# Patient Record
Sex: MALE | Race: WHITE | NOT HISPANIC OR LATINO | Employment: OTHER | URBAN - METROPOLITAN AREA
[De-identification: names, ages, dates, MRNs, and addresses within clinical notes are randomized per-mention and may not be internally consistent; named-entity substitution may affect disease eponyms.]

---

## 2023-02-13 LAB
ANION GAP SERPL CALCULATED.3IONS-SCNC: 10 MMOL/L (ref 7–15)
APTT PPP: 27 SECONDS (ref 22–38)
BUN SERPL-MCNC: 23.2 MG/DL (ref 8–23)
CALCIUM SERPL-MCNC: 9.5 MG/DL (ref 8.8–10.2)
CHLORIDE SERPL-SCNC: 96 MMOL/L (ref 98–107)
CREAT SERPL-MCNC: 1.16 MG/DL (ref 0.67–1.17)
DEPRECATED HCO3 PLAS-SCNC: 27 MMOL/L (ref 22–29)
GFR SERPL CREATININE-BSD FRML MDRD: 70 ML/MIN/1.73M2
GLUCOSE SERPL-MCNC: 98 MG/DL (ref 70–99)
INR PPP: 1.01 (ref 0.85–1.15)
POTASSIUM SERPL-SCNC: 4.8 MMOL/L (ref 3.4–5.3)
SODIUM SERPL-SCNC: 133 MMOL/L (ref 136–145)

## 2023-02-13 PROCEDURE — 99285 EMERGENCY DEPT VISIT HI MDM: CPT | Mod: 25

## 2023-02-13 PROCEDURE — 85610 PROTHROMBIN TIME: CPT | Performed by: NURSE PRACTITIONER

## 2023-02-13 PROCEDURE — 36415 COLL VENOUS BLD VENIPUNCTURE: CPT | Performed by: NURSE PRACTITIONER

## 2023-02-13 PROCEDURE — 85730 THROMBOPLASTIN TIME PARTIAL: CPT | Performed by: NURSE PRACTITIONER

## 2023-02-13 PROCEDURE — 80048 BASIC METABOLIC PNL TOTAL CA: CPT | Performed by: NURSE PRACTITIONER

## 2023-02-13 PROCEDURE — 85007 BL SMEAR W/DIFF WBC COUNT: CPT | Performed by: NURSE PRACTITIONER

## 2023-02-13 PROCEDURE — 85014 HEMATOCRIT: CPT | Performed by: NURSE PRACTITIONER

## 2023-02-14 ENCOUNTER — APPOINTMENT (OUTPATIENT)
Dept: RADIOLOGY | Facility: HOSPITAL | Age: 63
End: 2023-02-14
Attending: EMERGENCY MEDICINE
Payer: MEDICARE

## 2023-02-14 ENCOUNTER — APPOINTMENT (OUTPATIENT)
Dept: ULTRASOUND IMAGING | Facility: HOSPITAL | Age: 63
End: 2023-02-14
Attending: NURSE PRACTITIONER
Payer: MEDICARE

## 2023-02-14 ENCOUNTER — HOSPITAL ENCOUNTER (EMERGENCY)
Facility: HOSPITAL | Age: 63
Discharge: HOME | End: 2023-02-14
Attending: EMERGENCY MEDICINE | Admitting: EMERGENCY MEDICINE
Payer: MEDICARE

## 2023-02-14 VITALS
HEART RATE: 71 BPM | OXYGEN SATURATION: 99 % | RESPIRATION RATE: 18 BRPM | WEIGHT: 157 LBS | SYSTOLIC BLOOD PRESSURE: 135 MMHG | TEMPERATURE: 97.7 F | BODY MASS INDEX: 24.64 KG/M2 | DIASTOLIC BLOOD PRESSURE: 67 MMHG | HEIGHT: 67 IN

## 2023-02-14 DIAGNOSIS — R60.0 BILATERAL LOWER EXTREMITY EDEMA: ICD-10-CM

## 2023-02-14 DIAGNOSIS — M79.605 PAIN OF LEFT LOWER EXTREMITY: ICD-10-CM

## 2023-02-14 LAB
BASOPHILS # BLD MANUAL: 0 10E3/UL (ref 0–0.2)
BASOPHILS NFR BLD MANUAL: 0 %
EOSINOPHIL # BLD MANUAL: 0.1 10E3/UL (ref 0–0.7)
EOSINOPHIL NFR BLD MANUAL: 1 %
ERYTHROCYTE [DISTWIDTH] IN BLOOD BY AUTOMATED COUNT: 15.3 % (ref 10–15)
HCT VFR BLD AUTO: 41.3 % (ref 40–53)
HGB BLD-MCNC: 13 G/DL (ref 13.3–17.7)
LYMPHOCYTES # BLD MANUAL: 2 10E3/UL (ref 0.8–5.3)
LYMPHOCYTES NFR BLD MANUAL: 25 %
MCH RBC QN AUTO: 32.7 PG (ref 26.5–33)
MCHC RBC AUTO-ENTMCNC: 31.5 G/DL (ref 31.5–36.5)
MCV RBC AUTO: 104 FL (ref 78–100)
MONOCYTES # BLD MANUAL: 0.9 10E3/UL (ref 0–1.3)
MONOCYTES NFR BLD MANUAL: 12 %
NEUTROPHILS # BLD MANUAL: 4.9 10E3/UL (ref 1.6–8.3)
NEUTROPHILS NFR BLD MANUAL: 62 %
PLAT MORPH BLD: NORMAL
PLATELET # BLD AUTO: 143 10E3/UL (ref 150–450)
RBC # BLD AUTO: 3.97 10E6/UL (ref 4.4–5.9)
RBC MORPH BLD: NORMAL
WBC # BLD AUTO: 7.9 10E3/UL (ref 4–11)

## 2023-02-14 PROCEDURE — 73610 X-RAY EXAM OF ANKLE: CPT | Mod: LT

## 2023-02-14 PROCEDURE — 73590 X-RAY EXAM OF LOWER LEG: CPT | Mod: LT

## 2023-02-14 PROCEDURE — 93971 EXTREMITY STUDY: CPT | Mod: LT

## 2023-02-14 PROCEDURE — 250N000013 HC RX MED GY IP 250 OP 250 PS 637: Performed by: EMERGENCY MEDICINE

## 2023-02-14 RX ORDER — HYDROCODONE BITARTRATE AND ACETAMINOPHEN 5; 325 MG/1; MG/1
1 TABLET ORAL EVERY 6 HOURS PRN
Qty: 10 TABLET | Refills: 0 | Status: SHIPPED | OUTPATIENT
Start: 2023-02-14 | End: 2023-02-17

## 2023-02-14 RX ORDER — HYDROCODONE BITARTRATE AND ACETAMINOPHEN 5; 325 MG/1; MG/1
2 TABLET ORAL ONCE
Status: COMPLETED | OUTPATIENT
Start: 2023-02-14 | End: 2023-02-14

## 2023-02-14 RX ADMIN — HYDROCODONE BITARTRATE AND ACETAMINOPHEN 2 TABLET: 5; 325 TABLET ORAL at 03:54

## 2023-02-14 ASSESSMENT — ENCOUNTER SYMPTOMS: MYALGIAS: 1

## 2023-02-14 NOTE — ED TRIAGE NOTES
Patient comes to ED for evaluation of left lower leg swelling, redness and warmth to touch. Started today.      Triage Assessment     Row Name 02/13/23 2014       Triage Assessment (Adult)    Airway WDL WDL       Skin Circulation/Temperature WDL    Skin Circulation/Temperature WDL X  reddened swollen left leg       Cardiac WDL    Cardiac WDL WDL       Peripheral/Neurovascular WDL    Peripheral Neurovascular WDL WDL       Cognitive/Neuro/Behavioral WDL    Cognitive/Neuro/Behavioral WDL WDL

## 2023-02-14 NOTE — DISCHARGE INSTRUCTIONS
As discussed in the ER recommend restarting your diuretic medication to help remove fluid from your legs.  Recommend use of the wheelchair and limit weightbearing on that left leg to allow rest and follow-up with your regular doctor in the next week for recheck of pain and symptoms.  If any redness, increasing swelling, open wound, or changes in pain level occur you may return to the ER or call your doctor.  Regarding the pain medication recommend taking your senna-s 1 tablet twice daily as previously taken but if constipation starts to develop may increase to 2 tabs twice daily.

## 2023-02-14 NOTE — ED PROVIDER NOTES
NAME: Zackary Hutchison  AGE: 62 year old male  YOB: 1960  MRN: [id number]  EVALUATION DATE & TIME: No admission date for patient encounter.    PCP: Reid Escobar    ED PROVIDER: Michael Wilkinson M.D.      Chief Complaint   Patient presents with     Leg Swelling     FINAL IMPRESSION:  1. Pain of left lower extremity    2. Bilateral lower extremity edema      MEDICAL DECISION MAKIN:59 AM I met with the patient, obtained history, performed an initial exam, and discussed options and plan for diagnostics and treatment here in the ED.   3:35 AM I updated Zackary regarding his lab and imaging results. I discussed discharge with him and he is agreeable with the plan.    Patient was clinically assessed and consented to treatment. After assessment, medical decision making and workup were discussed with the patient. The patient was agreeable to plan for testing, workup, and treatment.  Pertinent Labs & Imaging studies reviewed. (See chart for details)       Medical Decision Making    History:    Supplemental history from: Documented in chart, if applicable    External Record(s) reviewed: Documented in chart, if applicable.    Work Up:    Chart documentation includes differential considered and any EKGs or imaging independently interpreted by provider, where specified.    In additional to work up documented, I considered the following work up: Documented in chart, if applicable.    External consultation:    Discussion of management with another provider: Documented in chart, if applicable    Complicating factors:    Care impacted by chronic illness: Diabetes, Heart Disease, Hyperlipidemia and Hypertension    Care affected by social determinants of health: N/A    Disposition considerations: Discharge. I prescribed additional prescription strength medication(s) as charted. I considered admission, but discharged the patient after share decision making conversation.    Zackary Hutchison is a 62 year old male who  presents with left leg pain.   Differential diagnosis includes but not limited to cellulitis, musculoskeletal pain, tibial fracture, DVT, peripheral neuropathy.  Patient with history of leg swelling and CHF.  Patient reports this has been much better but recently he has moved locations within his assisted living and now has a longer hallway to get to the cafeteria and common area.  She reports having to use wheelchair to get down that hallway but initially was using walker which she normally likes using as opposed to the wheelchair.  She reports with this excessive use he has noticed some pain in the left shin area.  On examination there is no redness, no bruising, no swelling in comparison to the right which both have some 1+ edema but patient reports this is down from previous.  We did discuss work-up and patient was sent for ultrasound and x-ray along with labs.  Labs did not show any kidney function changes and no elevated white blood cell count.  Otherwise DVT scan was negative and x-rays revealed an old tibial plateau fracture.  Patient reports not remembering having a fracture there but believes possibly that he has been favoring his other leg due to hip replacement and thus putting more strain on the left leg when using the walker recently.  Patient reports he has been feeling better with that when using the wheelchair but with pain medication here in the ER the symptoms have improved dramatically.  Patient I discussed options for admission for possible placement changes but patient reports he can talk to staff for further help at his care facility and would prefer to go home.  Patient requested medication to help with pain at home until he can speak with staff and his regular doctor which was prescribed.    0 minutes of critical care time    MEDICATIONS GIVEN IN THE EMERGENCY:  Medications   HYDROcodone-acetaminophen (NORCO) 5-325 MG per tablet 2 tablet (2 tablets Oral $Given 2/14/23 0354)       NEW  PRESCRIPTIONS STARTED AT TODAY'S ER VISIT:  Discharge Medication List as of 2/14/2023  3:58 AM      START taking these medications    Details   HYDROcodone-acetaminophen (NORCO) 5-325 MG tablet Take 1 tablet by mouth every 6 hours as needed for severe pain (7-10), Disp-10 tablet, R-0, Local Print             =================================================================    HPI    Patient information was obtained from: Patient    Use of : N/A      Zackary Hutchison is a 62 year old male with a past medical history of HLD, HTN, elevated glucose, chronic diastolic CHF, Chronic ischemic heart disease who presents to the ED by private vehicle for an evaluation of leg swelling.    The patient has been experiencing progressive left leg pain and swelling since 3 weeks ago. He describes the pain as a burning sensation. He notes possible factors of his symptoms may be from overusing his walker despite him normally using a wheelchair for mobility. The pain is more localized to the inner anterior side of his left leg and the inner side of his left foot. He notes having a history of hip replacement last June. He denied having any history of blood clots. Otherwise, he denied any other associated symptoms at this time.    REVIEW OF SYSTEMS   Review of Systems   Cardiovascular: Positive for leg swelling.   Musculoskeletal: Positive for myalgias (left leg pain, left foot pain).   All other systems reviewed and are negative.     PAST MEDICAL HISTORY:  No past medical history on file.    PAST SURGICAL HISTORY:  No past surgical history on file.    CURRENT MEDICATIONS:    No current facility-administered medications for this encounter.  No current outpatient medications on file.    ALLERGIES:  No Known Allergies    FAMILY HISTORY:  No family history on file.    SOCIAL HISTORY:   Social History     Socioeconomic History     Marital status: Single       PHYSICAL EXAM:    Vitals: /67   Pulse 71   Temp 97.7  F (36.5  C)  "(Oral)   Resp 18   Ht 1.702 m (5' 7\")   Wt 71.2 kg (157 lb)   SpO2 99%   BMI 24.59 kg/m     Physical Exam  Vitals and nursing note reviewed.   Constitutional:       General: He is not in acute distress.     Appearance: Normal appearance. He is obese. He is not ill-appearing, toxic-appearing or diaphoretic.   HENT:      Head: Normocephalic.   Cardiovascular:      Rate and Rhythm: Normal rate and regular rhythm.      Heart sounds: Normal heart sounds.   Pulmonary:      Effort: Pulmonary effort is normal. No respiratory distress.      Breath sounds: Normal breath sounds.   Abdominal:      Palpations: Abdomen is soft.      Tenderness: There is no abdominal tenderness.   Musculoskeletal:         General: Tenderness present.      Cervical back: Normal range of motion.      Right lower le+ Edema present.      Left lower leg: Tenderness present. 1+ Edema present.        Legs:    Skin:     General: Skin is warm and dry.      Capillary Refill: Capillary refill takes less than 2 seconds.      Coloration: Skin is not pale.      Findings: No erythema or rash.   Neurological:      General: No focal deficit present.      Mental Status: He is alert.      Cranial Nerves: No cranial nerve deficit.      Coordination: Coordination normal.   Psychiatric:         Behavior: Behavior normal.        LAB:  All pertinent labs reviewed and interpreted.  Labs Ordered and Resulted from Time of ED Arrival to Time of ED Departure   BASIC METABOLIC PANEL - Abnormal       Result Value    Sodium 133 (*)     Potassium 4.8      Chloride 96 (*)     Carbon Dioxide (CO2) 27      Anion Gap 10      Urea Nitrogen 23.2 (*)     Creatinine 1.16      Calcium 9.5      Glucose 98      GFR Estimate 70     CBC WITH PLATELETS AND DIFFERENTIAL - Abnormal    WBC Count 7.9      RBC Count 3.97 (*)     Hemoglobin 13.0 (*)     Hematocrit 41.3       (*)     MCH 32.7      MCHC 31.5      RDW 15.3 (*)     Platelet Count 143 (*)    INR - Normal    INR 1.01   "   PARTIAL THROMBOPLASTIN TIME - Normal    aPTT 27     DIFFERENTIAL    % Neutrophils 62      % Lymphocytes 25      % Monocytes 12      % Eosinophils 1      % Basophils 0      Absolute Neutrophils 4.9      Absolute Lymphocytes 2.0      Absolute Monocytes 0.9      Absolute Eosinophils 0.1      Absolute Basophils 0.0      RBC Morphology Confirmed RBC Indices      Platelet Assessment        Value: Automated Count Confirmed. Platelet morphology is normal.     RADIOLOGY:  XR Ankle Left 3 Views   Final Result   IMPRESSION: A old medial tibial plateau fracture deformity is noted. There is no evidence of acute fracture or malalignment identified. Soft tissue swelling of the lower leg and ankle is identified.      XR Tibia & Fibula Left 2 Views   Final Result   IMPRESSION: A old medial tibial plateau fracture deformity is noted. There is no evidence of acute fracture or malalignment identified. Soft tissue swelling of the lower leg and ankle is identified.      US Lower Extremity Venous Duplex Left   Final Result   IMPRESSION:   1.  No deep venous thrombosis in the left lower extremity.      PROCEDURES:   Procedures       I, Andrez Gonzales, am serving as a scribe to document services personally performed by Dr. Michael Wilkinson  based on my observation and the provider's statements to me. IMichael MD attest that Andrez Gonzales is acting in a scribe capacity, has observed my performance of the services and has documented them in accordance with my direction.      Michael Wilkinson M.D.  Emergency Medicine  [facility] Emergency Department and [facility] Emergency Department       Michael Wilkinson MD  02/23/23 2944

## 2023-09-08 ENCOUNTER — HOSPITAL ENCOUNTER (INPATIENT)
Facility: HOSPITAL | Age: 63
LOS: 6 days | Discharge: HOME | DRG: 286 | End: 2023-09-14
Attending: EMERGENCY MEDICINE | Admitting: INTERNAL MEDICINE
Payer: MEDICARE

## 2023-09-08 ENCOUNTER — APPOINTMENT (OUTPATIENT)
Dept: RADIOLOGY | Facility: HOSPITAL | Age: 63
DRG: 286 | End: 2023-09-08
Attending: EMERGENCY MEDICINE
Payer: MEDICARE

## 2023-09-08 DIAGNOSIS — R94.31 ABNORMAL ELECTROCARDIOGRAM: ICD-10-CM

## 2023-09-08 DIAGNOSIS — I50.9 HEART FAILURE, UNSPECIFIED HF CHRONICITY, UNSPECIFIED HEART FAILURE TYPE (H): Primary | ICD-10-CM

## 2023-09-08 DIAGNOSIS — I48.91 ATRIAL FIBRILLATION, UNSPECIFIED TYPE (H): ICD-10-CM

## 2023-09-08 DIAGNOSIS — I21.4 NSTEMI (NON-ST ELEVATED MYOCARDIAL INFARCTION) (H): ICD-10-CM

## 2023-09-08 DIAGNOSIS — R06.02 SOB (SHORTNESS OF BREATH): ICD-10-CM

## 2023-09-08 DIAGNOSIS — L03.116 CELLULITIS OF LEFT LOWER EXTREMITY: ICD-10-CM

## 2023-09-08 LAB
ANION GAP SERPL CALCULATED.3IONS-SCNC: 11 MMOL/L (ref 7–15)
ANION GAP SERPL CALCULATED.3IONS-SCNC: 16 MMOL/L (ref 7–15)
BASOPHILS # BLD AUTO: 0.1 10E3/UL (ref 0–0.2)
BASOPHILS NFR BLD AUTO: 1 %
BUN SERPL-MCNC: 34.9 MG/DL (ref 8–23)
BUN SERPL-MCNC: 35.2 MG/DL (ref 8–23)
CALCIUM SERPL-MCNC: 9.7 MG/DL (ref 8.8–10.2)
CALCIUM SERPL-MCNC: 9.7 MG/DL (ref 8.8–10.2)
CHLORIDE SERPL-SCNC: 98 MMOL/L (ref 98–107)
CHLORIDE SERPL-SCNC: 98 MMOL/L (ref 98–107)
CK SERPL-CCNC: 139 U/L (ref 39–308)
CREAT SERPL-MCNC: 1.35 MG/DL (ref 0.67–1.17)
CREAT SERPL-MCNC: 1.38 MG/DL (ref 0.67–1.17)
CRP SERPL-MCNC: 15.6 MG/L
DEPRECATED HCO3 PLAS-SCNC: 21 MMOL/L (ref 22–29)
DEPRECATED HCO3 PLAS-SCNC: 24 MMOL/L (ref 22–29)
EGFRCR SERPLBLD CKD-EPI 2021: 57 ML/MIN/1.73M2
EGFRCR SERPLBLD CKD-EPI 2021: 59 ML/MIN/1.73M2
EOSINOPHIL # BLD AUTO: 0.2 10E3/UL (ref 0–0.7)
EOSINOPHIL NFR BLD AUTO: 2 %
ERYTHROCYTE [DISTWIDTH] IN BLOOD BY AUTOMATED COUNT: 16.6 % (ref 10–15)
GLUCOSE SERPL-MCNC: 104 MG/DL (ref 70–99)
GLUCOSE SERPL-MCNC: 105 MG/DL (ref 70–99)
HCT VFR BLD AUTO: 32.5 % (ref 40–53)
HGB BLD-MCNC: 10.3 G/DL (ref 13.3–17.7)
IMM GRANULOCYTES # BLD: 0.1 10E3/UL
IMM GRANULOCYTES NFR BLD: 1 %
LYMPHOCYTES # BLD AUTO: 1.1 10E3/UL (ref 0.8–5.3)
LYMPHOCYTES NFR BLD AUTO: 15 %
MCH RBC QN AUTO: 32.3 PG (ref 26.5–33)
MCHC RBC AUTO-ENTMCNC: 31.7 G/DL (ref 31.5–36.5)
MCV RBC AUTO: 102 FL (ref 78–100)
MONOCYTES # BLD AUTO: 0.6 10E3/UL (ref 0–1.3)
MONOCYTES NFR BLD AUTO: 9 %
NEUTROPHILS # BLD AUTO: 5.3 10E3/UL (ref 1.6–8.3)
NEUTROPHILS NFR BLD AUTO: 72 %
NRBC # BLD AUTO: 0 10E3/UL
NRBC BLD AUTO-RTO: 0 /100
NT-PROBNP SERPL-MCNC: 751 PG/ML (ref 0–900)
PLATELET # BLD AUTO: 173 10E3/UL (ref 150–450)
POTASSIUM SERPL-SCNC: 5.1 MMOL/L (ref 3.4–5.3)
POTASSIUM SERPL-SCNC: 5.5 MMOL/L (ref 3.4–5.3)
PROCALCITONIN SERPL IA-MCNC: 0.03 NG/ML
RBC # BLD AUTO: 3.19 10E6/UL (ref 4.4–5.9)
SODIUM SERPL-SCNC: 133 MMOL/L (ref 136–145)
SODIUM SERPL-SCNC: 135 MMOL/L (ref 136–145)
TROPONIN T SERPL HS-MCNC: 28 NG/L
TROPONIN T SERPL HS-MCNC: 29 NG/L
WBC # BLD AUTO: 7.3 10E3/UL (ref 4–11)

## 2023-09-08 PROCEDURE — 82550 ASSAY OF CK (CPK): CPT | Performed by: INTERNAL MEDICINE

## 2023-09-08 PROCEDURE — 250N000011 HC RX IP 250 OP 636: Mod: JZ | Performed by: EMERGENCY MEDICINE

## 2023-09-08 PROCEDURE — 96374 THER/PROPH/DIAG INJ IV PUSH: CPT

## 2023-09-08 PROCEDURE — 250N000013 HC RX MED GY IP 250 OP 250 PS 637: Performed by: EMERGENCY MEDICINE

## 2023-09-08 PROCEDURE — 250N000009 HC RX 250: Performed by: EMERGENCY MEDICINE

## 2023-09-08 PROCEDURE — 80048 BASIC METABOLIC PNL TOTAL CA: CPT | Performed by: EMERGENCY MEDICINE

## 2023-09-08 PROCEDURE — 83880 ASSAY OF NATRIURETIC PEPTIDE: CPT | Performed by: EMERGENCY MEDICINE

## 2023-09-08 PROCEDURE — 93005 ELECTROCARDIOGRAM TRACING: CPT | Performed by: EMERGENCY MEDICINE

## 2023-09-08 PROCEDURE — 250N000011 HC RX IP 250 OP 636: Mod: JZ | Performed by: INTERNAL MEDICINE

## 2023-09-08 PROCEDURE — 99223 1ST HOSP IP/OBS HIGH 75: CPT | Performed by: INTERNAL MEDICINE

## 2023-09-08 PROCEDURE — 94640 AIRWAY INHALATION TREATMENT: CPT

## 2023-09-08 PROCEDURE — 84145 PROCALCITONIN (PCT): CPT | Performed by: INTERNAL MEDICINE

## 2023-09-08 PROCEDURE — 210N000001 HC R&B IMCU HEART CARE

## 2023-09-08 PROCEDURE — 36415 COLL VENOUS BLD VENIPUNCTURE: CPT | Performed by: EMERGENCY MEDICINE

## 2023-09-08 PROCEDURE — 84484 ASSAY OF TROPONIN QUANT: CPT | Performed by: EMERGENCY MEDICINE

## 2023-09-08 PROCEDURE — 999N000157 HC STATISTIC RCP TIME EA 10 MIN

## 2023-09-08 PROCEDURE — 71045 X-RAY EXAM CHEST 1 VIEW: CPT

## 2023-09-08 PROCEDURE — 80048 BASIC METABOLIC PNL TOTAL CA: CPT | Performed by: INTERNAL MEDICINE

## 2023-09-08 PROCEDURE — 99285 EMERGENCY DEPT VISIT HI MDM: CPT | Mod: 25

## 2023-09-08 PROCEDURE — 86140 C-REACTIVE PROTEIN: CPT | Performed by: INTERNAL MEDICINE

## 2023-09-08 PROCEDURE — 85025 COMPLETE CBC W/AUTO DIFF WBC: CPT | Performed by: EMERGENCY MEDICINE

## 2023-09-08 PROCEDURE — 250N000013 HC RX MED GY IP 250 OP 250 PS 637: Performed by: INTERNAL MEDICINE

## 2023-09-08 RX ORDER — ASPIRIN 81 MG/1
81 TABLET, CHEWABLE ORAL ONCE
Status: COMPLETED | OUTPATIENT
Start: 2023-09-08 | End: 2023-09-08

## 2023-09-08 RX ORDER — ISOSORBIDE MONONITRATE 60 MG/1
180 TABLET, EXTENDED RELEASE ORAL EVERY MORNING
Status: DISCONTINUED | OUTPATIENT
Start: 2023-09-09 | End: 2023-09-14 | Stop reason: HOSPADM

## 2023-09-08 RX ORDER — SPIRONOLACTONE 25 MG/1
25 TABLET ORAL EVERY MORNING
Status: DISCONTINUED | OUTPATIENT
Start: 2023-09-09 | End: 2023-09-08

## 2023-09-08 RX ORDER — ALBUTEROL SULFATE 90 UG/1
1 AEROSOL, METERED RESPIRATORY (INHALATION) EVERY 6 HOURS PRN
Status: DISCONTINUED | OUTPATIENT
Start: 2023-09-08 | End: 2023-09-14 | Stop reason: HOSPADM

## 2023-09-08 RX ORDER — PIPERACILLIN SODIUM, TAZOBACTAM SODIUM 3; .375 G/15ML; G/15ML
3.38 INJECTION, POWDER, LYOPHILIZED, FOR SOLUTION INTRAVENOUS ONCE
Status: COMPLETED | OUTPATIENT
Start: 2023-09-08 | End: 2023-09-09

## 2023-09-08 RX ORDER — ACETAMINOPHEN 500 MG
1000 TABLET ORAL EVERY 8 HOURS PRN
Status: ON HOLD | COMMUNITY
Start: 2023-01-10 | End: 2024-05-20

## 2023-09-08 RX ORDER — ROPINIROLE 2 MG/1
2 TABLET, FILM COATED ORAL 3 TIMES DAILY
Status: ON HOLD | COMMUNITY
Start: 2023-08-15 | End: 2024-01-18

## 2023-09-08 RX ORDER — DULOXETIN HYDROCHLORIDE 30 MG/1
30 CAPSULE, DELAYED RELEASE ORAL EVERY MORNING
COMMUNITY
End: 2024-06-03

## 2023-09-08 RX ORDER — ATORVASTATIN CALCIUM 40 MG/1
40 TABLET, FILM COATED ORAL EVERY MORNING
Status: DISCONTINUED | OUTPATIENT
Start: 2023-09-09 | End: 2023-09-14 | Stop reason: HOSPADM

## 2023-09-08 RX ORDER — ROPINIROLE 1 MG/1
4 TABLET, FILM COATED ORAL EVERY EVENING
Status: DISCONTINUED | OUTPATIENT
Start: 2023-09-08 | End: 2023-09-14 | Stop reason: HOSPADM

## 2023-09-08 RX ORDER — APIXABAN 5 MG/1
5 TABLET, FILM COATED ORAL 2 TIMES DAILY
COMMUNITY
End: 2023-09-09

## 2023-09-08 RX ORDER — IPRATROPIUM BROMIDE AND ALBUTEROL SULFATE 2.5; .5 MG/3ML; MG/3ML
3 SOLUTION RESPIRATORY (INHALATION)
Status: DISCONTINUED | OUTPATIENT
Start: 2023-09-08 | End: 2023-09-08

## 2023-09-08 RX ORDER — POTASSIUM CHLORIDE 1500 MG/1
20 TABLET, EXTENDED RELEASE ORAL DAILY
Status: DISCONTINUED | OUTPATIENT
Start: 2023-09-09 | End: 2023-09-08

## 2023-09-08 RX ORDER — ONDANSETRON 4 MG/1
4 TABLET, ORALLY DISINTEGRATING ORAL EVERY 6 HOURS PRN
Status: DISCONTINUED | OUTPATIENT
Start: 2023-09-08 | End: 2023-09-14 | Stop reason: HOSPADM

## 2023-09-08 RX ORDER — BUMETANIDE 0.25 MG/ML
2 INJECTION INTRAMUSCULAR; INTRAVENOUS ONCE
Status: COMPLETED | OUTPATIENT
Start: 2023-09-08 | End: 2023-09-08

## 2023-09-08 RX ORDER — ONDANSETRON 2 MG/ML
4 INJECTION INTRAMUSCULAR; INTRAVENOUS EVERY 6 HOURS PRN
Status: DISCONTINUED | OUTPATIENT
Start: 2023-09-08 | End: 2023-09-14 | Stop reason: HOSPADM

## 2023-09-08 RX ORDER — ACETAMINOPHEN 325 MG/1
650 TABLET ORAL EVERY 6 HOURS PRN
Status: DISCONTINUED | OUTPATIENT
Start: 2023-09-08 | End: 2023-09-11

## 2023-09-08 RX ORDER — NITROGLYCERIN 0.4 MG/1
0.4 TABLET SUBLINGUAL EVERY 5 MIN PRN
COMMUNITY
Start: 2023-01-06 | End: 2023-09-29

## 2023-09-08 RX ORDER — ATORVASTATIN CALCIUM 40 MG/1
40 TABLET, FILM COATED ORAL EVERY MORNING
COMMUNITY

## 2023-09-08 RX ORDER — GABAPENTIN 300 MG/1
1 CAPSULE ORAL 2 TIMES DAILY
Status: ON HOLD | COMMUNITY
End: 2023-12-27

## 2023-09-08 RX ORDER — AMLODIPINE BESYLATE 5 MG/1
5 TABLET ORAL AT BEDTIME
Status: DISCONTINUED | OUTPATIENT
Start: 2023-09-08 | End: 2023-09-09

## 2023-09-08 RX ORDER — IPRATROPIUM BROMIDE AND ALBUTEROL SULFATE 2.5; .5 MG/3ML; MG/3ML
3 SOLUTION RESPIRATORY (INHALATION) EVERY 6 HOURS PRN
COMMUNITY
Start: 2023-08-10 | End: 2024-01-08

## 2023-09-08 RX ORDER — ASPIRIN 81 MG/1
81 TABLET ORAL DAILY
Status: DISCONTINUED | OUTPATIENT
Start: 2023-09-09 | End: 2023-09-14 | Stop reason: HOSPADM

## 2023-09-08 RX ORDER — ACETAMINOPHEN 650 MG/1
650 SUPPOSITORY RECTAL EVERY 6 HOURS PRN
Status: DISCONTINUED | OUTPATIENT
Start: 2023-09-08 | End: 2023-09-11

## 2023-09-08 RX ORDER — LISINOPRIL 5 MG/1
5 TABLET ORAL AT BEDTIME
Status: ON HOLD | COMMUNITY
End: 2023-09-14

## 2023-09-08 RX ORDER — AMIODARONE HYDROCHLORIDE 200 MG/1
200 TABLET ORAL DAILY
COMMUNITY
End: 2024-03-21

## 2023-09-08 RX ORDER — AMLODIPINE BESYLATE 5 MG/1
5 TABLET ORAL AT BEDTIME
Status: ON HOLD | COMMUNITY
End: 2023-09-14

## 2023-09-08 RX ORDER — IPRATROPIUM BROMIDE AND ALBUTEROL SULFATE 2.5; .5 MG/3ML; MG/3ML
3 SOLUTION RESPIRATORY (INHALATION) EVERY 6 HOURS PRN
Status: DISCONTINUED | OUTPATIENT
Start: 2023-09-08 | End: 2023-09-08

## 2023-09-08 RX ORDER — ACETAMINOPHEN/DIPHENHYDRAMINE 500MG-25MG
1000 TABLET ORAL DAILY
COMMUNITY

## 2023-09-08 RX ORDER — IPRATROPIUM BROMIDE AND ALBUTEROL SULFATE 2.5; .5 MG/3ML; MG/3ML
3 SOLUTION RESPIRATORY (INHALATION) EVERY 4 HOURS PRN
Status: DISCONTINUED | OUTPATIENT
Start: 2023-09-08 | End: 2023-09-14 | Stop reason: HOSPADM

## 2023-09-08 RX ORDER — METOLAZONE 5 MG/1
5 TABLET ORAL SEE ADMIN INSTRUCTIONS
Status: ON HOLD | COMMUNITY
Start: 2023-07-10 | End: 2023-09-14

## 2023-09-08 RX ORDER — LIDOCAINE 40 MG/G
CREAM TOPICAL
Status: DISCONTINUED | OUTPATIENT
Start: 2023-09-08 | End: 2023-09-14 | Stop reason: HOSPADM

## 2023-09-08 RX ORDER — PIPERACILLIN SODIUM, TAZOBACTAM SODIUM 3; .375 G/15ML; G/15ML
3.38 INJECTION, POWDER, LYOPHILIZED, FOR SOLUTION INTRAVENOUS EVERY 8 HOURS
Status: DISCONTINUED | OUTPATIENT
Start: 2023-09-09 | End: 2023-09-14 | Stop reason: HOSPADM

## 2023-09-08 RX ORDER — HYDROMORPHONE HCL IN WATER/PF 6 MG/30 ML
0.2 PATIENT CONTROLLED ANALGESIA SYRINGE INTRAVENOUS
Status: DISCONTINUED | OUTPATIENT
Start: 2023-09-08 | End: 2023-09-13

## 2023-09-08 RX ORDER — METOPROLOL SUCCINATE 50 MG/1
50 TABLET, EXTENDED RELEASE ORAL EVERY MORNING
Status: ON HOLD | COMMUNITY
End: 2023-09-14

## 2023-09-08 RX ORDER — ALBUTEROL SULFATE 90 UG/1
1 AEROSOL, METERED RESPIRATORY (INHALATION) EVERY 6 HOURS PRN
COMMUNITY
End: 2023-12-22

## 2023-09-08 RX ORDER — PIPERACILLIN SODIUM, TAZOBACTAM SODIUM 3; .375 G/15ML; G/15ML
3.38 INJECTION, POWDER, LYOPHILIZED, FOR SOLUTION INTRAVENOUS EVERY 8 HOURS
Status: DISCONTINUED | OUTPATIENT
Start: 2023-09-08 | End: 2023-09-08

## 2023-09-08 RX ORDER — BUMETANIDE 2 MG/1
4 TABLET ORAL 2 TIMES DAILY
COMMUNITY
End: 2023-11-28

## 2023-09-08 RX ORDER — IPRATROPIUM BROMIDE AND ALBUTEROL SULFATE 2.5; .5 MG/3ML; MG/3ML
3 SOLUTION RESPIRATORY (INHALATION) ONCE
Status: COMPLETED | OUTPATIENT
Start: 2023-09-08 | End: 2023-09-08

## 2023-09-08 RX ORDER — POLYETHYLENE GLYCOL 3350 17 G/17G
17 POWDER, FOR SOLUTION ORAL 2 TIMES DAILY PRN
Status: ON HOLD | COMMUNITY
Start: 2023-01-10 | End: 2024-01-18

## 2023-09-08 RX ORDER — GABAPENTIN 300 MG/1
3 CAPSULE ORAL AT BEDTIME
Status: ON HOLD | COMMUNITY
End: 2023-12-27

## 2023-09-08 RX ORDER — BUMETANIDE 0.25 MG/ML
1 INJECTION INTRAMUSCULAR; INTRAVENOUS EVERY 12 HOURS
Status: DISCONTINUED | OUTPATIENT
Start: 2023-09-09 | End: 2023-09-09

## 2023-09-08 RX ORDER — ENOXAPARIN SODIUM 150 MG/ML
1 INJECTION SUBCUTANEOUS ONCE
Status: COMPLETED | OUTPATIENT
Start: 2023-09-08 | End: 2023-09-08

## 2023-09-08 RX ORDER — GABAPENTIN 300 MG/1
300 CAPSULE ORAL 2 TIMES DAILY
Status: DISCONTINUED | OUTPATIENT
Start: 2023-09-09 | End: 2023-09-14 | Stop reason: HOSPADM

## 2023-09-08 RX ORDER — GABAPENTIN 300 MG/1
900 CAPSULE ORAL AT BEDTIME
Status: DISCONTINUED | OUTPATIENT
Start: 2023-09-08 | End: 2023-09-14 | Stop reason: HOSPADM

## 2023-09-08 RX ORDER — POTASSIUM CHLORIDE 1500 MG/1
20 TABLET, EXTENDED RELEASE ORAL DAILY
Status: ON HOLD | COMMUNITY
End: 2024-01-18

## 2023-09-08 RX ORDER — SPIRONOLACTONE 25 MG/1
1 TABLET ORAL EVERY MORNING
Status: ON HOLD | COMMUNITY
Start: 2023-01-06 | End: 2023-09-14

## 2023-09-08 RX ORDER — CEFTRIAXONE 1 G/1
1 INJECTION, POWDER, FOR SOLUTION INTRAMUSCULAR; INTRAVENOUS ONCE
Status: COMPLETED | OUTPATIENT
Start: 2023-09-08 | End: 2023-09-08

## 2023-09-08 RX ORDER — DULOXETIN HYDROCHLORIDE 30 MG/1
30 CAPSULE, DELAYED RELEASE ORAL EVERY MORNING
Status: DISCONTINUED | OUTPATIENT
Start: 2023-09-09 | End: 2023-09-14 | Stop reason: HOSPADM

## 2023-09-08 RX ORDER — ISOSORBIDE MONONITRATE 60 MG/1
180 TABLET, EXTENDED RELEASE ORAL EVERY MORNING
COMMUNITY
End: 2024-04-22

## 2023-09-08 RX ORDER — AMIODARONE HYDROCHLORIDE 200 MG/1
200 TABLET ORAL DAILY
Status: DISCONTINUED | OUTPATIENT
Start: 2023-09-09 | End: 2023-09-14 | Stop reason: HOSPADM

## 2023-09-08 RX ADMIN — CEFTRIAXONE SODIUM 1 G: 1 INJECTION, POWDER, FOR SOLUTION INTRAMUSCULAR; INTRAVENOUS at 21:03

## 2023-09-08 RX ADMIN — ROPINIROLE HYDROCHLORIDE 4 MG: 1 TABLET, FILM COATED ORAL at 23:05

## 2023-09-08 RX ADMIN — ENOXAPARIN SODIUM 120 MG: 120 INJECTION SUBCUTANEOUS at 23:05

## 2023-09-08 RX ADMIN — Medication 1 MG: at 23:08

## 2023-09-08 RX ADMIN — ASPIRIN 81 MG CHEWABLE TABLET 81 MG: 81 TABLET CHEWABLE at 22:07

## 2023-09-08 RX ADMIN — IPRATROPIUM BROMIDE AND ALBUTEROL SULFATE 3 ML: 2.5; .5 SOLUTION RESPIRATORY (INHALATION) at 21:17

## 2023-09-08 RX ADMIN — GABAPENTIN 900 MG: 300 CAPSULE ORAL at 23:06

## 2023-09-08 RX ADMIN — AMLODIPINE BESYLATE 5 MG: 5 TABLET ORAL at 23:06

## 2023-09-08 RX ADMIN — BUMETANIDE 2 MG: 0.25 INJECTION INTRAMUSCULAR; INTRAVENOUS at 18:52

## 2023-09-08 RX ADMIN — PIPERACILLIN AND TAZOBACTAM 3.38 G: 3; .375 INJECTION, POWDER, FOR SOLUTION INTRAVENOUS at 23:12

## 2023-09-08 ASSESSMENT — ENCOUNTER SYMPTOMS
SHORTNESS OF BREATH: 1
NAUSEA: 0
WHEEZING: 1
VOMITING: 0
ABDOMINAL PAIN: 0
FEVER: 0
CHILLS: 0
COUGH: 0
WOUND: 1
UNEXPECTED WEIGHT CHANGE: 0

## 2023-09-08 ASSESSMENT — ACTIVITIES OF DAILY LIVING (ADL)
ADLS_ACUITY_SCORE: 35

## 2023-09-08 NOTE — ED PROVIDER NOTES
EMERGENCY DEPARTMENT ENCOUNTER      NAME: Zackary Hutchison  AGE: 62 year old male  YOB: 1960  MRN: [id number]  EVALUATION DATE & TIME: 9/8/2023  5:57 PM    PCP: Reid Escobar    ED PROVIDER: Demetrice Boggs PA-C      Chief Complaint   Patient presents with    Shortness of Breath    Wound Check         FINAL IMPRESSION:  1. SOB (shortness of breath)    2. Abnormal electrocardiogram    3. Cellulitis of left lower extremity          ED COURSE & MEDICAL DECISION MAKING:    Pertinent Labs & Imaging studies reviewed. (See chart for details)    62 year old male presents to the Emergency Department for evaluation of shortness of breath and leg wound.    Physical exam is remarkable for a chronically ill-appearing male who is in no acute distress.  Heart and lung sounds are remarkable for diffuse expiratory wheezing and diminished breath sounds throughout, tachypnea also noted.  Abdomen is soft and nontender.  Patient has pitting edema in the bilateral lower extremities.  He has a 4 x 2 cm wound on the left anterior shin with surrounding erythema, warmth, and tenderness to palpation.  Normal range of motion of the left knee and ankle.  Vital signs are stable and he is afebrile.    CBC is remarkable for anemia with hemoglobin of 10.3, no leukocytosis.  BMP remarkable for hyperkalemia with potassium of 5.5, mild kidney dysfunction with creatinine of 1.35 and GFR of 59.  BNP is 751.  Troponin is elevated at 29, 2-hour delta troponin is unchanged.  EKG does show some new T wave inversions in the septal leads V1 to V3.  Chest x-ray shows cardiomegaly.    The patient was given IV Bumex here for treatment of fluid overload.  IV Rocephin initiated for cellulitis of the left leg.  Given patient's EKG changes combined with likely infection of his leg, plan to admit him to the hospital for further evaluation/monitoring.  He is agreeable with this treatment plan.  Patient's care was discussed with staff physician   Ladonna Laguerre who is in agreement with the treatment plan.    Medical Decision Making    History:  Supplemental history from: Documented in chart, if applicable  External Record(s) reviewed: Inpatient Record: 2/14/23    Work Up:  Chart documentation includes differential considered and any EKGs or imaging independently interpreted by provider, where specified.  In additional to work up documented, I considered the following work up: Documented in chart, if applicable.    External consultation:  Discussion of management with another provider: Cardiology and Hospitalist    Complicating factors:  Care impacted by chronic illness: Diabetes, Heart Disease, Hyperlipidemia, and Hypertension  Care affected by social determinants of health: N/A    Disposition considerations: Admit.    ED Course   6:15 PM Performed my initial history and physical exam. Discussed workup in the emergency department, management of symptoms, and likely disposition.   6:43 PM Staffed with Dr. Ladonna Laguerre.  8:20 PM Updated with test results. Discussed plan for admission.  8:36 PM Discussed with Dr. Shelley.  9:17 PM Discussed with Dr. Corbett who recommends holding the Eliquis, lisinopril and metoprolol; continue amiodarone and bumex. Baby ASA administered per his request.       At the conclusion of the encounter I discussed the results of all of the tests and the disposition. The questions were answered. The patient or family acknowledged understanding and was agreeable with the care plan.     Voice recognition software was used in the creation of this note. Any grammatical or nonsensical errors are due to inherent errors with the software and are not the intention of the writer.     MEDICATIONS GIVEN IN THE EMERGENCY:  Medications   lidocaine 1 % 0.1-1 mL (has no administration in time range)   lidocaine (LMX4) cream (has no administration in time range)   sodium chloride (PF) 0.9% PF flush 3 mL (has no administration in time range)    sodium chloride (PF) 0.9% PF flush 3 mL (has no administration in time range)   melatonin tablet 1 mg (1 mg Oral $Given 9/8/23 2308)   HYDROmorphone (DILAUDID) injection 0.2 mg (has no administration in time range)   HYDROmorphone (DILAUDID) half-tab 1 mg (has no administration in time range)   acetaminophen (TYLENOL) tablet 650 mg (has no administration in time range)     Or   acetaminophen (TYLENOL) Suppository 650 mg (has no administration in time range)   ondansetron (ZOFRAN ODT) ODT tab 4 mg (has no administration in time range)     Or   ondansetron (ZOFRAN) injection 4 mg (has no administration in time range)   aspirin EC tablet 81 mg (has no administration in time range)   bumetanide (BUMEX) injection 1 mg (has no administration in time range)   albuterol (PROVENTIL HFA/VENTOLIN HFA) inhaler (has no administration in time range)   amLODIPine (NORVASC) tablet 5 mg (5 mg Oral $Given 9/8/23 2306)   amiodarone (PACERONE) tablet 200 mg (has no administration in time range)   atorvastatin (LIPITOR) tablet 40 mg (has no administration in time range)   DULoxetine (CYMBALTA) DR capsule 30 mg (has no administration in time range)   isosorbide mononitrate (IMDUR) 24 hr tablet 180 mg (has no administration in time range)   rOPINIRole (REQUIP) tablet 4 mg (4 mg Oral $Given 9/8/23 2305)   gabapentin (NEURONTIN) capsule 300 mg (has no administration in time range)   gabapentin (NEURONTIN) capsule 900 mg (900 mg Oral $Given 9/8/23 2306)   piperacillin-tazobactam (ZOSYN) 3.375 g vial to attach to  mL bag (3.375 g Intravenous $New Bag 9/8/23 2312)   piperacillin-tazobactam (ZOSYN) 3.375 g vial to attach to  mL bag (has no administration in time range)   ipratropium - albuterol 0.5 mg/2.5 mg/3 mL (DUONEB) neb solution 3 mL (has no administration in time range)   bumetanide (BUMEX) injection 2 mg (2 mg Intravenous $Given 9/8/23 9533)   cefTRIAXone (ROCEPHIN) 1 g vial to attach to  mL bag for ADULTS or NS 50  "mL bag for PEDS (0 g Intravenous Stopped 9/8/23 2154)   ipratropium - albuterol 0.5 mg/2.5 mg/3 mL (DUONEB) neb solution 3 mL (3 mLs Nebulization $Given 9/8/23 2117)   aspirin (ASA) chewable tablet 81 mg (81 mg Oral $Given 9/8/23 2207)   enoxaparin ANTICOAGULANT (LOVENOX) injection 120 mg (120 mg Subcutaneous $Given 9/8/23 2305)       NEW PRESCRIPTIONS STARTED AT TODAY'S ER VISIT  New Prescriptions    No medications on file            =================================================================    HPI    Patient information was obtained from: patient    Use of : N/A        Zackary Hutchison is a 62 year old male with pertinent history of leg swelling, hyperlipidemia, hypertension, elevated glucose, chronic diastolic CHF, and chronic ischemic heart disease who presents for shortness of breath.     The patient has been experiencing worsening shortness of breath and wheezing over the past week. He notes he has been short of breath all summer but it has escalated recently. He feels short of breath at rest and with all activities. He has also noticed increased swelling in his legs. He takes Bumex daily and metolazone twice weekly, he states he has been compliant with this. He also takes Eliquis. He notes he had an episode of chest pain two days ago that required him to take nitroglycerin; he was just \"doing normal things\" when the pain started and he states it radiated to his arms and up toward his jaw. The pain resolved with the nitroglycerin and has not recurred.    The patient also complains of a wound on his left lower leg. He noticed it about a week ago and states it has become increasingly red and painful. He is concerned it may be infected.     Patient states he has been out of nebulizer treatments for 2 weeks.    Denies nausea, vomiting, diarrhea, chills, recent weight gain, or any other complaints at this time.    REVIEW OF SYSTEMS   Review of Systems   Constitutional:  Negative for chills, fever and " unexpected weight change.   Respiratory:  Positive for shortness of breath and wheezing. Negative for cough.    Cardiovascular:  Positive for chest pain and leg swelling.   Gastrointestinal:  Negative for abdominal pain, nausea and vomiting.   Skin:  Positive for wound.       All other systems reviewed and are negative unless noted in HPI.      PAST MEDICAL HISTORY:  No past medical history on file.    PAST SURGICAL HISTORY:  No past surgical history on file.    CURRENT MEDICATIONS:    acetaminophen (TYLENOL) 500 MG tablet  albuterol (PROAIR HFA/PROVENTIL HFA/VENTOLIN HFA) 108 (90 Base) MCG/ACT inhaler  amiodarone (PACERONE) 200 MG tablet  amLODIPine (NORVASC) 5 MG tablet  atorvastatin (LIPITOR) 40 MG tablet  bumetanide (BUMEX) 2 MG tablet  [employer] VITAMIN B12 1000 MCG tablet  diclofenac (VOLTAREN) 1 % topical gel  DULoxetine (CYMBALTA) 30 MG capsule  ELIQUIS ANTICOAGULANT 5 MG tablet  gabapentin (NEURONTIN) 300 MG capsule  gabapentin (NEURONTIN) 300 MG capsule  hypromellose-dextran (ARTIFICAL TEARS) 0.1-0.3 % ophthalmic solution  ipratropium - albuterol 0.5 mg/2.5 mg/3 mL (DUONEB) 0.5-2.5 (3) MG/3ML neb solution  isosorbide mononitrate (IMDUR) 60 MG 24 hr tablet  KLOR-CON 20 MEQ CR tablet  lisinopril (ZESTRIL) 5 MG tablet  metolazone (ZAROXOLYN) 5 MG tablet  metoprolol succinate ER (TOPROL XL) 50 MG 24 hr tablet  nitroGLYcerin (NITROSTAT) 0.4 MG sublingual tablet  polyethylene glycol (MIRALAX) 17 g packet  rOPINIRole (REQUIP) 2 MG tablet  spironolactone (ALDACTONE) 25 MG tablet  COMPOUNDED NON-CONTROLLED SUBSTANCE (CMPD RX) - PHARMACY TO MIX COMPOUNDED MEDICATION        ALLERGIES:  No Known Allergies    FAMILY HISTORY:  No family history on file.    SOCIAL HISTORY:   Social History     Socioeconomic History    Marital status: Single       VITALS:  Patient Vitals for the past 24 hrs:   BP Temp Temp src Pulse Resp SpO2 Height Weight   09/08/23 2236 -- -- -- 77 -- 96 % -- --   09/08/23 2130 119/60 -- -- 68 -- 97 % -- --  "  09/08/23 2117 -- -- -- -- -- 96 % -- --   09/08/23 2100 109/56 -- -- 69 24 96 % -- --   09/08/23 2030 117/55 -- -- 67 22 94 % -- --   09/08/23 1920 (!) 169/72 -- -- 75 24 97 % -- --   09/08/23 1915 -- -- -- 71 22 96 % -- --   09/08/23 1900 120/59 -- -- 67 29 95 % 1.676 m (5' 6\") --   09/08/23 1847 119/58 -- -- 67 19 95 % -- --   09/08/23 1742 100/50 97.1  F (36.2  C) Temporal 73 20 95 % 1.676 m (5' 6\") 122.3 kg (269 lb 9.6 oz)       PHYSICAL EXAM    VITAL SIGNS: /60   Pulse 77   Temp 97.1  F (36.2  C) (Temporal)   Resp 24   Ht 1.676 m (5' 6\")   Wt 122.3 kg (269 lb 9.6 oz)   SpO2 96%   BMI 43.51 kg/m    General Appearance: Alert, cooperative, normal speech and facial symmetry, appears stated age, the patient does not appear in distress  Head:  Normocephalic, without obvious abnormality, atraumatic  Eyes: Conjunctiva/corneas clear, EOM's intact, no nystagmus, PERRL  ENT:  Lips, mucosa, and tongue normal; teeth and gums normal, no pharyngeal inflammation, no dysphonia or difficulty swallowing, membranes are moist without pallor  Cardio:  Regular rate and rhythm, S1 and S2 normal, no murmur, rub    or gallop, 2+ pulses symmetric in all extremities  Pulm: Diffusely diminished breath sounds with expiratory wheezing; tachypnea   Abdomen:  Abdomen is soft, non-distended with no tenderness to palpation, rebound tenderness, or guarding.   Extremities: Pitting edema in the bilateral lower extremities.  He has a 4 x 2 cm wound on the left anterior shin with surrounding erythema, warmth, and tenderness to palpation.  Normal range of motion of the left knee and ankle  Neuro: Patient is awake, alert, and responsive to voice. No gross motor weaknesses or sensory loss; moves all extremities.    LAB:  All pertinent labs reviewed and interpreted.  Labs Ordered and Resulted from Time of ED Arrival to Time of ED Departure   BASIC METABOLIC PANEL - Abnormal       Result Value    Sodium 133 (*)     Potassium 5.5 (*)     " Chloride 98      Carbon Dioxide (CO2) 24      Anion Gap 11      Urea Nitrogen 34.9 (*)     Creatinine 1.35 (*)     Calcium 9.7      Glucose 105 (*)     GFR Estimate 59 (*)    TROPONIN T, HIGH SENSITIVITY - Abnormal    Troponin T, High Sensitivity 29 (*)    CBC WITH PLATELETS AND DIFFERENTIAL - Abnormal    WBC Count 7.3      RBC Count 3.19 (*)     Hemoglobin 10.3 (*)     Hematocrit 32.5 (*)      (*)     MCH 32.3      MCHC 31.7      RDW 16.6 (*)     Platelet Count 173      % Neutrophils 72      % Lymphocytes 15      % Monocytes 9      % Eosinophils 2      % Basophils 1      % Immature Granulocytes 1      NRBCs per 100 WBC 0      Absolute Neutrophils 5.3      Absolute Lymphocytes 1.1      Absolute Monocytes 0.6      Absolute Eosinophils 0.2      Absolute Basophils 0.1      Absolute Immature Granulocytes 0.1      Absolute NRBCs 0.0     TROPONIN T, HIGH SENSITIVITY - Abnormal    Troponin T, High Sensitivity 28 (*)    CRP INFLAMMATION - Abnormal    CRP Inflammation 15.60 (*)    BASIC METABOLIC PANEL - Abnormal    Sodium 135 (*)     Potassium 5.1      Chloride 98      Carbon Dioxide (CO2) 21 (*)     Anion Gap 16 (*)     Urea Nitrogen 35.2 (*)     Creatinine 1.38 (*)     Calcium 9.7      Glucose 104 (*)     GFR Estimate 57 (*)    NT PROBNP INPATIENT - Normal    N terminal Pro BNP Inpatient 751     CK TOTAL - Normal         PROCALCITONIN - Normal    Procalcitonin 0.03         RADIOLOGY:  Reviewed all pertinent imaging. Please see official radiology report.  XR Chest Port 1 View   Final Result   IMPRESSION: Previous CABG. Mild cardiomegaly. Pulmonary vessels appear normal. Lungs clear. Old left-sided rib fractures.      Echocardiogram Complete    (Results Pending)   CT Tibia Fibula Lower Leg Left wo Contrast    (Results Pending)       EKG:    Performed at: 18:40    I have independently reviewed and interpreted the EKG, along with the final read. EKG also reviewed by Dr. Ladonna Laguerre.    Ventricular rate 69  bpm  HI interval 170 ms  QRS duration 130 ms  QT/QTc 432/462 ms  P-R-T axes 83 74 31    Impression: Sinus rhythm; RBBB; T wave inversions leads V1-V3      I, Deandre Narvaez, am serving as a scribe to document services personally performed by Demetrice Boggs PA-C based on my observation and the provider's statements to me. I, Demetrice Boggs PA-C attest that Deandre Narvaez is acting in a scribe capacity, has observed my performance of the services and has documented them in accordance with my direction.     Demetrice Boggs PA-C  Emergency Medicine  [facility] EMERGENCY DEPARTMENT  [facility] [id number] [phone]  Dept: [phone]       Demetrice Boggs PA-C  09/08/23 7390

## 2023-09-08 NOTE — Clinical Note
Potential access sites were evaluated for patency using ultrasound.   The right femoral artery and right femoral vein were selected. Access was obtained under with Sonosite guidance using a micropuncture 21 gauge needle with direct visualization of needle entry.

## 2023-09-08 NOTE — ED TRIAGE NOTES
Patient here for wound check on left lower extremity. Patient states the wound has been present for about 3 weeks. Patient states wound has gotten bigger and has much more drainage. Patient also here for shortness of breath that is the worse today compared to other days. Patient states he has had shortness of breath all summer but today it is by far the worse. Patient states he is unable to walk 50 ft without being short of breath.     Triage Assessment       Row Name 09/08/23 9545       Triage Assessment (Adult)    Airway WDL WDL       Respiratory WDL    Respiratory WDL WDL       Skin Circulation/Temperature WDL    Skin Circulation/Temperature WDL WDL       Cardiac WDL    Cardiac WDL WDL       Peripheral/Neurovascular WDL    Peripheral Neurovascular WDL WDL       Cognitive/Neuro/Behavioral WDL    Cognitive/Neuro/Behavioral WDL WDL

## 2023-09-09 ENCOUNTER — APPOINTMENT (OUTPATIENT)
Dept: CARDIOLOGY | Facility: HOSPITAL | Age: 63
DRG: 286 | End: 2023-09-09
Attending: INTERNAL MEDICINE
Payer: MEDICARE

## 2023-09-09 ENCOUNTER — APPOINTMENT (OUTPATIENT)
Dept: ULTRASOUND IMAGING | Facility: HOSPITAL | Age: 63
DRG: 286 | End: 2023-09-09
Attending: INTERNAL MEDICINE
Payer: MEDICARE

## 2023-09-09 ENCOUNTER — APPOINTMENT (OUTPATIENT)
Dept: CT IMAGING | Facility: HOSPITAL | Age: 63
DRG: 286 | End: 2023-09-09
Attending: INTERNAL MEDICINE
Payer: MEDICARE

## 2023-09-09 LAB
ANION GAP SERPL CALCULATED.3IONS-SCNC: 11 MMOL/L (ref 7–15)
BUN SERPL-MCNC: 34 MG/DL (ref 8–23)
CALCIUM SERPL-MCNC: 9.3 MG/DL (ref 8.8–10.2)
CHLORIDE SERPL-SCNC: 100 MMOL/L (ref 98–107)
CREAT SERPL-MCNC: 1.46 MG/DL (ref 0.67–1.17)
DEPRECATED HCO3 PLAS-SCNC: 26 MMOL/L (ref 22–29)
EGFRCR SERPLBLD CKD-EPI 2021: 53 ML/MIN/1.73M2
ERYTHROCYTE [DISTWIDTH] IN BLOOD BY AUTOMATED COUNT: 16.7 % (ref 10–15)
GLUCOSE SERPL-MCNC: 91 MG/DL (ref 70–99)
HCT VFR BLD AUTO: 33.5 % (ref 40–53)
HGB BLD-MCNC: 10.4 G/DL (ref 13.3–17.7)
LVEF ECHO: NORMAL
MCH RBC QN AUTO: 31.6 PG (ref 26.5–33)
MCHC RBC AUTO-ENTMCNC: 31 G/DL (ref 31.5–36.5)
MCV RBC AUTO: 102 FL (ref 78–100)
PLATELET # BLD AUTO: 169 10E3/UL (ref 150–450)
POTASSIUM SERPL-SCNC: 4.6 MMOL/L (ref 3.4–5.3)
RBC # BLD AUTO: 3.29 10E6/UL (ref 4.4–5.9)
SODIUM SERPL-SCNC: 137 MMOL/L (ref 136–145)
WBC # BLD AUTO: 6 10E3/UL (ref 4–11)

## 2023-09-09 PROCEDURE — 99222 1ST HOSP IP/OBS MODERATE 55: CPT | Mod: 25 | Performed by: INTERNAL MEDICINE

## 2023-09-09 PROCEDURE — 255N000002 HC RX 255 OP 636: Performed by: INTERNAL MEDICINE

## 2023-09-09 PROCEDURE — 210N000001 HC R&B IMCU HEART CARE

## 2023-09-09 PROCEDURE — 80048 BASIC METABOLIC PNL TOTAL CA: CPT | Performed by: INTERNAL MEDICINE

## 2023-09-09 PROCEDURE — 250N000013 HC RX MED GY IP 250 OP 250 PS 637: Performed by: INTERNAL MEDICINE

## 2023-09-09 PROCEDURE — 93970 EXTREMITY STUDY: CPT

## 2023-09-09 PROCEDURE — G0463 HOSPITAL OUTPT CLINIC VISIT: HCPCS

## 2023-09-09 PROCEDURE — 99233 SBSQ HOSP IP/OBS HIGH 50: CPT | Performed by: INTERNAL MEDICINE

## 2023-09-09 PROCEDURE — 93306 TTE W/DOPPLER COMPLETE: CPT | Mod: 26 | Performed by: INTERNAL MEDICINE

## 2023-09-09 PROCEDURE — G1010 CDSM STANSON: HCPCS

## 2023-09-09 PROCEDURE — 85027 COMPLETE CBC AUTOMATED: CPT | Performed by: INTERNAL MEDICINE

## 2023-09-09 PROCEDURE — 250N000011 HC RX IP 250 OP 636: Mod: JZ | Performed by: INTERNAL MEDICINE

## 2023-09-09 PROCEDURE — 36415 COLL VENOUS BLD VENIPUNCTURE: CPT | Performed by: INTERNAL MEDICINE

## 2023-09-09 RX ORDER — BUMETANIDE 2 MG/1
2 TABLET ORAL
Status: DISCONTINUED | OUTPATIENT
Start: 2023-09-09 | End: 2023-09-14

## 2023-09-09 RX ORDER — CLOPIDOGREL BISULFATE 75 MG/1
300 TABLET ORAL ONCE
Status: COMPLETED | OUTPATIENT
Start: 2023-09-09 | End: 2023-09-09

## 2023-09-09 RX ORDER — CLOPIDOGREL BISULFATE 75 MG/1
75 TABLET ORAL DAILY
Status: DISCONTINUED | OUTPATIENT
Start: 2023-09-10 | End: 2023-09-14 | Stop reason: HOSPADM

## 2023-09-09 RX ORDER — LISINOPRIL 5 MG/1
5 TABLET ORAL DAILY
Status: DISCONTINUED | OUTPATIENT
Start: 2023-09-10 | End: 2023-09-11

## 2023-09-09 RX ADMIN — PERFLUTREN 2 ML: 6.52 INJECTION, SUSPENSION INTRAVENOUS at 10:06

## 2023-09-09 RX ADMIN — PIPERACILLIN AND TAZOBACTAM 3.38 G: 3; .375 INJECTION, POWDER, LYOPHILIZED, FOR SOLUTION INTRAVENOUS at 20:57

## 2023-09-09 RX ADMIN — ATORVASTATIN CALCIUM 40 MG: 40 TABLET, FILM COATED ORAL at 07:42

## 2023-09-09 RX ADMIN — PIPERACILLIN AND TAZOBACTAM 3.38 G: 3; .375 INJECTION, POWDER, LYOPHILIZED, FOR SOLUTION INTRAVENOUS at 12:00

## 2023-09-09 RX ADMIN — AMIODARONE HYDROCHLORIDE 200 MG: 200 TABLET ORAL at 07:43

## 2023-09-09 RX ADMIN — BUMETANIDE 1 MG: 0.25 INJECTION INTRAMUSCULAR; INTRAVENOUS at 07:42

## 2023-09-09 RX ADMIN — BUMETANIDE 2 MG: 2 TABLET ORAL at 17:11

## 2023-09-09 RX ADMIN — Medication 81 MG: at 07:42

## 2023-09-09 RX ADMIN — ISOSORBIDE MONONITRATE 180 MG: 60 TABLET, EXTENDED RELEASE ORAL at 07:42

## 2023-09-09 RX ADMIN — DULOXETINE HYDROCHLORIDE 30 MG: 30 CAPSULE, DELAYED RELEASE ORAL at 07:43

## 2023-09-09 RX ADMIN — HYDROMORPHONE HYDROCHLORIDE 0.2 MG: 0.2 INJECTION, SOLUTION INTRAMUSCULAR; INTRAVENOUS; SUBCUTANEOUS at 06:27

## 2023-09-09 RX ADMIN — GABAPENTIN 900 MG: 300 CAPSULE ORAL at 21:01

## 2023-09-09 RX ADMIN — ROPINIROLE HYDROCHLORIDE 4 MG: 1 TABLET, FILM COATED ORAL at 19:45

## 2023-09-09 RX ADMIN — GABAPENTIN 300 MG: 300 CAPSULE ORAL at 12:00

## 2023-09-09 RX ADMIN — PIPERACILLIN AND TAZOBACTAM 3.38 G: 3; .375 INJECTION, POWDER, LYOPHILIZED, FOR SOLUTION INTRAVENOUS at 05:55

## 2023-09-09 RX ADMIN — CLOPIDOGREL BISULFATE 300 MG: 75 TABLET ORAL at 13:31

## 2023-09-09 RX ADMIN — GABAPENTIN 300 MG: 300 CAPSULE ORAL at 07:42

## 2023-09-09 ASSESSMENT — ACTIVITIES OF DAILY LIVING (ADL)
ADLS_ACUITY_SCORE: 35
ADLS_ACUITY_SCORE: 35
TRANSFERRING: 0-->INDEPENDENT
DRESS: 1-->ASSISTANCE (EQUIPMENT/PERSON) NEEDED
DOING_ERRANDS_INDEPENDENTLY_DIFFICULTY: YES
CHANGE_IN_FUNCTIONAL_STATUS_SINCE_ONSET_OF_CURRENT_ILLNESS/INJURY: NO
ADLS_ACUITY_SCORE: 35
WEAR_GLASSES_OR_BLIND: YES
FALL_HISTORY_WITHIN_LAST_SIX_MONTHS: NO
CONCENTRATING,_REMEMBERING_OR_MAKING_DECISIONS_DIFFICULTY: NO
TOILETING_ISSUES: NO
WALKING_OR_CLIMBING_STAIRS: STAIR CLIMBING DIFFICULTY, DEPENDENT;AMBULATION DIFFICULTY, REQUIRES EQUIPMENT
WALKING_OR_CLIMBING_STAIRS_DIFFICULTY: YES
DIFFICULTY_EATING/SWALLOWING: NO
ADLS_ACUITY_SCORE: 35
TRANSFERRING: 0-->INDEPENDENT
ADLS_ACUITY_SCORE: 35
ADLS_ACUITY_SCORE: 35
BATHING: 1-->ASSISTANCE NEEDED
ADLS_ACUITY_SCORE: 35
EQUIPMENT_CURRENTLY_USED_AT_HOME: WALKER, ROLLING
ADLS_ACUITY_SCORE: 35
DRESSING/BATHING_DIFFICULTY: YES
VISION_MANAGEMENT: GLASSES
ADLS_ACUITY_SCORE: 35
DRESSING/BATHING: DRESSING DIFFICULTY, REQUIRES EQUIPMENT
DRESS: 0-->ASSISTANCE NEEDED (DEVELOPMENTALLY APPROPRIATE)
ADLS_ACUITY_SCORE: 35

## 2023-09-09 NOTE — ED NOTES
Cardiologist left bedside and don't plan to do any procedure today. Pt is able to have food and plan is to just continue with IV ABX for now. Monday will do the procedure.

## 2023-09-09 NOTE — H&P
Admission History and Physical   Zackary Jones,    1960,   [id number]    [facility]   Cellulitis of left lower extremity [L03.116]    PCP: Reid Escobar,    Code status:  No Order       Extended Emergency Contact Information  Primary Emergency Contact: APRIL JONES  Home Phone: [phone]  Relation: Brother       Principal Problem:    Cellulitis of left lower extremity  Active Problems:    Abnormal electrocardiogram    SOB (shortness of breath)       ASSESSMENT AND PLAN:  Shortness of breath with bilateral leg edema.  Clinical features suggestive of acute on chronic CHF exacerbation  CAD with history of previous CABG in the past, no chest pain currently but EKG shows T wave changes.  COPD currently stable without decompensation  Left lower extremity open wound/cellulitis, neurovascular intact  Atrial fibrillation, paroxysmal on anticoagulation/amiodarone  Benign essential hypertension, controlled currently  Hereditary peripheral neuropathy on gabapentin      Plan  Admit to cardiac telemetry  Note delta troponins unremarkable  ED spoke to cardiology on-call who recommend hold anticoagulation, metoprolol for now.  Single dose Lovenox high-dose given.  Cardiology consult placed, echo pending  Begin empiric IV antibiotics for soft tissue infection  Consult W OC for wound care evaluation  We will obtain CT left lower extremity tonight rule out complicated cellulitis  Check CRP procalcitonin and CK  Home medications reviewed and resumed as appropriate  Cardiology recommend to hold anticoagulation for now, n.p.o. in case procedure is needed in a.m.  Schedule home nebulizers  Continue amiodarone for A-fib    Full code    DVT PPX:  Home anticoagulation    Barriers to discharge    Anticipated Discharge date inpatient multiple days          Chief Complaint:  Shortness of breath few weeks duration with leg edema    HPI:  Zackary Jones is a 62 year old old male with history of CHF in the past presented  "with exertional shortness of breath with bilateral leg swelling a few weeks duration but it is getting worse last couple of days.  Patient denies chest pain no diaphoresis, no PND orthopnea.  Denies abdominal pain and no diarrhea.  Also has noticed increasing redness and pain in left lower extremity, no recent trauma.  Denies fever or chills.  Work-up in the ED suggestive of CHF exacerbation.  ED spoke to cardiology on-call recommend hold anticoagulation for possible invasive procedure in the a.m.      Medical History see above      Surgical History  He  has no past surgical history on file.      SOCIAL HISTORY:  Social History     Socioeconomic History    Marital status: Single     Spouse name: Not on file    Number of children: Not on file    Years of education: Not on file    Highest education level: Not on file   Occupational History    Not on file   Tobacco Use    Smoking status: Not on file    Smokeless tobacco: Not on file   Substance and Sexual Activity    Alcohol use: Not on file    Drug use: Not on file    Sexual activity: Not on file   Other Topics Concern    Not on file   Social History Narrative    Not on file     Social Determinants of Health     Financial Resource Strain: Not on file   Food Insecurity: Not on file   Transportation Needs: Not on file   Physical Activity: Not on file   Stress: Not on file   Social Connections: Not on file   Intimate Partner Violence: Not on file   Housing Stability: Not on file       FAMILY HISTORY:  No family history on file.      ALLERGIES:  No Known Allergies    MEDICATIONS: Please see pharmacy note        ROS:  12 point review of systems reviewed and is negative except as stated above      PHYSICAL EXAM:  /60   Pulse 68   Temp 97.1  F (36.2  C) (Temporal)   Resp 24   Ht 1.676 m (5' 6\")   Wt 122.3 kg (269 lb 9.6 oz)   SpO2 97%   BMI 43.51 kg/m      General: Alert and oriented x 3. Not in obvious distress.  HEENT: Pupils equal and reactive,ENT WNL   Neck- " supple, No JVP elevation, lymphadenopathy or thyromegaly. Trachea-central.  Chest: Bilateral wheeze  Heart: S1S2 regular. No M/R/G.  Abdomen: Soft. NT, ND. No organomegaly. Bowel sounds- active.  Back: No spine tenderness. No CVA tenderness.  Extremities: Bilateral leg swelling. Peripheral pulses 2+ bilaterally.  With open lesion on anterior left leg aspect, with erythema and mild tenderness crepitus however  Neuro: No focal neurological deficit  Skin: no skin rashes     DIAGNOSTIC DATA: Case discussed extensively ED physician      EKG Results: Personally reviewed ST T wave changes noted        Advanced Care Planning       Kraig Shelley MD

## 2023-09-09 NOTE — PLAN OF CARE
Problem: Plan of Care - These are the overarching goals to be used throughout the patient stay.    Goal: Plan of Care Review  Description: The Plan of Care Review/Shift note should be completed every shift.  The Outcome Evaluation is a brief statement about your assessment that the patient is improving, declining, or no change.  This information will be displayed automatically on your shift note.  Outcome: Progressing   Goal Outcome Evaluation:  Patient's vital signs and telemetry stable.  Patient alert and oriented x4.  Patient ambulates independently with walker to the bathroom with out difficulty.  WOC consult ordered for left shin cellulitis/wound.  Wound was oozing tan thick discharge.  Cleansed wound with saline, patted dry, and applied nonadherant dressing.  Patient has no complaints of pain. Patient does have dyspnea with exertion, and o2 saturations are mid 90s on room air.  Patient understanding he will be educated and prepped for angiogram tomorrow.  Angio scheduled for Monday.

## 2023-09-09 NOTE — ED NOTES
Pt's walker and 2 belonging bags were taken into his room up in 310. Pt is aware of that. He will head to his room after ultrasound. His cell phone, house keys and 1 belonging bag (with wallet) was left with him to US.

## 2023-09-09 NOTE — ED NOTES
"[facility] ED Handoff Report    ED Chief Complaint: Shortness of breath and wound check on left lower leg    ED Diagnosis:  (I50.9) Heart failure, unspecified HF chronicity, unspecified heart failure type (H)  (primary encounter diagnosis)    (R06.02) SOB (shortness of breath)    (R94.31) Abnormal electrocardiogram    (L03.116) Cellulitis of left lower extremity    (I21.4) NSTEMI (non-ST elevated myocardial infarction) (H)    Plan: Case Request: Coronary Angiogram, Percutaneous         Coronary Intervention      PMH:  No past medical history on file.     Code Status:  Full Code     Falls Risk: No Band: Not applicable    Current Living Situation/Residence: lives alone     Elimination Status: Continent: Yes     Activity Level: SBA w/ walker    Patients Preferred Language:  English     Needed: No    Vital Signs:  BP 98/50   Pulse 62   Temp 97.9  F (36.6  C) (Oral)   Resp 20   Ht 1.676 m (5' 6\")   Wt 122.3 kg (269 lb 9.6 oz)   SpO2 95%   BMI 43.51 kg/m       Cardiac Rhythm: normal sinus with PVC    Pain Score: 0/10    Is the Patient Confused:  No    Last Food or Drink: 9/8/23     Tests Performed: Done: Labs and Imaging    Family Dynamics/Concerns: No    Family Updated On Visitor Policy: No    Plan of Care Communicated to Family: No      Belongings Checklist Done and Signed by Patient: No    Medications sent with patient: none    Covid: asymptomatic     Additional Information: a/o x 4, sometimes sleeps in a recliner. Hx of CHF and CABG. Echo completed. Cardiology saw today and said IV ABX for cellulitis and Monday will do angiogram. Order food for patient but lunch has not arrived yet.    RN: Theodore Loco RN   9/9/2023 1:56 PM       "

## 2023-09-09 NOTE — MEDICATION SCRIBE - ADMISSION MEDICATION HISTORY
Medication Scribe Admission Medication History    Admission medication history is complete. The information provided in this note is only as accurate as the sources available at the time of the update.    Medication reconciliation/reorder completed by provider prior to medication history? No    Information Source(s): Patient via in-person    Pertinent Information: Patient reports that he has failed to take some medications due to high cost.    Patient's PTA list contained no medications prior to medication history.     Patient has home supply of wart removal compound. Has not started it yet.    Changes made to PTA medication list:  Added: All  Deleted: None  Changed: None    Medication Affordability:  Not including over the counter (OTC) medications, was there a time in the past 3 months when you did not take your medications as prescribed because of cost?: Yes    Allergies reviewed with patient and updates made in EHR: yes    Medication History Completed By: Killian Aguirre 9/8/2023 8:46 PM    Prior to Admission medications    Medication Sig Last Dose Taking? Auth Provider Long Term End Date   acetaminophen (TYLENOL) 500 MG tablet Take 1,000 mg by mouth every 8 hours as needed 9/8/2023 at am Yes Reported, Patient     albuterol (PROAIR HFA/PROVENTIL HFA/VENTOLIN HFA) 108 (90 Base) MCG/ACT inhaler Inhale 1 puff into the lungs every 6 hours as needed 9/8/2023 at am Yes Reported, Patient Yes    amiodarone (PACERONE) 200 MG tablet Take 200 mg by mouth daily 9/8/2023 at am Yes Reported, Patient Yes    amLODIPine (NORVASC) 5 MG tablet Take 5 mg by mouth At Bedtime 9/7/2023 at hs Yes Reported, Patient Yes    atorvastatin (LIPITOR) 40 MG tablet Take 40 mg by mouth every morning 9/8/2023 at am Yes Reported, Patient Yes    bumetanide (BUMEX) 2 MG tablet Take 2 mg by mouth 2 times daily 9/8/2023 at am Yes Reported, Patient     [employer] VITAMIN B12 1000 MCG tablet Take 1,000 mcg by mouth daily 9/8/2023 at am Yes Reported, Patient      diclofenac (VOLTAREN) 1 % topical gel Place 2 g onto the skin 4 times daily as needed Apply 2 g to skin 4 times a day. Indications: Joint Damage causing Pain and Loss of Function More than a month at prn Yes Reported, Patient     DULoxetine (CYMBALTA) 30 MG capsule Take 30 mg by mouth every morning 9/8/2023 at am Yes Reported, Patient Yes    ELIQUIS ANTICOAGULANT 5 MG tablet Take 5 mg by mouth 2 times daily 9/8/2023 at am Yes Reported, Patient     gabapentin (NEURONTIN) 300 MG capsule Take 300 mg by mouth 2 times daily 300 MG AM AND NOON,  MG BY MOUTH DAILY AT BEDTIME 9/8/2023 at am (1st dose) Yes Reported, Patient Yes    gabapentin (NEURONTIN) 300 MG capsule Take 900 mg by mouth At Bedtime 300 MG AM AND NOON,  MG BY MOUTH DAILY AT BEDTIME 9/7/2023 at pm Yes Reported, Patient Yes    hypromellose-dextran (ARTIFICAL TEARS) 0.1-0.3 % ophthalmic solution Place 2 drops into both eyes 4 times daily as needed Past Week at prn Yes Reported, Patient     ipratropium - albuterol 0.5 mg/2.5 mg/3 mL (DUONEB) 0.5-2.5 (3) MG/3ML neb solution Inhale 3 mLs into the lungs every 6 hours as needed More than a month at prn Yes Reported, Patient Yes    isosorbide mononitrate (IMDUR) 60 MG 24 hr tablet Take 180 mg by mouth every morning 9/8/2023 at am Yes Reported, Patient Yes    KLOR-CON 20 MEQ CR tablet Take 20 mEq by mouth daily 9/8/2023 at am Yes Reported, Patient     lisinopril (ZESTRIL) 5 MG tablet Take 5 mg by mouth At Bedtime 9/7/2023 at pm Yes Reported, Patient Yes    metolazone (ZAROXOLYN) 5 MG tablet Take 5 mg by mouth See Admin Instructions TAKE 1 TABLET (5 MG) BY MOUTH TWO TIMES A WEEK. TUESDAY AND FRIDAY, 30 MINUTES BEFORE MORNING BUMEX. 9/7/2023 at am Yes Reported, Patient Yes 7/9/24   metoprolol succinate ER (TOPROL XL) 50 MG 24 hr tablet Take 50 mg by mouth every morning 9/8/2023 at am Yes Reported, Patient Yes    nitroGLYcerin (NITROSTAT) 0.4 MG sublingual tablet Place 0.4 mg under the tongue every 5  minutes as needed If no relief after 3 tabs call 911;continue 1 tab every 5 min max 3 tab Indications: chest pain n/a at n/a Yes Reported, Patient Yes    polyethylene glycol (MIRALAX) 17 g packet Take 17 g by mouth 2 times daily as needed  Yes Reported, Patient     rOPINIRole (REQUIP) 2 MG tablet Take 4 mg by mouth every evening More than a month at prn Yes Reported, Patient Yes 8/14/24   spironolactone (ALDACTONE) 25 MG tablet Take 1 tablet by mouth every morning 9/8/2023 at am Yes Reported, Patient Yes    COMPOUNDED NON-CONTROLLED SUBSTANCE (CMPD RX) - PHARMACY TO MIX COMPOUNDED MEDICATION Apply topically three times a week Apply topically. DIPHENYLCYCLOPROPENONE 0.1% TOP OINT + 15% salicylic acid in paraffin. Apply 3 times weekly to wart until resolved.   Reported, Patient

## 2023-09-09 NOTE — TREATMENT PLAN
[facility] Treatment Plan    Patient Score: 7  Patient Acuity: 4    Clinical Indication for Therapy: history of bronchospasm    Therapy Ordered: Duo Q4H    Assessment Summary: Patient is a 61 yo male with a  pmh of CHF admitted with leg swelling and SOB.  Gave 1 duoneb to patient.  Patient is currently on RA, SPO2 96%. Lung sounds are diminished with crackles.  Upper airway wheeze noted.  Based off RCAT protocol, will change patient to PRN nebulizers.    RT will reassess in 72 hours.     Anand Bishop, RT  9/8/2023

## 2023-09-09 NOTE — PROGRESS NOTES
"[facility]    Medicine Progress Note - Hospitalist Service    Date of Admission:  9/8/2023    Assessment & Plan   Zackary Hutchison is a 62 year old old male with history of CHF,  in the past presented with exertional shortness of breath with bilateral leg swelling a few weeks duration but it is getting worse last couple of days.      Cardiomyopathy with questionable acute heart failure with reduced ejection fraction  -Labs on admission: , troponin 29 greater than 28  -Echo 9/9: LVEF 35 to 40%, borderline apical wall hypokinesis, decreased RV systolic function, no hemodynamically significant valvular disease  -Per cardiology stopping amlodipine, restarting lisinopril  -Bumex 2 mg twice daily    Coronary artery disease s/p CABG in past  -EKG showed T wave changes  -Cardiology consulted: Possible PCI Monday, starting clopidogrel and aspirin, continue Imdur, metoprolol, statin    Acute Cellulitis  Chronic venous stasis ulceration  -Chronic stasis wound has sloughing and serosanguineous drainage with surrounding erythema and edema  -Wound care consulted  -Continue Zosyn    COPD  Likely obstructive sleep apnea  Tobacco abuse  -Smokes 1/2 pack/day and has since \"forever\"  -Has been robust wheeze from the trachea and wheezing in the right upper lobe  -Discussed CPAP and sleep study with patient, will refer for sleep study at discharge  -Will need PFTs after discharge as well    Paroxysmal atrial fibrillation  -Continue amiodarone  -Holding Eliquis for procedure Monday    Possible PRINCESS on CKD stage II  -Prior creatinine 1.16 in February, 1.35 on admission  -Will monitor and avoid nephrotoxic agents    Mood disorder  -Continue duloxetine    Anemia, likely of chronic disease-stable  -Hemoglobin in February was 13, now in the tens  -Will monitor     Diet: NPO for Medical/Clinical Reasons Except for: Meds    DVT Prophylaxis: Pneumatic Compression Devices  Ruvalcaba Catheter: Not present  Lines: None   " "  Cardiac Monitoring: None  Code Status: Full Code      Clinically Significant Risk Factors Present on Admission        # Hyperkalemia: Highest K = 5.5 mmol/L in last 2 days, will monitor as appropriate        # Drug Induced Coagulation Defect: home medication list includes an anticoagulant medication    # Hypertension: Home medication list includes antihypertensive(s)      # Severe Obesity: Estimated body mass index is 43.51 kg/m  as calculated from the following:    Height as of this encounter: 1.676 m (5' 6\").    Weight as of this encounter: 122.3 kg (269 lb 9.6 oz).              Disposition Plan      Expected Discharge Date: 09/10/2023                  Pat Sebastian MD  Hospitalist Service  [facility]  Securely message with [employer] (more info)  Text page via [employer] Paging/Directory   ______________________________________________________________________    Interval History   Mr. Hutchison is doing well today.  He is feeling all right.  He is still having pain in his left lower extremity.  There is oozing coming from the chronic ulceration there.  He is having wheezing coming from both his throat and his lungs.  He states that he has been having more breathing troubles than usual.  He does not believe he has ever had PFTs.  He also has not had a sleep study and had heard \"horror stories\" about CPAP machines.  After discussion he is on board with sleep study outpatient and PFTs outpatient.    Physical Exam   Vital Signs: Temp: 97.1  F (36.2  C) Temp src: Temporal BP: 110/61 Pulse: 64   Resp: 20 SpO2: 92 % O2 Device: None (Room air)    Weight: 269 lbs 9.6 oz    General Appearance: Awake, alert, in no acute distress  Respiratory: Tracheal wheezing, wheezing in the right upper lobe  Cardiovascular: RRR  GI: soft, nontender, non distended, normal bowel sounds  Skin: no jaundice, no rash, draining and sloughing chronic venous ulcer on the left lower extremity with surrounding erythema and " edema    Medical Decision Making       56 MINUTES SPENT BY ME on the date of service doing chart review, history, exam, documentation & further activities per the note.      Data     I have personally reviewed the following data over the past 24 hrs:    6.0  \   10.4 (L)   / 169     137 100 34.0 (H) /  91   4.6 26 1.46 (H) \     Trop: 28 (H) BNP: 751     Procal: 0.03 CRP: 15.60 (H) Lactic Acid: N/A         Imaging results reviewed over the past 24 hrs:   Recent Results (from the past 24 hour(s))   XR Chest Port 1 View    Narrative    EXAM: XR CHEST PORT 1 VIEW  LOCATION: [facility]  DATE: 9/8/2023    INDICATION: increased SOB, history of CHF  COMPARISON: None.      Impression    IMPRESSION: Previous CABG. Mild cardiomegaly. Pulmonary vessels appear normal. Lungs clear. Old left-sided rib fractures.   CT Tibia Fibula Lower Leg Left wo Contrast    Narrative    EXAM: CT TIBIA FIBULA LOWER LEG LEFT WO CONTRAST  LOCATION: [facility]  DATE: 9/9/2023    INDICATION: edema, cellulitis  COMPARISON: Tibia fibula radiographs 02/14/2023  TECHNIQUE: Noncontrast. Axial, sagittal and coronal thin-section reconstruction. Dose reduction techniques were used.     FINDINGS:     BONES:  - Chronic appearing comminuted fracture involving the medial and lateral tibial plateau, though with some nonunion of the fracture fragments. Within the proximal tibial there is an soft tissue lesion which raises the possibility of a pathologic fracture.   This measures approximately 5.1 x 4.0 cm in coronal dimension (series 5 image 56). In retrospect this was subtly present on the prior radiograph and is likely increased in size on current study.  No other evidence of fracture of the tibia or fibula.   Tricompartmental degenerative changes of the knee which most prominently involve the patellofemoral component where there is severe joint space narrowing and bone on bone contact, as well as large  osteophytes.    SOFT TISSUES:  - Atherosclerotic vascular calcifications. Trace knee effusion. Diffuse subcutaneous edema of the lower extremity predominating in the area of the shin and which may be compatible with the clinically suspected cellulitis. While limited without IV   contrast, there is no definite evidence of drainable fluid collection.      Impression    IMPRESSION:  1.  Chronic appearing comminuted fracture involving the medial and lateral tibial plateau, though with some nonunion of the fracture fragments. Within the proximal tibial there is a soft tissue lesion measuring up to 5.1 cm which raises the possibility   of a pathologic fracture. In retrospect this was subtly present on the prior radiograph and is likely increased in size on the current study. MRI with contrast may be helpful for further characterization. Additionally, may consider bone scan to assess   for other sites of disease.   2.  Trace knee effusion.  3.  Diffuse subcutaneous edema of the lower extremity predominating in the area of the shin and which may be compatible with the clinically suspected cellulitis. While limited without IV contrast, there is no definite evidence of drainable fluid   collection.     Echocardiogram Complete   Result Value    LVEF  35-40% (moderately reduced)    [facility]    [id number]  [id number]  [id number]  [id number]^LISANDRA^[place] [address]     Name: KRISTINE JONES  MRN: [id number]  : 1960  Study Date: 2023 08:04 AM  Age: 62 yrs  Gender: Male  Patient Location: [facility]  Reason For Study: Syncope  Ordering Physician: NAN FRY  Performed By: MB     BSA: 2.3 m2  Height: 66 in  Weight: 269 lb  HR: 62  BP: 98/50 mmHg  ______________________________________________________________________________  Procedure  Complete Echo Adult. Definity (NDC #64622-772) given intravenously. No  hemodynamically significant valvular abnormalities on 2D or color  flow  imaging. The study was technically difficult. There is no comparison study  available.  ______________________________________________________________________________  Interpretation Summary     1.Left ventricular function is decreased. The ejection fraction is 35-40%  (moderately reduced).  2.Septal motion is consistent with post-operative state.  3.There is borderline apical wall hypokinesis.  4.Mildly decreased right ventricular systolic function  5.No hemodynamically significant valvular abnormalities on 2D or color flow  imaging.  6.There is borderline aortic root enlargement. The ascending aorta is  Borderline dilated.  7.The study was technically difficult.  There is no comparison study available.  ______________________________________________________________________________  I      WMSI = 2.13     % Normal = 0     X - Cannot   0 -                      (2) - Mildly 2 -          Segments  Size  Interpret    Hyperkinetic 1 - Normal  Hypokinetic  Hypokinetic  1-2     small                                                     7 -          3-5      moderate  3 - Akinetic 4 -          5 -         6 - Akinetic Dyskinetic   6-14    large               Dyskinetic   Aneurysmal  w/scar       w/scar       15-16   diffuse     Left Ventricle  Left ventricular function is decreased. The ejection fraction is 35-40%  (moderately reduced). The left ventricle is borderline dilated. There is  normal left ventricular wall thickness. Left ventricular diastolic function is  normal. Septal motion is consistent with post-operative state. There is  borderline apical wall hypokinesis.     Right Ventricle  The right ventricle is normal size. TAPSE is abnormal, which is consistent  with abnormal right ventricular systolic function. Mildly decreased right  ventricular systolic function.     Atria  The left atrium is mild to moderately dilated. Right atrial size is normal.  There is no color Doppler evidence of an atrial shunt.      Mitral Valve  The mitral valve is not well visualized. There is no mitral regurgitation  noted. There is no mitral valve stenosis.     Tricuspid Valve  The tricuspid valve is not well visualized. Right ventricle systolic pressure  estimate normal. There is trace tricuspid regurgitation. There is no tricuspid  stenosis.     Aortic Valve  The aortic valve is not well visualized. A bicuspid aortic valve cannot be  excluded. No aortic regurgitation is present. No aortic stenosis is present.     Pulmonic Valve  The pulmonic valve is not well seen, but is grossly normal. This degree of  valvular regurgitation is within normal limits. There is no pulmonic valvular  stenosis.     Vessels  There is borderline aortic root enlargement. The ascending aorta is Borderline  dilated. IVC diameter <2.1 cm collapsing >50% with sniff suggests a normal RA  pressure of 3 mmHg.     Pericardium  There is no pericardial effusion.     Rhythm  Sinus rhythm was noted.     ______________________________________________________________________________  MMode/2D Measurements & Calculations  IVSd: 0.93 cm  LVIDd: 5.6 cm  LVIDs: 4.1 cm  LVPWd: 0.95 cm  FS: 27.5 %     LV mass(C)d: 202.3 grams  LV mass(C)dI: 89.2 grams/m2  Ao root diam: 4.0 cm  asc Aorta Diam: 4.0 cm  LVOT diam: 2.2 cm  LVOT area: 3.8 cm2  Ao root diam Index (cm/m2): 1.8  asc Aorta Diam Index (cm/m2): 1.7  LA Volume (BP): 64.7 ml  LA Volume Index (BP): 28.5 ml/m2     LA Volume Indexed (AL/bp): 29.9 ml/m2  RWT: 0.34  TAPSE: 1.2 cm     Time Measurements  MM HR: 62.0 BPM     Doppler Measurements & Calculations  MV E max mariely: 99.9 cm/sec  MV A max mariely: 62.7 cm/sec  MV E/A: 1.6  MV dec slope: 535.0 cm/sec2  MV dec time: 0.19 sec  Ao V2 max: 138.0 cm/sec  Ao max P.0 mmHg  Ao V2 mean: 95.3 cm/sec  Ao mean P.0 mmHg  Ao V2 VTI: 30.3 cm  MARIUM(I,D): 3.2 cm2  MARIUM(V,D): 3.4 cm2     LV V1 max P.0 mmHg  LV V1 max: 122.0 cm/sec  LV V1 VTI: 25.4 cm  SV(LVOT): 96.6 ml  SI(LVOT): 42.6  ml/m2  PA acc time: 0.09 sec  AV Iraj Ratio (DI): 0.88  MARIUM Index (cm2/m2): 1.4  E/E': 9.3  E/E' avg: 10.3  Lateral E/e': 11.3  Medial E/e': 9.3  Peak E' Iraj: 10.8 cm/sec  RV S Iraj: 9.9 cm/sec     ______________________________________________________________________________  Report approved by: Aura Beal 09/09/2023 12:28 PM         US Lower Extremity Venous Duplex Bilateral    Narrative    EXAM: US LOWER EXTREMITY VENOUS DUPLEX BILATERAL  LOCATION: [facility]  DATE: 9/9/2023    INDICATION: edema and risk for DVT, please screen both legs. thank you  COMPARISON: None.  TECHNIQUE: Venous Duplex ultrasound of bilateral lower extremities with and without compression, augmentation and duplex. Color flow and spectral Doppler with waveform analysis performed.    FINDINGS: Exam includes the common femoral, femoral, popliteal veins as well as segmentally visualized deep calf veins and greater saphenous vein.     RIGHT: No deep vein thrombosis. No superficial thrombophlebitis. No popliteal cyst.    LEFT: No deep vein thrombosis. No superficial thrombophlebitis. No popliteal cyst.      Impression    IMPRESSION:  1.  No deep venous thrombosis in the bilateral lower extremities.

## 2023-09-09 NOTE — ED NOTES
I, Ladonna Laguerre MD have reviewed the documentation, personally taken the patient's history, performed an exam and agree with the physical finds, diagnosis and management plan.  I saw this patient with Demetrice Boggs PA-C.  ED Course as of 09/08/23 2143   Fri Sep 08, 2023   1841 I discussed the case with Demetrice Boggs PA-C.   2106 Patient is a 62-year-old male who comes in today for multiple complaints.  He has been having some shortness of breath that is been worsening over the last few months and then a lot worse the last few weeks and then the worst its been today.  He denies any associated chest pain.  He ran out of his albuterol 2 weeks ago and has not been able to get it refilled.  He does have a history of COPD.  He is also complaining of lower extremity swelling has been going on for some time.  Has been working with his cardiologist in [place] and sometimes gets better and sometimes it gets worse.  He also has a wound to his left anterior shin that started a couple weeks ago and really started getting worse in the last week.  It is now really red and started draining.  He has some mild diffuse tenderness to the left anterior shin.  He has some faint wheezing bilaterally to his lungs.  We will give him a dose of antibiotics for the leg.  We will give him some nebs for the lungs.  He had an ischemic-looking EKG so he needs to come into the hospital for further evaluation and further work-up.  He was comfortable with this plan.     I personally saw the patient and performed a substantive portion of the visit including all aspects of the medical decision making.       Ladonna Laguerre MD  09/08/23 2143

## 2023-09-10 LAB
ANION GAP SERPL CALCULATED.3IONS-SCNC: 12 MMOL/L (ref 7–15)
ATRIAL RATE - MUSE: 69 BPM
BUN SERPL-MCNC: 33 MG/DL (ref 8–23)
CALCIUM SERPL-MCNC: 9.2 MG/DL (ref 8.8–10.2)
CHLORIDE SERPL-SCNC: 97 MMOL/L (ref 98–107)
CREAT SERPL-MCNC: 1.43 MG/DL (ref 0.67–1.17)
DEPRECATED HCO3 PLAS-SCNC: 26 MMOL/L (ref 22–29)
DIASTOLIC BLOOD PRESSURE - MUSE: NORMAL MMHG
EGFRCR SERPLBLD CKD-EPI 2021: 55 ML/MIN/1.73M2
ERYTHROCYTE [DISTWIDTH] IN BLOOD BY AUTOMATED COUNT: 16.1 % (ref 10–15)
GLUCOSE SERPL-MCNC: 87 MG/DL (ref 70–99)
HCT VFR BLD AUTO: 33.9 % (ref 40–53)
HGB BLD-MCNC: 10.9 G/DL (ref 13.3–17.7)
INTERPRETATION ECG - MUSE: NORMAL
MAGNESIUM SERPL-MCNC: 1.9 MG/DL (ref 1.7–2.3)
MCH RBC QN AUTO: 32.1 PG (ref 26.5–33)
MCHC RBC AUTO-ENTMCNC: 32.2 G/DL (ref 31.5–36.5)
MCV RBC AUTO: 100 FL (ref 78–100)
P AXIS - MUSE: 83 DEGREES
PLATELET # BLD AUTO: 171 10E3/UL (ref 150–450)
POTASSIUM SERPL-SCNC: 4 MMOL/L (ref 3.4–5.3)
PR INTERVAL - MUSE: 170 MS
QRS DURATION - MUSE: 130 MS
QT - MUSE: 432 MS
QTC - MUSE: 462 MS
R AXIS - MUSE: 74 DEGREES
RBC # BLD AUTO: 3.4 10E6/UL (ref 4.4–5.9)
SODIUM SERPL-SCNC: 135 MMOL/L (ref 136–145)
SYSTOLIC BLOOD PRESSURE - MUSE: NORMAL MMHG
T AXIS - MUSE: 31 DEGREES
VENTRICULAR RATE- MUSE: 69 BPM
WBC # BLD AUTO: 6.3 10E3/UL (ref 4–11)

## 2023-09-10 PROCEDURE — 250N000013 HC RX MED GY IP 250 OP 250 PS 637: Performed by: INTERNAL MEDICINE

## 2023-09-10 PROCEDURE — 83735 ASSAY OF MAGNESIUM: CPT | Performed by: INTERNAL MEDICINE

## 2023-09-10 PROCEDURE — 36415 COLL VENOUS BLD VENIPUNCTURE: CPT | Performed by: INTERNAL MEDICINE

## 2023-09-10 PROCEDURE — 80048 BASIC METABOLIC PNL TOTAL CA: CPT | Performed by: INTERNAL MEDICINE

## 2023-09-10 PROCEDURE — 250N000011 HC RX IP 250 OP 636: Mod: JZ | Performed by: INTERNAL MEDICINE

## 2023-09-10 PROCEDURE — 86900 BLOOD TYPING SEROLOGIC ABO: CPT | Performed by: NURSE PRACTITIONER

## 2023-09-10 PROCEDURE — 210N000001 HC R&B IMCU HEART CARE

## 2023-09-10 PROCEDURE — 99231 SBSQ HOSP IP/OBS SF/LOW 25: CPT | Mod: 25 | Performed by: INTERNAL MEDICINE

## 2023-09-10 PROCEDURE — 85014 HEMATOCRIT: CPT | Performed by: INTERNAL MEDICINE

## 2023-09-10 RX ORDER — ROPINIROLE 1 MG/1
2 TABLET, FILM COATED ORAL DAILY PRN
Status: DISCONTINUED | OUTPATIENT
Start: 2023-09-10 | End: 2023-09-14 | Stop reason: HOSPADM

## 2023-09-10 RX ADMIN — ROPINIROLE HYDROCHLORIDE 2 MG: 1 TABLET, FILM COATED ORAL at 15:59

## 2023-09-10 RX ADMIN — DULOXETINE HYDROCHLORIDE 30 MG: 30 CAPSULE, DELAYED RELEASE ORAL at 10:04

## 2023-09-10 RX ADMIN — PIPERACILLIN AND TAZOBACTAM 3.38 G: 3; .375 INJECTION, POWDER, LYOPHILIZED, FOR SOLUTION INTRAVENOUS at 05:54

## 2023-09-10 RX ADMIN — Medication 81 MG: at 09:58

## 2023-09-10 RX ADMIN — GABAPENTIN 300 MG: 300 CAPSULE ORAL at 12:18

## 2023-09-10 RX ADMIN — GABAPENTIN 900 MG: 300 CAPSULE ORAL at 21:09

## 2023-09-10 RX ADMIN — CLOPIDOGREL BISULFATE 75 MG: 75 TABLET ORAL at 09:59

## 2023-09-10 RX ADMIN — LISINOPRIL 5 MG: 5 TABLET ORAL at 09:59

## 2023-09-10 RX ADMIN — PIPERACILLIN AND TAZOBACTAM 3.38 G: 3; .375 INJECTION, POWDER, LYOPHILIZED, FOR SOLUTION INTRAVENOUS at 21:10

## 2023-09-10 RX ADMIN — ROPINIROLE HYDROCHLORIDE 4 MG: 1 TABLET, FILM COATED ORAL at 21:10

## 2023-09-10 RX ADMIN — BUMETANIDE 2 MG: 2 TABLET ORAL at 15:59

## 2023-09-10 RX ADMIN — GABAPENTIN 300 MG: 300 CAPSULE ORAL at 09:59

## 2023-09-10 RX ADMIN — AMIODARONE HYDROCHLORIDE 200 MG: 200 TABLET ORAL at 10:00

## 2023-09-10 RX ADMIN — ATORVASTATIN CALCIUM 40 MG: 40 TABLET, FILM COATED ORAL at 09:57

## 2023-09-10 RX ADMIN — BUMETANIDE 2 MG: 2 TABLET ORAL at 09:58

## 2023-09-10 RX ADMIN — ISOSORBIDE MONONITRATE 180 MG: 60 TABLET, EXTENDED RELEASE ORAL at 09:58

## 2023-09-10 RX ADMIN — PIPERACILLIN AND TAZOBACTAM 3.38 G: 3; .375 INJECTION, POWDER, LYOPHILIZED, FOR SOLUTION INTRAVENOUS at 12:18

## 2023-09-10 ASSESSMENT — ACTIVITIES OF DAILY LIVING (ADL)
ADLS_ACUITY_SCORE: 35

## 2023-09-10 NOTE — PLAN OF CARE
Problem: Plan of Care - These are the overarching goals to be used throughout the patient stay.    Goal: Plan of Care Review  Description: The Plan of Care Review/Shift note should be completed every shift.  The Outcome Evaluation is a brief statement about your assessment that the patient is improving, declining, or no change.  This information will be displayed automatically on your shift note.  Outcome: Progressing   Goal Outcome Evaluation:  Patient has no complaints of pain.  Patient does have dyspnea with exertion.  Patient has been up independent in his room with a walker.  Patient has been voiding into the urinal for accurate output monitoring..  Patient's vitals and telemetry have remained stable. Patient will be prepped for angiogram tonight before bed.  Patient refused education materials about the the angiogram.

## 2023-09-10 NOTE — PLAN OF CARE
"  Problem: Plan of Care - These are the overarching goals to be used throughout the patient stay.    Goal: Plan of Care Review  Description: The Plan of Care Review/Shift note should be completed every shift.  The Outcome Evaluation is a brief statement about your assessment that the patient is improving, declining, or no change.  This information will be displayed automatically on your shift note.  Outcome: Progressing     Problem: Plan of Care - These are the overarching goals to be used throughout the patient stay.    Goal: Patient-Specific Goal (Individualized)  Description: You can add care plan individualizations to a care plan. Examples of Individualization might be:  \"Parent requests to be called daily at 9am for status\", \"I have a hard time hearing out of my right ear\", or \"Do not touch me to wake me up as it startles me\".  Outcome: Progressing     Problem: Plan of Care - These are the overarching goals to be used throughout the patient stay.    Goal: Optimal Comfort and Wellbeing  Outcome: Progressing   Goal Outcome Evaluation:       Report given to [place] DOLL all questions answered.                 "

## 2023-09-11 PROBLEM — I50.9 HEART FAILURE, UNSPECIFIED HF CHRONICITY, UNSPECIFIED HEART FAILURE TYPE (H): Status: ACTIVE | Noted: 2023-09-11

## 2023-09-11 LAB
ABO/RH(D): NORMAL
ANTIBODY SCREEN: NEGATIVE
BASE EXCESS BLDA CALC-SCNC: 2.2 MMOL/L
BASE EXCESS BLDV CALC-SCNC: 3.5 MMOL/L
COHGB MFR BLD: 97.3 % (ref 96–97)
GLUCOSE BLDC GLUCOMTR-MCNC: 93 MG/DL (ref 70–99)
HCO3 BLDA-SCNC: 26 MMOL/L (ref 23–29)
HCO3 BLDV-SCNC: 26 MMOL/L (ref 24–30)
PCO2 BLDA: 53 MM HG (ref 35–45)
PCO2 BLDV: 56 MM HG (ref 35–50)
PH BLDA: 7.33 [PH] (ref 7.37–7.44)
PH BLDV: 7.33 [PH] (ref 7.35–7.45)
PO2 BLDA: 87 MM HG (ref 80–90)
PO2 BLDV: 40 MM HG (ref 25–47)
SATV LHE POCT: 71.1 % (ref 70–75)
SPECIMEN EXPIRATION DATE: NORMAL

## 2023-09-11 PROCEDURE — 255N000002 HC RX 255 OP 636: Performed by: INTERNAL MEDICINE

## 2023-09-11 PROCEDURE — 82805 BLOOD GASES W/O2 SATURATION: CPT

## 2023-09-11 PROCEDURE — 99152 MOD SED SAME PHYS/QHP 5/>YRS: CPT | Performed by: INTERNAL MEDICINE

## 2023-09-11 PROCEDURE — 93451 RIGHT HEART CATH: CPT | Performed by: INTERNAL MEDICINE

## 2023-09-11 PROCEDURE — 250N000011 HC RX IP 250 OP 636: Performed by: INTERNAL MEDICINE

## 2023-09-11 PROCEDURE — 250N000009 HC RX 250: Performed by: INTERNAL MEDICINE

## 2023-09-11 PROCEDURE — 210N000001 HC R&B IMCU HEART CARE

## 2023-09-11 PROCEDURE — B2111ZZ FLUOROSCOPY OF MULTIPLE CORONARY ARTERIES USING LOW OSMOLAR CONTRAST: ICD-10-PCS | Performed by: INTERNAL MEDICINE

## 2023-09-11 PROCEDURE — 93461 R&L HRT ART/VENTRICLE ANGIO: CPT | Performed by: INTERNAL MEDICINE

## 2023-09-11 PROCEDURE — 93461 R&L HRT ART/VENTRICLE ANGIO: CPT | Mod: 26 | Performed by: INTERNAL MEDICINE

## 2023-09-11 PROCEDURE — 250N000013 HC RX MED GY IP 250 OP 250 PS 637: Performed by: NURSE PRACTITIONER

## 2023-09-11 PROCEDURE — 250N000011 HC RX IP 250 OP 636: Mod: JZ | Performed by: INTERNAL MEDICINE

## 2023-09-11 PROCEDURE — 250N000013 HC RX MED GY IP 250 OP 250 PS 637: Performed by: INTERNAL MEDICINE

## 2023-09-11 PROCEDURE — 4A023N8 MEASUREMENT OF CARDIAC SAMPLING AND PRESSURE, BILATERAL, PERCUTANEOUS APPROACH: ICD-10-PCS | Performed by: INTERNAL MEDICINE

## 2023-09-11 PROCEDURE — 999N000033 HC STATISTIC CHRONIC PULMONARY DISEASE SPECIALIST

## 2023-09-11 PROCEDURE — C1894 INTRO/SHEATH, NON-LASER: HCPCS | Performed by: INTERNAL MEDICINE

## 2023-09-11 PROCEDURE — 272N000001 HC OR GENERAL SUPPLY STERILE: Performed by: INTERNAL MEDICINE

## 2023-09-11 PROCEDURE — 99232 SBSQ HOSP IP/OBS MODERATE 35: CPT | Performed by: INTERNAL MEDICINE

## 2023-09-11 PROCEDURE — C1769 GUIDE WIRE: HCPCS | Performed by: INTERNAL MEDICINE

## 2023-09-11 PROCEDURE — 258N000003 HC RX IP 258 OP 636: Performed by: NURSE PRACTITIONER

## 2023-09-11 PROCEDURE — 99233 SBSQ HOSP IP/OBS HIGH 50: CPT | Performed by: INTERNAL MEDICINE

## 2023-09-11 PROCEDURE — C1760 CLOSURE DEV, VASC: HCPCS | Performed by: INTERNAL MEDICINE

## 2023-09-11 PROCEDURE — C1751 CATH, INF, PER/CENT/MIDLINE: HCPCS | Performed by: INTERNAL MEDICINE

## 2023-09-11 PROCEDURE — G0463 HOSPITAL OUTPT CLINIC VISIT: HCPCS

## 2023-09-11 PROCEDURE — 250N000011 HC RX IP 250 OP 636: Mod: JZ | Performed by: NURSE PRACTITIONER

## 2023-09-11 PROCEDURE — 99153 MOD SED SAME PHYS/QHP EA: CPT | Performed by: INTERNAL MEDICINE

## 2023-09-11 RX ORDER — ACETAMINOPHEN 325 MG/1
650 TABLET ORAL EVERY 4 HOURS PRN
Status: DISCONTINUED | OUTPATIENT
Start: 2023-09-11 | End: 2023-09-14 | Stop reason: HOSPADM

## 2023-09-11 RX ORDER — NALOXONE HYDROCHLORIDE 0.4 MG/ML
0.2 INJECTION, SOLUTION INTRAMUSCULAR; INTRAVENOUS; SUBCUTANEOUS
Status: DISCONTINUED | OUTPATIENT
Start: 2023-09-11 | End: 2023-09-14 | Stop reason: HOSPADM

## 2023-09-11 RX ORDER — ASPIRIN 325 MG
325 TABLET ORAL ONCE
Status: COMPLETED | OUTPATIENT
Start: 2023-09-11 | End: 2023-09-11

## 2023-09-11 RX ORDER — FENTANYL CITRATE 50 UG/ML
25 INJECTION, SOLUTION INTRAMUSCULAR; INTRAVENOUS
Status: DISCONTINUED | OUTPATIENT
Start: 2023-09-11 | End: 2023-09-11

## 2023-09-11 RX ORDER — FENTANYL CITRATE 50 UG/ML
INJECTION, SOLUTION INTRAMUSCULAR; INTRAVENOUS
Status: DISCONTINUED | OUTPATIENT
Start: 2023-09-11 | End: 2023-09-11 | Stop reason: HOSPADM

## 2023-09-11 RX ORDER — SODIUM CHLORIDE 9 MG/ML
75 INJECTION, SOLUTION INTRAVENOUS CONTINUOUS
Status: DISCONTINUED | OUTPATIENT
Start: 2023-09-11 | End: 2023-09-11

## 2023-09-11 RX ORDER — OXYCODONE HYDROCHLORIDE 5 MG/1
10 TABLET ORAL EVERY 4 HOURS PRN
Status: DISCONTINUED | OUTPATIENT
Start: 2023-09-11 | End: 2023-09-14 | Stop reason: HOSPADM

## 2023-09-11 RX ORDER — NALOXONE HYDROCHLORIDE 0.4 MG/ML
0.4 INJECTION, SOLUTION INTRAMUSCULAR; INTRAVENOUS; SUBCUTANEOUS
Status: ACTIVE | OUTPATIENT
Start: 2023-09-11 | End: 2023-09-11

## 2023-09-11 RX ORDER — NALOXONE HYDROCHLORIDE 0.4 MG/ML
0.4 INJECTION, SOLUTION INTRAMUSCULAR; INTRAVENOUS; SUBCUTANEOUS
Status: DISCONTINUED | OUTPATIENT
Start: 2023-09-11 | End: 2023-09-14 | Stop reason: HOSPADM

## 2023-09-11 RX ORDER — SODIUM CHLORIDE 9 MG/ML
INJECTION, SOLUTION INTRAVENOUS CONTINUOUS
Status: DISCONTINUED | OUTPATIENT
Start: 2023-09-11 | End: 2023-09-11 | Stop reason: HOSPADM

## 2023-09-11 RX ORDER — LISINOPRIL 5 MG/1
5 TABLET ORAL DAILY
Status: DISCONTINUED | OUTPATIENT
Start: 2023-09-11 | End: 2023-09-14 | Stop reason: HOSPADM

## 2023-09-11 RX ORDER — IODIXANOL 320 MG/ML
INJECTION, SOLUTION INTRAVASCULAR
Status: DISCONTINUED | OUTPATIENT
Start: 2023-09-11 | End: 2023-09-11 | Stop reason: HOSPADM

## 2023-09-11 RX ORDER — DIAZEPAM 10 MG
10 TABLET ORAL
Status: COMPLETED | OUTPATIENT
Start: 2023-09-11 | End: 2023-09-11

## 2023-09-11 RX ORDER — ASPIRIN 81 MG/1
243 TABLET, CHEWABLE ORAL ONCE
Status: COMPLETED | OUTPATIENT
Start: 2023-09-11 | End: 2023-09-11

## 2023-09-11 RX ORDER — LIDOCAINE 40 MG/G
CREAM TOPICAL
Status: DISCONTINUED | OUTPATIENT
Start: 2023-09-11 | End: 2023-09-11 | Stop reason: HOSPADM

## 2023-09-11 RX ORDER — FLUMAZENIL 0.1 MG/ML
0.2 INJECTION, SOLUTION INTRAVENOUS
Status: ACTIVE | OUTPATIENT
Start: 2023-09-11 | End: 2023-09-11

## 2023-09-11 RX ORDER — NALOXONE HYDROCHLORIDE 0.4 MG/ML
0.2 INJECTION, SOLUTION INTRAMUSCULAR; INTRAVENOUS; SUBCUTANEOUS
Status: ACTIVE | OUTPATIENT
Start: 2023-09-11 | End: 2023-09-11

## 2023-09-11 RX ORDER — OXYCODONE HYDROCHLORIDE 5 MG/1
5 TABLET ORAL EVERY 4 HOURS PRN
Status: DISCONTINUED | OUTPATIENT
Start: 2023-09-11 | End: 2023-09-14 | Stop reason: HOSPADM

## 2023-09-11 RX ORDER — ATROPINE SULFATE 0.1 MG/ML
0.5 INJECTION INTRAVENOUS
Status: ACTIVE | OUTPATIENT
Start: 2023-09-11 | End: 2023-09-11

## 2023-09-11 RX ORDER — HYDRALAZINE HYDROCHLORIDE 20 MG/ML
10 INJECTION INTRAMUSCULAR; INTRAVENOUS
Status: DISCONTINUED | OUTPATIENT
Start: 2023-09-11 | End: 2023-09-14 | Stop reason: HOSPADM

## 2023-09-11 RX ADMIN — ATORVASTATIN CALCIUM 40 MG: 40 TABLET, FILM COATED ORAL at 08:31

## 2023-09-11 RX ADMIN — Medication 81 MG: at 08:31

## 2023-09-11 RX ADMIN — GABAPENTIN 300 MG: 300 CAPSULE ORAL at 08:31

## 2023-09-11 RX ADMIN — ROPINIROLE HYDROCHLORIDE 4 MG: 1 TABLET, FILM COATED ORAL at 22:00

## 2023-09-11 RX ADMIN — ASPIRIN 81 MG CHEWABLE TABLET 243 MG: 81 TABLET CHEWABLE at 12:11

## 2023-09-11 RX ADMIN — ISOSORBIDE MONONITRATE 180 MG: 60 TABLET, EXTENDED RELEASE ORAL at 08:36

## 2023-09-11 RX ADMIN — PIPERACILLIN AND TAZOBACTAM 3.38 G: 3; .375 INJECTION, POWDER, LYOPHILIZED, FOR SOLUTION INTRAVENOUS at 16:17

## 2023-09-11 RX ADMIN — DIAZEPAM 10 MG: 10 TABLET ORAL at 13:22

## 2023-09-11 RX ADMIN — CLOPIDOGREL BISULFATE 75 MG: 75 TABLET ORAL at 08:32

## 2023-09-11 RX ADMIN — AMIODARONE HYDROCHLORIDE 200 MG: 200 TABLET ORAL at 08:32

## 2023-09-11 RX ADMIN — BUMETANIDE 2 MG: 2 TABLET ORAL at 15:20

## 2023-09-11 RX ADMIN — GABAPENTIN 900 MG: 300 CAPSULE ORAL at 21:59

## 2023-09-11 RX ADMIN — PIPERACILLIN AND TAZOBACTAM 3.38 G: 3; .375 INJECTION, POWDER, LYOPHILIZED, FOR SOLUTION INTRAVENOUS at 04:08

## 2023-09-11 RX ADMIN — LISINOPRIL 5 MG: 5 TABLET ORAL at 16:17

## 2023-09-11 RX ADMIN — SODIUM CHLORIDE: 9 INJECTION, SOLUTION INTRAVENOUS at 12:11

## 2023-09-11 RX ADMIN — HYDROMORPHONE HYDROCHLORIDE 1 MG: 2 TABLET ORAL at 15:20

## 2023-09-11 RX ADMIN — BUMETANIDE 2 MG: 2 TABLET ORAL at 08:31

## 2023-09-11 RX ADMIN — OXYCODONE HYDROCHLORIDE 5 MG: 5 TABLET ORAL at 16:17

## 2023-09-11 RX ADMIN — DULOXETINE HYDROCHLORIDE 30 MG: 30 CAPSULE, DELAYED RELEASE ORAL at 08:36

## 2023-09-11 ASSESSMENT — ACTIVITIES OF DAILY LIVING (ADL)
ADLS_ACUITY_SCORE: 35
ADLS_ACUITY_SCORE: 35
ADLS_ACUITY_SCORE: 33
ADLS_ACUITY_SCORE: 35
ADLS_ACUITY_SCORE: 33
ADLS_ACUITY_SCORE: 35
ADLS_ACUITY_SCORE: 33
ADLS_ACUITY_SCORE: 33
ADLS_ACUITY_SCORE: 35

## 2023-09-11 ASSESSMENT — EJECTION FRACTION: EF_VALUE: .17

## 2023-09-11 NOTE — PLAN OF CARE
Goal Outcome Evaluation:      Plan of Care Reviewed With: patient    Overall Patient Progress: no changeOverall Patient Progress: no change       Vitals:    09/10/23 0951 09/10/23 1129 09/10/23 1523 09/10/23 1919   BP: 115/55 91/45 107/55 104/54   BP Location: Left arm Left arm Left arm Left arm   Pulse: 72 74 79 74   Resp:  18 18 18   Temp:  98  F (36.7  C) 98  F (36.7  C) 98.4  F (36.9  C)   TempSrc:  Oral Oral Oral   SpO2:  93% 95% 92%   Weight:       Height:          Denies pain. Has some shortness of breath with exertion. Room air. On IV antibiotics. WOC consult for left shin wound. Independent in the room. Npo at [place] angio. Patient wiped down and clipped. Genna Solo RN

## 2023-09-11 NOTE — PROGRESS NOTES
Patient given scheduled meds this morning lisinopril was held per orders.  Patient given additional ASA before [place] [facility] Patient was clipped on the previous shift.  IVF started before patient was transferred.  Patient taken by w/c.  Patient is A&OX4.  No c/o pain.  Patient pleasant.  Patient declined to review the video for angio prior to transfer.  Patient reports that he is had angio grams done in the past.

## 2023-09-11 NOTE — PROGRESS NOTES
HEART CARE NOTE          Assessment/Recommendations     1. Severe Biventricular dysfunction/HFrEF   Assessment / Plan  Nearing euvolemia on physical exam; tolerating oral diuretic regimen - no changes at this time; continue to monitor renal function/repeat BMP, UOP and hemodynamics closely   Patient is high risk for adverse cardiac events 2/2 severe biventricular dysfunction  GDMT as detailed below    Current Pharmacotherapy AHA Guideline-Directed Medical Therapy   Lisinopril 5 mg daily - on hold  Lisinopril 20 mg twice daily   Carvedilol - not started in the setting of borderline HF Carvedilol 25 mg twice daily   Spironolactone - no started Spironolactone 25 mg once daily   Hydralazine NA Hydralazine 100 mg three times daily   Isosorbide dinitrate NA Isosorbide dinitrate 40 mg three times daily   SGLT2 inhibitor:Dapagliflozin/Empagliflozin - not started Dapagliflozin or Empagliflozin 10 mg daily     2. Atrial fibrillation  Assessment / Plan  Continue amiodarone  Apixaban on hold in anticipation of cath    3. CAD  Assessment / Plan  S/p CABG presenting with dyspnea in exertion  Plan for coronary angiogram +/- PCI today; I discussed this with him including potential risk of stroke, myocardial infarction, or death.  We also discussed the possibility of vascular injury, bleeding requiring blood transfusion, or reaction to x-ray dye  Continue ASA, atorvastatin    4. PRINCESS on CKD  Assessment / Plan  Continue to monitor UOP and renal function closely      Plan of care discussed on September 11, 2023 with patient at bedside, and primary team overseeing patient's care      History of Present Illness/Subjective    Mr. Zackary Hutchison is a 62 year old male with a PMHx significant for CAD s/p CABG, afib, HFrEF and CKD who presents in ADHF c/b angina    Today, Mr. Hutchison denies any acute cardiac events or complaints; Management plan as detailed above     ECG: personally reviewed; normal sinus rhythm, RBBB.    ECHO (personnaly  "Reviewed on 9/11/23):   1.Left ventricular function is decreased. The ejection fraction is 35-40%  (moderately reduced).  2.Septal motion is consistent with post-operative state.  3.There is borderline apical wall hypokinesis.  4.Mildly decreased right ventricular systolic function  5.No hemodynamically significant valvular abnormalities on 2D or color flow  imaging.  6.There is borderline aortic root enlargement. The ascending aorta is  Borderline dilated.  7.The study was technically difficult.  There is no comparison study available    Telemetry: personally reviewed September 11, 2023; notable for NSR      Lab results: personally reviewed September 11, 2023; notable for PRINCESS on CKD    Medical history and pertinent documents reviewed in Care Everywhere please where applicable see details above        Physical Examination Review of Systems   /53 (BP Location: Left arm)   Pulse 69   Temp 97.5  F (36.4  C) (Oral)   Resp 20   Ht 1.676 m (5' 6\")   Wt 116.3 kg (256 lb 4.8 oz)   SpO2 92%   BMI 41.37 kg/m    Body mass index is 41.37 kg/m .  Wt Readings from Last 3 Encounters:   09/11/23 116.3 kg (256 lb 4.8 oz)   02/13/23 71.2 kg (157 lb)     General Appearance:   no distress, normal body habitus   ENT/Mouth: membranes moist, no oral lesions or bleeding gums.      EYES:  no scleral icterus, normal conjunctivae   Neck: no carotid bruits or thyromegaly   Chest/Lungs:   lungs are clear to auscultation, no rales or wheezing, equal chest wall expansion    Cardiovascular:   Regular. Normal first and second heart sounds with rubs, or gallops; the carotid, radial and posterior tibial pulses are intact, no JVD and trace LE edema bilaterally    Abdomen:  no organomegaly, masses, bruits, or tenderness; bowel sounds are present   Extremities: no cyanosis or clubbing   Skin: no xanthelasma, warm.    Neurologic: NAD     Psychiatric: alert and oriented x3, calm     A complete 10 systems ROS was reviewed  And is negative " except what is listed in the HPI.          Medical History  Surgical History Family History Social History   No past medical history on file. No past surgical history on file. no family history of premature coronary artery disease Social History     Socioeconomic History    Marital status: Single     Spouse name: Not on file    Number of children: Not on file    Years of education: Not on file    Highest education level: Not on file   Occupational History    Not on file   Tobacco Use    Smoking status: Not on file    Smokeless tobacco: Not on file   Substance and Sexual Activity    Alcohol use: Not on file    Drug use: Not on file    Sexual activity: Not on file   Other Topics Concern    Not on file   Social History Narrative    Not on file     Social Determinants of Health     Financial Resource Strain: Not on file   Food Insecurity: Not on file   Transportation Needs: Not on file   Physical Activity: Not on file   Stress: Not on file   Social Connections: Not on file   Intimate Partner Violence: Not on file   Housing Stability: Not on file           Lab Results    Chemistry/lipid CBC Cardiac Enzymes/BNP/TSH/INR   Lab Results   Component Value Date    BUN 33.0 (H) 09/10/2023     (L) 09/10/2023    CO2 26 09/10/2023    Lab Results   Component Value Date    WBC 6.3 09/10/2023    HGB 10.9 (L) 09/10/2023    HCT 33.9 (L) 09/10/2023     09/10/2023     09/10/2023    Lab Results   Component Value Date    INR 1.01 02/13/2023     No results found for: CKTOTAL, CKMB, TROPONINI       Weight:    Wt Readings from Last 3 Encounters:   09/11/23 116.3 kg (256 lb 4.8 oz)   02/13/23 71.2 kg (157 lb)       Allergies  No Known Allergies      Surgical History  No past surgical history on file.    Social History  Tobacco:   History   Smoking Status    Not on file   Smokeless Tobacco    Not on file    Alcohol:   Social History    Substance and Sexual Activity      Alcohol use: Not on file   Illicit Drugs:   History    Drug Use Not on file       Family History  No family history on file.       Jacey Bhandari MD on 9/11/2023      cc: Reid Escobar

## 2023-09-11 NOTE — CONSULTS
[facility]  WOC Nurse Inpatient Assessment     Consulted for: LLE        Patient History (according to provider note(s):      Per H+P:  62 year old old male with history of CHF in the past presented with exertional shortness of breath with bilateral leg swelling a few weeks duration but it is getting worse last couple of days.  Patient denies chest pain no diaphoresis, no PND orthopnea.  Denies abdominal pain and no diarrhea.  Also has noticed increasing redness and pain in left lower extremity, no recent trauma.  Denies fever or chills.  Work-up in the ED suggestive of CHF exacerbation.  ED spoke to cardiology on-call recommend hold anticoagulation for possible invasive procedure in the a.m.     Assessment:      Areas visualized during today's visit: Lower extremities     Wound location: Left shin    Last photo: 9/11  Wound due to:  cellulitis  Wound history/plan of care: Admitted for cellulitis  Wound base: 100 % granulation tissue     Palpation of the wound bed: normal      Drainage: moderate     Description of drainage: serosanguinous     Measurements (length x width x depth, in cm): 6.8  x 3.4cm with proximal smaller area, see photo     Tunneling: N/A     Undermining: N/A  Periwound skin: Edematous and Erythema- blanchable      Color: red      Temperature: warm  Odor: none  Pain: denies   Treatment goal: Infection control/prevention and Protection  STATUS: initial assessment  Supplies ordered: gathered       Treatment Plan:     Left shin:  1. Cleanse with wound cleanser, including miriam wound skin, gently pat dry  2. Apply Adaptic and secure with Mepilex  Change every other day    Orders: Written    RECOMMEND PRIMARY TEAM ORDER: None, at this time  Education provided: plan of care  Discussed plan of care with: Patient and Nurse  WOC nurse follow-up plan: weekly  Notify WOC if wound(s) deteriorate.  Nursing to notify the Provider(s) and re-consult the WOC Nurse if new skin  concern.    DATA:     Current support surface: Standard  Standard gel/foam mattress (IsoFlex, Atmos air, etc)  Containment of urine/stool: Continent of bladder and Continent of bowel  BMI: Body mass index is 41.37 kg/m .   Active diet order: Orders Placed This Encounter      Advance Diet as Tolerated: Clear Liquid Diet     Output: I/O last 3 completed shifts:  In: 940 [P.O.:840; I.V.:100]  Out: 795 [Urine:795]     Labs:   Recent Labs   Lab 09/10/23  0445   HGB 10.9*   WBC 6.3     Pressure injury risk assessment:   Sensory Perception: 4-->no impairment  Moisture: 4-->rarely moist  Activity: 3-->walks occasionally  Mobility: 3-->slightly limited  Nutrition: 3-->adequate  Friction and Shear: 3-->no apparent problem  Simeon Score: 20    NOEL MendezN, RN, PHN, HNB-BC, CWOCN  Pager no longer is use, please contact through [employer] group: [facility] [employer]

## 2023-09-11 NOTE — DISCHARGE INSTRUCTIONS
WOC DISCHARGE INSTRUCTIONS:  Left shin:  1. Cleanse with wound cleanser, including miriam wound skin, gently pat dry  2. Apply Adaptic and secure with Mepilex  Change every other day

## 2023-09-11 NOTE — PRE-PROCEDURE
GENERAL PRE-PROCEDURE:   Procedure:  Coronary angiogram with possible PCI  Date/Time:  9/11/2023 1:28 PM    Written consent obtained?: Yes    Risks and benefits: Risks, benefits and alternatives were discussed    Consent given by:  Patient  Patient states understanding of procedure being performed: Yes    Patient's understanding of procedure matches consent: Yes    Procedure consent matches procedure scheduled: Yes    Expected level of sedation:  Moderate  Appropriately NPO:  Yes  ASA Class:  4 (elevated troponin, cardiomyopathy; EF 35-40% ,CAD; s/p CABG, HTN, AF; on NOAC, tobacco use disorder, COPD)  Mallampati  :  Grade 3- soft palate visible, posterior pharyngeal wall not visible  Lungs:  Lungs clear with good breath sounds bilaterally  Heart:  Normal heart sounds and rate  History & Physical reviewed:  History and physical reviewed and updates made (see comment)  H&P Comments:  Clinically Significant Risk Factors Present on Admission    Cardiovascular : Acute systolic heart failure    Fluid & Electrolyte Disorders : Not present on admission    Gastroenterology : Not present on admission    Hematology/Oncology : Anemia; stable    Nephrology: PRINCESS; Scr 1.43    Neurology : Not present on admission    Pulmonology : COPD, tobacco use disorder    Systemic : Not present on admission    Statement of review:  I have reviewed the lab findings, diagnostic data, medications, and the plan for sedation

## 2023-09-11 NOTE — PROGRESS NOTES
"[facility]    Medicine Progress Note - Hospitalist Service    Date of Admission:  9/8/2023    Assessment & Plan   Zackary Hutchison is a 62 year old old male with history of CHF,  in the past presented with exertional shortness of breath with bilateral leg swelling a few weeks duration but it is getting worse last couple of days.      Cardiomyopathy with questionable acute heart failure with reduced ejection fraction  -Labs on admission: , troponin 29 greater than 28  -Echo 9/9: LVEF 35 to 40%, borderline apical wall hypokinesis, decreased RV systolic function, no hemodynamically significant valvular disease  -Per cardiology stopping amlodipine, restarting lisinopril  -Bumex 2 mg twice daily  -Had angiogram 9/11    Coronary artery disease s/p CABG in past  -EKG showed T wave changes  -Cardiology consulted: Possible PCI Monday, starting clopidogrel and aspirin, continue Imdur, metoprolol, statin    Acute Cellulitis  Chronic venous stasis ulceration  -Chronic stasis wound has sloughing and serosanguineous drainage with surrounding erythema and edema  -Wound care consulted  -Continue Zosyn    COPD  Likely obstructive sleep apnea-elevated CO2  Tobacco abuse  -Smokes 1/2 pack/day and has since \"forever\"  -ABG 9/11: pH 7.33, PCO2 53, PO2 87, sat 97  -Has been robust wheeze from the trachea and wheezing in the right upper lobe  -Discussed CPAP and sleep study with patient, will refer for sleep study at discharge  -Will need PFTs after discharge as well    Paroxysmal atrial fibrillation  -Continue amiodarone  -Holding Eliquis for procedure Monday    Possible PRINCESS on CKD stage II versus CKD stage 3  -Prior creatinine 1.16 in February, 1.35 on admission and stable  -Will monitor and avoid nephrotoxic agents    Mood disorder  -Continue duloxetine    Anemia, likely of chronic disease-stable  -Hemoglobin in February was 13, now in the tens  -Will monitor       Diet: Advance Diet as Tolerated: Full Liquid " "Diet    DVT Prophylaxis: Pneumatic Compression Devices  Ruvalcaba Catheter: Not present  Lines: None     Cardiac Monitoring: ACTIVE order. Indication: Post- PCI/Angiogram (24 hours)  Code Status: Full Code      Clinically Significant Risk Factors                   # Acute heart failure with reduced ejection fraction: last echo with EF <40% and receiving IV diuretics       # Severe Obesity: Estimated body mass index is 41.37 kg/m  as calculated from the following:    Height as of this encounter: 1.676 m (5' 6\").    Weight as of this encounter: 116.3 kg (256 lb 4.8 oz)., PRESENT ON ADMISSION            Disposition Plan     Expected Discharge Date: 09/13/2023      Destination: assisted living            Pat Sebastian MD  Hospitalist Service  [facility]  Securely message with [employer] (more info)  Text page via [employer] Paging/Directory   ______________________________________________________________________    Interval History   Mr. Hutchison is doing well this morning without complaints.  I saw him prior to his angiography.  He was concerned about not being able to see wound care but he was able to see them.  No intervention done during angiography today.  On advancing diet.    Physical Exam   Vital Signs: Temp: 98.2  F (36.8  C) Temp src: Oral BP: 117/57 Pulse: 64   Resp: 15 SpO2: 97 % O2 Device: None (Room air) Oxygen Delivery: 2 LPM  Weight: 256 lbs 4.8 oz    General Appearance: Awake, alert, in no acute distress  Respiratory: Wheeze bilaterally some from trachea  Cardiovascular: RRR  GI: soft, nontender, non distended, normal bowel sounds  Skin: no jaundice, less drainage from wound with less saturation of bandage, may have had on for less time than prior      Medical Decision Making       45 MINUTES SPENT BY ME on the date of service doing chart review, history, exam, documentation & further activities per the note.      Data         Imaging results reviewed over the past 24 hrs:   Recent " Results (from the past 24 hour(s))   Cardiac Catheterization    Narrative    1.  Chronic total occlusion proximal LAD and stented segment  2.  Moderate calcific disease in medium size circumflex OM1 branch.    Smaller tortuous AV groove circumflex branch has 70% calcified narrowing   and diffuse distal disease.  3.  Chronic total occlusion mid right coronary artery.  4.  Widely patent large caliber LIMA to mid LAD with good distal and   proximal runoff.  5.  Widely patent free LAURI graft to mid PDA.  Diffuse disease in   posterolateral system which is never well visualized.  6.  Right heart cath data:  RA 12   RV 38/13  PA 41/20  mean 27  PAWP 16  CO 6.6  CI 2.2

## 2023-09-11 NOTE — PLAN OF CARE
Problem: Plan of Care - These are the overarching goals to be used throughout the patient stay.    Goal: Absence of Hospital-Acquired Illness or Injury  Intervention: Identify and Manage Fall Risk  Recent Flowsheet Documentation  Taken 9/11/2023 0415 by Vinita Bianchi, RN  Safety Promotion/Fall Prevention:   clutter free environment maintained   lighting adjusted   mobility aid in reach   nonskid shoes/slippers when out of bed   patient and family education   room door open   room near nurse's station   room organization consistent   safety round/check completed  Taken 9/10/2023 2340 by Vinita Bianchi, RN  Safety Promotion/Fall Prevention:   clutter free environment maintained   lighting adjusted   mobility aid in reach   nonskid shoes/slippers when out of bed   patient and family education   room door open   room near nurse's station   room organization consistent   safety round/check completed     Problem: Plan of Care - These are the overarching goals to be used throughout the patient stay.    Goal: Optimal Comfort and Wellbeing  9/11/2023 0614 by Vinita Bianchi, RN  Outcome: Progressing  9/11/2023 0611 by Vinita Bianchi, RN  Outcome: Progressing   Goal Outcome Evaluation:    A & O x 4, VSS, on room air, NSR with inverted T waves. Denied pain, expiratory wheeze in all lung lobes. Plan for angio this AM. Pleasant and cooperative.

## 2023-09-12 LAB
ANION GAP SERPL CALCULATED.3IONS-SCNC: 15 MMOL/L (ref 7–15)
BUN SERPL-MCNC: 41 MG/DL (ref 8–23)
CALCIUM SERPL-MCNC: 9.3 MG/DL (ref 8.8–10.2)
CHLORIDE SERPL-SCNC: 91 MMOL/L (ref 98–107)
CREAT SERPL-MCNC: 2.06 MG/DL (ref 0.67–1.17)
DEPRECATED HCO3 PLAS-SCNC: 26 MMOL/L (ref 22–29)
EGFRCR SERPLBLD CKD-EPI 2021: 35 ML/MIN/1.73M2
ERYTHROCYTE [DISTWIDTH] IN BLOOD BY AUTOMATED COUNT: 16 % (ref 10–15)
GLUCOSE SERPL-MCNC: 84 MG/DL (ref 70–99)
HCT VFR BLD AUTO: 35.5 % (ref 40–53)
HGB BLD-MCNC: 11.2 G/DL (ref 13.3–17.7)
MCH RBC QN AUTO: 31.7 PG (ref 26.5–33)
MCHC RBC AUTO-ENTMCNC: 31.5 G/DL (ref 31.5–36.5)
MCV RBC AUTO: 101 FL (ref 78–100)
PLATELET # BLD AUTO: 194 10E3/UL (ref 150–450)
POTASSIUM SERPL-SCNC: 3.9 MMOL/L (ref 3.4–5.3)
RBC # BLD AUTO: 3.53 10E6/UL (ref 4.4–5.9)
SODIUM SERPL-SCNC: 132 MMOL/L (ref 136–145)
WBC # BLD AUTO: 8.3 10E3/UL (ref 4–11)

## 2023-09-12 PROCEDURE — 99233 SBSQ HOSP IP/OBS HIGH 50: CPT | Performed by: INTERNAL MEDICINE

## 2023-09-12 PROCEDURE — 250N000013 HC RX MED GY IP 250 OP 250 PS 637: Performed by: NURSE PRACTITIONER

## 2023-09-12 PROCEDURE — 85027 COMPLETE CBC AUTOMATED: CPT | Performed by: INTERNAL MEDICINE

## 2023-09-12 PROCEDURE — 36415 COLL VENOUS BLD VENIPUNCTURE: CPT | Performed by: INTERNAL MEDICINE

## 2023-09-12 PROCEDURE — 80048 BASIC METABOLIC PNL TOTAL CA: CPT | Performed by: INTERNAL MEDICINE

## 2023-09-12 PROCEDURE — P9047 ALBUMIN (HUMAN), 25%, 50ML: HCPCS | Performed by: INTERNAL MEDICINE

## 2023-09-12 PROCEDURE — 250N000011 HC RX IP 250 OP 636: Mod: JZ | Performed by: NURSE PRACTITIONER

## 2023-09-12 PROCEDURE — 250N000011 HC RX IP 250 OP 636: Performed by: INTERNAL MEDICINE

## 2023-09-12 PROCEDURE — 210N000001 HC R&B IMCU HEART CARE

## 2023-09-12 RX ORDER — ALBUMIN (HUMAN) 12.5 G/50ML
50 SOLUTION INTRAVENOUS ONCE
Status: COMPLETED | OUTPATIENT
Start: 2023-09-12 | End: 2023-09-12

## 2023-09-12 RX ADMIN — Medication 81 MG: at 08:20

## 2023-09-12 RX ADMIN — PIPERACILLIN AND TAZOBACTAM 3.38 G: 3; .375 INJECTION, POWDER, LYOPHILIZED, FOR SOLUTION INTRAVENOUS at 16:49

## 2023-09-12 RX ADMIN — GABAPENTIN 300 MG: 300 CAPSULE ORAL at 08:20

## 2023-09-12 RX ADMIN — DULOXETINE HYDROCHLORIDE 30 MG: 30 CAPSULE, DELAYED RELEASE ORAL at 08:21

## 2023-09-12 RX ADMIN — CLOPIDOGREL BISULFATE 75 MG: 75 TABLET ORAL at 08:20

## 2023-09-12 RX ADMIN — GABAPENTIN 900 MG: 300 CAPSULE ORAL at 20:14

## 2023-09-12 RX ADMIN — ALBUMIN HUMAN 50 G: 0.25 SOLUTION INTRAVENOUS at 08:19

## 2023-09-12 RX ADMIN — AMIODARONE HYDROCHLORIDE 200 MG: 200 TABLET ORAL at 08:20

## 2023-09-12 RX ADMIN — GABAPENTIN 300 MG: 300 CAPSULE ORAL at 11:09

## 2023-09-12 RX ADMIN — PIPERACILLIN AND TAZOBACTAM 3.38 G: 3; .375 INJECTION, POWDER, LYOPHILIZED, FOR SOLUTION INTRAVENOUS at 00:55

## 2023-09-12 RX ADMIN — ATORVASTATIN CALCIUM 40 MG: 40 TABLET, FILM COATED ORAL at 08:20

## 2023-09-12 RX ADMIN — PIPERACILLIN AND TAZOBACTAM 3.38 G: 3; .375 INJECTION, POWDER, LYOPHILIZED, FOR SOLUTION INTRAVENOUS at 11:07

## 2023-09-12 ASSESSMENT — ACTIVITIES OF DAILY LIVING (ADL)
ADLS_ACUITY_SCORE: 33

## 2023-09-12 NOTE — PLAN OF CARE
Problem: Cardiac Catheterization (Diagnostic/Interventional)  Goal: Stable Heart Rate and Rhythm  Outcome: Progressing   Goal Outcome Evaluation:       Pt is alert and oriented x 4,On RA, denies pain. Left shin wound is clean and intact. Pt's angio site is intact, no swelling, no hematoma. Site is soft. Pt was able to go for a walk in the [address], no dyspnea noted. Capable of calling for help.will continue to monitor pt.

## 2023-09-12 NOTE — PLAN OF CARE
[id number]  Patient Aox4. VSS on RA. Tele NSR. Denies chest pain and n/v. Sob with exertion. R) groin site wnl. IV abx. LLE dressing changed. Albumin given this shift.     Goal Outcome Evaluation:  Problem: Plan of Care - These are the overarching goals to be used throughout the patient stay.    Goal: Absence of Hospital-Acquired Illness or Injury  Intervention: Identify and Manage Fall Risk  Recent Flowsheet Documentation  Taken 9/12/2023 1525 by Ginna Garcia, RN  Safety Promotion/Fall Prevention:   assistive device/personal items within reach   clutter free environment maintained   nonskid shoes/slippers when out of bed   patient and family education  Taken 9/12/2023 0820 by Ginna Garcia, RN  Safety Promotion/Fall Prevention:   assistive device/personal items within reach   clutter free environment maintained   nonskid shoes/slippers when out of bed   patient and family education  Problem: Cardiac Catheterization (Diagnostic/Interventional)  Goal: Absence of Bleeding  Outcome: Progressing

## 2023-09-12 NOTE — PROGRESS NOTES
HEART CARE NOTE          Assessment/Recommendations     1. Severe Biventricular dysfunction/HFrEF   Assessment / Plan  Near euvolemia; PRINCESS noted on am labs- hold diuretics and ACE-I/ARB for today; continue to monitor renal function/repeat BMP, UOP and hemodynamics closely   Patient is high risk for adverse cardiac events 2/2 severe biventricular dysfunction  GDMT as detailed below     Current Pharmacotherapy AHA Guideline-Directed Medical Therapy   Lisinopril 5 mg daily - on hold  Lisinopril 20 mg twice daily   Carvedilol - not started in the setting of borderline HF Carvedilol 25 mg twice daily   Spironolactone - no started Spironolactone 25 mg once daily   Hydralazine NA Hydralazine 100 mg three times daily   Isosorbide dinitrate NA Isosorbide dinitrate 40 mg three times daily   SGLT2 inhibitor:Dapagliflozin/Empagliflozin - not started Dapagliflozin or Empagliflozin 10 mg daily      2. Atrial fibrillation  Assessment / Plan  Resume apixaban unless otherwise contraindicated     3. CAD  Assessment / Plan  S/p CABG presenting with dyspnea in exertion  S/p coronary angiogram - no intervention indicated  Continue ASA, atorvastatin     4. PRINCESS on CKD  Assessment / Plan  Progressive; diuretics held; Continue to monitor UOP and renal function closely       Plan of care discussed on September 12, 2023 with patient at bedside, and primary team overseeing patient's care      History of Present Illness/Subjective    Mr. Zackary Hutchison is a 62 year old male with a PMHx significant for CAD s/p CABG, afib, HFrEF and CKD who presents in ADHF c/b angina     Today, Mr. Hutchison denies any acute cardiac events or complaints; Management plan as detailed above      ECG: personally reviewed; normal sinus rhythm, RBBB.     ECHO (personnaly Reviewed on 9/11/23):   1.Left ventricular function is decreased. The ejection fraction is 35-40%  (moderately reduced).  2.Septal motion is consistent with post-operative state.  3.There is borderline  "apical wall hypokinesis.  4.Mildly decreased right ventricular systolic function  5.No hemodynamically significant valvular abnormalities on 2D or color flow  imaging.  6.There is borderline aortic root enlargement. The ascending aorta is  Borderline dilated.  7.The study was technically difficult.  There is no comparison study available    Telemetry: personally reviewed September 12, 2023; notable for NSR     Lab results: personally reviewed September 12, 2023; notable for PRINCESS    Medical history and pertinent documents reviewed in Care Everywhere please where applicable see details above        Physical Examination Review of Systems   /53 (BP Location: Left arm)   Pulse 69   Temp 97.7  F (36.5  C) (Oral)   Resp 20   Ht 1.676 m (5' 6\")   Wt 116.2 kg (256 lb 1.6 oz)   SpO2 94%   BMI 41.34 kg/m    Body mass index is 41.34 kg/m .  Wt Readings from Last 3 Encounters:   09/12/23 116.2 kg (256 lb 1.6 oz)   02/13/23 71.2 kg (157 lb)     General Appearance:   no distress, normal body habitus   ENT/Mouth: membranes moist, no oral lesions or bleeding gums.      EYES:  no scleral icterus, normal conjunctivae   Neck: no carotid bruits or thyromegaly   Chest/Lungs:   lungs are clear to auscultation, no rales or wheezing, equal chest wall expansion    Cardiovascular:   Regular. Normal first and second heart sounds with no murmurs, rubs, or gallops; the carotid, radial and posterior tibial pulses are intact, no JVD and trace LE edema bilaterally    Abdomen:  no organomegaly, masses, bruits, or tenderness; bowel sounds are present   Extremities: no cyanosis or clubbing   Skin: no xanthelasma, warm.    Neurologic: NAD     Psychiatric: alert and oriented x3, calm     A complete 10 systems ROS was reviewed  And is negative except what is listed in the HPI.          Medical History  Surgical History Family History Social History   No past medical history on file. No past surgical history on file. no family history of " premature coronary artery disease Social History     Socioeconomic History    Marital status: Single     Spouse name: Not on file    Number of children: Not on file    Years of education: Not on file    Highest education level: Not on file   Occupational History    Not on file   Tobacco Use    Smoking status: Every Day     Packs/day: 0.50     Types: Cigarettes    Smokeless tobacco: Never    Tobacco comments:     Seen IP by [facility] on 9/11/23 and declined cessation services and materials   Substance and Sexual Activity    Alcohol use: Not on file    Drug use: Not on file    Sexual activity: Not on file   Other Topics Concern    Not on file   Social History Narrative    Not on file     Social Determinants of Health     Financial Resource Strain: Not on file   Food Insecurity: Not on file   Transportation Needs: Not on file   Physical Activity: Not on file   Stress: Not on file   Social Connections: Not on file   Intimate Partner Violence: Not on file   Housing Stability: Not on file           Lab Results    Chemistry/lipid CBC Cardiac Enzymes/BNP/TSH/INR   Lab Results   Component Value Date    BUN 41.0 (H) 09/12/2023     (L) 09/12/2023    CO2 26 09/12/2023    Lab Results   Component Value Date    WBC 8.3 09/12/2023    HGB 11.2 (L) 09/12/2023    HCT 35.5 (L) 09/12/2023     (H) 09/12/2023     09/12/2023    Lab Results   Component Value Date    INR 1.01 02/13/2023     No results found for: CKTOTAL, CKMB, TROPONINI       Weight:    Wt Readings from Last 3 Encounters:   09/12/23 116.2 kg (256 lb 1.6 oz)   02/13/23 71.2 kg (157 lb)       Allergies  No Known Allergies      Surgical History  No past surgical history on file.    Social History  Tobacco:   History   Smoking Status    Every Day    Packs/day: 0.50    Types: Cigarettes   Smokeless Tobacco    Never    Alcohol:   Social History    Substance and Sexual Activity      Alcohol use: Not on file   Illicit Drugs:   History   Drug Use Not on file        Family History  No family history on file.       Jacey Bhandari MD on 9/12/2023      cc: Reid Escobar

## 2023-09-12 NOTE — PROGRESS NOTES
"[facility]    Medicine Progress Note - Hospitalist Service    Date of Admission:  9/8/2023    Assessment & Plan   Zackary Hutchison is a 62 year old old male with history of CHF,  in the past presented with exertional shortness of breath with bilateral leg swelling a few weeks duration but it is getting worse last couple of days.      Cardiomyopathy with questionable acute heart failure with reduced ejection fraction  -Labs on admission: , troponin 29 greater than 28  -Echo 9/9: LVEF 35 to 40%, borderline apical wall hypokinesis, decreased RV systolic function, no hemodynamically significant valvular disease  -Per cardiology stopping amlodipine, restarting lisinopril  -Bumex 2 mg twice daily  -Had angiogram 9/11    Coronary artery disease s/p CABG in past  -EKG showed T wave changes  -Cardiology consulted: clopidogrel and aspirin, continue Imdur, metoprolol, statin    PRINCESS on CKD stage II versus CKD stage 3  -Prior creatinine 1.16 in February, 1.35 on admission, high of 2.06 on 9/12  -Likely due to angiography  -Will monitor and avoid nephrotoxic agents    Acute Cellulitis  Chronic venous stasis ulceration  -Chronic stasis wound has sloughing and serosanguineous drainage with surrounding erythema and edema  -Wound care consulted  -Continue Zosyn    COPD  Likely obstructive sleep apnea-elevated CO2  Tobacco abuse  -Smokes 1/2 pack/day and has since \"forever\"  -ABG 9/11: pH 7.33, PCO2 53, PO2 87, sat 97  -Has been robust wheeze from the trachea and wheezing in the right upper lobe  -Discussed CPAP and sleep study with patient, will refer for sleep study at discharge  -Will need PFTs after discharge as well    Paroxysmal atrial fibrillation  -Continue amiodarone  -Holding Eliquis for procedure Monday    Mood disorder  -Continue duloxetine    Anemia, likely of chronic disease-stable  -Hemoglobin in February was 13, now in the tens  -Will monitor       Diet: Advance Diet as Tolerated: Full Liquid " "Diet    DVT Prophylaxis: Pneumatic Compression Devices  Ruvalcaba Catheter: Not present  Lines: None     Cardiac Monitoring: ACTIVE order. Indication: Post- PCI/Angiogram (24 hours)  Code Status: Full Code      Clinically Significant Risk Factors                 # Acute Kidney Injury, unspecified: based on a >150% or 0.3 mg/dL increase in last creatinine compared to past 90 day average, will monitor renal function   # Acute heart failure with reduced ejection fraction: last echo with EF <40% and receiving IV diuretics       # Severe Obesity: Estimated body mass index is 41.34 kg/m  as calculated from the following:    Height as of this encounter: 1.676 m (5' 6\").    Weight as of this encounter: 116.2 kg (256 lb 1.6 oz)., PRESENT ON ADMISSION            Disposition Plan     Expected Discharge Date: 09/13/2023      Destination: assisted living            Pat Sebastian MD  Hospitalist Service  [facility]  Securely message with [employer] (more info)  Text page via [employer] Paging/Directory   ______________________________________________________________________    Interval History   Mr. Hutchison is doing well today.  He is not having any symptoms.  He is worried about his kidneys.  I reassured him that we would monitor their function and I would have him follow-up with his outpatient PCP and have them checked to make sure he gets back to baseline.  I did encourage him to drink orally today.    Physical Exam   Vital Signs: Temp: 97.7  F (36.5  C) Temp src: Oral BP: 102/53 Pulse: 69   Resp: 20 SpO2: 94 % O2 Device: None (Room air) Oxygen Delivery: 2 LPM  Weight: 256 lbs 1.6 oz    General Appearance: Awake, alert, in no acute distress  Respiratory: CTAB, no wheeze-was laying on side so wheeze may be positional  Cardiovascular: RRR, no murmur noted  GI: soft, nontender, non distended, normal bowel sounds  Skin: no jaundice, no rash, wound covered      Medical Decision Making       50 MINUTES SPENT BY ME on " the date of service doing chart review, history, exam, documentation & further activities per the note.      Data     I have personally reviewed the following data over the past 24 hrs:    8.3  \   11.2 (L)   / 194     132 (L) 91 (L) 41.0 (H) /  84   3.9 26 2.06 (H) \       Imaging results reviewed over the past 24 hrs:   No results found for this or any previous visit (from the past 24 hour(s)).

## 2023-09-13 ENCOUNTER — TELEPHONE (OUTPATIENT)
Dept: CARDIOLOGY | Facility: CLINIC | Age: 63
End: 2023-09-13
Payer: MEDICARE

## 2023-09-13 DIAGNOSIS — I50.9 ACUTE DECOMPENSATED HEART FAILURE (H): Primary | ICD-10-CM

## 2023-09-13 LAB
ANION GAP SERPL CALCULATED.3IONS-SCNC: 14 MMOL/L (ref 7–15)
BUN SERPL-MCNC: 39.7 MG/DL (ref 8–23)
CALCIUM SERPL-MCNC: 9.3 MG/DL (ref 8.8–10.2)
CHLORIDE SERPL-SCNC: 93 MMOL/L (ref 98–107)
CREAT SERPL-MCNC: 1.65 MG/DL (ref 0.67–1.17)
DEPRECATED HCO3 PLAS-SCNC: 24 MMOL/L (ref 22–29)
EGFRCR SERPLBLD CKD-EPI 2021: 46 ML/MIN/1.73M2
GLUCOSE SERPL-MCNC: 90 MG/DL (ref 70–99)
POTASSIUM SERPL-SCNC: 4.1 MMOL/L (ref 3.4–5.3)
SODIUM SERPL-SCNC: 131 MMOL/L (ref 136–145)

## 2023-09-13 PROCEDURE — 250N000013 HC RX MED GY IP 250 OP 250 PS 637: Performed by: INTERNAL MEDICINE

## 2023-09-13 PROCEDURE — 99233 SBSQ HOSP IP/OBS HIGH 50: CPT | Performed by: INTERNAL MEDICINE

## 2023-09-13 PROCEDURE — 80048 BASIC METABOLIC PNL TOTAL CA: CPT | Performed by: INTERNAL MEDICINE

## 2023-09-13 PROCEDURE — 36415 COLL VENOUS BLD VENIPUNCTURE: CPT | Performed by: INTERNAL MEDICINE

## 2023-09-13 PROCEDURE — 250N000013 HC RX MED GY IP 250 OP 250 PS 637: Performed by: NURSE PRACTITIONER

## 2023-09-13 PROCEDURE — 250N000011 HC RX IP 250 OP 636: Mod: JZ | Performed by: NURSE PRACTITIONER

## 2023-09-13 PROCEDURE — 120N000004 HC R&B MS OVERFLOW

## 2023-09-13 RX ADMIN — ISOSORBIDE MONONITRATE 180 MG: 60 TABLET, EXTENDED RELEASE ORAL at 09:22

## 2023-09-13 RX ADMIN — ROPINIROLE HYDROCHLORIDE 2 MG: 1 TABLET, FILM COATED ORAL at 03:59

## 2023-09-13 RX ADMIN — PIPERACILLIN AND TAZOBACTAM 3.38 G: 3; .375 INJECTION, POWDER, LYOPHILIZED, FOR SOLUTION INTRAVENOUS at 16:44

## 2023-09-13 RX ADMIN — Medication 81 MG: at 09:18

## 2023-09-13 RX ADMIN — DULOXETINE HYDROCHLORIDE 30 MG: 30 CAPSULE, DELAYED RELEASE ORAL at 09:18

## 2023-09-13 RX ADMIN — CLOPIDOGREL BISULFATE 75 MG: 75 TABLET ORAL at 09:18

## 2023-09-13 RX ADMIN — PIPERACILLIN AND TAZOBACTAM 3.38 G: 3; .375 INJECTION, POWDER, LYOPHILIZED, FOR SOLUTION INTRAVENOUS at 09:27

## 2023-09-13 RX ADMIN — ATORVASTATIN CALCIUM 40 MG: 40 TABLET, FILM COATED ORAL at 09:18

## 2023-09-13 RX ADMIN — GABAPENTIN 300 MG: 300 CAPSULE ORAL at 09:18

## 2023-09-13 RX ADMIN — BUMETANIDE 2 MG: 2 TABLET ORAL at 16:34

## 2023-09-13 RX ADMIN — PIPERACILLIN AND TAZOBACTAM 3.38 G: 3; .375 INJECTION, POWDER, LYOPHILIZED, FOR SOLUTION INTRAVENOUS at 00:37

## 2023-09-13 RX ADMIN — ROPINIROLE HYDROCHLORIDE 4 MG: 1 TABLET, FILM COATED ORAL at 20:48

## 2023-09-13 RX ADMIN — ROPINIROLE HYDROCHLORIDE 2 MG: 1 TABLET, FILM COATED ORAL at 00:08

## 2023-09-13 RX ADMIN — BUMETANIDE 2 MG: 2 TABLET ORAL at 09:18

## 2023-09-13 RX ADMIN — AMIODARONE HYDROCHLORIDE 200 MG: 200 TABLET ORAL at 09:18

## 2023-09-13 RX ADMIN — APIXABAN 5 MG: 5 TABLET, FILM COATED ORAL at 20:47

## 2023-09-13 RX ADMIN — GABAPENTIN 300 MG: 300 CAPSULE ORAL at 12:28

## 2023-09-13 RX ADMIN — GABAPENTIN 900 MG: 300 CAPSULE ORAL at 20:47

## 2023-09-13 ASSESSMENT — ACTIVITIES OF DAILY LIVING (ADL)
ADLS_ACUITY_SCORE: 33

## 2023-09-13 NOTE — PROGRESS NOTES
HEART CARE NOTE          Assessment/Recommendations     1. Severe Biventricular dysfunction/HFrEF   Assessment / Plan  Renal function improving; resume oral diuretic; continue to hold ACE-I/ARB for today; continue to monitor renal function/repeat BMP, UOP and hemodynamics closely   Patient is high risk for adverse cardiac events 2/2 severe biventricular dysfunction  GDMT as detailed below     Current Pharmacotherapy AHA Guideline-Directed Medical Therapy   Lisinopril 5 mg daily - on hold  Lisinopril 20 mg twice daily   Carvedilol - not started in the setting of borderline HF Carvedilol 25 mg twice daily   Spironolactone - no started Spironolactone 25 mg once daily   Hydralazine NA Hydralazine 100 mg three times daily   Isosorbide dinitrate NA Isosorbide dinitrate 40 mg three times daily   SGLT2 inhibitor:Dapagliflozin/Empagliflozin - not started Dapagliflozin or Empagliflozin 10 mg daily      2. Atrial fibrillation  Assessment / Plan  Resume apixaban unless otherwise contraindicated     3. CAD  Assessment / Plan  S/p CABG presenting with dyspnea in exertion  S/p coronary angiogram - no intervention indicated  Continue ASA, atorvastatin     4. PRINCESS on CKD  Assessment / Plan  Improved; diuretics as above; continue to monitor UOP and renal function closely    Plan of care discussed on September 13, 2023 with patient at bedside, and primary team overseeing patient's care      History of Present Illness/Subjective    Mr. Zackary Hutchison is a 62 year old male with a PMHx significant for CAD s/p CABG, afib, HFrEF and CKD who presents in ADHF c/b angina     Today, Mr. Hutchison denies any acute cardiac events or complaints; Management plan as detailed above      ECG: personally reviewed; normal sinus rhythm, RBBB.     ECHO (personnaly Reviewed on 9/11/23):   1.Left ventricular function is decreased. The ejection fraction is 35-40%  (moderately reduced).  2.Septal motion is consistent with post-operative state.  3.There is  "borderline apical wall hypokinesis.  4.Mildly decreased right ventricular systolic function  5.No hemodynamically significant valvular abnormalities on 2D or color flow  imaging.  6.There is borderline aortic root enlargement. The ascending aorta is  Borderline dilated.  7.The study was technically difficult.  There is no comparison study available    Telemetry: personally reviewed September 13, 2023; notable for NSR     Lab results: personally reviewed September 13, 2023; notable for resolving PRINCESS    Medical history and pertinent documents reviewed in Care Everywhere please where applicable see details above        Physical Examination Review of Systems   /57 (BP Location: Right arm)   Pulse 65   Temp 98.5  F (36.9  C) (Oral)   Resp 20   Ht 1.676 m (5' 6\")   Wt 118 kg (260 lb 1.6 oz)   SpO2 96%   BMI 41.98 kg/m    Body mass index is 41.98 kg/m .  Wt Readings from Last 3 Encounters:   09/13/23 118 kg (260 lb 1.6 oz)   02/13/23 71.2 kg (157 lb)     General Appearance:   no distress, normal body habitus   ENT/Mouth: membranes moist, no oral lesions or bleeding gums.      EYES:  no scleral icterus, normal conjunctivae   Neck: no carotid bruits or thyromegaly   Chest/Lungs:   lungs are clear to auscultation, no rales or wheezing, equal chest wall expansion    Cardiovascular:   Regular. Normal first and second heart sounds with no murmurs, rubs, or gallops; the carotid, radial and posterior tibial pulses are intact, no JVD or LE edema bilaterally    Abdomen:  no organomegaly, masses, bruits, or tenderness; bowel sounds are present   Extremities: no cyanosis or clubbing   Skin: no xanthelasma, warm.    Neurologic: NAD     Psychiatric: alert and oriented x3, calm     A complete 10 systems ROS was reviewed  And is negative except what is listed in the HPI.          Medical History  Surgical History Family History Social History   No past medical history on file. No past surgical history on file. no family " history of premature coronary artery disease Social History     Socioeconomic History    Marital status: Single     Spouse name: Not on file    Number of children: Not on file    Years of education: Not on file    Highest education level: Not on file   Occupational History    Not on file   Tobacco Use    Smoking status: Every Day     Packs/day: 0.50     Types: Cigarettes    Smokeless tobacco: Never    Tobacco comments:     Seen IP by [facility] on 9/11/23 and declined cessation services and materials   Substance and Sexual Activity    Alcohol use: Not on file    Drug use: Not on file    Sexual activity: Not on file   Other Topics Concern    Not on file   Social History Narrative    Not on file     Social Determinants of Health     Financial Resource Strain: Not on file   Food Insecurity: Not on file   Transportation Needs: Not on file   Physical Activity: Not on file   Stress: Not on file   Social Connections: Not on file   Intimate Partner Violence: Not on file   Housing Stability: Not on file           Lab Results    Chemistry/lipid CBC Cardiac Enzymes/BNP/TSH/INR   Lab Results   Component Value Date    BUN 39.7 (H) 09/13/2023     (L) 09/13/2023    CO2 24 09/13/2023    Lab Results   Component Value Date    WBC 8.3 09/12/2023    HGB 11.2 (L) 09/12/2023    HCT 35.5 (L) 09/12/2023     (H) 09/12/2023     09/12/2023    Lab Results   Component Value Date    INR 1.01 02/13/2023     No results found for: CKTOTAL, CKMB, TROPONINI       Weight:    Wt Readings from Last 3 Encounters:   09/13/23 118 kg (260 lb 1.6 oz)   02/13/23 71.2 kg (157 lb)       Allergies  No Known Allergies      Surgical History  No past surgical history on file.    Social History  Tobacco:   History   Smoking Status    Every Day    Packs/day: 0.50    Types: Cigarettes   Smokeless Tobacco    Never    Alcohol:   Social History    Substance and Sexual Activity      Alcohol use: Not on file   Illicit Drugs:   History   Drug Use Not on file        Family History  No family history on file.       Jacey Bhandari MD on 9/13/2023      cc: Redi Escobar

## 2023-09-13 NOTE — PROGRESS NOTES
[facility] was introduced to the patient today including our role in the home management of his heart failure.  Heart Failure Education Materials were shared today with the patient. Pathophysiology of heart failure is reviewed today, including the impact of reduced blood circulating volume on his body.  Introduction to the expectations including daily weight tracking using the Daily Weight Log  Home B/P monitoring as appropriate( to bring in home monitoring cuff to the clinic appointment for verification)  Low Sodium diet of 2000mg or less daily, review of label reading, aim for fresh and avoid processed items. He lives in an Assisted Living and meals are provided for him  there. Options are not always lowest in sodium as they could be. He was instructed on items to avoid on the menu when making his selections to reduce sodium intake.  Daily Fluid restriction of 2000ml  considerations. He reports that he stays within the fluid restrictions without too much difficulty.   Exercise importance as able explained  Monitor and report s/s of heart failure. How to report these changes.  Follow up appointments and testing expectations.    Zackary was advised that he can make some improvements in his day to day management of his heart failure and that this would in turn benefit his outcomes.     Heather HUERTA RN BSN, CHFN, PCCN-K    [facility]   [phone] Nurse [facility]   Heart [facility]   [address] [place] [address]

## 2023-09-13 NOTE — PROGRESS NOTES
"[facility]    Medicine Progress Note - Hospitalist Service    Date of Admission:  9/8/2023    Assessment & Plan   Zackary Hutchison is a 62 year old old male with history of CHF,  in the past presented with exertional shortness of breath with bilateral leg swelling a few weeks duration but it is getting worse last couple of days.      Cardiomyopathy with questionable acute heart failure with reduced ejection fraction  -Labs on admission: , troponin 29 greater than 28  -Echo 9/9: LVEF 35 to 40%, borderline apical wall hypokinesis, decreased RV systolic function, no hemodynamically significant valvular disease  -Stopped amlodipine, holding lisinopril  -Bumex 2 mg twice daily, was held for 1 day but now restarted    Coronary artery disease s/p CABG in past  -EKG showed T wave changes  -Cardiology consulted: Had angiogram 9/11    PRINCESS on CKD stage II versus CKD stage 3-improving  -Prior creatinine 1.16 in February, 1.35 on admission, high of 2.06 on 9/12  -Likely due to angiography  -Will monitor, restarting Bumex  -avoid nephrotoxic agents    Acute Cellulitis  Chronic venous stasis ulceration  -Chronic stasis wound has sloughing and serosanguineous drainage with surrounding erythema and edema  -Wound care consulted  -Zosyn had been held for 9/12, now restarted, continue 1 more day for 5-day course total of antibiotics    COPD  Likely obstructive sleep apnea-elevated CO2  Tobacco abuse  -Smokes 1/2 pack/day and has since \"forever\"  -ABG 9/11: pH 7.33, PCO2 53, PO2 87, sat 97  -Has been robust wheeze from the trachea and wheezing in the right upper lobe  -Discussed CPAP and sleep study with patient, will refer for sleep study at discharge  -Will need PFTs after discharge as well    Paroxysmal atrial fibrillation  -Continue amiodarone  -Restarted apixaban    Mood disorder  -Continue duloxetine    Anemia, likely of chronic disease-stable  -Hemoglobin in February was 13, now in the tens  -Will " "monitor       Diet: Advance Diet as Tolerated: Regular Diet Adult; Low Saturated Fat Na <2400mg Diet    DVT Prophylaxis: Pneumatic Compression Devices  Ruvalcaba Catheter: Not present  Lines: None     Cardiac Monitoring: None  Code Status: Full Code      Clinically Significant Risk Factors                   # Chronic heart failure with reduced ejection fraction: last echo with EF <40%       # Severe Obesity: Estimated body mass index is 41.98 kg/m  as calculated from the following:    Height as of this encounter: 1.676 m (5' 6\").    Weight as of this encounter: 118 kg (260 lb 1.6 oz).             Disposition Plan      Expected Discharge Date: 09/14/2023      Destination: assisted living            Pat Sebastian MD  Hospitalist Service  [facility]  Securely message with [employer] (more info)  Text page via [employer] Paging/Directory   ______________________________________________________________________    Interval History   Mr. Hutchison was concerned about discharge plans for tomorrow as he would need a ride set up and would need pants since they were removed in the ED.  Have spoken with social work for this.  Will restart Bumex and monitor his renal function.  Likely discharge home tomorrow.  He is not having complaints.    Physical Exam   Vital Signs: Temp: 98  F (36.7  C) Temp src: Oral BP: 116/61 Pulse: 75   Resp: 19 SpO2: 98 % O2 Device: None (Room air)    Weight: 260 lbs 1.6 oz    General Appearance: Awake, alert, in no acute distress  Respiratory: CTAB, no wheeze  Cardiovascular: RRR, no murmur noted  GI: soft, nontender, non distended, normal bowel sounds  Skin: no jaundice, no rash    Medical Decision Making       50 MINUTES SPENT BY ME on the date of service doing chart review, history, exam, documentation & further activities per the note.      Data     I have personally reviewed the following data over the past 24 hrs:    N/A  \   N/A   / N/A     131 (L) 93 (L) 39.7 (H) /  90   4.1 24 " 1.65 (H) \       Imaging results reviewed over the past 24 hrs:   No results found for this or any previous visit (from the past 24 hour(s)).

## 2023-09-13 NOTE — PLAN OF CARE
"  Problem: Plan of Care - These are the overarching goals to be used throughout the patient stay.    Goal: Plan of Care Review  Description: The Plan of Care Review/Shift note should be completed every shift.  The Outcome Evaluation is a brief statement about your assessment that the patient is improving, declining, or no change.  This information will be displayed automatically on your shift note.  9/13/2023 0505 by Alexx Adkins RN  Outcome: Progressing  9/13/2023 0502 by Alexx Adkins RN  Outcome: Progressing  Goal: Patient-Specific Goal (Individualized)  Description: You can add care plan individualizations to a care plan. Examples of Individualization might be:  \"Parent requests to be called daily at 9am for status\", \"I have a hard time hearing out of my right ear\", or \"Do not touch me to wake me up as it startles me\".  9/13/2023 0505 by Alexx Adkins RN  Outcome: Progressing  9/13/2023 0502 by Alexx Adkins RN  Outcome: Progressing  Goal: Absence of Hospital-Acquired Illness or Injury  9/13/2023 0505 by Alexx Adkins RN  Outcome: Progressing  9/13/2023 0502 by Alexx Adkins RN  Outcome: Progressing  Intervention: Identify and Manage Fall Risk  Recent Flowsheet Documentation  Taken 9/13/2023 0400 by Alexx Adkins RN  Safety Promotion/Fall Prevention:   assistive device/personal items within reach   clutter free environment maintained   nonskid shoes/slippers when out of bed   patient and family education   safety round/check completed   room organization consistent   room near nurse's station   room door open   mobility aid in reach   lighting adjusted   increase visualization of patient   increased rounding and observation  Taken 9/13/2023 0000 by Alexx Adkins RN  Safety Promotion/Fall Prevention:   assistive device/personal items within reach   clutter free environment maintained   nonskid shoes/slippers when out of bed   patient " and family education   safety round/check completed   room organization consistent   room near nurse's station   room door open   mobility aid in reach   lighting adjusted   increase visualization of patient   increased rounding and observation  Taken 9/12/2023 2000 by Alexx Adkins RN  Safety Promotion/Fall Prevention:   assistive device/personal items within reach   clutter free environment maintained   nonskid shoes/slippers when out of bed   patient and family education   safety round/check completed   room organization consistent   room near nurse's station   room door open   mobility aid in reach   lighting adjusted   increase visualization of patient   increased rounding and observation  Intervention: Prevent Skin Injury  Recent Flowsheet Documentation  Taken 9/13/2023 0400 by Alexx Adkins RN  Body Position: position changed independently  Taken 9/13/2023 0000 by Alexx Adkins RN  Body Position: position changed independently  Taken 9/12/2023 2000 by Alexx Adkins RN  Body Position: position changed independently  Goal: Optimal Comfort and Wellbeing  9/13/2023 0505 by Alexx Adkins RN  Outcome: Progressing  9/13/2023 0502 by Alexx Adkins RN  Outcome: Progressing  Goal: Readiness for Transition of Care  9/13/2023 0505 by Alexx Adkins RN  Outcome: Progressing  9/13/2023 0502 by Alexx Adkins RN  Outcome: Progressing   Goal Outcome Evaluation:    Cardiac:  NSR with BBB and occasional 1st AVB. Rate 60-90's.   Respiratory:  Rate 16-20, non-labored.  Sat's: Maintaining mid 90's at RA.  LS: Diminished in the bases, bilat.  Neuro:  WNL.  GI:  Passing flatus.  :  Nominal UOP (see I/O).  Monitor renal function and labs.  Pain:  Denies.  Ambulation:  Up ad maria luisa independent.  Wound:  Venous stasis ulcer: Dressing CDI.  Dressing change every day.   Labs:  AM labs pending.  Plan:  ACE-Inhibitor, Amiodarone.  IV Abx.  Hold diuretics.   Monitor renal function.

## 2023-09-13 NOTE — PROGRESS NOTES
"Care Management Follow Up    Length of Stay (days): 5    Expected Discharge Date:  09/14/23     Concerns to be Addressed:     Care progression - discharge planning  Patient plan of care discussed at interdisciplinary rounds: Yes    Anticipated Discharge Disposition:  Home     Anticipated Discharge Services:  Home  Anticipated Discharge DME:  [facility]    Patient/family educated on Medicare website which has current facility and service quality ratings:    Education Provided on the Discharge Plan:  NA  Patient/Family in Agreement with the Plan:  NA    Referrals Placed by CM/SW:    Private pay costs discussed: Not applicable    Additional Information:  Per provider, patient maybe discharge tomorrow. Patient requested assistance to set up transportation with [employer] [phone]. Patient also needs pants.    RNCM met patient at bedside. Informed patient [employer] said they are full tomorrow. They have a few openings on Friday.  Patient request a cab voucher. Bedside nurse will assist. New  XL size sweat pant given to patient.    Social Hx: \"Patient lives at [facility]. Unknown discharge needs. Transport TBD.\"     RNCM to follow for medical progression, recommendations, and final discharge plan.     Lola Jaffe RN     "

## 2023-09-14 VITALS
SYSTOLIC BLOOD PRESSURE: 112 MMHG | WEIGHT: 260.1 LBS | TEMPERATURE: 98.3 F | RESPIRATION RATE: 20 BRPM | HEART RATE: 70 BPM | OXYGEN SATURATION: 91 % | DIASTOLIC BLOOD PRESSURE: 53 MMHG | BODY MASS INDEX: 41.8 KG/M2 | HEIGHT: 66 IN

## 2023-09-14 LAB
ANION GAP SERPL CALCULATED.3IONS-SCNC: 11 MMOL/L (ref 7–15)
BUN SERPL-MCNC: 37.9 MG/DL (ref 8–23)
CALCIUM SERPL-MCNC: 9.5 MG/DL (ref 8.8–10.2)
CHLORIDE SERPL-SCNC: 93 MMOL/L (ref 98–107)
CREAT SERPL-MCNC: 1.7 MG/DL (ref 0.67–1.17)
DEPRECATED HCO3 PLAS-SCNC: 30 MMOL/L (ref 22–29)
EGFRCR SERPLBLD CKD-EPI 2021: 44 ML/MIN/1.73M2
ERYTHROCYTE [DISTWIDTH] IN BLOOD BY AUTOMATED COUNT: 15.4 % (ref 10–15)
GLUCOSE SERPL-MCNC: 100 MG/DL (ref 70–99)
HCT VFR BLD AUTO: 34.3 % (ref 40–53)
HGB BLD-MCNC: 10.9 G/DL (ref 13.3–17.7)
MCH RBC QN AUTO: 31.5 PG (ref 26.5–33)
MCHC RBC AUTO-ENTMCNC: 31.8 G/DL (ref 31.5–36.5)
MCV RBC AUTO: 99 FL (ref 78–100)
PLATELET # BLD AUTO: 150 10E3/UL (ref 150–450)
POTASSIUM SERPL-SCNC: 4.2 MMOL/L (ref 3.4–5.3)
RBC # BLD AUTO: 3.46 10E6/UL (ref 4.4–5.9)
SODIUM SERPL-SCNC: 134 MMOL/L (ref 136–145)
WBC # BLD AUTO: 6.4 10E3/UL (ref 4–11)

## 2023-09-14 PROCEDURE — 250N000013 HC RX MED GY IP 250 OP 250 PS 637: Performed by: NURSE PRACTITIONER

## 2023-09-14 PROCEDURE — 250N000011 HC RX IP 250 OP 636: Performed by: INTERNAL MEDICINE

## 2023-09-14 PROCEDURE — P9047 ALBUMIN (HUMAN), 25%, 50ML: HCPCS | Performed by: INTERNAL MEDICINE

## 2023-09-14 PROCEDURE — 85027 COMPLETE CBC AUTOMATED: CPT | Performed by: INTERNAL MEDICINE

## 2023-09-14 PROCEDURE — 250N000013 HC RX MED GY IP 250 OP 250 PS 637

## 2023-09-14 PROCEDURE — 36415 COLL VENOUS BLD VENIPUNCTURE: CPT | Performed by: INTERNAL MEDICINE

## 2023-09-14 PROCEDURE — 250N000011 HC RX IP 250 OP 636: Mod: JZ | Performed by: NURSE PRACTITIONER

## 2023-09-14 PROCEDURE — 99239 HOSP IP/OBS DSCHRG MGMT >30: CPT | Performed by: INTERNAL MEDICINE

## 2023-09-14 PROCEDURE — 250N000013 HC RX MED GY IP 250 OP 250 PS 637: Performed by: INTERNAL MEDICINE

## 2023-09-14 PROCEDURE — 80048 BASIC METABOLIC PNL TOTAL CA: CPT | Performed by: INTERNAL MEDICINE

## 2023-09-14 PROCEDURE — 99233 SBSQ HOSP IP/OBS HIGH 50: CPT | Performed by: INTERNAL MEDICINE

## 2023-09-14 RX ORDER — CLOPIDOGREL BISULFATE 75 MG/1
75 TABLET ORAL DAILY
Qty: 90 TABLET | Refills: 0 | Status: SHIPPED | OUTPATIENT
Start: 2023-09-15 | End: 2023-09-19

## 2023-09-14 RX ORDER — ALBUMIN (HUMAN) 12.5 G/50ML
50 SOLUTION INTRAVENOUS ONCE
Status: COMPLETED | OUTPATIENT
Start: 2023-09-14 | End: 2023-09-14

## 2023-09-14 RX ORDER — CEPHALEXIN 500 MG/1
500 CAPSULE ORAL 4 TIMES DAILY
Qty: 20 CAPSULE | Refills: 0 | Status: SHIPPED | OUTPATIENT
Start: 2023-09-14 | End: 2023-09-19

## 2023-09-14 RX ORDER — BUMETANIDE 2 MG/1
2 TABLET ORAL
Status: DISCONTINUED | OUTPATIENT
Start: 2023-09-14 | End: 2023-09-14 | Stop reason: HOSPADM

## 2023-09-14 RX ORDER — ASPIRIN 81 MG/1
81 TABLET ORAL DAILY
Qty: 90 TABLET | Refills: 0 | Status: SHIPPED | OUTPATIENT
Start: 2023-09-15 | End: 2024-01-08

## 2023-09-14 RX ORDER — BUMETANIDE 2 MG/1
2 TABLET ORAL DAILY
Status: DISCONTINUED | OUTPATIENT
Start: 2023-09-14 | End: 2023-09-14

## 2023-09-14 RX ADMIN — AMIODARONE HYDROCHLORIDE 200 MG: 200 TABLET ORAL at 09:41

## 2023-09-14 RX ADMIN — ATORVASTATIN CALCIUM 40 MG: 40 TABLET, FILM COATED ORAL at 09:42

## 2023-09-14 RX ADMIN — ISOSORBIDE MONONITRATE 180 MG: 60 TABLET, EXTENDED RELEASE ORAL at 09:42

## 2023-09-14 RX ADMIN — PIPERACILLIN AND TAZOBACTAM 3.38 G: 3; .375 INJECTION, POWDER, LYOPHILIZED, FOR SOLUTION INTRAVENOUS at 09:43

## 2023-09-14 RX ADMIN — ALBUMIN HUMAN 50 G: 0.25 SOLUTION INTRAVENOUS at 05:57

## 2023-09-14 RX ADMIN — Medication 81 MG: at 09:41

## 2023-09-14 RX ADMIN — APIXABAN 5 MG: 5 TABLET, FILM COATED ORAL at 09:41

## 2023-09-14 RX ADMIN — BENZOCAINE 6 MG-MENTHOL 10 MG LOZENGES 1 LOZENGE: at 06:28

## 2023-09-14 RX ADMIN — BUMETANIDE 2 MG: 2 TABLET ORAL at 09:41

## 2023-09-14 RX ADMIN — GABAPENTIN 300 MG: 300 CAPSULE ORAL at 09:41

## 2023-09-14 RX ADMIN — GABAPENTIN 300 MG: 300 CAPSULE ORAL at 13:26

## 2023-09-14 RX ADMIN — PIPERACILLIN AND TAZOBACTAM 3.38 G: 3; .375 INJECTION, POWDER, LYOPHILIZED, FOR SOLUTION INTRAVENOUS at 00:09

## 2023-09-14 RX ADMIN — CLOPIDOGREL BISULFATE 75 MG: 75 TABLET ORAL at 09:42

## 2023-09-14 RX ADMIN — DULOXETINE HYDROCHLORIDE 30 MG: 30 CAPSULE, DELAYED RELEASE ORAL at 09:45

## 2023-09-14 ASSESSMENT — ACTIVITIES OF DAILY LIVING (ADL)
ADLS_ACUITY_SCORE: 33

## 2023-09-14 NOTE — PLAN OF CARE
Goal Outcome Evaluation:             Went over discharge instructions and chf education. Pt has all his belongings. [employer] called for pt. Pt stated he had someone to get his medications at [facility].

## 2023-09-14 NOTE — PROGRESS NOTES
Pt reported  congested cough. Dr was notified and chloraseptic lozenge was prescribed. . This cough medication is in the pt's drawer.

## 2023-09-14 NOTE — PROGRESS NOTES
Care Management Discharge Note    Discharge Date: 09/14/2023       Discharge Disposition: Home    Discharge Services: None    Discharge DME: None    Discharge Transportation: Taxi voucher    Private pay costs discussed: Not applicable    Does the patient's insurance plan have a 3 day qualifying hospital stay waiver?  No    PAS Confirmation Code:  NA  Patient/family educated on Medicare website which has current facility and service quality ratings:      Education Provided on the Discharge Plan:  per team  Persons Notified of Discharge Plans: per team  Patient/Family in Agreement with the Plan: yes    Handoff Referral Completed: Yes    Additional Information:  Patient discharge home. He requested a taxi voucher.   RNCM notified bedside nurse and [facility].    Lola Jaffe RN

## 2023-09-14 NOTE — DISCHARGE SUMMARY
[facility]  Hospitalist Discharge Summary      Date of Admission:  9/8/2023  Date of Discharge:  9/14/2023  3:05 PM  Discharging Provider: Lakhwinder Huff,   Discharge Service: Hospitalist Service        Follow-ups Needed After Discharge   Follow-up Appointments     Follow-up and recommended labs and tests       Follow up with primary care provider, Reid Escobar, within 7 days for   hospital follow- up.  The following labs/tests are recommended: BMP.            Unresulted Labs Ordered in the Past 30 Days of this Admission       No orders found from 8/9/2023 to 9/9/2023.             Discharge Disposition   Discharged to home  Condition at discharge: Stable      Hospital Course   62 year old old male with history of CHF, CAD, PAF, COPD, and mood disorder presented with acute systolic CHF exacerbation.      Cardiomyopathy with concern for acute systolic CHF  , troponin 29 and trended to 28.   Echo 9/9: LVEF 35 to 40%, borderline apical wall hypokinesis, decreased RV systolic function, no hemodynamically significant valvular disease. No prior studies available for comparison  -- seen by cardiology for acute systolic CHF  -- Stopped amlodipine, holding lisinopril due to soft blood pressures and PRINCESS  -- cardiology recommends discharge on Bumex 2 mg twice daily and plan outpatient CHF clinic follow up         Coronary artery disease s/p CABG in past  EKG showed T wave changes  Trop 29 and trended down to 28 on recheck  -- coronary angiogram 9/11/2023 showed chronic total occlusion of proximal LAD and stented segment, with 70% calcified narrowing and diffuse distal disease of OM1 branch.  There is also chronic total occlusion of the RCA.  He has widely patent large caliber LIMA to mid LAD with good distal and proximal runoff, widely patent free LAURI graft to mid PDA.  -- Cardiology recommends aspirin, Plavix and apixaban for 3 months  -- Patient cardiology follow-up  -- continue  imdur, statin,          PRINCESS on CKD stage II versus CKD stage 3-improving  --Close outpatient follow-up  -- home lisinopril, metolazone and spironolactone held         Acute Cellulitis  Chronic venous stasis ulceration  --Patient received 5 days of Zosyn while inpatient and will plan discharge with 5 additional days of Keflex         Paroxysmal atrial fibrillation  --Continue amiodarone  --Restarted apixaban         COPD  Likely obstructive sleep apnea-elevated CO2 on ABG  Tobacco abuse  Smokes half a pack per day  -- continue albuterol  --will refer for sleep study at discharge  -- smoking cessation discussed         Mood disorder  --Continue duloxetine       Anemia, likely of chronic disease-stable  -- outpatient follow up       Consultations This Hospital Stay   CARDIOLOGY IP CONSULT  PHARMACY IP CONSULT  WOUND OSTOMY CONTINENCE NURSE  IP CONSULT  PHARMACY IP CONSULT  PHARMACY IP CONSULT    Code Status   Full Code    Time Spent on this Encounter   ILakhwinder DO, personally saw the patient today and spent greater than 30 minutes discharging this patient.       Lakhwinder Huff DO  [facility]  [address] [place] [id number]  Phone: [phone]  Fax: [phone]  ______________________________________________________________________    Physical Exam   Vital Signs: Temp: 98.3  F (36.8  C) Temp src: Oral BP: 112/53 Pulse: 70   Resp: 20 SpO2: 91 % O2 Device: None (Room air)    Weight: 260 lbs 1.6 oz    General: NAD  HEENT: Without congestion or inflammation  RESPIRATORY: Respirations nonlabored  CARDIOVASCULAR: No le edema bilat.  NEUROLOGIC: No focal arm or leg weakness, speech is clear    Primary Care Physician   Reid Escobar    Discharge Orders      Follow-Up with Cardiology      Adult Sleep Eval & Management  Referral      Reason for your hospital stay    Heart failure     Follow-up and recommended labs and tests     Follow up with  primary care provider, Reid Escobar, within 7 days for hospital follow- up.  The following labs/tests are recommended: BMP.     Activity    Your activity upon discharge: activity as tolerated     Diet    Follow this diet upon discharge: Orders Placed This Encounter      Advance Diet as Tolerated: Regular Diet Adult; Low Saturated Fat Na <2400mg Diet     \    Discharge Medications   Current Discharge Medication List        START taking these medications    Details   apixaban ANTICOAGULANT (ELIQUIS) 5 MG tablet Take 1 tablet (5 mg) by mouth 2 times daily  Qty: 180 tablet, Refills: 0    Associated Diagnoses: Atrial fibrillation, unspecified type (H)      aspirin 81 MG EC tablet Take 1 tablet (81 mg) by mouth daily  Qty: 90 tablet, Refills: 0    Associated Diagnoses: NSTEMI (non-ST elevated myocardial infarction) (H)      cephALEXin (KEFLEX) 500 MG capsule Take 1 capsule (500 mg) by mouth 4 times daily for 5 days  Qty: 20 capsule, Refills: 0    Associated Diagnoses: Cellulitis of left lower extremity      clopidogrel (PLAVIX) 75 MG tablet Take 1 tablet (75 mg) by mouth daily  Qty: 90 tablet, Refills: 0    Associated Diagnoses: NSTEMI (non-ST elevated myocardial infarction) (H)           CONTINUE these medications which have NOT CHANGED    Details   acetaminophen (TYLENOL) 500 MG tablet Take 1,000 mg by mouth every 8 hours as needed      albuterol (PROAIR HFA/PROVENTIL HFA/VENTOLIN HFA) 108 (90 Base) MCG/ACT inhaler Inhale 1 puff into the lungs every 6 hours as needed      amiodarone (PACERONE) 200 MG tablet Take 200 mg by mouth daily      atorvastatin (LIPITOR) 40 MG tablet Take 40 mg by mouth every morning      bumetanide (BUMEX) 2 MG tablet Take 2 mg by mouth 2 times daily      [employer] VITAMIN B12 1000 MCG tablet Take 1,000 mcg by mouth daily      diclofenac (VOLTAREN) 1 % topical gel Place 2 g onto the skin 4 times daily as needed Apply 2 g to skin 4 times a day. Indications: Joint Damage causing Pain and Loss of  Function      DULoxetine (CYMBALTA) 30 MG capsule Take 30 mg by mouth every morning      !! gabapentin (NEURONTIN) 300 MG capsule Take 300 mg by mouth 2 times daily 300 MG AM AND NOON,  MG BY MOUTH DAILY AT BEDTIME      !! gabapentin (NEURONTIN) 300 MG capsule Take 900 mg by mouth At Bedtime 300 MG AM AND NOON,  MG BY MOUTH DAILY AT BEDTIME      hypromellose-dextran (ARTIFICAL TEARS) 0.1-0.3 % ophthalmic solution Place 2 drops into both eyes 4 times daily as needed      ipratropium - albuterol 0.5 mg/2.5 mg/3 mL (DUONEB) 0.5-2.5 (3) MG/3ML neb solution Inhale 3 mLs into the lungs every 6 hours as needed      isosorbide mononitrate (IMDUR) 60 MG 24 hr tablet Take 180 mg by mouth every morning      KLOR-CON 20 MEQ CR tablet Take 20 mEq by mouth daily      nitroGLYcerin (NITROSTAT) 0.4 MG sublingual tablet Place 0.4 mg under the tongue every 5 minutes as needed If no relief after 3 tabs call 911;continue 1 tab every 5 min max 3 tab Indications: chest pain      polyethylene glycol (MIRALAX) 17 g packet Take 17 g by mouth 2 times daily as needed      rOPINIRole (REQUIP) 2 MG tablet Take 4 mg by mouth every evening      COMPOUNDED NON-CONTROLLED SUBSTANCE (CMPD RX) - PHARMACY TO MIX COMPOUNDED MEDICATION Apply topically three times a week Apply topically. DIPHENYLCYCLOPROPENONE 0.1% TOP OINT + 15% salicylic acid in paraffin. Apply 3 times weekly to wart until resolved.       !! - Potential duplicate medications found. Please discuss with provider.        STOP taking these medications       amLODIPine (NORVASC) 5 MG tablet Comments:   Reason for Stopping:         lisinopril (ZESTRIL) 5 MG tablet Comments:   Reason for Stopping:         metolazone (ZAROXOLYN) 5 MG tablet Comments:   Reason for Stopping:         metoprolol succinate ER (TOPROL XL) 50 MG 24 hr tablet Comments:   Reason for Stopping:         spironolactone (ALDACTONE) 25 MG tablet Comments:   Reason for Stopping:             Allergies   No Known  Allergies

## 2023-09-14 NOTE — PLAN OF CARE
Problem: Plan of Care - These are the overarching goals to be used throughout the patient stay.    Goal: Plan of Care Review  Description: The Plan of Care Review/Shift note should be completed every shift.  The Outcome Evaluation is a brief statement about your assessment that the patient is improving, declining, or no change.  This information will be displayed automatically on your shift note.  Outcome: Progressing   Goal Outcome Evaluation:         Po bumex, monitoring I &0, IV antibiotic.  Pt denies pain. Up independently with walker. Left shin dressing change done. Wound area and leg still look read but wound bed looks like its healing.

## 2023-09-14 NOTE — PROGRESS NOTES
Heart Failure Care Map  GOALS TO BE MET BEFORE DISCHARGE:    1. Decrease congestion and/or edema with diuretic therapy to achieve near optimal volume status.     Dyspnea improved: Yes, satisfactory for discharge.   Edema improved: Edema is improving but not completely gone. Pt is still on PO Bumex with strict monitoring of the kidney functions.  Strict intake, and out put.         Last 24 hour I/O:   Intake/Output Summary (Last 24 hours) at 9/14/2023 0024  Last data filed at 9/13/2023 2358  Gross per 24 hour   Intake 1203 ml   Output 2550 ml   Net -1347 ml           Net I/O and Weights since admission:   08/15 0700 - 09/14 0659  In: 3656 [P.O.:3450; I.V.:206]  Out: 24405 [Urine:19186]  Net: -7589     Vitals:    09/08/23 1742 09/09/23 1551 09/11/23 0431 09/12/23 0345   Weight: 122.3 kg (269 lb 9.6 oz) 117.6 kg (259 lb 3.2 oz) 116.3 kg (256 lb 4.8 oz) 116.2 kg (256 lb 1.6 oz)    09/13/23 0402   Weight: 118 kg (260 lb 1.6 oz)       2.  O2 sats > 90% on room air, or at prior home O2 therapy level.      Able to wean O2 this shift to keep sats above 90%?: Yes, satisfactory for discharge.   Does patient use Home O2? No          Current oxygenation status:   SpO2: 95 %     O2 Device: None (Room air),      3.  Tolerates ambulation and mobility near baseline.     Ambulation: Yes, satisfactory for discharge.   Times patient ambulated with staff this shift: x 2. Pt is able to ambulate in the price with a standby assist.. And independent  in the room.  With a walker. RA, Left shin dressing is clean, dry, and intact.. Next wound dressing is 9/15/2023.will continue to monitor pt.     Please review the Heart Failure Care Map for additional HF goal outcomes.    Syed Falcno RN  9/14/2023

## 2023-09-14 NOTE — PROGRESS NOTES
HEART CARE NOTE          Assessment/Recommendations     1. Severe Biventricular dysfunction/HFrEF   Assessment / Plan  Toleratinf oral diuretics - no changes at this time; continue to hold ACE-I/ARB for today; continue to monitor renal function/repeat BMP, UOP and hemodynamics closely   Patient is high risk for adverse cardiac events 2/2 severe biventricular dysfunction  GDMT as detailed below     Current Pharmacotherapy AHA Guideline-Directed Medical Therapy   Lisinopril 5 mg daily - on hold  Lisinopril 20 mg twice daily   Carvedilol - not started in the setting of borderline HF Carvedilol 25 mg twice daily   Spironolactone - no started Spironolactone 25 mg once daily   Hydralazine NA Hydralazine 100 mg three times daily   Isosorbide dinitrate NA Isosorbide dinitrate 40 mg three times daily   SGLT2 inhibitor:Dapagliflozin/Empagliflozin - not started Dapagliflozin or Empagliflozin 10 mg daily      2. Atrial fibrillation  Assessment / Plan  Continue apixaban unless otherwise contraindicated     3. CAD  Assessment / Plan  S/p CABG presenting with dyspnea in exertion  S/p coronary angiogram - no intervention indicated  Continue ASA, atorvastatin     4. PRINCESS on CKD  Assessment / Plan  Diuretics as above; continue to monitor UOP and renal function closely    Plan of care discussed on September 14, 2023 with patient at bedside, and primary team overseeing patient's care    Cardiology team will sign-off for now. Please do not hesitate to consult us again if new questions or concerns arise. Follow-up appointment will be arranged by CORE/HF clinic.     History of Present Illness/Subjective    Mr. Zackary Hutchison is a 62 year old male with a PMHx significant for CAD s/p CABG, afib, HFrEF and CKD who presents in ADHF c/b angina     Today, Mr. Hutchison denies any acute cardiac events or complaints; Management plan as detailed above      ECG: personally reviewed; normal sinus rhythm, RBBB.     ECHO (personnaly Reviewed on 9/11/23):  "  1.Left ventricular function is decreased. The ejection fraction is 35-40%  (moderately reduced).  2.Septal motion is consistent with post-operative state.  3.There is borderline apical wall hypokinesis.  4.Mildly decreased right ventricular systolic function  5.No hemodynamically significant valvular abnormalities on 2D or color flow  imaging.  6.There is borderline aortic root enlargement. The ascending aorta is  Borderline dilated.  7.The study was technically difficult.  There is no comparison study available    Telemetry: personally reviewed September 14, 2023; notable for NSR     Lab results: personally reviewed September 14, 2023; notable for stable renal function    Medical history and pertinent documents reviewed in Care Everywhere please where applicable see details above        Physical Examination Review of Systems   /60 (BP Location: Left arm)   Pulse 69   Temp 98.6  F (37  C) (Oral)   Resp 20   Ht 1.676 m (5' 6\")   Wt 118 kg (260 lb 1.6 oz)   SpO2 95%   BMI 41.98 kg/m    Body mass index is 41.98 kg/m .  Wt Readings from Last 3 Encounters:   09/13/23 118 kg (260 lb 1.6 oz)   02/13/23 71.2 kg (157 lb)     General Appearance:   no distress, normal body habitus   ENT/Mouth: membranes moist, no oral lesions or bleeding gums.      EYES:  no scleral icterus, normal conjunctivae   Neck: no carotid bruits or thyromegaly   Chest/Lungs:   lungs are clear to auscultation, no rales or wheezing, equal chest wall expansion    Cardiovascular:   Regular. Normal first and second heart sounds with no murmurs, rubs, or gallops; the carotid, radial and posterior tibial pulses are intact, no JVD or LE edema bilaterally    Abdomen:  no organomegaly, masses, bruits, or tenderness; bowel sounds are present   Extremities: no cyanosis or clubbing   Skin: no xanthelasma, warm.    Neurologic: NAD     Psychiatric: alert and oriented x3, calm     A complete 10 systems ROS was reviewed  And is negative except what is " listed in the HPI.          Medical History  Surgical History Family History Social History   No past medical history on file. Past Surgical History:   Procedure Laterality Date    CV CORONARY ANGIOGRAM N/A 9/11/2023    Procedure: Coronary Angiogram;  Surgeon: Santy Esposito MD;  Location: [facility] CATH LAB CV    CV LEFT HEART CATH N/A 9/11/2023    Procedure: Left Heart Catheterization;  Surgeon: Santy Esposito MD;  Location: [facility] CATH LAB CV    CV RIGHT HEART CATH MEASUREMENTS RECORDED N/A 9/11/2023    Procedure: Right Heart Catheterization;  Surgeon: Santy Esposito MD;  Location: [facility] CATH LAB CV    no family history of premature coronary artery disease Social History     Socioeconomic History    Marital status: Single     Spouse name: Not on file    Number of children: Not on file    Years of education: Not on file    Highest education level: Not on file   Occupational History    Not on file   Tobacco Use    Smoking status: Every Day     Packs/day: 0.50     Types: Cigarettes    Smokeless tobacco: Never    Tobacco comments:     Seen IP by [facility] on 9/11/23 and declined cessation services and materials   Substance and Sexual Activity    Alcohol use: Not on file    Drug use: Not on file    Sexual activity: Not on file   Other Topics Concern    Not on file   Social History Narrative    Not on file     Social Determinants of Health     Financial Resource Strain: Not on file   Food Insecurity: Not on file   Transportation Needs: Not on file   Physical Activity: Not on file   Stress: Not on file   Social Connections: Not on file   Intimate Partner Violence: Not on file   Housing Stability: Not on file           Lab Results    Chemistry/lipid CBC Cardiac Enzymes/BNP/TSH/INR   Lab Results   Component Value Date    BUN 37.9 (H) 09/14/2023     (L) 09/14/2023    CO2 30 (H) 09/14/2023    Lab Results   Component Value Date    WBC 6.4 09/14/2023    HGB 10.9 (L) 09/14/2023    HCT 34.3 (L) 09/14/2023    MCV  99 09/14/2023     09/14/2023    Lab Results   Component Value Date    INR 1.01 02/13/2023     No results found for: CKTOTAL, CKMB, TROPONINI       Weight:    Wt Readings from Last 3 Encounters:   09/13/23 118 kg (260 lb 1.6 oz)   02/13/23 71.2 kg (157 lb)       Allergies  No Known Allergies      Surgical History  Past Surgical History:   Procedure Laterality Date    CV CORONARY ANGIOGRAM N/A 9/11/2023    Procedure: Coronary Angiogram;  Surgeon: Santy Esposito MD;  Location: [facility] CATH LAB CV    CV LEFT HEART CATH N/A 9/11/2023    Procedure: Left Heart Catheterization;  Surgeon: Santy Esposito MD;  Location: [facility] CATH LAB CV    CV RIGHT HEART CATH MEASUREMENTS RECORDED N/A 9/11/2023    Procedure: Right Heart Catheterization;  Surgeon: Santy Esposito MD;  Location: [facility] CATH LAB CV       Social History  Tobacco:   History   Smoking Status    Every Day    Packs/day: 0.50    Types: Cigarettes   Smokeless Tobacco    Never    Alcohol:   Social History    Substance and Sexual Activity      Alcohol use: Not on file   Illicit Drugs:   History   Drug Use Not on file       Family History  No family history on file.       Jacey Bhandari MD on 9/14/2023      cc: Reid Escobar

## 2023-09-16 ENCOUNTER — PATIENT OUTREACH (OUTPATIENT)
Dept: CARE COORDINATION | Facility: CLINIC | Age: 63
End: 2023-09-16
Payer: MEDICARE

## 2023-09-16 NOTE — PROGRESS NOTES
Connected Care Resource Center Contact  [facility]/Voicemail     Clinical Data: Post-Discharge Outreach     Outreach attempted x 2.  Left message on patient's voicemail, providing [facility]'s 24/7 scheduling and nurse triage phone number [phone] ([phone]) for questions/concerns and/or to schedule an appt with an [facility] provider, if they do not have a PCP.      Plan:  [facility] will do no further outreaches at this time.       Shirin Grady MA  Connected Care Resource Center, [facility]    *Connected Care Resource Team does NOT follow patient ongoing. Referrals are identified based on internal discharge reports and the outreach is to ensure patient has an understanding of their discharge instructions.

## 2023-09-18 ENCOUNTER — TELEPHONE (OUTPATIENT)
Dept: CARDIOLOGY | Facility: CLINIC | Age: 63
End: 2023-09-18
Payer: MEDICARE

## 2023-09-18 NOTE — TELEPHONE ENCOUNTER
Called patient-- unable to reach. Left message for patient to return call to discuss recommendations. -sea     =====  ----- Message -----  From: Cedrick Corbett MD  Sent: 9/18/2023   2:22 PM CDT  To: Idania Piedra RN  Subject: RE: Med recommendations                          YES!! Thank you!!  ----- Message -----  From: Idania Piedra RN  Sent: 9/18/2023  11:31 AM CDT  To: Cedrick Corbett MD  Subject: Med recommendations                              Dr Corbett,  This patient had a diagnostic angiogram with no interventions on 9/8. Per your note Ok to stop his Clopidogrel and continue his aspirin and Eliquis?  Thanks,  SHARMILA Emerson      =====  Cedrick Corbett MD Audette, Sarah E, RN  He should stop clopidogrel and use only asp and eliquis given did not have intervention - if intervention would stop asp and cont clopidgolre and elqiuis  thanks

## 2023-09-19 ENCOUNTER — TELEPHONE (OUTPATIENT)
Dept: CARDIOLOGY | Facility: CLINIC | Age: 63
End: 2023-09-19
Payer: MEDICARE

## 2023-09-19 NOTE — TELEPHONE ENCOUNTER
Patient notified of results and recommendations per Dr. Corbett to stop taking his Clopidogrel. Patient verbalizes understanding and agrees with plan. No further questions or concerns at this time. -sea

## 2023-09-28 ENCOUNTER — APPOINTMENT (OUTPATIENT)
Dept: CARDIOLOGY | Facility: CLINIC | Age: 63
End: 2023-09-28
Payer: MEDICARE

## 2023-09-28 ENCOUNTER — OFFICE VISIT (OUTPATIENT)
Dept: CARDIOLOGY | Facility: CLINIC | Age: 63
End: 2023-09-28
Payer: MEDICARE

## 2023-09-28 VITALS
OXYGEN SATURATION: 93 % | HEART RATE: 80 BPM | HEIGHT: 66 IN | BODY MASS INDEX: 44.2 KG/M2 | WEIGHT: 275 LBS | SYSTOLIC BLOOD PRESSURE: 110 MMHG | DIASTOLIC BLOOD PRESSURE: 58 MMHG | RESPIRATION RATE: 20 BRPM

## 2023-09-28 DIAGNOSIS — I50.20 HEART FAILURE WITH REDUCED EJECTION FRACTION (H): Primary | ICD-10-CM

## 2023-09-28 DIAGNOSIS — E66.813 CLASS 3 SEVERE OBESITY DUE TO EXCESS CALORIES WITH BODY MASS INDEX (BMI) OF 40.0 TO 44.9 IN ADULT, UNSPECIFIED WHETHER SERIOUS COMORBIDITY PRESENT (H): ICD-10-CM

## 2023-09-28 DIAGNOSIS — E66.01 CLASS 3 SEVERE OBESITY DUE TO EXCESS CALORIES WITH BODY MASS INDEX (BMI) OF 40.0 TO 44.9 IN ADULT, UNSPECIFIED WHETHER SERIOUS COMORBIDITY PRESENT (H): ICD-10-CM

## 2023-09-28 DIAGNOSIS — I48.0 PAROXYSMAL ATRIAL FIBRILLATION (H): ICD-10-CM

## 2023-09-28 DIAGNOSIS — I25.810 CORONARY ARTERY DISEASE INVOLVING CORONARY BYPASS GRAFT OF NATIVE HEART WITHOUT ANGINA PECTORIS: ICD-10-CM

## 2023-09-28 DIAGNOSIS — I25.5 ISCHEMIC CARDIOMYOPATHY: ICD-10-CM

## 2023-09-28 DIAGNOSIS — N18.32 CHRONIC KIDNEY DISEASE, STAGE 3B (H): ICD-10-CM

## 2023-09-28 DIAGNOSIS — Z72.0 TOBACCO ABUSE: ICD-10-CM

## 2023-09-28 LAB
ANION GAP SERPL CALCULATED.3IONS-SCNC: 10 MMOL/L (ref 7–15)
BUN SERPL-MCNC: 17.1 MG/DL (ref 8–23)
CALCIUM SERPL-MCNC: 9.8 MG/DL (ref 8.8–10.2)
CHLORIDE SERPL-SCNC: 102 MMOL/L (ref 98–107)
CREAT SERPL-MCNC: 1.07 MG/DL (ref 0.67–1.17)
EGFRCR SERPLBLD CKD-EPI 2021: 77 ML/MIN/1.73M2
GLUCOSE SERPL-MCNC: 101 MG/DL (ref 70–99)
HCO3 SERPL-SCNC: 29 MMOL/L (ref 22–29)
POTASSIUM SERPL-SCNC: 4.4 MMOL/L (ref 3.4–5.3)
SODIUM SERPL-SCNC: 141 MMOL/L (ref 135–145)

## 2023-09-28 PROCEDURE — 36415 COLL VENOUS BLD VENIPUNCTURE: CPT | Performed by: NURSE PRACTITIONER

## 2023-09-28 PROCEDURE — 99215 OFFICE O/P EST HI 40 MIN: CPT | Performed by: NURSE PRACTITIONER

## 2023-09-28 PROCEDURE — 80048 BASIC METABOLIC PNL TOTAL CA: CPT | Performed by: NURSE PRACTITIONER

## 2023-09-28 NOTE — PROGRESS NOTES
Assessment/Recommendations   Assessment:    1. Ischemic cardiomyopathy, heart failure with reduced ejection fraction, ejection fraction 35-40%, NYHA class III: Decompensated.  He has had weight gain, increased dyspnea on exertion, lower extremity edema and wheezing since discharge.  He lives in assisted living where his meals are provided.  His meals do not sound low in sodium and I encouraged him to discuss with the dietitian.  He met with the nurse clinician during hospitalization.  2.  CAD: Has occasional chest pain. Status post CABG in 2015.  Coronary angiogram September 11 showed chronic total occlusion of proximal LAD and RCA.  Patent grafts.  No intervention was completed.  He continues aspirin, atorvastatin, Imdur.  Clopidogrel recently discontinued due to being on aspirin and Eliquis.  3.  Possible GRACE: Referral to sleep medicine made during hospitalization  4.  Paroxysmal atrial fibrillation: Normal sinus rhythm.  He continues Eliquis for anticoagulation.  He is on amiodarone 200 mg daily  5.  Chronic kidney disease stage IIIb: Creatinine increased to 2.06 during hospitalization.  His spironolactone, lisinopril and metolazone are on hold  6.  Tobacco abuse: He states he continues to smoke 1/2 pack/day.  7.  Obesity: BMI 44.39    Plan:  1.  Take an extra 2 mg of bumex today  2.  BMP pending  3.  Continue monitoring weights and stressed importance of low salt diet     Zackary Hutchison will follow up with Dr Bhandari in 2 months and in the heart failure clinic in 2 weeks.  Previous [facility] cardiology patient and he needs to determine wants to follow with for his heart care     History of Present Illness/Subjective    Mr. Zackary Hutchison is a 63 year old male seen at [facility] heart failure clinic today for continued follow-up.  He follows up for heart failure with reduced ejection fraction, ischemic cardiomyopathy.  He was hospitalized September 8 to September 14 with acute heart failure.  He was  diuresed and symptoms improved.  His amlodipine was discontinued and holding lisinopril, spironolactone and metolazone due to hypotension and acute kidney injury.  His echocardiogram showed ejection fraction 35 to 40%.  He underwent coronary angiogram with EKG showing T wave changes.  Showed chronic total occlusion of proximal LAD and stented segment, chronic total occlusion of RCA with widely patent large caliber LIMA to mid LAD with good distal and proximal runoff, widely patent free LAURI graft to mid PDA.  No intervention was completed.  He also had cellulitis and received Zosyn and discharged on Keflex.  He has a past medical history significant for hypertension, CAD, paroxysmal atrial fibrillation, COPD, possible GRACE, tobacco use, alcohol use, CABG in 2015, obesity, chronic kidney disease stage IIIb, hyperlipidemia.    Today, he has had increased dyspnea on exertion, weight gain, lower extremity edema, wheezing.  He denies lightheadedness, PND, palpitations, and abdominal fullness/bloating.      He is monitoring home weights which have increased from 252 to 269 pounds.  He lives in assisted living where his meals are provided which do not sound low in sodium      ECHOCARDIOGRAM: 9/9/2023  Interpretation Summary     1.Left ventricular function is decreased. The ejection fraction is 35-40%  (moderately reduced).  2.Septal motion is consistent with post-operative state.  3.There is borderline apical wall hypokinesis.  4.Mildly decreased right ventricular systolic function  5.No hemodynamically significant valvular abnormalities on 2D or color flow  imaging.  6.There is borderline aortic root enlargement. The ascending aorta is  Borderline dilated.  7.The study was technically difficult.  There is no comparison study available.      Coronary angiogram, right heart catheterization, left heart catheterization: 9/11/2023:  Pre Procedure Diagnosis    Acute CHF exacerbation    Post Procedure Diagnosis    Same     "  Conclusion    1.  Chronic total occlusion proximal LAD and stented segment  2.  Moderate calcific disease in medium size circumflex OM1 branch.  Smaller tortuous AV groove circumflex branch has 70% calcified narrowing and diffuse distal disease.  3.  Chronic total occlusion mid right coronary artery.  4.  Widely patent large caliber LIMA to mid LAD with good distal and proximal runoff.  5.  Widely patent free LAURI graft to mid PDA.  Diffuse disease in posterolateral system which is never well visualized.  6.  Right heart cath data:  RA 12   RV 38/13  PA 41/20  mean 27  PAWP 16  CO 6.6  CI 2.2         Plan     Follow bedrest per protocol   Continued medical management and lifestyle modifications for cardiovascular risk factor optimizations.     Recommendations    General Recommendations:  - Patient given specific instructions regarding care of arteriotomy site, activity restrictions, signs and symptoms of cardiac or vascular complications and to seek immediate medical evaluation should they occur.        Physical Examination Review of Systems   /58 (BP Location: Right arm, Patient Position: Sitting, Cuff Size: Adult Large)   Pulse 80   Resp 20   Ht 1.676 m (5' 6\")   Wt 124.7 kg (275 lb)   SpO2 93%   BMI 44.39 kg/m    Body mass index is 44.39 kg/m .  Wt Readings from Last 3 Encounters:   09/28/23 124.7 kg (275 lb)   09/13/23 118 kg (260 lb 1.6 oz)   02/13/23 71.2 kg (157 lb)       General Appearance:   no acute distress   ENT/Mouth: No abnormalities   EYES:  no scleral icterus, normal conjunctivae   Neck: no thyromegaly   Chest/Lungs:   Wheezing throughout lung fields, equal chest wall expansion    Cardiovascular:   Regular. Normal first and second heart sounds with no murmurs, rubs, or gallops, moderate edema bilaterally    Abdomen:  bowel sounds are present   Extremities: no cyanosis or clubbing   Skin: warm   Neurologic: no tremors     Psychiatric: alert and oriented x3                                "               Medical History  Surgical History Family History Social History   Past Medical History:   Diagnosis Date    Atrial fibrillation (H)     Chronic kidney disease     Chronic kidney disease, stage 3b (H) 09/28/2023    Class 3 severe obesity due to excess calories with body mass index (BMI) of 40.0 to 44.9 in adult (H) 09/28/2023    Congestive heart failure (H)     COPD (chronic obstructive pulmonary disease) (H)     Coronary artery disease     Coronary artery disease involving coronary bypass graft of native heart without angina pectoris 09/28/2023    Heart failure with reduced ejection fraction (H) 09/11/2023    Hyperlipidemia     Hypertension     Ischemic cardiomyopathy 09/28/2023    Obese     Paroxysmal atrial fibrillation (H) 09/28/2023    Tobacco abuse 09/28/2023    Past Surgical History:   Procedure Laterality Date    CORONARY ANGIOGRAPHY ADULT ORDER      CORONARY ARTERY BYPASS      CV CORONARY ANGIOGRAM N/A 09/11/2023    Procedure: Coronary Angiogram;  Surgeon: Santy Esposito MD;  Location: [facility]    CV LEFT HEART CATH N/A 09/11/2023    Procedure: Left Heart Catheterization;  Surgeon: Santy Esposito MD;  Location: [facility]    CV RIGHT HEART CATH MEASUREMENTS RECORDED N/A 09/11/2023    Procedure: Right Heart Catheterization;  Surgeon: Santy Esposito MD;  Location: [facility]    No family history on file. Social History     Socioeconomic History    Marital status: Single     Spouse name: Not on file    Number of children: Not on file    Years of education: Not on file    Highest education level: Not on file   Occupational History    Not on file   Tobacco Use    Smoking status: Every Day     Packs/day: 0.50     Types: Cigarettes    Smokeless tobacco: Never    Tobacco comments:     Seen IP by CTTS on 9/11/23 and declined cessation services and materials   Substance and Sexual Activity    Alcohol use: Not on file    Drug use: Not on file    Sexual activity:  Not on file   Other Topics Concern    Not on file   Social History Narrative    Not on file     Social Determinants of Health     Financial Resource Strain: Not on file   Food Insecurity: Not on file   Transportation Needs: Not on file   Physical Activity: Not on file   Stress: Not on file   Social Connections: Not on file   Interpersonal Safety: Not on file   Housing Stability: Not on file          Medications  Allergies   Current Outpatient Medications   Medication Sig Dispense Refill    acetaminophen (TYLENOL) 500 MG tablet Take 1,000 mg by mouth every 8 hours as needed      albuterol (PROAIR HFA/PROVENTIL HFA/VENTOLIN HFA) 108 (90 Base) MCG/ACT inhaler Inhale 1 puff into the lungs every 6 hours as needed      amiodarone (PACERONE) 200 MG tablet Take 200 mg by mouth daily      apixaban ANTICOAGULANT (ELIQUIS) 5 MG tablet Take 1 tablet (5 mg) by mouth 2 times daily 180 tablet 0    aspirin 81 MG EC tablet Take 1 tablet (81 mg) by mouth daily 90 tablet 0    atorvastatin (LIPITOR) 40 MG tablet Take 40 mg by mouth every morning      bumetanide (BUMEX) 2 MG tablet Take 2 mg by mouth 2 times daily      [employer] VITAMIN B12 1000 MCG tablet Take 1,000 mcg by mouth daily      diclofenac (VOLTAREN) 1 % topical gel Place 2 g onto the skin 4 times daily as needed Apply 2 g to skin 4 times a day. Indications: Joint Damage causing Pain and Loss of Function      DULoxetine (CYMBALTA) 30 MG capsule Take 30 mg by mouth every morning      gabapentin (NEURONTIN) 300 MG capsule Take 300 mg by mouth 2 times daily 300 MG AM AND NOON,  MG BY MOUTH DAILY AT BEDTIME      hypromellose-dextran (ARTIFICAL TEARS) 0.1-0.3 % ophthalmic solution Place 2 drops into both eyes 4 times daily as needed      ipratropium - albuterol 0.5 mg/2.5 mg/3 mL (DUONEB) 0.5-2.5 (3) MG/3ML neb solution Inhale 3 mLs into the lungs every 6 hours as needed      isosorbide mononitrate (IMDUR) 60 MG 24 hr tablet Take 180 mg by mouth every morning      KLOR-CON 20  MEQ CR tablet Take 20 mEq by mouth daily      nitroGLYcerin (NITROSTAT) 0.4 MG sublingual tablet Place 0.4 mg under the tongue every 5 minutes as needed If no relief after 3 tabs call 911;continue 1 tab every 5 min max 3 tab Indications: chest pain      polyethylene glycol (MIRALAX) 17 g packet Take 17 g by mouth 2 times daily as needed      rOPINIRole (REQUIP) 2 MG tablet Take 4 mg by mouth every evening      COMPOUNDED NON-CONTROLLED SUBSTANCE (CMPD RX) - PHARMACY TO MIX COMPOUNDED MEDICATION Apply topically three times a week Apply topically. DIPHENYLCYCLOPROPENONE 0.1% TOP OINT + 15% salicylic acid in paraffin. Apply 3 times weekly to wart until resolved. (Patient not taking: Reported on 9/28/2023)      gabapentin (NEURONTIN) 300 MG capsule Take 900 mg by mouth At Bedtime 300 MG AM AND NOON,  MG BY MOUTH DAILY AT BEDTIME (Patient not taking: Reported on 9/28/2023)      No Known Allergies      Lab Results    Chemistry/lipid CBC Cardiac Enzymes/BNP/TSH/INR   Lab Results   Component Value Date    BUN 37.9 (H) 09/14/2023     (L) 09/14/2023    CO2 30 (H) 09/14/2023    Lab Results   Component Value Date    WBC 6.4 09/14/2023    HGB 10.9 (L) 09/14/2023    HCT 34.3 (L) 09/14/2023    MCV 99 09/14/2023     09/14/2023    Lab Results   Component Value Date    INR 1.01 02/13/2023             This note has been dictated using voice recognition software. Any grammatical, typographical, or context distortions are unintentional and inherent to the software    40 minutes spent on the date of encounter doing chart review, review of outside records, review of test results, interpretation with above tests, patient visit, and documentation.

## 2023-09-28 NOTE — PATIENT INSTRUCTIONS
Zackary Hutchison,    It was a pleasure to see you today at [facility].     My recommendations after this visit include:  - Please follow up with Dr Bhandari in 2 months   - Please follow up with Debbie Woodard in 2 weeks  - I have checked labs  - Take an bumex today  - Nurse line [phone]    Debbie Woodard, CNP

## 2023-09-28 NOTE — LETTER
9/28/2023    Reid Escobar MD  [address] [place] [address]    RE: Zackary Hutchison       Dear Colleague,     I had the pleasure of seeing Zackary Hutchison in the [facility].        Assessment/Recommendations   Assessment:    1. Ischemic cardiomyopathy, heart failure with reduced ejection fraction, ejection fraction 35-40%, NYHA class III: Decompensated.  He has had weight gain, increased dyspnea on exertion, lower extremity edema and wheezing since discharge.  He lives in assisted living where his meals are provided.  His meals do not sound low in sodium and I encouraged him to discuss with the dietitian.  He met with the nurse clinician during hospitalization.  2.  CAD: Has occasional chest pain. Status post CABG in 2015.  Coronary angiogram September 11 showed chronic total occlusion of proximal LAD and RCA.  Patent grafts.  No intervention was completed.  He continues aspirin, atorvastatin, Imdur.  Clopidogrel recently discontinued due to being on aspirin and Eliquis.  3.  Possible GRACE: Referral to sleep medicine made during hospitalization  4.  Paroxysmal atrial fibrillation: Normal sinus rhythm.  He continues Eliquis for anticoagulation.  He is on amiodarone 200 mg daily  5.  Chronic kidney disease stage IIIb: Creatinine increased to 2.06 during hospitalization.  His spironolactone, lisinopril and metolazone are on hold  6.  Tobacco abuse: He states he continues to smoke 1/2 pack/day.  7.  Obesity: BMI 44.39    Plan:  1.  Take an extra 2 mg of bumex today  2.  BMP pending  3.  Continue monitoring weights and stressed importance of low salt diet     Zackary Hutchison will follow up with Dr Bhandari in 2 months and in the heart failure clinic in 2 weeks.  Previous [facility] cardiology patient and he needs to determine wants to follow with for his heart care     History of Present Illness/Subjective    Mr. Zackary Hutchison is a 63 year old male seen at [facility] heart failure  clinic today for continued follow-up.  He follows up for heart failure with reduced ejection fraction, ischemic cardiomyopathy.  He was hospitalized September 8 to September 14 with acute heart failure.  He was diuresed and symptoms improved.  His amlodipine was discontinued and holding lisinopril, spironolactone and metolazone due to hypotension and acute kidney injury.  His echocardiogram showed ejection fraction 35 to 40%.  He underwent coronary angiogram with EKG showing T wave changes.  Showed chronic total occlusion of proximal LAD and stented segment, chronic total occlusion of RCA with widely patent large caliber LIMA to mid LAD with good distal and proximal runoff, widely patent free LAURI graft to mid PDA.  No intervention was completed.  He also had cellulitis and received Zosyn and discharged on Keflex.  He has a past medical history significant for hypertension, CAD, paroxysmal atrial fibrillation, COPD, possible GRACE, tobacco use, alcohol use, CABG in 2015, obesity, chronic kidney disease stage IIIb, hyperlipidemia.    Today, he has had increased dyspnea on exertion, weight gain, lower extremity edema, wheezing.  He denies lightheadedness, PND, palpitations, and abdominal fullness/bloating.      He is monitoring home weights which have increased from 252 to 269 pounds.  He lives in assisted living where his meals are provided which do not sound low in sodium      ECHOCARDIOGRAM: 9/9/2023  Interpretation Summary     1.Left ventricular function is decreased. The ejection fraction is 35-40%  (moderately reduced).  2.Septal motion is consistent with post-operative state.  3.There is borderline apical wall hypokinesis.  4.Mildly decreased right ventricular systolic function  5.No hemodynamically significant valvular abnormalities on 2D or color flow  imaging.  6.There is borderline aortic root enlargement. The ascending aorta is  Borderline dilated.  7.The study was technically difficult.  There is no  "comparison study available.      Coronary angiogram, right heart catheterization, left heart catheterization: 9/11/2023:  Pre Procedure Diagnosis    Acute CHF exacerbation    Post Procedure Diagnosis    Same      Conclusion    1.  Chronic total occlusion proximal LAD and stented segment  2.  Moderate calcific disease in medium size circumflex OM1 branch.  Smaller tortuous AV groove circumflex branch has 70% calcified narrowing and diffuse distal disease.  3.  Chronic total occlusion mid right coronary artery.  4.  Widely patent large caliber LIMA to mid LAD with good distal and proximal runoff.  5.  Widely patent free LAURI graft to mid PDA.  Diffuse disease in posterolateral system which is never well visualized.  6.  Right heart cath data:  RA 12   RV 38/13  PA 41/20  mean 27  PAWP 16  CO 6.6  CI 2.2         Plan     Follow bedrest per protocol   Continued medical management and lifestyle modifications for cardiovascular risk factor optimizations.     Recommendations    General Recommendations:  - Patient given specific instructions regarding care of arteriotomy site, activity restrictions, signs and symptoms of cardiac or vascular complications and to seek immediate medical evaluation should they occur.        Physical Examination Review of Systems   /58 (BP Location: Right arm, Patient Position: Sitting, Cuff Size: Adult Large)   Pulse 80   Resp 20   Ht 1.676 m (5' 6\")   Wt 124.7 kg (275 lb)   SpO2 93%   BMI 44.39 kg/m    Body mass index is 44.39 kg/m .  Wt Readings from Last 3 Encounters:   09/28/23 124.7 kg (275 lb)   09/13/23 118 kg (260 lb 1.6 oz)   02/13/23 71.2 kg (157 lb)       General Appearance:   no acute distress   ENT/Mouth: No abnormalities   EYES:  no scleral icterus, normal conjunctivae   Neck: no thyromegaly   Chest/Lungs:   Wheezing throughout lung fields, equal chest wall expansion    Cardiovascular:   Regular. Normal first and second heart sounds with no murmurs, rubs, or gallops, " moderate edema bilaterally    Abdomen:  bowel sounds are present   Extremities: no cyanosis or clubbing   Skin: warm   Neurologic: no tremors     Psychiatric: alert and oriented x3                                              Medical History  Surgical History Family History Social History   Past Medical History:   Diagnosis Date    Atrial fibrillation (H)     Chronic kidney disease     Chronic kidney disease, stage 3b (H) 09/28/2023    Class 3 severe obesity due to excess calories with body mass index (BMI) of 40.0 to 44.9 in adult (H) 09/28/2023    Congestive heart failure (H)     COPD (chronic obstructive pulmonary disease) (H)     Coronary artery disease     Coronary artery disease involving coronary bypass graft of native heart without angina pectoris 09/28/2023    Heart failure with reduced ejection fraction (H) 09/11/2023    Hyperlipidemia     Hypertension     Ischemic cardiomyopathy 09/28/2023    Obese     Paroxysmal atrial fibrillation (H) 09/28/2023    Tobacco abuse 09/28/2023    Past Surgical History:   Procedure Laterality Date    CORONARY ANGIOGRAPHY ADULT ORDER      CORONARY ARTERY BYPASS      CV CORONARY ANGIOGRAM N/A 09/11/2023    Procedure: Coronary Angiogram;  Surgeon: Santy Esposito MD;  Location: [facility]    CV LEFT HEART CATH N/A 09/11/2023    Procedure: Left Heart Catheterization;  Surgeon: Santy Esposito MD;  Location: [facility]    CV RIGHT HEART CATH MEASUREMENTS RECORDED N/A 09/11/2023    Procedure: Right Heart Catheterization;  Surgeon: Santy Esposito MD;  Location: [facility]    No family history on file. Social History     Socioeconomic History    Marital status: Single     Spouse name: Not on file    Number of children: Not on file    Years of education: Not on file    Highest education level: Not on file   Occupational History    Not on file   Tobacco Use    Smoking status: Every Day     Packs/day: 0.50     Types: Cigarettes    Smokeless tobacco:  Never    Tobacco comments:     Seen IP by CTTS on 9/11/23 and declined cessation services and materials   Substance and Sexual Activity    Alcohol use: Not on file    Drug use: Not on file    Sexual activity: Not on file   Other Topics Concern    Not on file   Social History Narrative    Not on file     Social Determinants of Health     Financial Resource Strain: Not on file   Food Insecurity: Not on file   Transportation Needs: Not on file   Physical Activity: Not on file   Stress: Not on file   Social Connections: Not on file   Interpersonal Safety: Not on file   Housing Stability: Not on file          Medications  Allergies   Current Outpatient Medications   Medication Sig Dispense Refill    acetaminophen (TYLENOL) 500 MG tablet Take 1,000 mg by mouth every 8 hours as needed      albuterol (PROAIR HFA/PROVENTIL HFA/VENTOLIN HFA) 108 (90 Base) MCG/ACT inhaler Inhale 1 puff into the lungs every 6 hours as needed      amiodarone (PACERONE) 200 MG tablet Take 200 mg by mouth daily      apixaban ANTICOAGULANT (ELIQUIS) 5 MG tablet Take 1 tablet (5 mg) by mouth 2 times daily 180 tablet 0    aspirin 81 MG EC tablet Take 1 tablet (81 mg) by mouth daily 90 tablet 0    atorvastatin (LIPITOR) 40 MG tablet Take 40 mg by mouth every morning      bumetanide (BUMEX) 2 MG tablet Take 2 mg by mouth 2 times daily      CVS VITAMIN B12 1000 MCG tablet Take 1,000 mcg by mouth daily      diclofenac (VOLTAREN) 1 % topical gel Place 2 g onto the skin 4 times daily as needed Apply 2 g to skin 4 times a day. Indications: Joint Damage causing Pain and Loss of Function      DULoxetine (CYMBALTA) 30 MG capsule Take 30 mg by mouth every morning      gabapentin (NEURONTIN) 300 MG capsule Take 300 mg by mouth 2 times daily 300 MG AM AND NOON,  MG BY MOUTH DAILY AT BEDTIME      hypromellose-dextran (ARTIFICAL TEARS) 0.1-0.3 % ophthalmic solution Place 2 drops into both eyes 4 times daily as needed      ipratropium - albuterol 0.5 mg/2.5  mg/3 mL (DUONEB) 0.5-2.5 (3) MG/3ML neb solution Inhale 3 mLs into the lungs every 6 hours as needed      isosorbide mononitrate (IMDUR) 60 MG 24 hr tablet Take 180 mg by mouth every morning      KLOR-CON 20 MEQ CR tablet Take 20 mEq by mouth daily      nitroGLYcerin (NITROSTAT) 0.4 MG sublingual tablet Place 0.4 mg under the tongue every 5 minutes as needed If no relief after 3 tabs call 911;continue 1 tab every 5 min max 3 tab Indications: chest pain      polyethylene glycol (MIRALAX) 17 g packet Take 17 g by mouth 2 times daily as needed      rOPINIRole (REQUIP) 2 MG tablet Take 4 mg by mouth every evening      COMPOUNDED NON-CONTROLLED SUBSTANCE (CMPD RX) - PHARMACY TO MIX COMPOUNDED MEDICATION Apply topically three times a week Apply topically. DIPHENYLCYCLOPROPENONE 0.1% TOP OINT + 15% salicylic acid in paraffin. Apply 3 times weekly to wart until resolved. (Patient not taking: Reported on 9/28/2023)      gabapentin (NEURONTIN) 300 MG capsule Take 900 mg by mouth At Bedtime 300 MG AM AND NOON,  MG BY MOUTH DAILY AT BEDTIME (Patient not taking: Reported on 9/28/2023)      No Known Allergies      Lab Results    Chemistry/lipid CBC Cardiac Enzymes/BNP/TSH/INR   Lab Results   Component Value Date    BUN 37.9 (H) 09/14/2023     (L) 09/14/2023    CO2 30 (H) 09/14/2023    Lab Results   Component Value Date    WBC 6.4 09/14/2023    HGB 10.9 (L) 09/14/2023    HCT 34.3 (L) 09/14/2023    MCV 99 09/14/2023     09/14/2023    Lab Results   Component Value Date    INR 1.01 02/13/2023             This note has been dictated using voice recognition software. Any grammatical, typographical, or context distortions are unintentional and inherent to the software    40 minutes spent on the date of encounter doing chart review, review of outside records, review of test results, interpretation with above tests, patient visit, and documentation.                Thank you for allowing me to participate in the care of  your patient.      Sincerely,     TONY Vigil CNP     [facility]  cc:   Jacey Bhandari MD  [address]  [place] [address]

## 2023-09-29 ENCOUNTER — TELEPHONE (OUTPATIENT)
Dept: CARDIOLOGY | Facility: CLINIC | Age: 63
End: 2023-09-29
Payer: MEDICARE

## 2023-09-29 DIAGNOSIS — I25.5 ISCHEMIC CARDIOMYOPATHY: Primary | ICD-10-CM

## 2023-09-29 RX ORDER — NITROGLYCERIN 0.4 MG/1
0.4 TABLET SUBLINGUAL EVERY 5 MIN PRN
Qty: 100 TABLET | Refills: 1 | Status: SHIPPED | OUTPATIENT
Start: 2023-09-29

## 2023-09-29 NOTE — TELEPHONE ENCOUNTER
Patient is LM on identified VM with detailed instructions on increased dose of Bumex 4mg bid, with CORE RN to call next Tuesday to discuss his daily wt trend and response to this increase.  Heather Armenta  CORE RN BSN, CHFN, PCCN-K

## 2023-09-29 NOTE — TELEPHONE ENCOUNTER
----- Message from TONY Vigil CNP sent at 9/29/2023  7:27 AM CDT -----  Please inform patient of lab results.  He was retaining fluid on exam yesterday.  I recommend increasing his Bumex to 4 mg twice a day and follow-up with him early next week to see how he is doing.  Please make sure he is following a low-sodium diet as we discussed yesterday.  He lives in assisted living which is difficult.  Also monitor weights daily.  Thank you

## 2023-10-03 ENCOUNTER — TELEPHONE (OUTPATIENT)
Dept: CARDIOLOGY | Facility: CLINIC | Age: 63
End: 2023-10-03
Payer: MEDICARE

## 2023-10-03 DIAGNOSIS — I50.20 HEART FAILURE WITH REDUCED EJECTION FRACTION (H): Primary | ICD-10-CM

## 2023-10-03 NOTE — TELEPHONE ENCOUNTER
Pt was informed of recs. States he has had a bowel movement. He agreed to starting diuril tomorrow with blood draw this week.     Siddharth CLEMENT RN  BSN

## 2023-10-03 NOTE — TELEPHONE ENCOUNTER
----- Message from TONY Vigil CNP sent at 10/3/2023  7:36 AM CDT -----  Regarding: FW: increase bumex  Please call patient to see how he has responded to the increase of Bumex last week.  Recommend getting a BMP in 1 week.  Please update me.  Thank you    ----- Message -----  From: Debbie Woodard APRN CNP  Sent: 10/3/2023  12:00 AM CDT  To: TONY Vigil CNP  Subject: increase bumex                                   See how patient is doing with the increase Bumex 4 mg twice a day.  BMP in a week?

## 2023-10-03 NOTE — TELEPHONE ENCOUNTER
Saturday when I weighed myself I was up to 272lbs. Sunday it was 3lbs lighter. Today is 273lbs.     And I'm taking the double dose of bumex and I'm taking miralax three times a day and what do you know.    No improvement in symptoms. With exertion some chest pain.     Everything is the same except I'm even bigger.     Pretty much hard to do (sticking with sodium restriction). Turkey sandwich taco salad ham dinner.    Debbie, any further recs?    Siddharth CLEMENT RN  BSN

## 2023-10-03 NOTE — TELEPHONE ENCOUNTER
Has he had a bowel movement?  We discussed during his visit last week possible use of diuril.  He had used metolazone in the past and felt it was not beneficial.  Please have him start Diuril 500 mg once a week starting tomorrow if possible.  Recommend BMP this week if possible.  Reiterate the importance of low-sodium diet and making conscious choices at his assisted living.  Thank you

## 2023-10-10 ENCOUNTER — TELEPHONE (OUTPATIENT)
Dept: CARDIOLOGY | Facility: CLINIC | Age: 63
End: 2023-10-10
Payer: MEDICARE

## 2023-10-10 NOTE — TELEPHONE ENCOUNTER
Attempted to reach Pt, directed to INEZ CALERO stating to take his diuril as prescribed and still get blood work done on Friday.    Siddharth CLEMENT RN  BSN

## 2023-10-10 NOTE — TELEPHONE ENCOUNTER
[facility] Call Center    Phone Message    May a detailed message be left on voicemail: yes     Reason for Call: Other: Pt has some questions regarding his Diuril. Pt was unable to get medication until yesterday and is wondering if he will still have bloodwork done on Friday. Per patient's request, please call his brother- Cash to discuss as patient's phone is not working at the moment. [phone]      Action Taken: Other: Cardiology    Travel Screening: Not Applicable

## 2023-10-13 ENCOUNTER — OFFICE VISIT (OUTPATIENT)
Dept: CARDIOLOGY | Facility: CLINIC | Age: 63
End: 2023-10-13
Payer: MEDICARE

## 2023-10-13 VITALS
HEART RATE: 81 BPM | SYSTOLIC BLOOD PRESSURE: 144 MMHG | DIASTOLIC BLOOD PRESSURE: 62 MMHG | BODY MASS INDEX: 44.87 KG/M2 | OXYGEN SATURATION: 94 % | WEIGHT: 278 LBS | RESPIRATION RATE: 18 BRPM

## 2023-10-13 DIAGNOSIS — I48.0 PAROXYSMAL ATRIAL FIBRILLATION (H): ICD-10-CM

## 2023-10-13 DIAGNOSIS — I50.20 HEART FAILURE WITH REDUCED EJECTION FRACTION (H): Primary | ICD-10-CM

## 2023-10-13 DIAGNOSIS — E66.813 CLASS 3 SEVERE OBESITY DUE TO EXCESS CALORIES WITH BODY MASS INDEX (BMI) OF 40.0 TO 44.9 IN ADULT, UNSPECIFIED WHETHER SERIOUS COMORBIDITY PRESENT (H): ICD-10-CM

## 2023-10-13 DIAGNOSIS — I25.810 CORONARY ARTERY DISEASE INVOLVING CORONARY BYPASS GRAFT OF NATIVE HEART WITHOUT ANGINA PECTORIS: ICD-10-CM

## 2023-10-13 DIAGNOSIS — N18.32 CHRONIC KIDNEY DISEASE, STAGE 3B (H): ICD-10-CM

## 2023-10-13 DIAGNOSIS — I25.5 ISCHEMIC CARDIOMYOPATHY: ICD-10-CM

## 2023-10-13 DIAGNOSIS — E66.01 CLASS 3 SEVERE OBESITY DUE TO EXCESS CALORIES WITH BODY MASS INDEX (BMI) OF 40.0 TO 44.9 IN ADULT, UNSPECIFIED WHETHER SERIOUS COMORBIDITY PRESENT (H): ICD-10-CM

## 2023-10-13 DIAGNOSIS — Z72.0 TOBACCO ABUSE: ICD-10-CM

## 2023-10-13 LAB
ANION GAP SERPL CALCULATED.3IONS-SCNC: 12 MMOL/L (ref 7–15)
BUN SERPL-MCNC: 16.4 MG/DL (ref 8–23)
CALCIUM SERPL-MCNC: 9.9 MG/DL (ref 8.8–10.2)
CHLORIDE SERPL-SCNC: 97 MMOL/L (ref 98–107)
CREAT SERPL-MCNC: 1.03 MG/DL (ref 0.67–1.17)
DEPRECATED HCO3 PLAS-SCNC: 29 MMOL/L (ref 22–29)
EGFRCR SERPLBLD CKD-EPI 2021: 81 ML/MIN/1.73M2
GLUCOSE SERPL-MCNC: 87 MG/DL (ref 70–99)
POTASSIUM SERPL-SCNC: 3.7 MMOL/L (ref 3.4–5.3)
SODIUM SERPL-SCNC: 138 MMOL/L (ref 135–145)

## 2023-10-13 PROCEDURE — 36415 COLL VENOUS BLD VENIPUNCTURE: CPT | Performed by: NURSE PRACTITIONER

## 2023-10-13 PROCEDURE — 99214 OFFICE O/P EST MOD 30 MIN: CPT | Performed by: NURSE PRACTITIONER

## 2023-10-13 PROCEDURE — 80048 BASIC METABOLIC PNL TOTAL CA: CPT | Performed by: NURSE PRACTITIONER

## 2023-10-13 NOTE — PROGRESS NOTES
Assessment/Recommendations   Assessment:    1. Ischemic cardiomyopathy, heart failure with reduced ejection fraction, ejection fraction 35-40%, NYHA class III: Decompensated.  I increased his Bumex to 4 mg twice a day 3 weeks ago.  He did not notice any improvement in his symptoms.  I started him on Diuril this week which he took on Tuesday.  He states his weight dropped 3 pounds but gained it the next day.  He continues to have dyspnea on exertion, abdominal distention and lower extremity edema.  He denies orthopnea or PND  2.  CAD: Denies angina.  Status post CABG in 2015.  Coronary angiogram September 11 showed chronic total occlusion of proximal LAD and RCA.  Patent grafts.  No intervention was completed.  He continues aspirin, atorvastatin, Imdur.  Clopidogrel recently discontinued due to being on aspirin and Eliquis.   3.  Possible GRACE: Sleep referral made during hospitalization.  4.  Paroxysmal atrial fibrillation: Normal sinus rhythm.  He continues Eliquis for anticoagulation.  He is on amiodarone 200 mg daily   5.  Chronic kidney disease stage IIIb: BMP pending  6.  Obesity: BMI 44.87  7.  Tobacco use: He continues to smoke a half a pack a day.  He does not want to quit.    Plan:  1.  BMP pending  2.  Take an extra dose of diuril tomorrow and he will call us on Monday for an update  3.  Continue monitoring weights and stressed importance of low-salt diet    Zackary Hutchison will follow up with Dr. Bhandari November 28 and in the heart failure clinic depending on lab results.     History of Present Illness/Subjective    Mr. Zackary Hutchison is a 63 year old male seen at [facility] heart failure clinic today for continued follow-up.  He follows up for heart failure with reduced ejection fraction, ischemic cardiomyopathy.  He was hospitalized in September due to acute heart failure.  He had an echocardiogram which showed ejection fraction of 35 to 40%.  He underwent coronary angiogram with EKG showing T  wave changes.  Showed chronic total occlusion of proximal LAD and stented segment, chronic total occlusion of RCA with widely patent large caliber LIMA to mid LAD with good distal and proximal runoff, widely patent free LAURI graft to mid PDA.  No intervention was completed.  He has a past medical history significant for hypertension, CAD, paroxysmal atrial fibrillation, COPD, possible GRACE, tobacco use, alcohol use, CABG in 2015, obesity, chronic kidney disease stage IIIb, hyperlipidemia.     During the last clinic visit, I increased his Bumex to 4 mg twice a day.  He did not notice any improvement in his symptoms.  I started him on Diuril 500 mg weekly.  He took this on Tuesday and his weight decreased 3 pounds.  The following day his weight went back up.  He continues to have fatigue, dyspnea on exertion and lower extremity edema.  He denies lightheadedness, orthopnea, PND, palpitations, and chest pain.      He is monitoring home weights which was 274 pounds today. He lives in assisted living where his meals are provided and are not low in sodium     Physical Examination Review of Systems   BP (!) 144/62 (BP Location: Right arm, Patient Position: Sitting, Cuff Size: Adult Large)   Pulse 81   Resp 18   Wt 126.1 kg (278 lb)   SpO2 94%   BMI 44.87 kg/m    Body mass index is 44.87 kg/m .  Wt Readings from Last 3 Encounters:   10/13/23 126.1 kg (278 lb)   09/28/23 124.7 kg (275 lb)   09/13/23 118 kg (260 lb 1.6 oz)       General Appearance:   no acute distress   ENT/Mouth: No abnormalities   EYES:  no scleral icterus, normal conjunctivae   Neck: no thyromegaly   Chest/Lungs:   wheezing throughout lung fields, equal chest wall expansion    Cardiovascular:   Regular. Normal first and second heart sounds with no murmurs, rubs, or gallops, moderate edema bilaterally    Abdomen:  Distended, bowel sounds are present   Extremities: no cyanosis or clubbing   Skin: warm   Neurologic: no tremors     Psychiatric: alert and  oriented x3                                              Medical History  Surgical History Family History Social History   Past Medical History:   Diagnosis Date    Atrial fibrillation (H)     Chronic kidney disease     Chronic kidney disease, stage 3b (H) 09/28/2023    Class 3 severe obesity due to excess calories with body mass index (BMI) of 40.0 to 44.9 in adult (H) 09/28/2023    Congestive heart failure (H)     COPD (chronic obstructive pulmonary disease) (H)     Coronary artery disease     Coronary artery disease involving coronary bypass graft of native heart without angina pectoris 09/28/2023    Heart failure with reduced ejection fraction (H) 09/11/2023    Hyperlipidemia     Hypertension     Ischemic cardiomyopathy 09/28/2023    Obese     Paroxysmal atrial fibrillation (H) 09/28/2023    Tobacco abuse 09/28/2023    Past Surgical History:   Procedure Laterality Date    CORONARY ANGIOGRAPHY ADULT ORDER      CORONARY ARTERY BYPASS      CV CORONARY ANGIOGRAM N/A 09/11/2023    Procedure: Coronary Angiogram;  Surgeon: Santy Esposito MD;  Location: [facility]    CV LEFT HEART CATH N/A 09/11/2023    Procedure: Left Heart Catheterization;  Surgeon: Santy Esposito MD;  Location: [facility]    CV RIGHT HEART CATH MEASUREMENTS RECORDED N/A 09/11/2023    Procedure: Right Heart Catheterization;  Surgeon: Santy Esposito MD;  Location: [facility] CV    No family history on file. Social History     Socioeconomic History    Marital status: Single     Spouse name: Not on file    Number of children: Not on file    Years of education: Not on file    Highest education level: Not on file   Occupational History    Not on file   Tobacco Use    Smoking status: Every Day     Packs/day: .5     Types: Cigarettes    Smokeless tobacco: Never    Tobacco comments:     Seen IP by CTTS on 9/11/23 and declined cessation services and materials   Substance and Sexual Activity    Alcohol use: Not on file     Drug use: Not on file    Sexual activity: Not on file   Other Topics Concern    Not on file   Social History Narrative    Not on file     Social Determinants of Health     Financial Resource Strain: Not on file   Food Insecurity: Not on file   Transportation Needs: Not on file   Physical Activity: Not on file   Stress: Not on file   Social Connections: Not on file   Interpersonal Safety: Not on file   Housing Stability: Not on file          Medications  Allergies   Current Outpatient Medications   Medication Sig Dispense Refill    acetaminophen (TYLENOL) 500 MG tablet Take 1,000 mg by mouth every 8 hours as needed      albuterol (PROAIR HFA/PROVENTIL HFA/VENTOLIN HFA) 108 (90 Base) MCG/ACT inhaler Inhale 1 puff into the lungs every 6 hours as needed      amiodarone (PACERONE) 200 MG tablet Take 200 mg by mouth daily      apixaban ANTICOAGULANT (ELIQUIS) 5 MG tablet Take 1 tablet (5 mg) by mouth 2 times daily 180 tablet 0    aspirin 81 MG EC tablet Take 1 tablet (81 mg) by mouth daily 90 tablet 0    atorvastatin (LIPITOR) 40 MG tablet Take 40 mg by mouth every morning      bumetanide (BUMEX) 2 MG tablet Take 4 mg by mouth 2 times daily      chlorothiazide (DIURIL) 250 MG/5ML suspension Take 10 mLs (500 mg) by mouth once a week 50 mL 0    COMPOUNDED NON-CONTROLLED SUBSTANCE (CMPD RX) - PHARMACY TO MIX COMPOUNDED MEDICATION Apply topically three times a week Apply topically. DIPHENYLCYCLOPROPENONE 0.1% TOP OINT + 15% salicylic acid in paraffin. Apply 3 times weekly to wart until resolved.      CVS VITAMIN B12 1000 MCG tablet Take 1,000 mcg by mouth daily      diclofenac (VOLTAREN) 1 % topical gel Place 2 g onto the skin 4 times daily as needed Apply 2 g to skin 4 times a day. Indications: Joint Damage causing Pain and Loss of Function      DULoxetine (CYMBALTA) 30 MG capsule Take 30 mg by mouth every morning      gabapentin (NEURONTIN) 300 MG capsule Take 300 mg by mouth 2 times daily 300 MG AM AND NOON,  MG BY  MOUTH DAILY AT BEDTIME      gabapentin (NEURONTIN) 300 MG capsule Take 900 mg by mouth at bedtime 300 MG AM AND NOON,  MG BY MOUTH DAILY AT BEDTIME      hypromellose-dextran (ARTIFICAL TEARS) 0.1-0.3 % ophthalmic solution Place 2 drops into both eyes 4 times daily as needed      ipratropium - albuterol 0.5 mg/2.5 mg/3 mL (DUONEB) 0.5-2.5 (3) MG/3ML neb solution Inhale 3 mLs into the lungs every 6 hours as needed      isosorbide mononitrate (IMDUR) 60 MG 24 hr tablet Take 180 mg by mouth every morning      KLOR-CON 20 MEQ CR tablet Take 20 mEq by mouth daily      nitroGLYcerin (NITROSTAT) 0.4 MG sublingual tablet Place 1 tablet (0.4 mg) under the tongue every 5 minutes as needed If no relief after 3 tabs call 911;continue 1 tab every 5 min max 3 tab Indications: chest pain 100 tablet 1    polyethylene glycol (MIRALAX) 17 g packet Take 17 g by mouth 2 times daily as needed      rOPINIRole (REQUIP) 2 MG tablet Take 4 mg by mouth every evening      No Known Allergies      Lab Results    Chemistry/lipid CBC Cardiac Enzymes/BNP/TSH/INR   Lab Results   Component Value Date    BUN 17.1 09/28/2023     09/28/2023    CO2 29 09/28/2023    Lab Results   Component Value Date    WBC 6.4 09/14/2023    HGB 10.9 (L) 09/14/2023    HCT 34.3 (L) 09/14/2023    MCV 99 09/14/2023     09/14/2023    Lab Results   Component Value Date    INR 1.01 02/13/2023             This note has been dictated using voice recognition software. Any grammatical, typographical, or context distortions are unintentional and inherent to the software    30 minutes spent on the date of encounter doing chart review, review of outside records, review of test results, interpretation with above tests, patient visit, and documentation.

## 2023-10-13 NOTE — LETTER
10/13/2023    Reid Escobar MD  [address] [place] [address]    RE: Zackary Hutchison       Dear Colleague,     I had the pleasure of seeing Zackary Hutchison in the [facility].        Assessment/Recommendations   Assessment:    1. Ischemic cardiomyopathy, heart failure with reduced ejection fraction, ejection fraction 35-40%, NYHA class III: Decompensated.  I increased his Bumex to 4 mg twice a day 3 weeks ago.  He did not notice any improvement in his symptoms.  I started him on Diuril this week which he took on Tuesday.  He states his weight dropped 3 pounds but gained it the next day.  He continues to have dyspnea on exertion, abdominal distention and lower extremity edema.  He denies orthopnea or PND  2.  CAD: Denies angina.  Status post CABG in 2015.  Coronary angiogram September 11 showed chronic total occlusion of proximal LAD and RCA.  Patent grafts.  No intervention was completed.  He continues aspirin, atorvastatin, Imdur.  Clopidogrel recently discontinued due to being on aspirin and Eliquis.   3.  Possible GRACE: Sleep referral made during hospitalization.  4.  Paroxysmal atrial fibrillation: Normal sinus rhythm.  He continues Eliquis for anticoagulation.  He is on amiodarone 200 mg daily   5.  Chronic kidney disease stage IIIb: BMP pending  6.  Obesity: BMI 44.87  7.  Tobacco use: He continues to smoke a half a pack a day.  He does not want to quit.    Plan:  1.  BMP pending  2.  Take an extra dose of diuril tomorrow and he will call us on Monday for an update  3.  Continue monitoring weights and stressed importance of low-salt diet    Zackary Hutchison will follow up with Dr. Bhandari November 28 and in the heart failure clinic depending on lab results.     History of Present Illness/Subjective    Mr. Zackary Hutchison is a 63 year old male seen at [facility] heart failure clinic today for continued follow-up.  He follows up for heart failure with reduced ejection fraction,  ischemic cardiomyopathy.  He was hospitalized in September due to acute heart failure.  He had an echocardiogram which showed ejection fraction of 35 to 40%.  He underwent coronary angiogram with EKG showing T wave changes.  Showed chronic total occlusion of proximal LAD and stented segment, chronic total occlusion of RCA with widely patent large caliber LIMA to mid LAD with good distal and proximal runoff, widely patent free LAURI graft to mid PDA.  No intervention was completed.  He has a past medical history significant for hypertension, CAD, paroxysmal atrial fibrillation, COPD, possible GRACE, tobacco use, alcohol use, CABG in 2015, obesity, chronic kidney disease stage IIIb, hyperlipidemia.     During the last clinic visit, I increased his Bumex to 4 mg twice a day.  He did not notice any improvement in his symptoms.  I started him on Diuril 500 mg weekly.  He took this on Tuesday and his weight decreased 3 pounds.  The following day his weight went back up.  He continues to have fatigue, dyspnea on exertion and lower extremity edema.  He denies lightheadedness, orthopnea, PND, palpitations, and chest pain.      He is monitoring home weights which was 274 pounds today. He lives in assisted living where his meals are provided and are not low in sodium     Physical Examination Review of Systems   BP (!) 144/62 (BP Location: Right arm, Patient Position: Sitting, Cuff Size: Adult Large)   Pulse 81   Resp 18   Wt 126.1 kg (278 lb)   SpO2 94%   BMI 44.87 kg/m    Body mass index is 44.87 kg/m .  Wt Readings from Last 3 Encounters:   10/13/23 126.1 kg (278 lb)   09/28/23 124.7 kg (275 lb)   09/13/23 118 kg (260 lb 1.6 oz)       General Appearance:   no acute distress   ENT/Mouth: No abnormalities   EYES:  no scleral icterus, normal conjunctivae   Neck: no thyromegaly   Chest/Lungs:   wheezing throughout lung fields, equal chest wall expansion    Cardiovascular:   Regular. Normal first and second heart sounds with no  murmurs, rubs, or gallops, moderate edema bilaterally    Abdomen:  Distended, bowel sounds are present   Extremities: no cyanosis or clubbing   Skin: warm   Neurologic: no tremors     Psychiatric: alert and oriented x3                                              Medical History  Surgical History Family History Social History   Past Medical History:   Diagnosis Date    Atrial fibrillation (H)     Chronic kidney disease     Chronic kidney disease, stage 3b (H) 09/28/2023    Class 3 severe obesity due to excess calories with body mass index (BMI) of 40.0 to 44.9 in adult (H) 09/28/2023    Congestive heart failure (H)     COPD (chronic obstructive pulmonary disease) (H)     Coronary artery disease     Coronary artery disease involving coronary bypass graft of native heart without angina pectoris 09/28/2023    Heart failure with reduced ejection fraction (H) 09/11/2023    Hyperlipidemia     Hypertension     Ischemic cardiomyopathy 09/28/2023    Obese     Paroxysmal atrial fibrillation (H) 09/28/2023    Tobacco abuse 09/28/2023    Past Surgical History:   Procedure Laterality Date    CORONARY ANGIOGRAPHY ADULT ORDER      CORONARY ARTERY BYPASS      CV CORONARY ANGIOGRAM N/A 09/11/2023    Procedure: Coronary Angiogram;  Surgeon: Santy Esposito MD;  [facility]: [facility] CATH LAB CV    CV LEFT HEART CATH N/A 09/11/2023    Procedure: Left Heart Catheterization;  Surgeon: Santy Esposito MD;  Location: [facility]    CV RIGHT HEART CATH MEASUREMENTS RECORDED N/A 09/11/2023    Procedure: Right Heart Catheterization;  Surgeon: Santy Esposito MD;  Location: [facility] CV    No family history on file. Social History     Socioeconomic History    Marital status: Single     Spouse name: Not on file    Number of children: Not on file    Years of education: Not on file    Highest education level: Not on file   Occupational History    Not on file   Tobacco Use    Smoking status: Every Day     Packs/day: .5      Types: Cigarettes    Smokeless tobacco: Never    Tobacco comments:     Seen IP by CTTS on 9/11/23 and declined cessation services and materials   Substance and Sexual Activity    Alcohol use: Not on file    Drug use: Not on file    Sexual activity: Not on file   Other Topics Concern    Not on file   Social History Narrative    Not on file     Social Determinants of Health     Financial Resource Strain: Not on file   Food Insecurity: Not on file   Transportation Needs: Not on file   Physical Activity: Not on file   Stress: Not on file   Social Connections: Not on file   Interpersonal Safety: Not on file   Housing Stability: Not on file          Medications  Allergies   Current Outpatient Medications   Medication Sig Dispense Refill    acetaminophen (TYLENOL) 500 MG tablet Take 1,000 mg by mouth every 8 hours as needed      albuterol (PROAIR HFA/PROVENTIL HFA/VENTOLIN HFA) 108 (90 Base) MCG/ACT inhaler Inhale 1 puff into the lungs every 6 hours as needed      amiodarone (PACERONE) 200 MG tablet Take 200 mg by mouth daily      apixaban ANTICOAGULANT (ELIQUIS) 5 MG tablet Take 1 tablet (5 mg) by mouth 2 times daily 180 tablet 0    aspirin 81 MG EC tablet Take 1 tablet (81 mg) by mouth daily 90 tablet 0    atorvastatin (LIPITOR) 40 MG tablet Take 40 mg by mouth every morning      bumetanide (BUMEX) 2 MG tablet Take 4 mg by mouth 2 times daily      chlorothiazide (DIURIL) 250 MG/5ML suspension Take 10 mLs (500 mg) by mouth once a week 50 mL 0    COMPOUNDED NON-CONTROLLED SUBSTANCE (CMPD RX) - PHARMACY TO MIX COMPOUNDED MEDICATION Apply topically three times a week Apply topically. DIPHENYLCYCLOPROPENONE 0.1% TOP OINT + 15% salicylic acid in paraffin. Apply 3 times weekly to wart until resolved.      CVS VITAMIN B12 1000 MCG tablet Take 1,000 mcg by mouth daily      diclofenac (VOLTAREN) 1 % topical gel Place 2 g onto the skin 4 times daily as needed Apply 2 g to skin 4 times a day. Indications: Joint Damage causing Pain  and Loss of Function      DULoxetine (CYMBALTA) 30 MG capsule Take 30 mg by mouth every morning      gabapentin (NEURONTIN) 300 MG capsule Take 300 mg by mouth 2 times daily 300 MG AM AND NOON,  MG BY MOUTH DAILY AT BEDTIME      gabapentin (NEURONTIN) 300 MG capsule Take 900 mg by mouth at bedtime 300 MG AM AND NOON,  MG BY MOUTH DAILY AT BEDTIME      hypromellose-dextran (ARTIFICAL TEARS) 0.1-0.3 % ophthalmic solution Place 2 drops into both eyes 4 times daily as needed      ipratropium - albuterol 0.5 mg/2.5 mg/3 mL (DUONEB) 0.5-2.5 (3) MG/3ML neb solution Inhale 3 mLs into the lungs every 6 hours as needed      isosorbide mononitrate (IMDUR) 60 MG 24 hr tablet Take 180 mg by mouth every morning      KLOR-CON 20 MEQ CR tablet Take 20 mEq by mouth daily      nitroGLYcerin (NITROSTAT) 0.4 MG sublingual tablet Place 1 tablet (0.4 mg) under the tongue every 5 minutes as needed If no relief after 3 tabs call 911;continue 1 tab every 5 min max 3 tab Indications: chest pain 100 tablet 1    polyethylene glycol (MIRALAX) 17 g packet Take 17 g by mouth 2 times daily as needed      rOPINIRole (REQUIP) 2 MG tablet Take 4 mg by mouth every evening      No Known Allergies      Lab Results    Chemistry/lipid CBC Cardiac Enzymes/BNP/TSH/INR   Lab Results   Component Value Date    BUN 17.1 09/28/2023     09/28/2023    CO2 29 09/28/2023    Lab Results   Component Value Date    WBC 6.4 09/14/2023    HGB 10.9 (L) 09/14/2023    HCT 34.3 (L) 09/14/2023    MCV 99 09/14/2023     09/14/2023    Lab Results   Component Value Date    INR 1.01 02/13/2023             This note has been dictated using voice recognition software. Any grammatical, typographical, or context distortions are unintentional and inherent to the software    30 minutes spent on the date of encounter doing chart review, review of outside records, review of test results, interpretation with above tests, patient visit, and documentation.                 Thank you for allowing me to participate in the care of your patient.      Sincerely,     TONY Vigil [facility]  cc:   No referring provider defined for this encounter.

## 2023-10-13 NOTE — PATIENT INSTRUCTIONS
Zackary Hutchison,    It was a pleasure to see you today at [facility].     My recommendations after this visit include:  - Please follow up with Dr Bhandari November 28   - Please follow up with Debbie Woodard depending on lab results  - I have checked labs  - Take an dose of diuril tomorrow  - Call us on Monday at your convenience    Debbie Woodard, CNP

## 2023-10-16 ENCOUNTER — TELEPHONE (OUTPATIENT)
Dept: CARDIOLOGY | Facility: CLINIC | Age: 63
End: 2023-10-16
Payer: MEDICARE

## 2023-10-16 DIAGNOSIS — I50.20 HEART FAILURE WITH REDUCED EJECTION FRACTION (H): Primary | ICD-10-CM

## 2023-10-16 NOTE — TELEPHONE ENCOUNTER
[facility] Call Center    Phone Message    May a detailed message be left on voicemail: yes     Reason for Call: Other: Pt was told to call today to get test results from lab and due to his phone being broke he would like a call to go to his Brothers phone Obed Castrejon number is [phone].      Action Taken: Other: Cardio    Travel Screening: Not Applicable

## 2023-10-16 NOTE — TELEPHONE ENCOUNTER
LM in detail on brother Cash's phone, asking him to call back with updates.  Heather HUERTA RN BSN, CHFN, PCCN-K

## 2023-10-18 NOTE — TELEPHONE ENCOUNTER
LM again for patient on his brother's phone asking for return call to confirm his receipt of lab results and recommendations.   Heather HUERTA RN BSN, CHFN, PCCN-K

## 2023-10-20 NOTE — TELEPHONE ENCOUNTER
Zackary is calling with updates on his current HF status. He reports using Diuril last Saturday as is his plan for once weekly use.   He reports weight last Saturday of 277# prior to Diuril                                      Monday wt 273#                                      Today's wt 275#  Zackary states he does not feel a great deal of improvement. Shortness of breath and Wheezing with any activity, none when he is at rest. Sleeping fine. No cough, no dizziness or lightheadedness reported. He states he has LE edema  in ankles legs and feet. He was counseled on dietary sodium restriction and fluid restrictions today. He states he tries, but struggles with this.    He was asked to have weekly labs on Monday's which he will plan to begin next Monday.  Standing order is placed.    Debbie Woodard CNP any additional recommendations at this time?      Heather HUERTA RN BSN, CHFN, PCCN-K

## 2023-10-20 NOTE — TELEPHONE ENCOUNTER
I recommend he take Diuril twice a week instead of once a week and agree with weekly labs.  He lives in a facility where his meals are provided and are not low in sodium so this is difficult for him to follow.  Maybe offer him [facility]?  Thank you

## 2023-10-20 NOTE — TELEPHONE ENCOUNTER
LM for patient on his brother Cash's VM as per Zackary's recommendations   Discussed increased Diuril to twice a week and the weekly labs. [facility] Meal plan is also reviewed and instructed them to let us know if they wish to enroll.  Heather HUERTA RN BSN, CHFN, PCCN-K

## 2023-10-23 ENCOUNTER — LAB (OUTPATIENT)
Dept: LAB | Facility: HOSPITAL | Age: 63
End: 2023-10-23
Payer: MEDICARE

## 2023-10-23 DIAGNOSIS — I50.20 HEART FAILURE WITH REDUCED EJECTION FRACTION (H): ICD-10-CM

## 2023-10-23 LAB
ANION GAP SERPL CALCULATED.3IONS-SCNC: 10 MMOL/L (ref 7–15)
BUN SERPL-MCNC: 13.4 MG/DL (ref 8–23)
CALCIUM SERPL-MCNC: 9.5 MG/DL (ref 8.8–10.2)
CHLORIDE SERPL-SCNC: 99 MMOL/L (ref 98–107)
CREAT SERPL-MCNC: 1.1 MG/DL (ref 0.67–1.17)
DEPRECATED HCO3 PLAS-SCNC: 30 MMOL/L (ref 22–29)
EGFRCR SERPLBLD CKD-EPI 2021: 74 ML/MIN/1.73M2
GLUCOSE SERPL-MCNC: 90 MG/DL (ref 70–99)
POTASSIUM SERPL-SCNC: 3.7 MMOL/L (ref 3.4–5.3)
SODIUM SERPL-SCNC: 139 MMOL/L (ref 135–145)

## 2023-10-23 PROCEDURE — 82310 ASSAY OF CALCIUM: CPT

## 2023-10-23 PROCEDURE — 36415 COLL VENOUS BLD VENIPUNCTURE: CPT

## 2023-11-15 ENCOUNTER — TELEPHONE (OUTPATIENT)
Dept: CARDIOLOGY | Facility: CLINIC | Age: 63
End: 2023-11-15
Payer: MEDICARE

## 2023-11-15 NOTE — TELEPHONE ENCOUNTER
[place] Community Measures Blood Pressure guideline reviewed.  Patients recent blood pressure is outside of guideline parameters.  Called pt to review, no answer.  Left voicemail message asking patient to check their blood pressure using a home blood pressure cuff or by going to a [facility].  Patient instructed to then call [phone] ([facility]) and leave a message with their name, date of birth, and blood pressure reading that was completed within the last 24 hours and where it was completed.  Will await call back for further review.    ORLANDO Goldstein

## 2023-11-28 ENCOUNTER — TELEPHONE (OUTPATIENT)
Dept: CARDIOLOGY | Facility: CLINIC | Age: 63
End: 2023-11-28

## 2023-11-28 ENCOUNTER — OFFICE VISIT (OUTPATIENT)
Dept: CARDIOLOGY | Facility: CLINIC | Age: 63
End: 2023-11-28
Payer: MEDICARE

## 2023-11-28 VITALS
RESPIRATION RATE: 18 BRPM | HEART RATE: 86 BPM | HEIGHT: 66 IN | WEIGHT: 281 LBS | BODY MASS INDEX: 45.16 KG/M2 | SYSTOLIC BLOOD PRESSURE: 128 MMHG | DIASTOLIC BLOOD PRESSURE: 62 MMHG

## 2023-11-28 DIAGNOSIS — I50.20 HEART FAILURE WITH REDUCED EJECTION FRACTION (H): Primary | ICD-10-CM

## 2023-11-28 PROCEDURE — 99215 OFFICE O/P EST HI 40 MIN: CPT | Mod: 25 | Performed by: INTERNAL MEDICINE

## 2023-11-28 PROCEDURE — 96374 THER/PROPH/DIAG INJ IV PUSH: CPT | Performed by: INTERNAL MEDICINE

## 2023-11-28 RX ORDER — BUMETANIDE 0.25 MG/ML
12 INJECTION INTRAMUSCULAR; INTRAVENOUS ONCE
Status: COMPLETED | OUTPATIENT
Start: 2023-11-28 | End: 2023-11-28

## 2023-11-28 RX ORDER — LISINOPRIL 5 MG/1
5 TABLET ORAL DAILY
COMMUNITY
Start: 2023-10-27 | End: 2023-12-22

## 2023-11-28 RX ORDER — BUMETANIDE 1 MG/1
1 TABLET ORAL 2 TIMES DAILY
Qty: 180 TABLET | Refills: 3 | Status: SHIPPED | OUTPATIENT
Start: 2023-11-28 | End: 2023-11-28

## 2023-11-28 RX ORDER — BUMETANIDE 1 MG/1
5 TABLET ORAL 2 TIMES DAILY
Qty: 900 TABLET | Refills: 3 | Status: ON HOLD | OUTPATIENT
Start: 2023-11-28 | End: 2023-12-27

## 2023-11-28 RX ORDER — METOPROLOL SUCCINATE 25 MG/1
25 TABLET, EXTENDED RELEASE ORAL DAILY
Qty: 90 TABLET | Refills: 3 | Status: SHIPPED | OUTPATIENT
Start: 2023-11-28 | End: 2023-11-28

## 2023-11-28 RX ADMIN — BUMETANIDE 12 MG: 0.25 INJECTION, SOLUTION INTRAMUSCULAR; INTRAVENOUS at 15:14

## 2023-11-28 NOTE — PATIENT INSTRUCTIONS
Thank you for allowing the [facility] clinic to be a part of your care. Please pay close attention to the following medications as they have been changed during this visit.    1.) Please increase your Bumex to 5 mg (two and a half 2 mg tablets) two times daily

## 2023-11-28 NOTE — PROGRESS NOTES
Bumex 12 mg IVP administered today in clinic. Tolerated well.  Instructed to obtain labs prior to end of week, he is in agreement   With this plan.  [facility] RN to call him tomorrow to discuss response to IV diuretics.  Heather Armenta  [facility] RN BSN, CHFN, PCCN-K

## 2023-11-28 NOTE — LETTER
11/28/2023    Reid Escobar MD  [address] [place] [address]    RE: Zackary Hutchison       Dear Colleague,     I had the pleasure of seeing Zackary Hutchison in the [facility].    HEART CARE NOTE          Assessment/Recommendations     1. Severe Biventricular dysfunction/HFrEF   Assessment / Plan  Hypervolemic on physical exam; ~ 20 lbs fluid retention; endorses orthopnea, PND and progressive dyspnea; Will give IV diuretic in clinic today and C[facility] clinic will call to follow-up tomorrow; increase bumex to 5 mg BID; low threshold for admission for IV diuresis; Will also enroll patient in [facility] meal program; patient will follow-up in [facility] clinic in 2 weeks;  Patient is high risk for adverse cardiac events 2/2 severe biventricular dysfunction  GDMT as detailed below; repeat echo ordered     Current Pharmacotherapy AHA Guideline-Directed Medical Therapy   Lisinopril 5 mg daily   Lisinopril 20 mg twice daily   Metoprolol succinate 25 mg daily -on hold given acute ADHF Carvedilol 25 mg twice daily   Spironolactone - no started Spironolactone 25 mg once daily   Hydralazine NA Hydralazine 100 mg three times daily   Isosorbide mononitrate 180 mg daily Isosorbide dinitrate 40 mg three times daily   SGLT2 inhibitor:Dapagliflozin/Empagliflozin - not started Dapagliflozin or Empagliflozin 10 mg daily      2. Atrial fibrillation  Assessment / Plan  Currently on apixaban and amiodarone     3. CAD c/b ICM  Assessment / Plan  S/p CABG 2015; now s/p repeat coronary angiogram - no intervention indicated  Denies chest pain or anginal equivalents; Continue ASA, atorvastatin     4. PRINCESS on CKD  Assessment / Plan  Resolved; Diuretics as above    45 minutes spent reviewing prior records (including documentation, laboratory studies, cardiac testing/imaging), history and physical exam, planning, and subsequent documentation.    History of Present Illness/Subjective    Mr. Zackary Hutchison is a 62 year old male  "with a PMHx significant for CAD s/p CABG, afib, HFrEF and CKD who presents to [facility] clinic to establish care.     Today, Mr. Hutchison endorses progressive weight gain, orthopnea, LE edema, and progressive dyspnea; We discussed HF diet and lifestyle modifications including the 2 g Na and 2L fluid restrictions. He is unable to adhere as he does not prepare his own meals - will enroll in [facility]al program; Management plan as detailed above      ECG: personally reviewed; normal sinus rhythm, RBBB.     ECHO (personnaly Reviewed on 9/11/23):   1.Left ventricular function is decreased. The ejection fraction is 35-40%  (moderately reduced).  2.Septal motion is consistent with post-operative state.  3.There is borderline apical wall hypokinesis.  4.Mildly decreased right ventricular systolic function  5.No hemodynamically significant valvular abnormalities on 2D or color flow  imaging.  6.There is borderline aortic root enlargement. The ascending aorta is  Borderline dilated.  7.The study was technically difficult.  There is no comparison study available    Lab results: personally reviewed November 28, 2023; notable for resolution of PRINCESS    Medical history and pertinent documents reviewed in Care Everywhere please where applicable see details above        Physical Examination Review of Systems   /62 (BP Location: Left arm, Patient Position: Sitting, Cuff Size: Adult Large)   Pulse 86   Resp 18   Ht 1.676 m (5' 6\")   Wt 127.5 kg (281 lb)   BMI 45.35 kg/m    Body mass index is 45.35 kg/m .  Wt Readings from Last 3 Encounters:   11/28/23 127.5 kg (281 lb)   10/13/23 126.1 kg (278 lb)   09/28/23 124.7 kg (275 lb)     General Appearance:   no distress, normal body habitus   ENT/Mouth: membranes moist, no oral lesions or bleeding gums.      EYES:  no scleral icterus, normal conjunctivae   Neck: no carotid bruits or thyromegaly   Chest/Lungs:   lungs are clear to auscultation, no rales or wheezing, equal chest wall " expansion    Cardiovascular:   Regular. Normal first and second heart sounds with no murmurs, rubs, or gallops; the carotid, radial and posterior tibial pulses are intact, + JVD and LE edema bilaterally    Abdomen:  no organomegaly, masses, bruits, or tenderness; bowel sounds are present   Extremities: no cyanosis or clubbing   Skin: no xanthelasma, warm.    Neurologic: NAD     Psychiatric: alert and oriented x3, calm     A complete 10 systems ROS was reviewed  And is negative except what is listed in the HPI.          Medical History  Surgical History Family History Social History   Past Medical History:   Diagnosis Date    Atrial fibrillation (H)     Chronic kidney disease     Chronic kidney disease, stage 3b (H) 09/28/2023    Class 3 severe obesity due to excess calories with body mass index (BMI) of 40.0 to 44.9 in adult (H) 09/28/2023    Congestive heart failure (H)     COPD (chronic obstructive pulmonary disease) (H)     Coronary artery disease     Coronary artery disease involving coronary bypass graft of native heart without angina pectoris 09/28/2023    Heart failure with reduced ejection fraction (H) 09/11/2023    Hyperlipidemia     Hypertension     Ischemic cardiomyopathy 09/28/2023    Obese     Paroxysmal atrial fibrillation (H) 09/28/2023    Tobacco abuse 09/28/2023    Past Surgical History:   Procedure Laterality Date    CORONARY ANGIOGRAPHY ADULT ORDER      CORONARY ARTERY BYPASS      CV CORONARY ANGIOGRAM N/A 09/11/2023    Procedure: Coronary Angiogram;  Surgeon: Santy Esposito MD;  Location: [facility] CATH LAB CV    CV LEFT HEART CATH N/A 09/11/2023    Procedure: Left Heart Catheterization;  Surgeon: Santy Esposito MD;  Location: [facility] CATH LAB     CV RIGHT HEART CATH MEASUREMENTS RECORDED N/A 09/11/2023    Procedure: Right Heart Catheterization;  Surgeon: Santy Esposito MD;  Location: [facility] CATH LAB CV    no family history of premature coronary artery disease Social History  "    Socioeconomic History    Marital status: Single     Spouse name: Not on file    Number of children: Not on file    Years of education: Not on file    Highest education level: Not on file   Occupational History    Not on file   Tobacco Use    Smoking status: Every Day     Packs/day: .5     Types: Cigarettes    Smokeless tobacco: Never    Tobacco comments:     Seen IP by CTTS on 9/11/23 and declined cessation services and materials   Substance and Sexual Activity    Alcohol use: Not on file    Drug use: Not on file    Sexual activity: Not on file   Other Topics Concern    Not on file   Social History Narrative    Not on file     Social Determinants of Health     Financial Resource Strain: Not on file   Food Insecurity: Not on file   Transportation Needs: Not on file   Physical Activity: Not on file   Stress: Not on file   Social Connections: Not on file   Interpersonal Safety: Not on file   Housing Stability: Not on file           Lab Results    Chemistry/lipid CBC Cardiac Enzymes/BNP/TSH/INR   Lab Results   Component Value Date    BUN 13.4 10/23/2023     10/23/2023    CO2 30 (H) 10/23/2023    Lab Results   Component Value Date    WBC 6.4 09/14/2023    HGB 10.9 (L) 09/14/2023    HCT 34.3 (L) 09/14/2023    MCV 99 09/14/2023     09/14/2023    Lab Results   Component Value Date    INR 1.01 02/13/2023     No results found for: \"CKTOTAL\", \"CKMB\", \"TROPONINI\"       Weight:    Wt Readings from Last 3 Encounters:   11/28/23 127.5 kg (281 lb)   10/13/23 126.1 kg (278 lb)   09/28/23 124.7 kg (275 lb)       Allergies  No Known Allergies      Surgical History  Past Surgical History:   Procedure Laterality Date    CORONARY ANGIOGRAPHY ADULT ORDER      CORONARY ARTERY BYPASS      CV CORONARY ANGIOGRAM N/A 09/11/2023    Procedure: Coronary Angiogram;  Surgeon: Santy Esposito MD;  Location: [facility] CATH LAB CV    CV LEFT HEART CATH N/A 09/11/2023    Procedure: Left Heart Catheterization;  Surgeon: Santy Esposito " MD KIMBERLEE;  Location: [facility] CATH LAB CV    CV RIGHT HEART CATH MEASUREMENTS RECORDED N/A 09/11/2023    Procedure: Right Heart Catheterization;  Surgeon: Santy Esposito MD;  Location: [facility] CATH LAB CV       Social History  Tobacco:   History   Smoking Status    Every Day    Packs/day: 0.50    Types: Cigarettes   Smokeless Tobacco    Never    Alcohol:   Social History    Substance and Sexual Activity      Alcohol use: Not on file   Illicit Drugs:   History   Drug Use Not on file       Family History  No family history on file.       Jacey Bhandari MD on 11/28/2023      cc: Reid Escobar      Thank you for allowing me to participate in the care of your patient.      Sincerely,     Jacey Bhandari MD     [facility]  cc:   TONY Vigil CNP  [address], [address]  [place] [address]

## 2023-11-28 NOTE — TELEPHONE ENCOUNTER
CORE RN to call patient to discuss wt, HF symptoms in response to IV Diuretics in clinic today   Heather HUERTA RN BSN, CHFN, PCCN-K    Patient LM today to review response to IV diuretics in clinic yesterday  Heather HUERTA RN BSN, CHFN, PCCN-K

## 2023-11-28 NOTE — PROGRESS NOTES
HEART CARE NOTE          Assessment/Recommendations     1. Severe Biventricular dysfunction/HFrEF   Assessment / Plan  Hypervolemic on physical exam; ~ 20 lbs fluid retention; endorses orthopnea, PND and progressive dyspnea; Will give IV diuretic in clinic today and [facility] clinic will call to follow-up tomorrow; increase bumex to 5 mg BID; low threshold for admission for IV diuresis; Will also enroll patient in [facility] meal program; patient will follow-up in [facility] clinic in 2 weeks;  Patient is high risk for adverse cardiac events 2/2 severe biventricular dysfunction  GDMT as detailed below; repeat echo ordered     Current Pharmacotherapy AHA Guideline-Directed Medical Therapy   Lisinopril 5 mg daily   Lisinopril 20 mg twice daily   Metoprolol succinate 25 mg daily -on hold given acute ADHF Carvedilol 25 mg twice daily   Spironolactone - no started Spironolactone 25 mg once daily   Hydralazine NA Hydralazine 100 mg three times daily   Isosorbide mononitrate 180 mg daily Isosorbide dinitrate 40 mg three times daily   SGLT2 inhibitor:Dapagliflozin/Empagliflozin - not started Dapagliflozin or Empagliflozin 10 mg daily      2. Atrial fibrillation  Assessment / Plan  Currently on apixaban and amiodarone     3. CAD c/b ICM  Assessment / Plan  S/p CABG 2015; now s/p repeat coronary angiogram - no intervention indicated  Denies chest pain or anginal equivalents; Continue ASA, atorvastatin     4. PRINCESS on CKD  Assessment / Plan  Resolved; Diuretics as above    45 minutes spent reviewing prior records (including documentation, laboratory studies, cardiac testing/imaging), history and physical exam, planning, and subsequent documentation.    History of Present Illness/Subjective    Mr. Zackary Hutchison is a 62 year old male with a PMHx significant for CAD s/p CABG, afib, HFrEF and CKD who presents to [facility] clinic to establish care.     Today, Mr. Hutchison endorses progressive weight gain, orthopnea, LE edema, and progressive dyspnea;  "We discussed HF diet and lifestyle modifications including the 2 g Na and 2L fluid restrictions. He is unable to adhere as he does not prepare his own meals - will enroll in [facility] Meal program; Management plan as detailed above      ECG: personally reviewed; normal sinus rhythm, RBBB.     ECHO (personnaly Reviewed on 9/11/23):   1.Left ventricular function is decreased. The ejection fraction is 35-40%  (moderately reduced).  2.Septal motion is consistent with post-operative state.  3.There is borderline apical wall hypokinesis.  4.Mildly decreased right ventricular systolic function  5.No hemodynamically significant valvular abnormalities on 2D or color flow  imaging.  6.There is borderline aortic root enlargement. The ascending aorta is  Borderline dilated.  7.The study was technically difficult.  There is no comparison study available    Lab results: personally reviewed November 28, 2023; notable for resolution of PRINCESS    Medical history and pertinent documents reviewed in Care Everywhere please where applicable see details above        Physical Examination Review of Systems   /62 (BP Location: Left arm, Patient Position: Sitting, Cuff Size: Adult Large)   Pulse 86   Resp 18   Ht 1.676 m (5' 6\")   Wt 127.5 kg (281 lb)   BMI 45.35 kg/m    Body mass index is 45.35 kg/m .  Wt Readings from Last 3 Encounters:   11/28/23 127.5 kg (281 lb)   10/13/23 126.1 kg (278 lb)   09/28/23 124.7 kg (275 lb)     General Appearance:   no distress, normal body habitus   ENT/Mouth: membranes moist, no oral lesions or bleeding gums.      EYES:  no scleral icterus, normal conjunctivae   Neck: no carotid bruits or thyromegaly   Chest/Lungs:   lungs are clear to auscultation, no rales or wheezing, equal chest wall expansion    Cardiovascular:   Regular. Normal first and second heart sounds with no murmurs, rubs, or gallops; the carotid, radial and posterior tibial pulses are intact, + JVD and LE edema bilaterally    Abdomen: "  no organomegaly, masses, bruits, or tenderness; bowel sounds are present   Extremities: no cyanosis or clubbing   Skin: no xanthelasma, warm.    Neurologic: NAD     Psychiatric: alert and oriented x3, calm     A complete 10 systems ROS was reviewed  And is negative except what is listed in the HPI.          Medical History  Surgical History Family History Social History   Past Medical History:   Diagnosis Date    Atrial fibrillation (H)     Chronic kidney disease     Chronic kidney disease, stage 3b (H) 09/28/2023    Class 3 severe obesity due to excess calories with body mass index (BMI) of 40.0 to 44.9 in adult (H) 09/28/2023    Congestive heart failure (H)     COPD (chronic obstructive pulmonary disease) (H)     Coronary artery disease     Coronary artery disease involving coronary bypass graft of native heart without angina pectoris 09/28/2023    Heart failure with reduced ejection fraction (H) 09/11/2023    Hyperlipidemia     Hypertension     Ischemic cardiomyopathy 09/28/2023    Obese     Paroxysmal atrial fibrillation (H) 09/28/2023    Tobacco abuse 09/28/2023    Past Surgical History:   Procedure Laterality Date    CORONARY ANGIOGRAPHY ADULT ORDER      CORONARY ARTERY BYPASS      CV CORONARY ANGIOGRAM N/A 09/11/2023    Procedure: Coronary Angiogram;  Surgeon: Santy Esposito MD;  Location: [facility] CATH LAB CV    CV LEFT HEART CATH N/A 09/11/2023    Procedure: Left Heart Catheterization;  Surgeon: Santy Esposito MD;  Location: [facility] CATH LAB CV    CV RIGHT HEART CATH MEASUREMENTS RECORDED N/A 09/11/2023    Procedure: Right Heart Catheterization;  Surgeon: Santy Esposito MD;  Location: [facility] CATH LAB CV    no family history of premature coronary artery disease Social History     Socioeconomic History    Marital status: Single     Spouse name: Not on file    Number of children: Not on file    Years of education: Not on file    Highest education level: Not on file   Occupational History    Not on  "file   Tobacco Use    Smoking status: Every Day     Packs/day: .5     Types: Cigarettes    Smokeless tobacco: Never    Tobacco comments:     Seen IP by CTTS on 9/11/23 and declined cessation services and materials   Substance and Sexual Activity    Alcohol use: Not on file    Drug use: Not on file    Sexual activity: Not on file   Other Topics Concern    Not on file   Social History Narrative    Not on file     Social Determinants of Health     Financial Resource Strain: Not on file   Food Insecurity: Not on file   Transportation Needs: Not on file   Physical Activity: Not on file   Stress: Not on file   Social Connections: Not on file   Interpersonal Safety: Not on file   Housing Stability: Not on file           Lab Results    Chemistry/lipid CBC Cardiac Enzymes/BNP/TSH/INR   Lab Results   Component Value Date    BUN 13.4 10/23/2023     10/23/2023    CO2 30 (H) 10/23/2023    Lab Results   Component Value Date    WBC 6.4 09/14/2023    HGB 10.9 (L) 09/14/2023    HCT 34.3 (L) 09/14/2023    MCV 99 09/14/2023     09/14/2023    Lab Results   Component Value Date    INR 1.01 02/13/2023     No results found for: \"CKTOTAL\", \"CKMB\", \"TROPONINI\"       Weight:    Wt Readings from Last 3 Encounters:   11/28/23 127.5 kg (281 lb)   10/13/23 126.1 kg (278 lb)   09/28/23 124.7 kg (275 lb)       Allergies  No Known Allergies      Surgical History  Past Surgical History:   Procedure Laterality Date    CORONARY ANGIOGRAPHY ADULT ORDER      CORONARY ARTERY BYPASS      CV CORONARY ANGIOGRAM N/A 09/11/2023    Procedure: Coronary Angiogram;  Surgeon: Santy Esposito MD;  Location: [facility] CATH LAB CV    CV LEFT HEART CATH N/A 09/11/2023    Procedure: Left Heart Catheterization;  Surgeon: Santy Esposito MD;  Location: [facility] CATH LAB CV    CV RIGHT HEART CATH MEASUREMENTS RECORDED N/A 09/11/2023    Procedure: Right Heart Catheterization;  Surgeon: Santy Esposito MD;  Location: [facility] CATH LAB CV       Social " History  Tobacco:   History   Smoking Status    Every Day    Packs/day: 0.50    Types: Cigarettes   Smokeless Tobacco    Never    Alcohol:   Social History    Substance and Sexual Activity      Alcohol use: Not on file   Illicit Drugs:   History   Drug Use Not on file       Family History  No family history on file.       Jacey Bhandari MD on 11/28/2023      cc: Reid Escobar

## 2023-11-28 NOTE — TELEPHONE ENCOUNTER
[facility]: Heart Failure Care Coordination   Heart Failure Education    Situation/Background:      RN CC provided heart failure education to Patient  during clinic visit.    Assessment:      Living situation: assisted living    Barriers to Heart Failure follow-up: transportation, finances and meal options at Assisted Living. Will work on [facility] Meal Plan Application with patient today.     Medication management:         Intervention/Plan:      CM/HF education topics reviewed:  Low sodium: 2000 mg or less daily, meal choices and label reading   Fluid Restriction: 60 oz per day   Daily weight monitoring and logging   Medication review and importance of compliance   Home blood pressure monitoring and logging   Overview of C.O.R.E. clinic   Overview of heart failure appointments and testing   Symptoms of HF to be reported to Core Team      Education materials provided:  Low sodium food and drink handout  Low sodium food product examples  Already prepared low salt meal delivery services handout  HF stoplight tool  Guide to HF booklet  Cardiac medication handout  C.O.R.E. information brochure     HF resources reviewed:  [facility]  HRS      Instructed patient to call RN line with new or worsening heart failure symptoms and/or rapid weight gain.     Patient expressed understanding of above education/instructions and denied further questions at this time.    Heather Armenta  CORE RN BSN, CHFN, PCCN-K

## 2023-11-30 NOTE — TELEPHONE ENCOUNTER
Additional message left for patient today to discuss response to IV diuretics in clinic,   Heather HUERTA RN BSN, CHFN, PCCN-K

## 2023-12-01 ENCOUNTER — LAB (OUTPATIENT)
Dept: LAB | Facility: HOSPITAL | Age: 63
End: 2023-12-01
Payer: MEDICARE

## 2023-12-01 DIAGNOSIS — I50.20 HEART FAILURE WITH REDUCED EJECTION FRACTION (H): ICD-10-CM

## 2023-12-01 LAB
ANION GAP SERPL CALCULATED.3IONS-SCNC: 12 MMOL/L (ref 7–15)
BUN SERPL-MCNC: 20 MG/DL (ref 8–23)
CALCIUM SERPL-MCNC: 9.6 MG/DL (ref 8.8–10.2)
CHLORIDE SERPL-SCNC: 98 MMOL/L (ref 98–107)
CREAT SERPL-MCNC: 1.15 MG/DL (ref 0.67–1.17)
DEPRECATED HCO3 PLAS-SCNC: 32 MMOL/L (ref 22–29)
EGFRCR SERPLBLD CKD-EPI 2021: 71 ML/MIN/1.73M2
GLUCOSE SERPL-MCNC: 82 MG/DL (ref 70–99)
POTASSIUM SERPL-SCNC: 3.4 MMOL/L (ref 3.4–5.3)
SODIUM SERPL-SCNC: 142 MMOL/L (ref 135–145)

## 2023-12-01 PROCEDURE — 80048 BASIC METABOLIC PNL TOTAL CA: CPT

## 2023-12-01 PROCEDURE — 36415 COLL VENOUS BLD VENIPUNCTURE: CPT

## 2023-12-01 NOTE — TELEPHONE ENCOUNTER
"Patient returns call to report 5# wt loss following the receipt of IV Bumex 12mg in clinic on last Tuesday.,   He states that he has a couple of \"bad looking wounds on his LE draining and looking infected\" He was concerned that he could not get into his PCP for a couple of weeks and that he needed more attention to the wounds.   He was advised that the wounds are not likely to heal well, with the amount of excess fluid he has on board.   \"Yeah, I know that , so I maybe will just go into the hospital like Dr. Bhandari suggested at my clinic visit.\"    Dr. Bhandari is notified of patient's plan to present to the [facility]   Heather HUERTA RN BSN, CHFN, PCCN-K    "

## 2023-12-14 ENCOUNTER — TELEPHONE (OUTPATIENT)
Dept: VASCULAR SURGERY | Facility: CLINIC | Age: 63
End: 2023-12-14

## 2023-12-14 ENCOUNTER — APPOINTMENT (OUTPATIENT)
Dept: RADIOLOGY | Facility: HOSPITAL | Age: 63
End: 2023-12-14
Attending: EMERGENCY MEDICINE
Payer: MEDICARE

## 2023-12-14 ENCOUNTER — HOSPITAL ENCOUNTER (EMERGENCY)
Facility: HOSPITAL | Age: 63
Discharge: HOME | End: 2023-12-14
Attending: EMERGENCY MEDICINE | Admitting: EMERGENCY MEDICINE
Payer: MEDICARE

## 2023-12-14 ENCOUNTER — APPOINTMENT (OUTPATIENT)
Dept: CT IMAGING | Facility: HOSPITAL | Age: 63
End: 2023-12-14
Attending: EMERGENCY MEDICINE
Payer: MEDICARE

## 2023-12-14 VITALS
HEART RATE: 74 BPM | OXYGEN SATURATION: 95 % | RESPIRATION RATE: 18 BRPM | SYSTOLIC BLOOD PRESSURE: 173 MMHG | TEMPERATURE: 97.8 F | DIASTOLIC BLOOD PRESSURE: 84 MMHG

## 2023-12-14 DIAGNOSIS — R06.00 DYSPNEA, UNSPECIFIED TYPE: ICD-10-CM

## 2023-12-14 DIAGNOSIS — S81.802A OPEN WOUND OF LOWER LIMB, LEFT, INITIAL ENCOUNTER: Primary | ICD-10-CM
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
## 2023-12-14 LAB
ALBUMIN UR-MCNC: NEGATIVE MG/DL
ANION GAP SERPL CALCULATED.3IONS-SCNC: 10 MMOL/L (ref 7–15)
APPEARANCE UR: CLEAR
BILIRUB UR QL STRIP: NEGATIVE
BUN SERPL-MCNC: 21.9 MG/DL (ref 8–23)
CALCIUM SERPL-MCNC: 9.2 MG/DL (ref 8.8–10.2)
CHLORIDE SERPL-SCNC: 96 MMOL/L (ref 98–107)
COLOR UR AUTO: ABNORMAL
CREAT SERPL-MCNC: 1.31 MG/DL (ref 0.67–1.17)
DEPRECATED HCO3 PLAS-SCNC: 33 MMOL/L (ref 22–29)
EGFRCR SERPLBLD CKD-EPI 2021: 60 ML/MIN/1.73M2
ERYTHROCYTE [DISTWIDTH] IN BLOOD BY AUTOMATED COUNT: 17.1 % (ref 10–15)
GLUCOSE SERPL-MCNC: 117 MG/DL (ref 70–99)
GLUCOSE UR STRIP-MCNC: NEGATIVE MG/DL
HCT VFR BLD AUTO: 38.7 % (ref 40–53)
HGB BLD-MCNC: 11.7 G/DL (ref 13.3–17.7)
HGB UR QL STRIP: NEGATIVE
HYALINE CASTS: 8 /LPF
KETONES UR STRIP-MCNC: NEGATIVE MG/DL
LEUKOCYTE ESTERASE UR QL STRIP: NEGATIVE
MCH RBC QN AUTO: 29.4 PG (ref 26.5–33)
MCHC RBC AUTO-ENTMCNC: 30.2 G/DL (ref 31.5–36.5)
MCV RBC AUTO: 97 FL (ref 78–100)
NITRATE UR QL: NEGATIVE
NT-PROBNP SERPL-MCNC: 245 PG/ML (ref 0–900)
PH UR STRIP: 6 [PH] (ref 5–7)
PLATELET # BLD AUTO: 168 10E3/UL (ref 150–450)
POTASSIUM SERPL-SCNC: 3.5 MMOL/L (ref 3.4–5.3)
RBC # BLD AUTO: 3.98 10E6/UL (ref 4.4–5.9)
RBC URINE: 1 /HPF
SODIUM SERPL-SCNC: 139 MMOL/L (ref 135–145)
SP GR UR STRIP: 1.02 (ref 1–1.03)
TROPONIN T SERPL HS-MCNC: 26 NG/L
TROPONIN T SERPL HS-MCNC: 28 NG/L
UROBILINOGEN UR STRIP-MCNC: <2 MG/DL
WBC # BLD AUTO: 8 10E3/UL (ref 4–11)
WBC URINE: 2 /HPF

## 2023-12-14 PROCEDURE — 85027 COMPLETE CBC AUTOMATED: CPT | Performed by: EMERGENCY MEDICINE

## 2023-12-14 PROCEDURE — 250N000013 HC RX MED GY IP 250 OP 250 PS 637: Performed by: EMERGENCY MEDICINE

## 2023-12-14 PROCEDURE — 71046 X-RAY EXAM CHEST 2 VIEWS: CPT

## 2023-12-14 PROCEDURE — G1010 CDSM STANSON: HCPCS

## 2023-12-14 PROCEDURE — 81001 URINALYSIS AUTO W/SCOPE: CPT | Performed by: EMERGENCY MEDICINE

## 2023-12-14 PROCEDURE — 82310 ASSAY OF CALCIUM: CPT | Performed by: EMERGENCY MEDICINE

## 2023-12-14 PROCEDURE — 84484 ASSAY OF TROPONIN QUANT: CPT | Performed by: EMERGENCY MEDICINE

## 2023-12-14 PROCEDURE — 93005 ELECTROCARDIOGRAM TRACING: CPT | Performed by: EMERGENCY MEDICINE

## 2023-12-14 PROCEDURE — 36415 COLL VENOUS BLD VENIPUNCTURE: CPT | Performed by: EMERGENCY MEDICINE

## 2023-12-14 PROCEDURE — 99285 EMERGENCY DEPT VISIT HI MDM: CPT | Mod: 25

## 2023-12-14 PROCEDURE — 83880 ASSAY OF NATRIURETIC PEPTIDE: CPT | Performed by: EMERGENCY MEDICINE

## 2023-12-14 PROCEDURE — G0463 HOSPITAL OUTPT CLINIC VISIT: HCPCS

## 2023-12-14 RX ORDER — ALBUTEROL SULFATE 90 UG/1
2 AEROSOL, METERED RESPIRATORY (INHALATION) 4 TIMES DAILY
Qty: 18 G | Refills: 0 | Status: SHIPPED | OUTPATIENT
Start: 2023-12-14 | End: 2024-05-13

## 2023-12-14 RX ORDER — PREDNISONE 20 MG/1
40 TABLET ORAL DAILY
Qty: 8 TABLET | Refills: 0 | Status: SHIPPED | OUTPATIENT
Start: 2023-12-14 | End: 2023-12-18

## 2023-12-14 RX ORDER — ALBUTEROL SULFATE 0.83 MG/ML
2.5 SOLUTION RESPIRATORY (INHALATION) EVERY 6 HOURS PRN
Qty: 90 ML | Refills: 0 | Status: SHIPPED | OUTPATIENT
Start: 2023-12-14 | End: 2024-05-13

## 2023-12-14 RX ORDER — ROPINIROLE 1 MG/1
2 TABLET, FILM COATED ORAL 3 TIMES DAILY
Status: DISCONTINUED | OUTPATIENT
Start: 2023-12-14 | End: 2023-12-14 | Stop reason: HOSPADM

## 2023-12-14 RX ADMIN — ROPINIROLE HYDROCHLORIDE 2 MG: 1 TABLET, FILM COATED ORAL at 11:03

## 2023-12-14 ASSESSMENT — ACTIVITIES OF DAILY LIVING (ADL)
ADLS_ACUITY_SCORE: 35

## 2023-12-14 NOTE — CONSULTS
[facility] Nurse Inpatient Assessment     Consulted for: leg wound    Summary: 12/14/2023 Patient seen in ED.  Concerns with left lower leg wound weeping and if infected.  Wounds do not appear infected, patient denied pain on exam and no exudate noted.  Covered with Mepilex and advised compression therapy and follow up with Vascular clinic.    Patient History (according to provider note(s):      Zackary Hutchison is a 63 year old male with a pertinent history of COPD, atrial fibrillation, CAD, CHF, HTN, HLD, who presents to this ED via EMS for evaluation of fall.     Per EMS, patient presents from a senior living facility, having fallen off of his bed. He endured a scrape to his ear and is on blood thinners (eloquis), but the bleeding is controlled. The patient has chronic shortness of breath.     Per the patient, he currently has shortness of breath when sitting that is slightly worse than his baseline, as he normally is fine when sitting. He has an albuterol inhaler that he uses frequently, and a nebulizer that doesn't improve his symptoms very much     Patient also complains of increased lower extremity swelling for the past few weeks. He had a recent appointment to address this issue. He also has increasing weight issues with the swelling.    Assessment:      Areas visualized during today's visit: Focused: and left lower leg    Wound location: left lower leg        Last photo: 12/14  Wound due to: Mixed Etiology: venous/arterial  Wound history/plan of care: has been dealing with edema and cardiology, advised to see vascular clinic across the street.  Plan to cover wound with Mepilex and change every 2 days or as needed until seen by Vascular.  pation of the wound bed: normal      Drainage: none     Description of drainage: none     Measurements (length x width x depth, in cm): approx 5 cm x 3 cm      Periwound skin: Dry/scaly      Color: normal and consistent with surrounding tissue       Temperature: normal   Odor: none  Pain: denies , none  Pain interventions prior to dressing change: patient tolerated well and slow and gentle cares   Treatment goal: Heal   STATUS: initial assessment  Supplies ordered: gathered      Treatment Plan:     Cleanse wound with normal saline and cover with Mepilex wound(s): Every other dayor PRN       RECOMMEND PRIMARY TEAM ORDER: Vascular consult  Education provided: plan of care  Discussed plan of care with: Patient  WO nurse follow-up plan: signing off  Notify WOC if wound(s) deteriorate.  Nursing to notify the Provider(s) and re-consult the WOC Nurse if new skin concern.    DATA:     Current support surface: Standard  ED cart  Containment of urine/stool: Continent of bladder and Continent of bowel  BMI: There is no height or weight on file to calculate BMI.   Active diet order: None     Output: No intake/output data recorded.     Labs:   Recent Labs   Lab 12/14/23  0811   HGB 11.7*   WBC 8.0     Pressure injury risk assessment:                        PAOLO Carty RN [facility] services  Pager no longer in use, please contact through [employer] group: [facility] [employer]

## 2023-12-14 NOTE — ED PROVIDER NOTES
EMERGENCY DEPARTMENT ENCOUnter      NAME: Zackary Hutchison  AGE: 63 year old male  YOB: 1960  MRN: [id number]  EVALUATION DATE & TIME: 2023  7:53 AM    PCP: Reid Escobar    ED PROVIDER: Shaw Serrano DO      Chief Complaint   Patient presents with    Fall    Respiratory Problems         FINAL IMPRESSION:  1. Fall, initial encounter    2. Dyspnea, unspecified type          ED COURSE & MEDICAL DECISION MAKIN:51 AM I met the patient and performed my initial interview and exam.   10:47 AM Rechecked and updated patient on lab and imaging results. Discussed plan of care.   1:10 PM We discussed the plan for discharge and the patient is agreeable. All questions were addressed.       The patient presented to the emergency department today after falling at home and striking his head.  He is on a blood thinner medication.  He also notes some recent dyspnea.  Head CT along with chest x-ray today was normal.  The remainder of his workup was normal as well.  The patient states that he is in need of additional nebulizer and inhaler medications.  I refilled these along with prescribing some oral steroids.  I feel that he can be safely discharged home.  He is comfortable with this plan.  Of note, he also notes that he has some leg wounds that are in need of care.  The wound team saw him during his ER stay, dressed his wounds, and are arranging for an outpatient follow-up appointment.        Medical Decision Making    History:  Supplemental history from: EMS  External Record(s) reviewed: Documented in chart, if applicable. and Inpatient Record: [facility] on 2023    Work Up:  Chart documentation includes differential considered and any EKGs or imaging independently interpreted by provider, where specified.  In additional to work up documented, I considered the following work up: Documented in chart, if applicable.    External consultation:  Discussion of management with another  provider: Documented in chart, if applicable    Complicating factors:  Care impacted by chronic illness: Anticoagulated State, Chronic Kidney Disease, Chronic Lung Disease, Heart Disease, Hyperlipidemia, Hypertension, Smoking / Nicotine Use, and Other: atrial fibrillation  Care affected by social determinants of health: N/A    Disposition considerations: Discharge. I prescribed additional prescription strength medication(s) as charted. See documentation for any additional details.        At the conclusion of the encounter I discussed the results of all of the tests and the disposition. The questions were answered. The patient or family acknowledged understanding and was agreeable with the care plan.         MEDICATIONS GIVEN IN THE EMERGENCY:  Medications   rOPINIRole (REQUIP) tablet 2 mg (2 mg Oral $Given 12/14/23 1103)       NEW PRESCRIPTIONS STARTED AT TODAY'S ER VISIT  Discharge Medication List as of 12/14/2023  1:19 PM        START taking these medications    Details   !! albuterol (PROAIR HFA/PROVENTIL HFA/VENTOLIN HFA) 108 (90 Base) MCG/ACT inhaler Inhale 2 puffs into the lungs 4 times daily, Disp-18 g, R-0, E-Prescribe      albuterol (PROVENTIL) (2.5 MG/3ML) 0.083% neb solution Take 1 vial (2.5 mg) by nebulization every 6 hours as needed for shortness of breath, wheezing or cough, Disp-90 mL, R-0, E-Prescribe      predniSONE (DELTASONE) 20 MG tablet Take 2 tablets (40 mg) by mouth daily for 4 days Take two tablets (= 40mg) each day for 4 (four) days, Disp-8 tablet, R-0, E-Prescribe       !! - Potential duplicate medications found. Please discuss with provider.             =================================================================    HPI        Zackary Hutchison is a 63 year old male with a pertinent history of COPD, atrial fibrillation, CAD, CHF, HTN, HLD, who presents to this ED via EMS for evaluation of fall.    Per EMS, patient presents from a senior living facility, having fallen off of his bed. He  endured a scrape to his ear and is on blood thinners (eloquis), but the bleeding is controlled. The patient has chronic shortness of breath.    Per the patient, he currently has shortness of breath when sitting that is slightly worse than his baseline, as he normally is fine when sitting. He has an albuterol inhaler that he uses frequently, and a nebulizer that doesn't improve his symptoms very much    Patient also complains of increased lower extremity swelling for the past few weeks. He had a recent appointment to address this issue. He also has increasing weight issues with the swelling.    Per chart review, the patient presented to [facility] on 28-Nov-2023 for evaluation of heart failure follow-up. Bumex 12 mg administered in clinic; tolerated well.      PAST MEDICAL HISTORY:  Past Medical History:   Diagnosis Date    Atrial fibrillation (H)     Chronic kidney disease     Chronic kidney disease, stage 3b (H) 09/28/2023    Class 3 severe obesity due to excess calories with body mass index (BMI) of 40.0 to 44.9 in adult (H) 09/28/2023    Congestive heart failure (H)     COPD (chronic obstructive pulmonary disease) (H)     Coronary artery disease     Coronary artery disease involving coronary bypass graft of native heart without angina pectoris 09/28/2023    Heart failure with reduced ejection fraction (H) 09/11/2023    Hyperlipidemia     Hypertension     Ischemic cardiomyopathy 09/28/2023    Obese     Paroxysmal atrial fibrillation (H) 09/28/2023    Tobacco abuse 09/28/2023       PAST SURGICAL HISTORY:  Past Surgical History:   Procedure Laterality Date    CORONARY ANGIOGRAPHY ADULT ORDER      CORONARY ARTERY BYPASS      CV CORONARY ANGIOGRAM N/A 09/11/2023    Procedure: Coronary Angiogram;  Surgeon: Santy Esposito MD;  Location: [facility] CV    CV LEFT HEART CATH N/A 09/11/2023    Procedure: Left Heart Catheterization;  Surgeon: Santy Esposito MD;  Location: [facility]  CV    CV RIGHT HEART CATH MEASUREMENTS RECORDED N/A 09/11/2023    Procedure: Right Heart Catheterization;  Surgeon: Santy Esposito MD;  Location: [facility] CATH LAB CV           CURRENT MEDICATIONS:    albuterol (PROAIR HFA/PROVENTIL HFA/VENTOLIN HFA) 108 (90 Base) MCG/ACT inhaler  albuterol (PROVENTIL) (2.5 MG/3ML) 0.083% neb solution  predniSONE (DELTASONE) 20 MG tablet  acetaminophen (TYLENOL) 500 MG tablet  albuterol (PROAIR HFA/PROVENTIL HFA/VENTOLIN HFA) 108 (90 Base) MCG/ACT inhaler  amiodarone (PACERONE) 200 MG tablet  apixaban ANTICOAGULANT (ELIQUIS) 5 MG tablet  aspirin 81 MG EC tablet  atorvastatin (LIPITOR) 40 MG tablet  bumetanide (BUMEX) 1 MG tablet  chlorothiazide (DIURIL) 250 MG/5ML suspension  [employer] VITAMIN B12 1000 MCG tablet  diclofenac (VOLTAREN) 1 % topical gel  DULoxetine (CYMBALTA) 30 MG capsule  gabapentin (NEURONTIN) 300 MG capsule  gabapentin (NEURONTIN) 300 MG capsule  hypromellose-dextran (ARTIFICAL TEARS) 0.1-0.3 % ophthalmic solution  ipratropium - albuterol 0.5 mg/2.5 mg/3 mL (DUONEB) 0.5-2.5 (3) MG/3ML neb solution  isosorbide mononitrate (IMDUR) 60 MG 24 hr tablet  KLOR-CON 20 MEQ CR tablet  lisinopril (ZESTRIL) 5 MG tablet  nitroGLYcerin (NITROSTAT) 0.4 MG sublingual tablet  polyethylene glycol (MIRALAX) 17 g packet  rOPINIRole (REQUIP) 2 MG tablet        ALLERGIES:  No Known Allergies    FAMILY HISTORY:  History reviewed. No pertinent family history.    SOCIAL HISTORY:   Social History     Socioeconomic History    Marital status: Single     Spouse name: None    Number of children: None    Years of education: None    Highest education level: None   Tobacco Use    Smoking status: Every Day     Packs/day: .5     Types: Cigarettes    Smokeless tobacco: Never    Tobacco comments:     Seen IP by [facility] on 9/11/23 and declined cessation services and materials       VITALS:  Patient Vitals for the past 24 hrs:   BP Temp Temp src Pulse Resp SpO2   12/14/23 1330 (!) 173/84 -- -- 74 18 95 %    12/14/23 1317 -- -- -- 76 -- 96 %   12/14/23 1300 (!) 152/71 -- -- 71 18 95 %   12/14/23 1030 (!) 176/88 -- -- 75 18 94 %   12/14/23 1000 (!) 165/72 -- -- 76 16 93 %   12/14/23 0930 (!) 189/97 -- -- 76 16 94 %   12/14/23 0900 (!) 176/83 -- -- 74 16 94 %   12/14/23 0854 (!) 161/78 -- -- 76 -- 95 %   12/14/23 0815 (!) 142/71 -- -- 74 -- 95 %   12/14/23 0800 (!) 162/79 -- -- 75 -- 96 %   12/14/23 0754 (!) 143/81 97.8  F (36.6  C) Oral 81 18 93 %       PHYSICAL EXAM    Constitutional:  Well developed, Well nourished,  HENT:  Normocephalic, Atraumatic, Oropharynx moist, Nose normal. Minor abrasion on the left ear; no active bleeding.  Neck: No C-spine tenderness.  Eyes:  EOMI, Conjunctiva normal, No discharge.   Respiratory:  No respiratory distress, No wheezing, No chest tenderness. Diminished breath sounds bilaterally.   Cardiovascular:  Normal heart rate, Normal rhythm, No murmurs  GI:  Soft, No tenderness, No guarding, No CVA tenderness.   Musculoskeletal:  No tenderness to palpation or major deformities noted.  Extremities: Significant lower extremity edema bilaterally.  Neurologic:  Alert & oriented x 3, No focal deficits noted.   Psychiatric:  Affect normal, Judgment normal, Mood normal.        LAB:  All pertinent labs reviewed and interpreted.  Results for orders placed or performed during the hospital encounter of 12/14/23                         CBC with platelets   Result Value Ref Range    WBC Count 8.0 4.0 - 11.0 10e3/uL    RBC Count 3.98 (L) 4.40 - 5.90 10e6/uL    Hemoglobin 11.7 (L) 13.3 - 17.7 g/dL    Hematocrit 38.7 (L) 40.0 - 53.0 %    MCV 97 78 - 100 fL    MCH 29.4 26.5 - 33.0 pg    MCHC 30.2 (L) 31.5 - 36.5 g/dL    RDW 17.1 (H) 10.0 - 15.0 %    Platelet Count 168 150 - 450 10e3/uL   Basic metabolic panel   Result Value Ref Range    Sodium 139 135 - 145 mmol/L    Potassium 3.5 3.4 - 5.3 mmol/L    Chloride 96 (L) 98 - 107 mmol/L    Carbon Dioxide (CO2) 33 (H) 22 - 29 mmol/L    Anion Gap 10 7 - 15  mmol/L    Urea Nitrogen 21.9 8.0 - 23.0 mg/dL    Creatinine 1.31 (H) 0.67 - 1.17 mg/dL    GFR Estimate 60 (L) >60 mL/min/1.73m2    Calcium 9.2 8.8 - 10.2 mg/dL    Glucose 117 (H) 70 - 99 mg/dL   UA with Microscopic reflex to Culture    Specimen: Urine, Midstream   Result Value Ref Range    Color Urine Light Yellow Colorless, Straw, Light Yellow, Yellow    Appearance Urine Clear Clear    Glucose Urine Negative Negative mg/dL    Bilirubin Urine Negative Negative    Ketones Urine Negative Negative mg/dL    Specific Gravity Urine 1.016 1.001 - 1.030    Blood Urine Negative Negative    pH Urine 6.0 5.0 - 7.0    Protein Albumin Urine Negative Negative mg/dL    Urobilinogen Urine <2.0 <2.0 mg/dL    Nitrite Urine Negative Negative    Leukocyte Esterase Urine Negative Negative    RBC Urine 1 <=2 /HPF    WBC Urine 2 <=5 /HPF    Hyaline Casts Urine 8 (H) <=2 /LPF   Result Value Ref Range    Troponin T, High Sensitivity 26 (H) <=22 ng/L   Nt probnp inpatient   Result Value Ref Range    N terminal Pro BNP Inpatient 245 0 - 900 pg/mL   Result Value Ref Range    Troponin T, High Sensitivity 28 (H) <=22 ng/L         RADIOLOGY:  I have independently reviewed and interpreted the above imaging, pending the final radiology read.  XR Chest 2 Views   Final Result   IMPRESSION:    Sternotomy wires. Cardiomegaly with normal pulmonary vascularity. The lungs are clear. Old left rib fractures. No change from previous.      CT Head w/o Contrast   Final Result   IMPRESSION:      1.  No evidence of acute traumatic intracranial abnormality.   2.  Calcified intracranial atherosclerosis with changes suggestive of mild chronic microvascular ischemic disease.   3.  Moderate generalized parenchymal atrophy.          EKG:    Normal sinus rhythm at 74 bpm.  Nonspecific ST changes.  No signs of acute ischemia.   ms, QTc 492 ms.    I have independently reviewed and interpreted this EKG        I, Joseph Abdi, am serving as a scribe to document  services personally performed by Dr. Serrano based on my observation and the provider's statements to me. I, Shaw Serrano, DO attest that Joseph Garcianikki is acting in a scribe capacity, has observed my performance of the services and has documented them in accordance with my direction.    Shaw Serrano, DO  Emergency Medicine  [facility] EMERGENCY DEPARTMENT  [address] [place] [id number]  [phone]  Dept: [phone]     Shaw Serrano MD  12/14/23 9485

## 2023-12-14 NOTE — TELEPHONE ENCOUNTER
----- Message from Marie Villafuerte RN sent at 12/14/2023  3:10 PM CST -----  LK/JR and LESLIE  ----- Message -----  From: Abelardo Ruiz, RN  Sent: 12/14/2023   3:08 PM CST  To: [facility] Support Pool    Hello,    I saw this patient in the ED today.  He needs to be seen by Vascular due to chronic edema and chronic open, weeping sores on his legs times 2 months per patient but possibly longer.  I talked to the patient about seeing someone in Vascular and he was open to it but would like a call to schedule.        Thanks,  NOEL CartyN RN [facility] services  Pager no longer in use, please contact through [employer]: [facility] [employer]

## 2023-12-14 NOTE — ED TRIAGE NOTES
Pt arrives to ER via [facility] from [facility] for evaluation of fall. Pt was on the toilet this morning when he fell asleep and scrapped L ear on wall. Pt did wake up when he began to fall but couldn't stop the fall. Pt on eliquis, no LOC. Pt also endorses worsening of chronic SOB. Pt more dyspneic on exertion, hx of CHF and COPD.      Triage Assessment (Adult)       Row Name 12/14/23 0755          Triage Assessment    Airway WDL WDL        Respiratory WDL    Respiratory WDL X;rhythm/pattern     Rhythm/Pattern, Respiratory dyspnea upon exertion;shallow;unlabored;pattern regular        Skin Circulation/Temperature WDL    Skin Circulation/Temperature WDL circulation     Skin Circulation pallor        Cardiac WDL    Cardiac WDL WDL        Peripheral/Neurovascular WDL    Peripheral Neurovascular WDL capillary refill     Capillary Refill, General greater than 3 secs        Cognitive/Neuro/Behavioral WDL    Cognitive/Neuro/Behavioral WDL WDL

## 2023-12-14 NOTE — ED NOTES
Bed: [facility]  Expected date:   Expected time:   Means of arrival: Ambulance  Comments:  MPLW: Fall, ear lac, + thinners, also SOB

## 2023-12-15 LAB
ATRIAL RATE - MUSE: 74 BPM
DIASTOLIC BLOOD PRESSURE - MUSE: NORMAL MMHG
INTERPRETATION ECG - MUSE: NORMAL
P AXIS - MUSE: 55 DEGREES
PR INTERVAL - MUSE: 178 MS
QRS DURATION - MUSE: 136 MS
QT - MUSE: 444 MS
QTC - MUSE: 492 MS
R AXIS - MUSE: 98 DEGREES
SYSTOLIC BLOOD PRESSURE - MUSE: NORMAL MMHG
T AXIS - MUSE: 92 DEGREES
VENTRICULAR RATE- MUSE: 74 BPM

## 2023-12-22 ENCOUNTER — APPOINTMENT (OUTPATIENT)
Dept: RADIOLOGY | Facility: HOSPITAL | Age: 63
DRG: 291 | End: 2023-12-22
Attending: EMERGENCY MEDICINE
Payer: MEDICARE

## 2023-12-22 ENCOUNTER — TELEPHONE (OUTPATIENT)
Dept: VASCULAR SURGERY | Facility: CLINIC | Age: 63
End: 2023-12-22
Payer: MEDICARE

## 2023-12-22 ENCOUNTER — HOSPITAL ENCOUNTER (INPATIENT)
Facility: HOSPITAL | Age: 63
LOS: 5 days | Discharge: HOME HEALTH | DRG: 291 | End: 2023-12-27
Attending: EMERGENCY MEDICINE | Admitting: STUDENT IN AN ORGANIZED HEALTH CARE EDUCATION/TRAINING PROGRAM
Payer: MEDICARE

## 2023-12-22 DIAGNOSIS — J44.1 COPD EXACERBATION (H): ICD-10-CM

## 2023-12-22 DIAGNOSIS — I50.20 HEART FAILURE WITH REDUCED EJECTION FRACTION (H): ICD-10-CM

## 2023-12-22 DIAGNOSIS — G62.9 NEUROPATHY: Primary | ICD-10-CM

## 2023-12-22 DIAGNOSIS — L03.116 CELLULITIS OF LEFT LOWER EXTREMITY: ICD-10-CM

## 2023-12-22 DIAGNOSIS — I87.2 VENOUS STASIS DERMATITIS OF BOTH LOWER EXTREMITIES: ICD-10-CM

## 2023-12-22 LAB
ALBUMIN SERPL BCG-MCNC: 3.9 G/DL (ref 3.5–5.2)
ALP SERPL-CCNC: 83 U/L (ref 40–150)
ALT SERPL W P-5'-P-CCNC: 9 U/L (ref 0–70)
ANION GAP SERPL CALCULATED.3IONS-SCNC: 12 MMOL/L (ref 7–15)
AST SERPL W P-5'-P-CCNC: 17 U/L (ref 0–45)
BASOPHILS # BLD AUTO: 0.1 10E3/UL (ref 0–0.2)
BASOPHILS NFR BLD AUTO: 1 %
BILIRUB DIRECT SERPL-MCNC: <0.2 MG/DL (ref 0–0.3)
BILIRUB SERPL-MCNC: 0.3 MG/DL
BUN SERPL-MCNC: 28.3 MG/DL (ref 8–23)
CALCIUM SERPL-MCNC: 9.4 MG/DL (ref 8.8–10.2)
CHLORIDE SERPL-SCNC: 97 MMOL/L (ref 98–107)
CREAT SERPL-MCNC: 1.08 MG/DL (ref 0.67–1.17)
DEPRECATED HCO3 PLAS-SCNC: 32 MMOL/L (ref 22–29)
EGFRCR SERPLBLD CKD-EPI 2021: 76 ML/MIN/1.73M2
EOSINOPHIL # BLD AUTO: 0.1 10E3/UL (ref 0–0.7)
EOSINOPHIL NFR BLD AUTO: 1 %
ERYTHROCYTE [DISTWIDTH] IN BLOOD BY AUTOMATED COUNT: 17.2 % (ref 10–15)
FLUAV RNA SPEC QL NAA+PROBE: NEGATIVE
FLUBV RNA RESP QL NAA+PROBE: NEGATIVE
GLUCOSE SERPL-MCNC: 133 MG/DL (ref 70–99)
HCT VFR BLD AUTO: 39.9 % (ref 40–53)
HGB BLD-MCNC: 11.8 G/DL (ref 13.3–17.7)
HOLD SPECIMEN: NORMAL
IMM GRANULOCYTES # BLD: 0.1 10E3/UL
IMM GRANULOCYTES NFR BLD: 1 %
LYMPHOCYTES # BLD AUTO: 0.8 10E3/UL (ref 0.8–5.3)
LYMPHOCYTES NFR BLD AUTO: 7 %
MCH RBC QN AUTO: 28.6 PG (ref 26.5–33)
MCHC RBC AUTO-ENTMCNC: 29.6 G/DL (ref 31.5–36.5)
MCV RBC AUTO: 97 FL (ref 78–100)
MONOCYTES # BLD AUTO: 0.6 10E3/UL (ref 0–1.3)
MONOCYTES NFR BLD AUTO: 6 %
NEUTROPHILS # BLD AUTO: 9.6 10E3/UL (ref 1.6–8.3)
NEUTROPHILS NFR BLD AUTO: 84 %
NRBC # BLD AUTO: 0 10E3/UL
NRBC BLD AUTO-RTO: 0 /100
NT-PROBNP SERPL-MCNC: 407 PG/ML (ref 0–900)
PLATELET # BLD AUTO: 214 10E3/UL (ref 150–450)
POTASSIUM SERPL-SCNC: 4.1 MMOL/L (ref 3.4–5.3)
PROCALCITONIN SERPL IA-MCNC: 0.04 NG/ML
PROT SERPL-MCNC: 7.8 G/DL (ref 6.4–8.3)
RBC # BLD AUTO: 4.13 10E6/UL (ref 4.4–5.9)
RSV RNA SPEC NAA+PROBE: NEGATIVE
SARS-COV-2 RNA RESP QL NAA+PROBE: NEGATIVE
SODIUM SERPL-SCNC: 141 MMOL/L (ref 135–145)
TROPONIN T SERPL HS-MCNC: 22 NG/L
TROPONIN T SERPL HS-MCNC: 29 NG/L
WBC # BLD AUTO: 11.3 10E3/UL (ref 4–11)

## 2023-12-22 PROCEDURE — 84484 ASSAY OF TROPONIN QUANT: CPT | Performed by: EMERGENCY MEDICINE

## 2023-12-22 PROCEDURE — 250N000009 HC RX 250: Performed by: EMERGENCY MEDICINE

## 2023-12-22 PROCEDURE — 250N000009 HC RX 250: Performed by: STUDENT IN AN ORGANIZED HEALTH CARE EDUCATION/TRAINING PROGRAM

## 2023-12-22 PROCEDURE — 71046 X-RAY EXAM CHEST 2 VIEWS: CPT

## 2023-12-22 PROCEDURE — 94640 AIRWAY INHALATION TREATMENT: CPT | Mod: 76

## 2023-12-22 PROCEDURE — 80053 COMPREHEN METABOLIC PANEL: CPT | Performed by: EMERGENCY MEDICINE

## 2023-12-22 PROCEDURE — 87637 SARSCOV2&INF A&B&RSV AMP PRB: CPT | Performed by: EMERGENCY MEDICINE

## 2023-12-22 PROCEDURE — 84145 PROCALCITONIN (PCT): CPT | Performed by: STUDENT IN AN ORGANIZED HEALTH CARE EDUCATION/TRAINING PROGRAM

## 2023-12-22 PROCEDURE — 83880 ASSAY OF NATRIURETIC PEPTIDE: CPT | Performed by: EMERGENCY MEDICINE

## 2023-12-22 PROCEDURE — 999N000156 HC STATISTIC RCP CONSULT EA 30 MIN

## 2023-12-22 PROCEDURE — 120N000001 HC R&B MED SURG/OB

## 2023-12-22 PROCEDURE — 250N000013 HC RX MED GY IP 250 OP 250 PS 637: Performed by: STUDENT IN AN ORGANIZED HEALTH CARE EDUCATION/TRAINING PROGRAM

## 2023-12-22 PROCEDURE — 36415 COLL VENOUS BLD VENIPUNCTURE: CPT | Performed by: EMERGENCY MEDICINE

## 2023-12-22 PROCEDURE — 85025 COMPLETE CBC W/AUTO DIFF WBC: CPT | Performed by: EMERGENCY MEDICINE

## 2023-12-22 PROCEDURE — 99223 1ST HOSP IP/OBS HIGH 75: CPT | Performed by: STUDENT IN AN ORGANIZED HEALTH CARE EDUCATION/TRAINING PROGRAM

## 2023-12-22 PROCEDURE — 250N000011 HC RX IP 250 OP 636: Performed by: EMERGENCY MEDICINE

## 2023-12-22 PROCEDURE — 250N000013 HC RX MED GY IP 250 OP 250 PS 637: Performed by: EMERGENCY MEDICINE

## 2023-12-22 PROCEDURE — 999N000157 HC STATISTIC RCP TIME EA 10 MIN

## 2023-12-22 PROCEDURE — 94640 AIRWAY INHALATION TREATMENT: CPT

## 2023-12-22 PROCEDURE — 93005 ELECTROCARDIOGRAM TRACING: CPT | Performed by: EMERGENCY MEDICINE

## 2023-12-22 PROCEDURE — 99285 EMERGENCY DEPT VISIT HI MDM: CPT | Mod: 25

## 2023-12-22 RX ORDER — IPRATROPIUM BROMIDE AND ALBUTEROL SULFATE 2.5; .5 MG/3ML; MG/3ML
3 SOLUTION RESPIRATORY (INHALATION) ONCE
Status: COMPLETED | OUTPATIENT
Start: 2023-12-22 | End: 2023-12-22

## 2023-12-22 RX ORDER — ONDANSETRON 2 MG/ML
4 INJECTION INTRAMUSCULAR; INTRAVENOUS EVERY 6 HOURS PRN
Status: DISCONTINUED | OUTPATIENT
Start: 2023-12-22 | End: 2023-12-27 | Stop reason: HOSPADM

## 2023-12-22 RX ORDER — DULOXETIN HYDROCHLORIDE 30 MG/1
30 CAPSULE, DELAYED RELEASE ORAL EVERY MORNING
Status: DISCONTINUED | OUTPATIENT
Start: 2023-12-23 | End: 2023-12-22

## 2023-12-22 RX ORDER — ACETAMINOPHEN 325 MG/1
650 TABLET ORAL EVERY 4 HOURS PRN
Status: DISCONTINUED | OUTPATIENT
Start: 2023-12-22 | End: 2023-12-24

## 2023-12-22 RX ORDER — ROPINIROLE 1 MG/1
2 TABLET, FILM COATED ORAL 3 TIMES DAILY
Status: DISCONTINUED | OUTPATIENT
Start: 2023-12-22 | End: 2023-12-27 | Stop reason: HOSPADM

## 2023-12-22 RX ORDER — HYDRALAZINE HYDROCHLORIDE 20 MG/ML
10 INJECTION INTRAMUSCULAR; INTRAVENOUS EVERY 4 HOURS PRN
Status: DISCONTINUED | OUTPATIENT
Start: 2023-12-22 | End: 2023-12-27 | Stop reason: HOSPADM

## 2023-12-22 RX ORDER — PROCHLORPERAZINE 25 MG
12.5 SUPPOSITORY, RECTAL RECTAL EVERY 12 HOURS PRN
Status: DISCONTINUED | OUTPATIENT
Start: 2023-12-22 | End: 2023-12-27 | Stop reason: HOSPADM

## 2023-12-22 RX ORDER — DOXYCYCLINE 100 MG/1
100 CAPSULE ORAL 2 TIMES DAILY
Status: DISCONTINUED | OUTPATIENT
Start: 2023-12-22 | End: 2023-12-27 | Stop reason: HOSPADM

## 2023-12-22 RX ORDER — LIDOCAINE 40 MG/G
CREAM TOPICAL
Status: DISCONTINUED | OUTPATIENT
Start: 2023-12-22 | End: 2023-12-27 | Stop reason: HOSPADM

## 2023-12-22 RX ORDER — ASPIRIN 81 MG/1
81 TABLET ORAL DAILY
Status: DISCONTINUED | OUTPATIENT
Start: 2023-12-23 | End: 2023-12-27 | Stop reason: HOSPADM

## 2023-12-22 RX ORDER — ONDANSETRON 4 MG/1
4 TABLET, ORALLY DISINTEGRATING ORAL EVERY 6 HOURS PRN
Status: DISCONTINUED | OUTPATIENT
Start: 2023-12-22 | End: 2023-12-27 | Stop reason: HOSPADM

## 2023-12-22 RX ORDER — IPRATROPIUM BROMIDE AND ALBUTEROL SULFATE 2.5; .5 MG/3ML; MG/3ML
3 SOLUTION RESPIRATORY (INHALATION)
Status: DISCONTINUED | OUTPATIENT
Start: 2023-12-22 | End: 2023-12-22

## 2023-12-22 RX ORDER — ISOSORBIDE MONONITRATE 60 MG/1
180 TABLET, EXTENDED RELEASE ORAL EVERY MORNING
Status: DISCONTINUED | OUTPATIENT
Start: 2023-12-23 | End: 2023-12-27 | Stop reason: HOSPADM

## 2023-12-22 RX ORDER — GABAPENTIN 300 MG/1
300 CAPSULE ORAL 2 TIMES DAILY
Status: DISCONTINUED | OUTPATIENT
Start: 2023-12-22 | End: 2023-12-25

## 2023-12-22 RX ORDER — LISINOPRIL 5 MG/1
5 TABLET ORAL DAILY
Status: DISCONTINUED | OUTPATIENT
Start: 2023-12-23 | End: 2023-12-27 | Stop reason: HOSPADM

## 2023-12-22 RX ORDER — ALBUTEROL SULFATE 0.83 MG/ML
2.5 SOLUTION RESPIRATORY (INHALATION) EVERY 4 HOURS PRN
Status: DISCONTINUED | OUTPATIENT
Start: 2023-12-22 | End: 2023-12-24

## 2023-12-22 RX ORDER — GABAPENTIN 300 MG/1
900 CAPSULE ORAL AT BEDTIME
Status: DISCONTINUED | OUTPATIENT
Start: 2023-12-22 | End: 2023-12-25

## 2023-12-22 RX ORDER — ACETAMINOPHEN 650 MG/1
650 SUPPOSITORY RECTAL EVERY 4 HOURS PRN
Status: DISCONTINUED | OUTPATIENT
Start: 2023-12-22 | End: 2023-12-24

## 2023-12-22 RX ORDER — ROPINIROLE 1 MG/1
2 TABLET, FILM COATED ORAL ONCE
Status: COMPLETED | OUTPATIENT
Start: 2023-12-22 | End: 2023-12-22

## 2023-12-22 RX ORDER — ACETAMINOPHEN 325 MG/1
650 TABLET ORAL ONCE
Status: COMPLETED | OUTPATIENT
Start: 2023-12-22 | End: 2023-12-22

## 2023-12-22 RX ORDER — PROCHLORPERAZINE MALEATE 5 MG
5 TABLET ORAL EVERY 6 HOURS PRN
Status: DISCONTINUED | OUTPATIENT
Start: 2023-12-22 | End: 2023-12-27 | Stop reason: HOSPADM

## 2023-12-22 RX ORDER — BISACODYL 10 MG
10 SUPPOSITORY, RECTAL RECTAL DAILY PRN
Status: DISCONTINUED | OUTPATIENT
Start: 2023-12-22 | End: 2023-12-27 | Stop reason: HOSPADM

## 2023-12-22 RX ORDER — LIDOCAINE 40 MG/G
CREAM TOPICAL 2 TIMES DAILY PRN
Status: DISCONTINUED | OUTPATIENT
Start: 2023-12-22 | End: 2023-12-27 | Stop reason: HOSPADM

## 2023-12-22 RX ORDER — PREDNISONE 20 MG/1
40 TABLET ORAL DAILY
Status: DISCONTINUED | OUTPATIENT
Start: 2023-12-23 | End: 2023-12-27 | Stop reason: HOSPADM

## 2023-12-22 RX ORDER — AMIODARONE HYDROCHLORIDE 200 MG/1
200 TABLET ORAL DAILY
Status: DISCONTINUED | OUTPATIENT
Start: 2023-12-23 | End: 2023-12-27 | Stop reason: HOSPADM

## 2023-12-22 RX ORDER — POLYETHYLENE GLYCOL 3350 17 G/17G
17 POWDER, FOR SOLUTION ORAL 2 TIMES DAILY PRN
Status: DISCONTINUED | OUTPATIENT
Start: 2023-12-22 | End: 2023-12-27 | Stop reason: HOSPADM

## 2023-12-22 RX ORDER — NITROGLYCERIN 0.4 MG/1
0.4 TABLET SUBLINGUAL EVERY 5 MIN PRN
Status: DISCONTINUED | OUTPATIENT
Start: 2023-12-22 | End: 2023-12-27 | Stop reason: HOSPADM

## 2023-12-22 RX ORDER — NICOTINE 21 MG/24HR
1 PATCH, TRANSDERMAL 24 HOURS TRANSDERMAL DAILY PRN
Status: DISCONTINUED | OUTPATIENT
Start: 2023-12-22 | End: 2023-12-27 | Stop reason: HOSPADM

## 2023-12-22 RX ORDER — METHYLPREDNISOLONE SODIUM SUCCINATE 125 MG/2ML
125 INJECTION, POWDER, LYOPHILIZED, FOR SOLUTION INTRAMUSCULAR; INTRAVENOUS ONCE
Status: COMPLETED | OUTPATIENT
Start: 2023-12-22 | End: 2023-12-22

## 2023-12-22 RX ORDER — DULOXETIN HYDROCHLORIDE 60 MG/1
60 CAPSULE, DELAYED RELEASE ORAL EVERY MORNING
Status: DISCONTINUED | OUTPATIENT
Start: 2023-12-23 | End: 2023-12-25

## 2023-12-22 RX ORDER — IPRATROPIUM BROMIDE AND ALBUTEROL SULFATE 2.5; .5 MG/3ML; MG/3ML
3 SOLUTION RESPIRATORY (INHALATION)
Status: DISCONTINUED | OUTPATIENT
Start: 2023-12-23 | End: 2023-12-27 | Stop reason: HOSPADM

## 2023-12-22 RX ORDER — ATORVASTATIN CALCIUM 40 MG/1
40 TABLET, FILM COATED ORAL EVERY MORNING
Status: DISCONTINUED | OUTPATIENT
Start: 2023-12-23 | End: 2023-12-27 | Stop reason: HOSPADM

## 2023-12-22 RX ADMIN — POLYETHYLENE GLYCOL 3350 17 G: 17 POWDER, FOR SOLUTION ORAL at 22:32

## 2023-12-22 RX ADMIN — IPRATROPIUM BROMIDE AND ALBUTEROL SULFATE 3 ML: .5; 3 SOLUTION RESPIRATORY (INHALATION) at 22:37

## 2023-12-22 RX ADMIN — GABAPENTIN 900 MG: 300 CAPSULE ORAL at 22:32

## 2023-12-22 RX ADMIN — APIXABAN 5 MG: 5 TABLET, FILM COATED ORAL at 22:32

## 2023-12-22 RX ADMIN — ACETAMINOPHEN 650 MG: 325 TABLET ORAL at 22:32

## 2023-12-22 RX ADMIN — IPRATROPIUM BROMIDE AND ALBUTEROL SULFATE 3 ML: .5; 3 SOLUTION RESPIRATORY (INHALATION) at 17:49

## 2023-12-22 RX ADMIN — DOXYCYCLINE 100 MG: 100 CAPSULE ORAL at 22:32

## 2023-12-22 RX ADMIN — ROPINIROLE HYDROCHLORIDE 2 MG: 1 TABLET, FILM COATED ORAL at 20:52

## 2023-12-22 RX ADMIN — ACETAMINOPHEN 650 MG: 325 TABLET ORAL at 19:49

## 2023-12-22 RX ADMIN — METHYLPREDNISOLONE SODIUM SUCCINATE 125 MG: 125 INJECTION, POWDER, FOR SOLUTION INTRAMUSCULAR; INTRAVENOUS at 19:50

## 2023-12-22 ASSESSMENT — ACTIVITIES OF DAILY LIVING (ADL)
ADLS_ACUITY_SCORE: 35
ADLS_ACUITY_SCORE: 35
ADLS_ACUITY_SCORE: 25

## 2023-12-22 NOTE — ED TRIAGE NOTES
Pt endorses sore on bilateral legs. Pt was seen by nurse at assisted living who told him to come to ER. He believes the sores are infected on legs. Bilateral legs are swollen.      Triage Assessment (Adult)       Row Name 12/22/23 1415          Triage Assessment    Airway WDL WDL        Respiratory WDL    Respiratory WDL WDL        Skin Circulation/Temperature WDL    Skin Circulation/Temperature WDL WDL        Cardiac WDL    Cardiac WDL WDL        Peripheral/Neurovascular WDL    Peripheral Neurovascular WDL neurovascular assessment lower  neuropathy in bilateral legs        Cognitive/Neuro/Behavioral WDL    Cognitive/Neuro/Behavioral WDL WDL        LLE Neurovascular Assessment    Sensation LLE tingling present  tingling present in toes on bilateral feet at baseline        RLE Neurovascular Assessment    Sensation RLE tingling present

## 2023-12-22 NOTE — TELEPHONE ENCOUNTER
Caller: patient    Provider: none    Detailed reason for call: Calling back about wounds/swelling and needing an appointment. Patient states that he has been having telephone problems and apologizes for not being reachable.     Patient states that a nurse at his assisted living facility looked at the wounds and recommended that the patient go to the ED. Patient calling here to ask if he should go to the ED or come straight to our clinic. Writer let him know that we do not have appointments today and that if there is any concern for infection that he should present to the ED/UC. Patient stated that he would go to the ED.    Best phone number to contact: [phone]    Best time to contact: anytime    Ok to leave a detailed message: Yes          (Noted to patient if reason is related to wound or incision, to please send a photo via email or [employer].)

## 2023-12-22 NOTE — ED PROVIDER NOTES
"EMERGENCY DEPARTMENT NOTE     Name: Zackary Hutchison    Age/Sex: 63 year old male   MRN: [id number]   Evaluation Date & Time:  12/22/2023  5:33 PM    PCP:    Reid Escobar   ED Provider: Zackary Orellana D.O.       CHIEF COMPLAINT    Leg Swelling       DIAGNOSIS & DISPOSITION/MEDICAL DECISION MAKING     1. COPD exacerbation (H)    2. Venous stasis dermatitis of both lower extremities        Zackary Hutchison is a 63 year old male with relevant past history of CHF, Afib on Eliquis, CAD, COPD, obesity, CKD, HTN, HLD, who presents to the emergency department for evaluation of leg swelling.    Differential  diagnosis considered included but not limited to CHF, COPD exacerbation, cellulitis, DVT or PE    Medical Decision Making  Patient on exam is alert without marked respiratory distress.  Cardiac exam with irregularly irregular rhythm but normal rate, pulmonary exam with bilateral expiratory wheezing.  Extremities showed changes of chronic venous stasis with open ulcerations but does not appear to be cellulitic  EKG demonstrated sinus rhythm with bifascicular block with nonspecific ST-T wave changes, prolonged QTc troponin and BNP not elevated, globin 11.8 stable for chronic anemia, creatinine 1 with GFR 76, glucose 133 with normal CO2 and anion gap, hepatic profile within normal limits.  Received DuoNeb but remained with bilateral expiratory wheezing.  Low suspicion for DVT or PE as patient is currently anticoagulated.  Discussed options with the patient.  With persistent wheezing and patient reported exertional dyspnea not improved with home nebulizers will admit for treatment of what appears to be COPD exacerbation and will have wound care consultation for venous stasis ulcer ration lower legs.  Case was discussed with the hospitalist service.      Interventions:Duoneb  Discharge Vital Signs:/66 (BP Location: Left arm)   Pulse 89   Temp 98  F (36.7  C) (Oral)   Resp 18   Ht 1.676 m (5' 6\")   Wt 122.5 kg (270 lb)  "  SpO2 93%   BMI 43.58 kg/m       DISPOSITION:     Diagnostic studies:  Imaging:  Chest XR,  PA & LAT   Final Result   IMPRESSION: Stable chest with again seen hyperinflation of both lungs. Postoperative changes sternotomy. Old healed left rib fractures. Mild to moderate scattered degenerative changes the bony skeleton.         Lab:  Labs Ordered and Resulted from Time of ED Arrival to Time of ED Departure   BASIC METABOLIC PANEL - Abnormal       Result Value    Sodium 141      Potassium 4.1      Chloride 97 (*)     Carbon Dioxide (CO2) 32 (*)     Anion Gap 12      Urea Nitrogen 28.3 (*)     Creatinine 1.08      GFR Estimate 76      Calcium 9.4      Glucose 133 (*)    TROPONIN T, HIGH SENSITIVITY - Abnormal    Troponin T, High Sensitivity 29 (*)    CBC WITH PLATELETS AND DIFFERENTIAL - Abnormal    WBC Count 11.3 (*)     RBC Count 4.13 (*)     Hemoglobin 11.8 (*)     Hematocrit 39.9 (*)     MCV 97      MCH 28.6      MCHC 29.6 (*)     RDW 17.2 (*)     Platelet Count 214      % Neutrophils 84      % Lymphocytes 7      % Monocytes 6      % Eosinophils 1      % Basophils 1      % Immature Granulocytes 1      NRBCs per 100 WBC 0      Absolute Neutrophils 9.6 (*)     Absolute Lymphocytes 0.8      Absolute Monocytes 0.6      Absolute Eosinophils 0.1      Absolute Basophils 0.1      Absolute Immature Granulocytes 0.1      Absolute NRBCs 0.0     HEPATIC FUNCTION PANEL - Normal    Protein Total 7.8      Albumin 3.9      Bilirubin Total 0.3      Alkaline Phosphatase 83      AST 17      ALT 9      Bilirubin Direct <0.20     NT PROBNP INPATIENT - Normal    N terminal Pro BNP Inpatient 407     INFLUENZA A/B, RSV, & SARS-COV2 PCR - Normal    Influenza A PCR Negative      Influenza B PCR Negative      RSV PCR Negative      SARS CoV2 PCR Negative                 Triage note reviewed:Pt endorses sore on bilateral legs. Pt was seen by nurse at assisted living who told him to come to ER. He believes the sores are infected on legs.  Bilateral legs are swollen.      Triage Assessment (Adult)       Row Name 12/22/23 1415          Triage Assessment    Airway WDL WDL        Respiratory WDL    Respiratory WDL WDL        Skin Circulation/Temperature WDL    Skin Circulation/Temperature WDL WDL        Cardiac WDL    Cardiac WDL WDL        Peripheral/Neurovascular WDL    Peripheral Neurovascular WDL neurovascular assessment lower  neuropathy in bilateral legs        Cognitive/Neuro/Behavioral WDL    Cognitive/Neuro/Behavioral WDL WDL        LLE Neurovascular Assessment    Sensation LLE tingling present  tingling present in toes on bilateral feet at baseline        RLE Neurovascular Assessment    Sensation RLE tingling present                         History:  Supplemental history from: Documented in chart, if applicable  External Record(s) reviewed: Primary care office visit Nov 28 2023 discharge summary September 14, 2023    Work Up:  Chart documentation includes differential considered and any EKGs or imaging independently interpreted by provider, where specified.  In additional to work up documented, I considered the following work up: Of the chest or ultrasound bilateral lower extremities below suspicion for DVT or PE and deferred    External consultation:  Discussion of management with another provider: Hospitalist    Complicating factors:  Care impacted by chronic illness: Anticoagulated State, Chronic Kidney Disease, Chronic Lung Disease, Heart Disease, Hyperlipidemia, and Hypertension  Care affected by social determinants of health: Access to Medical Care    Disposition considerations: Admit.    At the conclusion of the encounter I discussed the results of all of the tests and the disposition. The questions were answered. The patient or family acknowledged understanding and was agreeable with the care plan.    TOTAL CRITICAL CARE TIME (EXCLUDING PROCEDURES): Not applicable    PROCEDURES:   None    EMERGENCY DEPARTMENT COURSE   5:19 PM I met with  the patient to gather history and to perform my initial exam.  We discussed treatment options and the plan for care while in the Emergency Department.    ED INTERVENTIONS     Medications   lidocaine 1 % 0.1-1 mL (has no administration in time range)   lidocaine (LMX4) cream (has no administration in time range)   sodium chloride (PF) 0.9% PF flush 3 mL (3 mLs Intracatheter $Given 12/22/23 2234)   sodium chloride (PF) 0.9% PF flush 3 mL (has no administration in time range)   Patient is already receiving anticoagulation with heparin, enoxaparin (LOVENOX), warfarin (COUMADIN)  or other anticoagulant medication (has no administration in time range)   hydrALAZINE (APRESOLINE) injection 10 mg (has no administration in time range)   acetaminophen (TYLENOL) tablet 650 mg (650 mg Oral $Given 12/22/23 2232)     Or   acetaminophen (TYLENOL) Suppository 650 mg ( Rectal See Alternative 12/22/23 2232)   bisacodyl (DULCOLAX) suppository 10 mg (has no administration in time range)   polyethylene glycol (MIRALAX) Packet 17 g (17 g Oral $Given 12/22/23 2232)   ondansetron (ZOFRAN ODT) ODT tab 4 mg (has no administration in time range)     Or   ondansetron (ZOFRAN) injection 4 mg (has no administration in time range)   prochlorperazine (COMPAZINE) injection 5 mg (has no administration in time range)     Or   prochlorperazine (COMPAZINE) tablet 5 mg (has no administration in time range)     Or   prochlorperazine (COMPAZINE) suppository 12.5 mg (has no administration in time range)   predniSONE (DELTASONE) tablet 40 mg (has no administration in time range)   doxycycline hyclate (VIBRAMYCIN) capsule 100 mg (100 mg Oral $Given 12/22/23 2232)   albuterol (PROVENTIL) neb solution 2.5 mg (has no administration in time range)   amiodarone (PACERONE) tablet 200 mg (has no administration in time range)   apixaban ANTICOAGULANT (ELIQUIS) tablet 5 mg (5 mg Oral $Given 12/22/23 2232)   aspirin EC tablet 81 mg (has no administration in time range)    atorvastatin (LIPITOR) tablet 40 mg (has no administration in time range)   bumetanide (BUMEX) tablet 5 mg (has no administration in time range)   gabapentin (NEURONTIN) capsule 300 mg (300 mg Oral Not Given 12/22/23 2233)   gabapentin (NEURONTIN) capsule 900 mg (900 mg Oral $Given 12/22/23 2232)   isosorbide mononitrate (IMDUR) 24 hr tablet 180 mg (has no administration in time range)   nitroGLYcerin (NITROSTAT) sublingual tablet 0.4 mg (has no administration in time range)   rOPINIRole (REQUIP) tablet 2 mg (2 mg Oral Not Given 12/22/23 2234)   DULoxetine (CYMBALTA) DR capsule 60 mg (has no administration in time range)   diclofenac (VOLTAREN) 1 % topical gel 4 g (4 g Topical Not Given 12/22/23 2235)   lidocaine (LMX4) cream (has no administration in time range)   nicotine Patch in Place ( Transdermal Patch Free Period 12/22/23 2244)   nicotine (NICODERM CQ) 21 MG/24HR 24 hr patch 1 patch (has no administration in time range)   lisinopril (ZESTRIL) tablet 5 mg (has no administration in time range)   ipratropium - albuterol 0.5 mg/2.5 mg/3 mL (DUONEB) neb solution 3 mL (has no administration in time range)   ipratropium - albuterol 0.5 mg/2.5 mg/3 mL (DUONEB) neb solution 3 mL (3 mLs Nebulization $Given 12/22/23 1749)   methylPREDNISolone sodium succinate (solu-MEDROL) injection 125 mg (125 mg Intravenous $Given 12/22/23 1950)   acetaminophen (TYLENOL) tablet 650 mg (650 mg Oral $Given 12/22/23 1949)   rOPINIRole (REQUIP) tablet 2 mg (2 mg Oral $Given 12/22/23 2052)       DISCHARGE MEDICATIONS        Review of your medicines        UNREVIEWED medicines. Ask your doctor about these medicines        Dose / Directions   acetaminophen 500 MG tablet  Commonly known as: TYLENOL      Dose: 1,000 mg  Take 1,000 mg by mouth every 8 hours as needed  Refills: 0     * albuterol 108 (90 Base) MCG/ACT inhaler  Commonly known as: PROAIR HFA/PROVENTIL HFA/VENTOLIN HFA  Ask about: Which instructions should I use?      Dose: 2  puff  Inhale 2 puffs into the lungs 4 times daily  Quantity: 18 g  Refills: 0     * albuterol (2.5 MG/3ML) 0.083% neb solution  Commonly known as: PROVENTIL  Ask about: Which instructions should I use?      Dose: 2.5 mg  Take 1 vial (2.5 mg) by nebulization every 6 hours as needed for shortness of breath, wheezing or cough  Quantity: 90 mL  Refills: 0     amiodarone 200 MG tablet  Commonly known as: PACERONE      Dose: 200 mg  Take 200 mg by mouth daily  Refills: 0     apixaban ANTICOAGULANT 5 MG tablet  Commonly known as: ELIQUIS  Indication: Atrial Fibrillation Not Caused By A Heart Valve Problem  Used for: Atrial fibrillation, unspecified type (H)      Dose: 5 mg  Take 1 tablet (5 mg) by mouth 2 times daily  Quantity: 180 tablet  Refills: 0     aspirin 81 MG EC tablet  Used for: NSTEMI (non-ST elevated myocardial infarction) (H)      Dose: 81 mg  Take 1 tablet (81 mg) by mouth daily  Quantity: 90 tablet  Refills: 0     atorvastatin 40 MG tablet  Commonly known as: LIPITOR      Dose: 40 mg  Take 40 mg by mouth every morning  Refills: 0     bumetanide 1 MG tablet  Commonly known as: BUMEX  Used for: Heart failure with reduced ejection fraction (H)      Dose: 5 mg  Take 5 tablets (5 mg) by mouth 2 times daily  Quantity: 900 tablet  Refills: 3     [employer] Vitamin B12 1000 MCG tablet  Generic drug: vitamin B-12      Dose: 1,000 mcg  Take 1,000 mcg by mouth daily  Refills: 0     DULoxetine 30 MG capsule  Commonly known as: CYMBALTA      Dose: 30 mg  Take 30 mg by mouth every morning  Refills: 0     * gabapentin 300 MG capsule  Commonly known as: NEURONTIN      Dose: 1 capsule  Take 1 capsule by mouth 2 times daily 300 MG AM AND NOON,  MG BY MOUTH DAILY AT BEDTIME  Refills: 0     * gabapentin 300 MG capsule  Commonly known as: NEURONTIN      Dose: 3 capsule  Take 3 capsules by mouth at bedtime 300 MG AM AND NOON,  MG BY MOUTH DAILY AT BEDTIME  Refills: 0     hypromellose-dextran 0.1-0.3 % ophthalmic  solution  Commonly known as: ARTIFICAL TEARS      Dose: 2 drop  Place 2 drops into both eyes 4 times daily as needed  Refills: 0     ipratropium - albuterol 0.5 mg/2.5 mg/3 mL 0.5-2.5 (3) MG/3ML neb solution  Commonly known as: DUONEB      Dose: 3 mL  Inhale 3 mLs into the lungs every 6 hours as needed  Refills: 0     isosorbide mononitrate 60 MG 24 hr tablet  Commonly known as: IMDUR      Dose: 180 mg  Take 180 mg by mouth every morning  Refills: 0     KLOR-CON 20 MEQ CR tablet  Generic drug: potassium chloride ER      Dose: 20 mEq  Take 20 mEq by mouth daily  Refills: 0     nitroGLYcerin 0.4 MG sublingual tablet  Commonly known as: NITROSTAT  Used for: Ischemic cardiomyopathy      Dose: 0.4 mg  Place 1 tablet (0.4 mg) under the tongue every 5 minutes as needed If no relief after 3 tabs call 911;continue 1 tab every 5 min max 3 tab Indications: chest pain  Quantity: 100 tablet  Refills: 1     polyethylene glycol 17 g packet  Commonly known as: MIRALAX      Dose: 17 g  Take 17 g by mouth 2 times daily as needed  Refills: 0     rOPINIRole 2 MG tablet  Commonly known as: REQUIP      Dose: 2 mg  Take 2 mg by mouth 3 times daily  Refills: 0           * This list has 4 medication(s) that are the same as other medications prescribed for you. Read the directions carefully, and ask your doctor or other care provider to review them with you.                    INFORMATION SOURCE AND LIMITATIONS    History/Exam limitations: None  Patient information was obtained from: patient  Use of : N/A  HISTORY OF PRESENT ILLNESS   Zackary Hutchison is a 63 year old year old male with a relevant past history of  CHF, Afib on Eliquis, CAD, COPD, obesity, CKD, HTN, HLD, who presents to this ED via self walk-in for evaluation of leg swelling.    Patient reports he has chronic open sores on his bilateral lower extremities for the past few months that has progressively worsened since. Within the past week, the sores have acutely worsened.  He was evaluated with the nurse at his assisted living facility last night who advised him to go to the ED for evaluation. He also associates shortness of breath, wheezing, and lower extremity edema. He denies any weight gain recently. There were no other concerns/complaints at this time.    REVIEW OF SYSTEMS:   All other systems reviewed and are negative except as noted above in HPI.    PATIENT HISTORY     Past Medical History:   Diagnosis Date    Atrial fibrillation (H)     Chronic kidney disease     Chronic kidney disease, stage 3b (H) 09/28/2023    Class 3 severe obesity due to excess calories with body mass index (BMI) of 40.0 to 44.9 in adult (H) 09/28/2023    Congestive heart failure (H)     COPD (chronic obstructive pulmonary disease) (H)     Coronary artery disease     Coronary artery disease involving coronary bypass graft of native heart without angina pectoris 09/28/2023    Heart failure with reduced ejection fraction (H) 09/11/2023    Hyperlipidemia     Hypertension     Ischemic cardiomyopathy 09/28/2023    Obese     Paroxysmal atrial fibrillation (H) 09/28/2023    Tobacco abuse 09/28/2023     Patient Active Problem List   Diagnosis    Abnormal electrocardiogram    SOB (shortness of breath)    Cellulitis of left lower extremity    Heart failure with reduced ejection fraction (H)    Ischemic cardiomyopathy    Coronary artery disease involving coronary bypass graft of native heart without angina pectoris    Paroxysmal atrial fibrillation (H)    Class 3 severe obesity due to excess calories with body mass index (BMI) of 40.0 to 44.9 in adult (H)    Chronic kidney disease, stage 3b (H)    Tobacco abuse    COPD exacerbation (H)    Venous stasis dermatitis of both lower extremities     Past Surgical History:   Procedure Laterality Date    CORONARY ANGIOGRAPHY ADULT ORDER      CORONARY ARTERY BYPASS      CV CORONARY ANGIOGRAM N/A 09/11/2023    Procedure: Coronary Angiogram;  Surgeon: Santy Esposito MD;   Location: [facility] CATH LAB CV    CV LEFT HEART CATH N/A 09/11/2023    Procedure: Left Heart Catheterization;  Surgeon: Santy Esposito MD;  Location: [facility] CATH [facility] CV    CV RIGHT HEART CATH MEASUREMENTS RECORDED N/A 09/11/2023    Procedure: Right Heart Catheterization;  Surgeon: Santy Esposito MD;  Location: [facility] CV       No Known Allergies    OUTPATIENT MEDICATIONS     Current Discharge Medication List         Vitals:    12/22/23 1844 12/22/23 1845 12/22/23 2054 12/22/23 2314   BP:  (!) 146/69 (!) 140/64 139/66   BP Location:   Left arm Left arm   Patient Position:   Sitting    Cuff Size:   Adult Large    Pulse: 87  89 89   Resp: 22 18 18   Temp:    98  F (36.7  C)   TempSrc:    Oral   SpO2: 95%  94% 93%   Weight:       Height:           Physical Exam   Constitutional: Oriented to person, place, and time. Appears well-developed and well-nourished.   HEENT:    Head: Atraumatic.   Neck: Normal range of motion. Neck supple.   Cardiovascular: Normal rate, regular rhythm and normal heart sounds.    Pulmonary/Chest: Normal effort. Expiratory wheezing.  Abdominal: Soft. Bowel sounds are normal.   Musculoskeletal: Normal range of motion. Edema with open sores on legs.   Neurological: Alert and oriented to person, place, and time. Normal strength. No sensory deficit. No cranial nerve deficit.  Skin: Skin is warm and dry.   Psychiatric: Normal mood and affect. Behavior is normal. Thought content normal.     DIAGNOSTICS    LABORATORY FINDINGS (REVIEWED AND INTERPRETED):  Labs Ordered and Resulted from Time of ED Arrival to Time of ED Departure   BASIC METABOLIC PANEL - Abnormal       Result Value    Sodium 141      Potassium 4.1      Chloride 97 (*)     Carbon Dioxide (CO2) 32 (*)     Anion Gap 12      Urea Nitrogen 28.3 (*)     Creatinine 1.08      GFR Estimate 76      Calcium 9.4      Glucose 133 (*)    TROPONIN T, HIGH SENSITIVITY - Abnormal    Troponin T, High Sensitivity 29 (*)    CBC WITH  PLATELETS AND DIFFERENTIAL - Abnormal    WBC Count 11.3 (*)     RBC Count 4.13 (*)     Hemoglobin 11.8 (*)     Hematocrit 39.9 (*)     MCV 97      MCH 28.6      MCHC 29.6 (*)     RDW 17.2 (*)     Platelet Count 214      % Neutrophils 84      % Lymphocytes 7      % Monocytes 6      % Eosinophils 1      % Basophils 1      % Immature Granulocytes 1      NRBCs per 100 WBC 0      Absolute Neutrophils 9.6 (*)     Absolute Lymphocytes 0.8      Absolute Monocytes 0.6      Absolute Eosinophils 0.1      Absolute Basophils 0.1      Absolute Immature Granulocytes 0.1      Absolute NRBCs 0.0     HEPATIC FUNCTION PANEL - Normal    Protein Total 7.8      Albumin 3.9      Bilirubin Total 0.3      Alkaline Phosphatase 83      AST 17      ALT 9      Bilirubin Direct <0.20     NT PROBNP INPATIENT - Normal    N terminal Pro BNP Inpatient 407     INFLUENZA A/B, RSV, & SARS-COV2 PCR - Normal    Influenza A PCR Negative      Influenza B PCR Negative      RSV PCR Negative      SARS CoV2 PCR Negative           IMAGING (REVIEWED AND INTERPRETED):  Chest XR,  PA & LAT   Final Result   IMPRESSION: Stable chest with again seen hyperinflation of both lungs. Postoperative changes sternotomy. Old healed left rib fractures. Mild to moderate scattered degenerative changes the bony skeleton.            ECG (REVIEWED AND INTERPRETED):   ECG:   Performed at: 17:36  HR:  88 bpm  Rhythm: Sinus  Axis: 129   QRS duration: 132 ms  QTC: 515 ms  ST changes: No ST segment elevation or depression, no T wave inversion,No Q wave  Interpretation: Sinus rhythm with bifascicular block, nonspecific ST-T wave changes  Compared to most recent ECG from: Dec14 2023 no significant change    I have reviewed the patient's ECG, with comments made as listed above. Please see scanned image for full interpretation.         I, Gissell Morales, am serving as a scribe to document services personally performed by Zackary Orellana D.O., based on my observation and the provider s statements  to me.    I, Zackary Orellana D.O., attest that Gissell Morales is acting in a scribe capacity, has observed my performance of the services and has documented them in accordance with my direction.    Zackary Orellana D.O.  EMERGENCY MEDICINE   12/22/23  [facility] EMERGENCY DEPARTMENT  [address] [place] [id number]  [phone]  Dept: [phone]     Zackary Orellana,   12/23/23 0214

## 2023-12-23 LAB
ANION GAP SERPL CALCULATED.3IONS-SCNC: 13 MMOL/L (ref 7–15)
ATRIAL RATE - MUSE: 88 BPM
BUN SERPL-MCNC: 31.3 MG/DL (ref 8–23)
CALCIUM SERPL-MCNC: 9.6 MG/DL (ref 8.8–10.2)
CHLORIDE SERPL-SCNC: 96 MMOL/L (ref 98–107)
CREAT SERPL-MCNC: 1.01 MG/DL (ref 0.67–1.17)
DEPRECATED HCO3 PLAS-SCNC: 30 MMOL/L (ref 22–29)
DIASTOLIC BLOOD PRESSURE - MUSE: NORMAL MMHG
EGFRCR SERPLBLD CKD-EPI 2021: 83 ML/MIN/1.73M2
ERYTHROCYTE [DISTWIDTH] IN BLOOD BY AUTOMATED COUNT: 17 % (ref 10–15)
GLUCOSE SERPL-MCNC: 152 MG/DL (ref 70–99)
HCT VFR BLD AUTO: 39.1 % (ref 40–53)
HGB BLD-MCNC: 11.7 G/DL (ref 13.3–17.7)
HOLD SPECIMEN: NORMAL
INTERPRETATION ECG - MUSE: NORMAL
MCH RBC QN AUTO: 28.3 PG (ref 26.5–33)
MCHC RBC AUTO-ENTMCNC: 29.9 G/DL (ref 31.5–36.5)
MCV RBC AUTO: 94 FL (ref 78–100)
P AXIS - MUSE: 80 DEGREES
PLATELET # BLD AUTO: 205 10E3/UL (ref 150–450)
POTASSIUM SERPL-SCNC: 3.7 MMOL/L (ref 3.4–5.3)
PR INTERVAL - MUSE: 170 MS
QRS DURATION - MUSE: 132 MS
QT - MUSE: 426 MS
QTC - MUSE: 515 MS
R AXIS - MUSE: 129 DEGREES
RBC # BLD AUTO: 4.14 10E6/UL (ref 4.4–5.9)
SODIUM SERPL-SCNC: 139 MMOL/L (ref 135–145)
SYSTOLIC BLOOD PRESSURE - MUSE: NORMAL MMHG
T AXIS - MUSE: 90 DEGREES
TROPONIN T SERPL HS-MCNC: 20 NG/L
VENTRICULAR RATE- MUSE: 88 BPM
WBC # BLD AUTO: 9.3 10E3/UL (ref 4–11)

## 2023-12-23 PROCEDURE — 93010 ELECTROCARDIOGRAM REPORT: CPT | Mod: HIP | Performed by: INTERNAL MEDICINE

## 2023-12-23 PROCEDURE — 94799 UNLISTED PULMONARY SVC/PX: CPT

## 2023-12-23 PROCEDURE — 80048 BASIC METABOLIC PNL TOTAL CA: CPT | Performed by: STUDENT IN AN ORGANIZED HEALTH CARE EDUCATION/TRAINING PROGRAM

## 2023-12-23 PROCEDURE — 93005 ELECTROCARDIOGRAM TRACING: CPT | Performed by: STUDENT IN AN ORGANIZED HEALTH CARE EDUCATION/TRAINING PROGRAM

## 2023-12-23 PROCEDURE — 94640 AIRWAY INHALATION TREATMENT: CPT | Mod: 76

## 2023-12-23 PROCEDURE — 250N000009 HC RX 250

## 2023-12-23 PROCEDURE — 250N000013 HC RX MED GY IP 250 OP 250 PS 637: Performed by: STUDENT IN AN ORGANIZED HEALTH CARE EDUCATION/TRAINING PROGRAM

## 2023-12-23 PROCEDURE — 250N000012 HC RX MED GY IP 250 OP 636 PS 637: Performed by: STUDENT IN AN ORGANIZED HEALTH CARE EDUCATION/TRAINING PROGRAM

## 2023-12-23 PROCEDURE — 85014 HEMATOCRIT: CPT | Performed by: STUDENT IN AN ORGANIZED HEALTH CARE EDUCATION/TRAINING PROGRAM

## 2023-12-23 PROCEDURE — 999N000157 HC STATISTIC RCP TIME EA 10 MIN

## 2023-12-23 PROCEDURE — 250N000011 HC RX IP 250 OP 636: Performed by: STUDENT IN AN ORGANIZED HEALTH CARE EDUCATION/TRAINING PROGRAM

## 2023-12-23 PROCEDURE — 36415 COLL VENOUS BLD VENIPUNCTURE: CPT | Performed by: STUDENT IN AN ORGANIZED HEALTH CARE EDUCATION/TRAINING PROGRAM

## 2023-12-23 PROCEDURE — 120N000001 HC R&B MED SURG/OB

## 2023-12-23 PROCEDURE — G0378 HOSPITAL OBSERVATION PER HR: HCPCS

## 2023-12-23 PROCEDURE — 84484 ASSAY OF TROPONIN QUANT: CPT | Performed by: STUDENT IN AN ORGANIZED HEALTH CARE EDUCATION/TRAINING PROGRAM

## 2023-12-23 PROCEDURE — 94640 AIRWAY INHALATION TREATMENT: CPT

## 2023-12-23 PROCEDURE — 93005 ELECTROCARDIOGRAM TRACING: CPT

## 2023-12-23 PROCEDURE — 99233 SBSQ HOSP IP/OBS HIGH 50: CPT | Performed by: STUDENT IN AN ORGANIZED HEALTH CARE EDUCATION/TRAINING PROGRAM

## 2023-12-23 PROCEDURE — 250N000011 HC RX IP 250 OP 636: Performed by: HOSPITALIST

## 2023-12-23 RX ORDER — CALCIUM CARBONATE 500 MG/1
1000 TABLET, CHEWABLE ORAL 3 TIMES DAILY PRN
Status: DISCONTINUED | OUTPATIENT
Start: 2023-12-23 | End: 2023-12-27 | Stop reason: HOSPADM

## 2023-12-23 RX ORDER — BUMETANIDE 0.25 MG/ML
5 INJECTION INTRAMUSCULAR; INTRAVENOUS
Status: DISCONTINUED | OUTPATIENT
Start: 2023-12-23 | End: 2023-12-25

## 2023-12-23 RX ADMIN — IPRATROPIUM BROMIDE AND ALBUTEROL SULFATE 3 ML: 2.5; .5 SOLUTION RESPIRATORY (INHALATION) at 13:15

## 2023-12-23 RX ADMIN — BUMETANIDE 5 MG: 1 TABLET ORAL at 08:44

## 2023-12-23 RX ADMIN — BUMETANIDE 5 MG: 0.25 INJECTION INTRAMUSCULAR; INTRAVENOUS at 18:53

## 2023-12-23 RX ADMIN — ACETAMINOPHEN 650 MG: 325 TABLET ORAL at 08:38

## 2023-12-23 RX ADMIN — DOXYCYCLINE 100 MG: 100 CAPSULE ORAL at 22:03

## 2023-12-23 RX ADMIN — ACETAMINOPHEN 650 MG: 325 TABLET ORAL at 12:58

## 2023-12-23 RX ADMIN — NITROGLYCERIN 0.4 MG: 0.4 TABLET, ORALLY DISINTEGRATING SUBLINGUAL at 15:02

## 2023-12-23 RX ADMIN — ROPINIROLE HYDROCHLORIDE 2 MG: 1 TABLET, FILM COATED ORAL at 14:28

## 2023-12-23 RX ADMIN — GABAPENTIN 300 MG: 300 CAPSULE ORAL at 12:59

## 2023-12-23 RX ADMIN — PREDNISONE 40 MG: 20 TABLET ORAL at 08:40

## 2023-12-23 RX ADMIN — APIXABAN 5 MG: 5 TABLET, FILM COATED ORAL at 08:40

## 2023-12-23 RX ADMIN — APIXABAN 5 MG: 5 TABLET, FILM COATED ORAL at 22:03

## 2023-12-23 RX ADMIN — IPRATROPIUM BROMIDE AND ALBUTEROL SULFATE 3 ML: 2.5; .5 SOLUTION RESPIRATORY (INHALATION) at 17:07

## 2023-12-23 RX ADMIN — DULOXETINE HYDROCHLORIDE 60 MG: 60 CAPSULE, DELAYED RELEASE PELLETS ORAL at 08:39

## 2023-12-23 RX ADMIN — ISOSORBIDE MONONITRATE 180 MG: 60 TABLET, EXTENDED RELEASE ORAL at 08:39

## 2023-12-23 RX ADMIN — IPRATROPIUM BROMIDE AND ALBUTEROL SULFATE 3 ML: 2.5; .5 SOLUTION RESPIRATORY (INHALATION) at 20:00

## 2023-12-23 RX ADMIN — GABAPENTIN 900 MG: 300 CAPSULE ORAL at 22:03

## 2023-12-23 RX ADMIN — ACETAMINOPHEN 650 MG: 325 TABLET ORAL at 03:47

## 2023-12-23 RX ADMIN — ATORVASTATIN CALCIUM 40 MG: 40 TABLET, FILM COATED ORAL at 08:40

## 2023-12-23 RX ADMIN — GABAPENTIN 300 MG: 300 CAPSULE ORAL at 08:40

## 2023-12-23 RX ADMIN — ROPINIROLE HYDROCHLORIDE 2 MG: 1 TABLET, FILM COATED ORAL at 08:41

## 2023-12-23 RX ADMIN — CALCIUM CARBONATE (ANTACID) CHEW TAB 500 MG 1000 MG: 500 CHEW TAB at 12:59

## 2023-12-23 RX ADMIN — LISINOPRIL 5 MG: 5 TABLET ORAL at 08:39

## 2023-12-23 RX ADMIN — Medication 81 MG: at 08:39

## 2023-12-23 RX ADMIN — DOXYCYCLINE 100 MG: 100 CAPSULE ORAL at 08:40

## 2023-12-23 RX ADMIN — IPRATROPIUM BROMIDE AND ALBUTEROL SULFATE 3 ML: 2.5; .5 SOLUTION RESPIRATORY (INHALATION) at 08:23

## 2023-12-23 RX ADMIN — ROPINIROLE HYDROCHLORIDE 2 MG: 1 TABLET, FILM COATED ORAL at 22:02

## 2023-12-23 RX ADMIN — ACETAMINOPHEN 650 MG: 325 TABLET ORAL at 22:03

## 2023-12-23 RX ADMIN — AMIODARONE HYDROCHLORIDE 200 MG: 200 TABLET ORAL at 08:39

## 2023-12-23 ASSESSMENT — ACTIVITIES OF DAILY LIVING (ADL)
ADLS_ACUITY_SCORE: 25
ADLS_ACUITY_SCORE: 25
ADLS_ACUITY_SCORE: 24
ADLS_ACUITY_SCORE: 25
ADLS_ACUITY_SCORE: 24
ADLS_ACUITY_SCORE: 25
ADLS_ACUITY_SCORE: 24
ADLS_ACUITY_SCORE: 25
ADLS_ACUITY_SCORE: 24
ADLS_ACUITY_SCORE: 24

## 2023-12-23 NOTE — ED NOTES
"[facility] ED Handoff Report    ED Chief Complaint: Leg swelling     ED Diagnosis:  (J44.1) COPD exacerbation (H)  Comment: On room air  Plan: Monitor oxygen saturation and neb treatments as needed.     (I87.2) Venous stasis dermatitis of both lower extremities  Comment: Wound care          PMH:    Past Medical History:   Diagnosis Date    Atrial fibrillation (H)     Chronic kidney disease     Chronic kidney disease, stage 3b (H) 09/28/2023    Class 3 severe obesity due to excess calories with body mass index (BMI) of 40.0 to 44.9 in adult (H) 09/28/2023    Congestive heart failure (H)     COPD (chronic obstructive pulmonary disease) (H)     Coronary artery disease     Coronary artery disease involving coronary bypass graft of native heart without angina pectoris 09/28/2023    Heart failure with reduced ejection fraction (H) 09/11/2023    Hyperlipidemia     Hypertension     Ischemic cardiomyopathy 09/28/2023    Obese     Paroxysmal atrial fibrillation (H) 09/28/2023    Tobacco abuse 09/28/2023        Code Status:  Prior     Falls Risk: Yes Band: Applied    Current Living Situation/Residence: lives alone     Elimination Status: Continent: Yes     Activity Level: SBA w/ walker    Patients Preferred Language:  English     Needed: No    Vital Signs:  BP (!) 146/69   Pulse 87   Temp 99.2  F (37.3  C) (Oral)   Resp 22   Ht 1.676 m (5' 6\")   Wt 122.5 kg (270 lb)   SpO2 95%   BMI 43.58 kg/m       Cardiac Rhythm: NSR with BBB    Pain Score: 7/10 bilateral legs but left is worse then right     Is the Patient Confused:  No    Last Food or Drink: 12/22/23 at 2000    Focused Assessment:  Patient came to the ER for bilateral leg swelling and ulcers on his legs. Patient has 4 plus pitting edema in bilateral legs with open areas are draining a clear yellow drainage. Patient also reports SOB and intermittent chest pain. CP is worse with activity. Lung sound are diminished on inspiration and coarse with " expiratory wheezes on expiration.  Patient is on room air. Alert and oriented x 4.    Tests Performed: Done: Labs and Imaging    Treatments Provided:      Family Dynamics/Concerns: No    Family Updated On Visitor Policy: No    Plan of Care Communicated to Family: No    Who Was Updated about Plan of Care: Patient reports he will call his brother who lives in [place] to update him.     Belongings Checklist Done and Signed by Patient: Yes    Medications sent with patient: No medication to send.     Covid: symptomatic, negative    Additional Information:     RN: Idania Breaux RN   12/22/2023 8:04 PM

## 2023-12-23 NOTE — PLAN OF CARE
"PRIMARY DIAGNOSIS: \"GENERIC\" NURSING  OUTPATIENT/OBSERVATION GOALS TO BE MET BEFORE DISCHARGE:  ADLs back to baseline: No    Activity and level of assistance: Ambulating independently.    Pain status: Improved-controlled with oral pain medications.    Return to near baseline physical activity: No     Discharge Planner Nurse   Safe discharge environment identified: Yes  Barriers to discharge: Yes       Entered by: Hannah Reece RN 12/23/2023 5:59 PM   Dyspnea with exertion and pain  Please review provider order for any additional goals.   Nurse to notify provider when observation goals have been met and patient is ready for discharge.Goal Outcome Evaluation:      Plan of Care Reviewed With: patient                 "

## 2023-12-23 NOTE — PROVIDER NOTIFICATION
PROVIDER NOTIFICATION    Reason for communication:  Patient complained of heartburn.    Team member name: Dr Stallworth    Team member role:  Hospitalist    Method of Communication:  vocera text    Response:  ordered Tums prn.

## 2023-12-23 NOTE — PROGRESS NOTES
[facility]    Medicine Progress Note - Hospitalist Service    Date of Admission:  12/22/2023    Assessment & Plan   Zackary Hutchison is a 63M presents with acute dyspnea, bilateral leg pain; pmhx includes chronic venous stasis, HF (35%, 9/23), CAD (CABG 2v), ICM, A fib (apixaban), CKD3b, tobacco use; admitted for COPD exacerbation, bilateral chronic venous stasis pain.    #COPD exacerbation  Not requiring supplemental O2, but feeling very short of breath. Diffuse wheezing on exam. Significant tobacco use history. Hx of COPD, evidenced by XR demonstration of hyperventilation. No PFTs I can find in chart.  -Not on long-acting inhalers  -Supplemental oxygen, titrated to 88-92%  -Duonebs QID for now  -Doxycycline 100mg PO BID for 5 days  -Prednisone 40mg PO qD    #Chronic HFrEF, 35% ejection fraction  Bilateral LE edematous, but unclear what contribution is from CHF vs his chronic venous stasis. Suspect not in significant exacerbation. Most recent ECHO on 9/23/23 shows 35% EF.  -Bumex 5mg IV BID (home dose 5mg PO BID)  -BB: held per last cardiology note on 11/28  -ACEI/ARB: Restart lisinopril 5mg PO qD  -TOD: None currently  -Device: Consider ICD placement in clinic  -Continue Imdur 180 mg PO qD  -Referral to cardiac rehab placed    #Chronic venous stasis  -gabapentin 300mg PO BID, 900mg at bedtime  -duloxetine INCREASED to 60mg PO every day  -lidocaine gel PRN  -bumex 5mg PO BID  -lymphedema consult    #Prerenal azotemia  - Trend    #Leukocytosis  Chronic venous stasis with ulcerations. No purulence to suggest acute cellulitis. CXR without consolidation.  -CBC trend    #Normocytic anemia  Hgb baseline estimated around 11.  -CBC trend    #Tobacco use  Current 1ppd tobacco use. Precontemplative stage of reduction/cessation.  -NRT PRN    #Atrial fibrillation  -amiodarone 200mg PO every day  -apixaban 5mg PO BID          Diet: Combination Diet Low Saturated Fat Na <2400mg Diet, No Caffeine Diet    DVT  "Prophylaxis: DOAC  Ruvalcaba Catheter: Not present  Lines: None     Cardiac Monitoring: None  Code Status: Full Code      Clinically Significant Risk Factors Present on Admission               # Drug Induced Coagulation Defect: home medication list includes an anticoagulant medication  # Drug Induced Platelet Defect: home medication list includes an antiplatelet medication    # Chronic heart failure with reduced ejection fraction: last echo with EF <40%     # Severe Obesity: Estimated body mass index is 43.58 kg/m  as calculated from the following:    Height as of this encounter: 1.676 m (5' 6\").    Weight as of this encounter: 122.5 kg (270 lb).              Disposition Plan     Expected Discharge Date: 12/24/2023      Destination: home              ANN DUPREE MD  Hospitalist Service  [facility]  Securely message with [employer] (more info)  Text page via [employer] Paging/Directory   ______________________________________________________________________    Interval History   SOB improving. Legs still hurts. Have been swollen recently.    Physical Exam   Vital Signs: Temp: 97.9  F (36.6  C) Temp src: Oral BP: (!) 141/65 Pulse: 104   Resp: 20 SpO2: 92 % O2 Device: None (Room air)    Weight: 270 lbs 0 oz    General Appearance: NAD, obese  Respiratory: Diffuse wheezing in bilateral lung fields  Cardiovascular: RRR. No m/r/g  GI: Soft. NT/ND  Skin: Bilateral lower extremities with superficial skin wounds to the bilateral shins with serous weeping. Surround erythema is mild, no induration  Ext: BLE with 3+ chronic appearing edema    Medical Decision Making       50 MINUTES SPENT BY ME on the date of service doing chart review, history, exam, documentation & further activities per the note.      Data     I have personally reviewed the following data over the past 24 hrs:    9.3  \   11.7 (L)   / 205     139 96 (L) 31.3 (H) /  152 (H)   3.7 30 (H) 1.01 \     ALT: 9 AST: 17 AP: 83 TBILI: 0.3   ALB: 3.9 " TOT PROTEIN: 7.8 LIPASE: N/A     Trop: 22 BNP: 407     Procal: 0.04 CRP: N/A Lactic Acid: N/A         Imaging results reviewed over the past 24 hrs:   Recent Results (from the past 24 hour(s))   Chest XR,  PA & LAT    Narrative    EXAM: XR CHEST 2 VIEWS  LOCATION: [facility]  DATE: 12/22/2023    INDICATION: dyspnea  COMPARISON: 12/14/2023      Impression    IMPRESSION: Stable chest with again seen hyperinflation of both lungs. Postoperative changes sternotomy. Old healed left rib fractures. Mild to moderate scattered degenerative changes the bony skeleton.

## 2023-12-23 NOTE — PROVIDER NOTIFICATION
12/22/23 0655   RCAT Assessment   Reason for Assessment Asthma  (patient with no PFT's and no prior diagnosis of COPD)   Pulmonary Status 1   Surgical Status 0   Chest X-ray 1   Respiratory Pattern 0   Mental Status 0   Breath Sounds 4   Cough Effectiveness 0   Level of Activity 0   O2 Required for SpO2>=92% 0   Acuity Level (points) 6   Acuity Level  4   Re-eval Interval Guideline Every 3 days   Re-evaluation Date 12/25/23   Clinical Indications/Symptoms   Aerosol Therapy History of bronchospasm   Broncho-pulmonary Hygiene History of mucous producing disease   Aerosol Therapy Plan   RT Treatment Nebulizer   Aerosol Treatment Frequency Acuity Level 3: QID/PRN @noc-Mod wheezing/Hx asthma/secretion removal   Anticholinergic/Beta-Andrenergic Agonist Duoneb soln (0.5mg/3mg per 3mL) neb Max 6 doses/24h     RCAT Treatment Plan    Patient Score: 6    Patient Acuity: 4    Clinical Indication for Therapy: Patient with history of Asthma.  No prior history of COPD. No PFT's    Therapy Ordered: Duonebs QID and Albuterol Q4 prn. Flutter valve BID. RCAT updated at this time to reflect protocol frequency for nebs and flutter valve.     Assessment Summary: Patient with crackles and wheezes. Patient with history of CHF and is on Bumex at home. Ejection fraction 35%. Lab results reviewed and Troponin, BNP, and Blood Glucose elevated. Patient already off oxygen prior to transport to P2 from ED. Oxygen saturation 98% on RA and RR 18. Nebs will be available tonight as needed.    Recommend that patient is referred for PFT's and Pulmonary medicine establishment of care at time of discharge. PFT's would be beneficial for initiation of correct MDI's and/or nebs as these medications would be based on how much, if any, obstruction is shown on the PFT's.       Karly Blackwood, RRT/BS/CTTS  12/22/2023

## 2023-12-23 NOTE — PLAN OF CARE
PRIMARY DIAGNOSIS: ACUTE PAIN  OUTPATIENT/OBSERVATION GOALS TO BE MET BEFORE DISCHARGE:  1. Pain Status: Improved-controlled with oral pain medications.    2. Return to near baseline physical activity: No    3. Cleared for discharge by consultants (if involved): No    Discharge Planner Nurse   Safe discharge environment identified: No  Barriers to discharge: Yes       Entered by: Hannah Reece RN 12/23/2023 6:04 PM     Please review provider order for any additional goals.   Nurse to notify provider when observation goals have been met and patient is ready for discharge.Goal Outcome Evaluation:      Plan of Care Reviewed With: patient  Patient complaining of bilateral lower extremities pain 7/10 tylenol given. Helpful  per Patient rated  pain 5/10.  Patient refused to elevated his legs.   Ambulating to bathroom independently with a walker.  Good appetite and good amount of urine output.

## 2023-12-23 NOTE — PLAN OF CARE
Goal Outcome Evaluation:      Plan of Care Reviewed With: patient  1456: Patient complain of chest pain goes across the shoulder.  Prn of Nitroglycerin 0.4 mg given MD notified.   EKG  and troponin ordered.  /68, HR 93,  1505: Bp 136/65 .  1510: Patient reported relief .

## 2023-12-23 NOTE — UTILIZATION REVIEW
"Admission Status; Secondary Review Determination     Under the authority of the Utilization Management Committee, the utilization review process indicated a secondary review on Zackary Hutchison.  The review outcome is based on review of the medical records, discussions with staff, and applying clinical experience noted on the date of the review.     (x) Observation Status Appropriate - This patient does not meet hospital inpatient criteria and is placed in observation status. If this patient's primary payer is Medicare and was admitted as an inpatient, Condition Code 44 should be used and patient status changed to \"observation\".     RATIONALE FOR DETERMINATION   63 year old male with a past history of CHF, Afib on Eliquis, CAD, COPD, obesity, CKD, HTN, HLD, who presents to the emergency department for evaluation of leg swelling. Admitted with COPD exacerbation , no hypoxia , stable vital signs, On oral steroid and antibiotics     The severity of illness, intensity of service provided, expected LOS and risk for adverse outcome make the care appropriate for further observation; however, doesn't meet criteria for hospital inpatient admission. Dr. Stallworth  is notified of this determination and agrees with downgrade of status.      The information on this document is developed by the utilization review team in order for the business office to ensure compliance.  This only denotes the appropriateness of proper admission status and does not reflect the quality of care rendered.         The definitions of Inpatient Status and Observation Status used in making the determination above are those provided in the CMS Coverage Manual, Chapter 1 and Chapter 6, section 70.4.      Sincerely,  rCistian Urias MD  Utilization Review  Physician Advisor  [facility]  "

## 2023-12-23 NOTE — PLAN OF CARE
Goal Outcome Evaluation:  Reports pain in bilateral lower extremities from leg wounds and edema. Tylenol given at bedtimes which is what patient has been taking at home for the pain as well.  Patient reported he was hungry and was requesting food since he hadn't been able to eat all day- [place], crackers/peanut butter and ice cream provided.  Reported no BM in 6 days. Miralax given at bedtime with his pills. Encouraged PO fluid intake.  On IV methylpred for COPD exacerbation. Reports dyspnea on exertion. Sating mid 90s on RA. Does not use O2 at home. Does report he is a smoke, nicotine patch ordered but patient declined so no patch in place.  Cardiology consult tomorrow.  Sitting at edge of bed. Encouraged patient to elevate legs in either bed or chair but he prefers edge of bed.

## 2023-12-23 NOTE — PROGRESS NOTES
RCAT Treatment Plan    Patient Score: 9  Patient Acuity: 4    Clinical Indication for Therapy: COPD    Therapy Ordered: DUONEB Q6H/Flutter valve QID    Assessment Summary: Patient has hx of COPD and upon auscultation was wheezing bilaterally. Patient should continue current therapy. RT will follow.    Zach Joe, RT  12/22/2023

## 2023-12-23 NOTE — TREATMENT PLAN
RESPIRATORY CARE NOTE    Pt on 21%, bs expiratory wheezes, strong congested productive cough, swallowed secretions.    Received duoneb x3 via mouth piece.Flutter valve x1 2cycles of 10 rep.Encourage use of flutter valve between tx.    Bs post tx slighlty improvement of aeration, but still has exp wheezes.    Pt stated he uses nebs at home.    Cont with current poc.    Melita Gomez RT

## 2023-12-23 NOTE — PLAN OF CARE
Problem: Adult Inpatient Plan of Care  Goal: Absence of Hospital-Acquired Illness or Injury  Intervention: Identify and Manage Fall Risk  Recent Flowsheet Documentation  Taken 12/23/2023 0231 by Garrison Forbes RN  Safety Promotion/Fall Prevention:   activity supervised   assistive device/personal items within reach   clutter free environment maintained   nonskid shoes/slippers when out of bed   patient and family education   room near nurse's station   safety round/check completed   Goal Outcome Evaluation:       Patient requested and received prn Tylenol 650 mg for bilateral lower extremities pain with somewhat effective. Patient encouraged elevate his lower extremities while in bed. Slept on and off through the night. No SOB or respiratory distress noted or reported.

## 2023-12-23 NOTE — PROVIDER NOTIFICATION
PROVIDER NOTIFICATION    Reason for communication:  Patient complained of chest pain.    Team member name:  Dr Stallworth     Team member role:  Hospitalist    Method of Communication:  Vocera Text     Response:  Ordered EKG and Troponin stat.

## 2023-12-23 NOTE — PROGRESS NOTES
Care Management Follow Up    Length of Stay (days): 1    Expected Discharge Date: 12/24/2023     Concerns to be Addressed:     discharge planning   Patient plan of care discussed at interdisciplinary rounds: Yes    Anticipated Discharge Disposition:  per team      Anticipated Discharge Services:  per team   Anticipated Discharge DME:  per team     Patient/family educated on Medicare website which has current facility and service quality ratings:  NA  Education Provided on the Discharge Plan:  yes  Patient/Family in Agreement with the Plan:  yes    Referrals Placed by CM/SW:  NA  Private pay costs discussed: Not applicable    Additional Information:  SW attempted to meet with patient to assess. Patient was meeting with another provider. SW will attempt to assess patient in the AM.     BHARATH Phelps

## 2023-12-23 NOTE — H&P
[facility]    History and Physical - Hospitalist Service       Date of Admission:  12/22/2023    Assessment & Plan      Zackary Hutchison is a 63M presents with acute dyspnea, bilateral leg pain; pmhx includes chronic venous stasis, HF (35%, 9/23), CAD (CABG 2v), ICM, A fib (apixaban), CKD3b, tobacco use; admitted for COPD exacerbation, bilateral chronic venous stasis pain.    #COPD exacerbation  Acute increase in supplemental oxygen demands. Hx of COPD, evidenced by XR demonstration of hyperventilation.  NO PFT.  -Supplemental oxygen, titrated to >88%  -CXR  -Duonebs q6h  -albuterol q4h/PRN  -Doxycycline 100mg PO BID for 5 days  -prednisone 40mg PO qD  -RT protocols/consultation  -Pulmonary toilet  -pulmonary med referral/consult  -pulmonary rehabilitation referral prior to discharge    #Heart failure, 35% ejection fraction  NOT in exacerbation. Most recent ECHO on 9/23 shows 35% EF. Trace bilateral peripheral edema.  -bumex 5mg PO BID  -NO home b blockers, held per last cardiology note on 11/28  -ACEI/ARB: restart lisinopril 5mg PO qD  -NO home Spironolactone 25mg PO every day  -nitrates are indicated, Imdur 180 mg PO qD  -Influenza vaccinations prior to discharge  -Referral to cardiac rehab placed  -Referral placed for AICD placement consideration  -Recommended to schedule with PCP within 1 week of discharge    #venous stasis  -procalcitonin pending  -gabapentin 300mg PO BID, 900mg at bedtime  -duloxetine INCREASED to 60mg PO every day  -diclofenac gel PRN  -lidocaine gel PRN  -bumex 5mg PO BID    #prerenal azotemia  Possible early findings of diuresis. Though stable HCO3 prior to diuretic change.  -BMP trend    #leukocytosis  Chronic venous stasis with ulcerations. No purulence to suggest acute cellulitis.  -CBC trend  -procalcitonin    #normocytic anaemia  Hgb baseline estimated around 11.  -CBC trend    #tobacco use  Current 1ppd tobacco use. Precontemplative stage of  "reduction/cessation.  -NRT PRN    #atrial fibrillation  -amiodarone 200mg PO every day  -apixaban 5mg PO BID          Diet: Combination Diet Low Saturated Fat Na <2400mg Diet, No Caffeine Diet  DVT Prophylaxis: DOAC  Ruvalcaba Catheter: Not present  Lines: None     Cardiac Monitoring: None  Code Status: Full Code    Clinically Significant Risk Factors Present on Admission               # Drug Induced Coagulation Defect: home medication list includes an anticoagulant medication  # Drug Induced Platelet Defect: home medication list includes an antiplatelet medication    # Chronic heart failure with reduced ejection fraction: last echo with EF <40%     # Severe Obesity: Estimated body mass index is 43.58 kg/m  as calculated from the following:    Height as of this encounter: 1.676 m (5' 6\").    Weight as of this encounter: 122.5 kg (270 lb).              Disposition Plan      Expected Discharge Date: 12/24/2023                  Anuj Morales MD  Hospitalist Service  [facility]  Securely message with [employer] (more info)  Text page via [employer] Paging/Directory     ______________________________________________________________________    Chief Complaint   Bilateral leg pain    History is obtained from the patient    History of Present Illness   Zackary Hutchison is a 63M presents with acute dyspnea, bilateral leg pain; pmhx includes chronic venous stasis, HF (35%, 9/23), CAD (CABG 2v), ICM, A fib (apixaban), CKD3b, tobacco use; admitted for COPD exacerbation, bilateral chronic venous stasis pain.    Presents with acute on chronic bilateral lower extremity pain, shortness of breath that been progressive over the past few months.  He does notice some increased swelling of his bilateral lower legs, with development of skin wounds.  No chest pain, fever/chills, lightheaded/dizziness, vision change, headache.    He has been working with cardiologist to try to improve his leg swelling, notes that he has been " diagnosed with chronic venous stasis.    Past Medical History    Past Medical History:   Diagnosis Date    Atrial fibrillation (H)     Chronic kidney disease     Chronic kidney disease, stage 3b (H) 09/28/2023    Class 3 severe obesity due to excess calories with body mass index (BMI) of 40.0 to 44.9 in adult (H) 09/28/2023    Congestive heart failure (H)     COPD (chronic obstructive pulmonary disease) (H)     Coronary artery disease     Coronary artery disease involving coronary bypass graft of native heart without angina pectoris 09/28/2023    Heart failure with reduced ejection fraction (H) 09/11/2023    Hyperlipidemia     Hypertension     Ischemic cardiomyopathy 09/28/2023    Obese     Paroxysmal atrial fibrillation (H) 09/28/2023    Tobacco abuse 09/28/2023       Past Surgical History   Past Surgical History:   Procedure Laterality Date    CORONARY ANGIOGRAPHY ADULT ORDER      CORONARY ARTERY BYPASS      CV CORONARY ANGIOGRAM N/A 09/11/2023    Procedure: Coronary Angiogram;  Surgeon: Santy Esposito MD;  Location: [facility] CATH LAB CV    CV LEFT HEART CATH N/A 09/11/2023    Procedure: Left Heart Catheterization;  Surgeon: Santy Esposito MD;  Location: [facility] CATH LAB CV    CV RIGHT HEART CATH MEASUREMENTS RECORDED N/A 09/11/2023    Procedure: Right Heart Catheterization;  Surgeon: Santy Esposito MD;  Location: [facility] CATH LAB CV       Prior to Admission Medications   Prior to Admission Medications   Prescriptions Last Dose Informant Patient Reported? Taking?   [employer] VITAMIN B12 1000 MCG tablet 12/22/2023 at am  Yes Yes   Sig: Take 1,000 mcg by mouth daily   DULoxetine (CYMBALTA) 30 MG capsule 12/22/2023 at am  Yes Yes   Sig: Take 30 mg by mouth every morning   KLOR-CON 20 MEQ CR tablet 12/22/2023 at am  Yes Yes   Sig: Take 20 mEq by mouth daily   acetaminophen (TYLENOL) 500 MG tablet 12/22/2023 at am  Yes Yes   Sig: Take 1,000 mg by mouth every 8 hours as needed   albuterol (PROAIR HFA/PROVENTIL  HFA/VENTOLIN HFA) 108 (90 Base) MCG/ACT inhaler 12/22/2023 at am  No Yes   Sig: Inhale 2 puffs into the lungs 4 times daily   albuterol (PROVENTIL) (2.5 MG/3ML) 0.083% neb solution Past Week at prn  No Yes   Sig: Take 1 vial (2.5 mg) by nebulization every 6 hours as needed for shortness of breath, wheezing or cough   amiodarone (PACERONE) 200 MG tablet 12/22/2023 at am  Yes Yes   Sig: Take 200 mg by mouth daily   apixaban ANTICOAGULANT (ELIQUIS) 5 MG tablet 12/22/2023 at am  No Yes   Sig: Take 1 tablet (5 mg) by mouth 2 times daily   aspirin 81 MG EC tablet Past Week at pt ran out of  med 2 days ago  No Yes   Sig: Take 1 tablet (81 mg) by mouth daily   atorvastatin (LIPITOR) 40 MG tablet 12/22/2023 at am  Yes Yes   Sig: Take 40 mg by mouth every morning   bumetanide (BUMEX) 1 MG tablet 12/22/2023 at am  No Yes   Sig: Take 5 tablets (5 mg) by mouth 2 times daily   gabapentin (NEURONTIN) 300 MG capsule 12/22/2023 at noon  Yes Yes   Sig: Take 1 capsule by mouth 2 times daily 300 MG AM AND NOON,  MG BY MOUTH DAILY AT BEDTIME   gabapentin (NEURONTIN) 300 MG capsule 12/21/2023 at pm  Yes Yes   Sig: Take 3 capsules by mouth at bedtime 300 MG AM AND NOON,  MG BY MOUTH DAILY AT BEDTIME   hypromellose-dextran (ARTIFICAL TEARS) 0.1-0.3 % ophthalmic solution More than a month at prn  Yes Yes   Sig: Place 2 drops into both eyes 4 times daily as needed   ipratropium - albuterol 0.5 mg/2.5 mg/3 mL (DUONEB) 0.5-2.5 (3) MG/3ML neb solution Past Week at prn  Yes Yes   Sig: Inhale 3 mLs into the lungs every 6 hours as needed   isosorbide mononitrate (IMDUR) 60 MG 24 hr tablet 12/22/2023 at am  Yes Yes   Sig: Take 180 mg by mouth every morning   nitroGLYcerin (NITROSTAT) 0.4 MG sublingual tablet 12/21/2023 at pm  No Yes   Sig: Place 1 tablet (0.4 mg) under the tongue every 5 minutes as needed If no relief after 3 tabs call 911;continue 1 tab every 5 min max 3 tab Indications: chest pain   polyethylene glycol (MIRALAX) 17  g packet Past Week at pt states hasnt been taking daily past 2 weeks  Yes Yes   Sig: Take 17 g by mouth 2 times daily as needed   rOPINIRole (REQUIP) 2 MG tablet 12/22/2023 at am  Yes Yes   Sig: Take 2 mg by mouth 3 times daily      Facility-Administered Medications: None        Review of Systems    The 10 point Review of Systems is negative other than noted in the HPI or here.      Physical Exam   Vital Signs: Temp: 99.2  F (37.3  C) Temp src: Oral BP: (!) 140/64 Pulse: 89   Resp: 18 SpO2: 94 % O2 Device: None (Room air)    Weight: 270 lbs 0 oz    Constitutional: awake, alert, cooperative, no apparent distress, and appears stated age  Respiratory: CTAB  Cardiovascular: RRR no m/g/r, trace bilateral LE edema at level of knee  GI: soft, nontender, nondistended  Musculoskeletal: shoulder/elbow flexion/extension 5/5 bilaterally and symmetric; bilateral lower extremities with skin tightness, multiple weeping grade 1 ulderations, NO purulence.  Neurologic: AOx4, no focal deficits    Medical Decision Making       45 MINUTES SPENT BY ME on the date of service doing chart review, history, exam, documentation & further activities per the note.  MANAGEMENT DISCUSSED with the following over the past 24 hours: patient   NOTE(S)/MEDICAL RECORDS REVIEWED over the past 24 hours: outpatient cardiology, outpatient FM  Tests ORDERED & REVIEWED in the past 24 hours:  - See lab/imaging results included in the data section of the note      Data     I have personally reviewed the following data over the past 24 hrs:    11.3 (H)  \   11.8 (L)   / 214     141 97 (L) 28.3 (H) /  133 (H)   4.1 32 (H) 1.08 \     ALT: 9 AST: 17 AP: 83 TBILI: 0.3   ALB: 3.9 TOT PROTEIN: 7.8 LIPASE: N/A     Trop: 22 BNP: 407       Imaging results reviewed over the past 24 hrs:   Recent Results (from the past 24 hour(s))   Chest XR,  PA & LAT    Narrative    EXAM: XR CHEST 2 VIEWS  LOCATION: [facility]  DATE: 12/22/2023    INDICATION:  dyspnea  COMPARISON: 12/14/2023      Impression    IMPRESSION: Stable chest with again seen hyperinflation of both lungs. Postoperative changes sternotomy. Old healed left rib fractures. Mild to moderate scattered degenerative changes the bony skeleton.

## 2023-12-23 NOTE — MEDICATION SCRIBE - ADMISSION MEDICATION HISTORY
Medication Scribe Admission Medication History    Admission medication history is complete. The information provided in this note is only as accurate as the sources available at the time of the update.    Information Source(s): Patient and CareEverywhere/SureScripts via in-person    Pertinent Information: pt states manages his own med's  Pt states no longer taking metolazone,metoprolol,spironolactone  Pt states only using one albuterol inhaler, pt states 2 puffs 4 times daily    Changes made to PTA medication list:  Added: None  Deleted: None  Changed: None    Medication Affordability:  Not including over the counter (OTC) medications, was there a time in the past 3 months when you did not take your medications as prescribed because of cost?: No    Allergies reviewed with patient and updates made in EHR: yes    Medication History Completed By: CARYL LITTLE 12/22/2023 7:10 PM    PTA Med List   Medication Sig Last Dose    acetaminophen (TYLENOL) 500 MG tablet Take 1,000 mg by mouth every 8 hours as needed 12/22/2023 at am    albuterol (PROAIR HFA/PROVENTIL HFA/VENTOLIN HFA) 108 (90 Base) MCG/ACT inhaler Inhale 2 puffs into the lungs 4 times daily 12/22/2023 at am    albuterol (PROVENTIL) (2.5 MG/3ML) 0.083% neb solution Take 1 vial (2.5 mg) by nebulization every 6 hours as needed for shortness of breath, wheezing or cough Past Week at prn    amiodarone (PACERONE) 200 MG tablet Take 200 mg by mouth daily 12/22/2023 at am    apixaban ANTICOAGULANT (ELIQUIS) 5 MG tablet Take 1 tablet (5 mg) by mouth 2 times daily 12/22/2023 at am    aspirin 81 MG EC tablet Take 1 tablet (81 mg) by mouth daily Past Week at pt ran out of  med 2 days ago    atorvastatin (LIPITOR) 40 MG tablet Take 40 mg by mouth every morning 12/22/2023 at am    bumetanide (BUMEX) 1 MG tablet Take 5 tablets (5 mg) by mouth 2 times daily 12/22/2023 at am    [facility] VITAMIN B12 1000 MCG tablet Take 1,000 mcg by mouth daily 12/22/2023 at am    DULoxetine  (CYMBALTA) 30 MG capsule Take 30 mg by mouth every morning 12/22/2023 at am    gabapentin (NEURONTIN) 300 MG capsule Take 1 capsule by mouth 2 times daily 300 MG AM AND NOON,  MG BY MOUTH DAILY AT BEDTIME 12/22/2023 at noon    gabapentin (NEURONTIN) 300 MG capsule Take 3 capsules by mouth at bedtime 300 MG AM AND NOON,  MG BY MOUTH DAILY AT BEDTIME 12/21/2023 at pm    hypromellose-dextran (ARTIFICAL TEARS) 0.1-0.3 % ophthalmic solution Place 2 drops into both eyes 4 times daily as needed More than a month at prn    ipratropium - albuterol 0.5 mg/2.5 mg/3 mL (DUONEB) 0.5-2.5 (3) MG/3ML neb solution Inhale 3 mLs into the lungs every 6 hours as needed Past Week at prn    isosorbide mononitrate (IMDUR) 60 MG 24 hr tablet Take 180 mg by mouth every morning 12/22/2023 at am    KLOR-CON 20 MEQ CR tablet Take 20 mEq by mouth daily 12/22/2023 at am    nitroGLYcerin (NITROSTAT) 0.4 MG sublingual tablet Place 1 tablet (0.4 mg) under the tongue every 5 minutes as needed If no relief after 3 tabs call 911;continue 1 tab every 5 min max 3 tab Indications: chest pain 12/21/2023 at pm    polyethylene glycol (MIRALAX) 17 g packet Take 17 g by mouth 2 times daily as needed Past Week at  states hasnt been taking daily past 2 weeks    rOPINIRole (REQUIP) 2 MG tablet Take 2 mg by mouth 3 times daily 12/22/2023 at am

## 2023-12-24 LAB
ANION GAP SERPL CALCULATED.3IONS-SCNC: 12 MMOL/L (ref 7–15)
ATRIAL RATE - MUSE: 99 BPM
BUN SERPL-MCNC: 32.6 MG/DL (ref 8–23)
CALCIUM SERPL-MCNC: 9.3 MG/DL (ref 8.8–10.2)
CHLORIDE SERPL-SCNC: 95 MMOL/L (ref 98–107)
CREAT SERPL-MCNC: 1.01 MG/DL (ref 0.67–1.17)
DEPRECATED HCO3 PLAS-SCNC: 30 MMOL/L (ref 22–29)
DIASTOLIC BLOOD PRESSURE - MUSE: NORMAL MMHG
EGFRCR SERPLBLD CKD-EPI 2021: 83 ML/MIN/1.73M2
ERYTHROCYTE [DISTWIDTH] IN BLOOD BY AUTOMATED COUNT: 17 % (ref 10–15)
GLUCOSE SERPL-MCNC: 101 MG/DL (ref 70–99)
HCT VFR BLD AUTO: 38.9 % (ref 40–53)
HGB BLD-MCNC: 11.9 G/DL (ref 13.3–17.7)
INTERPRETATION ECG - MUSE: NORMAL
MCH RBC QN AUTO: 28.5 PG (ref 26.5–33)
MCHC RBC AUTO-ENTMCNC: 30.6 G/DL (ref 31.5–36.5)
MCV RBC AUTO: 93 FL (ref 78–100)
P AXIS - MUSE: 91 DEGREES
PLATELET # BLD AUTO: 194 10E3/UL (ref 150–450)
POTASSIUM SERPL-SCNC: 3.4 MMOL/L (ref 3.4–5.3)
PR INTERVAL - MUSE: 182 MS
QRS DURATION - MUSE: 148 MS
QT - MUSE: 424 MS
QTC - MUSE: 544 MS
R AXIS - MUSE: 84 DEGREES
RBC # BLD AUTO: 4.17 10E6/UL (ref 4.4–5.9)
SODIUM SERPL-SCNC: 137 MMOL/L (ref 135–145)
SYSTOLIC BLOOD PRESSURE - MUSE: NORMAL MMHG
T AXIS - MUSE: 10 DEGREES
VENTRICULAR RATE- MUSE: 99 BPM
WBC # BLD AUTO: 10.4 10E3/UL (ref 4–11)

## 2023-12-24 PROCEDURE — 250N000011 HC RX IP 250 OP 636: Performed by: HOSPITALIST

## 2023-12-24 PROCEDURE — 85027 COMPLETE CBC AUTOMATED: CPT | Performed by: STUDENT IN AN ORGANIZED HEALTH CARE EDUCATION/TRAINING PROGRAM

## 2023-12-24 PROCEDURE — 250N000009 HC RX 250

## 2023-12-24 PROCEDURE — 250N000012 HC RX MED GY IP 250 OP 636 PS 637: Performed by: STUDENT IN AN ORGANIZED HEALTH CARE EDUCATION/TRAINING PROGRAM

## 2023-12-24 PROCEDURE — 99233 SBSQ HOSP IP/OBS HIGH 50: CPT | Performed by: STUDENT IN AN ORGANIZED HEALTH CARE EDUCATION/TRAINING PROGRAM

## 2023-12-24 PROCEDURE — G0378 HOSPITAL OBSERVATION PER HR: HCPCS

## 2023-12-24 PROCEDURE — 36415 COLL VENOUS BLD VENIPUNCTURE: CPT | Performed by: STUDENT IN AN ORGANIZED HEALTH CARE EDUCATION/TRAINING PROGRAM

## 2023-12-24 PROCEDURE — 250N000013 HC RX MED GY IP 250 OP 250 PS 637: Performed by: HOSPITALIST

## 2023-12-24 PROCEDURE — 80048 BASIC METABOLIC PNL TOTAL CA: CPT | Performed by: STUDENT IN AN ORGANIZED HEALTH CARE EDUCATION/TRAINING PROGRAM

## 2023-12-24 PROCEDURE — 120N000001 HC R&B MED SURG/OB

## 2023-12-24 PROCEDURE — 250N000013 HC RX MED GY IP 250 OP 250 PS 637: Performed by: STUDENT IN AN ORGANIZED HEALTH CARE EDUCATION/TRAINING PROGRAM

## 2023-12-24 PROCEDURE — 250N000011 HC RX IP 250 OP 636: Performed by: STUDENT IN AN ORGANIZED HEALTH CARE EDUCATION/TRAINING PROGRAM

## 2023-12-24 PROCEDURE — 94640 AIRWAY INHALATION TREATMENT: CPT

## 2023-12-24 PROCEDURE — 999N000157 HC STATISTIC RCP TIME EA 10 MIN

## 2023-12-24 PROCEDURE — 250N000013 HC RX MED GY IP 250 OP 250 PS 637: Performed by: INTERNAL MEDICINE

## 2023-12-24 RX ORDER — SENNOSIDES 8.6 MG
2 TABLET ORAL 2 TIMES DAILY
Status: DISCONTINUED | OUTPATIENT
Start: 2023-12-24 | End: 2023-12-27 | Stop reason: HOSPADM

## 2023-12-24 RX ORDER — POLYETHYLENE GLYCOL 3350 17 G/17G
17 POWDER, FOR SOLUTION ORAL DAILY
Status: DISCONTINUED | OUTPATIENT
Start: 2023-12-25 | End: 2023-12-27 | Stop reason: HOSPADM

## 2023-12-24 RX ORDER — OXYCODONE HYDROCHLORIDE 5 MG/1
5 TABLET ORAL EVERY 4 HOURS PRN
Status: DISCONTINUED | OUTPATIENT
Start: 2023-12-24 | End: 2023-12-26

## 2023-12-24 RX ORDER — NALOXONE HYDROCHLORIDE 0.4 MG/ML
0.2 INJECTION, SOLUTION INTRAMUSCULAR; INTRAVENOUS; SUBCUTANEOUS
Status: DISCONTINUED | OUTPATIENT
Start: 2023-12-24 | End: 2023-12-27 | Stop reason: HOSPADM

## 2023-12-24 RX ORDER — NALOXONE HYDROCHLORIDE 0.4 MG/ML
0.4 INJECTION, SOLUTION INTRAMUSCULAR; INTRAVENOUS; SUBCUTANEOUS
Status: DISCONTINUED | OUTPATIENT
Start: 2023-12-24 | End: 2023-12-27 | Stop reason: HOSPADM

## 2023-12-24 RX ORDER — ACETAMINOPHEN 325 MG/1
975 TABLET ORAL 3 TIMES DAILY PRN
Status: DISCONTINUED | OUTPATIENT
Start: 2023-12-24 | End: 2023-12-27 | Stop reason: HOSPADM

## 2023-12-24 RX ORDER — POLYETHYLENE GLYCOL 3350 17 G/17G
17 POWDER, FOR SOLUTION ORAL DAILY
Status: DISCONTINUED | OUTPATIENT
Start: 2023-12-24 | End: 2023-12-24

## 2023-12-24 RX ORDER — ALBUTEROL SULFATE 90 UG/1
2 AEROSOL, METERED RESPIRATORY (INHALATION)
Status: DISCONTINUED | OUTPATIENT
Start: 2023-12-24 | End: 2023-12-27 | Stop reason: HOSPADM

## 2023-12-24 RX ADMIN — ACETAMINOPHEN 975 MG: 325 TABLET ORAL at 14:18

## 2023-12-24 RX ADMIN — SENNOSIDES 2 TABLET: 8.6 TABLET, FILM COATED ORAL at 20:04

## 2023-12-24 RX ADMIN — POLYETHYLENE GLYCOL 3350 17 G: 17 POWDER, FOR SOLUTION ORAL at 12:59

## 2023-12-24 RX ADMIN — GABAPENTIN 900 MG: 300 CAPSULE ORAL at 20:03

## 2023-12-24 RX ADMIN — DULOXETINE HYDROCHLORIDE 60 MG: 60 CAPSULE, DELAYED RELEASE PELLETS ORAL at 08:00

## 2023-12-24 RX ADMIN — AMIODARONE HYDROCHLORIDE 200 MG: 200 TABLET ORAL at 08:00

## 2023-12-24 RX ADMIN — Medication 81 MG: at 08:00

## 2023-12-24 RX ADMIN — APIXABAN 5 MG: 5 TABLET, FILM COATED ORAL at 08:00

## 2023-12-24 RX ADMIN — PREDNISONE 40 MG: 20 TABLET ORAL at 08:00

## 2023-12-24 RX ADMIN — ROPINIROLE HYDROCHLORIDE 2 MG: 1 TABLET, FILM COATED ORAL at 08:00

## 2023-12-24 RX ADMIN — ALBUTEROL SULFATE 2 PUFF: 90 AEROSOL, METERED RESPIRATORY (INHALATION) at 23:08

## 2023-12-24 RX ADMIN — BUMETANIDE 5 MG: 0.25 INJECTION INTRAMUSCULAR; INTRAVENOUS at 17:12

## 2023-12-24 RX ADMIN — IPRATROPIUM BROMIDE AND ALBUTEROL SULFATE 3 ML: 2.5; .5 SOLUTION RESPIRATORY (INHALATION) at 19:32

## 2023-12-24 RX ADMIN — OXYCODONE HYDROCHLORIDE 5 MG: 5 TABLET ORAL at 17:12

## 2023-12-24 RX ADMIN — GABAPENTIN 300 MG: 300 CAPSULE ORAL at 12:59

## 2023-12-24 RX ADMIN — ATORVASTATIN CALCIUM 40 MG: 40 TABLET, FILM COATED ORAL at 08:00

## 2023-12-24 RX ADMIN — IPRATROPIUM BROMIDE AND ALBUTEROL SULFATE 3 ML: 2.5; .5 SOLUTION RESPIRATORY (INHALATION) at 15:46

## 2023-12-24 RX ADMIN — ROPINIROLE HYDROCHLORIDE 2 MG: 1 TABLET, FILM COATED ORAL at 14:17

## 2023-12-24 RX ADMIN — IPRATROPIUM BROMIDE AND ALBUTEROL SULFATE 3 ML: 2.5; .5 SOLUTION RESPIRATORY (INHALATION) at 11:50

## 2023-12-24 RX ADMIN — DOXYCYCLINE 100 MG: 100 CAPSULE ORAL at 20:04

## 2023-12-24 RX ADMIN — SALINE NASAL SPRAY 1 SPRAY: 1.5 SOLUTION NASAL at 14:17

## 2023-12-24 RX ADMIN — ISOSORBIDE MONONITRATE 180 MG: 60 TABLET, EXTENDED RELEASE ORAL at 07:59

## 2023-12-24 RX ADMIN — DOXYCYCLINE 100 MG: 100 CAPSULE ORAL at 08:00

## 2023-12-24 RX ADMIN — OXYCODONE HYDROCHLORIDE 5 MG: 5 TABLET ORAL at 13:05

## 2023-12-24 RX ADMIN — IPRATROPIUM BROMIDE AND ALBUTEROL SULFATE 3 ML: 2.5; .5 SOLUTION RESPIRATORY (INHALATION) at 08:50

## 2023-12-24 RX ADMIN — LISINOPRIL 5 MG: 5 TABLET ORAL at 08:00

## 2023-12-24 RX ADMIN — APIXABAN 5 MG: 5 TABLET, FILM COATED ORAL at 20:04

## 2023-12-24 RX ADMIN — ACETAMINOPHEN 650 MG: 325 TABLET ORAL at 07:59

## 2023-12-24 RX ADMIN — ROPINIROLE HYDROCHLORIDE 2 MG: 1 TABLET, FILM COATED ORAL at 20:04

## 2023-12-24 RX ADMIN — GABAPENTIN 300 MG: 300 CAPSULE ORAL at 08:00

## 2023-12-24 RX ADMIN — POLYETHYLENE GLYCOL 3350 17 G: 17 POWDER, FOR SOLUTION ORAL at 17:29

## 2023-12-24 RX ADMIN — BUMETANIDE 5 MG: 0.25 INJECTION INTRAMUSCULAR; INTRAVENOUS at 08:01

## 2023-12-24 ASSESSMENT — ACTIVITIES OF DAILY LIVING (ADL)
DEPENDENT_IADLS:: COOKING;CLEANING
ADLS_ACUITY_SCORE: 24

## 2023-12-24 NOTE — PROGRESS NOTES
Care Management Initial Consult    General Information  Assessment completed with: Patient,    Type of CM/SW Visit: Initial Assessment    Primary Care Provider verified and updated as needed:     Readmission within the last 30 days:        Reason for Consult: discharge planning  Advance Care Planning:            Communication Assessment  Patient's communication style: spoken language (English or Bilingual)    Hearing Difficulty or Deaf: no   Wear Glasses or Blind: yes    Cognitive  Cognitive/Neuro/Behavioral: WDL                      Living Environment:   People in home: alone     Current living Arrangements: assisted living, independent living facility  Name of Facility: [facility]   Able to return to prior arrangements:         Family/Social Support:  Care provided by:    Provides care for:    Marital Status: Single             Description of Support System:           Current Resources:   Patient receiving home care services: No     Community Resources:    Equipment currently used at home: walker, rolling  Supplies currently used at home:      Employment/Financial:  Employment Status: retired        Financial Concerns: none           Does the patient's insurance plan have a 3 day qualifying hospital stay waiver?  No    Lifestyle & Psychosocial Needs:  Social Determinants of Health     Food Insecurity: Not on file   Depression: Not on file   Housing Stability: Not on file   Tobacco Use: High Risk (12/22/2023)    Patient History     Smoking Tobacco Use: Every Day     Smokeless Tobacco Use: Never     Passive Exposure: Not on file   Financial Resource Strain: Not on file   Alcohol Use: Not on file   Transportation Needs: Not on file   Physical Activity: Not on file   Interpersonal Safety: Not on file   Stress: Not on file   Social Connections: Not on file       Functional Status:  Prior to admission patient needed assistance:   Dependent ADLs:: Independent, Ambulation-walker  Dependent IADLs:: Cooking,  Cleaning       Mental Health Status:  Mental Health Status: No Current Concerns       Chemical Dependency Status:  Chemical Dependency Status: No Current Concerns             Values/Beliefs:  Spiritual, Cultural Beliefs, Latter day Practices, Values that affect care:                 Additional Information:  Patient is from Independent Living at [facility]. Patient reports that they also have assisted living within their facility. Patient reports that he lives alone, and may need to move to the higher level of care within the facility. Patient is independent, but does receive housekeeping services and meals from the facility. Patient reports he manages his own medications, and utilizes [employer] for transportation. CM to follow for discharge needs and recs. Transportation TBD. Transportation costs were discussed if needing a wheelchair ride home.     BHARATH Phelps

## 2023-12-24 NOTE — PLAN OF CARE
PRIMARY DIAGNOSIS: COPD exacerbation, Venous stasis dermatitis of both lower extremities   OUTPATIENT/OBSERVATION GOALS TO BE MET BEFORE DISCHARGE:  ADLs back to baseline: Yes    Activity and level of assistance: Ambulating independently.    Pain status: Improved-controlled with oral pain medications.    Return to near baseline physical activity: Yes     Discharge Planner Nurse   Safe discharge environment identified: No  Barriers to discharge: Yes       Entered by: Roxanna Gates RN 12/24/2023 11:14 AM     Patient VSS on RA this morning, A+Ox4, endorsing BLE pain 7/10, admin of prn APAP per request, admin effective per patient. Receiving IV Bumex and neb treatments. C/o constipation and nasal congestion, MD notified. Continuing to monitor.    For detailed vital signs and assessments, please see documentation flowsheets. For detailed medication administrations, please see VAUGHN Gates RN 12/24/2023  [phone]

## 2023-12-24 NOTE — PROGRESS NOTES
[facility]    Medicine Progress Note - Hospitalist Service    Date of Admission:  12/22/2023    Assessment & Plan   Zackary Hutchison is a 63M presents with acute dyspnea, bilateral leg pain; pmhx includes chronic venous stasis, HF (35%, 9/23), CAD (CABG 2v), ICM, A fib (apixaban), CKD3b, tobacco use; admitted for COPD exacerbation, bilateral chronic venous stasis pain.    #COPD exacerbation  Not requiring supplemental O2, but feeling very short of breath. Diffuse wheezing on exam. Significant tobacco use history. Hx of COPD, evidenced by XR demonstration of hyperventilation. No PFTs I can find in chart.  -Not on long-acting inhalers  -Duonebs QID for now  -Doxycycline 100mg PO BID for 5 days  -Prednisone 40mg PO qD    #Chronic HFrEF, 35% ejection fraction  Bilateral LE edematous, but unclear what contribution is from CHF vs his chronic venous stasis. Suspect not in significant exacerbation. Most recent ECHO on 9/23/23 shows 35% EF.  -Bumex 5mg IV BID (home dose 5mg PO BID)  -BB: held per last cardiology note on 11/28  -ACEI/ARB: Restart lisinopril 5mg PO qD  -TOD: None currently  -Device: Consider ICD placement in clinic  -Continue Imdur 180 mg PO qD  -Referral to cardiac rehab placed    #Chronic venous stasis  -gabapentin 300mg PO BID, 900mg at bedtime  -duloxetine increased to 60mg PO every day  -lidocaine gel PRN  -bumex 5mg BID  -lymphedema consult, wound care consult    #Prerenal azotemia  - Trend    #Leukocytosis  Chronic venous stasis with ulcerations. No purulence to suggest acute cellulitis. CXR without consolidation.  -CBC trend    #Normocytic anemia  Hgb baseline estimated around 11.  -CBC trend    #Tobacco use  Current 1ppd tobacco use. Precontemplative stage of reduction/cessation.  -NRT PRN    #Atrial fibrillation  -amiodarone 200mg PO every day  -apixaban 5mg PO BID          Diet: Combination Diet Low Saturated Fat Na <2400mg Diet, No Caffeine Diet    DVT Prophylaxis: DOAC  Ruvalcaba  "Catheter: Not present  Lines: None     Cardiac Monitoring: None  Code Status: Full Code      Clinically Significant Risk Factors Present on Admission               # Drug Induced Coagulation Defect: home medication list includes an anticoagulant medication  # Drug Induced Platelet Defect: home medication list includes an antiplatelet medication    # Acute heart failure with reduced ejection fraction: last echo with EF <40% and receiving IV diuretics     # Severe Obesity: Estimated body mass index is 43.58 kg/m  as calculated from the following:    Height as of this encounter: 1.676 m (5' 6\").    Weight as of this encounter: 122.5 kg (270 lb).       # Financial/Environmental Concerns: none         Disposition Plan     Expected Discharge Date: 12/25/2023      Destination: home              ANN DUPREE MD  Hospitalist Service  [facility]  Securely message with [employer]sl[employer] (more info)  Text page via Identified Paging/Directory   ______________________________________________________________________    Interval History   Still with ongoing wheezing, still not requiring O2. Legs a little better.    Physical Exam   Vital Signs: Temp: 97.7  F (36.5  C) Temp src: Oral BP: 137/82 Pulse: 81   Resp: 24 SpO2: 95 % O2 Device: None (Room air)    Weight: 270 lbs 0 oz    General Appearance: NAD, obese  Respiratory: Diffuse wheezing in bilateral lung fields  Cardiovascular: RRR. No m/r/g  GI: Soft. NT/ND  Skin: Bilateral lower extremities with superficial skin wounds to the bilateral shins with serous weeping. Surround erythema is mild, no induration  Ext: BLE with 3+ chronic appearing edema    Medical Decision Making       50 MINUTES SPENT BY ME on the date of service doing chart review, history, exam, documentation & further activities per the note.      Data     I have personally reviewed the following data over the past 24 hrs:    10.4  \   11.9 (L)   / 194     137 95 (L) 32.6 (H) /  101 (H)   3.4 30 (H) " 1.01 \     Trop: 20 BNP: N/A       Imaging results reviewed over the past 24 hrs:   No results found for this or any previous visit (from the past 24 hour(s)).

## 2023-12-24 NOTE — PROGRESS NOTES
"Pt was seen on room air. Duoneb given x1. Breath sounds were expiratory wheezes pre neb and increased in aeration post neb. Pt tolerated treatment well. RT following.    /63 (BP Location: Left arm)   Pulse 92   Temp 98  F (36.7  C) (Oral)   Resp 24   Ht 1.676 m (5' 6\")   Wt 122.5 kg (270 lb)   SpO2 92%   BMI 43.58 kg/m       Tammie Cohen, RT   "

## 2023-12-24 NOTE — PLAN OF CARE
Goal Outcome Evaluation:      Plan of Care Reviewed With: patient          PRIMARY DIAGNOSIS: ACUTE PAIN  OUTPATIENT/OBSERVATION GOALS TO BE MET BEFORE DISCHARGE:  1. Pain Status: Improved-controlled with oral pain medications.    2. Return to near baseline physical activity: No    3. Cleared for discharge by consultants (if involved): No    Discharge Planner Nurse   Safe discharge environment identified: No  Barriers to discharge: Yes       Entered by: Hannah Reece RN 12/23/2023 6:02 PM   Patient needs to be seen by [facility]/ lymphedema consulted  Please review provider order for any additional goals.   Nurse to notify provider when observation goals have been met and patient is ready for discharge.

## 2023-12-24 NOTE — PLAN OF CARE
PRIMARY DIAGNOSIS: COPD exacerbation, Venous stasis dermatitis of both lower extremities    OUTPATIENT/OBSERVATION GOALS TO BE MET BEFORE DISCHARGE:  ADLs back to baseline: Yes    Activity and level of assistance: Ambulating independently w/ walker    Pain status: Improved-controlled with oral pain medications.    Return to near baseline physical activity: Yes     Discharge Planner Nurse   Safe discharge environment identified: No  Barriers to discharge: Yes       Entered by: Roxanna Gates RN 12/24/2023 2:25 PM     Patient VSS on RA A+Ox4, endorsing BLE pain 5-8/10 this shift, receiving prn APAP and prn oxycodone.  Receiving IV Bumex, 1300 ml urine output this shift. C/o constipation and nasal congestion, prn Miralax given, prn saline nasal spray given. Continuing to monitor.     For detailed vital signs and assessments, please see documentation flowsheets. For detailed medication administrations, please see VAUGHN Gates RN 12/24/2023  [phone]

## 2023-12-25 ENCOUNTER — APPOINTMENT (OUTPATIENT)
Dept: CARDIOLOGY | Facility: HOSPITAL | Age: 63
DRG: 291 | End: 2023-12-25
Attending: HOSPITALIST
Payer: MEDICARE

## 2023-12-25 LAB
ANION GAP SERPL CALCULATED.3IONS-SCNC: 9 MMOL/L (ref 7–15)
BI-PLANE LVEF ECHO: NORMAL
BUN SERPL-MCNC: 33.1 MG/DL (ref 8–23)
CALCIUM SERPL-MCNC: 9 MG/DL (ref 8.8–10.2)
CHLORIDE SERPL-SCNC: 93 MMOL/L (ref 98–107)
CREAT SERPL-MCNC: 1.05 MG/DL (ref 0.67–1.17)
CRP SERPL-MCNC: <3 MG/L
DEPRECATED HCO3 PLAS-SCNC: 33 MMOL/L (ref 22–29)
EGFRCR SERPLBLD CKD-EPI 2021: 79 ML/MIN/1.73M2
ERYTHROCYTE [DISTWIDTH] IN BLOOD BY AUTOMATED COUNT: 17 % (ref 10–15)
GLUCOSE SERPL-MCNC: 92 MG/DL (ref 70–99)
HCT VFR BLD AUTO: 39.5 % (ref 40–53)
HGB BLD-MCNC: 12 G/DL (ref 13.3–17.7)
MAGNESIUM SERPL-MCNC: 2.1 MG/DL (ref 1.7–2.3)
MCH RBC QN AUTO: 28.4 PG (ref 26.5–33)
MCHC RBC AUTO-ENTMCNC: 30.4 G/DL (ref 31.5–36.5)
MCV RBC AUTO: 94 FL (ref 78–100)
PLATELET # BLD AUTO: 195 10E3/UL (ref 150–450)
POTASSIUM SERPL-SCNC: 3.6 MMOL/L (ref 3.4–5.3)
RBC # BLD AUTO: 4.22 10E6/UL (ref 4.4–5.9)
SODIUM SERPL-SCNC: 135 MMOL/L (ref 135–145)
WBC # BLD AUTO: 10.6 10E3/UL (ref 4–11)

## 2023-12-25 PROCEDURE — 93321 DOPPLER ECHO F-UP/LMTD STD: CPT | Mod: 26 | Performed by: GENERAL ACUTE CARE HOSPITAL

## 2023-12-25 PROCEDURE — 999N000156 HC STATISTIC RCP CONSULT EA 30 MIN

## 2023-12-25 PROCEDURE — 250N000011 HC RX IP 250 OP 636: Performed by: HOSPITALIST

## 2023-12-25 PROCEDURE — 36415 COLL VENOUS BLD VENIPUNCTURE: CPT | Performed by: STUDENT IN AN ORGANIZED HEALTH CARE EDUCATION/TRAINING PROGRAM

## 2023-12-25 PROCEDURE — 80048 BASIC METABOLIC PNL TOTAL CA: CPT | Performed by: STUDENT IN AN ORGANIZED HEALTH CARE EDUCATION/TRAINING PROGRAM

## 2023-12-25 PROCEDURE — 250N000009 HC RX 250

## 2023-12-25 PROCEDURE — 250N000013 HC RX MED GY IP 250 OP 250 PS 637: Performed by: STUDENT IN AN ORGANIZED HEALTH CARE EDUCATION/TRAINING PROGRAM

## 2023-12-25 PROCEDURE — 83735 ASSAY OF MAGNESIUM: CPT | Performed by: STUDENT IN AN ORGANIZED HEALTH CARE EDUCATION/TRAINING PROGRAM

## 2023-12-25 PROCEDURE — 250N000012 HC RX MED GY IP 250 OP 636 PS 637: Performed by: STUDENT IN AN ORGANIZED HEALTH CARE EDUCATION/TRAINING PROGRAM

## 2023-12-25 PROCEDURE — 999N000157 HC STATISTIC RCP TIME EA 10 MIN

## 2023-12-25 PROCEDURE — 93325 DOPPLER ECHO COLOR FLOW MAPG: CPT | Mod: 26 | Performed by: GENERAL ACUTE CARE HOSPITAL

## 2023-12-25 PROCEDURE — 120N000001 HC R&B MED SURG/OB

## 2023-12-25 PROCEDURE — 250N000013 HC RX MED GY IP 250 OP 250 PS 637: Performed by: INTERNAL MEDICINE

## 2023-12-25 PROCEDURE — 86140 C-REACTIVE PROTEIN: CPT | Performed by: HOSPITALIST

## 2023-12-25 PROCEDURE — G0378 HOSPITAL OBSERVATION PER HR: HCPCS

## 2023-12-25 PROCEDURE — 85027 COMPLETE CBC AUTOMATED: CPT | Performed by: STUDENT IN AN ORGANIZED HEALTH CARE EDUCATION/TRAINING PROGRAM

## 2023-12-25 PROCEDURE — 94640 AIRWAY INHALATION TREATMENT: CPT | Mod: 76

## 2023-12-25 PROCEDURE — 255N000002 HC RX 255 OP 636: Performed by: HOSPITALIST

## 2023-12-25 PROCEDURE — 250N000013 HC RX MED GY IP 250 OP 250 PS 637: Performed by: HOSPITALIST

## 2023-12-25 PROCEDURE — 94640 AIRWAY INHALATION TREATMENT: CPT

## 2023-12-25 PROCEDURE — 93308 TTE F-UP OR LMTD: CPT | Mod: 26 | Performed by: GENERAL ACUTE CARE HOSPITAL

## 2023-12-25 PROCEDURE — 99233 SBSQ HOSP IP/OBS HIGH 50: CPT | Performed by: HOSPITALIST

## 2023-12-25 PROCEDURE — 93325 DOPPLER ECHO COLOR FLOW MAPG: CPT

## 2023-12-25 PROCEDURE — P9047 ALBUMIN (HUMAN), 25%, 50ML: HCPCS | Performed by: HOSPITALIST

## 2023-12-25 RX ORDER — BUMETANIDE 0.25 MG/ML
2 INJECTION INTRAMUSCULAR; INTRAVENOUS EVERY 8 HOURS
Status: DISCONTINUED | OUTPATIENT
Start: 2023-12-26 | End: 2023-12-25

## 2023-12-25 RX ORDER — ALBUMIN (HUMAN) 12.5 G/50ML
25 SOLUTION INTRAVENOUS ONCE
Status: COMPLETED | OUTPATIENT
Start: 2023-12-25 | End: 2023-12-25

## 2023-12-25 RX ORDER — DULOXETIN HYDROCHLORIDE 30 MG/1
30 CAPSULE, DELAYED RELEASE ORAL EVERY MORNING
Status: DISCONTINUED | OUTPATIENT
Start: 2023-12-26 | End: 2023-12-27 | Stop reason: HOSPADM

## 2023-12-25 RX ORDER — BUMETANIDE 0.25 MG/ML
2 INJECTION INTRAMUSCULAR; INTRAVENOUS EVERY 8 HOURS
Status: DISCONTINUED | OUTPATIENT
Start: 2023-12-25 | End: 2023-12-26

## 2023-12-25 RX ORDER — GABAPENTIN 300 MG/1
300 CAPSULE ORAL AT BEDTIME
Status: DISCONTINUED | OUTPATIENT
Start: 2023-12-25 | End: 2023-12-27 | Stop reason: HOSPADM

## 2023-12-25 RX ORDER — METOLAZONE 5 MG/1
5 TABLET ORAL ONCE
Qty: 1 TABLET | Refills: 0 | Status: COMPLETED | OUTPATIENT
Start: 2023-12-25 | End: 2023-12-25

## 2023-12-25 RX ORDER — GUAIFENESIN 200 MG/10ML
200 LIQUID ORAL EVERY 4 HOURS PRN
Status: DISCONTINUED | OUTPATIENT
Start: 2023-12-25 | End: 2023-12-27 | Stop reason: HOSPADM

## 2023-12-25 RX ADMIN — LISINOPRIL 5 MG: 5 TABLET ORAL at 09:42

## 2023-12-25 RX ADMIN — IPRATROPIUM BROMIDE AND ALBUTEROL SULFATE 3 ML: 2.5; .5 SOLUTION RESPIRATORY (INHALATION) at 08:00

## 2023-12-25 RX ADMIN — ROPINIROLE HYDROCHLORIDE 2 MG: 1 TABLET, FILM COATED ORAL at 09:42

## 2023-12-25 RX ADMIN — SENNOSIDES 2 TABLET: 8.6 TABLET, FILM COATED ORAL at 20:43

## 2023-12-25 RX ADMIN — METOLAZONE 5 MG: 5 TABLET ORAL at 12:49

## 2023-12-25 RX ADMIN — OXYCODONE HYDROCHLORIDE 5 MG: 5 TABLET ORAL at 13:45

## 2023-12-25 RX ADMIN — Medication 81 MG: at 09:42

## 2023-12-25 RX ADMIN — ACETAMINOPHEN 975 MG: 325 TABLET ORAL at 12:30

## 2023-12-25 RX ADMIN — APIXABAN 5 MG: 5 TABLET, FILM COATED ORAL at 20:43

## 2023-12-25 RX ADMIN — ATORVASTATIN CALCIUM 40 MG: 40 TABLET, FILM COATED ORAL at 09:42

## 2023-12-25 RX ADMIN — AMIODARONE HYDROCHLORIDE 200 MG: 200 TABLET ORAL at 09:42

## 2023-12-25 RX ADMIN — ROPINIROLE HYDROCHLORIDE 2 MG: 1 TABLET, FILM COATED ORAL at 20:43

## 2023-12-25 RX ADMIN — DOXYCYCLINE 100 MG: 100 CAPSULE ORAL at 09:42

## 2023-12-25 RX ADMIN — OXYCODONE HYDROCHLORIDE 5 MG: 5 TABLET ORAL at 18:21

## 2023-12-25 RX ADMIN — ALBUTEROL SULFATE 2 PUFF: 90 AEROSOL, METERED RESPIRATORY (INHALATION) at 02:52

## 2023-12-25 RX ADMIN — DULOXETINE HYDROCHLORIDE 60 MG: 60 CAPSULE, DELAYED RELEASE PELLETS ORAL at 09:42

## 2023-12-25 RX ADMIN — OXYCODONE HYDROCHLORIDE 5 MG: 5 TABLET ORAL at 06:44

## 2023-12-25 RX ADMIN — APIXABAN 5 MG: 5 TABLET, FILM COATED ORAL at 09:42

## 2023-12-25 RX ADMIN — POLYETHYLENE GLYCOL 3350 17 G: 17 POWDER, FOR SOLUTION ORAL at 09:41

## 2023-12-25 RX ADMIN — GABAPENTIN 300 MG: 300 CAPSULE ORAL at 20:43

## 2023-12-25 RX ADMIN — IPRATROPIUM BROMIDE AND ALBUTEROL SULFATE 3 ML: 2.5; .5 SOLUTION RESPIRATORY (INHALATION) at 19:19

## 2023-12-25 RX ADMIN — PREDNISONE 40 MG: 20 TABLET ORAL at 09:42

## 2023-12-25 RX ADMIN — BUMETANIDE 5 MG: 0.25 INJECTION INTRAMUSCULAR; INTRAVENOUS at 09:41

## 2023-12-25 RX ADMIN — IPRATROPIUM BROMIDE AND ALBUTEROL SULFATE 3 ML: 2.5; .5 SOLUTION RESPIRATORY (INHALATION) at 11:37

## 2023-12-25 RX ADMIN — ROPINIROLE HYDROCHLORIDE 2 MG: 1 TABLET, FILM COATED ORAL at 13:45

## 2023-12-25 RX ADMIN — ALBUMIN HUMAN 25 G: 0.25 SOLUTION INTRAVENOUS at 18:11

## 2023-12-25 RX ADMIN — BUMETANIDE 2 MG: 0.25 INJECTION INTRAMUSCULAR; INTRAVENOUS at 18:39

## 2023-12-25 RX ADMIN — ISOSORBIDE MONONITRATE 180 MG: 60 TABLET, EXTENDED RELEASE ORAL at 09:42

## 2023-12-25 RX ADMIN — CALCIUM CARBONATE (ANTACID) CHEW TAB 500 MG 1000 MG: 500 CHEW TAB at 09:49

## 2023-12-25 RX ADMIN — DOXYCYCLINE 100 MG: 100 CAPSULE ORAL at 20:43

## 2023-12-25 RX ADMIN — PERFLUTREN 2 ML: 6.52 INJECTION, SUSPENSION INTRAVENOUS at 12:00

## 2023-12-25 RX ADMIN — SENNOSIDES 2 TABLET: 8.6 TABLET, FILM COATED ORAL at 09:42

## 2023-12-25 RX ADMIN — GABAPENTIN 300 MG: 300 CAPSULE ORAL at 09:42

## 2023-12-25 ASSESSMENT — ACTIVITIES OF DAILY LIVING (ADL)
ADLS_ACUITY_SCORE: 27
ADLS_ACUITY_SCORE: 26
ADLS_ACUITY_SCORE: 24
ADLS_ACUITY_SCORE: 24
ADLS_ACUITY_SCORE: 26
ADLS_ACUITY_SCORE: 24
ADLS_ACUITY_SCORE: 26
ADLS_ACUITY_SCORE: 24
ADLS_ACUITY_SCORE: 26
ADLS_ACUITY_SCORE: 24

## 2023-12-25 NOTE — PLAN OF CARE
Problem: Wound  Goal: Improved Oral Intake  Outcome: Progressing     Problem: Pain Chronic (Persistent)  Goal: Optimal Pain Control and Function  Outcome: Progressing  Intervention: Develop Pain Management Plan  Recent Flowsheet Documentation  Taken 12/24/2023 1712 by Jesus Sheth RN  Pain Management Interventions: medication (see MAR)   Goal Outcome Evaluation:       Pt is alert and oriented x4. Reports pain to BLE which is manageable with prn oxycodone. Lung sounds wheezing on inspiration and expiration. Reports shortness of breath on exertion. Non productive cough. Prn albuterol inhale given as per pt request.

## 2023-12-25 NOTE — PLAN OF CARE
PRIMARY DIAGNOSIS: COPD exacerbation, Venous stasis dermatitis of both lower extremities     OUTPATIENT/OBSERVATION GOALS TO BE MET BEFORE DISCHARGE:  ADLs back to baseline: Yes    Activity and level of assistance: Ambulating independently.    Pain status: Improved-controlled with oral pain medications.    Return to near baseline physical activity: Yes     Discharge Planner Nurse   Safe discharge environment identified: Yes  Barriers to discharge: Yes       Entered by: Roxanna Gates RN 12/25/2023 1:36 PM     Patient VSS on RA this morning, A+Ox4, up independently w/ walker. Notable wheezing on lung assessment, patient receiving scheduled nebs, declined prn inhaler offer. C/o heartburn, given prn Tums. Receiving IV bumex. Ednrosing BLE pain 4/10, declines need for prn intervention this morning. Continuing to monitor.    For detailed vital signs and assessments, please see documentation flowsheets. For detailed medication administrations, please see VAUGHN Gates RN 12/25/2023  [phone]

## 2023-12-25 NOTE — PLAN OF CARE
PRIMARY DIAGNOSIS: COPD exacerbation  OUTPATIENT/OBSERVATION GOALS TO BE MET BEFORE DISCHARGE:  Dyspnea improved and O2 sats >88% on RA or back to baseline O2 levels: Yes   SpO2: 92 %, O2 Device: None (Room air)    Tolerating oral abx or appropriate plans made outpatient infusion: Yes    Vitals signs normal or return to baseline: Yes    Short term supplemental O2 needed with activity at home: No    Tolerate oral intake to maintain hydration: Yes    Return to near baseline physical activity: Yes    Discharge Planner Nurse   Safe discharge environment identified: Yes  Barriers to discharge: Yes       Entered by: Jesus Sheth RN 12/25/2023 6:50 AM     Please review provider order for any additional goals.   Nurse to notify provider when observation goals have been met and patient is ready for discharge.Goal Outcome Evaluation:

## 2023-12-25 NOTE — PLAN OF CARE
PRIMARY DIAGNOSIS: COPD exacerbation  OUTPATIENT/OBSERVATION GOALS TO BE MET BEFORE DISCHARGE:  Dyspnea improved and O2 sats >88% on RA or back to baseline O2 levels: Yes   SpO2: 92 %, O2 Device: None (Room air)    Tolerating oral abx or appropriate plans made outpatient infusion: Yes    Vitals signs normal or return to baseline: Yes    Short term supplemental O2 needed with activity at home: No    Tolerate oral intake to maintain hydration: Yes    Return to near baseline physical activity: Yes    Discharge Planner Nurse   Safe discharge environment identified: Yes  Barriers to discharge: Yes       Entered by: Jesus Sheth RN 12/25/2023 4:41 AM     Please review provider order for any additional goals.   Nurse to notify provider when observation goals have been met and patient is ready for discharge.Goal Outcome Evaluation:

## 2023-12-25 NOTE — PROGRESS NOTES
[facility] Hospitalist Progress Note  [facility]  Admission date: 12/22/2023    Summary:    63M presents with acute dyspnea, bilateral leg pain; pmhx includes chronic venous stasis, HF (35%, 9/23), CAD (CABG 2v), ICM, A fib (apixaban), CKD3b, tobacco use who presents with CARNEY, orthopnea, fluid gain, wheeze and weeping from legs along with 15-20 lb weight gain - most consisted with HF exacerbation.   Wheezing on exam could be cardiac wheeze vs. COPD.    Assessment/Plan    #Acute on chronic HFrEF, 35% ejection fraction, pulm HTN  -IV diuresis bumex 2q8h with hold parameters, added metolazone x 1 12/25.    -Noted hypotension this afternoon but repeat BP without intervention 110s/60s.  Getting albumin  -note the 5mg bumex dose was not a steady state baseline but was being increased due to volume overload  -cards consult tomorrow    -BB: held per last cardiology note on 11/28  -ACEI/ARB: hold lisinopril 5mg PO qD  -TOD: None currently  -Device: Consider ICD placement in clinic  Consider outpatient sleep evaluation.    #Wheezing due to COPD exacerbation vs. Cardiac wheeze  -Significant tobacco use history. Hx of COPD, evidenced by XR demonstration of hyperventilation. No PFTs I can find in chart.  -Not on long-acting inhalers  -Duonebs QID for now  -Doxycycline 100mg PO BID for 5 days  -Prednisone 40mg PO qD    #CAD - asa, eliquis, Imdur 180 mg PO every day     #Chronic venous stasis with ulceration - worse due to volume overload.  -lymphedema consult, wound care consult  -check CRP and if elevated would start ancef  -lidocaine gel PRN    #Restless Leg Syndrome  -   -reduce gabapentin to 300mg at bedtime as doesn't find all that helpful and can contribute to volume overload.   Consider stopping entirely in near future.  -requip  -duloxetine increase to 60mg PO every day.  ?indication and not sure why increased. Reduce back to home dose of 30mg      #Normocytic anemia Hgb baseline estimated around 11.    "  #Tobacco use  Current 1ppd tobacco use. Precontemplative stage of reduction/cessation.  -NRT PRN     #Atrial fibrillation  -amiodarone 200mg PO every day  -apixaban 5mg PO BID        Checklist:  Code Status: Full Code    Diet: Combination Diet Low Saturated Fat Na <2400mg Diet, No Caffeine Diet        Disposition and Discharge Planning  Auto-populated from discharge tab:     Expected Discharge Date: 12/25/2023      Destination: home           System Identified Risk Variables  Auto-populated based on system request - if needs to be addressed in treatment plan they will be addressed above:  \"  Clinically Significant Risk Factors Present on Admission               # Drug Induced Coagulation Defect: home medication list includes an anticoagulant medication  # Drug Induced Platelet Defect: home medication list includes an antiplatelet medication    # Acute heart failure with reduced ejection fraction: last echo with EF <40% and receiving IV diuretics     # Severe Obesity: Estimated body mass index is 43.58 kg/m  as calculated from the following:    Height as of this encounter: 1.676 m (5' 6\").    Weight as of this encounter: 122.5 kg (270 lb).         # Financial/Environmental Concerns: none         \"    Interval Events/Subjective/Notable results:    Weight 272, during last admission discharge weight was 260  Ongoing wheeze and CARNEY.  Legs reportedly looking better.  Several weeks of swelling, weeping and erythema  Reviewed hx.  CARNEY, orthopnea, fluid gain, wheeze and weeping from legs led to presentation    Reviewed: - 3.3L,BMP, CBC stable, most recent ECHO and R&LHC      Objective    Vital signs in last 24 hours  Temp:  [97.5  F (36.4  C)-98.8  F (37.1  C)] 97.9  F (36.6  C)  Pulse:  [75-86] 80  Resp:  [16-20] 16  BP: ()/(55-71) 113/55  SpO2:  [90 %-96 %] 96 % O2 Device: None (Room air)    Weight:   270 lbs 0 oz  Body mass index is 43.58 kg/m .    Intake/Output last 3 shifts  I/O last 3 completed shifts:  In: " 1080 [P.O.:1080]  Out: 3200 [Urine:3200]    Physical Exam  General:  Alert, cooperative, no distress    Neurologic:  oriented, facial symmetry preserved, fluent speech.   Psych: calm  HEENT:  Anicteric, MMM  CV: RRR no MRG, normal S1 and S2, hypervolemic  Lungs: diffuse wheeze without use of accessory muscles  Abd: soft, NT  Skin: venous ulcers, venous stasis changes, erythema  Ruvalcaba Catheter: Not present  Lines: None          Medical Decision Making               Ania Douglass MD, [facility]  Internal Medicine Hospitalist

## 2023-12-25 NOTE — TREATMENT PLAN
"BP (!) 78/41 (BP Location: Left arm)   Pulse 84   Temp 97.4  F (36.3  C) (Oral)   Resp 26   Ht 1.676 m (5' 6\")   Wt 123.7 kg (272 lb 11.2 oz)   SpO2 92%   BMI 44.01 kg/m        No Known Allergies       Intake/Output Summary (Last 24 hours) at 12/25/2023 1636  Last data filed at 12/25/2023 1200  Gross per 24 hour   Intake 600 ml   Output 3850 ml   Net -3250 ml      Current Outpatient Medications   Medication Instructions    acetaminophen (TYLENOL) 1,000 mg, Oral, EVERY 8 HOURS PRN    albuterol (PROAIR HFA/PROVENTIL HFA/VENTOLIN HFA) 108 (90 Base) MCG/ACT inhaler 2 puffs, Inhalation, 4 TIMES DAILY    albuterol (PROVENTIL) 2.5 mg, Nebulization, EVERY 6 HOURS PRN    amiodarone (PACERONE) 200 mg, Oral, DAILY    apixaban ANTICOAGULANT (ELIQUIS) 5 mg, Oral, 2 TIMES DAILY    aspirin 81 mg, Oral, DAILY    atorvastatin (LIPITOR) 40 mg, Oral, EVERY MORNING    bumetanide (BUMEX) 5 mg, Oral, 2 TIMES DAILY    [facility]S Vitamin B12 1,000 mcg, Oral, DAILY    DULoxetine (CYMBALTA) 30 mg, Oral, EVERY MORNING    gabapentin (NEURONTIN) 300 MG capsule 1 capsule, Oral, 2 TIMES DAILY, 300 MG AM AND NOON,  MG BY MOUTH DAILY AT BEDTIME    gabapentin (NEURONTIN) 300 MG capsule 3 capsules, Oral, AT BEDTIME, 300 MG AM AND NOON,  MG BY MOUTH DAILY AT BEDTIME    hypromellose-dextran (ARTIFICAL TEARS) 0.1-0.3 % ophthalmic solution 2 drops, Both Eyes, 4 TIMES DAILY PRN    ipratropium - albuterol 0.5 mg/2.5 mg/3 mL (DUONEB) 0.5-2.5 (3) MG/3ML neb solution 3 mLs, Inhalation, EVERY 6 HOURS PRN    isosorbide mononitrate (IMDUR) 180 mg, Oral, EVERY MORNING    KLOR-CON 20 MEQ CR tablet 20 mEq, Oral, DAILY    nitroGLYcerin (NITROSTAT) 0.4 mg, Sublingual, EVERY 5 MIN PRN, If no relief after 3 tabs call 911;continue 1 tab every 5 min max 3 tab Indications: chest pain    polyethylene glycol (MIRALAX) 17 g, Oral, 2 TIMES DAILY PRN    rOPINIRole (REQUIP) 2 mg, Oral, 3 TIMES DAILY      RCAT Treatment Plan    Patient Score: 6  Patient Acuity: " 4    Clinical Indication for Therapy: history of bronchospasm    Therapy Ordered: Douneb QID and prn. Pt uses aerobika on own.     Assessment Summary: Good voicing. No accessory muscle use. No nasal flaring. No retractions. Pt speaks in complete sentences. Pt on RA.  Breath sounds scattered wheezes throughout, no change post tx. Pt reports feeling no change post therapy. Will continue with current therapy. May wish to consider Pulmonary Med consult.       Elder Tariq, RT  12/25/2023

## 2023-12-25 NOTE — PLAN OF CARE
Problem: Wound  Goal: Improved Oral Intake  Outcome: Progressing     Problem: Adult Inpatient Plan of Care  Goal: Absence of Hospital-Acquired Illness or Injury  Intervention: Identify and Manage Fall Risk  Recent Flowsheet Documentation  Taken 12/24/2023 1645 by Jesus Sheth RN  Safety Promotion/Fall Prevention:   assistive device/personal items within reach   clutter free environment maintained   increased rounding and observation   nonskid shoes/slippers when out of bed   patient and family education   room near nurse's station   room organization consistent   safety round/check completed   supervised activity  Intervention: Prevent Skin Injury  Recent Flowsheet Documentation  Taken 12/24/2023 1645 by Jesus Sheth RN  Body Position: position changed independently  Intervention: Prevent Infection  Recent Flowsheet Documentation  Taken 12/24/2023 1645 by Jesus Sheth RN  Infection Prevention:   hand hygiene promoted   rest/sleep promoted   single patient room provided  Goal: Optimal Comfort and Wellbeing  Intervention: Monitor Pain and Promote Comfort  Recent Flowsheet Documentation  Taken 12/24/2023 1712 by Jesus Sheth RN  Pain Management Interventions: medication (see MAR)     Problem: Wound  Goal: Optimal Pain Control and Function  Intervention: Prevent or Manage Pain  Recent Flowsheet Documentation  Taken 12/24/2023 1712 by Jesus Sheth RN  Pain Management Interventions: medication (see MAR)     Problem: Pain Chronic (Persistent)  Goal: Optimal Pain Control and Function  Intervention: Develop Pain Management Plan  Recent Flowsheet Documentation  Taken 12/24/2023 1712 by Jesus Sheth RN  Pain Management Interventions: medication (see MAR)   Goal Outcome Evaluation:       Pt is independent in room with walker. Alert and oriented. Lung sounds wheezing on expiration. Endorses shortness of breath on exertion. On scheduled neb treatments, prednisone and  doxycycline.

## 2023-12-25 NOTE — PLAN OF CARE
PRIMARY DIAGNOSIS: COPD exacerbation  OUTPATIENT/OBSERVATION GOALS TO BE MET BEFORE DISCHARGE:  Dyspnea improved and O2 sats >88% on RA or back to baseline O2 levels: Yes   SpO2: 92 %, O2 Device: None (Room air)    Tolerating oral abx or appropriate plans made outpatient infusion: Yes    Vitals signs normal or return to baseline: Yes    Short term supplemental O2 needed with activity at home: No    Tolerate oral intake to maintain hydration: Yes    Return to near baseline physical activity: Yes    Discharge Planner Nurse   Safe discharge environment identified: Yes  Barriers to discharge: Yes       Entered by: Jesus Sheth RN 12/24/2023 8:40 PM     Please review provider order for any additional goals.   Nurse to notify provider when observation goals have been met and patient is ready for discharge.Goal Outcome Evaluation:

## 2023-12-25 NOTE — PLAN OF CARE
PRIMARY DIAGNOSIS: COPD exacerbation, Venous stasis dermatitis of both lower extremities      OUTPATIENT/OBSERVATION GOALS TO BE MET BEFORE DISCHARGE:  ADLs back to baseline: Yes    Activity and level of assistance: Ambulating independently w walker    Pain status: Improved-controlled with oral pain medications.    Return to near baseline physical activity: Yes     Discharge Planner Nurse   Safe discharge environment identified: Yes  Barriers to discharge: Yes       Entered by: Roxanna Gates RN 12/25/2023 2:23 PM     Patient VSS on RA this shift, A+Ox4, up independently w/ walker. Notable wheezing on lung assessment, patient receiving scheduled nebs, declines offer of prn inhaler offer. Echo performed at bedside. Endorsing BLE pain 6-7/10, given prn APAP and oxy 5 mg x 1 per request, Continuing to monitor.     For detailed vital signs and assessments, please see documentation flowsheets. For detailed medication administrations, please see VAUGHN Gates RN 12/25/2023  [phone]

## 2023-12-26 ENCOUNTER — APPOINTMENT (OUTPATIENT)
Dept: PHYSICAL THERAPY | Facility: HOSPITAL | Age: 63
DRG: 291 | End: 2023-12-26
Attending: STUDENT IN AN ORGANIZED HEALTH CARE EDUCATION/TRAINING PROGRAM
Payer: MEDICARE

## 2023-12-26 PROCEDURE — 97161 PT EVAL LOW COMPLEX 20 MIN: CPT | Mod: GP

## 2023-12-26 PROCEDURE — 97535 SELF CARE MNGMENT TRAINING: CPT | Mod: GP

## 2023-12-26 PROCEDURE — 99222 1ST HOSP IP/OBS MODERATE 55: CPT | Performed by: INTERNAL MEDICINE

## 2023-12-26 PROCEDURE — 250N000012 HC RX MED GY IP 250 OP 636 PS 637: Performed by: STUDENT IN AN ORGANIZED HEALTH CARE EDUCATION/TRAINING PROGRAM

## 2023-12-26 PROCEDURE — 250N000013 HC RX MED GY IP 250 OP 250 PS 637: Performed by: INTERNAL MEDICINE

## 2023-12-26 PROCEDURE — 99233 SBSQ HOSP IP/OBS HIGH 50: CPT | Performed by: INTERNAL MEDICINE

## 2023-12-26 PROCEDURE — 250N000013 HC RX MED GY IP 250 OP 250 PS 637: Performed by: STUDENT IN AN ORGANIZED HEALTH CARE EDUCATION/TRAINING PROGRAM

## 2023-12-26 PROCEDURE — 120N000001 HC R&B MED SURG/OB

## 2023-12-26 PROCEDURE — G0463 HOSPITAL OUTPT CLINIC VISIT: HCPCS

## 2023-12-26 PROCEDURE — 250N000011 HC RX IP 250 OP 636: Mod: JZ | Performed by: HOSPITALIST

## 2023-12-26 PROCEDURE — 250N000009 HC RX 250

## 2023-12-26 PROCEDURE — 94640 AIRWAY INHALATION TREATMENT: CPT | Mod: 76

## 2023-12-26 PROCEDURE — 94640 AIRWAY INHALATION TREATMENT: CPT

## 2023-12-26 PROCEDURE — 250N000013 HC RX MED GY IP 250 OP 250 PS 637: Performed by: HOSPITALIST

## 2023-12-26 PROCEDURE — 999N000157 HC STATISTIC RCP TIME EA 10 MIN

## 2023-12-26 RX ORDER — CARVEDILOL 3.12 MG/1
1.56 TABLET, FILM COATED ORAL 2 TIMES DAILY WITH MEALS
Status: DISCONTINUED | OUTPATIENT
Start: 2023-12-26 | End: 2023-12-27 | Stop reason: HOSPADM

## 2023-12-26 RX ORDER — BUMETANIDE 2 MG/1
2 TABLET ORAL
Status: DISCONTINUED | OUTPATIENT
Start: 2023-12-26 | End: 2023-12-27 | Stop reason: HOSPADM

## 2023-12-26 RX ADMIN — ACETAMINOPHEN 975 MG: 325 TABLET ORAL at 14:52

## 2023-12-26 RX ADMIN — CARVEDILOL 1.56 MG: 3.12 TABLET, FILM COATED ORAL at 10:26

## 2023-12-26 RX ADMIN — BUMETANIDE 2 MG: 2 TABLET ORAL at 10:02

## 2023-12-26 RX ADMIN — LISINOPRIL 5 MG: 5 TABLET ORAL at 10:02

## 2023-12-26 RX ADMIN — DOXYCYCLINE 100 MG: 100 CAPSULE ORAL at 10:00

## 2023-12-26 RX ADMIN — IPRATROPIUM BROMIDE AND ALBUTEROL SULFATE 3 ML: 2.5; .5 SOLUTION RESPIRATORY (INHALATION) at 16:19

## 2023-12-26 RX ADMIN — CARVEDILOL 1.56 MG: 3.12 TABLET, FILM COATED ORAL at 17:22

## 2023-12-26 RX ADMIN — DOXYCYCLINE 100 MG: 100 CAPSULE ORAL at 20:24

## 2023-12-26 RX ADMIN — BUMETANIDE 2 MG: 0.25 INJECTION INTRAMUSCULAR; INTRAVENOUS at 05:37

## 2023-12-26 RX ADMIN — AMIODARONE HYDROCHLORIDE 200 MG: 200 TABLET ORAL at 10:01

## 2023-12-26 RX ADMIN — GABAPENTIN 300 MG: 300 CAPSULE ORAL at 20:21

## 2023-12-26 RX ADMIN — POLYETHYLENE GLYCOL 3350 17 G: 17 POWDER, FOR SOLUTION ORAL at 10:27

## 2023-12-26 RX ADMIN — BUMETANIDE 2 MG: 2 TABLET ORAL at 17:21

## 2023-12-26 RX ADMIN — DULOXETINE HYDROCHLORIDE 30 MG: 30 CAPSULE, DELAYED RELEASE ORAL at 10:01

## 2023-12-26 RX ADMIN — IPRATROPIUM BROMIDE AND ALBUTEROL SULFATE 3 ML: 2.5; .5 SOLUTION RESPIRATORY (INHALATION) at 08:06

## 2023-12-26 RX ADMIN — ROPINIROLE HYDROCHLORIDE 2 MG: 1 TABLET, FILM COATED ORAL at 14:53

## 2023-12-26 RX ADMIN — ROPINIROLE HYDROCHLORIDE 2 MG: 1 TABLET, FILM COATED ORAL at 20:22

## 2023-12-26 RX ADMIN — PREDNISONE 40 MG: 20 TABLET ORAL at 10:26

## 2023-12-26 RX ADMIN — Medication 81 MG: at 10:01

## 2023-12-26 RX ADMIN — IPRATROPIUM BROMIDE AND ALBUTEROL SULFATE 3 ML: 2.5; .5 SOLUTION RESPIRATORY (INHALATION) at 19:37

## 2023-12-26 RX ADMIN — ROPINIROLE HYDROCHLORIDE 2 MG: 1 TABLET, FILM COATED ORAL at 10:03

## 2023-12-26 RX ADMIN — APIXABAN 5 MG: 5 TABLET, FILM COATED ORAL at 20:24

## 2023-12-26 RX ADMIN — ISOSORBIDE MONONITRATE 180 MG: 60 TABLET, EXTENDED RELEASE ORAL at 10:02

## 2023-12-26 RX ADMIN — ATORVASTATIN CALCIUM 40 MG: 40 TABLET, FILM COATED ORAL at 10:01

## 2023-12-26 RX ADMIN — OXYCODONE HYDROCHLORIDE 5 MG: 5 TABLET ORAL at 06:40

## 2023-12-26 RX ADMIN — APIXABAN 5 MG: 5 TABLET, FILM COATED ORAL at 10:01

## 2023-12-26 RX ADMIN — SENNOSIDES 2 TABLET: 8.6 TABLET, FILM COATED ORAL at 20:23

## 2023-12-26 RX ADMIN — SENNOSIDES 2 TABLET: 8.6 TABLET, FILM COATED ORAL at 10:01

## 2023-12-26 RX ADMIN — ALBUTEROL SULFATE 2 PUFF: 90 AEROSOL, METERED RESPIRATORY (INHALATION) at 06:15

## 2023-12-26 ASSESSMENT — ACTIVITIES OF DAILY LIVING (ADL)
ADLS_ACUITY_SCORE: 24
ADLS_ACUITY_SCORE: 28
ADLS_ACUITY_SCORE: 26
ADLS_ACUITY_SCORE: 27
ADLS_ACUITY_SCORE: 27
ADLS_ACUITY_SCORE: 28
ADLS_ACUITY_SCORE: 27
ADLS_ACUITY_SCORE: 27
ADLS_ACUITY_SCORE: 26

## 2023-12-26 NOTE — PLAN OF CARE
"Goal Outcome Evaluation:       /62 (BP Location: Left arm, Patient Position: Supine)   Pulse 78   Temp 97.9  F (36.6  C) (Oral)   Resp 18   Ht 1.676 m (5' 6\")   Wt 123.7 kg (272 lb 11.2 oz)   SpO2 90%   BMI 44.01 kg/m      VSS, gave Bumex at 0530 to allow pt sleep. Good urine output. Needed PRN inhaler x1 for wheezing, effective. Needed PRN oxycodone for BLE pain. Wounds TENA.    Serge Scott RN                    "

## 2023-12-26 NOTE — PROGRESS NOTES
[facility]    PROGRESS NOTE - Hospitalist Service    Assessment and Plan  Patient is new to me, Zackary Hutchison is 58 years old male with a past medical history of chronic venous stasis, HF (35%, 9/23), CAD (CABG 2v), ICM, A fib (apixaban), CKD3b, tobacco use who presents with CARNEY, orthopnea, fluid gain, wheeze and weeping from legs along with 15-20 lb weight gain - most consisted with HF exacerbation.   Wheezing on exam could be cardiac wheeze vs. COPD.     Acute on chronic HFrEF, 35% ejection fraction, pulm HTN  -IV diuresis bumex 2q8h with hold parameters, added metolazone x 1 12/25.    -Noted hypotension this afternoon but repeat BP without intervention 110s/60s.  Getting albumin  -note the 5mg bumex dose was not a steady state baseline but was being increased due to volume overload  -Cardiology consult, appreciate input  -Switch to oral antibiotics  -Continue to monitor blood pressure  -BB: held per last cardiology note on 11/28  -ACEI/ARB: hold lisinopril 5mg PO qD  -TOD: None currently  -Device: Consider ICD placement in clinic  Consider outpatient sleep evaluation.     Wheezing due to COPD exacerbation vs. Cardiac wheeze  -Significant tobacco use history. Hx of COPD, evidenced by XR demonstration of hyperventilation. No PFTs I can find in chart.  -Not on long-acting inhalers  -Duonebs QID for now  -Doxycycline 100mg PO BID for 5 days  -Prednisone 40mg PO qD     History of CAD -  - asa, eliquis, Imdur 180 mg PO every day  - Cardiology consult, appreciate input      Chronic venous stasis with ulceration   - worse due to volume overload.  -lymphedema consult, wound care consult  -check CRP and if elevated would start ancef  -lidocaine gel PRN  -May need lymphedema treatment  -Doxycycline started empirically     Restless Leg Syndrome  -   -reduce gabapentin to 300mg at bedtime as doesn't find all that helpful and can contribute to volume overload.   Consider stopping entirely in near  future.  -requip  -duloxetine increase to 60mg PO every day.  ?indication and not sure why increased. Reduce back to home dose of 30mg      Normocytic anemia   - Hgb baseline estimated around 11.     Tobacco use  Current 1ppd tobacco use. Precontemplative stage of reduction/cessation.  -NRT PRN     Atrial fibrillation  -amiodarone 200mg PO every day  -apixaban 5mg PO BID     Weakness and deconditioning  -PT/OT evaluation     40 MINUTES SPENT BY ME on the date of service doing chart review, history, exam, documentation & further activities per the note    Principal Problem:    Venous stasis dermatitis of both lower extremities  Active Problems:    COPD exacerbation (H)      VTE prophylaxis:  DOAC  DIET: Orders Placed This Encounter      Combination Diet Low Saturated Fat Na <2400mg Diet      Disposition/Barriers to discharge: Wound care treatment, transition to oral diuretics  Code Status: Full Code    Subjective:  Zackary is feeling about the same today, denies any chest pain or shortness of breath, still has some leg pains.    PHYSICAL EXAM  Vitals:    12/22/23 1414 12/25/23 1505 12/26/23 1448   Weight: 122.5 kg (270 lb) 123.7 kg (272 lb 11.2 oz) 120.6 kg (265 lb 12.8 oz)     B/P:103/56 T:97.5 P:81 R:17     Intake/Output Summary (Last 24 hours) at 12/26/2023 1533  Last data filed at 12/26/2023 1400  Gross per 24 hour   Intake 3071 ml   Output 4675 ml   Net -1604 ml      Body mass index is 42.9 kg/m .    Constitutional: awake, alert, cooperative, no apparent distress, and appears stated age  Respiratory: No increased work of breathing, good air exchange, clear to auscultation bilaterally, no crackles or wheezing  Cardiovascular: Normal apical impulse, regular rate and rhythm, normal S1 and S2, no S3 or S4, and no murmur noted  GI: No scars, normal bowel sounds, soft, non-distended, non-tender, no masses palpated, no hepatosplenomegally  Skin: no bruising or bleeding and normal skin color, texture,  turgor  Musculoskeletal: Bilateral +2 legs edema with open wounds.  Neurologic: Awake, alert, oriented to name, place and time.  Cranial nerves II-XII are grossly intact.  Motor is 5 out of 5 bilaterally.   Sensory is intact.    Neuropsychiatric: Appropriate with examiner      PERTINENT LABS/IMAGING:    I have personally reviewed the following data over the past 24 hrs:    Procal: N/A CRP: N/A Lactic Acid: N/A         Imaging results reviewed over the past 24 hrs:   No results found for this or any previous visit (from the past 24 hour(s)).    Discussed with patient, family, cardiology, nursing staff and discharge planner    Cristian Urias MD  [facility] Medicine Service  [phone]

## 2023-12-26 NOTE — CONSULTS
CARDIOLOGY CONSULT NOTE         Assessment:   1.  Heart failure-acute on chronic in the setting of midrange ejection fraction of 41%.  For the most part symptomatically resolved, on room air, would switch Bumex over to oral.  2.  Cardiomyopathy-ejection fraction 41%, presumed ischemic.  Currently on Imdur and lisinopril, would benefit from beta-blocker therapy and would start low-dose.  3.  Coronary artery disease-recent coronary angiography September 2023 shows normal left main, proximal total occlusion LAD, circumflex with a mid 70% lesion of the first obtuse marginal artery 50% lesion, and the right coronary artery with a proximal total occlusion.  4.  Coronary artery bypass grafting-has a patent LIMA to the distal LAD as well as a patent vein graft to the distal right coronary artery.  5.  Venous stasis dermatitis of both lower extremities-agree most likely secondary to volume expansion although BNP normal, probably falsely normal due to obesity.  Would switch over to oral diuretic.  6.  COPD exacerbation (H)-so noted, would recommend nebulizers and stop smoking.  7.  Obesity-needs to work on weight loss and would strongly recommend ruling out sleep apnea.  8.  Atrial fibrillation-paroxysmal, currently well-controlled on amiodarone.  In sinus rhythm, also on Eliquis, most recent ALT, AST, potassium all unremarkable December 22, 2023.  9.  Pulmonary hypertension-mild, estimated pressure 40/20 with a mean of 27.  Wedge was normal at 16.  Most likely secondary to obesity/hypoventilation and sleep apnea.     Plan:   1.  Cardiology does not have much further inpatient to add, most likely will sign off tomorrow.  2.  Would switch to oral diuretic.  3.  Would start low-dose carvedilol.  4.  Can follow with Dr. Cedrick Corbett primary cardiologist.     Current History:   [facility] has been requested by Dr. Craig Douglass to evaluate Zackary Hutchison  who is a 63 year old year old white male for peripheral  edema.  Patient was in his usual state of health till around September at which point time he developed a volume accumulation.  He states he became extremely short of breath to the point he could barely sleep in bed and has been sleeping in a recliner for the last several months.  He had increased bipedal edema and about 3 to 4 days ago the edema began weeping and became painful causing to present to the hospital.  BNP was normal and he was diuresed and he became hypotensive yesterday and cardiology consultation was requested.  During hypotension patient does admit to some lightheadedness and fatigue.  He also does admit to chest discomfort described as a tightness across his chest that comes on at rest and goes away within 5 minutes of taking the nitroglycerin.  He tells me he is taking equivalent to maybe 20 nitros over the last several months.    Past Medical History:     Past Medical History:   Diagnosis Date    Chronic kidney disease, stage 3b (H) 09/28/2023    Class 3 severe obesity due to excess calories with body mass index (BMI) of 40.0 to 44.9 in adult (H) 09/28/2023    COPD (chronic obstructive pulmonary disease) (H)     Coronary artery disease involving coronary bypass graft of native heart without angina pectoris 09/28/2023    Heart failure with reduced ejection fraction (H) 09/11/2023    Hyperlipidemia     Hypertension     Ischemic cardiomyopathy 09/28/2023    Obese     Paroxysmal atrial fibrillation (H) 09/28/2023    Tobacco abuse 09/28/2023      Past history is negative for cancer, tuberculosis, diabetes mellitus, myocardial infarction,  rheumatic fever, cerebrovascular accident, chronic kidney disease, peptic ulcer disease, chronic obstructive pulmonary disease, or thyroid disorder.    Past Surgical History:     Past Surgical History:   Procedure Laterality Date    CORONARY ANGIOGRAPHY ADULT ORDER      CORONARY ARTERY BYPASS      CV CORONARY ANGIOGRAM N/A 09/11/2023    Procedure: Coronary Angiogram;   Surgeon: Santy Esposito MD;  Location: [facility] LAB CV    CV LEFT HEART CATH N/A 09/11/2023    Procedure: Left Heart Catheterization;  Surgeon: Santy Esposito MD;  Location: [facility] LAB CV    CV RIGHT HEART CATH MEASUREMENTS RECORDED N/A 09/11/2023    Procedure: Right Heart Catheterization;  Surgeon: Santy Esposito MD;  Location: [facility] LAB CV     Family History:   Reviewed, and positive for coronary artery disease causing death of father at the age of 49.    Social History:   Reviewed, and he  reports that he has been smoking cigarettes. He has been smoking an average of 0.5 packs per day, in the past up to a pack a day for the last 50 years. He has never used smokeless tobacco.  He used to drink an occasional wine but currently not, is single living in assisted living but independently, is a retired /[employer] , and primary care provider is Dr. Joseph Escobar.    Meds:      amiodarone  200 mg Oral Daily    apixaban ANTICOAGULANT  5 mg Oral BID    aspirin  81 mg Oral Daily    atorvastatin  40 mg Oral QAM    bumetanide  2 mg Intravenous Q8H    doxycycline hyclate  100 mg Oral BID    DULoxetine  30 mg Oral QAM    gabapentin  300 mg Oral At Bedtime    ipratropium - albuterol 0.5 mg/2.5 mg/3 mL  3 mL Nebulization 4x daily    isosorbide mononitrate  180 mg Oral QAM    [Held by provider] lisinopril  5 mg Oral Daily    nicotine   Transdermal Q8H    polyethylene glycol  17 g Oral Daily    predniSONE  40 mg Oral Daily    rOPINIRole  2 mg Oral TID    sennosides  2 tablet Oral BID    sodium chloride (PF)  3 mL Intracatheter Q8H     Allergies:   Patient has no known allergies.    Review of Systems:   A 12 point comprehensive review of systems was  as follows:   General: Positive negative for fatigue, weight loss or weight gain  Constitutional: negative for anorexia, chills, fevers, malaise, night sweats   Eyes: negative for cataracts, color blindness, contacts/glasses,  "glaucoma, icterus, irritation, redness and visual disturbance  Ears, nose, mouth, throat, and face: negative for ear drainage, earaches, epistaxis, facial trauma, hearing loss, hoarseness, nasal congestion, snoring, sore mouth, sore throat, tinnitus and voice change  Respiratory: Positive for cough, dyspnea on exertion, PND, orthopnea, negative hemoptysis, sputum production, pleurisy, stridor, wheezing,  Cardiovascular: Positive for chest pain, chest pressure/discomfort, peripheral edema, negative claudication, dyspnea, exertional chest pressure/discomfort, fatigue, irregular heart beat, near-syncope,  palpitations,  syncope   Gastrointestinal: negative for abdominal pain, change in bowel haibits, constipation, diarrhea, dyspepsia, dysphagia, jaundice, melena, nausea, odynophagia, reflux symptoms and vomiting  Genitourinary: negative for decreased stream  Hematologic/lymphatic: negative for bleeding  Musculoskeletal: negative for arthralgias, back pain, bone pain, muscle weakness, myalgias, neck pain and stiff joints  Neurological: negative for syncope, presyncope, coordination problems, dizziness, gait problems, headaches, memory problems, paresthesia, seizures, speech problems, tremors, vertigo and weakness    Objective:     Physical Exam:  /60 (BP Location: Left arm)   Pulse 82   Temp 97.5  F (36.4  C) (Oral)   Resp 17   Ht 1.676 m (5' 6\")   Wt 123.7 kg (272 lb 11.2 oz)   SpO2 91%   BMI 44.01 kg/m       Head:    Normocephalic, without obvious abnormality, atraumatic   Eyes:    PERRL, conjunctiva/corneas clear, EOM's intact,           Nose:   Nares normal, septum midline, mucosa normal, no drainage  or sinus tenderness   Throat:   Lips, mucosa, and tongue normal; teeth and gums normal   Neck:   Supple, symmetrical, trachea midline, no adenopathy;        thyroid:  No enlargement/tenderness/nodules; no carotid    bruit, to jaw at 30 degrees JVD   Back:     Symmetric, no curvature, ROM normal, no CVA " "tenderness   Lungs:     Clear to auscultation bilaterally, respirations unlabored   Chest wall:    No tenderness, midline sternotomy scar   Heart:    Regular rate and rhythm, S1 and S2 normal, no murmur, rub   or gallop   Abdomen:     Soft, non-tender, bowel sounds active all four quadrants,     no masses, no organomegaly   Extremities:   Extremities normal, atraumatic, no cyanosis or edema   Pulses:   2+ and symmetric all extremities   Skin:   Skin color, texture, turgor normal, no rashes or lesions   Neurologic:   CNII-XII intact. Normal strength, sensation and reflexes       throughout     Cardiographics and Imaging  Radiology Results: Chest x-ray shows Stable chest with again seen hyperinflation of both lungs. Postoperative changes sternotomy. Old healed left rib fractures. Mild to moderate scattered degenerative changes the bony skeleton.     EKG Results: personally reviewed sinus tachycardia, right bundle branch block pattern.      Lab Review   Pertinent Labs  Lab Results: personally reviewed       No results found for: \"CKTOTAL\", \"CKMB\", \"TROPONINI\"    Lab Results   Component Value Date    BUN 33.1 (H) 12/25/2023     12/25/2023    CO2 33 (H) 12/25/2023       Lab Results   Component Value Date    WBC 10.6 12/25/2023    HGB 12.0 (L) 12/25/2023    HCT 39.5 (L) 12/25/2023    MCV 94 12/25/2023     12/25/2023       No results found for: \"BNP\"    Clinically Significant Risk Factors                   # Acute heart failure with reduced ejection fraction: last echo with EF <40% and receiving IV diuretics       # Severe Obesity: Estimated body mass index is 44.01 kg/m  as calculated from the following:    Height as of this encounter: 1.676 m (5' 6\").    Weight as of this encounter: 123.7 kg (272 lb 11.2 oz)., PRESENT ON ADMISSION     # Financial/Environmental Concerns: none      Alkalosis              "

## 2023-12-26 NOTE — DISCHARGE INSTRUCTIONS
BLE wound(s): three times weekly cleanse with tap water, pat dry, apply lotion to intact skin, cover areas with adaptic, abd, kerlix

## 2023-12-26 NOTE — CONSULTS
[facility]  WO Nurse Inpatient Assessment     Consulted for: BLE    Summary: Patient with dry wounds to BLE. Will need home care at discharge. Declining to go to OP wound clinic    Patient History (according to provider note(s):      63M presents with acute dyspnea, bilateral leg pain; pmhx includes chronic venous stasis, HF (35%, 9/23), CAD (CABG 2v), ICM, A fib (apixaban), CKD3b, tobacco use who presents with CARNEY, orthopnea, fluid gain, wheeze and weeping from legs along with 15-20 lb weight gain - most consisted with HF exacerbation.   Wheezing on exam could be cardiac wheeze vs. COPD.     Assessment:      Areas visualized during today's visit: Lower extremities     Wound location: BLE    Last photo: NA  Wound due to: Venous Ulcer  Wound history/plan of care: chronic wounds to BLE. Patient reports that he occasionally wraps his legs at home, otherwise will leave open to air. He does not wear any compression   Wound base: wounds completely covered with dried drainage/dry wound beds due to no dressing over them at time of visit.     Palpation of the wound bed: normal      Drainage: none     Description of drainage: none     Measurements (length x width x depth, in cm):RLE: approx 15x6cm with scattered smaller areas to leg. LLE: approx 20x7cm scattered across shin. All wounds dry and crusty at this time, so difficult to determine what is actually open.     Tunneling: N/A     Undermining: N/A  Periwound skin: Hemosiderin staining      Color: normal and consistent with surrounding tissue      Temperature: normal   Odor: none  Pain: mild,  with palpation  Pain interventions prior to dressing change: patient tolerated well  Treatment goal: Heal  and Increase moisture   STATUS: initial assessment  Supplies ordered: supplies stored on unit    Patient would benefit from tubigrip and short stretch to [facility] for swelling- awaiting lymphedema consult       Treatment Plan:     BLE wound(s): Daily cleanse  with tap water, pat dry, apply lotion to intact skin, cover areas with adaptic, abd, kerlix     Orders: Written    Discussed going to the outpatient wound clinic for follow up- patient declining at this time- only wants HC RN.     RECOMMEND PRIMARY TEAM ORDER: None, at this time  Education provided: plan of care  Discussed plan of care with: Patient  WO nurse follow-up plan: weekly  Notify WOC if wound(s) deteriorate.  Nursing to notify the Provider(s) and re-consult the WOC Nurse if new skin concern.    DATA:     Current support surface: Standard  Standard gel/foam mattress (IsoFlex, Atmos air, etc)  Containment of urine/stool: Continent of bladder and Continent of bowel  BMI: Body mass index is 44.01 kg/m .   Active diet order: Orders Placed This Encounter      Combination Diet Low Saturated Fat Na <2400mg Diet     Output: I/O last 3 completed shifts:  In: 2231 [P.O.:2140; I.V.:91]  Out: 5775 [Urine:5775]     Labs:   Recent Labs   Lab 12/25/23  0520 12/23/23  0846 12/22/23  1643   ALBUMIN  --   --  3.9   HGB 12.0*   < > 11.8*   WBC 10.6   < > 11.3*    < > = values in this interval not displayed.     Pressure injury risk assessment:   Sensory Perception: 4-->no impairment  Moisture: 3-->occasionally moist  Activity: 2-->chairfast  Mobility: 3-->slightly limited  Nutrition: 3-->adequate  Friction and Shear: 2-->potential problem  Simeon Score: 17    KATLIN LINARES RN CWOCN, CFCN  Pager no longer is use, please contact through [employer] group: [facility] Nurse [facility]

## 2023-12-26 NOTE — PLAN OF CARE
Goal Outcome Evaluation:  Reports pain in bilateral lower extremities. Takes oxycodone prn. LD at 1820.  Appetite excellent. Ate all of supper meal and HS snack.  Started shift with low BP 78/41. When rechecked it was 115/59. See flowsheet charting-Bps labile throughout shift. Got albumin 25G.  On IV bumex 2mg every 8 hours. Diuresing well. Had 1750 urine output tonight.  LS with expiratory wheezes, didn't notice a cough. O2 sats in low 90s. Doesn't use O2 at home.  Still waiting on Lymphedema and WOCN consults. Also Cardiology to see tomorrow.  Patient instructed to please call for assist before getting up or out of bed d/t fluctuating Bps and occassional dizziness. Patient agrees.  Was up in chair most of shift.

## 2023-12-26 NOTE — PROGRESS NOTES
Care Management Follow Up    Length of Stay (days): 4    Expected Discharge Date: 12/27/2023     Concerns to be Addressed: discharge planning     Patient plan of care discussed at interdisciplinary rounds: Yes    Anticipated Discharge Disposition: Home     Anticipated Discharge Services: None  Anticipated Discharge DME: None    Patient/family educated on Medicare website which has current facility and service quality ratings:    Education Provided on the Discharge Plan:    Patient/Family in Agreement with the Plan:      Referrals Placed by CM/SW:    Private pay costs discussed: Not applicable    Additional Information:  Chart reviewed. Patient lives in [facility] alone. He is independent at baseline and receives house keeping and meals from the facility. He manages his meds and uses [employer] for transportation. He states he may need to move into [facility] soon which they have available at [facility].     CM will continue to monitor progression of care, review team recommendations and provide discharge planning assist as needed.       NOEL WoodsW

## 2023-12-26 NOTE — PLAN OF CARE
Problem: Wound  Goal: Optimal Pain Control and Function  Intervention: Prevent or Manage Pain  Recent Flowsheet Documentation  Taken 12/26/2023 1002 by Hannah Reece RN  Pain Management Interventions: emotional support     Problem: Pain Chronic (Persistent)  Goal: Optimal Pain Control and Function  Intervention: Develop Pain Management Plan  Patient requested tylenol for LLE wound pain of 7/10.  Patient wound dressing changed by Woc nurse.   Up the chair for meals.  Blood pressure this shift within the normal range.  Iv Bumex switched to PO every 8 hours.

## 2023-12-27 VITALS
OXYGEN SATURATION: 90 % | HEIGHT: 66 IN | DIASTOLIC BLOOD PRESSURE: 79 MMHG | SYSTOLIC BLOOD PRESSURE: 141 MMHG | WEIGHT: 260.8 LBS | TEMPERATURE: 97.4 F | BODY MASS INDEX: 41.91 KG/M2 | RESPIRATION RATE: 18 BRPM | HEART RATE: 86 BPM

## 2023-12-27 PROCEDURE — 999N000157 HC STATISTIC RCP TIME EA 10 MIN

## 2023-12-27 PROCEDURE — 94640 AIRWAY INHALATION TREATMENT: CPT | Mod: 76

## 2023-12-27 PROCEDURE — 94640 AIRWAY INHALATION TREATMENT: CPT

## 2023-12-27 PROCEDURE — 250N000013 HC RX MED GY IP 250 OP 250 PS 637: Performed by: STUDENT IN AN ORGANIZED HEALTH CARE EDUCATION/TRAINING PROGRAM

## 2023-12-27 PROCEDURE — 250N000009 HC RX 250

## 2023-12-27 PROCEDURE — 250N000013 HC RX MED GY IP 250 OP 250 PS 637: Performed by: GENERAL ACUTE CARE HOSPITAL

## 2023-12-27 PROCEDURE — 99239 HOSP IP/OBS DSCHRG MGMT >30: CPT | Performed by: INTERNAL MEDICINE

## 2023-12-27 PROCEDURE — 250N000013 HC RX MED GY IP 250 OP 250 PS 637: Performed by: HOSPITALIST

## 2023-12-27 PROCEDURE — 250N000012 HC RX MED GY IP 250 OP 636 PS 637: Performed by: STUDENT IN AN ORGANIZED HEALTH CARE EDUCATION/TRAINING PROGRAM

## 2023-12-27 RX ORDER — BUMETANIDE 1 MG/1
2 TABLET ORAL 2 TIMES DAILY
Qty: 900 TABLET | Refills: 3 | Status: SHIPPED | OUTPATIENT
Start: 2023-12-27 | End: 2024-03-22

## 2023-12-27 RX ORDER — CARVEDILOL 3.12 MG/1
1.56 TABLET ORAL 2 TIMES DAILY WITH MEALS
Qty: 90 TABLET | Refills: 0 | Status: ON HOLD | OUTPATIENT
Start: 2023-12-27 | End: 2024-01-18

## 2023-12-27 RX ORDER — DOXYCYCLINE 100 MG/1
100 CAPSULE ORAL 2 TIMES DAILY
Qty: 10 CAPSULE | Refills: 0 | Status: SHIPPED | OUTPATIENT
Start: 2023-12-27 | End: 2024-01-01

## 2023-12-27 RX ORDER — LISINOPRIL 5 MG/1
5 TABLET ORAL DAILY
Qty: 90 TABLET | Refills: 0 | Status: ON HOLD | OUTPATIENT
Start: 2023-12-28 | End: 2024-01-18

## 2023-12-27 RX ORDER — PREDNISONE 20 MG/1
20 TABLET ORAL DAILY
Qty: 5 TABLET | Refills: 0 | Status: SHIPPED | OUTPATIENT
Start: 2023-12-27 | End: 2024-01-01

## 2023-12-27 RX ORDER — GABAPENTIN 300 MG/1
300 CAPSULE ORAL AT BEDTIME
Status: ON HOLD
Start: 2023-12-27 | End: 2024-01-18

## 2023-12-27 RX ADMIN — AMIODARONE HYDROCHLORIDE 200 MG: 200 TABLET ORAL at 09:13

## 2023-12-27 RX ADMIN — ROPINIROLE HYDROCHLORIDE 2 MG: 1 TABLET, FILM COATED ORAL at 14:54

## 2023-12-27 RX ADMIN — IPRATROPIUM BROMIDE AND ALBUTEROL SULFATE 3 ML: 2.5; .5 SOLUTION RESPIRATORY (INHALATION) at 11:38

## 2023-12-27 RX ADMIN — ATORVASTATIN CALCIUM 40 MG: 40 TABLET, FILM COATED ORAL at 09:13

## 2023-12-27 RX ADMIN — ROPINIROLE HYDROCHLORIDE 2 MG: 1 TABLET, FILM COATED ORAL at 09:13

## 2023-12-27 RX ADMIN — CARVEDILOL 1.56 MG: 3.12 TABLET, FILM COATED ORAL at 12:06

## 2023-12-27 RX ADMIN — PREDNISONE 40 MG: 20 TABLET ORAL at 09:13

## 2023-12-27 RX ADMIN — DOXYCYCLINE 100 MG: 100 CAPSULE ORAL at 09:13

## 2023-12-27 RX ADMIN — DULOXETINE HYDROCHLORIDE 30 MG: 30 CAPSULE, DELAYED RELEASE ORAL at 09:14

## 2023-12-27 RX ADMIN — POLYETHYLENE GLYCOL 3350 17 G: 17 POWDER, FOR SOLUTION ORAL at 09:12

## 2023-12-27 RX ADMIN — BUMETANIDE 2 MG: 2 TABLET ORAL at 12:04

## 2023-12-27 RX ADMIN — IPRATROPIUM BROMIDE AND ALBUTEROL SULFATE 3 ML: 2.5; .5 SOLUTION RESPIRATORY (INHALATION) at 16:42

## 2023-12-27 RX ADMIN — Medication 81 MG: at 09:14

## 2023-12-27 RX ADMIN — SENNOSIDES 2 TABLET: 8.6 TABLET, FILM COATED ORAL at 09:14

## 2023-12-27 RX ADMIN — APIXABAN 5 MG: 5 TABLET, FILM COATED ORAL at 09:14

## 2023-12-27 RX ADMIN — ACETAMINOPHEN 975 MG: 325 TABLET ORAL at 04:24

## 2023-12-27 ASSESSMENT — ACTIVITIES OF DAILY LIVING (ADL)
ADLS_ACUITY_SCORE: 24

## 2023-12-27 NOTE — PLAN OF CARE
Problem: Comorbidity Management  Goal: Maintenance of COPD Symptom Control  Outcome: Progressing  Goal: Maintenance of Heart Failure Symptom Control  Description: Patient alert and oriented x4. Denies SOB. Up in the chair for meals.  Outcome: Progressing   Goal Outcome Evaluation:  Patient alert and oriented X4.   Patient breathing okay no SOB  reported.  Using IS.  SBp this morning ranges from , DBP 43-59.  BP meds held per parameter.   Cardiologist notified and added parameters to Bumex and Coreg.  Recheck Bp at 1500 141/70 MD notified.

## 2023-12-27 NOTE — PLAN OF CARE
"  Problem: Wound  Goal: Absence of Infection Signs and Symptoms  Outcome: Progressing     Problem: Adult Inpatient Plan of Care  Goal: Optimal Comfort and Wellbeing  Outcome: Progressing     Problem: Wound  Goal: Optimal Pain Control and Function  Outcome: Progressing   Goal Outcome Evaluation:       Pt A&O X 4. independent in room with walker, uses urinal in bathroom. C/o leg pain and headache, Tylenol 650 mg given. Wheezing on inspiration and expiration noted. BLE wrapped in ace wrap, able to let needs be known./59 (BP Location: Left arm)   Pulse 79   Temp 98.5  F (36.9  C) (Oral)   Resp 18   Ht 1.676 m (5' 6\")   Wt 120.6 kg (265 lb 12.8 oz)   SpO2 91%   BMI 42.90 kg/m                    "

## 2023-12-27 NOTE — PROVIDER NOTIFICATION
PROVIDER NOTIFICATION    Reason for communication:  Low Bp no parameters for Bumex and Coreg.  Team member name: Dr Pereira    Team member role:  Cardiologist    Method of Communication:  Paged    Response:  Ordered parameters.

## 2023-12-27 NOTE — PROGRESS NOTES
Care Management Discharge Note    Discharge Date: 12/27/2023     Discharge Disposition: Home    Discharge Services: None    Discharge DME: None    Discharge Transportation:  (Will need [employer])    Private pay costs discussed: Not applicable    Does the patient's insurance plan have a 3 day qualifying hospital stay waiver?  No    PAS Confirmation Code: NA  Patient/family educated on Medicare website which has current facility and service quality ratings: no    Education Provided on the Discharge Plan:  (NA)  Persons Notified of Discharge Plans: Nursing; Home Care; MD; Patient;   Patient/Family in Agreement with the Plan: yes    Handoff Referral Completed: Yes    Additional Information:  Discharging to home today with home care. [facility] can accept for SO 1/2/2024. Update provided to MD.   (DISCHARGE NOTE REFRESHED AND BPA DID NOT FIRE)    Idania Davila RN

## 2023-12-27 NOTE — PROGRESS NOTES
[facility]     Patient is anticipated to discharge 12/27/23.  Patient agrees to have [facility] provide SN Wound Care services. Patient will receive a phone call from [facility] to schedule an initial home care visit. Anticipated start of care date is [within 24-48 hours of discharge].     Please reach out to Clinical Liaison with questions.     MEENAKSHI Jones, LPN  [facility] Liaison   Cell: [phone]

## 2023-12-27 NOTE — PROGRESS NOTES
RESPIRATORY CARE NOTE   Patient is on room air, BS clear, gave Duoneb & flutter valve treatment x1, BS post treatment no change, patient perceives mild improvement, patient tolerated well.     Adrián Hatfield, RT

## 2023-12-27 NOTE — DISCHARGE SUMMARY
"[facility]  Hospitalist Discharge Summary      Date of Admission:  12/22/2023  Date of Discharge:  12/27/2023  Discharging Provider: Cristian Urias MD  Discharge Service: Hospitalist Service    Discharge Diagnoses     Acute on chronic CHF, systolic dysfunction  Pulmonary hypertension  COPD exacerbation  History of coronary artery disease  Chronic bilateral legs venous stasis with ulceration  Neuropathy  Restless leg syndrome  Chronic A-fib  Weakness and deconditioning    Clinically Significant Risk Factors     # Severe Obesity: Estimated body mass index is 42.09 kg/m  as calculated from the following:    Height as of this encounter: 1.676 m (5' 6\").    Weight as of this encounter: 118.3 kg (260 lb 12.8 oz).       Follow-ups Needed After Discharge   Follow-up Appointments     Follow-up and recommended labs and tests       Follow up with primary care provider, Reid Escobar, within 7 days for   hospital follow- up.  No follow up labs or test are needed.    Follow up with cardiology as scheduled            Unresulted Labs Ordered in the Past 30 Days of this Admission       No orders found from 11/22/2023 to 12/23/2023.        These results will be followed up by PCP    Discharge Disposition   Discharged to home with home care  Condition at discharge: Stable    Hospital Course   58 years old male with a past medical history of chronic venous stasis, HF (35%, 9/23), CAD (CABG 2v), ICM, A fib (apixaban), CKD3b, tobacco use who presents with CARNEY, orthopnea, fluid gain, wheeze and weeping from legs along with 15-20 lb weight gain - most consisted with HF exacerbation.   Please refer to H&P for details     Acute on chronic HFrEF, 35% ejection fraction, pulm HTN  -IV diuresis bumex 2q8h with hold parameters, added metolazone x 1 12/25.    -Noted hypotension this afternoon but repeat BP without intervention 110s/60s.  Getting albumin  -note the 5mg bumex dose was not a steady state baseline but was " being increased due to volume overload  -Cardiology consult, appreciate input  -Switch to oral antibiotics  -Continue to monitor blood pressure  -BB: held per last cardiology note on 11/28  -ACEI/ARB: hold lisinopril 5mg initially, restart afterwards  -TOD: None currently  -Device: Consider ICD placement in clinic  Consider outpatient sleep evaluation.  -Continue with Bumex on discharge, adjust dose as per cardiology  -Carvedilol is started at lower dose.  -Follow-up with cardiology as outpatient     Wheezing due to COPD exacerbation vs. Cardiac wheeze  -Significant tobacco use history. Hx of COPD, evidenced by XR demonstration of hyperventilation. No PFTs I can find in chart.  -Not on long-acting inhalers  -Duonebs QID for now  -Doxycycline 100mg PO BID for 5 days  -Prednisone 40mg PO qD, prednisone tapering course on discharge     History of CAD -  - asa, eliquis, Imdur 180 mg PO every day  - Cardiology consult, appreciate input  -Unremarkable telemetry monitoring      Chronic venous stasis with ulceration   - worse due to volume overload.  -lymphedema consult, wound care consult  -check CRP and if elevated would start ancef  -lidocaine gel PRN  -May need lymphedema treatment  -Doxycycline started empirically  -Continue with wound care as up     Restless Leg Syndrome  -   -reduce gabapentin to 300mg at bedtime as doesn't find all that helpful and can contribute to volume overload.   Consider stopping entirely in near future.  -requip  -duloxetine increase to 60mg PO every day.  ?indication and not sure why increased. Reduce back to home dose of 30mg    Neuropathy  -Continue with gabapentin  -Avoid narcotics    Normocytic anemia   - Hgb baseline estimated around 11.     Tobacco use  Current 1ppd tobacco use. Precontemplative stage of reduction/cessation.  -NRT PRN     Atrial fibrillation  -amiodarone 200mg PO every day  -apixaban 5mg PO BID      Weakness and deconditioning  -PT/OT evaluation  -Plan for home  discharge    Discussed with patient, cardiology, nursing staff and discharge planner       Consultations This Hospital Stay   CARDIOLOGY IP CONSULT  LYMPHEDEMA THERAPY IP CONSULT  WOUND OSTOMY CONTINENCE NURSE  IP CONSULT  CARDIOLOGY IP CONSULT    Code Status   Full Code    Time Spent on this Encounter   I, Cristian Urias MD, personally saw the patient today and spent greater than 30 minutes discharging this patient.       Cristian Urias MD  [address] [address]  Phone: [phone]  Fax: [phone]  ______________________________________________________________________    Physical Exam   Vital Signs: Temp: 97.4  F (36.3  C) Temp src: Oral BP: (!) 141/79 Pulse: 86   Resp: 18 SpO2: 90 % O2 Device: None (Room air)    Weight: 260 lbs 12.8 oz  Constitutional: awake, alert, cooperative, no apparent distress, and appears stated age  Respiratory: No increased work of breathing, good air exchange, clear to auscultation bilaterally, no crackles or wheezing  Cardiovascular: Normal apical impulse, regular rate and rhythm, normal S1 and S2, no S3 or S4, and no murmur noted  GI: No scars, normal bowel sounds, soft, non-distended, non-tender, no masses palpated, no hepatosplenomegally  Skin: no bruising or bleeding and normal skin color, texture, turgor  Musculoskeletal: Bilateral +2 legs edema with open wounds.  Neurologic: Awake, alert, oriented to name, place and time.  Cranial nerves II-XII are grossly intact.  Motor is 5 out of 5 bilaterally.   Sensory is intact.    Neuropsychiatric: Appropriate with examiner          Primary Care Physician   Reid Escobar    Discharge Orders      Follow-Up with Cardiology PATRICIA Heart Failure Discharge      Home Care Referral      Reason for your hospital stay    Acute CHF, acute COPD exacerbation     Follow-up and recommended labs and tests     Follow up with primary care provider, Reid Escobar, within 7 days for hospital  follow- up.  No follow up labs or test are needed.    Follow up with cardiology as scheduled     Activity    Your activity upon discharge: activity as tolerated     Diet    Follow this diet upon discharge: Orders Placed This Encounter      Combination Diet Low Saturated Fat Na <2400mg Diet       Significant Results and Procedures   Most Recent 3 CBC's:  Recent Labs   Lab Test 23  0520 23  0723 23  0846   WBC 10.6 10.4 9.3   HGB 12.0* 11.9* 11.7*   MCV 94 93 94    194 205     Most Recent 3 BMP's:  Recent Labs   Lab Test 23  0520 23  0723 23  0846    137 139   POTASSIUM 3.6 3.4 3.7   CHLORIDE 93* 95* 96*   CO2 33* 30* 30*   BUN 33.1* 32.6* 31.3*   CR 1.05 1.01 1.01   ANIONGAP 9 12 13   RUSLAN 9.0 9.3 9.6   GLC 92 101* 152*   ,   Results for orders placed or performed during the hospital encounter of 23   Chest XR,  PA & LAT    Narrative    EXAM: XR CHEST 2 VIEWS  LOCATION: [facility]  DATE: 2023    INDICATION: dyspnea  COMPARISON: 2023      Impression    IMPRESSION: Stable chest with again seen hyperinflation of both lungs. Postoperative changes sternotomy. Old healed left rib fractures. Mild to moderate scattered degenerative changes the bony skeleton.   Echocardiogram Limited     Value    Biplane LVEF 41%    Narrative    [id number]  [id number]  [id number]  [id number]^MARCIO^[place] [address]     Name: KRISTINE JONES  MRN: [id number]  : 1960  Study Date: 2023 11:46 AM  Age: 63 yrs  Gender: Male  Patient Location: [facility]  Reason For Study: CHF  Ordering Physician: TANIA BUCKLEY  Performed By: GALE     BSA: 2.2 m2  Height: 65 in  Weight: 270 lb  HR: 79  ______________________________________________________________________________  Procedure  Limited Echo Adult. Definity (NDC #01435-957) given intravenously. Technically  difficult study. Compared to the prior study dated 2023,  there have been  no changes.  ______________________________________________________________________________  Interpretation Summary     1. Technically very difficult study.  2. Left ventricular chamber size and wall thickness are normal. Systolic  function is mildly reduced with global hypokinesis. The calculated left  ventricular ejection fraction is 41%.  3. Right ventricle is not well visualized. Right ventricular chamber size and  systolic function are grossly normal.  4. No hemodynamically significant valvular abnormalities although the cardiac  valves are not well visualized.  5. Compared to the prior study dated 9/9/2023, there has been no significant  change.  ______________________________________________________________________________  I      WMSI = 2.00     % Normal = 0     X - Cannot   0 -                      (2) - Mildly 2 -          Segments  Size  Interpret    Hyperkinetic 1 - Normal  Hypokinetic  Hypokinetic  1-2     small                                                     7 -          3-5      moderate  3 - Akinetic 4 -          5 -         6 - Akinetic Dyskinetic   6-14    large               Dyskinetic   Aneurysmal  w/scar       w/scar       15-16   diffuse     Left Ventricle  The left ventricle is normal in size. There is normal left ventricular wall  thickness. Biplane LVEF is 41%. There is mild global hypokinesia of the left  ventricle. Septal motion is consistent with post-operative state.     Right Ventricle  The right ventricle is not well visualized. Normal right ventricle size and  systolic function.     Atria  Normal left atrial size. Right atrium not well visualized.     Mitral Valve  The mitral valve leaflets are mildly thickened. There is trace mitral  regurgitation. There is no mitral valve stenosis.     Tricuspid Valve  The tricuspid valve is not well visualized.     Aortic Valve  The aortic valve is not well visualized.     Pulmonic Valve  The pulmonic valve is not well  visualized.     Vessels  The aorta root cannot be assessed. IVC diameter <2.1 cm collapsing >50% with  sniff suggests a normal RA pressure of 3 mmHg.     Pericardium  There is no pericardial effusion.     Rhythm  Sinus rhythm was noted.     ______________________________________________________________________________  MMode/2D Measurements & Calculations  IVSd: 0.87 cm  LVIDd: 5.4 cm  LVIDs: 4.3 cm  LVPWd: 0.95 cm     FS: 19.0 %  LV mass(C)d: 180.3 grams  LV mass(C)dI: 80.2 grams/m2  LA dimension: 4.7 cm  LA Volume Index (BP): 30.0 ml/m2  RWT: 0.35     Time Measurements  MM HR: 84.0 BPM     ______________________________________________________________________________  Report approved by: Aura Wright 12/25/2023 01:14 PM             Discharge Medications   Current Discharge Medication List        START taking these medications    Details   carvedilol (COREG) 3.125 MG tablet Take 0.5 tablets (1.5625 mg) by mouth 2 times daily (with meals) for 90 days  Qty: 90 tablet, Refills: 0    Associated Diagnoses: Heart failure with reduced ejection fraction (H)      doxycycline hyclate (VIBRAMYCIN) 100 MG capsule Take 1 capsule (100 mg) by mouth 2 times daily for 5 days  Qty: 10 capsule, Refills: 0    Associated Diagnoses: COPD exacerbation (H)      lisinopril (ZESTRIL) 5 MG tablet Take 1 tablet (5 mg) by mouth daily for 90 days  Qty: 90 tablet, Refills: 0    Associated Diagnoses: Heart failure with reduced ejection fraction (H)      predniSONE (DELTASONE) 20 MG tablet Take 1 tablet (20 mg) by mouth daily for 5 days  Qty: 5 tablet, Refills: 0    Associated Diagnoses: COPD exacerbation (H)           CONTINUE these medications which have CHANGED    Details   bumetanide (BUMEX) 1 MG tablet Take 2 tablets (2 mg) by mouth 2 times daily  Qty: 900 tablet, Refills: 3    Associated Diagnoses: Heart failure with reduced ejection fraction (H)      gabapentin (NEURONTIN) 300 MG capsule Take 1 capsule (300 mg) by mouth at bedtime 300  MG AM AND NOON,  MG BY MOUTH DAILY AT BEDTIME    Associated Diagnoses: Neuropathy           CONTINUE these medications which have NOT CHANGED    Details   acetaminophen (TYLENOL) 500 MG tablet Take 1,000 mg by mouth every 8 hours as needed      albuterol (PROAIR HFA/PROVENTIL HFA/VENTOLIN HFA) 108 (90 Base) MCG/ACT inhaler Inhale 2 puffs into the lungs 4 times daily  Qty: 18 g, Refills: 0      albuterol (PROVENTIL) (2.5 MG/3ML) 0.083% neb solution Take 1 vial (2.5 mg) by nebulization every 6 hours as needed for shortness of breath, wheezing or cough  Qty: 90 mL, Refills: 0      amiodarone (PACERONE) 200 MG tablet Take 200 mg by mouth daily      apixaban ANTICOAGULANT (ELIQUIS) 5 MG tablet Take 1 tablet (5 mg) by mouth 2 times daily  Qty: 180 tablet, Refills: 0    Associated Diagnoses: Atrial fibrillation, unspecified type (H)      aspirin 81 MG EC tablet Take 1 tablet (81 mg) by mouth daily  Qty: 90 tablet, Refills: 0    Associated Diagnoses: NSTEMI (non-ST elevated myocardial infarction) (H)      atorvastatin (LIPITOR) 40 MG tablet Take 40 mg by mouth every morning      [employer] VITAMIN B12 1000 MCG tablet Take 1,000 mcg by mouth daily      DULoxetine (CYMBALTA) 30 MG capsule Take 30 mg by mouth every morning      hypromellose-dextran (ARTIFICAL TEARS) 0.1-0.3 % ophthalmic solution Place 2 drops into both eyes 4 times daily as needed      ipratropium - albuterol 0.5 mg/2.5 mg/3 mL (DUONEB) 0.5-2.5 (3) MG/3ML neb solution Inhale 3 mLs into the lungs every 6 hours as needed      isosorbide mononitrate (IMDUR) 60 MG 24 hr tablet Take 180 mg by mouth every morning      KLOR-CON 20 MEQ CR tablet Take 20 mEq by mouth daily      nitroGLYcerin (NITROSTAT) 0.4 MG sublingual tablet Place 1 tablet (0.4 mg) under the tongue every 5 minutes as needed If no relief after 3 tabs call 911;continue 1 tab every 5 min max 3 tab Indications: chest pain  Qty: 100 tablet, Refills: 1    Associated Diagnoses: Ischemic cardiomyopathy       polyethylene glycol (MIRALAX) 17 g packet Take 17 g by mouth 2 times daily as needed      rOPINIRole (REQUIP) 2 MG tablet Take 2 mg by mouth 3 times daily           Allergies   No Known Allergies

## 2023-12-28 NOTE — PLAN OF CARE
Goal Outcome Evaluation:  Patient discharge instruction educated.  After Visit Summary given to Patient.  Patient escorted in a wheelchair by CNA of the unit to main Entrance for Taxi.

## 2023-12-28 NOTE — PLAN OF CARE
Lymphedema Discharge Summary    Reason for therapy discharge:    Discharged to home with home therapy.    Progress towards therapy goal(s). See goals on Care Plan in [facility] electronic health record for goal details.  Goals partially met.  Barriers to achieving goals:   discharge from facility.    Therapy recommendation(s):    Continued therapy is recommended.  Rationale/Recommendations:  recommend continued lymphedema therapy follow-up. Patient fit with tetragrip and is wearing at discharge.    Nicole Mcdonnell, PT  12/28/2023

## 2023-12-29 ENCOUNTER — PATIENT OUTREACH (OUTPATIENT)
Dept: CARE COORDINATION | Facility: CLINIC | Age: 63
End: 2023-12-29
Payer: MEDICARE

## 2023-12-29 NOTE — PROGRESS NOTES
Connected Care Resource Center Contact  [facility]/Voicemail     Clinical Data: Post-Discharge Outreach     Outreach attempted x 2.  Left message on patient's voicemail, providing [facility]'s central phone number of [phone] ([phone]) for questions/concerns and/or to schedule an appt with an [facility] provider, if they do not have a PCP.      Plan:  [facility] will do no further outreaches at this time.       JOY Colindres  Connected Care Resource Center, [facility]    *Connected Care Resource Team does NOT follow patient ongoing. Referrals are identified based on internal discharge reports and the outreach is to ensure patient has an understanding of their discharge instructions.

## 2024-01-08 ENCOUNTER — APPOINTMENT (OUTPATIENT)
Dept: RADIOLOGY | Facility: HOSPITAL | Age: 64
DRG: 291 | End: 2024-01-08
Attending: EMERGENCY MEDICINE
Payer: MEDICARE

## 2024-01-08 ENCOUNTER — HOSPITAL ENCOUNTER (INPATIENT)
Facility: HOSPITAL | Age: 64
LOS: 10 days | Discharge: HOME HEALTH | DRG: 291 | End: 2024-01-18
Attending: EMERGENCY MEDICINE | Admitting: INTERNAL MEDICINE
Payer: MEDICARE

## 2024-01-08 DIAGNOSIS — G62.9 NEUROPATHY: ICD-10-CM

## 2024-01-08 DIAGNOSIS — I50.9 CONGESTIVE HEART FAILURE, UNSPECIFIED HF CHRONICITY, UNSPECIFIED HEART FAILURE TYPE (H): ICD-10-CM

## 2024-01-08 DIAGNOSIS — G25.81 RESTLESS LEGS SYNDROME (RLS): ICD-10-CM

## 2024-01-08 DIAGNOSIS — I50.20 HEART FAILURE WITH REDUCED EJECTION FRACTION (H): ICD-10-CM

## 2024-01-08 DIAGNOSIS — I25.10 CORONARY ARTERY DISEASE INVOLVING NATIVE HEART WITHOUT ANGINA PECTORIS, UNSPECIFIED VESSEL OR LESION TYPE: ICD-10-CM

## 2024-01-08 DIAGNOSIS — K59.00 CONSTIPATION, UNSPECIFIED CONSTIPATION TYPE: Primary | ICD-10-CM

## 2024-01-08 DIAGNOSIS — I50.23 ACUTE ON CHRONIC SYSTOLIC CONGESTIVE HEART FAILURE (H): ICD-10-CM

## 2024-01-08 PROBLEM — F17.200 SMOKER: Status: ACTIVE | Noted: 2023-09-28

## 2024-01-08 PROBLEM — R06.02 SOB (SHORTNESS OF BREATH): Status: RESOLVED | Noted: 2023-09-08 | Resolved: 2024-01-08

## 2024-01-08 PROBLEM — I48.0 PAROXYSMAL ATRIAL FIBRILLATION (H): Status: ACTIVE | Noted: 2023-09-28

## 2024-01-08 PROBLEM — J44.1 COPD EXACERBATION (H): Status: ACTIVE | Noted: 2023-12-22

## 2024-01-08 PROBLEM — N17.9 AKI (ACUTE KIDNEY INJURY) (H): Status: ACTIVE | Noted: 2024-01-08

## 2024-01-08 LAB
ANION GAP SERPL CALCULATED.3IONS-SCNC: 11 MMOL/L (ref 7–15)
BUN SERPL-MCNC: 30.3 MG/DL (ref 8–23)
CALCIUM SERPL-MCNC: 9.3 MG/DL (ref 8.8–10.2)
CHLORIDE SERPL-SCNC: 97 MMOL/L (ref 98–107)
CREAT SERPL-MCNC: 1.38 MG/DL (ref 0.67–1.17)
DEPRECATED HCO3 PLAS-SCNC: 34 MMOL/L (ref 22–29)
EGFRCR SERPLBLD CKD-EPI 2021: 57 ML/MIN/1.73M2
ERYTHROCYTE [DISTWIDTH] IN BLOOD BY AUTOMATED COUNT: 17.3 % (ref 10–15)
GLUCOSE SERPL-MCNC: 113 MG/DL (ref 70–99)
HCT VFR BLD AUTO: 40.1 % (ref 40–53)
HGB BLD-MCNC: 12 G/DL (ref 13.3–17.7)
HOLD SPECIMEN: NORMAL
MCH RBC QN AUTO: 27.7 PG (ref 26.5–33)
MCHC RBC AUTO-ENTMCNC: 29.9 G/DL (ref 31.5–36.5)
MCV RBC AUTO: 93 FL (ref 78–100)
NT-PROBNP SERPL-MCNC: 289 PG/ML (ref 0–900)
PLATELET # BLD AUTO: 153 10E3/UL (ref 150–450)
POTASSIUM SERPL-SCNC: 3.5 MMOL/L (ref 3.4–5.3)
RBC # BLD AUTO: 4.33 10E6/UL (ref 4.4–5.9)
SODIUM SERPL-SCNC: 142 MMOL/L (ref 135–145)
TROPONIN T SERPL HS-MCNC: 33 NG/L
WBC # BLD AUTO: 9.6 10E3/UL (ref 4–11)

## 2024-01-08 PROCEDURE — 250N000013 HC RX MED GY IP 250 OP 250 PS 637: Performed by: INTERNAL MEDICINE

## 2024-01-08 PROCEDURE — 99223 1ST HOSP IP/OBS HIGH 75: CPT | Mod: AI | Performed by: INTERNAL MEDICINE

## 2024-01-08 PROCEDURE — 83880 ASSAY OF NATRIURETIC PEPTIDE: CPT | Performed by: EMERGENCY MEDICINE

## 2024-01-08 PROCEDURE — 71046 X-RAY EXAM CHEST 2 VIEWS: CPT

## 2024-01-08 PROCEDURE — 84484 ASSAY OF TROPONIN QUANT: CPT | Performed by: EMERGENCY MEDICINE

## 2024-01-08 PROCEDURE — 120N000001 HC R&B MED SURG/OB

## 2024-01-08 PROCEDURE — 93005 ELECTROCARDIOGRAM TRACING: CPT | Performed by: STUDENT IN AN ORGANIZED HEALTH CARE EDUCATION/TRAINING PROGRAM

## 2024-01-08 PROCEDURE — 80048 BASIC METABOLIC PNL TOTAL CA: CPT | Performed by: EMERGENCY MEDICINE

## 2024-01-08 PROCEDURE — 93005 ELECTROCARDIOGRAM TRACING: CPT | Performed by: EMERGENCY MEDICINE

## 2024-01-08 PROCEDURE — 36415 COLL VENOUS BLD VENIPUNCTURE: CPT | Performed by: EMERGENCY MEDICINE

## 2024-01-08 PROCEDURE — 250N000011 HC RX IP 250 OP 636: Performed by: EMERGENCY MEDICINE

## 2024-01-08 PROCEDURE — 99285 EMERGENCY DEPT VISIT HI MDM: CPT | Mod: 25

## 2024-01-08 PROCEDURE — 85014 HEMATOCRIT: CPT | Performed by: EMERGENCY MEDICINE

## 2024-01-08 PROCEDURE — 250N000011 HC RX IP 250 OP 636: Performed by: INTERNAL MEDICINE

## 2024-01-08 RX ORDER — ISOSORBIDE MONONITRATE 60 MG/1
180 TABLET, EXTENDED RELEASE ORAL EVERY MORNING
Status: DISCONTINUED | OUTPATIENT
Start: 2024-01-09 | End: 2024-01-18 | Stop reason: HOSPADM

## 2024-01-08 RX ORDER — ALBUTEROL SULFATE 0.83 MG/ML
2.5 SOLUTION RESPIRATORY (INHALATION) EVERY 6 HOURS PRN
Status: DISCONTINUED | OUTPATIENT
Start: 2024-01-08 | End: 2024-01-18 | Stop reason: HOSPADM

## 2024-01-08 RX ORDER — GABAPENTIN 100 MG/1
300 CAPSULE ORAL AT BEDTIME
Status: DISCONTINUED | OUTPATIENT
Start: 2024-01-08 | End: 2024-01-08

## 2024-01-08 RX ORDER — LIDOCAINE 40 MG/G
CREAM TOPICAL
Status: DISCONTINUED | OUTPATIENT
Start: 2024-01-08 | End: 2024-01-18 | Stop reason: HOSPADM

## 2024-01-08 RX ORDER — AMIODARONE HYDROCHLORIDE 200 MG/1
200 TABLET ORAL DAILY
Status: DISCONTINUED | OUTPATIENT
Start: 2024-01-09 | End: 2024-01-18 | Stop reason: HOSPADM

## 2024-01-08 RX ORDER — ACETAMINOPHEN 325 MG/1
650 TABLET ORAL EVERY 4 HOURS PRN
Status: DISCONTINUED | OUTPATIENT
Start: 2024-01-08 | End: 2024-01-18 | Stop reason: HOSPADM

## 2024-01-08 RX ORDER — NITROGLYCERIN 0.4 MG/1
0.4 TABLET SUBLINGUAL EVERY 5 MIN PRN
Status: DISCONTINUED | OUTPATIENT
Start: 2024-01-08 | End: 2024-01-18 | Stop reason: HOSPADM

## 2024-01-08 RX ORDER — ALBUTEROL SULFATE 90 UG/1
2 AEROSOL, METERED RESPIRATORY (INHALATION) 4 TIMES DAILY
Status: DISCONTINUED | OUTPATIENT
Start: 2024-01-08 | End: 2024-01-18 | Stop reason: HOSPADM

## 2024-01-08 RX ORDER — AMOXICILLIN 250 MG
1 CAPSULE ORAL 2 TIMES DAILY PRN
Status: DISCONTINUED | OUTPATIENT
Start: 2024-01-08 | End: 2024-01-18 | Stop reason: HOSPADM

## 2024-01-08 RX ORDER — DULOXETIN HYDROCHLORIDE 30 MG/1
30 CAPSULE, DELAYED RELEASE ORAL EVERY MORNING
Status: DISCONTINUED | OUTPATIENT
Start: 2024-01-09 | End: 2024-01-18 | Stop reason: HOSPADM

## 2024-01-08 RX ORDER — ROPINIROLE 1 MG/1
2 TABLET, FILM COATED ORAL 3 TIMES DAILY
Status: DISCONTINUED | OUTPATIENT
Start: 2024-01-08 | End: 2024-01-18 | Stop reason: HOSPADM

## 2024-01-08 RX ORDER — ONDANSETRON 2 MG/ML
4 INJECTION INTRAMUSCULAR; INTRAVENOUS EVERY 6 HOURS PRN
Status: DISCONTINUED | OUTPATIENT
Start: 2024-01-08 | End: 2024-01-18 | Stop reason: HOSPADM

## 2024-01-08 RX ORDER — POTASSIUM CHLORIDE 1500 MG/1
40 TABLET, EXTENDED RELEASE ORAL ONCE
Status: COMPLETED | OUTPATIENT
Start: 2024-01-08 | End: 2024-01-08

## 2024-01-08 RX ORDER — POLYETHYLENE GLYCOL 3350 17 G/17G
17 POWDER, FOR SOLUTION ORAL 2 TIMES DAILY PRN
Status: DISCONTINUED | OUTPATIENT
Start: 2024-01-08 | End: 2024-01-18 | Stop reason: HOSPADM

## 2024-01-08 RX ORDER — AMOXICILLIN 250 MG
2 CAPSULE ORAL 2 TIMES DAILY PRN
Status: DISCONTINUED | OUTPATIENT
Start: 2024-01-08 | End: 2024-01-18 | Stop reason: HOSPADM

## 2024-01-08 RX ORDER — POTASSIUM CHLORIDE 1500 MG/1
20 TABLET, EXTENDED RELEASE ORAL DAILY
Status: DISCONTINUED | OUTPATIENT
Start: 2024-01-09 | End: 2024-01-16

## 2024-01-08 RX ORDER — SPIRONOLACTONE 25 MG/1
25 TABLET ORAL DAILY
Status: DISCONTINUED | OUTPATIENT
Start: 2024-01-08 | End: 2024-01-13

## 2024-01-08 RX ORDER — ATORVASTATIN CALCIUM 40 MG/1
40 TABLET, FILM COATED ORAL EVERY MORNING
Status: DISCONTINUED | OUTPATIENT
Start: 2024-01-09 | End: 2024-01-18 | Stop reason: HOSPADM

## 2024-01-08 RX ORDER — ACETAMINOPHEN 650 MG/1
650 SUPPOSITORY RECTAL EVERY 4 HOURS PRN
Status: DISCONTINUED | OUTPATIENT
Start: 2024-01-08 | End: 2024-01-18 | Stop reason: HOSPADM

## 2024-01-08 RX ORDER — LANOLIN ALCOHOL/MO/W.PET/CERES
1000 CREAM (GRAM) TOPICAL DAILY
Status: DISCONTINUED | OUTPATIENT
Start: 2024-01-09 | End: 2024-01-18 | Stop reason: HOSPADM

## 2024-01-08 RX ORDER — LANOLIN ALCOHOL/MO/W.PET/CERES
3 CREAM (GRAM) TOPICAL
Status: DISCONTINUED | OUTPATIENT
Start: 2024-01-08 | End: 2024-01-18 | Stop reason: HOSPADM

## 2024-01-08 RX ORDER — BUMETANIDE 0.25 MG/ML
2 INJECTION INTRAMUSCULAR; INTRAVENOUS ONCE
Status: COMPLETED | OUTPATIENT
Start: 2024-01-08 | End: 2024-01-08

## 2024-01-08 RX ORDER — BENZOCAINE/MENTHOL 6 MG-10 MG
LOZENGE MUCOUS MEMBRANE 2 TIMES DAILY
Status: DISCONTINUED | OUTPATIENT
Start: 2024-01-08 | End: 2024-01-18 | Stop reason: HOSPADM

## 2024-01-08 RX ORDER — CARVEDILOL 3.12 MG/1
1.56 TABLET, FILM COATED ORAL 2 TIMES DAILY WITH MEALS
Status: DISCONTINUED | OUTPATIENT
Start: 2024-01-08 | End: 2024-01-17

## 2024-01-08 RX ORDER — LISINOPRIL 5 MG/1
5 TABLET ORAL DAILY
Status: DISCONTINUED | OUTPATIENT
Start: 2024-01-09 | End: 2024-01-18 | Stop reason: HOSPADM

## 2024-01-08 RX ORDER — GABAPENTIN 300 MG/1
900 CAPSULE ORAL AT BEDTIME
Status: DISCONTINUED | OUTPATIENT
Start: 2024-01-08 | End: 2024-01-18 | Stop reason: HOSPADM

## 2024-01-08 RX ORDER — BUMETANIDE 0.25 MG/ML
2 INJECTION INTRAMUSCULAR; INTRAVENOUS 2 TIMES DAILY WITH MEALS
Status: DISCONTINUED | OUTPATIENT
Start: 2024-01-08 | End: 2024-01-09

## 2024-01-08 RX ORDER — ONDANSETRON 4 MG/1
4 TABLET, ORALLY DISINTEGRATING ORAL EVERY 6 HOURS PRN
Status: DISCONTINUED | OUTPATIENT
Start: 2024-01-08 | End: 2024-01-18 | Stop reason: HOSPADM

## 2024-01-08 RX ORDER — HYDRALAZINE HYDROCHLORIDE 25 MG/1
25 TABLET, FILM COATED ORAL EVERY 6 HOURS PRN
Status: DISCONTINUED | OUTPATIENT
Start: 2024-01-08 | End: 2024-01-18 | Stop reason: HOSPADM

## 2024-01-08 RX ORDER — CALCIUM CARBONATE 500 MG/1
1000 TABLET, CHEWABLE ORAL 4 TIMES DAILY PRN
Status: DISCONTINUED | OUTPATIENT
Start: 2024-01-08 | End: 2024-01-18 | Stop reason: HOSPADM

## 2024-01-08 RX ADMIN — APIXABAN 5 MG: 5 TABLET, FILM COATED ORAL at 22:05

## 2024-01-08 RX ADMIN — HYDROCORTISONE: 1 CREAM TOPICAL at 22:06

## 2024-01-08 RX ADMIN — ROPINIROLE HYDROCHLORIDE 2 MG: 1 TABLET, FILM COATED ORAL at 22:05

## 2024-01-08 RX ADMIN — BUMETANIDE 2 MG: 0.25 INJECTION INTRAMUSCULAR; INTRAVENOUS at 22:11

## 2024-01-08 RX ADMIN — POTASSIUM CHLORIDE 40 MEQ: 1500 TABLET, EXTENDED RELEASE ORAL at 18:55

## 2024-01-08 RX ADMIN — GABAPENTIN 900 MG: 300 CAPSULE ORAL at 22:05

## 2024-01-08 RX ADMIN — SPIRONOLACTONE 25 MG: 25 TABLET, FILM COATED ORAL at 22:05

## 2024-01-08 RX ADMIN — ALBUTEROL SULFATE 2 PUFF: 90 AEROSOL, METERED RESPIRATORY (INHALATION) at 22:06

## 2024-01-08 RX ADMIN — CARVEDILOL 1.56 MG: 3.12 TABLET, FILM COATED ORAL at 22:06

## 2024-01-08 RX ADMIN — BUMETANIDE 2 MG: 0.25 INJECTION INTRAMUSCULAR; INTRAVENOUS at 18:22

## 2024-01-08 ASSESSMENT — ACTIVITIES OF DAILY LIVING (ADL)
ADLS_ACUITY_SCORE: 35
ADLS_ACUITY_SCORE: 35
DEPENDENT_IADLS:: CLEANING;COOKING;MEAL PREPARATION;TRANSPORTATION
ADLS_ACUITY_SCORE: 35

## 2024-01-08 NOTE — ED TRIAGE NOTES
Patient reports he has gained all his water weight back since his last admission, he went and saw cardiology today who informed him he must go to the hospital. Patient reports that he has a lot of pain in his BLE as well. Patient states that he has been med compliant. Patient denies any chest pain at this time but states he does have some wheezing related to the water retention.

## 2024-01-08 NOTE — ED PROVIDER NOTES
EMERGENCY DEPARTMENT ENCOUNTER            IMPRESSION:  Acute congestive heart failure exacerbation        MEDICAL DECISION MAKING:  It was my pleasure to provide care for Zackary Hutchison who was referred to the emergency department from cardiology clinic for exacerbation of heart failure.  He has been compliant with medication.  Reports symptoms of shortness of breath and lower extremity edema    On my exam patient is pleasant and cooperative.   Vital signs are normal.  Physical exam notable for no respiratory distress.  There is some JVD.  Soft rales in the bases.  Lower extremity edema.     2 mg IV Bumex administered for symptom relief.  Patient's symptoms improved.     EKG independently interpreted by myself and shows right bundle branch block and T wave inversions which are unchanged    Laboratory investigation independently interpreted and notable for elevated troponin along with BUN and creatinine    Chest x-ray imaging is unremarkable.  Imaging independently interpreted by myself and does not show pulmonary edema    Patient will be admitted to the hospitalist for inpatient diuresis    =================================================================  CHIEF COMPLAINT:  Chief Complaint   Patient presents with    Heart Failure         HPI  Zackary Hutchison is a 63 year old male with a history of chronic kidney disease stage 3b, heart failure, COPD, coronary artery disease, hyperlipidemia, hypertension, obesity, and s/p CABG,  who presents to the ED by walk-in unaccompanied for evaluation of shortness of breath.    Per Chart Review,  12/22/23  - 12/27/23 The patient was admitted to [facility] for further evaluation of venous stasis dermatitis of BLE and a heart failure exacerbation. His lab results were remarkable for decreased hemoglobin levels (11.7) and elevated BUN (31.3). His chest x-ray was remarkable for bilateral hyperinflation of lungs. His ECHO was remarkable for a mild reduction in systolic function. He  received IV diureses with bumex and metolazone, which improved his symptoms. He was discharged with a follow-up appointment with cardiology and with prescriptions for carvedilol 3.125mg, doxycycline hyclate 100mg, lisinopril 5mg, and prednisone 20mg.  1/8/24: The patient presented to [facility] for further evaluation of acute heart failure. The patient was suspected to have an acute heart failure exacerbation. His exam was notable for wheezing and he had gained 18 pounds since leaving the hospital a week and a half prior. He was recommended to visit the ED and follow-up with cardiology within a week of  leaving the ED or hospital.    The patient presents with wheezing, shortness of breath, and leg swelling that have persisted for a week and a half. He states he was hospitalized at [facility] two weeks ago (12/22/23) for the same symptoms, had some relief in the hospital, but since being discharged his symptoms returned. His leg swelling has resolved somewhat but is still present. He also reports a weight gain of 17-18 pounds in the last two weeks. The patient denies any other symptoms or complaints at this time.       REVIEW OF SYSTEMS  Constitutional: Unintentional weight gain of 17-18 lbs in two weeks. Does not report chills, unintentional weight loss or fatigue   Eyes: Does not report visual changes or discharge    HENT: Does not report sore throat, ear pain or neck pain  Respiratory: shortness of breath, wheezing. Does not report cough   Cardiovascular: Bilateral leg swelling. Does not report chest pain, palpitations  GI: Does not report abdominal pain, nausea, vomiting, or dark, bloody stools.    : Does not report hematuria, dysuria, or flank pain  Musculoskeletal: Does not report any new musculoskeletal pain or new muscle/joint pains  Skin: Does not report rash or wound  Neurologic: Does not report current headache, new weakness, focal weakness, or sensory changes         Remainder of systems reviewed, unless noted in HPI all others negative.      PAST MEDICAL HISTORY:  Past Medical History:   Diagnosis Date    Chronic kidney disease, stage 3b (H) 09/28/2023    Class 3 severe obesity due to excess calories with body mass index (BMI) of 40.0 to 44.9 in adult (H) 09/28/2023    Congestive heart failure (H)     COPD (chronic obstructive pulmonary disease) (H)     Coronary artery disease involving coronary bypass graft of native heart without angina pectoris 09/28/2023    Heart failure with reduced ejection fraction (H) 09/11/2023    Hyperlipidemia     Hypertension     Ischemic cardiomyopathy 09/28/2023    Obese     Paroxysmal atrial fibrillation (H) 09/28/2023    Tobacco abuse 09/28/2023       PAST SURGICAL HISTORY:  Past Surgical History:   Procedure Laterality Date    CORONARY ANGIOGRAPHY ADULT ORDER      CORONARY ARTERY BYPASS Left 2014    2 vessels    CV CORONARY ANGIOGRAM N/A 09/11/2023    Procedure: Coronary Angiogram;  Surgeon: Santy Esposito MD;  Location: [facility] CATH LAB CV    CV LEFT HEART CATH N/A 09/11/2023    Procedure: Left Heart Catheterization;  Surgeon: Santy Esposito MD;  Location: [facility] CATH LAB CV    CV RIGHT HEART CATH MEASUREMENTS RECORDED N/A 09/11/2023    Procedure: Right Heart Catheterization;  Surgeon: Santy Esposito MD;  Location: [facility] CATH LAB CV         CURRENT MEDICATIONS:    No current outpatient medications on file.      ALLERGIES:  No Known Allergies    FAMILY HISTORY:  Family History   Problem Relation Age of Onset    Cerebrovascular Disease Mother     Myocardial Infarction Father        SOCIAL HISTORY:   Social History     Socioeconomic History    Marital status: Single   Tobacco Use    Smoking status: Every Day     Packs/day: .5     Types: Cigarettes    Smokeless tobacco: Never    Tobacco comments:     Seen IP by CTTS on 9/11/23 and declined cessation services and materials       PHYSICAL EXAM:    /62 (BP Location: Left  "arm)   Pulse 81   Temp 97.5  F (36.4  C) (Oral)   Resp 20   Ht 1.676 m (5' 6\")   Wt 115.8 kg (255 lb 4.8 oz)   SpO2 94%   BMI 41.21 kg/m      Constitutional: Awake, alert, no respiratory distress  Head: Normocephalic, atraumatic.  ENT: Mucous membranes are moist.  No pallor.   Eyes: Pupils are reactive.  No discoloration.  Neck: No lymphadenopathy, no stridor, supple, no soft tissue swelling  Chest: No tenderness   Respiratory: Crackles in the bases  Cardiovascular: Regular rate and rhythm.  Good overall perfusion.  Upper and lower extremity pulses are equal.  GI: Abdomen soft, non-tender to palpation.  No guarding or rebound. Bowel sounds present throughout.   Back: No CVA tenderness.    Musculoskeletal: Lower extremity edema  Integument: Warm, dry. No rash. No bruising or petechiae.  Neurologic: Alert & oriented x 3. Normal speech.   Psychiatric: Normal mood and affect.  Appropriate judgement.    ED COURSE:    4:13 PM Due to a shortage of available emergency department rooms, with the patient's permission I met with the patient for the initial interview and physical examination in a triage room. Discussed plan for treatment and workup in the ED.     5:20 PM I discussed the case with hospitalist, Dr Lloyd, who accepts the patient.      Medical Decision Making    History:  Supplemental history from: none  External Record(s) reviewed: External medical records including care everywhere reviewed 12/22/23 admission, 1/8/24 cardiology visit    Work Up:  EKG, laboratory and imaging studies as ordered were independently interpreted by myself.   Broad differential diagnosis considered for heart failure  The patient's presentation was of high complexity.     External consultation:  Discussion of management with another provider: Hospitalist     Complicating factors:  Patient has a complicated past medical history including chronic kidney disease stage 3b, heart failure, COPD, coronary artery disease, hyperlipidemia, " hypertension, obesity, and coronary artery bypass  Care affected by social determinants of health: Access to primary care    Disposition involved shared decision-making with the patient.      LAB:  Laboratory results were independently reviewed and interpreted  Results for orders placed or performed during the hospital encounter of 01/08/24   XR Chest 2 Views    Impression    IMPRESSION:     Left coronary stents. Previous median sternotomy. Cardiac silhouette is normal in size and the vascular pedicle width is normal.    Symmetric lung inflation. No interstitial or alveolar opacities. No pleural effusion.    Healed fracture deformity of several left upper posterolateral ribs. Intact and aligned sternal wires. Flowing degenerative osteophytes of the thoracic spine.   US Lower Extremity Venous Duplex Bilateral    Impression    IMPRESSION:  1.  No deep venous thrombosis in the bilateral lower extremities.    2.  No popliteal cyst on either side.   Nt probnp inpatient   Result Value Ref Range    N terminal Pro BNP Inpatient 289 0 - 900 pg/mL   Troponin T, High Sensitivity (now)   Result Value Ref Range    Troponin T, High Sensitivity 33 (H) <=22 ng/L   Basic metabolic panel   Result Value Ref Range    Sodium 142 135 - 145 mmol/L    Potassium 3.5 3.4 - 5.3 mmol/L    Chloride 97 (L) 98 - 107 mmol/L    Carbon Dioxide (CO2) 34 (H) 22 - 29 mmol/L    Anion Gap 11 7 - 15 mmol/L    Urea Nitrogen 30.3 (H) 8.0 - 23.0 mg/dL    Creatinine 1.38 (H) 0.67 - 1.17 mg/dL    GFR Estimate 57 (L) >60 mL/min/1.73m2    Calcium 9.3 8.8 - 10.2 mg/dL    Glucose 113 (H) 70 - 99 mg/dL   CBC (+ platelets, no diff)   Result Value Ref Range    WBC Count 9.6 4.0 - 11.0 10e3/uL    RBC Count 4.33 (L) 4.40 - 5.90 10e6/uL    Hemoglobin 12.0 (L) 13.3 - 17.7 g/dL    Hematocrit 40.1 40.0 - 53.0 %    MCV 93 78 - 100 fL    MCH 27.7 26.5 - 33.0 pg    MCHC 29.9 (L) 31.5 - 36.5 g/dL    RDW 17.3 (H) 10.0 - 15.0 %    Platelet Count 153 150 - 450 10e3/uL   Extra  Blue Top Tube   Result Value Ref Range    Hold Specimen JIC    Extra Red Top Tube   Result Value Ref Range    Hold Specimen JIC    Extra Green Top (Lithium Heparin) Tube   Result Value Ref Range    Hold Specimen JIC    Extra Purple Top Tube   Result Value Ref Range    Hold Specimen JIC    Basic metabolic panel   Result Value Ref Range    Sodium 144 135 - 145 mmol/L    Potassium 3.8 3.4 - 5.3 mmol/L    Chloride 100 98 - 107 mmol/L    Carbon Dioxide (CO2) 32 (H) 22 - 29 mmol/L    Anion Gap 12 7 - 15 mmol/L    Urea Nitrogen 26.9 (H) 8.0 - 23.0 mg/dL    Creatinine 1.11 0.67 - 1.17 mg/dL    GFR Estimate 74 >60 mL/min/1.73m2    Calcium 8.9 8.8 - 10.2 mg/dL    Glucose 103 (H) 70 - 99 mg/dL   CBC with platelets   Result Value Ref Range    WBC Count 8.2 4.0 - 11.0 10e3/uL    RBC Count 4.55 4.40 - 5.90 10e6/uL    Hemoglobin 12.7 (L) 13.3 - 17.7 g/dL    Hematocrit 42.1 40.0 - 53.0 %    MCV 93 78 - 100 fL    MCH 27.9 26.5 - 33.0 pg    MCHC 30.2 (L) 31.5 - 36.5 g/dL    RDW 17.7 (H) 10.0 - 15.0 %    Platelet Count 154 150 - 450 10e3/uL   Basic metabolic panel   Result Value Ref Range    Sodium 142 135 - 145 mmol/L    Potassium 3.3 (L) 3.4 - 5.3 mmol/L    Chloride 99 98 - 107 mmol/L    Carbon Dioxide (CO2) 32 (H) 22 - 29 mmol/L    Anion Gap 11 7 - 15 mmol/L    Urea Nitrogen 26.5 (H) 8.0 - 23.0 mg/dL    Creatinine 1.13 0.67 - 1.17 mg/dL    GFR Estimate 72 >60 mL/min/1.73m2    Calcium 9.2 8.8 - 10.2 mg/dL    Glucose 104 (H) 70 - 99 mg/dL   Result Value Ref Range    Magnesium 2.0 1.7 - 2.3 mg/dL   Result Value Ref Range    Potassium 3.6 3.4 - 5.3 mmol/L   Basic metabolic panel   Result Value Ref Range    Sodium 143 135 - 145 mmol/L    Potassium 3.5 3.4 - 5.3 mmol/L    Chloride 97 (L) 98 - 107 mmol/L    Carbon Dioxide (CO2) 34 (H) 22 - 29 mmol/L    Anion Gap 12 7 - 15 mmol/L    Urea Nitrogen 30.7 (H) 8.0 - 23.0 mg/dL    Creatinine 1.30 (H) 0.67 - 1.17 mg/dL    GFR Estimate 61 >60 mL/min/1.73m2    Calcium 9.8 8.8 - 10.2 mg/dL     Glucose 137 (H) 70 - 99 mg/dL   Basic metabolic panel   Result Value Ref Range    Sodium 139 135 - 145 mmol/L    Potassium 3.4 3.4 - 5.3 mmol/L    Chloride 96 (L) 98 - 107 mmol/L    Carbon Dioxide (CO2) 29 22 - 29 mmol/L    Anion Gap 14 7 - 15 mmol/L    Urea Nitrogen 31.1 (H) 8.0 - 23.0 mg/dL    Creatinine 1.14 0.67 - 1.17 mg/dL    GFR Estimate 71 >60 mL/min/1.73m2    Calcium 9.9 8.8 - 10.2 mg/dL    Glucose 110 (H) 70 - 99 mg/dL   Result Value Ref Range    Potassium 3.7 3.4 - 5.3 mmol/L   Extra Purple Top EDTA (LAB USE ONLY)   Result Value Ref Range    Hold Specimen [facility]    Basic metabolic panel   Result Value Ref Range    Sodium 140 135 - 145 mmol/L    Potassium 3.3 (L) 3.4 - 5.3 mmol/L    Chloride 96 (L) 98 - 107 mmol/L    Carbon Dioxide (CO2) 29 22 - 29 mmol/L    Anion Gap 15 7 - 15 mmol/L    Urea Nitrogen 36.7 (H) 8.0 - 23.0 mg/dL    Creatinine 1.30 (H) 0.67 - 1.17 mg/dL    GFR Estimate 61 >60 mL/min/1.73m2    Calcium 10.5 (H) 8.8 - 10.2 mg/dL    Glucose 159 (H) 70 - 99 mg/dL   Extra Purple Top Tube   Result Value Ref Range    Hold Specimen JI    Result Value Ref Range    Potassium 4.0 3.4 - 5.3 mmol/L   Basic metabolic panel   Result Value Ref Range    Sodium 141 135 - 145 mmol/L    Potassium 3.8 3.4 - 5.3 mmol/L    Chloride 97 (L) 98 - 107 mmol/L    Carbon Dioxide (CO2) 29 22 - 29 mmol/L    Anion Gap 15 7 - 15 mmol/L    Urea Nitrogen 43.9 (H) 8.0 - 23.0 mg/dL    Creatinine 1.33 (H) 0.67 - 1.17 mg/dL    GFR Estimate 59 (L) >60 mL/min/1.73m2    Calcium 10.7 (H) 8.8 - 10.2 mg/dL    Glucose 146 (H) 70 - 99 mg/dL   Basic metabolic panel   Result Value Ref Range    Sodium 140 135 - 145 mmol/L    Potassium 4.3 3.4 - 5.3 mmol/L    Chloride 95 (L) 98 - 107 mmol/L    Carbon Dioxide (CO2) 31 (H) 22 - 29 mmol/L    Anion Gap 14 7 - 15 mmol/L    Urea Nitrogen 49.3 (H) 8.0 - 23.0 mg/dL    Creatinine 1.45 (H) 0.67 - 1.17 mg/dL    GFR Estimate 53 (L) >60 mL/min/1.73m2    Calcium 11.0 (H) 8.8 - 10.2 mg/dL    Glucose 143 (H) 70  - 99 mg/dL   ECG 12-LEAD WITH MUSE (LHE)   Result Value Ref Range    Systolic Blood Pressure  mmHg    Diastolic Blood Pressure  mmHg    Ventricular Rate 87 BPM    Atrial Rate 86 BPM    OK Interval  ms    QRS Duration 132 ms     ms    QTc 498 ms    P Axis  degrees    R AXIS 83 degrees    T Axis 88 degrees    Interpretation ECG       Wide QRS rhythm  Right bundle branch block  Abnormal ECG  When compared with ECG of 23-DEC-2023 15:16,  Wide QRS rhythm has replaced Sinus rhythm  Confirmed by SEE ED PROVIDER NOTE FOR, ECG INTERPRETATION ([id number]),  MATHIEU CULLEN ([id number]) on 1/12/2024 4:08:42 AM     ECG 12-LEAD WITH MUSE (LHE)   Result Value Ref Range    Systolic Blood Pressure  mmHg    Diastolic Blood Pressure  mmHg    Ventricular Rate 95 BPM    Atrial Rate 95 BPM    OK Interval 164 ms    QRS Duration 146 ms     ms    QTc 532 ms    P Axis 11 degrees    R AXIS 84 degrees    T Axis 18 degrees    Interpretation ECG       Sinus rhythm with sinus arrhythmia  Right bundle branch block  T wave abnormality, consider inferior ischemia  Abnormal ECG  When compared with ECG of 08-JAN-2024 15:53,  No significant change was found  Confirmed by AVA SALOMON, LES LOC:[place] ([id number]) on 1/11/2024 1:57:31 PM           RADIOLOGY:  Radiology reports were independently reviewed and interpreted  US Lower Extremity Venous Duplex Bilateral   Final Result   IMPRESSION:   1.  No deep venous thrombosis in the bilateral lower extremities.      2.  No popliteal cyst on either side.      XR Chest 2 Views   Final Result   IMPRESSION:       Left coronary stents. Previous median sternotomy. Cardiac silhouette is normal in size and the vascular pedicle width is normal.      Symmetric lung inflation. No interstitial or alveolar opacities. No pleural effusion.      Healed fracture deformity of several left upper posterolateral ribs. Intact and aligned sternal wires. Flowing degenerative osteophytes of the thoracic spine.           EKG:     Performed at: 15:53  Impression: Wide QRS rhythm. Right bundle branch block. Abnormal ECG.     Rate: 87 BPM  VA Interval: * ms  QRS Duration: 132 ms  Qtc Interval: 498 ms    Comparison: Compared to previous from 23-DEC-2023 Wide QRS rhythm has replaced sinus rhythm.        I have independently reviewed and interpreted the EKG(s) documented above.          MEDICATIONS GIVEN IN THE EMERGENCY:  Medications   lidocaine 1 % 0.1-1 mL (has no administration in time range)   lidocaine (LMX4) cream (has no administration in time range)   sodium chloride (PF) 0.9% PF flush 3 mL (3 mLs Intracatheter Not Given 1/15/24 0623)   sodium chloride (PF) 0.9% PF flush 3 mL (has no administration in time range)   hydrALAZINE (APRESOLINE) tablet 25 mg (has no administration in time range)   acetaminophen (TYLENOL) tablet 650 mg (650 mg Oral $Given 1/13/24 0036)     Or   acetaminophen (TYLENOL) Suppository 650 mg ( Rectal See Alternative 1/13/24 0036)   melatonin tablet 3 mg (has no administration in time range)   senna-docusate (SENOKOT-S/PERICOLACE) 8.6-50 MG per tablet 1 tablet ( Oral See Alternative 1/14/24 1200)     Or   senna-docusate (SENOKOT-S/PERICOLACE) 8.6-50 MG per tablet 2 tablet (2 tablets Oral $Given 1/14/24 1200)   ondansetron (ZOFRAN ODT) ODT tab 4 mg (has no administration in time range)     Or   ondansetron (ZOFRAN) injection 4 mg (has no administration in time range)   calcium carbonate (TUMS) chewable tablet 1,000 mg (has no administration in time range)   benzocaine-menthol (CHLORASEPTIC) 6-10 MG lozenge 1 lozenge (has no administration in time range)   miconazole (MICATIN) 2 % powder (has no administration in time range)   menthol-zinc oxide (CALMOSEPTINE) 0.44-20.6 % ointment OINT (has no administration in time range)   albuterol (PROVENTIL HFA/VENTOLIN HFA) inhaler (2 puffs Inhalation Not Given 1/14/24 2123)   albuterol (PROVENTIL) neb solution 2.5 mg (has no administration in time range)   amiodarone (PACERONE)  tablet 200 mg (200 mg Oral $Given 1/14/24 0824)   apixaban ANTICOAGULANT (ELIQUIS) tablet 5 mg (5 mg Oral $Given 1/14/24 2121)   atorvastatin (LIPITOR) tablet 40 mg (40 mg Oral $Given 1/14/24 0824)   carvedilol (COREG) half-tab 1.5625 mg ( Oral Automatically Held 1/20/24 1800)   cyanocobalamin (VITAMIN B-12) tablet 1,000 mcg (1,000 mcg Oral $Given 1/14/24 0823)   DULoxetine (CYMBALTA) DR capsule 30 mg (30 mg Oral $Given 1/14/24 0823)   isosorbide mononitrate (IMDUR) 24 hr tablet 180 mg (180 mg Oral $Given 1/14/24 0824)   potassium chloride ER (KLOR-CON M) CR tablet 20 mEq (20 mEq Oral $Given 1/14/24 0823)   lisinopril (ZESTRIL) tablet 5 mg ( Oral Automatically Held 1/18/24 0800)   nitroGLYcerin (NITROSTAT) sublingual tablet 0.4 mg (0.4 mg Sublingual $Given 1/11/24 0859)   rOPINIRole (REQUIP) tablet 2 mg (2 mg Oral $Given 1/14/24 2120)   polyethylene glycol (MIRALAX) Packet 17 g (17 g Oral $Given 1/14/24 1200)   hydrocortisone (CORTAID) 1 % cream ( Topical Not Given 1/14/24 2123)   gabapentin (NEURONTIN) capsule 900 mg (900 mg Oral $Given 1/14/24 2120)   furosemide (LASIX) 100 mg in sodium chloride 0.9 % 100 mL infusion (0 mg/hr Intravenous Stopped 1/14/24 1420)   albumin human 25 % injection 50 g (50 g Intravenous $New Bag 1/14/24 0824)   ipratropium - albuterol 0.5 mg/2.5 mg/3 mL (DUONEB) neb solution 3 mL (has no administration in time range)   albuterol (PROVENTIL HFA/VENTOLIN HFA) inhaler (2 puffs Inhalation $Given 1/11/24 0400)   DOPamine 400 mg in 250 mL D5W PERIPHERAL infusion (3 mcg/kg/min × 117.9 kg Intravenous Rate/Dose Verify 1/15/24 0050)   hydrALAZINE (APRESOLINE) tablet 10 mg (10 mg Oral $Given 1/14/24 2121)   spironolactone (ALDACTONE) tablet 50 mg (50 mg Oral $Given 1/14/24 0824)   polyethylene glycol (MIRALAX) Packet 17 g (17 g Oral Not Given 1/14/24 1254)   senna-docusate (SENOKOT-S/PERICOLACE) 8.6-50 MG per tablet 1 tablet (1 tablet Oral $Given 1/14/24 2120)   bumetanide (BUMEX) injection 2 mg (2  mg Intravenous $Given 1/8/24 1822)   potassium chloride ER (KLOR-CON M) CR tablet 40 mEq (40 mEq Oral $Given 1/8/24 1855)   furosemide (LASIX) injection 80 mg (80 mg Intravenous $Given 1/9/24 1021)   albumin human 25 % injection 50 g (50 g Intravenous $New Bag 1/9/24 1021)   albumin human 25 % injection 50 g (0 g Intravenous Stopped 1/10/24 1040)   chlorothiazide (DIURIL) injection 500 mg (500 mg Intravenous $New Bag 1/10/24 0917)   potassium chloride ER (KLOR-CON M) CR tablet 40 mEq (40 mEq Oral $Given 1/10/24 1552)   HYDROmorphone (DILAUDID) injection 0.2 mg (0.2 mg Intravenous $Given 1/10/24 2021)   potassium chloride ER (KLOR-CON M) CR tablet 40 mEq (40 mEq Oral $Given 1/12/24 0823)   potassium chloride ER (KLOR-CON M) CR tablet 40 mEq (40 mEq Oral $Given 1/13/24 0724)   potassium chloride ER (KLOR-CON M) CR tablet 40 mEq (40 mEq Oral $Given 1/13/24 0908)           NEW PRESCRIPTIONS STARTED AT TODAY'S ER VISIT:  Current Discharge Medication List                   FINAL DIAGNOSIS:    ICD-10-CM    1. Congestive heart failure, unspecified HF chronicity, unspecified heart failure type (H)  I50.9                  NAME: Zackary Hutchison  AGE: 63 year old male  YOB: 1960  MRN: [id number]  EVALUATION DATE & TIME: No admission date for patient encounter.    PCP: Reid Escobar    ED PROVIDER: ROQUE Morris Natalie Horwath, am serving as a scribe to document services personally performed by Dr. Killian Aguilera based on my observation and the provider's statements to me. I, Killian Aguilera MD attest that Emelina Escobar is acting in a scribe capacity, has observed my performance of the services and has documented them in accordance with my direction.    Killian Aguilera M.D.  Emergency Medicine  [facility] EMERGENCY DEPARTMENT  [address] [place] [address] [id number] [phone]  Dept: [phone]  1/8/2024         Killian Aguilera MD  01/15/24 0709

## 2024-01-09 LAB
ANION GAP SERPL CALCULATED.3IONS-SCNC: 12 MMOL/L (ref 7–15)
BUN SERPL-MCNC: 26.9 MG/DL (ref 8–23)
CALCIUM SERPL-MCNC: 8.9 MG/DL (ref 8.8–10.2)
CHLORIDE SERPL-SCNC: 100 MMOL/L (ref 98–107)
CREAT SERPL-MCNC: 1.11 MG/DL (ref 0.67–1.17)
DEPRECATED HCO3 PLAS-SCNC: 32 MMOL/L (ref 22–29)
EGFRCR SERPLBLD CKD-EPI 2021: 74 ML/MIN/1.73M2
ERYTHROCYTE [DISTWIDTH] IN BLOOD BY AUTOMATED COUNT: 17.7 % (ref 10–15)
GLUCOSE SERPL-MCNC: 103 MG/DL (ref 70–99)
HCT VFR BLD AUTO: 42.1 % (ref 40–53)
HGB BLD-MCNC: 12.7 G/DL (ref 13.3–17.7)
MCH RBC QN AUTO: 27.9 PG (ref 26.5–33)
MCHC RBC AUTO-ENTMCNC: 30.2 G/DL (ref 31.5–36.5)
MCV RBC AUTO: 93 FL (ref 78–100)
PLATELET # BLD AUTO: 154 10E3/UL (ref 150–450)
POTASSIUM SERPL-SCNC: 3.8 MMOL/L (ref 3.4–5.3)
RBC # BLD AUTO: 4.55 10E6/UL (ref 4.4–5.9)
SODIUM SERPL-SCNC: 144 MMOL/L (ref 135–145)
WBC # BLD AUTO: 8.2 10E3/UL (ref 4–11)

## 2024-01-09 PROCEDURE — P9047 ALBUMIN (HUMAN), 25%, 50ML: HCPCS | Performed by: INTERNAL MEDICINE

## 2024-01-09 PROCEDURE — 85027 COMPLETE CBC AUTOMATED: CPT | Performed by: INTERNAL MEDICINE

## 2024-01-09 PROCEDURE — 250N000011 HC RX IP 250 OP 636: Performed by: INTERNAL MEDICINE

## 2024-01-09 PROCEDURE — 80048 BASIC METABOLIC PNL TOTAL CA: CPT | Performed by: INTERNAL MEDICINE

## 2024-01-09 PROCEDURE — 99223 1ST HOSP IP/OBS HIGH 75: CPT | Performed by: INTERNAL MEDICINE

## 2024-01-09 PROCEDURE — 250N000013 HC RX MED GY IP 250 OP 250 PS 637: Performed by: INTERNAL MEDICINE

## 2024-01-09 PROCEDURE — 258N000003 HC RX IP 258 OP 636: Performed by: INTERNAL MEDICINE

## 2024-01-09 PROCEDURE — 99232 SBSQ HOSP IP/OBS MODERATE 35: CPT | Performed by: INTERNAL MEDICINE

## 2024-01-09 PROCEDURE — 120N000001 HC R&B MED SURG/OB

## 2024-01-09 PROCEDURE — 36415 COLL VENOUS BLD VENIPUNCTURE: CPT | Performed by: INTERNAL MEDICINE

## 2024-01-09 RX ORDER — FUROSEMIDE 10 MG/ML
80 INJECTION INTRAMUSCULAR; INTRAVENOUS ONCE
Status: COMPLETED | OUTPATIENT
Start: 2024-01-09 | End: 2024-01-09

## 2024-01-09 RX ORDER — ALBUMIN (HUMAN) 12.5 G/50ML
50 SOLUTION INTRAVENOUS ONCE
Status: COMPLETED | OUTPATIENT
Start: 2024-01-09 | End: 2024-01-09

## 2024-01-09 RX ADMIN — CARVEDILOL 1.56 MG: 3.12 TABLET, FILM COATED ORAL at 17:55

## 2024-01-09 RX ADMIN — APIXABAN 5 MG: 5 TABLET, FILM COATED ORAL at 08:03

## 2024-01-09 RX ADMIN — AMIODARONE HYDROCHLORIDE 200 MG: 200 TABLET ORAL at 08:03

## 2024-01-09 RX ADMIN — ALBUTEROL SULFATE 2 PUFF: 90 AEROSOL, METERED RESPIRATORY (INHALATION) at 12:04

## 2024-01-09 RX ADMIN — ROPINIROLE HYDROCHLORIDE 2 MG: 1 TABLET, FILM COATED ORAL at 14:32

## 2024-01-09 RX ADMIN — ALBUTEROL SULFATE 2 PUFF: 90 AEROSOL, METERED RESPIRATORY (INHALATION) at 20:54

## 2024-01-09 RX ADMIN — FUROSEMIDE 20 MG/HR: 10 INJECTION, SOLUTION INTRAVENOUS at 20:52

## 2024-01-09 RX ADMIN — ATORVASTATIN CALCIUM 40 MG: 40 TABLET, FILM COATED ORAL at 08:03

## 2024-01-09 RX ADMIN — LISINOPRIL 5 MG: 5 TABLET ORAL at 08:03

## 2024-01-09 RX ADMIN — FUROSEMIDE 80 MG: 10 INJECTION, SOLUTION INTRAMUSCULAR; INTRAVENOUS at 10:21

## 2024-01-09 RX ADMIN — HYDROCORTISONE: 1 CREAM TOPICAL at 08:06

## 2024-01-09 RX ADMIN — GABAPENTIN 900 MG: 300 CAPSULE ORAL at 21:48

## 2024-01-09 RX ADMIN — CARVEDILOL 1.56 MG: 3.12 TABLET, FILM COATED ORAL at 08:03

## 2024-01-09 RX ADMIN — APIXABAN 5 MG: 5 TABLET, FILM COATED ORAL at 20:53

## 2024-01-09 RX ADMIN — BUMETANIDE 2 MG: 0.25 INJECTION INTRAMUSCULAR; INTRAVENOUS at 08:04

## 2024-01-09 RX ADMIN — FUROSEMIDE 20 MG/HR: 10 INJECTION, SOLUTION INTRAVENOUS at 12:04

## 2024-01-09 RX ADMIN — DULOXETINE HYDROCHLORIDE 30 MG: 30 CAPSULE, DELAYED RELEASE ORAL at 08:03

## 2024-01-09 RX ADMIN — ALBUMIN HUMAN 50 G: 0.25 SOLUTION INTRAVENOUS at 10:21

## 2024-01-09 RX ADMIN — ISOSORBIDE MONONITRATE 180 MG: 60 TABLET, EXTENDED RELEASE ORAL at 08:04

## 2024-01-09 RX ADMIN — ACETAMINOPHEN 650 MG: 325 TABLET ORAL at 18:51

## 2024-01-09 RX ADMIN — ALBUTEROL SULFATE 2 PUFF: 90 AEROSOL, METERED RESPIRATORY (INHALATION) at 16:20

## 2024-01-09 RX ADMIN — SPIRONOLACTONE 25 MG: 25 TABLET, FILM COATED ORAL at 08:03

## 2024-01-09 RX ADMIN — CYANOCOBALAMIN TAB 1000 MCG 1000 MCG: 1000 TAB at 08:03

## 2024-01-09 RX ADMIN — HYDROCORTISONE: 1 CREAM TOPICAL at 20:53

## 2024-01-09 RX ADMIN — POTASSIUM CHLORIDE 20 MEQ: 1500 TABLET, EXTENDED RELEASE ORAL at 08:03

## 2024-01-09 RX ADMIN — ROPINIROLE HYDROCHLORIDE 2 MG: 1 TABLET, FILM COATED ORAL at 08:03

## 2024-01-09 RX ADMIN — ROPINIROLE HYDROCHLORIDE 2 MG: 1 TABLET, FILM COATED ORAL at 20:53

## 2024-01-09 RX ADMIN — ALBUTEROL SULFATE 2 PUFF: 90 AEROSOL, METERED RESPIRATORY (INHALATION) at 08:04

## 2024-01-09 ASSESSMENT — ACTIVITIES OF DAILY LIVING (ADL)
ADLS_ACUITY_SCORE: 37
ADLS_ACUITY_SCORE: 37
ADLS_ACUITY_SCORE: 36
ADLS_ACUITY_SCORE: 37
ADLS_ACUITY_SCORE: 38
ADLS_ACUITY_SCORE: 37
ADLS_ACUITY_SCORE: 36
ADLS_ACUITY_SCORE: 37
ADLS_ACUITY_SCORE: 36
ADLS_ACUITY_SCORE: 35
ADLS_ACUITY_SCORE: 37
ADLS_ACUITY_SCORE: 37

## 2024-01-09 NOTE — PLAN OF CARE
Problem: Heart Failure  Goal: Fluid and Electrolyte Balance  Outcome: Progressing  Goal: Effective Oxygenation and Ventilation  Outcome: Progressing  Intervention: Promote Airway Secretion Clearance  Recent Flowsheet Documentation  Taken 1/9/2024 1200 by Sol Hanson RN  Cough And Deep Breathing: done independently per patient  Activity Management: up in chair  Taken 1/9/2024 0730 by Sol Hanson RN  Cough And Deep Breathing: done independently per patient  Activity Management: up in chair  Intervention: Optimize Oxygenation and Ventilation  Recent Flowsheet Documentation  Taken 1/9/2024 1200 by Sol Hanson, RN  Head of Bed (HOB) Positioning: HOB at 30 degrees  Taken 1/9/2024 0730 by Sol Hanson RN  Head of Bed (HOB) Positioning: HOB at 30 degrees   Goal Outcome Evaluation:    Pt tolerating care plan without adverse effect. Tolerating IV lasix drip. VSS. Denies pain. Utilizes call light appropriately and is able to verbalize needs effectively.

## 2024-01-09 NOTE — H&P
[facility]    History and Physical  Hospitalist       Date of Admission:  1/8/2024    Assessment & Plan   63 year old male with past medical hx of smoking, chronic kidney disease stage 3b, heart failure, COPD, coronary artery disease, hyperlipidemia, hypertension, obesity, and s/p CABG (2 vessels, 10 years ago) presents with acute on chronic systolic CHF.    Summary:    Principal Problem:    Acute on chronic systolic CHF -- has retained 18 lbs in 2 weeks  -- diurese with lasix, monitor daily weight and I&O  -- will add Spironolactone       PRINCESS (acute kidney injury) (H24)- diurese with IV bumex and hope kidney function improves     Active Problems:    CAD, S/P CABG x 2 vessels in 2014      Paroxysmal atrial fib -- on Eliquis    -- currently in NSR      Smoker   -- needs to quit, does not want nicotine replacement   -- will give info on discharge      COPD       PRINCESS (acute kidney injury) (H24)- diurese with lasix       Plan:  As above, expect home in 2-3 days    DVT Prophylaxis: DOAC  Code Status: Full Code    Disposition: Expected discharge home in 2-3 days    Devyn Yousif MD  Pager: [phone]  Cell Phone:  [phone]     Primary Care Physician   Reid Escobar    Chief Complaint   SOB, weight gaine    History is obtained from Patient    History of Present Illness   63 year old male with a history of smoking, chronic kidney disease stage 3b, heart failure, COPD, coronary artery disease, hyperlipidemia, hypertension, obesity, and s/p CABG (2 vessels, 10 years ago),  who presents to the ED by walk-in unaccompanied for evaluation of shortness of breath.    The patient presents with wheezing, shortness of breath, and leg swelling that have persisted for a week and a half. He states he was hospitalized at [facility] two weeks ago (12/22/23) for the same symptoms. Patient reported that weight upon leaving the hospital was 254 lbs and has increased to 274 lbs.Has increased dyspnea  and wheezing minimal at rest, but significant with exertion and when laying flat (he sleeps in recliner). He denies cough or significant sputum production.     In ER, AVSS (O2 sat 97% on room air), Creat 1.38 (baseline Creat 1.05 two weeks ago), CXR with        PAST MEDICAL HISTORY    Past Medical History:   Diagnosis Date    Chronic kidney disease, stage 3b (H) 09/28/2023    Class 3 severe obesity due to excess calories with body mass index (BMI) of 40.0 to 44.9 in adult (H) 09/28/2023    Congestive heart failure (H)     COPD (chronic obstructive pulmonary disease) (H)     Coronary artery disease involving coronary bypass graft of native heart without angina pectoris 09/28/2023    Heart failure with reduced ejection fraction (H) 09/11/2023    Hyperlipidemia     Hypertension     Ischemic cardiomyopathy 09/28/2023    Obese     Paroxysmal atrial fibrillation (H) 09/28/2023    Tobacco abuse 09/28/2023        PAST SURGICAL HISTORY    Past Surgical History:   Procedure Laterality Date    CORONARY ANGIOGRAPHY ADULT ORDER      CORONARY ARTERY BYPASS Left 2014    2 vessels    CV CORONARY ANGIOGRAM N/A 09/11/2023    Procedure: Coronary Angiogram;  Surgeon: Santy Esposito MD;  Location: [facility] CATH LAB CV    CV LEFT HEART CATH N/A 09/11/2023    Procedure: Left Heart Catheterization;  Surgeon: Santy Esopsito MD;  Location: [facility] CATH LAB CV    CV RIGHT HEART CATH MEASUREMENTS RECORDED N/A 09/11/2023    Procedure: Right Heart Catheterization;  Surgeon: Santy Esposito MD;  Location: [facility] CATH LAB CV        Prior to Admission Medications   Prior to Admission Medications   Prescriptions Last Dose Informant Patient Reported? Taking?   [facility] VITAMIN B12 1000 MCG tablet 1/8/2024 at am  Yes Yes   Sig: Take 1,000 mcg by mouth daily   DULoxetine (CYMBALTA) 30 MG capsule 1/8/2024 at am  Yes Yes   Sig: Take 30 mg by mouth every morning   KLOR-CON 20 MEQ CR tablet 1/8/2024 at am  Yes Yes   Sig: Take 20 mEq by mouth daily    acetaminophen (TYLENOL) 500 MG tablet 1/8/2024 at am  Yes Yes   Sig: Take 1,000 mg by mouth every 8 hours as needed   albuterol (PROAIR HFA/PROVENTIL HFA/VENTOLIN HFA) 108 (90 Base) MCG/ACT inhaler 1/7/2024 at pm  No Yes   Sig: Inhale 2 puffs into the lungs 4 times daily   albuterol (PROVENTIL) (2.5 MG/3ML) 0.083% neb solution Past Week at prn  No Yes   Sig: Take 1 vial (2.5 mg) by nebulization every 6 hours as needed for shortness of breath, wheezing or cough   amiodarone (PACERONE) 200 MG tablet 1/8/2024 at am  Yes Yes   Sig: Take 200 mg by mouth daily   apixaban ANTICOAGULANT (ELIQUIS) 5 MG tablet 1/8/2024 at am  No Yes   Sig: Take 1 tablet (5 mg) by mouth 2 times daily   atorvastatin (LIPITOR) 40 MG tablet 1/8/2024 at am  Yes Yes   Sig: Take 40 mg by mouth every morning   bumetanide (BUMEX) 1 MG tablet 1/8/2024 at am  No Yes   Sig: Take 2 tablets (2 mg) by mouth 2 times daily   carvedilol (COREG) 3.125 MG tablet 1/8/2024 at am  No Yes   Sig: Take 0.5 tablets (1.5625 mg) by mouth 2 times daily (with meals) for 90 days   gabapentin (NEURONTIN) 300 MG capsule 1/8/2024 at am  No Yes   Sig: Take 1 capsule (300 mg) by mouth at bedtime 300 MG AM AND NOON,  MG BY MOUTH DAILY AT BEDTIME   isosorbide mononitrate (IMDUR) 60 MG 24 hr tablet 1/8/2024 at am  Yes Yes   Sig: Take 180 mg by mouth every morning   lisinopril (ZESTRIL) 5 MG tablet Past Week at pt states last dose 2 days ago  No Yes   Sig: Take 1 tablet (5 mg) by mouth daily for 90 days   nitroGLYcerin (NITROSTAT) 0.4 MG sublingual tablet Past Week at prn  No Yes   Sig: Place 1 tablet (0.4 mg) under the tongue every 5 minutes as needed If no relief after 3 tabs call 911;continue 1 tab every 5 min max 3 tab Indications: chest pain   polyethylene glycol (MIRALAX) 17 g packet Past Week at prn  Yes Yes   Sig: Take 17 g by mouth 2 times daily as needed   rOPINIRole (REQUIP) 2 MG tablet 1/7/2024 at pm  Yes Yes   Sig: Take 2 mg by mouth 3 times daily       Facility-Administered Medications: None     Allergies   No Known Allergies    SOCIAL HISTORY    Social History     Social History Narrative    Currently lives in an assisted living facility. (Updated: 01/08/2024)      Social History     Tobacco Use    Smoking status: Every Day     Packs/day: .5     Types: Cigarettes    Smokeless tobacco: Never    Tobacco comments:     Seen IP by [facility] on 9/11/23 and declined cessation services and materials   Substance Use Topics    Alcohol use: Not Currently        FAMILY HISTORY    Family History   Problem Relation Age of Onset    Cerebrovascular Disease Mother     Myocardial Infarction Father         Review of Systems   The 10 point Review of Systems is negative other than noted in the HPI or here.       PHYSICAL EXAM     Temp: 98.5  F (36.9  C) Temp src: Oral BP: (!) 141/74 Pulse: 79   Resp: 23 SpO2: 97 %      Vital Signs with Ranges  Temp:  [97.1  F (36.2  C)-98.5  F (36.9  C)] 98.5  F (36.9  C)  Pulse:  [79-91] 79  Resp:  [23-24] 23  BP: (118-141)/(56-74) 141/74  SpO2:  [97 %] 97 %  0 lbs 0 oz    Constitutional: Awake, alert, cooperative, no apparent distress.  Eyes: Conjunctiva and pupils examined and normal.  HEENT: Moist mucous membranes, normal dentition.  Respiratory: mild wheezing bilaterally, Clear to auscultation bilaterally, no crackles or   Cardiovascular: Regular rate and rhythm, normal S1 and S2, and no murmur noted, no carotid bruits.  3+ ankle edema.   GI: Soft, non-distended, non-tender, normal bowel sounds.  Lymph/Hematologic: No anterior cervical, supraclavicular or axillary adenopathy.  Skin: 6 cm hematoma on mid-left back, bilateral erythema of the lower extremities, no cyanosis.   Musculoskeletal: No joint swelling, erythema or tenderness. Negative holguin sign on right calf.    Neurologic: Alert, Ox3, Cranial nerves 2-12 intact, no focal weakness or numbness  Psychiatric:  No obvious anxiety or depression.    Data   Data reviewed today:  I personally  reviewed the EKG tracing showing NSR  .  Recent Labs   Lab 01/08/24  1606   WBC 9.6   HGB 12.0*   MCV 93         POTASSIUM 3.5   CHLORIDE 97*   CO2 34*   BUN 30.3*   CR 1.38*   ANIONGAP 11   RUSLAN 9.3   *       Imaging:  Recent Results (from the past 24 hour(s))   XR Chest 2 Views    Narrative    EXAM: XR CHEST 2 VIEWS  LOCATION: [facility]  DATE: 1/8/2024    INDICATION: Congestive heart failure  COMPARISON: AP and lateral views the chest 12/20/2023      Impression    IMPRESSION:     Left coronary stents. Previous median sternotomy. Cardiac silhouette is normal in size and the vascular pedicle width is normal.    Symmetric lung inflation. No interstitial or alveolar opacities. No pleural effusion.    Healed fracture deformity of several left upper posterolateral ribs. Intact and aligned sternal wires. Flowing degenerative osteophytes of the thoracic spine.

## 2024-01-09 NOTE — CONSULTS
Care Management Initial Consult    General Information  Assessment completed with: Patient, pt  Type of CM/SW Visit: Initial Assessment    Primary Care Provider verified and updated as needed: Yes   Readmission within the last 30 days: no previous admission in last 30 days      Reason for Consult: discharge planning  Advance Care Planning: Advance Care Planning Reviewed: no concerns identified     General Information Comments: lives alone at [facility], in the Independent [facility]vg and will need transport at discharge that can accomodate the walker to take his walker home    Communication Assessment  Patient's communication style: spoken language (English or Bilingual)             Cognitive  Cognitive/Neuro/Behavioral: WDL                      Living Environment:   People in home: alone     Current living Arrangements: assisted living, independent living facility  Name of Facility: [facility]   Able to return to prior arrangements: yes       Family/Social Support:  Care provided by: self  Provides care for: no one  Marital Status: Single  Sibling(s)          Description of Support System: Supportive, Involved    Support Assessment: Adequate family and caregiver support, Adequate social supports    Current Resources:   Patient receiving home care services: No     Community Resources: None  Equipment currently used at home:    Supplies currently used at home: None    Employment/Financial:  Employment Status:          Financial Concerns: none   Referral to Financial Worker: No       Does the patient's insurance plan have a 3 day qualifying hospital stay waiver?  No    Lifestyle & Psychosocial Needs:  Social Determinants of Health     Food Insecurity: Not on file   Depression: Not on file   Housing Stability: Not on file   Tobacco Use: High Risk (1/8/2024)    Patient History     Smoking Tobacco Use: Every Day     Smokeless Tobacco Use: Never     Passive Exposure: Not on file   Financial Resource  Strain: Not on file   Alcohol Use: Not on file   Transportation Needs: Not on file   Physical Activity: Not on file   Interpersonal Safety: Not on file   Stress: Not on file   Social Connections: Not on file       Functional Status:  Prior to admission patient needed assistance:   Dependent ADLs:: Ambulation-walker, Independent  Dependent IADLs:: Cleaning, Cooking, Meal Preparation, Transportation  Assesssment of Functional Status: Not at  functional baseline    Mental Health Status:  Mental Health Status: No Current Concerns       Chemical Dependency Status:                Values/Beliefs:  Spiritual, Cultural Beliefs, Methodist Practices, Values that affect care:                 Additional Information:  Assessed, lives alone at [facility], in the Independent [facility] and will need transport at discharge that can accomodate the walker to take his walker home. CM to follow for discharge needs.     Latoya Cuellar RN

## 2024-01-09 NOTE — MEDICATION SCRIBE - ADMISSION MEDICATION HISTORY
Medication Scribe Admission Medication History    Admission medication history is complete. The information provided in this note is only as accurate as the sources available at the time of the update.    Information Source(s): Patient and CareEverywhere/SureScripts via in-person    Pertinent Information: none    Changes made to PTA medication list:  Added: None  Deleted: None  Changed: None    Medication Affordability:  Not including over the counter (OTC) medications, was there a time in the past 3 months when you did not take your medications as prescribed because of cost?: No    Allergies reviewed with patient and updates made in EHR: yes    Medication History Completed By: CARYL LITTLE 1/8/2024 6:26 PM    PTA Med List   Medication Sig Last Dose    acetaminophen (TYLENOL) 500 MG tablet Take 1,000 mg by mouth every 8 hours as needed 1/8/2024 at am    albuterol (PROAIR HFA/PROVENTIL HFA/VENTOLIN HFA) 108 (90 Base) MCG/ACT inhaler Inhale 2 puffs into the lungs 4 times daily 1/7/2024 at pm    albuterol (PROVENTIL) (2.5 MG/3ML) 0.083% neb solution Take 1 vial (2.5 mg) by nebulization every 6 hours as needed for shortness of breath, wheezing or cough Past Week at prn    amiodarone (PACERONE) 200 MG tablet Take 200 mg by mouth daily 1/8/2024 at am    apixaban ANTICOAGULANT (ELIQUIS) 5 MG tablet Take 1 tablet (5 mg) by mouth 2 times daily 1/8/2024 at am    atorvastatin (LIPITOR) 40 MG tablet Take 40 mg by mouth every morning 1/8/2024 at am    bumetanide (BUMEX) 1 MG tablet Take 2 tablets (2 mg) by mouth 2 times daily 1/8/2024 at am    carvedilol (COREG) 3.125 MG tablet Take 0.5 tablets (1.5625 mg) by mouth 2 times daily (with meals) for 90 days 1/8/2024 at am    [facility] VITAMIN B12 1000 MCG tablet Take 1,000 mcg by mouth daily 1/8/2024 at am    DULoxetine (CYMBALTA) 30 MG capsule Take 30 mg by mouth every morning 1/8/2024 at am    gabapentin (NEURONTIN) 300 MG capsule Take 1 capsule (300 mg) by mouth at bedtime 300 MG  AM AND NOON,  MG BY MOUTH DAILY AT BEDTIME 1/8/2024 at am    isosorbide mononitrate (IMDUR) 60 MG 24 hr tablet Take 180 mg by mouth every morning 1/8/2024 at am    KLOR-CON 20 MEQ CR tablet Take 20 mEq by mouth daily 1/8/2024 at am    lisinopril (ZESTRIL) 5 MG tablet Take 1 tablet (5 mg) by mouth daily for 90 days Past Week at pt states last dose 2 days ago    nitroGLYcerin (NITROSTAT) 0.4 MG sublingual tablet Place 1 tablet (0.4 mg) under the tongue every 5 minutes as needed If no relief after 3 tabs call 911;continue 1 tab every 5 min max 3 tab Indications: chest pain Past Week at prn    polyethylene glycol (MIRALAX) 17 g packet Take 17 g by mouth 2 times daily as needed Past Week at prn    rOPINIRole (REQUIP) 2 MG tablet Take 2 mg by mouth 3 times daily 1/7/2024 at pm

## 2024-01-09 NOTE — PLAN OF CARE
Problem: Heart Failure  Goal: Fluid and Electrolyte Balance  Outcome: Progressing  Goal: Effective Oxygenation and Ventilation  Outcome: Progressing  Intervention: Promote Airway Secretion Clearance  Recent Flowsheet Documentation  Taken 1/9/2024 0100 by Jesus Sheth RN  Cough And Deep Breathing: done independently per patient  Activity Management: up in chair  Intervention: Optimize Oxygenation and Ventilation  Recent Flowsheet Documentation  Taken 1/9/2024 0100 by Jesus Sheth RN  Head of Bed (HOB) Positioning: HOB at 30 degrees   Goal Outcome Evaluation:        Lung sounds clear but diminished. Pt denies shortness of breath. Oxygen at RA. BLE edema. SBA of 1 with walker. Using urinal. On Bumex iv.

## 2024-01-09 NOTE — CONSULTS
HEART CARE NOTE        Thank you, Dr. Lloyd, for asking the [facility] Heart Care team to see Zackary Hutchison to evaluate ADHF.      Assessment/Recommendations     1. Severe Biventricular dysfunction/HFrEF c/b severe ADHF   Assessment / Plan  Hypervolemic on physical exam; ~ 30-40 lbs fluid retention; endorses orthopnea, PND and progressive dyspnea; start furosemide gtt after bolus and continue to monitor UOP, renal function  Patient is high risk for adverse cardiac events 2/2 severe biventricular dysfunction, multiple HFHs, non-compliance with dietary restrictions  GDMT as detailed below; repeat echo ordered     Current Pharmacotherapy AHA Guideline-Directed Medical Therapy   Lisinopril 5 mg daily   Lisinopril 20 mg twice daily   Carvedilol 1.5625 mg BID Carvedilol 25 mg twice daily   Spironolactone 25 mg daily Spironolactone 25 mg once daily   Hydralazine NA Hydralazine 100 mg three times daily   Isosorbide mononitrate 180 mg daily Isosorbide dinitrate 40 mg three times daily   SGLT2 inhibitor:Dapagliflozin/Empagliflozin - not started Dapagliflozin or Empagliflozin 10 mg daily      2. Atrial fibrillation  Assessment / Plan  Currently on apixaban and amiodarone  Will need afib clinic referral on discharge     3. CAD c/b ICM  Assessment / Plan  S/p CABG 2015; now s/p repeat coronary angiogram - no intervention indicated  Denies chest pain or anginal equivalents; Continue atorvastatin, carvedilol, lisinopril and atorvastatin     4. PRINCESS on CKD  Assessment / Plan  Resolving - continue to monitor UOP and renal functio closely; diuresis as above      Clinically Significant Risk Factors Present on Admission               # Drug Induced Coagulation Defect: home medication list includes an anticoagulant medication    # Hypertension: Home medication list includes antihypertensive(s)  # Acute heart failure with reduced ejection fraction: last echo with EF <40% and receiving IV diuretics     # Severe Obesity: Estimated  "body mass index is 42.09 kg/m  as calculated from the following:    Height as of 12/22/23: 1.676 m (5' 6\").    Weight as of 12/27/23: 118.3 kg (260 lb 12.8 oz).       # Financial/Environmental Concerns: none        Cardiac Arrhythmia: Atrial fibrillation: Unspecified  Cardiomyopathy  Systolic  Systolic acute    Fluid overload, unspecified, Other fluid overload, and Other disorders of electrolyte and fluid balance, not elsewhere classified    Acute kidney failure, unspecified  CKD POA List: Stage 2 (GFR 60-89)      80 minutes spent reviewing prior records (including documentation, laboratory studies, cardiac testing/imaging), history and physical exam, planning, and subsequent documentation.      History of Present Illness/Subjective    Mr. Zackary Hutchison is a 62 year old male with a PMHx significant for CAD s/p CABG, afib, HFrEF and CKD who presents to in ADHF     Today, Mr. Hutchison endorses progressive weight gain, orthopnea, LE edema, and progressive dyspnea; We discussed HF diet and lifestyle modifications including the 2 g Na and 2L fluid restrictions. He is unable to adhere as he does not prepare his own meals, but admits to nor following the fluid restrictions; Management plan as detailed above      ECG: personally reviewed; normal sinus rhythm, RBBB.     ECHO (personnaly Reviewed on 9/11/23):   1.Left ventricular function is decreased. The ejection fraction is 35-40%  (moderately reduced).  2.Septal motion is consistent with post-operative state.  3.There is borderline apical wall hypokinesis.  4.Mildly decreased right ventricular systolic function  5.No hemodynamically significant valvular abnormalities on 2D or color flow  imaging.  6.There is borderline aortic root enlargement. The ascending aorta is  Borderline dilated.  7.The study was technically difficult.  There is no comparison study available    Lab results: personally reviewed January 9, 2024; notable for resolving PRINCESS    Medical history and pertinent " documents reviewed in Care Everywhere please where applicable see details above          Physical Examination Review of Systems   BP (!) 143/62 (BP Location: Left arm, Patient Position: Sitting, Cuff Size: Adult Regular)   Pulse 88   Temp 98.8  F (37.1  C) (Oral)   Resp 18   SpO2 95%   There is no height or weight on file to calculate BMI.  Wt Readings from Last 3 Encounters:   12/27/23 118.3 kg (260 lb 12.8 oz)   11/28/23 127.5 kg (281 lb)   10/13/23 126.1 kg (278 lb)     General Appearance:   no distress   ENT/Mouth: membranes moist, no oral lesions or bleeding gums.      EYES:  no scleral icterus, normal conjunctivae   Neck: no carotid bruits or thyromegaly   Chest/Lungs:   lungs are clear to auscultation, no rales or wheezing, equal chest wall expansion    Cardiovascular:   Regular. Normal first and second heart sounds with no murmurs, rubs, or gallops; the carotid, radial and posterior tibial pulses are intact, + JVD and LE edema bilaterally    Abdomen:  no organomegaly, masses, bruits, or tenderness; bowel sounds are present   Extremities: no cyanosis or clubbing   Skin: no xanthelasma, warm.    Neurologic: NAD     Psychiatric: alert and oriented x3, calm     A complete 10 systems ROS was reviewed  And is negative except what is listed in the HPI.          Medical History  Surgical History Family History Social History   Past Medical History:   Diagnosis Date    Chronic kidney disease, stage 3b (H) 09/28/2023    Class 3 severe obesity due to excess calories with body mass index (BMI) of 40.0 to 44.9 in adult (H) 09/28/2023    Congestive heart failure (H)     COPD (chronic obstructive pulmonary disease) (H)     Coronary artery disease involving coronary bypass graft of native heart without angina pectoris 09/28/2023    Heart failure with reduced ejection fraction (H) 09/11/2023    Hyperlipidemia     Hypertension     Ischemic cardiomyopathy 09/28/2023    Obese     Paroxysmal atrial fibrillation (H)  09/28/2023    Tobacco abuse 09/28/2023    Past Surgical History:   Procedure Laterality Date    CORONARY ANGIOGRAPHY ADULT ORDER      CORONARY ARTERY BYPASS Left 2014    2 vessels    CV CORONARY ANGIOGRAM N/A 09/11/2023    Procedure: Coronary Angiogram;  Surgeon: Santy Esposito MD;  Location: [facility] CATH LAB CV    CV LEFT HEART CATH N/A 09/11/2023    Procedure: Left Heart Catheterization;  Surgeon: Santy Esposito MD;  Location: [facility] CATH LAB CV    CV RIGHT HEART CATH MEASUREMENTS RECORDED N/A 09/11/2023    Procedure: Right Heart Catheterization;  Surgeon: Santy Esposito MD;  Location: [facility] CATH LAB CV    no family history of premature coronary artery disease Social History     Socioeconomic History    Marital status: Single     Spouse name: Not on file    Number of children: Not on file    Years of education: Not on file    Highest education level: Not on file   Occupational History    Occupation: Retired restaurant owner     Comment: [employer]   Tobacco Use    Smoking status: Every Day     Packs/day: .5     Types: Cigarettes    Smokeless tobacco: Never    Tobacco comments:     Seen IP by [facility] on 9/11/23 and declined cessation services and materials   Substance and Sexual Activity    Alcohol use: Not Currently    Drug use: Not on file    Sexual activity: Not on file   Other Topics Concern    Not on file   Social History Narrative    Currently lives in an assisted living facility. (Updated: 01/08/2024)     Social Determinants of Health     Financial Resource Strain: Not on file   Food Insecurity: Not on file   Transportation Needs: Not on file   Physical Activity: Not on file   Stress: Not on file   Social Connections: Not on file   Interpersonal Safety: Not on file   Housing Stability: Not on file           Lab Results    Chemistry/lipid CBC Cardiac Enzymes/BNP/TSH/INR   Lab Results   Component Value Date    BUN 26.9 (H) 01/09/2024     01/09/2024    CO2 32 (H) 01/09/2024    Lab Results  "  Component Value Date    WBC 8.2 01/09/2024    HGB 12.7 (L) 01/09/2024    HCT 42.1 01/09/2024    MCV 93 01/09/2024     01/09/2024    Lab Results   Component Value Date    INR 1.01 02/13/2023     No results found for: \"CKTOTAL\", \"CKMB\", \"TROPONINI\"       Weight:    Wt Readings from Last 3 Encounters:   12/27/23 118.3 kg (260 lb 12.8 oz)   11/28/23 127.5 kg (281 lb)   10/13/23 126.1 kg (278 lb)       Allergies  No Known Allergies      Surgical History  Past Surgical History:   Procedure Laterality Date    CORONARY ANGIOGRAPHY ADULT ORDER      CORONARY ARTERY BYPASS Left 2014    2 vessels    CV CORONARY ANGIOGRAM N/A 09/11/2023    Procedure: Coronary Angiogram;  Surgeon: Santy Esposito MD;  Location: [facility] CATH LAB CV    CV LEFT HEART CATH N/A 09/11/2023    Procedure: Left Heart Catheterization;  Surgeon: Santy Esposito MD;  Location: [facility] CATH LAB CV    CV RIGHT HEART CATH MEASUREMENTS RECORDED N/A 09/11/2023    Procedure: Right Heart Catheterization;  Surgeon: Santy Esposito MD;  Location: [facility] CATH LAB CV       Social History  Tobacco:   History   Smoking Status    Every Day    Packs/day: 0.50    Types: Cigarettes   Smokeless Tobacco    Never    Alcohol:   Social History    Substance and Sexual Activity      Alcohol use: Not Currently   Illicit Drugs:   History   Drug Use Not on file       Family History  Family History   Problem Relation Age of Onset    Cerebrovascular Disease Mother     Myocardial Infarction Father           Jacey Bhandari MD on 1/9/2024      cc: Reid Escobar,     "

## 2024-01-09 NOTE — PROGRESS NOTES
[facility]    Hospitalist Progress Note    Assessment & Plan   63 year old male who was admitted on 1/8/2024 for acute on chronic systolic CHF.     Impression:   Principal Problem:    Acute on chronic systolic CHF -- lost 5 lbs since last night   -- monitor I&O and daily weights   -- Cardiology switched to Lasix drip    Active Problems:    CAD, S/P CABG x 2 vessels in 2014      Paroxysmal atrial fib -- on Eliquis   -- currently in NSR      Smoker  -- needs to quit, does not want nicotine replacement  -- will give info on discharge      COPD       PRINCESS (acute kidney injury) (H24) -- Creatinine improving with diuresis       Restless legs syndrome (RLS) -- home med Ropinirole 3x daily prn       Plan:  as above, anticipate home in 2-3 days.      DVT Prophylaxis: DOAC  Code Status: Full Code    Disposition: Expected discharge in 2-3 days     Devyn Yousif MD  Pager [phone]  Cell Phone [phone]  Text Page (7am to 6pm)    Interval History       Physical Exam   Temp: 98.6  F (37  C) Temp src: Oral BP: (!) 140/66 Pulse: 76   Resp: 18 SpO2: 93 % O2 Device: None (Room air)    There were no vitals filed for this visit.  Vital Signs with Ranges  Temp:  [97.1  F (36.2  C)-98.8  F (37.1  C)] 98.6  F (37  C)  Pulse:  [76-91] 76  Resp:  [18-24] 18  BP: (118-143)/(56-74) 140/66  SpO2:  [93 %-97 %] 93 %  I/O last 3 completed shifts:  In: 940 [P.O.:940]  Out: 1900 [Urine:1900]    # Pain Assessment:      1/9/2024     1:00 AM   Current Pain Score   Patient currently in pain? denies   Zackary s pain level was assessed and he currently denies pain.        Constitutional: Awake, alert, cooperative, no apparent distress  Respiratory: Clear to auscultation bilaterally, no crackles or wheezing  Cardiovascular: Regular rate and rhythm, normal S1 and S2, and no murmur noted, 2+ ankle edema  GI: Normal bowel sounds, soft, non-distended, non-tender  Extrem: No calf tenderness, no ankle edema  Neuro: Ox3, no  focal motor or sensory deficits    Medications      albuterol  2 puff Inhalation 4x Daily    amiodarone  200 mg Oral Daily    apixaban ANTICOAGULANT  5 mg Oral BID    atorvastatin  40 mg Oral QAM    bumetanide  2 mg Intravenous BID w/meals    carvedilol  1.5625 mg Oral BID w/meals    cyanocobalamin  1,000 mcg Oral Daily    DULoxetine  30 mg Oral QAM    gabapentin  900 mg Oral At Bedtime    hydrocortisone   Topical BID    isosorbide mononitrate  180 mg Oral QAM    lisinopril  5 mg Oral Daily    potassium chloride ER  20 mEq Oral Daily    rOPINIRole  2 mg Oral TID    sodium chloride (PF)  3 mL Intracatheter Q8H    spironolactone  25 mg Oral Daily       Data   Recent Labs   Lab 01/09/24  0709 01/08/24  1606   WBC 8.2 9.6   HGB 12.7* 12.0*   MCV 93 93    153    142   POTASSIUM 3.8 3.5   CHLORIDE 100 97*   CO2 32* 34*   BUN 26.9* 30.3*   CR 1.11 1.38*   ANIONGAP 12 11   RUSLAN 8.9 9.3   * 113*       Imaging:   Recent Results (from the past 24 hour(s))   XR Chest 2 Views    Narrative    EXAM: XR CHEST 2 VIEWS  LOCATION: [facility]  DATE: 1/8/2024    INDICATION: Congestive heart failure  COMPARISON: AP and lateral views the chest 12/20/2023      Impression    IMPRESSION:     Left coronary stents. Previous median sternotomy. Cardiac silhouette is normal in size and the vascular pedicle width is normal.    Symmetric lung inflation. No interstitial or alveolar opacities. No pleural effusion.    Healed fracture deformity of several left upper posterolateral ribs. Intact and aligned sternal wires. Flowing degenerative osteophytes of the thoracic spine.

## 2024-01-09 NOTE — PLAN OF CARE
Problem: Adult Inpatient Plan of Care  Goal: Plan of Care Review  Description: The Plan of Care Review/Shift note should be completed every shift.  The Outcome Evaluation is a brief statement about your assessment that the patient is improving, declining, or no change.  This information will be displayed automatically on your shift  note.  Outcome: Progressing  Flowsheets (Taken 1/8/2024 1902)  Plan of Care Reviewed With: patient   Goal Outcome Evaluation:      Plan of Care Reviewed With: patient  Pt alert and oriented x 4, up with stand by and a walker. Pt admitted with increase weight and lower extremity edema, 2 mg IV Bumex and Potassium 40 meq was given once. Will continue to monitor.

## 2024-01-10 LAB
ANION GAP SERPL CALCULATED.3IONS-SCNC: 11 MMOL/L (ref 7–15)
BUN SERPL-MCNC: 26.5 MG/DL (ref 8–23)
CALCIUM SERPL-MCNC: 9.2 MG/DL (ref 8.8–10.2)
CHLORIDE SERPL-SCNC: 99 MMOL/L (ref 98–107)
CREAT SERPL-MCNC: 1.13 MG/DL (ref 0.67–1.17)
DEPRECATED HCO3 PLAS-SCNC: 32 MMOL/L (ref 22–29)
EGFRCR SERPLBLD CKD-EPI 2021: 72 ML/MIN/1.73M2
GLUCOSE SERPL-MCNC: 104 MG/DL (ref 70–99)
MAGNESIUM SERPL-MCNC: 2 MG/DL (ref 1.7–2.3)
POTASSIUM SERPL-SCNC: 3.3 MMOL/L (ref 3.4–5.3)
POTASSIUM SERPL-SCNC: 3.6 MMOL/L (ref 3.4–5.3)
SODIUM SERPL-SCNC: 142 MMOL/L (ref 135–145)

## 2024-01-10 PROCEDURE — 99233 SBSQ HOSP IP/OBS HIGH 50: CPT | Performed by: INTERNAL MEDICINE

## 2024-01-10 PROCEDURE — 36415 COLL VENOUS BLD VENIPUNCTURE: CPT | Performed by: INTERNAL MEDICINE

## 2024-01-10 PROCEDURE — P9047 ALBUMIN (HUMAN), 25%, 50ML: HCPCS | Performed by: INTERNAL MEDICINE

## 2024-01-10 PROCEDURE — 250N000011 HC RX IP 250 OP 636: Performed by: INTERNAL MEDICINE

## 2024-01-10 PROCEDURE — 210N000001 HC R&B IMCU HEART CARE

## 2024-01-10 PROCEDURE — 250N000013 HC RX MED GY IP 250 OP 250 PS 637: Performed by: INTERNAL MEDICINE

## 2024-01-10 PROCEDURE — 84132 ASSAY OF SERUM POTASSIUM: CPT | Performed by: INTERNAL MEDICINE

## 2024-01-10 PROCEDURE — 80048 BASIC METABOLIC PNL TOTAL CA: CPT | Performed by: INTERNAL MEDICINE

## 2024-01-10 PROCEDURE — 258N000003 HC RX IP 258 OP 636: Performed by: INTERNAL MEDICINE

## 2024-01-10 PROCEDURE — 83735 ASSAY OF MAGNESIUM: CPT | Performed by: INTERNAL MEDICINE

## 2024-01-10 RX ORDER — ALBUMIN (HUMAN) 12.5 G/50ML
50 SOLUTION INTRAVENOUS DAILY
Status: DISCONTINUED | OUTPATIENT
Start: 2024-01-11 | End: 2024-01-18 | Stop reason: HOSPADM

## 2024-01-10 RX ORDER — CHLOROTHIAZIDE SODIUM 500 MG/1
500 INJECTION INTRAVENOUS ONCE
Qty: 20 ML | Refills: 0 | Status: COMPLETED | OUTPATIENT
Start: 2024-01-10 | End: 2024-01-10

## 2024-01-10 RX ORDER — HYDROMORPHONE HCL IN WATER/PF 6 MG/30 ML
0.2 PATIENT CONTROLLED ANALGESIA SYRINGE INTRAVENOUS ONCE
Status: COMPLETED | OUTPATIENT
Start: 2024-01-10 | End: 2024-01-10

## 2024-01-10 RX ORDER — ALBUMIN (HUMAN) 12.5 G/50ML
50 SOLUTION INTRAVENOUS ONCE
Status: COMPLETED | OUTPATIENT
Start: 2024-01-10 | End: 2024-01-10

## 2024-01-10 RX ORDER — POTASSIUM CHLORIDE 1500 MG/1
40 TABLET, EXTENDED RELEASE ORAL ONCE
Status: COMPLETED | OUTPATIENT
Start: 2024-01-10 | End: 2024-01-10

## 2024-01-10 RX ADMIN — SPIRONOLACTONE 25 MG: 25 TABLET, FILM COATED ORAL at 08:22

## 2024-01-10 RX ADMIN — DULOXETINE HYDROCHLORIDE 30 MG: 30 CAPSULE, DELAYED RELEASE ORAL at 08:23

## 2024-01-10 RX ADMIN — APIXABAN 5 MG: 5 TABLET, FILM COATED ORAL at 08:23

## 2024-01-10 RX ADMIN — FUROSEMIDE 20 MG/HR: 10 INJECTION, SOLUTION INTRAVENOUS at 10:42

## 2024-01-10 RX ADMIN — ALBUTEROL SULFATE 2 PUFF: 90 AEROSOL, METERED RESPIRATORY (INHALATION) at 12:33

## 2024-01-10 RX ADMIN — ALBUMIN HUMAN 50 G: 0.25 SOLUTION INTRAVENOUS at 09:25

## 2024-01-10 RX ADMIN — CARVEDILOL 1.56 MG: 3.12 TABLET, FILM COATED ORAL at 19:20

## 2024-01-10 RX ADMIN — ROPINIROLE HYDROCHLORIDE 2 MG: 1 TABLET, FILM COATED ORAL at 13:32

## 2024-01-10 RX ADMIN — CARVEDILOL 1.56 MG: 3.12 TABLET, FILM COATED ORAL at 08:24

## 2024-01-10 RX ADMIN — POTASSIUM CHLORIDE 40 MEQ: 1500 TABLET, EXTENDED RELEASE ORAL at 15:52

## 2024-01-10 RX ADMIN — ALBUTEROL SULFATE 2 PUFF: 90 AEROSOL, METERED RESPIRATORY (INHALATION) at 19:21

## 2024-01-10 RX ADMIN — GABAPENTIN 900 MG: 300 CAPSULE ORAL at 21:09

## 2024-01-10 RX ADMIN — FUROSEMIDE 20 MG/HR: 10 INJECTION, SOLUTION INTRAVENOUS at 02:36

## 2024-01-10 RX ADMIN — ALBUTEROL SULFATE 2 PUFF: 90 AEROSOL, METERED RESPIRATORY (INHALATION) at 15:53

## 2024-01-10 RX ADMIN — ISOSORBIDE MONONITRATE 180 MG: 60 TABLET, EXTENDED RELEASE ORAL at 08:22

## 2024-01-10 RX ADMIN — AMIODARONE HYDROCHLORIDE 200 MG: 200 TABLET ORAL at 08:23

## 2024-01-10 RX ADMIN — APIXABAN 5 MG: 5 TABLET, FILM COATED ORAL at 19:21

## 2024-01-10 RX ADMIN — HYDROMORPHONE HYDROCHLORIDE 0.2 MG: 0.2 INJECTION, SOLUTION INTRAMUSCULAR; INTRAVENOUS; SUBCUTANEOUS at 20:21

## 2024-01-10 RX ADMIN — ACETAMINOPHEN 650 MG: 325 TABLET ORAL at 19:20

## 2024-01-10 RX ADMIN — FUROSEMIDE 20 MG/HR: 10 INJECTION, SOLUTION INTRAVENOUS at 22:18

## 2024-01-10 RX ADMIN — ROPINIROLE HYDROCHLORIDE 2 MG: 1 TABLET, FILM COATED ORAL at 19:21

## 2024-01-10 RX ADMIN — CYANOCOBALAMIN TAB 1000 MCG 1000 MCG: 1000 TAB at 08:23

## 2024-01-10 RX ADMIN — ATORVASTATIN CALCIUM 40 MG: 40 TABLET, FILM COATED ORAL at 08:26

## 2024-01-10 RX ADMIN — POTASSIUM CHLORIDE 20 MEQ: 1500 TABLET, EXTENDED RELEASE ORAL at 08:26

## 2024-01-10 RX ADMIN — CHLOROTHIAZIDE SODIUM 500 MG: 500 INJECTION, POWDER, LYOPHILIZED, FOR SOLUTION INTRAVENOUS at 09:17

## 2024-01-10 RX ADMIN — ALBUTEROL SULFATE 2 PUFF: 90 AEROSOL, METERED RESPIRATORY (INHALATION) at 08:36

## 2024-01-10 RX ADMIN — ROPINIROLE HYDROCHLORIDE 2 MG: 1 TABLET, FILM COATED ORAL at 08:21

## 2024-01-10 ASSESSMENT — ACTIVITIES OF DAILY LIVING (ADL)
ADLS_ACUITY_SCORE: 25
ADLS_ACUITY_SCORE: 26
ADLS_ACUITY_SCORE: 25
ADLS_ACUITY_SCORE: 25
ADLS_ACUITY_SCORE: 38
ADLS_ACUITY_SCORE: 26
ADLS_ACUITY_SCORE: 25
ADLS_ACUITY_SCORE: 26
ADLS_ACUITY_SCORE: 25
ADLS_ACUITY_SCORE: 26

## 2024-01-10 NOTE — PLAN OF CARE
Problem: Adult Inpatient Plan of Care  Goal: Optimal Comfort and Wellbeing  Outcome: Progressing     Problem: Heart Failure  Goal: Fluid and Electrolyte Balance  Outcome: Progressing   Goal Outcome Evaluation:       Denied pain overnight. Tolerating lasix gtt well. Good UOP, 1,750 mL. VSS. A&Ox4, able to make needs known appropriately. Ax1-2 with walker.

## 2024-01-10 NOTE — PROGRESS NOTES
[facility]    Hospitalist Progress Note    Assessment & Plan   63 year old male who was admitted on 1/8/2024 for acute on chronic systolic CHF.     Impression:   Principal Problem:    Acute on chronic systolic CHF  with EF 41% on echo 12/23 -- -- monitor I&O and daily weights   -- Cardiology switched to Lasix drip  Good urine output at 4200ml   Started on potassium replacement protocol for mild hypokalemia potassium at 3.3     Active Problems:    CAD, S/P CABG x 2 vessels in 2014      Paroxysmal atrial fib -- on Eliquis   -- currently in NSR      Smoker  -- needs to quit, does not want nicotine replacement  -- will give info on discharge      COPD       PRINCESS (acute kidney injury) (H24) -- Creatinine improving with diuresis       Restless legs syndrome (RLS) -- home med Ropinirole 3x daily prn       Plan:  as above, anticipate home in 2-3 days.      DVT Prophylaxis: DOAC  Code Status: Full Code    Disposition: Expected discharge in 2-3 days     Juanita Duenas MD  Pager [phone]     Interval History   Pt boarded in IR room seen in IR. SOB improving. Responding well to diuresis on lasix drip. Continue lasix drip per cardiology today. Lower extremities still swollen     Physical Exam   Temp: 97.8  F (36.6  C) Temp src: Oral BP: 118/56 Pulse: 73   Resp: 18 SpO2: 91 % O2 Device: None (Room air)    Vitals:    01/09/24 1134   Weight: 120 kg (264 lb 8.8 oz)     Vital Signs with Ranges  Temp:  [97.6  F (36.4  C)-98.2  F (36.8  C)] 97.8  F (36.6  C)  Pulse:  [71-78] 73  Resp:  [18] 18  BP: (101-140)/(56-75) 118/56  SpO2:  [91 %-95 %] 91 %  I/O last 3 completed shifts:  In: 1020 [P.O.:1020]  Out: 4200 [Urine:4200]    # Pain Assessment:      1/10/2024     7:44 AM   Current Pain Score   Patient currently in pain? denies   Zackary s pain level was assessed and he currently denies pain.        Constitutional: Awake, alert, cooperative, no apparent distress  Respiratory: Clear to auscultation bilaterally, no  crackles or wheezing  Cardiovascular: Regular rate and rhythm, normal S1 and S2, and no murmur noted, 2+ ankle edema  GI: Normal bowel sounds, soft, non-distended, non-tender  Extrem: No calf tenderness, 2+ chronic edema noted   Neuro: Ox3, no focal motor or sensory deficits    Medications    furosemide (LASIX) 100 mg in sodium chloride 0.9 % 100 mL infusion 20 mg/hr (01/10/24 1042)      [START ON 1/11/2024] albumin human  50 g Intravenous Daily    albuterol  2 puff Inhalation 4x Daily    amiodarone  200 mg Oral Daily    apixaban ANTICOAGULANT  5 mg Oral BID    atorvastatin  40 mg Oral QAM    carvedilol  1.5625 mg Oral BID w/meals    cyanocobalamin  1,000 mcg Oral Daily    DULoxetine  30 mg Oral QAM    gabapentin  900 mg Oral At Bedtime    hydrocortisone   Topical BID    isosorbide mononitrate  180 mg Oral QAM    lisinopril  5 mg Oral Daily    potassium chloride ER  20 mEq Oral Daily    rOPINIRole  2 mg Oral TID    sodium chloride (PF)  3 mL Intracatheter Q8H    spironolactone  25 mg Oral Daily       Data   Recent Labs   Lab 01/10/24  0709 01/09/24  0709 01/08/24  1606   WBC  --  8.2 9.6   HGB  --  12.7* 12.0*   MCV  --  93 93   PLT  --  154 153    144 142   POTASSIUM 3.3* 3.8 3.5   CHLORIDE 99 100 97*   CO2 32* 32* 34*   BUN 26.5* 26.9* 30.3*   CR 1.13 1.11 1.38*   ANIONGAP 11 12 11   RUSLAN 9.2 8.9 9.3   * 103* 113*       Imaging:   No results found for this or any previous visit (from the past 24 hour(s)).

## 2024-01-10 NOTE — PROGRESS NOTES
Care Management Follow Up    Length of Stay (days): 2    Expected Discharge Date: 01/12/2024    Concerns to be Addressed:   Lasix drip;   Patient plan of care discussed at interdisciplinary rounds: Yes    Anticipated Discharge Disposition:  Home.      Anticipated Discharge Services:  To be determined. Open to home care for skilled nursing.  Anticipated Discharge DME:  Uses a walker.    Patient/family educated on Medicare website which has current facility and service quality ratings:   NA  Education Provided on the Discharge Plan:   Per team  Patient/Family in Agreement with the Plan:   Yes    Referrals Placed by CM/SW:  [facility]    Private pay costs discussed: Not applicable     Additional Information:  Patient lives alone at [facility] in an Independent living apartment. He needs assist with cleaning, cooking, meals and transportation but has no in home services. Patient will need transport at discharge that can accomodate the walker to take his walker home.   CM will continue to monitor progression of care, review team recommendations and provide discharge planning assist as needed.    11:51 AM:  Received an update from [facility] liaison that patient is open to [facility] for skilled nursing.     Idania Davila RN

## 2024-01-10 NOTE — PLAN OF CARE
Patient still has not received his breakfast tray. Nutrition has been called several times. They say they sent it. Patient is hungry. Awaiting arrival of patients tray. Originally ordered @ 0850.     1050, called nutrition services for the third time. They are going to send a tray. Patient is very frustrated that he has not received his tray yet.    Patient still awaiting the arrival of his Breakfast  tray, 1022.     1236, patient has yet to receive his breakfast tray. Multiple calls have been made, Managers are looking into why he has not. Patient declines to order a lunch tray until he gets his breakfast tray. Many apologies have been given. Patient is frustrated. Patient did get a janie doone snack cookie pack and a fruit cup.     1300 Breakfast tray finally arrived!

## 2024-01-10 NOTE — PLAN OF CARE
Patient on a Lasix gtt, voiding per urinal. Patient has been up in the recliner most of shift. Patient is up with stand by assistance and his walker. Patient ate 100% of his breakfast ( around lunch time when his tray finally arrived). Patient seen by Dr. Bhandari this AM , pt received a dose of Diuril and Albumin.

## 2024-01-10 NOTE — PROGRESS NOTES
HEART CARE NOTE          Assessment/Recommendations   1. Severe Biventricular dysfunction/HFrEF c/b severe ADHF   Assessment / Plan  Diuresing well on current regimen - no changes at this time; ~ 30-40 lbs fluid retention; endorses orthopnea, PND and progressive dyspnea; continue to monitor UOP, renal function  Patient is high risk for adverse cardiac events 2/2 severe biventricular dysfunction, multiple HFHs, non-compliance with dietary restrictions  GDMT as detailed below; repeat echo ordered     Current Pharmacotherapy AHA Guideline-Directed Medical Therapy   Lisinopril 5 mg daily   Lisinopril 20 mg twice daily   Carvedilol 1.5625 mg BID Carvedilol 25 mg twice daily   Spironolactone 25 mg daily Spironolactone 25 mg once daily   Hydralazine NA Hydralazine 100 mg three times daily   Isosorbide mononitrate 180 mg daily Isosorbide dinitrate 40 mg three times daily   SGLT2 inhibitor:Dapagliflozin/Empagliflozin - not started Dapagliflozin or Empagliflozin 10 mg daily      2. Atrial fibrillation  Assessment / Plan  Currently on apixaban and amiodarone  Will need afib clinic referral on discharge     3. CAD c/b ICM  Assessment / Plan  S/p CABG 2015; now s/p repeat coronary angiogram - no intervention indicated  Denies chest pain or anginal equivalents; Continue atorvastatin, carvedilol, lisinopril and atorvastatin     4. PRINCESS on CKD  Assessment / Plan  Resolving - continue to monitor UOP and renal functio closely; diuresis as above      Plan of care discussed on January 10, 2024 with patient at bedside, and primary team overseeing patient's care    50 minutes spent reviewing prior records (including documentation, laboratory studies, cardiac testing/imaging), history and physical exam, planning, and subsequent documentation.      History of Present Illness/Subjective    Mr. Zackary Hutchison is a 62 year old male with a PMHx significant for CAD s/p CABG, afib, HFrEF and CKD who presents to in ADHF     Today, Mr. Hutchison denies  "acute cardiac events or complaints; Management plan as detailed above      ECG: personally reviewed; normal sinus rhythm, RBBB.     ECHO (personnaly Reviewed on 9/11/23):   1.Left ventricular function is decreased. The ejection fraction is 35-40%  (moderately reduced).  2.Septal motion is consistent with post-operative state.  3.There is borderline apical wall hypokinesis.  4.Mildly decreased right ventricular systolic function  5.No hemodynamically significant valvular abnormalities on 2D or color flow  imaging.  6.There is borderline aortic root enlargement. The ascending aorta is  Borderline dilated.  7.The study was technically difficult.  There is no comparison study available    Lab results: personally reviewed January 10, 2024; notable for resolving PRINCESS    Medical history and pertinent documents reviewed in Care Everywhere please where applicable see details above        Physical Examination Review of Systems   /63 (BP Location: Other (Comment), Patient Position: Sitting, Cuff Size: Adult Regular)   Pulse 71   Temp 98.2  F (36.8  C) (Oral)   Resp 18   Ht 1.677 m (5' 6.02\")   Wt 120 kg (264 lb 8.8 oz)   SpO2 94%   BMI 42.67 kg/m    Body mass index is 42.67 kg/m .  Wt Readings from Last 3 Encounters:   01/09/24 120 kg (264 lb 8.8 oz)   12/27/23 118.3 kg (260 lb 12.8 oz)   11/28/23 127.5 kg (281 lb)     General Appearance:   no distress, normal body habitus   ENT/Mouth: membranes moist, no oral lesions or bleeding gums.      EYES:  no scleral icterus, normal conjunctivae   Neck: no carotid bruits or thyromegaly   Chest/Lungs:   lungs are clear to auscultation, no rales or wheezing, equal chest wall expansion    Cardiovascular:   Regular. Normal first and second heart sounds with no murmurs, rubs, or gallops; the carotid, radial and posterior tibial pulses are intact, + JVD and LE edema bilaterally    Abdomen:  no organomegaly, masses, bruits, or tenderness; bowel sounds are present   Extremities: no " cyanosis or clubbing   Skin: no xanthelasma, warm.    Neurologic: NAD     Psychiatric: alert and oriented x3, calm     A complete 10 systems ROS was reviewed  And is negative except what is listed in the HPI.          Medical History  Surgical History Family History Social History   Past Medical History:   Diagnosis Date    Chronic kidney disease, stage 3b (H) 09/28/2023    Class 3 severe obesity due to excess calories with body mass index (BMI) of 40.0 to 44.9 in adult (H) 09/28/2023    Congestive heart failure (H)     COPD (chronic obstructive pulmonary disease) (H)     Coronary artery disease involving coronary bypass graft of native heart without angina pectoris 09/28/2023    Heart failure with reduced ejection fraction (H) 09/11/2023    Hyperlipidemia     Hypertension     Ischemic cardiomyopathy 09/28/2023    Obese     Paroxysmal atrial fibrillation (H) 09/28/2023    Tobacco abuse 09/28/2023    Past Surgical History:   Procedure Laterality Date    CORONARY ANGIOGRAPHY ADULT ORDER      CORONARY ARTERY BYPASS Left 2014    2 vessels    CV CORONARY ANGIOGRAM N/A 09/11/2023    Procedure: Coronary Angiogram;  Surgeon: Santy Esposito MD;  [facility] CATH LAB CV    CV LEFT HEART CATH N/A 09/11/2023    Procedure: Left Heart Catheterization;  Surgeon: Santy Esposito MD;  Location: [facility] CATH LAB CV    CV RIGHT HEART CATH MEASUREMENTS RECORDED N/A 09/11/2023    Procedure: Right Heart Catheterization;  Surgeon: Santy Esposito MD;  Location: [facility] CATH LAB CV    no family history of premature coronary artery disease Social History     Socioeconomic History    Marital status: Single     Spouse name: Not on file    Number of children: Not on file    Years of education: Not on file    Highest education level: Not on file   Occupational History    Occupation: Retired restaurant owner     Comment: [employer]   Tobacco Use    Smoking status: Every Day     Packs/day: .5     Types: Cigarettes    Smokeless  "tobacco: Never    Tobacco comments:     Seen IP by [facility] on 9/11/23 and declined cessation services and materials   Substance and Sexual Activity    Alcohol use: Not Currently    Drug use: Not on file    Sexual activity: Not on file   Other Topics Concern    Not on file   Social History Narrative    Currently lives in an assisted living facility. (Updated: 01/08/2024)     Social Determinants of Health     Financial Resource Strain: Not on file   Food Insecurity: Not on file   Transportation Needs: Not on file   Physical Activity: Not on file   Stress: Not on file   Social Connections: Not on file   Interpersonal Safety: Not on file   Housing Stability: Not on file           Lab Results    Chemistry/lipid CBC Cardiac Enzymes/BNP/TSH/INR   Lab Results   Component Value Date    BUN 26.5 (H) 01/10/2024     01/10/2024    CO2 32 (H) 01/10/2024    Lab Results   Component Value Date    WBC 8.2 01/09/2024    HGB 12.7 (L) 01/09/2024    HCT 42.1 01/09/2024    MCV 93 01/09/2024     01/09/2024    Lab Results   Component Value Date    INR 1.01 02/13/2023     No results found for: \"CKTOTAL\", \"CKMB\", \"TROPONINI\"       Weight:    Wt Readings from Last 3 Encounters:   01/09/24 120 kg (264 lb 8.8 oz)   12/27/23 118.3 kg (260 lb 12.8 oz)   11/28/23 127.5 kg (281 lb)       Allergies  No Known Allergies      Surgical History  Past Surgical History:   Procedure Laterality Date    CORONARY ANGIOGRAPHY ADULT ORDER      CORONARY ARTERY BYPASS Left 2014    2 vessels    CV CORONARY ANGIOGRAM N/A 09/11/2023    Procedure: Coronary Angiogram;  Surgeon: Santy Esposito MD;  Location: [facility] CATH LAB CV    CV LEFT HEART CATH N/A 09/11/2023    Procedure: Left Heart Catheterization;  Surgeon: Santy Esposito MD;  Location: [facility] CATH LAB CV    CV RIGHT HEART CATH MEASUREMENTS RECORDED N/A 09/11/2023    Procedure: Right Heart Catheterization;  Surgeon: Santy Esposito MD;  Location: [facility] CATH LAB CV       Social " History  Tobacco:   History   Smoking Status    Every Day    Packs/day: 0.50    Types: Cigarettes   Smokeless Tobacco    Never    Alcohol:   Social History    Substance and Sexual Activity      Alcohol use: Not Currently   Illicit Drugs:   History   Drug Use Not on file       Family History  Family History   Problem Relation Age of Onset    Cerebrovascular Disease Mother     Myocardial Infarction Father           Jacey Bhandari MD on 1/10/2024      cc: Reid Escobar

## 2024-01-10 NOTE — PLAN OF CARE
Report called to Otto DOLL @ [id number]. Patient to transfer to  via wheelchair. Patient remains on a lasix gtt. Patients belongings to be transported up as well.

## 2024-01-10 NOTE — PLAN OF CARE
"  Problem: Adult Inpatient Plan of Care  Goal: Plan of Care Review  Description: The Plan of Care Review/Shift note should be completed every shift.  The Outcome Evaluation is a brief statement about your assessment that the patient is improving, declining, or no change.  This information will be displayed automatically on your shift  note.  Outcome: Progressing  Goal: Patient-Specific Goal (Individualized)  Description: You can add care plan individualizations to a care plan. Examples of Individualization might be:  \"Parent requests to be called daily at 9am for status\", \"I have a hard time hearing out of my right ear\", or \"Do not touch me to wake me up as it startles  me\".  Outcome: Progressing  Goal: Absence of Hospital-Acquired Illness or Injury  Outcome: Progressing  Intervention: Identify and Manage Fall Risk  Recent Flowsheet Documentation  Taken 1/9/2024 1629 by Yolis Charles RN  Safety Promotion/Fall Prevention: assistive device/personal items within reach  Intervention: Prevent Skin Injury  Recent Flowsheet Documentation  Taken 1/9/2024 1629 by Yolis Charles RN  Body Position: position changed independently  Device Skin Pressure Protection: adhesive use limited  Goal: Optimal Comfort and Wellbeing  Outcome: Progressing  Intervention: Monitor Pain and Promote Comfort  Recent Flowsheet Documentation  Taken 1/9/2024 1851 by Yolis Charles, RN  Pain Management Interventions:   medication (see MAR)   emotional support   environmental changes  Taken 1/9/2024 1629 by Yolis Charles, RN  Pain Management Interventions:   environmental changes   emotional support   distraction   care clustered   pain management plan reviewed with patient/caregiver  Goal: Readiness for Transition of Care  Outcome: Progressing     Problem: Heart Failure  Goal: Optimal Coping  Outcome: Progressing  Goal: Optimal Cardiac Output  Outcome: Progressing  Intervention: Optimize Cardiac Output  Recent Flowsheet " Documentation  Taken 1/9/2024 1629 by Yolis Charles RN  Stabilization Measures: respiratory support increased  Goal: Stable Heart Rate and Rhythm  Outcome: Progressing  Goal: Optimal Functional Ability  Outcome: Progressing  Intervention: Optimize Functional Ability  Recent Flowsheet Documentation  Taken 1/9/2024 1629 by Yolis Charles RN  Activity Management: up in chair  Goal: Fluid and Electrolyte Balance  Outcome: Progressing  Goal: Improved Oral Intake  Outcome: Progressing  Goal: Effective Oxygenation and Ventilation  Outcome: Progressing  Intervention: Promote Airway Secretion Clearance  Recent Flowsheet Documentation  Taken 1/9/2024 1629 by Yolis Charles RN  Activity Management: up in chair  Intervention: Optimize Oxygenation and Ventilation  Recent Flowsheet Documentation  Taken 1/9/2024 1629 by Yolis Charles RN  Head of Bed (HOB) Positioning: HOB at 30 degrees  Goal: Effective Breathing Pattern During Sleep  Outcome: Progressing  Intervention: Monitor and Manage Obstructive Sleep Apnea  Recent Flowsheet Documentation  Taken 1/9/2024 1629 by Yolis Charles RN  Medication Review/Management: medications reviewed     Problem: Comorbidity Management  Goal: Maintenance of COPD Symptom Control  Outcome: Progressing  Intervention: Maintain COPD Symptom Control  Recent Flowsheet Documentation  Taken 1/9/2024 1629 by Yolis Charles RN  Medication Review/Management: medications reviewed  Goal: Maintenance of Heart Failure Symptom Control  Outcome: Progressing  Intervention: Maintain Heart Failure Management  Recent Flowsheet Documentation  Taken 1/9/2024 1629 by Yolis Charles RN  Medication Review/Management: medications reviewed  Goal: Blood Pressure in Desired Range  Outcome: Progressing  Intervention: Maintain Blood Pressure Management  Recent Flowsheet Documentation  Taken 1/9/2024 1629 by Yolis Charles RN  Medication Review/Management: medications reviewed   Goal  Outcome Evaluation:  Patient is resistive to elevating legs even with education. Needs reinforcement. Also needs a riser when sitting or becomes AO2. States he is orthopneic and refuses to lay on bed. States he sleeps in a recliner at home. Lotion applied to flaky legs. Has headache 4/10 given Tylenol. /56 at that time. Patient drank 1080 ml. Denies other complaints.

## 2024-01-11 ENCOUNTER — APPOINTMENT (OUTPATIENT)
Dept: ULTRASOUND IMAGING | Facility: HOSPITAL | Age: 64
DRG: 291 | End: 2024-01-11
Attending: INTERNAL MEDICINE
Payer: MEDICARE

## 2024-01-11 LAB
ANION GAP SERPL CALCULATED.3IONS-SCNC: 12 MMOL/L (ref 7–15)
ATRIAL RATE - MUSE: 95 BPM
BUN SERPL-MCNC: 30.7 MG/DL (ref 8–23)
CALCIUM SERPL-MCNC: 9.8 MG/DL (ref 8.8–10.2)
CHLORIDE SERPL-SCNC: 97 MMOL/L (ref 98–107)
CREAT SERPL-MCNC: 1.3 MG/DL (ref 0.67–1.17)
DEPRECATED HCO3 PLAS-SCNC: 34 MMOL/L (ref 22–29)
DIASTOLIC BLOOD PRESSURE - MUSE: NORMAL MMHG
EGFRCR SERPLBLD CKD-EPI 2021: 61 ML/MIN/1.73M2
GLUCOSE SERPL-MCNC: 137 MG/DL (ref 70–99)
INTERPRETATION ECG - MUSE: NORMAL
P AXIS - MUSE: 11 DEGREES
POTASSIUM SERPL-SCNC: 3.5 MMOL/L (ref 3.4–5.3)
PR INTERVAL - MUSE: 164 MS
QRS DURATION - MUSE: 146 MS
QT - MUSE: 424 MS
QTC - MUSE: 532 MS
R AXIS - MUSE: 84 DEGREES
SODIUM SERPL-SCNC: 143 MMOL/L (ref 135–145)
SYSTOLIC BLOOD PRESSURE - MUSE: NORMAL MMHG
T AXIS - MUSE: 18 DEGREES
VENTRICULAR RATE- MUSE: 95 BPM

## 2024-01-11 PROCEDURE — 80048 BASIC METABOLIC PNL TOTAL CA: CPT | Performed by: INTERNAL MEDICINE

## 2024-01-11 PROCEDURE — 250N000013 HC RX MED GY IP 250 OP 250 PS 637: Performed by: INTERNAL MEDICINE

## 2024-01-11 PROCEDURE — 258N000003 HC RX IP 258 OP 636: Performed by: INTERNAL MEDICINE

## 2024-01-11 PROCEDURE — 99233 SBSQ HOSP IP/OBS HIGH 50: CPT | Performed by: INTERNAL MEDICINE

## 2024-01-11 PROCEDURE — 93005 ELECTROCARDIOGRAM TRACING: CPT

## 2024-01-11 PROCEDURE — 3E043XZ INTRODUCTION OF VASOPRESSOR INTO CENTRAL VEIN, PERCUTANEOUS APPROACH: ICD-10-PCS | Performed by: INTERNAL MEDICINE

## 2024-01-11 PROCEDURE — 250N000011 HC RX IP 250 OP 636: Performed by: INTERNAL MEDICINE

## 2024-01-11 PROCEDURE — 93010 ELECTROCARDIOGRAM REPORT: CPT | Mod: HIP | Performed by: INTERNAL MEDICINE

## 2024-01-11 PROCEDURE — 36415 COLL VENOUS BLD VENIPUNCTURE: CPT | Performed by: INTERNAL MEDICINE

## 2024-01-11 PROCEDURE — 210N000001 HC R&B IMCU HEART CARE

## 2024-01-11 PROCEDURE — 99291 CRITICAL CARE FIRST HOUR: CPT | Performed by: INTERNAL MEDICINE

## 2024-01-11 PROCEDURE — P9047 ALBUMIN (HUMAN), 25%, 50ML: HCPCS | Performed by: INTERNAL MEDICINE

## 2024-01-11 PROCEDURE — 93970 EXTREMITY STUDY: CPT

## 2024-01-11 RX ORDER — ALBUTEROL SULFATE 90 UG/1
2 AEROSOL, METERED RESPIRATORY (INHALATION) EVERY 4 HOURS PRN
Status: DISCONTINUED | OUTPATIENT
Start: 2024-01-11 | End: 2024-01-18 | Stop reason: HOSPADM

## 2024-01-11 RX ORDER — ALBUMIN (HUMAN) 12.5 G/50ML
50 SOLUTION INTRAVENOUS ONCE
Status: DISCONTINUED | OUTPATIENT
Start: 2024-01-11 | End: 2024-01-11

## 2024-01-11 RX ORDER — IPRATROPIUM BROMIDE AND ALBUTEROL SULFATE 2.5; .5 MG/3ML; MG/3ML
3 SOLUTION RESPIRATORY (INHALATION) EVERY 4 HOURS PRN
Status: DISCONTINUED | OUTPATIENT
Start: 2024-01-11 | End: 2024-01-18 | Stop reason: HOSPADM

## 2024-01-11 RX ADMIN — FUROSEMIDE 20 MG/HR: 10 INJECTION, SOLUTION INTRAVENOUS at 03:24

## 2024-01-11 RX ADMIN — ALBUTEROL SULFATE 2 PUFF: 90 AEROSOL, METERED RESPIRATORY (INHALATION) at 04:00

## 2024-01-11 RX ADMIN — ALBUTEROL SULFATE 2 PUFF: 90 AEROSOL, METERED RESPIRATORY (INHALATION) at 12:07

## 2024-01-11 RX ADMIN — GABAPENTIN 900 MG: 300 CAPSULE ORAL at 21:01

## 2024-01-11 RX ADMIN — POTASSIUM CHLORIDE 20 MEQ: 1500 TABLET, EXTENDED RELEASE ORAL at 08:21

## 2024-01-11 RX ADMIN — APIXABAN 5 MG: 5 TABLET, FILM COATED ORAL at 21:01

## 2024-01-11 RX ADMIN — AMIODARONE HYDROCHLORIDE 200 MG: 200 TABLET ORAL at 08:21

## 2024-01-11 RX ADMIN — ALBUTEROL SULFATE 2 PUFF: 90 AEROSOL, METERED RESPIRATORY (INHALATION) at 08:22

## 2024-01-11 RX ADMIN — DULOXETINE HYDROCHLORIDE 30 MG: 30 CAPSULE, DELAYED RELEASE ORAL at 08:31

## 2024-01-11 RX ADMIN — ATORVASTATIN CALCIUM 40 MG: 40 TABLET, FILM COATED ORAL at 08:21

## 2024-01-11 RX ADMIN — CYANOCOBALAMIN TAB 1000 MCG 1000 MCG: 1000 TAB at 08:21

## 2024-01-11 RX ADMIN — ROPINIROLE HYDROCHLORIDE 2 MG: 1 TABLET, FILM COATED ORAL at 14:57

## 2024-01-11 RX ADMIN — SPIRONOLACTONE 25 MG: 25 TABLET, FILM COATED ORAL at 08:21

## 2024-01-11 RX ADMIN — ALBUTEROL SULFATE 2 PUFF: 90 AEROSOL, METERED RESPIRATORY (INHALATION) at 16:31

## 2024-01-11 RX ADMIN — APIXABAN 5 MG: 5 TABLET, FILM COATED ORAL at 08:21

## 2024-01-11 RX ADMIN — ROPINIROLE HYDROCHLORIDE 2 MG: 1 TABLET, FILM COATED ORAL at 08:25

## 2024-01-11 RX ADMIN — DOPAMINE HYDROCHLORIDE 3 MCG/KG/MIN: 160 INJECTION, SOLUTION INTRAVENOUS at 08:11

## 2024-01-11 RX ADMIN — FUROSEMIDE 20 MG/HR: 10 INJECTION, SOLUTION INTRAVENOUS at 10:35

## 2024-01-11 RX ADMIN — ISOSORBIDE MONONITRATE 180 MG: 60 TABLET, EXTENDED RELEASE ORAL at 08:23

## 2024-01-11 RX ADMIN — NITROGLYCERIN 0.4 MG: 0.4 TABLET, ORALLY DISINTEGRATING SUBLINGUAL at 08:59

## 2024-01-11 RX ADMIN — HYDROCORTISONE: 1 CREAM TOPICAL at 08:28

## 2024-01-11 RX ADMIN — FUROSEMIDE 20 MG/HR: 10 INJECTION, SOLUTION INTRAVENOUS at 16:31

## 2024-01-11 RX ADMIN — FUROSEMIDE 20 MG/HR: 10 INJECTION, SOLUTION INTRAVENOUS at 22:20

## 2024-01-11 RX ADMIN — ACETAMINOPHEN 650 MG: 325 TABLET ORAL at 08:59

## 2024-01-11 RX ADMIN — ALBUMIN HUMAN 50 G: 0.25 SOLUTION INTRAVENOUS at 08:15

## 2024-01-11 RX ADMIN — ALBUTEROL SULFATE 2 PUFF: 90 AEROSOL, METERED RESPIRATORY (INHALATION) at 21:02

## 2024-01-11 RX ADMIN — ROPINIROLE HYDROCHLORIDE 2 MG: 1 TABLET, FILM COATED ORAL at 21:01

## 2024-01-11 ASSESSMENT — ACTIVITIES OF DAILY LIVING (ADL)
ADLS_ACUITY_SCORE: 26
ADLS_ACUITY_SCORE: 25
ADLS_ACUITY_SCORE: 26

## 2024-01-11 NOTE — PROGRESS NOTES
Daily Progress Note        CODE STATUS:  Full Code    01/11/24  Assessment/Plan:  63 year old male with PMH of hypertension, hyperlipidemia, obesity, CKD-3bCAD, s/p CABG (10 years ago), ischemic cardiomyopathy with EF 41%, admitted on 1/8/2024 for acute on chronic systolic CHF.      Acute on chronic systolic CHF  with EF 41%   -- Appreciate cardiology consult. Diuresed per cardilogy with lasix infusion. Dopamine infusion added today for augmentation. Albumin prn as well  -- Diuresing well. Negative balance of 6.8L since admission. Still looks significantly vol up  -- Monitor I&O, daily weights and renal function closely while being aggresively diuresed               Active Problems:  CAD, S/P CABG x 2 vessels in 2014     Paroxysmal atrial fib -- on Eliquis  -- Currently in NSR     Smoker  -- Needs to quit, does not want nicotine replacement  -- Will give info on discharge     COPD      PRINCESS (acute kidney injury) (H24)   -- Monitor creatinine closely while on IV diuresis     Restless legs syndrome (RLS)   -- Con home med Ropinirole 3x daily prn       DVT Prophylaxis: DOAC  Code Status: Full Code      Disposition; Likely home in 2-3 days  Barrier to discharge; Clinical progress. IV diuresis      Subjective:  Interval History: Patient seen and examined. Notes, labs, imaging reports personally reviewed. Patient is new to me today. No acute issues overnight. He reports doing better with less shortness of breath. Denies any chest pain. Reports leg swelling getting better.     Review of Systems:   As mentioned in subjective.    Patient Active Problem List   Diagnosis    Abnormal electrocardiogram    Cellulitis of left lower extremity    Ischemic cardiomyopathy    CAD, S/P CABG    Paroxysmal atrial fib -- on Eliquis    Class 3 severe obesity due to excess calories with body mass index (BMI) of 40.0 to 44.9 in adult (H)    Chronic kidney disease, stage 3b (H)    Smoker    COPD exacerbation (H)    Venous stasis dermatitis of  both lower extremities    PRINCESS (acute kidney injury) (H24)    Acute on chronic systolic CHF    Restless legs syndrome (RLS)       Scheduled Meds:   albumin human  50 g Intravenous Daily    albuterol  2 puff Inhalation 4x Daily    amiodarone  200 mg Oral Daily    apixaban ANTICOAGULANT  5 mg Oral BID    atorvastatin  40 mg Oral QAM    [Held by provider] carvedilol  1.5625 mg Oral BID w/meals    cyanocobalamin  1,000 mcg Oral Daily    DULoxetine  30 mg Oral QAM    gabapentin  900 mg Oral At Bedtime    hydrocortisone   Topical BID    isosorbide mononitrate  180 mg Oral QAM    [Held by provider] lisinopril  5 mg Oral Daily    potassium chloride ER  20 mEq Oral Daily    rOPINIRole  2 mg Oral TID    sodium chloride (PF)  3 mL Intracatheter Q8H    spironolactone  25 mg Oral Daily     Continuous Infusions:   DOPamine 3 mcg/kg/min (01/11/24 0811)    furosemide (LASIX) 100 mg in sodium chloride 0.9 % 100 mL infusion 20 mg/hr (01/11/24 1631)     PRN Meds:.acetaminophen **OR** acetaminophen, albuterol, albuterol, sore throat, calcium carbonate, hydrALAZINE, ipratropium - albuterol 0.5 mg/2.5 mg/3 mL, lidocaine 4%, lidocaine (buffered or not buffered), melatonin, menthol-zinc oxide, miconazole, nitroGLYcerin, ondansetron **OR** ondansetron, polyethylene glycol, senna-docusate **OR** senna-docusate, sodium chloride (PF)    Objective:  Vital signs in last 24 hours:  Temp:  [97.7  F (36.5  C)-99  F (37.2  C)] 98.2  F (36.8  C)  Pulse:  [] 91  Resp:  [13-22] 16  BP: (123-158)/(62-79) 147/75  SpO2:  [91 %-98 %] 98 %        Intake/Output Summary (Last 24 hours) at 1/11/2024 1644  Last data filed at 1/11/2024 1600  Gross per 24 hour   Intake 2543.96 ml   Output 3775 ml   Net -1231.04 ml       Physical Exam:    General: Not in obvious distress.  HEENT: NC, AT   Chest: Diminished breath sounds bilaterally due to thick chest wall  Heart: S1S2 normal, regular. No M/R/G  Abdomen: Soft. NT, ND. Bowel sounds- active.  Extremities: 2-3 +  leg swelling.   Neuro: Alert and awake, grossly non-focal      Lab Results:(I have personally reviewed the results)    Recent Results (from the past 24 hour(s))   Potassium    Collection Time: 01/10/24  8:07 PM   Result Value Ref Range    Potassium 3.6 3.4 - 5.3 mmol/L   Basic metabolic panel    Collection Time: 01/11/24  5:12 AM   Result Value Ref Range    Sodium 143 135 - 145 mmol/L    Potassium 3.5 3.4 - 5.3 mmol/L    Chloride 97 (L) 98 - 107 mmol/L    Carbon Dioxide (CO2) 34 (H) 22 - 29 mmol/L    Anion Gap 12 7 - 15 mmol/L    Urea Nitrogen 30.7 (H) 8.0 - 23.0 mg/dL    Creatinine 1.30 (H) 0.67 - 1.17 mg/dL    GFR Estimate 61 >60 mL/min/1.73m2    Calcium 9.8 8.8 - 10.2 mg/dL    Glucose 137 (H) 70 - 99 mg/dL   ECG 12-LEAD WITH MUSE (LHE)    Collection Time: 01/11/24  9:31 AM   Result Value Ref Range    Systolic Blood Pressure  mmHg    Diastolic Blood Pressure  mmHg    Ventricular Rate 95 BPM    Atrial Rate 95 BPM    WI Interval 164 ms    QRS Duration 146 ms     ms    QTc 532 ms    P Axis 11 degrees    R AXIS 84 degrees    T Axis 18 degrees    Interpretation ECG       Sinus rhythm with sinus arrhythmia  Right bundle branch block  T wave abnormality, consider inferior ischemia  Abnormal ECG  When compared with ECG of 08-JAN-2024 15:53,  No significant change was found  Confirmed by JESSICA ESCALANTE MD LOC: ([id number]) on 1/11/2024 1:57:31 PM         All laboratory and imaging data in the past 24 hours reviewed  Serum Glucose range:   Recent Labs   Lab 01/11/24  0512 01/10/24  0709 01/09/24  0709 01/08/24  1606   * 104* 103* 113*     ABG: No lab results found in last 7 days.  CBC:   Recent Labs   Lab 01/09/24  0709 01/08/24  1606   WBC 8.2 9.6   HGB 12.7* 12.0*   HCT 42.1 40.1   MCV 93 93    153     Chemistry:   Recent Labs   Lab 01/11/24  0512 01/10/24  2007 01/10/24  0709 01/09/24  0709     --  142 144   POTASSIUM 3.5 3.6 3.3* 3.8   CHLORIDE 97*  --  99 100   CO2 34*  --  32* 32*   BUN 30.7*  --  " 26.5* 26.9*   CR 1.30*  --  1.13 1.11   GFRESTIMATED 61  --  72 74   RUSLAN 9.8  --  9.2 8.9   MAG  --   --  2.0  --      Coags:  No results for input(s): \"INR\", \"PROTIME\", \"PTT\" in the last 168 hours.    Invalid input(s): \"APTT\"  Cardiac Markers:  No results for input(s): \"CKTOTAL\", \"TROPONINI\" in the last 168 hours.       US Lower Extremity Venous Duplex Bilateral    Result Date: 1/11/2024  EXAM: US LOWER EXTREMITY VENOUS DUPLEX BILATERAL LOCATION: [facility] DATE: 1/11/2024 INDICATION: Lower extremity swelling. COMPARISON: Ultrasound venous left lower extremity Doppler 02/14/2023. TECHNIQUE: Venous Duplex ultrasound of bilateral lower extremities with and without compression, augmentation and duplex. Color flow and spectral Doppler with waveform analysis performed. FINDINGS: Exam includes the common femoral, femoral, popliteal veins as well as segmentally visualized deep calf veins and greater saphenous vein. RIGHT: No deep vein thrombosis. No superficial thrombophlebitis. No popliteal cyst. LEFT: No deep vein thrombosis. No superficial thrombophlebitis. No popliteal cyst. No significant interval change.     IMPRESSION: 1.  No deep venous thrombosis in the bilateral lower extremities. 2.  No popliteal cyst on either side.    XR Chest 2 Views    Result Date: 1/8/2024  EXAM: XR CHEST 2 VIEWS LOCATION: [facility] DATE: 1/8/2024 INDICATION: Congestive heart failure COMPARISON: AP and lateral views the chest 12/20/2023     IMPRESSION: Left coronary stents. Previous median sternotomy. Cardiac silhouette is normal in size and the vascular pedicle width is normal. Symmetric lung inflation. No interstitial or alveolar opacities. No pleural effusion. Healed fracture deformity of several left upper posterolateral ribs. Intact and aligned sternal wires. Flowing degenerative osteophytes of the thoracic spine.    Echocardiogram Limited    Result Date: 12/25/2023  [id number] " [id number]^MARCIO^[place] [address]  Name: KRISTINE JONES MRN: [id number] : 1960 Study Date: 2023 11:46 AM Age: 63 yrs Gender: Male Patient Location: [facility] Reason For Study: CHF Ordering Physician: TANIA BUCKLEY Performed By: AD  BSA: 2.2 m2 Height: 65 in Weight: 270 lb HR: 79 ______________________________________________________________________________ Procedure Limited Echo Adult. Definity (NDC #89763-650) given intravenously. Technically difficult study. Compared to the prior study dated 2023, there have been no changes. ______________________________________________________________________________ Interpretation Summary  1. Technically very difficult study. 2. Left ventricular chamber size and wall thickness are normal. Systolic function is mildly reduced with global hypokinesis. The calculated left ventricular ejection fraction is 41%. 3. Right ventricle is not well visualized. Right ventricular chamber size and systolic function are grossly normal. 4. No hemodynamically significant valvular abnormalities although the cardiac valves are not well visualized. 5. Compared to the prior study dated 2023, there has been no significant change. ______________________________________________________________________________ I      WMSI = 2.00     % Normal = 0  X - Cannot   0 -                      (2) - Mildly 2 -          Segments  Size Interpret    Hyperkinetic 1 - Normal  Hypokinetic  Hypokinetic  1-2     small                                                    7 -          3-5    moderate 3 - Akinetic 4 -          5 -         6 - Akinetic Dyskinetic   6-14    large              Dyskinetic   Aneurysmal  w/scar       w/scar       15-16   diffuse  Left Ventricle The left ventricle is normal in size. There is normal left ventricular wall thickness. Biplane LVEF is 41%. There is mild global hypokinesia of the left ventricle. Septal motion is  consistent with post-operative state.  Right Ventricle The right ventricle is not well visualized. Normal right ventricle size and systolic function.  Atria Normal left atrial size. Right atrium not well visualized.  Mitral Valve The mitral valve leaflets are mildly thickened. There is trace mitral regurgitation. There is no mitral valve stenosis.  Tricuspid Valve The tricuspid valve is not well visualized.  Aortic Valve The aortic valve is not well visualized.  Pulmonic Valve The pulmonic valve is not well visualized.  Vessels The aorta root cannot be assessed. IVC diameter <2.1 cm collapsing >50% with sniff suggests a normal RA pressure of 3 mmHg.  Pericardium There is no pericardial effusion.  Rhythm Sinus rhythm was noted.  ______________________________________________________________________________ MMode/2D Measurements & Calculations IVSd: 0.87 cm LVIDd: 5.4 cm LVIDs: 4.3 cm LVPWd: 0.95 cm  FS: 19.0 % LV mass(C)d: 180.3 grams LV mass(C)dI: 80.2 grams/m2 LA dimension: 4.7 cm LA Volume Index (BP): 30.0 ml/m2 RWT: 0.35  Time Measurements MM HR: 84.0 BPM  ______________________________________________________________________________ Report approved by: Aura Wright 12/25/2023 01:14 PM       Chest XR,  PA & LAT    Result Date: 12/22/2023  EXAM: XR CHEST 2 VIEWS LOCATION: [facility] DATE: 12/22/2023 INDICATION: dyspnea COMPARISON: 12/14/2023     IMPRESSION: Stable chest with again seen hyperinflation of both lungs. Postoperative changes sternotomy. Old healed left rib fractures. Mild to moderate scattered degenerative changes the bony skeleton.    XR Chest 2 Views    Result Date: 12/14/2023  EXAM: XR CHEST 2 VIEWS LOCATION: [facility] DATE: 12/14/2023 INDICATION: Dyspnea. COMPARISON: 09/08/2023.     IMPRESSION: Sternotomy wires. Cardiomegaly with normal pulmonary vascularity. The lungs are clear. Old left rib fractures. No change from previous.    CT Head w/o  Contrast    Result Date: 12/14/2023  EXAM: CT HEAD W/O CONTRAST LOCATION: [facility] DATE: 12/14/2023 INDICATION: head injury COMPARISON: None. TECHNIQUE: Routine CT Head without IV contrast. Multiplanar reformats. Dose reduction techniques were used. FINDINGS: INTRACRANIAL CONTENTS: No intracranial hemorrhage, extraaxial collection, or mass effect.  No CT evidence of acute infarct. Mild presumed chronic small vessel ischemic changes. Moderate generalized volume loss. No hydrocephalus. Calcified intracranial atherosclerosis. VISUALIZED ORBITS/SINUSES/MASTOIDS: No intraorbital abnormality. Mild mucosal thickening scattered about the paranasal sinuses. No middle ear or mastoid effusion. BONES/SOFT TISSUES: No acute abnormality.     IMPRESSION: 1.  No evidence of acute traumatic intracranial abnormality. 2.  Calcified intracranial atherosclerosis with changes suggestive of mild chronic microvascular ischemic disease. 3.  Moderate generalized parenchymal atrophy.      Latest radiology report personally reviewed.    Note created using dragon voice recognition software so sounds alike errors may have escaped editing.      01/11/2024   Martin Ch MD  Hospitalist, [facility]  Pager: [phone]

## 2024-01-11 NOTE — PROGRESS NOTES
HEART CARE NOTE          Assessment/Recommendations   1. Severe Biventricular dysfunction/HFrEF c/b cardiogenic shock  Assessment / Plan  PRINCESS noted on am labs - hold ACE-I; will give albumin bolus; continue to monitor UOP, renal function  Add dopamine to augment cardiac output and promote renal perfusion  Patient is high risk for adverse cardiac events 2/2 severe biventricular dysfunction, multiple HFHs, non-compliance with dietary restrictions  GDMT as detailed below; repeat echo ordered     Current Pharmacotherapy AHA Guideline-Directed Medical Therapy   Lisinopril 5 mg daily  - held Lisinopril 20 mg twice daily   Carvedilol 1.5625 mg BID - held Carvedilol 25 mg twice daily   Spironolactone 25 mg daily Spironolactone 25 mg once daily   Hydralazine NA Hydralazine 100 mg three times daily   Isosorbide mononitrate 180 mg daily Isosorbide dinitrate 40 mg three times daily   SGLT2 inhibitor:Dapagliflozin/Empagliflozin - not started Dapagliflozin or Empagliflozin 10 mg daily      2. Atrial fibrillation  Assessment / Plan  Currently on apixaban and amiodarone  Will need afib clinic referral on discharge     3. CAD c/b ICM  Assessment / Plan  S/p CABG 2015; now s/p repeat coronary angiogram - no intervention indicated  Denies chest pain or anginal equivalents; Continue atorvastatin, carvedilol, lisinopril and atorvastatin     4. PRINCESS on CKD  Assessment / Plan  Resolving - continue to monitor UOP and renal functio closely; diuresis as above    Plan of care discussed on January 11, 2024 with patient at bedside, and primary team overseeing patient's care    Patient remains critically ill requiring hemodynamic support via dopamine gtt in the setting of cardiogenic and renal dysfunction. 70 minutes spent on critical care.      History of Present Illness/Subjective    Mr. Zackary Hutchison is a 62 year old male with a PMHx significant for CAD s/p CABG, afib, HFrEF and CKD who presents to in [facility]     Today, Mr. Hutchison denies acute  "cardiac events or complaints; Management plan as detailed above      ECG: personally reviewed; normal sinus rhythm, RBBB.     ECHO (personnaly Reviewed on 9/11/23):   1.Left ventricular function is decreased. The ejection fraction is 35-40%  (moderately reduced).  2.Septal motion is consistent with post-operative state.  3.There is borderline apical wall hypokinesis.  4.Mildly decreased right ventricular systolic function  5.No hemodynamically significant valvular abnormalities on 2D or color flow  imaging.  6.There is borderline aortic root enlargement. The ascending aorta is  Borderline dilated.  7.The study was technically difficult.  There is no comparison study available    Telemetry: personally reviewed January 11, 2024; notable for sinus rhythm     Lab results: personally reviewed January 11, 2024; notable for PRINCESS    Medical history and pertinent documents reviewed in Care Everywhere please where applicable see details above        Physical Examination Review of Systems   BP (!) 153/79 (BP Location: Left arm)   Pulse 75   Temp 99  F (37.2  C) (Oral)   Resp 20   Ht 1.676 m (5' 6\")   Wt 117.9 kg (260 lb)   SpO2 96%   BMI 41.97 kg/m    Body mass index is 41.97 kg/m .  Wt Readings from Last 3 Encounters:   01/11/24 117.9 kg (260 lb)   12/27/23 118.3 kg (260 lb 12.8 oz)   11/28/23 127.5 kg (281 lb)     General Appearance:   no distress, normal body habitus   ENT/Mouth: membranes moist, no oral lesions or bleeding gums.      EYES:  no scleral icterus, normal conjunctivae   Neck: no carotid bruits or thyromegaly   Chest/Lungs:   lungs are clear to auscultation, no rales or wheezing, equal chest wall expansion    Cardiovascular:   Regular. Normal first and second heart sounds with no murmurs, rubs, or gallops; the carotid, radial and posterior tibial pulses are intact, + JVD and LE edema bilaterally    Abdomen:  no organomegaly, masses, bruits, or tenderness; bowel sounds are present   Extremities: no cyanosis " or clubbing   Skin: no xanthelasma, warm.    Neurologic: NAD     Psychiatric: alert and oriented x3, calm     A complete 10 systems ROS was reviewed  And is negative except what is listed in the HPI.          Medical History  Surgical History Family History Social History   Past Medical History:   Diagnosis Date    Chronic kidney disease, stage 3b (H) 09/28/2023    Class 3 severe obesity due to excess calories with body mass index (BMI) of 40.0 to 44.9 in adult (H) 09/28/2023    Congestive heart failure (H)     COPD (chronic obstructive pulmonary disease) (H)     Coronary artery disease involving coronary bypass graft of native heart without angina pectoris 09/28/2023    Heart failure with reduced ejection fraction (H) 09/11/2023    Hyperlipidemia     Hypertension     Ischemic cardiomyopathy 09/28/2023    Obese     Paroxysmal atrial fibrillation (H) 09/28/2023    Tobacco abuse 09/28/2023    Past Surgical History:   Procedure Laterality Date    CORONARY ANGIOGRAPHY ADULT ORDER      CORONARY ARTERY BYPASS Left 2014    2 vessels    CV CORONARY ANGIOGRAM N/A 09/11/2023    Procedure: Coronary Angiogram;  Surgeon: Santy Esposito MD;  Location: [facility] CATH LAB CV    CV LEFT HEART CATH N/A 09/11/2023    Procedure: Left Heart Catheterization;  Surgeon: Santy Esposito MD;  Location: [facility] CATH LAB CV    CV RIGHT HEART CATH MEASUREMENTS RECORDED N/A 09/11/2023    Procedure: Right Heart Catheterization;  Surgeon: Santy Esposito MD;  Location: [facility] CATH LAB CV    no family history of premature coronary artery disease Social History     Socioeconomic History    Marital status: Single     Spouse name: Not on file    Number of children: Not on file    Years of education: Not on file    Highest education level: Not on file   Occupational History    Occupation: Retired restaurant owner     Comment: [employer]   Tobacco Use    Smoking status: Every Day     Packs/day: .5     Types: Cigarettes    Smokeless tobacco: Never  "   Tobacco comments:     Seen IP by [facility] on 9/11/23 and declined cessation services and materials   Substance and Sexual Activity    Alcohol use: Not Currently    Drug use: Not on file    Sexual activity: Not on file   Other Topics Concern    Not on file   Social History Narrative    Currently lives in an assisted living facility. (Updated: 01/08/2024)     Social Determinants of Health     Financial Resource Strain: Not on file   Food Insecurity: Not on file   Transportation Needs: Not on file   Physical Activity: Not on file   Stress: Not on file   Social Connections: Not on file   Interpersonal Safety: Not on file   Housing Stability: Not on file           Lab Results    Chemistry/lipid CBC Cardiac Enzymes/BNP/TSH/INR   Lab Results   Component Value Date    BUN 30.7 (H) 01/11/2024     01/11/2024    CO2 34 (H) 01/11/2024    Lab Results   Component Value Date    WBC 8.2 01/09/2024    HGB 12.7 (L) 01/09/2024    HCT 42.1 01/09/2024    MCV 93 01/09/2024     01/09/2024    Lab Results   Component Value Date    INR 1.01 02/13/2023     No results found for: \"CKTOTAL\", \"CKMB\", \"TROPONINI\"       Weight:    Wt Readings from Last 3 Encounters:   01/11/24 117.9 kg (260 lb)   12/27/23 118.3 kg (260 lb 12.8 oz)   11/28/23 127.5 kg (281 lb)       Allergies  No Known Allergies      Surgical History  Past Surgical History:   Procedure Laterality Date    CORONARY ANGIOGRAPHY ADULT ORDER      CORONARY ARTERY BYPASS Left 2014    2 vessels    CV CORONARY ANGIOGRAM N/A 09/11/2023    Procedure: Coronary Angiogram;  Surgeon: Santy Esposito MD;  Location: [facility] CATH LAB CV    CV LEFT HEART CATH N/A 09/11/2023    Procedure: Left Heart Catheterization;  Surgeon: Santy Esposito MD;  Location: [facility] CATH LAB CV    CV RIGHT HEART CATH MEASUREMENTS RECORDED N/A 09/11/2023    Procedure: Right Heart Catheterization;  Surgeon: Santy Esposito MD;  Location: [facility] CATH LAB CV       Social History  Tobacco:   History "   Smoking Status    Every Day    Packs/day: 0.50    Types: Cigarettes   Smokeless Tobacco    Never    Alcohol:   Social History    Substance and Sexual Activity      Alcohol use: Not Currently   Illicit Drugs:   History   Drug Use Not on file       Family History  Family History   Problem Relation Age of Onset    Cerebrovascular Disease Mother     Myocardial Infarction Father           Jacey Bhandari MD on 1/11/2024      cc: Reid Escobar

## 2024-01-11 NOTE — PLAN OF CARE
Goal Outcome Evaluation:          [facility]

## 2024-01-11 NOTE — PLAN OF CARE
"  Problem: Adult Inpatient Plan of Care  Goal: Plan of Care Review  Description: The Plan of Care Review/Shift note should be completed every shift.  The Outcome Evaluation is a brief statement about your assessment that the patient is improving, declining, or no change.  This information will be displayed automatically on your shift  note.  Outcome: Progressing  Flowsheets (Taken 1/10/2024 2221)  Plan of Care Reviewed With: patient  Goal: Patient-Specific Goal (Individualized)  Description: You can add care plan individualizations to a care plan. Examples of Individualization might be:  \"Parent requests to be called daily at 9am for status\", \"I have a hard time hearing out of my right ear\", or \"Do not touch me to wake me up as it startles  me\".  Outcome: Progressing  Goal: Absence of Hospital-Acquired Illness or Injury  Outcome: Progressing  Intervention: Identify and Manage Fall Risk  Recent Flowsheet Documentation  Taken 1/10/2024 1538 by Devyn Hancock RN  Safety Promotion/Fall Prevention: activity supervised  Intervention: Prevent Infection  Recent Flowsheet Documentation  Taken 1/10/2024 1538 by Devyn Hancock RN  Infection Prevention:   single patient room provided   rest/sleep promoted  Goal: Optimal Comfort and Wellbeing  Outcome: Progressing  Intervention: Monitor Pain and Promote Comfort  Recent Flowsheet Documentation  Taken 1/10/2024 2000 by Devyn Hancock RN  Pain Management Interventions:   medication (see MAR)   emotional support   environmental changes  Taken 1/10/2024 1538 by Devyn Hancock RN  Pain Management Interventions:   medication (see MAR)   emotional support   environmental changes  Goal: Readiness for Transition of Care  Outcome: Progressing     Problem: Heart Failure  Goal: Optimal Coping  Outcome: Progressing  Goal: Optimal Cardiac Output  Outcome: Progressing  Goal: Stable Heart Rate and Rhythm  Outcome: Progressing  Goal: Optimal Functional Ability  Outcome: Progressing  Goal: " Fluid and Electrolyte Balance  Outcome: Progressing  Goal: Improved Oral Intake  Outcome: Progressing  Goal: Effective Oxygenation and Ventilation  Outcome: Progressing  Intervention: Promote Airway Secretion Clearance  Recent Flowsheet Documentation  Taken 1/10/2024 2000 by Devyn Hancock RN  Cough And Deep Breathing: done independently per patient  Taken 1/10/2024 1538 by Devyn Hancock RN  Cough And Deep Breathing: done independently per patient  Goal: Effective Breathing Pattern During Sleep  Outcome: Progressing  Intervention: Monitor and Manage Obstructive Sleep Apnea  Recent Flowsheet Documentation  Taken 1/10/2024 1538 by Devyn Hancock RN  Medication Review/Management: medications reviewed     Problem: Comorbidity Management  Goal: Maintenance of COPD Symptom Control  Outcome: Progressing  Intervention: Maintain COPD Symptom Control  Recent Flowsheet Documentation  Taken 1/10/2024 1538 by Devyn Hancock RN  Medication Review/Management: medications reviewed  Goal: Maintenance of Heart Failure Symptom Control  Outcome: Progressing  Intervention: Maintain Heart Failure Management  Recent Flowsheet Documentation  Taken 1/10/2024 1538 by Devyn Hancock RN  Medication Review/Management: medications reviewed  Goal: Blood Pressure in Desired Range  Outcome: Progressing  Intervention: Maintain Blood Pressure Management  Recent Flowsheet Documentation  Taken 1/10/2024 1538 by Devyn Hancock RN  Medication Review/Management: medications reviewed     Goal Outcome Evaluation:      Plan of Care Reviewed With: patient    Pt arrived on unit from ED at approx 1530. A/O x 4 & vitally stable on arrival to unit. Pt saturating well on RA - lung sounds clear but pt endorses CARNEY & orthopnea. Tele shows sinus rhythm with occasional PACs. Pt appears fluid overloaded with +2/+3 LE edema, BLE are also inflamed and tender on palpation with ulcers present to shins - pt states he has been receiving wound care at home for these.  Pt endorsed 7/10 pain of RLE - poorly managed with PRN tylenol. [facility] notified & ordered one-time dosage of dilaudid, also ultrasound to rule out DVTs - negative homans sign on assessment. Lasix gtt continues infusing at 20 ml/hr with good UO - see I/O. Up with AO1 due to unsteadiness. K+ for recheck tomorrow AM. Will continue to monitor.     Devyn Hancock RN on 1/10/2024 at 10:30 PM

## 2024-01-12 ENCOUNTER — APPOINTMENT (OUTPATIENT)
Dept: PHYSICAL THERAPY | Facility: HOSPITAL | Age: 64
DRG: 291 | End: 2024-01-12
Attending: INTERNAL MEDICINE
Payer: MEDICARE

## 2024-01-12 ENCOUNTER — APPOINTMENT (OUTPATIENT)
Dept: OCCUPATIONAL THERAPY | Facility: HOSPITAL | Age: 64
DRG: 291 | End: 2024-01-12
Attending: INTERNAL MEDICINE
Payer: MEDICARE

## 2024-01-12 LAB
ANION GAP SERPL CALCULATED.3IONS-SCNC: 14 MMOL/L (ref 7–15)
ATRIAL RATE - MUSE: 86 BPM
BUN SERPL-MCNC: 31.1 MG/DL (ref 8–23)
CALCIUM SERPL-MCNC: 9.9 MG/DL (ref 8.8–10.2)
CHLORIDE SERPL-SCNC: 96 MMOL/L (ref 98–107)
CREAT SERPL-MCNC: 1.14 MG/DL (ref 0.67–1.17)
DEPRECATED HCO3 PLAS-SCNC: 29 MMOL/L (ref 22–29)
DIASTOLIC BLOOD PRESSURE - MUSE: NORMAL MMHG
EGFRCR SERPLBLD CKD-EPI 2021: 71 ML/MIN/1.73M2
GLUCOSE SERPL-MCNC: 110 MG/DL (ref 70–99)
HOLD SPECIMEN: NORMAL
INTERPRETATION ECG - MUSE: NORMAL
P AXIS - MUSE: NORMAL DEGREES
POTASSIUM SERPL-SCNC: 3.4 MMOL/L (ref 3.4–5.3)
POTASSIUM SERPL-SCNC: 3.7 MMOL/L (ref 3.4–5.3)
PR INTERVAL - MUSE: NORMAL MS
QRS DURATION - MUSE: 132 MS
QT - MUSE: 414 MS
QTC - MUSE: 498 MS
R AXIS - MUSE: 83 DEGREES
SODIUM SERPL-SCNC: 139 MMOL/L (ref 135–145)
SYSTOLIC BLOOD PRESSURE - MUSE: NORMAL MMHG
T AXIS - MUSE: 88 DEGREES
VENTRICULAR RATE- MUSE: 87 BPM

## 2024-01-12 PROCEDURE — 97110 THERAPEUTIC EXERCISES: CPT | Mod: GP

## 2024-01-12 PROCEDURE — 36415 COLL VENOUS BLD VENIPUNCTURE: CPT | Performed by: INTERNAL MEDICINE

## 2024-01-12 PROCEDURE — 250N000013 HC RX MED GY IP 250 OP 250 PS 637: Performed by: INTERNAL MEDICINE

## 2024-01-12 PROCEDURE — 210N000001 HC R&B IMCU HEART CARE

## 2024-01-12 PROCEDURE — P9047 ALBUMIN (HUMAN), 25%, 50ML: HCPCS | Performed by: INTERNAL MEDICINE

## 2024-01-12 PROCEDURE — 80048 BASIC METABOLIC PNL TOTAL CA: CPT | Performed by: INTERNAL MEDICINE

## 2024-01-12 PROCEDURE — 97165 OT EVAL LOW COMPLEX 30 MIN: CPT | Mod: GO

## 2024-01-12 PROCEDURE — 97116 GAIT TRAINING THERAPY: CPT | Mod: GP

## 2024-01-12 PROCEDURE — 250N000011 HC RX IP 250 OP 636: Performed by: INTERNAL MEDICINE

## 2024-01-12 PROCEDURE — 258N000003 HC RX IP 258 OP 636: Performed by: INTERNAL MEDICINE

## 2024-01-12 PROCEDURE — 84132 ASSAY OF SERUM POTASSIUM: CPT | Performed by: INTERNAL MEDICINE

## 2024-01-12 PROCEDURE — 99233 SBSQ HOSP IP/OBS HIGH 50: CPT | Performed by: INTERNAL MEDICINE

## 2024-01-12 PROCEDURE — 97530 THERAPEUTIC ACTIVITIES: CPT | Mod: GO

## 2024-01-12 PROCEDURE — 97162 PT EVAL MOD COMPLEX 30 MIN: CPT | Mod: GP

## 2024-01-12 PROCEDURE — 97535 SELF CARE MNGMENT TRAINING: CPT | Mod: GO

## 2024-01-12 PROCEDURE — 99291 CRITICAL CARE FIRST HOUR: CPT | Performed by: INTERNAL MEDICINE

## 2024-01-12 RX ORDER — POTASSIUM CHLORIDE 1500 MG/1
40 TABLET, EXTENDED RELEASE ORAL ONCE
Status: COMPLETED | OUTPATIENT
Start: 2024-01-12 | End: 2024-01-12

## 2024-01-12 RX ADMIN — SPIRONOLACTONE 25 MG: 25 TABLET, FILM COATED ORAL at 08:18

## 2024-01-12 RX ADMIN — ALBUTEROL SULFATE 2 PUFF: 90 AEROSOL, METERED RESPIRATORY (INHALATION) at 08:23

## 2024-01-12 RX ADMIN — POTASSIUM CHLORIDE 40 MEQ: 1500 TABLET, EXTENDED RELEASE ORAL at 08:23

## 2024-01-12 RX ADMIN — DOPAMINE HYDROCHLORIDE 3 MCG/KG/MIN: 160 INJECTION, SOLUTION INTRAVENOUS at 02:40

## 2024-01-12 RX ADMIN — ALBUTEROL SULFATE 2 PUFF: 90 AEROSOL, METERED RESPIRATORY (INHALATION) at 21:14

## 2024-01-12 RX ADMIN — ALBUMIN HUMAN 50 G: 0.25 SOLUTION INTRAVENOUS at 08:44

## 2024-01-12 RX ADMIN — ISOSORBIDE MONONITRATE 180 MG: 60 TABLET, EXTENDED RELEASE ORAL at 08:18

## 2024-01-12 RX ADMIN — AMIODARONE HYDROCHLORIDE 200 MG: 200 TABLET ORAL at 08:18

## 2024-01-12 RX ADMIN — APIXABAN 5 MG: 5 TABLET, FILM COATED ORAL at 08:18

## 2024-01-12 RX ADMIN — ATORVASTATIN CALCIUM 40 MG: 40 TABLET, FILM COATED ORAL at 08:18

## 2024-01-12 RX ADMIN — ALBUTEROL SULFATE 2 PUFF: 90 AEROSOL, METERED RESPIRATORY (INHALATION) at 13:09

## 2024-01-12 RX ADMIN — ALBUTEROL SULFATE 2 PUFF: 90 AEROSOL, METERED RESPIRATORY (INHALATION) at 15:46

## 2024-01-12 RX ADMIN — APIXABAN 5 MG: 5 TABLET, FILM COATED ORAL at 21:14

## 2024-01-12 RX ADMIN — DULOXETINE HYDROCHLORIDE 30 MG: 30 CAPSULE, DELAYED RELEASE ORAL at 08:23

## 2024-01-12 RX ADMIN — CYANOCOBALAMIN TAB 1000 MCG 1000 MCG: 1000 TAB at 08:18

## 2024-01-12 RX ADMIN — ROPINIROLE HYDROCHLORIDE 2 MG: 1 TABLET, FILM COATED ORAL at 08:18

## 2024-01-12 RX ADMIN — DOPAMINE HYDROCHLORIDE 3 MCG/KG/MIN: 160 INJECTION, SOLUTION INTRAVENOUS at 22:27

## 2024-01-12 RX ADMIN — ROPINIROLE HYDROCHLORIDE 2 MG: 1 TABLET, FILM COATED ORAL at 21:14

## 2024-01-12 RX ADMIN — GABAPENTIN 900 MG: 300 CAPSULE ORAL at 21:14

## 2024-01-12 RX ADMIN — FUROSEMIDE 20 MG/HR: 10 INJECTION, SOLUTION INTRAVENOUS at 15:52

## 2024-01-12 RX ADMIN — FUROSEMIDE 20 MG/HR: 10 INJECTION, SOLUTION INTRAVENOUS at 22:27

## 2024-01-12 RX ADMIN — FUROSEMIDE 20 MG/HR: 10 INJECTION, SOLUTION INTRAVENOUS at 04:15

## 2024-01-12 RX ADMIN — ROPINIROLE HYDROCHLORIDE 2 MG: 1 TABLET, FILM COATED ORAL at 13:09

## 2024-01-12 RX ADMIN — POTASSIUM CHLORIDE 20 MEQ: 1500 TABLET, EXTENDED RELEASE ORAL at 08:18

## 2024-01-12 ASSESSMENT — ACTIVITIES OF DAILY LIVING (ADL)
ADLS_ACUITY_SCORE: 25
ADLS_ACUITY_SCORE: 27
ADLS_ACUITY_SCORE: 25
ADLS_ACUITY_SCORE: 27
ADLS_ACUITY_SCORE: 27
ADLS_ACUITY_SCORE: 25
ADLS_ACUITY_SCORE: 27
ADLS_ACUITY_SCORE: 25

## 2024-01-12 NOTE — PLAN OF CARE
Problem: Heart Failure  Goal: Optimal Coping  Outcome: Progressing  Goal: Stable Heart Rate and Rhythm  Outcome: Progressing  Goal: Optimal Functional Ability  Intervention: Optimize Functional Ability  Recent Flowsheet Documentation  Taken 1/12/2024 0900 by Arlen Block, RN  Activity Management: activity adjusted per tolerance  Goal: Effective Oxygenation and Ventilation  Outcome: Progressing  Intervention: Promote Airway Secretion Clearance  Recent Flowsheet Documentation  Taken 1/12/2024 0900 by Arlen Block, RN  Activity Management: activity adjusted per tolerance  Goal: Effective Breathing Pattern During Sleep  Intervention: Monitor and Manage Obstructive Sleep Apnea  Recent Flowsheet Documentation  Taken 1/12/2024 0900 by Arlen Block, RN  Medication Review/Management: medications reviewed   Goal Outcome Evaluation:    Vitals stable  On RA 94%. Dopamine gtt 3 mcg/kg/min.  Lasix gtt 20 mg/hr.  Paged Cardiology to place PICC line if they want to continue dopamine gttt. Response pending. NSR BBB.  Down 7 LB since yesterday.  Assist of 1 w walker.  Output 1250. Intake 500. Central line is not necessary per hospitalist.

## 2024-01-12 NOTE — PLAN OF CARE
Problem: Adult Inpatient Plan of Care  Goal: Optimal Comfort and Wellbeing  Outcome: Progressing     Problem: Heart Failure  Goal: Stable Heart Rate and Rhythm  Outcome: Progressing     Problem: Heart Failure  Goal: Fluid and Electrolyte Balance  Outcome: Progressing   Goal Outcome Evaluation:       Denied pain overnight. Tolerating continuous infusions as ordered. On RA, vitally stable. NSR with BBB on monitor. Slept intermittently. 850 mL UOP. 120 mL intake.

## 2024-01-12 NOTE — PROGRESS NOTES
01/12/24 0930   Appointment Info   Signing Clinician's Name / Credentials (PT) Paris Byers PT   Living Environment   People in Home alone   Current Living Arrangements independent living facility;apartment   Home Accessibility no concerns   Transportation Anticipated family or friend will provide   Self-Care   Usual Activity Tolerance good   Current Activity Tolerance moderate   Regular Exercise No   Equipment Currently Used at Home walker, rolling  (4WW)   General Information   Onset of Illness/Injury or Date of Surgery 01/08/24   Referring Physician JULIEN Duenas   Patient/Family Therapy Goals Statement (PT) return home   Pertinent History of Current Problem (include personal factors and/or comorbidities that impact the POC) 63 year old male with PMH of hypertension, hyperlipidemia, obesity, CKD-3bCAD, s/p CABG (10 years ago), ischemic cardiomyopathy with EF 41%, admitted on 1/8/2024 for acute on chronic systolic CHF.   Existing Precautions/Restrictions fall   Cognition   Affect/Mental Status (Cognition) WFL   Orientation Status (Cognition) oriented x 4   Follows Commands (Cognition) WFL   Pain Assessment   Patient Currently in Pain Yes, see Vital Sign flowsheet   Range of Motion (ROM)   ROM Comment BLE ROM painful at knees, but overall is WFL   Strength (Manual Muscle Testing)   Strength Comments LLE 4/5, RLE 3/5   Bed Mobility   Comment, (Bed Mobility) pt stiiting EOB indep at start of session   Transfers   Transfers sit-stand transfer   Sit-Stand Transfer   Sit-Stand [place] (Transfers) contact guard;verbal cues   Assistive Device (Sit-Stand Transfers) walker, 4-wheeled   Comment, (Sit-Stand Transfer) several attempts required   Gait/Stairs (Locomotion)   [place] Level (Gait) contact guard;verbal cues   Assistive Device (Gait) walker, 4-wheeled   Distance in Feet (Gait) 25   Pattern (Gait) step-to;step-through   Deviations/Abnormal Patterns (Gait) antalgic;base of support, wide;robert decreased;stride  length decreased   Comment, (Gait/Stairs) slow pace, no LOB, reports painful knees and lower legs bilat   Clinical Impression   Criteria for Skilled Therapeutic Intervention Yes, treatment indicated   PT Diagnosis (PT) impaired functional mobility   Influenced by the following impairments pain, weakness, fatigue   Functional limitations due to impairments transfers, gait   Clinical Presentation (PT Evaluation Complexity) evolving   Clinical Presentation Rationale presents as diagnosed   Clinical Decision Making (Complexity) moderate complexity   Planned Therapy Interventions (PT) bed mobility training;transfer training;gait training;strengthening;ROM (range of motion)   Risk & Benefits of therapy have been explained care plan/treatment goals reviewed;patient   PT Total Evaluation Time   PT Eval, Moderate Complexity Minutes (64272) 10   Physical Therapy Goals   PT Frequency Daily   PT Predicted Duration/Target Date for Goal Attainment 01/16/24   PT Goals Bed Mobility;Transfers;Gait   PT: Bed Mobility Independent;Supine to/from sit;Rolling   PT: Transfers Supervision/stand-by assist;Sit to/from stand;Bed to/from chair;Assistive device   PT: Gait Supervision/stand-by assist;Rolling walker;150 feet   Interventions   Interventions Quick Adds Gait Training;Therapeutic Procedure   Therapeutic Procedure/Exercise   Ther. Procedure: strength, endurance, ROM, flexibillity Minutes (36231) 12   Symptoms Noted During/After Treatment increased pain;fatigue   Treatment Detail/Skilled Intervention instructed in and performed BLE ex in sitting.  Cues for technique and ROM, pt able to do 10-12 reps of each with rest breaks inbtwn.   Gait Training   Gait Training Minutes (40683) 13   Symptoms Noted During/After Treatment (Gait Training) fatigue;increased pain;shortness of breath   Treatment Detail/Skilled Intervention slow pace, but fairly steady overall, 2 short standing rests along the way   Distance in Feet additional 60'    [place] Level (Gait Training) contact guard   Physical Assistance Level (Gait Training) verbal cues;1 person assist   Weight Bearing (Gait Training) weight-bearing as tolerated   Assistive Device (Gait Training) rolling walker   Gait Analysis Deviations decreased robert;decreased step length   Impairments (Gait Analysis/Training) pain;strength decreased   PT Discharge Planning   PT Plan check bed mobility, transfers and gt with his 4WW, LE ROM/strengthening   PT Discharge Recommendation (DC Rec) home with home care physical therapy   PT Rationale for DC Rec should be able to return to his apt, recommend PT to increase strength/activity tolerance and safety with all mobility   PT Brief overview of current status transfers and gt, up to 60' with 4WW and CGA   PT Equipment Needed at Discharge walker, rolling   Total Session Time   Timed Code Treatment Minutes 25   Total Session Time (sum of timed and untimed services) 35

## 2024-01-12 NOTE — PROGRESS NOTES
Occupational Therapy      01/12/24 8689   Appointment Info   Signing Clinician's Name / Credentials (OT) Carolyn Man OTR/L   Living Environment   People in Home alone   Current Living Arrangements independent living facility   Home Accessibility no concerns   Transportation Anticipated family or friend will provide   Living Environment Comments pt lives in [facility] it is also a [facility] but pt is ind. and receives only meals at facility.   Self-Care   Usual Activity Tolerance good   Current Activity Tolerance moderate   Regular Exercise No   Equipment Currently Used at Home walker, rolling  (4ww)   Fall history within last six months yes   Number of times patient has fallen within last six months 2  (Pt reports fall off toilet as he fell asleep)   Activity/Exercise/Self-Care Comment Ind. w/ ADL routine   Instrumental Activities of Daily Living (IADL)   IADL Comments Pt receives meals at [facility] pt does not drive   General Information   Onset of Illness/Injury or Date of Surgery 01/08/24   Referring Physician Juanita Duenas MD   Additional Occupational Profile Info/Pertinent History of Current Problem 63 year old male with PMH of hypertension, hyperlipidemia, obesity, CKD-3bCAD, s/p CABG (10 years ago), ischemic cardiomyopathy with EF 41%, admitted on 1/8/2024 for acute on chronic systolic CHF   Existing Precautions/Restrictions fall;cardiac   Cognitive Status Examination   Orientation Status orientation to person, place and time   Range of Motion Comprehensive   General Range of Motion bilateral upper extremity ROM WFL   Bed Mobility   Bed Mobility supine-sit   Supine-Sit [place] (Bed Mobility) modified independence   Transfers   Transfers sit-stand transfer   Sit-Stand Transfer   Sit-Stand [place] (Transfers) verbal cues;supervision   Assistive Device (Sit-Stand Transfers) walker, 4-wheeled   Balance   Balance Assessment standing dynamic balance   Standing Balance: Dynamic verbal cues;supervision   Position/Device  Used, Standing Balance walker, 4-wheeled   Activities of Daily Living   BADL Assessment/Intervention toileting   Toileting   [place] Level (Toileting) supervision;verbal cues;contact guard assist   Clinical Impression   Criteria for Skilled Therapeutic Interventions Met (OT) Yes, treatment indicated   OT Diagnosis decreased act. tolerance   OT Problem List-Impairments impacting ADL problems related to;activity tolerance impaired;balance;mobility;strength   Assessment of Occupational Performance 1-3 Performance Deficits   Identified Performance Deficits endurance/act. tolerance   Planned Therapy Interventions (OT) ADL retraining;balance training;bed mobility training;strengthening;transfer training;home program guidelines   Clinical Decision Making Complexity (OT) problem focused assessment/low complexity   Risk & Benefits of therapy have been explained evaluation/treatment results reviewed;patient   OT Total Evaluation Time   OT Eval, Low Complexity Minutes (99247) 10   OT Goals   Therapy Frequency (OT) Daily   OT Predicted Duration/Target Date for Goal Attainment 01/19/24   OT Goals Cardiac Phase 1;Toilet Transfer/Toileting;Hygiene/Grooming   OT: Hygiene/Grooming modified independent;while standing   OT: Toilet Transfer/Toileting Modified independent;using adaptive equipment   OT: Understanding of cardiac education to maximize quality of life, condition management, and health outcomes Patient;Verbalize;Demonstrate   OT: Perform aerobic activity with stable cardiovascular response continuous;25 minutes   OT: Functional/aerobic ambulation tolerance with stable cardiovascular response in order to return to home and community environment Modified independent;200 feet   Self-Care/Home Management   Self-Care/Home Mgmt/ADL, Compensatory, Meal Prep Minutes (66449) 8   Symptoms Noted During/After Treatment (Meal Preparation/Planning Training) fatigue   Treatment Detail/Skilled Intervention Pt provided w/ CHF booklet  and educated/trained in importance of monitoring daily weights/low sodium diet/following stoplight tool/and daily act. to improve overall health/wellbeing and better manage CHF symptoms. Pt mod.I bed mobility, STS SBA w/ 4ww cues for tech/safety pt reporting increased pain in BLE but agreeable to therapy, SBA w/ 4ww w/in room cues for safety w/ device OT assisted w/ managing IV tubing. Pt required assist for LB dressing d/t increased pain.   Therapeutic Activities   Therapeutic Activity Minutes (54918) 8   Symptoms noted during/after treatment fatigue;shortness of breath   Treatment Detail/Skilled Intervention Pt engaged in functional ambulation task to improve overall act. tolerance/endruance needed for community mobility/ADL ind. see ambulation tab for details.   Ambulation   Workload 110ft   Symptoms Fatigue;Weakness   Cardiovascular Response Hypertension at rest   Exercise Details SBA w/ 4ww very slow pace 1 standing rest break   Vital Signs Details pre act. HR 86 /65 post HR 89 /79   Cardiac Education   Education Provided Daily weights;Stop light tool;Precautions;Diet   Education Packet Given to Patient Yes   All Patient Education Handouts Reviewed with Patient and/or Family Yes   OT Discharge Planning   OT Plan review CHF booklet, endurance/act. tolerance, LB dressing iNd.   OT Discharge Recommendation (DC Rec) home with assist   OT Rationale for DC Rec Pt moving well w/ 4ww pt lives in ILF and receives assist w/ meals- anticipate pt to d/c back to ILF when medically ready.   OT Brief overview of current status SBA 4ww   Total Session Time   Timed Code Treatment Minutes 16   Total Session Time (sum of timed and untimed services) 26

## 2024-01-12 NOTE — PROGRESS NOTES
Daily Progress Note        CODE STATUS:  Full Code    01/11/24  Assessment/Plan:  63 year old male with PMH of hypertension, hyperlipidemia, obesity, CKD-3bCAD, s/p CABG (10 years ago), ischemic cardiomyopathy with EF 41%, admitted on 1/8/2024 for acute on chronic systolic CHF.      Acute on chronic systolic CHF  with EF 41%   -- Appreciate cardiology consult. Diuresed per cardilogy with lasix infusion. Dopamine infusion added 1/11 for augmentation. Albumin prn as well  -- Diuresing well. Negative balance of 8.3L since admission. Still looks significantly vol up  -- Monitor I&O, daily weights and renal function closely while being aggresively diuresed               Active Problems:  CAD, S/P CABG x 2 vessels in 2014     Paroxysmal atrial fib -- on Eliquis  -- Currently in NSR     Smoker  -- Needs to quit, does not want nicotine replacement  -- Will give info on discharge     COPD      PRINCESS (acute kidney injury) (H24)   -- Monitor creatinine closely while on IV diuresis     Restless legs syndrome (RLS)   -- Con home med Ropinirole 3x daily prn       DVT Prophylaxis: DOAC  Code Status: Full Code      Disposition; Likely home when off the drips, and ok with cardiology  Barrier to discharge; Clinical progress. IV diuresis      Subjective:  Interval History: Patient seen and examined. Doing much better. Denies shortness of breath at rest. Denies any chest pain. Leg swelling is looking better.     Review of Systems:   As mentioned in subjective.    Patient Active Problem List   Diagnosis    Abnormal electrocardiogram    Cellulitis of left lower extremity    Ischemic cardiomyopathy    CAD, S/P CABG    Paroxysmal atrial fib -- on Eliquis    Class 3 severe obesity due to excess calories with body mass index (BMI) of 40.0 to 44.9 in adult (H)    Chronic kidney disease, stage 3b (H)    Smoker    COPD exacerbation (H)    Venous stasis dermatitis of both lower extremities    PRINCESS (acute kidney injury) (H24)    Acute on chronic  systolic CHF    Restless legs syndrome (RLS)       Scheduled Meds:   albumin human  50 g Intravenous Daily    albuterol  2 puff Inhalation 4x Daily    amiodarone  200 mg Oral Daily    apixaban ANTICOAGULANT  5 mg Oral BID    atorvastatin  40 mg Oral QAM    [Held by provider] carvedilol  1.5625 mg Oral BID w/meals    cyanocobalamin  1,000 mcg Oral Daily    DULoxetine  30 mg Oral QAM    gabapentin  900 mg Oral At Bedtime    hydrocortisone   Topical BID    isosorbide mononitrate  180 mg Oral QAM    [Held by provider] lisinopril  5 mg Oral Daily    potassium chloride ER  20 mEq Oral Daily    rOPINIRole  2 mg Oral TID    sodium chloride (PF)  3 mL Intracatheter Q8H    spironolactone  25 mg Oral Daily     Continuous Infusions:   DOPamine 3 mcg/kg/min (01/12/24 0827)    furosemide (LASIX) 100 mg in sodium chloride 0.9 % 100 mL infusion 20 mg/hr (01/12/24 0827)     PRN Meds:.acetaminophen **OR** acetaminophen, albuterol, albuterol, sore throat, calcium carbonate, hydrALAZINE, ipratropium - albuterol 0.5 mg/2.5 mg/3 mL, lidocaine 4%, lidocaine (buffered or not buffered), melatonin, menthol-zinc oxide, miconazole, nitroGLYcerin, ondansetron **OR** ondansetron, polyethylene glycol, senna-docusate **OR** senna-docusate, sodium chloride (PF)    Objective:  Vital signs in last 24 hours:  Temp:  [97.6  F (36.4  C)-98.5  F (36.9  C)] 98.1  F (36.7  C)  Pulse:  [75-93] 93  Resp:  [16-20] 20  BP: (124-168)/(57-81) 124/57  SpO2:  [94 %-98 %] 95 %        Intake/Output Summary (Last 24 hours) at 1/11/2024 1644  Last data filed at 1/11/2024 1600  Gross per 24 hour   Intake 2543.96 ml   Output 3775 ml   Net -1231.04 ml       Physical Exam:    General: Not in obvious distress.  HEENT: NC, AT   Chest: Diminished breath sounds bilaterally due to thick chest wall  Heart: S1S2 normal, regular. No M/R/G  Abdomen: Soft. NT, ND. Bowel sounds- active.  Extremities: 2 + leg swelling.   Neuro: Alert and awake, grossly non-focal      Lab Results:(I  "have personally reviewed the results)    Recent Results (from the past 24 hour(s))   Basic metabolic panel    Collection Time: 01/12/24  4:34 AM   Result Value Ref Range    Sodium 139 135 - 145 mmol/L    Potassium 3.4 3.4 - 5.3 mmol/L    Chloride 96 (L) 98 - 107 mmol/L    Carbon Dioxide (CO2) 29 22 - 29 mmol/L    Anion Gap 14 7 - 15 mmol/L    Urea Nitrogen 31.1 (H) 8.0 - 23.0 mg/dL    Creatinine 1.14 0.67 - 1.17 mg/dL    GFR Estimate 71 >60 mL/min/1.73m2    Calcium 9.9 8.8 - 10.2 mg/dL    Glucose 110 (H) 70 - 99 mg/dL       All laboratory and imaging data in the past 24 hours reviewed  Serum Glucose range:   Recent Labs   Lab 01/12/24  0434 01/11/24  0512 01/10/24  0709 01/09/24  0709   * 137* 104* 103*     ABG: No lab results found in last 7 days.  CBC:   Recent Labs   Lab 01/09/24 0709 01/08/24  1606   WBC 8.2 9.6   HGB 12.7* 12.0*   HCT 42.1 40.1   MCV 93 93    153     Chemistry:   Recent Labs   Lab 01/12/24  0434 01/11/24  0512 01/10/24  2007 01/10/24  0709    143  --  142   POTASSIUM 3.4 3.5 3.6 3.3*   CHLORIDE 96* 97*  --  99   CO2 29 34*  --  32*   BUN 31.1* 30.7*  --  26.5*   CR 1.14 1.30*  --  1.13   GFRESTIMATED 71 61  --  72   RUSLAN 9.9 9.8  --  9.2   MAG  --   --   --  2.0     Coags:  No results for input(s): \"INR\", \"PROTIME\", \"PTT\" in the last 168 hours.    Invalid input(s): \"APTT\"  Cardiac Markers:  No results for input(s): \"CKTOTAL\", \"TROPONINI\" in the last 168 hours.       US Lower Extremity Venous Duplex Bilateral    Result Date: 1/11/2024  EXAM: US LOWER EXTREMITY VENOUS DUPLEX BILATERAL LOCATION: [facility] DATE: 1/11/2024 INDICATION: Lower extremity swelling. COMPARISON: Ultrasound venous left lower extremity Doppler 02/14/2023. TECHNIQUE: Venous Duplex ultrasound of bilateral lower extremities with and without compression, augmentation and duplex. Color flow and spectral Doppler with waveform analysis performed. FINDINGS: Exam includes the common " femoral, femoral, popliteal veins as well as segmentally visualized deep calf veins and greater saphenous vein. RIGHT: No deep vein thrombosis. No superficial thrombophlebitis. No popliteal cyst. LEFT: No deep vein thrombosis. No superficial thrombophlebitis. No popliteal cyst. No significant interval change.     IMPRESSION: 1.  No deep venous thrombosis in the bilateral lower extremities. 2.  No popliteal cyst on either side.    XR Chest 2 Views    Result Date: 2024  EXAM: XR CHEST 2 VIEWS LOCATION: [facility] DATE: 2024 INDICATION: Congestive heart failure COMPARISON: AP and lateral views the chest 2023     IMPRESSION: Left coronary stents. Previous median sternotomy. Cardiac silhouette is normal in size and the vascular pedicle width is normal. Symmetric lung inflation. No interstitial or alveolar opacities. No pleural effusion. Healed fracture deformity of several left upper posterolateral ribs. Intact and aligned sternal wires. Flowing degenerative osteophytes of the thoracic spine.    Echocardiogram Limited    Result Date: 2023  [id number]^MARCIO^[place] [address]  Name: KRISTINE JONES MRN: [id number] : 1960 Study Date: 2023 11:46 AM Age: 63 yrs Gender: Male Patient Location: [facility] Reason For Study: CHF Ordering Physician: TANIA BUCKLEY Performed By: AD  BSA: 2.2 m2 Height: 65 in Weight: 270 lb HR: 79 ______________________________________________________________________________ Procedure Limited Echo Adult. Definity (NDC #95552-050) given intravenously. Technically difficult study. Compared to the prior study dated 2023, there have been no changes. ______________________________________________________________________________ Interpretation Summary  1. Technically very difficult study. 2. Left ventricular chamber size and wall thickness are normal. Systolic function is mildly  reduced with global hypokinesis. The calculated left ventricular ejection fraction is 41%. 3. Right ventricle is not well visualized. Right ventricular chamber size and systolic function are grossly normal. 4. No hemodynamically significant valvular abnormalities although the cardiac valves are not well visualized. 5. Compared to the prior study dated 9/9/2023, there has been no significant change. ______________________________________________________________________________ I      WMSI = 2.00     % Normal = 0  X - Cannot   0 -                      (2) - Mildly 2 -          Segments  Size Interpret    Hyperkinetic 1 - Normal  Hypokinetic  Hypokinetic  1-2     small                                                    7 -          3-5    moderate 3 - Akinetic 4 -          5 -         6 - Akinetic Dyskinetic   6-14    large              Dyskinetic   Aneurysmal  w/scar       w/scar       15-16   diffuse  Left Ventricle The left ventricle is normal in size. There is normal left ventricular wall thickness. Biplane LVEF is 41%. There is mild global hypokinesia of the left ventricle. Septal motion is consistent with post-operative state.  Right Ventricle The right ventricle is not well visualized. Normal right ventricle size and systolic function.  Atria Normal left atrial size. Right atrium not well visualized.  Mitral Valve The mitral valve leaflets are mildly thickened. There is trace mitral regurgitation. There is no mitral valve stenosis.  Tricuspid Valve The tricuspid valve is not well visualized.  Aortic Valve The aortic valve is not well visualized.  Pulmonic Valve The pulmonic valve is not well visualized.  Vessels The aorta root cannot be assessed. IVC diameter <2.1 cm collapsing >50% with sniff suggests a normal RA pressure of 3 mmHg.  Pericardium There is no pericardial effusion.  Rhythm Sinus rhythm was noted.  ______________________________________________________________________________ MMode/2D Measurements  & Calculations IVSd: 0.87 cm LVIDd: 5.4 cm LVIDs: 4.3 cm LVPWd: 0.95 cm  FS: 19.0 % LV mass(C)d: 180.3 grams LV mass(C)dI: 80.2 grams/m2 LA dimension: 4.7 cm LA Volume Index (BP): 30.0 ml/m2 RWT: 0.35  Time Measurements MM HR: 84.0 BPM  ______________________________________________________________________________ Report approved by: Aura Wright 12/25/2023 01:14 PM       Chest XR,  PA & LAT    Result Date: 12/22/2023  EXAM: XR CHEST 2 VIEWS LOCATION: [facility] DATE: 12/22/2023 INDICATION: dyspnea COMPARISON: 12/14/2023     IMPRESSION: Stable chest with again seen hyperinflation of both lungs. Postoperative changes sternotomy. Old healed left rib fractures. Mild to moderate scattered degenerative changes the bony skeleton.    XR Chest 2 Views    Result Date: 12/14/2023  EXAM: XR CHEST 2 VIEWS LOCATION: [facility] DATE: 12/14/2023 INDICATION: Dyspnea. COMPARISON: 09/08/2023.     IMPRESSION: Sternotomy wires. Cardiomegaly with normal pulmonary vascularity. The lungs are clear. Old left rib fractures. No change from previous.    CT Head w/o Contrast    Result Date: 12/14/2023  EXAM: CT HEAD W/O CONTRAST LOCATION: [facility] DATE: 12/14/2023 INDICATION: head injury COMPARISON: None. TECHNIQUE: Routine CT Head without IV contrast. Multiplanar reformats. Dose reduction techniques were used. FINDINGS: INTRACRANIAL CONTENTS: No intracranial hemorrhage, extraaxial collection, or mass effect.  No CT evidence of acute infarct. Mild presumed chronic small vessel ischemic changes. Moderate generalized volume loss. No hydrocephalus. Calcified intracranial atherosclerosis. VISUALIZED ORBITS/SINUSES/MASTOIDS: No intraorbital abnormality. Mild mucosal thickening scattered about the paranasal sinuses. No middle ear or mastoid effusion. BONES/SOFT TISSUES: No acute abnormality.     IMPRESSION: 1.  No evidence of acute traumatic intracranial abnormality.  2.  Calcified intracranial atherosclerosis with changes suggestive of mild chronic microvascular ischemic disease. 3.  Moderate generalized parenchymal atrophy.      Latest radiology report personally reviewed.    Note created using dragon voice recognition software so sounds alike errors may have escaped editing.      01/12/2024   Martin Ch MD  Hospitalist, [facility]  Pager: [phone]

## 2024-01-12 NOTE — PLAN OF CARE
Problem: Comorbidity Management  Goal: Maintenance of COPD Symptom Control  Outcome: Progressing  Intervention: Maintain COPD Symptom Control  Recent Flowsheet Documentation  Taken 1/12/2024 1637 by Sonya Tan RN  Medication Review/Management: medications reviewed     Problem: Heart Failure  Goal: Optimal Coping  Outcome: Progressing     Problem: Heart Failure  Goal: Optimal Cardiac Output  Outcome: Progressing     Problem: Heart Failure  Goal: Stable Heart Rate and Rhythm  Outcome: Progressing   Goal Outcome Evaluation:       Pt is AOX4 and denies pain. Pt c/o SOB, scheduled Albuterol given w/ relief. NSR. VSS. Lasix gtt running at 20ml/hr, good output. Dopamine gtt running at 13.3ml/hr. Pt using urinal. Pt is able to make needs known.

## 2024-01-12 NOTE — PROGRESS NOTES
HEART CARE NOTE          Assessment/Recommendations   1. Severe Biventricular dysfunction/HFrEF c/b cardiogenic shock  Assessment / Plan  Renal function improving; UOP remains adequate - continue current diuretic regimen; continue to hold ACE-I; continue to monitor UOP, renal function  Continue dopamine to augment cardiac output and promote renal perfusion  Patient is high risk for adverse cardiac events 2/2 severe biventricular dysfunction, multiple HFHs, non-compliance with dietary restrictions  GDMT as detailed below; repeat echo ordered     Current Pharmacotherapy AHA Guideline-Directed Medical Therapy   Lisinopril 5 mg daily  - held Lisinopril 20 mg twice daily   Carvedilol 1.5625 mg BID - held Carvedilol 25 mg twice daily   Spironolactone 25 mg daily Spironolactone 25 mg once daily   Hydralazine NA Hydralazine 100 mg three times daily   Isosorbide mononitrate 180 mg daily Isosorbide dinitrate 40 mg three times daily   SGLT2 inhibitor:Dapagliflozin/Empagliflozin - not started Dapagliflozin or Empagliflozin 10 mg daily      2. Atrial fibrillation  Assessment / Plan  Currently on apixaban and amiodarone  Will need afib clinic referral on discharge     3. CAD c/b ICM  Assessment / Plan  S/p CABG 2015; now s/p repeat coronary angiogram - no intervention indicated  Denies chest pain or anginal equivalents; Continue atorvastatin, carvedilol, lisinopril and atorvastatin     4. PRINCESS on CKD  Assessment / Plan  Resolving - continue to monitor UOP and renal functio closely; diuresis as above    Plan of care discussed on January 12, 2024 with patient at bedside, and primary team overseeing patient's care     Patient remains critically ill requiring hemodynamic support via dopamine gtt in the setting of cardiogenic and renal dysfunction. 50 minutes spent on critical care.       History of Present Illness/Subjective    Mr. Zackary Hutchison is a 62 year old male with a PMHx significant for CAD s/p CABG, afib, HFrEF and CKD who  "presents to in ADHF     Today, Mr. Hutchison denies acute cardiac events or complaints; Management plan as detailed above      ECG: personally reviewed; normal sinus rhythm, RBBB.     ECHO (personnaly Reviewed on 9/11/23):   1.Left ventricular function is decreased. The ejection fraction is 35-40%  (moderately reduced).  2.Septal motion is consistent with post-operative state.  3.There is borderline apical wall hypokinesis.  4.Mildly decreased right ventricular systolic function  5.No hemodynamically significant valvular abnormalities on 2D or color flow  imaging.  6.There is borderline aortic root enlargement. The ascending aorta is  Borderline dilated.  7.The study was technically difficult.  There is no comparison study available    Telemetry: personally reviewed January 12, 2024; notable for sinus rhythm     Lab results: personally reviewed January 12, 2024; notable for resolving PRINCESS    Medical history and pertinent documents reviewed in Care Everywhere please where applicable see details above        Physical Examination Review of Systems   BP (!) 145/81 (BP Location: Left arm, Patient Position: Semi-Mendenhall's, Cuff Size: Adult Regular)   Pulse 75   Temp 98.1  F (36.7  C) (Oral)   Resp 20   Ht 1.676 m (5' 6\")   Wt 117.9 kg (260 lb)   SpO2 96%   BMI 41.97 kg/m    Body mass index is 41.97 kg/m .  Wt Readings from Last 3 Encounters:   01/11/24 117.9 kg (260 lb)   12/27/23 118.3 kg (260 lb 12.8 oz)   11/28/23 127.5 kg (281 lb)     General Appearance:   no distress, normal body habitus   ENT/Mouth: membranes moist, no oral lesions or bleeding gums.      EYES:  no scleral icterus, normal conjunctivae   Neck: no carotid bruits or thyromegaly   Chest/Lungs:   lungs are clear to auscultation, no rales or wheezing, equal chest wall expansion    Cardiovascular:   Regular. Normal first and second heart sounds with no murmurs, rubs, or gallops; the carotid, radial and posterior tibial pulses are intact, + JVD and LE " edema bilaterally    Abdomen:  no organomegaly, masses, bruits, or tenderness; bowel sounds are present   Extremities: no cyanosis or clubbing   Skin: no xanthelasma, warm.    Neurologic: NAD     Psychiatric: alert and oriented x3, calm     A complete 10 systems ROS was reviewed  And is negative except what is listed in the HPI.          Medical History  Surgical History Family History Social History   Past Medical History:   Diagnosis Date    Chronic kidney disease, stage 3b (H) 09/28/2023    Class 3 severe obesity due to excess calories with body mass index (BMI) of 40.0 to 44.9 in adult (H) 09/28/2023    Congestive heart failure (H)     COPD (chronic obstructive pulmonary disease) (H)     Coronary artery disease involving coronary bypass graft of native heart without angina pectoris 09/28/2023    Heart failure with reduced ejection fraction (H) 09/11/2023    Hyperlipidemia     Hypertension     Ischemic cardiomyopathy 09/28/2023    Obese     Paroxysmal atrial fibrillation (H) 09/28/2023    Tobacco abuse 09/28/2023    Past Surgical History:   Procedure Laterality Date    CORONARY ANGIOGRAPHY ADULT ORDER      CORONARY ARTERY BYPASS Left 2014    2 vessels    CV CORONARY ANGIOGRAM N/A 09/11/2023    Procedure: Coronary Angiogram;  Surgeon: Santy Esposito MD;  Location: [facility] CATH LAB CV    CV LEFT HEART CATH N/A 09/11/2023    Procedure: Left Heart Catheterization;  Surgeon: Santy Esposito MD;  Location: [facility] CATH LAB CV    CV RIGHT HEART CATH MEASUREMENTS RECORDED N/A 09/11/2023    Procedure: Right Heart Catheterization;  Surgeon: Santy Esposito MD;  Location: [facility] CATH LAB CV    no family history of premature coronary artery disease Social History     Socioeconomic History    Marital status: Single     Spouse name: Not on file    Number of children: Not on file    Years of education: Not on file    Highest education level: Not on file   Occupational History    Occupation: Retired restaurant owner  "    Comment: [employer]   Tobacco Use    Smoking status: Every Day     Packs/day: .5     Types: Cigarettes    Smokeless tobacco: Never    Tobacco comments:     Seen IP by [facility] on 9/11/23 and declined cessation services and materials   Substance and Sexual Activity    Alcohol use: Not Currently    Drug use: Not on file    Sexual activity: Not on file   Other Topics Concern    Not on file   Social History Narrative    Currently lives in an assisted living facility. (Updated: 01/08/2024)     Social Determinants of Health     Financial Resource Strain: Not on file   Food Insecurity: Not on file   Transportation Needs: Not on file   Physical Activity: Not on file   Stress: Not on file   Social Connections: Not on file   Interpersonal Safety: Not on file   Housing Stability: Not on file           Lab Results    Chemistry/lipid CBC Cardiac Enzymes/BNP/TSH/INR   Lab Results   Component Value Date    BUN 31.1 (H) 01/12/2024     01/12/2024    CO2 29 01/12/2024    Lab Results   Component Value Date    WBC 8.2 01/09/2024    HGB 12.7 (L) 01/09/2024    HCT 42.1 01/09/2024    MCV 93 01/09/2024     01/09/2024    Lab Results   Component Value Date    INR 1.01 02/13/2023     No results found for: \"CKTOTAL\", \"CKMB\", \"TROPONINI\"       Weight:    Wt Readings from Last 3 Encounters:   01/11/24 117.9 kg (260 lb)   12/27/23 118.3 kg (260 lb 12.8 oz)   11/28/23 127.5 kg (281 lb)       Allergies  No Known Allergies      Surgical History  Past Surgical History:   Procedure Laterality Date    CORONARY ANGIOGRAPHY ADULT ORDER      CORONARY ARTERY BYPASS Left 2014    2 vessels    CV CORONARY ANGIOGRAM N/A 09/11/2023    Procedure: Coronary Angiogram;  Surgeon: Santy Esposito MD;  Location: [facility] CATH LAB CV    CV LEFT HEART CATH N/A 09/11/2023    Procedure: Left Heart Catheterization;  Surgeon: Santy Esposito MD;  Location: [facility] CATH LAB CV    CV RIGHT HEART CATH MEASUREMENTS RECORDED N/A 09/11/2023    Procedure: Right " Heart Catheterization;  Surgeon: Santy Esposito MD;  Location: [facility] CATH LAB CV       Social History  Tobacco:   History   Smoking Status    Every Day    Packs/day: 0.50    Types: Cigarettes   Smokeless Tobacco    Never    Alcohol:   Social History    Substance and Sexual Activity      Alcohol use: Not Currently   Illicit Drugs:   History   Drug Use Not on file       Family History  Family History   Problem Relation Age of Onset    Cerebrovascular Disease Mother     Myocardial Infarction Father           Jacey Bhandari MD on 1/12/2024      cc: Reid Escobar

## 2024-01-13 LAB
ANION GAP SERPL CALCULATED.3IONS-SCNC: 15 MMOL/L (ref 7–15)
BUN SERPL-MCNC: 36.7 MG/DL (ref 8–23)
CALCIUM SERPL-MCNC: 10.5 MG/DL (ref 8.8–10.2)
CHLORIDE SERPL-SCNC: 96 MMOL/L (ref 98–107)
CREAT SERPL-MCNC: 1.3 MG/DL (ref 0.67–1.17)
DEPRECATED HCO3 PLAS-SCNC: 29 MMOL/L (ref 22–29)
EGFRCR SERPLBLD CKD-EPI 2021: 61 ML/MIN/1.73M2
GLUCOSE SERPL-MCNC: 159 MG/DL (ref 70–99)
HOLD SPECIMEN: NORMAL
POTASSIUM SERPL-SCNC: 3.3 MMOL/L (ref 3.4–5.3)
POTASSIUM SERPL-SCNC: 4 MMOL/L (ref 3.4–5.3)
SODIUM SERPL-SCNC: 140 MMOL/L (ref 135–145)

## 2024-01-13 PROCEDURE — 80048 BASIC METABOLIC PNL TOTAL CA: CPT | Performed by: INTERNAL MEDICINE

## 2024-01-13 PROCEDURE — 36415 COLL VENOUS BLD VENIPUNCTURE: CPT | Performed by: INTERNAL MEDICINE

## 2024-01-13 PROCEDURE — 250N000013 HC RX MED GY IP 250 OP 250 PS 637: Performed by: INTERNAL MEDICINE

## 2024-01-13 PROCEDURE — 99232 SBSQ HOSP IP/OBS MODERATE 35: CPT | Performed by: INTERNAL MEDICINE

## 2024-01-13 PROCEDURE — 258N000003 HC RX IP 258 OP 636: Performed by: INTERNAL MEDICINE

## 2024-01-13 PROCEDURE — 210N000001 HC R&B IMCU HEART CARE

## 2024-01-13 PROCEDURE — 250N000011 HC RX IP 250 OP 636: Performed by: INTERNAL MEDICINE

## 2024-01-13 PROCEDURE — 99233 SBSQ HOSP IP/OBS HIGH 50: CPT | Performed by: INTERNAL MEDICINE

## 2024-01-13 PROCEDURE — 84132 ASSAY OF SERUM POTASSIUM: CPT | Performed by: INTERNAL MEDICINE

## 2024-01-13 PROCEDURE — P9047 ALBUMIN (HUMAN), 25%, 50ML: HCPCS | Performed by: INTERNAL MEDICINE

## 2024-01-13 RX ORDER — HYDRALAZINE HYDROCHLORIDE 10 MG/1
10 TABLET, FILM COATED ORAL 4 TIMES DAILY
Status: DISCONTINUED | OUTPATIENT
Start: 2024-01-13 | End: 2024-01-18 | Stop reason: HOSPADM

## 2024-01-13 RX ORDER — POTASSIUM CHLORIDE 1500 MG/1
40 TABLET, EXTENDED RELEASE ORAL ONCE
Status: COMPLETED | OUTPATIENT
Start: 2024-01-13 | End: 2024-01-13

## 2024-01-13 RX ORDER — SPIRONOLACTONE 25 MG/1
50 TABLET ORAL DAILY
Status: DISCONTINUED | OUTPATIENT
Start: 2024-01-14 | End: 2024-01-18 | Stop reason: HOSPADM

## 2024-01-13 RX ADMIN — POTASSIUM CHLORIDE 20 MEQ: 1500 TABLET, EXTENDED RELEASE ORAL at 09:00

## 2024-01-13 RX ADMIN — ROPINIROLE HYDROCHLORIDE 2 MG: 1 TABLET, FILM COATED ORAL at 21:01

## 2024-01-13 RX ADMIN — ALBUTEROL SULFATE 2 PUFF: 90 AEROSOL, METERED RESPIRATORY (INHALATION) at 20:59

## 2024-01-13 RX ADMIN — POTASSIUM CHLORIDE 40 MEQ: 1500 TABLET, EXTENDED RELEASE ORAL at 07:24

## 2024-01-13 RX ADMIN — FUROSEMIDE 20 MG/HR: 10 INJECTION, SOLUTION INTRAVENOUS at 04:01

## 2024-01-13 RX ADMIN — DULOXETINE HYDROCHLORIDE 30 MG: 30 CAPSULE, DELAYED RELEASE ORAL at 09:04

## 2024-01-13 RX ADMIN — CYANOCOBALAMIN TAB 1000 MCG 1000 MCG: 1000 TAB at 09:00

## 2024-01-13 RX ADMIN — ROPINIROLE HYDROCHLORIDE 2 MG: 1 TABLET, FILM COATED ORAL at 09:00

## 2024-01-13 RX ADMIN — DOPAMINE HYDROCHLORIDE 3 MCG/KG/MIN: 160 INJECTION, SOLUTION INTRAVENOUS at 16:56

## 2024-01-13 RX ADMIN — ATORVASTATIN CALCIUM 40 MG: 40 TABLET, FILM COATED ORAL at 09:00

## 2024-01-13 RX ADMIN — ISOSORBIDE MONONITRATE 180 MG: 60 TABLET, EXTENDED RELEASE ORAL at 09:01

## 2024-01-13 RX ADMIN — AMIODARONE HYDROCHLORIDE 200 MG: 200 TABLET ORAL at 09:00

## 2024-01-13 RX ADMIN — ACETAMINOPHEN 650 MG: 325 TABLET ORAL at 00:36

## 2024-01-13 RX ADMIN — POTASSIUM CHLORIDE 40 MEQ: 1500 TABLET, EXTENDED RELEASE ORAL at 09:08

## 2024-01-13 RX ADMIN — APIXABAN 5 MG: 5 TABLET, FILM COATED ORAL at 09:00

## 2024-01-13 RX ADMIN — GABAPENTIN 900 MG: 300 CAPSULE ORAL at 21:00

## 2024-01-13 RX ADMIN — APIXABAN 5 MG: 5 TABLET, FILM COATED ORAL at 21:00

## 2024-01-13 RX ADMIN — FUROSEMIDE 20 MG/HR: 10 INJECTION, SOLUTION INTRAVENOUS at 16:03

## 2024-01-13 RX ADMIN — SPIRONOLACTONE 25 MG: 25 TABLET, FILM COATED ORAL at 09:00

## 2024-01-13 RX ADMIN — FUROSEMIDE 20 MG/HR: 10 INJECTION, SOLUTION INTRAVENOUS at 20:59

## 2024-01-13 RX ADMIN — HYDRALAZINE HYDROCHLORIDE 10 MG: 10 TABLET, FILM COATED ORAL at 21:00

## 2024-01-13 RX ADMIN — ALBUMIN HUMAN 50 G: 0.25 SOLUTION INTRAVENOUS at 09:05

## 2024-01-13 RX ADMIN — ROPINIROLE HYDROCHLORIDE 2 MG: 1 TABLET, FILM COATED ORAL at 13:13

## 2024-01-13 ASSESSMENT — ACTIVITIES OF DAILY LIVING (ADL)
ADLS_ACUITY_SCORE: 27
ADLS_ACUITY_SCORE: 27
ADLS_ACUITY_SCORE: 26
ADLS_ACUITY_SCORE: 25
ADLS_ACUITY_SCORE: 27
ADLS_ACUITY_SCORE: 27
ADLS_ACUITY_SCORE: 26
ADLS_ACUITY_SCORE: 27
ADLS_ACUITY_SCORE: 25
ADLS_ACUITY_SCORE: 26
ADLS_ACUITY_SCORE: 25
ADLS_ACUITY_SCORE: 25

## 2024-01-13 NOTE — PROGRESS NOTES
Care Management Follow Up    Length of Stay (days): 5    Expected Discharge Date: 01/14/2024     Concerns to be Addressed:       Patient plan of care discussed at interdisciplinary rounds: Yes    Anticipated Discharge Disposition:       Anticipated Discharge Services:    Anticipated Discharge DME:      Patient/family educated on Medicare website which has current facility and service quality ratings:    Education Provided on the Discharge Plan:    Patient/Family in Agreement with the Plan:      Referrals Placed by CM/SW:    Private pay costs discussed: Not applicable    Additional Information:  TY met with pt to discuss PT recommendation for home PT.  Pt is currently open to [facility] for RN visits.  Pt agrees to adding home PT.  SW sent a message to [facility] team requesting to add home PT at discharge.     CM follownig care progression to assist with safe discharge planning.     Social History:  Patient lives alone at [facility] in an Independent living apartment. He needs assist with cleaning, cooking, meals and transportation but has no in home services. Patient will need transport at discharge that can accomodate the walker to take his walker home.      MARIUSZ Jimenez

## 2024-01-13 NOTE — PLAN OF CARE
Problem: Heart Failure  Goal: Optimal Coping  Outcome: Progressing     Problem: Fluid Volume Excess  Goal: Fluid Balance  Outcome: Progressing   Goal Outcome Evaluation:       Pt is AOX4 and denies pain. VSS. Tele NSR. Lung sounds diminished. Lasix and Dopamine gtt running as ordered. Pt using urinal, good output. 1500 FR. RA. Plan for overnight oximetry study. Pt able to make needs known.                  1.  Continue same medications.   2.  Continue cardiac low sodium, low cholesterol diet.   3.  Please try to exercise daily.   4.  Follow up with Dr. Calvert 11/5/19.   5.  Let us know if you need anything.   6.  If you experience any worsening or change in symptoms, please seek emergency medical treatment.   7.  Please have fastin labs done Dec 1st or after.     Thank you!  Latosha Oates CNP    Additional Educational Resources:  For additional resources regarding your symptoms, diagnosis, or further health information, please visit the Health Resources section on [email or web address] or the Online Health Resources section in [employer].

## 2024-01-13 NOTE — PLAN OF CARE
Problem: Fluid Volume Excess  Goal: Fluid Balance  Outcome: Progressing   Goal Outcome Evaluation:    No pain.  NSR BBB with PACs.  Dopamine and lasix gtt still running. Gave albumin IV.  K+ 3.3 gave dose and recheck at 1400.  On RA.  Viitals stable.  Output 2450  Intake 560

## 2024-01-13 NOTE — PROGRESS NOTES
"Daily Progress Note        CODE STATUS:  Full Code    01/11/24  Assessment/Plan:  63 year old male with PMH of hypertension, hyperlipidemia, obesity, CKD-3bCAD, s/p CABG (10 years ago), ischemic cardiomyopathy with EF 41%, admitted on 1/8/2024 for acute on chronic systolic CHF.      Acute on chronic systolic CHF  with EF 41%   -- Appreciate cardiology consult. Diuresed per cardilogy with lasix infusion. Dopamine infusion added 1/11 for augmentation. Albumin prn as well  -- Diuresing well. Net ngative balance of 11.4L since admission. Still looks significantly vol up  -- Monitor I&O, daily weights and renal function closely while being aggresively diuresed  -- Creatinine up to 1.3 from 1.1. Defer to cardiology if need to hold diuresis today.                Active Problems:  CAD, S/P CABG x 2 vessels in 2014     Paroxysmal atrial fib -- on Eliquis  -- Currently in NSR     Smoker  -- Needs to quit, does not want nicotine replacement  -- Will give info on discharge     COPD      RPINCESS (acute kidney injury) (H24)   -- Monitor creatinine closely while on IV diuresis     Restless legs syndrome (RLS)   -- Con home med Ropinirole 3x daily prn       DVT Prophylaxis: DOAC  Code Status: Full Code      Disposition; Likely home when off the drips, and ok with cardiology  Barrier to discharge; Clinical progress. IV diuresis    Clinically Significant Risk Factors        # Hypokalemia: Lowest K = 3.3 mmol/L in last 2 days, will replace as needed    # Hypercalcemia: Highest Ca = 10.5 mg/dL in last 2 days, will monitor as appropriate         # Acute heart failure with reduced ejection fraction: last echo with EF <40% and receiving IV diuretics       # Severe Obesity: Estimated body mass index is 41.09 kg/m  as calculated from the following:    Height as of this encounter: 1.676 m (5' 6\").    Weight as of this encounter: 115.5 kg (254 lb 9.6 oz).      # Financial/Environmental Concerns: none           Subjective:  Interval History: " Patient seen and examined. No new issues overnight. Leg swelling is coming down. Denies any shortness of breath or chest pain at rest.     Review of Systems:   As mentioned in subjective.    Patient Active Problem List   Diagnosis    Abnormal electrocardiogram    Cellulitis of left lower extremity    Ischemic cardiomyopathy    CAD, S/P CABG    Paroxysmal atrial fib -- on Eliquis    Class 3 severe obesity due to excess calories with body mass index (BMI) of 40.0 to 44.9 in adult (H)    Chronic kidney disease, stage 3b (H)    Smoker    COPD exacerbation (H)    Venous stasis dermatitis of both lower extremities    PRINCESS (acute kidney injury) (H24)    Acute on chronic systolic CHF    Restless legs syndrome (RLS)       Scheduled Meds:   albumin human  50 g Intravenous Daily    albuterol  2 puff Inhalation 4x Daily    amiodarone  200 mg Oral Daily    apixaban ANTICOAGULANT  5 mg Oral BID    atorvastatin  40 mg Oral QAM    [Held by provider] carvedilol  1.5625 mg Oral BID w/meals    cyanocobalamin  1,000 mcg Oral Daily    DULoxetine  30 mg Oral QAM    gabapentin  900 mg Oral At Bedtime    hydrocortisone   Topical BID    isosorbide mononitrate  180 mg Oral QAM    [Held by provider] lisinopril  5 mg Oral Daily    potassium chloride ER  20 mEq Oral Daily    potassium chloride  40 mEq Oral Once    rOPINIRole  2 mg Oral TID    sodium chloride (PF)  3 mL Intracatheter Q8H    spironolactone  25 mg Oral Daily     Continuous Infusions:   DOPamine 3 mcg/kg/min (01/12/24 2227)    furosemide (LASIX) 100 mg in sodium chloride 0.9 % 100 mL infusion 20 mg/hr (01/13/24 0401)     PRN Meds:.acetaminophen **OR** acetaminophen, albuterol, albuterol, sore throat, calcium carbonate, hydrALAZINE, ipratropium - albuterol 0.5 mg/2.5 mg/3 mL, lidocaine 4%, lidocaine (buffered or not buffered), melatonin, menthol-zinc oxide, miconazole, nitroGLYcerin, ondansetron **OR** ondansetron, polyethylene glycol, senna-docusate **OR** senna-docusate, sodium  chloride (PF)    Objective:  Vital signs in last 24 hours:  Temp:  [97.6  F (36.4  C)-98.3  F (36.8  C)] 97.9  F (36.6  C)  Pulse:  [83-93] 84  Resp:  [18-20] 18  BP: (124-190)/(57-90) 143/69  SpO2:  [95 %-97 %] 96 %        Intake/Output Summary (Last 24 hours) at 1/11/2024 1644  Last data filed at 1/11/2024 1600  Gross per 24 hour   Intake 2543.96 ml   Output 3775 ml   Net -1231.04 ml       Physical Exam:    General: Not in obvious distress.  HEENT: NC, AT   Chest: Diminished breath sounds bilaterally due to thick chest wall  Heart: S1S2 normal, regular. No M/R/G  Abdomen: Soft. NT, ND. Bowel sounds- active.  Extremities: 2 + leg swelling.   Neuro: Alert and awake, grossly non-focal      Lab Results:(I have personally reviewed the results)    Recent Results (from the past 24 hour(s))   Potassium    Collection Time: 01/12/24  2:31 PM   Result Value Ref Range    Potassium 3.7 3.4 - 5.3 mmol/L   Extra Purple Top EDTA (LAB USE ONLY)    Collection Time: 01/12/24  2:31 PM   Result Value Ref Range    Hold Specimen JIC    Basic metabolic panel    Collection Time: 01/13/24  5:11 AM   Result Value Ref Range    Sodium 140 135 - 145 mmol/L    Potassium 3.3 (L) 3.4 - 5.3 mmol/L    Chloride 96 (L) 98 - 107 mmol/L    Carbon Dioxide (CO2) 29 22 - 29 mmol/L    Anion Gap 15 7 - 15 mmol/L    Urea Nitrogen 36.7 (H) 8.0 - 23.0 mg/dL    Creatinine 1.30 (H) 0.67 - 1.17 mg/dL    GFR Estimate 61 >60 mL/min/1.73m2    Calcium 10.5 (H) 8.8 - 10.2 mg/dL    Glucose 159 (H) 70 - 99 mg/dL   Extra Purple Top Tube    Collection Time: 01/13/24  5:11 AM   Result Value Ref Range    Hold Specimen JI        All laboratory and imaging data in the past 24 hours reviewed  Serum Glucose range:   Recent Labs   Lab 01/13/24  0511 01/12/24  0434 01/11/24  0512 01/10/24  0709   * 110* 137* 104*     ABG: No lab results found in last 7 days.  CBC:   Recent Labs   Lab 01/09/24  0709 01/08/24  1606   WBC 8.2 9.6   HGB 12.7* 12.0*   HCT 42.1 40.1   MCV 93 93  "   153     Chemistry:   Recent Labs   Lab 01/13/24  0511 01/12/24  1431 01/12/24  0434 01/11/24  0512 01/10/24  2007 01/10/24  0709     --  139 143  --  142   POTASSIUM 3.3* 3.7 3.4 3.5   < > 3.3*   CHLORIDE 96*  --  96* 97*  --  99   CO2 29  --  29 34*  --  32*   BUN 36.7*  --  31.1* 30.7*  --  26.5*   CR 1.30*  --  1.14 1.30*  --  1.13   GFRESTIMATED 61  --  71 61  --  72   RUSLAN 10.5*  --  9.9 9.8  --  9.2   MAG  --   --   --   --   --  2.0    < > = values in this interval not displayed.     Coags:  No results for input(s): \"INR\", \"PROTIME\", \"PTT\" in the last 168 hours.    Invalid input(s): \"APTT\"  Cardiac Markers:  No results for input(s): \"CKTOTAL\", \"TROPONINI\" in the last 168 hours.       US Lower Extremity Venous Duplex Bilateral    Result Date: 1/11/2024  EXAM: US LOWER EXTREMITY VENOUS DUPLEX BILATERAL LOCATION: [facility] DATE: 1/11/2024 INDICATION: Lower extremity swelling. COMPARISON: Ultrasound venous left lower extremity Doppler 02/14/2023. TECHNIQUE: Venous Duplex ultrasound of bilateral lower extremities with and without compression, augmentation and duplex. Color flow and spectral Doppler with waveform analysis performed. FINDINGS: Exam includes the common femoral, femoral, popliteal veins as well as segmentally visualized deep calf veins and greater saphenous vein. RIGHT: No deep vein thrombosis. No superficial thrombophlebitis. No popliteal cyst. LEFT: No deep vein thrombosis. No superficial thrombophlebitis. No popliteal cyst. No significant interval change.     IMPRESSION: 1.  No deep venous thrombosis in the bilateral lower extremities. 2.  No popliteal cyst on either side.    XR Chest 2 Views    Result Date: 1/8/2024  EXAM: XR CHEST 2 VIEWS LOCATION: [facility] DATE: 1/8/2024 INDICATION: Congestive heart failure COMPARISON: AP and lateral views the chest 12/20/2023     IMPRESSION: Left coronary stents. Previous median sternotomy. " Cardiac silhouette is normal in size and the vascular pedicle width is normal. Symmetric lung inflation. No interstitial or alveolar opacities. No pleural effusion. Healed fracture deformity of several left upper posterolateral ribs. Intact and aligned sternal wires. Flowing degenerative osteophytes of the thoracic spine.    Echocardiogram Limited    Result Date: 2023  [id number]^MARCIO^[place] [address]  Name: KRISTINE JONES MRN: [id number] : 1960 Study Date: 2023 11:46 AM Age: 63 yrs Gender: Male Patient Location: [facility] Reason For Study: CHF Ordering Physician: TANIA BUCKLEY Performed By: AD  BSA: 2.2 m2 Height: 65 in Weight: 270 lb HR: 79 ______________________________________________________________________________ Procedure Limited Echo Adult. Definity ([facility]C #19875-043) given intravenously. Technically difficult study. Compared to the prior study dated 2023, there have been no changes. ______________________________________________________________________________ Interpretation Summary  1. Technically very difficult study. 2. Left ventricular chamber size and wall thickness are normal. Systolic function is mildly reduced with global hypokinesis. The calculated left ventricular ejection fraction is 41%. 3. Right ventricle is not well visualized. Right ventricular chamber size and systolic function are grossly normal. 4. No hemodynamically significant valvular abnormalities although the cardiac valves are not well visualized. 5. Compared to the prior study dated 2023, there has been no significant change. ______________________________________________________________________________ I      WMSI = 2.00     % Normal = 0  X - Cannot   0 -                      (2) - Mildly 2 -          Segments  Size Interpret    Hyperkinetic 1 - Normal  Hypokinetic  Hypokinetic  1-2     small                                                     7 -          3-5    moderate 3 - Akinetic 4 -          5 -         6 - Akinetic Dyskinetic   6-14    large              Dyskinetic   Aneurysmal  w/scar       w/scar       15-16   diffuse  Left Ventricle The left ventricle is normal in size. There is normal left ventricular wall thickness. Biplane LVEF is 41%. There is mild global hypokinesia of the left ventricle. Septal motion is consistent with post-operative state.  Right Ventricle The right ventricle is not well visualized. Normal right ventricle size and systolic function.  Atria Normal left atrial size. Right atrium not well visualized.  Mitral Valve The mitral valve leaflets are mildly thickened. There is trace mitral regurgitation. There is no mitral valve stenosis.  Tricuspid Valve The tricuspid valve is not well visualized.  Aortic Valve The aortic valve is not well visualized.  Pulmonic Valve The pulmonic valve is not well visualized.  Vessels The aorta root cannot be assessed. IVC diameter <2.1 cm collapsing >50% with sniff suggests a normal RA pressure of 3 mmHg.  Pericardium There is no pericardial effusion.  Rhythm Sinus rhythm was noted.  ______________________________________________________________________________ MMode/2D Measurements & Calculations IVSd: 0.87 cm LVIDd: 5.4 cm LVIDs: 4.3 cm LVPWd: 0.95 cm  FS: 19.0 % LV mass(C)d: 180.3 grams LV mass(C)dI: 80.2 grams/m2 LA dimension: 4.7 cm LA Volume Index (BP): 30.0 ml/m2 RWT: 0.35  Time Measurements MM HR: 84.0 BPM  ______________________________________________________________________________ Report approved by: Aura Wright 12/25/2023 01:14 PM       Chest XR,  PA & LAT    Result Date: 12/22/2023  EXAM: XR CHEST 2 VIEWS LOCATION: [facility] DATE: 12/22/2023 INDICATION: dyspnea COMPARISON: 12/14/2023     IMPRESSION: Stable chest with again seen hyperinflation of both lungs. Postoperative changes sternotomy. Old healed left rib fractures. Mild to moderate  scattered degenerative changes the bony skeleton.    XR Chest 2 Views    Result Date: 12/14/2023  EXAM: XR CHEST 2 VIEWS LOCATION: [facility] DATE: 12/14/2023 INDICATION: Dyspnea. COMPARISON: 09/08/2023.     IMPRESSION: Sternotomy wires. Cardiomegaly with normal pulmonary vascularity. The lungs are clear. Old left rib fractures. No change from previous.    CT Head w/o Contrast    Result Date: 12/14/2023  EXAM: CT HEAD W/O CONTRAST LOCATION: [facility] DATE: 12/14/2023 INDICATION: head injury COMPARISON: None. TECHNIQUE: Routine CT Head without IV contrast. Multiplanar reformats. Dose reduction techniques were used. FINDINGS: INTRACRANIAL CONTENTS: No intracranial hemorrhage, extraaxial collection, or mass effect.  No CT evidence of acute infarct. Mild presumed chronic small vessel ischemic changes. Moderate generalized volume loss. No hydrocephalus. Calcified intracranial atherosclerosis. VISUALIZED ORBITS/SINUSES/MASTOIDS: No intraorbital abnormality. Mild mucosal thickening scattered about the paranasal sinuses. No middle ear or mastoid effusion. BONES/SOFT TISSUES: No acute abnormality.     IMPRESSION: 1.  No evidence of acute traumatic intracranial abnormality. 2.  Calcified intracranial atherosclerosis with changes suggestive of mild chronic microvascular ischemic disease. 3.  Moderate generalized parenchymal atrophy.      Latest radiology report personally reviewed.    Note created using dragon voice recognition software so sounds alike errors may have escaped editing.      01/13/2024   Martin Ch MD  Hospitalist, [facility]  Pager: [phone]

## 2024-01-13 NOTE — PLAN OF CARE
Problem: Heart Failure  Goal: Optimal Coping  Outcome: Progressing     Problem: Heart Failure  Goal: Optimal Cardiac Output  Outcome: Progressing   Goal Outcome Evaluation:    Pt restful over noc. Diuresis ongoing. Dopamine gtt/Lasix gtt infusing per md order. Pt hypertensive over noc, discussed with house officer, instructed to monitor, no need to treat sbp 160's at this time. Potassium replacement per protocol. NSR on tele.

## 2024-01-14 ENCOUNTER — APPOINTMENT (OUTPATIENT)
Dept: PHYSICAL THERAPY | Facility: HOSPITAL | Age: 64
DRG: 291 | End: 2024-01-14
Payer: MEDICARE

## 2024-01-14 LAB
ANION GAP SERPL CALCULATED.3IONS-SCNC: 15 MMOL/L (ref 7–15)
BUN SERPL-MCNC: 43.9 MG/DL (ref 8–23)
CALCIUM SERPL-MCNC: 10.7 MG/DL (ref 8.8–10.2)
CHLORIDE SERPL-SCNC: 97 MMOL/L (ref 98–107)
CREAT SERPL-MCNC: 1.33 MG/DL (ref 0.67–1.17)
DEPRECATED HCO3 PLAS-SCNC: 29 MMOL/L (ref 22–29)
EGFRCR SERPLBLD CKD-EPI 2021: 59 ML/MIN/1.73M2
GLUCOSE SERPL-MCNC: 146 MG/DL (ref 70–99)
POTASSIUM SERPL-SCNC: 3.8 MMOL/L (ref 3.4–5.3)
SODIUM SERPL-SCNC: 141 MMOL/L (ref 135–145)

## 2024-01-14 PROCEDURE — 97116 GAIT TRAINING THERAPY: CPT | Mod: GP

## 2024-01-14 PROCEDURE — P9047 ALBUMIN (HUMAN), 25%, 50ML: HCPCS | Performed by: INTERNAL MEDICINE

## 2024-01-14 PROCEDURE — 258N000003 HC RX IP 258 OP 636: Performed by: INTERNAL MEDICINE

## 2024-01-14 PROCEDURE — 250N000011 HC RX IP 250 OP 636: Performed by: INTERNAL MEDICINE

## 2024-01-14 PROCEDURE — 250N000009 HC RX 250: Performed by: INTERNAL MEDICINE

## 2024-01-14 PROCEDURE — 97110 THERAPEUTIC EXERCISES: CPT | Mod: GP

## 2024-01-14 PROCEDURE — 210N000001 HC R&B IMCU HEART CARE

## 2024-01-14 PROCEDURE — 999N000157 HC STATISTIC RCP TIME EA 10 MIN

## 2024-01-14 PROCEDURE — 250N000013 HC RX MED GY IP 250 OP 250 PS 637: Performed by: INTERNAL MEDICINE

## 2024-01-14 PROCEDURE — 99232 SBSQ HOSP IP/OBS MODERATE 35: CPT | Performed by: INTERNAL MEDICINE

## 2024-01-14 PROCEDURE — 80048 BASIC METABOLIC PNL TOTAL CA: CPT | Performed by: INTERNAL MEDICINE

## 2024-01-14 PROCEDURE — 36415 COLL VENOUS BLD VENIPUNCTURE: CPT | Performed by: INTERNAL MEDICINE

## 2024-01-14 PROCEDURE — 99233 SBSQ HOSP IP/OBS HIGH 50: CPT | Performed by: INTERNAL MEDICINE

## 2024-01-14 RX ORDER — AMOXICILLIN 250 MG
1 CAPSULE ORAL 2 TIMES DAILY
Status: DISCONTINUED | OUTPATIENT
Start: 2024-01-14 | End: 2024-01-17

## 2024-01-14 RX ORDER — POLYETHYLENE GLYCOL 3350 17 G/17G
17 POWDER, FOR SOLUTION ORAL DAILY
Status: DISCONTINUED | OUTPATIENT
Start: 2024-01-14 | End: 2024-01-17

## 2024-01-14 RX ADMIN — SPIRONOLACTONE 50 MG: 25 TABLET, FILM COATED ORAL at 08:24

## 2024-01-14 RX ADMIN — DOPAMINE HYDROCHLORIDE 3 MCG/KG/MIN: 160 INJECTION, SOLUTION INTRAVENOUS at 12:12

## 2024-01-14 RX ADMIN — ROPINIROLE HYDROCHLORIDE 2 MG: 1 TABLET, FILM COATED ORAL at 13:05

## 2024-01-14 RX ADMIN — ROPINIROLE HYDROCHLORIDE 2 MG: 1 TABLET, FILM COATED ORAL at 08:23

## 2024-01-14 RX ADMIN — GABAPENTIN 900 MG: 300 CAPSULE ORAL at 21:20

## 2024-01-14 RX ADMIN — POTASSIUM CHLORIDE 20 MEQ: 1500 TABLET, EXTENDED RELEASE ORAL at 08:23

## 2024-01-14 RX ADMIN — POLYETHYLENE GLYCOL 3350 17 G: 17 POWDER, FOR SOLUTION ORAL at 12:00

## 2024-01-14 RX ADMIN — ALBUMIN HUMAN 50 G: 0.25 SOLUTION INTRAVENOUS at 08:24

## 2024-01-14 RX ADMIN — APIXABAN 5 MG: 5 TABLET, FILM COATED ORAL at 08:24

## 2024-01-14 RX ADMIN — APIXABAN 5 MG: 5 TABLET, FILM COATED ORAL at 21:21

## 2024-01-14 RX ADMIN — ISOSORBIDE MONONITRATE 180 MG: 60 TABLET, EXTENDED RELEASE ORAL at 08:24

## 2024-01-14 RX ADMIN — HYDRALAZINE HYDROCHLORIDE 10 MG: 10 TABLET, FILM COATED ORAL at 16:04

## 2024-01-14 RX ADMIN — DULOXETINE HYDROCHLORIDE 30 MG: 30 CAPSULE, DELAYED RELEASE ORAL at 08:23

## 2024-01-14 RX ADMIN — ROPINIROLE HYDROCHLORIDE 2 MG: 1 TABLET, FILM COATED ORAL at 21:20

## 2024-01-14 RX ADMIN — HYDRALAZINE HYDROCHLORIDE 10 MG: 10 TABLET, FILM COATED ORAL at 12:01

## 2024-01-14 RX ADMIN — HYDRALAZINE HYDROCHLORIDE 10 MG: 10 TABLET, FILM COATED ORAL at 08:23

## 2024-01-14 RX ADMIN — FUROSEMIDE 20 MG/HR: 10 INJECTION, SOLUTION INTRAVENOUS at 07:46

## 2024-01-14 RX ADMIN — ATORVASTATIN CALCIUM 40 MG: 40 TABLET, FILM COATED ORAL at 08:24

## 2024-01-14 RX ADMIN — SENNOSIDES AND DOCUSATE SODIUM 2 TABLET: 8.6; 5 TABLET ORAL at 12:00

## 2024-01-14 RX ADMIN — SENNOSIDES AND DOCUSATE SODIUM 1 TABLET: 8.6; 5 TABLET ORAL at 21:20

## 2024-01-14 RX ADMIN — CYANOCOBALAMIN TAB 1000 MCG 1000 MCG: 1000 TAB at 08:23

## 2024-01-14 RX ADMIN — HYDRALAZINE HYDROCHLORIDE 10 MG: 10 TABLET, FILM COATED ORAL at 21:21

## 2024-01-14 RX ADMIN — FUROSEMIDE 20 MG/HR: 10 INJECTION, SOLUTION INTRAVENOUS at 03:26

## 2024-01-14 RX ADMIN — AMIODARONE HYDROCHLORIDE 200 MG: 200 TABLET ORAL at 08:24

## 2024-01-14 RX ADMIN — FUROSEMIDE 20 MG/HR: 10 INJECTION, SOLUTION INTRAVENOUS at 14:20

## 2024-01-14 ASSESSMENT — ACTIVITIES OF DAILY LIVING (ADL)
ADLS_ACUITY_SCORE: 24
ADLS_ACUITY_SCORE: 25
ADLS_ACUITY_SCORE: 24
ADLS_ACUITY_SCORE: 25
ADLS_ACUITY_SCORE: 24

## 2024-01-14 NOTE — PROGRESS NOTES
"Daily Progress Note        CODE STATUS:  Full Code    01/11/24  Assessment/Plan:  63 year old male with PMH of hypertension, hyperlipidemia, obesity, CKD-3bCAD, s/p CABG (10 years ago), ischemic cardiomyopathy with EF 41%, admitted on 1/8/2024 for acute on chronic systolic CHF.      Acute on chronic systolic CHF  with EF 41%   -- Appreciate cardiology consult. Diuresed per cardilogy with lasix infusion. Dopamine infusion added 1/11 for augmentation. Albumin prn as well  -- Diuresing well. Net ngative balance of 13.6L since admission. Still looks vol up  -- Monitor I&O, daily weights and renal function closely while being aggresively diuresed  -- Creatinine up to 1.3 from 1.1. Defer to cardiology if need to hold diuresis today.                Active Problems:  CAD, S/P CABG x 2 vessels in 2014     Paroxysmal atrial fib -- on Eliquis  -- Currently in NSR     Smoker  -- Needs to quit, does not want nicotine replacement  -- Will give info on discharge     COPD      PRINCESS (acute kidney injury) (H24)   -- Monitor creatinine closely while on IV diuresis     Restless legs syndrome (RLS)   -- Con home med Ropinirole 3x daily prn      Constipation  -- NO BM x 5 days  -- Start miralax and senna-s     DVT Prophylaxis: DOAC  Code Status: Full Code      Disposition; Likely home when off the drips, and ok with cardiology  Barrier to discharge; Clinical progress. IV diuresis    Clinically Significant Risk Factors        # Hypokalemia: Lowest K = 3.3 mmol/L in last 2 days, will replace as needed    # Hypercalcemia: Highest Ca = 10.7 mg/dL in last 2 days, will monitor as appropriate           # Acute heart failure with reduced ejection fraction: last echo with EF <40% and receiving IV diuretics       # Severe Obesity: Estimated body mass index is 41.29 kg/m  as calculated from the following:    Height as of this encounter: 1.676 m (5' 6\").    Weight as of this encounter: 116 kg (255 lb 12.8 oz).        # Financial/Environmental " Concerns: none           Subjective:  Interval History: Patient seen and examined. Doing better. Denies any shortness of breath or chest pain. Endorses having CARNEY. Leg swelling is getting better.     Review of Systems:   As mentioned in subjective.    Patient Active Problem List   Diagnosis    Abnormal electrocardiogram    Cellulitis of left lower extremity    Ischemic cardiomyopathy    CAD, S/P CABG    Paroxysmal atrial fib -- on Eliquis    Class 3 severe obesity due to excess calories with body mass index (BMI) of 40.0 to 44.9 in adult (H)    Chronic kidney disease, stage 3b (H)    Smoker    COPD exacerbation (H)    Venous stasis dermatitis of both lower extremities    PRINCESS (acute kidney injury) (H24)    Acute on chronic systolic CHF    Restless legs syndrome (RLS)       Scheduled Meds:   albumin human  50 g Intravenous Daily    albuterol  2 puff Inhalation 4x Daily    amiodarone  200 mg Oral Daily    apixaban ANTICOAGULANT  5 mg Oral BID    atorvastatin  40 mg Oral QAM    [Held by provider] carvedilol  1.5625 mg Oral BID w/meals    cyanocobalamin  1,000 mcg Oral Daily    DULoxetine  30 mg Oral QAM    gabapentin  900 mg Oral At Bedtime    hydrALAZINE  10 mg Oral 4x Daily    hydrocortisone   Topical BID    isosorbide mononitrate  180 mg Oral QAM    [Held by provider] lisinopril  5 mg Oral Daily    potassium chloride ER  20 mEq Oral Daily    rOPINIRole  2 mg Oral TID    sodium chloride (PF)  3 mL Intracatheter Q8H    spironolactone  50 mg Oral Daily     Continuous Infusions:   DOPamine 3 mcg/kg/min (01/14/24 1212)    furosemide (LASIX) 100 mg in sodium chloride 0.9 % 100 mL infusion 20 mg/hr (01/14/24 0746)    furosemide       PRN Meds:.acetaminophen **OR** acetaminophen, albuterol, albuterol, sore throat, calcium carbonate, hydrALAZINE, ipratropium - albuterol 0.5 mg/2.5 mg/3 mL, lidocaine 4%, lidocaine (buffered or not buffered), melatonin, menthol-zinc oxide, miconazole, nitroGLYcerin, ondansetron **OR**  ondansetron, polyethylene glycol, senna-docusate **OR** senna-docusate, sodium chloride (PF)    Objective:  Vital signs in last 24 hours:  Temp:  [97.7  F (36.5  C)-98.3  F (36.8  C)] 98.3  F (36.8  C)  Pulse:  [78-82] 78  Resp:  [18-20] 18  BP: (120-148)/(58-71) 131/58  SpO2:  [96 %-99 %] 96 %        Intake/Output Summary (Last 24 hours) at 1/11/2024 1644  Last data filed at 1/11/2024 1600  Gross per 24 hour   Intake 2543.96 ml   Output 3775 ml   Net -1231.04 ml       Physical Exam:    General: Not in obvious distress.  HEENT: NC, AT   Chest: Diminished breath sounds bilaterally due to thick chest wall  Heart: S1S2 normal, regular. No M/R/G  Abdomen: Soft. NT, ND. Bowel sounds- active.  Extremities: 2 + leg swelling.   Neuro: Alert and awake, grossly non-focal      Lab Results:(I have personally reviewed the results)    Recent Results (from the past 24 hour(s))   Potassium    Collection Time: 01/13/24  2:18 PM   Result Value Ref Range    Potassium 4.0 3.4 - 5.3 mmol/L   Basic metabolic panel    Collection Time: 01/14/24  4:38 AM   Result Value Ref Range    Sodium 141 135 - 145 mmol/L    Potassium 3.8 3.4 - 5.3 mmol/L    Chloride 97 (L) 98 - 107 mmol/L    Carbon Dioxide (CO2) 29 22 - 29 mmol/L    Anion Gap 15 7 - 15 mmol/L    Urea Nitrogen 43.9 (H) 8.0 - 23.0 mg/dL    Creatinine 1.33 (H) 0.67 - 1.17 mg/dL    GFR Estimate 59 (L) >60 mL/min/1.73m2    Calcium 10.7 (H) 8.8 - 10.2 mg/dL    Glucose 146 (H) 70 - 99 mg/dL       All laboratory and imaging data in the past 24 hours reviewed  Serum Glucose range:   Recent Labs   Lab 01/14/24  0438 01/13/24  0511 01/12/24  0434 01/11/24  0512   * 159* 110* 137*     ABG: No lab results found in last 7 days.  CBC:   Recent Labs   Lab 01/09/24  0709 01/08/24  1606   WBC 8.2 9.6   HGB 12.7* 12.0*   HCT 42.1 40.1   MCV 93 93    153     Chemistry:   Recent Labs   Lab 01/14/24  0438 01/13/24  1418 01/13/24  0511 01/12/24  1431 01/12/24  0434 01/10/24  2007 01/10/24  0709  "    --  140  --  139   < > 142   POTASSIUM 3.8 4.0 3.3*   < > 3.4   < > 3.3*   CHLORIDE 97*  --  96*  --  96*   < > 99   CO2 29  --  29  --  29   < > 32*   BUN 43.9*  --  36.7*  --  31.1*   < > 26.5*   CR 1.33*  --  1.30*  --  1.14   < > 1.13   GFRESTIMATED 59*  --  61  --  71   < > 72   RUSLAN 10.7*  --  10.5*  --  9.9   < > 9.2   MAG  --   --   --   --   --   --  2.0    < > = values in this interval not displayed.     Coags:  No results for input(s): \"INR\", \"PROTIME\", \"PTT\" in the last 168 hours.    Invalid input(s): \"APTT\"  Cardiac Markers:  No results for input(s): \"CKTOTAL\", \"TROPONINI\" in the last 168 hours.       US Lower Extremity Venous Duplex Bilateral    Result Date: 1/11/2024  EXAM: US LOWER EXTREMITY VENOUS DUPLEX BILATERAL LOCATION: [facility] DATE: 1/11/2024 INDICATION: Lower extremity swelling. COMPARISON: Ultrasound venous left lower extremity Doppler 02/14/2023. TECHNIQUE: Venous Duplex ultrasound of bilateral lower extremities with and without compression, augmentation and duplex. Color flow and spectral Doppler with waveform analysis performed. FINDINGS: Exam includes the common femoral, femoral, popliteal veins as well as segmentally visualized deep calf veins and greater saphenous vein. RIGHT: No deep vein thrombosis. No superficial thrombophlebitis. No popliteal cyst. LEFT: No deep vein thrombosis. No superficial thrombophlebitis. No popliteal cyst. No significant interval change.     IMPRESSION: 1.  No deep venous thrombosis in the bilateral lower extremities. 2.  No popliteal cyst on either side.    XR Chest 2 Views    Result Date: 1/8/2024  EXAM: XR CHEST 2 VIEWS LOCATION: [facility] DATE: 1/8/2024 INDICATION: Congestive heart failure COMPARISON: AP and lateral views the chest 12/20/2023     IMPRESSION: Left coronary stents. Previous median sternotomy. Cardiac silhouette is normal in size and the vascular pedicle width is normal. " Symmetric lung inflation. No interstitial or alveolar opacities. No pleural effusion. Healed fracture deformity of several left upper posterolateral ribs. Intact and aligned sternal wires. Flowing degenerative osteophytes of the thoracic spine.    Echocardiogram Limited    Result Date: 2023  [id number]^MARCIO^[place] [address]  Name: KRISTINE JONES MRN: [id number] : 1960 Study Date: 2023 11:46 AM Age: 63 yrs Gender: Male Patient Location: [facility] Reason For Study: CHF Ordering Physician: TANIA BUCKLEY Performed By: AD  BSA: 2.2 m2 Height: 65 in Weight: 270 lb HR: 79 ______________________________________________________________________________ Procedure Limited Echo Adult. Definity (NDC #46334-371) given intravenously. Technically difficult study. Compared to the prior study dated 2023, there have been no changes. ______________________________________________________________________________ Interpretation Summary  1. Technically very difficult study. 2. Left ventricular chamber size and wall thickness are normal. Systolic function is mildly reduced with global hypokinesis. The calculated left ventricular ejection fraction is 41%. 3. Right ventricle is not well visualized. Right ventricular chamber size and systolic function are grossly normal. 4. No hemodynamically significant valvular abnormalities although the cardiac valves are not well visualized. 5. Compared to the prior study dated 2023, there has been no significant change. ______________________________________________________________________________ I      WMSI = 2.00     % Normal = 0  X - Cannot   0 -                      (2) - Mildly 2 -          Segments  Size Interpret    Hyperkinetic 1 - Normal  Hypokinetic  Hypokinetic  1-2     small                                                    7 -          3-5    moderate 3 - Akinetic 4 -          5 -         6 -  Akinetic Dyskinetic   6-14    large              Dyskinetic   Aneurysmal  w/scar       w/scar       15-16   diffuse  Left Ventricle The left ventricle is normal in size. There is normal left ventricular wall thickness. Biplane LVEF is 41%. There is mild global hypokinesia of the left ventricle. Septal motion is consistent with post-operative state.  Right Ventricle The right ventricle is not well visualized. Normal right ventricle size and systolic function.  Atria Normal left atrial size. Right atrium not well visualized.  Mitral Valve The mitral valve leaflets are mildly thickened. There is trace mitral regurgitation. There is no mitral valve stenosis.  Tricuspid Valve The tricuspid valve is not well visualized.  Aortic Valve The aortic valve is not well visualized.  Pulmonic Valve The pulmonic valve is not well visualized.  Vessels The aorta root cannot be assessed. IVC diameter <2.1 cm collapsing >50% with sniff suggests a normal RA pressure of 3 mmHg.  Pericardium There is no pericardial effusion.  Rhythm Sinus rhythm was noted.  ______________________________________________________________________________ MMode/2D Measurements & Calculations IVSd: 0.87 cm LVIDd: 5.4 cm LVIDs: 4.3 cm LVPWd: 0.95 cm  FS: 19.0 % LV mass(C)d: 180.3 grams LV mass(C)dI: 80.2 grams/m2 LA dimension: 4.7 cm LA Volume Index (BP): 30.0 ml/m2 RWT: 0.35  Time Measurements MM HR: 84.0 BPM  ______________________________________________________________________________ Report approved by: Aura Wright 12/25/2023 01:14 PM       Chest XR,  PA & LAT    Result Date: 12/22/2023  EXAM: XR CHEST 2 VIEWS LOCATION: [facility] DATE: 12/22/2023 INDICATION: dyspnea COMPARISON: 12/14/2023     IMPRESSION: Stable chest with again seen hyperinflation of both lungs. Postoperative changes sternotomy. Old healed left rib fractures. Mild to moderate scattered degenerative changes the bony skeleton.    XR Chest 2 Views    Result  Date: 12/14/2023  EXAM: XR CHEST 2 VIEWS LOCATION: [facility] DATE: 12/14/2023 INDICATION: Dyspnea. COMPARISON: 09/08/2023.     IMPRESSION: Sternotomy wires. Cardiomegaly with normal pulmonary vascularity. The lungs are clear. Old left rib fractures. No change from previous.    CT Head w/o Contrast    Result Date: 12/14/2023  EXAM: CT HEAD W/O CONTRAST LOCATION: [facility] DATE: 12/14/2023 INDICATION: head injury COMPARISON: None. TECHNIQUE: Routine CT Head without IV contrast. Multiplanar reformats. Dose reduction techniques were used. FINDINGS: INTRACRANIAL CONTENTS: No intracranial hemorrhage, extraaxial collection, or mass effect.  No CT evidence of acute infarct. Mild presumed chronic small vessel ischemic changes. Moderate generalized volume loss. No hydrocephalus. Calcified intracranial atherosclerosis. VISUALIZED ORBITS/SINUSES/MASTOIDS: No intraorbital abnormality. Mild mucosal thickening scattered about the paranasal sinuses. No middle ear or mastoid effusion. BONES/SOFT TISSUES: No acute abnormality.     IMPRESSION: 1.  No evidence of acute traumatic intracranial abnormality. 2.  Calcified intracranial atherosclerosis with changes suggestive of mild chronic microvascular ischemic disease. 3.  Moderate generalized parenchymal atrophy.      Latest radiology report personally reviewed.    Note created using dragon voice recognition software so sounds alike errors may have escaped editing.      01/14/2024   Martin Ch MD  Hospitalist, [facility]  Pager: [phone]

## 2024-01-14 NOTE — PLAN OF CARE
Problem: Adult Inpatient Plan of Care  Goal: Optimal Comfort and Wellbeing  Outcome: Progressing     Problem: Heart Failure  Goal: Stable Heart Rate and Rhythm  Outcome: Progressing   Goal Outcome Evaluation:    Pt comfortable, restful over noc. Dopamine/lasix gtt infusing per md order. O2 sats stable on room air. NSR on tele. Vitals stable. ~1L urine output over noc. Pt up to standing scale this am, weight is up slightly this am. Continue strict I/O.

## 2024-01-14 NOTE — PROGRESS NOTES
Unable to do overnight oximetry study tonight 1/13; oximeter not available.  Plan on study tonight 1/14    Canelo Hay, RT

## 2024-01-14 NOTE — PLAN OF CARE
Problem: Fluid Volume Excess  Goal: Fluid Balance  Outcome: Not Progressing   Goal Outcome Evaluation:    Up 2lbs in 2 days.  Dopamine gtt and lasix gtt still running.  NSR BBB. On RA.  No chest pain. Gave miralax and senna.  LBM 10th. Intake 480cc. Output 1440cc. Lasix IV continuous increased to 500mg.

## 2024-01-14 NOTE — PROGRESS NOTES
Cardiology Progress Note    Assessment/Plan:    Heart failure with preserved ejection fraction 41% by echo last month, 18 pound weight gain prior to admission, down since admission with dopamine, furosemide drip.  No changes today.  Ischemic cardiomyopathy with stable coronary anatomy, patent grafts 9/2023  Acute on chronic renal insufficiency, worsening since admission  Persistent atrial fibrillation on amiodarone and apixaban, now in sinus rhythm  Hypertension with less than ideal control.  Will add hydralazine, increase spironolactone  Probable obstructive sleep apnea based on symptoms.  Needs formal outpatient evaluation.  Will check overnight oximetry tonight    Principal Problem:    Acute on chronic systolic CHF  Active Problems:    CAD, S/P CABG    Paroxysmal atrial fib -- on Eliquis    Smoker    COPD exacerbation (H)    PRINCESS (acute kidney injury) (H24)    Restless legs syndrome (RLS)     LOS: 5 days     Subjective:  Resting comfortably on right side.  Discouraged about weight increase.  Reports he has been told he stops breathing, frequently awakens short of breath at night.  Has never had sleep apnea evaluation but is willing.  Weight up over the last 2 days    Objective:   Vital signs in last 24 hours:  Vitals:    01/13/24 1921 01/14/24 0003 01/14/24 0328 01/14/24 0729   BP: (!) 142/67 (!) 140/67 120/68 (!) 141/68   BP Location: Left arm Left arm Left arm Left arm   Pulse: 82 81 78    Resp: 19 20 20 20   Temp: 98.1  F (36.7  C) 97.9  F (36.6  C) 97.9  F (36.6  C) 98.2  F (36.8  C)   TempSrc: Oral Oral Oral Oral   SpO2: 99% 96% 96%    Weight:   116 kg (255 lb 12.8 oz)    Height:         Weight:   Wt Readings from Last 3 Encounters:   01/14/24 116 kg (255 lb 12.8 oz)   12/27/23 118.3 kg (260 lb 12.8 oz)   11/28/23 127.5 kg (281 lb)           PHYSICAL EXAM      Respiratory:  Normal breath sounds, No respiratory distress, No wheezing, No chest tenderness.   Cardiovascular: Distant heart tones.  Regular.  No  "gallop appreciated  GI: Obese.  Bowel sounds normal, Soft, No tenderness, No masses  Extremities: 2+ edema       Cardiographics:   Telemetry sinus rhythm 75 to 100 bpm.    Echocardiogram 12/25/2023:  1. Technically very difficult study.  2. Left ventricular chamber size and wall thickness are normal. Systolic  function is mildly reduced with global hypokinesis. The calculated left  ventricular ejection fraction is 41%.  3. Right ventricle is not well visualized. Right ventricular chamber size and  systolic function are grossly normal.  4. No hemodynamically significant valvular abnormalities although the cardiac  valves are not well visualized.  5. Compared to the prior study dated 9/9/2023, there has been no significant  change.        Lab Results:   Lab Results   Component Value Date    WBC 8.2 01/09/2024    HGB 12.7 (L) 01/09/2024    HCT 42.1 01/09/2024     01/09/2024    ALT 9 12/22/2023    AST 17 12/22/2023     01/14/2024    BUN 43.9 (H) 01/14/2024    CO2 29 01/14/2024    INR 1.01 02/13/2023     No results found for: \"CKTOTAL\", \"CKMB\", \"TROPONINI\"      Edmond Guzman MD FAC  1/13/2024     "

## 2024-01-14 NOTE — PROGRESS NOTES
Cardiology Progress Note  New to me.  Chart reviewed.  Assessment/Plan:    Heart failure with preserved ejection fraction 41% by echo last month, 18 pound weight gain prior to admission, down 10 pounds since admission with dopamine, furosemide drip  Ischemic cardiomyopathy with stable coronary anatomy, patent grafts 9/2023  Acute on chronic renal insufficiency  Persistent atrial fibrillation on amiodarone and apixaban, now in sinus rhythm  Hypertension with less than ideal control.  Will add hydralazine, increase spironolactone  Probable obstructive sleep apnea based on symptoms.  Needs formal outpatient evaluation.      Principal Problem:    Acute on chronic systolic CHF  Active Problems:    CAD, S/P CABG    Paroxysmal atrial fib -- on Eliquis    Smoker    COPD exacerbation (H)    PRINCESS (acute kidney injury) (H24)    Restless legs syndrome (RLS)     LOS: 5 days     Subjective:  Breathing improving..  Reports he has been told he stops breathing, frequently awakens short of breath at night.  Has never had sleep apnea evaluation but is willing.      Objective:   Vital signs in last 24 hours:  Vitals:    01/13/24 0407 01/13/24 0803 01/13/24 1137 01/13/24 1512   BP: (!) 162/79 (!) 143/69 130/60 (!) 148/71   BP Location:  Left arm Left arm Left arm   Pulse:  84 80 82   Resp:  18 18 18   Temp:  97.9  F (36.6  C) 98  F (36.7  C) 97.7  F (36.5  C)   TempSrc:  Oral Oral Oral   SpO2:  96% 95% 96%   Weight:       Height:         Weight:   Wt Readings from Last 3 Encounters:   01/13/24 115.5 kg (254 lb 9.6 oz)   12/27/23 118.3 kg (260 lb 12.8 oz)   11/28/23 127.5 kg (281 lb)           PHYSICAL EXAM      Respiratory:  Normal breath sounds, No respiratory distress, No wheezing, No chest tenderness.   Cardiovascular: Distant heart tones.  Regular.  No gallop appreciated  GI: Obese.  Bowel sounds normal, Soft, No tenderness, No masses  Extremities: 2+ edema       Cardiographics:   Telemetry sinus rhythm 75 to 90  "bpm.    Echocardiogram 12/25/2023:  1. Technically very difficult study.  2. Left ventricular chamber size and wall thickness are normal. Systolic  function is mildly reduced with global hypokinesis. The calculated left  ventricular ejection fraction is 41%.  3. Right ventricle is not well visualized. Right ventricular chamber size and  systolic function are grossly normal.  4. No hemodynamically significant valvular abnormalities although the cardiac  valves are not well visualized.  5. Compared to the prior study dated 9/9/2023, there has been no significant  change.        Lab Results:   Lab Results   Component Value Date    WBC 8.2 01/09/2024    HGB 12.7 (L) 01/09/2024    HCT 42.1 01/09/2024     01/09/2024    ALT 9 12/22/2023    AST 17 12/22/2023     01/13/2024    BUN 36.7 (H) 01/13/2024    CO2 29 01/13/2024    INR 1.01 02/13/2023     No results found for: \"CKTOTAL\", \"CKMB\", \"TROPONINI\"      Edmond Guzman MD [facility]  1/13/2024     "

## 2024-01-15 ENCOUNTER — APPOINTMENT (OUTPATIENT)
Dept: OCCUPATIONAL THERAPY | Facility: HOSPITAL | Age: 64
DRG: 291 | End: 2024-01-15
Payer: MEDICARE

## 2024-01-15 ENCOUNTER — TELEPHONE (OUTPATIENT)
Dept: CARDIOLOGY | Facility: CLINIC | Age: 64
End: 2024-01-15
Payer: MEDICARE

## 2024-01-15 DIAGNOSIS — I50.9 ACUTE DECOMPENSATED HEART FAILURE (H): Primary | ICD-10-CM

## 2024-01-15 DIAGNOSIS — I48.91 A-FIB (H): Primary | ICD-10-CM

## 2024-01-15 LAB
ANION GAP SERPL CALCULATED.3IONS-SCNC: 14 MMOL/L (ref 7–15)
BUN SERPL-MCNC: 49.3 MG/DL (ref 8–23)
CALCIUM SERPL-MCNC: 11 MG/DL (ref 8.8–10.2)
CHLORIDE SERPL-SCNC: 95 MMOL/L (ref 98–107)
CREAT SERPL-MCNC: 1.45 MG/DL (ref 0.67–1.17)
DEPRECATED HCO3 PLAS-SCNC: 31 MMOL/L (ref 22–29)
EGFRCR SERPLBLD CKD-EPI 2021: 53 ML/MIN/1.73M2
GLUCOSE SERPL-MCNC: 143 MG/DL (ref 70–99)
POTASSIUM SERPL-SCNC: 4.3 MMOL/L (ref 3.4–5.3)
SODIUM SERPL-SCNC: 140 MMOL/L (ref 135–145)

## 2024-01-15 PROCEDURE — 250N000013 HC RX MED GY IP 250 OP 250 PS 637: Performed by: INTERNAL MEDICINE

## 2024-01-15 PROCEDURE — 36415 COLL VENOUS BLD VENIPUNCTURE: CPT | Performed by: INTERNAL MEDICINE

## 2024-01-15 PROCEDURE — P9047 ALBUMIN (HUMAN), 25%, 50ML: HCPCS | Performed by: INTERNAL MEDICINE

## 2024-01-15 PROCEDURE — 94762 N-INVAS EAR/PLS OXIMTRY CONT: CPT

## 2024-01-15 PROCEDURE — 210N000001 HC R&B IMCU HEART CARE

## 2024-01-15 PROCEDURE — 97530 THERAPEUTIC ACTIVITIES: CPT | Mod: GO

## 2024-01-15 PROCEDURE — 80048 BASIC METABOLIC PNL TOTAL CA: CPT | Performed by: INTERNAL MEDICINE

## 2024-01-15 PROCEDURE — 99233 SBSQ HOSP IP/OBS HIGH 50: CPT | Performed by: INTERNAL MEDICINE

## 2024-01-15 PROCEDURE — 250N000011 HC RX IP 250 OP 636: Performed by: INTERNAL MEDICINE

## 2024-01-15 RX ORDER — BUMETANIDE 2 MG/1
2 TABLET ORAL
Status: DISCONTINUED | OUTPATIENT
Start: 2024-01-15 | End: 2024-01-18 | Stop reason: HOSPADM

## 2024-01-15 RX ADMIN — ROPINIROLE HYDROCHLORIDE 2 MG: 1 TABLET, FILM COATED ORAL at 08:58

## 2024-01-15 RX ADMIN — DULOXETINE HYDROCHLORIDE 30 MG: 30 CAPSULE, DELAYED RELEASE ORAL at 08:57

## 2024-01-15 RX ADMIN — APIXABAN 5 MG: 5 TABLET, FILM COATED ORAL at 08:58

## 2024-01-15 RX ADMIN — ROPINIROLE HYDROCHLORIDE 2 MG: 1 TABLET, FILM COATED ORAL at 20:06

## 2024-01-15 RX ADMIN — SENNOSIDES AND DOCUSATE SODIUM 1 TABLET: 8.6; 5 TABLET ORAL at 08:57

## 2024-01-15 RX ADMIN — GABAPENTIN 900 MG: 300 CAPSULE ORAL at 22:14

## 2024-01-15 RX ADMIN — AMIODARONE HYDROCHLORIDE 200 MG: 200 TABLET ORAL at 08:58

## 2024-01-15 RX ADMIN — BUMETANIDE 2 MG: 2 TABLET ORAL at 12:18

## 2024-01-15 RX ADMIN — CYANOCOBALAMIN TAB 1000 MCG 1000 MCG: 1000 TAB at 08:58

## 2024-01-15 RX ADMIN — HYDRALAZINE HYDROCHLORIDE 10 MG: 10 TABLET, FILM COATED ORAL at 12:18

## 2024-01-15 RX ADMIN — HYDRALAZINE HYDROCHLORIDE 10 MG: 10 TABLET, FILM COATED ORAL at 08:58

## 2024-01-15 RX ADMIN — HYDRALAZINE HYDROCHLORIDE 10 MG: 10 TABLET, FILM COATED ORAL at 20:06

## 2024-01-15 RX ADMIN — APIXABAN 5 MG: 5 TABLET, FILM COATED ORAL at 20:06

## 2024-01-15 RX ADMIN — ISOSORBIDE MONONITRATE 180 MG: 60 TABLET, EXTENDED RELEASE ORAL at 08:57

## 2024-01-15 RX ADMIN — SENNOSIDES AND DOCUSATE SODIUM 1 TABLET: 8.6; 5 TABLET ORAL at 20:06

## 2024-01-15 RX ADMIN — POTASSIUM CHLORIDE 20 MEQ: 1500 TABLET, EXTENDED RELEASE ORAL at 08:59

## 2024-01-15 RX ADMIN — ALBUMIN HUMAN 50 G: 0.25 SOLUTION INTRAVENOUS at 09:01

## 2024-01-15 RX ADMIN — ROPINIROLE HYDROCHLORIDE 2 MG: 1 TABLET, FILM COATED ORAL at 13:28

## 2024-01-15 RX ADMIN — SPIRONOLACTONE 50 MG: 25 TABLET, FILM COATED ORAL at 08:58

## 2024-01-15 RX ADMIN — DOPAMINE HYDROCHLORIDE 3 MCG/KG/MIN: 160 INJECTION, SOLUTION INTRAVENOUS at 09:04

## 2024-01-15 RX ADMIN — HYDRALAZINE HYDROCHLORIDE 10 MG: 10 TABLET, FILM COATED ORAL at 17:05

## 2024-01-15 RX ADMIN — BUMETANIDE 2 MG: 2 TABLET ORAL at 17:06

## 2024-01-15 RX ADMIN — ATORVASTATIN CALCIUM 40 MG: 40 TABLET, FILM COATED ORAL at 08:57

## 2024-01-15 RX ADMIN — POLYETHYLENE GLYCOL 3350 17 G: 17 POWDER, FOR SOLUTION ORAL at 09:00

## 2024-01-15 NOTE — TELEPHONE ENCOUNTER
Contact patient to arrange consult with AFIB clinic, ( [facility]) in 2-3 weeks per Dr. Bhandari

## 2024-01-15 NOTE — PLAN OF CARE
Problem: Heart Failure  Goal: Optimal Cardiac Output  Outcome: Progressing  Goal: Stable Heart Rate and Rhythm  Outcome: Progressing  Goal: Optimal Functional Ability  Intervention: Optimize Functional Ability  Recent Flowsheet Documentation  Taken 1/15/2024 0900 by Arlen Block, RN  Activity Management: activity adjusted per tolerance  Goal: Effective Oxygenation and Ventilation  Intervention: Promote Airway Secretion Clearance  Recent Flowsheet Documentation  Taken 1/15/2024 0900 by Arlen Block, RN  Activity Management: activity adjusted per tolerance   Goal Outcome Evaluation:    /62.  On RA 95%. Lasix gtt dc'd.  Dopamine gtt continued.  Creatinine 1.45.  K+ 4.3. Pt SBA.  Uses urinal. Output 1175.  Intake 480.

## 2024-01-15 NOTE — PLAN OF CARE
Problem: Heart Failure  Goal: Optimal Coping  Outcome: Progressing  Intervention: Support Psychosocial Response  Recent Flowsheet Documentation  Taken 1/15/2024 0420 by Jennifer James RN  Supportive Measures: active listening utilized  Taken 1/15/2024 0046 by Jennifer James RN  Supportive Measures: active listening utilized  Goal: Optimal Cardiac Output  Outcome: Progressing  Intervention: Optimize Cardiac Output  Recent Flowsheet Documentation  Taken 1/15/2024 0420 by Jennifer James RN  Environmental Support: calm environment promoted  Taken 1/15/2024 0046 by Jennifer James RN  Environmental Support: calm environment promoted  Goal: Stable Heart Rate and Rhythm  Outcome: Progressing  Goal: Optimal Functional Ability  Outcome: Progressing  Intervention: Optimize Functional Ability  Recent Flowsheet Documentation  Taken 1/15/2024 0420 by Jennifer James RN  Activity Management: activity adjusted per tolerance  Taken 1/15/2024 0046 by Jennifer James RN  Activity Management: activity adjusted per tolerance  Goal: Fluid and Electrolyte Balance  Outcome: Progressing  Goal: Improved Oral Intake  Outcome: Progressing  Goal: Effective Oxygenation and Ventilation  Outcome: Progressing  Intervention: Promote Airway Secretion Clearance  Recent Flowsheet Documentation  Taken 1/15/2024 0420 by Jennifer James RN  Cough And Deep Breathing: done independently per patient  Activity Management: activity adjusted per tolerance  Taken 1/15/2024 0046 by Jennifer James RN  Cough And Deep Breathing: done independently per patient  Activity Management: activity adjusted per tolerance  Intervention: Optimize Oxygenation and Ventilation  Recent Flowsheet Documentation  Taken 1/15/2024 0420 by Jennifer James RN  Head of Bed (HOB) Positioning: HOB at 30 degrees  Taken 1/15/2024 0046 by Jennifer James RN  Head of Bed (HOB) Positioning: HOB at 30 degrees  Goal: Effective Breathing Pattern During Sleep  Outcome:  Progressing  Intervention: Monitor and Manage Obstructive Sleep Apnea  Recent Flowsheet Documentation  Taken 1/15/2024 0420 by Jennifer James, RN  Medication Review/Management: medications reviewed  Taken 1/15/2024 0046 by Jennifer James, RN  Medication Review/Management: medications reviewed   Goal Outcome Evaluation:    Pt's vital signs were stable on RA. He was NSR with a BBB on telemetry. He denied pain throughout the night. He was on a lasix gtt throughout the night but it was discontinued this am. He is also on a dopamine gtt at 13.3 ml/hr.  He is on a 1500 ml fluid restriction and had 350 ml overnight. He is on the potassium protocol which will be rechecked tomorrow. He had a sleep study completed overnight. He is up with an A1 and uses his urinal at bedside. He is able to make needs known. Call light is within reach.

## 2024-01-15 NOTE — PROGRESS NOTES
CLINICAL NUTRITION SERVICES    Reviewed nutrition risk factors due to LOS. Pt is tolerating diet and eating adequately per nursing documentation. Per pt, reports good/adequate oral intake. Reviewed wt hx.  No unintentional wt loss PTA.      - Per flow sheet, pt has a good appetite most days, consuming 100% of 2-3 meals per day, which increased from 1-2/day when first admitted..   - Per [facility] [place] (meal ordering system), pt ordering a 6-day average of 1856 kcal and 79 g protein, which has been steadily increasing during admission and well over 2000 kcal and 80 g protein in past few days.    Wt Readings from Last 30 Encounters:   01/15/24 115.8 kg (255 lb 4.8 oz)   12/27/23 118.3 kg (260 lb 12.8 oz)   11/28/23 127.5 kg (281 lb)   10/13/23 126.1 kg (278 lb)   09/28/23 124.7 kg (275 lb)   09/13/23 118 kg (260 lb 1.6 oz)   02/13/23 71.2 kg (157 lb)     Follow Up / Monitoring:   Will continue to follow pt per protocol.     Pk Perales RD, LD

## 2024-01-15 NOTE — PROGRESS NOTES
"Daily Progress Note        CODE STATUS:  Full Code    01/11/24  Assessment/Plan:  63 year old male with PMH of hypertension, hyperlipidemia, obesity, CKD-3bCAD, s/p CABG (10 years ago), ischemic cardiomyopathy with EF 41%, admitted on 1/8/2024 for acute on chronic systolic CHF.      Acute on chronic systolic CHF  with EF 41%   -- Appreciate cardiology consult. Diuresed per cardilogy with lasix infusion. Dopamine infusion added 1/11 for augmentation. Albumin prn as well  -- Diuresing well. Net ngative balance of 14.5L since admission. Lasix gtt stopped today. Started on PO bumex 3mg BID.  -- Monitor I&O, daily weights and renal function closely while being aggresively diuresed  -- Creatinine up to 1.45 from 1.1.                Active Problems:  CAD, S/P CABG x 2 vessels in 2014     Paroxysmal atrial fib -- on Eliquis  -- Currently in NSR     Smoker  -- Needs to quit, does not want nicotine replacement  -- Will give info on discharge     COPD      PRINCESS (acute kidney injury) (H24)   -- Monitor creatinine closely while on IV diuresis     Restless legs syndrome (RLS)   -- Con home med Ropinirole 3x daily prn      Constipation  -- NO BM x 5 days  -- Start miralax and senna-s     DVT Prophylaxis: DOAC  Code Status: Full Code      Disposition; Likely home when off the drips, and ok with cardiology  Barrier to discharge; Clinical progress.     Clinically Significant Risk Factors           # Hypercalcemia: Highest Ca = 11 mg/dL in last 2 days, will monitor as appropriate           # Acute heart failure with reduced ejection fraction: last echo with EF <40% and receiving IV diuretics       # Severe Obesity: Estimated body mass index is 41.21 kg/m  as calculated from the following:    Height as of this encounter: 1.676 m (5' 6\").    Weight as of this encounter: 115.8 kg (255 lb 4.8 oz).        # Financial/Environmental Concerns: none           Subjective:  Interval History: Patient seen and examined this morning. He is doing " better. Denies any chest pain or shortness of breath at rest.     Review of Systems:   As mentioned in subjective.    Patient Active Problem List   Diagnosis    Abnormal electrocardiogram    Cellulitis of left lower extremity    Ischemic cardiomyopathy    CAD, S/P CABG    Paroxysmal atrial fib -- on Eliquis    Class 3 severe obesity due to excess calories with body mass index (BMI) of 40.0 to 44.9 in adult (H)    Chronic kidney disease, stage 3b (H)    Smoker    COPD exacerbation (H)    Venous stasis dermatitis of both lower extremities    PRINCESS (acute kidney injury) (H24)    Acute on chronic systolic CHF    Restless legs syndrome (RLS)       Scheduled Meds:   albumin human  50 g Intravenous Daily    albuterol  2 puff Inhalation 4x Daily    amiodarone  200 mg Oral Daily    apixaban ANTICOAGULANT  5 mg Oral BID    atorvastatin  40 mg Oral QAM    bumetanide  2 mg Oral BID    [Held by provider] carvedilol  1.5625 mg Oral BID w/meals    cyanocobalamin  1,000 mcg Oral Daily    DULoxetine  30 mg Oral QAM    gabapentin  900 mg Oral At Bedtime    hydrALAZINE  10 mg Oral 4x Daily    hydrocortisone   Topical BID    isosorbide mononitrate  180 mg Oral QAM    [Held by provider] lisinopril  5 mg Oral Daily    polyethylene glycol  17 g Oral Daily    potassium chloride ER  20 mEq Oral Daily    rOPINIRole  2 mg Oral TID    senna-docusate  1 tablet Oral BID    sodium chloride (PF)  3 mL Intracatheter Q8H    spironolactone  50 mg Oral Daily     Continuous Infusions:   DOPamine 3 mcg/kg/min (01/15/24 0904)     PRN Meds:.acetaminophen **OR** acetaminophen, albuterol, albuterol, sore throat, calcium carbonate, hydrALAZINE, ipratropium - albuterol 0.5 mg/2.5 mg/3 mL, lidocaine 4%, lidocaine (buffered or not buffered), melatonin, menthol-zinc oxide, miconazole, nitroGLYcerin, ondansetron **OR** ondansetron, polyethylene glycol, senna-docusate **OR** senna-docusate, sodium chloride (PF)    Objective:  Vital signs in last 24 hours:  Temp:   [97.5  F (36.4  C)-98.3  F (36.8  C)] 97.7  F (36.5  C)  Pulse:  [77-83] 83  Resp:  [16-20] 16  BP: (130-168)/(58-80) 136/69  SpO2:  [94 %-95 %] 95 %        Intake/Output Summary (Last 24 hours) at 1/11/2024 1644  Last data filed at 1/11/2024 1600  Gross per 24 hour   Intake 2543.96 ml   Output 3775 ml   Net -1231.04 ml       Physical Exam:    General: Not in obvious distress.  HEENT: NC, AT   Chest: Diminished breath sounds bilaterally due to thick chest wall  Heart: S1S2 normal, regular. No M/R/G  Abdomen: Soft. NT, ND. Bowel sounds- active.  Extremities: 2 + leg swelling.   Neuro: Alert and awake, grossly non-focal      Lab Results:(I have personally reviewed the results)    Recent Results (from the past 24 hour(s))   Basic metabolic panel    Collection Time: 01/15/24  4:58 AM   Result Value Ref Range    Sodium 140 135 - 145 mmol/L    Potassium 4.3 3.4 - 5.3 mmol/L    Chloride 95 (L) 98 - 107 mmol/L    Carbon Dioxide (CO2) 31 (H) 22 - 29 mmol/L    Anion Gap 14 7 - 15 mmol/L    Urea Nitrogen 49.3 (H) 8.0 - 23.0 mg/dL    Creatinine 1.45 (H) 0.67 - 1.17 mg/dL    GFR Estimate 53 (L) >60 mL/min/1.73m2    Calcium 11.0 (H) 8.8 - 10.2 mg/dL    Glucose 143 (H) 70 - 99 mg/dL       All laboratory and imaging data in the past 24 hours reviewed  Serum Glucose range:   Recent Labs   Lab 01/15/24  0458 01/14/24  0438 01/13/24  0511 01/12/24  0434   * 146* 159* 110*     ABG: No lab results found in last 7 days.  CBC:   Recent Labs   Lab 01/09/24  0709   WBC 8.2   HGB 12.7*   HCT 42.1   MCV 93        Chemistry:   Recent Labs   Lab 01/15/24  0458 01/14/24  0438 01/13/24  1418 01/13/24  0511 01/10/24  2007 01/10/24  0709    141  --  140   < > 142   POTASSIUM 4.3 3.8 4.0 3.3*   < > 3.3*   CHLORIDE 95* 97*  --  96*   < > 99   CO2 31* 29  --  29   < > 32*   BUN 49.3* 43.9*  --  36.7*   < > 26.5*   CR 1.45* 1.33*  --  1.30*   < > 1.13   GFRESTIMATED 53* 59*  --  61   < > 72   RUSLAN 11.0* 10.7*  --  10.5*   < > 9.2  "  MAG  --   --   --   --   --  2.0    < > = values in this interval not displayed.     Coags:  No results for input(s): \"INR\", \"PROTIME\", \"PTT\" in the last 168 hours.    Invalid input(s): \"APTT\"  Cardiac Markers:  No results for input(s): \"CKTOTAL\", \"TROPONINI\" in the last 168 hours.       US Lower Extremity Venous Duplex Bilateral    Result Date: 1/11/2024  EXAM: US LOWER EXTREMITY VENOUS DUPLEX BILATERAL LOCATION: [facility] DATE: 1/11/2024 INDICATION: Lower extremity swelling. COMPARISON: Ultrasound venous left lower extremity Doppler 02/14/2023. TECHNIQUE: Venous Duplex ultrasound of bilateral lower extremities with and without compression, augmentation and duplex. Color flow and spectral Doppler with waveform analysis performed. FINDINGS: Exam includes the common femoral, femoral, popliteal veins as well as segmentally visualized deep calf veins and greater saphenous vein. RIGHT: No deep vein thrombosis. No superficial thrombophlebitis. No popliteal cyst. LEFT: No deep vein thrombosis. No superficial thrombophlebitis. No popliteal cyst. No significant interval change.     IMPRESSION: 1.  No deep venous thrombosis in the bilateral lower extremities. 2.  No popliteal cyst on either side.    XR Chest 2 Views    Result Date: 1/8/2024  EXAM: XR CHEST 2 VIEWS LOCATION: [facility] DATE: 1/8/2024 INDICATION: Congestive heart failure COMPARISON: AP and lateral views the chest 12/20/2023     IMPRESSION: Left coronary stents. Previous median sternotomy. Cardiac silhouette is normal in size and the vascular pedicle width is normal. Symmetric lung inflation. No interstitial or alveolar opacities. No pleural effusion. Healed fracture deformity of several left upper posterolateral ribs. Intact and aligned sternal wires. Flowing degenerative osteophytes of the thoracic spine.    Echocardiogram Limited    Result Date: 12/25/2023  [id number]^MARCIO^[place]  " [place] [address]  Name: KRISTINE JONES MRN: [id number] : 1960 Study Date: 2023 11:46 AM Age: 63 yrs Gender: Male Patient Location: [facility] Reason For Study: CHF Ordering Physician: TANIA BUCKLEY Performed By: AD  BSA: 2.2 m2 Height: 65 in Weight: 270 lb HR: 79 ______________________________________________________________________________ Procedure Limited Echo Adult. Definity (NDC #53143-276) given intravenously. Technically difficult study. Compared to the prior study dated 2023, there have been no changes. ______________________________________________________________________________ Interpretation Summary  1. Technically very difficult study. 2. Left ventricular chamber size and wall thickness are normal. Systolic function is mildly reduced with global hypokinesis. The calculated left ventricular ejection fraction is 41%. 3. Right ventricle is not well visualized. Right ventricular chamber size and systolic function are grossly normal. 4. No hemodynamically significant valvular abnormalities although the cardiac valves are not well visualized. 5. Compared to the prior study dated 2023, there has been no significant change. ______________________________________________________________________________ I      WMSI = 2.00     % Normal = 0  X - Cannot   0 -                      (2) - Mildly 2 -          Segments  Size Interpret    Hyperkinetic 1 - Normal  Hypokinetic  Hypokinetic  1-2     small                                                    7 -          3-5    moderate 3 - Akinetic 4 -          5 -         6 - Akinetic Dyskinetic   6-14    large              Dyskinetic   Aneurysmal  w/scar       w/scar       15-16   diffuse  Left Ventricle The left ventricle is normal in size. There is normal left ventricular wall thickness. Biplane LVEF is 41%. There is mild global hypokinesia of the left ventricle. Septal motion is consistent with post-operative state.   Right Ventricle The right ventricle is not well visualized. Normal right ventricle size and systolic function.  Atria Normal left atrial size. Right atrium not well visualized.  Mitral Valve The mitral valve leaflets are mildly thickened. There is trace mitral regurgitation. There is no mitral valve stenosis.  Tricuspid Valve The tricuspid valve is not well visualized.  Aortic Valve The aortic valve is not well visualized.  Pulmonic Valve The pulmonic valve is not well visualized.  Vessels The aorta root cannot be assessed. IVC diameter <2.1 cm collapsing >50% with sniff suggests a normal RA pressure of 3 mmHg.  Pericardium There is no pericardial effusion.  Rhythm Sinus rhythm was noted.  ______________________________________________________________________________ MMode/2D Measurements & Calculations IVSd: 0.87 cm LVIDd: 5.4 cm LVIDs: 4.3 cm LVPWd: 0.95 cm  FS: 19.0 % LV mass(C)d: 180.3 grams LV mass(C)dI: 80.2 grams/m2 LA dimension: 4.7 cm LA Volume Index (BP): 30.0 ml/m2 RWT: 0.35  Time Measurements MM HR: 84.0 BPM  ______________________________________________________________________________ Report approved by: Aura Wright 12/25/2023 01:14 PM       Chest XR,  PA & LAT    Result Date: 12/22/2023  EXAM: XR CHEST 2 VIEWS LOCATION: [facility] DATE: 12/22/2023 INDICATION: dyspnea COMPARISON: 12/14/2023     IMPRESSION: Stable chest with again seen hyperinflation of both lungs. Postoperative changes sternotomy. Old healed left rib fractures. Mild to moderate scattered degenerative changes the bony skeleton.    XR Chest 2 Views    Result Date: 12/14/2023  EXAM: XR CHEST 2 VIEWS LOCATION: [facility] DATE: 12/14/2023 INDICATION: Dyspnea. COMPARISON: 09/08/2023.     IMPRESSION: Sternotomy wires. Cardiomegaly with normal pulmonary vascularity. The lungs are clear. Old left rib fractures. No change from previous.    CT Head w/o Contrast    Result Date:  12/14/2023  EXAM: CT HEAD W/O CONTRAST LOCATION: [facility] DATE: 12/14/2023 INDICATION: head injury COMPARISON: None. TECHNIQUE: Routine CT Head without IV contrast. Multiplanar reformats. Dose reduction techniques were used. FINDINGS: INTRACRANIAL CONTENTS: No intracranial hemorrhage, extraaxial collection, or mass effect.  No CT evidence of acute infarct. Mild presumed chronic small vessel ischemic changes. Moderate generalized volume loss. No hydrocephalus. Calcified intracranial atherosclerosis. VISUALIZED ORBITS/SINUSES/MASTOIDS: No intraorbital abnormality. Mild mucosal thickening scattered about the paranasal sinuses. No middle ear or mastoid effusion. BONES/SOFT TISSUES: No acute abnormality.     IMPRESSION: 1.  No evidence of acute traumatic intracranial abnormality. 2.  Calcified intracranial atherosclerosis with changes suggestive of mild chronic microvascular ischemic disease. 3.  Moderate generalized parenchymal atrophy.      Latest radiology report personally reviewed.    Note created using dragon voice recognition software so sounds alike errors may have escaped editing.      01/15/2024   Martin Ch MD  Hospitalist, [facility]  Pager: [phone]

## 2024-01-15 NOTE — PLAN OF CARE
Problem: Fluid Volume Excess  Goal: Fluid Balance  Outcome: Progressing   Goal Outcome Evaluation:       Pt is AOX4 and denies pain. RA. Pt denies SOB. Tele NSR. RT visited pt this evening to set up overnight oximetry study for overnight. Pt is using urinal. 1500 FR. Dopamine and Lasix gtt running as ordered. Pt able to make needs known.

## 2024-01-15 NOTE — PROGRESS NOTES
HEART CARE NOTE          Assessment/Recommendations   1. Severe Biventricular dysfunction/HFrEF c/b cardiogenic shock  Assessment / Plan  Transition to oral diuretic regimen and continue to hold ACE-I; continue albumin boluses; continue to monitor UOP, renal function  Continue dopamine to augment cardiac output and promote renal perfusion  Patient is high risk for adverse cardiac events 2/2 severe biventricular dysfunction, multiple HFHs, non-compliance with dietary restrictions  GDMT as detailed below; repeat echo ordered     Current Pharmacotherapy AHA Guideline-Directed Medical Therapy   Lisinopril 5 mg daily  - held Lisinopril 20 mg twice daily   Carvedilol 1.5625 mg BID - held Carvedilol 25 mg twice daily   Spironolactone 25 mg daily Spironolactone 25 mg once daily   Hydralazine NA Hydralazine 100 mg three times daily   Isosorbide mononitrate 180 mg daily Isosorbide dinitrate 40 mg three times daily   SGLT2 inhibitor:Dapagliflozin/Empagliflozin - not started Dapagliflozin or Empagliflozin 10 mg daily      2. Atrial fibrillation  Assessment / Plan  Currently on apixaban and amiodarone  Will need afib clinic referral on discharge     3. CAD c/b ICM  Assessment / Plan  S/p CABG 2015; now s/p repeat coronary angiogram - no intervention indicated  Denies chest pain or anginal equivalents; Continue atorvastatin, carvedilol, lisinopril and atorvastatin     4. PRINCESS on CKD  Assessment / Plan  Resolving - continue to monitor UOP and renal functio closely; diuresis as above    Plan of care discussed on January 15, 2024 with patient at bedside, and primary team overseeing patient's care    50 minutes spent reviewing prior records (including documentation, laboratory studies, cardiac testing/imaging), history and physical exam, planning, and subsequent documentation.      History of Present Illness/Subjective    Mr. Zackary Hutchison is a 62 year old male with a PMHx significant for CAD s/p CABG, afib, HFrEF and CKD who presents  "to in ADHF     Today, Mr. Hutchison denies acute cardiac events or complaints; Management plan as detailed above      ECG: personally reviewed; normal sinus rhythm, RBBB.     ECHO (personnaly Reviewed on 9/11/23):   1.Left ventricular function is decreased. The ejection fraction is 35-40%  (moderately reduced).  2.Septal motion is consistent with post-operative state.  3.There is borderline apical wall hypokinesis.  4.Mildly decreased right ventricular systolic function  5.No hemodynamically significant valvular abnormalities on 2D or color flow  imaging.  6.There is borderline aortic root enlargement. The ascending aorta is  Borderline dilated.  7.The study was technically difficult.  There is no comparison study available    Lab results: personally reviewed January 15, 2024; notable for PRINCESS    Medical history and pertinent documents reviewed in Care Everywhere please where applicable see details above        Physical Examination Review of Systems   /62 (BP Location: Left arm)   Pulse 81   Temp 97.5  F (36.4  C) (Oral)   Resp 20   Ht 1.676 m (5' 6\")   Wt 115.8 kg (255 lb 4.8 oz)   SpO2 94%   BMI 41.21 kg/m    Body mass index is 41.21 kg/m .  Wt Readings from Last 3 Encounters:   01/15/24 115.8 kg (255 lb 4.8 oz)   12/27/23 118.3 kg (260 lb 12.8 oz)   11/28/23 127.5 kg (281 lb)     General Appearance:   no distress, normal body habitus   ENT/Mouth: membranes moist, no oral lesions or bleeding gums.      EYES:  no scleral icterus, normal conjunctivae   Neck: no carotid bruits or thyromegaly   Chest/Lungs:   lungs are clear to auscultation, no rales or wheezing, equal chest wall expansion    Cardiovascular:   Regular. Normal first and second heart sounds with no murmurs, rubs, or gallops; the carotid, radial and posterior tibial pulses are intact, + JVD and LE edema bilaterally    Abdomen:  no organomegaly, masses, bruits, or tenderness; bowel sounds are present   Extremities: no cyanosis or clubbing   Skin: " no xanthelasma, warm.    Neurologic: NAD     Psychiatric: alert and oriented x3, calm     A complete 10 systems ROS was reviewed  And is negative except what is listed in the HPI.          Medical History  Surgical History Family History Social History   Past Medical History:   Diagnosis Date    Chronic kidney disease, stage 3b (H) 09/28/2023    Class 3 severe obesity due to excess calories with body mass index (BMI) of 40.0 to 44.9 in adult (H) 09/28/2023    Congestive heart failure (H)     COPD (chronic obstructive pulmonary disease) (H)     Coronary artery disease involving coronary bypass graft of native heart without angina pectoris 09/28/2023    Heart failure with reduced ejection fraction (H) 09/11/2023    Hyperlipidemia     Hypertension     Ischemic cardiomyopathy 09/28/2023    Obese     Paroxysmal atrial fibrillation (H) 09/28/2023    Tobacco abuse 09/28/2023    Past Surgical History:   Procedure Laterality Date    CORONARY ANGIOGRAPHY ADULT ORDER      CORONARY ARTERY BYPASS Left 2014    2 vessels    CV CORONARY ANGIOGRAM N/A 09/11/2023    Procedure: Coronary Angiogram;  Surgeon: Santy Esposito MD;  Location: [facility] CATH LAB CV    CV LEFT HEART CATH N/A 09/11/2023    Procedure: Left Heart Catheterization;  Surgeon: Santy Esposito MD;  Location: [facility] CATH LAB CV    CV RIGHT HEART CATH MEASUREMENTS RECORDED N/A 09/11/2023    Procedure: Right Heart Catheterization;  Surgeon: Santy Esposito MD;  Location: [facility] CATH LAB CV    no family history of premature coronary artery disease Social History     Socioeconomic History    Marital status: Single     Spouse name: Not on file    Number of children: Not on file    Years of education: Not on file    Highest education level: Not on file   Occupational History    Occupation: Retired restaurant owner     Comment: [employer]   Tobacco Use    Smoking status: Every Day     Packs/day: .5     Types: Cigarettes    Smokeless tobacco: Never    Tobacco comments:  "    Seen IP by [facility] on 9/11/23 and declined cessation services and materials   Substance and Sexual Activity    Alcohol use: Not Currently    Drug use: Not on file    Sexual activity: Not on file   Other Topics Concern    Not on file   Social History Narrative    Currently lives in an assisted living facility. (Updated: 01/08/2024)     Social Determinants of Health     Financial Resource Strain: Not on file   Food Insecurity: Not on file   Transportation Needs: Not on file   Physical Activity: Not on file   Stress: Not on file   Social Connections: Not on file   Interpersonal Safety: Not on file   Housing Stability: Not on file           Lab Results    Chemistry/lipid CBC Cardiac Enzymes/BNP/TSH/INR   Lab Results   Component Value Date    BUN 49.3 (H) 01/15/2024     01/15/2024    CO2 31 (H) 01/15/2024    Lab Results   Component Value Date    WBC 8.2 01/09/2024    HGB 12.7 (L) 01/09/2024    HCT 42.1 01/09/2024    MCV 93 01/09/2024     01/09/2024    Lab Results   Component Value Date    INR 1.01 02/13/2023     No results found for: \"CKTOTAL\", \"CKMB\", \"TROPONINI\"       Weight:    Wt Readings from Last 3 Encounters:   01/15/24 115.8 kg (255 lb 4.8 oz)   12/27/23 118.3 kg (260 lb 12.8 oz)   11/28/23 127.5 kg (281 lb)       Allergies  No Known Allergies      Surgical History  Past Surgical History:   Procedure Laterality Date    CORONARY ANGIOGRAPHY ADULT ORDER      CORONARY ARTERY BYPASS Left 2014    2 vessels    CV CORONARY ANGIOGRAM N/A 09/11/2023    Procedure: Coronary Angiogram;  Surgeon: Santy Esposito MD;  Location: [facility] CATH LAB CV    CV LEFT HEART CATH N/A 09/11/2023    Procedure: Left Heart Catheterization;  Surgeon: Santy Esposito MD;  Location: [facility] CATH LAB CV    CV RIGHT HEART CATH MEASUREMENTS RECORDED N/A 09/11/2023    Procedure: Right Heart Catheterization;  Surgeon: Santy Esposito MD;  Location: [facility] CATH LAB CV       Social History  Tobacco:   History   Smoking Status "    Every Day    Packs/day: 0.50    Types: Cigarettes   Smokeless Tobacco    Never    Alcohol:   Social History    Substance and Sexual Activity      Alcohol use: Not Currently   Illicit Drugs:   History   Drug Use Not on file       Family History  Family History   Problem Relation Age of Onset    Cerebrovascular Disease Mother     Myocardial Infarction Father           Jacey Bhandari MD on 1/15/2024      cc: Reid Escobar

## 2024-01-16 ENCOUNTER — APPOINTMENT (OUTPATIENT)
Dept: PHYSICAL THERAPY | Facility: HOSPITAL | Age: 64
DRG: 291 | End: 2024-01-16
Payer: MEDICARE

## 2024-01-16 LAB
ANION GAP SERPL CALCULATED.3IONS-SCNC: 16 MMOL/L (ref 7–15)
BUN SERPL-MCNC: 55.8 MG/DL (ref 8–23)
CALCIUM SERPL-MCNC: 10.7 MG/DL (ref 8.8–10.2)
CHLORIDE SERPL-SCNC: 96 MMOL/L (ref 98–107)
CREAT SERPL-MCNC: 1.47 MG/DL (ref 0.67–1.17)
DEPRECATED HCO3 PLAS-SCNC: 26 MMOL/L (ref 22–29)
EGFRCR SERPLBLD CKD-EPI 2021: 53 ML/MIN/1.73M2
GLUCOSE SERPL-MCNC: 165 MG/DL (ref 70–99)
POTASSIUM SERPL-SCNC: 4.1 MMOL/L (ref 3.4–5.3)
SODIUM SERPL-SCNC: 138 MMOL/L (ref 135–145)

## 2024-01-16 PROCEDURE — 250N000013 HC RX MED GY IP 250 OP 250 PS 637: Performed by: INTERNAL MEDICINE

## 2024-01-16 PROCEDURE — 250N000011 HC RX IP 250 OP 636: Performed by: INTERNAL MEDICINE

## 2024-01-16 PROCEDURE — 80048 BASIC METABOLIC PNL TOTAL CA: CPT | Performed by: INTERNAL MEDICINE

## 2024-01-16 PROCEDURE — 99233 SBSQ HOSP IP/OBS HIGH 50: CPT | Performed by: INTERNAL MEDICINE

## 2024-01-16 PROCEDURE — 210N000001 HC R&B IMCU HEART CARE

## 2024-01-16 PROCEDURE — P9047 ALBUMIN (HUMAN), 25%, 50ML: HCPCS | Performed by: INTERNAL MEDICINE

## 2024-01-16 PROCEDURE — 97530 THERAPEUTIC ACTIVITIES: CPT | Mod: GP | Performed by: PHYSICAL THERAPIST

## 2024-01-16 PROCEDURE — 97116 GAIT TRAINING THERAPY: CPT | Mod: GP | Performed by: PHYSICAL THERAPIST

## 2024-01-16 PROCEDURE — 99232 SBSQ HOSP IP/OBS MODERATE 35: CPT | Performed by: STUDENT IN AN ORGANIZED HEALTH CARE EDUCATION/TRAINING PROGRAM

## 2024-01-16 PROCEDURE — 36415 COLL VENOUS BLD VENIPUNCTURE: CPT | Performed by: INTERNAL MEDICINE

## 2024-01-16 RX ADMIN — ROPINIROLE HYDROCHLORIDE 2 MG: 1 TABLET, FILM COATED ORAL at 20:00

## 2024-01-16 RX ADMIN — SENNOSIDES AND DOCUSATE SODIUM 1 TABLET: 8.6; 5 TABLET ORAL at 08:56

## 2024-01-16 RX ADMIN — DULOXETINE HYDROCHLORIDE 30 MG: 30 CAPSULE, DELAYED RELEASE ORAL at 08:54

## 2024-01-16 RX ADMIN — HYDRALAZINE HYDROCHLORIDE 10 MG: 10 TABLET, FILM COATED ORAL at 13:45

## 2024-01-16 RX ADMIN — HYDRALAZINE HYDROCHLORIDE 10 MG: 10 TABLET, FILM COATED ORAL at 17:57

## 2024-01-16 RX ADMIN — DOPAMINE HYDROCHLORIDE 3 MCG/KG/MIN: 160 INJECTION, SOLUTION INTRAVENOUS at 05:12

## 2024-01-16 RX ADMIN — ISOSORBIDE MONONITRATE 180 MG: 60 TABLET, EXTENDED RELEASE ORAL at 08:53

## 2024-01-16 RX ADMIN — ALBUMIN HUMAN 50 G: 0.25 SOLUTION INTRAVENOUS at 08:47

## 2024-01-16 RX ADMIN — HYDRALAZINE HYDROCHLORIDE 10 MG: 10 TABLET, FILM COATED ORAL at 08:55

## 2024-01-16 RX ADMIN — APIXABAN 5 MG: 5 TABLET, FILM COATED ORAL at 08:56

## 2024-01-16 RX ADMIN — BUMETANIDE 2 MG: 2 TABLET ORAL at 08:54

## 2024-01-16 RX ADMIN — BUMETANIDE 2 MG: 2 TABLET ORAL at 17:57

## 2024-01-16 RX ADMIN — AMIODARONE HYDROCHLORIDE 200 MG: 200 TABLET ORAL at 08:56

## 2024-01-16 RX ADMIN — SENNOSIDES AND DOCUSATE SODIUM 1 TABLET: 8.6; 5 TABLET ORAL at 20:00

## 2024-01-16 RX ADMIN — HYDRALAZINE HYDROCHLORIDE 10 MG: 10 TABLET, FILM COATED ORAL at 20:00

## 2024-01-16 RX ADMIN — ACETAMINOPHEN 650 MG: 325 TABLET ORAL at 08:45

## 2024-01-16 RX ADMIN — POLYETHYLENE GLYCOL 3350 17 G: 17 POWDER, FOR SOLUTION ORAL at 08:51

## 2024-01-16 RX ADMIN — POLYETHYLENE GLYCOL 3350 17 G: 17 POWDER, FOR SOLUTION ORAL at 17:57

## 2024-01-16 RX ADMIN — ROPINIROLE HYDROCHLORIDE 2 MG: 1 TABLET, FILM COATED ORAL at 08:53

## 2024-01-16 RX ADMIN — APIXABAN 5 MG: 5 TABLET, FILM COATED ORAL at 20:00

## 2024-01-16 RX ADMIN — ATORVASTATIN CALCIUM 40 MG: 40 TABLET, FILM COATED ORAL at 08:55

## 2024-01-16 RX ADMIN — ROPINIROLE HYDROCHLORIDE 2 MG: 1 TABLET, FILM COATED ORAL at 13:45

## 2024-01-16 RX ADMIN — SPIRONOLACTONE 50 MG: 25 TABLET, FILM COATED ORAL at 08:55

## 2024-01-16 RX ADMIN — CYANOCOBALAMIN TAB 1000 MCG 1000 MCG: 1000 TAB at 08:56

## 2024-01-16 RX ADMIN — GABAPENTIN 900 MG: 300 CAPSULE ORAL at 22:12

## 2024-01-16 ASSESSMENT — ACTIVITIES OF DAILY LIVING (ADL)
ADLS_ACUITY_SCORE: 24

## 2024-01-16 NOTE — PLAN OF CARE
Patient is A/O, and has been stable.  Lungs are diminished and patient is on RA.  Patient's Tele was Sinus with inverted T-wave this shift.  Patient reported 6/10 right leg soreness this morning, PRN tylenol resolved that issue.  Patient is a 1500 mL Fluid restriction.  Anticipated discharge to home tomorrow with home RN care.    Heart Failure Care Map  GOALS TO BE MET BEFORE DISCHARGE:    1. Decrease congestion and/or edema with diuretic therapy to achieve near optimal volume status.     Dyspnea improved: No, further care required to meet this goal. Please explain patient  needs to work with PT today.   Edema improved: No, further care required to meet this goal. Please explain Edema improving but is receiving PO bumex.         Last 24 hour I/O:   Intake/Output Summary (Last 24 hours) at 1/16/2024 1246  Last data filed at 1/16/2024 1022  Gross per 24 hour   Intake 1475 ml   Output 1825 ml   Net -350 ml           Net I/O and Weights since admission:   12/17 1500 - 01/16 1459  In: 24179.56 [P.O.:03597; I.V.:1685.56]  Out: 16573 [Urine:71420]  Net: -95023.44     Vitals:    01/09/24 1134 01/10/24 1538 01/11/24 0332 01/12/24 0654   Weight: 120 kg (264 lb 8.8 oz) 118.4 kg (261 lb) 117.9 kg (260 lb) 114.9 kg (253 lb 4.8 oz)    01/13/24 0323 01/14/24 0328 01/15/24 0418 01/16/24 0413   Weight: 115.5 kg (254 lb 9.6 oz) 116 kg (255 lb 12.8 oz) 115.8 kg (255 lb 4.8 oz) 116.4 kg (256 lb 9.6 oz)       2.  O2 sats > 90% on room air, or at prior home O2 therapy level.      Able to wean O2 this shift to keep sats above 90%?: Yes, satisfactory for discharge.   Does patient use Home O2? No          Current oxygenation status:   SpO2: 93 %     O2 Device: None (Room air),      3.  Tolerates ambulation and mobility near baseline.     Ambulation: No, further care required to meet this goal. Please explain patient was tired and reported right leg discomfort, patient needs to try ambulating more.    Times patient ambulated with staff  "this shift: 1    Please review the Heart Failure Care Map for additional HF goal outcomes.    Stefano Rodríguez RN  1/16/2024      Problem: Adult Inpatient Plan of Care  Goal: Plan of Care Review  Description: The Plan of Care Review/Shift note should be completed every shift.  The Outcome Evaluation is a brief statement about your assessment that the patient is improving, declining, or no change.  This information will be displayed automatically on your shift  note.  Outcome: Progressing  Goal: Patient-Specific Goal (Individualized)  Description: You can add care plan individualizations to a care plan. Examples of Individualization might be:  \"Parent requests to be called daily at 9am for status\", \"I have a hard time hearing out of my right ear\", or \"Do not touch me to wake me up as it startles  me\".  Outcome: Progressing  Goal: Absence of Hospital-Acquired Illness or Injury  Outcome: Progressing  Intervention: Identify and Manage Fall Risk  Recent Flowsheet Documentation  Taken 1/16/2024 1209 by Stefano Rodríguez, RN  Safety Promotion/Fall Prevention:   supervised activity   safety round/check completed   room organization consistent   room near nurse's station   patient and family education   nonskid shoes/slippers when out of bed   mobility aid in reach   lighting adjusted   activity supervised   assistive device/personal items within reach  Taken 1/16/2024 0845 by Stefano Rodríguez, RN  Safety Promotion/Fall Prevention:   supervised activity   safety round/check completed   room organization consistent   room near nurse's station   patient and family education   nonskid shoes/slippers when out of bed   mobility aid in reach   lighting adjusted   activity supervised   assistive device/personal items within reach  Intervention: Prevent and Manage VTE (Venous Thromboembolism) Risk  Recent Flowsheet Documentation  Taken 1/16/2024 1209 by Stefano Rodríguez, RN  VTE Prevention/Management: SCDs (sequential compression " devices) off  Taken 1/16/2024 0845 by Stefano Rodríguez RN  VTE Prevention/Management: SCDs (sequential compression devices) off  Intervention: Prevent Infection  Recent Flowsheet Documentation  Taken 1/16/2024 1209 by Stefano Rodríguez, RN  Infection Prevention:   environmental surveillance performed   equipment surfaces disinfected   hand hygiene promoted   personal protective equipment utilized   rest/sleep promoted   single patient room provided   visitors restricted/screened  Taken 1/16/2024 0845 by Stefano Rodríguez, RN  Infection Prevention:   environmental surveillance performed   equipment surfaces disinfected   hand hygiene promoted   personal protective equipment utilized   rest/sleep promoted   single patient room provided   visitors restricted/screened  Goal: Optimal Comfort and Wellbeing  Outcome: Progressing  Intervention: Monitor Pain and Promote Comfort  Recent Flowsheet Documentation  Taken 1/16/2024 1023 by Stefano Rodríguez, RN  Pain Management Interventions: pillow support provided  Taken 1/16/2024 0845 by Stefano Rodríguez, RN  Pain Management Interventions: medication (see MAR)    Otto Rodríguez RN

## 2024-01-16 NOTE — PLAN OF CARE
"  Problem: Adult Inpatient Plan of Care  Goal: Plan of Care Review  Description: The Plan of Care Review/Shift note should be completed every shift.  The Outcome Evaluation is a brief statement about your assessment that the patient is improving, declining, or no change.  This information will be displayed automatically on your shift  note.  Outcome: Progressing  Goal: Patient-Specific Goal (Individualized)  Description: You can add care plan individualizations to a care plan. Examples of Individualization might be:  \"Parent requests to be called daily at 9am for status\", \"I have a hard time hearing out of my right ear\", or \"Do not touch me to wake me up as it startles  me\".  Outcome: Progressing  Goal: Absence of Hospital-Acquired Illness or Injury  Outcome: Progressing  Intervention: Identify and Manage Fall Risk  Recent Flowsheet Documentation  Taken 1/15/2024 2000 by Khalida Moncada RN  Safety Promotion/Fall Prevention:   activity supervised   clutter free environment maintained   mobility aid in reach   nonskid shoes/slippers when out of bed  Taken 1/15/2024 1600 by Khalida Moncada RN  Safety Promotion/Fall Prevention:   activity supervised   clutter free environment maintained   mobility aid in reach   nonskid shoes/slippers when out of bed  Intervention: Prevent Skin Injury  Recent Flowsheet Documentation  Taken 1/15/2024 2000 by Khalida Moncada RN  Body Position: position changed independently  Taken 1/15/2024 1600 by Khalida Moncada RN  Body Position: position changed independently  Goal: Optimal Comfort and Wellbeing  Outcome: Progressing  Goal: Readiness for Transition of Care  Outcome: Progressing     Problem: Heart Failure  Goal: Optimal Coping  Outcome: Progressing  Goal: Optimal Cardiac Output  Outcome: Progressing  Goal: Stable Heart Rate and Rhythm  Outcome: Progressing  Goal: Optimal Functional Ability  Outcome: Progressing  Intervention: Optimize Functional Ability  Recent Flowsheet " Documentation  Taken 1/15/2024 2000 by Khalida Moncada RN  Activity Management: activity adjusted per tolerance  Taken 1/15/2024 1600 by Khalida Moncada RN  Activity Management: activity adjusted per tolerance  Goal: Fluid and Electrolyte Balance  Outcome: Progressing  Goal: Improved Oral Intake  Outcome: Progressing  Goal: Effective Oxygenation and Ventilation  Outcome: Progressing  Intervention: Promote Airway Secretion Clearance  Recent Flowsheet Documentation  Taken 1/15/2024 2000 by Khalida Moncada RN  Cough And Deep Breathing: done independently per patient  Activity Management: activity adjusted per tolerance  Taken 1/15/2024 1600 by Khalida Moncada RN  Cough And Deep Breathing: done independently per patient  Activity Management: activity adjusted per tolerance  Intervention: Optimize Oxygenation and Ventilation  Recent Flowsheet Documentation  Taken 1/15/2024 2000 by Khalida Moncada RN  Head of Bed (HOB) Positioning: HOB flat  Taken 1/15/2024 1600 by Khalida Moncada RN  Head of Bed (HOB) Positioning: HOB flat  Goal: Effective Breathing Pattern During Sleep  Outcome: Progressing  Intervention: Monitor and Manage Obstructive Sleep Apnea  Recent Flowsheet Documentation  Taken 1/15/2024 2000 by Khalida Moncada RN  Medication Review/Management: medications reviewed  Taken 1/15/2024 1600 by Khalida Moncada RN  Medication Review/Management: medications reviewed     Problem: Comorbidity Management  Goal: Maintenance of COPD Symptom Control  Outcome: Progressing  Intervention: Maintain COPD Symptom Control  Recent Flowsheet Documentation  Taken 1/15/2024 2000 by Khalida Moncada RN  Medication Review/Management: medications reviewed  Taken 1/15/2024 1600 by Khalida Moncada RN  Medication Review/Management: medications reviewed  Goal: Maintenance of Heart Failure Symptom Control  Outcome: Progressing  Intervention: Maintain Heart Failure Management  Recent Flowsheet Documentation  Taken 1/15/2024 2000 by  Khalida Moncada, RN  Medication Review/Management: medications reviewed  Taken 1/15/2024 1600 by Khalida Moncada RN  Medication Review/Management: medications reviewed  Goal: Blood Pressure in Desired Range  Outcome: Progressing  Intervention: Maintain Blood Pressure Management  Recent Flowsheet Documentation  Taken 1/15/2024 2000 by Khalida Moncada RN  Medication Review/Management: medications reviewed  Taken 1/15/2024 1600 by Khalida Moncada RN  Medication Review/Management: medications reviewed     Problem: Fluid Volume Excess  Goal: Fluid Balance  Outcome: Progressing   Goal Outcome Evaluation:  Pt is alert and oriented, and uses the call light appropriately.  Pt's tele is SR w/ BBB w/ inverted T wave.  Pt is up with 1 assist with gait belt and walker.  Pt eats, drinks, and takes pills without incident.  Pt does adhere to fluid restriction, and will check with staff prior to asking for more fluids.  Pt is voiding without incident, but LBM was 1/9/24.  Pt is taking bowel medications to help with constipation.  Pt continues on Dopamine gtt through peripheral line, no need for central line per sticky note in chart from 1/12.  Line is currently patent, and was monitored closely through out shift.  Pt also continues on Potassium protocol, recheck next AM.  Pt can discharge home with home care when off drips and cleared by cardiology.

## 2024-01-16 NOTE — PROGRESS NOTES
HEART CARE NOTE          Assessment/Recommendations   1. Severe Biventricular dysfunction/HFrEF c/b cardiogenic shock  Assessment / Plan  Tolerating oral diuretic regimen - no changes at this time; continue to hold ACE-I; continue albumin boluses; continue to monitor UOP, renal function  Wean dopamine as tolerated  Patient is high risk for adverse cardiac events 2/2 severe biventricular dysfunction, multiple HFHs, non-compliance with dietary restrictions  GDMT as detailed below; repeat echo ordered     Current Pharmacotherapy AHA Guideline-Directed Medical Therapy   Lisinopril 5 mg daily  - held Lisinopril 20 mg twice daily   Carvedilol 1.5625 mg BID - held Carvedilol 25 mg twice daily   Spironolactone 25 mg daily Spironolactone 25 mg once daily   Hydralazine NA Hydralazine 100 mg three times daily   Isosorbide mononitrate 180 mg daily Isosorbide dinitrate 40 mg three times daily   SGLT2 inhibitor:Dapagliflozin/Empagliflozin - not started Dapagliflozin or Empagliflozin 10 mg daily      2. Atrial fibrillation  Assessment / Plan  Currently on apixaban and amiodarone  Will need afib clinic referral on discharge     3. CAD c/b ICM  Assessment / Plan  S/p CABG 2015; now s/p repeat coronary angiogram - no intervention indicated  Denies chest pain or anginal equivalents; Continue atorvastatin, carvedilol, lisinopril and atorvastatin     4. PRINCESS on CKD  Assessment / Plan  Stable renal function; continue to monitor UOP and renal functio closely; diuresis as above    Plan of care discussed on January 16, 2024 with patient at bedside, and primary team overseeing patient's care    50 minutes spent reviewing prior records (including documentation, laboratory studies, cardiac testing/imaging), history and physical exam, planning, and subsequent documentation.    Cardiology team will sign-off for now. Please do not hesitate to consult us again if new questions or concerns arise. Follow-up appointment will be arranged by CORE/HF  "clinic.     History of Present Illness/Subjective    Mr. Zackary Hutchison is a 62 year old male with a PMHx significant for CAD s/p CABG, afib, HFrEF and CKD who presents to in [facility]     Today, Mr. Hutchison denies acute cardiac events or complaints; Management plan as detailed above      ECG: personally reviewed; normal sinus rhythm, RBBB.     ECHO (personnaly Reviewed on 9/11/23):   1.Left ventricular function is decreased. The ejection fraction is 35-40%  (moderately reduced).  2.Septal motion is consistent with post-operative state.  3.There is borderline apical wall hypokinesis.  4.Mildly decreased right ventricular systolic function  5.No hemodynamically significant valvular abnormalities on 2D or color flow  imaging.  6.There is borderline aortic root enlargement. The ascending aorta is  Borderline dilated.  7.The study was technically difficult.  There is no comparison study available    Lab results: personally reviewed January 16, 2024; notable for stable crt    Medical history and pertinent documents reviewed in Care Everywhere please where applicable see details above        Physical Examination Review of Systems   /68 (BP Location: Left arm, Patient Position: Sitting, Cuff Size: Adult Regular)   Pulse 83   Temp 98.2  F (36.8  C) (Oral)   Resp 20   Ht 1.676 m (5' 6\")   Wt 116.4 kg (256 lb 9.6 oz)   SpO2 95%   BMI 41.42 kg/m    Body mass index is 41.42 kg/m .  Wt Readings from Last 3 Encounters:   01/16/24 116.4 kg (256 lb 9.6 oz)   12/27/23 118.3 kg (260 lb 12.8 oz)   11/28/23 127.5 kg (281 lb)     General Appearance:   no distress, normal body habitus   ENT/Mouth: membranes moist, no oral lesions or bleeding gums.      EYES:  no scleral icterus, normal conjunctivae   Neck: no carotid bruits or thyromegaly   Chest/Lungs:   lungs are clear to auscultation, no rales or wheezing, equal chest wall expansion    Cardiovascular:   Regular. Normal first and second heart sounds with no murmurs, rubs, or " gallops; the carotid, radial and posterior tibial pulses are intact, + JVD and trace-mild LE edema bilaterally    Abdomen:  no organomegaly, masses, bruits, or tenderness; bowel sounds are present   Extremities: no cyanosis or clubbing   Skin: no xanthelasma, warm.    Neurologic: NAD     Psychiatric: alert and oriented x3, calm     A complete 10 systems ROS was reviewed  And is negative except what is listed in the HPI.          Medical History  Surgical History Family History Social History   Past Medical History:   Diagnosis Date    Chronic kidney disease, stage 3b (H) 09/28/2023    Class 3 severe obesity due to excess calories with body mass index (BMI) of 40.0 to 44.9 in adult (H) 09/28/2023    Congestive heart failure (H)     COPD (chronic obstructive pulmonary disease) (H)     Coronary artery disease involving coronary bypass graft of native heart without angina pectoris 09/28/2023    Heart failure with reduced ejection fraction (H) 09/11/2023    Hyperlipidemia     Hypertension     Ischemic cardiomyopathy 09/28/2023    Obese     Paroxysmal atrial fibrillation (H) 09/28/2023    Tobacco abuse 09/28/2023    Past Surgical History:   Procedure Laterality Date    CORONARY ANGIOGRAPHY ADULT ORDER      CORONARY ARTERY BYPASS Left 2014    2 vessels    CV CORONARY ANGIOGRAM N/A 09/11/2023    Procedure: Coronary Angiogram;  Surgeon: Santy Esposito MD;  Location: [facility] CATH LAB CV    CV LEFT HEART CATH N/A 09/11/2023    Procedure: Left Heart Catheterization;  Surgeon: Santy Esposito MD;  Location: [facility] CATH LAB CV    CV RIGHT HEART CATH MEASUREMENTS RECORDED N/A 09/11/2023    Procedure: Right Heart Catheterization;  Surgeon: Santy Esposito MD;  Location: [facility] CATH LAB CV    no family history of premature coronary artery disease Social History     Socioeconomic History    Marital status: Single     Spouse name: Not on file    Number of children: Not on file    Years of education: Not on file    Highest  "education level: Not on file   Occupational History    Occupation: Retired restaurant owner     Comment: [employer]   Tobacco Use    Smoking status: Every Day     Packs/day: .5     Types: Cigarettes    Smokeless tobacco: Never    Tobacco comments:     Seen IP by [facility] on 9/11/23 and declined cessation services and materials   Substance and Sexual Activity    Alcohol use: Not Currently    Drug use: Not on file    Sexual activity: Not on file   Other Topics Concern    Not on file   Social History Narrative    Currently lives in an assisted living facility. (Updated: 01/08/2024)     Social Determinants of Health     Financial Resource Strain: Not on file   Food Insecurity: Not on file   Transportation Needs: Not on file   Physical Activity: Not on file   Stress: Not on file   Social Connections: Not on file   Interpersonal Safety: Not on file   Housing Stability: Not on file           Lab Results    Chemistry/lipid CBC Cardiac Enzymes/BNP/TSH/INR   Lab Results   Component Value Date    BUN 55.8 (H) 01/16/2024     01/16/2024    CO2 26 01/16/2024    Lab Results   Component Value Date    WBC 8.2 01/09/2024    HGB 12.7 (L) 01/09/2024    HCT 42.1 01/09/2024    MCV 93 01/09/2024     01/09/2024    Lab Results   Component Value Date    INR 1.01 02/13/2023     No results found for: \"CKTOTAL\", \"CKMB\", \"TROPONINI\"       Weight:    Wt Readings from Last 3 Encounters:   01/16/24 116.4 kg (256 lb 9.6 oz)   12/27/23 118.3 kg (260 lb 12.8 oz)   11/28/23 127.5 kg (281 lb)       Allergies  No Known Allergies      Surgical History  Past Surgical History:   Procedure Laterality Date    CORONARY ANGIOGRAPHY ADULT ORDER      CORONARY ARTERY BYPASS Left 2014    2 vessels    CV CORONARY ANGIOGRAM N/A 09/11/2023    Procedure: Coronary Angiogram;  Surgeon: Santy Esposito MD;  Location: [facility] CATH LAB CV    CV LEFT HEART CATH N/A 09/11/2023    Procedure: Left Heart Catheterization;  Surgeon: Santy Esposito MD;  [facility]: [facility] CATH LAB CV    CV RIGHT HEART CATH MEASUREMENTS RECORDED N/A 09/11/2023    Procedure: Right Heart Catheterization;  Surgeon: Santy Esposito MD;  Location: [facility] CATH LAB CV       Social History  Tobacco:   History   Smoking Status    Every Day    Packs/day: 0.50    Types: Cigarettes   Smokeless Tobacco    Never    Alcohol:   Social History    Substance and Sexual Activity      Alcohol use: Not Currently   Illicit Drugs:   History   Drug Use Not on file       Family History  Family History   Problem Relation Age of Onset    Cerebrovascular Disease Mother     Myocardial Infarction Father           Jacey Bhandari MD on 1/16/2024      cc: Reid Escobar

## 2024-01-16 NOTE — PROGRESS NOTES
"Daily Progress Note        CODE STATUS:  Full Code    01/11/24  Assessment/Plan:  63 year old male with PMH of hypertension, hyperlipidemia, obesity, CKD-3b, CAD, s/p CABG (10 years ago), ischemic cardiomyopathy with EF 41%, admitted on 1/8/2024 for acute on chronic systolic CHF.     #Acute on chronic HFmrEF  -- Appreciate cardiology consult. Diuresed per cardilogy with lasix infusion. Dopamine infusion added 1/11 for augmentation but now off  -- Albumin daily per cardiology  -- Diuresing well. Lasix gtt stopped today. Started on PO bumex 3mg BID, now 2mg BID  -- Monitor I&O, daily weights and renal function closely while being diuresed               #CAD, S/P CABG x 2 vessels in 2014     #Paroxysmal atrial fib -- on Eliquis  -- Currently in NSR     #Tobacco use  -- Needs to quit, does not want nicotine replacement  -- Will give info on discharge     #COPD      #PRINCESS (acute kidney injury) (H24)   -- Monitor creatinine closely while on IV diuresis     #Restless legs syndrome (RLS)   -- Con home med Ropinirole 3x daily prn      #Constipation  -- NO BM x 5 days  -- Start miralax and senna-s     DVT Prophylaxis: DOAC  Code Status: Full Code      Disposition; Home in 1-2 days  Barrier to discharge; Clinical progress.     Clinically Significant Risk Factors           # Hypercalcemia: Highest Ca = 11 mg/dL in last 2 days, will monitor as appropriate           # Acute heart failure with reduced ejection fraction: last echo with EF <40% and receiving IV diuretics       # Severe Obesity: Estimated body mass index is 41.42 kg/m  as calculated from the following:    Height as of this encounter: 1.676 m (5' 6\").    Weight as of this encounter: 116.4 kg (256 lb 9.6 oz).        # Financial/Environmental Concerns: none           Subjective:  Feeling fine this morning, no new concerns.    Patient Active Problem List   Diagnosis    Abnormal electrocardiogram    Cellulitis of left lower extremity    Ischemic cardiomyopathy    CAD, S/P " CABG    Paroxysmal atrial fib -- on Eliquis    Class 3 severe obesity due to excess calories with body mass index (BMI) of 40.0 to 44.9 in adult (H)    Chronic kidney disease, stage 3b (H)    Smoker    COPD exacerbation (H)    Venous stasis dermatitis of both lower extremities    PRINCESS (acute kidney injury) (H24)    Acute on chronic systolic CHF    Restless legs syndrome (RLS)       Scheduled Meds:   albumin human  50 g Intravenous Daily    albuterol  2 puff Inhalation 4x Daily    amiodarone  200 mg Oral Daily    apixaban ANTICOAGULANT  5 mg Oral BID    atorvastatin  40 mg Oral QAM    bumetanide  2 mg Oral BID    [Held by provider] carvedilol  1.5625 mg Oral BID w/meals    cyanocobalamin  1,000 mcg Oral Daily    DULoxetine  30 mg Oral QAM    gabapentin  900 mg Oral At Bedtime    hydrALAZINE  10 mg Oral 4x Daily    hydrocortisone   Topical BID    isosorbide mononitrate  180 mg Oral QAM    [Held by provider] lisinopril  5 mg Oral Daily    polyethylene glycol  17 g Oral Daily    rOPINIRole  2 mg Oral TID    senna-docusate  1 tablet Oral BID    sodium chloride (PF)  3 mL Intracatheter Q8H    spironolactone  50 mg Oral Daily     Continuous Infusions:      PRN Meds:.acetaminophen **OR** acetaminophen, albuterol, albuterol, sore throat, calcium carbonate, hydrALAZINE, ipratropium - albuterol 0.5 mg/2.5 mg/3 mL, lidocaine 4%, lidocaine (buffered or not buffered), melatonin, menthol-zinc oxide, miconazole, nitroGLYcerin, ondansetron **OR** ondansetron, polyethylene glycol, senna-docusate **OR** senna-docusate, sodium chloride (PF)    Objective:  Vital signs in last 24 hours:  Temp:  [97.7  F (36.5  C)-98.4  F (36.9  C)] 98  F (36.7  C)  Pulse:  [83-88] 87  Resp:  [16-20] 20  BP: (113-155)/(58-74) 113/58  SpO2:  [93 %-95 %] 93 %        Intake/Output Summary (Last 24 hours) at 1/11/2024 1644  Last data filed at 1/11/2024 1600  Gross per 24 hour   Intake 2543.96 ml   Output 3775 ml   Net -1231.04 ml       Physical  "Exam:    General: Not in obvious distress.  HEENT: NC, AT   Chest: Diminished breath sounds bilaterally due to thick chest wall  Heart: S1S2 normal, regular. No M/R/G  Abdomen: Soft. NT, ND. Bowel sounds- active.  Extremities: 2 + leg swelling.   Neuro: Alert and awake, grossly non-focal      Lab Results:(I have personally reviewed the results)    Recent Results (from the past 24 hour(s))   Basic metabolic panel    Collection Time: 01/16/24  4:29 AM   Result Value Ref Range    Sodium 138 135 - 145 mmol/L    Potassium 4.1 3.4 - 5.3 mmol/L    Chloride 96 (L) 98 - 107 mmol/L    Carbon Dioxide (CO2) 26 22 - 29 mmol/L    Anion Gap 16 (H) 7 - 15 mmol/L    Urea Nitrogen 55.8 (H) 8.0 - 23.0 mg/dL    Creatinine 1.47 (H) 0.67 - 1.17 mg/dL    GFR Estimate 53 (L) >60 mL/min/1.73m2    Calcium 10.7 (H) 8.8 - 10.2 mg/dL    Glucose 165 (H) 70 - 99 mg/dL       All laboratory and imaging data in the past 24 hours reviewed  Serum Glucose range:   Recent Labs   Lab 01/16/24  0429 01/15/24  0458 01/14/24  0438 01/13/24  0511   * 143* 146* 159*     ABG: No lab results found in last 7 days.  CBC:   No results for input(s): \"WBC\", \"HGB\", \"HCT\", \"MCV\", \"PLT\", \"NEUTROPHIL\", \"LYMPH\", \"MONOCYTE\", \"EOSINOPHIL\" in the last 168 hours.    Chemistry:   Recent Labs   Lab 01/16/24  0429 01/15/24  0458 01/14/24  0438 01/10/24  2007 01/10/24  0709    140 141   < > 142   POTASSIUM 4.1 4.3 3.8   < > 3.3*   CHLORIDE 96* 95* 97*   < > 99   CO2 26 31* 29   < > 32*   BUN 55.8* 49.3* 43.9*   < > 26.5*   CR 1.47* 1.45* 1.33*   < > 1.13   GFRESTIMATED 53* 53* 59*   < > 72   RUSLAN 10.7* 11.0* 10.7*   < > 9.2   MAG  --   --   --   --  2.0    < > = values in this interval not displayed.     Coags:  No results for input(s): \"INR\", \"PROTIME\", \"PTT\" in the last 168 hours.    Invalid input(s): \"APTT\"  Cardiac Markers:  No results for input(s): \"CKTOTAL\", \"TROPONINI\" in the last 168 hours.        Lower Extremity Venous Duplex Bilateral    Result Date: " 2024  EXAM: US LOWER EXTREMITY VENOUS DUPLEX BILATERAL LOCATION: [facility] DATE: 2024 INDICATION: Lower extremity swelling. COMPARISON: Ultrasound venous left lower extremity Doppler 2023. TECHNIQUE: Venous Duplex ultrasound of bilateral lower extremities with and without compression, augmentation and duplex. Color flow and spectral Doppler with waveform analysis performed. FINDINGS: Exam includes the common femoral, femoral, popliteal veins as well as segmentally visualized deep calf veins and greater saphenous vein. RIGHT: No deep vein thrombosis. No superficial thrombophlebitis. No popliteal cyst. LEFT: No deep vein thrombosis. No superficial thrombophlebitis. No popliteal cyst. No significant interval change.     IMPRESSION: 1.  No deep venous thrombosis in the bilateral lower extremities. 2.  No popliteal cyst on either side.    XR Chest 2 Views    Result Date: 2024  EXAM: XR CHEST 2 VIEWS LOCATION: [facility] DATE: 2024 INDICATION: Congestive heart failure COMPARISON: AP and lateral views the chest 2023     IMPRESSION: Left coronary stents. Previous median sternotomy. Cardiac silhouette is normal in size and the vascular pedicle width is normal. Symmetric lung inflation. No interstitial or alveolar opacities. No pleural effusion. Healed fracture deformity of several left upper posterolateral ribs. Intact and aligned sternal wires. Flowing degenerative osteophytes of the thoracic spine.    Echocardiogram Limited    Result Date: 2023  [id number]^MARCIO^[place] [address]  Name: KRISTINE JONES MRN: [id number] : 1960 Study Date: 2023 11:46 AM Age: 63 yrs Gender: Male Patient Location: [facility] Reason For Study: CHF Ordering Physician: TANIA BUCKLEY Performed By: GALE  BSA: 2.2 m2 Height: 65 in Weight: 270 lb HR: 79  ______________________________________________________________________________ Procedure Limited Echo Adult. Definity (NDC #07823-289) given intravenously. Technically difficult study. Compared to the prior study dated 9/9/2023, there have been no changes. ______________________________________________________________________________ Interpretation Summary  1. Technically very difficult study. 2. Left ventricular chamber size and wall thickness are normal. Systolic function is mildly reduced with global hypokinesis. The calculated left ventricular ejection fraction is 41%. 3. Right ventricle is not well visualized. Right ventricular chamber size and systolic function are grossly normal. 4. No hemodynamically significant valvular abnormalities although the cardiac valves are not well visualized. 5. Compared to the prior study dated 9/9/2023, there has been no significant change. ______________________________________________________________________________ I      WMSI = 2.00     % Normal = 0  X - Cannot   0 -                      (2) - Mildly 2 -          Segments  Size Interpret    Hyperkinetic 1 - Normal  Hypokinetic  Hypokinetic  1-2     small                                                    7 -          3-5    moderate 3 - Akinetic 4 -          5 -         6 - Akinetic Dyskinetic   6-14    large              Dyskinetic   Aneurysmal  w/scar       w/scar       15-16   diffuse  Left Ventricle The left ventricle is normal in size. There is normal left ventricular wall thickness. Biplane LVEF is 41%. There is mild global hypokinesia of the left ventricle. Septal motion is consistent with post-operative state.  Right Ventricle The right ventricle is not well visualized. Normal right ventricle size and systolic function.  Atria Normal left atrial size. Right atrium not well visualized.  Mitral Valve The mitral valve leaflets are mildly thickened. There is trace mitral regurgitation. There is no mitral valve stenosis.   Tricuspid Valve The tricuspid valve is not well visualized.  Aortic Valve The aortic valve is not well visualized.  Pulmonic Valve The pulmonic valve is not well visualized.  Vessels The aorta root cannot be assessed. IVC diameter <2.1 cm collapsing >50% with sniff suggests a normal RA pressure of 3 mmHg.  Pericardium There is no pericardial effusion.  Rhythm Sinus rhythm was noted.  ______________________________________________________________________________ MMode/2D Measurements & Calculations IVSd: 0.87 cm LVIDd: 5.4 cm LVIDs: 4.3 cm LVPWd: 0.95 cm  FS: 19.0 % LV mass(C)d: 180.3 grams LV mass(C)dI: 80.2 grams/m2 LA dimension: 4.7 cm LA Volume Index (BP): 30.0 ml/m2 RWT: 0.35  Time Measurements MM HR: 84.0 BPM  ______________________________________________________________________________ Report approved by: Aura Wright 12/25/2023 01:14 PM       Chest XR,  PA & LAT    Result Date: 12/22/2023  EXAM: XR CHEST 2 VIEWS LOCATION: [facility] DATE: 12/22/2023 INDICATION: dyspnea COMPARISON: 12/14/2023     IMPRESSION: Stable chest with again seen hyperinflation of both lungs. Postoperative changes sternotomy. Old healed left rib fractures. Mild to moderate scattered degenerative changes the bony skeleton.    XR Chest 2 Views    Result Date: 12/14/2023  EXAM: XR CHEST 2 VIEWS LOCATION: [facility] DATE: 12/14/2023 INDICATION: Dyspnea. COMPARISON: 09/08/2023.     IMPRESSION: Sternotomy wires. Cardiomegaly with normal pulmonary vascularity. The lungs are clear. Old left rib fractures. No change from previous.    CT Head w/o Contrast    Result Date: 12/14/2023  EXAM: CT HEAD W/O CONTRAST LOCATION: [facility] DATE: 12/14/2023 INDICATION: head injury COMPARISON: None. TECHNIQUE: Routine CT Head without IV contrast. Multiplanar reformats. Dose reduction techniques were used. FINDINGS: INTRACRANIAL CONTENTS: No intracranial hemorrhage, extraaxial  collection, or mass effect.  No CT evidence of acute infarct. Mild presumed chronic small vessel ischemic changes. Moderate generalized volume loss. No hydrocephalus. Calcified intracranial atherosclerosis. VISUALIZED ORBITS/SINUSES/MASTOIDS: No intraorbital abnormality. Mild mucosal thickening scattered about the paranasal sinuses. No middle ear or mastoid effusion. BONES/SOFT TISSUES: No acute abnormality.     IMPRESSION: 1.  No evidence of acute traumatic intracranial abnormality. 2.  Calcified intracranial atherosclerosis with changes suggestive of mild chronic microvascular ischemic disease. 3.  Moderate generalized parenchymal atrophy.      Latest radiology report personally reviewed.

## 2024-01-16 NOTE — PLAN OF CARE
Problem: Comorbidity Management  Goal: Maintenance of COPD Symptom Control  Outcome: Progressing  Intervention: Maintain COPD Symptom Control  Recent Flowsheet Documentation  Taken 1/15/2024 2328 by Ronan Brody RN  Supportive Measures: active listening utilized  Medication Review/Management: medications reviewed  Goal: Maintenance of Heart Failure Symptom Control  Outcome: Progressing  Intervention: Maintain Heart Failure Management  Recent Flowsheet Documentation  Taken 1/15/2024 2328 by Ronan Brody RN  Medication Review/Management: medications reviewed  Goal: Blood Pressure in Desired Range  Outcome: Progressing  Intervention: Maintain Blood Pressure Management  Recent Flowsheet Documentation  Taken 1/15/2024 2328 by Ronan Brody RN  Medication Review/Management: medications reviewed   Goal Outcome Evaluation:       Pt is alert & oriented x4. On RA. VSS. Tele: SR w/ BBB. Lung sounds clear. On Dopamine gtt at 13.3ml/hr. PIV site WNL. Pt is able to make needs known. Call light is within reach.

## 2024-01-16 NOTE — PROGRESS NOTES
Care Management Follow Up    Length of Stay (days): 8    Expected Discharge Date: 01/17/2024     Concerns to be Addressed:     Discharge planning  Patient plan of care discussed at interdisciplinary rounds: Yes    Anticipated Discharge Disposition:  Return to independent apartment at [facility]     Anticipated Discharge Services:  Home RN w/ [facility]  Anticipated Discharge DME:  has a 4WW    Patient/family educated on Medicare website which has current facility and service quality ratings:    Education Provided on the Discharge Plan:  yes  Patient/Family in Agreement with the Plan:  yes    Referrals Placed by CM/SW:    Private pay costs discussed: Not applicable    Additional Information:  SW met with patient at bedside today. He plans to return to his independent apartment at discharge and is agreeable to continued skilled RN visits with [facility] home care.  He declines home PT. Pt reports he is otherwise independent with all self cares at home.  Patient will likely need medical transport at discharge.    Naz Beverly, JTSW

## 2024-01-17 ENCOUNTER — APPOINTMENT (OUTPATIENT)
Dept: PHYSICAL THERAPY | Facility: HOSPITAL | Age: 64
DRG: 291 | End: 2024-01-17
Payer: MEDICARE

## 2024-01-17 LAB
ANION GAP SERPL CALCULATED.3IONS-SCNC: 12 MMOL/L (ref 7–15)
BUN SERPL-MCNC: 56.4 MG/DL (ref 8–23)
CALCIUM SERPL-MCNC: 10.7 MG/DL (ref 8.8–10.2)
CHLORIDE SERPL-SCNC: 97 MMOL/L (ref 98–107)
CREAT SERPL-MCNC: 1.49 MG/DL (ref 0.67–1.17)
DEPRECATED HCO3 PLAS-SCNC: 28 MMOL/L (ref 22–29)
EGFRCR SERPLBLD CKD-EPI 2021: 52 ML/MIN/1.73M2
FLUAV RNA SPEC QL NAA+PROBE: NEGATIVE
FLUBV RNA RESP QL NAA+PROBE: NEGATIVE
GLUCOSE SERPL-MCNC: 167 MG/DL (ref 70–99)
POTASSIUM SERPL-SCNC: 3.9 MMOL/L (ref 3.4–5.3)
RSV RNA SPEC NAA+PROBE: NEGATIVE
SARS-COV-2 RNA RESP QL NAA+PROBE: NEGATIVE
SODIUM SERPL-SCNC: 137 MMOL/L (ref 135–145)

## 2024-01-17 PROCEDURE — 250N000013 HC RX MED GY IP 250 OP 250 PS 637: Performed by: INTERNAL MEDICINE

## 2024-01-17 PROCEDURE — 36415 COLL VENOUS BLD VENIPUNCTURE: CPT | Performed by: INTERNAL MEDICINE

## 2024-01-17 PROCEDURE — 250N000011 HC RX IP 250 OP 636: Performed by: INTERNAL MEDICINE

## 2024-01-17 PROCEDURE — 80048 BASIC METABOLIC PNL TOTAL CA: CPT | Performed by: INTERNAL MEDICINE

## 2024-01-17 PROCEDURE — 87637 SARSCOV2&INF A&B&RSV AMP PRB: CPT | Performed by: HOSPITALIST

## 2024-01-17 PROCEDURE — 250N000013 HC RX MED GY IP 250 OP 250 PS 637: Performed by: HOSPITALIST

## 2024-01-17 PROCEDURE — 97116 GAIT TRAINING THERAPY: CPT | Mod: GP

## 2024-01-17 PROCEDURE — 250N000013 HC RX MED GY IP 250 OP 250 PS 637: Performed by: STUDENT IN AN ORGANIZED HEALTH CARE EDUCATION/TRAINING PROGRAM

## 2024-01-17 PROCEDURE — 210N000001 HC R&B IMCU HEART CARE

## 2024-01-17 PROCEDURE — P9047 ALBUMIN (HUMAN), 25%, 50ML: HCPCS | Performed by: INTERNAL MEDICINE

## 2024-01-17 PROCEDURE — 99232 SBSQ HOSP IP/OBS MODERATE 35: CPT | Performed by: STUDENT IN AN ORGANIZED HEALTH CARE EDUCATION/TRAINING PROGRAM

## 2024-01-17 RX ORDER — LACTULOSE 10 G/15ML
20 SOLUTION ORAL 2 TIMES DAILY
Status: DISCONTINUED | OUTPATIENT
Start: 2024-01-17 | End: 2024-01-18 | Stop reason: HOSPADM

## 2024-01-17 RX ORDER — CARVEDILOL 3.12 MG/1
3.12 TABLET ORAL 2 TIMES DAILY WITH MEALS
Status: DISCONTINUED | OUTPATIENT
Start: 2024-01-17 | End: 2024-01-18 | Stop reason: HOSPADM

## 2024-01-17 RX ORDER — AMOXICILLIN 250 MG
2 CAPSULE ORAL 2 TIMES DAILY
Status: DISCONTINUED | OUTPATIENT
Start: 2024-01-17 | End: 2024-01-18 | Stop reason: HOSPADM

## 2024-01-17 RX ADMIN — SENNOSIDES AND DOCUSATE SODIUM 1 TABLET: 8.6; 5 TABLET ORAL at 09:48

## 2024-01-17 RX ADMIN — SALINE NASAL SPRAY 1 SPRAY: 1.5 SOLUTION NASAL at 19:47

## 2024-01-17 RX ADMIN — BUMETANIDE 2 MG: 2 TABLET ORAL at 17:12

## 2024-01-17 RX ADMIN — GABAPENTIN 900 MG: 300 CAPSULE ORAL at 21:39

## 2024-01-17 RX ADMIN — ALBUTEROL SULFATE 2 PUFF: 90 AEROSOL, METERED RESPIRATORY (INHALATION) at 21:39

## 2024-01-17 RX ADMIN — APIXABAN 5 MG: 5 TABLET, FILM COATED ORAL at 09:47

## 2024-01-17 RX ADMIN — SALINE NASAL SPRAY 1 SPRAY: 1.5 SOLUTION NASAL at 11:02

## 2024-01-17 RX ADMIN — ISOSORBIDE MONONITRATE 180 MG: 60 TABLET, EXTENDED RELEASE ORAL at 09:42

## 2024-01-17 RX ADMIN — APIXABAN 5 MG: 5 TABLET, FILM COATED ORAL at 19:45

## 2024-01-17 RX ADMIN — HYDRALAZINE HYDROCHLORIDE 10 MG: 10 TABLET, FILM COATED ORAL at 12:32

## 2024-01-17 RX ADMIN — Medication 1 LOZENGE: at 00:10

## 2024-01-17 RX ADMIN — ROPINIROLE HYDROCHLORIDE 2 MG: 1 TABLET, FILM COATED ORAL at 09:42

## 2024-01-17 RX ADMIN — SENNOSIDES AND DOCUSATE SODIUM 2 TABLET: 8.6; 5 TABLET ORAL at 12:32

## 2024-01-17 RX ADMIN — AMIODARONE HYDROCHLORIDE 200 MG: 200 TABLET ORAL at 09:48

## 2024-01-17 RX ADMIN — HYDROCORTISONE: 1 CREAM TOPICAL at 21:39

## 2024-01-17 RX ADMIN — ROPINIROLE HYDROCHLORIDE 2 MG: 1 TABLET, FILM COATED ORAL at 21:40

## 2024-01-17 RX ADMIN — DULOXETINE HYDROCHLORIDE 30 MG: 30 CAPSULE, DELAYED RELEASE ORAL at 09:47

## 2024-01-17 RX ADMIN — ALBUMIN HUMAN 50 G: 0.25 SOLUTION INTRAVENOUS at 09:43

## 2024-01-17 RX ADMIN — POLYETHYLENE GLYCOL 3350 17 G: 17 POWDER, FOR SOLUTION ORAL at 09:41

## 2024-01-17 RX ADMIN — SENNOSIDES AND DOCUSATE SODIUM 2 TABLET: 8.6; 5 TABLET ORAL at 21:40

## 2024-01-17 RX ADMIN — POLYETHYLENE GLYCOL 3350 17 G: 17 POWDER, FOR SOLUTION ORAL at 12:31

## 2024-01-17 RX ADMIN — HYDRALAZINE HYDROCHLORIDE 10 MG: 10 TABLET, FILM COATED ORAL at 17:12

## 2024-01-17 RX ADMIN — CARVEDILOL 3.12 MG: 3.12 TABLET, FILM COATED ORAL at 17:12

## 2024-01-17 RX ADMIN — SALINE NASAL SPRAY 1 SPRAY: 1.5 SOLUTION NASAL at 01:30

## 2024-01-17 RX ADMIN — ACETAMINOPHEN 650 MG: 325 TABLET ORAL at 09:40

## 2024-01-17 RX ADMIN — SALINE NASAL SPRAY 1 SPRAY: 1.5 SOLUTION NASAL at 12:35

## 2024-01-17 RX ADMIN — ATORVASTATIN CALCIUM 40 MG: 40 TABLET, FILM COATED ORAL at 09:47

## 2024-01-17 RX ADMIN — HYDRALAZINE HYDROCHLORIDE 10 MG: 10 TABLET, FILM COATED ORAL at 21:40

## 2024-01-17 RX ADMIN — SALINE NASAL SPRAY 1 SPRAY: 1.5 SOLUTION NASAL at 04:49

## 2024-01-17 RX ADMIN — LACTULOSE 20 G: 10 SOLUTION ORAL at 17:12

## 2024-01-17 RX ADMIN — ROPINIROLE HYDROCHLORIDE 2 MG: 1 TABLET, FILM COATED ORAL at 13:57

## 2024-01-17 RX ADMIN — HYDRALAZINE HYDROCHLORIDE 10 MG: 10 TABLET, FILM COATED ORAL at 09:48

## 2024-01-17 RX ADMIN — SPIRONOLACTONE 50 MG: 25 TABLET, FILM COATED ORAL at 09:46

## 2024-01-17 RX ADMIN — ALBUTEROL SULFATE 2 PUFF: 90 AEROSOL, METERED RESPIRATORY (INHALATION) at 12:33

## 2024-01-17 RX ADMIN — BUMETANIDE 2 MG: 2 TABLET ORAL at 09:48

## 2024-01-17 RX ADMIN — CYANOCOBALAMIN TAB 1000 MCG 1000 MCG: 1000 TAB at 09:48

## 2024-01-17 ASSESSMENT — ACTIVITIES OF DAILY LIVING (ADL)
ADLS_ACUITY_SCORE: 24

## 2024-01-17 NOTE — PLAN OF CARE
Occupational Therapy Discharge Summary    Reason for therapy discharge:    Discharged to home with home therapy.    Progress towards therapy goal(s). See goals on Care Plan in [facility] electronic health record for goal details.  Goals not met.  Barriers to achieving goals:   Pt does not want OT at this time.    Therapy recommendation(s):    No further therapy is recommended.Pt is not wanting further OT, Pt is safe to return home w/family support

## 2024-01-17 NOTE — PLAN OF CARE
"Goal Outcome Evaluation:                    Patient alert an oriented.  Denied pain.  He reported slight shortness of breath at rest.  Cough drop given for sore throat and MD updated about new congestion.  Saline spray ordered for congestions, which he said helped a little, and viral COVID/RSV/Flu A B swab collected and sent to lab.  Patient tested negative for any viruses.  Patient resting, will continue to monitor.      Problem: Adult Inpatient Plan of Care  Goal: Plan of Care Review  Description: The Plan of Care Review/Shift note should be completed every shift.  The Outcome Evaluation is a brief statement about your assessment that the patient is improving, declining, or no change.  This information will be displayed automatically on your shift  note.  Outcome: Progressing  Goal: Patient-Specific Goal (Individualized)  Description: You can add care plan individualizations to a care plan. Examples of Individualization might be:  \"Parent requests to be called daily at 9am for status\", \"I have a hard time hearing out of my right ear\", or \"Do not touch me to wake me up as it startles  me\".  Outcome: Progressing  Goal: Absence of Hospital-Acquired Illness or Injury  Outcome: Progressing  Intervention: Identify and Manage Fall Risk  Recent Flowsheet Documentation  Taken 1/17/2024 0000 by Julianna Darden RN  Safety Promotion/Fall Prevention:   activity supervised   nonskid shoes/slippers when out of bed   clutter free environment maintained   safety round/check completed  Intervention: Prevent Skin Injury  Recent Flowsheet Documentation  Taken 1/17/2024 0347 by Julianna Darden RN  Body Position: position changed independently  Taken 1/17/2024 0000 by Julianna Darden RN  Body Position: position changed independently  Intervention: Prevent Infection  Recent Flowsheet Documentation  Taken 1/17/2024 0000 by Julianna Darden RN  Infection Prevention: hand hygiene promoted  Goal: Optimal Comfort and " Wellbeing  Outcome: Progressing  Goal: Readiness for Transition of Care  Outcome: Progressing

## 2024-01-17 NOTE — PLAN OF CARE
Goal Outcome Evaluation:      Plan of Care Reviewed With: patient      Pt alert and oriented times 4. Pt had high BP. Gave scheduled hydralazine twice which brought BP down. He has been 94-97 % on RA. Pt denied any pain. Emphasized on fluid restriction which pt was not happy to hear it. Pt voiding without any problem. Pt on K protocol. Recheck lab in am per protocol. Tele NSR with BBB and inverted T wave. Pt has not have bowel movement for 5 days per pt. Gave prn miralax and senna. He still has not have any BM. Passing gas , bowel sound present.

## 2024-01-17 NOTE — PLAN OF CARE
"  Patient is A/O.  Reported 6/10 right leg pain, PRN tylenol resolved this.  Patient has been stable.  Tele was Sinus with BBB.  Patient has not had a BM since 1/9.  MD notified, scheduled miralax and senna given.  PRN miralax and senna given.  Patient is anticipated to discharge to home with home medical care.    Problem: Adult Inpatient Plan of Care  Goal: Plan of Care Review  Description: The Plan of Care Review/Shift note should be completed every shift.  The Outcome Evaluation is a brief statement about your assessment that the patient is improving, declining, or no change.  This information will be displayed automatically on your shift  note.  Outcome: Progressing  Goal: Patient-Specific Goal (Individualized)  Description: You can add care plan individualizations to a care plan. Examples of Individualization might be:  \"Parent requests to be called daily at 9am for status\", \"I have a hard time hearing out of my right ear\", or \"Do not touch me to wake me up as it startles  me\".  Outcome: Progressing  Goal: Absence of Hospital-Acquired Illness or Injury  Outcome: Progressing  Intervention: Identify and Manage Fall Risk  Recent Flowsheet Documentation  Taken 1/17/2024 1237 by Stefano Rodríguez, RN  Safety Promotion/Fall Prevention:   clutter free environment maintained   increased rounding and observation   lighting adjusted   mobility aid in reach   nonskid shoes/slippers when out of bed   patient and family education   room near nurse's station   room organization consistent   safety round/check completed  Taken 1/17/2024 0954 by Stefano Rodríguez, RN  Safety Promotion/Fall Prevention:   clutter free environment maintained   increased rounding and observation   lighting adjusted   mobility aid in reach   nonskid shoes/slippers when out of bed   patient and family education   room near nurse's station   room organization consistent   safety round/check completed  Intervention: Prevent and Manage VTE (Venous " Thromboembolism) Risk  Recent Flowsheet Documentation  Taken 1/17/2024 1237 by Stefano Rodríguez, RN  VTE Prevention/Management: SCDs (sequential compression devices) off  Taken 1/17/2024 0954 by Stefano Rodríguez RN  VTE Prevention/Management: SCDs (sequential compression devices) off  Intervention: Prevent Infection  Recent Flowsheet Documentation  Taken 1/17/2024 1237 by Stefano Rodríguez, RN  Infection Prevention: hand hygiene promoted  Taken 1/17/2024 0954 by Stefano Rodríguez RN  Infection Prevention: hand hygiene promoted  Goal: Optimal Comfort and Wellbeing  Outcome: Progressing  Intervention: Monitor Pain and Promote Comfort  Recent Flowsheet Documentation  Taken 1/17/2024 0940 by Stefano Rodríguez, RN  Pain Management Interventions: medication (see MAR)     Otto Rodríguez RN

## 2024-01-17 NOTE — PROGRESS NOTES
Care Management Follow Up    Length of Stay (days): 9    Expected Discharge Date: 01/18/2024     Concerns to be Addressed:       Patient plan of care discussed at interdisciplinary rounds: Yes    Anticipated Discharge Disposition:  [facility].      Anticipated Discharge Services:  Home RN  Anticipated Discharge DME:  4WW    Referrals Placed by CM/SW:    Private pay costs discussed: Not applicable    Additional Information:  Likely here one more day per MD.  Patient will discharge with resumption of home RN with [facility].  He uses a 4 wheeled walker at baseline, he declines home PT.  He will return to [facility].     BHARATH Casillas

## 2024-01-17 NOTE — PROGRESS NOTES
"Daily Progress Note        CODE STATUS:  Full Code    01/11/24  Assessment/Plan:  63 year old male with PMH of hypertension, hyperlipidemia, obesity, CKD-3b, CAD, s/p CABG (10 years ago), ischemic cardiomyopathy with EF 41%, admitted on 1/8/2024 for acute on chronic systolic CHF.     #Acute on chronic HF with moderately reduced EF  -- Appreciate cardiology consult. Diuresed per cardilogy with lasix infusion. Dopamine infusion added 1/11 for augmentation but now off  -- Albumin daily per cardiology  -- Diuresing well. Lasix gtt stopped. Started on PO bumex 3mg BID, now 2mg BID  -- Monitor I&O, daily weights and renal function closely while being diuresed               #PRINCESS on CKD  Cr 1.5 from 1.1 from diuresis.  - Monitor with diuresis reduction    #CAD, S/P CABG x 2 vessels in 2014     #Paroxysmal atrial fib -- on Eliquis  -- Currently in NSR     #Tobacco use  -- Needs to quit, does not want nicotine replacement  -- Will give info on discharge     #COPD      #PRINCESS (acute kidney injury) (H24)   -- Monitor creatinine closely while on IV diuresis     #Restless legs syndrome (RLS)   -- Con home med Ropinirole 3x daily prn      #Constipation  -- NO BM x 5 days  -- Start miralax and senna-s     DVT Prophylaxis: DOAC  Code Status: Full Code      Disposition; Tomorrow  Barrier to discharge; Clinical progress.     Clinically Significant Risk Factors           # Hypercalcemia: Highest Ca = 10.7 mg/dL in last 2 days, will monitor as appropriate           # Acute heart failure with reduced ejection fraction: last echo with EF <40% and receiving IV diuretics       # Severe Obesity: Estimated body mass index is 41.79 kg/m  as calculated from the following:    Height as of this encounter: 1.676 m (5' 6\").    Weight as of this encounter: 117.4 kg (258 lb 14.4 oz).        # Financial/Environmental Concerns: none           Subjective:  Feeling fine this morning, no new concerns.    Patient Active Problem List   Diagnosis    Abnormal " electrocardiogram    Cellulitis of left lower extremity    Ischemic cardiomyopathy    CAD, S/P CABG    Paroxysmal atrial fib -- on Eliquis    Class 3 severe obesity due to excess calories with body mass index (BMI) of 40.0 to 44.9 in adult (H)    Chronic kidney disease, stage 3b (H)    Smoker    COPD exacerbation (H)    Venous stasis dermatitis of both lower extremities    PRINCESS (acute kidney injury) (H24)    Acute on chronic systolic CHF    Restless legs syndrome (RLS)       Scheduled Meds:   albumin human  50 g Intravenous Daily    albuterol  2 puff Inhalation 4x Daily    amiodarone  200 mg Oral Daily    apixaban ANTICOAGULANT  5 mg Oral BID    atorvastatin  40 mg Oral QAM    bumetanide  2 mg Oral BID    carvedilol  3.125 mg Oral BID w/meals    cyanocobalamin  1,000 mcg Oral Daily    DULoxetine  30 mg Oral QAM    gabapentin  900 mg Oral At Bedtime    hydrALAZINE  10 mg Oral 4x Daily    hydrocortisone   Topical BID    isosorbide mononitrate  180 mg Oral QAM    [Held by provider] lisinopril  5 mg Oral Daily    polyethylene glycol  17 g Oral Daily    rOPINIRole  2 mg Oral TID    senna-docusate  1 tablet Oral BID    sodium chloride (PF)  3 mL Intracatheter Q8H    spironolactone  50 mg Oral Daily     Continuous Infusions:      PRN Meds:.acetaminophen **OR** acetaminophen, albuterol, albuterol, sore throat, calcium carbonate, hydrALAZINE, ipratropium - albuterol 0.5 mg/2.5 mg/3 mL, lidocaine 4%, lidocaine (buffered or not buffered), melatonin, menthol-zinc oxide, miconazole, nitroGLYcerin, ondansetron **OR** ondansetron, polyethylene glycol, senna-docusate **OR** senna-docusate, sodium chloride, sodium chloride (PF)    Objective:  Vital signs in last 24 hours:  Temp:  [97.4  F (36.3  C)-98.1  F (36.7  C)] 97.9  F (36.6  C)  Pulse:  [92-97] 92  Resp:  [18-22] 18  BP: (133-186)/(65-82) 135/72  SpO2:  [94 %-99 %] 94 %        Intake/Output Summary (Last 24 hours) at 1/11/2024 1644  Last data filed at 1/11/2024 1600  Gross per  "24 hour   Intake 2543.96 ml   Output 3775 ml   Net -1231.04 ml       Physical Exam:    General: Not in obvious distress.  HEENT: NC, AT   Chest: Diminished breath sounds bilaterally due to thick chest wall  Heart: S1S2 normal, regular. No M/R/G  Abdomen: Soft. NT, ND. Bowel sounds- active.  Extremities: 2 + leg swelling.   Neuro: Alert and awake, grossly non-focal      Lab Results:(I have personally reviewed the results)    Recent Results (from the past 24 hour(s))   Asymptomatic Influenza A/B, RSV, & SARS-CoV2 PCR (COVID-19) Nasopharyngeal    Collection Time: 01/17/24  1:28 AM    Specimen: Nasopharyngeal; Swab   Result Value Ref Range    Influenza A PCR Negative Negative    Influenza B PCR Negative Negative    RSV PCR Negative Negative    SARS CoV2 PCR Negative Negative   Basic metabolic panel    Collection Time: 01/17/24  4:41 AM   Result Value Ref Range    Sodium 137 135 - 145 mmol/L    Potassium 3.9 3.4 - 5.3 mmol/L    Chloride 97 (L) 98 - 107 mmol/L    Carbon Dioxide (CO2) 28 22 - 29 mmol/L    Anion Gap 12 7 - 15 mmol/L    Urea Nitrogen 56.4 (H) 8.0 - 23.0 mg/dL    Creatinine 1.49 (H) 0.67 - 1.17 mg/dL    GFR Estimate 52 (L) >60 mL/min/1.73m2    Calcium 10.7 (H) 8.8 - 10.2 mg/dL    Glucose 167 (H) 70 - 99 mg/dL       All laboratory and imaging data in the past 24 hours reviewed  Serum Glucose range:   Recent Labs   Lab 01/17/24  0441 01/16/24  0429 01/15/24  0458 01/14/24  0438   * 165* 143* 146*     ABG: No lab results found in last 7 days.  CBC:   No results for input(s): \"WBC\", \"HGB\", \"HCT\", \"MCV\", \"PLT\", \"NEUTROPHIL\", \"LYMPH\", \"MONOCYTE\", \"EOSINOPHIL\" in the last 168 hours.    Chemistry:   Recent Labs   Lab 01/17/24 0441 01/16/24  0429 01/15/24  0458    138 140   POTASSIUM 3.9 4.1 4.3   CHLORIDE 97* 96* 95*   CO2 28 26 31*   BUN 56.4* 55.8* 49.3*   CR 1.49* 1.47* 1.45*   GFRESTIMATED 52* 53* 53*   RUSLAN 10.7* 10.7* 11.0*     Coags:  No results for input(s): \"INR\", \"PROTIME\", \"PTT\" in the last " "168 hours.    Invalid input(s): \"APTT\"  Cardiac Markers:  No results for input(s): \"CKTOTAL\", \"TROPONINI\" in the last 168 hours.       US Lower Extremity Venous Duplex Bilateral    Result Date: 2024  EXAM: US LOWER EXTREMITY VENOUS DUPLEX BILATERAL LOCATION: [facility] DATE: 2024 INDICATION: Lower extremity swelling. COMPARISON: Ultrasound venous left lower extremity Doppler 2023. TECHNIQUE: Venous Duplex ultrasound of bilateral lower extremities with and without compression, augmentation and duplex. Color flow and spectral Doppler with waveform analysis performed. FINDINGS: Exam includes the common femoral, femoral, popliteal veins as well as segmentally visualized deep calf veins and greater saphenous vein. RIGHT: No deep vein thrombosis. No superficial thrombophlebitis. No popliteal cyst. LEFT: No deep vein thrombosis. No superficial thrombophlebitis. No popliteal cyst. No significant interval change.     IMPRESSION: 1.  No deep venous thrombosis in the bilateral lower extremities. 2.  No popliteal cyst on either side.    XR Chest 2 Views    Result Date: 2024  EXAM: XR CHEST 2 VIEWS LOCATION: [facility] DATE: 2024 INDICATION: Congestive heart failure COMPARISON: AP and lateral views the chest 2023     IMPRESSION: Left coronary stents. Previous median sternotomy. Cardiac silhouette is normal in size and the vascular pedicle width is normal. Symmetric lung inflation. No interstitial or alveolar opacities. No pleural effusion. Healed fracture deformity of several left upper posterolateral ribs. Intact and aligned sternal wires. Flowing degenerative osteophytes of the thoracic spine.    Echocardiogram Limited    Result Date: 2023  [id number]^MARCIO^[place] [address]  Name: KRISTINE JONES MRN: [id number] : 1960 Study Date: 2023 11:46 AM Age: 63 yrs " Gender: Male Patient Location: [facility] Reason For Study: CHF Ordering Physician: TANIA BUCKLEY Performed By: AD  BSA: 2.2 m2 Height: 65 in Weight: 270 lb HR: 79 ______________________________________________________________________________ Procedure Limited Echo Adult. Definity (NDC #40924-444) given intravenously. Technically difficult study. Compared to the prior study dated 9/9/2023, there have been no changes. ______________________________________________________________________________ Interpretation Summary  1. Technically very difficult study. 2. Left ventricular chamber size and wall thickness are normal. Systolic function is mildly reduced with global hypokinesis. The calculated left ventricular ejection fraction is 41%. 3. Right ventricle is not well visualized. Right ventricular chamber size and systolic function are grossly normal. 4. No hemodynamically significant valvular abnormalities although the cardiac valves are not well visualized. 5. Compared to the prior study dated 9/9/2023, there has been no significant change. ______________________________________________________________________________ I      WMSI = 2.00     % Normal = 0  X - Cannot   0 -                      (2) - Mildly 2 -          Segments  Size Interpret    Hyperkinetic 1 - Normal  Hypokinetic  Hypokinetic  1-2     small                                                    7 -          3-5    moderate 3 - Akinetic 4 -          5 -         6 - Akinetic Dyskinetic   6-14    large              Dyskinetic   Aneurysmal  w/scar       w/scar       15-16   diffuse  Left Ventricle The left ventricle is normal in size. There is normal left ventricular wall thickness. Biplane LVEF is 41%. There is mild global hypokinesia of the left ventricle. Septal motion is consistent with post-operative state.  Right Ventricle The right ventricle is not well visualized. Normal right ventricle size and systolic function.  Atria Normal left atrial size. Right  atrium not well visualized.  Mitral Valve The mitral valve leaflets are mildly thickened. There is trace mitral regurgitation. There is no mitral valve stenosis.  Tricuspid Valve The tricuspid valve is not well visualized.  Aortic Valve The aortic valve is not well visualized.  Pulmonic Valve The pulmonic valve is not well visualized.  Vessels The aorta root cannot be assessed. IVC diameter <2.1 cm collapsing >50% with sniff suggests a normal RA pressure of 3 mmHg.  Pericardium There is no pericardial effusion.  Rhythm Sinus rhythm was noted.  ______________________________________________________________________________ MMode/2D Measurements & Calculations IVSd: 0.87 cm LVIDd: 5.4 cm LVIDs: 4.3 cm LVPWd: 0.95 cm  FS: 19.0 % LV mass(C)d: 180.3 grams LV mass(C)dI: 80.2 grams/m2 LA dimension: 4.7 cm LA Volume Index (BP): 30.0 ml/m2 RWT: 0.35  Time Measurements MM HR: 84.0 BPM  ______________________________________________________________________________ Report approved by: Aura Wright 12/25/2023 01:14 PM       Chest XR,  PA & LAT    Result Date: 12/22/2023  EXAM: XR CHEST 2 VIEWS LOCATION: [facility] DATE: 12/22/2023 INDICATION: dyspnea COMPARISON: 12/14/2023     IMPRESSION: Stable chest with again seen hyperinflation of both lungs. Postoperative changes sternotomy. Old healed left rib fractures. Mild to moderate scattered degenerative changes the bony skeleton.    XR Chest 2 Views    Result Date: 12/14/2023  EXAM: XR CHEST 2 VIEWS LOCATION: [facility] DATE: 12/14/2023 INDICATION: Dyspnea. COMPARISON: 09/08/2023.     IMPRESSION: Sternotomy wires. Cardiomegaly with normal pulmonary vascularity. The lungs are clear. Old left rib fractures. No change from previous.    CT Head w/o Contrast    Result Date: 12/14/2023  EXAM: CT HEAD W/O CONTRAST LOCATION: [facility] DATE: 12/14/2023 INDICATION: head injury COMPARISON: None. TECHNIQUE: Routine  CT Head without IV contrast. Multiplanar reformats. Dose reduction techniques were used. FINDINGS: INTRACRANIAL CONTENTS: No intracranial hemorrhage, extraaxial collection, or mass effect.  No CT evidence of acute infarct. Mild presumed chronic small vessel ischemic changes. Moderate generalized volume loss. No hydrocephalus. Calcified intracranial atherosclerosis. VISUALIZED ORBITS/SINUSES/MASTOIDS: No intraorbital abnormality. Mild mucosal thickening scattered about the paranasal sinuses. No middle ear or mastoid effusion. BONES/SOFT TISSUES: No acute abnormality.     IMPRESSION: 1.  No evidence of acute traumatic intracranial abnormality. 2.  Calcified intracranial atherosclerosis with changes suggestive of mild chronic microvascular ischemic disease. 3.  Moderate generalized parenchymal atrophy.      Latest radiology report personally reviewed.

## 2024-01-18 VITALS
RESPIRATION RATE: 18 BRPM | WEIGHT: 260.2 LBS | HEART RATE: 96 BPM | TEMPERATURE: 97.9 F | DIASTOLIC BLOOD PRESSURE: 67 MMHG | HEIGHT: 66 IN | BODY MASS INDEX: 41.82 KG/M2 | OXYGEN SATURATION: 94 % | SYSTOLIC BLOOD PRESSURE: 122 MMHG

## 2024-01-18 LAB
ANION GAP SERPL CALCULATED.3IONS-SCNC: 12 MMOL/L (ref 7–15)
BUN SERPL-MCNC: 57.4 MG/DL (ref 8–23)
CALCIUM SERPL-MCNC: 10.3 MG/DL (ref 8.8–10.2)
CHLORIDE SERPL-SCNC: 96 MMOL/L (ref 98–107)
CREAT SERPL-MCNC: 1.54 MG/DL (ref 0.67–1.17)
DEPRECATED HCO3 PLAS-SCNC: 27 MMOL/L (ref 22–29)
EGFRCR SERPLBLD CKD-EPI 2021: 50 ML/MIN/1.73M2
GLUCOSE SERPL-MCNC: 122 MG/DL (ref 70–99)
POTASSIUM SERPL-SCNC: 4.2 MMOL/L (ref 3.4–5.3)
SODIUM SERPL-SCNC: 135 MMOL/L (ref 135–145)

## 2024-01-18 PROCEDURE — 250N000013 HC RX MED GY IP 250 OP 250 PS 637: Performed by: INTERNAL MEDICINE

## 2024-01-18 PROCEDURE — 99239 HOSP IP/OBS DSCHRG MGMT >30: CPT | Performed by: STUDENT IN AN ORGANIZED HEALTH CARE EDUCATION/TRAINING PROGRAM

## 2024-01-18 PROCEDURE — 250N000011 HC RX IP 250 OP 636: Performed by: INTERNAL MEDICINE

## 2024-01-18 PROCEDURE — 80048 BASIC METABOLIC PNL TOTAL CA: CPT | Performed by: INTERNAL MEDICINE

## 2024-01-18 PROCEDURE — P9047 ALBUMIN (HUMAN), 25%, 50ML: HCPCS | Performed by: INTERNAL MEDICINE

## 2024-01-18 PROCEDURE — 36415 COLL VENOUS BLD VENIPUNCTURE: CPT | Performed by: INTERNAL MEDICINE

## 2024-01-18 PROCEDURE — 250N000013 HC RX MED GY IP 250 OP 250 PS 637: Performed by: STUDENT IN AN ORGANIZED HEALTH CARE EDUCATION/TRAINING PROGRAM

## 2024-01-18 RX ORDER — SPIRONOLACTONE 50 MG/1
50 TABLET, FILM COATED ORAL DAILY
Qty: 90 TABLET | Refills: 3 | Status: ON HOLD | OUTPATIENT
Start: 2024-01-18 | End: 2024-05-20

## 2024-01-18 RX ORDER — SENNOSIDES 8.6 MG
2 TABLET ORAL 2 TIMES DAILY
Qty: 28 TABLET | Refills: 0 | Status: SHIPPED | OUTPATIENT
Start: 2024-01-18 | End: 2024-01-25

## 2024-01-18 RX ORDER — POLYETHYLENE GLYCOL 3350 17 G/17G
17 POWDER, FOR SOLUTION ORAL 2 TIMES DAILY PRN
Qty: 510 G | Refills: 0 | Status: SHIPPED | OUTPATIENT
Start: 2024-01-18

## 2024-01-18 RX ORDER — ROPINIROLE 2 MG/1
2 TABLET, FILM COATED ORAL 3 TIMES DAILY
Qty: 270 TABLET | Refills: 3 | Status: SHIPPED | OUTPATIENT
Start: 2024-01-18 | End: 2024-05-13

## 2024-01-18 RX ORDER — LACTULOSE 10 G/15ML
20 SOLUTION ORAL 2 TIMES DAILY
Qty: 180 ML | Refills: 0 | Status: SHIPPED | OUTPATIENT
Start: 2024-01-18 | End: 2024-01-21

## 2024-01-18 RX ORDER — CARVEDILOL 3.12 MG/1
3.12 TABLET ORAL 2 TIMES DAILY WITH MEALS
Start: 2024-01-18 | End: 2024-03-21

## 2024-01-18 RX ORDER — GABAPENTIN 300 MG/1
900 CAPSULE ORAL AT BEDTIME
Status: ON HOLD
Start: 2024-01-18 | End: 2024-05-08

## 2024-01-18 RX ORDER — ASPIRIN 81 MG/1
81 TABLET ORAL DAILY
Qty: 90 TABLET | Refills: 1 | Status: SHIPPED | OUTPATIENT
Start: 2024-01-18 | End: 2024-05-08

## 2024-01-18 RX ORDER — HYDRALAZINE HYDROCHLORIDE 10 MG/1
10 TABLET, FILM COATED ORAL 3 TIMES DAILY
Qty: 270 TABLET | Refills: 3 | Status: SHIPPED | OUTPATIENT
Start: 2024-01-18

## 2024-01-18 RX ADMIN — DULOXETINE HYDROCHLORIDE 30 MG: 30 CAPSULE, DELAYED RELEASE ORAL at 09:53

## 2024-01-18 RX ADMIN — ROPINIROLE HYDROCHLORIDE 2 MG: 1 TABLET, FILM COATED ORAL at 09:41

## 2024-01-18 RX ADMIN — ROPINIROLE HYDROCHLORIDE 2 MG: 1 TABLET, FILM COATED ORAL at 14:13

## 2024-01-18 RX ADMIN — CYANOCOBALAMIN TAB 1000 MCG 1000 MCG: 1000 TAB at 09:40

## 2024-01-18 RX ADMIN — ALBUMIN HUMAN 50 G: 0.25 SOLUTION INTRAVENOUS at 10:06

## 2024-01-18 RX ADMIN — CARVEDILOL 3.12 MG: 3.12 TABLET, FILM COATED ORAL at 09:39

## 2024-01-18 RX ADMIN — SENNOSIDES AND DOCUSATE SODIUM 2 TABLET: 8.6; 5 TABLET ORAL at 09:40

## 2024-01-18 RX ADMIN — AMIODARONE HYDROCHLORIDE 200 MG: 200 TABLET ORAL at 09:40

## 2024-01-18 RX ADMIN — ATORVASTATIN CALCIUM 40 MG: 40 TABLET, FILM COATED ORAL at 09:40

## 2024-01-18 RX ADMIN — SALINE NASAL SPRAY 1 SPRAY: 1.5 SOLUTION NASAL at 09:47

## 2024-01-18 RX ADMIN — LACTULOSE 20 G: 10 SOLUTION ORAL at 09:46

## 2024-01-18 RX ADMIN — ISOSORBIDE MONONITRATE 180 MG: 60 TABLET, EXTENDED RELEASE ORAL at 09:41

## 2024-01-18 RX ADMIN — HYDRALAZINE HYDROCHLORIDE 10 MG: 10 TABLET, FILM COATED ORAL at 12:52

## 2024-01-18 RX ADMIN — BUMETANIDE 2 MG: 2 TABLET ORAL at 09:39

## 2024-01-18 RX ADMIN — APIXABAN 5 MG: 5 TABLET, FILM COATED ORAL at 09:40

## 2024-01-18 RX ADMIN — SPIRONOLACTONE 50 MG: 25 TABLET, FILM COATED ORAL at 09:40

## 2024-01-18 RX ADMIN — HYDRALAZINE HYDROCHLORIDE 10 MG: 10 TABLET, FILM COATED ORAL at 09:40

## 2024-01-18 ASSESSMENT — ACTIVITIES OF DAILY LIVING (ADL)
ADLS_ACUITY_SCORE: 24

## 2024-01-18 NOTE — PLAN OF CARE
Problem: Adult Inpatient Plan of Care  Goal: Optimal Comfort and Wellbeing  Outcome: Adequate for Care Transition     Problem: Adult Inpatient Plan of Care  Goal: Readiness for Transition of Care  Outcome: Adequate for Care Transition     Pt A/Ox4. Denied pain and SOB. Up with a SBA. VSS. Discharge orders placed this shift per MD. Education completed with pt, no further questions noted. [facility] left unit with pt at 1500 via wheelchair to home with home care. Pt left unit with all discharge prescriptions and belongings.

## 2024-01-18 NOTE — PROGRESS NOTES
Care Management Discharge Note    Discharge Date: 01/18/2024       Discharge [facility]    Discharge Services:  Resume skilled nurse visits from [facility]    Discharge DME:  pt has 4WW present with him    Discharge Transportation: family or friend will provide    Private pay costs discussed: Not applicable    Does the patient's insurance plan have a 3 day qualifying hospital stay waiver?  No    Education Provided on the Discharge Plan:  yes  Persons Notified of Discharge Plans: pt  Patient/Family in Agreement with the Plan:      Handoff Referral Completed: Yes    Additional Information:  Patient has discharge orders.   Discharge to his independent apartment today. Resume skilled nurse visits.   [facility] care liaison notified.   Pt is requesting a wheelchair transport.   Wheelchair ride scheduled for 1:08pm.  Pt updated by phone. RN updated.     BHARATH Casillas

## 2024-01-18 NOTE — PLAN OF CARE
Problem: Heart Failure  Goal: Optimal Coping  Outcome: Progressing     Problem: Fluid Volume Excess  Goal: Fluid Balance  Outcome: Progressing   Goal Outcome Evaluation:       Pt is alert and oriented x 4, denies pain. Standby assist with a walker. RA, vitals are stable. Lung sounds are clear, Fluid restriction of 1500 ml. Lactulose  given at 1712. Due to no BM for 5 days. Positive bowel sounds, passing gas.. Saline spray given as ordered for congestions, SR on tele.   Will continue to monitor pt.

## 2024-01-18 NOTE — PLAN OF CARE
Physical Therapy Discharge Summary    Reason for therapy discharge:    Discharged to home.    Progress towards therapy goal(s). See goals on Care Plan in [facility] electronic health record for goal details.  Goals met    Therapy recommendation(s):    Continued therapy is recommended.  Rationale/Recommendations:  to improve functional endurance-pt declined home PT.

## 2024-01-18 NOTE — PLAN OF CARE
Problem: Heart Failure  Goal: Optimal Coping  Outcome: Progressing     Problem: Fluid Volume Excess  Goal: Fluid Balance  Outcome: Progressing   Goal Outcome Evaluation:

## 2024-01-18 NOTE — PLAN OF CARE
Goal Outcome Evaluation:      Plan of Care Reviewed With: patient    Overall Patient Progress: improving  Problem: Adult Inpatient Plan of Care  Goal: Plan of Care Review  Description: The Plan of Care Review/Shift note should be completed every shift.  The Outcome Evaluation is a brief statement about your assessment that the patient is improving, declining, or no change.  This information will be displayed automatically on your shift  note.  Outcome: Progressing  Flowsheets (Taken 1/17/2024 1927)  Plan of Care Reviewed With: patient  Overall Patient Progress: improving     Problem: Comorbidity Management  Goal: Maintenance of COPD Symptom Control  Outcome: Progressing  Intervention: Maintain COPD Symptom Control  Recent Flowsheet Documentation  Taken 1/17/2024 1515 by Francisca Jenkins, RN  Medication Review/Management: medications reviewed     Problem: Fluid Volume Excess  Goal: Fluid Balance  Outcome: Progressing    Assumed care at 1500. Requested to sleep until 1630. No complaints of pain. Started lactulose per orders, answered his questions about it. Tele is NSR with 1st degree AV block and BBB. Did have a small pause, Oncoming shift aware.

## 2024-01-18 NOTE — DISCHARGE SUMMARY
"[facility]  Hospitalist Discharge Summary      Date of Admission:  1/8/2024  Date of Discharge:  1/18/2024  Discharging Provider: ANN DUPREE MD  Discharge Service: Hospitalist Service    Discharge Diagnoses     #Acute exacerbation of chronic HF with moderately reduced EF  #PRINCESS on CKD  #CAD s/p CABG in 2014  #Paroxysmal atrial fibrillation  #Tobacco use  #COPD  #RLS  #Constipation    Clinically Significant Risk Factors     # Severe Obesity: Estimated body mass index is 42 kg/m  as calculated from the following:    Height as of this encounter: 1.676 m (5' 6\").    Weight as of this encounter: 118 kg (260 lb 3.2 oz).       Follow-ups Needed After Discharge   Follow-up Appointments     Follow-up and recommended labs and tests       Follow up with primary care provider, Reid Escobar, in 7 days to   evaluate medication change and to evaluate treatment change.  The   following labs/tests are recommended: BMP, Mg.    Follow up with your cardiologist as scheduled on 2/7/23            Unresulted Labs Ordered in the Past 30 Days of this Admission       No orders found from 12/9/2023 to 1/9/2024.        These results will be followed up by hospitalist    Discharge Disposition   Discharged to home  Condition at discharge: Stable    Hospital Course   63 year old male with PMH of hypertension, hyperlipidemia, obesity, CKD-3b, CAD, s/p CABG (10 years ago), ischemic cardiomyopathy with EF 41%, admitted on 1/8/2024 for acute on chronic systolic CHF.     #Acute on chronic HF with moderately reduced EF  Admitted for decompensated CHF. Cardiology was consulted. Diuresed per cardilogy with lasix infusion that was augmented by a dopamine infusion added 1/11, but this was eventually stopped and the patient was transitioned to oral Bumex. He was -18L during the admission. He remained in slightly negative fluid balance on 2mg BID on a fluid restriction that he has said he will not continue at home.  - " Continue Bumex 2mg BID at discharge, follow up with cardiology as OP  - S/p albumin infusions per cardiology  - Discharge weight: 118kg  - Coreg increased to 3.125mg BID  - Start spironolactone 50mg daily per cardiology               #PRINCESS on CKD  Cr 1.5 from 1.1 from diuresis. Stabilized ~1.5, maybe new baseline vs mild ATN that will show recovery.  - Continue gentle oral diuresis as prescribed  - Stop lisinopril at discharge until seen as OP     #Primary HTN  - Sop lisinopril for possible PRINCESS above  - Continue home Imdur 180mg daily  - Start hydralazine 10mg TID     #CAD, S/P CABG x 2 vessels in 2014     #Paroxysmal atrial fib -- on Eliquis, amiodarone  -- Currently in NSR     #Tobacco use  -- Needs to quit, does not want nicotine replacement     #COPD      #Restless legs syndrome (RLS)   -- Con home med Ropinirole 3x daily prn   -- Continue home gabapentin 900mg QHS     #Constipation  -- No BM x 5 days  -- Prescribed lactulose, Miralax, Senna    Consultations This Hospital Stay   CARDIOLOGY IP CONSULT  CARE MANAGEMENT / SOCIAL WORK IP CONSULT  PHYSICAL THERAPY ADULT IP CONSULT  OCCUPATIONAL THERAPY ADULT IP CONSULT    Code Status   Full Code    Time Spent on this Encounter   I, ANN DUPREE MD, personally saw the patient today and spent greater than 30 minutes discharging this patient.       ANN DUPREE MD  [facility]  [address] [place] [id number]  Phone: [phone]  Fax: [phone]  ______________________________________________________________________    Physical Exam   Vital Signs: Temp: 97.9  F (36.6  C) Temp src: Oral BP: 118/58 Pulse: 96   Resp: 18 SpO2: 94 % O2 Device: None (Room air)    Weight: 260 lbs 3.2 oz    General: Not in obvious distress.  HEENT: NC, AT   Chest: Diminished breath sounds bilaterally due to thick chest wall  Heart: S1S2 normal, regular. No M/R/G  Abdomen: Soft. NT, ND. Bowel sounds- active.  Extremities: 1 + leg  swelling.   Neuro: Alert and awake, grossly non-focal       Primary Care Physician   Reid Escobar    Discharge Orders      Home Care Referral      Discharge Instructions    If interested in quitting smoking, there is free assistance available through the [place] [employer].  Please call them at [phone] ([phone]).  They will provide free meds, tools, and one to one coaching to help you quit smoking, and comes with free 24/7 support.     Reason for your hospital stay    You were admitted for an exacerbation of your heart failure. You responded well to our diuretic medications and you were sent home on an effective dose of Bumex. Please avoid drinking more than 2L of water per day and consume a low-salt diet, otherwise you will assuredly gain water-weight again.     Follow-up and recommended labs and tests     Follow up with primary care provider, Reid Escobar, in 7 days to evaluate medication change and to evaluate treatment change.  The following labs/tests are recommended: BMP, Mg.    Follow up with your cardiologist as scheduled on 2/7/23     Activity    Your activity upon discharge: activity as tolerated     Diet    Follow this diet upon discharge: Orders Placed This Encounter      Fluid restriction 2000 ML FLUID      2 Gram Sodium Diet       Significant Results and Procedures   Most Recent 3 CBC's:  Recent Labs   Lab Test 01/09/24  0709 01/08/24  1606 12/25/23  0520   WBC 8.2 9.6 10.6   HGB 12.7* 12.0* 12.0*   MCV 93 93 94    153 195     Most Recent 3 BMP's:  Recent Labs   Lab Test 01/18/24  0449 01/17/24  0441 01/16/24  0429    137 138   POTASSIUM 4.2 3.9 4.1   CHLORIDE 96* 97* 96*   CO2 27 28 26   BUN 57.4* 56.4* 55.8*   CR 1.54* 1.49* 1.47*   ANIONGAP 12 12 16*   RUSLAN 10.3* 10.7* 10.7*   * 167* 165*   ,   Results for orders placed or performed during the hospital encounter of 01/08/24   XR Chest 2 Views    Narrative    EXAM: XR CHEST 2 VIEWS  LOCATION: [facility]  DATE: 1/8/2024    INDICATION: Congestive heart failure  COMPARISON: AP and lateral views the chest 12/20/2023      Impression    IMPRESSION:     Left coronary stents. Previous median sternotomy. Cardiac silhouette is normal in size and the vascular pedicle width is normal.    Symmetric lung inflation. No interstitial or alveolar opacities. No pleural effusion.    Healed fracture deformity of several left upper posterolateral ribs. Intact and aligned sternal wires. Flowing degenerative osteophytes of the thoracic spine.   US Lower Extremity Venous Duplex Bilateral    Narrative    EXAM: US LOWER EXTREMITY VENOUS DUPLEX BILATERAL  LOCATION: [facility]  DATE: 1/11/2024    INDICATION: Lower extremity swelling.  COMPARISON: Ultrasound venous left lower extremity Doppler 02/14/2023.  TECHNIQUE: Venous Duplex ultrasound of bilateral lower extremities with and without compression, augmentation and duplex. Color flow and spectral Doppler with waveform analysis performed.    FINDINGS: Exam includes the common femoral, femoral, popliteal veins as well as segmentally visualized deep calf veins and greater saphenous vein.     RIGHT: No deep vein thrombosis. No superficial thrombophlebitis. No popliteal cyst.    LEFT: No deep vein thrombosis. No superficial thrombophlebitis. No popliteal cyst. No significant interval change.      Impression    IMPRESSION:  1.  No deep venous thrombosis in the bilateral lower extremities.    2.  No popliteal cyst on either side.       Discharge Medications   Current Discharge Medication List        START taking these medications    Details   aspirin 81 MG EC tablet Take 1 tablet (81 mg) by mouth daily  Qty: 90 tablet, Refills: 1    Associated Diagnoses: Coronary artery disease involving native heart without angina pectoris, unspecified vessel or lesion type      hydrALAZINE (APRESOLINE) 10 MG tablet Take 1 tablet (10 mg) by mouth 3 times  daily  Qty: 270 tablet, Refills: 3    Associated Diagnoses: Acute on chronic systolic congestive heart failure (H)      lactulose (CHRONULAC) 10 GM/15ML solution Take 30 mLs (20 g) by mouth 2 times daily for 3 days  Qty: 180 mL, Refills: 0    Associated Diagnoses: Constipation, unspecified constipation type      sennosides (SENOKOT) 8.6 MG tablet Take 2 tablets by mouth 2 times daily for 7 days  Qty: 28 tablet, Refills: 0    Associated Diagnoses: Constipation, unspecified constipation type      spironolactone (ALDACTONE) 50 MG tablet Take 1 tablet (50 mg) by mouth daily  Qty: 90 tablet, Refills: 3    Associated Diagnoses: Heart failure with reduced ejection fraction (H)           CONTINUE these medications which have CHANGED    Details   carvedilol (COREG) 3.125 MG tablet Take 1 tablet (3.125 mg) by mouth 2 times daily (with meals)    Associated Diagnoses: Heart failure with reduced ejection fraction (H)      gabapentin (NEURONTIN) 300 MG capsule Take 3 capsules (900 mg) by mouth at bedtime    Associated Diagnoses: Neuropathy      polyethylene glycol (MIRALAX) 17 GM/Dose powder Take 17 g by mouth 2 times daily as needed  Qty: 510 g, Refills: 0    Associated Diagnoses: Constipation, unspecified constipation type      rOPINIRole (REQUIP) 2 MG tablet Take 1 tablet (2 mg) by mouth 3 times daily  Qty: 270 tablet, Refills: 3    Associated Diagnoses: Restless legs syndrome (RLS)           CONTINUE these medications which have NOT CHANGED    Details   acetaminophen (TYLENOL) 500 MG tablet Take 1,000 mg by mouth every 8 hours as needed      albuterol (PROAIR HFA/PROVENTIL HFA/VENTOLIN HFA) 108 (90 Base) MCG/ACT inhaler Inhale 2 puffs into the lungs 4 times daily  Qty: 18 g, Refills: 0      albuterol (PROVENTIL) (2.5 MG/3ML) 0.083% neb solution Take 1 vial (2.5 mg) by nebulization every 6 hours as needed for shortness of breath, wheezing or cough  Qty: 90 mL, Refills: 0      amiodarone (PACERONE) 200 MG tablet Take 200 mg by  mouth daily      apixaban ANTICOAGULANT (ELIQUIS) 5 MG tablet Take 1 tablet (5 mg) by mouth 2 times daily  Qty: 180 tablet, Refills: 0    Associated Diagnoses: Atrial fibrillation, unspecified type (H)      atorvastatin (LIPITOR) 40 MG tablet Take 40 mg by mouth every morning      bumetanide (BUMEX) 1 MG tablet Take 2 tablets (2 mg) by mouth 2 times daily  Qty: 900 tablet, Refills: 3    Associated Diagnoses: Heart failure with reduced ejection fraction (H)      [employer] VITAMIN B12 1000 MCG tablet Take 1,000 mcg by mouth daily      DULoxetine (CYMBALTA) 30 MG capsule Take 30 mg by mouth every morning      isosorbide mononitrate (IMDUR) 60 MG 24 hr tablet Take 180 mg by mouth every morning      nitroGLYcerin (NITROSTAT) 0.4 MG sublingual tablet Place 1 tablet (0.4 mg) under the tongue every 5 minutes as needed If no relief after 3 tabs call 911;continue 1 tab every 5 min max 3 tab Indications: chest pain  Qty: 100 tablet, Refills: 1    Associated Diagnoses: Ischemic cardiomyopathy           STOP taking these medications       KLOR-CON 20 MEQ CR tablet Comments:   Reason for Stopping:         lisinopril (ZESTRIL) 5 MG tablet Comments:   Reason for Stopping:             Allergies   No Known Allergies

## 2024-01-22 ENCOUNTER — PATIENT OUTREACH (OUTPATIENT)
Dept: CARE COORDINATION | Facility: CLINIC | Age: 64
End: 2024-01-22
Payer: MEDICARE

## 2024-01-22 NOTE — PROGRESS NOTES
"  [facility]: [facility]: Post-Discharge Note  SITUATION                                                      Admission:    Admission Date: 01/08/24   Reason for Admission: Acute exacerbation of chronic HF with moderately reduced EF  Discharge:   Discharge Date: 01/18/24  Discharge Diagnosis: Acute exacerbation of chronic HF with moderately reduced EF    BACKGROUND                                                      Per hospital discharge summary and inpatient provider notes:63 year old male with PMH of hypertension, hyperlipidemia, obesity, CKD-3b, CAD, s/p CABG (10 years ago), ischemic cardiomyopathy with EF 41%, admitted on 1/8/2024 for acute on chronic systolic CHF.     #Acute on chronic HF with moderately reduced EF  Admitted for decompensated CHF. Cardiology was consulted. Diuresed per cardilogy with lasix infusion that was augmented by a dopamine infusion added 1/11, but this was eventually stopped and the patient was transitioned to oral Bumex. He was -18L during the admission. He remained in slightly negative fluid balance on 2mg BID on a fluid restriction that he has said he will not continue at home.  - Continue Bumex 2mg BID at discharge, follow up with cardiology as OP  - S/p albumin infusions per cardiology  - Discharge weight: 118kg  - Coreg increased to 3.125mg BID  - Start spironolactone 50mg daily per cardiology      ASSESSMENT           Discharge Assessment  How are you doing now that you are home?: \" I am doing ok \"  How are your symptoms? (Red Flag symptoms escalate to triage hotline per guidelines): Improved  Do you feel your condition is stable enough to be safe at home until your provider visit?: Yes  Does the patient have their discharge instructions? : Yes  Does the patient have questions regarding their discharge instructions? : No  Were you started on any new medications or were there changes to any of your previous medications? : Yes  Does the patient have all " of their medications?: Yes  Do you have questions regarding any of your medications? : No  Do you have all of your needed medical supplies or equipment (DME)?  (i.e. oxygen tank, CPAP, cane, etc.): Yes  Discharge follow-up appointment scheduled within 14 calendar days? : Yes  Discharge Follow Up Appointment Date: 01/31/24  Discharge Follow Up Appointment Scheduled with?: Primary Care Provider    Post-op ([facility] Only)  If the patient had a surgery or procedure, do they have any questions for a nurse?: No             PLAN                                                      Outpatient Plan: Follow-up Appointments     Follow-up and recommended labs and tests       Follow up with primary care provider, Reid Escobar, in 7 days to   evaluate medication change and to evaluate treatment change.  The   following labs/tests are recommended: BMP, Mg.     Follow up with your cardiologist as scheduled on 2/7/23      Future Appointments   Date Time Provider Department Center   2/7/2024  9:10 AM Debbie Woodard APRN CNP [facility]   3/7/2024  7:50 AM Negrita Brandon APRN [facility]         For any urgent concerns, please contact our 24 hour nurse triage line: [phone] ([phone])         JOY Pena

## 2024-01-29 ENCOUNTER — TELEPHONE (OUTPATIENT)
Dept: CARDIOLOGY | Facility: CLINIC | Age: 64
End: 2024-01-29
Payer: MEDICARE

## 2024-01-29 DIAGNOSIS — I50.23 ACUTE ON CHRONIC SYSTOLIC CONGESTIVE HEART FAILURE (H): Primary | ICD-10-CM

## 2024-01-29 NOTE — TELEPHONE ENCOUNTER
Health Call Center    Phone Message    May a detailed message be left on voicemail: yes     Reason for Call: Other: patient is calling and would like the care team to call him as he is wondering if we need to make a change to his bumetanide (BUMEX, as he is have some swelling in his legs and wondering if it needs to be increased, please call patient to further discuss, thank you.     Action Taken: Other: cardiology     Travel Screening: Not Applicable  Thank you!  [facility]

## 2024-01-29 NOTE — TELEPHONE ENCOUNTER
Called pt back. Pt hospitalized at [facility] [place] from 1/8/24-1/18/24 for CHF. Pt's wt at discharge was 260lbs per chart review. Pt reports that his wt was 264lbs at home the day after discharge.     Pt reports worsening swelling of his feet and lower legs over the weekend and increased SOB w/ activity like walking. Also some wheezing with walking. Pt reports his wt today at home was 274lbs.     Pt denies SOB or wheezing at rest, chest pain or pressure, dizziness, feeling like he is going to pass out, confusion.     Pt confirms he has been taking bumex 2mg BID. Appt w/ TONY Cline CNP scheduled for 2/7/24. Most recent BMP on 1/18/24 w/ creatinine 1.54, eGFR 50.    Given that it is later in the day and providers are likely out of the office, reviewed with pt when to seek care such as calling 911 or presenting to the emergency room including: SOB or wheezing at rest, difficulty breathing, chest pain or pressure, feeling less alert, feeling like he may pass out or faint. Pt verbalized understanding and reports feeling stable at home right now.     Dr. Bhandari, what are your recommendations?     MINERVA AnnOYOKO RN

## 2024-01-30 RX ORDER — METOLAZONE 2.5 MG/1
TABLET ORAL
Qty: 2 TABLET | Refills: 0 | Status: SHIPPED | OUTPATIENT
Start: 2024-01-30 | End: 2024-03-07

## 2024-01-30 NOTE — TELEPHONE ENCOUNTER
Jacey Bhandari MD Strom, Tayva M, RN  Caller: Unspecified (Yesterday,  1:25 PM)  Thanks, Pauly. Please instruct Zackary to take metolazone 2.5 mg x1 dose with follow-up BMP the next day.    ~ Wen

## 2024-01-30 NOTE — TELEPHONE ENCOUNTER
Called pt w/ recommendation from Dr. Bhandari. Pt verbalized understanding. He lives in a senior facility independently and does not drive, so he will have to have someone  the metolazone 2.5mg tablet for him and coordinate getting a ride to have a BMP drawn the day after.     Dr. Bhandari, wt is the same today as yesterday, SOB w/ exertion and wheezing are about the same as yesterday. I advised him to take the metolazone as soon as possible, but he's not sure someone will be able to pick it up today, he may have to take it tomorrow 1/31/24 and have labs the next day on 2/1/24. Requested pt keep us updated and notify us of worsening symptoms or to seek care if emergent. Pt verbalized understanding.     CHAPARRITA Ann RN

## 2024-01-31 NOTE — TELEPHONE ENCOUNTER
Update Dr. Bhandari,     Pt took metolazone 2.5mg (he found 5mg tablets at home so took 1/2 tablet) yesterday 1/30/24. His wt is down 4lbs today at 270lbs. He still felt wheezy last night and had some trouble sleeping. His SOB w/ walking today was a little improved. He hasn't noticed improvement in his swelling yet.     Today he had an appt w/ his PCP who is through [facility] and they clemente labs, will watch for them to come back.    CHAPARRITA Ann RN

## 2024-02-01 NOTE — TELEPHONE ENCOUNTER
Debbie,    With Dr. Bhandari out today, will you please review this encounter? Since metolazone 2.5mg x1 dose a couple days ago, his wt is now down to 266lbs today, breathing felt a lot better last night, calves feel less swollen today. He did eat a lot less yesterday too though.     You will see pt on 2/7/24. Labs done yesterday have resulted, can see them in our chart review. He has previously been on higher doses of bumex, as well as weekly metolazone and diuril at one point. Please let me know if you have additional recommendation at this time.     CHAPARRITA Ann RN

## 2024-02-01 NOTE — TELEPHONE ENCOUNTER
Called pt, no answer. Left detailed message with no new recommendations at this time and that he will see Debbie next week. Provided CORE HF nurse line and 24hr HF nurse line if pt has worsening HF symptoms, wt gain, question or concerns. Reminded him to monitor fluid and sodium intake and to follow restrictions as best as he can.     CHAPARRITA Ann RN

## 2024-02-06 ENCOUNTER — TELEPHONE (OUTPATIENT)
Dept: CARDIOLOGY | Facility: CLINIC | Age: 64
End: 2024-02-06
Payer: MEDICARE

## 2024-02-06 NOTE — TELEPHONE ENCOUNTER
Spoke with home health RN Krzysztof, stated pt was vitally stable, experiencing SOB at baseline. Denied any immediate need for medical attention. Pt will follow up tomorrow with HF PATRICIA DCB.     Manuelito Bianchi RN [facility]

## 2024-02-06 NOTE — TELEPHONE ENCOUNTER
----- Message from Vaishnavi Moran sent at 2/5/2024  3:29 PM CST -----  Regarding: DCB PATIENT  General phone call:    Caller: [facility]    Primary cardiologist: RAHUL    Detailed reason for call: CALLING TO REPORT WEIGHT  GAIN , PATIENTS WEIGHT  LBS, OUTSIDE OF THE PARAMETERS ([phone])    Best phone number: [phone]    Best time to contact: ANY    Ok to leave a detailedmessage? YES    Device? NO    Additional Info:

## 2024-02-07 ENCOUNTER — OFFICE VISIT (OUTPATIENT)
Dept: CARDIOLOGY | Facility: CLINIC | Age: 64
End: 2024-02-07
Payer: MEDICARE

## 2024-02-07 ENCOUNTER — TELEPHONE (OUTPATIENT)
Dept: CARDIOLOGY | Facility: CLINIC | Age: 64
End: 2024-02-07

## 2024-02-07 VITALS
OXYGEN SATURATION: 95 % | BODY MASS INDEX: 43.74 KG/M2 | WEIGHT: 271 LBS | HEART RATE: 81 BPM | SYSTOLIC BLOOD PRESSURE: 152 MMHG | RESPIRATION RATE: 20 BRPM | DIASTOLIC BLOOD PRESSURE: 84 MMHG

## 2024-02-07 DIAGNOSIS — E66.01 CLASS 3 SEVERE OBESITY DUE TO EXCESS CALORIES WITH SERIOUS COMORBIDITY AND BODY MASS INDEX (BMI) OF 40.0 TO 44.9 IN ADULT (H): ICD-10-CM

## 2024-02-07 DIAGNOSIS — I50.20 HFREF (HEART FAILURE WITH REDUCED EJECTION FRACTION) (H): Primary | ICD-10-CM

## 2024-02-07 DIAGNOSIS — I25.10 CORONARY ARTERY DISEASE INVOLVING NATIVE HEART WITHOUT ANGINA PECTORIS, UNSPECIFIED VESSEL OR LESION TYPE: ICD-10-CM

## 2024-02-07 DIAGNOSIS — I48.0 PAROXYSMAL ATRIAL FIBRILLATION (H): ICD-10-CM

## 2024-02-07 DIAGNOSIS — E66.813 CLASS 3 SEVERE OBESITY DUE TO EXCESS CALORIES WITH SERIOUS COMORBIDITY AND BODY MASS INDEX (BMI) OF 40.0 TO 44.9 IN ADULT (H): ICD-10-CM

## 2024-02-07 DIAGNOSIS — I25.5 ISCHEMIC CARDIOMYOPATHY: ICD-10-CM

## 2024-02-07 DIAGNOSIS — N18.32 CHRONIC KIDNEY DISEASE, STAGE 3B (H): ICD-10-CM

## 2024-02-07 PROCEDURE — G2211 COMPLEX E/M VISIT ADD ON: HCPCS | Performed by: NURSE PRACTITIONER

## 2024-02-07 PROCEDURE — 99215 OFFICE O/P EST HI 40 MIN: CPT | Performed by: NURSE PRACTITIONER

## 2024-02-07 RX ORDER — IPRATROPIUM BROMIDE AND ALBUTEROL SULFATE 2.5; .5 MG/3ML; MG/3ML
1 SOLUTION RESPIRATORY (INHALATION) EVERY 6 HOURS PRN
COMMUNITY
Start: 2024-01-23 | End: 2024-10-01

## 2024-02-07 NOTE — TELEPHONE ENCOUNTER
Spoke with patient, received the [facility] RN line to set up BMP for tomorrow. Faxed them his BMP order.   Told patient to anticipate a call about his CardioMEMS implant tentatively on 03/15/2024. We also rescheduled his follow up with DCB to 03/21, per her request. He has an appointment with Dr. Bhandari in April.     Manuelito Bianchi RN [facility]

## 2024-02-07 NOTE — PATIENT INSTRUCTIONS
Zackary Hutchison,    It was a pleasure to see you today at [facility].     My recommendations after this visit include:  - Please follow up with Dr Bhandari in April    - Please follow up with Negrita Brandon March 7  - Please follow up with Debbie Woodard in 3 months  - Take metolazone 2.5 mg for 1 dose today.  - My nurse number is [phone]    Debbie Woodard, CNP

## 2024-02-07 NOTE — TELEPHONE ENCOUNTER
Zackary Hutchison is a 64 y/o who carries the diagnosis of NYHA Class 3 HFrEF. His EF is 41% He has struggled with management of his heart failure, and required Multiple hospitalizations over the past 4-5 months.  He has required in clinic IVP Diuretics and active titration of oral diuretics to prevent further hospitalizations for CHF. He also carries additional diagnoses that complicate his management including Atrial Fibrillation, Coronary Artery Disease, Chronic Kidney Disease Stage 3b, Chronic Venostasis, Obesity, COPD, Hypertension, Hyperlipidemia,Tobacco use, Alcohol use.  He has elevated NT Pro BNP based upon the adjusted NT Pro BNP thresholds incorporating his reduced EF,  according to BMI of 43.74.   He is enrolled in [facility] Patient home monitoring of his daily weights, vital signs and heart failure symptoms. He has received heart failure education including sodium and fluid restrictions. Due to his Venostasis, he has ongoing challenges with cellulitis and wounds.     Zackary resides at an Assisted Living facility with intermittent Home Care services through his primary care provider for wound care management. His meals are provided at his facility which limits his ability to reduce his sodium.    CardioMEMS Pulmonary Artery Implant will offer his providers additional valuable data that will enhance/improve our ability to manage his fluid status. Zackary is interested in moving forward with the prior authorization for this procedure, as the benefits of having the daily pulmonary artery pressures would greatly improve our ability to adjust his diuretic medications. He looks forward to the better quality of life that the CardioMEMS implant would offer by being proactive in his care.     Case Request placed for procedure allowing Prior Authorization Team to complete process.  Heather HUERTA RN BSN, CHFN, PCCN-K

## 2024-02-07 NOTE — H&P (VIEW-ONLY)
Assessment/Recommendations   Assessment:    1. Ischemic cardiomyopathy, heart failure with reduced ejection fraction, ejection fraction 41%, NYHA class III: Decompensated.  He states he has had increased shortness of breath, wheezing, lower extremity edema and weight gain.  He took 2.5 mg of metolazone on January 30 and lost approximately 4 pounds.  He states this did help his symptoms.  He was started on spironolactone during hospitalization.  His Bumex was also decreased.  We discussed enrolling in [facility] and possible CardioMEMS implant.  He watched the CardioMEMS video and is very interested in both HRS and CardioMEMS.  His recent multiple hospitalizations he would be a good candidate.  2.  Hypertension: Elevated.  Blood pressure 152/84.  His lisinopril was discontinued during hospitalization and was started on hydralazine  3.  Paroxysmal atrial fibrillation: Normal sinus rhythm.  He continues Eliquis for anticoagulation.  Dr. Bhandari recommended a referral to A-fib clinic for management.  He continues amiodarone and carvedilol  4.  CKD stage IIIb: He had labs a week ago with a creatinine of 1.17  5.  CAD: Denies angina. Status post CABG in 2015.  Coronary angiogram September 2023 showed chronic total occlusion of proximal LAD and RCA.  Patent grafts.  No intervention was completed.  He continues aspirin, atorvastatin, Imdur.  Recently started on hydralazine  6.  Obesity: BMI 43.74    Plan:  1.  Take metolazone 2.5 mg x 1 dose today.  May need to increase Bumex dose as this was decreased during hospitalization  2.  BMP following day.  Will try to get home care to obtain  3.  Continue monitoring weights and following a low-sodium diet  4.  Enroll in HRS  5.  Work on prior authorization for CardioMEMS implant    Zackary Hutchison will follow up with Dr. Bhandari in 2 months, atrial fibrillation clinic March 7 and in the heart failure clinic in 3 months.     History of Present Illness/Subjective    Mr. Zackary Hutchison is  a 63 year old male seen at [facility] heart failure clinic today for continued follow-up.  He follows up for heart failure with reduced ejection fraction, ischemic cardiomyopathy.  He has had multiple hospitalizations in the last 6 months.  Most recently he was hospitalized mid January for acute heart failure and was started on a Lasix drip.  He was diuresed 18 L.  He was started on spironolactone and hydralazine.  His lisinopril was discontinued.  He had an echocardiogram in December which showed ejection fraction of 41%.  He has a past medical history significant for hypertension, CAD, paroxysmal atrial fibrillation, COPD, possible GRACE, tobacco use, alcohol use, CABG in 2015, obesity, chronic kidney disease stage IIIb, hyperlipidemia.      Today, he has fatigue, dyspnea on exertion, lower extremity edema and weight gain.  He denies lightheadedness, orthopnea, PND, palpitations, chest pain, and abdominal fullness/bloating.      He is monitoring home weights which have increased to 267 pounds.  Discharge weight mid January was 260 pounds.  He lives in a facility where his meals are provided and he tries to follow a low-sodium diet.      LIMITED ECHOCARDIOGRAM: 12/25/2023-reviewed  Interpretation Summary     1. Technically very difficult study.  2. Left ventricular chamber size and wall thickness are normal. Systolic  function is mildly reduced with global hypokinesis. The calculated left  ventricular ejection fraction is 41%.  3. Right ventricle is not well visualized. Right ventricular chamber size and  systolic function are grossly normal.  4. No hemodynamically significant valvular abnormalities although the cardiac  valves are not well visualized.  5. Compared to the prior study dated 9/9/2023, there has been no significant  change.     Physical Examination Review of Systems   BP (!) 152/84 (BP Location: Right arm, Patient Position: Sitting, Cuff Size: Adult Large)   Pulse 81   Resp 20   Wt 122.9 kg  (271 lb)   SpO2 95%   BMI 43.74 kg/m    Body mass index is 43.74 kg/m .  Wt Readings from Last 3 Encounters:   02/07/24 122.9 kg (271 lb)   01/18/24 118 kg (260 lb 3.2 oz)   12/27/23 118.3 kg (260 lb 12.8 oz)       General Appearance:   no acute distress   ENT/Mouth: No abnormalities   EYES:  no scleral icterus, normal conjunctivae   Neck: no thyromegaly   Chest/Lungs:   lungs are wheezing to auscultation, equal chest wall expansion    Cardiovascular:   Regular. Normal first and second heart sounds with no murmurs, rubs, or gallops, moderate edema bilaterally    Abdomen:  Obese, bowel sounds are present   Extremities: no cyanosis or clubbing   Skin: warm   Neurologic: no tremors     Psychiatric: alert and oriented x3    Enc Vitals  BP: (!) 152/84  Pulse: 81  Resp: 20  SpO2: 95 %  Weight: 122.9 kg (271 lb)                                         Medical History  Surgical History Family History Social History   Past Medical History:   Diagnosis Date    Atrial fibrillation (H)     Chronic kidney disease     Chronic kidney disease, stage 3b (H) 09/28/2023    Class 3 severe obesity due to excess calories with body mass index (BMI) of 40.0 to 44.9 in adult (H) 09/28/2023    Congestive heart failure (H)     COPD (chronic obstructive pulmonary disease) (H)     Coronary artery disease involving coronary bypass graft of native heart without angina pectoris 09/28/2023    Heart failure with reduced ejection fraction (H) 09/11/2023    HFrEF (heart failure with reduced ejection fraction) (H) 09/11/2023    Hyperlipidemia     Hypertension     Ischemic cardiomyopathy 09/28/2023    Obese     Paroxysmal atrial fibrillation (H) 09/28/2023    Tobacco abuse 09/28/2023    Past Surgical History:   Procedure Laterality Date    CORONARY ANGIOGRAPHY ADULT ORDER      CORONARY ARTERY BYPASS Left 2014    2 vessels    CV CORONARY ANGIOGRAM N/A 09/11/2023    Procedure: Coronary Angiogram;  Surgeon: Santy Esposito MD;  Location: [facility]  LAB CV    CV LEFT HEART CATH N/A 09/11/2023    Procedure: Left Heart Catheterization;  Surgeon: Santy Esposito MD;  Location: [facility] LAB CV    CV RIGHT HEART CATH MEASUREMENTS RECORDED N/A 09/11/2023    Procedure: Right Heart Catheterization;  Surgeon: Santy Esposito MD;  Location: [facility] LAB CV    Family History   Problem Relation Age of Onset    Cerebrovascular Disease Mother     Myocardial Infarction Father     Social History     Socioeconomic History    Marital status: Single     Spouse name: Not on file    Number of children: Not on file    Years of education: Not on file    Highest education level: Not on file   Occupational History    Occupation: Retired restaurant owner     Comment: [employer]   Tobacco Use    Smoking status: Every Day     Packs/day: .5     Types: Cigarettes    Smokeless tobacco: Never    Tobacco comments:     Seen IP by CTTS on 9/11/23 and declined cessation services and materials   Substance and Sexual Activity    Alcohol use: Not Currently    Drug use: Not on file    Sexual activity: Not on file   Other Topics Concern    Not on file   Social History Narrative    Currently lives in an assisted living facility. (Updated: 01/08/2024)     Social Determinants of Health     Financial Resource Strain: Not on file   Food Insecurity: Not on file   Transportation Needs: Not on file   Physical Activity: Not on file   Stress: Not on file   Social Connections: Not on file   Interpersonal Safety: Not on file   Housing Stability: Not on file          Medications  Allergies   Current Outpatient Medications   Medication Sig Dispense Refill    acetaminophen (TYLENOL) 500 MG tablet Take 1,000 mg by mouth every 8 hours as needed      albuterol (PROAIR HFA/PROVENTIL HFA/VENTOLIN HFA) 108 (90 Base) MCG/ACT inhaler Inhale 2 puffs into the lungs 4 times daily 18 g 0    amiodarone (PACERONE) 200 MG tablet Take 200 mg by mouth daily      apixaban ANTICOAGULANT (ELIQUIS) 5 MG tablet Take 1 tablet  (5 mg) by mouth 2 times daily 180 tablet 0    aspirin 81 MG EC tablet Take 1 tablet (81 mg) by mouth daily 90 tablet 1    atorvastatin (LIPITOR) 40 MG tablet Take 40 mg by mouth every morning      bumetanide (BUMEX) 1 MG tablet Take 2 tablets (2 mg) by mouth 2 times daily 900 tablet 3    carvedilol (COREG) 3.125 MG tablet Take 1 tablet (3.125 mg) by mouth 2 times daily (with meals)      [employer] VITAMIN B12 1000 MCG tablet Take 1,000 mcg by mouth daily      DULoxetine (CYMBALTA) 30 MG capsule Take 30 mg by mouth every morning      gabapentin (NEURONTIN) 300 MG capsule Take 3 capsules (900 mg) by mouth at bedtime      hydrALAZINE (APRESOLINE) 10 MG tablet Take 1 tablet (10 mg) by mouth 3 times daily 270 tablet 3    ipratropium - albuterol 0.5 mg/2.5 mg/3 mL (DUONEB) 0.5-2.5 (3) MG/3ML neb solution INHALE 3 ML EVERY 6 HOURS AS NEEDED FOR WHEEZING.      isosorbide mononitrate (IMDUR) 60 MG 24 hr tablet Take 180 mg by mouth every morning      polyethylene glycol (MIRALAX) 17 GM/Dose powder Take 17 g by mouth 2 times daily as needed 510 g 0    rOPINIRole (REQUIP) 2 MG tablet Take 1 tablet (2 mg) by mouth 3 times daily 270 tablet 3    spironolactone (ALDACTONE) 50 MG tablet Take 1 tablet (50 mg) by mouth daily 90 tablet 3    albuterol (PROVENTIL) (2.5 MG/3ML) 0.083% neb solution Take 1 vial (2.5 mg) by nebulization every 6 hours as needed for shortness of breath, wheezing or cough (Patient not taking: Reported on 2/7/2024) 90 mL 0    metolazone (ZAROXOLYN) 2.5 MG tablet Take metolazone 2.5mg (1 tablet) one time. Save the second tablet for future use at the direction of cardiology only. (Patient not taking: Reported on 2/7/2024) 2 tablet 0    nitroGLYcerin (NITROSTAT) 0.4 MG sublingual tablet Place 1 tablet (0.4 mg) under the tongue every 5 minutes as needed If no relief after 3 tabs call 911;continue 1 tab every 5 min max 3 tab Indications: chest pain 100 tablet 1    No Known Allergies      Lab Results    Chemistry/lipid CBC  Cardiac Enzymes/BNP/TSH/INR   Lab Results   Component Value Date    BUN 57.4 (H) 01/18/2024     01/18/2024    CO2 27 01/18/2024    Lab Results   Component Value Date    WBC 8.2 01/09/2024    HGB 12.7 (L) 01/09/2024    HCT 42.1 01/09/2024    MCV 93 01/09/2024     01/09/2024    Lab Results   Component Value Date    INR 1.01 02/13/2023             This note has been dictated using voice recognition software. Any grammatical, typographical, or context distortions are unintentional and inherent to the software    40 minutes spent on the date of encounter doing chart review, review of outside records, review of test results, interpretation with above tests, patient visit, and documentation.        The longitudinal plan of care for heart failure with reduced ejection fraction, paroxysmal atrial fibrillation, CAD, obesity, CKD stage III was addressed during this visit. Due to the added complexity in care, I will continue to support Zackary in the subsequent management of this condition(s) and with the ongoing continuity of care of this condition(s).

## 2024-02-07 NOTE — TELEPHONE ENCOUNTER
Case request placed for CardioMEMS Implantation (Pulmonary Artery Sensor) for ongoing patient HF Management as per Debbie Woodard,ZEINA HUERTA RN BSN, CHFN, PCCN-K

## 2024-02-07 NOTE — PROGRESS NOTES
Assessment/Recommendations   Assessment:    1. Ischemic cardiomyopathy, heart failure with reduced ejection fraction, ejection fraction 41%, NYHA class III: Decompensated.  He states he has had increased shortness of breath, wheezing, lower extremity edema and weight gain.  He took 2.5 mg of metolazone on January 30 and lost approximately 4 pounds.  He states this did help his symptoms.  He was started on spironolactone during hospitalization.  His Bumex was also decreased.  We discussed enrolling in [facility] and possible CardioMEMS implant.  He watched the CardioMEMS video and is very interested in both HRS and CardioMEMS.  His recent multiple hospitalizations he would be a good candidate.  2.  Hypertension: Elevated.  Blood pressure 152/84.  His lisinopril was discontinued during hospitalization and was started on hydralazine  3.  Paroxysmal atrial fibrillation: Normal sinus rhythm.  He continues Eliquis for anticoagulation.  Dr. Bhandari recommended a referral to A-fib clinic for management.  He continues amiodarone and carvedilol  4.  CKD stage IIIb: He had labs a week ago with a creatinine of 1.17  5.  CAD: Denies angina. Status post CABG in 2015.  Coronary angiogram September 2023 showed chronic total occlusion of proximal LAD and RCA.  Patent grafts.  No intervention was completed.  He continues aspirin, atorvastatin, Imdur.  Recently started on hydralazine  6.  Obesity: BMI 43.74    Plan:  1.  Take metolazone 2.5 mg x 1 dose today.  May need to increase Bumex dose as this was decreased during hospitalization  2.  BMP following day.  Will try to get home care to obtain  3.  Continue monitoring weights and following a low-sodium diet  4.  Enroll in HRS  5.  Work on prior authorization for CardioMEMS implant    Zackary Hutchison will follow up with Dr. Bhandari in 2 months, atrial fibrillation clinic March 7 and in the heart failure clinic in 3 months.     History of Present Illness/Subjective    Mr. Zackary Hutchison is  a 63 year old male seen at [facility] heart failure clinic today for continued follow-up.  He follows up for heart failure with reduced ejection fraction, ischemic cardiomyopathy.  He has had multiple hospitalizations in the last 6 months.  Most recently he was hospitalized mid January for acute heart failure and was started on a Lasix drip.  He was diuresed 18 L.  He was started on spironolactone and hydralazine.  His lisinopril was discontinued.  He had an echocardiogram in December which showed ejection fraction of 41%.  He has a past medical history significant for hypertension, CAD, paroxysmal atrial fibrillation, COPD, possible GRACE, tobacco use, alcohol use, CABG in 2015, obesity, chronic kidney disease stage IIIb, hyperlipidemia.      Today, he has fatigue, dyspnea on exertion, lower extremity edema and weight gain.  He denies lightheadedness, orthopnea, PND, palpitations, chest pain, and abdominal fullness/bloating.      He is monitoring home weights which have increased to 267 pounds.  Discharge weight mid January was 260 pounds.  He lives in a facility where his meals are provided and he tries to follow a low-sodium diet.      LIMITED ECHOCARDIOGRAM: 12/25/2023-reviewed  Interpretation Summary     1. Technically very difficult study.  2. Left ventricular chamber size and wall thickness are normal. Systolic  function is mildly reduced with global hypokinesis. The calculated left  ventricular ejection fraction is 41%.  3. Right ventricle is not well visualized. Right ventricular chamber size and  systolic function are grossly normal.  4. No hemodynamically significant valvular abnormalities although the cardiac  valves are not well visualized.  5. Compared to the prior study dated 9/9/2023, there has been no significant  change.     Physical Examination Review of Systems   BP (!) 152/84 (BP Location: Right arm, Patient Position: Sitting, Cuff Size: Adult Large)   Pulse 81   Resp 20   Wt 122.9 kg  (271 lb)   SpO2 95%   BMI 43.74 kg/m    Body mass index is 43.74 kg/m .  Wt Readings from Last 3 Encounters:   02/07/24 122.9 kg (271 lb)   01/18/24 118 kg (260 lb 3.2 oz)   12/27/23 118.3 kg (260 lb 12.8 oz)       General Appearance:   no acute distress   ENT/Mouth: No abnormalities   EYES:  no scleral icterus, normal conjunctivae   Neck: no thyromegaly   Chest/Lungs:   lungs are wheezing to auscultation, equal chest wall expansion    Cardiovascular:   Regular. Normal first and second heart sounds with no murmurs, rubs, or gallops, moderate edema bilaterally    Abdomen:  Obese, bowel sounds are present   Extremities: no cyanosis or clubbing   Skin: warm   Neurologic: no tremors     Psychiatric: alert and oriented x3    Enc Vitals  BP: (!) 152/84  Pulse: 81  Resp: 20  SpO2: 95 %  Weight: 122.9 kg (271 lb)                                         Medical History  Surgical History Family History Social History   Past Medical History:   Diagnosis Date    Atrial fibrillation (H)     Chronic kidney disease     Chronic kidney disease, stage 3b (H) 09/28/2023    Class 3 severe obesity due to excess calories with body mass index (BMI) of 40.0 to 44.9 in adult (H) 09/28/2023    Congestive heart failure (H)     COPD (chronic obstructive pulmonary disease) (H)     Coronary artery disease involving coronary bypass graft of native heart without angina pectoris 09/28/2023    Heart failure with reduced ejection fraction (H) 09/11/2023    HFrEF (heart failure with reduced ejection fraction) (H) 09/11/2023    Hyperlipidemia     Hypertension     Ischemic cardiomyopathy 09/28/2023    Obese     Paroxysmal atrial fibrillation (H) 09/28/2023    Tobacco abuse 09/28/2023    Past Surgical History:   Procedure Laterality Date    CORONARY ANGIOGRAPHY ADULT ORDER      CORONARY ARTERY BYPASS Left 2014    2 vessels    CV CORONARY ANGIOGRAM N/A 09/11/2023    Procedure: Coronary Angiogram;  Surgeon: Santy Esposito MD;  Location: [facility]  LAB CV    CV LEFT HEART CATH N/A 09/11/2023    Procedure: Left Heart Catheterization;  Surgeon: Santy Esposito MD;  Location: [facility] LAB CV    CV RIGHT HEART CATH MEASUREMENTS RECORDED N/A 09/11/2023    Procedure: Right Heart Catheterization;  Surgeon: Santy Esposito MD;  Location: [facility] LAB CV    Family History   Problem Relation Age of Onset    Cerebrovascular Disease Mother     Myocardial Infarction Father     Social History     Socioeconomic History    Marital status: Single     Spouse name: Not on file    Number of children: Not on file    Years of education: Not on file    Highest education level: Not on file   Occupational History    Occupation: Retired restaurant owner     Comment: [employer]   Tobacco Use    Smoking status: Every Day     Packs/day: .5     Types: Cigarettes    Smokeless tobacco: Never    Tobacco comments:     Seen IP by CTTS on 9/11/23 and declined cessation services and materials   Substance and Sexual Activity    Alcohol use: Not Currently    Drug use: Not on file    Sexual activity: Not on file   Other Topics Concern    Not on file   Social History Narrative    Currently lives in an assisted living facility. (Updated: 01/08/2024)     Social Determinants of Health     Financial Resource Strain: Not on file   Food Insecurity: Not on file   Transportation Needs: Not on file   Physical Activity: Not on file   Stress: Not on file   Social Connections: Not on file   Interpersonal Safety: Not on file   Housing Stability: Not on file          Medications  Allergies   Current Outpatient Medications   Medication Sig Dispense Refill    acetaminophen (TYLENOL) 500 MG tablet Take 1,000 mg by mouth every 8 hours as needed      albuterol (PROAIR HFA/PROVENTIL HFA/VENTOLIN HFA) 108 (90 Base) MCG/ACT inhaler Inhale 2 puffs into the lungs 4 times daily 18 g 0    amiodarone (PACERONE) 200 MG tablet Take 200 mg by mouth daily      apixaban ANTICOAGULANT (ELIQUIS) 5 MG tablet Take 1 tablet  (5 mg) by mouth 2 times daily 180 tablet 0    aspirin 81 MG EC tablet Take 1 tablet (81 mg) by mouth daily 90 tablet 1    atorvastatin (LIPITOR) 40 MG tablet Take 40 mg by mouth every morning      bumetanide (BUMEX) 1 MG tablet Take 2 tablets (2 mg) by mouth 2 times daily 900 tablet 3    carvedilol (COREG) 3.125 MG tablet Take 1 tablet (3.125 mg) by mouth 2 times daily (with meals)      [employer]S VITAMIN B12 1000 MCG tablet Take 1,000 mcg by mouth daily      DULoxetine (CYMBALTA) 30 MG capsule Take 30 mg by mouth every morning      gabapentin (NEURONTIN) 300 MG capsule Take 3 capsules (900 mg) by mouth at bedtime      hydrALAZINE (APRESOLINE) 10 MG tablet Take 1 tablet (10 mg) by mouth 3 times daily 270 tablet 3    ipratropium - albuterol 0.5 mg/2.5 mg/3 mL (DUONEB) 0.5-2.5 (3) MG/3ML neb solution INHALE 3 ML EVERY 6 HOURS AS NEEDED FOR WHEEZING.      isosorbide mononitrate (IMDUR) 60 MG 24 hr tablet Take 180 mg by mouth every morning      polyethylene glycol (MIRALAX) 17 GM/Dose powder Take 17 g by mouth 2 times daily as needed 510 g 0    rOPINIRole (REQUIP) 2 MG tablet Take 1 tablet (2 mg) by mouth 3 times daily 270 tablet 3    spironolactone (ALDACTONE) 50 MG tablet Take 1 tablet (50 mg) by mouth daily 90 tablet 3    albuterol (PROVENTIL) (2.5 MG/3ML) 0.083% neb solution Take 1 vial (2.5 mg) by nebulization every 6 hours as needed for shortness of breath, wheezing or cough (Patient not taking: Reported on 2/7/2024) 90 mL 0    metolazone (ZAROXOLYN) 2.5 MG tablet Take metolazone 2.5mg (1 tablet) one time. Save the second tablet for future use at the direction of cardiology only. (Patient not taking: Reported on 2/7/2024) 2 tablet 0    nitroGLYcerin (NITROSTAT) 0.4 MG sublingual tablet Place 1 tablet (0.4 mg) under the tongue every 5 minutes as needed If no relief after 3 tabs call 911;continue 1 tab every 5 min max 3 tab Indications: chest pain 100 tablet 1    No Known Allergies      Lab Results    Chemistry/lipid CBC  Cardiac Enzymes/BNP/TSH/INR   Lab Results   Component Value Date    BUN 57.4 (H) 01/18/2024     01/18/2024    CO2 27 01/18/2024    Lab Results   Component Value Date    WBC 8.2 01/09/2024    HGB 12.7 (L) 01/09/2024    HCT 42.1 01/09/2024    MCV 93 01/09/2024     01/09/2024    Lab Results   Component Value Date    INR 1.01 02/13/2023             This note has been dictated using voice recognition software. Any grammatical, typographical, or context distortions are unintentional and inherent to the software    40 minutes spent on the date of encounter doing chart review, review of outside records, review of test results, interpretation with above tests, patient visit, and documentation.        The longitudinal plan of care for heart failure with reduced ejection fraction, paroxysmal atrial fibrillation, CAD, obesity, CKD stage III was addressed during this visit. Due to the added complexity in care, I will continue to support Zackary in the subsequent management of this condition(s) and with the ongoing continuity of care of this condition(s).

## 2024-02-07 NOTE — Clinical Note
Please set up patient with HRS.  Also discussed CardioMEMS implant and he is wanting to proceed so please start PA process.  Also recommend following up with me sooner than 3 months since he is not seeing Dr. Bhandari until late April.  Lets have him see me mid March.  Thank you

## 2024-02-09 ENCOUNTER — TELEPHONE (OUTPATIENT)
Dept: CARDIOLOGY | Facility: CLINIC | Age: 64
End: 2024-02-09
Payer: MEDICARE

## 2024-02-09 NOTE — TELEPHONE ENCOUNTER
Email to patient today with pre-procedure preparation and medication instructions. [facility] Care RN contacted with lab orders for 2/14/24 to be drawn at his home. [facility] RN  Lisa will also call the patient to update him IF they are able to draw labs today or if he needed to get a ride to the lab himself.   Heather HUERTA RN BSN, CHFN, PCCN-K

## 2024-02-09 NOTE — TELEPHONE ENCOUNTER
----- Message from Astrid Wood sent at 2/9/2024  9:28 AM CST -----  Regarding: RE: CardioMEMS needs scheduled.  Good morning Heather,     Pt should arrive at 10:00 am to [facility]. I do have case notes in along with notes of me leaving messages for pt. If you do speak with him can you just let me know so I can remove the lvm msgs please. Also, reps have been notified and confirmed for 2/15.     Thank you,  Astrid  ----- Message -----  From: Heather Armenta RN  Sent: 2/7/2024   2:29 PM CST  To: [facility] Procedure  Pool - L[facility]  Subject: CardioMEMS needs scheduled.                      Please schedule patient for CardioMEMS implant procedure for March 15th, ( if the prior auth team gets this to go through prior to Fri Feb 16th, which is Dr. Bhandari's next CMEM's implant day, then we can change him to the earlier day.    Arrange procedure With Dr. Bhandari     Alerts:   Eliquis HOLD 2 doses  Labs day prior needs appt or to go to the hospital out pt lab  H&P 2/7/24      Heather HUERTA RN BSN, CHFN, PCCN-K

## 2024-02-14 ENCOUNTER — PREP FOR PROCEDURE (OUTPATIENT)
Dept: CARDIOLOGY | Facility: CLINIC | Age: 64
End: 2024-02-14
Payer: MEDICARE

## 2024-02-14 ENCOUNTER — LAB REQUISITION (OUTPATIENT)
Dept: LAB | Facility: HOSPITAL | Age: 64
End: 2024-02-14
Payer: MEDICARE

## 2024-02-14 DIAGNOSIS — I50.20 UNSPECIFIED SYSTOLIC (CONGESTIVE) HEART FAILURE (H): ICD-10-CM

## 2024-02-14 DIAGNOSIS — I25.5 ISCHEMIC CARDIOMYOPATHY: Primary | ICD-10-CM

## 2024-02-14 LAB
ANION GAP SERPL CALCULATED.3IONS-SCNC: 13 MMOL/L (ref 7–15)
BASOPHILS # BLD AUTO: 0.1 10E3/UL (ref 0–0.2)
BASOPHILS NFR BLD AUTO: 1 %
BUN SERPL-MCNC: 27.1 MG/DL (ref 8–23)
CALCIUM SERPL-MCNC: 9 MG/DL (ref 8.8–10.2)
CHLORIDE SERPL-SCNC: 96 MMOL/L (ref 98–107)
CREAT SERPL-MCNC: 1.35 MG/DL (ref 0.67–1.17)
DEPRECATED HCO3 PLAS-SCNC: 25 MMOL/L (ref 22–29)
EGFRCR SERPLBLD CKD-EPI 2021: 58 ML/MIN/1.73M2
EOSINOPHIL # BLD AUTO: 0.2 10E3/UL (ref 0–0.7)
EOSINOPHIL NFR BLD AUTO: 3 %
ERYTHROCYTE [DISTWIDTH] IN BLOOD BY AUTOMATED COUNT: 20.6 % (ref 10–15)
GLUCOSE SERPL-MCNC: 107 MG/DL (ref 70–99)
HCT VFR BLD AUTO: 38.1 % (ref 40–53)
HGB BLD-MCNC: 11.5 G/DL (ref 13.3–17.7)
IMM GRANULOCYTES # BLD: 0.1 10E3/UL
IMM GRANULOCYTES NFR BLD: 1 %
LYMPHOCYTES # BLD AUTO: 1.4 10E3/UL (ref 0.8–5.3)
LYMPHOCYTES NFR BLD AUTO: 22 %
MCH RBC QN AUTO: 28 PG (ref 26.5–33)
MCHC RBC AUTO-ENTMCNC: 30.2 G/DL (ref 31.5–36.5)
MCV RBC AUTO: 93 FL (ref 78–100)
MONOCYTES # BLD AUTO: 0.8 10E3/UL (ref 0–1.3)
MONOCYTES NFR BLD AUTO: 12 %
NEUTROPHILS # BLD AUTO: 4.1 10E3/UL (ref 1.6–8.3)
NEUTROPHILS NFR BLD AUTO: 61 %
NRBC # BLD AUTO: 0 10E3/UL
NRBC BLD AUTO-RTO: 0 /100
PLATELET # BLD AUTO: 168 10E3/UL (ref 150–450)
POTASSIUM SERPL-SCNC: 4.4 MMOL/L (ref 3.4–5.3)
RBC # BLD AUTO: 4.11 10E6/UL (ref 4.4–5.9)
SODIUM SERPL-SCNC: 134 MMOL/L (ref 135–145)
WBC # BLD AUTO: 6.6 10E3/UL (ref 4–11)

## 2024-02-14 PROCEDURE — 85025 COMPLETE CBC W/AUTO DIFF WBC: CPT | Mod: ORL | Performed by: NURSE PRACTITIONER

## 2024-02-14 PROCEDURE — 80048 BASIC METABOLIC PNL TOTAL CA: CPT | Mod: ORL | Performed by: NURSE PRACTITIONER

## 2024-02-14 RX ORDER — DEXTROSE MONOHYDRATE 25 G/50ML
25-50 INJECTION, SOLUTION INTRAVENOUS
Status: CANCELLED | OUTPATIENT
Start: 2024-02-15

## 2024-02-14 RX ORDER — LIDOCAINE 40 MG/G
CREAM TOPICAL
Status: CANCELLED | OUTPATIENT
Start: 2024-02-14

## 2024-02-14 RX ORDER — NICOTINE POLACRILEX 4 MG
15-30 LOZENGE BUCCAL
Status: CANCELLED | OUTPATIENT
Start: 2024-02-15

## 2024-02-14 NOTE — PROGRESS NOTES
Zackary Hutchison  [id number]  [facility] [address]  [phone] (home)     PCP:  Reid Escobar  H&P completed by:  Debbie Woodard CNP  Admit date 2/15/24 Arrival time:  10 AM  Anticoagulation:  apixaban (ELIQUIS)  Previous PCI: N/A  Bypass Grafts: yes  Renal Issues: No  Diabetic?: No  Device?: No  Type:  n/a  Ambulation status: independent    Reason for Visit:  Telephone call to discuss pre-procedure education in preparation for: CardioMEMS Implantation    Procedure Prep:  EKG results obtained, dated: To be completed on day of cath lab procedure  Hemogram results obtained: Completed on 2/14/24  Basic Metabolic Panel results obtained: Completed on 2/14/24  Pertinent cardiac test results: n/a  COVID-19 test results obtained:     Patient Education    Your arrival time is 10 AM.  Location is [facility] - [address] [place] [address], [place] [address] - Main Entrance of the Hospital  Please plan on being at the hospital all day.  At any time, emergencies and/or urgent cases may come up which could delay the start of your procedure.    COVID Testing Instructions  *Mandatory COVID Testing: ALL Patients will need to complete a COVID test no sooner than 4 days prior to their procedure (regardless of vaccination status).    To schedule COVID testing Please call [phone]  If you want to complete this at an outside facility please call them to find out if they will have the results within the appropriate time frame and their fax number.  You will need to provide us with that information so we can send the order.  The facility completing the test will need to fax the results to [phone]  If you are running into and issues please call us.     Pre-procedure instructions  Take your temperature in the morning prior to coming in.  If your temperature is 100 F please call [facility] [phone] and notify them.  If you do not have access to a thermometer at home, please come in for testing.  If you  are running a temperature your procedure may be rescheduled.  Patient instructed to not Eat or Drink after midnight.  Patient instructed to shower the evening before or the morning of the procedure.  Patient instructed to arrange for transportation home following procedure from a responsible family member or friend. No driving for at least 24 hours.  Patient instructed to have a responsible adult with them for 24 hours post-procedure.  Post-procedure follow up process.  Conscious sedation discussed.      Pre-procedure medication instructions.  Continue medications as scheduled, with a small amount of water on the day of the procedure unless indicated. (NO Diabetic Medications or Blood Thinners)  Pt instructed not to consume Alcohol, Tobacco, Caffeine, or Carbonated beverages 12 hours prior to procedure.  Patient instructed to take 325 mg of Aspirin the night before and morning of procedure: Yes  Other medication: instructed to take all routine morning medications the a.m. of the procedure.EXCEPT ELIQUIS              Diabetic Medication Instructions  DO NOT take any oral diabetic medication, short-acting diabetes medications/insulin, humalog or regular insulin the morning of your test  Take   dose of long-acting insulin (Lantus, Levemir) the day of your test  Hold Oral Diabetic on the day of the procedure and for 48 hours after IV contrast given  Remember to  bring your glucometer and insulin with you to take after your test if needed              Anticoagulation Medication Instructions     apixaban (ELIQUIS) - Hold 24 hours prior to procedure    Patient states understanding of procedure and agrees to proceed.    *PATIENTS RECORDS AVAILABLE IN [facility] UNLESS OTHERWISE INDICATED*      Patient Active Problem List   Diagnosis    Abnormal electrocardiogram    Cellulitis of left lower extremity    HFrEF (heart failure with reduced ejection fraction) (H)    Ischemic cardiomyopathy    CAD, S/P CABG    Paroxysmal atrial fib --  on Eliquis    Class 3 severe obesity due to excess calories with body mass index (BMI) of 40.0 to 44.9 in adult (H)    Chronic kidney disease, stage 3b (H)    Smoker    COPD exacerbation (H)    Venous stasis dermatitis of both lower extremities    PRINCESS (acute kidney injury) (H24)    Acute on chronic systolic CHF    Restless legs syndrome (RLS)       Current Outpatient Medications   Medication Sig Dispense Refill    acetaminophen (TYLENOL) 500 MG tablet Take 1,000 mg by mouth every 8 hours as needed      albuterol (PROAIR HFA/PROVENTIL HFA/VENTOLIN HFA) 108 (90 Base) MCG/ACT inhaler Inhale 2 puffs into the lungs 4 times daily 18 g 0    albuterol (PROVENTIL) (2.5 MG/3ML) 0.083% neb solution Take 1 vial (2.5 mg) by nebulization every 6 hours as needed for shortness of breath, wheezing or cough (Patient not taking: Reported on 2/7/2024) 90 mL 0    amiodarone (PACERONE) 200 MG tablet Take 200 mg by mouth daily      apixaban ANTICOAGULANT (ELIQUIS) 5 MG tablet Take 1 tablet (5 mg) by mouth 2 times daily 180 tablet 0    aspirin 81 MG EC tablet Take 1 tablet (81 mg) by mouth daily 90 tablet 1    atorvastatin (LIPITOR) 40 MG tablet Take 40 mg by mouth every morning      bumetanide (BUMEX) 1 MG tablet Take 2 tablets (2 mg) by mouth 2 times daily 900 tablet 3    carvedilol (COREG) 3.125 MG tablet Take 1 tablet (3.125 mg) by mouth 2 times daily (with meals)      [employer] VITAMIN B12 1000 MCG tablet Take 1,000 mcg by mouth daily      DULoxetine (CYMBALTA) 30 MG capsule Take 30 mg by mouth every morning      gabapentin (NEURONTIN) 300 MG capsule Take 3 capsules (900 mg) by mouth at bedtime      hydrALAZINE (APRESOLINE) 10 MG tablet Take 1 tablet (10 mg) by mouth 3 times daily 270 tablet 3    ipratropium - albuterol 0.5 mg/2.5 mg/3 mL (DUONEB) 0.5-2.5 (3) MG/3ML neb solution INHALE 3 ML EVERY 6 HOURS AS NEEDED FOR WHEEZING.      isosorbide mononitrate (IMDUR) 60 MG 24 hr tablet Take 180 mg by mouth every morning      metolazone  (ZAROXOLYN) 2.5 MG tablet Take metolazone 2.5mg (1 tablet) one time. Save the second tablet for future use at the direction of cardiology only. (Patient not taking: Reported on 2/7/2024) 2 tablet 0    nitroGLYcerin (NITROSTAT) 0.4 MG sublingual tablet Place 1 tablet (0.4 mg) under the tongue every 5 minutes as needed If no relief after 3 tabs call 911;continue 1 tab every 5 min max 3 tab Indications: chest pain 100 tablet 1    polyethylene glycol (MIRALAX) 17 GM/Dose powder Take 17 g by mouth 2 times daily as needed 510 g 0    rOPINIRole (REQUIP) 2 MG tablet Take 1 tablet (2 mg) by mouth 3 times daily 270 tablet 3    spironolactone (ALDACTONE) 50 MG tablet Take 1 tablet (50 mg) by mouth daily 90 tablet 3       No Known Allergies    Heather Armenta RN on 2/14/2024 at 7:43 AM

## 2024-02-15 ENCOUNTER — HOSPITAL ENCOUNTER (OUTPATIENT)
Facility: HOSPITAL | Age: 64
Discharge: HOME | End: 2024-02-15
Attending: INTERNAL MEDICINE | Admitting: INTERNAL MEDICINE
Payer: MEDICARE

## 2024-02-15 VITALS
SYSTOLIC BLOOD PRESSURE: 162 MMHG | RESPIRATION RATE: 24 BRPM | HEIGHT: 66 IN | BODY MASS INDEX: 42.75 KG/M2 | OXYGEN SATURATION: 94 % | DIASTOLIC BLOOD PRESSURE: 78 MMHG | TEMPERATURE: 97.9 F | WEIGHT: 266 LBS | HEART RATE: 78 BPM

## 2024-02-15 DIAGNOSIS — I25.5 ISCHEMIC CARDIOMYOPATHY: ICD-10-CM

## 2024-02-15 DIAGNOSIS — I50.20 HFREF (HEART FAILURE WITH REDUCED EJECTION FRACTION) (H): ICD-10-CM

## 2024-02-15 LAB
ATRIAL RATE - MUSE: 77 BPM
BASE EXCESS BLDV CALC-SCNC: 2.4 MMOL/L (ref -3–3)
BASE EXCESS BLDV CALC-SCNC: 2.7 MMOL/L (ref -3–3)
DIASTOLIC BLOOD PRESSURE - MUSE: NORMAL MMHG
HCO3 BLDV-SCNC: 25 MMOL/L (ref 21–28)
HCO3 BLDV-SCNC: 25 MMOL/L (ref 21–28)
INTERPRETATION ECG - MUSE: NORMAL
OXYHGB MFR BLDV: 57 % (ref 70–75)
OXYHGB MFR BLDV: 58 % (ref 70–75)
P AXIS - MUSE: 60 DEGREES
PCO2 BLDV: 52 MM HG (ref 40–50)
PCO2 BLDV: 52 MM HG (ref 40–50)
PH BLDV: 7.34 [PH] (ref 7.32–7.43)
PH BLDV: 7.34 [PH] (ref 7.32–7.43)
PO2 BLDV: 32 MM HG (ref 25–47)
PO2 BLDV: 32 MM HG (ref 25–47)
PR INTERVAL - MUSE: 192 MS
QRS DURATION - MUSE: 140 MS
QT - MUSE: 430 MS
QTC - MUSE: 486 MS
R AXIS - MUSE: 74 DEGREES
SAO2 % BLDV: 61 % (ref 70–75)
SAO2 % BLDV: 62 % (ref 70–75)
SYSTOLIC BLOOD PRESSURE - MUSE: NORMAL MMHG
T AXIS - MUSE: 42 DEGREES
VENTRICULAR RATE- MUSE: 77 BPM

## 2024-02-15 PROCEDURE — 94640 AIRWAY INHALATION TREATMENT: CPT

## 2024-02-15 PROCEDURE — 82805 BLOOD GASES W/O2 SATURATION: CPT | Mod: 91

## 2024-02-15 PROCEDURE — C1769 GUIDE WIRE: HCPCS | Performed by: INTERNAL MEDICINE

## 2024-02-15 PROCEDURE — C2624 WIRELESS PRESSURE SENSOR: HCPCS | Performed by: INTERNAL MEDICINE

## 2024-02-15 PROCEDURE — C1894 INTRO/SHEATH, NON-LASER: HCPCS | Performed by: INTERNAL MEDICINE

## 2024-02-15 PROCEDURE — C1751 CATH, INF, PER/CENT/MIDLINE: HCPCS | Performed by: INTERNAL MEDICINE

## 2024-02-15 PROCEDURE — 33289 TCAT IMPL WRLS P-ART PRS SNR: CPT | Performed by: INTERNAL MEDICINE

## 2024-02-15 PROCEDURE — 93005 ELECTROCARDIOGRAM TRACING: CPT

## 2024-02-15 PROCEDURE — 999N000054 HC STATISTIC EKG NON-CHARGEABLE

## 2024-02-15 PROCEDURE — 250N000011 HC RX IP 250 OP 636

## 2024-02-15 PROCEDURE — 999N000157 HC STATISTIC RCP TIME EA 10 MIN

## 2024-02-15 PROCEDURE — 250N000009 HC RX 250: Performed by: INTERNAL MEDICINE

## 2024-02-15 PROCEDURE — 99152 MOD SED SAME PHYS/QHP 5/>YRS: CPT | Performed by: INTERNAL MEDICINE

## 2024-02-15 PROCEDURE — 250N000009 HC RX 250: Performed by: NURSE PRACTITIONER

## 2024-02-15 PROCEDURE — 258N000003 HC RX IP 258 OP 636

## 2024-02-15 PROCEDURE — 250N000013 HC RX MED GY IP 250 OP 250 PS 637: Performed by: NURSE PRACTITIONER

## 2024-02-15 PROCEDURE — 250N000011 HC RX IP 250 OP 636: Performed by: NURSE PRACTITIONER

## 2024-02-15 PROCEDURE — 76937 US GUIDE VASCULAR ACCESS: CPT | Mod: 26 | Performed by: INTERNAL MEDICINE

## 2024-02-15 PROCEDURE — 250N000011 HC RX IP 250 OP 636: Performed by: INTERNAL MEDICINE

## 2024-02-15 PROCEDURE — 255N000002 HC RX 255 OP 636: Performed by: INTERNAL MEDICINE

## 2024-02-15 PROCEDURE — 272N000001 HC OR GENERAL SUPPLY STERILE: Performed by: INTERNAL MEDICINE

## 2024-02-15 PROCEDURE — 93010 ELECTROCARDIOGRAM REPORT: CPT | Mod: HOP | Performed by: STUDENT IN AN ORGANIZED HEALTH CARE EDUCATION/TRAINING PROGRAM

## 2024-02-15 PROCEDURE — 99153 MOD SED SAME PHYS/QHP EA: CPT | Performed by: INTERNAL MEDICINE

## 2024-02-15 DEVICE — SYSTEM CARDIOMEMS PA SENSOR AND DELIVERY: Type: IMPLANTABLE DEVICE | Site: HEART | Status: FUNCTIONAL

## 2024-02-15 RX ORDER — FENTANYL CITRATE 50 UG/ML
INJECTION, SOLUTION INTRAMUSCULAR; INTRAVENOUS
Status: DISCONTINUED | OUTPATIENT
Start: 2024-02-15 | End: 2024-02-15 | Stop reason: HOSPADM

## 2024-02-15 RX ORDER — ONDANSETRON 4 MG/1
4 TABLET, ORALLY DISINTEGRATING ORAL EVERY 6 HOURS PRN
Status: DISCONTINUED | OUTPATIENT
Start: 2024-02-15 | End: 2024-02-15 | Stop reason: HOSPADM

## 2024-02-15 RX ORDER — ONDANSETRON 2 MG/ML
4 INJECTION INTRAMUSCULAR; INTRAVENOUS EVERY 6 HOURS PRN
Status: DISCONTINUED | OUTPATIENT
Start: 2024-02-15 | End: 2024-02-15 | Stop reason: HOSPADM

## 2024-02-15 RX ORDER — IODIXANOL 320 MG/ML
INJECTION, SOLUTION INTRAVASCULAR
Status: DISCONTINUED | OUTPATIENT
Start: 2024-02-15 | End: 2024-02-15 | Stop reason: HOSPADM

## 2024-02-15 RX ORDER — DEXTROSE MONOHYDRATE 25 G/50ML
25-50 INJECTION, SOLUTION INTRAVENOUS
Status: DISCONTINUED | OUTPATIENT
Start: 2024-02-15 | End: 2024-02-15 | Stop reason: HOSPADM

## 2024-02-15 RX ORDER — NICOTINE POLACRILEX 4 MG
15-30 LOZENGE BUCCAL
Status: DISCONTINUED | OUTPATIENT
Start: 2024-02-15 | End: 2024-02-15 | Stop reason: HOSPADM

## 2024-02-15 RX ORDER — LIDOCAINE 40 MG/G
CREAM TOPICAL
Status: DISCONTINUED | OUTPATIENT
Start: 2024-02-15 | End: 2024-02-15 | Stop reason: HOSPADM

## 2024-02-15 RX ORDER — ACETAMINOPHEN 325 MG/1
650 TABLET ORAL EVERY 4 HOURS PRN
Status: DISCONTINUED | OUTPATIENT
Start: 2024-02-15 | End: 2024-02-15 | Stop reason: HOSPADM

## 2024-02-15 RX ORDER — FUROSEMIDE 10 MG/ML
80 INJECTION INTRAMUSCULAR; INTRAVENOUS ONCE
Status: COMPLETED | OUTPATIENT
Start: 2024-02-15 | End: 2024-02-15

## 2024-02-15 RX ORDER — ACETAMINOPHEN 325 MG/1
325 TABLET ORAL EVERY 4 HOURS PRN
Status: DISCONTINUED | OUTPATIENT
Start: 2024-02-15 | End: 2024-02-15

## 2024-02-15 RX ORDER — IPRATROPIUM BROMIDE AND ALBUTEROL SULFATE 2.5; .5 MG/3ML; MG/3ML
3 SOLUTION RESPIRATORY (INHALATION)
Status: DISCONTINUED | OUTPATIENT
Start: 2024-02-15 | End: 2024-02-15 | Stop reason: HOSPADM

## 2024-02-15 RX ADMIN — ACETAMINOPHEN 650 MG: 325 TABLET ORAL at 15:45

## 2024-02-15 RX ADMIN — IPRATROPIUM BROMIDE AND ALBUTEROL SULFATE 3 ML: .5; 3 SOLUTION RESPIRATORY (INHALATION) at 16:47

## 2024-02-15 RX ADMIN — FUROSEMIDE 80 MG: 10 INJECTION, SOLUTION INTRAMUSCULAR; INTRAVENOUS at 15:54

## 2024-02-15 ASSESSMENT — ACTIVITIES OF DAILY LIVING (ADL)
ADLS_ACUITY_SCORE: 38

## 2024-02-15 NOTE — DISCHARGE INSTRUCTIONS
Interventional Cardiology  CardioMEMS Discharge Instructions -      Instructions following Implantation of your CardioMEMS  device    **Do not stop your Coumadin, aspirin or platelet inhibitor unless directed by your Cardiologist.  These medicines help to prevent cells in your blood from sticking together and forming a clot.  Examples of these medicines are:  Ticagrelor (Brilinta), Clopidigrel (Plavix), Prasugrel (Effient)      For the first 72 hours after your procedure:  No bending over or placing your head lower than your heart for 24 hours after the procedure.  Do not lift more than 15 pounds.  If you usually lift 50 pounds or more daily, please contact your cardiologist.  Avoid any hard work or tiring activities, this includes, yard work, jogging, biking, sexual activity.  You may return to work the next day if you are feeling well and your job does not involve heavy lifting as described above.       Wound Care / Bleeding  You may take off the dressing on your neck the day after your procedure.  Keep the area dry and clean.  It is ok to shower with regular soap.  Pat dry, do not rub.  You do not need to use a bandage.  Bruising around the neck area is normal.  It may take 2-3 weeks for this to go away.  It is normal for the bruised area to turn green or yellow as it is healing.    A small lump may be present and may last 2-3 months.  Your neck may be sore or stiff for a few days.  You may take Tylenol or a pain medicine recommended by your doctor.  If you have a fever over 100.4, that lasts more than one day - call your cardiologist. [phone].  If your neck starts to bleed or begins to swell suddenly after leaving the hospital, sit up and apply firm pressure below the site for 15 minutes.    If bleeding continues, call 9-1-1      Daily routine with CardioMEMS   When you wake up, turn on the Patient Electronics Unit.  It may take a few minutes to warm up.  When you are ready, lie on the pillow and press  the remote button.  Follow the instructions given by the Patient Electronics Unit.  The Patient Electronics Unit will turn off automatically when the reading is sent.  Your readings are sent to a secure website.    Your readings are monitored regularly by [facility].  You will be called if any changes to your medicines need to be made.  For technical support, please call [phone]             Interventional Cardiology   Discharge Instructions for Femoral Approach    The instructions below are to help you understand how to take care of yourself. There is also information about when to call the doctor or emergency services          For the first 72 hours after your procedure:  No driving for 24 hours  Do not lift more than 15 pounds.  If you usually lift 50 pounds or more daily, please contact your cardiologist  Avoid any hard work or tiring activities, this includes, yard work, jogging, biking, sexual activity  During the day get up and walk around every 2 hours  You may return to work after 72 hours if you are feeling well and your job does not involve heavy lifting          Groin Site / Wound Care / Bleeding    You may take off the dressing on your groin the day after your procedure  Keep the area dry and clean  It is ok to shower with regular soap.  Pat dry, do not rub  You do not need to use a bandage  No tub/pool or hot tub for 1 week  If your groin starts to bleed or begins to swell suddenly after leaving the hospital, lie flat and apply firm pressure at the site for 15 minutes.  If bleeding continues, call 9-1-1  Bruising around the groin area is normal.  It may take 2-3 weeks for this to go away.  It is normal for the bruised area to turn green and/or yellow as it is healing.  A small lump may also be present and may last 2-3 months.  Your leg may be sore or stiff for a few days.  You may take Tylenol or a pain medicine recommended by your doctor  If you have a fever over 100.4, that  lasts more than one day - call your cardiologist.      Your Procedural Physician was: Dr. Bhandari, the phone number is: [phone].    [facility]:  [phone]  If you are calling after hours, please listen to the entire voicemail, a live  will answer at the end of the message

## 2024-02-15 NOTE — INTERVAL H&P NOTE
"I have reviewed the surgical (or preoperative) H&P that is linked to this encounter, and examined the patient. There are no significant changes    Clinical Conditions Present on Arrival:  Clinically Significant Risk Factors Present on Admission         # Hyponatremia: Lowest Na = 134 mmol/L in last 30 days, will monitor as appropriate  # Hypercalcemia: Highest Ca = 10.7 mg/dL in last 30 days, will monitor as appropriate       # Drug Induced Coagulation Defect: home medication list includes an anticoagulant medication  # Drug Induced Platelet Defect: home medication list includes an antiplatelet medication  # Severe Obesity: Estimated body mass index is 42.93 kg/m  as calculated from the following:    Height as of this encounter: 1.676 m (5' 6\").    Weight as of this encounter: 120.7 kg (266 lb).       "

## 2024-02-15 NOTE — PRE-PROCEDURE
GENERAL PRE-PROCEDURE:   Procedure:  CardioMEMs implant  Date/Time:  2/15/2024 12:22 PM    Written consent obtained?: Yes    Risks and benefits: Risks, benefits and alternatives were discussed    Consent given by:  Patient  Patient states understanding of procedure being performed: Yes    Patient's understanding of procedure matches consent: Yes    Procedure consent matches procedure scheduled: Yes    Expected level of sedation:  Moderate  Appropriately NPO:  Yes  ASA Class:  4 (HFrEF; NYHA Class III, ICM, CAD; s/p CABG, PAF; on Eliquis, Class III obesity; BMI 42.93kg/m2, CKD Stage IIIb)  Mallampati  :  Grade 3- soft palate visible, posterior pharyngeal wall not visible  Lungs:  Lungs clear with good breath sounds bilaterally  Heart:  Normal heart sounds and rate  History & Physical reviewed:  History and physical reviewed and updates made (see comment)  H&P Comments:  History & Physical reviewed:  History and physical reviewed and updates made (see comment)  H&P Comments:  Clinically Significant Risk Factors Present on Admission     Cardiovascular : HFrEF; NYHA Class III, ICM, CAD; s/p CABG, PAF; on Eliquis, Class III obesity; BMI 42.93kg/m2     Fluid & Electrolyte Disorders : Not present on admission     Gastroenterology : Not present on admission     Hematology/Oncology : Not present on admission     Nephrology : CKD Stage IIIb; stable     Neurology : Not present on admission     Pulmonology : Not present on admission     Systemic : Class III obesity; BMI 42.93kg/m2    Statement of review:  I have reviewed the lab findings, diagnostic data, medications, and the plan for sedation    Statement of review:  I have reviewed the lab findings, diagnostic data, medications, and the plan for sedation

## 2024-02-16 NOTE — PROGRESS NOTES
Writer was asked by bedside RN, Jer BLEVINS To assist with discharge plan.  Mr. Hutchison had a cardiomems procedure today and received moderate sedation which previously had not been planned and therefore patient did not have a responsible adult planned to stay with him post procedure and his transport service is not able to provide him a ride after 5pm.  Per bedside RN, she paged Dr. Bhandari to clarify the discharge plan and had not heard back.  Writer paged Dr. Bhandari at 1820.  Dr. Bhandari responded at 1830, Per Dr. Bhandari patient is cleared to discharge back to his assisted living in a cab six hours after fentanyl dosing.  Writer updated the patient on this plan and he is agreeable.  Writer asked if Mr. Hutchison is ok with writer calling his assisted living to update them on his procedure today and post-procedure cares.  Patient declined and state he could update them with his discharge instructions.  Writer advised that he ask his assisted living to check-in on him during the night due to receiving moderate sedation today.  Patient verbalized understanding.

## 2024-02-16 NOTE — PROGRESS NOTES
Patient was kept comfortable during post-procedure stay.VSS. Complained of pain 4/10 in his neck access site, treated with Tylenol and ice pack. Initially has a small oozing of blood but stopped after dressing changed about an hour in recovery. Right femoral access site remains dry & free from signs of bleeding even post ambulation. Tolerated food and fluids. Ambulated without issues. Appointments already made & included in AVS. Patient's medical ride is done at 5 pm and patient cannot find any ride back to assisted living. Dr. Bhandari was paged to check if patient is ok to go home via a cab ride. With approval from Talya SIDDIQUI NP, and later on from Dr. Bhandari (as relayed by Sarah DOLL), a cab ride was arranged to get the patient back to his assisted living.  Post-op instructions reviewed and packet given to patient. Advised to have an aide checked on him and have his instructions read by them. Able to ask questions. Verbalized no concerns. Belongings returned. Discharged in stable condition.

## 2024-02-19 ENCOUNTER — TELEPHONE (OUTPATIENT)
Dept: CARDIOLOGY | Facility: CLINIC | Age: 64
End: 2024-02-19
Payer: MEDICARE

## 2024-02-19 NOTE — TELEPHONE ENCOUNTER
Patient was contacted today as he was not able to successfully send a CM[facility] transmission since his implant on 2/15/24  [facility] Rep left a message to see if they can assist him with technique.   Heather HUERTA RN BSN, CHFN, PCCN-K

## 2024-02-20 ENCOUNTER — TELEPHONE (OUTPATIENT)
Dept: CARDIOLOGY | Facility: CLINIC | Age: 64
End: 2024-02-20
Payer: MEDICARE

## 2024-02-20 NOTE — TELEPHONE ENCOUNTER
Zackary is LM today to advise that he seek additional help to perform CM[employer] transmissions. He was provided the Tech Support number and instructed to make an effort to resolve the issues.   Advised that it may be possible that he would need a replacement pillow device.  Heather HUERTA RN BSN, CHFN, PCCN-K

## 2024-02-23 ENCOUNTER — TELEPHONE (OUTPATIENT)
Dept: CARDIOLOGY | Facility: CLINIC | Age: 64
End: 2024-02-23
Payer: MEDICARE

## 2024-02-23 DIAGNOSIS — I50.20 HFREF (HEART FAILURE WITH REDUCED EJECTION FRACTION) (H): Primary | ICD-10-CM

## 2024-02-23 NOTE — TELEPHONE ENCOUNTER
Zackary was receptive to taking Metolazone 2.5 with follow up BMP. Will reach out for a Status on HF S/S Monday 02/26.     Manuelito Bianchi RN [facility]   _____________________________________________________________________    Michelle Iyer APRN CNP  You; [facility]13 minutes ago (2:08 PM)    CY  May take Metolazone 2.5 mg X 1 today and BMP tomorrow if possible.  Thank you!  CY

## 2024-02-23 NOTE — TELEPHONE ENCOUNTER
[facility]:   HRS ([employer]) Daily Alert     February 23, 2024    Alert(s): Weight    Current diuretic dose: Bumex 2 mg BID, Spironolactone 50mg Daily, Metolazone PRN 2.5 or 5    Recent plan of care changes: Pt reports wheezing and swelling for the last several days. Has benefitted from Metolazone in the past, has had 5 and 2.5. Works for him. Able to get labs Monday. He still has both doses at home. Can he take one?     Goal Weight Range: Unknown, he fluctuates drastically. His recent discharge wt was 266    Weight, BP and HR Readings: Weight 268    Time spent in review this day: 10 min.      Manuelito Bianchi RN [facility]       Future Appointments   Date Time Provider Department Center   3/18/2024  4:00 AM N [facility] CARDIOMEMS [facility]   3/21/2024  2:50 PM Debbie Woodard, APRN CNP [facility]   4/2/2024  2:50 PM Toshia Khanna, NP [facility]   4/18/2024  4:00 AM N [facility] CARDIOMEMS [facility]   4/25/2024 12:50 PM Jacey Bhandari MD [facility]

## 2024-02-26 ENCOUNTER — LAB (OUTPATIENT)
Dept: LAB | Facility: HOSPITAL | Age: 64
End: 2024-02-26
Payer: MEDICARE

## 2024-02-26 ENCOUNTER — TELEPHONE (OUTPATIENT)
Dept: CARDIOLOGY | Facility: CLINIC | Age: 64
End: 2024-02-26

## 2024-02-26 DIAGNOSIS — I50.20 HFREF (HEART FAILURE WITH REDUCED EJECTION FRACTION) (H): ICD-10-CM

## 2024-02-26 LAB
ANION GAP SERPL CALCULATED.3IONS-SCNC: 14 MMOL/L (ref 7–15)
BUN SERPL-MCNC: 33.6 MG/DL (ref 8–23)
CALCIUM SERPL-MCNC: 9.2 MG/DL (ref 8.8–10.2)
CHLORIDE SERPL-SCNC: 90 MMOL/L (ref 98–107)
CREAT SERPL-MCNC: 1.36 MG/DL (ref 0.67–1.17)
DEPRECATED HCO3 PLAS-SCNC: 30 MMOL/L (ref 22–29)
EGFRCR SERPLBLD CKD-EPI 2021: 58 ML/MIN/1.73M2
GLUCOSE SERPL-MCNC: 145 MG/DL (ref 70–99)
POTASSIUM SERPL-SCNC: 3.7 MMOL/L (ref 3.4–5.3)
SODIUM SERPL-SCNC: 134 MMOL/L (ref 135–145)

## 2024-02-26 PROCEDURE — 80048 BASIC METABOLIC PNL TOTAL CA: CPT

## 2024-02-26 PROCEDURE — 36415 COLL VENOUS BLD VENIPUNCTURE: CPT

## 2024-02-26 NOTE — TELEPHONE ENCOUNTER
Zackary stated he lost about 1.5 lbs over the weekend (267.2), had a lot of urine output, Vitals stable on HRS, Swelling improved, and feeling a little bit better.     Manuelito Bianchi RN [facility]

## 2024-02-26 NOTE — TELEPHONE ENCOUNTER
----- Message from TONY Vigil CNP sent at 2/26/2024 11:48 AM CST -----  Please inform patient of stable labs after taking metolazone. How did he respond? No further recs, thanks you

## 2024-02-29 ENCOUNTER — TELEPHONE (OUTPATIENT)
Dept: CARDIOLOGY | Facility: CLINIC | Age: 64
End: 2024-02-29
Payer: MEDICARE

## 2024-02-29 NOTE — TELEPHONE ENCOUNTER
Pt returns call to [facility] to report that someone from the CardioMEMS team will be coming to his residence on Monday afternoon 3/4/24 to assist w/ sending transmissions.     Pt also noted that his RLS has been acting up, and he has sometimes been taking more of his requip than is ordered. Advised him to take medication as prescribed and to follow up w/ his PCP.     Wt is trending up slightly on HRS home monitoring, pt reports stable symptoms. Will reassess tomorrow.    CHAPARRITA Ann RN

## 2024-03-01 ENCOUNTER — TELEPHONE (OUTPATIENT)
Dept: CARDIOLOGY | Facility: CLINIC | Age: 64
End: 2024-03-01
Payer: MEDICARE

## 2024-03-01 DIAGNOSIS — I50.20 HFREF (HEART FAILURE WITH REDUCED EJECTION FRACTION) (H): Primary | ICD-10-CM

## 2024-03-01 NOTE — TELEPHONE ENCOUNTER
Jacey Bhandari MD Strom, Tayva M RN  Caller: Unspecified (Today,  2:03 PM)  Thanks, Pauly. Please instruct Zackary to take metolazone 2.5 mg x1 dose with follow-up BMP the next day.    ~ Wen

## 2024-03-01 NOTE — TELEPHONE ENCOUNTER
Noted. Called pt, no answer. Left detailed message for pt with recommendation from Dr. Bhandari. Requested call back to [facility] to discuss and confirm he received the recommendation.     CHAPARRITA Ann RN

## 2024-03-01 NOTE — TELEPHONE ENCOUNTER
Dr. Bhandari-    Pt called to follow up on worsening HF symptoms today that he marked on his HRS survey. Pt is feeling more SOB w/ activity and quite wheezy today. His BLE edema is increased. Pt's wt is 268.7lbs today per HRS, this is up +3lbs from 2 days ago.     Last took metolazone 2.5mg on 2/24/24. BMP on 2/26 w/ creatinine 1.36, eGFR 58.     What are your recommendations? Also, [facility] Team will be at his house on Monday 3/4 in the afternoon to assist w/ transmissions.     Time spent: 10 min    CHAPARRITA Ann RN

## 2024-03-04 ENCOUNTER — LAB (OUTPATIENT)
Dept: LAB | Facility: HOSPITAL | Age: 64
End: 2024-03-04
Payer: MEDICARE

## 2024-03-04 ENCOUNTER — TELEPHONE (OUTPATIENT)
Dept: CARDIOLOGY | Facility: CLINIC | Age: 64
End: 2024-03-04

## 2024-03-04 DIAGNOSIS — I50.20 HFREF (HEART FAILURE WITH REDUCED EJECTION FRACTION) (H): ICD-10-CM

## 2024-03-04 LAB
ANION GAP SERPL CALCULATED.3IONS-SCNC: 13 MMOL/L (ref 7–15)
BUN SERPL-MCNC: 29.8 MG/DL (ref 8–23)
CALCIUM SERPL-MCNC: 9.5 MG/DL (ref 8.8–10.2)
CHLORIDE SERPL-SCNC: 91 MMOL/L (ref 98–107)
CREAT SERPL-MCNC: 1.22 MG/DL (ref 0.67–1.17)
DEPRECATED HCO3 PLAS-SCNC: 32 MMOL/L (ref 22–29)
EGFRCR SERPLBLD CKD-EPI 2021: 66 ML/MIN/1.73M2
GLUCOSE SERPL-MCNC: 151 MG/DL (ref 70–99)
POTASSIUM SERPL-SCNC: 3.8 MMOL/L (ref 3.4–5.3)
SODIUM SERPL-SCNC: 136 MMOL/L (ref 135–145)

## 2024-03-04 PROCEDURE — 36415 COLL VENOUS BLD VENIPUNCTURE: CPT

## 2024-03-04 PROCEDURE — 82374 ASSAY BLOOD CARBON DIOXIDE: CPT

## 2024-03-04 NOTE — TELEPHONE ENCOUNTER
"[facility]:   [employer] Daily Alert     March 4, 2024    Alert(s): Symptom and Weight   today's weight is 272# with LE Edema reported    Current diuretic dose: Bumex 2mg bid, Metolazone 2.5mg PRN last used on Friday March 1st, Creat is   1.22  TODAY  Recent plan of care changes: patient reports large fried chicken meal last PM, \"I ate too much,I know, but it was so good\" he reports. States he has swelling in his ankles, legs and feels more fatigue than his usual. \"I have the HRS kit up and running, but the [employer] folks will come at 3pm today to check out why my [facility] pillow is not working\" He stated.     Goal Weight Range: 266-267#       Time spent in review this day: 10 min.    Dr. Bhandari any additional recommendations?  Weekly Metolazone? 1.25mg twice a week?    Heather HUERTA RN BSN, CHFN, PCCN-K        Future Appointments   Date Time Provider Department Center   3/18/2024  4:00 AM N [facility]   3/21/2024  2:50 PM De[facility]ard[facility]   4/2/2024  2:50 PM [facility]anna[facility] NP [facility]   4/18/2024  4:00 AM [facility]   4/25/2024 12:50 PM Jacey Bhandari MD [facility]     "

## 2024-03-04 NOTE — TELEPHONE ENCOUNTER
LM to call [facility] to confirm Metolazone order from last Friday 03/01. Results back with no recommendations.     Manuelito Bianchi RN [facility]

## 2024-03-07 ENCOUNTER — TELEPHONE (OUTPATIENT)
Dept: CARDIOLOGY | Facility: CLINIC | Age: 64
End: 2024-03-07

## 2024-03-07 DIAGNOSIS — I50.23 ACUTE ON CHRONIC SYSTOLIC CONGESTIVE HEART FAILURE (H): Primary | ICD-10-CM

## 2024-03-07 RX ORDER — METOLAZONE 2.5 MG/1
2.5 TABLET ORAL WEEKLY
Status: ON HOLD
Start: 2024-03-07 | End: 2024-05-08

## 2024-03-07 NOTE — TELEPHONE ENCOUNTER
Spoke w/ Dr. Bhandari in person regarding HRS alert. She reviewed pt's recent BMP results. Dr. Bhandari gave recommendation for pt to start taking metolazone 2.5mg weekly on Thursdays and have a weekly BMP drawn on Fridays. Will update pt w/ this recommendation.     CHAPARRITA Ann RN

## 2024-03-07 NOTE — TELEPHONE ENCOUNTER
Called pt, no answer. Left detailed message w/ Dr. Bhandari's recommendation for pt to start taking metolazone 2.5mg once weekly on Thursdays, starting today if he has not taken his second dose of bumex yet today. With this, to also start having a weekly BMP drawn on Fridays. Requested pt call [facility] back to review recommendations and let us know if he is able to take the dose of metolazone today or tomorrow morning and the plan going forward.     CHAPARRITA Ann RN

## 2024-03-07 NOTE — TELEPHONE ENCOUNTER
[facility]:   [employer] ([employer]) Daily Alert     March 7, 2024    Alert(s): Symptom and Weight  Current wt is  269.6#    Current diuretic dose: Bumex 2mg bid , Spironolactone 50mg daily and PRN Metolazone  Last Used on   March 1st, Creat is 1.22 on 3/4/24    Recent plan of care changes: Meals at Assisted Living have been more sodium than normal and he has been trying to avoid this as much as he can. Has LE edema, wound  in his lower leg is oozing. He has more shortness of breath and an audible wheeze on the phone today.  He would have Home Care for lab draws weekly if needed.     Goal Weight Range:  262#      Time spent in review this day: 20 min.    Heather HUERTA RN BSN, CHFN, PCCN-K      Dr. Bhandari do you want to consider Metolazone weekly at this time?     Future Appointments   Date Time Provider Department Center   3/18/2024  4:00 AM N [facility] CARDIOMEMS [facility]   3/21/2024  2:50 PM Debbie Woodard, APRN CNP [facility]   4/2/2024  2:50 PM Toshia Khanna, NP [facility]   4/18/2024  4:00 AM [facility] CARDIOMEMS [facility]   4/25/2024 12:50 PM Jacey Bhandari MD [facility]

## 2024-03-08 ENCOUNTER — TELEPHONE (OUTPATIENT)
Dept: CARDIOLOGY | Facility: CLINIC | Age: 64
End: 2024-03-08
Payer: MEDICARE

## 2024-03-08 ENCOUNTER — LAB REQUISITION (OUTPATIENT)
Dept: LAB | Facility: HOSPITAL | Age: 64
End: 2024-03-08
Payer: MEDICARE

## 2024-03-08 DIAGNOSIS — I50.23 ACUTE ON CHRONIC SYSTOLIC (CONGESTIVE) HEART FAILURE (H): ICD-10-CM

## 2024-03-08 LAB
ANION GAP SERPL CALCULATED.3IONS-SCNC: 11 MMOL/L (ref 7–15)
BUN SERPL-MCNC: 29.7 MG/DL (ref 8–23)
CALCIUM SERPL-MCNC: 9.9 MG/DL (ref 8.8–10.2)
CHLORIDE SERPL-SCNC: 95 MMOL/L (ref 98–107)
CREAT SERPL-MCNC: 1.16 MG/DL (ref 0.67–1.17)
DEPRECATED HCO3 PLAS-SCNC: 32 MMOL/L (ref 22–29)
EGFRCR SERPLBLD CKD-EPI 2021: 70 ML/MIN/1.73M2
GLUCOSE SERPL-MCNC: 139 MG/DL (ref 70–99)
POTASSIUM SERPL-SCNC: 3.2 MMOL/L (ref 3.4–5.3)
SODIUM SERPL-SCNC: 138 MMOL/L (ref 135–145)

## 2024-03-08 PROCEDURE — 80048 BASIC METABOLIC PNL TOTAL CA: CPT | Mod: ORL | Performed by: INTERNAL MEDICINE

## 2024-03-08 NOTE — TELEPHONE ENCOUNTER
[facility] Call Center    Phone Message    May a detailed message be left on voicemail: yes     Reason for Call: Other: Candida would like a call back to clarify some information on labs.     Action Taken: Other: Cardiology    Travel Screening: Not Applicable    Thank you!  [facility]

## 2024-03-08 NOTE — TELEPHONE ENCOUNTER
Pt agreeable to the 2.5 mg Metolazone dosage on Thursdays with follow up labs drawn at home on Fridays. [facility] SHARMILA Tirado updated. Standing orders in.     Manuelito Bianchi RN [facility]

## 2024-03-08 NOTE — TELEPHONE ENCOUNTER
Called pt to let him know we updated his nurse Candida alas/ [facility] about weekly labs, see separate telephone encounter from 3/8.    Requested pt call [facility] back to update us on how he is feeling today since metolazone dose yesterday afternoon & to do his readings on HRS.     CHAPARRITA Ann RN

## 2024-03-08 NOTE — TELEPHONE ENCOUNTER
Spoke w/ nurse Candida. Updated her w/ new orders from Dr. Bhandari for pt to have a BMP drawn weekly on Fridays. Will fax updated order to her. She will bring the labs to [facility]. She will draw a BMP today on pt since he took metolazone 2.5mg yesterday.     CHAPARRITA Ann RN

## 2024-03-08 NOTE — TELEPHONE ENCOUNTER
"Pt reports no change in HF Status since Metolazone yesterday however the patient stated  historically it takes a couple days to bring his weight down.   On HRS patient reported CP this afternoon, when we spoke he said it was this morning and he took nitroglycerin and it relieved his Sx. According to pt this \"has happened a million times and nitroglycerin never fails me\". Assessed for further Sx of ischemia, pt denies. Pain resolved. On Imdur as well.     Blood Pressure: 139/73, 86bpm Pulseox: 95%, 90bpm Survey: Complete Weight: 270.4lbs    Manuelito Bianchi RN [facility]     Time 10 mins.   "

## 2024-03-09 ENCOUNTER — HEALTH MAINTENANCE LETTER (OUTPATIENT)
Age: 64
End: 2024-03-09

## 2024-03-14 ENCOUNTER — TELEPHONE (OUTPATIENT)
Dept: CARDIOLOGY | Facility: CLINIC | Age: 64
End: 2024-03-14
Payer: MEDICARE

## 2024-03-14 NOTE — TELEPHONE ENCOUNTER
[facility] Call [facility]    Phone Message    May a detailed message be left on voicemail: yes     Reason for Call: Tabatha is calling due to a order she received  order number [id number]   Came back without being signed, please resend with signature, thank you.    Action Taken: Other: cardiology    Travel Screening: Not Applicable  Thank you!  [facility]                                                                     \

## 2024-03-18 ENCOUNTER — OFFICE VISIT (OUTPATIENT)
Dept: CARDIOLOGY | Facility: CLINIC | Age: 64
End: 2024-03-18
Payer: MEDICARE

## 2024-03-18 DIAGNOSIS — I50.23 ACUTE ON CHRONIC SYSTOLIC CONGESTIVE HEART FAILURE (H): Primary | ICD-10-CM

## 2024-03-18 PROCEDURE — 99207 PR NO CHARGE LOS: CPT | Performed by: INTERNAL MEDICINE

## 2024-03-21 ENCOUNTER — OFFICE VISIT (OUTPATIENT)
Dept: CARDIOLOGY | Facility: CLINIC | Age: 64
End: 2024-03-21
Attending: NURSE PRACTITIONER
Payer: MEDICARE

## 2024-03-21 VITALS
OXYGEN SATURATION: 97 % | BODY MASS INDEX: 45.84 KG/M2 | DIASTOLIC BLOOD PRESSURE: 68 MMHG | RESPIRATION RATE: 20 BRPM | HEART RATE: 90 BPM | WEIGHT: 284 LBS | SYSTOLIC BLOOD PRESSURE: 126 MMHG

## 2024-03-21 DIAGNOSIS — I10 PRIMARY HYPERTENSION: ICD-10-CM

## 2024-03-21 DIAGNOSIS — N18.32 CHRONIC KIDNEY DISEASE, STAGE 3B (H): ICD-10-CM

## 2024-03-21 DIAGNOSIS — I25.5 ISCHEMIC CARDIOMYOPATHY: ICD-10-CM

## 2024-03-21 DIAGNOSIS — E66.01 CLASS 3 SEVERE OBESITY DUE TO EXCESS CALORIES WITH SERIOUS COMORBIDITY AND BODY MASS INDEX (BMI) OF 40.0 TO 44.9 IN ADULT (H): ICD-10-CM

## 2024-03-21 DIAGNOSIS — E66.813 CLASS 3 SEVERE OBESITY DUE TO EXCESS CALORIES WITH SERIOUS COMORBIDITY AND BODY MASS INDEX (BMI) OF 40.0 TO 44.9 IN ADULT (H): ICD-10-CM

## 2024-03-21 DIAGNOSIS — I50.20 HEART FAILURE WITH REDUCED EJECTION FRACTION (H): ICD-10-CM

## 2024-03-21 DIAGNOSIS — I50.20 HFREF (HEART FAILURE WITH REDUCED EJECTION FRACTION) (H): Primary | ICD-10-CM

## 2024-03-21 DIAGNOSIS — I48.0 PAROXYSMAL ATRIAL FIBRILLATION (H): ICD-10-CM

## 2024-03-21 DIAGNOSIS — I25.10 CORONARY ARTERY DISEASE INVOLVING NATIVE CORONARY ARTERY OF NATIVE HEART WITHOUT ANGINA PECTORIS: ICD-10-CM

## 2024-03-21 PROCEDURE — G2211 COMPLEX E/M VISIT ADD ON: HCPCS | Performed by: NURSE PRACTITIONER

## 2024-03-21 PROCEDURE — 99214 OFFICE O/P EST MOD 30 MIN: CPT | Performed by: NURSE PRACTITIONER

## 2024-03-21 RX ORDER — AMIODARONE HYDROCHLORIDE 200 MG/1
200 TABLET ORAL DAILY
Qty: 90 TABLET | Refills: 0 | Status: ON HOLD | OUTPATIENT
Start: 2024-03-21 | End: 2024-06-10

## 2024-03-21 RX ORDER — CARVEDILOL 3.12 MG/1
3.12 TABLET ORAL 2 TIMES DAILY WITH MEALS
Qty: 180 TABLET | Refills: 1 | Status: SHIPPED | OUTPATIENT
Start: 2024-03-21

## 2024-03-21 NOTE — PROGRESS NOTES
Assessment/Recommendations   Assessment:    1. Ischemic cardiomyopathy, heart failure with mildly reduced ejection fraction, ejection fraction 41%, NYHA class III: Decompensated.  His weight has increased 9 pounds in 4 days.  He has fatigue, dyspnea exertion, abdominal distention and lower extremity edema that is weeping.  Status post CardioMEMS implant February 15, 2024 but he has been having issues with his pillow.  [facility] will be going into his facility tomorrow to help him with this.  He is also enrolled in HRS.  2.  Hypertension: Blood pressure well-controlled today 126/68  3.  Paroxysmal atrial fibrillation: Normal sinus rhythm. He continues Eliquis for anticoagulation. Dr. Bhandari recommended a referral to A-fib clinic for management.  He follows with Toshia Khanna April 2.  He states he ran out of amiodarone and carvedilol mid February and never restarted these medications.  4.  CKD stage IIIb: Most recent creatinine was 1.16 a few weeks ago.  He has weekly labs on Fridays due to taking metolazone on Thursdays  5.  Obesity: BMI 45.84  6.  CAD: Denies angina. Status post CABG in 2015.  Coronary angiogram September 2023 showed chronic total occlusion of proximal LAD and RCA.  Patent grafts.  No intervention was completed.  He continues aspirin, atorvastatin, Imdur and hydralazine     Plan:  1.  Restart amiodarone 200 mg daily  2.  Restart carvedilol 3.125 mg twice a day  3.  Administer IV Bumex 12 mg today in clinic, addendum: unable to obtain IV access, will set up in infusion clinic  4.  Take normal dosing of metolazone today  5.  Continue daily weights and stressed importance of low-sodium diet  6.  Continue weekly labs on Fridays    Zackary Hutchison will follow up with Dr. Bhandari April 25, Toshia Khanna April 2 and in the heart failure clinic in 2 months.     History of Present Illness/Subjective    Mr. Zackary Hutchison is a 63 year old male seen at [facility] heart failure clinic today for  continued follow-up. He follows up for heart failure with mildly reduced ejection fraction, ischemic cardiomyopathy.  He has had multiple hospitalizations in the last 6 months.  Most recently he was hospitalized mid January for acute heart failure and was started on a Lasix drip.  He was diuresed 18 L.  He was started on spironolactone and hydralazine.  His lisinopril was discontinued.  He had an echocardiogram in December which showed ejection fraction of 41%.  He has a past medical history significant for hypertension, CAD, paroxysmal atrial fibrillation, COPD, possible GRACE, tobacco use, alcohol use, CABG in 2015, obesity, chronic kidney disease stage IIIb, hyperlipidemia. Status post CardioMEMS February 15, 2024    Today, he has fatigue, dyspnea on exertion, abdominal distention and lower extremity edema.  He denies lightheadedness, orthopnea, PND, palpitations, and chest pain.      He is monitoring home weights which is increased to 278 pounds today.  He lives in a facility where his meals are provided and he tries to follow a low-sodium diet.     Physical Examination Review of Systems   /68 (BP Location: Right arm, Patient Position: Sitting, Cuff Size: Adult Large)   Pulse 90   Resp 20   Wt 128.8 kg (284 lb)   SpO2 97%   BMI 45.84 kg/m    Body mass index is 45.84 kg/m .  Wt Readings from Last 3 Encounters:   03/21/24 128.8 kg (284 lb)   02/15/24 120.7 kg (266 lb)   02/07/24 122.9 kg (271 lb)       General Appearance:   no acute distress   ENT/Mouth: No abnormalities   EYES:  no scleral icterus, normal conjunctivae   Neck: no thyromegaly   Chest/Lungs:   lungs are wheezy, equal chest wall expansion    Cardiovascular:   Regular. Normal first and second heart sounds with no murmurs, rubs, or gallops, severe edema bilaterally that is weeping   Abdomen:  Obese, bowel sounds are present   Extremities: no cyanosis or clubbing   Skin: warm   Neurologic: no tremors     Psychiatric: alert and oriented x3                                               Medical History  Surgical History Family History Social History   Past Medical History:   Diagnosis Date    Atrial fibrillation (H)     Chronic kidney disease     Chronic kidney disease, stage 3b (H) 09/28/2023    Class 3 severe obesity due to excess calories with body mass index (BMI) of 40.0 to 44.9 in adult (H) 09/28/2023    Congestive heart failure (H)     COPD (chronic obstructive pulmonary disease) (H)     Coronary artery disease involving coronary bypass graft of native heart without angina pectoris 09/28/2023    Heart failure with reduced ejection fraction (H) 09/11/2023    HFrEF (heart failure with reduced ejection fraction) (H) 09/11/2023    Hyperlipidemia     Hypertension     Ischemic cardiomyopathy 09/28/2023    Obese     Paroxysmal atrial fibrillation (H) 09/28/2023    Tobacco abuse 09/28/2023    Past Surgical History:   Procedure Laterality Date    CORONARY ANGIOGRAPHY ADULT ORDER      CORONARY ARTERY BYPASS Left 2014    2 vessels    CV CARDIOMEMS WITH RIGHT HEART CATH N/A 2/15/2024    Procedure: Pulmonary Arterial Pressure Sensor Placement;  Surgeon: Jacey Bhandari MD;  Location: [facility] CATH LAB CV    CV CORONARY ANGIOGRAM N/A 09/11/2023    Procedure: Coronary Angiogram;  Surgeon: Santy Esposito MD;  Location: [facility] CATH LAB CV    CV LEFT HEART CATH N/A 09/11/2023    Procedure: Left Heart Catheterization;  Surgeon: Santy Esposito MD;  Location: [facility] CATH LAB CV    CV RIGHT HEART CATH MEASUREMENTS RECORDED N/A 09/11/2023    Procedure: Right Heart Catheterization;  Surgeon: Santy Esposito MD;  Location: [facility] CATH LAB CV    Family History   Problem Relation Age of Onset    Cerebrovascular Disease Mother     Myocardial Infarction Father     Social History     Socioeconomic History    Marital status: Single     Spouse name: Not on file    Number of children: Not on file    Years of education: Not on file    Highest education level: Not  on file   Occupational History    Occupation: Retired restaurant owner     Comment: [employer]   Tobacco Use    Smoking status: Every Day     Packs/day: .5     Types: Cigarettes    Smokeless tobacco: Never    Tobacco comments:     Seen IP by CTTS on 9/11/23 and declined cessation services and materials   Substance and Sexual Activity    Alcohol use: Not Currently    Drug use: Not on file    Sexual activity: Not on file   Other Topics Concern    Not on file   Social History Narrative    Currently lives in an assisted living facility. (Updated: 01/08/2024)     Social Determinants of Health     Financial Resource Strain: Not on file   Food Insecurity: Not on file   Transportation Needs: Not on file   Physical Activity: Not on file   Stress: Not on file   Social Connections: Not on file   Interpersonal Safety: Not on file   Housing Stability: Not on file          Medications  Allergies   Current Outpatient Medications   Medication Sig Dispense Refill    acetaminophen (TYLENOL) 500 MG tablet Take 1,000 mg by mouth every 8 hours as needed      albuterol (PROAIR HFA/PROVENTIL HFA/VENTOLIN HFA) 108 (90 Base) MCG/ACT inhaler Inhale 2 puffs into the lungs 4 times daily 18 g 0    albuterol (PROVENTIL) (2.5 MG/3ML) 0.083% neb solution Take 1 vial (2.5 mg) by nebulization every 6 hours as needed for shortness of breath, wheezing or cough 90 mL 0    amiodarone (PACERONE) 200 MG tablet Take 1 tablet (200 mg) by mouth daily 90 tablet 0    apixaban ANTICOAGULANT (ELIQUIS) 5 MG tablet Take 1 tablet (5 mg) by mouth 2 times daily 180 tablet 0    aspirin 81 MG EC tablet Take 1 tablet (81 mg) by mouth daily 90 tablet 1    atorvastatin (LIPITOR) 40 MG tablet Take 40 mg by mouth every morning      bumetanide (BUMEX) 1 MG tablet Take 2 tablets (2 mg) by mouth 2 times daily 900 tablet 3    carvedilol (COREG) 3.125 MG tablet Take 1 tablet (3.125 mg) by mouth 2 times daily (with meals) 180 tablet 1    [employer] VITAMIN B12 1000 MCG tablet Take 1,000  mcg by mouth daily      DULoxetine (CYMBALTA) 30 MG capsule Take 30 mg by mouth every morning      gabapentin (NEURONTIN) 300 MG capsule Take 3 capsules (900 mg) by mouth at bedtime      hydrALAZINE (APRESOLINE) 10 MG tablet Take 1 tablet (10 mg) by mouth 3 times daily 270 tablet 3    ipratropium - albuterol 0.5 mg/2.5 mg/3 mL (DUONEB) 0.5-2.5 (3) MG/3ML neb solution INHALE 3 ML EVERY 6 HOURS AS NEEDED FOR WHEEZING.      isosorbide mononitrate (IMDUR) 60 MG 24 hr tablet Take 180 mg by mouth every morning      polyethylene glycol (MIRALAX) 17 GM/Dose powder Take 17 g by mouth 2 times daily as needed 510 g 0    rOPINIRole (REQUIP) 2 MG tablet Take 1 tablet (2 mg) by mouth 3 times daily 270 tablet 3    spironolactone (ALDACTONE) 50 MG tablet Take 1 tablet (50 mg) by mouth daily 90 tablet 3    metolazone (ZAROXOLYN) 2.5 MG tablet Take 1 tablet (2.5 mg) by mouth once a week on Thursdays, 30 minutes before your bumex. Labs weekly on Fridays.      nitroGLYcerin (NITROSTAT) 0.4 MG sublingual tablet Place 1 tablet (0.4 mg) under the tongue every 5 minutes as needed If no relief after 3 tabs call 911;continue 1 tab every 5 min max 3 tab Indications: chest pain 100 tablet 1    No Known Allergies      Lab Results    Chemistry/lipid CBC Cardiac Enzymes/BNP/TSH/INR   Lab Results   Component Value Date    BUN 29.7 (H) 03/08/2024     03/08/2024    CO2 32 (H) 03/08/2024    Lab Results   Component Value Date    WBC 6.6 02/14/2024    HGB 11.5 (L) 02/14/2024    HCT 38.1 (L) 02/14/2024    MCV 93 02/14/2024     02/14/2024    Lab Results   Component Value Date    INR 1.01 02/13/2023             This note has been dictated using voice recognition software. Any grammatical, typographical, or context distortions are unintentional and inherent to the software    30 minutes spent on the date of encounter doing chart review, review of outside records, review of test results, interpretation with above tests, patient visit, and  documentation.        The longitudinal plan of care for Zackary Hutchison was addressed during this visit. Due to the added complexity in care, I will continue to support Zackary in the subsequent management of this condition(s) and with the ongoing continuity of care of this condition(s).

## 2024-03-21 NOTE — PATIENT INSTRUCTIONS
Zackary Hutchison,    It was a pleasure to see you today at [facility].     My recommendations after this visit include:  - Please follow up with Dr Bhandari April 25   - Please follow up with Toshia Khanna April 2  - Please follow up with Debbie Woodard in 2 months  - Continue weekly labs on Fridays  - Restart amiodarone 200 mg daily  - Restart carvedilol 3.125 mg twice    Debbie Woodard, CNP

## 2024-03-21 NOTE — LETTER
3/21/2024    Reid Escobar MD  [address] [place] [address]    RE: Zackary Hutchison       Dear Colleague,     I had the pleasure of seeing Zackary Kianna in the [facility].        Assessment/Recommendations   Assessment:    1. Ischemic cardiomyopathy, heart failure with mildly reduced ejection fraction, ejection fraction 41%, NYHA class III: Decompensated.  His weight has increased 9 pounds in 4 days.  He has fatigue, dyspnea exertion, abdominal distention and lower extremity edema that is weeping.  Status post CardioMEMS implant February 15, 2024 but he has been having issues with his pillow.  [facility] will be going into his facility tomorrow to help him with this.  He is also enrolled in HRS.  2.  Hypertension: Blood pressure well-controlled today 126/68  3.  Paroxysmal atrial fibrillation: Normal sinus rhythm. He continues Eliquis for anticoagulation. Dr. Bhandari recommended a referral to A-fib clinic for management.  He follows with Toshia Khanna April 2.  He states he ran out of amiodarone and carvedilol mid February and never restarted these medications.  4.  CKD stage IIIb: Most recent creatinine was 1.16 a few weeks ago.  He has weekly labs on Fridays due to taking metolazone on Thursdays  5.  Obesity: BMI 45.84  6.  CAD: Denies angina. Status post CABG in 2015.  Coronary angiogram September 2023 showed chronic total occlusion of proximal LAD and RCA.  Patent grafts.  No intervention was completed.  He continues aspirin, atorvastatin, Imdur and hydralazine     Plan:  1.  Restart amiodarone 200 mg daily  2.  Restart carvedilol 3.125 mg twice a day  3.  Administer IV Bumex 12 mg today in clinic  4.  BMP pending  5.  Take normal dosing of metolazone today  6.  Continue daily weights and stressed importance of low-sodium diet  7.  Continue weekly labs on Fridays    Zackary Hutchison will follow up with Dr. Bhandari April 25, Toshia Khanna April 2 and in the heart failure clinic  in 2 months.     History of Present Illness/Subjective    Mr. Zackary Hutchison is a 63 year old male seen at [facility] heart failure clinic today for continued follow-up. He follows up for heart failure with mildly reduced ejection fraction, ischemic cardiomyopathy.  He has had multiple hospitalizations in the last 6 months.  Most recently he was hospitalized mid January for acute heart failure and was started on a Lasix drip.  He was diuresed 18 L.  He was started on spironolactone and hydralazine.  His lisinopril was discontinued.  He had an echocardiogram in December which showed ejection fraction of 41%.  He has a past medical history significant for hypertension, CAD, paroxysmal atrial fibrillation, COPD, possible GRACE, tobacco use, alcohol use, CABG in 2015, obesity, chronic kidney disease stage IIIb, hyperlipidemia. Status post CardioMEMS February 15, 2024    Today, he has fatigue, dyspnea on exertion, abdominal distention and lower extremity edema.  He denies lightheadedness, orthopnea, PND, palpitations, and chest pain.      He is monitoring home weights which is increased to 278 pounds today.  He lives in a facility where his meals are provided and he tries to follow a low-sodium diet.     Physical Examination Review of Systems   /68 (BP Location: Right arm, Patient Position: Sitting, Cuff Size: Adult Large)   Pulse 90   Resp 20   Wt 128.8 kg (284 lb)   SpO2 97%   BMI 45.84 kg/m    Body mass index is 45.84 kg/m .  Wt Readings from Last 3 Encounters:   03/21/24 128.8 kg (284 lb)   02/15/24 120.7 kg (266 lb)   02/07/24 122.9 kg (271 lb)       General Appearance:   no acute distress   ENT/Mouth: No abnormalities   EYES:  no scleral icterus, normal conjunctivae   Neck: no thyromegaly   Chest/Lungs:   lungs are wheezy, equal chest wall expansion    Cardiovascular:   Regular. Normal first and second heart sounds with no murmurs, rubs, or gallops, severe edema bilaterally that is weeping   Abdomen:   Obese, bowel sounds are present   Extremities: no cyanosis or clubbing   Skin: warm   Neurologic: no tremors     Psychiatric: alert and oriented x3                                              Medical History  Surgical History Family History Social History   Past Medical History:   Diagnosis Date    Atrial fibrillation (H)     Chronic kidney disease     Chronic kidney disease, stage 3b (H) 09/28/2023    Class 3 severe obesity due to excess calories with body mass index (BMI) of 40.0 to 44.9 in adult (H) 09/28/2023    Congestive heart failure (H)     COPD (chronic obstructive pulmonary disease) (H)     Coronary artery disease involving coronary bypass graft of native heart without angina pectoris 09/28/2023    Heart failure with reduced ejection fraction (H) 09/11/2023    HFrEF (heart failure with reduced ejection fraction) (H) 09/11/2023    Hyperlipidemia     Hypertension     Ischemic cardiomyopathy 09/28/2023    Obese     Paroxysmal atrial fibrillation (H) 09/28/2023    Tobacco abuse 09/28/2023    Past Surgical History:   Procedure Laterality Date    CORONARY ANGIOGRAPHY ADULT ORDER      CORONARY ARTERY BYPASS Left 2014    2 vessels    CV CARDIOMEMS WITH RIGHT HEART CATH N/A 2/15/2024    Procedure: Pulmonary Arterial Pressure Sensor Placement;  Surgeon: Jacey Bhandari MD;  Location: [facility] CATH LAB CV    CV CORONARY ANGIOGRAM N/A 09/11/2023    Procedure: Coronary Angiogram;  Surgeon: Santy Esposito MD;  Location: [facility] CATH LAB CV    CV LEFT HEART CATH N/A 09/11/2023    Procedure: Left Heart Catheterization;  Surgeon: Santy Esposito MD;  Location: [facility] CATH LAB CV    CV RIGHT HEART CATH MEASUREMENTS RECORDED N/A 09/11/2023    Procedure: Right Heart Catheterization;  Surgeon: Santy Esposito MD;  Location: [facility] CATH LAB CV    Family History   Problem Relation Age of Onset    Cerebrovascular Disease Mother     Myocardial Infarction Father     Social History     Socioeconomic History     Marital status: Single     Spouse name: Not on file    Number of children: Not on file    Years of education: Not on file    Highest education level: Not on file   Occupational History    Occupation: Retired restaurant owner     Comment: [employer]   Tobacco Use    Smoking status: Every Day     Packs/day: .5     Types: Cigarettes    Smokeless tobacco: Never    Tobacco comments:     Seen IP by CTTS on 9/11/23 and declined cessation services and materials   Substance and Sexual Activity    Alcohol use: Not Currently    Drug use: Not on file    Sexual activity: Not on file   Other Topics Concern    Not on file   Social History Narrative    Currently lives in an assisted living facility. (Updated: 01/08/2024)     Social Determinants of Health     Financial Resource Strain: Not on file   Food Insecurity: Not on file   Transportation Needs: Not on file   Physical Activity: Not on file   Stress: Not on file   Social Connections: Not on file   Interpersonal Safety: Not on file   Housing Stability: Not on file          Medications  Allergies   Current Outpatient Medications   Medication Sig Dispense Refill    acetaminophen (TYLENOL) 500 MG tablet Take 1,000 mg by mouth every 8 hours as needed      albuterol (PROAIR HFA/PROVENTIL HFA/VENTOLIN HFA) 108 (90 Base) MCG/ACT inhaler Inhale 2 puffs into the lungs 4 times daily 18 g 0    albuterol (PROVENTIL) (2.5 MG/3ML) 0.083% neb solution Take 1 vial (2.5 mg) by nebulization every 6 hours as needed for shortness of breath, wheezing or cough 90 mL 0    amiodarone (PACERONE) 200 MG tablet Take 1 tablet (200 mg) by mouth daily 90 tablet 0    apixaban ANTICOAGULANT (ELIQUIS) 5 MG tablet Take 1 tablet (5 mg) by mouth 2 times daily 180 tablet 0    aspirin 81 MG EC tablet Take 1 tablet (81 mg) by mouth daily 90 tablet 1    atorvastatin (LIPITOR) 40 MG tablet Take 40 mg by mouth every morning      bumetanide (BUMEX) 1 MG tablet Take 2 tablets (2 mg) by mouth 2 times daily 900 tablet 3     carvedilol (COREG) 3.125 MG tablet Take 1 tablet (3.125 mg) by mouth 2 times daily (with meals) 180 tablet 1    [employer]S VITAMIN B12 1000 MCG tablet Take 1,000 mcg by mouth daily      DULoxetine (CYMBALTA) 30 MG capsule Take 30 mg by mouth every morning      gabapentin (NEURONTIN) 300 MG capsule Take 3 capsules (900 mg) by mouth at bedtime      hydrALAZINE (APRESOLINE) 10 MG tablet Take 1 tablet (10 mg) by mouth 3 times daily 270 tablet 3    ipratropium - albuterol 0.5 mg/2.5 mg/3 mL (DUONEB) 0.5-2.5 (3) MG/3ML neb solution INHALE 3 ML EVERY 6 HOURS AS NEEDED FOR WHEEZING.      isosorbide mononitrate (IMDUR) 60 MG 24 hr tablet Take 180 mg by mouth every morning      polyethylene glycol (MIRALAX) 17 GM/Dose powder Take 17 g by mouth 2 times daily as needed 510 g 0    rOPINIRole (REQUIP) 2 MG tablet Take 1 tablet (2 mg) by mouth 3 times daily 270 tablet 3    spironolactone (ALDACTONE) 50 MG tablet Take 1 tablet (50 mg) by mouth daily 90 tablet 3    metolazone (ZAROXOLYN) 2.5 MG tablet Take 1 tablet (2.5 mg) by mouth once a week on Thursdays, 30 minutes before your bumex. Labs weekly on Fridays.      nitroGLYcerin (NITROSTAT) 0.4 MG sublingual tablet Place 1 tablet (0.4 mg) under the tongue every 5 minutes as needed If no relief after 3 tabs call 911;continue 1 tab every 5 min max 3 tab Indications: chest pain 100 tablet 1    No Known Allergies      Lab Results    Chemistry/lipid CBC Cardiac Enzymes/BNP/TSH/INR   Lab Results   Component Value Date    BUN 29.7 (H) 03/08/2024     03/08/2024    CO2 32 (H) 03/08/2024    Lab Results   Component Value Date    WBC 6.6 02/14/2024    HGB 11.5 (L) 02/14/2024    HCT 38.1 (L) 02/14/2024    MCV 93 02/14/2024     02/14/2024    Lab Results   Component Value Date    INR 1.01 02/13/2023             This note has been dictated using voice recognition software. Any grammatical, typographical, or context distortions are unintentional and inherent to the software    30 minutes  spent on the date of encounter doing chart review, review of outside records, review of test results, interpretation with above tests, patient visit, and documentation.        The longitudinal plan of care for Zackary Hutchison was addressed during this visit. Due to the added complexity in care, I will continue to support Zackary in the subsequent management of this condition(s) and with the ongoing continuity of care of this condition(s).                Thank you for allowing me to participate in the care of your patient.      Sincerely,     TONY Vigil CNP     [facility]  cc:   TOYN Vigil CNP  [address], [address]  [place] [address]

## 2024-03-22 ENCOUNTER — TELEPHONE (OUTPATIENT)
Dept: CARDIOLOGY | Facility: CLINIC | Age: 64
End: 2024-03-22
Payer: MEDICARE

## 2024-03-22 ENCOUNTER — LAB REQUISITION (OUTPATIENT)
Dept: LAB | Facility: HOSPITAL | Age: 64
End: 2024-03-22
Payer: MEDICARE

## 2024-03-22 DIAGNOSIS — I50.20 HEART FAILURE WITH REDUCED EJECTION FRACTION (H): Primary | ICD-10-CM

## 2024-03-22 DIAGNOSIS — I13.0 HYPERTENSIVE HEART AND CHRONIC KIDNEY DISEASE WITH HEART FAILURE AND STAGE 1 THROUGH STAGE 4 CHRONIC KIDNEY DISEASE, OR UNSPECIFIED CHRONIC KIDNEY DISEASE (H): ICD-10-CM

## 2024-03-22 LAB
ANION GAP SERPL CALCULATED.3IONS-SCNC: 13 MMOL/L (ref 7–15)
BUN SERPL-MCNC: 27.4 MG/DL (ref 8–23)
CALCIUM SERPL-MCNC: 9.7 MG/DL (ref 8.8–10.2)
CHLORIDE SERPL-SCNC: 91 MMOL/L (ref 98–107)
CREAT SERPL-MCNC: 1.24 MG/DL (ref 0.67–1.17)
DEPRECATED HCO3 PLAS-SCNC: 32 MMOL/L (ref 22–29)
EGFRCR SERPLBLD CKD-EPI 2021: 65 ML/MIN/1.73M2
GLUCOSE SERPL-MCNC: 136 MG/DL (ref 70–99)
POTASSIUM SERPL-SCNC: 3.1 MMOL/L (ref 3.4–5.3)
SODIUM SERPL-SCNC: 136 MMOL/L (ref 135–145)

## 2024-03-22 PROCEDURE — 80048 BASIC METABOLIC PNL TOTAL CA: CPT | Mod: ORL

## 2024-03-22 RX ORDER — BUMETANIDE 1 MG/1
5 TABLET ORAL 2 TIMES DAILY
Qty: 900 TABLET | Refills: 3 | Status: ON HOLD | OUTPATIENT
Start: 2024-03-22 | End: 2024-05-08

## 2024-03-22 NOTE — TELEPHONE ENCOUNTER
Discussion with Debbie Woodard CNP who is recommending increased Bumex dose up to 5mg bid, LM for patient to return call to discuss this increase.  Had checked with the [facility] to consider doing IV Diuretics, due to difficult IV access, but they are unable to accommodate until a week from today. Will increase oral Bumex instead. NIKOLAY/Debbie Woodard CNP /Heather HUERTA RN BSN, CHFN, PCCN-K

## 2024-03-22 NOTE — TELEPHONE ENCOUNTER
Zackary returned my call, he report using Metolazone last PM with 6# loss overnight, he states that his breathing is improving without an audible wheeze noted.  He was advised to increase his Bumex dose up to 5mg bid starting this AM. To consume more foods in his diet that contain higher amounts of potassium.  He is contacting the [facility] team to arrange a time for his lab draw today. He also has an appt with Glo /from the [facility] team to assist in set up for the pillow unit to more effectively send transmissions.   Heather HUERTA RN BSN, CHFN, PCCN-K    FYI to Debbie Woodard, CNP

## 2024-03-25 ENCOUNTER — ALLIED HEALTH/NURSE VISIT (OUTPATIENT)
Dept: CARDIOLOGY | Facility: CLINIC | Age: 64
End: 2024-03-25
Payer: MEDICARE

## 2024-03-25 DIAGNOSIS — I50.20 HEART FAILURE WITH REDUCED EJECTION FRACTION (H): Primary | ICD-10-CM

## 2024-03-25 PROCEDURE — 99458 RPM TX MGMT EA ADDL 20 MIN: CPT | Performed by: INTERNAL MEDICINE

## 2024-03-25 PROCEDURE — 99454 REM MNTR PHYSIOL PARAM 16-30: CPT | Performed by: INTERNAL MEDICINE

## 2024-03-25 PROCEDURE — 99453 REM MNTR PHYSIOL PARAM SETUP: CPT | Performed by: INTERNAL MEDICINE

## 2024-03-25 PROCEDURE — 99457 RPM TX MGMT 1ST 20 MIN: CPT | Performed by: INTERNAL MEDICINE

## 2024-03-26 ENCOUNTER — TELEPHONE (OUTPATIENT)
Dept: CARDIOLOGY | Facility: CLINIC | Age: 64
End: 2024-03-26
Payer: MEDICARE

## 2024-03-26 NOTE — TELEPHONE ENCOUNTER
Faxed signed BMP orders on 03/26/2024 at 1218 per [facility]'s request.     Manuelito Bianchi RN [facility]

## 2024-03-29 ENCOUNTER — LAB REQUISITION (OUTPATIENT)
Dept: LAB | Facility: HOSPITAL | Age: 64
End: 2024-03-29
Payer: MEDICARE

## 2024-03-29 DIAGNOSIS — I50.23 ACUTE ON CHRONIC SYSTOLIC (CONGESTIVE) HEART FAILURE (H): ICD-10-CM

## 2024-03-29 LAB
ANION GAP SERPL CALCULATED.3IONS-SCNC: 15 MMOL/L (ref 7–15)
BUN SERPL-MCNC: 37.5 MG/DL (ref 8–23)
CALCIUM SERPL-MCNC: 10 MG/DL (ref 8.8–10.2)
CHLORIDE SERPL-SCNC: 87 MMOL/L (ref 98–107)
CREAT SERPL-MCNC: 1.38 MG/DL (ref 0.67–1.17)
DEPRECATED HCO3 PLAS-SCNC: 31 MMOL/L (ref 22–29)
EGFRCR SERPLBLD CKD-EPI 2021: 57 ML/MIN/1.73M2
GLUCOSE SERPL-MCNC: 350 MG/DL (ref 70–99)
POTASSIUM SERPL-SCNC: 3.6 MMOL/L (ref 3.4–5.3)
SODIUM SERPL-SCNC: 133 MMOL/L (ref 135–145)

## 2024-03-29 PROCEDURE — 80048 BASIC METABOLIC PNL TOTAL CA: CPT | Mod: ORL | Performed by: NURSE PRACTITIONER
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
## 2024-04-01 PROBLEM — I45.10 RBBB (RIGHT BUNDLE BRANCH BLOCK): Status: ACTIVE | Noted: 2020-10-31

## 2024-04-01 PROBLEM — G89.29 CHRONIC PAIN OF BOTH KNEES: Status: ACTIVE | Noted: 2021-09-14

## 2024-04-01 PROBLEM — K59.81 PSEUDO-OBSTRUCTION OF COLON: Status: ACTIVE | Noted: 2022-06-09

## 2024-04-01 PROBLEM — H35.22 PROLIFERATIVE VITREORETINOPATHY OF LEFT EYE: Status: ACTIVE | Noted: 2019-05-24

## 2024-04-01 PROBLEM — K92.1 HEMATOCHEZIA: Status: ACTIVE | Noted: 2021-02-17

## 2024-04-01 PROBLEM — G60.9 HEREDITARY AND IDIOPATHIC PERIPHERAL NEUROPATHY: Status: ACTIVE | Noted: 2021-09-14

## 2024-04-01 PROBLEM — M25.562 CHRONIC PAIN OF BOTH KNEES: Status: ACTIVE | Noted: 2021-09-14

## 2024-04-01 PROBLEM — L03.90 CELLULITIS: Status: ACTIVE | Noted: 2024-01-31

## 2024-04-01 PROBLEM — E16.2 HYPOGLYCEMIA: Status: ACTIVE | Noted: 2020-08-25

## 2024-04-01 PROBLEM — A40.9 SEPSIS DUE TO STREPTOCOCCUS SPECIES (H): Status: ACTIVE | Noted: 2020-10-28

## 2024-04-01 PROBLEM — R26.81 GAIT INSTABILITY: Status: ACTIVE | Noted: 2021-09-14

## 2024-04-01 PROBLEM — M17.0 OSTEOARTHRITIS OF BOTH KNEES: Status: ACTIVE | Noted: 2021-09-14

## 2024-04-01 PROBLEM — L97.821: Status: ACTIVE | Noted: 2022-03-28

## 2024-04-01 PROBLEM — M25.561 CHRONIC PAIN OF BOTH KNEES: Status: ACTIVE | Noted: 2021-09-14

## 2024-04-01 PROBLEM — R10.84 GENERALIZED ABDOMINAL PAIN: Status: ACTIVE | Noted: 2022-03-28

## 2024-04-01 PROBLEM — H33.002 MACULA-OFF RHEGMATOGENOUS RETINAL DETACHMENT, LEFT: Status: ACTIVE | Noted: 2019-05-06

## 2024-04-01 PROBLEM — E87.6 HYPOKALEMIA: Status: ACTIVE | Noted: 2022-03-28

## 2024-04-01 PROBLEM — K56.7 ILEUS (H): Status: ACTIVE | Noted: 2022-03-28

## 2024-04-01 PROBLEM — B35.1 ONYCHOMYCOSIS: Status: ACTIVE | Noted: 2021-09-14

## 2024-04-01 NOTE — PROGRESS NOTES
Thank you, Dr. Bhandari, for asking the [facility] team to see MrHemalatha Hutchison to evaluate PAF/PAFL.    Assessment/Recommendations     Assessment/Plan:    Diagnoses and all orders for this visit:  Paroxysmal atrial fib -- on Eliquis  On Amiodarone Therapy  -diagnosed with AF RVR 6/14/2019, confirmed by EKG, rate 160 bpm.  Paroxysmal A-fib since.  First noted to have atrial flutter with 2 1 conduction 8/22/2020, confirmed by EKG, rate 147 bpm.  History of  right bundle branch block noted 1/26/2015  -Sx: fatigue, dyspnea on exertion   -Rhythm control: Amiodarone 200 mg daily + Carvedilol 3.125 mg BID. Ventricular rate 96 bpm today with intermittent irregularity noted on exam today. He does not monitor his rhythm or HR by way of smart watch, Kardia or BP cuff monitoring system at home. He is set up with [facility] through HF/CORE clinic  -12 lead EKG in clinic today shows NSR RBBB 96 bpm  ms QT/QTC: 406/512 ms  -TSH 1.30 8/2023, ALT 9 12/22/23  -we discussed the ongoing importance of lifestyle modification (maintaining a healthy weight, sleep apnea diagnosis and management, alcohol avoidance) as part of a long term strategy for atrial fibrillation management  -We reviewed the pathophysiology of atrial fibrillation and management considerations including stroke risk and anticoagulation, rate control, cardioversion, antiarrhythmic drug therapy, and catheter ablation.  Antiarrhythmic drug options discussed.  We discussed atrial fibrillation ablation procedures, anticipated success rates, the potential need for re-do ablation vs addition of anti-arrhythmic drugs, procedural risks (including groin bleeding, tamponade, phrenic or esophageal injury, stroke, pulmonary vein stenosis) and recovery expectations.    [id number] score of 4 due to age, HF, HTN, CAD and on Eliquis. No bleeding issues reported, no missed doses.       Heart failure with reduced ejection fraction (H)  -LVEF 41% per TTE  12/2023  -no valve disease, no bi-atrial enlargement noted  -GDMT: Bumex, Carvedilol, Spironolactone, weekly Metolazone  -QRS duration 140 ms  -Known RBBB since 2015  -no device  -Managed by HF/CORE clinic (Cardio [facility] monitoring in place)    Coronary artery disease involving coronary bypass graft of native heart without angina pectoris  -Coronary Cath completed 9/11/23  -Noted to have a chronic total occlusion of the proximal LAD, stented segment and mid RCA + Moderate calcific disease in medium size circumflex OM1 branch. Smaller tortuous AV groove circumflex branch has 70% calcified narrowing and diffuse distal disease. Widely patent large caliber LIMA to mid LAD with good distal and proximal runoff. Widely patent free LAURI graft to mid PDA. Diffuse disease in posterolateral system which is never well visualized.   -ASA, Imdur and statin daily    Primary hypertension  -/62, recheck 10 minutes later, 140/70 using large cuff right arm    PLAN:   Continue on amiodarone 200 mg daily + carvedilol 3.125 mg twice daily  Continue on Eliquis 5 mg twice daily  Continue on current GDMT therapy, managed by heart failure/core clinic  Follow-up with Dr. Bee for PVI ablation consult  Check Amiodarone and TSH at next OV (patient left today before labs could be collected)     History of Present Illness/Subjective     Zackary Hutchison is a very pleasant 63 year old male who comes in today for EP consult PAF/PAFL. Referred by Dr Bhandari.      Zackary Hutchison has a known history of 2-vessel CABG (2015) - LIMA->LAD, left radial->RPDA. Occluded natives with patent grafts at angiography 8/2019, paroxysmal atrial fibrillation diagnosed June 2019, paroxysmal atrial flutter noted August 2020, ischemic cardiomyopathy hypertension, right bundle branch block noted 2015, chronic kidney disease stage III, tobacco abuse, COPD, morbid obesity.     Arrhythmia hx  Dx/date: diagnosed with AF RVR 6/14/2019, confirmed by EKG, rate 160 bpm.   Paroxysmal A-fib since.  First noted to have atrial flutter with 2 1 conduction 8/22/2020, confirmed by EKG, rate 147 bpm.  History of  right bundle branch block noted 1/26/2015.   Sx: fatigue, dyspnea on exertion   Prior AAD, AV raudel blocking agents: Amiodarone  OAC: Eliquis  Procedures  DCCV: 6/17/19 (200 J X1, successful)  Ablation: cassy Raymundo remains on rhythm control strategy consisting of amiodarone 200 mg daily in addition to carvedilol 3.125 mg twice daily.  He remains in normal sinus rhythm today, confirmed by twelve-lead EKG with ventricular rate of 96 bpm, QRS duration 140 ms, QTc 512 ms.  He continues to report persistent fatigue, shortness of breath on exertion, intermittent dizziness and persistent lower extremity swelling.  On occasion, he reports chest pain occurs in the center of his chest radiating to the left chest wall, he denies nausea, vomiting or diaphoresis with his chest pain episodes.  He denies palpitations or syncope.  He has known heart failure with reduced ejection fraction, LVEF by recent TTE 41%.  Known history of conduction disease with with documented right bundle branch block since 2015.  GDMT therapy consisting of Bumex, carvedilol, spironolactone and weekly metolazone.  He has managed by heart failure/core clinic and Card[facility] monitoring.  Known history of coronary artery disease with recent coronary catheterization in September 2023, coronary catheter revealed chronic total occlusion of the proximal LAD, stented segment and mid RCA + Moderate calcific disease in medium size circumflex OM1 branch. Smaller tortuous AV groove circumflex branch has 70% calcified narrowing and diffuse distal disease. Widely patent large caliber LIMA to mid LAD with good distal and proximal runoff. Widely patent free LAURI graft to mid PDA. Diffuse disease in posterolateral system which is never well visualized.  He remains on daily aspirin, Imdur and statin therapy.  Blood pressure mildly elevated x  2 today.  He remains on Eliquis twice daily for stroke prevention, he denies any missed doses in the last 3 weeks or episodes of bleeding. Due for TSH and Amiodarone level assessment today.       Cardiographics (reviewed):  EKG 4/2/24 NSR RBBB 96 bpm  ms QT/QTC: 406/512 ms  EKG done 2/15/24 NSR RBBB 77 bpm  ms QT/QTC: 430/486 ms  EKG done 12/14/23 NSR RBBB 74 bpm  ms QT/QTC: 444/492 ms  EKG done 9/8/23 NSR RBBB 69 bpm  ms QT/QTC: 432/462 ms  EKG done 9/18/20 AF RVR RBBB 118 bpm  bpm QT/QTC: 366/513 ms  EKG done 9/17/20 AF RVR RBBB 120 bpm  ms QT/QTC: 358/505 ms  EKG done 8/22/20 AFL 2:1 RBBB 147 bpm  ms QT/QTC: 336/525 ms  EKG done 6/14/19 AF RVR RBBB 116 bpm  ms QTC: 346/480 ms      TTE 12/25/23  1. Technically very difficult study.  2. Left ventricular chamber size and wall thickness are normal. Systolic  function is mildly reduced with global hypokinesis. The calculated left  ventricular ejection fraction is 41%.  3. Right ventricle is not well visualized. Right ventricular chamber size and  systolic function are grossly normal.  4. No hemodynamically significant valvular abnormalities although the cardiac  valves are not well visualized.  5. Compared to the prior study dated 9/9/2023, there has been no significant  change.      Coronary Cath 9/11/23  1.  Chronic total occlusion proximal LAD and stented segment  2.  Moderate calcific disease in medium size circumflex OM1 branch.  Smaller tortuous AV groove circumflex branch has 70% calcified narrowing and diffuse distal disease.  3.  Chronic total occlusion mid right coronary artery.  4.  Widely patent large caliber LIMA to mid LAD with good distal and proximal runoff.  5.  Widely patent free LAURI graft to mid PDA.  Diffuse disease in posterolateral system which is never well visualized.  6.  Right heart cath data:  RA 12   RV 38/13  PA 41/20  mean 27  PAWP 16  CO 6.6  CI 2.2   Problem List:  Patient Active  Problem List   Diagnosis    Abnormal electrocardiogram    Cellulitis of left lower extremity    Heart failure with reduced ejection fraction (H)    Ischemic cardiomyopathy    CAD, S/P CABG    Paroxysmal atrial fib -- on Eliquis    Class 3 severe obesity due to excess calories with body mass index (BMI) of 40.0 to 44.9 in adult (H)    Chronic kidney disease, stage 3b (H)    Smoker    COPD exacerbation (H)    Venous stasis dermatitis of both lower extremities    PRINCESS (acute kidney injury) (H24)    Acute on chronic systolic CHF    Restless legs syndrome (RLS)    Primary hypertension    Cellulitis    Chronic ischemic heart disease    Fall    Gait instability    Generalized abdominal pain    Hematochezia    Hereditary and idiopathic peripheral neuropathy    Hyperlipidemia    Hypoglycemia    Hypokalemia    Ileus (H)    Keratoconus, stable condition    Chronic pain of both knees    Long term current use of anticoagulant therapy    Low back pain    Macula-off rhegmatogenous retinal detachment, left    Morbid obesity with body mass index (BMI) of 40.0 or higher (H)    Onychomycosis    Osteoarthritis of both knees    Postoperative anemia due to acute blood loss    Proliferative vitreoretinopathy of left eye    Pseudo-obstruction of colon    Rhinitis, allergic    S/P CABG (coronary artery bypass graft)    Sepsis due to Streptococcus species (H)    Skin ulcer of left pretibial region limited to breakdown of skin (H)    RBBB (right bundle branch block)     Revi  e  Physical Examination Review of Systems   w fystems  There were no vitals taken for this visit.  There is no height or weight on file to calculate BMI.  Wt Readings from Last 3 Encounters:   03/21/24 128.8 kg (284 lb)   02/15/24 120.7 kg (266 lb)   02/07/24 122.9 kg (271 lb)     General Appearance:   Alert, well-appearing and in no acute distress.   HEENT: Atraumatic, normocephalic.  No scleral icterus, normal conjunctivae; mucous membranes pink and moist.     Chest:  Chest symmetric, spine straight.   Lungs:   Respirations unlabored: Lungs with expiratory wheezing in all lung fields. Mildly labored with minimal activity and ambulation.    Cardiovascular:   Normal first and second heart sounds with no murmurs, rubs, or gallops.  Regular, regular.   Normal JVD, 2+ pitting BLE  edema.       Extremities: No cyanosis or clubbing   Musculoskeletal: Moves all extremities   Skin: Warm, dry, intact. Mild erythema noted on BLE, hot to touch. Quarter sized scabbing noted x2 on RLE.    Neurologic: Mood and affect are appropriate, alert and oriented to person, place, time, and situation     ROS: 10 point ROS neg other than the symptoms noted above in the HPI.     Medical History  Surgical History Family History Social History     Past Medical History:   Diagnosis Date    Atrial fibrillation (H)     Chronic kidney disease     Chronic kidney disease, stage 3b (H) 09/28/2023    Class 3 severe obesity due to excess calories with body mass index (BMI) of 40.0 to 44.9 in adult (H) 09/28/2023    Congestive heart failure (H)     COPD (chronic obstructive pulmonary disease) (H)     Coronary artery disease involving coronary bypass graft of native heart without angina pectoris 09/28/2023    Heart failure with reduced ejection fraction (H) 09/11/2023    HFrEF (heart failure with reduced ejection fraction) (H) 09/11/2023    Hyperlipidemia     Hypertension     Ischemic cardiomyopathy 09/28/2023    Obese     Paroxysmal atrial fibrillation (H) 09/28/2023    Tobacco abuse 09/28/2023    Past Surgical History:   Procedure Laterality Date    CORONARY ANGIOGRAPHY ADULT ORDER      CORONARY ARTERY BYPASS Left 2014    2 vessels    CV CARDIOMEMS WITH RIGHT HEART CATH N/A 2/15/2024    Procedure: Pulmonary Arterial Pressure Sensor Placement;  Surgeon: Jacey Bhandari MD;  Location: [facility] CATH LAB CV    CV CORONARY ANGIOGRAM N/A 09/11/2023    Procedure: Coronary Angiogram;  Surgeon: Santy Esposito MD;   Location: [facility] LAB CV    CV LEFT HEART CATH N/A 09/11/2023    Procedure: Left Heart Catheterization;  Surgeon: Santy Esposito MD;  Location: [facility] CV    CV RIGHT HEART CATH MEASUREMENTS RECORDED N/A 09/11/2023    Procedure: Right Heart Catheterization;  Surgeon: Santy Esposito MD;  Location: [facility] CV    Family History   Problem Relation Age of Onset    Cerebrovascular Disease Mother     Myocardial Infarction Father     History   Smoking Status    Every Day    Packs/day: 0.50    Types: Cigarettes   Smokeless Tobacco    Never     Social History    Substance and Sexual Activity      Alcohol use: Not Currently       Medications  Allergies     Current Outpatient Medications   Medication Sig Dispense Refill    acetaminophen (TYLENOL) 500 MG tablet Take 1,000 mg by mouth every 8 hours as needed      albuterol (PROAIR HFA/PROVENTIL HFA/VENTOLIN HFA) 108 (90 Base) MCG/ACT inhaler Inhale 2 puffs into the lungs 4 times daily 18 g 0    albuterol (PROVENTIL) (2.5 MG/3ML) 0.083% neb solution Take 1 vial (2.5 mg) by nebulization every 6 hours as needed for shortness of breath, wheezing or cough 90 mL 0    amiodarone (PACERONE) 200 MG tablet Take 1 tablet (200 mg) by mouth daily 90 tablet 0    apixaban ANTICOAGULANT (ELIQUIS) 5 MG tablet Take 1 tablet (5 mg) by mouth 2 times daily 180 tablet 0    aspirin 81 MG EC tablet Take 1 tablet (81 mg) by mouth daily 90 tablet 1    atorvastatin (LIPITOR) 40 MG tablet Take 40 mg by mouth every morning      bumetanide (BUMEX) 1 MG tablet Take 5 tablets (5 mg) by mouth 2 times daily 900 tablet 3    carvedilol (COREG) 3.125 MG tablet Take 1 tablet (3.125 mg) by mouth 2 times daily (with meals) 180 tablet 1    [employer] VITAMIN B12 1000 MCG tablet Take 1,000 mcg by mouth daily      DULoxetine (CYMBALTA) 30 MG capsule Take 30 mg by mouth every morning      gabapentin (NEURONTIN) 300 MG capsule Take 3 capsules (900 mg) by mouth at bedtime      hydrALAZINE  "(APRESOLINE) 10 MG tablet Take 1 tablet (10 mg) by mouth 3 times daily 270 tablet 3    ipratropium - albuterol 0.5 mg/2.5 mg/3 mL (DUONEB) 0.5-2.5 (3) MG/3ML neb solution INHALE 3 ML EVERY 6 HOURS AS NEEDED FOR WHEEZING.      isosorbide mononitrate (IMDUR) 60 MG 24 hr tablet Take 180 mg by mouth every morning      metolazone (ZAROXOLYN) 2.5 MG tablet Take 1 tablet (2.5 mg) by mouth once a week on Thursdays, 30 minutes before your bumex. Labs weekly on Fridays.      nitroGLYcerin (NITROSTAT) 0.4 MG sublingual tablet Place 1 tablet (0.4 mg) under the tongue every 5 minutes as needed If no relief after 3 tabs call 911;continue 1 tab every 5 min max 3 tab Indications: chest pain 100 tablet 1    polyethylene glycol (MIRALAX) 17 GM/Dose powder Take 17 g by mouth 2 times daily as needed 510 g 0    rOPINIRole (REQUIP) 2 MG tablet Take 1 tablet (2 mg) by mouth 3 times daily 270 tablet 3    spironolactone (ALDACTONE) 50 MG tablet Take 1 tablet (50 mg) by mouth daily 90 tablet 3      No Known Allergies   Medical, surgical, family, social history, and medications were all reviewed and updated as necessary.   Lab Results    Chemistry/lipid CBC Cardiac Enzymes/BNP/TSH/INR   No results for input(s): \"CHOL\", \"HDL\", \"LDL\", \"TRIG\", \"CHOLHDLRATIO\" in the last 23849 hours.  No results for input(s): \"LDL\" in the last 42166 hours.  Recent Labs   Lab Test 03/29/24  1240   *   POTASSIUM 3.6   CHLORIDE 87*   CO2 31*   *   BUN 37.5*   CR 1.38*   GFRESTIMATED 57*   RUSLAN 10.0     Recent Labs   Lab Test 03/29/24  1240 03/22/24  1530 03/08/24  1430   CR 1.38* 1.24* 1.16     No results for input(s): \"A1C\" in the last 67770 hours.       Recent Labs   Lab Test 02/14/24  1520   WBC 6.6   HGB 11.5*   HCT 38.1*   MCV 93        Recent Labs   Lab Test 02/14/24  1520 01/09/24  0709 01/08/24  1606   HGB 11.5* 12.7* 12.0*    No results for input(s): \"TROPONINI\" in the last 53780 hours.  Recent Labs   Lab Test 01/08/24  1606 " "12/22/23  1643 12/14/23  0811   NTBNPI [phone]     No results for input(s): \"TSH\" in the last 42686 hours.  Recent Labs   Lab Test 02/13/23  2307   INR 1.01          Total Time- 47 minutes spent on date of encounter doing chart review, history and exam, documentation and further activities as noted above.  This note has been dictated using voice recognition software. Any grammatical, typographical, or context distortions are unintentional and inherent to the software.    Toshia Khanna [facility] [[email or web address]]  [phone]                       "

## 2024-04-02 ENCOUNTER — OFFICE VISIT (OUTPATIENT)
Dept: CARDIOLOGY | Facility: CLINIC | Age: 64
End: 2024-04-02
Attending: INTERNAL MEDICINE
Payer: MEDICARE

## 2024-04-02 VITALS
RESPIRATION RATE: 17 BRPM | WEIGHT: 271 LBS | HEART RATE: 96 BPM | DIASTOLIC BLOOD PRESSURE: 70 MMHG | SYSTOLIC BLOOD PRESSURE: 140 MMHG | BODY MASS INDEX: 43.74 KG/M2

## 2024-04-02 DIAGNOSIS — Z79.899 ON AMIODARONE THERAPY: ICD-10-CM

## 2024-04-02 DIAGNOSIS — I25.810 CORONARY ARTERY DISEASE INVOLVING CORONARY BYPASS GRAFT OF NATIVE HEART WITHOUT ANGINA PECTORIS: ICD-10-CM

## 2024-04-02 DIAGNOSIS — I48.0 PAROXYSMAL ATRIAL FIBRILLATION (H): Primary | ICD-10-CM

## 2024-04-02 DIAGNOSIS — I10 PRIMARY HYPERTENSION: ICD-10-CM

## 2024-04-02 DIAGNOSIS — I50.20 HEART FAILURE WITH REDUCED EJECTION FRACTION (H): ICD-10-CM

## 2024-04-02 LAB
ATRIAL RATE - MUSE: 96 BPM
DIASTOLIC BLOOD PRESSURE - MUSE: NORMAL MMHG
INTERPRETATION ECG - MUSE: NORMAL
P AXIS - MUSE: 108 DEGREES
PR INTERVAL - MUSE: 168 MS
QRS DURATION - MUSE: 140 MS
QT - MUSE: 406 MS
QTC - MUSE: 512 MS
R AXIS - MUSE: 77 DEGREES
SYSTOLIC BLOOD PRESSURE - MUSE: NORMAL MMHG
T AXIS - MUSE: -20 DEGREES
VENTRICULAR RATE- MUSE: 96 BPM

## 2024-04-02 PROCEDURE — G2211 COMPLEX E/M VISIT ADD ON: HCPCS | Performed by: NURSE PRACTITIONER

## 2024-04-02 PROCEDURE — 93000 ELECTROCARDIOGRAM COMPLETE: CPT | Performed by: INTERNAL MEDICINE

## 2024-04-02 PROCEDURE — 99214 OFFICE O/P EST MOD 30 MIN: CPT | Performed by: NURSE PRACTITIONER

## 2024-04-02 NOTE — PATIENT INSTRUCTIONS
Zackary Hutchison,    It was a pleasure to see you today at the [facility].     My recommendations after this visit include:    Continue with Amiodarone 200 mg daily and current Carvedilol dose   Continue with Eliquis 5 mg every 12 hours for stroke prevention  I have ordered a consult for you to meet with Dr Cayla Bee for PVI ablation consult, this will get you off of Amiodarone post procedure. He may discuss possible need for ICD device.      Toshia Khanna CNP  [facility], Electrophysiology  [phone]  EP nurses [phone]

## 2024-04-02 NOTE — LETTER
4/2/2024    Reid Escobar MD  [address] [place] [address]    RE: Zackary Hutchison       Dear Colleague,     I had the pleasure of seeing Zackary Hutchison in the [facility].    Thank you, Dr. Bhandari, for asking the [facility] team to see Mr. Zackary Hutchison to evaluate PAF/PAFL.    Assessment/Recommendations     Assessment/Plan:    Diagnoses and all orders for this visit:  Paroxysmal atrial fib -- on Eliquis  On Amiodarone Therapy  -diagnosed with AF RVR 6/14/2019, confirmed by EKG, rate 160 bpm.  Paroxysmal A-fib since.  First noted to have atrial flutter with 2 1 conduction 8/22/2020, confirmed by EKG, rate 147 bpm.  History of  right bundle branch block noted 1/26/2015  -Sx: fatigue, dyspnea on exertion   -Rhythm control: Amiodarone 200 mg daily + Carvedilol 3.125 mg BID. Ventricular rate 96 bpm today with intermittent irregularity noted on exam today. He does not monitor his rhythm or HR by way of smart watch, Kardia or BP cuff monitoring system at home. He is set up with [facility] through HF/CORE clinic  -12 lead EKG in clinic today shows NSR RBBB 96 bpm  ms QT/QTC: 406/512 ms  -TSH 1.30 8/2023, ALT 9 12/22/23  -we discussed the ongoing importance of lifestyle modification (maintaining a healthy weight, sleep apnea diagnosis and management, alcohol avoidance) as part of a long term strategy for atrial fibrillation management  -We reviewed the pathophysiology of atrial fibrillation and management considerations including stroke risk and anticoagulation, rate control, cardioversion, antiarrhythmic drug therapy, and catheter ablation.  Antiarrhythmic drug options discussed.  We discussed atrial fibrillation ablation procedures, anticipated success rates, the potential need for re-do ablation vs addition of anti-arrhythmic drugs, procedural risks (including groin bleeding, tamponade, phrenic or esophageal injury, stroke, pulmonary vein stenosis) and recovery  expectations.    [id number] score of 4 due to age, HF, HTN, CAD and on Eliquis. No bleeding issues reported, no missed doses.       Heart failure with reduced ejection fraction (H)  -LVEF 41% per TTE 12/2023  -no valve disease, no bi-atrial enlargement noted  -GDMT: Bumex, Carvedilol, Spironolactone, weekly Metolazone  -QRS duration 140 ms  -Known RBBB since 2015  -no device  -Managed by HF/CORE clinic (Cardio [facility] monitoring in place)    Coronary artery disease involving coronary bypass graft of native heart without angina pectoris  -Coronary Cath completed 9/11/23  -Noted to have a chronic total occlusion of the proximal LAD, stented segment and mid RCA + Moderate calcific disease in medium size circumflex OM1 branch. Smaller tortuous AV groove circumflex branch has 70% calcified narrowing and diffuse distal disease. Widely patent large caliber LIMA to mid LAD with good distal and proximal runoff. Widely patent free LAURI graft to mid PDA. Diffuse disease in posterolateral system which is never well visualized.   -ASA, Imdur and statin daily    Primary hypertension  -/62, recheck 10 minutes later, 140/70 using large cuff right arm    PLAN:   Continue on amiodarone 200 mg daily + carvedilol 3.125 mg twice daily  Continue on Eliquis 5 mg twice daily  Continue on current GDMT therapy, managed by heart failure/core clinic  Follow-up with Dr. Bee for PVI ablation consult  Check Amiodarone and TSH at next OV (patient left today before labs could be collected)     History of Present Illness/Subjective     Zackary Hutchison is a very pleasant 63 year old male who comes in today for EP consult PAF/PAFL. Referred by Dr Bhandari.      Zackary Hutchison has a known history of 2-vessel CABG (2015) - LIMA->LAD, left radial->RPDA. Occluded natives with patent grafts at angiography 8/2019, paroxysmal atrial fibrillation diagnosed June 2019, paroxysmal atrial flutter noted August 2020, ischemic cardiomyopathy hypertension, right  bundle branch block noted 2015, chronic kidney disease stage III, tobacco abuse, COPD, morbid obesity.     Arrhythmia hx  Dx/date: diagnosed with AF RVR 6/14/2019, confirmed by EKG, rate 160 bpm.  Paroxysmal A-fib since.  First noted to have atrial flutter with 2 1 conduction 8/22/2020, confirmed by EKG, rate 147 bpm.  History of  right bundle branch block noted 1/26/2015.   Sx: fatigue, dyspnea on exertion   Prior AAD, AV raudel blocking agents: Amiodarone  OAC: Eliquis  Procedures  DCCV: 6/17/19 (200 J X1, successful)  Ablation: cassy Raymundo remains on rhythm control strategy consisting of amiodarone 200 mg daily in addition to carvedilol 3.125 mg twice daily.  He remains in normal sinus rhythm today, confirmed by twelve-lead EKG with ventricular rate of 96 bpm, QRS duration 140 ms, QTc 512 ms.  He continues to report persistent fatigue, shortness of breath on exertion, intermittent dizziness and persistent lower extremity swelling.  On occasion, he reports chest pain occurs in the center of his chest radiating to the left chest wall, he denies nausea, vomiting or diaphoresis with his chest pain episodes.  He denies palpitations or syncope.  He has known heart failure with reduced ejection fraction, LVEF by recent TTE 41%.  Known history of conduction disease with with documented right bundle branch block since 2015.  GDMT therapy consisting of Bumex, carvedilol, spironolactone and weekly metolazone.  He has managed by heart failure/core clinic and [facility] monitoring.  Known history of coronary artery disease with recent coronary catheterization in September 2023, coronary catheter revealed chronic total occlusion of the proximal LAD, stented segment and mid RCA + Moderate calcific disease in medium size circumflex OM1 branch. Smaller tortuous AV groove circumflex branch has 70% calcified narrowing and diffuse distal disease. Widely patent large caliber LIMA to mid LAD with good distal and proximal runoff. Widely  patent free LAURI graft to mid PDA. Diffuse disease in posterolateral system which is never well visualized.  He remains on daily aspirin, Imdur and statin therapy.  Blood pressure mildly elevated x 2 today.  He remains on Eliquis twice daily for stroke prevention, he denies any missed doses in the last 3 weeks or episodes of bleeding. Due for TSH and Amiodarone level assessment today.       Cardiographics (reviewed):  EKG 4/2/24 NSR RBBB 96 bpm  ms QT/QTC: 406/512 ms  EKG done 2/15/24 NSR RBBB 77 bpm  ms QT/QTC: 430/486 ms  EKG done 12/14/23 NSR RBBB 74 bpm  ms QT/QTC: 444/492 ms  EKG done 9/8/23 NSR RBBB 69 bpm  ms QT/QTC: 432/462 ms  EKG done 9/18/20 AF RVR RBBB 118 bpm  bpm QT/QTC: 366/513 ms  EKG done 9/17/20 AF RVR RBBB 120 bpm  ms QT/QTC: 358/505 ms  EKG done 8/22/20 AFL 2:1 RBBB 147 bpm  ms QT/QTC: 336/525 ms  EKG done 6/14/19 AF RVR RBBB 116 bpm  ms QTC: 346/480 ms      TTE 12/25/23  1. Technically very difficult study.  2. Left ventricular chamber size and wall thickness are normal. Systolic  function is mildly reduced with global hypokinesis. The calculated left  ventricular ejection fraction is 41%.  3. Right ventricle is not well visualized. Right ventricular chamber size and  systolic function are grossly normal.  4. No hemodynamically significant valvular abnormalities although the cardiac  valves are not well visualized.  5. Compared to the prior study dated 9/9/2023, there has been no significant  change.      Coronary Cath 9/11/23  1.  Chronic total occlusion proximal LAD and stented segment  2.  Moderate calcific disease in medium size circumflex OM1 branch.  Smaller tortuous AV groove circumflex branch has 70% calcified narrowing and diffuse distal disease.  3.  Chronic total occlusion mid right coronary artery.  4.  Widely patent large caliber LIMA to mid LAD with good distal and proximal runoff.  5.  Widely patent free LAURI graft to mid  PDA.  Diffuse disease in posterolateral system which is never well visualized.  6.  Right heart cath data:  RA 12   RV 38/13  PA 41/20  mean 27  PAWP 16  CO 6.6  CI 2.2   Problem List:  Patient Active Problem List   Diagnosis    Abnormal electrocardiogram    Cellulitis of left lower extremity    Heart failure with reduced ejection fraction (H)    Ischemic cardiomyopathy    CAD, S/P CABG    Paroxysmal atrial fib -- on Eliquis    Class 3 severe obesity due to excess calories with body mass index (BMI) of 40.0 to 44.9 in adult (H)    Chronic kidney disease, stage 3b (H)    Smoker    COPD exacerbation (H)    Venous stasis dermatitis of both lower extremities    PRINCESS (acute kidney injury) (H24)    Acute on chronic systolic CHF    Restless legs syndrome (RLS)    Primary hypertension    Cellulitis    Chronic ischemic heart disease    Fall    Gait instability    Generalized abdominal pain    Hematochezia    Hereditary and idiopathic peripheral neuropathy    Hyperlipidemia    Hypoglycemia    Hypokalemia    Ileus (H)    Keratoconus, stable condition    Chronic pain of both knees    Long term current use of anticoagulant therapy    Low back pain    Macula-off rhegmatogenous retinal detachment, left    Morbid obesity with body mass index (BMI) of 40.0 or higher (H)    Onychomycosis    Osteoarthritis of both knees    Postoperative anemia due to acute blood loss    Proliferative vitreoretinopathy of left eye    Pseudo-obstruction of colon    Rhinitis, allergic    S/P CABG (coronary artery bypass graft)    Sepsis due to Streptococcus species (H)    Skin ulcer of left pretibial region limited to breakdown of skin (H)    RBBB (right bundle branch block)     Revi  e  Physical Examination Review of Systems   w stems  There were no vitals taken for this visit.  There is no height or weight on file to calculate BMI.  Wt Readings from Last 3 Encounters:   03/21/24 128.8 kg (284 lb)   02/15/24 120.7 kg (266 lb)   02/07/24 122.9 kg (271  lb)     General Appearance:   Alert, well-appearing and in no acute distress.   HEENT: Atraumatic, normocephalic.  No scleral icterus, normal conjunctivae; mucous membranes pink and moist.     Chest: Chest symmetric, spine straight.   Lungs:   Respirations unlabored: Lungs with expiratory wheezing in all lung fields. Mildly labored with minimal activity and ambulation.    Cardiovascular:   Normal first and second heart sounds with no murmurs, rubs, or gallops.  Regular, regular.   Normal JVD, 2+ pitting BLE  edema.       Extremities: No cyanosis or clubbing   Musculoskeletal: Moves all extremities   Skin: Warm, dry, intact. Mild erythema noted on BLE, hot to touch. Quarter sized scabbing noted x2 on RLE.    Neurologic: Mood and affect are appropriate, alert and oriented to person, place, time, and situation     ROS: 10 point ROS neg other than the symptoms noted above in the HPI.     Medical History  Surgical History Family History Social History     Past Medical History:   Diagnosis Date    Atrial fibrillation (H)     Chronic kidney disease     Chronic kidney disease, stage 3b (H) 09/28/2023    Class 3 severe obesity due to excess calories with body mass index (BMI) of 40.0 to 44.9 in adult (H) 09/28/2023    Congestive heart failure (H)     COPD (chronic obstructive pulmonary disease) (H)     Coronary artery disease involving coronary bypass graft of native heart without angina pectoris 09/28/2023    Heart failure with reduced ejection fraction (H) 09/11/2023    HFrEF (heart failure with reduced ejection fraction) (H) 09/11/2023    Hyperlipidemia     Hypertension     Ischemic cardiomyopathy 09/28/2023    Obese     Paroxysmal atrial fibrillation (H) 09/28/2023    Tobacco abuse 09/28/2023    Past Surgical History:   Procedure Laterality Date    CORONARY ANGIOGRAPHY ADULT ORDER      CORONARY ARTERY BYPASS Left 2014    2 vessels    CV CARDIOMEMS WITH RIGHT HEART CATH N/A 2/15/2024    Procedure: Pulmonary Arterial  Pressure Sensor Placement;  Surgeon: Jacey Bhandari MD;  Location: [facility] CATH LAB CV    CV CORONARY ANGIOGRAM N/A 09/11/2023    Procedure: Coronary Angiogram;  Surgeon: Santy Esposito MD;  Location: [facility] CATH LAB CV    CV LEFT HEART CATH N/A 09/11/2023    Procedure: Left Heart Catheterization;  Surgeon: Santy Esposito MD;  Location: [facility] LAB CV    CV RIGHT HEART CATH MEASUREMENTS RECORDED N/A 09/11/2023    Procedure: Right Heart Catheterization;  Surgeon: Santy Esposito MD;  Location: [facility] LAB CV    Family History   Problem Relation Age of Onset    Cerebrovascular Disease Mother     Myocardial Infarction Father     History   Smoking Status    Every Day    Packs/day: 0.50    Types: Cigarettes   Smokeless Tobacco    Never     Social History    Substance and Sexual Activity      Alcohol use: Not Currently       Medications  Allergies     Current Outpatient Medications   Medication Sig Dispense Refill    acetaminophen (TYLENOL) 500 MG tablet Take 1,000 mg by mouth every 8 hours as needed      albuterol (PROAIR HFA/PROVENTIL HFA/VENTOLIN HFA) 108 (90 Base) MCG/ACT inhaler Inhale 2 puffs into the lungs 4 times daily 18 g 0    albuterol (PROVENTIL) (2.5 MG/3ML) 0.083% neb solution Take 1 vial (2.5 mg) by nebulization every 6 hours as needed for shortness of breath, wheezing or cough 90 mL 0    amiodarone (PACERONE) 200 MG tablet Take 1 tablet (200 mg) by mouth daily 90 tablet 0    apixaban ANTICOAGULANT (ELIQUIS) 5 MG tablet Take 1 tablet (5 mg) by mouth 2 times daily 180 tablet 0    aspirin 81 MG EC tablet Take 1 tablet (81 mg) by mouth daily 90 tablet 1    atorvastatin (LIPITOR) 40 MG tablet Take 40 mg by mouth every morning      bumetanide (BUMEX) 1 MG tablet Take 5 tablets (5 mg) by mouth 2 times daily 900 tablet 3    carvedilol (COREG) 3.125 MG tablet Take 1 tablet (3.125 mg) by mouth 2 times daily (with meals) 180 tablet 1    [employer] VITAMIN B12 1000 MCG tablet Take 1,000 mcg by  "mouth daily      DULoxetine (CYMBALTA) 30 MG capsule Take 30 mg by mouth every morning      gabapentin (NEURONTIN) 300 MG capsule Take 3 capsules (900 mg) by mouth at bedtime      hydrALAZINE (APRESOLINE) 10 MG tablet Take 1 tablet (10 mg) by mouth 3 times daily 270 tablet 3    ipratropium - albuterol 0.5 mg/2.5 mg/3 mL (DUONEB) 0.5-2.5 (3) MG/3ML neb solution INHALE 3 ML EVERY 6 HOURS AS NEEDED FOR WHEEZING.      isosorbide mononitrate (IMDUR) 60 MG 24 hr tablet Take 180 mg by mouth every morning      metolazone (ZAROXOLYN) 2.5 MG tablet Take 1 tablet (2.5 mg) by mouth once a week on Thursdays, 30 minutes before your bumex. Labs weekly on Fridays.      nitroGLYcerin (NITROSTAT) 0.4 MG sublingual tablet Place 1 tablet (0.4 mg) under the tongue every 5 minutes as needed If no relief after 3 tabs call 911;continue 1 tab every 5 min max 3 tab Indications: chest pain 100 tablet 1    polyethylene glycol (MIRALAX) 17 GM/Dose powder Take 17 g by mouth 2 times daily as needed 510 g 0    rOPINIRole (REQUIP) 2 MG tablet Take 1 tablet (2 mg) by mouth 3 times daily 270 tablet 3    spironolactone (ALDACTONE) 50 MG tablet Take 1 tablet (50 mg) by mouth daily 90 tablet 3      No Known Allergies   Medical, surgical, family, social history, and medications were all reviewed and updated as necessary.   Lab Results    Chemistry/lipid CBC Cardiac Enzymes/BNP/TSH/INR   No results for input(s): \"CHOL\", \"HDL\", \"LDL\", \"TRIG\", \"CHOLHDLRATIO\" in the last 02766 hours.  No results for input(s): \"LDL\" in the last 76651 hours.  Recent Labs   Lab Test 03/29/24  1240   *   POTASSIUM 3.6   CHLORIDE 87*   CO2 31*   *   BUN 37.5*   CR 1.38*   GFRESTIMATED 57*   RUSLAN 10.0     Recent Labs   Lab Test 03/29/24  1240 03/22/24  1530 03/08/24  1430   CR 1.38* 1.24* 1.16     No results for input(s): \"A1C\" in the last 66175 hours.       Recent Labs   Lab Test 02/14/24  1520   WBC 6.6   HGB 11.5*   HCT 38.1*   MCV 93        Recent Labs   Lab " "Test 02/14/24  1520 01/09/24  0709 01/08/24  1606   HGB 11.5* 12.7* 12.0*    No results for input(s): \"TROPONINI\" in the last 32963 hours.  Recent Labs   Lab Test 01/08/24  1606 12/22/23  1643 12/14/23  0811   NTBNPI 289 407 245     No results for input(s): \"TSH\" in the last 71752 hours.  Recent Labs   Lab Test 02/13/23  2307   INR 1.01          Total Time- 47 minutes spent on date of encounter doing chart review, history and exam, documentation and further activities as noted above.  This note has been dictated using voice recognition software. Any grammatical, typographical, or context distortions are unintentional and inherent to the software.    Toshia Khanna Count includes the [facility]  [phone]      Thank you for allowing me to participate in the care of your patient.      Sincerely,     Toshia Khanna NP     [facility]  cc:   Jacey Bhanadri MD  [address]  [place] [address]      "

## 2024-04-03 NOTE — PROGRESS NOTES
[facility]:   [facility] ([employer]) Routine Remote Evaluation    HRS Enrollment date: 2/23/24     Dates: 2/23/24 through 3/25/24    This is the first billing cycle. Patient received initial education this month regarding device equipment prior to starting services    Alerts in the last month:     2/23/24 - Wt. Alert  3/1/24 - Wt. & Symptom Alert  3/4/24 - Wt. Alert  3/7/24 - Symptom Alert    These adjustments have been discussed with the patient/caregiver and they express verbal understanding.    Readings:         Total Minutes Spent: 40 minutes     Anh Velasquez CMA    Future Appointments   Date Time Provider Department Center   4/18/2024  4:00 AM [facility]   4/25/2024  4:00 AM [facility]   4/25/2024 12:50 [facility]   4/25/2024  2:20 PM [facility]   5/20/2024  4:00 AM [facility]   5/28/2024  4:00 AM [facility]   6/20/2024  4:00 AM [facility]   6/28/2024  4:00 AM [facility]   7/22/2024  4:00 AM [facility]   7/29/2024  4:00 AM [facility]   8/22/2024  4:00 AM [facility]   8/29/2024  4:00 AM [facility]      I have reviewed Anh Velasquez CMA 's note and agree.    Jacey Bhandari MD., S

## 2024-04-05 ENCOUNTER — LAB REQUISITION (OUTPATIENT)
Dept: LAB | Facility: HOSPITAL | Age: 64
End: 2024-04-05
Payer: MEDICARE

## 2024-04-05 DIAGNOSIS — I13.0 HYPERTENSIVE HEART AND CHRONIC KIDNEY DISEASE WITH HEART FAILURE AND STAGE 1 THROUGH STAGE 4 CHRONIC KIDNEY DISEASE, OR UNSPECIFIED CHRONIC KIDNEY DISEASE (H): ICD-10-CM

## 2024-04-05 DIAGNOSIS — I50.23 ACUTE ON CHRONIC SYSTOLIC (CONGESTIVE) HEART FAILURE (H): ICD-10-CM

## 2024-04-05 DIAGNOSIS — I27.20 PULMONARY HYPERTENSION, UNSPECIFIED (H): ICD-10-CM

## 2024-04-05 LAB
ANION GAP SERPL CALCULATED.3IONS-SCNC: 15 MMOL/L (ref 7–15)
BUN SERPL-MCNC: 48.5 MG/DL (ref 8–23)
CALCIUM SERPL-MCNC: 10.1 MG/DL (ref 8.8–10.2)
CHLORIDE SERPL-SCNC: 86 MMOL/L (ref 98–107)
CREAT SERPL-MCNC: 1.45 MG/DL (ref 0.67–1.17)
DEPRECATED HCO3 PLAS-SCNC: 30 MMOL/L (ref 22–29)
EGFRCR SERPLBLD CKD-EPI 2021: 53 ML/MIN/1.73M2
GLUCOSE SERPL-MCNC: 203 MG/DL (ref 70–99)
POTASSIUM SERPL-SCNC: 3.3 MMOL/L (ref 3.4–5.3)
SODIUM SERPL-SCNC: 131 MMOL/L (ref 135–145)

## 2024-04-05 PROCEDURE — 80048 BASIC METABOLIC PNL TOTAL CA: CPT | Mod: ORL | Performed by: FAMILY MEDICINE

## 2024-04-12 ENCOUNTER — TELEPHONE (OUTPATIENT)
Dept: CARDIOLOGY | Facility: CLINIC | Age: 64
End: 2024-04-12
Payer: MEDICARE

## 2024-04-12 ENCOUNTER — LAB REQUISITION (OUTPATIENT)
Dept: LAB | Facility: HOSPITAL | Age: 64
End: 2024-04-12
Payer: MEDICARE

## 2024-04-12 DIAGNOSIS — I50.23 ACUTE ON CHRONIC SYSTOLIC (CONGESTIVE) HEART FAILURE (H): ICD-10-CM

## 2024-04-12 DIAGNOSIS — I13.0 HYPERTENSIVE HEART AND CHRONIC KIDNEY DISEASE WITH HEART FAILURE AND STAGE 1 THROUGH STAGE 4 CHRONIC KIDNEY DISEASE, OR UNSPECIFIED CHRONIC KIDNEY DISEASE (H): ICD-10-CM

## 2024-04-12 DIAGNOSIS — I50.20 HEART FAILURE WITH REDUCED EJECTION FRACTION (H): Primary | ICD-10-CM

## 2024-04-12 LAB
ANION GAP SERPL CALCULATED.3IONS-SCNC: 13 MMOL/L (ref 7–15)
BUN SERPL-MCNC: 40.1 MG/DL (ref 8–23)
CALCIUM SERPL-MCNC: 9.5 MG/DL (ref 8.8–10.2)
CHLORIDE SERPL-SCNC: 93 MMOL/L (ref 98–107)
CREAT SERPL-MCNC: 1.27 MG/DL (ref 0.67–1.17)
DEPRECATED HCO3 PLAS-SCNC: 31 MMOL/L (ref 22–29)
EGFRCR SERPLBLD CKD-EPI 2021: 63 ML/MIN/1.73M2
GLUCOSE SERPL-MCNC: 231 MG/DL (ref 70–99)
POTASSIUM SERPL-SCNC: 4.2 MMOL/L (ref 3.4–5.3)
SODIUM SERPL-SCNC: 137 MMOL/L (ref 135–145)

## 2024-04-12 PROCEDURE — 80048 BASIC METABOLIC PNL TOTAL CA: CPT | Mod: ORL | Performed by: FAMILY MEDICINE

## 2024-04-12 NOTE — TELEPHONE ENCOUNTER
[facility]:   HRS ([employer]) Daily Alert     April 12, 2024    Alert(s): Symptom and Weight    Current diuretic dose: 5 mg BID with Metolazone 2.5 on Thursdays     Recent plan of care changes:   Pt reported chest pain on HRS Sx survey today. He stated that last night he experienced it twice, hours apart. He took 1 tablet of Nitroglycerin after both episodes with resolve. No CP since.     Pt stated he FORGOT his Metolazone yesterday so he will be taking it this afternoon. I am currently arranging labs with home care RN to draw tomorrow or asap.   Still expect a lab draw this afternoon. I thought it would be a nice to know since he had CP episodes recently, as well as experiencing over a week-long-constipation episode that resulted in diarrhea yesterday. Curious what his K+ is. Thanks.     Manuelito Bianchi RN [facility]       Weight, BP and HR Readings:  Blood Pressure: 130/70, 86bpm Pulseox: 92%, 88bpm Survey: Complete Weight: 270.2lbs     Time spent in review this day: 10 min.        Future Appointments   Date Time Provider Department Center   4/18/2024  4:00 [facility]   4/25/2024  4:00 AM [facility]   4/25/2024 12:50 PM [facility]   4/25/2024  2:20 PM [facility]   5/20/2024  4:00 AM [facility]   5/28/2024  4:00 [facility]   6/20/2024  4:00 AM [facility]   6/28/2024  4:00 AM [facility]   7/22/2024  4:00 AM [facility]   7/29/2024  4:00 AM [facility]   8/22/2024  4:00 AM [facility]   8/29/2024  4:00 AM [facility]

## 2024-04-12 NOTE — TELEPHONE ENCOUNTER
Will await response to his metolazone today.  No further recommendations.  Thanks   [employer]  Kandis Mckeon P.A.-C.  [address] [address] [address]  [place] [address]  Fax: [phone]   Authorization for Release/Disclosure of   Protected Health Information   Name: EMILY MUNIZ : 1969 SSN: [id number]   Address: [address] [place] [address] Phone:    [phone] (home)    I authorize the entity listed below to release/disclose the PHI below to:   [employer]/Kandis Mckeon P.A.-C. and Kandis Mckeon P.A.-C.   Provider or Entity Name:  Dr. Te Singh MD  [facility], [facility]  [address] [place] [address] Phone:  [phone]    Fax:     Reason for request: continuity of care   Information to be released:    [  ] LAST COLONOSCOPY,  including any PATH REPORT and follow-up  [  ] LAST FIT/COLOGUARD RESULT [  ] LAST DEXA  [  ] LAST MAMMOGRAM  [  ] LAST PAP  [  ] LAST LABS [  ] RETINA EXAM REPORT  [  ] IMMUNIZATION RECORDS  [XXX] Release all info      [XXX] Check here and initial the line next to each item to release ALL health information INCLUDING  _____ Care and treatment for drug and / or alcohol abuse  _____ HIV testing, infection status, or AIDS  _____ Genetic Testing    DATES OF SERVICE OR TIME PERIOD TO BE DISCLOSED: _ to current______  I understand and acknowledge that:  * This Authorization may be revoked at any time by you in writing, except if your health information has already been used or disclosed.  * Your health information that will be used or disclosed as a result of you signing this authorization could be re-disclosed by the recipient. If this occurs, your re-disclosed health information may no longer be protected by State or Federal laws.  * You may refuse to sign this Authorization. Your refusal will not affect your ability to obtain treatment.  * This Authorization becomes effective upon signing and will  on (date) __________.      If no date is indicated, this  Authorization will  one (1) year from the signature date.    Name: Herman Chiang    Signature:   Date:     2021       PLEASE FAX REQUESTED RECORDS BACK TO: [phone]

## 2024-04-12 NOTE — TELEPHONE ENCOUNTER
Home Health Nurse at [facility] is reached with recommendations to obtain BMP today and again tomorrow, related to the use of Metolazone today ( omitted yesterday by accident)  Orders are faxed.   Heather HUERTA RN BSN, CHFN, PCCN-K

## 2024-04-13 ENCOUNTER — LAB REQUISITION (OUTPATIENT)
Dept: LAB | Facility: HOSPITAL | Age: 64
End: 2024-04-13
Payer: MEDICARE

## 2024-04-13 DIAGNOSIS — I13.0 HYPERTENSIVE HEART AND CHRONIC KIDNEY DISEASE WITH HEART FAILURE AND STAGE 1 THROUGH STAGE 4 CHRONIC KIDNEY DISEASE, OR UNSPECIFIED CHRONIC KIDNEY DISEASE (H): ICD-10-CM

## 2024-04-13 DIAGNOSIS — I50.23 ACUTE ON CHRONIC SYSTOLIC (CONGESTIVE) HEART FAILURE (H): ICD-10-CM

## 2024-04-13 LAB
ANION GAP SERPL CALCULATED.3IONS-SCNC: 14 MMOL/L (ref 7–15)
BUN SERPL-MCNC: 41.2 MG/DL (ref 8–23)
CALCIUM SERPL-MCNC: 9.7 MG/DL (ref 8.8–10.2)
CHLORIDE SERPL-SCNC: 87 MMOL/L (ref 98–107)
CREAT SERPL-MCNC: 1.28 MG/DL (ref 0.67–1.17)
DEPRECATED HCO3 PLAS-SCNC: 30 MMOL/L (ref 22–29)
EGFRCR SERPLBLD CKD-EPI 2021: 62 ML/MIN/1.73M2
GLUCOSE SERPL-MCNC: 311 MG/DL (ref 70–99)
POTASSIUM SERPL-SCNC: 4 MMOL/L (ref 3.4–5.3)
SODIUM SERPL-SCNC: 131 MMOL/L (ref 135–145)

## 2024-04-13 PROCEDURE — 80048 BASIC METABOLIC PNL TOTAL CA: CPT | Mod: ORL | Performed by: FAMILY MEDICINE

## 2024-04-15 NOTE — TELEPHONE ENCOUNTER
Spoke w/ [facility] nurse on Fri 4/12/24 who confirmed they could draw a BMP on both 4/12/24 & 4/13/24.     CHAPARRITA Ann RN

## 2024-04-18 ENCOUNTER — OFFICE VISIT (OUTPATIENT)
Dept: CARDIOLOGY | Facility: CLINIC | Age: 64
End: 2024-04-18
Payer: MEDICARE

## 2024-04-18 DIAGNOSIS — I50.20 HEART FAILURE WITH REDUCED EJECTION FRACTION (H): Primary | ICD-10-CM

## 2024-04-18 PROCEDURE — 93264 REM MNTR WRLS P-ART PRS SNR: CPT | Performed by: INTERNAL MEDICINE

## 2024-04-19 ENCOUNTER — TELEPHONE (OUTPATIENT)
Dept: CARDIOLOGY | Facility: CLINIC | Age: 64
End: 2024-04-19

## 2024-04-19 ENCOUNTER — LAB REQUISITION (OUTPATIENT)
Dept: LAB | Facility: HOSPITAL | Age: 64
End: 2024-04-19
Payer: MEDICARE

## 2024-04-19 DIAGNOSIS — J44.9 CHRONIC OBSTRUCTIVE PULMONARY DISEASE, UNSPECIFIED (H): ICD-10-CM

## 2024-04-19 DIAGNOSIS — I25.10 CORONARY ARTERY DISEASE INVOLVING NATIVE HEART WITHOUT ANGINA PECTORIS, UNSPECIFIED VESSEL OR LESION TYPE: Primary | ICD-10-CM

## 2024-04-19 DIAGNOSIS — I50.1 LEFT VENTRICULAR FAILURE, UNSPECIFIED (H): ICD-10-CM

## 2024-04-19 LAB
ANION GAP SERPL CALCULATED.3IONS-SCNC: 15 MMOL/L (ref 7–15)
BUN SERPL-MCNC: 43.8 MG/DL (ref 8–23)
CALCIUM SERPL-MCNC: 9.5 MG/DL (ref 8.8–10.2)
CHLORIDE SERPL-SCNC: 86 MMOL/L (ref 98–107)
CREAT SERPL-MCNC: 1.39 MG/DL (ref 0.67–1.17)
DEPRECATED HCO3 PLAS-SCNC: 30 MMOL/L (ref 22–29)
EGFRCR SERPLBLD CKD-EPI 2021: 56 ML/MIN/1.73M2
GLUCOSE SERPL-MCNC: 376 MG/DL (ref 70–99)
POTASSIUM SERPL-SCNC: 3.3 MMOL/L (ref 3.4–5.3)
SODIUM SERPL-SCNC: 131 MMOL/L (ref 135–145)

## 2024-04-19 PROCEDURE — 80048 BASIC METABOLIC PNL TOTAL CA: CPT | Mod: ORL | Performed by: FAMILY MEDICINE

## 2024-04-19 NOTE — TELEPHONE ENCOUNTER
Debbie, are you willing to refill patients Imdur 180 mg daily? -ejb    ==  Patient called requesting refills of Requip and Imdur. Advised non-cardiac meds need to be refilled by PCP. Patient verbalized understanding.

## 2024-04-22 RX ORDER — ISOSORBIDE MONONITRATE 60 MG/1
180 TABLET, EXTENDED RELEASE ORAL EVERY MORNING
Qty: 270 TABLET | Refills: 1 | Status: SHIPPED | OUTPATIENT
Start: 2024-04-22

## 2024-04-24 ENCOUNTER — TELEPHONE (OUTPATIENT)
Dept: CARDIOLOGY | Facility: CLINIC | Age: 64
End: 2024-04-24
Payer: MEDICARE

## 2024-04-24 NOTE — TELEPHONE ENCOUNTER
Caller: Zackary    Reason For Call: Symptoms    Symptoms:    shortness of breath at  exertion, lower extremity edema, chest pain, and waking up at night with dyspnea and abdominal pressure    Diuretic Regimen / Relevant Medications: Metolazone 2.5 mg on Thursdays, Bumex 5 mg BID     Recent Cr: 1.39 on 04/19    Patient Notes: Pt wanted us to know he's been feeling symptomatic and hasn't been able to sleep. He stated he always feels symptomatic but worse as of late.   Pt due for Metolazone tomorrow and has a follow up with TSB tomorrow as well.  Will continue to monitor and follow up after TSB visit. Explained we might recommend IV diuretics tomorrow. Pt receptive and will seek ED if Sx increase or warrant an immediate assessment.      MEMS 17 No Goal Set, Ranges 14-19  Blood Pressure: 168/84, 97bpm Pulseox: 95%, 101bpm Survey: Complete Weight: 269.6lbs       Recommendation: CTM, Appt. Tomorrow with TSB.     Last Visit: 03/21/2024 DCB    Future Appointment: 04/25/2024 TSB

## 2024-04-25 ENCOUNTER — ALLIED HEALTH/NURSE VISIT (OUTPATIENT)
Dept: CARDIOLOGY | Facility: CLINIC | Age: 64
End: 2024-04-25
Payer: MEDICARE

## 2024-04-25 ENCOUNTER — OFFICE VISIT (OUTPATIENT)
Dept: CARDIOLOGY | Facility: CLINIC | Age: 64
End: 2024-04-25
Payer: MEDICARE

## 2024-04-25 VITALS
HEART RATE: 92 BPM | SYSTOLIC BLOOD PRESSURE: 128 MMHG | BODY MASS INDEX: 44.87 KG/M2 | DIASTOLIC BLOOD PRESSURE: 62 MMHG | WEIGHT: 278 LBS | RESPIRATION RATE: 16 BRPM

## 2024-04-25 VITALS
HEART RATE: 92 BPM | RESPIRATION RATE: 16 BRPM | DIASTOLIC BLOOD PRESSURE: 62 MMHG | BODY MASS INDEX: 44.87 KG/M2 | WEIGHT: 278 LBS | SYSTOLIC BLOOD PRESSURE: 128 MMHG

## 2024-04-25 DIAGNOSIS — I48.0 PAROXYSMAL ATRIAL FIBRILLATION (H): Primary | ICD-10-CM

## 2024-04-25 DIAGNOSIS — I50.20 HFREF (HEART FAILURE WITH REDUCED EJECTION FRACTION) (H): Primary | ICD-10-CM

## 2024-04-25 DIAGNOSIS — I50.20 HEART FAILURE WITH REDUCED EJECTION FRACTION (H): ICD-10-CM

## 2024-04-25 DIAGNOSIS — I50.9 ACUTE ON CHRONIC CONGESTIVE HEART FAILURE, UNSPECIFIED HEART FAILURE TYPE (H): Primary | ICD-10-CM

## 2024-04-25 PROCEDURE — G2211 COMPLEX E/M VISIT ADD ON: HCPCS | Performed by: INTERNAL MEDICINE

## 2024-04-25 PROCEDURE — 99214 OFFICE O/P EST MOD 30 MIN: CPT | Performed by: INTERNAL MEDICINE

## 2024-04-25 PROCEDURE — 99207 PR NO CHARGE LOS: CPT

## 2024-04-25 PROCEDURE — 99215 OFFICE O/P EST HI 40 MIN: CPT | Performed by: INTERNAL MEDICINE

## 2024-04-25 NOTE — PROGRESS NOTES
[facility]  Cardiac Electrophysiology  [address]  [place] [address]   Office: [phone]  Fax: [phone]     Patient: Zackary Hutchison   : 1960       CHIEF COMPLAINT/REASON FOR VISIT  Paroxysmal atrial fibrillation      Assessment/Recommendations       [id number]/Paroxysmal vs Stage 3B/Persistent atrial fibrillation and possible atrial flutter - symptomatic with fatigue, exertional dyspnea  [id number] 4  We reviewed atrial fibrillation physiology, managing stroke risk, cardioversion, antiarrhythmic drug therapy, and catheter ablation.  We discussed atrial fibrillation ablation procedures, anticipated success rates, the potential need for re-do ablation vs addition of anti-arrhythmic drugs, procedural risks (including groin bleeding, tamponade, phrenic or esophageal injury, stroke, pulmonary vein stenosis) and recovery expectations.  We will plan for the following:  - he was noted at visit with Dr. Bhandari earlier today to be fluid overloaded  - proceed with upcoming admission and diuresis  - follow-up visit in 3 months - at any time after fluid stability has been attained, we can consider coordinating an ablation towards providing atrial fibrillation suppression without long term amiodarone use.  Plan would be for PVI, general anesthesia, continue apixaban, hold amiodarone 7 days prior and likely resume for 6-12 weeks post ablation  - continue amiodarone 200mg daily for now  - continue apixaban 5mg twice daily    RBBB - QRS 140ms    Chronic systolic heart failure - LVEF 41% by 2023 TTE  - continue heart failure medical therapies, CardioMEMS follow-up    Follow up: as above           History of Present Illness   Zackary Hutchison is a 63 year old male with paroxysmal atrial fibrillation, RBBB, chronic systolic heart failure (LVEF 41% by 2023 TTE), CAD with prior CABG (2015), HTN, history of GI bleeding referred by Toshia Khanna CNP for consultation regarding  atrial fibrillation.    Mr. Hutchison's atrial fibrillation history is as summarized below:  Symptoms: fatigue, exertional dyspnea  Symptom onset date: unknown  Diagnosis date: 6/14/2019.  AFL reported 8/20/2020 (147bpm)  Admissions/ER visits: none recently  Prior medical therapies: sotalol (?-8/2020 - inefficacy), amiodarone (8/2020-present)  Prior DCCVs: 6/17/2019  Prior ablations: none  Percutaneous left atrial appendage occlusion: none    He notes denies chest pain, syncope.  He notes variable dyspnea, lower extremity edema, and weights.         Physical Examination  Review of Systems   VITALS: /62   Pulse 92   Resp 16   Wt 126.1 kg (278 lb)   BMI 44.87 kg/m    Wt Readings from Last 3 Encounters:   04/02/24 122.9 kg (271 lb)   03/21/24 128.8 kg (284 lb)   02/15/24 120.7 kg (266 lb)     CONSTITUTIONAL: well nourished, comfortable, no distress  EYES:  Conjunctivae pink, sclerae clear.    E/N/T:  Oral mucosa pink  RESPIRATORY:  Respiratory effort is normal  CARDIOVASCULAR:  regular, normal S1 and S2  GASTROINTESTINAL:  Abdomen without masses or tenderness  EXTREMITIES:  No clubbing or cyanosis.    MUSCULOSKELETAL:  Overall grossly normal muscle strength  SKIN:  Overall, skin warm and dry, no lesions.  NEURO/PSYCH:  Oriented x 3 with normal affect.   Constitutional:  No weight loss or loss of appetite    Eyes:  No difficulty with vision, no double vision, no dry eyes  ENT:  No sore throat, difficulty swallowing; changes in hearing or tinnitus  Cardiovascular: As detailed above  Respiratory:  No cough  Musculoskeletal  No joint pain, muscle aches  Neurologic:  No syncope, lightheadedness, fainting spells   Hematologic: No easy bruising, excessive bleeding tendency   Gastrointestinal:  No jaundice, abdominal pain or abdominal bloating  Genitourinary: No changes in urinary habits, no trouble urinating    Psychiatric: No anxiety or depression      Medical History  Surgical History   Past Medical History:    Diagnosis Date    Atrial fibrillation (H)     Chronic kidney disease     Chronic kidney disease, stage 3b (H) 09/28/2023    Class 3 severe obesity due to excess calories with body mass index (BMI) of 40.0 to 44.9 in adult (H) 09/28/2023    Congestive heart failure (H)     COPD (chronic obstructive pulmonary disease) (H)     Coronary artery disease involving coronary bypass graft of native heart without angina pectoris 09/28/2023    Heart failure with reduced ejection fraction (H) 09/11/2023    HFrEF (heart failure with reduced ejection fraction) (H) 09/11/2023    Hyperlipidemia     Hypertension     Ischemic cardiomyopathy 09/28/2023    Obese     Paroxysmal atrial fibrillation (H) 09/28/2023    Tobacco abuse 09/28/2023    Past Surgical History:   Procedure Laterality Date    CORONARY ANGIOGRAPHY ADULT ORDER      CORONARY ARTERY BYPASS Left 2014    2 vessels    CV CARDIOMEMS WITH RIGHT HEART CATH N/A 2/15/2024    Procedure: Pulmonary Arterial Pressure Sensor Placement;  Surgeon: Jacey Bhandari MD;  Location: [facility] CATH LAB CV    CV CORONARY ANGIOGRAM N/A 09/11/2023    Procedure: Coronary Angiogram;  Surgeon: Santy Esposito MD;  Location: [facility] CATH LAB CV    CV LEFT HEART CATH N/A 09/11/2023    Procedure: Left Heart Catheterization;  Surgeon: Santy Esposito MD;  Location: [facility] CATH LAB CV    CV RIGHT HEART CATH MEASUREMENTS RECORDED N/A 09/11/2023    Procedure: Right Heart Catheterization;  Surgeon: Santy Esposito MD;  Location: [facility] CATH LAB CV         Family History Social History   Family History   Problem Relation Age of Onset    Cerebrovascular Disease Mother     Myocardial Infarction Father         Social History     Tobacco Use    Smoking status: Every Day     Current packs/day: 0.50     Types: Cigarettes    Smokeless tobacco: Never    Tobacco comments:     Seen IP by CTTS on 9/11/23 and declined cessation services and materials   Substance Use Topics    Alcohol use: Not  Currently         Medications  Allergies     Current Outpatient Medications:     acetaminophen (TYLENOL) 500 MG tablet, Take 1,000 mg by mouth every 8 hours as needed, Disp: , Rfl:     albuterol (PROAIR HFA/PROVENTIL HFA/VENTOLIN HFA) 108 (90 Base) MCG/ACT inhaler, Inhale 2 puffs into the lungs 4 times daily, Disp: 18 g, Rfl: 0    albuterol (PROVENTIL) (2.5 MG/3ML) 0.083% neb solution, Take 1 vial (2.5 mg) by nebulization every 6 hours as needed for shortness of breath, wheezing or cough, Disp: 90 mL, Rfl: 0    amiodarone (PACERONE) 200 MG tablet, Take 1 tablet (200 mg) by mouth daily, Disp: 90 tablet, Rfl: 0    apixaban ANTICOAGULANT (ELIQUIS) 5 MG tablet, Take 1 tablet (5 mg) by mouth 2 times daily, Disp: 180 tablet, Rfl: 3    aspirin 81 MG EC tablet, Take 1 tablet (81 mg) by mouth daily, Disp: 90 tablet, Rfl: 1    atorvastatin (LIPITOR) 40 MG tablet, Take 40 mg by mouth every morning, Disp: , Rfl:     bumetanide (BUMEX) 1 MG tablet, Take 5 tablets (5 mg) by mouth 2 times daily, Disp: 900 tablet, Rfl: 3    carvedilol (COREG) 3.125 MG tablet, Take 1 tablet (3.125 mg) by mouth 2 times daily (with meals), Disp: 180 tablet, Rfl: 1    CVS VITAMIN B12 1000 MCG tablet, Take 1,000 mcg by mouth daily, Disp: , Rfl:     DULoxetine (CYMBALTA) 30 MG capsule, Take 30 mg by mouth every morning, Disp: , Rfl:     gabapentin (NEURONTIN) 300 MG capsule, Take 3 capsules (900 mg) by mouth at bedtime, Disp: , Rfl:     hydrALAZINE (APRESOLINE) 10 MG tablet, Take 1 tablet (10 mg) by mouth 3 times daily, Disp: 270 tablet, Rfl: 3    ipratropium - albuterol 0.5 mg/2.5 mg/3 mL (DUONEB) 0.5-2.5 (3) MG/3ML neb solution, INHALE 3 ML EVERY 6 HOURS AS NEEDED FOR WHEEZING., Disp: , Rfl:     isosorbide mononitrate (IMDUR) 60 MG 24 hr tablet, Take 3 tablets (180 mg) by mouth every morning, Disp: 270 tablet, Rfl: 1    metolazone (ZAROXOLYN) 2.5 MG tablet, Take 1 tablet (2.5 mg) by mouth once a week on Thursdays, 30 minutes before your bumex. Labs  "weekly on Fridays., Disp: , Rfl:     nitroGLYcerin (NITROSTAT) 0.4 MG sublingual tablet, Place 1 tablet (0.4 mg) under the tongue every 5 minutes as needed If no relief after 3 tabs call 911;continue 1 tab every 5 min max 3 tab Indications: chest pain, Disp: 100 tablet, Rfl: 1    polyethylene glycol (MIRALAX) 17 GM/Dose powder, Take 17 g by mouth 2 times daily as needed, Disp: 510 g, Rfl: 0    rOPINIRole (REQUIP) 2 MG tablet, Take 1 tablet (2 mg) by mouth 3 times daily, Disp: 270 tablet, Rfl: 3    spironolactone (ALDACTONE) 50 MG tablet, Take 1 tablet (50 mg) by mouth daily, Disp: 90 tablet, Rfl: 3   No Known Allergies       Lab Results    Chemistry CBC Cardiac Enzymes/BNP/TSH/INR   Recent Labs   Lab Test 04/19/24  1545   *   POTASSIUM 3.3*   CHLORIDE 86*   CO2 30*   *   BUN 43.8*   CR 1.39*   GFRESTIMATED 56*   RUSLAN 9.5     Recent Labs   Lab Test 04/19/24  1545 04/13/24  1000 04/12/24  1738   CR 1.39* 1.28* 1.27*          Recent Labs   Lab Test 02/14/24  1520   WBC 6.6   HGB 11.5*   HCT 38.1*   MCV 93        Recent Labs   Lab Test 02/14/24  1520 01/09/24  0709 01/08/24  1606   HGB 11.5* 12.7* 12.0*    No results for input(s): \"TROPONINI\" in the last 01302 hours.  Recent Labs   Lab Test 01/08/24  1606 12/22/23  1643 12/14/23  0811   NTBNPI [phone]     No results for input(s): \"TSH\" in the last 54972 hours.  Recent Labs   Lab Test 02/13/23  2307   INR 1.01         Data Review    ECGs (tracings independently reviewed)  4/2/2024 - SR 96bpm, RBBB QRS 140ms    12/25/2023 TTE  1. Technically very difficult study.  2. Left ventricular chamber size and wall thickness are normal. Systolic  function is mildly reduced with global hypokinesis. The calculated left  ventricular ejection fraction is 41%.  3. Right ventricle is not well visualized. Right ventricular chamber size and  systolic function are grossly normal.  4. No hemodynamically significant valvular abnormalities although the cardiac  valves " are not well visualized.  5. Compared to the prior study dated 9/9/2023, there has been no significant  change.    9/11/2023 coronary angiography, [facility]  1.  Chronic total occlusion proximal LAD and stented segment  2.  Moderate calcific disease in medium size circumflex OM1 branch.  Smaller tortuous AV groove circumflex branch has 70% calcified narrowing and diffuse distal disease.  3.  Chronic total occlusion mid right coronary artery.  4.  Widely patent large caliber LIMA to mid LAD with good distal and proximal runoff.  5.  Widely patent free LUARI graft to mid PDA.  Diffuse disease in posterolateral system which is never well visualized.  6.  Right heart cath data:  RA 12   RV 38/13  PA 41/20  mean 27  PAWP 16  CO 6.6  CI 2.2       Cc: Toshia Khanna CNP, Reid Escobar MD Amila Dilusha William, MD  4/25/2024  2:48 PM

## 2024-04-25 NOTE — LETTER
2024    Reid Escobar MD  [address] [place] [address]    RE: Zackary Hutchison       Dear Colleague,     I had the pleasure of seeing Zackary Hutchison in the [facility].     [facility]  Cardiac Electrophysiology  [address]  [place] [address]   Office: [phone]  Fax: [phone]     Patient: Zackary Hutchison   : 1960       CHIEF COMPLAINT/REASON FOR VISIT  Paroxysmal atrial fibrillation      Assessment/Recommendations       [id number]/Paroxysmal vs Stage 3B/Persistent atrial fibrillation and possible atrial flutter - symptomatic with fatigue, exertional dyspnea  [id number] 4  We reviewed atrial fibrillation physiology, managing stroke risk, cardioversion, antiarrhythmic drug therapy, and catheter ablation.  We discussed atrial fibrillation ablation procedures, anticipated success rates, the potential need for re-do ablation vs addition of anti-arrhythmic drugs, procedural risks (including groin bleeding, tamponade, phrenic or esophageal injury, stroke, pulmonary vein stenosis) and recovery expectations.  We will plan for the following:  - he was noted at visit with Dr. Bhandari earlier today to be fluid overloaded  - proceed with upcoming admission and diuresis  - follow-up visit in 3 months - at any time after fluid stability has been attained, we can consider coordinating an ablation towards providing atrial fibrillation suppression without long term amiodarone use.  Plan would be for PVI, general anesthesia, continue apixaban, hold amiodarone 7 days prior and likely resume for 6-12 weeks post ablation  - continue amiodarone 200mg daily for now  - continue apixaban 5mg twice daily    RBBB - QRS 140ms    Chronic systolic heart failure - LVEF 41% by 2023 TTE  - continue heart failure medical therapies, CardioMEMS follow-up    Follow up: as above           History of Present Illness   Zackary Hutchison is a 63 year old male with  paroxysmal atrial fibrillation, RBBB, chronic systolic heart failure (LVEF 41% by 12/2023 TTE), CAD with prior CABG (8/2015), HTN, history of GI bleeding referred by Toshia Khanna CNP for consultation regarding atrial fibrillation.    Mr. Hutchison's atrial fibrillation history is as summarized below:  Symptoms: fatigue, exertional dyspnea  Symptom onset date: unknown  Diagnosis date: 6/14/2019.  AFL reported 8/20/2020 (147bpm)  Admissions/ER visits: none recently  Prior medical therapies: sotalol (?-8/2020 - inefficacy), amiodarone (8/2020-present)  Prior DCCVs: 6/17/2019  Prior ablations: none  Percutaneous left atrial appendage occlusion: none    He notes denies chest pain, syncope.  He notes variable dyspnea, lower extremity edema, and weights.         Physical Examination  Review of Systems   VITALS: /62   Pulse 92   Resp 16   Wt 126.1 kg (278 lb)   BMI 44.87 kg/m    Wt Readings from Last 3 Encounters:   04/02/24 122.9 kg (271 lb)   03/21/24 128.8 kg (284 lb)   02/15/24 120.7 kg (266 lb)     CONSTITUTIONAL: well nourished, comfortable, no distress  EYES:  Conjunctivae pink, sclerae clear.    E/N/T:  Oral mucosa pink  RESPIRATORY:  Respiratory effort is normal  CARDIOVASCULAR:  regular, normal S1 and S2  GASTROINTESTINAL:  Abdomen without masses or tenderness  EXTREMITIES:  No clubbing or cyanosis.    MUSCULOSKELETAL:  Overall grossly normal muscle strength  SKIN:  Overall, skin warm and dry, no lesions.  NEURO/PSYCH:  Oriented x 3 with normal affect.   Constitutional:  No weight loss or loss of appetite    Eyes:  No difficulty with vision, no double vision, no dry eyes  ENT:  No sore throat, difficulty swallowing; changes in hearing or tinnitus  Cardiovascular: As detailed above  Respiratory:  No cough  Musculoskeletal  No joint pain, muscle aches  Neurologic:  No syncope, lightheadedness, fainting spells   Hematologic: No easy bruising, excessive bleeding tendency   Gastrointestinal:  No jaundice,  abdominal pain or abdominal bloating  Genitourinary: No changes in urinary habits, no trouble urinating    Psychiatric: No anxiety or depression      Medical History  Surgical History   Past Medical History:   Diagnosis Date    Atrial fibrillation (H)     Chronic kidney disease     Chronic kidney disease, stage 3b (H) 09/28/2023    Class 3 severe obesity due to excess calories with body mass index (BMI) of 40.0 to 44.9 in adult (H) 09/28/2023    Congestive heart failure (H)     COPD (chronic obstructive pulmonary disease) (H)     Coronary artery disease involving coronary bypass graft of native heart without angina pectoris 09/28/2023    Heart failure with reduced ejection fraction (H) 09/11/2023    HFrEF (heart failure with reduced ejection fraction) (H) 09/11/2023    Hyperlipidemia     Hypertension     Ischemic cardiomyopathy 09/28/2023    Obese     Paroxysmal atrial fibrillation (H) 09/28/2023    Tobacco abuse 09/28/2023    Past Surgical History:   Procedure Laterality Date    CORONARY ANGIOGRAPHY ADULT ORDER      CORONARY ARTERY BYPASS Left 2014    2 vessels    CV CARDIOMEMS WITH RIGHT HEART CATH N/A 2/15/2024    Procedure: Pulmonary Arterial Pressure Sensor Placement;  Surgeon: Jacey Bhandari MD;  Location: [facility] CATH LAB CV    CV CORONARY ANGIOGRAM N/A 09/11/2023    Procedure: Coronary Angiogram;  Surgeon: Santy Esposito MD;  Location: [facility] CATH LAB CV    CV LEFT HEART CATH N/A 09/11/2023    Procedure: Left Heart Catheterization;  Surgeon: Santy Esposito MD;  Location: [facility] CATH LAB CV    CV RIGHT HEART CATH MEASUREMENTS RECORDED N/A 09/11/2023    Procedure: Right Heart Catheterization;  Surgeon: Santy Esposito MD;  Location: [facility] CATH LAB CV         Family History Social History   Family History   Problem Relation Age of Onset    Cerebrovascular Disease Mother     Myocardial Infarction Father         Social History     Tobacco Use    Smoking status: Every Day     Current  packs/day: 0.50     Types: Cigarettes    Smokeless tobacco: Never    Tobacco comments:     Seen IP by CTTS on 9/11/23 and declined cessation services and materials   Substance Use Topics    Alcohol use: Not Currently         Medications  Allergies     Current Outpatient Medications:     acetaminophen (TYLENOL) 500 MG tablet, Take 1,000 mg by mouth every 8 hours as needed, Disp: , Rfl:     albuterol (PROAIR HFA/PROVENTIL HFA/VENTOLIN HFA) 108 (90 Base) MCG/ACT inhaler, Inhale 2 puffs into the lungs 4 times daily, Disp: 18 g, Rfl: 0    albuterol (PROVENTIL) (2.5 MG/3ML) 0.083% neb solution, Take 1 vial (2.5 mg) by nebulization every 6 hours as needed for shortness of breath, wheezing or cough, Disp: 90 mL, Rfl: 0    amiodarone (PACERONE) 200 MG tablet, Take 1 tablet (200 mg) by mouth daily, Disp: 90 tablet, Rfl: 0    apixaban ANTICOAGULANT (ELIQUIS) 5 MG tablet, Take 1 tablet (5 mg) by mouth 2 times daily, Disp: 180 tablet, Rfl: 3    aspirin 81 MG EC tablet, Take 1 tablet (81 mg) by mouth daily, Disp: 90 tablet, Rfl: 1    atorvastatin (LIPITOR) 40 MG tablet, Take 40 mg by mouth every morning, Disp: , Rfl:     bumetanide (BUMEX) 1 MG tablet, Take 5 tablets (5 mg) by mouth 2 times daily, Disp: 900 tablet, Rfl: 3    carvedilol (COREG) 3.125 MG tablet, Take 1 tablet (3.125 mg) by mouth 2 times daily (with meals), Disp: 180 tablet, Rfl: 1    CVS VITAMIN B12 1000 MCG tablet, Take 1,000 mcg by mouth daily, Disp: , Rfl:     DULoxetine (CYMBALTA) 30 MG capsule, Take 30 mg by mouth every morning, Disp: , Rfl:     gabapentin (NEURONTIN) 300 MG capsule, Take 3 capsules (900 mg) by mouth at bedtime, Disp: , Rfl:     hydrALAZINE (APRESOLINE) 10 MG tablet, Take 1 tablet (10 mg) by mouth 3 times daily, Disp: 270 tablet, Rfl: 3    ipratropium - albuterol 0.5 mg/2.5 mg/3 mL (DUONEB) 0.5-2.5 (3) MG/3ML neb solution, INHALE 3 ML EVERY 6 HOURS AS NEEDED FOR WHEEZING., Disp: , Rfl:     isosorbide mononitrate (IMDUR) 60 MG 24 hr tablet,  "Take 3 tablets (180 mg) by mouth every morning, Disp: 270 tablet, Rfl: 1    metolazone (ZAROXOLYN) 2.5 MG tablet, Take 1 tablet (2.5 mg) by mouth once a week on Thursdays, 30 minutes before your bumex. Labs weekly on Fridays., Disp: , Rfl:     nitroGLYcerin (NITROSTAT) 0.4 MG sublingual tablet, Place 1 tablet (0.4 mg) under the tongue every 5 minutes as needed If no relief after 3 tabs call 911;continue 1 tab every 5 min max 3 tab Indications: chest pain, Disp: 100 tablet, Rfl: 1    polyethylene glycol (MIRALAX) 17 GM/Dose powder, Take 17 g by mouth 2 times daily as needed, Disp: 510 g, Rfl: 0    rOPINIRole (REQUIP) 2 MG tablet, Take 1 tablet (2 mg) by mouth 3 times daily, Disp: 270 tablet, Rfl: 3    spironolactone (ALDACTONE) 50 MG tablet, Take 1 tablet (50 mg) by mouth daily, Disp: 90 tablet, Rfl: 3   No Known Allergies       Lab Results    Chemistry CBC Cardiac Enzymes/BNP/TSH/INR   Recent Labs   Lab Test 04/19/24  1545   *   POTASSIUM 3.3*   CHLORIDE 86*   CO2 30*   *   BUN 43.8*   CR 1.39*   GFRESTIMATED 56*   RUSLAN 9.5     Recent Labs   Lab Test 04/19/24  1545 04/13/24  1000 04/12/24  1738   CR 1.39* 1.28* 1.27*          Recent Labs   Lab Test 02/14/24  1520   WBC 6.6   HGB 11.5*   HCT 38.1*   MCV 93        Recent Labs   Lab Test 02/14/24  1520 01/09/24  0709 01/08/24  1606   HGB 11.5* 12.7* 12.0*    No results for input(s): \"TROPONINI\" in the last 57724 hours.  Recent Labs   Lab Test 01/08/24  1606 12/22/23  1643 12/14/23  0811   NTBNPI [phone]     No results for input(s): \"TSH\" in the last 29137 hours.  Recent Labs   Lab Test 02/13/23  2307   INR 1.01         Data Review    ECGs (tracings independently reviewed)  4/2/2024 - SR 96bpm, RBBB QRS 140ms    12/25/2023 TTE  1. Technically very difficult study.  2. Left ventricular chamber size and wall thickness are normal. Systolic  function is mildly reduced with global hypokinesis. The calculated left  ventricular ejection fraction is " 41%.  3. Right ventricle is not well visualized. Right ventricular chamber size and  systolic function are grossly normal.  4. No hemodynamically significant valvular abnormalities although the cardiac  valves are not well visualized.  5. Compared to the prior study dated 9/9/2023, there has been no significant  change.    9/11/2023 coronary angiography, [facility]  1.  Chronic total occlusion proximal LAD and stented segment  2.  Moderate calcific disease in medium size circumflex OM1 branch.  Smaller tortuous AV groove circumflex branch has 70% calcified narrowing and diffuse distal disease.  3.  Chronic total occlusion mid right coronary artery.  4.  Widely patent large caliber LIMA to mid LAD with good distal and proximal runoff.  5.  Widely patent free LAURI graft to mid PDA.  Diffuse disease in posterolateral system which is never well visualized.  6.  Right heart cath data:  RA 12   RV 38/13  PA 41/20  mean 27  PAWP 16  CO 6.6  CI 2.2       Cc: Tsohia Khanna CNP, Reid Escobar MD Amila Dilusha William, MD  4/25/2024  2:48 PM        Thank you for allowing me to participate in the care of your patient.      Sincerely,     Jose Bee MD     [facility]  cc:   Toshia Khanna, NP  [address], [address]  [place] [id number]

## 2024-04-25 NOTE — LETTER
4/25/2024    Reid Escobar MD  [address] [place] [address]    RE: Zackary Kianna       Dear Colleague,     I had the pleasure of seeing Zackary Hutchison in the [facility].    HEART CARE NOTE          Assessment/Recommendations     1. Severe Biventricular dysfunction/HFrEF  Assessment / Plan  Hypervolemic on physical exam - will present for admission and IV diuresis on Monday (4/29/24) patient declines admission today; Patient is high risk for adverse cardiac events 2/2 severe biventricular dysfunction, multiple HFHs, non-compliance with dietary restrictions  GDMT as detailed below; repeat echo ordered     Current Pharmacotherapy AHA Guideline-Directed Medical Therapy   Lisinopril 5 mg daily  - held Lisinopril 20 mg twice daily   Carvedilol 3.125 mg BID  Carvedilol 25 mg twice daily   Spironolactone 50 mg daily Spironolactone 25 mg once daily   Hydralazine NA Hydralazine 100 mg three times daily   Isosorbide mononitrate 180 mg daily Isosorbide dinitrate 40 mg three times daily   SGLT2 inhibitor:Dapagliflozin/Empagliflozin - not started Dapagliflozin or Empagliflozin 10 mg daily      2. Atrial fibrillation  Assessment / Plan  Currently on apixaban and amiodarone     3. CAD c/b ICM  Assessment / Plan  S/p CABG 2015; now s/p repeat coronary angiogram - no intervention indicated  Denies chest pain or anginal equivalents; Continue atorvastatin, carvedilol     4. CKD  Assessment / Plan  Overall stable renal function    40 minutes spent reviewing prior records (including documentation, laboratory studies, cardiac testing/imaging), history and physical exam, planning, and subsequent documentation.    The longitudinal plan of care for HFrEF was addressed during this visit. Due to the added complexity in care, I will continue to support Mr. Zackary Hutchison  in the subsequent management of this condition(s) and with the ongoing continuity of care of this condition(s).      History of Present  Illness/Subjective    Mr. Zackary Hutchison is a 62 year old male with a PMHx significant for CAD s/p CABG, afib, HFrEF and CKD who presents to in [facility]     Today, Mr. Hutchison denies acute cardiac events or complaints; Management plan as detailed above      ECG: personally reviewed; normal sinus rhythm, RBBB.     ECHO (personnaly Reviewed on 9/11/23):   1.Left ventricular function is decreased. The ejection fraction is 35-40%  (moderately reduced).  2.Septal motion is consistent with post-operative state.  3.There is borderline apical wall hypokinesis.  4.Mildly decreased right ventricular systolic function  5.No hemodynamically significant valvular abnormalities on 2D or color flow  imaging.  6.There is borderline aortic root enlargement. The ascending aorta is  Borderline dilated.  7.The study was technically difficult.  There is no comparison study available    Lab results: personally reviewed April 25, 2024; notable for CKD and hypokalemia    Medical history and pertinent documents reviewed in Care Everywhere please where applicable see details above        Physical Examination Review of Systems   /62 (BP Location: Right arm, Patient Position: Sitting, Cuff Size: Adult Large)   Pulse 92   Resp 16   Wt 126.1 kg (278 lb)   BMI 44.87 kg/m    Body mass index is 44.87 kg/m .  Wt Readings from Last 3 Encounters:   04/25/24 126.1 kg (278 lb)   04/02/24 122.9 kg (271 lb)   03/21/24 128.8 kg (284 lb)     General Appearance:   no distress, normal body habitus   ENT/Mouth: membranes moist, no oral lesions or bleeding gums.      EYES:  no scleral icterus, normal conjunctivae   Neck: no carotid bruits or thyromegaly   Chest/Lungs:   lungs are clear to auscultation, no rales or wheezing, equal chest wall expansion    Cardiovascular:   Regular. Normal first and second heart sounds with no murmurs, rubs, or gallops; the carotid, radial and posterior tibial pulses are intact, + JVD and LE  edema bilaterally    Abdomen:  no  organomegaly, masses, bruits, or tenderness; bowel sounds are present   Extremities: no cyanosis or clubbing   Skin: no xanthelasma, warm.    Neurologic: NAD     Psychiatric: alert and oriented x3, calm     A complete 10 systems ROS was reviewed  And is negative except what is listed in the HPI.          Medical History  Surgical History Family History Social History   Past Medical History:   Diagnosis Date    Atrial fibrillation (H)     Chronic kidney disease     Chronic kidney disease, stage 3b (H) 09/28/2023    Class 3 severe obesity due to excess calories with body mass index (BMI) of 40.0 to 44.9 in adult (H) 09/28/2023    Congestive heart failure (H)     COPD (chronic obstructive pulmonary disease) (H)     Coronary artery disease involving coronary bypass graft of native heart without angina pectoris 09/28/2023    Heart failure with reduced ejection fraction (H) 09/11/2023    HFrEF (heart failure with reduced ejection fraction) (H) 09/11/2023    Hyperlipidemia     Hypertension     Ischemic cardiomyopathy 09/28/2023    Obese     Paroxysmal atrial fibrillation (H) 09/28/2023    Tobacco abuse 09/28/2023    Past Surgical History:   Procedure Laterality Date    CORONARY ANGIOGRAPHY ADULT ORDER      CORONARY ARTERY BYPASS Left 2014    2 vessels    CV CARDIOMEMS WITH RIGHT HEART CATH N/A 2/15/2024    Procedure: Pulmonary Arterial Pressure Sensor Placement;  Surgeon: Jacey Bhandari MD;  Location: [facility] CATH LAB     CV CORONARY ANGIOGRAM N/A 09/11/2023    Procedure: Coronary Angiogram;  Surgeon: Santy Esposito MD;  Location: [facility] CATH LAB CV    CV LEFT HEART CATH N/A 09/11/2023    Procedure: Left Heart Catheterization;  Surgeon: Santy Esposito MD;  Location: [facility] CATH [facility]    CV RIGHT HEART CATH MEASUREMENTS RECORDED N/A 09/11/2023    Procedure: Right Heart Catheterization;  Surgeon: Santy Esposito MD;  Location: [facility] CATH LAB CV    no family history of premature coronary artery  "disease Social History     Socioeconomic History    Marital status: Single     Spouse name: Not on file    Number of children: Not on file    Years of education: Not on file    Highest education level: Not on file   Occupational History    Occupation: Retired restaurant owner     Comment: [employer]   Tobacco Use    Smoking status: Every Day     Current packs/day: 0.50     Types: Cigarettes    Smokeless tobacco: Never    Tobacco comments:     Seen IP by [facility] on 9/11/23 and declined cessation services and materials   Substance and Sexual Activity    Alcohol use: Not Currently    Drug use: Not on file    Sexual activity: Not on file   Other Topics Concern    Not on file   Social History Narrative    Currently lives in an assisted living facility. (Updated: 01/08/2024)     Social Determinants of Health     Financial Resource Strain: Not on file   Food Insecurity: Not on file   Transportation Needs: Not on file   Physical Activity: Not on file   Stress: Not on file   Social Connections: Not on file   Interpersonal Safety: Not on file   Housing Stability: Not on file           Lab Results    Chemistry/lipid CBC Cardiac Enzymes/BNP/TSH/INR   Lab Results   Component Value Date    BUN 43.8 (H) 04/19/2024     (L) 04/19/2024    CO2 30 (H) 04/19/2024    Lab Results   Component Value Date    WBC 6.6 02/14/2024    HGB 11.5 (L) 02/14/2024    HCT 38.1 (L) 02/14/2024    MCV 93 02/14/2024     02/14/2024    Lab Results   Component Value Date    INR 1.01 02/13/2023     No results found for: \"CKTOTAL\", \"CKMB\", \"TROPONINI\"       Weight:    Wt Readings from Last 3 Encounters:   04/25/24 126.1 kg (278 lb)   04/02/24 122.9 kg (271 lb)   03/21/24 128.8 kg (284 lb)       Allergies  No Known Allergies      Surgical History  Past Surgical History:   Procedure Laterality Date    CORONARY ANGIOGRAPHY ADULT ORDER      CORONARY ARTERY BYPASS Left 2014    2 vessels    CV CARDIOMEMS WITH RIGHT HEART CATH N/A 2/15/2024    Procedure: " Pulmonary Arterial Pressure Sensor Placement;  Surgeon: Jacey Bhandari MD;  Location: [facility] CATH LAB CV    CV CORONARY ANGIOGRAM N/A 09/11/2023    Procedure: Coronary Angiogram;  Surgeon: Santy Esposito MD;  Location: [facility] CATH LAB CV    CV LEFT HEART CATH N/A 09/11/2023    Procedure: Left Heart Catheterization;  Surgeon: Santy Esposito MD;  Location: [facility] CATH LAB CV    CV RIGHT HEART CATH MEASUREMENTS RECORDED N/A 09/11/2023    Procedure: Right Heart Catheterization;  Surgeon: Santy Esposito MD;  Location: [facility] CATH LAB CV       Social History  Tobacco:   History   Smoking Status    Every Day    Types: Cigarettes   Smokeless Tobacco    Never    Alcohol:   Social History    Substance and Sexual Activity      Alcohol use: Not Currently   Illicit Drugs:   History   Drug Use Not on file       Family History  Family History   Problem Relation Age of Onset    Cerebrovascular Disease Mother     Myocardial Infarction Father           Jacey Bhandari MD on 4/25/2024      cc: Reid Escobar      Thank you for allowing me to participate in the care of your patient.      Sincerely,     Jacey Bhandari MD     [facility]  cc:   Reid Escobar MD  [address] [place] [address]

## 2024-04-25 NOTE — PATIENT INSTRUCTIONS
[facility]  Cardiac Electrophysiology  [address]  [place] [address]   Office: [phone]  Fax: [phone]     Thank you for seeing us in clinic today - it is a pleasure to be a part of your care team.  Below is a summary of our plan from today's visit.       You have atrial fibrillation.  We reviewed atrial fibrillation, treatment options (ablation vs antiarrhythmic drug therapy), and long term stroke risk prevention (blood thinner).    We will plan for the following:  - proceed with upcoming admission and diuresis  - follow-up visit in 3 months - at any time after fluid stability has been attained, we can consider coordinating an ablation towards providing atrial fibrillation suppression without long term amiodarone use  - continue amiodarone 200mg daily for now  - continue apixaban 5mg twice daily     Please do not hesitate to be in touch with our office at [phone] with any questions that may arise.       Thank you for trusting us with your care,    Jose Bee MD  Clinical Cardiac Electrophysiology  [facility]  [address] [place] [address]   Office: [phone]  Fax: [phone]        ATRIAL FIBRILLATION: Patient Information    What is atrial fibrillation?  Atrial fibrillation (AF, A-fib) is a common heart rhythm problem (arrhythmia) occurring within the upper chambers of the heart (the atria).  In normal rhythm, the upper and lower chambers of the heart are electrically driven to contract in a coordinated sequence.  In atrial fibrillation, the atria lose their ability to contract because of rapid and chaotic electrical activity.  The lower chambers of the heart (the ventricles) continue to pump blood throughout the body, though with irregular and often faster rate due to the chaotic activity within the atria.        How do I know if I have atrial fibrillation?   Some people may feel their heart beating  faster, harder, or irregularly while in atrial fibrillation.  Others may be lightheaded, fatigued, feel weak or tired or become more short of breath especially with activities.  Some patients have no symptoms at all.  Atrial fibrillation may be found due to an irregular pulse or on an electrocardiogram (ECG). Atrial fibrillation can start and stop on its own, and episodes can last from seconds to several months.      How common is atrial fibrillation?   An estimated 3-6 million people in the [place] have atrial fibrillation.  Atrial fibrillation is a common heart rhythm problem for older persons, affecting as estimated 12-15% of people over the age of 65 years of age.    What causes atrial fibrillation?   Age is the most important risk factor for atrial fibrillation.  Atrial fibrillation is more common in people with other heart disease, high blood pressure, diabetes, obesity, sleep apnea and in people who regularly consume alcohol.  Surgery, lung disease, or thyroid problems can lead to atrial fibrillation.  Atrial fibrillation has multiple possible causes, and in most cases a single cause cannot be found.  Atrial fibrillation is a progressive condition, usually starting with at an early stage with short and infrequent episodes.  In later stages of disease, more frequent and longer lasting episodes of atrial fibrillation occur, ultimately culminating in episodes which do not spontaneously terminate.  Generally, more enlargement and scarring within the upper chambers of the heart is observed as atrial fibrillation progresses from early to late-stage disease.    How is atrial fibrillation diagnosed and evaluated?    Because of its start-stop nature, atrial fibrillation can be challenging to diagnose.  Atrial fibrillation is most commonly diagnosed via cardiac rhythm recordings - either an ECG or wearable cardiac rhythm monitor.  For patients with pacemakers, defibrillators or implantable loop recorders, atrial  fibrillation may be recorded via these devices.  Recently, commercially available devices (eg. Apple Watch, [employer] device, certain FitBit devices, others) can allow patients to take 30 second cardiac rhythm recordings which may document atrial fibrillation.  Once atrial fibrillation is diagnosed, additional tests include blood tests and an echocardiogram.  The echocardiogram uses ultrasound to look at your heart to assess your cardiac function and evaluate for other heart disease.  Additional evaluation may include CT or MRI studies.    Is atrial fibrillation dangerous?   Atrial fibrillation is not usually a life-threatening arrhythmia.  The most serious consequences of atrial fibrillation including stroke and worsening of overall cardiac function.  While in atrial fibrillation, the upper cardiac chambers do not contract normally, resulting in slower blood flow and increased risk of clot formation.  If this blood clot becomes detached from the heart a stroke can occur.  Unfortunately, stroke can be the first sign of atrial fibrillation for some people.  With a stroke, you may notice abnormal sensation, weakness on one side of the body or face, changes in your vision or speech.  If you have any of these signs, you should contact EMS and be evaluated in an emergency room as soon as possible.      How is atrial fibrillation treated?     Several treatment options exist for suppressing atrial fibrillation - however, it is not an easily curable arrhythmia.  The first goal in managing atrial fibrillation is to minimize stroke risk.  The second goal is to improve symptoms associated with atrial fibrillation.  Finally, in patients with reduced cardiac function, maintaining normal rhythm can help improve cardiac function.      Blood thinners are used to reduce the risk of stroke in patients with high estimated stroke risk related to atrial fibrillation.  For patients at higher risk of bleeding related to blood thinner use,  implantable devices may be an option to reduce stroke risk without the need for long term blood thinner use.      Atrial fibrillation can be managed via two strategies: rate control and rhythm control.  In a rate control strategy, continued atrial fibrillation is accepted and medications (eg. beta-blockers or calcium channel blockers) are used to control the lower chamber rate.  In a rhythm control strategy, anti-arrhythmic medications or catheter ablation are used to maintain normal cardiac rhythm and slow disease progression by suppressing atrial fibrillation.  A procedure called a cardioversion, in which an electric shock is delivered through patches placed on the chest wall while under deep sedation, can be performed to temporarily restore normal cardiac rhythm, though does not address the chance of atrial fibrillation recurrence.  Treatments are more effective for earlier rather than later stage atrial fibrillation.  Lifestyle modifications (maintaining a healthy weight, aerobic exercise, diagnosing and treating sleep apnea, and minimizing alcohol intake) are important elements of atrial fibrillation rhythm control.     What is catheter ablation for atrial fibrillation?  Cardiac catheter ablation is a commonly performed, minimally invasive procedure performed by a cardiac electrophysiologist to treat many different cardiac rhythm abnormalities.  During catheter ablation, long, thin, flexible tubes are advanced into the heart via small sheaths inserted into the femoral veins and thermal energy (either heating or cooling) is applied within the heart to disrupt abnormal electrical activity.  Atrial fibrillation ablation is performed under general anesthesia, with procedures generally taking approximately 2-3 hours.  Patients are typically observed for 3-5 hours after the ablation, and in most cases can be discharged home the same day.  Atrial fibrillation ablation is associated with better outcomes (mortality,  cardiovascular hospitalizations, atrial arrhythmia recurrences) compared to antiarrhythmic drug therapy.  However, atrial fibrillation recurrences are not uncommon, and repeat catheter ablation may be offered.  Your electrophysiology team can review atrial fibrillation ablation, anticipated success rates, risks, and recovery expectations with you.    What are anti-arrhythmic medications?  Anti-arrhythmic medications are specialized drugs which alter cardiac electrical functioning to suppress arrhythmias.  There are several anti-arrhythmic medications available, each with its own success rate and side effects.  Some anti-arrhythmic medications are less effective though safer to use, others are more effective though have serious potential toxicities.  Atrial fibrillation recurrences are common and may require dose adjustment or change in antiarrhythmic therapy.  Your electrophysiology team will carefully consider which medication would be the best and safest for your particular case.      Can I live a normal life?    The goal of atrial fibrillation management is for patients to live normal lives without being limited by symptoms related to atrial fibrillation.    Are any additional educational resources available?  There are a number of excellent atrial fibrillation education resources available to you online.  A few options you may wish to review include:  hrsonline.org/guide-atrial-fibrillation  afibmatters.org  getsmartaboutafib.com  stopaf.com    What comes next?    Consider your management options and let us know how we can help in your decision process.  Please take medications as they have been prescribed.  You should also get any tests that may have been ordered for you.      When to Call Your Doctor or seek emergency care:  Call your doctor or seek emergency care if you have any significant changes with the following:  Weakness  Dizziness  Fainting  Fatigue  Shortness of breath  Chest pain with increased  activity  If you are concerned that your heart rate is too fast or too slow  Bleeding that does not stop in 10 minutes  Coughing or throwing up blood  Bloody diarrhea or bleeding hemorrhoids  Dark-colored urine or black stool  Allergic reactions:  Rash  Itching  Swelling  Trouble breathing or swallowing      Please call the [facility] at [phone] if you have concerns about your symptoms, your medicines, or your follow-up appointments.

## 2024-04-25 NOTE — PROGRESS NOTES
HEART CARE NOTE          Assessment/Recommendations     1. Severe Biventricular dysfunction/HFrEF  Assessment / Plan  Hypervolemic on physical exam - will present for admission and IV diuresis on Monday (4/29/24) patient declines admission today; Patient is high risk for adverse cardiac events 2/2 severe biventricular dysfunction, multiple HFHs, non-compliance with dietary restrictions  GDMT as detailed below; repeat echo ordered     Current Pharmacotherapy AHA Guideline-Directed Medical Therapy   Lisinopril 5 mg daily  - held Lisinopril 20 mg twice daily   Carvedilol 3.125 mg BID  Carvedilol 25 mg twice daily   Spironolactone 50 mg daily Spironolactone 25 mg once daily   Hydralazine NA Hydralazine 100 mg three times daily   Isosorbide mononitrate 180 mg daily Isosorbide dinitrate 40 mg three times daily   SGLT2 inhibitor:Dapagliflozin/Empagliflozin - not started Dapagliflozin or Empagliflozin 10 mg daily      2. Atrial fibrillation  Assessment / Plan  Currently on apixaban and amiodarone     3. CAD c/b ICM  Assessment / Plan  S/p CABG 2015; now s/p repeat coronary angiogram - no intervention indicated  Denies chest pain or anginal equivalents; Continue atorvastatin, carvedilol     4. CKD  Assessment / Plan  Overall stable renal function    40 minutes spent reviewing prior records (including documentation, laboratory studies, cardiac testing/imaging), history and physical exam, planning, and subsequent documentation.    The longitudinal plan of care for HFrEF was addressed during this visit. Due to the added complexity in care, I will continue to support Mr. Zackary Hutchison  in the subsequent management of this condition(s) and with the ongoing continuity of care of this condition(s).      History of Present Illness/Subjective    Mr. Zackary Hutchison is a 62 year old male with a PMHx significant for CAD s/p CABG, afib, HFrEF and CKD who presents to in ADHF     Today, Mr. Hutchison denies acute cardiac events or complaints;  Management plan as detailed above      ECG: personally reviewed; normal sinus rhythm, RBBB.     ECHO (personnaly Reviewed on 9/11/23):   1.Left ventricular function is decreased. The ejection fraction is 35-40%  (moderately reduced).  2.Septal motion is consistent with post-operative state.  3.There is borderline apical wall hypokinesis.  4.Mildly decreased right ventricular systolic function  5.No hemodynamically significant valvular abnormalities on 2D or color flow  imaging.  6.There is borderline aortic root enlargement. The ascending aorta is  Borderline dilated.  7.The study was technically difficult.  There is no comparison study available    Lab results: personally reviewed April 25, 2024; notable for CKD and hypokalemia    Medical history and pertinent documents reviewed in Care Everywhere please where applicable see details above        Physical Examination Review of Systems   /62 (BP Location: Right arm, Patient Position: Sitting, Cuff Size: Adult Large)   Pulse 92   Resp 16   Wt 126.1 kg (278 lb)   BMI 44.87 kg/m    Body mass index is 44.87 kg/m .  Wt Readings from Last 3 Encounters:   04/25/24 126.1 kg (278 lb)   04/02/24 122.9 kg (271 lb)   03/21/24 128.8 kg (284 lb)     General Appearance:   no distress, normal body habitus   ENT/Mouth: membranes moist, no oral lesions or bleeding gums.      EYES:  no scleral icterus, normal conjunctivae   Neck: no carotid bruits or thyromegaly   Chest/Lungs:   lungs are clear to auscultation, no rales or wheezing, equal chest wall expansion    Cardiovascular:   Regular. Normal first and second heart sounds with no murmurs, rubs, or gallops; the carotid, radial and posterior tibial pulses are intact, + JVD and LE  edema bilaterally    Abdomen:  no organomegaly, masses, bruits, or tenderness; bowel sounds are present   Extremities: no cyanosis or clubbing   Skin: no xanthelasma, warm.    Neurologic: NAD     Psychiatric: alert and oriented x3, calm     A  complete 10 systems ROS was reviewed  And is negative except what is listed in the HPI.          Medical History  Surgical History Family History Social History   Past Medical History:   Diagnosis Date    Atrial fibrillation (H)     Chronic kidney disease     Chronic kidney disease, stage 3b (H) 09/28/2023    Class 3 severe obesity due to excess calories with body mass index (BMI) of 40.0 to 44.9 in adult (H) 09/28/2023    Congestive heart failure (H)     COPD (chronic obstructive pulmonary disease) (H)     Coronary artery disease involving coronary bypass graft of native heart without angina pectoris 09/28/2023    Heart failure with reduced ejection fraction (H) 09/11/2023    HFrEF (heart failure with reduced ejection fraction) (H) 09/11/2023    Hyperlipidemia     Hypertension     Ischemic cardiomyopathy 09/28/2023    Obese     Paroxysmal atrial fibrillation (H) 09/28/2023    Tobacco abuse 09/28/2023    Past Surgical History:   Procedure Laterality Date    CORONARY ANGIOGRAPHY ADULT ORDER      CORONARY ARTERY BYPASS Left 2014    2 vessels    CV CARDIOMEMS WITH RIGHT HEART CATH N/A 2/15/2024    Procedure: Pulmonary Arterial Pressure Sensor Placement;  Surgeon: Jacey Bhandari MD;  Location: [facility] CATH LAB CV    CV CORONARY ANGIOGRAM N/A 09/11/2023    Procedure: Coronary Angiogram;  Surgeon: Santy Esposito MD;  Location: [facility] CATH LAB CV    CV LEFT HEART CATH N/A 09/11/2023    Procedure: Left Heart Catheterization;  Surgeon: Santy Esposito MD;  Location: [facility] CATH LAB CV    CV RIGHT HEART CATH MEASUREMENTS RECORDED N/A 09/11/2023    Procedure: Right Heart Catheterization;  Surgeon: Santy Esposito MD;  Location: [facility] CATH LAB CV    no family history of premature coronary artery disease Social History     Socioeconomic History    Marital status: Single     Spouse name: Not on file    Number of children: Not on file    Years of education: Not on file    Highest education level: Not on file  "  Occupational History    Occupation: Retired restaurant owner     Comment: [employer]   Tobacco Use    Smoking status: Every Day     Current packs/day: 0.50     Types: Cigarettes    Smokeless tobacco: Never    Tobacco comments:     Seen IP by CTTS on 9/11/23 and declined cessation services and materials   Substance and Sexual Activity    Alcohol use: Not Currently    Drug use: Not on file    Sexual activity: Not on file   Other Topics Concern    Not on file   Social History Narrative    Currently lives in an assisted living facility. (Updated: 01/08/2024)     Social Determinants of Health     Financial Resource Strain: Not on file   Food Insecurity: Not on file   Transportation Needs: Not on file   Physical Activity: Not on file   Stress: Not on file   Social Connections: Not on file   Interpersonal Safety: Not on file   Housing Stability: Not on file           Lab Results    Chemistry/lipid CBC Cardiac Enzymes/BNP/TSH/INR   Lab Results   Component Value Date    BUN 43.8 (H) 04/19/2024     (L) 04/19/2024    CO2 30 (H) 04/19/2024    Lab Results   Component Value Date    WBC 6.6 02/14/2024    HGB 11.5 (L) 02/14/2024    HCT 38.1 (L) 02/14/2024    MCV 93 02/14/2024     02/14/2024    Lab Results   Component Value Date    INR 1.01 02/13/2023     No results found for: \"CKTOTAL\", \"CKMB\", \"TROPONINI\"       Weight:    Wt Readings from Last 3 Encounters:   04/25/24 126.1 kg (278 lb)   04/02/24 122.9 kg (271 lb)   03/21/24 128.8 kg (284 lb)       Allergies  No Known Allergies      Surgical History  Past Surgical History:   Procedure Laterality Date    CORONARY ANGIOGRAPHY ADULT ORDER      CORONARY ARTERY BYPASS Left 2014    2 vessels    CV CARDIOMEMS WITH RIGHT HEART CATH N/A 2/15/2024    Procedure: Pulmonary Arterial Pressure Sensor Placement;  Surgeon: Jacey Bhandari MD;  Location: [facility] CATH LAB CV    CV CORONARY ANGIOGRAM N/A 09/11/2023    Procedure: Coronary Angiogram;  Surgeon: Santy Esposito MD;  " Location: [facility] CATH LAB CV    CV LEFT HEART CATH N/A 09/11/2023    Procedure: Left Heart Catheterization;  Surgeon: Santy Esposito MD;  Location: [facility] CATH LAB CV    CV RIGHT HEART CATH MEASUREMENTS RECORDED N/A 09/11/2023    Procedure: Right Heart Catheterization;  Surgeon: Santy Esposito MD;  Location: [facility] CATH LAB CV       Social History  Tobacco:   History   Smoking Status    Every Day    Types: Cigarettes   Smokeless Tobacco    Never    Alcohol:   Social History    Substance and Sexual Activity      Alcohol use: Not Currently   Illicit Drugs:   History   Drug Use Not on file       Family History  Family History   Problem Relation Age of Onset    Cerebrovascular Disease Mother     Myocardial Infarction Father           Jacey Bhandari MD on 4/25/2024      cc: Reid Escobar

## 2024-04-26 ENCOUNTER — LAB REQUISITION (OUTPATIENT)
Dept: LAB | Facility: HOSPITAL | Age: 64
End: 2024-04-26
Payer: MEDICARE

## 2024-04-26 DIAGNOSIS — I50.23 ACUTE ON CHRONIC SYSTOLIC (CONGESTIVE) HEART FAILURE (H): ICD-10-CM

## 2024-04-26 DIAGNOSIS — I13.0 HYPERTENSIVE HEART AND CHRONIC KIDNEY DISEASE WITH HEART FAILURE AND STAGE 1 THROUGH STAGE 4 CHRONIC KIDNEY DISEASE, OR UNSPECIFIED CHRONIC KIDNEY DISEASE (H): ICD-10-CM

## 2024-04-26 LAB
ANION GAP SERPL CALCULATED.3IONS-SCNC: 15 MMOL/L (ref 7–15)
BUN SERPL-MCNC: 36.5 MG/DL (ref 8–23)
CALCIUM SERPL-MCNC: 9.7 MG/DL (ref 8.8–10.2)
CHLORIDE SERPL-SCNC: 86 MMOL/L (ref 98–107)
CREAT SERPL-MCNC: 1.18 MG/DL (ref 0.67–1.17)
DEPRECATED HCO3 PLAS-SCNC: 33 MMOL/L (ref 22–29)
EGFRCR SERPLBLD CKD-EPI 2021: 68 ML/MIN/1.73M2
GLUCOSE SERPL-MCNC: 164 MG/DL (ref 70–99)
POTASSIUM SERPL-SCNC: 2.8 MMOL/L (ref 3.4–5.3)
SODIUM SERPL-SCNC: 134 MMOL/L (ref 135–145)

## 2024-04-26 PROCEDURE — 80048 BASIC METABOLIC PNL TOTAL CA: CPT | Mod: ORL | Performed by: FAMILY MEDICINE

## 2024-04-29 ENCOUNTER — HOSPITAL ENCOUNTER (INPATIENT)
Facility: HOSPITAL | Age: 64
LOS: 9 days | Discharge: HOME HEALTH | DRG: 321 | End: 2024-05-08
Attending: EMERGENCY MEDICINE | Admitting: INTERNAL MEDICINE
Payer: MEDICARE

## 2024-04-29 ENCOUNTER — APPOINTMENT (OUTPATIENT)
Dept: RADIOLOGY | Facility: HOSPITAL | Age: 64
DRG: 321 | End: 2024-04-29
Attending: EMERGENCY MEDICINE
Payer: MEDICARE

## 2024-04-29 DIAGNOSIS — I25.10 CORONARY ARTERY DISEASE INVOLVING NATIVE CORONARY ARTERY OF NATIVE HEART WITHOUT ANGINA PECTORIS: ICD-10-CM

## 2024-04-29 DIAGNOSIS — I25.10 CORONARY ARTERY DISEASE INVOLVING NATIVE HEART WITHOUT ANGINA PECTORIS, UNSPECIFIED VESSEL OR LESION TYPE: ICD-10-CM

## 2024-04-29 DIAGNOSIS — I50.9 ACUTE DECOMPENSATED HEART FAILURE (H): ICD-10-CM

## 2024-04-29 DIAGNOSIS — I50.82 BIVENTRICULAR CONGESTIVE HEART FAILURE (H): ICD-10-CM

## 2024-04-29 DIAGNOSIS — I20.0 UNSTABLE ANGINA (H): Primary | ICD-10-CM

## 2024-04-29 DIAGNOSIS — T14.8XXA OPEN WOUND: ICD-10-CM

## 2024-04-29 PROBLEM — E11.9 NEWLY DIAGNOSED DIABETES (H): Status: ACTIVE | Noted: 2024-04-29

## 2024-04-29 PROBLEM — N18.32 CHRONIC KIDNEY DISEASE, STAGE 3B (H): Status: ACTIVE | Noted: 2023-09-28

## 2024-04-29 PROBLEM — R73.9 HYPERGLYCEMIA: Status: ACTIVE | Noted: 2024-04-29

## 2024-04-29 PROBLEM — I87.2 VENOUS STASIS DERMATITIS OF BOTH LOWER EXTREMITIES: Status: ACTIVE | Noted: 2023-12-22

## 2024-04-29 PROBLEM — I25.5 ISCHEMIC CARDIOMYOPATHY: Status: ACTIVE | Noted: 2023-09-28

## 2024-04-29 LAB
ALBUMIN SERPL BCG-MCNC: 4 G/DL (ref 3.5–5.2)
ALP SERPL-CCNC: 92 U/L (ref 40–150)
ALT SERPL W P-5'-P-CCNC: 29 U/L (ref 0–70)
ANION GAP SERPL CALCULATED.3IONS-SCNC: 15 MMOL/L (ref 7–15)
AST SERPL W P-5'-P-CCNC: 30 U/L (ref 0–45)
BILIRUB SERPL-MCNC: 0.4 MG/DL
BUN SERPL-MCNC: 35.7 MG/DL (ref 8–23)
CALCIUM SERPL-MCNC: 10.2 MG/DL (ref 8.8–10.2)
CHLORIDE SERPL-SCNC: 86 MMOL/L (ref 98–107)
CREAT SERPL-MCNC: 1.19 MG/DL (ref 0.67–1.17)
DEPRECATED HCO3 PLAS-SCNC: 34 MMOL/L (ref 22–29)
EGFRCR SERPLBLD CKD-EPI 2021: 68 ML/MIN/1.73M2
ERYTHROCYTE [DISTWIDTH] IN BLOOD BY AUTOMATED COUNT: 21.1 % (ref 10–15)
FLUAV RNA SPEC QL NAA+PROBE: NEGATIVE
FLUBV RNA RESP QL NAA+PROBE: NEGATIVE
GLUCOSE SERPL-MCNC: 223 MG/DL (ref 70–99)
HBA1C MFR BLD: 7.8 %
HCT VFR BLD AUTO: 41 % (ref 40–53)
HGB BLD-MCNC: 13.1 G/DL (ref 13.3–17.7)
HOLD SPECIMEN: NORMAL
MAGNESIUM SERPL-MCNC: 2.6 MG/DL (ref 1.7–2.3)
MCH RBC QN AUTO: 30 PG (ref 26.5–33)
MCHC RBC AUTO-ENTMCNC: 32 G/DL (ref 31.5–36.5)
MCV RBC AUTO: 94 FL (ref 78–100)
NT-PROBNP SERPL-MCNC: 160 PG/ML (ref 0–900)
PLATELET # BLD AUTO: 189 10E3/UL (ref 150–450)
POTASSIUM SERPL-SCNC: 3.3 MMOL/L (ref 3.4–5.3)
POTASSIUM SERPL-SCNC: 3.5 MMOL/L (ref 3.4–5.3)
PROT SERPL-MCNC: 8.1 G/DL (ref 6.4–8.3)
RBC # BLD AUTO: 4.36 10E6/UL (ref 4.4–5.9)
RSV RNA SPEC NAA+PROBE: NEGATIVE
SARS-COV-2 RNA RESP QL NAA+PROBE: NEGATIVE
SODIUM SERPL-SCNC: 135 MMOL/L (ref 135–145)
TROPONIN T SERPL HS-MCNC: 38 NG/L
TSH SERPL DL<=0.005 MIU/L-ACNC: 1.7 UIU/ML (ref 0.3–4.2)
WBC # BLD AUTO: 9.2 10E3/UL (ref 4–11)

## 2024-04-29 PROCEDURE — 36415 COLL VENOUS BLD VENIPUNCTURE: CPT | Performed by: EMERGENCY MEDICINE

## 2024-04-29 PROCEDURE — 84132 ASSAY OF SERUM POTASSIUM: CPT | Performed by: INTERNAL MEDICINE

## 2024-04-29 PROCEDURE — 250N000009 HC RX 250: Performed by: INTERNAL MEDICINE

## 2024-04-29 PROCEDURE — 84484 ASSAY OF TROPONIN QUANT: CPT | Performed by: EMERGENCY MEDICINE

## 2024-04-29 PROCEDURE — 250N000013 HC RX MED GY IP 250 OP 250 PS 637: Performed by: EMERGENCY MEDICINE

## 2024-04-29 PROCEDURE — 83880 ASSAY OF NATRIURETIC PEPTIDE: CPT | Performed by: EMERGENCY MEDICINE

## 2024-04-29 PROCEDURE — 82565 ASSAY OF CREATININE: CPT | Performed by: EMERGENCY MEDICINE

## 2024-04-29 PROCEDURE — 99285 EMERGENCY DEPT VISIT HI MDM: CPT | Mod: 25

## 2024-04-29 PROCEDURE — P9047 ALBUMIN (HUMAN), 25%, 50ML: HCPCS | Performed by: INTERNAL MEDICINE

## 2024-04-29 PROCEDURE — 83735 ASSAY OF MAGNESIUM: CPT | Performed by: EMERGENCY MEDICINE

## 2024-04-29 PROCEDURE — 83036 HEMOGLOBIN GLYCOSYLATED A1C: CPT | Performed by: INTERNAL MEDICINE

## 2024-04-29 PROCEDURE — 99223 1ST HOSP IP/OBS HIGH 75: CPT | Performed by: INTERNAL MEDICINE

## 2024-04-29 PROCEDURE — 93005 ELECTROCARDIOGRAM TRACING: CPT | Performed by: EMERGENCY MEDICINE

## 2024-04-29 PROCEDURE — 258N000003 HC RX IP 258 OP 636: Performed by: INTERNAL MEDICINE

## 2024-04-29 PROCEDURE — 85027 COMPLETE CBC AUTOMATED: CPT | Performed by: EMERGENCY MEDICINE

## 2024-04-29 PROCEDURE — 71045 X-RAY EXAM CHEST 1 VIEW: CPT

## 2024-04-29 PROCEDURE — 250N000011 HC RX IP 250 OP 636: Performed by: INTERNAL MEDICINE

## 2024-04-29 PROCEDURE — 36415 COLL VENOUS BLD VENIPUNCTURE: CPT | Performed by: INTERNAL MEDICINE

## 2024-04-29 PROCEDURE — 120N000004 HC R&B MS OVERFLOW

## 2024-04-29 PROCEDURE — 87637 SARSCOV2&INF A&B&RSV AMP PRB: CPT | Performed by: EMERGENCY MEDICINE

## 2024-04-29 PROCEDURE — 84443 ASSAY THYROID STIM HORMONE: CPT | Performed by: INTERNAL MEDICINE

## 2024-04-29 PROCEDURE — 96374 THER/PROPH/DIAG INJ IV PUSH: CPT

## 2024-04-29 PROCEDURE — 250N000011 HC RX IP 250 OP 636: Performed by: EMERGENCY MEDICINE

## 2024-04-29 PROCEDURE — 250N000013 HC RX MED GY IP 250 OP 250 PS 637: Performed by: INTERNAL MEDICINE

## 2024-04-29 RX ORDER — IPRATROPIUM BROMIDE AND ALBUTEROL SULFATE 2.5; .5 MG/3ML; MG/3ML
3 SOLUTION RESPIRATORY (INHALATION) EVERY 4 HOURS PRN
Status: DISCONTINUED | OUTPATIENT
Start: 2024-04-29 | End: 2024-04-29

## 2024-04-29 RX ORDER — DULOXETIN HYDROCHLORIDE 30 MG/1
30 CAPSULE, DELAYED RELEASE ORAL EVERY MORNING
Status: DISCONTINUED | OUTPATIENT
Start: 2024-04-30 | End: 2024-05-08 | Stop reason: HOSPADM

## 2024-04-29 RX ORDER — AMIODARONE HYDROCHLORIDE 200 MG/1
200 TABLET ORAL DAILY
Status: DISCONTINUED | OUTPATIENT
Start: 2024-04-30 | End: 2024-05-08 | Stop reason: HOSPADM

## 2024-04-29 RX ORDER — ALBUTEROL SULFATE 90 UG/1
2 AEROSOL, METERED RESPIRATORY (INHALATION) 4 TIMES DAILY
Status: DISCONTINUED | OUTPATIENT
Start: 2024-04-29 | End: 2024-04-29

## 2024-04-29 RX ORDER — IPRATROPIUM BROMIDE AND ALBUTEROL SULFATE 2.5; .5 MG/3ML; MG/3ML
3 SOLUTION RESPIRATORY (INHALATION) EVERY 4 HOURS PRN
Status: DISCONTINUED | OUTPATIENT
Start: 2024-04-29 | End: 2024-04-30

## 2024-04-29 RX ORDER — LANOLIN ALCOHOL/MO/W.PET/CERES
3 CREAM (GRAM) TOPICAL
Status: DISCONTINUED | OUTPATIENT
Start: 2024-04-29 | End: 2024-05-08 | Stop reason: HOSPADM

## 2024-04-29 RX ORDER — GABAPENTIN 300 MG/1
900 CAPSULE ORAL AT BEDTIME
Status: DISCONTINUED | OUTPATIENT
Start: 2024-04-29 | End: 2024-05-08 | Stop reason: HOSPADM

## 2024-04-29 RX ORDER — ALBUTEROL SULFATE 90 UG/1
2 AEROSOL, METERED RESPIRATORY (INHALATION) 4 TIMES DAILY
Status: DISCONTINUED | OUTPATIENT
Start: 2024-04-29 | End: 2024-04-30

## 2024-04-29 RX ORDER — POTASSIUM CHLORIDE 1500 MG/1
20 TABLET, EXTENDED RELEASE ORAL ONCE
Status: COMPLETED | OUTPATIENT
Start: 2024-04-29 | End: 2024-04-29

## 2024-04-29 RX ORDER — NITROGLYCERIN 0.4 MG/1
0.4 TABLET SUBLINGUAL EVERY 5 MIN PRN
Status: DISCONTINUED | OUTPATIENT
Start: 2024-04-29 | End: 2024-05-02

## 2024-04-29 RX ORDER — ACETAMINOPHEN 325 MG/1
650 TABLET ORAL EVERY 4 HOURS PRN
Status: DISCONTINUED | OUTPATIENT
Start: 2024-04-29 | End: 2024-05-02

## 2024-04-29 RX ORDER — POTASSIUM CHLORIDE 1500 MG/1
40 TABLET, EXTENDED RELEASE ORAL ONCE
Status: COMPLETED | OUTPATIENT
Start: 2024-04-29 | End: 2024-04-29

## 2024-04-29 RX ORDER — ALBUTEROL SULFATE 0.83 MG/ML
2.5 SOLUTION RESPIRATORY (INHALATION) EVERY 6 HOURS PRN
Status: DISCONTINUED | OUTPATIENT
Start: 2024-04-29 | End: 2024-05-05 | Stop reason: DRUGHIGH

## 2024-04-29 RX ORDER — HYDRALAZINE HYDROCHLORIDE 10 MG/1
10 TABLET, FILM COATED ORAL 3 TIMES DAILY
Status: DISCONTINUED | OUTPATIENT
Start: 2024-04-29 | End: 2024-05-08 | Stop reason: HOSPADM

## 2024-04-29 RX ORDER — ROPINIROLE 1 MG/1
2 TABLET, FILM COATED ORAL 3 TIMES DAILY
Status: DISCONTINUED | OUTPATIENT
Start: 2024-04-29 | End: 2024-05-08 | Stop reason: HOSPADM

## 2024-04-29 RX ORDER — DEXTROSE MONOHYDRATE 25 G/50ML
25-50 INJECTION, SOLUTION INTRAVENOUS
Status: DISCONTINUED | OUTPATIENT
Start: 2024-04-29 | End: 2024-05-08 | Stop reason: HOSPADM

## 2024-04-29 RX ORDER — ISOSORBIDE MONONITRATE 60 MG/1
180 TABLET, EXTENDED RELEASE ORAL EVERY MORNING
Status: DISCONTINUED | OUTPATIENT
Start: 2024-04-30 | End: 2024-05-08 | Stop reason: HOSPADM

## 2024-04-29 RX ORDER — CARVEDILOL 3.12 MG/1
3.12 TABLET ORAL 2 TIMES DAILY WITH MEALS
Status: DISCONTINUED | OUTPATIENT
Start: 2024-04-29 | End: 2024-05-08 | Stop reason: HOSPADM

## 2024-04-29 RX ORDER — NICOTINE 21 MG/24HR
1 PATCH, TRANSDERMAL 24 HOURS TRANSDERMAL DAILY
Status: DISCONTINUED | OUTPATIENT
Start: 2024-04-29 | End: 2024-05-08 | Stop reason: HOSPADM

## 2024-04-29 RX ORDER — FUROSEMIDE 10 MG/ML
80 INJECTION INTRAMUSCULAR; INTRAVENOUS ONCE
Status: COMPLETED | OUTPATIENT
Start: 2024-04-29 | End: 2024-04-29

## 2024-04-29 RX ORDER — ALBUMIN (HUMAN) 12.5 G/50ML
50 SOLUTION INTRAVENOUS 2 TIMES DAILY
Status: DISCONTINUED | OUTPATIENT
Start: 2024-04-29 | End: 2024-05-03

## 2024-04-29 RX ORDER — ALBUMIN (HUMAN) 12.5 G/50ML
50 SOLUTION INTRAVENOUS ONCE
Status: COMPLETED | OUTPATIENT
Start: 2024-04-29 | End: 2024-04-29

## 2024-04-29 RX ORDER — SPIRONOLACTONE 25 MG/1
50 TABLET ORAL DAILY
Status: DISCONTINUED | OUTPATIENT
Start: 2024-04-30 | End: 2024-05-08 | Stop reason: HOSPADM

## 2024-04-29 RX ORDER — ATORVASTATIN CALCIUM 40 MG/1
40 TABLET, FILM COATED ORAL EVERY MORNING
Status: DISCONTINUED | OUTPATIENT
Start: 2024-04-30 | End: 2024-05-08 | Stop reason: HOSPADM

## 2024-04-29 RX ORDER — BISACODYL 10 MG
10 SUPPOSITORY, RECTAL RECTAL DAILY PRN
Status: DISCONTINUED | OUTPATIENT
Start: 2024-04-29 | End: 2024-05-08 | Stop reason: HOSPADM

## 2024-04-29 RX ORDER — ASPIRIN 81 MG/1
81 TABLET ORAL DAILY
Status: DISCONTINUED | OUTPATIENT
Start: 2024-04-30 | End: 2024-05-02

## 2024-04-29 RX ORDER — NICOTINE POLACRILEX 4 MG
15-30 LOZENGE BUCCAL
Status: DISCONTINUED | OUTPATIENT
Start: 2024-04-29 | End: 2024-05-08 | Stop reason: HOSPADM

## 2024-04-29 RX ADMIN — ALBUTEROL SULFATE 2.5 MG: 2.5 SOLUTION RESPIRATORY (INHALATION) at 20:58

## 2024-04-29 RX ADMIN — GABAPENTIN 900 MG: 300 CAPSULE ORAL at 21:00

## 2024-04-29 RX ADMIN — ROPINIROLE HYDROCHLORIDE 2 MG: 1 TABLET, FILM COATED ORAL at 16:35

## 2024-04-29 RX ADMIN — ROPINIROLE HYDROCHLORIDE 2 MG: 1 TABLET, FILM COATED ORAL at 20:25

## 2024-04-29 RX ADMIN — HYDRALAZINE HYDROCHLORIDE 10 MG: 10 TABLET, FILM COATED ORAL at 16:34

## 2024-04-29 RX ADMIN — POTASSIUM CHLORIDE 40 MEQ: 1500 TABLET, EXTENDED RELEASE ORAL at 13:43

## 2024-04-29 RX ADMIN — CARVEDILOL 3.12 MG: 3.12 TABLET, FILM COATED ORAL at 17:45

## 2024-04-29 RX ADMIN — FUROSEMIDE 80 MG: 10 INJECTION, SOLUTION INTRAMUSCULAR; INTRAVENOUS at 11:31

## 2024-04-29 RX ADMIN — ALBUMIN HUMAN 50 G: 0.25 SOLUTION INTRAVENOUS at 12:37

## 2024-04-29 RX ADMIN — FUROSEMIDE 15 MG/HR: 10 INJECTION, SOLUTION INTRAVENOUS at 20:07

## 2024-04-29 RX ADMIN — FUROSEMIDE 15 MG/HR: 10 INJECTION, SOLUTION INTRAVENOUS at 12:58

## 2024-04-29 RX ADMIN — ALBUTEROL SULFATE 2 PUFF: 90 INHALANT RESPIRATORY (INHALATION) at 20:08

## 2024-04-29 RX ADMIN — ALBUMIN HUMAN 50 G: 0.25 SOLUTION INTRAVENOUS at 20:19

## 2024-04-29 RX ADMIN — APIXABAN 5 MG: 5 TABLET, FILM COATED ORAL at 19:20

## 2024-04-29 RX ADMIN — ALBUTEROL SULFATE 2 PUFF: 90 AEROSOL, METERED RESPIRATORY (INHALATION) at 16:36

## 2024-04-29 RX ADMIN — POTASSIUM CHLORIDE 20 MEQ: 1500 TABLET, EXTENDED RELEASE ORAL at 19:20

## 2024-04-29 ASSESSMENT — ACTIVITIES OF DAILY LIVING (ADL)
ADLS_ACUITY_SCORE: 24
ADLS_ACUITY_SCORE: 24
ADLS_ACUITY_SCORE: 37
ADLS_ACUITY_SCORE: 24
ADLS_ACUITY_SCORE: 37
ADLS_ACUITY_SCORE: 37
ADLS_ACUITY_SCORE: 24

## 2024-04-29 ASSESSMENT — COLUMBIA-SUICIDE SEVERITY RATING SCALE - C-SSRS
2. HAVE YOU ACTUALLY HAD ANY THOUGHTS OF KILLING YOURSELF IN THE PAST MONTH?: NO
6. HAVE YOU EVER DONE ANYTHING, STARTED TO DO ANYTHING, OR PREPARED TO DO ANYTHING TO END YOUR LIFE?: NO
1. IN THE PAST MONTH, HAVE YOU WISHED YOU WERE DEAD OR WISHED YOU COULD GO TO SLEEP AND NOT WAKE UP?: NO

## 2024-04-29 NOTE — PHARMACY-ADMISSION MEDICATION HISTORY
Pharmacist Admission Medication History    Admission medication history is complete. The information provided in this note is only as accurate as the sources available at the time of the update.    Information Source(s): Patient and CareEverywhere/SureScripts via in-person    Pertinent Information: none    Changes made to PTA medication list:  Added: None  Deleted: None  Changed: None    Allergies reviewed with patient and updates made in EHR: yes    Medication History Completed By: KAMERON SCRUGGS [facility] 4/29/2024 1:19 PM    PTA Med List   Medication Sig Last Dose    acetaminophen (TYLENOL) 500 MG tablet Take 1,000 mg by mouth every 8 hours as needed 4/26/2024 at prn    albuterol (PROAIR HFA/PROVENTIL HFA/VENTOLIN HFA) 108 (90 Base) MCG/ACT inhaler Inhale 2 puffs into the lungs 4 times daily Unknown at prn    albuterol (PROVENTIL) (2.5 MG/3ML) 0.083% neb solution Take 1 vial (2.5 mg) by nebulization every 6 hours as needed for shortness of breath, wheezing or cough Unknown at prn    amiodarone (PACERONE) 200 MG tablet Take 1 tablet (200 mg) by mouth daily 4/29/2024 at am    apixaban ANTICOAGULANT (ELIQUIS) 5 MG tablet Take 1 tablet (5 mg) by mouth 2 times daily 4/29/2024 at am    aspirin 81 MG EC tablet Take 1 tablet (81 mg) by mouth daily 4/29/2024 at am    atorvastatin (LIPITOR) 40 MG tablet Take 40 mg by mouth every morning 4/29/2024 at am    bumetanide (BUMEX) 1 MG tablet Take 5 tablets (5 mg) by mouth 2 times daily 4/28/2024 at x2    carvedilol (COREG) 3.125 MG tablet Take 1 tablet (3.125 mg) by mouth 2 times daily (with meals) 4/29/2024 at am    [employer] VITAMIN B12 1000 MCG tablet Take 1,000 mcg by mouth daily 4/29/2024 at am    DULoxetine (CYMBALTA) 30 MG capsule Take 30 mg by mouth every morning 4/29/2024 at am    gabapentin (NEURONTIN) 300 MG capsule Take 3 capsules (900 mg) by mouth at bedtime 4/28/2024 at [facility]    hydrALAZINE (APRESOLINE) 10 MG tablet Take 1 tablet (10 mg) by mouth 3 times daily 4/29/2024 at  am x1    ipratropium - albuterol 0.5 mg/2.5 mg/3 mL (DUONEB) 0.5-2.5 (3) MG/3ML neb solution INHALE 3 ML EVERY 6 HOURS AS NEEDED FOR WHEEZING. 4/29/2024 at am    isosorbide mononitrate (IMDUR) 60 MG 24 hr tablet Take 3 tablets (180 mg) by mouth every morning 4/29/2024 at am    metolazone (ZAROXOLYN) 2.5 MG tablet Take 1 tablet (2.5 mg) by mouth once a week on Thursdays, 30 minutes before your bumex. Labs weekly on Fridays. 4/25/2024    nitroGLYcerin (NITROSTAT) 0.4 MG sublingual tablet Place 1 tablet (0.4 mg) under the tongue every 5 minutes as needed If no relief after 3 tabs call 911;continue 1 tab every 5 min max 3 tab Indications: chest pain Unknown at prn    polyethylene glycol (MIRALAX) 17 GM/Dose powder Take 17 g by mouth 2 times daily as needed Unknown at prn    rOPINIRole (REQUIP) 2 MG tablet Take 1 tablet (2 mg) by mouth 3 times daily 4/29/2024 at am    spironolactone (ALDACTONE) 50 MG tablet Take 1 tablet (50 mg) by mouth daily 4/29/2024 at am

## 2024-04-29 NOTE — ED NOTES
[facility] ED Handoff Report    ED Chief Complaint: shortness of breath, leg swelling    ED Diagnosis:  (I50.9) Acute decompensated heart failure (H)  Comment:   Plan: diuresis       PMH:    Past Medical History:   Diagnosis Date    Atrial fibrillation (H)     Chronic kidney disease     Chronic kidney disease, stage 3b (H) 09/28/2023    Class 3 severe obesity due to excess calories with body mass index (BMI) of 40.0 to 44.9 in adult (H) 09/28/2023    Congestive heart failure (H)     COPD (chronic obstructive pulmonary disease) (H)     Coronary artery disease involving coronary bypass graft of native heart without angina pectoris 09/28/2023    Heart failure with reduced ejection fraction (H) 09/11/2023    HFrEF (heart failure with reduced ejection fraction) (H) 09/11/2023    Hyperlipidemia     Hypertension     Ischemic cardiomyopathy 09/28/2023    Obese     Paroxysmal atrial fibrillation (H) 09/28/2023    Tobacco abuse 09/28/2023        Code Status:  Full Code     Falls Risk: Yes Band: Applied    Current Living Situation/Residence: lives in an apartment     Elimination Status: Continent: Yes     Activity Level: SBA w/ walker    Patients Preferred Language:  English     Needed: No    Vital Signs:  /55   Pulse 87   Temp 96.9  F (36.1  C) (Tympanic)   Resp 25   Wt 125.3 kg (276 lb 3.2 oz)   SpO2 95%   BMI 44.58 kg/m       Cardiac Rhythm: SR, PACs    Pain Score: 3/10    Is the Patient Confused:  No    Last Food or Drink: 04/29/24 at 1800    Focused Assessment:  patient arrives to ED with c/o SOB and dyspnea on exertion in addition to bilateral LE swelling, wound and weeping of the RLE with discomfort. Blanchable redness of the bilat LE. Significant weight gain in the past few weeks. Saw Dr. Bhandari on Friday who wanted him to come in for diuresis.     Tests Performed: Done: Labs and imaging    Treatments Provided:  see MAR    Family Dynamics/Concerns: No    Family Updated On Visitor  Policy: No    Plan of Care Communicated to Family: No    Who Was Updated about Plan of Care: patient to update family at his own discretion    Belongings Checklist Done and Signed by Patient: Yes    Belongings Sent with Patient: walker, glasses, cell phone and , keys, clothing, shoes, grab stick, misc belongings    Medications sent with patient: albuterol inhaler, insulin pen    Covid: symptomatic, negative    Additional Information: patient has questions about BG checks and insulin orders. Refused both before dinner meal and wants to speak with hospitalist about this. Dr. Luu was notified.    RN: Joanne HENDRICKS 4/29/2024 6:03 PM

## 2024-04-29 NOTE — CONSULTS
HEART CARE NOTE        Thank you, Dr. Luu, for asking the [facility] Heart Care team to see Zackary Hutchison to evaluate ADHF.      Assessment/Recommendations     1. Severe Biventricular dysfunction/HFrEF  Assessment / Plan  Hypervolemic on physical exam - start furosemide gtt after bolus and continue to monitor UOP and renal function closely  Patient is high risk for adverse cardiac events 2/2 severe biventricular dysfunction, multiple HFHs, non-compliance with dietary restrictions, renal dysfunction, CAD  GDMT as detailed below; repeat echo ordered     Current Pharmacotherapy AHA Guideline-Directed Medical Therapy   Lisinopril 5 mg daily  - held Lisinopril 20 mg twice daily   Carvedilol 3.125 mg BID  Carvedilol 25 mg twice daily   Spironolactone 50 mg daily Spironolactone 25 mg once daily   Hydralazine NA Hydralazine 100 mg three times daily   Isosorbide mononitrate 180 mg daily Isosorbide dinitrate 40 mg three times daily   SGLT2 inhibitor:Dapagliflozin/Empagliflozin - not started Dapagliflozin or Empagliflozin 10 mg daily      2. Atrial fibrillation  Assessment / Plan  Currently on apixaban and amiodarone     3. CAD c/b ICM  Assessment / Plan  S/p CABG 2015; now s/p repeat coronary angiogram - no intervention indicated  Denies chest pain or anginal equivalents; Continue atorvastatin, carvedilol     4. PRINCESS on CKD  Assessment / Plan  Like CRS; diuresis as above; continue to monitor UOP and renal function closely    Clinically Significant Risk Factors Present on Admission               # Drug Induced Coagulation Defect: home medication list includes an anticoagulant medication  # Drug Induced Platelet Defect: home medication list includes an antiplatelet medication   # Hypertension: Noted on problem list  # Chronic heart failure with reduced ejection fraction: last echo with EF <40%         # Financial/Environmental Concerns:          Cardiac Arrhythmia: Atrial fibrillation:  Unspecified  Cardiomyopathy  Systolic  Systolic acute    Hypokalemia, Other fluid overload, and Other disorders of electrolyte and fluid balance, not elsewhere classified    Acute kidney failure, unspecified  CKD POA List: Stage 3 (unspecified if a or b) (GFR 30 - 59)      85 minutes spent reviewing prior records (including documentation, laboratory studies, cardiac testing/imaging), history and physical exam, planning, and subsequent documentation.      History of Present Illness/Subjective    Mr. Zackary Hutchison is a 62 year old male with a PMHx significant for CAD s/p CABG, afib, HFrEF and CKD who presents to in ADHF     Today, Mr. Hutchison, endorses progressive orthopnea, dyspnea, LE edema and declining functional capacity x several months; Management plan as detailed above    ECG: Personally reviewed. unchanged from previous tracings, normal sinus rhythm, nonspecific ST and T waves changes, PAC's noted.    ECHO (personnaly Reviewed on 4/29/24): repeat study pending  1. Technically very difficult study.  2. Left ventricular chamber size and wall thickness are normal. Systolic  function is mildly reduced with global hypokinesis. The calculated left  ventricular ejection fraction is 41%.  3. Right ventricle is not well visualized. Right ventricular chamber size and  systolic function are grossly normal.  4. No hemodynamically significant valvular abnormalities although the cardiac  valves are not well visualized.  5. Compared to the prior study dated 9/9/2023, there has been no significant  change    Telemetry: personally reviewed April 29, 2024; notable for sinus rhythm     Lab results: personally reviewed April 29, 2024; notable for PRINCESS on CKD    Medical history and pertinent documents reviewed in Care Everywhere please where applicable see details above          Physical Examination Review of Systems   BP (!) 175/78   Pulse 95   Temp 96.9  F (36.1  C) (Tympanic)   Resp 20   Wt 125.3 kg (276 lb 3.2 oz)   SpO2 95%    BMI 44.58 kg/m    Body mass index is 44.58 kg/m .  Wt Readings from Last 3 Encounters:   04/29/24 125.3 kg (276 lb 3.2 oz)   04/25/24 126.1 kg (278 lb)   04/25/24 126.1 kg (278 lb)     General Appearance:   no distress, normal body habitus   ENT/Mouth: membranes moist, no oral lesions or bleeding gums.      EYES:  no scleral icterus, normal conjunctivae   Neck: no carotid bruits or thyromegaly   Chest/Lungs:   lungs are clear to auscultation, no rales or wheezing, equal chest wall expansion    Cardiovascular:   Regular. Normal first and second heart sounds with no murmurs, rubs, or gallops; the carotid, radial and posterior tibial pulses are intact, + JVD and LE edema bilaterally    Abdomen:  no organomegaly, masses, bruits, or tenderness; bowel sounds are present   Extremities: no cyanosis or clubbing   Skin: no xanthelasma, warm.    Neurologic: NAD     Psychiatric: alert and oriented x3, calm     A complete 10 systems ROS was reviewed  And is negative except what is listed in the HPI.          Medical History  Surgical History Family History Social History   Past Medical History:   Diagnosis Date    Atrial fibrillation (H)     Chronic kidney disease     Chronic kidney disease, stage 3b (H) 09/28/2023    Class 3 severe obesity due to excess calories with body mass index (BMI) of 40.0 to 44.9 in adult (H) 09/28/2023    Congestive heart failure (H)     COPD (chronic obstructive pulmonary disease) (H)     Coronary artery disease involving coronary bypass graft of native heart without angina pectoris 09/28/2023    Heart failure with reduced ejection fraction (H) 09/11/2023    HFrEF (heart failure with reduced ejection fraction) (H) 09/11/2023    Hyperlipidemia     Hypertension     Ischemic cardiomyopathy 09/28/2023    Obese     Paroxysmal atrial fibrillation (H) 09/28/2023    Tobacco abuse 09/28/2023    Past Surgical History:   Procedure Laterality Date    CORONARY ANGIOGRAPHY ADULT ORDER      CORONARY ARTERY BYPASS  Left 2014    2 vessels    CV CARDIOMEMS WITH RIGHT HEART CATH N/A 2/15/2024    Procedure: Pulmonary Arterial Pressure Sensor Placement;  Surgeon: Jacey Bhandari MD;  Location: [facility] CATH LAB CV    CV CORONARY ANGIOGRAM N/A 09/11/2023    Procedure: Coronary Angiogram;  Surgeon: Santy Esposito MD;  Location: [facility] CATH LAB CV    CV LEFT HEART CATH N/A 09/11/2023    Procedure: Left Heart Catheterization;  Surgeon: Santy Esposito MD;  Location: [facility] CATH LAB CV    CV RIGHT HEART CATH MEASUREMENTS RECORDED N/A 09/11/2023    Procedure: Right Heart Catheterization;  Surgeon: Santy Esposito MD;  Location: [facility] CATH LAB CV    no family history of premature coronary artery disease Social History     Socioeconomic History    Marital status: Single     Spouse name: Not on file    Number of children: Not on file    Years of education: Not on file    Highest education level: Not on file   Occupational History    Occupation: Retired restaurant owner     Comment: [employer]   Tobacco Use    Smoking status: Every Day     Current packs/day: 0.50     Types: Cigarettes    Smokeless tobacco: Never    Tobacco comments:     Seen IP by CTTS on 9/11/23 and declined cessation services and materials   Substance and Sexual Activity    Alcohol use: Not Currently    Drug use: Not on file    Sexual activity: Not on file   Other Topics Concern    Not on file   Social History Narrative    Currently lives in an assisted living facility. (Updated: 01/08/2024)     Social Determinants of Health     Financial Resource Strain: Not on file   Food Insecurity: Not on file   Transportation Needs: Not on file   Physical Activity: Not on file   Stress: Not on file   Social Connections: Not on file   Interpersonal Safety: Not on file   Housing Stability: Not on file           Lab Results    Chemistry/lipid CBC Cardiac Enzymes/BNP/TSH/INR   Lab Results   Component Value Date    BUN 36.5 (H) 04/26/2024     (L) 04/26/2024    CO2 33  "(H) 04/26/2024    Lab Results   Component Value Date    WBC 6.6 02/14/2024    HGB 11.5 (L) 02/14/2024    HCT 38.1 (L) 02/14/2024    MCV 93 02/14/2024     02/14/2024    Lab Results   Component Value Date    INR 1.01 02/13/2023     No results found for: \"CKTOTAL\", \"CKMB\", \"TROPONINI\"       Weight:    Wt Readings from Last 3 Encounters:   04/29/24 125.3 kg (276 lb 3.2 oz)   04/25/24 126.1 kg (278 lb)   04/25/24 126.1 kg (278 lb)       Allergies  No Known Allergies      Surgical History  Past Surgical History:   Procedure Laterality Date    CORONARY ANGIOGRAPHY ADULT ORDER      CORONARY ARTERY BYPASS Left 2014    2 vessels    CV CARDIOMEMS WITH RIGHT HEART CATH N/A 2/15/2024    Procedure: Pulmonary Arterial Pressure Sensor Placement;  Surgeon: Jacey Bhandari MD;  Location: [facility] CATH LAB CV    CV CORONARY ANGIOGRAM N/A 09/11/2023    Procedure: Coronary Angiogram;  Surgeon: Santy Esposito MD;  Location: [facility] CATH LAB CV    CV LEFT HEART CATH N/A 09/11/2023    Procedure: Left Heart Catheterization;  Surgeon: Santy Esposito MD;  Location: [facility] CATH LAB CV    CV RIGHT HEART CATH MEASUREMENTS RECORDED N/A 09/11/2023    Procedure: Right Heart Catheterization;  Surgeon: Santy Esposito MD;  Location: [facility] CATH LAB CV       Social History  Tobacco:   History   Smoking Status    Every Day    Types: Cigarettes   Smokeless Tobacco    Never    Alcohol:   Social History    Substance and Sexual Activity      Alcohol use: Not Currently   Illicit Drugs:   History   Drug Use Not on file       Family History  Family History   Problem Relation Age of Onset    Cerebrovascular Disease Mother     Myocardial Infarction Father           Jacey Bhandari MD on 4/29/2024      cc: Reid Escobar,     "

## 2024-04-29 NOTE — Clinical Note
If  increasing difficulty breathing /shortness of breath;  fever, more weakness, lightheaded/dizzy, persistent vomiting /diarrhea, must go to emergency department, earlier evaluation decreases risk for admission and  complications     WHAT TO DRINK :   Pedialyte 6-8 ounces 2-4 x/day  for 3-5 days ; another option  \" Drip Drop \" powdered electrolyte packet    soup 3-4 times per day x 3 day ( caution if on  Salt restricted diet) not creme based   Coconut water  V 8 Splash     What to take for  Nose /throat /ear symptoms :  Flonase :  opposite hand opposite nostril  2pf 2xd x 14d   Nasal saline 2-3 puffs 3-4 times per day ×5 days  Mucinex/guaifenesin 400 mg 3 times a day × 10 days    MAKE AT HOME   >>HONEY  1 cup + ,    2 tblsp of each :  Lemon / apple cider vinegar /tumerick / katrina   mix together, take straight  or add added  warm or cold liquid     What to take for stomach/ Intestinal discomfort /bloat     > GAS -X ;  gaviscon   -Probiotics:Choose one, take 2 time per day for length of Rx  Florostor  florogjen  Acidophilus  Aloe Vera Juice     Self care / home instructions  CoVid :  -Do not take Advil/Aleve/ibuprofen/Motrin/Celebrex >blocks our body defense vs CoVid   - Bathroom:  open windows; fan on ; keep bathroom cool and DRY , open car  windows   -Open windows/ get outside at least 3 times per day to circulate clean air    Vitamins to purchase over the counter    -Vitamin-C 500 mg /day ; Vitamin D 400 units/day ; Zinc 50 mg /day     >SLEEP on stomach/side : allow gravity to aid alveoli filling   >EAT more Protein ,4xd       If POSITIVE  COVid monitor symptoms :  Purchase PULSE OXIMETER , if oxygen stays below 90% at rest go to ER   If more SOB/cannot finish sentence / CP / incr Fever/ dizziness / confusion / worsening vomiting +/or diarrhea /feeling more UNWELL>  HOSPITAL ER   ( ph # below )    [place]  [phone]  [place] [phone]  [place]  [phone]  [facility]  Balloon inserted to circumflex and marginal circumflex. [phone]  [place] [phone]  [place]  [phone]  [facility]  [phone]   [facility]  [phone]  [facility]   [phone]  [place]  [phone]      Original document  per Malinda Martin MD   3/2020 , updated 11/2020, 9/2021

## 2024-04-29 NOTE — ED TRIAGE NOTES
Pt here on referral from cardiology to get diuresed. Pt has hx of CHF and takes all his daily meds. Hypertensive in triage. SpO2 is 95.      Triage Assessment (Adult)       Row Name 04/29/24 1104          Triage Assessment    Airway WDL WDL        Respiratory WDL    Respiratory WDL X;rhythm/pattern     Rhythm/Pattern, Respiratory dyspnea upon exertion;labored;shortness of breath        Skin Circulation/Temperature WDL    Skin Circulation/Temperature WDL WDL        Cognitive/Neuro/Behavioral WDL    Cognitive/Neuro/Behavioral WDL WDL

## 2024-04-29 NOTE — Clinical Note
The first balloon was inserted into the circumflex and ostium marginal circumflex.Max pressure = 14 benjie. Total duration = 18 seconds.     Max pressure = 16 benjie. Total duration = 30 seconds.    Balloon reinflated a second time: Max pressure = 16 benjie. Total duration = 30 seconds.  Balloon reinflated a third time: Max pressure = 16 benjie. Total duration = 16 seconds.

## 2024-04-29 NOTE — ED NOTES
Expected Patient Referral to ED  9:55 AM    Referring Clinic/Provider:  Wen Bhandari (Cardiology)    Reason for referral/Clinical facts:  IV diuretics, volume overload  40+ lbs with fluids  80 lasix IV with 15 lasix drip at 15  Admit and consult cardiology    Recommendations provided:  Send to ED for further evaluation    Caller was informed that this institution does possess the capabilities and/or resources to provide for patient and should be transferred to our facility.    Discussed that if direct admit is sought and any hurdles are encountered, this ED would be happy to see the patient and evaluate.    Informed caller that recommendations provided are recommendations based only on the facts provided and that they responsible to accept or reject the advice, or to seek a formal in person consultation as needed and that this ED will see/treat patient should they arrive.      Walter Zheng MD  [facility] EMERGENCY DEPARTMENT  [address] [place] [phone]  [phone]     Walter Zheng MD  04/29/24 0956

## 2024-04-29 NOTE — Clinical Note
The first balloon was inserted into the circumflex and ostium marginal circumflex.Max pressure = 14 benjie. Total duration = 14 seconds.     Max pressure = 12 benjie. Total duration = 10 seconds.    Balloon reinflated a second time: Max pressure = 12 benjie. Total duration = 10 seconds.  Balloon reinflated a third time: Max pressure = 14 benjie. Total duration = 14 seconds.  Balloon reinflated a fourth time: Max pressure = 10 benjie. Total duration =  10 seconds.

## 2024-04-29 NOTE — Clinical Note
The first balloon was inserted into the circumflex and ostium marginal circumflex.Max pressure = 12 benjie. Total duration = 18 seconds.     Max pressure = 18 benjie. Total duration = 25 seconds.    Balloon reinflated a second time: Max pressure = 18 benjie. Total duration = 25 seconds.  Balloon reinflated a third time: Max pressure = 16 benjie. Total duration = 15 seconds.

## 2024-04-29 NOTE — ED PROVIDER NOTES
EMERGENCY DEPARTMENT ENCOUNTER      NAME: Zackary Hutchison  AGE: 63 year old male  YOB: 1960  MRN: [id number]  EVALUATION DATE & TIME: 4/29/2024 11:09 AM    PCP: Reid Escobar    ED PROVIDER: Zackary Darby M.D.      Chief Complaint   Patient presents with    Shortness of Breath    Leg Swelling         FINAL IMPRESSION:  1. Acute decompensated heart failure (H)          ED COURSE & MEDICAL DECISION MAKING:    Pertinent Labs & Imaging studies reviewed. (See chart for details)  63 year old male presents to the Emergency Department for evaluation of shortness of breath.  Patient is a morbidly obese male with a history of CHF.  Reports being seen by his cardiologist on Friday with recommendations for immediate hospitalization and diuresis.  Patient was unable to present until today.  His cardiologist Dr. Bhandari had called prior to his arrival recommending initiation of diuresis with intravenous Lasix x 80 mg.  Patient reports significant weight gain over the last several weeks and is noted to have swelling and sores developing on his legs.  He states the sores happen every time his legs get swollen.  Reports taking all of his medications as directed.  On exam he is a morbidly obese male in moderate distress.  Vital signs significant for slight heart rate elevation.  Borderline respiratory distress.  Patient with coarse breath sounds bilaterally.  No obvious murmurs on cardiac exam.  Abdomen obese.  Marked lower extremity edema with shallow ulcerations with oozing.  Baseline blood work being obtained to assess renal function and electrolytes.  Baseline troponin and BNP also obtained along with chest x-ray to assess for cardiac decompensation.  Records reviewed.  Most recent echo reveals an ejection fraction around 40%.  Plans will be for hospitalization given recommendations of cardiologist.  Patient appears non toxic with stable vitals signs.     11:14 AM I met with the patient for the initial interview and  physical examination. Discussed plan for treatment and workup in the ED.  12:31 PM Spoke to Dr. Luu, Hospitalist, regarding plan for admission.  Patient will be a full admit.  Vital signs significant for slight hypokalemia potassium 3.3.  Oral supplementation will be provided.  Patient with mild renal insufficiency creatinine 1.19.  This essentially unchanged from prior values.  Troponin slightly elevated at 38.  BNP normal at 160.  Magnesium slightly elevated 2.6.  Minimal anemia and hemoglobin 13.1.  Chest x-ray pending.  EKG is unremarkable.    At the conclusion of the encounter I discussed the results of all of the tests and the disposition. The questions were answered and return precautions provided. The patient or family acknowledged understanding and was agreeable with the care plan.       MEDICATIONS GIVEN IN THE EMERGENCY:  Medications   furosemide (LASIX) 100 mg in sodium chloride 0.9 % 100 mL infusion (has no administration in time range)   albumin human 25 % injection 50 g (has no administration in time range)   furosemide (LASIX) injection 80 mg (80 mg Intravenous $Given 4/29/24 1133)       NEW PRESCRIPTIONS STARTED AT TODAY'S ER VISIT  New Prescriptions    No medications on file          =================================================================    HPI    Patient information was obtained from: Patient    Use of Intrepreter: N/A        Zackary Hutchison is a 63 year old male with a pertient medical history of chronic venous stasis, HF, CAD (CABG 2v), ICM, A fib (on apixaban), CKD 3, tobacco use, who presents to the ED for evaluation of shortness of breath and leg swelling.    Per chart review, the patient was admitted to [facility] for CARNEY, wheezing, orthopnea, fluid gain, and weeping from legs along with 15-20 lb weight gain on 12/22-12/27/2023. Patient had an echo completed during admission which showed left ventricular chamber size and wall thickness were normal. Systolic function  was mildly reduced with global hypokinesis. The calculated left ventricular ejection fraction was 41% and right ventricular chamber size and systolic function was grossly normal. There was no hemodynamically significant valvular abnormalities although the cardiac valves that was visualized. Cardiology was consulted and recommended switching Bumex over to oral and starting beta-blocker therapy at low-dose. Recommended patient to switch over to oral diuretic to treat venous stasis dermatitis of bilateral lower extremities. Patient was advised to use nebulizers, lose weight, and to stop smoking. Patient was started on 3.125 mg of Coreg, 100 mg of Vibramycin, 5 mg of lisinopril, and 20 mg of prednisone upon discharge.    Patient reports he saw Dr. Bhandari, his HF and Transplant Cardiologist, on Friday (3 days ago) and was advised to come to the ER to get diuresed. Patient decided to come to the ER today and reports intermittent shortness of breath for a while that became more constant recently. Notes bilateral lower extremity edema with ulcerations that has been ongoing for 2-3 weeks. Denies hx of asthma. No recent cough or fevers. No other reported complaints or concerns at this time.      REVIEW OF SYSTEMS   Refer to HPI    PAST MEDICAL HISTORY:  Past Medical History:   Diagnosis Date    Atrial fibrillation (H)     Chronic kidney disease     Chronic kidney disease, stage 3b (H) 09/28/2023    Class 3 severe obesity due to excess calories with body mass index (BMI) of 40.0 to 44.9 in adult (H) 09/28/2023    Congestive heart failure (H)     COPD (chronic obstructive pulmonary disease) (H)     Coronary artery disease involving coronary bypass graft of native heart without angina pectoris 09/28/2023    Heart failure with reduced ejection fraction (H) 09/11/2023    HFrEF (heart failure with reduced ejection fraction) (H) 09/11/2023    Hyperlipidemia     Hypertension     Ischemic cardiomyopathy 09/28/2023    Obese      Paroxysmal atrial fibrillation (H) 09/28/2023    Tobacco abuse 09/28/2023       PAST SURGICAL HISTORY:  Past Surgical History:   Procedure Laterality Date    CORONARY ANGIOGRAPHY ADULT ORDER      CORONARY ARTERY BYPASS Left 2014    2 vessels    CV CARDIOMEMS WITH RIGHT HEART CATH N/A 2/15/2024    Procedure: Pulmonary Arterial Pressure Sensor Placement;  Surgeon: Jacey Bhandari MD;  Location: [facility] CATH LAB CV    CV CORONARY ANGIOGRAM N/A 09/11/2023    Procedure: Coronary Angiogram;  Surgeon: Santy Esposito MD;  Location: [facility] CATH LAB CV    CV LEFT HEART CATH N/A 09/11/2023    Procedure: Left Heart Catheterization;  Surgeon: Santy Esposito MD;  Location: [facility] CATH LAB CV    CV RIGHT HEART CATH MEASUREMENTS RECORDED N/A 09/11/2023    Procedure: Right Heart Catheterization;  Surgeon: Santy Esposito MD;  Location: [facility] CATH LAB CV         CURRENT MEDICATIONS:      Current Facility-Administered Medications:     albumin human 25 % injection 50 g, 50 g, Intravenous, Once, Jacey Bhandari MD    furosemide (LASIX) 100 mg in sodium chloride 0.9 % 100 mL infusion, 15 mg/hr, Intravenous, Continuous, Jacey Bhandari MD    Current Outpatient Medications:     acetaminophen (TYLENOL) 500 MG tablet, Take 1,000 mg by mouth every 8 hours as needed, Disp: , Rfl:     albuterol (PROAIR HFA/PROVENTIL HFA/VENTOLIN HFA) 108 (90 Base) MCG/ACT inhaler, Inhale 2 puffs into the lungs 4 times daily, Disp: 18 g, Rfl: 0    albuterol (PROVENTIL) (2.5 MG/3ML) 0.083% neb solution, Take 1 vial (2.5 mg) by nebulization every 6 hours as needed for shortness of breath, wheezing or cough, Disp: 90 mL, Rfl: 0    amiodarone (PACERONE) 200 MG tablet, Take 1 tablet (200 mg) by mouth daily, Disp: 90 tablet, Rfl: 0    apixaban ANTICOAGULANT (ELIQUIS) 5 MG tablet, Take 1 tablet (5 mg) by mouth 2 times daily, Disp: 180 tablet, Rfl: 3    aspirin 81 MG EC tablet, Take 1 tablet (81 mg) by mouth daily, Disp: 90 tablet, Rfl:  1    atorvastatin (LIPITOR) 40 MG tablet, Take 40 mg by mouth every morning, Disp: , Rfl:     bumetanide (BUMEX) 1 MG tablet, Take 5 tablets (5 mg) by mouth 2 times daily, Disp: 900 tablet, Rfl: 3    carvedilol (COREG) 3.125 MG tablet, Take 1 tablet (3.125 mg) by mouth 2 times daily (with meals), Disp: 180 tablet, Rfl: 1    CVS VITAMIN B12 1000 MCG tablet, Take 1,000 mcg by mouth daily, Disp: , Rfl:     DULoxetine (CYMBALTA) 30 MG capsule, Take 30 mg by mouth every morning, Disp: , Rfl:     gabapentin (NEURONTIN) 300 MG capsule, Take 3 capsules (900 mg) by mouth at bedtime, Disp: , Rfl:     hydrALAZINE (APRESOLINE) 10 MG tablet, Take 1 tablet (10 mg) by mouth 3 times daily, Disp: 270 tablet, Rfl: 3    ipratropium - albuterol 0.5 mg/2.5 mg/3 mL (DUONEB) 0.5-2.5 (3) MG/3ML neb solution, INHALE 3 ML EVERY 6 HOURS AS NEEDED FOR WHEEZING., Disp: , Rfl:     isosorbide mononitrate (IMDUR) 60 MG 24 hr tablet, Take 3 tablets (180 mg) by mouth every morning, Disp: 270 tablet, Rfl: 1    metolazone (ZAROXOLYN) 2.5 MG tablet, Take 1 tablet (2.5 mg) by mouth once a week on Thursdays, 30 minutes before your bumex. Labs weekly on Fridays., Disp: , Rfl:     nitroGLYcerin (NITROSTAT) 0.4 MG sublingual tablet, Place 1 tablet (0.4 mg) under the tongue every 5 minutes as needed If no relief after 3 tabs call 911;continue 1 tab every 5 min max 3 tab Indications: chest pain, Disp: 100 tablet, Rfl: 1    polyethylene glycol (MIRALAX) 17 GM/Dose powder, Take 17 g by mouth 2 times daily as needed, Disp: 510 g, Rfl: 0    rOPINIRole (REQUIP) 2 MG tablet, Take 1 tablet (2 mg) by mouth 3 times daily, Disp: 270 tablet, Rfl: 3    spironolactone (ALDACTONE) 50 MG tablet, Take 1 tablet (50 mg) by mouth daily, Disp: 90 tablet, Rfl: 3    ALLERGIES:  No Known Allergies    FAMILY HISTORY:  Family History   Problem Relation Age of Onset    Cerebrovascular Disease Mother     Myocardial Infarction Father        SOCIAL HISTORY:   Social History      Socioeconomic History    Marital status: Single     Spouse name: None    Number of children: None    Years of education: None    Highest education level: None   Occupational History    Occupation: Retired restaurant owner     Comment: [employer]   Tobacco Use    Smoking status: Every Day     Current packs/day: 0.50     Types: Cigarettes    Smokeless tobacco: Never    Tobacco comments:     Seen IP by [facility] on 9/11/23 and declined cessation services and materials   Substance and Sexual Activity    Alcohol use: Not Currently   Social History Narrative    Currently lives in an assisted living facility. (Updated: 01/08/2024)       VITALS:  Patient Vitals for the past 24 hrs:   BP Temp Temp src Pulse Resp SpO2 Weight   04/29/24 1215 136/58 -- -- 101 25 94 % --   04/29/24 1200 127/61 -- -- 91 21 95 % --   04/29/24 1145 (!) 142/72 -- -- 87 19 96 % --   04/29/24 1130 132/64 -- -- 88 15 96 % --   04/29/24 1115 (!) 164/76 -- -- 95 21 96 % --   04/29/24 1111 -- -- -- -- -- -- 125.3 kg (276 lb 3.2 oz)   04/29/24 1107 (!) 175/78 96.9  F (36.1  C) Tympanic 95 20 95 % --        PHYSICAL EXAM    Constitutional:  Awake, alert, in moderate distress. Morbidly obese.  HENT:  Normocephalic, Atraumatic. Bilateral external ears normal. Oropharynx moist. Nose normal. Neck- Normal range of motion with no guarding,Supple, No stridor.   Eyes:  PERRL, EOMI with no signs of entrapment, Conjunctiva normal, No discharge.   Respiratory:  Coarse breath sounds throughout, No respiratory distress, No wheezing.    Cardiovascular:  Normal heart rate, Normal rhythm, No appreciable rubs or gallops.   GI:  Soft, No tenderness, No distension, No palpable masses  Musculoskeletal: , Marked lower extremity edema weeping with  erythema and shallow ulcerations. Good range of motion in all major joints. No tenderness to palpation or major deformities noted.  Integument:  Warm, Dry, No erythema, No rash.   Neurologic:  Alert & oriented, Normal motor function,  Normal sensory function, No focal deficits noted.   Psychiatric:  Affect normal, Judgment normal, Mood normal.     LAB:  All pertinent labs reviewed and interpreted.  Results for orders placed or performed during the hospital encounter of 04/29/24   Comprehensive metabolic panel     Status: Abnormal   Result Value Ref Range    Sodium 135 135 - 145 mmol/L    Potassium 3.3 (L) 3.4 - 5.3 mmol/L    Carbon Dioxide (CO2) 34 (H) 22 - 29 mmol/L    Anion Gap 15 7 - 15 mmol/L    Urea Nitrogen 35.7 (H) 8.0 - 23.0 mg/dL    Creatinine 1.19 (H) 0.67 - 1.17 mg/dL    GFR Estimate 68 >60 mL/min/1.73m2    Calcium 10.2 8.8 - 10.2 mg/dL    Chloride 86 (L) 98 - 107 mmol/L    Glucose 223 (H) 70 - 99 mg/dL    Alkaline Phosphatase 92 40 - 150 U/L    AST 30 0 - 45 U/L    ALT 29 0 - 70 U/L    Protein Total 8.1 6.4 - 8.3 g/dL    Albumin 4.0 3.5 - 5.2 g/dL    Bilirubin Total 0.4 <=1.2 mg/dL   Troponin T, High Sensitivity     Status: Abnormal   Result Value Ref Range    Troponin T, High Sensitivity 38 (H) <=22 ng/L   Magnesium     Status: Abnormal   Result Value Ref Range    Magnesium 2.6 (H) 1.7 - 2.3 mg/dL   Nt probnp inpatient (BNP)     Status: Normal   Result Value Ref Range    N terminal Pro BNP Inpatient 160 0 - 900 pg/mL   CBC (+ platelets, no diff)     Status: Abnormal   Result Value Ref Range    WBC Count 9.2 4.0 - 11.0 10e3/uL    RBC Count 4.36 (L) 4.40 - 5.90 10e6/uL    Hemoglobin 13.1 (L) 13.3 - 17.7 g/dL    Hematocrit 41.0 40.0 - 53.0 %    MCV 94 78 - 100 fL    MCH 30.0 26.5 - 33.0 pg    MCHC 32.0 31.5 - 36.5 g/dL    RDW 21.1 (H) 10.0 - 15.0 %    Platelet Count 189 150 - 450 10e3/uL   Symptomatic Influenza A/B, RSV, & SARS-CoV2 PCR (COVID-19) Nasopharyngeal     Status: Normal    Specimen: Nasopharyngeal; Swab   Result Value Ref Range    Influenza A PCR Negative Negative    Influenza B PCR Negative Negative    RSV PCR Negative Negative    SARS CoV2 PCR Negative Negative    Narrative    Testing was performed using the Xpert Xpress  CoV2/Flu/RSV Assay on the [employer] GeneXpert Instrument. This test should be ordered for the detection of SARS-CoV-2, influenza, and RSV viruses in individuals who meet clinical and/or epidemiological criteria. Test performance is unknown in asymptomatic patients. This test is for in vitro diagnostic use under the FDA EUA for laboratories certified under CLIA to perform high or moderate complexity testing. This test has not been FDA cleared or approved. A negative result does not rule out the presence of PCR inhibitors in the specimen or target RNA in concentration below the limit of detection for the assay. If only one viral target is positive but coinfection with multiple targets is suspected, the sample should be re-tested with another FDA cleared, approved, or authorized test, if coinfection would change clinical management. This test was validated by the [facility]. These laboratories are certified under the Clinical Laboratory Improvement Amendments of 1988 (CLIA-88) as qualified to perform high complexity laboratory testing.        RADIOLOGY:  Reviewed all pertinent imaging. Please see official radiology report.  Pending    EKG:    Normal sinus rhythm with occasional PAC.  Right bundle branch block morphology.  No acute ST segment abnormalities.  Unchanged compared to April 2, 2024  I have independently reviewed and interpreted the EKG(s) documented above.      I, Blossom Levine, am serving as a scribe to document services personally performed by Zackary Darby MD, based on my observation and the provider's statements to me. I, Zackary Darby MD attest that Blossom Levine is acting in a scribe capacity, has observed my performance of the services and has documented them in accordance with my direction.    Zackary Darby M.D.  Emergency Medicine  [facility] EMERGENCY DEPARTMENT         Zackary Darby MD  04/29/24 2871

## 2024-04-29 NOTE — Clinical Note
Pt c/o pain across chest, audibhle wheezes noted and feeling more SoB. RT here for neb, nitg gtt staerted to offload, decrease bp , sys [id number], and help with cp

## 2024-04-29 NOTE — Clinical Note
Stent deployed in the ostium marginal circumflex. Max pressure = 14 benjie. Total duration = 20 seconds.

## 2024-04-29 NOTE — Clinical Note
The first balloon was inserted into the ramus and ramus.Max pressure = 8 benjie. Total duration = 18 seconds.     Max pressure = 8 benjie. Total duration = 15 seconds.    Balloon reinflated a second time: Max pressure = 8 benjie. Total duration = 15 seconds.

## 2024-04-29 NOTE — ED NOTES
Patient has questions about A1C results and orders for BG monitoring and insulin. He wants to hold on BG check and administration of insulin until his questions can be answered by Dr. Luu. Notified provider via text page.

## 2024-04-29 NOTE — Clinical Note
The first balloon was inserted into the ramus and ramus.Max pressure = 10 benjie. Total duration = 15 seconds.     Max pressure = 10 benjie. Total duration = 15 seconds.    Balloon reinflated a second time: Max pressure = 10 benjie. Total duration = 15 seconds.  Balloon reinflated a third time: Max pressure = 10 benjie. Total duration = 10 seconds.

## 2024-04-29 NOTE — Clinical Note
The first balloon was inserted into the circumflex and ostium marginal circumflex.Max pressure = 10 benjie. Total duration = 5 seconds.     Ruptured.

## 2024-04-29 NOTE — Clinical Note
The first balloon was inserted into the circumflex and ostium marginal circumflex.Max pressure = 12 benjie. Total duration = 21 seconds.     Max pressure = 12 ebnjie. Total duration = 12 seconds.    Balloon reinflated a second time: Max pressure = 12 benjie. Total duration = 12 seconds.  Balloon reinflated a third time: Max pressure = 12 benjie. Total duration = 8 seconds.  Balloon reinflated a fourth time: Max pressure = 12 benjie. Total duration = 5  seconds. Ruptured balloon

## 2024-04-29 NOTE — Clinical Note
The first balloon was inserted into the ramus and ramus.Max pressure = 12 benjie. Total duration = 14 seconds.     Max pressure = 16 benjie. Total duration = 18 seconds.    Balloon reinflated a second time: Max pressure = 16 benjie. Total duration = 18 seconds.  Balloon reinflated a third time: Max pressure = 18 benjie. Total duration = 30 seconds.  Balloon reinflated a fourth time: Max pressure = 14 benjie. Total duration = 10 seconds.

## 2024-04-29 NOTE — H&P
Admission History and Physical   Zackary Hutchison, 1960, [id number]  [facility]  Acute decompensated heart failure (H)  CHF (congestive heart failure) (H)  PCP:Reid Escobar, [phone]   Admitting provider: Angelina Luu MD.    Code status:  Full Code          Extended Emergency Contact Information  Primary Emergency Contact: APRIL HUTCHISON  Home Phone: [phone]  Relation: Brother  Secondary Emergency Contact: [facility]  Work Phone: [phone]  Relation: Other       Assessment and Plan  Principal Problem:    Acute on chronic systolic CHF  Active Problems:    Ischemic cardiomyopathy    Paroxysmal atrial fib -- on Eliquis    Chronic kidney disease, stage 3b (H)    COPD exacerbation (H)    Venous stasis dermatitis of both lower extremities    Restless legs syndrome (RLS)    Primary hypertension    Hypokalemia    Long term current use of anticoagulant therapy    Morbid obesity with body mass index (BMI) of 40.0 or higher (H)    RBBB (right bundle branch block)    Hyperglycemia    Open wound    Newly diagnosed diabetes (H)    Zackary Hutchison is a 63 year old male presenting with CHF exacerbation     Acute on chronic systolic CHF secondary to dietary noncompliance, H/O ICCM   - last echo EF was 41%  - recent cardioMEMs implant   -appreciate cardiology consult discussed with Dr. Bhandari   - furosemide infusion started with 80mg IV bolus  - cardiac telemetry   - trend renal function  -CHF protocol, daily weights I/Os  - potassium replacement protocol and monitor other lytes   - 2gm Na diet with fluid restriction   - metoprolol and hydralazine, Imdur, pRN ntg      PAF  - recently seen in Afib clinic   - continue DOAC  - continue metoprolol and amiodarone     Venous stasis with open wound on RLE  - WOC consulted  - does not appear infected at this time  -once WOC assess will ordered lymphedema     Hyperglycemia with A1c of 7.8   -novolog sliding scale TID with meals, medium  - will need  medication when ready for discharge     HTN  - continue above meds  - cardiology to determine further medication adjustment once diuresed     Mild COPD exacerbation but not requiring O2 and mor likely from fluid overload and should improve  - stressed smoking cessation   - nicotine patch ordered  - diuresing  - continue home nebs and MDI  - RCAT   - hold on steroids at this time unless starts having O2 needs or worsening breathing and with hyperglycemia and new dx of DM would avoid unless needed.      RLS  - continue home mirapex and gabapentin     Hypokalemia  - replacement protocol     CKD 3b  - trend renal function with diuresis     COVID-19 PCR Results          12/22/2023    18:03 1/17/2024    01:28 4/29/2024    11:42   COVID-19 PCR Results   SARS CoV2 PCR Negative  Negative  Negative      COVID-19 Antibody Results, Testing for Immunity           No data to display              VTE prophylaxis:  DOAC  DIET: Orders Placed This Encounter      Combination Diet 2 gm NA Diet; Low Saturated Fat Diet, No Caffeine Diet    Drains/Lines: none  Weight bearing status: WBAT  Disposition/Barriers to discharge: pending CHF compensation at least 2 midnights   Code Status:Full Code     Expected Discharge Date: 05/01/2024    Discharge Delays: Lab Result Pending (enter specific test & time in comments)  OT Disposition recs needed  OT New Consult needed  IV Medication - consider oral or Home Infusion  PT New Consult needed  PT Disposition recs needed  Specialist Consult (enter specialist & decision needed in comments)    Discharge Comments: Pending CHF compensation    HPI: Zackary Hutchison is a 63 year old male with h/o  chronic venous stasis, HF, CAD (CABG 2v), ICM, A fib (on apixaban), CKD 3, tobacco use, who presents to the ED for evaluation of shortness of breath and leg swelling. HE was seen by cardiology on Friday and recommended to come in for admission but declined despite cardiology concern for adverse cardiac events. Patient  continues to be noncompliant with dietary restrictions and fluids. He complains of chest tightness, fatigues, CARNEY, SOB, orthopnea, PND and increased leg swelling.      Per chart review, the patient was admitted to [facility] for CARNEY, wheezing, orthopnea, fluid gain, and weeping from legs along with 15-20 lb weight gain on 12/22-12/27/2023. Patient had an echo completed during admission which showed left ventricular chamber size and wall thickness were normal. Systolic function was mildly reduced with global hypokinesis. The calculated left ventricular ejection fraction was 41% and right ventricular chamber size and systolic function was grossly normal. There was no hemodynamically significant valvular abnormalities although the cardiac valves that was visualized. Cardiology was consulted and recommended switching Bumex over to oral and starting beta-blocker therapy at low-dose. Recommended patient to switch over to oral diuretic to treat venous stasis dermatitis of bilateral lower extremities. Patient was advised to use nebulizers, lose weight, and to stop smoking. Patient was started on 3.125 mg of Coreg, 100 mg of Vibramycin, 5 mg of lisinopril, and 20 mg of prednisone upon discharge.      Past Medical History:   Diagnosis Date    Atrial fibrillation (H)     Chronic kidney disease     Chronic kidney disease, stage 3b (H) 09/28/2023    Class 3 severe obesity due to excess calories with body mass index (BMI) of 40.0 to 44.9 in adult (H) 09/28/2023    Congestive heart failure (H)     COPD (chronic obstructive pulmonary disease) (H)     Coronary artery disease involving coronary bypass graft of native heart without angina pectoris 09/28/2023    Heart failure with reduced ejection fraction (H) 09/11/2023    HFrEF (heart failure with reduced ejection fraction) (H) 09/11/2023    Hyperlipidemia     Hypertension     Ischemic cardiomyopathy 09/28/2023    Obese     Paroxysmal atrial fibrillation (H) 09/28/2023     Tobacco abuse 09/28/2023     Past Surgical History:   Procedure Laterality Date    CORONARY ANGIOGRAPHY ADULT ORDER      CORONARY ARTERY BYPASS Left 2014    2 vessels    CV CARDIOMEMS WITH RIGHT HEART CATH N/A 2/15/2024    Procedure: Pulmonary Arterial Pressure Sensor Placement;  Surgeon: Jacey Bhandari MD;  Location: [facility] CATH LAB CV    CV CORONARY ANGIOGRAM N/A 09/11/2023    Procedure: Coronary Angiogram;  Surgeon: Santy Esposito MD;  Location: [facility] CATH LAB CV    CV LEFT HEART CATH N/A 09/11/2023    Procedure: Left Heart Catheterization;  Surgeon: Santy Esposito MD;  Location: [facility] CATH LAB CV    CV RIGHT HEART CATH MEASUREMENTS RECORDED N/A 09/11/2023    Procedure: Right Heart Catheterization;  Surgeon: Santy Esposito MD;  Location: [facility] CATH LAB CV     Family History   Problem Relation Age of Onset    Cerebrovascular Disease Mother     Myocardial Infarction Father      Social History     Socioeconomic History    Marital status: Single     Spouse name: Not on file    Number of children: Not on file    Years of education: Not on file    Highest education level: Not on file   Occupational History    Occupation: Retired restaurant owner     Comment: [employer]   Tobacco Use    Smoking status: Every Day     Current packs/day: 0.50     Types: Cigarettes    Smokeless tobacco: Never    Tobacco comments:     Seen IP by [facility] on 9/11/23 and declined cessation services and materials   Substance and Sexual Activity    Alcohol use: Not Currently    Drug use: Not on file    Sexual activity: Not on file   Other Topics Concern    Not on file   Social History Narrative    Currently lives in an assisted living facility. (Updated: 01/08/2024)     Social Determinants of Health     Financial Resource Strain: Not on file   Food Insecurity: Not on file   Transportation Needs: Not on file   Physical Activity: Not on file   Stress: Not on file   Social Connections: Not on file   Interpersonal Safety: Not on  file   Housing Stability: Not on file     No Known Allergies    PRIOR TO ADMISSION MEDICATIONS   Prior to Admission medications    Medication Sig Last Dose Taking? Auth Provider Long Term End Date   acetaminophen (TYLENOL) 500 MG tablet Take 1,000 mg by mouth every 8 hours as needed 4/26/2024 at prn Yes Reported, Patient     albuterol (PROAIR HFA/PROVENTIL HFA/VENTOLIN HFA) 108 (90 Base) MCG/ACT inhaler Inhale 2 puffs into the lungs 4 times daily Unknown at prn Yes Shaw Serrano, DO Yes    albuterol (PROVENTIL) (2.5 MG/3ML) 0.083% neb solution Take 1 vial (2.5 mg) by nebulization every 6 hours as needed for shortness of breath, wheezing or cough Unknown at prn Yes Shaw Serrano DO Yes    amiodarone (PACERONE) 200 MG tablet Take 1 tablet (200 mg) by mouth daily 4/29/2024 at am Yes Debbie Woodard APRN CNP Yes    apixaban ANTICOAGULANT (ELIQUIS) 5 MG tablet Take 1 tablet (5 mg) by mouth 2 times daily 4/29/2024 at am Yes Toshia Khanna, NP No    aspirin 81 MG EC tablet Take 1 tablet (81 mg) by mouth daily 4/29/2024 at am Yes Vasile Stallworth MD     atorvastatin (LIPITOR) 40 MG tablet Take 40 mg by mouth every morning 4/29/2024 at am Yes Reported, Patient Yes    bumetanide (BUMEX) 1 MG tablet Take 5 tablets (5 mg) by mouth 2 times daily 4/28/2024 at x2 Yes Debbie Woodard APRN CNP Yes    carvedilol (COREG) 3.125 MG tablet Take 1 tablet (3.125 mg) by mouth 2 times daily (with meals) 4/29/2024 at am Yes Debbie Woodard APRN CNP Yes    [employer] VITAMIN B12 1000 MCG tablet Take 1,000 mcg by mouth daily 4/29/2024 at am Yes Reported, Patient     DULoxetine (CYMBALTA) 30 MG capsule Take 30 mg by mouth every morning 4/29/2024 at am Yes Reported, Patient Yes    gabapentin (NEURONTIN) 300 MG capsule Take 3 capsules (900 mg) by mouth at bedtime 4/28/2024 at qhs Yes Vasile Stallworth MD Yes    hydrALAZINE (APRESOLINE) 10 MG tablet Take 1 tablet (10 mg) by mouth 3 times  daily 4/29/2024 at am x1 Yes Vasile Stallworth MD Yes    ipratropium - albuterol 0.5 mg/2.5 mg/3 mL (DUONEB) 0.5-2.5 (3) MG/3ML neb solution INHALE 3 ML EVERY 6 HOURS AS NEEDED FOR WHEEZING. 4/29/2024 at am Yes Reported, Patient No    isosorbide mononitrate (IMDUR) 60 MG 24 hr tablet Take 3 tablets (180 mg) by mouth every morning 4/29/2024 at am Yes Debbie Woodard, APRDOMINIQUE CNP Yes    metolazone (ZAROXOLYN) 2.5 MG tablet Take 1 tablet (2.5 mg) by mouth once a week on Thursdays, 30 minutes before your bumex. Labs weekly on Fridays. 4/25/2024 Yes Jacey Bhandari MD Yes    nitroGLYcerin (NITROSTAT) 0.4 MG sublingual tablet Place 1 tablet (0.4 mg) under the tongue every 5 minutes as needed If no relief after 3 tabs call 911;continue 1 tab every 5 min max 3 tab Indications: chest pain Unknown at prn Yes Debbie Woodard, APRDOMINIQUE CNP Yes    polyethylene glycol (MIRALAX) 17 GM/Dose powder Take 17 g by mouth 2 times daily as needed Unknown at prn Yes Vasile Stallworth MD     rOPINIRole (REQUIP) 2 MG tablet Take 1 tablet (2 mg) by mouth 3 times daily 4/29/2024 at am Yes Vasile Stallworth MD Yes    spironolactone (ALDACTONE) 50 MG tablet Take 1 tablet (50 mg) by mouth daily 4/29/2024 at am Yes Vasile Stallworth MD Yes         REVIEW OF SYSTEMS:  12 point reviewed pertinent negatives and positives in HPI all others negative     PHYSICAL EXAM  Temp:  [96.9  F (36.1  C)] 96.9  F (36.1  C)  Pulse:  [] 90  Resp:  [15-26] 25  BP: (113-175)/(55-78) 138/62  SpO2:  [92 %-96 %] 93 %  Wt Readings from Last 1 Encounters:   04/29/24 125.3 kg (276 lb 3.2 oz)     Body mass index is 44.58 kg/m .  Physical Exam  Vitals and nursing note reviewed.   Constitutional:       General: He is not in acute distress.     Appearance: He is obese. He is ill-appearing.   Neck:      Comments: Large thick neck difficult to assess JVD but elevated to jaw line  Cardiovascular:      Rate and Rhythm: Normal rate and regular  rhythm.      Pulses: Normal pulses.   Pulmonary:      Comments: Tachypnea, insp and exp wheezing, harsh cough  Abdominal:      General: Bowel sounds are normal.      Palpations: Abdomen is soft.      Comments: Protuberant abd, NT   Musculoskeletal:      Comments: 3+BLE edema up thighs.    Skin:     General: Skin is warm.      Capillary Refill: Capillary refill takes less than 2 seconds.      Comments: Venous stasis changes in BLE, large open wound right anterior shin with serosanguinous drainage, and small shallow ones draining as well    Neurological:      General: No focal deficit present.      Mental Status: He is alert and oriented to person, place, and time. Mental status is at baseline.         PERTINENT LABS and RADIOLOGY   Results for orders placed or performed during the hospital encounter of 04/29/24   XR Chest Port 1 View    Impression    IMPRESSION: No focal consolidation or edema. No pleural effusion or pneumothorax. Stable heart size and mediastinal contours postoperative findings from CABG.       Imaging results reviewed over the past 24 hrs:   Recent Results (from the past 24 hour(s))   XR Chest Port 1 View    Narrative    EXAM: XR CHEST PORT 1 VIEW  LOCATION: [facility]  DATE: 4/29/2024    INDICATION: dyspnea  COMPARISON: 01/08/2024      Impression    IMPRESSION: No focal consolidation or edema. No pleural effusion or pneumothorax. Stable heart size and mediastinal contours postoperative findings from CABG.     Most Recent 3 CBC's:  Recent Labs   Lab Test 04/29/24  1126 02/14/24  1520 01/09/24  0709   WBC 9.2 6.6 8.2   HGB 13.1* 11.5* 12.7*   MCV 94 93 93    168 154     Most Recent 3 BMP's:  Recent Labs   Lab Test 04/29/24  1126 04/26/24  1209 04/19/24  1545    134* 131*   POTASSIUM 3.3* 2.8* 3.3*   CHLORIDE 86* 86* 86*   CO2 34* 33* 30*   BUN 35.7* 36.5* 43.8*   CR 1.19* 1.18* 1.39*   ANIONGAP 15 15 15   RUSLAN 10.2 9.7 9.5   * 164* 376*     Most Recent 2  LFT's:  Recent Labs   Lab Test 04/29/24  1126 12/22/23  1643   AST 30 17   ALT 29 9   ALKPHOS 92 83   BILITOTAL 0.4 0.3     Most Recent 3 INR's:  Recent Labs   Lab Test 02/13/23  2307   INR 1.01     Most Recent 3 Troponin's:No lab results found.  Most Recent 3 BNP's:  Recent Labs   Lab Test 04/29/24  1126 01/08/24  1606 12/22/23  1643   NTBNPI 160 289 407     Most Recent D-dimer:No lab results found.  Most Recent 6 Bacteria Isolates From Any Culture (See EPIC Reports for Culture Details):No lab results found.  Most Recent TSH and T4:  Recent Labs   Lab Test 04/29/24  1126   TSH 1.70     Most Recent Hemoglobin A1c:  Recent Labs   Lab Test 04/29/24  1126   A1C 7.8*     Most Recent 6 glucoses:  Recent Labs   Lab Test 04/29/24  1126 04/26/24  1209 04/19/24  1545 04/13/24  1000 04/12/24  1738 04/05/24  1622   * 164* 376* 311* 231* 203*     Most Recent Urinalysis:  Recent Labs   Lab Test 12/14/23  0851   COLOR Light Yellow   APPEARANCE Clear   URINEGLC Negative   URINEBILI Negative   URINEKETONE Negative   SG 1.016   UBLD Negative   URINEPH 6.0   PROTEIN Negative   NITRITE Negative   LEUKEST Negative   RBCU 1   WBCU 2     EKG:sinus rhythm RBBB with PACs      Angelina Luu MD  [facility]  [phone]

## 2024-04-29 NOTE — Clinical Note
The first balloon was inserted into the circumflex and ostium marginal circumflex.Max pressure = 10 benjie. Total duration = 10 seconds.

## 2024-04-29 NOTE — Clinical Note
The first balloon was inserted into the circumflex and ostium marginal circumflex.Max pressure = 8 benjie. Total duration = 10 seconds.     Max pressure = 8 benjie. Total duration = 10 seconds.    Balloon reinflated a second time: Max pressure = 8 benjie. Total duration = 10 seconds.  Balloon reinflated a third time: Max pressure = 8 benjie. Total duration = 10 seconds.  Balloon reinflated a fourth time: Max pressure = 8 benjie. Total duration = 8 sec onds.

## 2024-04-30 ENCOUNTER — TELEPHONE (OUTPATIENT)
Dept: CARDIOLOGY | Facility: CLINIC | Age: 64
End: 2024-04-30
Payer: MEDICARE

## 2024-04-30 DIAGNOSIS — I50.9 ACUTE DECOMPENSATED HEART FAILURE (H): Primary | ICD-10-CM

## 2024-04-30 LAB
ALBUMIN SERPL BCG-MCNC: 4.6 G/DL (ref 3.5–5.2)
ANION GAP SERPL CALCULATED.3IONS-SCNC: 18 MMOL/L (ref 7–15)
BUN SERPL-MCNC: 33.4 MG/DL (ref 8–23)
CALCIUM SERPL-MCNC: 9.7 MG/DL (ref 8.8–10.2)
CHLORIDE SERPL-SCNC: 90 MMOL/L (ref 98–107)
CREAT SERPL-MCNC: 1.24 MG/DL (ref 0.67–1.17)
DEPRECATED HCO3 PLAS-SCNC: 31 MMOL/L (ref 22–29)
EGFRCR SERPLBLD CKD-EPI 2021: 65 ML/MIN/1.73M2
ERYTHROCYTE [DISTWIDTH] IN BLOOD BY AUTOMATED COUNT: 20.7 % (ref 10–15)
GLUCOSE SERPL-MCNC: 143 MG/DL (ref 70–99)
HCT VFR BLD AUTO: 37.2 % (ref 40–53)
HGB BLD-MCNC: 11.3 G/DL (ref 13.3–17.7)
MAGNESIUM SERPL-MCNC: 2.3 MG/DL (ref 1.7–2.3)
MCH RBC QN AUTO: 28.8 PG (ref 26.5–33)
MCHC RBC AUTO-ENTMCNC: 30.4 G/DL (ref 31.5–36.5)
MCV RBC AUTO: 95 FL (ref 78–100)
PHOSPHATE SERPL-MCNC: 3.3 MG/DL (ref 2.5–4.5)
PLATELET # BLD AUTO: 166 10E3/UL (ref 150–450)
POTASSIUM SERPL-SCNC: 3.2 MMOL/L (ref 3.4–5.3)
POTASSIUM SERPL-SCNC: 3.2 MMOL/L (ref 3.4–5.3)
POTASSIUM SERPL-SCNC: 3.5 MMOL/L (ref 3.4–5.3)
RBC # BLD AUTO: 3.93 10E6/UL (ref 4.4–5.9)
SODIUM SERPL-SCNC: 139 MMOL/L (ref 135–145)
WBC # BLD AUTO: 8.4 10E3/UL (ref 4–11)

## 2024-04-30 PROCEDURE — 250N000011 HC RX IP 250 OP 636: Performed by: INTERNAL MEDICINE

## 2024-04-30 PROCEDURE — 84132 ASSAY OF SERUM POTASSIUM: CPT | Performed by: EMERGENCY MEDICINE

## 2024-04-30 PROCEDURE — 36415 COLL VENOUS BLD VENIPUNCTURE: CPT | Performed by: INTERNAL MEDICINE

## 2024-04-30 PROCEDURE — 99233 SBSQ HOSP IP/OBS HIGH 50: CPT | Performed by: INTERNAL MEDICINE

## 2024-04-30 PROCEDURE — 84132 ASSAY OF SERUM POTASSIUM: CPT | Performed by: INTERNAL MEDICINE

## 2024-04-30 PROCEDURE — 94640 AIRWAY INHALATION TREATMENT: CPT

## 2024-04-30 PROCEDURE — 250N000013 HC RX MED GY IP 250 OP 250 PS 637: Performed by: EMERGENCY MEDICINE

## 2024-04-30 PROCEDURE — 250N000013 HC RX MED GY IP 250 OP 250 PS 637: Performed by: INTERNAL MEDICINE

## 2024-04-30 PROCEDURE — 85018 HEMOGLOBIN: CPT | Performed by: INTERNAL MEDICINE

## 2024-04-30 PROCEDURE — 999N000156 HC STATISTIC RCP CONSULT EA 30 MIN

## 2024-04-30 PROCEDURE — 250N000009 HC RX 250: Performed by: INTERNAL MEDICINE

## 2024-04-30 PROCEDURE — 120N000004 HC R&B MS OVERFLOW

## 2024-04-30 PROCEDURE — G0463 HOSPITAL OUTPT CLINIC VISIT: HCPCS

## 2024-04-30 PROCEDURE — 83735 ASSAY OF MAGNESIUM: CPT | Performed by: INTERNAL MEDICINE

## 2024-04-30 PROCEDURE — 36415 COLL VENOUS BLD VENIPUNCTURE: CPT | Performed by: EMERGENCY MEDICINE

## 2024-04-30 PROCEDURE — 258N000003 HC RX IP 258 OP 636: Performed by: INTERNAL MEDICINE

## 2024-04-30 PROCEDURE — 94640 AIRWAY INHALATION TREATMENT: CPT | Mod: 76

## 2024-04-30 PROCEDURE — 999N000157 HC STATISTIC RCP TIME EA 10 MIN

## 2024-04-30 PROCEDURE — P9047 ALBUMIN (HUMAN), 25%, 50ML: HCPCS | Performed by: INTERNAL MEDICINE

## 2024-04-30 PROCEDURE — 80069 RENAL FUNCTION PANEL: CPT | Performed by: INTERNAL MEDICINE

## 2024-04-30 RX ORDER — POTASSIUM CHLORIDE 1500 MG/1
40 TABLET, EXTENDED RELEASE ORAL ONCE
Status: COMPLETED | OUTPATIENT
Start: 2024-04-30 | End: 2024-04-30

## 2024-04-30 RX ORDER — IPRATROPIUM BROMIDE AND ALBUTEROL SULFATE 2.5; .5 MG/3ML; MG/3ML
3 SOLUTION RESPIRATORY (INHALATION)
Status: DISCONTINUED | OUTPATIENT
Start: 2024-05-01 | End: 2024-05-05

## 2024-04-30 RX ORDER — CHLOROTHIAZIDE SODIUM 500 MG/1
500 INJECTION INTRAVENOUS ONCE
Qty: 20 ML | Refills: 0 | Status: COMPLETED | OUTPATIENT
Start: 2024-04-30 | End: 2024-04-30

## 2024-04-30 RX ORDER — ALBUTEROL SULFATE 90 UG/1
2 AEROSOL, METERED RESPIRATORY (INHALATION) EVERY 4 HOURS PRN
Status: DISCONTINUED | OUTPATIENT
Start: 2024-04-30 | End: 2024-05-05

## 2024-04-30 RX ORDER — POTASSIUM CHLORIDE 1500 MG/1
20 TABLET, EXTENDED RELEASE ORAL ONCE
Status: COMPLETED | OUTPATIENT
Start: 2024-04-30 | End: 2024-04-30

## 2024-04-30 RX ADMIN — ATORVASTATIN CALCIUM 40 MG: 40 TABLET, FILM COATED ORAL at 08:33

## 2024-04-30 RX ADMIN — AMIODARONE HYDROCHLORIDE 200 MG: 200 TABLET ORAL at 08:32

## 2024-04-30 RX ADMIN — ASPIRIN 81 MG: 81 TABLET, COATED ORAL at 08:33

## 2024-04-30 RX ADMIN — ALBUMIN HUMAN 50 G: 0.25 SOLUTION INTRAVENOUS at 21:36

## 2024-04-30 RX ADMIN — ALBUTEROL SULFATE 2.5 MG: 2.5 SOLUTION RESPIRATORY (INHALATION) at 12:15

## 2024-04-30 RX ADMIN — ALBUTEROL SULFATE 2 PUFF: 90 INHALANT RESPIRATORY (INHALATION) at 08:34

## 2024-04-30 RX ADMIN — APIXABAN 5 MG: 5 TABLET, FILM COATED ORAL at 08:32

## 2024-04-30 RX ADMIN — CHLOROTHIAZIDE SODIUM 500 MG: 500 INJECTION, POWDER, LYOPHILIZED, FOR SOLUTION INTRAVENOUS at 06:25

## 2024-04-30 RX ADMIN — GABAPENTIN 900 MG: 300 CAPSULE ORAL at 22:05

## 2024-04-30 RX ADMIN — ALBUMIN HUMAN 50 G: 0.25 SOLUTION INTRAVENOUS at 08:36

## 2024-04-30 RX ADMIN — POTASSIUM CHLORIDE 20 MEQ: 1500 TABLET, EXTENDED RELEASE ORAL at 18:43

## 2024-04-30 RX ADMIN — ROPINIROLE HYDROCHLORIDE 2 MG: 1 TABLET, FILM COATED ORAL at 22:05

## 2024-04-30 RX ADMIN — ROPINIROLE HYDROCHLORIDE 2 MG: 1 TABLET, FILM COATED ORAL at 08:33

## 2024-04-30 RX ADMIN — CARVEDILOL 3.12 MG: 3.12 TABLET, FILM COATED ORAL at 18:42

## 2024-04-30 RX ADMIN — ROPINIROLE HYDROCHLORIDE 2 MG: 1 TABLET, FILM COATED ORAL at 13:03

## 2024-04-30 RX ADMIN — ALBUTEROL SULFATE 2.5 MG: 2.5 SOLUTION RESPIRATORY (INHALATION) at 18:03

## 2024-04-30 RX ADMIN — FUROSEMIDE 15 MG/HR: 10 INJECTION, SOLUTION INTRAVENOUS at 18:45

## 2024-04-30 RX ADMIN — HYDRALAZINE HYDROCHLORIDE 10 MG: 10 TABLET, FILM COATED ORAL at 08:32

## 2024-04-30 RX ADMIN — FUROSEMIDE 15 MG/HR: 10 INJECTION, SOLUTION INTRAVENOUS at 02:55

## 2024-04-30 RX ADMIN — POTASSIUM CHLORIDE 40 MEQ: 1500 TABLET, EXTENDED RELEASE ORAL at 06:25

## 2024-04-30 RX ADMIN — HYDRALAZINE HYDROCHLORIDE 10 MG: 10 TABLET, FILM COATED ORAL at 13:04

## 2024-04-30 RX ADMIN — DULOXETINE HYDROCHLORIDE 30 MG: 30 CAPSULE, DELAYED RELEASE ORAL at 08:32

## 2024-04-30 RX ADMIN — POTASSIUM CHLORIDE 40 MEQ: 1500 TABLET, EXTENDED RELEASE ORAL at 13:03

## 2024-04-30 RX ADMIN — ISOSORBIDE MONONITRATE 180 MG: 60 TABLET, EXTENDED RELEASE ORAL at 08:32

## 2024-04-30 RX ADMIN — SPIRONOLACTONE 50 MG: 25 TABLET, FILM COATED ORAL at 08:32

## 2024-04-30 RX ADMIN — HYDRALAZINE HYDROCHLORIDE 10 MG: 10 TABLET, FILM COATED ORAL at 21:36

## 2024-04-30 RX ADMIN — CARVEDILOL 3.12 MG: 3.12 TABLET, FILM COATED ORAL at 08:32

## 2024-04-30 RX ADMIN — APIXABAN 5 MG: 5 TABLET, FILM COATED ORAL at 21:36

## 2024-04-30 RX ADMIN — FUROSEMIDE 15 MG/HR: 10 INJECTION, SOLUTION INTRAVENOUS at 10:35

## 2024-04-30 ASSESSMENT — ACTIVITIES OF DAILY LIVING (ADL)
ADLS_ACUITY_SCORE: 26
ADLS_ACUITY_SCORE: 27
ADLS_ACUITY_SCORE: 27
ADLS_ACUITY_SCORE: 26
ADLS_ACUITY_SCORE: 27
ADLS_ACUITY_SCORE: 26
ADLS_ACUITY_SCORE: 27
ADLS_ACUITY_SCORE: 26
DEPENDENT_IADLS:: CLEANING;COOKING;MEAL PREPARATION;TRANSPORTATION
ADLS_ACUITY_SCORE: 27
ADLS_ACUITY_SCORE: 26
ADLS_ACUITY_SCORE: 26
ADLS_ACUITY_SCORE: 27
ADLS_ACUITY_SCORE: 24
ADLS_ACUITY_SCORE: 26
ADLS_ACUITY_SCORE: 27
ADLS_ACUITY_SCORE: 26

## 2024-04-30 NOTE — CONSULTS
[facility] Nurse Inpatient Assessment     Consulted for: RLE    Summary:     Patient History (according to provider note(s):      HPI: Zackary Hutchison is a 63 year old male with h/o  chronic venous stasis, HF, CAD (CABG 2v), ICM, A fib (on apixaban), CKD 3, tobacco use, who presents to the ED for evaluation of shortness of breath and leg swelling. HE was seen by cardiology on Friday and recommended to come in for admission but declined despite cardiology concern for adverse cardiac events. Patient continues to be noncompliant with dietary restrictions and fluids. He complains of chest tightness, fatigues, CARNEY, SOB, orthopnea, PND and increased leg swelling.     Assessment:      Wound location: R lateral ankle/shin    4/30    Last photo: 4/30  Wound due to: Venous Ulcer  Wound history/plan of care: patient has had the wound for months per his report, PCP   Wound base: 80 % granulation tissue, 20 % fibrin     Palpation of the wound bed: normal      Drainage: small     Description of drainage: serosanguinous     Measurements (length x width x depth, in cm): 4  x 5  x  0.2 cm      Tunneling: N/A     Undermining: N/A  Periwound skin: Hemosiderin staining and Macerated      Color: normal and consistent with surrounding tissue      Temperature: warm  Odor: mild  Pain: mild and during dressing change, aching  Pain interventions prior to dressing change: slow and gentle cares   Treatment goal: Heal , Infection control/prevention, Increase granulation, Protection, Promote epidermal migration, Remove necrotic tissue, and Soften necrotic tissue  STATUS: initial assessment  Supplies ordered: ordered medihoney       Treatment Plan:     RLE  Soak wound with vashe moistened gauze for 5 minutes, pat dry  Apply nickel thick layer of medihoney gel to wound  Cover with mepilex  Change dressing daily+ PRN if soiled, saturated, falls off (can switch to every other day at home)      Orders: Written    RECOMMEND  PRIMARY TEAM ORDER: None, at this time  Education provided: plan of care and Infection prevention   Discussed plan of care with: Patient  WO nurse follow-up plan: weekly  Notify WOC if wound(s) deteriorate.  Nursing to notify the Provider(s) and re-consult the WOC Nurse if new skin concern.    DATA:     Current support surface: Standard  Standard gel/foam mattress (IsoFlex, Atmos air, etc)  Containment of urine/stool: Continent of bladder and Continent of bowel  BMI: Body mass index is 44.77 kg/m .   Active diet order: Orders Placed This Encounter      Combination Diet 2 gm NA Diet; Low Saturated Fat Diet, No Caffeine Diet     Output: I/O last 3 completed shifts:  In: 1338 [P.O.:1090; I.V.:248]  Out: 2520 [Urine:2520]     Labs:   Recent Labs   Lab 04/30/24  0458 04/29/24  1126   ALBUMIN 4.6 4.0   HGB 11.3* 13.1*   WBC 8.4 9.2   A1C  --  7.8*     Pressure injury risk assessment:   Sensory Perception: 3-->slightly limited  Moisture: 4-->rarely moist  Activity: 3-->walks occasionally  Mobility: 3-->slightly limited  Nutrition: 3-->adequate  Friction and Shear: 3-->no apparent problem  Simeon Score: 19    PAOLO Roche RN CWOCN  Pager no longer in use, please contact through [employer] group: [facility] [employer]

## 2024-04-30 NOTE — PROGRESS NOTES
HEART CARE NOTE          Assessment/Recommendations   1. Severe Biventricular dysfunction/HFrEF  Assessment / Plan  Hypervolemic on physical exam - diuresing well on current regimen - no changes at this time; continue to monitor UOP and renal function closely  Patient is high risk for adverse cardiac events 2/2 severe biventricular dysfunction, multiple HFHs, non-compliance with dietary restrictions, renal dysfunction, CAD  GDMT as detailed below; repeat echo ordered     Current Pharmacotherapy AHA Guideline-Directed Medical Therapy   Lisinopril 5 mg daily  - held Lisinopril 20 mg twice daily   Carvedilol 3.125 mg BID  Carvedilol 25 mg twice daily   Spironolactone 50 mg daily Spironolactone 25 mg once daily   Hydralazine NA Hydralazine 100 mg three times daily   Isosorbide mononitrate 180 mg daily Isosorbide dinitrate 40 mg three times daily   SGLT2 inhibitor:Dapagliflozin/Empagliflozin - not started Dapagliflozin or Empagliflozin 10 mg daily      2. Atrial fibrillation  Assessment / Plan  Currently on apixaban and amiodarone     3. CAD c/b ICM  Assessment / Plan  S/p CABG 2015; now s/p repeat coronary angiogram - no intervention indicated  Denies chest pain or anginal equivalents; Continue atorvastatin, carvedilol     4. PRINCESS on CKD  Assessment / Plan  Like CRS; diuresis as above; continue to monitor UOP and renal function closely    Plan of care discussed on April 30, 2024 with patient at bedside, and primary team overseeing patient's care      History of Present Illness/Subjective    Mr. Zackary Hutchison is a 62 year old male with a PMHx significant for CAD s/p CABG, afib, HFrEF and CKD who presents to in [facility]      Today, Mr. Hutchison denies acute cardiac events or complaints; Management plan as detailed above     ECG: Personally reviewed. unchanged from previous tracings, normal sinus rhythm, nonspecific ST and T waves changes, PAC's noted.     ECHO (personnaly Reviewed on 4/29/24): repeat study pending  1.  "Technically very difficult study.  2. Left ventricular chamber size and wall thickness are normal. Systolic  function is mildly reduced with global hypokinesis. The calculated left  ventricular ejection fraction is 41%.  3. Right ventricle is not well visualized. Right ventricular chamber size and  systolic function are grossly normal.  4. No hemodynamically significant valvular abnormalities although the cardiac  valves are not well visualized.  5. Compared to the prior study dated 9/9/2023, there has been no significant  change    Telemetry: personally reviewed April 30, 2024; notable for sinus rhythm     Lab results: personally reviewed April 30, 2024; notable for PRINCESS on CKD    Medical history and pertinent documents reviewed in Care Everywhere please where applicable see details above        Physical Examination Review of Systems   /59 (BP Location: Left arm)   Pulse 82   Temp 98  F (36.7  C) (Oral)   Resp 25   Ht 1.676 m (5' 6\")   Wt 125.8 kg (277 lb 6.4 oz)   SpO2 93%   BMI 44.77 kg/m    Body mass index is 44.77 kg/m .  Wt Readings from Last 3 Encounters:   04/30/24 125.8 kg (277 lb 6.4 oz)   04/25/24 126.1 kg (278 lb)   04/25/24 126.1 kg (278 lb)     General Appearance:   no distress, normal body habitus   ENT/Mouth: membranes moist, no oral lesions or bleeding gums.      EYES:  no scleral icterus, normal conjunctivae   Neck: no carotid bruits or thyromegaly   Chest/Lungs:   lungs are clear to auscultation, no rales or wheezing, equal chest wall expansion    Cardiovascular:   Regular. Normal first and second heart sounds with no murmurs, rubs, or gallops; the carotid, radial and posterior tibial pulses are intact, + JVD and LE edema bilaterally    Abdomen:  no organomegaly, masses, bruits, or tenderness; bowel sounds are present   Extremities: no cyanosis or clubbing   Skin: no xanthelasma, warm.    Neurologic: NAD     Psychiatric: alert and oriented x3, calm     A complete 10 systems ROS was " reviewed  And is negative except what is listed in the HPI.          Medical History  Surgical History Family History Social History   Past Medical History:   Diagnosis Date    Atrial fibrillation (H)     Chronic kidney disease     Chronic kidney disease, stage 3b (H) 09/28/2023    Class 3 severe obesity due to excess calories with body mass index (BMI) of 40.0 to 44.9 in adult (H) 09/28/2023    Congestive heart failure (H)     COPD (chronic obstructive pulmonary disease) (H)     Coronary artery disease involving coronary bypass graft of native heart without angina pectoris 09/28/2023    Heart failure with reduced ejection fraction (H) 09/11/2023    HFrEF (heart failure with reduced ejection fraction) (H) 09/11/2023    Hyperlipidemia     Hypertension     Ischemic cardiomyopathy 09/28/2023    Obese     Paroxysmal atrial fibrillation (H) 09/28/2023    Tobacco abuse 09/28/2023    Past Surgical History:   Procedure Laterality Date    CORONARY ANGIOGRAPHY ADULT ORDER      CORONARY ARTERY BYPASS Left 2014    2 vessels    CV CARDIOMEMS WITH RIGHT HEART CATH N/A 2/15/2024    Procedure: Pulmonary Arterial Pressure Sensor Placement;  Surgeon: Jacey Bhandari MD;  Location: [facility] CATH LAB CV    CV CORONARY ANGIOGRAM N/A 09/11/2023    Procedure: Coronary Angiogram;  Surgeon: Santy Esposito MD;  Location: [facility] CATH LAB CV    CV LEFT HEART CATH N/A 09/11/2023    Procedure: Left Heart Catheterization;  Surgeon: Santy Esposito MD;  Location: [facility] CATH LAB CV    CV RIGHT HEART CATH MEASUREMENTS RECORDED N/A 09/11/2023    Procedure: Right Heart Catheterization;  Surgeon: Santy Esposito MD;  Location: [facility] CATH LAB CV    no family history of premature coronary artery disease Social History     Socioeconomic History    Marital status: Single     Spouse name: Not on file    Number of children: Not on file    Years of education: Not on file    Highest education level: Not on file   Occupational History     "Occupation: Retired restaurant owner     Comment: [employer]   Tobacco Use    Smoking status: Every Day     Current packs/day: 0.50     Types: Cigarettes    Smokeless tobacco: Never    Tobacco comments:     Seen IP by [facility] on 9/11/23 and declined cessation services and materials   Substance and Sexual Activity    Alcohol use: Not Currently    Drug use: Not on file    Sexual activity: Not on file   Other Topics Concern    Not on file   Social History Narrative    Currently lives in an assisted living facility. (Updated: 01/08/2024)     Social Determinants of Health     Financial Resource Strain: Not on file   Food Insecurity: Not on file   Transportation Needs: Not on file   Physical Activity: Not on file   Stress: Not on file   Social Connections: Not on file   Interpersonal Safety: Not on file   Housing Stability: Not on file           Lab Results    Chemistry/lipid CBC Cardiac Enzymes/BNP/TSH/INR   Lab Results   Component Value Date    BUN 35.7 (H) 04/29/2024     04/29/2024    CO2 34 (H) 04/29/2024    Lab Results   Component Value Date    WBC 8.4 04/30/2024    HGB 11.3 (L) 04/30/2024    HCT 37.2 (L) 04/30/2024    MCV 95 04/30/2024     04/30/2024    Lab Results   Component Value Date    TSH 1.70 04/29/2024    INR 1.01 02/13/2023     No results found for: \"CKTOTAL\", \"CKMB\", \"TROPONINI\"       Weight:    Wt Readings from Last 3 Encounters:   04/30/24 125.8 kg (277 lb 6.4 oz)   04/25/24 126.1 kg (278 lb)   04/25/24 126.1 kg (278 lb)       Allergies  No Known Allergies      Surgical History  Past Surgical History:   Procedure Laterality Date    CORONARY ANGIOGRAPHY ADULT ORDER      CORONARY ARTERY BYPASS Left 2014    2 vessels    CV CARDIOMEMS WITH RIGHT HEART CATH N/A 2/15/2024    Procedure: Pulmonary Arterial Pressure Sensor Placement;  Surgeon: Jacey Bhandari MD;  Location: [facility] CATH LAB CV    CV CORONARY ANGIOGRAM N/A 09/11/2023    Procedure: Coronary Angiogram;  Surgeon: Santy Esposito MD;  " Location: [facility] CATH LAB CV    CV LEFT HEART CATH N/A 09/11/2023    Procedure: Left Heart Catheterization;  Surgeon: Santy Esposito MD;  Location: [facility] CATH LAB CV    CV RIGHT HEART CATH MEASUREMENTS RECORDED N/A 09/11/2023    Procedure: Right Heart Catheterization;  Surgeon: Santy Esposito MD;  Location: [facility] CATH LAB CV       Social History  Tobacco:   History   Smoking Status    Every Day    Types: Cigarettes   Smokeless Tobacco    Never    Alcohol:   Social History    Substance and Sexual Activity      Alcohol use: Not Currently   Illicit Drugs:   History   Drug Use Not on file       Family History  Family History   Problem Relation Age of Onset    Cerebrovascular Disease Mother     Myocardial Infarction Father           Jacey Bhandari MD on 4/30/2024      cc: Reid Escobar

## 2024-04-30 NOTE — PROGRESS NOTES
[facility]    Medicine Progress Note - Hospitalist Service    Date of Admission:  4/29/2024    Assessment & Plan   Zackary Hutchison is a 63-year-old man with history of paroxysmal atrial fibrillation on Eliquis, stage III CKD, CAD s/p CABG, ischemic cardiomyopathy s/p cardioMEMS implantation in February 2024, COPD, restless leg syndrome, hypertension, morbid obesity, ischemic cardiomyopathy, admitted on 4/9/2024 with acute exacerbation of HFrEF.      He was placed on furosemide drip on admission.  Cardiologist is assisting with management of acute decompensated HFrEF    Acute decompensated HFrEF  Biventricular heart failure s/p CardioMEMS in February 2024  Continue furosemide drip per cardiology recommendation  Continue albumin per cardiology recommendation  Carvedilol 3.125 mg twice daily  Hydralazine 10 mg 3 times daily  Chlorothiazide 500 mg IV   Spironolactone 50 mg daily    Atrial fibrillation  Heart rate is controlled  Amiodarone 200 mg daily  Carvedilol 3.125 mg twice daily  Apixaban 5 mg twice daily    CAD s/p CABG  Aspirin 81 mg daily  Atorvastatin 40 mg every morning  Carvedilol 3.125 mg twice daily  Imdur 180 mg every morning  Atorvastatin 40 mg daily    Restless leg syndrome  Ropinirole 2 mg 3 times daily    Hypertension  Continue hydralazine and carvedilol    Diet: Fluid restriction 2000 ML FLUID  Combination Diet 2 gm NA Diet; Low Saturated Fat Diet, No Caffeine Diet    DVT Prophylaxis: DOAC  Ruvalcaba Catheter: Not present  Lines: None     Cardiac Monitoring: ACTIVE order. Indication: Acute decompensated heart failure (48 hours)  Code Status: Full Code      Clinically Significant Risk Factors Present on Admission        # Hypokalemia: Lowest K = 3.2 mmol/L in last 2 days, will replace as needed    # Hypercalcemia: Highest Ca = 10.2 mg/dL in last 2 days, will monitor as appropriate     # Drug Induced Coagulation Defect: home medication list includes an anticoagulant medication  # Drug  "Induced Platelet Defect: home medication list includes an antiplatelet medication   # Hypertension: Noted on problem list  # Acute heart failure with reduced ejection fraction: last echo with EF <40% and receiving IV diuretics    # DMII: A1C = 7.8 % (Ref range: <5.7 %) within past 6 months    # Severe Obesity: Estimated body mass index is 44.77 kg/m  as calculated from the following:    Height as of this encounter: 1.676 m (5' 6\").    Weight as of this encounter: 125.8 kg (277 lb 6.4 oz).       # Financial/Environmental Concerns: none  # Support System: poor social support noted in nursing assessment         Disposition Plan     Medically Ready for Discharge: Anticipated in 2-4 Days    Connie Garay MD  Hospitalist Service  [facility]  Securely message with [employer] (more info)  Text page via [employer] Paging/Directory   ______________________________________________________________________    Interval History   He endorsed breathing difficulty associated with bilateral wheezes and leg swelling-unchanged since admission. He also mentions that his restless leg syndrome is beginning to flare up    Physical Exam   Vital Signs: Temp: 97.9  F (36.6  C) Temp src: Oral BP: 121/57 Pulse: 87   Resp: 24 SpO2: 93 % O2 Device: None (Room air)    Weight: 277 lbs 6.4 oz    General appearance: Morbidly obese, sitting comfortably on the recliner, awake, Alert, Cooperative, not in any obvious distress and appears stated age   HEENT: Normocephalic, atraumatic, conjunctiva clear without icterus and ears without discharge  Lungs: Clear to auscultation bilaterally, no wheezing, good air exchange, normal work of breathing  Cardiovascular: Regular Rate and Rythm, normal apical impulse, normal S1 and S2, bilateral lower extremity edema, pitting   Abdomen: Soft, non-tender and Non-distended, active bowel sounds  Skin: Stasis dermatitis bilaterally and stasis ulcer in the lateral aspect of the right shin "   Musculoskeletal: No bony deformities or joint tenderness. Normal ROM upon flexion & extension.   Neurologic: Alert & Oriented X 3, Facial symmetry preserved and upper & lower extremities moving well with symmetry  Psychiatric: Calm, normal eye contact and normal affect      Medical Decision Making       40 MINUTES SPENT BY ME on the date of service doing chart review, history, exam, documentation & further activities per the note.      Data     I have personally reviewed the following data over the past 24 hrs:    8.4  \   11.3 (L)   / 166     139 90 (L) 33.4 (H) /  143 (H)   3.2 (L) 31 (H) 1.24 (H) \     ALT: N/A AST: N/A AP: N/A TBILI: N/A   ALB: 4.6 TOT PROTEIN: N/A LIPASE: N/A     TSH: N/A T4: N/A A1C: N/A       Imaging results reviewed over the past 24 hrs:   Recent Results (from the past 24 hour(s))   XR Chest Port 1 View    Narrative    EXAM: XR CHEST PORT 1 VIEW  LOCATION: [facility]  DATE: 4/29/2024    INDICATION: dyspnea  COMPARISON: 01/08/2024      Impression    IMPRESSION: No focal consolidation or edema. No pleural effusion or pneumothorax. Stable heart size and mediastinal contours postoperative findings from CABG.

## 2024-04-30 NOTE — CONSULTS
Care Management Initial Consult    General Information  Assessment completed with: Patient, Patient  Type of CM/SW Visit: Initial Assessment    Primary Care Provider verified and updated as needed: Yes   Readmission within the last 30 days: no previous admission in last 30 days      Reason for Consult: discharge planning  Advance Care Planning: Advance Care Planning Reviewed: verified with patient        Communication Assessment  Patient's communication style: spoken language (English or Bilingual)    Hearing Difficulty or Deaf: no   Wear Glasses or Blind: yes    Cognitive  Cognitive/Neuro/Behavioral: WDL                      Living Environment:   People in home: alone     Current living Arrangements: independent living facility      Able to return to prior arrangements: yes     Family/Social Support:  Care provided by: self  Provides care for: no one, unable/limited ability to care for self  Marital Status: Single  None          Description of Support System:         Current Resources:   Patient receiving home care services: Yes  Skilled Home Care Services: Skilled Nursing  Community Resources: None  Equipment currently used at home: walker, rolling  Supplies currently used at home: None    Employment/Financial:  Employment Status: retired        Financial Concerns: none   Referral to Financial Worker: No     Does the patient's insurance plan have a 3 day qualifying hospital stay waiver?  No    Lifestyle & Psychosocial Needs:  Social Determinants of Health     Food Insecurity: Not on file   Depression: Not on file   Housing Stability: Not on file   Tobacco Use: High Risk (4/29/2024)    Patient History     Smoking Tobacco Use: Every Day     Smokeless Tobacco Use: Never     Passive Exposure: Not on file   Financial Resource Strain: Not on file   Alcohol Use: Alcohol Misuse (11/2/2020)    Received from [employer] [employer]    AUDIT-C     Frequency of Alcohol Consumption: 2-3 times a week     Average Number  of Drinks: 3 or 4     Frequency of Binge Drinking: Less than monthly   Transportation Needs: Not on file   Physical Activity: Not on file   Interpersonal Safety: Not on file   Stress: Not on file   Social Connections: Not on file   Health Literacy: Not on file     Functional Status:  Prior to admission patient needed assistance:   Dependent ADLs:: Independent, Ambulation-walker  Dependent IADLs:: Cleaning, Cooking, Meal Preparation, Transportation     Additional Information:  Writer met with patient at bedside to review role of care management services, discuss goals of care and assess need for any possible services at discharge. Patient alert, answering questions appropriately and engaged in the conversation. Patient reported no HCD. [facility]. Report independent with ADLs, but needs assist with IADLs. Has walker. [facility]. Goal is to return home. Cab voucher or [employer] for transport.       Lola Jaffe RN

## 2024-04-30 NOTE — PLAN OF CARE
Problem: Heart Failure  Goal: Optimal Coping  Outcome: Progressing  Goal: Stable Heart Rate and Rhythm  Outcome: Progressing  Goal: Optimal Functional Ability  Intervention: Optimize Functional Ability  Recent Flowsheet Documentation  Taken 4/29/2024 1837 by Ronit Coulter RN  Activity Management: sitting, edge of bed  Goal: Effective Oxygenation and Ventilation  Intervention: Promote Airway Secretion Clearance  Recent Flowsheet Documentation  Taken 4/29/2024 1837 by Ronit Coulter RN  Cough And Deep Breathing: done independently per patient  Activity Management: sitting, edge of bed  Goal: Effective Breathing Pattern During Sleep  Intervention: Monitor and Manage Obstructive Sleep Apnea  Recent Flowsheet Documentation  Taken 4/29/2024 1837 by Ronit Coulter RN  Medication Review/Management: medications reviewed     Problem: Skin Injury Risk Increased  Goal: Skin Health and Integrity  Intervention: Plan: Nurse Driven Intervention: Moisture Management  Recent Flowsheet Documentation  Taken 4/29/2024 1837 by Ronit Coulter RN  Moisture Interventions: Encourage regular toileting  Intervention: Optimize Skin Protection  Recent Flowsheet Documentation  Taken 4/29/2024 1837 by Ronit Coulter RN  Activity Management: sitting, edge of bed   Goal Outcome Evaluation:      A&O x4. No c/o pain. The pt is refusing to have his BG checked/or insulin given. The pt says he isn't diabetic, and wants to discuss this further with his doctor. He can make his needs known. VSS. Will continue to monitor.       Lasix gtt 15 mL/hr

## 2024-04-30 NOTE — PROGRESS NOTES
[facility]:  CardioMems Routine Remote Evaluation     Dates: 02/16/2024 through 03/18/2024    Not enough readings to facilitate billing for this cycle.     Manuelito Bianchi, [facility]       Readings:      Future Appointments   Date Time Provider Department Center   5/1/2024 11:15 AM Claribel Tracy, PT [facility]   5/14/2024 12:50 PM Debbie Woodard, APRN [facility]   5/20/2024  4:00 AM [facility]   5/28/2024  4:00 AM [facility]   5/30/2024  2:50 PM [facility]   6/20/2024  4:00 AM [facility]   6/28/2024  4:00 AM [facility]   7/22/2024  4:00 AM [facility]   7/29/2024  4:00 AM [facility]   7/30/2024  2:10 PM [facility]   8/22/2024  4:00 AM [facility]   8/29/2024  4:00 AM [facility]

## 2024-04-30 NOTE — PLAN OF CARE
Problem: Heart Failure  Goal: Optimal Cardiac Output  Outcome: Progressing  Goal: Stable Heart Rate and Rhythm  Outcome: Progressing  Goal: Optimal Functional Ability  Intervention: Optimize Functional Ability  Recent Flowsheet Documentation  Taken 4/30/2024 0900 by Arlen Block RN  Activity Management: activity adjusted per tolerance  Goal: Fluid and Electrolyte Balance  Intervention: Monitor and Manage Fluid and Electrolyte Balance  Recent Flowsheet Documentation  Taken 4/30/2024 0900 by Arlen Block RN  Fluid/Electrolyte Management: fluids restricted  Goal: Effective Oxygenation and Ventilation  Intervention: Promote Airway Secretion Clearance  Recent Flowsheet Documentation  Taken 4/30/2024 0900 by Arlen Block RN  Activity Management: activity adjusted per tolerance  Intervention: Optimize Oxygenation and Ventilation  Recent Flowsheet Documentation  Taken 4/30/2024 0900 by Arlen Block RN  Head of Bed (HOB) Positioning: HOB at 30 degrees     Problem: Electrolyte Imbalance  Goal: Electrolyte Balance  Outcome: Progressing  Intervention: Monitor and Manage Electrolyte Imbalance  Recent Flowsheet Documentation  Taken 4/30/2024 0900 by Arlen Block RN  Fluid/Electrolyte Management: fluids restricted   Goal Outcome Evaluation:    Vitals stable.  On RA 96%.  Lasix gtt 15mg/hr.  Refused to have blood sugar checked.  SBA to help with IV pole.  K+ check at 1100.  Refused nicotine patch.  No pain reported.  Would not let nurse check under bandage on right leg.  Edema 3+ lower extremities.  Used urinal 1000cc by 1000.  Expiratory wheezes.  Pt asked for PRN nebulizer.  RT called and they responded we will get there when we can.  He's on a waiting list it could be a while.  Creat 1.24.                    No retinal tears or retinal detachment seen on clinical exam today. Reviewed the signs and symptoms of retinal tear/retinal detachment and the importance of calling for prompt evaluation should there be increasing floaters, new flashing lights, or decreasing peripheral vision in either eye at any time. Observation recommended.

## 2024-04-30 NOTE — CONSULTS
NUTRITION EDUCATION    REASON FOR ASSESSMENT:  Provider Order- Nutrition Education, CHF 2 gm Na diet, new diabetic     Met with pt at bedside this AM. Pt reports not alert for education at this time, wanting to rest and requesting RD to return tomorrow.   Provided handouts: Low Sodium Nutrition Therapy, Heart Healthy Nutrition Label Reading, Sodium Free Flavoring Tips, Plate Method for Diabetes, Consistent Carbohydrate Nutrition Therapy, Diabetes Label Reading.     Will follow and complete diet education.

## 2024-04-30 NOTE — PLAN OF CARE
Goal Outcome Evaluation:    Problem: Heart Failure  Goal: Fluid and Electrolyte Balance  Intervention: Monitor and Manage Fluid and Electrolyte Balance  Recent Flowsheet Documentation  Taken 4/30/2024 0400 by Sarah Escobar RN  Fluid/Electrolyte Management: fluids restricted  Taken 4/30/2024 0007 by Sarah Escobar RN  Fluid/Electrolyte Management: fluids restricted   Zackary's K this morning was 3.2.  He received 40meq this morning.  He continues on his lasix drip.  He voided 775 tonight.   He received IV diuril this morning too.

## 2024-04-30 NOTE — DISCHARGE INSTRUCTIONS
[facility] DISCHARGE INSTRUCTIONS:  RLE  Soak wound with vashe moistened gauze for 5 minutes, pat dry  Apply nickel thick layer of medihoney gel to wound  Cover with mepilex  Change dressing MWF+ PRN if soiled, saturated, falls off

## 2024-05-01 ENCOUNTER — APPOINTMENT (OUTPATIENT)
Dept: OCCUPATIONAL THERAPY | Facility: HOSPITAL | Age: 64
DRG: 321 | End: 2024-05-01
Attending: INTERNAL MEDICINE
Payer: MEDICARE

## 2024-05-01 ENCOUNTER — APPOINTMENT (OUTPATIENT)
Dept: CARDIOLOGY | Facility: HOSPITAL | Age: 64
DRG: 321 | End: 2024-05-01
Attending: INTERNAL MEDICINE
Payer: MEDICARE

## 2024-05-01 LAB
ANION GAP SERPL CALCULATED.3IONS-SCNC: 16 MMOL/L (ref 7–15)
ATRIAL RATE - MUSE: 82 BPM
ATRIAL RATE - MUSE: 92 BPM
ATRIAL RATE - MUSE: 98 BPM
BUN SERPL-MCNC: 34.7 MG/DL (ref 8–23)
CALCIUM SERPL-MCNC: 10.2 MG/DL (ref 8.8–10.2)
CHLORIDE SERPL-SCNC: 91 MMOL/L (ref 98–107)
CREAT SERPL-MCNC: 1.18 MG/DL (ref 0.67–1.17)
DEPRECATED HCO3 PLAS-SCNC: 29 MMOL/L (ref 22–29)
DIASTOLIC BLOOD PRESSURE - MUSE: 76 MMHG
DIASTOLIC BLOOD PRESSURE - MUSE: NORMAL MMHG
DIASTOLIC BLOOD PRESSURE - MUSE: NORMAL MMHG
EGFRCR SERPLBLD CKD-EPI 2021: 68 ML/MIN/1.73M2
GLUCOSE SERPL-MCNC: 141 MG/DL (ref 70–99)
INTERPRETATION ECG - MUSE: NORMAL
P AXIS - MUSE: 115 DEGREES
P AXIS - MUSE: 48 DEGREES
P AXIS - MUSE: 80 DEGREES
POTASSIUM SERPL-SCNC: 3.7 MMOL/L (ref 3.4–5.3)
POTASSIUM SERPL-SCNC: 3.7 MMOL/L (ref 3.4–5.3)
PR INTERVAL - MUSE: 170 MS
PR INTERVAL - MUSE: 174 MS
PR INTERVAL - MUSE: 336 MS
QRS DURATION - MUSE: 140 MS
QRS DURATION - MUSE: 140 MS
QRS DURATION - MUSE: 144 MS
QT - MUSE: 434 MS
QT - MUSE: 444 MS
QT - MUSE: 450 MS
QTC - MUSE: 525 MS
QTC - MUSE: 536 MS
QTC - MUSE: 566 MS
R AXIS - MUSE: 100 DEGREES
R AXIS - MUSE: 94 DEGREES
R AXIS - MUSE: 98 DEGREES
SODIUM SERPL-SCNC: 136 MMOL/L (ref 135–145)
SYSTOLIC BLOOD PRESSURE - MUSE: 164 MMHG
SYSTOLIC BLOOD PRESSURE - MUSE: NORMAL MMHG
SYSTOLIC BLOOD PRESSURE - MUSE: NORMAL MMHG
T AXIS - MUSE: 31 DEGREES
T AXIS - MUSE: 34 DEGREES
T AXIS - MUSE: 49 DEGREES
VENTRICULAR RATE- MUSE: 82 BPM
VENTRICULAR RATE- MUSE: 92 BPM
VENTRICULAR RATE- MUSE: 98 BPM

## 2024-05-01 PROCEDURE — 93010 ELECTROCARDIOGRAM REPORT: CPT | Mod: HIP | Performed by: STUDENT IN AN ORGANIZED HEALTH CARE EDUCATION/TRAINING PROGRAM

## 2024-05-01 PROCEDURE — C8929 TTE W OR WO FOL WCON,DOPPLER: HCPCS

## 2024-05-01 PROCEDURE — 80048 BASIC METABOLIC PNL TOTAL CA: CPT | Performed by: INTERNAL MEDICINE

## 2024-05-01 PROCEDURE — 250N000013 HC RX MED GY IP 250 OP 250 PS 637: Performed by: INTERNAL MEDICINE

## 2024-05-01 PROCEDURE — 99291 CRITICAL CARE FIRST HOUR: CPT | Performed by: INTERNAL MEDICINE

## 2024-05-01 PROCEDURE — 120N000004 HC R&B MS OVERFLOW

## 2024-05-01 PROCEDURE — 258N000003 HC RX IP 258 OP 636: Performed by: INTERNAL MEDICINE

## 2024-05-01 PROCEDURE — 93005 ELECTROCARDIOGRAM TRACING: CPT

## 2024-05-01 PROCEDURE — 93005 ELECTROCARDIOGRAM TRACING: CPT | Performed by: INTERNAL MEDICINE

## 2024-05-01 PROCEDURE — 250N000009 HC RX 250: Performed by: INTERNAL MEDICINE

## 2024-05-01 PROCEDURE — 93010 ELECTROCARDIOGRAM REPORT: CPT | Mod: HIP | Performed by: INTERNAL MEDICINE

## 2024-05-01 PROCEDURE — 97535 SELF CARE MNGMENT TRAINING: CPT | Mod: GO

## 2024-05-01 PROCEDURE — 36415 COLL VENOUS BLD VENIPUNCTURE: CPT | Performed by: INTERNAL MEDICINE

## 2024-05-01 PROCEDURE — P9047 ALBUMIN (HUMAN), 25%, 50ML: HCPCS | Performed by: INTERNAL MEDICINE

## 2024-05-01 PROCEDURE — 999N000157 HC STATISTIC RCP TIME EA 10 MIN

## 2024-05-01 PROCEDURE — 255N000002 HC RX 255 OP 636: Performed by: INTERNAL MEDICINE

## 2024-05-01 PROCEDURE — 93306 TTE W/DOPPLER COMPLETE: CPT | Mod: 26 | Performed by: INTERNAL MEDICINE

## 2024-05-01 PROCEDURE — 97165 OT EVAL LOW COMPLEX 30 MIN: CPT | Mod: GO

## 2024-05-01 PROCEDURE — 94640 AIRWAY INHALATION TREATMENT: CPT | Mod: 76

## 2024-05-01 PROCEDURE — 94640 AIRWAY INHALATION TREATMENT: CPT

## 2024-05-01 PROCEDURE — 99233 SBSQ HOSP IP/OBS HIGH 50: CPT | Performed by: INTERNAL MEDICINE

## 2024-05-01 PROCEDURE — 250N000011 HC RX IP 250 OP 636: Performed by: INTERNAL MEDICINE

## 2024-05-01 RX ORDER — ASPIRIN 325 MG
325 TABLET ORAL ONCE
Status: CANCELLED | OUTPATIENT
Start: 2024-05-01 | End: 2024-05-01

## 2024-05-01 RX ORDER — POTASSIUM CHLORIDE 1500 MG/1
20 TABLET, EXTENDED RELEASE ORAL ONCE
Status: COMPLETED | OUTPATIENT
Start: 2024-05-01 | End: 2024-05-01

## 2024-05-01 RX ORDER — CHLOROTHIAZIDE SODIUM 500 MG/1
500 INJECTION INTRAVENOUS
Status: DISCONTINUED | OUTPATIENT
Start: 2024-05-01 | End: 2024-05-03

## 2024-05-01 RX ORDER — DOBUTAMINE HYDROCHLORIDE 200 MG/100ML
2.5 INJECTION INTRAVENOUS CONTINUOUS
Status: DISCONTINUED | OUTPATIENT
Start: 2024-05-01 | End: 2024-05-02

## 2024-05-01 RX ADMIN — FUROSEMIDE 20 MG/HR: 10 INJECTION, SOLUTION INTRAVENOUS at 21:41

## 2024-05-01 RX ADMIN — FUROSEMIDE 15 MG/HR: 10 INJECTION, SOLUTION INTRAVENOUS at 01:28

## 2024-05-01 RX ADMIN — HYDRALAZINE HYDROCHLORIDE 10 MG: 10 TABLET, FILM COATED ORAL at 08:42

## 2024-05-01 RX ADMIN — CHLOROTHIAZIDE SODIUM 500 MG: 500 INJECTION, POWDER, LYOPHILIZED, FOR SOLUTION INTRAVENOUS at 17:30

## 2024-05-01 RX ADMIN — IPRATROPIUM BROMIDE AND ALBUTEROL SULFATE 3 ML: .5; 3 SOLUTION RESPIRATORY (INHALATION) at 09:41

## 2024-05-01 RX ADMIN — FUROSEMIDE 20 MG/HR: 10 INJECTION, SOLUTION INTRAVENOUS at 16:43

## 2024-05-01 RX ADMIN — DULOXETINE HYDROCHLORIDE 30 MG: 30 CAPSULE, DELAYED RELEASE ORAL at 10:11

## 2024-05-01 RX ADMIN — ISOSORBIDE MONONITRATE 180 MG: 60 TABLET, EXTENDED RELEASE ORAL at 10:11

## 2024-05-01 RX ADMIN — IPRATROPIUM BROMIDE AND ALBUTEROL SULFATE 3 ML: .5; 3 SOLUTION RESPIRATORY (INHALATION) at 12:49

## 2024-05-01 RX ADMIN — POTASSIUM CHLORIDE 20 MEQ: 1500 TABLET, EXTENDED RELEASE ORAL at 08:42

## 2024-05-01 RX ADMIN — NITROGLYCERIN 0.4 MG: 0.4 TABLET, ORALLY DISINTEGRATING SUBLINGUAL at 10:19

## 2024-05-01 RX ADMIN — HYDRALAZINE HYDROCHLORIDE 10 MG: 10 TABLET, FILM COATED ORAL at 21:25

## 2024-05-01 RX ADMIN — ALBUMIN HUMAN 50 G: 0.25 SOLUTION INTRAVENOUS at 21:32

## 2024-05-01 RX ADMIN — ATORVASTATIN CALCIUM 40 MG: 40 TABLET, FILM COATED ORAL at 08:42

## 2024-05-01 RX ADMIN — IPRATROPIUM BROMIDE AND ALBUTEROL SULFATE 3 ML: .5; 3 SOLUTION RESPIRATORY (INHALATION) at 19:46

## 2024-05-01 RX ADMIN — SPIRONOLACTONE 50 MG: 25 TABLET, FILM COATED ORAL at 08:42

## 2024-05-01 RX ADMIN — APIXABAN 5 MG: 5 TABLET, FILM COATED ORAL at 08:42

## 2024-05-01 RX ADMIN — IPRATROPIUM BROMIDE AND ALBUTEROL SULFATE 3 ML: .5; 3 SOLUTION RESPIRATORY (INHALATION) at 16:23

## 2024-05-01 RX ADMIN — ASPIRIN 81 MG: 81 TABLET, COATED ORAL at 08:42

## 2024-05-01 RX ADMIN — ROPINIROLE HYDROCHLORIDE 2 MG: 1 TABLET, FILM COATED ORAL at 14:14

## 2024-05-01 RX ADMIN — HYDRALAZINE HYDROCHLORIDE 10 MG: 10 TABLET, FILM COATED ORAL at 14:15

## 2024-05-01 RX ADMIN — GABAPENTIN 900 MG: 300 CAPSULE ORAL at 21:25

## 2024-05-01 RX ADMIN — ROPINIROLE HYDROCHLORIDE 2 MG: 1 TABLET, FILM COATED ORAL at 21:25

## 2024-05-01 RX ADMIN — DOBUTAMINE HYDROCHLORIDE 2.5 MCG/KG/MIN: 200 INJECTION INTRAVENOUS at 08:43

## 2024-05-01 RX ADMIN — CHLOROTHIAZIDE SODIUM 500 MG: 500 INJECTION, POWDER, LYOPHILIZED, FOR SOLUTION INTRAVENOUS at 10:47

## 2024-05-01 RX ADMIN — ALBUMIN HUMAN 50 G: 0.25 SOLUTION INTRAVENOUS at 08:43

## 2024-05-01 RX ADMIN — AMIODARONE HYDROCHLORIDE 200 MG: 200 TABLET ORAL at 08:42

## 2024-05-01 RX ADMIN — FUROSEMIDE 20 MG/HR: 10 INJECTION, SOLUTION INTRAVENOUS at 11:12

## 2024-05-01 RX ADMIN — PERFLUTREN 2 ML: 6.52 INJECTION, SUSPENSION INTRAVENOUS at 11:32

## 2024-05-01 RX ADMIN — NITROGLYCERIN 0.4 MG: 0.4 TABLET, ORALLY DISINTEGRATING SUBLINGUAL at 10:13

## 2024-05-01 RX ADMIN — ROPINIROLE HYDROCHLORIDE 2 MG: 1 TABLET, FILM COATED ORAL at 05:12

## 2024-05-01 RX ADMIN — ALBUTEROL SULFATE 2.5 MG: 2.5 SOLUTION RESPIRATORY (INHALATION) at 02:01

## 2024-05-01 ASSESSMENT — ACTIVITIES OF DAILY LIVING (ADL)
ADLS_ACUITY_SCORE: 26
ADLS_ACUITY_SCORE: 28
ADLS_ACUITY_SCORE: 26
ADLS_ACUITY_SCORE: 29
ADLS_ACUITY_SCORE: 28
ADLS_ACUITY_SCORE: 28
ADLS_ACUITY_SCORE: 29
ADLS_ACUITY_SCORE: 28
ADLS_ACUITY_SCORE: 28
ADLS_ACUITY_SCORE: 26
ADLS_ACUITY_SCORE: 26
ADLS_ACUITY_SCORE: 28
ADLS_ACUITY_SCORE: 26
ADLS_ACUITY_SCORE: 29
ADLS_ACUITY_SCORE: 26
ADLS_ACUITY_SCORE: 29
ADLS_ACUITY_SCORE: 28
ADLS_ACUITY_SCORE: 26
ADLS_ACUITY_SCORE: 29

## 2024-05-01 NOTE — PLAN OF CARE
Goal Outcome Evaluation:      Plan of Care Reviewed With: patient    Overall Patient Progress: improvingOverall Patient Progress: improving  Pt. Doing well overnight, denied pain, nausea, or dyspnea. No lightheadedness, mo chest discomfort. LS quite wheezy and diminished, improved after gettting a nebulizer. Slow improvement with diuerising. VSS, continue to monitor.

## 2024-05-01 NOTE — PROGRESS NOTES
Occupational Therapy        05/01/24 1530   Appointment Info   Signing Clinician's Name / Credentials (OT) KRYSTYNA Shell   Living Environment   People in Home facility resident   Current Living Arrangements independent living facility   Home Accessibility no concerns   Transportation Anticipated health plan transportation   Self-Care   Regular Exercise No   Equipment Currently Used at Home walker, rolling;grab bar, toilet;grab bar, tub/shower;dressing device  (sock aids for LB dressing)   Fall history within last six months no   Activity/Exercise/Self-Care Comment Pt I w/ ADLs @ baseline   Instrumental Activities of Daily Living (IADL)   IADL Comments Pt recieves A w/ cooking, cleaning, & driving @ baseline. Pt reports i'ly completing laundry.   General Information   Onset of Illness/Injury or Date of Surgery 04/29/24   Referring Physician Angelina Luu MD   Patient/Family Therapy Goal Statement (OT) return home   Additional Occupational Profile Info/Pertinent History of Current Problem Zackary Hutchison is a 63 year old male with h/o  chronic venous stasis, HF, CAD (CABG 2v), ICM, A fib (on apixaban), CKD 3, tobacco use, who presents to the ED for evaluation of shortness of breath and leg swelling. HE was seen by cardiology on Friday and recommended to come in for admission but declined despite cardiology concern for adverse cardiac events. Patient continues to be noncompliant with dietary restrictions and fluids. He complains of chest tightness, fatigues, CARNEY, SOB, orthopnea, PND and increased leg swelling.   Existing Precautions/Restrictions cardiac   Cognitive Status Examination   Orientation Status orientation to person, place and time   Behavioral Issues uncooperative   Affect/Mental Status (Cognitive) WFL   Follows Commands WNL   Safety Deficit other (see comments)  (insight into non-compliance impacts on health)   Visual Perception   Visual Impairment/Limitations corrective lenses for reading   Pain  Assessment   Patient Currently in Pain No   Range of Motion Comprehensive   General Range of Motion no range of motion deficits identified   Strength Comprehensive (MMT)   General Manual Muscle Testing (MMT) Assessment no strength deficits identified   Coordination   Coordination Comments Ambulated ~175 ft in hallway w/ CGA, 4WW - noted slight SOB. Required motivation for mobilization.   Bed Mobility   Bed Mobility supine-sit   Supine-Sit [place] (Bed Mobility) supervision   Assistive Device (Bed Mobility) bed rails;other (see comments)  (elevated HOB)   Transfers   Transfers sit-stand transfer   Transfer Comments from EOB   Sit-Stand Transfer   Sit-Stand [place] (Transfers) supervision   Assistive Device (Sit-Stand Transfers) walker, 4-wheeled   Balance   Balance Assessment sitting static balance   Sitting Balance: Static supervision   Position, Sitting Balance sitting edge of bed   Activities of Daily Living   BADL Assessment/Intervention lower body dressing   Lower Body Dressing Assessment/Training   Position (Lower Body Dressing) edge of bed sitting   [place] Level (Lower Body Dressing) don;socks;dependent (less than 25% patient effort)  (pt reports use of sock aid @ home)   Clinical Impression   Criteria for Skilled Therapeutic Interventions Met (OT) Yes, treatment indicated   OT Diagnosis weakness/SOB impacting functional mobility/endurance   OT Problem List-Impairments impacting ADL problems related to;activity tolerance impaired;mobility   Assessment of Occupational Performance 1-3 Performance Deficits   Identified Performance Deficits functional endurance, functional mobility, standing tolerance   Planned Therapy Interventions (OT) ADL retraining   Clinical Decision Making Complexity (OT) problem focused assessment/low complexity   Risk & Benefits of therapy have been explained evaluation/treatment results reviewed;care plan/treatment goals reviewed   OT Total Evaluation Time   OT Eval, Low  "Complexity Minutes (52788) 15   OT Goals   Therapy Frequency (OT) One time eval and treatment   OT Predicted Duration/Target Date for Goal Attainment 05/01/24   OT Goals Cardiac Phase 1   OT: Understanding of cardiac education to maximize quality of life, condition management, and health outcomes Patient;Verbalize   OT: Perform aerobic activity with stable cardiovascular response 10 minutes;ambulation   OT: Functional/aerobic ambulation tolerance with stable cardiovascular response in order to return to home and community environment 200 feet;Rolling walker;Supervision/SBA   Interventions   Interventions Quick Adds Self-Care/Home Management;Cardiac Rehab   Self-Care/Home Management   Self-Care/Home Mgmt/ADL, Compensatory, Meal Prep Minutes (37764) 15   Symptoms Noted During/After Treatment (Meal Preparation/Planning Training) none   Treatment Detail/Skilled Intervention Eval completed, intervention started. Pt reported disinterest in OT - stated it was a \"waste of my time.\" Provided education on therapy's role, pt agreeable. Pt seated at EOB. Provided cardiac education, see below. Pt reported completes daily weighing prior to admission. Provided education on reasoning why/importance of daily weighing. Provided information on importance of activity/ambulation when reviewing walking program handout - pt reported walking \"doesn't do anything.\" Provided further education, pt still expressing disinterest in learning. Nurse entered room, pt left w/ nursing staff. Call light w/in reach.   Cardiac Education   Education Provided Daily weights;Stop light tool;Home exercise program   Education Packet Given to Patient Yes   All Patient Education Handouts Reviewed with Patient and/or Family Yes   OT Discharge Planning   OT Plan D/C from OT   OT Discharge Recommendation (DC Rec) home   OT Rationale for DC Rec Pt reports being I w/ ADLs @ baseline, receives some A w/ IADLs (cooking, cleaning) from senior living facility. Pt " transfering & ambulating w/ CGA/SBA. D/C from OT d/t pt not being receptive to therapy including cardiac ed & mobilization. Recommend home w/ continued A from senior living facility.   OT Brief overview of current status SBA/CGA   Total Session Time   Timed Code Treatment Minutes 15   Total Session Time (sum of timed and untimed services) 30

## 2024-05-01 NOTE — PROGRESS NOTES
HEART CARE NOTE          Assessment/Recommendations   1. Severe Biventricular dysfunction/HFrEF c/b cardiogenic shock  Assessment / Plan  Hypervolemic on physical exam with inadequate diuresis on current regimen in the setting of -low output; start dobutamine gtt in the hopes of augmenting cardiac output; continue furosemide gtt; continue to monitor UOP and renal function closely; will also add IV Diuril boluses  Patient is high risk for adverse cardiac events 2/2 severe biventricular dysfunction, multiple HFHs, non-compliance with dietary restrictions, renal dysfunction, CAD  GDMT as detailed below; repeat echo ordered     Current Pharmacotherapy AHA Guideline-Directed Medical Therapy   Lisinopril 5 mg daily  - held Lisinopril 20 mg twice daily   Carvedilol 3.125 mg BID - held while on inotrope Carvedilol 25 mg twice daily   Spironolactone 50 mg daily Spironolactone 25 mg once daily   Hydralazine NA Hydralazine 100 mg three times daily   Isosorbide mononitrate 180 mg daily Isosorbide dinitrate 40 mg three times daily   SGLT2 inhibitor:Dapagliflozin/Empagliflozin - not started Dapagliflozin or Empagliflozin 10 mg daily      2. Atrial fibrillation  Assessment / Plan  Currently on apixaban and amiodarone     3. CAD c/b ICM  Assessment / Plan  S/p CABG 2015; now s/p repeat coronary angiogram - no intervention indicated  Endorses chest burning concerning for GI vs ischemia - will get ECG and troponin; Continue atorvastatin, carvedilol     4. PRINCESS on CKD  Assessment / Plan  Likely CRS; diuresis as above; continue to monitor UOP and renal function closely     Plan of care discussed on May 1, 2024 with patient at bedside, and primary team overseeing patient's care    Patient remains critically ill requiring hemodynamic support via dobutamine gtt in the setting of cardiogenic shock. 60 minutes spent on critical care.      History of Present Illness/Subjective    Mr. Zackary Hutchison is a 62 year old male with a PMHx significant  "for CAD s/p CABG, afib, HFrEF and CKD who presents to in ADHF      Today, Mr. Hutchison denies acute cardiac events or complaints; he did endorse chest \"burning\" concerning GI vs ischemia - given ECG changes; Management plan as detailed above     ECG: Personally reviewed. unchanged from previous tracings, normal sinus rhythm, nonspecific ST and T waves changes, PAC's noted.     ECHO (personnaly Reviewed on 4/29/24): repeat study pending  1. Technically very difficult study.  2. Left ventricular chamber size and wall thickness are normal. Systolic  function is mildly reduced with global hypokinesis. The calculated left  ventricular ejection fraction is 41%.  3. Right ventricle is not well visualized. Right ventricular chamber size and  systolic function are grossly normal.  4. No hemodynamically significant valvular abnormalities although the cardiac  valves are not well visualized.  5. Compared to the prior study dated 9/9/2023, there has been no significant  change    Telemetry: personally reviewed May 1, 2024; notable for sinus rhythm     Lab results: personally reviewed May 1, 2024; notable for PRINCESS    Medical history and pertinent documents reviewed in Care Everywhere please where applicable see details above        Physical Examination Review of Systems   /73 (BP Location: Left arm, Patient Position: Sitting, Cuff Size: Adult Large)   Pulse 79   Temp 97.9  F (36.6  C) (Oral)   Resp 20   Ht 1.676 m (5' 6\")   Wt 124.9 kg (275 lb 6.4 oz)   SpO2 94%   BMI 44.45 kg/m    Body mass index is 44.45 kg/m .  Wt Readings from Last 3 Encounters:   05/01/24 124.9 kg (275 lb 6.4 oz)   04/25/24 126.1 kg (278 lb)   04/25/24 126.1 kg (278 lb)     General Appearance:   no distress, normal body habitus   ENT/Mouth: membranes moist, no oral lesions or bleeding gums.      EYES:  no scleral icterus, normal conjunctivae   Neck: no carotid bruits or thyromegaly   Chest/Lungs:   lungs are clear to auscultation, no rales or " wheezing, equal chest wall expansion    Cardiovascular:   Regular. Normal first and second heart sounds with no murmurs, rubs, or gallops; the carotid, radial and posterior tibial pulses are intact, + JVD and LE edema bilaterally    Abdomen:  no organomegaly, masses, bruits, or tenderness; bowel sounds are present   Extremities: no cyanosis or clubbing   Skin: no xanthelasma, warm.    Neurologic: NAD     Psychiatric: alert and oriented x3, calm     A complete 10 systems ROS was reviewed  And is negative except what is listed in the HPI.          Medical History  Surgical History Family History Social History   Past Medical History:   Diagnosis Date    Atrial fibrillation (H)     Chronic kidney disease     Chronic kidney disease, stage 3b (H) 09/28/2023    Class 3 severe obesity due to excess calories with body mass index (BMI) of 40.0 to 44.9 in adult (H) 09/28/2023    Congestive heart failure (H)     COPD (chronic obstructive pulmonary disease) (H)     Coronary artery disease involving coronary bypass graft of native heart without angina pectoris 09/28/2023    Heart failure with reduced ejection fraction (H) 09/11/2023    HFrEF (heart failure with reduced ejection fraction) (H) 09/11/2023    Hyperlipidemia     Hypertension     Ischemic cardiomyopathy 09/28/2023    Obese     Paroxysmal atrial fibrillation (H) 09/28/2023    Tobacco abuse 09/28/2023    Past Surgical History:   Procedure Laterality Date    CORONARY ANGIOGRAPHY ADULT ORDER      CORONARY ARTERY BYPASS Left 2014    2 vessels    CV CARDIOMEMS WITH RIGHT HEART CATH N/A 2/15/2024    Procedure: Pulmonary Arterial Pressure Sensor Placement;  Surgeon: Jacey Bhandari MD;  Location: [facility] CATH LAB CV    CV CORONARY ANGIOGRAM N/A 09/11/2023    Procedure: Coronary Angiogram;  Surgeon: Santy Esposito MD;  Location: [facility] CATH LAB CV    CV LEFT HEART CATH N/A 09/11/2023    Procedure: Left Heart Catheterization;  Surgeon: Santy Esposito MD;   "Location: [facility] CATH LAB CV    CV RIGHT HEART CATH MEASUREMENTS RECORDED N/A 09/11/2023    Procedure: Right Heart Catheterization;  Surgeon: Santy Esposito MD;  Location: [facility] CATH LAB CV    no family history of premature coronary artery disease Social History     Socioeconomic History    Marital status: Single     Spouse name: Not on file    Number of children: Not on file    Years of education: Not on file    Highest education level: Not on file   Occupational History    Occupation: Retired restaurant owner     Comment: [employer]   Tobacco Use    Smoking status: Every Day     Current packs/day: 0.50     Types: Cigarettes    Smokeless tobacco: Never    Tobacco comments:     Seen IP by [facility] on 9/11/23 and declined cessation services and materials   Substance and Sexual Activity    Alcohol use: Not Currently    Drug use: Not on file    Sexual activity: Not on file   Other Topics Concern    Not on file   Social History Narrative    Currently lives in an assisted living facility. (Updated: 01/08/2024)     Social Determinants of Health     Financial Resource Strain: Not on file   Food Insecurity: Not on file   Transportation Needs: Not on file   Physical Activity: Not on file   Stress: Not on file   Social Connections: Not on file   Interpersonal Safety: Not on file   Housing Stability: Not on file           Lab Results    Chemistry/lipid CBC Cardiac Enzymes/BNP/TSH/INR   Lab Results   Component Value Date    BUN 33.4 (H) 04/30/2024     04/30/2024    CO2 31 (H) 04/30/2024    Lab Results   Component Value Date    WBC 8.4 04/30/2024    HGB 11.3 (L) 04/30/2024    HCT 37.2 (L) 04/30/2024    MCV 95 04/30/2024     04/30/2024    Lab Results   Component Value Date    TSH 1.70 04/29/2024    INR 1.01 02/13/2023     No results found for: \"CKTOTAL\", \"CKMB\", \"TROPONINI\"       Weight:    Wt Readings from Last 3 Encounters:   05/01/24 124.9 kg (275 lb 6.4 oz)   04/25/24 126.1 kg (278 lb)   04/25/24 126.1 kg (278 " lb)       Allergies  No Known Allergies      Surgical History  Past Surgical History:   Procedure Laterality Date    CORONARY ANGIOGRAPHY ADULT ORDER      CORONARY ARTERY BYPASS Left 2014    2 vessels    CV CARDIOMEMS WITH RIGHT HEART CATH N/A 2/15/2024    Procedure: Pulmonary Arterial Pressure Sensor Placement;  Surgeon: Jacey Bhandari MD;  Location: [facility] LAB CV    CV CORONARY ANGIOGRAM N/A 09/11/2023    Procedure: Coronary Angiogram;  Surgeon: Santy Esposito MD;  Location: [facility] CATH LAB CV    CV LEFT HEART CATH N/A 09/11/2023    Procedure: Left Heart Catheterization;  Surgeon: Santy Esposito MD;  Location: [facility] CV    CV RIGHT HEART CATH MEASUREMENTS RECORDED N/A 09/11/2023    Procedure: Right Heart Catheterization;  Surgeon: Santy Esposito MD;  Location: [facility] CATH LAB CV       Social History  Tobacco:   History   Smoking Status    Every Day    Types: Cigarettes   Smokeless Tobacco    Never    Alcohol:   Social History    Substance and Sexual Activity      Alcohol use: Not Currently   Illicit Drugs:   History   Drug Use Not on file       Family History  Family History   Problem Relation Age of Onset    Cerebrovascular Disease Mother     Myocardial Infarction Father           Jacey Bhandari MD on 5/1/2024      cc: Reid Escobar

## 2024-05-01 NOTE — PLAN OF CARE
Key Events - This Shift:       - Pt expressed frustrations early on during shift  - Pt refused insulin and denies that he is diabetic even after meeting with a provider  - Dobutamine started and discontinued during shift   - Pt stated he had chest pain 7-8/10, SL nitroglycerin x2, scheduled chlorothiazide administered. Chest pain resolved within 10-15 minutes. Provider notified. Echo with dye was being performed during this time, possible allergic reaching to dye    Problem: Adult Inpatient Plan of Care  Goal: Absence of Hospital-Acquired Illness or Injury  Outcome: Progressing  Intervention: Identify and Manage Fall Risk  Recent Flowsheet Documentation  Taken 5/1/2024 1600 by Deandre Menchaca RN  Safety Promotion/Fall Prevention:   activity supervised   nonskid shoes/slippers when out of bed   patient and family education  Taken 5/1/2024 1230 by Deandre Menchaca RN  Safety Promotion/Fall Prevention:   activity supervised   nonskid shoes/slippers when out of bed   patient and family education  Taken 5/1/2024 0830 by Deandre Menchaca RN  Safety Promotion/Fall Prevention:   activity supervised   nonskid shoes/slippers when out of bed   patient and family education  Intervention: Prevent Skin Injury  Recent Flowsheet Documentation  Taken 5/1/2024 1600 by Deandre Menchaca RN  Body Position: position changed independently  Skin Protection: incontinence pads utilized  Device Skin Pressure Protection: adhesive use limited  Taken 5/1/2024 1230 by Deandre Menchaca RN  Body Position: position changed independently  Skin Protection: incontinence pads utilized  Device Skin Pressure Protection: adhesive use limited  Taken 5/1/2024 0830 by Deandre Menchaca RN  Body Position: position changed independently  Skin Protection: incontinence pads utilized  Device Skin Pressure Protection: adhesive use limited  Intervention: Prevent and Manage VTE (Venous Thromboembolism) Risk  Recent Flowsheet Documentation  Taken 5/1/2024  1600 by Deandre Menchaca RN  VTE Prevention/Management: SCDs (sequential compression devices) off  Taken 5/1/2024 1230 by Deandre Menchaca RN  VTE Prevention/Management: SCDs (sequential compression devices) off  Taken 5/1/2024 0830 by Deandre Menchaca RN  VTE Prevention/Management: SCDs (sequential compression devices) off  Intervention: Prevent Infection  Recent Flowsheet Documentation  Taken 5/1/2024 1600 by Deandre Menchaca RN  Infection Prevention: hand hygiene promoted  Taken 5/1/2024 1230 by Deandre Menchaca RN  Infection Prevention: hand hygiene promoted  Taken 5/1/2024 0830 by Deandre Menchaca RN  Infection Prevention: hand hygiene promoted     Problem: Adult Inpatient Plan of Care  Goal: Absence of Hospital-Acquired Illness or Injury  Intervention: Prevent Skin Injury  Recent Flowsheet Documentation  Taken 5/1/2024 1600 by Deandre Menchaca RN  Body Position: position changed independently  Skin Protection: incontinence pads utilized  Device Skin Pressure Protection: adhesive use limited  Taken 5/1/2024 1230 by Deandre Menchaca RN  Body Position: position changed independently  Skin Protection: incontinence pads utilized  Device Skin Pressure Protection: adhesive use limited  Taken 5/1/2024 0830 by Deandre Menchaca RN  Body Position: position changed independently  Skin Protection: incontinence pads utilized  Device Skin Pressure Protection: adhesive use limited     Problem: Adult Inpatient Plan of Care  Goal: Absence of Hospital-Acquired Illness or Injury  Intervention: Prevent and Manage VTE (Venous Thromboembolism) Risk  Recent Flowsheet Documentation  Taken 5/1/2024 1600 by Deandre Menchaca RN  VTE Prevention/Management: SCDs (sequential compression devices) off  Taken 5/1/2024 1230 by Deandre Menchaca RN  VTE Prevention/Management: SCDs (sequential compression devices) off  Taken 5/1/2024 0830 by Deandre Menchaca RN  VTE Prevention/Management: SCDs (sequential compression devices) off      Problem: Adult Inpatient Plan of Care  Goal: Absence of Hospital-Acquired Illness or Injury  Intervention: Prevent Infection  Recent Flowsheet Documentation  Taken 5/1/2024 1600 by Deandre Menchaca RN  Infection Prevention: hand hygiene promoted  Taken 5/1/2024 1230 by Deandre Menchaca RN  Infection Prevention: hand hygiene promoted  Taken 5/1/2024 0830 by Deandre Menchaca RN  Infection Prevention: hand hygiene promoted     Problem: Adult Inpatient Plan of Care  Goal: Optimal Comfort and Wellbeing  Outcome: Progressing     Problem: Skin Injury Risk Increased  Goal: Skin Health and Integrity  Outcome: Progressing  Intervention: Plan: Nurse Driven Intervention: Moisture Management  Recent Flowsheet Documentation  Taken 5/1/2024 1600 by Deandre Menchaca RN  Moisture Interventions: Encourage regular toileting  Taken 5/1/2024 1230 by Deandre Menchaca RN  Moisture Interventions: Encourage regular toileting  Taken 5/1/2024 0830 by Deandre Menchaca RN  Moisture Interventions: Encourage regular toileting  Intervention: Optimize Skin Protection  Recent Flowsheet Documentation  Taken 5/1/2024 1600 by Deandre Menchaca RN  Pressure Reduction Techniques: frequent weight shift encouraged  Skin Protection: incontinence pads utilized  Activity Management: activity adjusted per tolerance  Head of Bed (HOB) Positioning: HOB flat  Taken 5/1/2024 1230 by Deandre Menchaca RN  Pressure Reduction Techniques: frequent weight shift encouraged  Skin Protection: incontinence pads utilized  Activity Management: sitting, edge of bed  Head of Bed (HOB) Positioning: HOB at 30 degrees  Taken 5/1/2024 0830 by Deandre Menchaca RN  Pressure Reduction Techniques: frequent weight shift encouraged  Skin Protection: incontinence pads utilized  Activity Management: sitting, edge of bed  Head of Bed (HOB) Positioning: HOB at 30 degrees     Problem: Heart Failure  Goal: Optimal Functional Ability  Intervention: Optimize Functional Ability  Recent  Flowsheet Documentation  Taken 5/1/2024 1600 by Deandre Menchaca RN  Activity Management: activity adjusted per tolerance  Taken 5/1/2024 1230 by Deandre Menchaca RN  Activity Management: sitting, edge of bed  Taken 5/1/2024 0830 by Deandre Menchaca RN  Activity Management: sitting, edge of bed     Problem: Electrolyte Imbalance  Goal: Electrolyte Balance  Outcome: Progressing  Intervention: Monitor and Manage Electrolyte Imbalance  Recent Flowsheet Documentation  Taken 5/1/2024 1600 by Deandre Menchaca RN  Fluid/Electrolyte Management: fluids restricted  Taken 5/1/2024 1230 by Deandre Menchaca RN  Fluid/Electrolyte Management: fluids restricted  Taken 5/1/2024 0830 by Deandre Menchaca RN  Fluid/Electrolyte Management: fluids restricted     Problem: Cardiac Catheterization (Diagnostic/Interventional)  Goal: Stable Heart Rate and Rhythm  Outcome: Progressing  Intervention: Monitor and Manage Cardiac Rhythm Effect  Recent Flowsheet Documentation  Taken 5/1/2024 1600 by Deandre Menchaca RN  Fluid/Electrolyte Management: fluids restricted  Taken 5/1/2024 1230 by Deandre Menchaca RN  Fluid/Electrolyte Management: fluids restricted  Taken 5/1/2024 0830 by Deandre Menchaca RN  Fluid/Electrolyte Management: fluids restricted

## 2024-05-01 NOTE — PROGRESS NOTES
Physical Therapy: Orders received. Chart reviewed and discussed with care team.? Physical Therapy not indicated due to per OT evaluation patient is independent with mobility ,more appropriate for OT /CR services for CHF diagnosis.? Defer discharge recommendations to treatment team..? Will complete orders.

## 2024-05-01 NOTE — PLAN OF CARE
Problem: Adult Inpatient Plan of Care  Goal: Absence of Hospital-Acquired Illness or Injury  Outcome: Progressing  Intervention: Identify and Manage Fall Risk  Recent Flowsheet Documentation  Taken 4/30/2024 1640 by Idania Driver RN  Safety Promotion/Fall Prevention:   activity supervised   clutter free environment maintained   assistive device/personal items within reach   mobility aid in reach   lighting adjusted   nonskid shoes/slippers when out of bed   patient and family education   room near nurse's station   safety round/check completed   room organization consistent  Intervention: Prevent Skin Injury  Recent Flowsheet Documentation  Taken 4/30/2024 1922 by Idania Driver RN  Body Position: position changed independently  Taken 4/30/2024 1640 by Idania Driver RN  Body Position: position changed independently  Taken 4/30/2024 1547 by Idania Driver RN  Body Position: position changed independently  Goal: Optimal Comfort and Wellbeing  Outcome: Progressing  Goal: Readiness for Transition of Care  Outcome: Progressing     Problem: Adult Inpatient Plan of Care  Goal: Optimal Comfort and Wellbeing  Outcome: Progressing     Problem: Skin Injury Risk Increased  Goal: Skin Health and Integrity  Outcome: Progressing  Intervention: Plan: Nurse Driven Intervention: Moisture Management  Recent Flowsheet Documentation  Taken 4/30/2024 1640 by Idania Driver RN  Moisture Interventions: Encourage regular toileting  Intervention: Optimize Skin Protection  Recent Flowsheet Documentation  Taken 4/30/2024 1922 by Idania Driver RN  Activity Management:   bedrest   sitting, edge of bed  Taken 4/30/2024 1640 by Idania Driver, RN  Head of Bed (HOB) Positioning: HOB at 30 degrees  Taken 4/30/2024 1547 by Idania Driver, RN  Activity Management:   bedrest   sitting, edge of bed     Problem: Heart Failure  Goal: Optimal Coping  Outcome: Progressing  Intervention: Support Psychosocial Response  Recent Flowsheet  Documentation  Taken 4/30/2024 2045 by Idania Driver RN  Supportive Measures:   active listening utilized   positive reinforcement provided   problem-solving facilitated   self-care encouraged  Taken 4/30/2024 1640 by Idania Driver RN  Supportive Measures:   active listening utilized   positive reinforcement provided   problem-solving facilitated   self-care encouraged  Goal: Optimal Cardiac Output  Intervention: Optimize Cardiac Output  Recent Flowsheet Documentation  Taken 4/30/2024 2045 by Idania Driver RN  Environmental Support:   calm environment promoted   rest periods encouraged  Taken 4/30/2024 1640 by Idania Driver RN  Environmental Support:   calm environment promoted   rest periods encouraged  Goal: Stable Heart Rate and Rhythm  Outcome: Progressing  Goal: Optimal Functional Ability  Intervention: Optimize Functional Ability  Recent Flowsheet Documentation  Taken 4/30/2024 1922 by Idania Driver RN  Activity Management:   bedrest   sitting, edge of bed  Taken 4/30/2024 1547 by Idania Driver RN  Activity Management:   bedrest   sitting, edge of bed  Goal: Fluid and Electrolyte Balance  Outcome: Progressing  Intervention: Monitor and Manage Fluid and Electrolyte Balance  Recent Flowsheet Documentation  Taken 4/30/2024 1640 by Idania Driver RN  Fluid/Electrolyte Management: fluids restricted  Goal: Effective Oxygenation and Ventilation  Outcome: Progressing  Intervention: Promote Airway Secretion Clearance  Recent Flowsheet Documentation  Taken 4/30/2024 2045 by Idania Driver RN  Cough And Deep Breathing: done independently per patient  Taken 4/30/2024 1922 by Idania Driver RN  Activity Management:   bedrest   sitting, edge of bed  Taken 4/30/2024 1640 by Idania Driver RN  Cough And Deep Breathing: done independently per patient  Taken 4/30/2024 1547 by Idania Driver RN  Activity Management:   bedrest   sitting, edge of bed  Intervention: Optimize Oxygenation and Ventilation  Recent  Flowsheet Documentation  Taken 4/30/2024 1640 by Idania Driver RN  Head of Bed (HOB) Positioning: HOB at 30 degrees  Goal: Effective Breathing Pattern During Sleep  Intervention: Monitor and Manage Obstructive Sleep Apnea  Recent Flowsheet Documentation  Taken 4/30/2024 1640 by Idania Driver RN  Medication Review/Management: medications reviewed     Problem: Electrolyte Imbalance  Goal: Electrolyte Balance  Intervention: Monitor and Manage Electrolyte Imbalance  Recent Flowsheet Documentation  Taken 4/30/2024 1640 by Idania Driver RN  Fluid/Electrolyte Management: fluids restricted   Goal Outcome Evaluation:         Pt a/o x4, declines blood sugar checks, insulin, and bed alarm use. Sitting at edge of bed most of shift. Urinal used to void. Lasix gtt infusing as ordered. Albumin received tonight, see mar. Pt denies pain. Ate well for supper. Dressing changed to right shin as ordered this sky. Pt said will talk to md tomorrow re blood sugar. Reminded pt to use call light for needs. Continue to monitor pt.

## 2024-05-01 NOTE — PROGRESS NOTES
[facility]    Medicine Progress Note - Hospitalist Service    Date of Admission:  4/29/2024    Assessment & Plan   Zackary Hutchison is a 63-year-old man with history of paroxysmal atrial fibrillation on Eliquis, stage III CKD, CAD s/p CABG, ischemic cardiomyopathy s/p cardioMEMS implantation in February 2024, COPD, restless leg syndrome, hypertension, morbid obesity, ischemic cardiomyopathy, admitted on 4/9/2024 with acute exacerbation of HFrEF.      He was placed on furosemide drip on admission.  Cardiologist is assisting with management of acute decompensated HFrEF    Acute decompensated HFrEF  Biventricular heart failure s/p CardioMEMS in February 2024  Continue furosemide drip per cardiology recommendation  Dobutamine drip per cardiologist-currently on hold  Continue albumin per cardiology recommendation  Carvedilol 3.125 mg twice daily  Hydralazine 10 mg 3 times daily  Chlorothiazide 500 mg IV   Spironolactone 50 mg daily    Atrial fibrillation  Heart rate is controlled  Amiodarone 200 mg daily  Carvedilol 3.125 mg twice daily  Apixaban 5 mg twice daily-currently on hold    CAD s/p CABG  Aspirin 81 mg daily  Atorvastatin 40 mg every morning  Carvedilol 3.125 mg twice daily  Imdur 180 mg every morning  Atorvastatin 40 mg daily    Restless leg syndrome  Ropinirole 2 mg 3 times daily    Hypertension  Continue hydralazine and carvedilol    Type 2 diabetes-new diagnosis  Hemoglobin A1c is elevated at 7.8  Glucose is elevated  Patient declines further glucose checks and treatment with insulin  He agreed to discuss with diabetes educator.    Suspected stage III CKD  Possibly secondary to cardiorenal process  Continue furosemide drip  Monitor BMP  Avoid nephrotoxin  Consider nephrology evaluation if creatinine continues to worsen.    COPD  Continue DuoNeb 4 times daily      Diet: Fluid restriction 2000 ML FLUID  Combination Diet 2 gm NA Diet; Low Saturated Fat Diet, No Caffeine Diet    DVT  "Prophylaxis: DOAC  Ruvalcaba Catheter: Not present  Lines: None     Cardiac Monitoring: ACTIVE order. Indication: Acute decompensated heart failure (48 hours)  Code Status: Full Code      Clinically Significant Risk Factors        # Hypokalemia: Lowest K = 3.2 mmol/L in last 2 days, will replace as needed    # Hypercalcemia: Highest Ca = 10.2 mg/dL in last 2 days, will monitor as appropriate          # Hypertension: Noted on problem list  # Acute heart failure with reduced ejection fraction: last echo with EF <40% and receiving IV diuretics      # DMII: A1C = 7.8 % (Ref range: <5.7 %) within past 6 months, PRESENT ON ADMISSION  # Severe Obesity: Estimated body mass index is 44.45 kg/m  as calculated from the following:    Height as of this encounter: 1.676 m (5' 6\").    Weight as of this encounter: 124.9 kg (275 lb 6.4 oz)., PRESENT ON ADMISSION       # Financial/Environmental Concerns: none  # Support System: poor social support noted in nursing assessment         Disposition Plan     Medically Ready for Discharge: Anticipated in 2-4 Days    Connie Garay MD  Hospitalist Service  [facility]  Securely message with [email or web address] (more info)  Text page via [facility] Paging/Directory   ______________________________________________________________________    Interval History   He states his breathing has improved but continues to experience occasional wheezes.  Hemoglobin A1c checked yesterday was elevated at 7.8. He denies any prior diagnosis of diabetes mellitus or the use of antidiabetic medications before his hospitalization. Despite comprehensive education about the implications of an elevated Hemoglobin A1c and the necessity of diabetes management, the patient is resistant to continuing glucose monitoring and insulin therapy. He has expressed disinterest in ongoing management for diabetes, despite being informed of potential complications, including diabetic nephropathy, vasculopathy, and " retinopathy etc. Currently, he is receiving inotropic support with dobutamine infusion and furosemide infusion.    Physical Exam   Vital Signs: Temp: 97.9  F (36.6  C) Temp src: Oral BP: 116/54 Pulse: 91   Resp: 18 SpO2: 91 % O2 Device: None (Room air)    Weight: 275 lbs 6.4 oz    General appearance: Morbidly obese, sitting comfortably on the recliner, awake, Alert, Cooperative, not in any obvious distress and appears stated age   HEENT: Normocephalic, atraumatic, conjunctiva clear without icterus and ears without discharge  Lungs: Bilateral wheezes with diminished air entry.  Cardiovascular: Regular Rate and Rythm, normal apical impulse, normal S1 and S2, bilateral lower extremity edema, pitting   Abdomen: Soft, non-tender and Non-distended, active bowel sounds  Skin: Stasis dermatitis bilaterally and stasis ulcer in the lateral aspect of the right shin   Musculoskeletal: No bony deformities or joint tenderness. Normal ROM upon flexion & extension.   Neurologic: Alert & Oriented X 3, Facial symmetry preserved and upper & lower extremities moving well with symmetry  Psychiatric: Calm, normal eye contact and normal affect      Medical Decision Making       40 MINUTES SPENT BY ME on the date of service doing chart review, history, exam, documentation & further activities per the note.      Data     I have personally reviewed the following data over the past 24 hrs:    N/A  \   N/A   / N/A     136 91 (L) 34.7 (H) /  141 (H)   3.7; 3.7 29 1.18 (H) \       Imaging results reviewed over the past 24 hrs:   No results found for this or any previous visit (from the past 24 hour(s)).

## 2024-05-01 NOTE — PLAN OF CARE
Occupational Therapy Discharge Summary    Reason for therapy discharge:    Patient not receptive to therapy.    Progress towards therapy goal(s). See goals on Care Plan in [facility] electronic health record for goal details.  Goals partially met.  Barriers to achieving goals:   Patient not receptive to therapy. One time eval/treatment.    Therapy recommendation(s):    Continue cardiac education & mobilization w/ nursing staff

## 2024-05-01 NOTE — PROGRESS NOTES
NUTRITION EDUCATION      REASON FOR ASSESSMENT:  Consulted to educate on diabetic and low sodium diet    NUTRITION HISTORY:  Information obtained from pt    Pt states he doesn't have much control of what he eats as his meals are prepared for him. He allowed RD to provide written and verbal guidelines on the diabetic diet but just asked to have written guidelines on the low sodium diet ( he stated he will read it)    CURRENT DIET:  NPO    NUTRITION DIAGNOSIS:  Food- and nutrition-related knowledge deficit R/t cute exacerbation of HFrEF and new diagnosis of DM as evidenced by consulted to educate pt on low sodium/diabetic diet    INTERVENTIONS:    Nutrition Prescription:  Consistent CHO  (4-5 CHO choices with each meal), 2 gram Na    Implementation:      *  Nutrition Education (Content):   A)  Provided handout placemat ( food groups and serving sizes) CHO counting for people with diabetes and low sodium nutrition therapy, sodium free seasonings and low sodium shopping guidelines   B)  Discussed foods containing CHO, portion sizes, label reading, sample meals      *  Nutrition Education (Application):   A)  Discussed current eating habits and recommended alternative food choices      *  Diet Education - refer to Education Flowsheet    Goals:      *  Patient will verbalize understanding of diet met      *  All of the above goals met during the education session    Follow Up/Monitoring:      *  Provided RD contact information for future questions      *  Recommended Out-Patient Nutrition Referral, if further diet instructions are needed

## 2024-05-02 PROBLEM — I20.0 UNSTABLE ANGINA (H): Status: ACTIVE | Noted: 2024-05-02

## 2024-05-02 LAB
ABO/RH(D): NORMAL
ACT BLD: 294 SECONDS (ref 74–150)
ACT BLD: 348 SECONDS (ref 74–150)
ANION GAP SERPL CALCULATED.3IONS-SCNC: 20 MMOL/L (ref 7–15)
ANTIBODY SCREEN: NEGATIVE
ATRIAL RATE - MUSE: 82 BPM
BUN SERPL-MCNC: 43.2 MG/DL (ref 8–23)
CALCIUM SERPL-MCNC: 10.4 MG/DL (ref 8.8–10.2)
CHLORIDE SERPL-SCNC: 88 MMOL/L (ref 98–107)
CHOLEST SERPL-MCNC: 121 MG/DL
CREAT SERPL-MCNC: 1.37 MG/DL (ref 0.67–1.17)
DEPRECATED HCO3 PLAS-SCNC: 27 MMOL/L (ref 22–29)
DIASTOLIC BLOOD PRESSURE - MUSE: NORMAL MMHG
EGFRCR SERPLBLD CKD-EPI 2021: 57 ML/MIN/1.73M2
GLUCOSE BLDC GLUCOMTR-MCNC: 153 MG/DL (ref 70–99)
GLUCOSE SERPL-MCNC: 144 MG/DL (ref 70–99)
HDLC SERPL-MCNC: 27 MG/DL
HOLD SPECIMEN: NORMAL
INTERPRETATION ECG - MUSE: NORMAL
LDLC SERPL CALC-MCNC: 50 MG/DL
NONHDLC SERPL-MCNC: 94 MG/DL
P AXIS - MUSE: 111 DEGREES
POTASSIUM SERPL-SCNC: 3.2 MMOL/L (ref 3.4–5.3)
POTASSIUM SERPL-SCNC: 3.2 MMOL/L (ref 3.4–5.3)
POTASSIUM SERPL-SCNC: 3.4 MMOL/L (ref 3.4–5.3)
PR INTERVAL - MUSE: 180 MS
QRS DURATION - MUSE: 138 MS
QT - MUSE: 448 MS
QTC - MUSE: 523 MS
R AXIS - MUSE: 80 DEGREES
SODIUM SERPL-SCNC: 135 MMOL/L (ref 135–145)
SPECIMEN EXPIRATION DATE: NORMAL
SYSTOLIC BLOOD PRESSURE - MUSE: NORMAL MMHG
T AXIS - MUSE: 46 DEGREES
TRIGL SERPL-MCNC: 219 MG/DL
VENTRICULAR RATE- MUSE: 82 BPM

## 2024-05-02 PROCEDURE — 272N000001 HC OR GENERAL SUPPLY STERILE: Performed by: INTERNAL MEDICINE

## 2024-05-02 PROCEDURE — 99233 SBSQ HOSP IP/OBS HIGH 50: CPT | Performed by: INTERNAL MEDICINE

## 2024-05-02 PROCEDURE — 92928 PRQ TCAT PLMT NTRAC ST 1 LES: CPT | Mod: RI | Performed by: INTERNAL MEDICINE

## 2024-05-02 PROCEDURE — 250N000011 HC RX IP 250 OP 636: Performed by: INTERNAL MEDICINE

## 2024-05-02 PROCEDURE — C1725 CATH, TRANSLUMIN NON-LASER: HCPCS | Performed by: INTERNAL MEDICINE

## 2024-05-02 PROCEDURE — 999N000157 HC STATISTIC RCP TIME EA 10 MIN

## 2024-05-02 PROCEDURE — 250N000013 HC RX MED GY IP 250 OP 250 PS 637: Performed by: INTERNAL MEDICINE

## 2024-05-02 PROCEDURE — C1874 STENT, COATED/COV W/DEL SYS: HCPCS | Performed by: INTERNAL MEDICINE

## 2024-05-02 PROCEDURE — 250N000009 HC RX 250: Performed by: INTERNAL MEDICINE

## 2024-05-02 PROCEDURE — 99232 SBSQ HOSP IP/OBS MODERATE 35: CPT | Performed by: INTERNAL MEDICINE

## 2024-05-02 PROCEDURE — 258N000003 HC RX IP 258 OP 636: Performed by: INTERNAL MEDICINE

## 2024-05-02 PROCEDURE — 93005 ELECTROCARDIOGRAM TRACING: CPT

## 2024-05-02 PROCEDURE — 84132 ASSAY OF SERUM POTASSIUM: CPT | Performed by: INTERNAL MEDICINE

## 2024-05-02 PROCEDURE — P9047 ALBUMIN (HUMAN), 25%, 50ML: HCPCS | Performed by: INTERNAL MEDICINE

## 2024-05-02 PROCEDURE — C9600 PERC DRUG-EL COR STENT SING: HCPCS | Mod: RI | Performed by: INTERNAL MEDICINE

## 2024-05-02 PROCEDURE — 36415 COLL VENOUS BLD VENIPUNCTURE: CPT | Performed by: INTERNAL MEDICINE

## 2024-05-02 PROCEDURE — 93010 ELECTROCARDIOGRAM REPORT: CPT | Mod: HIP | Performed by: INTERNAL MEDICINE

## 2024-05-02 PROCEDURE — 4A023N7 MEASUREMENT OF CARDIAC SAMPLING AND PRESSURE, LEFT HEART, PERCUTANEOUS APPROACH: ICD-10-PCS | Performed by: INTERNAL MEDICINE

## 2024-05-02 PROCEDURE — 120N000004 HC R&B MS OVERFLOW

## 2024-05-02 PROCEDURE — 94640 AIRWAY INHALATION TREATMENT: CPT | Mod: 76

## 2024-05-02 PROCEDURE — 255N000002 HC RX 255 OP 636: Performed by: INTERNAL MEDICINE

## 2024-05-02 PROCEDURE — 94640 AIRWAY INHALATION TREATMENT: CPT

## 2024-05-02 PROCEDURE — 99152 MOD SED SAME PHYS/QHP 5/>YRS: CPT | Performed by: INTERNAL MEDICINE

## 2024-05-02 PROCEDURE — 85347 COAGULATION TIME ACTIVATED: CPT

## 2024-05-02 PROCEDURE — C1769 GUIDE WIRE: HCPCS | Performed by: INTERNAL MEDICINE

## 2024-05-02 PROCEDURE — 027236Z DILATION OF CORONARY ARTERY, THREE ARTERIES WITH THREE DRUG-ELUTING INTRALUMINAL DEVICES, PERCUTANEOUS APPROACH: ICD-10-PCS | Performed by: INTERNAL MEDICINE

## 2024-05-02 PROCEDURE — 93459 L HRT ART/GRFT ANGIO: CPT | Mod: 26 | Performed by: INTERNAL MEDICINE

## 2024-05-02 PROCEDURE — 93005 ELECTROCARDIOGRAM TRACING: CPT | Performed by: INTERNAL MEDICINE

## 2024-05-02 PROCEDURE — 80048 BASIC METABOLIC PNL TOTAL CA: CPT | Performed by: INTERNAL MEDICINE

## 2024-05-02 PROCEDURE — B211YZZ FLUOROSCOPY OF MULTIPLE CORONARY ARTERIES USING OTHER CONTRAST: ICD-10-PCS | Performed by: INTERNAL MEDICINE

## 2024-05-02 PROCEDURE — 93459 L HRT ART/GRFT ANGIO: CPT | Performed by: INTERNAL MEDICINE

## 2024-05-02 PROCEDURE — C1760 CLOSURE DEV, VASC: HCPCS | Performed by: INTERNAL MEDICINE

## 2024-05-02 PROCEDURE — 84478 ASSAY OF TRIGLYCERIDES: CPT | Performed by: INTERNAL MEDICINE

## 2024-05-02 PROCEDURE — 93010 ELECTROCARDIOGRAM REPORT: CPT | Mod: HIP | Performed by: STUDENT IN AN ORGANIZED HEALTH CARE EDUCATION/TRAINING PROGRAM

## 2024-05-02 PROCEDURE — 86900 BLOOD TYPING SEROLOGIC ABO: CPT | Performed by: INTERNAL MEDICINE

## 2024-05-02 PROCEDURE — 999N000254 HC STATISTIC VENTILATOR TRANSFER

## 2024-05-02 PROCEDURE — 94660 CPAP INITIATION&MGMT: CPT

## 2024-05-02 PROCEDURE — 99153 MOD SED SAME PHYS/QHP EA: CPT | Performed by: INTERNAL MEDICINE

## 2024-05-02 PROCEDURE — C1887 CATHETER, GUIDING: HCPCS | Performed by: INTERNAL MEDICINE

## 2024-05-02 DEVICE — STENT CORONARY DES SYNERGY XD MR US 2.25X24MM H7493941824220: Type: IMPLANTABLE DEVICE | Status: FUNCTIONAL

## 2024-05-02 DEVICE — STENT CORONARY DES SYNERGY XD MR US 3.00X24MM H7493941824300: Type: IMPLANTABLE DEVICE | Status: FUNCTIONAL

## 2024-05-02 DEVICE — STENT CORONARY DES SYNERGY XD MR US 2.50X16MM H7493941816250: Type: IMPLANTABLE DEVICE | Status: FUNCTIONAL

## 2024-05-02 DEVICE — CLOSURE ANGIOSEAL 6FR 610130: Type: IMPLANTABLE DEVICE | Status: FUNCTIONAL

## 2024-05-02 RX ORDER — METOPROLOL TARTRATE 1 MG/ML
5 INJECTION, SOLUTION INTRAVENOUS
Status: DISCONTINUED | OUTPATIENT
Start: 2024-05-02 | End: 2024-05-08 | Stop reason: HOSPADM

## 2024-05-02 RX ORDER — DIAZEPAM 5 MG
5 TABLET ORAL
Status: CANCELLED | OUTPATIENT
Start: 2024-05-02

## 2024-05-02 RX ORDER — ONDANSETRON 4 MG/1
4 TABLET, ORALLY DISINTEGRATING ORAL EVERY 6 HOURS PRN
Status: DISCONTINUED | OUTPATIENT
Start: 2024-05-02 | End: 2024-05-02

## 2024-05-02 RX ORDER — ASPIRIN 81 MG/1
81 TABLET ORAL DAILY
Status: DISCONTINUED | OUTPATIENT
Start: 2024-05-03 | End: 2024-05-02

## 2024-05-02 RX ORDER — NITROGLYCERIN 5 MG/ML
VIAL (ML) INTRAVENOUS
Status: DISCONTINUED | OUTPATIENT
Start: 2024-05-02 | End: 2024-05-02

## 2024-05-02 RX ORDER — SODIUM CHLORIDE 9 MG/ML
INJECTION, SOLUTION INTRAVENOUS CONTINUOUS
Status: DISCONTINUED | OUTPATIENT
Start: 2024-05-02 | End: 2024-05-02

## 2024-05-02 RX ORDER — ATROPINE SULFATE 0.1 MG/ML
0.5 INJECTION INTRAVENOUS
Status: ACTIVE | OUTPATIENT
Start: 2024-05-02 | End: 2024-05-02

## 2024-05-02 RX ORDER — FENTANYL CITRATE 50 UG/ML
25 INJECTION, SOLUTION INTRAMUSCULAR; INTRAVENOUS
Status: DISCONTINUED | OUTPATIENT
Start: 2024-05-02 | End: 2024-05-05 | Stop reason: SINTOL

## 2024-05-02 RX ORDER — OXYCODONE HYDROCHLORIDE 5 MG/1
10 TABLET ORAL EVERY 4 HOURS PRN
Status: DISCONTINUED | OUTPATIENT
Start: 2024-05-02 | End: 2024-05-08 | Stop reason: HOSPADM

## 2024-05-02 RX ORDER — NALOXONE HYDROCHLORIDE 0.4 MG/ML
0.2 INJECTION, SOLUTION INTRAMUSCULAR; INTRAVENOUS; SUBCUTANEOUS
Status: ACTIVE | OUTPATIENT
Start: 2024-05-02 | End: 2024-05-02

## 2024-05-02 RX ORDER — NALOXONE HYDROCHLORIDE 0.4 MG/ML
0.4 INJECTION, SOLUTION INTRAMUSCULAR; INTRAVENOUS; SUBCUTANEOUS
Status: ACTIVE | OUTPATIENT
Start: 2024-05-02 | End: 2024-05-02

## 2024-05-02 RX ORDER — NITROGLYCERIN 0.4 MG/1
0.4 TABLET SUBLINGUAL EVERY 5 MIN PRN
Status: DISCONTINUED | OUTPATIENT
Start: 2024-05-02 | End: 2024-05-08 | Stop reason: HOSPADM

## 2024-05-02 RX ORDER — SODIUM CHLORIDE 9 MG/ML
INJECTION, SOLUTION INTRAVENOUS CONTINUOUS
Status: ACTIVE | OUTPATIENT
Start: 2024-05-02 | End: 2024-05-02

## 2024-05-02 RX ORDER — HYDRALAZINE HYDROCHLORIDE 20 MG/ML
10 INJECTION INTRAMUSCULAR; INTRAVENOUS EVERY 4 HOURS PRN
Status: DISCONTINUED | OUTPATIENT
Start: 2024-05-02 | End: 2024-05-05 | Stop reason: SINTOL

## 2024-05-02 RX ORDER — ACETAMINOPHEN 325 MG/1
650 TABLET ORAL EVERY 4 HOURS PRN
Status: DISCONTINUED | OUTPATIENT
Start: 2024-05-02 | End: 2024-05-08 | Stop reason: HOSPADM

## 2024-05-02 RX ORDER — POTASSIUM CHLORIDE 1500 MG/1
40 TABLET, EXTENDED RELEASE ORAL ONCE
Status: COMPLETED | OUTPATIENT
Start: 2024-05-02 | End: 2024-05-02

## 2024-05-02 RX ORDER — CLOPIDOGREL BISULFATE 75 MG/1
75 TABLET ORAL DAILY
Status: DISCONTINUED | OUTPATIENT
Start: 2024-05-03 | End: 2024-05-08 | Stop reason: HOSPADM

## 2024-05-02 RX ORDER — ASPIRIN 81 MG/1
81 TABLET ORAL DAILY
Status: DISCONTINUED | OUTPATIENT
Start: 2024-05-03 | End: 2024-05-08 | Stop reason: HOSPADM

## 2024-05-02 RX ORDER — ASPIRIN 325 MG
325 TABLET ORAL ONCE
Status: COMPLETED | OUTPATIENT
Start: 2024-05-02 | End: 2024-05-02

## 2024-05-02 RX ORDER — DIAZEPAM 5 MG
5 TABLET ORAL
Status: DISCONTINUED | OUTPATIENT
Start: 2024-05-02 | End: 2024-05-02

## 2024-05-02 RX ORDER — OXYCODONE HYDROCHLORIDE 5 MG/1
5 TABLET ORAL EVERY 4 HOURS PRN
Status: DISCONTINUED | OUTPATIENT
Start: 2024-05-02 | End: 2024-05-08 | Stop reason: HOSPADM

## 2024-05-02 RX ORDER — LIDOCAINE 40 MG/G
CREAM TOPICAL
Status: DISCONTINUED | OUTPATIENT
Start: 2024-05-02 | End: 2024-05-02

## 2024-05-02 RX ORDER — ONDANSETRON 2 MG/ML
4 INJECTION INTRAMUSCULAR; INTRAVENOUS EVERY 6 HOURS PRN
Status: DISCONTINUED | OUTPATIENT
Start: 2024-05-02 | End: 2024-05-02

## 2024-05-02 RX ORDER — ASPIRIN 81 MG/1
81 TABLET, CHEWABLE ORAL ONCE
Status: DISCONTINUED | OUTPATIENT
Start: 2024-05-02 | End: 2024-05-02

## 2024-05-02 RX ORDER — NITROGLYCERIN 20 MG/100ML
INJECTION INTRAVENOUS CONTINUOUS PRN
Status: DISCONTINUED | OUTPATIENT
Start: 2024-05-02 | End: 2024-05-02

## 2024-05-02 RX ORDER — IODIXANOL 320 MG/ML
INJECTION, SOLUTION INTRAVASCULAR
Status: DISCONTINUED | OUTPATIENT
Start: 2024-05-02 | End: 2024-05-02

## 2024-05-02 RX ORDER — ASPIRIN 81 MG/1
243 TABLET, CHEWABLE ORAL ONCE
Status: COMPLETED | OUTPATIENT
Start: 2024-05-02 | End: 2024-05-02

## 2024-05-02 RX ORDER — FENTANYL CITRATE 50 UG/ML
25 INJECTION, SOLUTION INTRAMUSCULAR; INTRAVENOUS
Status: CANCELLED | OUTPATIENT
Start: 2024-05-02

## 2024-05-02 RX ORDER — FENTANYL CITRATE 50 UG/ML
INJECTION, SOLUTION INTRAMUSCULAR; INTRAVENOUS
Status: DISCONTINUED | OUTPATIENT
Start: 2024-05-02 | End: 2024-05-02

## 2024-05-02 RX ORDER — CLOPIDOGREL BISULFATE 75 MG/1
TABLET ORAL
Status: DISCONTINUED | OUTPATIENT
Start: 2024-05-02 | End: 2024-05-02

## 2024-05-02 RX ORDER — HEPARIN SODIUM 1000 [USP'U]/ML
INJECTION, SOLUTION INTRAVENOUS; SUBCUTANEOUS
Status: DISCONTINUED | OUTPATIENT
Start: 2024-05-02 | End: 2024-05-02

## 2024-05-02 RX ORDER — FLUMAZENIL 0.1 MG/ML
0.2 INJECTION, SOLUTION INTRAVENOUS
Status: ACTIVE | OUTPATIENT
Start: 2024-05-02 | End: 2024-05-02

## 2024-05-02 RX ADMIN — IPRATROPIUM BROMIDE AND ALBUTEROL SULFATE 3 ML: .5; 3 SOLUTION RESPIRATORY (INHALATION) at 19:13

## 2024-05-02 RX ADMIN — POTASSIUM CHLORIDE 40 MEQ: 1500 TABLET, EXTENDED RELEASE ORAL at 08:05

## 2024-05-02 RX ADMIN — ASPIRIN 325 MG ORAL TABLET 325 MG: 325 PILL ORAL at 10:06

## 2024-05-02 RX ADMIN — IPRATROPIUM BROMIDE AND ALBUTEROL SULFATE 3 ML: .5; 3 SOLUTION RESPIRATORY (INHALATION) at 16:52

## 2024-05-02 RX ADMIN — HYDRALAZINE HYDROCHLORIDE 10 MG: 10 TABLET, FILM COATED ORAL at 17:49

## 2024-05-02 RX ADMIN — FUROSEMIDE 20 MG/HR: 10 INJECTION, SOLUTION INTRAVENOUS at 08:05

## 2024-05-02 RX ADMIN — CARVEDILOL 3.12 MG: 3.12 TABLET, FILM COATED ORAL at 17:08

## 2024-05-02 RX ADMIN — DULOXETINE HYDROCHLORIDE 30 MG: 30 CAPSULE, DELAYED RELEASE ORAL at 08:18

## 2024-05-02 RX ADMIN — ATORVASTATIN CALCIUM 40 MG: 40 TABLET, FILM COATED ORAL at 08:06

## 2024-05-02 RX ADMIN — FUROSEMIDE 20 MG/HR: 10 INJECTION, SOLUTION INTRAVENOUS at 02:46

## 2024-05-02 RX ADMIN — APIXABAN 5 MG: 5 TABLET, FILM COATED ORAL at 21:06

## 2024-05-02 RX ADMIN — GABAPENTIN 900 MG: 300 CAPSULE ORAL at 22:00

## 2024-05-02 RX ADMIN — SODIUM CHLORIDE: 9 INJECTION, SOLUTION INTRAVENOUS at 10:06

## 2024-05-02 RX ADMIN — IPRATROPIUM BROMIDE AND ALBUTEROL SULFATE 3 ML: .5; 3 SOLUTION RESPIRATORY (INHALATION) at 07:58

## 2024-05-02 RX ADMIN — SODIUM CHLORIDE: 9 INJECTION, SOLUTION INTRAVENOUS at 16:39

## 2024-05-02 RX ADMIN — ROPINIROLE HYDROCHLORIDE 2 MG: 1 TABLET, FILM COATED ORAL at 05:18

## 2024-05-02 RX ADMIN — ROPINIROLE HYDROCHLORIDE 2 MG: 1 TABLET, FILM COATED ORAL at 16:40

## 2024-05-02 RX ADMIN — ALBUMIN HUMAN 50 G: 0.25 SOLUTION INTRAVENOUS at 08:02

## 2024-05-02 RX ADMIN — CHLOROTHIAZIDE SODIUM 500 MG: 500 INJECTION, POWDER, LYOPHILIZED, FOR SOLUTION INTRAVENOUS at 17:08

## 2024-05-02 RX ADMIN — ASPIRIN 81 MG: 81 TABLET, COATED ORAL at 08:06

## 2024-05-02 RX ADMIN — HYDRALAZINE HYDROCHLORIDE 10 MG: 10 TABLET, FILM COATED ORAL at 08:05

## 2024-05-02 RX ADMIN — CARVEDILOL 3.12 MG: 3.12 TABLET, FILM COATED ORAL at 08:05

## 2024-05-02 RX ADMIN — HYDRALAZINE HYDROCHLORIDE 10 MG: 10 TABLET, FILM COATED ORAL at 21:45

## 2024-05-02 RX ADMIN — ISOSORBIDE MONONITRATE 180 MG: 60 TABLET, EXTENDED RELEASE ORAL at 08:04

## 2024-05-02 RX ADMIN — AMIODARONE HYDROCHLORIDE 200 MG: 200 TABLET ORAL at 08:06

## 2024-05-02 RX ADMIN — POTASSIUM CHLORIDE 40 MEQ: 1500 TABLET, EXTENDED RELEASE ORAL at 18:51

## 2024-05-02 RX ADMIN — FUROSEMIDE 20 MG/HR: 10 INJECTION, SOLUTION INTRAVENOUS at 18:06

## 2024-05-02 RX ADMIN — ROPINIROLE HYDROCHLORIDE 2 MG: 1 TABLET, FILM COATED ORAL at 22:00

## 2024-05-02 RX ADMIN — CHLOROTHIAZIDE SODIUM 500 MG: 500 INJECTION, POWDER, LYOPHILIZED, FOR SOLUTION INTRAVENOUS at 08:01

## 2024-05-02 RX ADMIN — SPIRONOLACTONE 50 MG: 25 TABLET, FILM COATED ORAL at 08:05

## 2024-05-02 RX ADMIN — ALBUMIN HUMAN 50 G: 0.25 SOLUTION INTRAVENOUS at 20:57

## 2024-05-02 RX ADMIN — FUROSEMIDE 20 MG/HR: 10 INJECTION, SOLUTION INTRAVENOUS at 23:08

## 2024-05-02 RX ADMIN — FUROSEMIDE 20 MG/HR: 10 INJECTION, SOLUTION INTRAVENOUS at 13:15

## 2024-05-02 ASSESSMENT — ACTIVITIES OF DAILY LIVING (ADL)
ADLS_ACUITY_SCORE: 29

## 2024-05-02 ASSESSMENT — EJECTION FRACTION: EF_VALUE: .32

## 2024-05-02 NOTE — PRE-PROCEDURE
GENERAL PRE-PROCEDURE:   Procedure:  Cor angio with possible intervention  Date/Time:  5/2/2024 9:46 AM    Risks and benefits: Risks, benefits and alternatives were discussed    Patient states understanding of procedure being performed: Yes    Patient's understanding of procedure matches consent: Yes    Procedure consent matches procedure scheduled: Yes    Appropriately NPO:  Yes  ASA Class:  3 (CAD, previous CABG, biV failure, obesity, Afib, CKD)  Mallampati  :  Grade 3- soft palate visible, posterior pharyngeal wall not visible  Lungs:  Lungs clear with good breath sounds bilaterally  Heart:  Normal heart sounds and rate  History & Physical reviewed:  History and physical reviewed and updates made (see comment)  H&P Comments:  Clinically Significant Risk Factors Present on Admission    Cardiovascular : CAD, previous CABG, BiV failure, ICM, Afib    Fluid & Electrolyte Disorders : Not present on admission    Gastroenterology : Not present on admission    Hematology/Oncology : Not present on admission    Nephrology : CKD, stage 3    Neurology : Not present on admission    Pulmonology : tobacco abuse    Systemic : Obesity    [unfilled]    Statement of review:  I have reviewed the lab findings, diagnostic data, medications, and the plan for sedation

## 2024-05-02 NOTE — PLAN OF CARE
Goal Outcome Evaluation:  [facility] - ICU    RN Progress Note:            Pertinent Assessments:      Please refer to flowsheet rows for full assessment     Pt arrived from cath lab around 1500 had angio with intervention. 3 stents place; 1 stent ramsus and 2 stents proximal OM. Procedure done via right femoral, site intact, CMS intact,able to doppler pulse, and off bedrest at 1730. Pt endorses mild chest pain rates 2/10 which is much improved after procedure. MD aware. Arrived to unit on 3L NC, sats in the mid 90s. LS wheezes noted.           Key Events - This Shift:     -See above.               Barriers to Discharge / Downgrade:     Close monitoring after procedure.           Problem: Adult Inpatient Plan of Care  Goal: Plan of Care Review  Description: The Plan of Care Review/Shift note should be completed every shift.  The Outcome Evaluation is a brief statement about your assessment that the patient is improving, declining, or no change.  This information will be displayed automatically on your shift  note.  Outcome: Progressing     Problem: Adult Inpatient Plan of Care  Goal: Optimal Comfort and Wellbeing  Outcome: Progressing     Problem: Adult Inpatient Plan of Care  Goal: Absence of Hospital-Acquired Illness or Injury  Intervention: Prevent Infection  Recent Flowsheet Documentation  Taken 5/2/2024 1600 by Osmin Sharpe, RN  Infection Prevention:   hand hygiene promoted   rest/sleep promoted

## 2024-05-02 NOTE — PROGRESS NOTES
Talked with patient this morning.     Discussed the indication for coronary angiogram/possible PCI and the risks associated with angiogram including <0.1% of MI and CVA, death or any of the following to the degree that it could threaten patient's life: allergic reaction, arrhythmia, renal failure, hemorrhage, thrombosis and infection.  Risk associated with therapeutic intervention includes 2% or less risk of MI, less than 1% risk of CVA, emergency heart surgery, death, less than 4% risk of vascular injury requiring surgical repair or blood transfusion with 20-30% risk of restenosis with a bare-metal stent and less than 10% risk of stenosis with a drug-eluting stent.  Patient states understanding of procedure and risks and agrees to proceed.    Labs show stable kidney function and stable Hgb  Last dose Eliquis was 5/1 in AM    Consents are on chart  Valium ordered    Dee Dee Elizondo PA-C, MPAS  Structural Heart Program  [facility]

## 2024-05-02 NOTE — PLAN OF CARE
Heart Failure Care Map  GOALS TO BE MET BEFORE DISCHARGE:    1. Decrease congestion and/or edema with diuretic therapy to achieve near optimal volume status.     Dyspnea improved: Yes, satisfactory for discharge.   Edema improved: No, further care required to meet this goal. Please explain          Last 24 hour I/O:   Intake/Output Summary (Last 24 hours) at 5/2/2024 0614  Last data filed at 5/2/2024 0442  Gross per 24 hour   Intake 476.9 ml   Output 3300 ml   Net -2823.1 ml           Net I/O and Weights since admission:   04/02 0700 - 05/02 0659  In: 4268.9 [P.O.:3558; I.V.:510.9]  Out: 8815 [Urine:8815]  Net: -4546.1     Vitals:    04/29/24 1111 04/29/24 1837 04/30/24 0007 05/01/24 0506   Weight: 125.3 kg (276 lb 3.2 oz) 123.9 kg (273 lb 3.2 oz) 125.8 kg (277 lb 6.4 oz) 124.9 kg (275 lb 6.4 oz)       2.  O2 sats > 90% on room air, or at prior home O2 therapy level.      Able to wean O2 this shift to keep sats above 90%?: Yes, satisfactory for discharge.   Does patient use Home O2? NO          Current oxygenation status:   SpO2: 94 %     O2 Device: None (Room air),      3.  Tolerates ambulation and mobility near baseline.     Ambulation: Yes, satisfactory for discharge.   Times patient ambulated with staff this shift: 0    Please review the Heart Failure Care Map for additional HF goal outcomes.    Suyapa Perez RN  5/2/2024    Goal Outcome Evaluation: Pt denies pain overnight. Lasix gtt running as scheduled. 2000 FR maintained. NPO since MN for scheduled angio today. Pt was clipped and CHG wiped on evening shift. RLE wound changed. NSR. A/O. VSS. Will continue to monitor and notify MD of any changes.

## 2024-05-02 NOTE — PROGRESS NOTES
[facility]    Medicine Progress Note - Hospitalist Service    Date of Admission:  4/29/2024    Assessment & Plan   Zackary Hutchison is a 63-year-old man with history of paroxysmal atrial fibrillation on Eliquis, stage III CKD, CAD s/p CABG, ischemic cardiomyopathy s/p cardioMEMS implantation in February 2024, COPD, restless leg syndrome, hypertension, morbid obesity, ischemic cardiomyopathy, admitted on 4/9/2024 with acute exacerbation of HFrEF.      He was placed on furosemide drip on admission.  Cardiologist is assisting with management of acute decompensated HFrEF    Acute decompensated HFrEF  Biventricular heart failure s/p CardioMEMS in February 2024  Continue furosemide drip per cardiology recommendation  Continue albumin per cardiology recommendation  Carvedilol 3.125 mg twice daily  Hydralazine 10 mg 3 times daily  Chlorothiazide 500 mg IV bid   Spironolactone 50 mg daily    Atrial fibrillation  Heart rate is controlled  Amiodarone 200 mg daily  Carvedilol 3.125 mg twice daily  Apixaban 5 mg twice daily-currently on hold    CAD s/p CABG  Aspirin 81 mg daily  Atorvastatin 40 mg every morning  Carvedilol 3.125 mg twice daily  Imdur 180 mg every morning  Atorvastatin 40 mg daily  Coronary angiogram today as planned  Cardiology [id number] assistance    Restless leg syndrome  Ropinirole 2 mg 3 times daily    Hypertension  Continue hydralazine and carvedilol    Type 2 diabetes-new diagnosis  Hemoglobin A1c is elevated at 7.8  Glucose is elevated  Patient declines further glucose checks and treatment with insulin  He agreed to discuss with diabetes educator.    Suspected stage III CKD  Possibly secondary to cardiorenal process  Continue furosemide drip  Monitor BMP  Avoid nephrotoxin  Consider nephrology evaluation if creatinine continues to worsen.    COPD  Continue DuoNeb 4 times daily  Supplemental oxygen as needed      Diet: Fluid restriction 2000 ML FLUID  Combination Diet 2 gm NA  "Diet; Low Saturated Fat Diet, No Caffeine Diet  NPO for Medical/Clinical Reasons Except for: Meds    DVT Prophylaxis: DOAC  Ruvalcaba Catheter: Not present  Lines: None     Cardiac Monitoring: ACTIVE order. Indication: Procedural area  Code Status: Full Code      Clinically Significant Risk Factors        # Hypokalemia: Lowest K = 3.2 mmol/L in last 2 days, will replace as needed    # Hypercalcemia: Highest Ca = 10.4 mg/dL in last 2 days, will monitor as appropriate   # Anion Gap Metabolic Acidosis: Highest Anion Gap = 20 mmol/L in last 2 days, will monitor and treat as appropriate        # Hypertension: Noted on problem list  # Acute heart failure with reduced ejection fraction: last echo with EF <40% and receiving IV diuretics      # DMII: A1C = 7.8 % (Ref range: <5.7 %) within past 6 months, PRESENT ON ADMISSION  # Severe Obesity: Estimated body mass index is 44.45 kg/m  as calculated from the following:    Height as of this encounter: 1.676 m (5' 6\").    Weight as of this encounter: 124.9 kg (275 lb 6.4 oz)., PRESENT ON ADMISSION       # Financial/Environmental Concerns: none  # Support System: poor social support noted in nursing assessment         Disposition Plan     Medically Ready for Discharge: Anticipated in 2-4 Days    Connie Garay MD  Hospitalist Service  [facility]  Securely message with [employer] (more info)  Text page via [employer] Paging/Directory   ______________________________________________________________________    Interval History   No new complaints today and no acute events overnight.  He endorsed occasional wheezes, relieved with DuoNeb.  Scheduled for coronary angiogram today.    Physical Exam   Vital Signs: Temp: 97.9  F (36.6  C) Temp src: Oral BP: 107/59 Pulse: 79   Resp: 18 SpO2: 93 % O2 Device: None (Room air)    Weight: 275 lbs 6.4 oz    General appearance: Morbidly obese, sitting comfortably on the recliner, awake, Alert, Cooperative, not in any obvious " distress and appears stated age   HEENT: Normocephalic, atraumatic, conjunctiva clear without icterus and ears without discharge  Lungs: Bilateral wheezes with diminished air entry.  Cardiovascular: Regular Rate and Rythm, normal apical impulse, normal S1 and S2, bilateral lower extremity edema, pitting   Abdomen: Soft, non-tender and Non-distended, active bowel sounds  Skin: Stasis dermatitis bilaterally and stasis ulcer in the lateral aspect of the right shin   Musculoskeletal: No bony deformities or joint tenderness. Normal ROM upon flexion & extension.   Neurologic: Alert & Oriented X 3, Facial symmetry preserved and upper & lower extremities moving well with symmetry  Psychiatric: Calm, normal eye contact and normal affect      Medical Decision Making       40 MINUTES SPENT BY ME on the date of service doing chart review, history, exam, documentation & further activities per the note.      Data     I have personally reviewed the following data over the past 24 hrs:    N/A  \   N/A   / N/A     135 88 (L) 43.2 (H) /  144 (H)   3.2 (L); 3.2 (L) 27 1.37 (H) \       Imaging results reviewed over the past 24 hrs:   No results found for this or any previous visit (from the past 24 hour(s)).

## 2024-05-02 NOTE — PLAN OF CARE
[facility]     RN Progress Note:        Key Events - This Shift:       - Pt A/O x4, denies pain, denies SOB  - Lasix gtt continued   - Pt prepped for angio procedure and was taken for procedure at around 1230  - Pt to be admitted to ICU post procedure    Deandre Menchaca RN

## 2024-05-02 NOTE — PROGRESS NOTES
Called to cath lab to place pt on BiPAP. 12/6 16 45%. Pt appears comfortable with normal RR and Vt. Transferred to [facility] on BiPAP no complications.    At ~ 1540 pt taken off BiPAP and placed on 3L NC. Respiratory effort normal. BS expiratory wheeze. Transferred to ICU. Report given to oncoming RT.    Idania Tellez, RT

## 2024-05-02 NOTE — PROGRESS NOTES
HEART CARE NOTE          Assessment/Recommendations   1. Severe Biventricular dysfunction/HFrEF c/b cardiogenic shock  Assessment / Plan  Diuresing well on current regimen - no changes at this time; continue furosemide gtt and Diuril BID; continue to monitor UOP and renal function closely  Patient is high risk for adverse cardiac events 2/2 severe biventricular dysfunction, multiple HFHs, non-compliance with dietary restrictions, renal dysfunction, CAD  GDMT as detailed below; repeat echo ordered     Current Pharmacotherapy AHA Guideline-Directed Medical Therapy   Lisinopril 5 mg daily  - held Lisinopril 20 mg twice daily   Carvedilol 3.125 mg BID  Carvedilol 25 mg twice daily   Spironolactone 50 mg daily Spironolactone 25 mg once daily   Hydralazine NA Hydralazine 100 mg three times daily   Isosorbide mononitrate 180 mg daily Isosorbide dinitrate 40 mg three times daily   SGLT2 inhibitor:Dapagliflozin/Empagliflozin - not started Dapagliflozin or Empagliflozin 10 mg daily      2. Atrial fibrillation  Assessment / Plan  Currently on apixaban and amiodarone     3. CAD c/b ICM  Assessment / Plan  S/p CABG 2015  Episode of chest burning with initiation of dobutamine gtt concerning for GI vs ischemia - given concerning ECG changes will proceed with coronary angiogram +/- PCI today. I discussed this with him including potential risk of stroke, myocardial infarction, or death.  We also discussed the possibility of vascular injury, bleeding requiring blood transfusion, or reaction to x-ray dye     4. PRINCESS on CKD  Assessment / Plan  Likely CRS; diuresis as above; continue to monitor UOP and renal function closely    Plan of care discussed on May 2, 2024 with patient at bedside, and primary team overseeing patient's care      History of Present Illness/Subjective    Mr. Zackary Hutchison is a 62 year old male with a PMHx significant for CAD s/p CABG, afib, HFrEF and CKD who presents to in [facility]      Today, Mr. Hutchison denies acute  cardiac events or complaints; chest burning resolved with holding dobutamine - concerning for ischemia; Management plan as detailed above     ECG: Personally reviewed. unchanged from previous tracings, normal sinus rhythm, nonspecific ST and T waves changes, PAC's noted.     Repeat echo cardiogram personally reviewed 5/2/24:  1. Technically difficult study.  2. The left ventricle is normal in size. Image quality does not provide for  detailed assessment of LV systolic function, but is felt to be normal with a  visually estimated ejection fraction of roughly 55%.  3. No significant valvular heart disease is identified on this study though  the sensitivity, particularly of regurgitant lesions, is reduced due to poor  Doppler acoustics.  4. Right ventricular size and systolic performance could not be accurately  assessed due to inadequate visualization.     The acoustic quality of this study is fairly poor. If a more accurate  assessment of left ventricular systolicperformance, regional wall motion, and  other morphology/function is required; would recommend cardiac MRI or other  alternative imaging modality for further evaluation.     When compared to the prior real-time echocardiogram dated 25 December 2023,  the ejection fraction appears somewhat higher on the current study though  accurate comparison is somewhat limited due to suboptimal acoustic imaging not  only on the current examination, but also hold the prior study as well.    ECHO (personnaly Reviewed on 4/29/24):   1. Technically very difficult study.  2. Left ventricular chamber size and wall thickness are normal. Systolic  function is mildly reduced with global hypokinesis. The calculated left  ventricular ejection fraction is 41%.  3. Right ventricle is not well visualized. Right ventricular chamber size and  systolic function are grossly normal.  4. No hemodynamically significant valvular abnormalities although the cardiac  valves are not well  "visualized.  5. Compared to the prior study dated 9/9/2023, there has been no significant  change    Telemetry: personally reviewed May 2, 2024; notable for sinus rhythm     Lab results: personally reviewed May 2, 2024; notable for PRINCESS on CKD    Medical history and pertinent documents reviewed in Care Everywhere please where applicable see details above        Physical Examination Review of Systems   /70 (BP Location: Left arm)   Pulse 82   Temp 97.9  F (36.6  C) (Oral)   Resp 20   Ht 1.676 m (5' 6\")   Wt 124.9 kg (275 lb 6.4 oz)   SpO2 94%   BMI 44.45 kg/m    Body mass index is 44.45 kg/m .  Wt Readings from Last 3 Encounters:   05/01/24 124.9 kg (275 lb 6.4 oz)   04/25/24 126.1 kg (278 lb)   04/25/24 126.1 kg (278 lb)     General Appearance:   no distress, normal body habitus   ENT/Mouth: membranes moist, no oral lesions or bleeding gums.      EYES:  no scleral icterus, normal conjunctivae   Neck: no carotid bruits or thyromegaly   Chest/Lungs:   lungs are clear to auscultation, no rales or wheezing, equal chest wall expansion    Cardiovascular:   Regular. Normal first and second heart sounds with no murmurs, rubs, or gallops; the carotid, radial and posterior tibial pulses are intact, + JVD and LE edema bilaterally    Abdomen:  no organomegaly, masses, bruits, or tenderness; bowel sounds are present   Extremities: no cyanosis or clubbing   Skin: no xanthelasma, warm.    Neurologic: NAD     Psychiatric: alert and oriented x3, calm     A complete 10 systems ROS was reviewed  And is negative except what is listed in the HPI.          Medical History  Surgical History Family History Social History   Past Medical History:   Diagnosis Date    Atrial fibrillation (H)     Chronic kidney disease     Chronic kidney disease, stage 3b (H) 09/28/2023    Class 3 severe obesity due to excess calories with body mass index (BMI) of 40.0 to 44.9 in adult (H) 09/28/2023    Congestive heart failure (H)     COPD (chronic " obstructive pulmonary disease) (H)     Coronary artery disease involving coronary bypass graft of native heart without angina pectoris 09/28/2023    Heart failure with reduced ejection fraction (H) 09/11/2023    HFrEF (heart failure with reduced ejection fraction) (H) 09/11/2023    Hyperlipidemia     Hypertension     Ischemic cardiomyopathy 09/28/2023    Obese     Paroxysmal atrial fibrillation (H) 09/28/2023    Tobacco abuse 09/28/2023    Past Surgical History:   Procedure Laterality Date    CORONARY ANGIOGRAPHY ADULT ORDER      CORONARY ARTERY BYPASS Left 2014    2 vessels    CV CARDIOMEMS WITH RIGHT HEART CATH N/A 2/15/2024    Procedure: Pulmonary Arterial Pressure Sensor Placement;  Surgeon: Jacey Bhandari MD;  Location: [facility] CATH LAB CV    CV CORONARY ANGIOGRAM N/A 09/11/2023    Procedure: Coronary Angiogram;  Surgeon: Santy Esposito MD;  Location: [facility] CATH LAB CV    CV LEFT HEART CATH N/A 09/11/2023    Procedure: Left Heart Catheterization;  Surgeon: Santy Esposito MD;  Location: [facility] CATH LAB CV    CV RIGHT HEART CATH MEASUREMENTS RECORDED N/A 09/11/2023    Procedure: Right Heart Catheterization;  Surgeon: Santy Esposito MD;  Location: [facility] CATH LAB CV    no family history of premature coronary artery disease Social History     Socioeconomic History    Marital status: Single     Spouse name: Not on file    Number of children: Not on file    Years of education: Not on file    Highest education level: Not on file   Occupational History    Occupation: Retired restaurant owner     Comment: [employer]   Tobacco Use    Smoking status: Every Day     Current packs/day: 0.50     Types: Cigarettes    Smokeless tobacco: Never    Tobacco comments:     Seen IP by [facility] on 9/11/23 and declined cessation services and materials   Substance and Sexual Activity    Alcohol use: Not Currently    Drug use: Not on file    Sexual activity: Not on file   Other Topics Concern    Not on file   Social  "History Narrative    Currently lives in an assisted living facility. (Updated: 01/08/2024)     Social Determinants of Health     Financial Resource Strain: Not on file   Food Insecurity: Not on file   Transportation Needs: Not on file   Physical Activity: Not on file   Stress: Not on file   Social Connections: Not on file   Interpersonal Safety: Not on file   Housing Stability: Not on file           Lab Results    Chemistry/lipid CBC Cardiac Enzymes/BNP/TSH/INR   Lab Results   Component Value Date    BUN 34.7 (H) 05/01/2024     05/01/2024    CO2 29 05/01/2024    Lab Results   Component Value Date    WBC 8.4 04/30/2024    HGB 11.3 (L) 04/30/2024    HCT 37.2 (L) 04/30/2024    MCV 95 04/30/2024     04/30/2024    Lab Results   Component Value Date    TSH 1.70 04/29/2024    INR 1.01 02/13/2023     No results found for: \"CKTOTAL\", \"CKMB\", \"TROPONINI\"       Weight:    Wt Readings from Last 3 Encounters:   05/01/24 124.9 kg (275 lb 6.4 oz)   04/25/24 126.1 kg (278 lb)   04/25/24 126.1 kg (278 lb)       Allergies  No Known Allergies      Surgical History  Past Surgical History:   Procedure Laterality Date    CORONARY ANGIOGRAPHY ADULT ORDER      CORONARY ARTERY BYPASS Left 2014    2 vessels    CV CARDIOMEMS WITH RIGHT HEART CATH N/A 2/15/2024    Procedure: Pulmonary Arterial Pressure Sensor Placement;  Surgeon: Jacey Bhandari MD;  Location: [facility] CATH LAB CV    CV CORONARY ANGIOGRAM N/A 09/11/2023    Procedure: Coronary Angiogram;  Surgeon: Santy Esposito MD;  Location: [facility] CATH LAB CV    CV LEFT HEART CATH N/A 09/11/2023    Procedure: Left Heart Catheterization;  Surgeon: Santy Esposito MD;  Location: [facility] CATH LAB CV    CV RIGHT HEART CATH MEASUREMENTS RECORDED N/A 09/11/2023    Procedure: Right Heart Catheterization;  Surgeon: Santy Esposito MD;  Location: [facility] CATH LAB CV       Social History  Tobacco:   History   Smoking Status    Every Day    Types: Cigarettes   Smokeless " Tobacco    Never    Alcohol:   Social History    Substance and Sexual Activity      Alcohol use: Not Currently   Illicit Drugs:   History   Drug Use Not on file       Family History  Family History   Problem Relation Age of Onset    Cerebrovascular Disease Mother     Myocardial Infarction Father           Wen-Radha Bhandari MD on 5/2/2024      cc: Reid Escobar

## 2024-05-03 ENCOUNTER — APPOINTMENT (OUTPATIENT)
Dept: OCCUPATIONAL THERAPY | Facility: HOSPITAL | Age: 64
DRG: 321 | End: 2024-05-03
Attending: INTERNAL MEDICINE
Payer: MEDICARE

## 2024-05-03 LAB
ANION GAP SERPL CALCULATED.3IONS-SCNC: 18 MMOL/L (ref 7–15)
ATRIAL RATE - MUSE: 82 BPM
ATRIAL RATE - MUSE: 84 BPM
BUN SERPL-MCNC: 48.7 MG/DL (ref 8–23)
CALCIUM SERPL-MCNC: 10.9 MG/DL (ref 8.8–10.2)
CHLORIDE SERPL-SCNC: 89 MMOL/L (ref 98–107)
CREAT SERPL-MCNC: 1.6 MG/DL (ref 0.67–1.17)
DEPRECATED HCO3 PLAS-SCNC: 30 MMOL/L (ref 22–29)
DIASTOLIC BLOOD PRESSURE - MUSE: NORMAL MMHG
DIASTOLIC BLOOD PRESSURE - MUSE: NORMAL MMHG
EGFRCR SERPLBLD CKD-EPI 2021: 48 ML/MIN/1.73M2
GLUCOSE BLDC GLUCOMTR-MCNC: 170 MG/DL (ref 70–99)
GLUCOSE BLDC GLUCOMTR-MCNC: 275 MG/DL (ref 70–99)
GLUCOSE SERPL-MCNC: 156 MG/DL (ref 70–99)
INTERPRETATION ECG - MUSE: NORMAL
INTERPRETATION ECG - MUSE: NORMAL
P AXIS - MUSE: 105 DEGREES
P AXIS - MUSE: 65 DEGREES
POTASSIUM SERPL-SCNC: 3.5 MMOL/L (ref 3.4–5.3)
POTASSIUM SERPL-SCNC: 4 MMOL/L (ref 3.4–5.3)
PR INTERVAL - MUSE: 178 MS
PR INTERVAL - MUSE: 182 MS
QRS DURATION - MUSE: 140 MS
QRS DURATION - MUSE: 142 MS
QT - MUSE: 452 MS
QT - MUSE: 454 MS
QTC - MUSE: 528 MS
QTC - MUSE: 536 MS
R AXIS - MUSE: 80 DEGREES
R AXIS - MUSE: 82 DEGREES
SODIUM SERPL-SCNC: 137 MMOL/L (ref 135–145)
SYSTOLIC BLOOD PRESSURE - MUSE: NORMAL MMHG
SYSTOLIC BLOOD PRESSURE - MUSE: NORMAL MMHG
T AXIS - MUSE: 31 DEGREES
T AXIS - MUSE: 64 DEGREES
VENTRICULAR RATE- MUSE: 82 BPM
VENTRICULAR RATE- MUSE: 84 BPM

## 2024-05-03 PROCEDURE — 120N000001 HC R&B MED SURG/OB

## 2024-05-03 PROCEDURE — 97535 SELF CARE MNGMENT TRAINING: CPT | Mod: GO

## 2024-05-03 PROCEDURE — 258N000003 HC RX IP 258 OP 636: Performed by: INTERNAL MEDICINE

## 2024-05-03 PROCEDURE — 80048 BASIC METABOLIC PNL TOTAL CA: CPT | Performed by: INTERNAL MEDICINE

## 2024-05-03 PROCEDURE — 82310 ASSAY OF CALCIUM: CPT | Performed by: INTERNAL MEDICINE

## 2024-05-03 PROCEDURE — 250N000013 HC RX MED GY IP 250 OP 250 PS 637: Performed by: INTERNAL MEDICINE

## 2024-05-03 PROCEDURE — P9047 ALBUMIN (HUMAN), 25%, 50ML: HCPCS | Performed by: INTERNAL MEDICINE

## 2024-05-03 PROCEDURE — 250N000011 HC RX IP 250 OP 636: Performed by: INTERNAL MEDICINE

## 2024-05-03 PROCEDURE — 97165 OT EVAL LOW COMPLEX 30 MIN: CPT | Mod: GO

## 2024-05-03 PROCEDURE — 97110 THERAPEUTIC EXERCISES: CPT | Mod: GO

## 2024-05-03 PROCEDURE — 999N000157 HC STATISTIC RCP TIME EA 10 MIN

## 2024-05-03 PROCEDURE — 94640 AIRWAY INHALATION TREATMENT: CPT | Mod: 76

## 2024-05-03 PROCEDURE — 250N000009 HC RX 250: Performed by: INTERNAL MEDICINE

## 2024-05-03 PROCEDURE — 99233 SBSQ HOSP IP/OBS HIGH 50: CPT | Performed by: INTERNAL MEDICINE

## 2024-05-03 PROCEDURE — 36415 COLL VENOUS BLD VENIPUNCTURE: CPT | Performed by: INTERNAL MEDICINE

## 2024-05-03 PROCEDURE — 93005 ELECTROCARDIOGRAM TRACING: CPT | Performed by: INTERNAL MEDICINE

## 2024-05-03 PROCEDURE — 94660 CPAP INITIATION&MGMT: CPT

## 2024-05-03 PROCEDURE — 99232 SBSQ HOSP IP/OBS MODERATE 35: CPT | Performed by: INTERNAL MEDICINE

## 2024-05-03 PROCEDURE — 94640 AIRWAY INHALATION TREATMENT: CPT

## 2024-05-03 RX ORDER — NALOXONE HYDROCHLORIDE 0.4 MG/ML
0.4 INJECTION, SOLUTION INTRAMUSCULAR; INTRAVENOUS; SUBCUTANEOUS
Status: DISCONTINUED | OUTPATIENT
Start: 2024-05-03 | End: 2024-05-08 | Stop reason: HOSPADM

## 2024-05-03 RX ORDER — ALBUMIN (HUMAN) 12.5 G/50ML
50 SOLUTION INTRAVENOUS 2 TIMES DAILY
Status: COMPLETED | OUTPATIENT
Start: 2024-05-03 | End: 2024-05-03

## 2024-05-03 RX ORDER — POTASSIUM CHLORIDE 1500 MG/1
20 TABLET, EXTENDED RELEASE ORAL ONCE
Status: COMPLETED | OUTPATIENT
Start: 2024-05-03 | End: 2024-05-03

## 2024-05-03 RX ORDER — NALOXONE HYDROCHLORIDE 0.4 MG/ML
0.2 INJECTION, SOLUTION INTRAMUSCULAR; INTRAVENOUS; SUBCUTANEOUS
Status: DISCONTINUED | OUTPATIENT
Start: 2024-05-03 | End: 2024-05-08 | Stop reason: HOSPADM

## 2024-05-03 RX ADMIN — IPRATROPIUM BROMIDE AND ALBUTEROL SULFATE 3 ML: .5; 3 SOLUTION RESPIRATORY (INHALATION) at 08:37

## 2024-05-03 RX ADMIN — CARVEDILOL 3.12 MG: 3.12 TABLET, FILM COATED ORAL at 19:42

## 2024-05-03 RX ADMIN — IPRATROPIUM BROMIDE AND ALBUTEROL SULFATE 3 ML: .5; 3 SOLUTION RESPIRATORY (INHALATION) at 15:34

## 2024-05-03 RX ADMIN — APIXABAN 5 MG: 5 TABLET, FILM COATED ORAL at 19:42

## 2024-05-03 RX ADMIN — FUROSEMIDE 20 MG/HR: 10 INJECTION, SOLUTION INTRAVENOUS at 04:53

## 2024-05-03 RX ADMIN — APIXABAN 5 MG: 5 TABLET, FILM COATED ORAL at 08:50

## 2024-05-03 RX ADMIN — ASPIRIN 81 MG: 81 TABLET, COATED ORAL at 08:49

## 2024-05-03 RX ADMIN — HYDRALAZINE HYDROCHLORIDE 10 MG: 10 TABLET, FILM COATED ORAL at 20:34

## 2024-05-03 RX ADMIN — ROPINIROLE HYDROCHLORIDE 2 MG: 1 TABLET, FILM COATED ORAL at 08:57

## 2024-05-03 RX ADMIN — ATORVASTATIN CALCIUM 40 MG: 40 TABLET, FILM COATED ORAL at 08:53

## 2024-05-03 RX ADMIN — HYDRALAZINE HYDROCHLORIDE 10 MG: 10 TABLET, FILM COATED ORAL at 08:54

## 2024-05-03 RX ADMIN — POTASSIUM CHLORIDE 20 MEQ: 1500 TABLET, EXTENDED RELEASE ORAL at 04:06

## 2024-05-03 RX ADMIN — HYDRALAZINE HYDROCHLORIDE 10 MG: 20 INJECTION INTRAMUSCULAR; INTRAVENOUS at 04:08

## 2024-05-03 RX ADMIN — DULOXETINE HYDROCHLORIDE 30 MG: 30 CAPSULE, DELAYED RELEASE ORAL at 08:44

## 2024-05-03 RX ADMIN — AMIODARONE HYDROCHLORIDE 200 MG: 200 TABLET ORAL at 08:45

## 2024-05-03 RX ADMIN — ALBUMIN HUMAN 50 G: 0.25 SOLUTION INTRAVENOUS at 08:42

## 2024-05-03 RX ADMIN — ROPINIROLE HYDROCHLORIDE 2 MG: 1 TABLET, FILM COATED ORAL at 15:31

## 2024-05-03 RX ADMIN — CARVEDILOL 3.12 MG: 3.12 TABLET, FILM COATED ORAL at 08:45

## 2024-05-03 RX ADMIN — SPIRONOLACTONE 50 MG: 25 TABLET, FILM COATED ORAL at 08:50

## 2024-05-03 RX ADMIN — IPRATROPIUM BROMIDE AND ALBUTEROL SULFATE 3 ML: .5; 3 SOLUTION RESPIRATORY (INHALATION) at 20:16

## 2024-05-03 RX ADMIN — ISOSORBIDE MONONITRATE 180 MG: 60 TABLET, EXTENDED RELEASE ORAL at 08:44

## 2024-05-03 RX ADMIN — ALBUTEROL SULFATE 2.5 MG: 2.5 SOLUTION RESPIRATORY (INHALATION) at 02:14

## 2024-05-03 RX ADMIN — CLOPIDOGREL BISULFATE 75 MG: 75 TABLET ORAL at 09:08

## 2024-05-03 RX ADMIN — GABAPENTIN 900 MG: 300 CAPSULE ORAL at 21:27

## 2024-05-03 RX ADMIN — HYDRALAZINE HYDROCHLORIDE 10 MG: 10 TABLET, FILM COATED ORAL at 13:41

## 2024-05-03 ASSESSMENT — ACTIVITIES OF DAILY LIVING (ADL)
ADLS_ACUITY_SCORE: 29
ADLS_ACUITY_SCORE: 29
ADLS_ACUITY_SCORE: 30
ADLS_ACUITY_SCORE: 29
ADLS_ACUITY_SCORE: 30
ADLS_ACUITY_SCORE: 29
ADLS_ACUITY_SCORE: 29
ADLS_ACUITY_SCORE: 30
ADLS_ACUITY_SCORE: 29
ADLS_ACUITY_SCORE: 29
ADLS_ACUITY_SCORE: 30
ADLS_ACUITY_SCORE: 29
ADLS_ACUITY_SCORE: 29
ADLS_ACUITY_SCORE: 30
ADLS_ACUITY_SCORE: 29
ADLS_ACUITY_SCORE: 29

## 2024-05-03 NOTE — PROGRESS NOTES
Patient declines BIPAP overnight. PRN neb was given at 0214 for wheezing. No change in BS post neb treatment. Patient reports no shortness of breath.     RT will follow.     Arlene Bianchi, RT

## 2024-05-03 NOTE — PROGRESS NOTES
Cardiac rehab      05/03/24 0735   Appointment Info   Signing Clinician's Name / Credentials (OT) Carolyn Man OTR/L   Living Environment   People in Home facility resident   Current Living Arrangements assisted living   Home Accessibility no concerns   Transportation Anticipated health plan transportation   Living Environment Comments Pt lives on one level in [facility] pt ind. w/ Self cares   Self-Care   Usual Activity Tolerance good   Current Activity Tolerance moderate   Equipment Currently Used at Home walker, rolling;grab bar, toilet;grab bar, tub/shower;dressing device  (SA and reacher)   Fall history within last six months no   Activity/Exercise/Self-Care Comment Ind. w/ ADL tasks   Instrumental Activities of Daily Living (IADL)   IADL Comments Pt gets assist w/ cooking/cleaning/transportation at [facility]   General Information   Onset of Illness/Injury or Date of Surgery 04/29/24   Referring Physician Connie Garay MD   Patient/Family Therapy Goal Statement (OT) dc home   Additional Occupational Profile Info/Pertinent History of Current Problem 63-year-old man with history of paroxysmal atrial fibrillation on Eliquis, stage III CKD, CAD s/p CABG, ischemic cardiomyopathy s/p cardioMEMS implantation in February 2024, COPD, restless leg syndrome, hypertension, morbid obesity, ischemic cardiomyopathy, admitted on 4/9/2024 with acute exacerbation of HFrEF.   Existing Precautions/Restrictions cardiac   Cognitive Status Examination   Orientation Status orientation to person, place and time   Range of Motion Comprehensive   General Range of Motion no range of motion deficits identified   Strength Comprehensive (MMT)   General Manual Muscle Testing (MMT) Assessment no strength deficits identified   Transfers   Transfers sit-stand transfer   Sit-Stand Transfer   Sit-Stand [place]ter (Transfers) supervision   Assistive Device (Sit-Stand Transfers) walker, 4-wheeled   Balance   Balance Assessment sitting static balance    Sitting Balance: Static supervision   Position, Sitting Balance unsupported   Activities of Daily Living   BADL Assessment/Intervention lower body dressing   Lower Body Dressing Assessment/Training   Position (Lower Body Dressing) supported sitting   Reserve Level (Lower Body Dressing) minimum assist (75% patient effort);pants/bottoms   Clinical Impression   Criteria for Skilled Therapeutic Interventions Met (OT) Yes, treatment indicated   OT Diagnosis decreased act. tolerance   OT Problem List-Impairments impacting ADL problems related to;activity tolerance impaired;strength;post-surgical precautions   Assessment of Occupational Performance 1-3 Performance Deficits   Identified Performance Deficits endurance/act. tolerance   Planned Therapy Interventions (OT) ADL retraining   Clinical Decision Making Complexity (OT) problem focused assessment/low complexity   Risk & Benefits of therapy have been explained evaluation/treatment results reviewed;patient   OT Total Evaluation Time   OT Eval, Low Complexity Minutes (91282) 12   OT Goals   Therapy Frequency (OT) One time eval and treatment   OT Predicted Duration/Target Date for Goal Attainment 05/03/24   OT Goals Cardiac Phase 1   OT: Understanding of cardiac education to maximize quality of life, condition management, and health outcomes Patient;Verbalize;Demonstrate   OT: Perform aerobic activity with stable cardiovascular response continuous;15 minutes;ambulation   OT: Functional/aerobic ambulation tolerance with stable cardiovascular response in order to return to home and community environment Modified independent;Greater than 300 feet   Interventions   Interventions Quick Adds Self-Care/Home Management;Therapeutic Procedures/Exercise;Cardiac Rehab   Self-Care/Home Management   Self-Care/Home Mgmt/ADL, Compensatory, Meal Prep Minutes (65294) 8   Symptoms Noted During/After Treatment (Meal Preparation/Planning Training) fatigue   Treatment Detail/Skilled  Intervention Eval completed tx. intiated- Pt educated/trained in cardiac education pt verbalizing understanding of prec. following angio, outpatient cardiac rehab. Pt up in recliner upon OT arrival pt completed mult. STS SBA cues for tech, pt required assist for LB dressing d/t grippy socks getting stuck pt does have reacher that he can use to aid in more ind. LB dressing.   Therapeutic Procedures/Exercise   Therapeutic Procedure: strength, endurance, ROM, flexibillity minutes (29959) 10   Symptoms Noted During/After Treatment fatigue   Treatment Detail/Skilled Intervention see ambulation tab   Treatment Time Includes (CR Only) See specific exercise details intervention group(s)   Ambulation   Workload 350ft   Symptoms Denies symptoms;Fatigue   Cardiovascular Response Hypertensive response to exercise   Exercise Details SBA w/ 4ww slow pace 1 standing rest break   Vital Signs Details pre act. HR 92 /81 post HR 95 /87 RN aware of elevated BP   Cardiac Education   Education Provided Diagnosis;Daily weights;Home exercise program;Outpatient Cardiac Rehab;Precautions;Resuming home activities;Signs and symptoms   Education Packet Given to Patient Yes   All Patient Education Handouts Reviewed with Patient and/or Family Yes   Cardiac Rehab Phase II Plan   Phase II Order Received No   Phase II Appointment Status Not scheduled   Not Scheduled Reason Other (see comments)  (order placed not scheduled)   OT Discharge Planning   OT Plan dc cardiac rehab   OT Discharge Recommendation (DC Rec) home with assist   OT Rationale for DC Rec Pt lives in [facility] per report- ind. w/ ADL routine has SA/reacher for LB dressing, has assist w/ meals/cleaning. No further skilled OT needs plan for pt to d/c w/ outpatient cardiac rehab.   Total Session Time   Timed Code Treatment Minutes 18   Total Session Time (sum of timed and untimed services) 30

## 2024-05-03 NOTE — PLAN OF CARE
Cardiac Rehab Discharge Summary    Reason for therapy discharge:    All goals and outcomes met, no further needs identified.    Progress towards therapy goal(s). See goals on Care Plan in Epic electronic health record for goal details.  Goals met    Therapy recommendation(s):    Discharge back to to [facility], pt moving well w/ SBA/supervision w/ 4ww no LOB no c/o- discharge w/ outpatient cardiac rehab     ALISON Shi, OTR/L, 5/3/2024, 8:44 AM

## 2024-05-03 NOTE — PROGRESS NOTES
HEART CARE NOTE          Assessment/Recommendations   1. Severe Biventricular dysfunction/HFrEF c/b severe ADHF  Assessment / Plan  Progressive PRINCESS on am labs - hold loop diuretic and IV Diuril; continue albumin bolus x1; continue to monitor UOP and renal function closely  Patient is high risk for adverse cardiac events 2/2 severe biventricular dysfunction, multiple HFHs, non-compliance with dietary restrictions, renal dysfunction, CAD  GDMT as detailed below; repeat echo ordered     Current Pharmacotherapy AHA Guideline-Directed Medical Therapy   Lisinopril 5 mg daily  - held Lisinopril 20 mg twice daily   Carvedilol 3.125 mg BID  Carvedilol 25 mg twice daily   Spironolactone 50 mg daily Spironolactone 25 mg once daily   Hydralazine NA Hydralazine 100 mg three times daily   Isosorbide mononitrate 180 mg daily Isosorbide dinitrate 40 mg three times daily   SGLT2 inhibitor:Dapagliflozin/Empagliflozin - not started Dapagliflozin or Empagliflozin 10 mg daily      2. Atrial fibrillation  Assessment / Plan  Currently on apixaban and amiodarone     3. CAD c/b ICM  Assessment / Plan  S/p CABG 2015  Now s/op PCI to Ramus and OM with DARRELL  Denies chest pain or anginal equivalents  Continue ASA, atorvastatin, carvedilol, clopidogrel     4. PRINCESS on CKD  Assessment / Plan  Progressive; diuresis on hold; continue to monitor UOP and renal function closely    Plan of care discussed on May 3, 2024 with patient at bedside, and primary team overseeing patient's care      History of Present Illness/Subjective    Mr. Zackary Hutchison is a 62 year old male with a PMHx significant for CAD s/p CABG, afib, HFrEF and CKD who presents to in ADHF      Today, Mr. Hutchison denies acute cardiac events or complaints; Management plan as detailed above     ECG: Personally reviewed. unchanged from previous tracings, normal sinus rhythm, nonspecific ST and T waves changes, PAC's noted.     Repeat echo cardiogram personally reviewed 5/2/24:  1. Technically  difficult study.  2. The left ventricle is normal in size. Image quality does not provide for  detailed assessment of LV systolic function, but is felt to be normal with a  visually estimated ejection fraction of roughly 55%.  3. No significant valvular heart disease is identified on this study though  the sensitivity, particularly of regurgitant lesions, is reduced due to poor  Doppler acoustics.  4. Right ventricular size and systolic performance could not be accurately  assessed due to inadequate visualization.     The acoustic quality of this study is fairly poor. If a more accurate  assessment of left ventricular systolicperformance, regional wall motion, and  other morphology/function is required; would recommend cardiac MRI or other  alternative imaging modality for further evaluation.     When compared to the prior real-time echocardiogram dated 25 December 2023,  the ejection fraction appears somewhat higher on the current study though  accurate comparison is somewhat limited due to suboptimal acoustic imaging not  only on the current examination, but also hold the prior study as well.     ECHO (personnaly Reviewed on 4/29/24):   1. Technically very difficult study.  2. Left ventricular chamber size and wall thickness are normal. Systolic  function is mildly reduced with global hypokinesis. The calculated left  ventricular ejection fraction is 41%.  3. Right ventricle is not well visualized. Right ventricular chamber size and  systolic function are grossly normal.  4. No hemodynamically significant valvular abnormalities although the cardiac  valves are not well visualized.  5. Compared to the prior study dated 9/9/2023, there has been no significant  change    Telemetry: personally reviewed May 3, 2024; notable for sinus rhythm     Lab results: personally reviewed May 3, 2024; notable for progressive PRINCESS    Medical history and pertinent documents reviewed in Care Everywhere please where applicable see  "details above        Physical Examination Review of Systems   BP (!) 142/83   Pulse 86   Temp 97.6  F (36.4  C) (Oral)   Resp 20   Ht 1.676 m (5' 6\")   Wt 124.9 kg (275 lb 6.4 oz)   SpO2 97%   BMI 44.45 kg/m    Body mass index is 44.45 kg/m .  Wt Readings from Last 3 Encounters:   05/01/24 124.9 kg (275 lb 6.4 oz)   04/25/24 126.1 kg (278 lb)   04/25/24 126.1 kg (278 lb)     General Appearance:   no distress, normal body habitus   ENT/Mouth: membranes moist, no oral lesions or bleeding gums.      EYES:  no scleral icterus, normal conjunctivae   Neck: no carotid bruits or thyromegaly   Chest/Lungs:   lungs are clear to auscultation, no rales or wheezing, equal chest wall expansion    Cardiovascular:   Regular. Normal first and second heart sounds with no murmurs, rubs, or gallops; the carotid, radial and posterior tibial pulses are intact, no JVD and mild LE edema bilaterally    Abdomen:  no organomegaly, masses, bruits, or tenderness; bowel sounds are present   Extremities: no cyanosis or clubbing   Skin: no xanthelasma, warm.    Neurologic: NAD     Psychiatric: alert and oriented x3, calm     A complete 10 systems ROS was reviewed  And is negative except what is listed in the HPI.          Medical History  Surgical History Family History Social History   Past Medical History:   Diagnosis Date    Atrial fibrillation (H)     Chronic kidney disease     Chronic kidney disease, stage 3b (H) 09/28/2023    Class 3 severe obesity due to excess calories with body mass index (BMI) of 40.0 to 44.9 in adult (H) 09/28/2023    Congestive heart failure (H)     COPD (chronic obstructive pulmonary disease) (H)     Coronary artery disease involving coronary bypass graft of native heart without angina pectoris 09/28/2023    Heart failure with reduced ejection fraction (H) 09/11/2023    HFrEF (heart failure with reduced ejection fraction) (H) 09/11/2023    Hyperlipidemia     Hypertension     Ischemic cardiomyopathy 09/28/2023 "    Obese     Paroxysmal atrial fibrillation (H) 09/28/2023    Tobacco abuse 09/28/2023    Past Surgical History:   Procedure Laterality Date    CORONARY ANGIOGRAPHY ADULT ORDER      CORONARY ARTERY BYPASS Left 2014    2 vessels    CV CARDIOMEMS WITH RIGHT HEART CATH N/A 2/15/2024    Procedure: Pulmonary Arterial Pressure Sensor Placement;  Surgeon: Jacey Bhandari MD;  Location: [facility] CATH LAB CV    CV CORONARY ANGIOGRAM N/A 09/11/2023    Procedure: Coronary Angiogram;  Surgeon: Santy Esposito MD;  Location: [facility] CATH LAB CV    CV LEFT HEART CATH N/A 09/11/2023    Procedure: Left Heart Catheterization;  Surgeon: Santy Esposito MD;  Location: [facility] CATH LAB CV    CV RIGHT HEART CATH MEASUREMENTS RECORDED N/A 09/11/2023    Procedure: Right Heart Catheterization;  Surgeon: Santy Esposito MD;  Location: [facility] CATH LAB CV    no family history of premature coronary artery disease Social History     Socioeconomic History    Marital status: Single     Spouse name: Not on file    Number of children: Not on file    Years of education: Not on file    Highest education level: Not on file   Occupational History    Occupation: Retired restaurant owner     Comment: [employer]   Tobacco Use    Smoking status: Every Day     Current packs/day: 0.50     Types: Cigarettes    Smokeless tobacco: Never    Tobacco comments:     Seen IP by [facility] on 9/11/23 and declined cessation services and materials   Substance and Sexual Activity    Alcohol use: Not Currently    Drug use: Not on file    Sexual activity: Not on file   Other Topics Concern    Not on file   Social History Narrative    Currently lives in an assisted living facility. (Updated: 01/08/2024)     Social Determinants of Health     Financial Resource Strain: Not on file   Food Insecurity: Not on file   Transportation Needs: Not on file   Physical Activity: Not on file   Stress: Not on file   Social Connections: Not on file   Interpersonal Safety: Not on file  "  Housing Stability: Not on file           Lab Results    Chemistry/lipid CBC Cardiac Enzymes/BNP/TSH/INR   Lab Results   Component Value Date    CHOL 121 05/02/2024    HDL 27 (L) 05/02/2024    TRIG 219 (H) 05/02/2024    BUN 48.7 (H) 05/03/2024     05/03/2024    CO2 30 (H) 05/03/2024    Lab Results   Component Value Date    WBC 8.4 04/30/2024    HGB 11.3 (L) 04/30/2024    HCT 37.2 (L) 04/30/2024    MCV 95 04/30/2024     04/30/2024    Lab Results   Component Value Date    TSH 1.70 04/29/2024    INR 1.01 02/13/2023     No results found for: \"CKTOTAL\", \"CKMB\", \"TROPONINI\"       Weight:    Wt Readings from Last 3 Encounters:   05/01/24 124.9 kg (275 lb 6.4 oz)   04/25/24 126.1 kg (278 lb)   04/25/24 126.1 kg (278 lb)       Allergies  No Known Allergies      Surgical History  Past Surgical History:   Procedure Laterality Date    CORONARY ANGIOGRAPHY ADULT ORDER      CORONARY ARTERY BYPASS Left 2014    2 vessels    CV CARDIOMEMS WITH RIGHT HEART CATH N/A 2/15/2024    Procedure: Pulmonary Arterial Pressure Sensor Placement;  Surgeon: Jacey Bhandari MD;  Location: [facility] CATH LAB CV    CV CORONARY ANGIOGRAM N/A 09/11/2023    Procedure: Coronary Angiogram;  Surgeon: Santy Esposito MD;  Location: [facility] CATH LAB CV    CV LEFT HEART CATH N/A 09/11/2023    Procedure: Left Heart Catheterization;  Surgeon: Santy Esposito MD;  Location: [facility] CATH LAB CV    CV RIGHT HEART CATH MEASUREMENTS RECORDED N/A 09/11/2023    Procedure: Right Heart Catheterization;  Surgeon: Santy Esposito MD;  Location: [facility] CATH LAB CV       Social History  Tobacco:   History   Smoking Status    Every Day    Types: Cigarettes   Smokeless Tobacco    Never    Alcohol:   Social History    Substance and Sexual Activity      Alcohol use: Not Currently   Illicit Drugs:   History   Drug Use Not on file       Family History  Family History   Problem Relation Age of Onset    Cerebrovascular Disease Mother     Myocardial " Infarction Father           Jacey Bhandari MD on 5/3/2024      cc: Reid Escobar

## 2024-05-03 NOTE — PROGRESS NOTES
Care Management Follow Up    Length of Stay (days): 4    Expected Discharge Date: 05/07/2024    Anticipated Discharge Plan:  home     Transportation: TBD    PT Recommendations:    OT Recommendations:   home with assist  SLP Recommendations:       Barriers to Discharge: medical stability, IV abx, O2     Prior Living Situation:   Patient lives alone at [facility] apartment. Report independent with ADLs, but needs assist with IADLs. Has walker. Open to [facility] for RN. Goal is to return home. Cab voucher or [employer] for transport.       Advanced Directive on File: Advance Care Planning Reviewed: verified with patient      Patient/Spokesperson Updated: No    Additional Information:  Patient with PMH of paroxysmal atrial fibrillation on Eliquis, stage III CKD, CAD s/p CABG, ischemic cardiomyopathy s/p cardioMEMS implantation in February 2024, COPD, restless leg syndrome, hypertension, morbid obesity, ischemic cardiomyopathy, admitted on 4/9/2024 with acute exacerbation of HFrEF.   Cardiology following.  WOC for right lateral ankle/shin wound: dressing changes prn-Mepilex, every other day dressing change at discharge.       5/3/24:  Patient is not medically ready for discharge planning.  Therapy recommending home.  Will plan for return home with resumption of [facility] for RN.               Lainey Fox RN

## 2024-05-03 NOTE — CONSULTS
NUTRITION EDUCATION    REASON FOR ASSESSMENT:  Provider Order-  diet education     NUTRITION HISTORY:  Information obtained from patient    Met with pt at bedside this AM. Pt previously seen for T2DM consistent carbohydrate diet and low sodium diet education.     CURRENT DIET:  Low Saturated Fat, Na <2400 mg, 2000 ml fluid restriction     NUTRITION DIAGNOSIS:  Food- and nutrition-related knowledge deficit R/t CAD AEB need for heart healthy nutrition education.     INTERVENTIONS:  Implementation:      *  Nutrition Education (Content):   A)  Provided handouts- Heart healthy consistent carbohydrate nutrition therapy, heart healthy label reading.    B)  Discussed handouts, saturated and trans fats, choosing lean protein, low-fat/non-fat dairy, choosing unsaturated oils, reading labels.       *  Nutrition Education (Application):   A)  Discussed current eating habits and recommended alternative food choices      *  Anticipate good compliance      *  Diet Education - refer to Education Flowsheet    Goals:      *  Patient will verbalize understanding of diet      *  All of the above goals met during the education session    Follow Up/Monitoring:      *  Provided RD contact information for future questions      *  Recommended Out-Patient Nutrition Referral, if further diet instructions are needed

## 2024-05-03 NOTE — PLAN OF CARE
Goal Outcome Evaluation:             [facility] - ICU    RN Progress Note:            Pertinent Assessments:      Please refer to flowsheet rows for full assessment     R Groin Access Site WNL.  Pt Denies chest pain or SOB.  Pt on 1L Nasal Canula while sleeping.  Room Air this morning while up in chair.          Key Events - This Shift:     Pt Hypertensive, gave Hydralazine x1.  Increase in Expiratory wheezes overnight, PRN Neb given with improvement,  Changed dressing on R shin.          Barriers to Discharge / Downgrade:     None

## 2024-05-03 NOTE — PROGRESS NOTES
"/66   Pulse 83   Temp 97.8  F (36.6  C)   Resp 20   Ht 1.676 m (5' 6\")   Wt 120.2 kg (265 lb)   SpO2 93%   BMI 42.77 kg/m        No Known Allergies       Intake/Output Summary (Last 24 hours) at 5/3/2024 1713  Last data filed at 5/3/2024 0602  Gross per 24 hour   Intake 2306.09 ml   Output 2400 ml   Net -93.91 ml      Past Medical History:   Diagnosis Date    Atrial fibrillation (H)     Chronic kidney disease     Chronic kidney disease, stage 3b (H) 09/28/2023    Class 3 severe obesity due to excess calories with body mass index (BMI) of 40.0 to 44.9 in adult (H) 09/28/2023    Congestive heart failure (H)     COPD (chronic obstructive pulmonary disease) (H)     Coronary artery disease involving coronary bypass graft of native heart without angina pectoris 09/28/2023    Heart failure with reduced ejection fraction (H) 09/11/2023    HFrEF (heart failure with reduced ejection fraction) (H) 09/11/2023    Hyperlipidemia     Hypertension     Ischemic cardiomyopathy 09/28/2023    Obese     Paroxysmal atrial fibrillation (H) 09/28/2023    Tobacco abuse 09/28/2023      Results for orders placed or performed during the hospital encounter of 04/29/24   XR Chest Port 1 View    Impression    IMPRESSION: No focal consolidation or edema. No pleural effusion or pneumothorax. Stable heart size and mediastinal contours postoperative findings from CABG.          Pt declines NPV- V60 standby at bedside.  Will discontinue Bipap if pt does not use tonight.  Pt on O2 2 lpm via NC.  Pt has good voicing. Pt speaks in complete sentences. Pt alert and oriented. Pt denies SOB at this time. No accessory muscle . No nasal flaring. No retractions.     Breath sounds remain few fine crackles with coarse exp wheezes noted.  Wheezing is greatest when neck ausculted and radiates down.   No change post txs noted. Pt given Douneb for nebulized bronchodilator therapy.   "

## 2024-05-03 NOTE — PROGRESS NOTES
[facility]    Medicine Progress Note - Hospitalist Service    Date of Admission:  4/29/2024    Assessment & Plan   Zackary Hutchison is a 63-year-old man with history of paroxysmal atrial fibrillation on Eliquis, stage III CKD, CAD s/p CABG, ischemic cardiomyopathy s/p cardioMEMS implantation in February 2024, COPD, restless leg syndrome, hypertension, morbid obesity, ischemic cardiomyopathy, admitted on 4/9/2024 with acute exacerbation of HFrEF.      He was placed on furosemide drip on admission.  Cardiologist is assisting with management of acute decompensated HFrEF.  Coronary angiogram performed 5/2/2024 revealed extensive coronary artery disease including 100% stenosis in both the ostium to proximal segments of LAD and RCA, along with other significant stenoses for which cardiologist recommends apixaban, aspirin and Plavix.      Acute decompensated HFrEF  Biventricular heart failure s/p CardioMEMS in February 2024  Continue furosemide drip per cardiology recommendation  Continue albumin per cardiology recommendation  Carvedilol 3.125 mg twice daily  Hydralazine 10 mg 3 times daily  Chlorothiazide 500 mg IV bid   Spironolactone 50 mg daily    Atrial fibrillation  Heart rate is controlled  Amiodarone 200 mg daily  Carvedilol 3.125 mg twice daily  Apixaban 5 mg twice daily-currently on hold    CAD s/p CABG  Coronary angiogram performed 5/2/2024 revealed extensive coronary artery disease including 100% stenosis in both the ostium to proximal segments of LAD and RCA, along with other significant stenoses for which cardiologist recommends apixaban and Plavix.    Aspirin 81 mg daily  Plavix 75 mg daily  Apixaban 5 mg twice daily  Atorvastatin 40 mg every morning  Carvedilol 3.125 mg twice daily  Imdur 180 mg every morning  Atorvastatin 40 mg daily  Cardiology [id number] assistance    Restless leg syndrome  Ropinirole 2 mg 3 times daily    Hypertension  Continue hydralazine and  "carvedilol    Type 2 diabetes-new diagnosis  Hemoglobin A1c is elevated at 7.8  Glucose is elevated  Patient declines further glucose checks and treatment with insulin  He agreed to discuss with diabetes educator.    Suspected stage III CKD  Possibly secondary to cardiorenal process vs contrast exposure from coronary angiogram  Continue to hold diuretic therapy for now  Monitor BMP  Avoid nephrotoxin  Consider nephrology evaluation if creatinine continues to worsen.    COPD  Continue DuoNeb 4 times daily  Supplemental oxygen as needed      Diet: Fluid restriction 2000 ML FLUID  Low Saturated Fat Na <2400 mg    DVT Prophylaxis: DOAC  Ruvalcaba Catheter: Not present  Lines: None     Cardiac Monitoring: ACTIVE order. Indication: Post- PCI/Angiogram (24 hours)  Code Status: Full Code      Clinically Significant Risk Factors        # Hypokalemia: Lowest K = 3.2 mmol/L in last 2 days, will replace as needed    # Hypercalcemia: Highest Ca = 10.9 mg/dL in last 2 days, will monitor as appropriate   # Anion Gap Metabolic Acidosis: Highest Anion Gap = 20 mmol/L in last 2 days, will monitor and treat as appropriate        # Hypertension: Noted on problem list  # Acute heart failure with reduced ejection fraction: last echo with EF <40% and receiving IV diuretics      # DMII: A1C = 7.8 % (Ref range: <5.7 %) within past 6 months, PRESENT ON ADMISSION  # Severe Obesity: Estimated body mass index is 42.77 kg/m  as calculated from the following:    Height as of this encounter: 1.676 m (5' 6\").    Weight as of this encounter: 120.2 kg (265 lb)., PRESENT ON ADMISSION       # Financial/Environmental Concerns: none  # Support System: poor social support noted in nursing assessment         Disposition Plan     Medically Ready for Discharge: Anticipated in 2-4 Days    Connie Garay MD  Hospitalist Service  [facility]  Securely message with [employer] (more info)  Text page via [employer] Paging/Directory "   ______________________________________________________________________    Interval History   No new complaints today and no acute events overnight. Leg swelling and breathing have improved.    Physical Exam   Vital Signs: Temp: 97.6  F (36.4  C) Temp src: Oral BP: 139/66 Pulse: 82   Resp: 20 SpO2: (!) 89 % O2 Device: None (Room air) Oxygen Delivery: 12 LPM  Weight: 265 lbs 0 oz    General appearance: Morbidly obese, sitting comfortably on the recliner, awake, Alert, Cooperative, not in any obvious distress and appears stated age   HEENT: Normocephalic, atraumatic, conjunctiva clear without icterus and ears without discharge  Lungs: Bilateral wheezes with diminished air entry.  Cardiovascular: Regular Rate and Rythm, normal apical impulse, normal S1 and S2, bilateral lower extremity edema, pitting   Abdomen: Soft, non-tender and Non-distended, active bowel sounds  Skin: Stasis dermatitis bilaterally and stasis ulcer in the lateral aspect of the right shin   Musculoskeletal: No bony deformities or joint tenderness. Normal ROM upon flexion & extension.   Neurologic: Alert & Oriented X 3, Facial symmetry preserved and upper & lower extremities moving well with symmetry  Psychiatric: Calm, normal eye contact and normal affect      Medical Decision Making       40 MINUTES SPENT BY ME on the date of service doing chart review, history, exam, documentation & further activities per the note.      Data     I have personally reviewed the following data over the past 24 hrs:    N/A  \   N/A   / N/A     137 89 (L) 48.7 (H) /  170 (H)   4.0 30 (H) 1.60 (H) \       Imaging results reviewed over the past 24 hrs:   Recent Results (from the past 24 hour(s))   Cardiac Catheterization    Narrative      Ost LAD to Prox LAD lesion is 100% stenosed.    Ramus lesion is 95% stenosed.    Mid Cx to Dist Cx lesion is 80% stenosed.    1st Mrg lesion is 90% stenosed.    Prox RCA to Dist RCA lesion is 100% stenosed.    Mid LAD lesion is 70%  stenosed.    1.  LAD is occluded proximally.  Mid to distal LAD fills well from large,   widely patent LIMA graft.  LAD proximal to graft insertion site has   calcified stenosis compromising flow to large proximal diagonal  2.  Calcified 90% proximal stenosis in medium size ramus branch.  Ostium   ?covered by occluded LAD stent.  3.  Severely calcified stenosis proximal segment of large first obtuse   marginal branch of left circumflex.  4.  Chronic total mid RCA occlusion.  PDA fills from patent free LAURI   graft.  Posterolateral branches fill from collaterals from left   circumflex.  5.  LVEDP = 30 mmHg  6.  Successful PCI ramus branch with 2.5 x 24 mm Synergy DARRELL implant x 1.    30% eccentric residual with BROOKLYNN-3 flow.  7.  Successful PCI LCx OM branch with DARRELL implant x 2: 3.0 x 24 Synergy   DARRELL x 1, 2.5 x 16 Synergy DARRELL x 1.  0% residual, BROOKLYNN-3 flow.

## 2024-05-04 LAB
ANION GAP SERPL CALCULATED.3IONS-SCNC: 21 MMOL/L (ref 7–15)
BUN SERPL-MCNC: 77.3 MG/DL (ref 8–23)
CALCIUM SERPL-MCNC: 10.5 MG/DL (ref 8.8–10.2)
CHLORIDE SERPL-SCNC: 88 MMOL/L (ref 98–107)
CREAT SERPL-MCNC: 2.6 MG/DL (ref 0.67–1.17)
DEPRECATED HCO3 PLAS-SCNC: 24 MMOL/L (ref 22–29)
EGFRCR SERPLBLD CKD-EPI 2021: 27 ML/MIN/1.73M2
GLUCOSE SERPL-MCNC: 150 MG/DL (ref 70–99)
POTASSIUM SERPL-SCNC: 3.6 MMOL/L (ref 3.4–5.3)
POTASSIUM SERPL-SCNC: 3.6 MMOL/L (ref 3.4–5.3)
SODIUM SERPL-SCNC: 133 MMOL/L (ref 135–145)

## 2024-05-04 PROCEDURE — 999N000156 HC STATISTIC RCP CONSULT EA 30 MIN

## 2024-05-04 PROCEDURE — 99223 1ST HOSP IP/OBS HIGH 75: CPT | Performed by: INTERNAL MEDICINE

## 2024-05-04 PROCEDURE — 94640 AIRWAY INHALATION TREATMENT: CPT | Mod: 76

## 2024-05-04 PROCEDURE — 94640 AIRWAY INHALATION TREATMENT: CPT

## 2024-05-04 PROCEDURE — 250N000009 HC RX 250: Performed by: INTERNAL MEDICINE

## 2024-05-04 PROCEDURE — 84132 ASSAY OF SERUM POTASSIUM: CPT | Performed by: INTERNAL MEDICINE

## 2024-05-04 PROCEDURE — 99233 SBSQ HOSP IP/OBS HIGH 50: CPT | Performed by: INTERNAL MEDICINE

## 2024-05-04 PROCEDURE — 250N000013 HC RX MED GY IP 250 OP 250 PS 637: Performed by: INTERNAL MEDICINE

## 2024-05-04 PROCEDURE — 120N000001 HC R&B MED SURG/OB

## 2024-05-04 PROCEDURE — P9047 ALBUMIN (HUMAN), 25%, 50ML: HCPCS | Performed by: INTERNAL MEDICINE

## 2024-05-04 PROCEDURE — 99232 SBSQ HOSP IP/OBS MODERATE 35: CPT | Performed by: INTERNAL MEDICINE

## 2024-05-04 PROCEDURE — 80048 BASIC METABOLIC PNL TOTAL CA: CPT | Performed by: INTERNAL MEDICINE

## 2024-05-04 PROCEDURE — 36415 COLL VENOUS BLD VENIPUNCTURE: CPT | Performed by: INTERNAL MEDICINE

## 2024-05-04 PROCEDURE — 999N000157 HC STATISTIC RCP TIME EA 10 MIN

## 2024-05-04 PROCEDURE — 250N000011 HC RX IP 250 OP 636: Performed by: INTERNAL MEDICINE

## 2024-05-04 RX ORDER — SENNOSIDES 8.6 MG
8.6 TABLET ORAL 2 TIMES DAILY PRN
Status: DISCONTINUED | OUTPATIENT
Start: 2024-05-04 | End: 2024-05-08 | Stop reason: HOSPADM

## 2024-05-04 RX ORDER — POLYETHYLENE GLYCOL 3350 17 G/17G
17 POWDER, FOR SOLUTION ORAL DAILY PRN
Status: DISCONTINUED | OUTPATIENT
Start: 2024-05-04 | End: 2024-05-08 | Stop reason: HOSPADM

## 2024-05-04 RX ORDER — GABAPENTIN 300 MG/1
300 CAPSULE ORAL AT BEDTIME
Status: CANCELLED | OUTPATIENT
Start: 2024-05-04

## 2024-05-04 RX ORDER — ALBUMIN (HUMAN) 12.5 G/50ML
50 SOLUTION INTRAVENOUS ONCE
Status: COMPLETED | OUTPATIENT
Start: 2024-05-04 | End: 2024-05-04

## 2024-05-04 RX ORDER — POTASSIUM CHLORIDE 1500 MG/1
20 TABLET, EXTENDED RELEASE ORAL ONCE
Status: COMPLETED | OUTPATIENT
Start: 2024-05-04 | End: 2024-05-04

## 2024-05-04 RX ADMIN — ROPINIROLE HYDROCHLORIDE 2 MG: 1 TABLET, FILM COATED ORAL at 22:48

## 2024-05-04 RX ADMIN — ATORVASTATIN CALCIUM 40 MG: 40 TABLET, FILM COATED ORAL at 08:21

## 2024-05-04 RX ADMIN — IPRATROPIUM BROMIDE AND ALBUTEROL SULFATE 3 ML: .5; 3 SOLUTION RESPIRATORY (INHALATION) at 11:52

## 2024-05-04 RX ADMIN — POTASSIUM CHLORIDE 20 MEQ: 1500 TABLET, EXTENDED RELEASE ORAL at 08:21

## 2024-05-04 RX ADMIN — ROPINIROLE HYDROCHLORIDE 2 MG: 1 TABLET, FILM COATED ORAL at 15:21

## 2024-05-04 RX ADMIN — CARVEDILOL 3.12 MG: 3.12 TABLET, FILM COATED ORAL at 08:20

## 2024-05-04 RX ADMIN — CARVEDILOL 3.12 MG: 3.12 TABLET, FILM COATED ORAL at 18:30

## 2024-05-04 RX ADMIN — ASPIRIN 81 MG: 81 TABLET, COATED ORAL at 08:20

## 2024-05-04 RX ADMIN — ISOSORBIDE MONONITRATE 180 MG: 60 TABLET, EXTENDED RELEASE ORAL at 08:39

## 2024-05-04 RX ADMIN — APIXABAN 5 MG: 5 TABLET, FILM COATED ORAL at 08:20

## 2024-05-04 RX ADMIN — IPRATROPIUM BROMIDE AND ALBUTEROL SULFATE 3 ML: .5; 3 SOLUTION RESPIRATORY (INHALATION) at 20:24

## 2024-05-04 RX ADMIN — CLOPIDOGREL BISULFATE 75 MG: 75 TABLET ORAL at 08:20

## 2024-05-04 RX ADMIN — HYDRALAZINE HYDROCHLORIDE 10 MG: 10 TABLET, FILM COATED ORAL at 14:11

## 2024-05-04 RX ADMIN — ROPINIROLE HYDROCHLORIDE 2 MG: 1 TABLET, FILM COATED ORAL at 08:22

## 2024-05-04 RX ADMIN — GABAPENTIN 900 MG: 300 CAPSULE ORAL at 22:48

## 2024-05-04 RX ADMIN — HYDRALAZINE HYDROCHLORIDE 10 MG: 10 TABLET, FILM COATED ORAL at 20:15

## 2024-05-04 RX ADMIN — IPRATROPIUM BROMIDE AND ALBUTEROL SULFATE 3 ML: .5; 3 SOLUTION RESPIRATORY (INHALATION) at 08:42

## 2024-05-04 RX ADMIN — ROPINIROLE HYDROCHLORIDE 2 MG: 1 TABLET, FILM COATED ORAL at 00:05

## 2024-05-04 RX ADMIN — SPIRONOLACTONE 50 MG: 25 TABLET, FILM COATED ORAL at 08:21

## 2024-05-04 RX ADMIN — ALBUMIN HUMAN 50 G: 0.25 SOLUTION INTRAVENOUS at 14:11

## 2024-05-04 RX ADMIN — IPRATROPIUM BROMIDE AND ALBUTEROL SULFATE 3 ML: .5; 3 SOLUTION RESPIRATORY (INHALATION) at 16:32

## 2024-05-04 RX ADMIN — HYDRALAZINE HYDROCHLORIDE 10 MG: 10 TABLET, FILM COATED ORAL at 08:20

## 2024-05-04 RX ADMIN — AMIODARONE HYDROCHLORIDE 200 MG: 200 TABLET ORAL at 08:19

## 2024-05-04 RX ADMIN — ACETAMINOPHEN 650 MG: 325 TABLET ORAL at 15:11

## 2024-05-04 RX ADMIN — DULOXETINE HYDROCHLORIDE 30 MG: 30 CAPSULE, DELAYED RELEASE ORAL at 08:21

## 2024-05-04 RX ADMIN — APIXABAN 5 MG: 5 TABLET, FILM COATED ORAL at 20:15

## 2024-05-04 ASSESSMENT — ACTIVITIES OF DAILY LIVING (ADL)
ADLS_ACUITY_SCORE: 30
ADLS_ACUITY_SCORE: 29
ADLS_ACUITY_SCORE: 30
ADLS_ACUITY_SCORE: 29
ADLS_ACUITY_SCORE: 30
ADLS_ACUITY_SCORE: 29
ADLS_ACUITY_SCORE: 30
ADLS_ACUITY_SCORE: 29
ADLS_ACUITY_SCORE: 30
ADLS_ACUITY_SCORE: 29
ADLS_ACUITY_SCORE: 30
ADLS_ACUITY_SCORE: 29
ADLS_ACUITY_SCORE: 29
ADLS_ACUITY_SCORE: 30
ADLS_ACUITY_SCORE: 29

## 2024-05-04 NOTE — PROGRESS NOTES
[facility]    Medicine Progress Note - Hospitalist Service    Date of Admission:  4/29/2024    Assessment & Plan   Zackary Hutchison is a 63-year-old man with history of paroxysmal atrial fibrillation on Eliquis, stage III CKD, CAD s/p CABG, ischemic cardiomyopathy, COPD, restless leg syndrome, hypertension, morbid obesity, ischemic cardiomyopathy, admitted on 4/9/2024 with acute exacerbation of HFrEF.      He was placed on furosemide drip on admission.  Cardiologist is assisting with management of acute decompensated HFrEF.  Coronary angiogram performed 5/2/2024 revealed extensive coronary artery disease including 100% stenosis in both the ostium to proximal segments of LAD and RCA, along with other significant stenoses for which cardiologist recommends apixaban, aspirin and Plavix.    Hospital course was complicated by PRINCESS on stage III CKD possibly secondary to cardiorenal process versus diuretic therapy versus contrast exposure.  Posted nephrologist to assist with management of PRINCESS.    Acute decompensated HFrEF  Biventricular heart failure s/p CardioMEMS in February 2024  Off furosemide drip and chlorothiazide due to PRINCESS  Continue albumin per cardiology recommendation  Carvedilol 3.125 mg twice daily  Hydralazine 10 mg 3 times daily  Hold spironolactone 50 mg daily for now     Atrial fibrillation  Heart rate is controlled  Amiodarone 200 mg daily  Carvedilol 3.125 mg twice daily  Apixaban 5 mg twice daily-currently on hold    CAD s/p CABG  Coronary angiogram performed 5/2/2024 revealed extensive coronary artery disease including 100% stenosis in both the ostium to proximal segments of LAD and RCA, along with other significant stenoses for which cardiologist recommends apixaban and Plavix.    Aspirin 81 mg daily  Plavix 75 mg daily  Apixaban 5 mg twice daily  Atorvastatin 40 mg every morning  Carvedilol 3.125 mg twice daily  Imdur 180 mg every morning  Atorvastatin 40 mg daily  Cardiology  "[id number] assistance    Restless leg syndrome  Ropinirole 2 mg 3 times daily    Hypertension  Continue hydralazine and carvedilol    Type 2 diabetes-new diagnosis  Hemoglobin A1c is elevated at 7.8  Glucose is elevated  Patient declines further glucose checks and treatment with insulin  He agreed to discuss with diabetes educator.    PRINCESS on suspected stage III CKD  Possibly secondary to cardiorenal process vs contrast exposure from coronary angiogram  Creatinine is worsening  Continue to hold diuretic therapy for now  Monitor BMP  Avoid nephrotoxin  Follow-up nephrology consult    COPD  Continue DuoNeb 4 times daily  Supplemental oxygen as needed      Diet: Low Saturated Fat Na <2400 mg  Fluid restriction 1800 ML FLUID    DVT Prophylaxis: DOAC  Ruvalcaba Catheter: Not present  Lines: None     Cardiac Monitoring: ACTIVE order. Indication: Post- PCI/Angiogram (24 hours)  Code Status: Full Code      Clinically Significant Risk Factors           # Hypercalcemia: Highest Ca = 10.9 mg/dL in last 2 days, will monitor as appropriate   # Anion Gap Metabolic Acidosis: Highest Anion Gap = 21 mmol/L in last 2 days, will monitor and treat as appropriate       # Acute Kidney Injury, unspecified: based on a >150% or 0.3 mg/dL increase in last creatinine compared to past 90 day average, will monitor renal function  # Hypertension: Noted on problem list  # Acute heart failure with reduced ejection fraction: last echo with EF <40% and receiving IV diuretics      # DMII: A1C = 7.8 % (Ref range: <5.7 %) within past 6 months   # Severe Obesity: Estimated body mass index is 43.72 kg/m  as calculated from the following:    Height as of this encounter: 1.676 m (5' 6\").    Weight as of this encounter: 122.9 kg (270 lb 14.4 oz).        # Financial/Environmental Concerns: none  # Support System: poor social support noted in nursing assessment         Disposition Plan     Medically Ready for Discharge: Anticipated in 2-4 " Days    Connie Garay MD  Hospitalist Service  [facility]  Securely message with [employer] (more info)  Text page via [employer] Paging/Directory   ______________________________________________________________________    Interval History   No new complaints today and there were no acute events overnight. He reported intermittent wheezing, which was relieved with DuoNeb. He mentioned using DuoNebs four times daily prior to admission. Currently, he is breathing comfortably on room air. He states that he was able to ambulate in the hallway yesterday without any difficulty.    Physical Exam   Vital Signs: Temp: 97.9  F (36.6  C) Temp src: Oral BP: 131/71 Pulse: 74   Resp: 20 SpO2: 93 % O2 Device: None (Room air) Oxygen Delivery: 2 LPM  Weight: 270 lbs 14.4 oz    General appearance: Morbidly obese, sitting comfortably on the recliner, awake, Alert, Cooperative, not in any obvious distress and appears stated age   HEENT: Normocephalic, atraumatic, conjunctiva clear without icterus and ears without discharge  Lungs: Bilateral wheezes with diminished air entry.  Cardiovascular: Regular Rate and Rythm, normal apical impulse, normal S1 and S2, bilateral lower extremity edema, pitting   Abdomen: Soft, non-tender and Non-distended, active bowel sounds  Skin: Stasis dermatitis bilaterally and stasis ulcer in the lateral aspect of the right shin   Musculoskeletal: No bony deformities or joint tenderness. Normal ROM upon flexion & extension.   Neurologic: Alert & Oriented X 3, Facial symmetry preserved and upper & lower extremities moving well with symmetry  Psychiatric: Calm, normal eye contact and normal affect      Medical Decision Making       40 MINUTES SPENT BY ME on the date of service doing chart review, history, exam, documentation & further activities per the note.      Data     I have personally reviewed the following data over the past 24 hrs:    N/A  \   N/A   / N/A     133 (L) 88 (L) 77.3 (H)  /  150 (H)   3.6; 3.6 24 2.60 (H) \       Imaging results reviewed over the past 24 hrs:   No results found for this or any previous visit (from the past 24 hour(s)).

## 2024-05-04 NOTE — TREATMENT PLAN
"/71 (BP Location: Left arm)   Pulse 74   Temp 97.9  F (36.6  C) (Oral)   Resp 20   Ht 1.676 m (5' 6\")   Wt 122.9 kg (270 lb 14.4 oz)   SpO2 93%   BMI 43.72 kg/m        No Known Allergies     RCAT Treatment Plan    Patient Score: 7  Patient Acuity: 4    Clinical Indication for Therapy: CHF - exp wheezing    Therapy Ordered: Douneb QID - per pt home regimen. Pt on RA.   Assessment Summary: Good voicing. No accessory muscle use. No nasal flaring. No retractions. Pt speaks in complete sentences. Pt alert and oriented. Pt denies SOB.     Breath sounds - some decreased exp wheezes, greatest when neck auscultated and radiates down. NPC. No change post nebulized therapy.      Elder Tariq, RT  5/4/2024   "

## 2024-05-04 NOTE — PLAN OF CARE
PRIMARY DIAGNOSIS: CONGESTIVE HEART FAILURE  Dyspnea improved and O2 sats >88% at RA or at prior home O2 therapy level: No        SpO2: 95 %, O2 Device: None (Room air)  Vitals:    05/03/24 0600 05/03/24 0634 05/04/24 0826   Weight: 124.6 kg (274 lb 11.1 oz) 120.2 kg (265 lb) 122.9 kg (270 lb 14.4 oz)        ECHO and other diagnostic testing complete (if applicable): Yes    Return to near baseline physical activity: Yes    Discharge Planner Nurse   Safe discharge environment identified: Yes  Barriers to discharge: No       Entered by: Josie Pierson RN 05/04/2024 5:36 PM     Please review provider order for any additional goals.   Nurse to notify provider when observation goals have been met and patient is ready for discharge.    Pt ambulated in the hallway. Continues to have SOB with activity. Lung sounds diminished with expiratory wheezes. Nebs administered per RT. Pt has bilateral LE edema +2. Pt stated that he already has heart failure education book and didn't want to watch the HF video. He tries to follow a low sodium diet while eating at his assisted living facility.     Pt states that he has not had a BM since prior to his angiogram. He does not want to take any laxatives at this time. MD ordered some PRN laxatives if pt changes his mind. BS active and pt is passing gas.     Pt's femoral angio site is soft with minimal bruising. Dressing removed d/t pulling on his skin. No oozing noted. Site cleaned.

## 2024-05-04 NOTE — CONSULTS
[facility]  [facility]   Nephrology Consultation/Initial Inpatient Care    Zackary Hutchison   MRN: [id number]  : 1960   DOA: 2024     REASON FOR CONSULTATION: We are asked to see pt by Dr Garay for ARF/CKD    HISTORY OF PRESENT ILLNESS:63 year old male referred from cardiology clinic to ER on  due to volume overload; dx acute decompensated CJF and started on IV lasix and then lasix gtt  Cr 1.2 on day of admission; brisk diuresis in last several days with 3-4 L out; had started dobutamine on  due to only modest diuresis; also added diuril  Noting some rise in CR on 5/3/24 so diuretics held and given some albumin; Cr up further today and we are asked to see  Had angio on  with 290 ml of contrast    Had previous admit in December for fluid overload and weeping edematous legs  Still feels puffy; says urine output has gone down since off diuretics  No NSAIDs  Says sometimes has trouble emptying bladder when he is constipated          REVIEW OF SYSTEMS:  ROS was completely reviewed and otherwise negative and non-contributory    Past Medical History:   Diagnosis Date    Atrial fibrillation (H)     Chronic kidney disease     Chronic kidney disease, stage 3b (H) 2023    Class 3 severe obesity due to excess calories with body mass index (BMI) of 40.0 to 44.9 in adult (H) 2023    Congestive heart failure (H)     COPD (chronic obstructive pulmonary disease) (H)     Coronary artery disease involving coronary bypass graft of native heart without angina pectoris 2023    Heart failure with reduced ejection fraction (H) 2023    HFrEF (heart failure with reduced ejection fraction) (H) 2023    Hyperlipidemia     Hypertension     Ischemic cardiomyopathy 2023    Obese     Paroxysmal atrial fibrillation (H) 2023    Tobacco abuse 2023       Social History     Socioeconomic History    Marital status: Single      Spouse name: Not on file    Number of children: Not on file    Years of education: Not on file    Highest education level: Not on file   Occupational History    Occupation: Retired restaurant owner     Comment: [employer]   Tobacco Use    Smoking status: Every Day     Current packs/day: 0.50     Types: Cigarettes    Smokeless tobacco: Never    Tobacco comments:     Seen IP by [facility] on 9/11/23 and declined cessation services and materials   Substance and Sexual Activity    Alcohol use: Not Currently    Drug use: Not on file    Sexual activity: Not on file   Other Topics Concern    Not on file   Social History Narrative    Currently lives in an assisted living facility. (Updated: 01/08/2024)     Social Determinants of Health     Financial Resource Strain: Not on file   Food Insecurity: Not on file   Transportation Needs: Not on file   Physical Activity: Not on file   Stress: Not on file   Social Connections: Not on file   Interpersonal Safety: Not on file   Housing Stability: Not on file       Family History   Problem Relation Age of Onset    Cerebrovascular Disease Mother     Myocardial Infarction Father        No Known Allergies    MEDICATIONS:  Current Facility-Administered Medications   Medication Dose Route Frequency Provider Last Rate Last Admin    amiodarone (PACERONE) tablet 200 mg  200 mg Oral Daily Dee Dee Elizondo PA-C   200 mg at 05/04/24 0819    apixaban ANTICOAGULANT (ELIQUIS) tablet 5 mg  5 mg Oral BID Dee Dee Elizondo PA-C   5 mg at 05/04/24 0820    aspirin EC tablet 81 mg  81 mg Oral Daily Dee Dee Elizondo PA-C   81 mg at 05/04/24 0820    atorvastatin (LIPITOR) tablet 40 mg  40 mg Oral QAM Dee Dee Elizondo PA-C   40 mg at 05/04/24 0821    carvedilol (COREG) tablet 3.125 mg  3.125 mg Oral BID w/meals Dee Dee Elizondo PA-C   3.125 mg at 05/04/24 0820    clopidogrel (PLAVIX) tablet 75 mg  75 mg Oral Daily Dee Dee Elizondo PA-C   75 mg at 05/04/24 0820    DULoxetine (CYMBALTA) DR capsule 30 mg  30 mg  "Oral QAM Dee Dee Elizondo PA-C   30 mg at 05/04/24 0821    gabapentin (NEURONTIN) capsule 900 mg  900 mg Oral At Bedtime Dee Dee Elizondo PA-C   900 mg at 05/03/24 2127    hydrALAZINE (APRESOLINE) tablet 10 mg  10 mg Oral TID Dee Dee Elizondo PA-C   10 mg at 05/04/24 1411    [Held by provider] insulin aspart (NovoLOG) injection (RAPID ACTING)  1-7 Units Subcutaneous TID AC Angelina Luu MD        ipratropium - albuterol 0.5 mg/2.5 mg/3 mL (DUONEB) neb solution 3 mL  3 mL Nebulization 4x daily Dee Dee Elizondo PA-C   3 mL at 05/04/24 1152    isosorbide mononitrate (IMDUR) 24 hr tablet 180 mg  180 mg Oral TERRAM Dee Dee Elizondo PA-C   180 mg at 05/04/24 0839    nicotine (NICODERM CQ) 14 MG/24HR 24 hr patch 1 patch  1 patch Transdermal Daily Dee Dee Elizondo PA-C        rOPINIRole (REQUIP) tablet 2 mg  2 mg Oral TID Dee Dee Elizondo PA-C   2 mg at 05/04/24 0822    [Held by provider] spironolactone (ALDACTONE) tablet 50 mg  50 mg Oral Daily Dee Dee Elizondo PA-C   50 mg at 05/04/24 0821         PHYSICAL EXAM    BP (!) 148/75 (BP Location: Left arm, Patient Position: Chair)   Pulse 76   Temp 97.6  F (36.4  C) (Oral)   Resp 20   Ht 1.676 m (5' 6\")   Wt 122.9 kg (270 lb 14.4 oz)   SpO2 95%   BMI 43.72 kg/m        Intake/Output Summary (Last 24 hours) at 5/4/2024 1514  Last data filed at 5/4/2024 1200  Gross per 24 hour   Intake 720 ml   Output 900 ml   Net -180 ml       Alert/oriented x 3; awake and NAD  HEENT NC/AT; perrla; OP clear without lesions; mmm  Neck supple without LAD, TM  CV; RRR without rub or murmur  Lung: scattered rhonchi  Ab: soft and NT; not distended; normal bs  Ext: ++ edema and well perfused  Skin; no rash  Neuro; grossly intact    LABORATORIES    Last Renal Panel:  Sodium   Date Value Ref Range Status   05/04/2024 133 (L) 135 - 145 mmol/L Final     Comment:     Reference intervals for this test were updated on 09/26/2023 to more accurately reflect our healthy population. There may " "be differences in the flagging of prior results with similar values performed with this method. Interpretation of those prior results can be made in the context of the updated reference intervals.      Potassium   Date Value Ref Range Status   05/04/2024 3.6 3.4 - 5.3 mmol/L Final   05/04/2024 3.6 3.4 - 5.3 mmol/L Final     Chloride   Date Value Ref Range Status   05/04/2024 88 (L) 98 - 107 mmol/L Final     Carbon Dioxide (CO2)   Date Value Ref Range Status   05/04/2024 24 22 - 29 mmol/L Final     Anion Gap   Date Value Ref Range Status   05/04/2024 21 (H) 7 - 15 mmol/L Final     Glucose   Date Value Ref Range Status   05/04/2024 150 (H) 70 - 99 mg/dL Final     GLUCOSE BY METER POCT   Date Value Ref Range Status   05/03/2024 275 (H) 70 - 99 mg/dL Final     Urea Nitrogen   Date Value Ref Range Status   05/04/2024 77.3 (H) 8.0 - 23.0 mg/dL Final     Creatinine   Date Value Ref Range Status   05/04/2024 2.60 (H) 0.67 - 1.17 mg/dL Final     GFR Estimate   Date Value Ref Range Status   05/04/2024 27 (L) >60 mL/min/1.73m2 Final     Calcium   Date Value Ref Range Status   05/04/2024 10.5 (H) 8.8 - 10.2 mg/dL Final     Phosphorus   Date Value Ref Range Status   04/30/2024 3.3 2.5 - 4.5 mg/dL Final     Albumin   Date Value Ref Range Status   04/30/2024 4.6 3.5 - 5.2 g/dL Final     No components found for: \"URINE\"   Lab Results   Component Value Date    WBC 8.4 04/30/2024     Lab Results   Component Value Date    RBC 3.93 04/30/2024     Lab Results   Component Value Date    HGB 11.3 04/30/2024     Lab Results   Component Value Date    HCT 37.2 04/30/2024     No components found for: \"MCT\"  Lab Results   Component Value Date    MCV 95 04/30/2024     Lab Results   Component Value Date    MCH 28.8 04/30/2024     Lab Results   Component Value Date    MCHC 30.4 04/30/2024     Lab Results   Component Value Date    RDW 20.7 04/30/2024     Lab Results   Component Value Date     04/30/2024         I reviewed all " labs    ASSESSMENT/PLAN:  63 year old male    ARF/CKD; has underlying CKD with baseline CR 1.2-1.4; now rising CR in the setting of diuresis and  dye exposure and cardiorenal hemodynamics; follow and manage expectantly; d/w  pt his renal function may get worse yet before improves  Volume status; continues to be elevated but holding diuretics now due to ARF  CHF; EF 40%, CAD with recent cor angio; no PCI; diffuse disease  A fib, with chronic A/C; on eliquis and amio and BB  IVON with venous stasis changes  HTN; on coreg and hyralazine and imdur; bp generally 130-140s which is ok in this setting kenzie with attempting diuresis; avoid drops in bp  COPD, current smoker  RLS; on mirapex and gabapentin;  reduce dose of neurtontin due to altered GFR  Hyperlipid  Mild hyponatremia  Mild hypokalemia    Felisha Lopez MD  Nephrology

## 2024-05-04 NOTE — PLAN OF CARE
[facility] - ICU    RN Progress Note:            Pertinent Assessments:      Please refer to flowsheet rows for full assessment     Patient admitted for CHF exacerbation on 4/29. Underwent angiogram and then developed chest pain and was on a nitroglycerin drip as well as a lasix drip and moved to ICU. Patient has remained at baseline during shift normal cardiac rate and rhythym, no dyspnea, no increased edema, Nitroglycerin was discontinued on 5/2/24 and lasix was discontinued 5/3/24 in the morning.            Key Events - This Shift:       Patient complained of a positional pinching sensation at the femoral access site on the right leg. Pulses were present distal to the site, limb is warm and patient denied pain distally on right lower limb. Advised patient to avoid positions that cause discomfort as this site is healing.          Barriers to Discharge / Downgrade:     None, awaiting bed placement.

## 2024-05-04 NOTE — PLAN OF CARE
Problem: Skin Injury Risk Increased  Goal: Skin Health and Integrity  Outcome: Progressing  Intervention: Plan: Nurse Driven Intervention: Moisture Management  Recent Flowsheet Documentation  Taken 5/4/2024 0000 by Gonzalo Tomlinson RN  Moisture Interventions: Encourage regular toileting     Problem: Heart Failure  Goal: Stable Heart Rate and Rhythm  Outcome: Progressing     Goal Outcome Evaluation:  Pt transferred from ICU around 2300. Denies pain. Tele read SR. 1800 mL fluid restriction in place. Voiding freely in urinal. Changed dressing on right shin. Pleasant and able to make needs known.    Gonzalo Tomlinson, RN

## 2024-05-04 NOTE — PROGRESS NOTES
HEART CARE NOTE          Assessment/Recommendations   1. Severe Biventricular dysfunction/HFrEF c/b severe ADHF  Assessment / Plan  Continue to hold loop diuretic while awaiting renal recovery - hold loop continue to monitor UOP and renal function closely  Patient is high risk for adverse cardiac events 2/2 severe biventricular dysfunction, multiple HFHs, non-compliance with dietary restrictions, renal dysfunction, CAD  GDMT as detailed below; repeat echo ordered     Current Pharmacotherapy AHA Guideline-Directed Medical Therapy   Lisinopril 5 mg daily  - held Lisinopril 20 mg twice daily   Carvedilol 3.125 mg BID  Carvedilol 25 mg twice daily   Spironolactone 50 mg daily Spironolactone 25 mg once daily   Hydralazine NA Hydralazine 100 mg three times daily   Isosorbide mononitrate 180 mg daily Isosorbide dinitrate 40 mg three times daily   SGLT2 inhibitor:Dapagliflozin/Empagliflozin - not started Dapagliflozin or Empagliflozin 10 mg daily      2. Atrial fibrillation  Assessment / Plan  Currently on apixaban and amiodarone     3. CAD c/b ICM  Assessment / Plan  S/p CABG 2015  Now s/op PCI to Ramus and OM with DARRELL  Denies chest pain or anginal equivalents  Continue ASA, atorvastatin, carvedilol, clopidogrel     4. PRINCESS on CKD  Assessment / Plan  Progressive; diuresis on hold; continue to monitor UOP and renal function closely    Plan of care discussed on May 4, 2024 with patient at bedside, and primary team overseeing patient's care      History of Present Illness/Subjective    Mr. Zackary Hutchison is a 62 year old male with a PMHx significant for CAD s/p CABG, afib, HFrEF and CKD who presents to in ADHF      Today, Mr. Hutchison denies acute cardiac events or complaints; Management plan as detailed above     ECG: Personally reviewed. unchanged from previous tracings, normal sinus rhythm, nonspecific ST and T waves changes, PAC's noted.     Repeat echo cardiogram personally reviewed 5/2/24:  1. Technically difficult  study.  2. The left ventricle is normal in size. Image quality does not provide for  detailed assessment of LV systolic function, but is felt to be normal with a  visually estimated ejection fraction of roughly 55%.  3. No significant valvular heart disease is identified on this study though  the sensitivity, particularly of regurgitant lesions, is reduced due to poor  Doppler acoustics.  4. Right ventricular size and systolic performance could not be accurately  assessed due to inadequate visualization.     The acoustic quality of this study is fairly poor. If a more accurate  assessment of left ventricular systolicperformance, regional wall motion, and  other morphology/function is required; would recommend cardiac MRI or other  alternative imaging modality for further evaluation.     When compared to the prior real-time echocardiogram dated 25 December 2023,  the ejection fraction appears somewhat higher on the current study though  accurate comparison is somewhat limited due to suboptimal acoustic imaging not  only on the current examination, but also hold the prior study as well.     ECHO (personnaly Reviewed on 4/29/24):   1. Technically very difficult study.  2. Left ventricular chamber size and wall thickness are normal. Systolic  function is mildly reduced with global hypokinesis. The calculated left  ventricular ejection fraction is 41%.  3. Right ventricle is not well visualized. Right ventricular chamber size and  systolic function are grossly normal.  4. No hemodynamically significant valvular abnormalities although the cardiac  valves are not well visualized.  5. Compared to the prior study dated 9/9/2023, there has been no significant  change    Telemetry: personally reviewed May 4, 2024; notable for sinus rhythm     Lab results: personally reviewed May 4, 2024; notable for progressive PRINCESS    Medical history and pertinent documents reviewed in Care Everywhere please where applicable see details above  "       Physical Examination Review of Systems   BP (!) 142/70 (BP Location: Right arm)   Pulse 82   Temp 98  F (36.7  C) (Oral)   Resp 20   Ht 1.676 m (5' 6\")   Wt 120.2 kg (265 lb)   SpO2 94%   BMI 42.77 kg/m    Body mass index is 42.77 kg/m .  Wt Readings from Last 3 Encounters:   05/03/24 120.2 kg (265 lb)   04/25/24 126.1 kg (278 lb)   04/25/24 126.1 kg (278 lb)     General Appearance:   no distress, normal body habitus   ENT/Mouth: membranes moist, no oral lesions or bleeding gums.      EYES:  no scleral icterus, normal conjunctivae   Neck: no carotid bruits or thyromegaly   Chest/Lungs:   lungs are clear to auscultation, no rales or wheezing, equal chest wall expansion    Cardiovascular:   Regular. Normal first and second heart sounds with no murmurs, rubs, or gallops; the carotid, radial and posterior tibial pulses are intact, no JVD and trace LE edema bilaterally    Abdomen:  no organomegaly, masses, bruits, or tenderness; bowel sounds are present   Extremities: no cyanosis or clubbing   Skin: no xanthelasma, warm.    Neurologic: NAD     Psychiatric: alert and oriented x3, calm     A complete 10 systems ROS was reviewed  And is negative except what is listed in the HPI.          Medical History  Surgical History Family History Social History   Past Medical History:   Diagnosis Date    Atrial fibrillation (H)     Chronic kidney disease     Chronic kidney disease, stage 3b (H) 09/28/2023    Class 3 severe obesity due to excess calories with body mass index (BMI) of 40.0 to 44.9 in adult (H) 09/28/2023    Congestive heart failure (H)     COPD (chronic obstructive pulmonary disease) (H)     Coronary artery disease involving coronary bypass graft of native heart without angina pectoris 09/28/2023    Heart failure with reduced ejection fraction (H) 09/11/2023    HFrEF (heart failure with reduced ejection fraction) (H) 09/11/2023    Hyperlipidemia     Hypertension     Ischemic cardiomyopathy 09/28/2023    " Obese     Paroxysmal atrial fibrillation (H) 09/28/2023    Tobacco abuse 09/28/2023    Past Surgical History:   Procedure Laterality Date    CORONARY ANGIOGRAPHY ADULT ORDER      CORONARY ARTERY BYPASS Left 2014    2 vessels    CV CARDIOMEMS WITH RIGHT HEART CATH N/A 2/15/2024    Procedure: Pulmonary Arterial Pressure Sensor Placement;  Surgeon: Jacey Bhandari MD;  Location: [facility]    CV CORONARY ANGIOGRAM N/A 09/11/2023    Procedure: Coronary Angiogram;  Surgeon: Santy Esposito MD;  Location: [facility]    CV CORONARY ANGIOGRAM N/A 5/2/2024    Procedure: CV CORONARY ANGIOGRAM;  Surgeon: Santy sEposito MD;  Location: [facility]    CV LEFT HEART CATH N/A 09/11/2023    Procedure: Left Heart Catheterization;  Surgeon: Santy Esposito MD;  Location: [facility]    CV LEFT HEART CATH N/A 5/2/2024    Procedure: Left Heart Catheterization;  Surgeon: Santy Esposito MD;  Location: [facility]    CV PCI ANGIOPLASTY N/A 5/2/2024    Procedure: Percutaneous Transluminal Angioplasty;  Surgeon: Santy Esposito MD;  Location: [facility]    CV RIGHT HEART CATH MEASUREMENTS RECORDED N/A 09/11/2023    Procedure: Right Heart Catheterization;  Surgeon: Santy Esposito MD;  Location: [facility]    no family history of premature coronary artery disease Social History     Socioeconomic History    Marital status: Single     Spouse name: Not on file    Number of children: Not on file    Years of education: Not on file    Highest education level: Not on file   Occupational History    Occupation: Retired restaurant owner     Comment: [employer]   Tobacco Use    Smoking status: Every Day     Current packs/day: 0.50     Types: Cigarettes    Smokeless tobacco: Never    Tobacco comments:     Seen IP by CTTS on 9/11/23 and declined cessation services and materials   Substance and Sexual Activity    Alcohol use: Not Currently    Drug use: Not on file    Sexual  "activity: Not on file   Other Topics Concern    Not on file   Social History Narrative    Currently lives in an assisted living facility. (Updated: 01/08/2024)     Social Determinants of Health     Financial Resource Strain: Not on file   Food Insecurity: Not on file   Transportation Needs: Not on file   Physical Activity: Not on file   Stress: Not on file   Social Connections: Not on file   Interpersonal Safety: Not on file   Housing Stability: Not on file           Lab Results    Chemistry/lipid CBC Cardiac Enzymes/BNP/TSH/INR   Lab Results   Component Value Date    CHOL 121 05/02/2024    HDL 27 (L) 05/02/2024    TRIG 219 (H) 05/02/2024    BUN 48.7 (H) 05/03/2024     05/03/2024    CO2 30 (H) 05/03/2024    Lab Results   Component Value Date    WBC 8.4 04/30/2024    HGB 11.3 (L) 04/30/2024    HCT 37.2 (L) 04/30/2024    MCV 95 04/30/2024     04/30/2024    Lab Results   Component Value Date    TSH 1.70 04/29/2024    INR 1.01 02/13/2023     No results found for: \"CKTOTAL\", \"CKMB\", \"TROPONINI\"       Weight:    Wt Readings from Last 3 Encounters:   05/03/24 120.2 kg (265 lb)   04/25/24 126.1 kg (278 lb)   04/25/24 126.1 kg (278 lb)       Allergies  No Known Allergies      Surgical History  Past Surgical History:   Procedure Laterality Date    CORONARY ANGIOGRAPHY ADULT ORDER      CORONARY ARTERY BYPASS Left 2014    2 vessels    CV CARDIOMEMS WITH RIGHT HEART CATH N/A 2/15/2024    Procedure: Pulmonary Arterial Pressure Sensor Placement;  Surgeon: Jacey Bhandari MD;  Location: [facility]    CV CORONARY ANGIOGRAM N/A 09/11/2023    Procedure: Coronary Angiogram;  Surgeon: Santy Esposito MD;  Location: [facility]    CV CORONARY ANGIOGRAM N/A 5/2/2024    Procedure: CV CORONARY ANGIOGRAM;  Surgeon: Santy Esposito MD;  Location: [facility]    CV LEFT HEART CATH N/A 09/11/2023    Procedure: Left Heart Catheterization;  Surgeon: Santy Esposito MD;  Location: [facility]    CV LEFT HEART CATH N/A 5/2/2024    Procedure: Left Heart Catheterization;  Surgeon: Santy Esposito MD;  Location: [facility]    CV PCI ANGIOPLASTY N/A 5/2/2024    Procedure: Percutaneous Transluminal Angioplasty;  Surgeon: Santy Esposito MD;  Location: [facility]    CV RIGHT HEART CATH MEASUREMENTS RECORDED N/A 09/11/2023    Procedure: Right Heart Catheterization;  Surgeon: Santy Esposito MD;  Location: [facility]       Social History  Tobacco:   History   Smoking Status    Every Day    Types: Cigarettes   Smokeless Tobacco    Never    Alcohol:   Social History    Substance and Sexual Activity      Alcohol use: Not Currently   Illicit Drugs:   History   Drug Use Not on file       Family History  Family History   Problem Relation Age of Onset    Cerebrovascular Disease Mother     Myocardial Infarction Father           Jacey Bhandari MD on 5/4/2024      cc: Reid Escobar

## 2024-05-05 LAB
ANION GAP SERPL CALCULATED.3IONS-SCNC: 16 MMOL/L (ref 7–15)
BASOPHILS # BLD AUTO: 0.1 10E3/UL (ref 0–0.2)
BASOPHILS NFR BLD AUTO: 1 %
BUN SERPL-MCNC: 86 MG/DL (ref 8–23)
CALCIUM SERPL-MCNC: 10.7 MG/DL (ref 8.8–10.2)
CHLORIDE SERPL-SCNC: 91 MMOL/L (ref 98–107)
CREAT SERPL-MCNC: 2.32 MG/DL (ref 0.67–1.17)
DEPRECATED HCO3 PLAS-SCNC: 25 MMOL/L (ref 22–29)
EGFRCR SERPLBLD CKD-EPI 2021: 30 ML/MIN/1.73M2
EOSINOPHIL # BLD AUTO: 0.2 10E3/UL (ref 0–0.7)
EOSINOPHIL NFR BLD AUTO: 2 %
ERYTHROCYTE [DISTWIDTH] IN BLOOD BY AUTOMATED COUNT: 19.8 % (ref 10–15)
GLUCOSE SERPL-MCNC: 172 MG/DL (ref 70–99)
HCT VFR BLD AUTO: 36.5 % (ref 40–53)
HGB BLD-MCNC: 11.2 G/DL (ref 13.3–17.7)
IMM GRANULOCYTES # BLD: 0.1 10E3/UL
IMM GRANULOCYTES NFR BLD: 1 %
LYMPHOCYTES # BLD AUTO: 0.7 10E3/UL (ref 0.8–5.3)
LYMPHOCYTES NFR BLD AUTO: 8 %
MCH RBC QN AUTO: 28.9 PG (ref 26.5–33)
MCHC RBC AUTO-ENTMCNC: 30.7 G/DL (ref 31.5–36.5)
MCV RBC AUTO: 94 FL (ref 78–100)
MONOCYTES # BLD AUTO: 0.9 10E3/UL (ref 0–1.3)
MONOCYTES NFR BLD AUTO: 10 %
NEUTROPHILS # BLD AUTO: 6.9 10E3/UL (ref 1.6–8.3)
NEUTROPHILS NFR BLD AUTO: 78 %
NRBC # BLD AUTO: 0 10E3/UL
NRBC BLD AUTO-RTO: 0 /100
PLATELET # BLD AUTO: 158 10E3/UL (ref 150–450)
POTASSIUM SERPL-SCNC: 4.2 MMOL/L (ref 3.4–5.3)
RBC # BLD AUTO: 3.88 10E6/UL (ref 4.4–5.9)
SODIUM SERPL-SCNC: 132 MMOL/L (ref 135–145)
WBC # BLD AUTO: 8.7 10E3/UL (ref 4–11)

## 2024-05-05 PROCEDURE — 999N000156 HC STATISTIC RCP CONSULT EA 30 MIN

## 2024-05-05 PROCEDURE — 250N000013 HC RX MED GY IP 250 OP 250 PS 637: Performed by: INTERNAL MEDICINE

## 2024-05-05 PROCEDURE — 999N000157 HC STATISTIC RCP TIME EA 10 MIN

## 2024-05-05 PROCEDURE — 94640 AIRWAY INHALATION TREATMENT: CPT | Mod: 76

## 2024-05-05 PROCEDURE — 99232 SBSQ HOSP IP/OBS MODERATE 35: CPT | Performed by: INTERNAL MEDICINE

## 2024-05-05 PROCEDURE — 36415 COLL VENOUS BLD VENIPUNCTURE: CPT | Performed by: INTERNAL MEDICINE

## 2024-05-05 PROCEDURE — 94640 AIRWAY INHALATION TREATMENT: CPT

## 2024-05-05 PROCEDURE — 99233 SBSQ HOSP IP/OBS HIGH 50: CPT | Performed by: INTERNAL MEDICINE

## 2024-05-05 PROCEDURE — 80048 BASIC METABOLIC PNL TOTAL CA: CPT | Performed by: INTERNAL MEDICINE

## 2024-05-05 PROCEDURE — 85025 COMPLETE CBC W/AUTO DIFF WBC: CPT | Performed by: INTERNAL MEDICINE

## 2024-05-05 PROCEDURE — 250N000009 HC RX 250: Performed by: INTERNAL MEDICINE

## 2024-05-05 PROCEDURE — 120N000001 HC R&B MED SURG/OB

## 2024-05-05 RX ORDER — IPRATROPIUM BROMIDE AND ALBUTEROL SULFATE 2.5; .5 MG/3ML; MG/3ML
3 SOLUTION RESPIRATORY (INHALATION)
Status: COMPLETED | OUTPATIENT
Start: 2024-05-05 | End: 2024-05-05

## 2024-05-05 RX ORDER — INHALER, ASSIST DEVICES
1 SPACER (EA) MISCELLANEOUS ONCE
Status: COMPLETED | OUTPATIENT
Start: 2024-05-05 | End: 2024-05-05

## 2024-05-05 RX ORDER — ALBUTEROL SULFATE 90 UG/1
2 AEROSOL, METERED RESPIRATORY (INHALATION) 4 TIMES DAILY
Status: DISCONTINUED | OUTPATIENT
Start: 2024-05-05 | End: 2024-05-06

## 2024-05-05 RX ORDER — IPRATROPIUM BROMIDE AND ALBUTEROL SULFATE 2.5; .5 MG/3ML; MG/3ML
3 SOLUTION RESPIRATORY (INHALATION)
Status: DISCONTINUED | OUTPATIENT
Start: 2024-05-05 | End: 2024-05-06

## 2024-05-05 RX ADMIN — ALBUTEROL SULFATE 2 PUFF: 90 AEROSOL, METERED RESPIRATORY (INHALATION) at 21:22

## 2024-05-05 RX ADMIN — AMIODARONE HYDROCHLORIDE 200 MG: 200 TABLET ORAL at 09:08

## 2024-05-05 RX ADMIN — DULOXETINE HYDROCHLORIDE 30 MG: 30 CAPSULE, DELAYED RELEASE ORAL at 09:08

## 2024-05-05 RX ADMIN — CARVEDILOL 3.12 MG: 3.12 TABLET, FILM COATED ORAL at 09:08

## 2024-05-05 RX ADMIN — IPRATROPIUM BROMIDE AND ALBUTEROL SULFATE 3 ML: .5; 3 SOLUTION RESPIRATORY (INHALATION) at 08:16

## 2024-05-05 RX ADMIN — HYDRALAZINE HYDROCHLORIDE 10 MG: 10 TABLET, FILM COATED ORAL at 09:08

## 2024-05-05 RX ADMIN — OXYCODONE HYDROCHLORIDE 10 MG: 5 TABLET ORAL at 23:45

## 2024-05-05 RX ADMIN — APIXABAN 5 MG: 5 TABLET, FILM COATED ORAL at 21:23

## 2024-05-05 RX ADMIN — BUMETANIDE 5 MG: 1 TABLET ORAL at 09:15

## 2024-05-05 RX ADMIN — ACETAMINOPHEN 650 MG: 325 TABLET ORAL at 18:34

## 2024-05-05 RX ADMIN — CARVEDILOL 3.12 MG: 3.12 TABLET, FILM COATED ORAL at 18:34

## 2024-05-05 RX ADMIN — ROPINIROLE HYDROCHLORIDE 2 MG: 1 TABLET, FILM COATED ORAL at 21:22

## 2024-05-05 RX ADMIN — Medication 1 EACH: at 11:43

## 2024-05-05 RX ADMIN — ATORVASTATIN CALCIUM 40 MG: 40 TABLET, FILM COATED ORAL at 09:08

## 2024-05-05 RX ADMIN — Medication 1 LOZENGE: at 11:43

## 2024-05-05 RX ADMIN — APIXABAN 5 MG: 5 TABLET, FILM COATED ORAL at 09:08

## 2024-05-05 RX ADMIN — ROPINIROLE HYDROCHLORIDE 2 MG: 1 TABLET, FILM COATED ORAL at 14:33

## 2024-05-05 RX ADMIN — SPIRONOLACTONE 50 MG: 25 TABLET, FILM COATED ORAL at 09:08

## 2024-05-05 RX ADMIN — CLOPIDOGREL BISULFATE 75 MG: 75 TABLET ORAL at 09:08

## 2024-05-05 RX ADMIN — IPRATROPIUM BROMIDE AND ALBUTEROL SULFATE 3 ML: .5; 3 SOLUTION RESPIRATORY (INHALATION) at 19:05

## 2024-05-05 RX ADMIN — HYDRALAZINE HYDROCHLORIDE 10 MG: 10 TABLET, FILM COATED ORAL at 14:29

## 2024-05-05 RX ADMIN — HYDRALAZINE HYDROCHLORIDE 10 MG: 10 TABLET, FILM COATED ORAL at 21:23

## 2024-05-05 RX ADMIN — ISOSORBIDE MONONITRATE 180 MG: 60 TABLET, EXTENDED RELEASE ORAL at 09:10

## 2024-05-05 RX ADMIN — IPRATROPIUM BROMIDE AND ALBUTEROL SULFATE 3 ML: .5; 3 SOLUTION RESPIRATORY (INHALATION) at 11:06

## 2024-05-05 RX ADMIN — BUMETANIDE 5 MG: 2 TABLET ORAL at 18:34

## 2024-05-05 RX ADMIN — ROPINIROLE HYDROCHLORIDE 2 MG: 1 TABLET, FILM COATED ORAL at 09:11

## 2024-05-05 RX ADMIN — ASPIRIN 81 MG: 81 TABLET, COATED ORAL at 09:09

## 2024-05-05 RX ADMIN — ALBUTEROL SULFATE 2 PUFF: 90 AEROSOL, METERED RESPIRATORY (INHALATION) at 15:14

## 2024-05-05 ASSESSMENT — ACTIVITIES OF DAILY LIVING (ADL)
ADLS_ACUITY_SCORE: 34
ADLS_ACUITY_SCORE: 30
ADLS_ACUITY_SCORE: 34
ADLS_ACUITY_SCORE: 30

## 2024-05-05 NOTE — PROGRESS NOTES
HEART CARE NOTE          Assessment/Recommendations   1. Severe Biventricular dysfunction/HFrEF c/b severe ADHF  Assessment / Plan  Renal function demonstrating recovery s/p contrast administration and IV diuresis - Resume oral loop diuretic and continue to monitor UOP and renal function closely  Patient is high risk for adverse cardiac events 2/2 severe biventricular dysfunction, multiple HFHs, non-compliance with dietary restrictions, renal dysfunction, CAD  GDMT as detailed below; repeat echo ordered     Current Pharmacotherapy AHA Guideline-Directed Medical Therapy   Lisinopril 5 mg daily  - held Lisinopril 20 mg twice daily   Carvedilol 3.125 mg BID  Carvedilol 25 mg twice daily   Spironolactone 50 mg daily Spironolactone 25 mg once daily   Hydralazine NA Hydralazine 100 mg three times daily   Isosorbide mononitrate 180 mg daily Isosorbide dinitrate 40 mg three times daily   SGLT2 inhibitor:Dapagliflozin/Empagliflozin - not started Dapagliflozin or Empagliflozin 10 mg daily      2. Atrial fibrillation  Assessment / Plan  Currently on apixaban and amiodarone     3. CAD c/b ICM  Assessment / Plan  S/p CABG 2015  Now s/op PCI to Ramus and OM with DARRELL  Denies chest pain or anginal equivalents  Continue ASA, atorvastatin, carvedilol, clopidogrel     4. PRINCESS on CKD  Assessment / Plan  Progressive; resume diuresis as above; continue to monitor UOP and renal function closely    Plan of care discussed on May 5, 2024 with patient at bedside, and primary team overseeing patient's care      History of Present Illness/Subjective    Mr. Zackary Hutchison is a 62 year old male with a PMHx significant for CAD s/p CABG, afib, HFrEF and CKD who presents to in ADHF      Today, Mr. Hutchison denies acute cardiac events or complaints; Management plan as detailed above     ECG: Personally reviewed. unchanged from previous tracings, normal sinus rhythm, nonspecific ST and T waves changes, PAC's noted.     Repeat echo cardiogram personally  reviewed 5/2/24:  1. Technically difficult study.  2. The left ventricle is normal in size. Image quality does not provide for  detailed assessment of LV systolic function, but is felt to be normal with a  visually estimated ejection fraction of roughly 55%.  3. No significant valvular heart disease is identified on this study though  the sensitivity, particularly of regurgitant lesions, is reduced due to poor  Doppler acoustics.  4. Right ventricular size and systolic performance could not be accurately  assessed due to inadequate visualization.     The acoustic quality of this study is fairly poor. If a more accurate  assessment of left ventricular systolicperformance, regional wall motion, and  other morphology/function is required; would recommend cardiac MRI or other  alternative imaging modality for further evaluation.     When compared to the prior real-time echocardiogram dated 25 December 2023,  the ejection fraction appears somewhat higher on the current study though  accurate comparison is somewhat limited due to suboptimal acoustic imaging not  only on the current examination, but also hold the prior study as well.     ECHO (personnaly Reviewed on 4/29/24):   1. Technically very difficult study.  2. Left ventricular chamber size and wall thickness are normal. Systolic  function is mildly reduced with global hypokinesis. The calculated left  ventricular ejection fraction is 41%.  3. Right ventricle is not well visualized. Right ventricular chamber size and  systolic function are grossly normal.  4. No hemodynamically significant valvular abnormalities although the cardiac  valves are not well visualized.  5. Compared to the prior study dated 9/9/2023, there has been no significant  change    Telemetry: personally reviewed May 5, 2024; notable for sinus rhythm     Lab results: personally reviewed May 5, 2024; notable for PRINCESS    Medical history and pertinent documents reviewed in Care Everywhere please  "where applicable see details above        Physical Examination Review of Systems   /67 (BP Location: Right arm)   Pulse 73   Temp 98.1  F (36.7  C) (Oral)   Resp 20   Ht 1.676 m (5' 6\")   Wt 122.9 kg (270 lb 14.4 oz)   SpO2 95%   BMI 43.72 kg/m    Body mass index is 43.72 kg/m .  Wt Readings from Last 3 Encounters:   05/04/24 122.9 kg (270 lb 14.4 oz)   04/25/24 126.1 kg (278 lb)   04/25/24 126.1 kg (278 lb)     General Appearance:   no distress, normal body habitus   ENT/Mouth: membranes moist, no oral lesions or bleeding gums.      EYES:  no scleral icterus, normal conjunctivae   Neck: no carotid bruits or thyromegaly   Chest/Lungs:   lungs are clear to auscultation, no rales or wheezing, equal chest wall expansion    Cardiovascular:   Regular. Normal first and second heart sounds with no murmurs, rubs, or gallops; the carotid, radial and posterior tibial pulses are intact, no JVD and trace LE edema bilaterally    Abdomen:  no organomegaly, masses, bruits, or tenderness; bowel sounds are present   Extremities: no cyanosis or clubbing   Skin: no xanthelasma, warm.    Neurologic: NAD     Psychiatric: alert and oriented x3, calm     A complete 10 systems ROS was reviewed  And is negative except what is listed in the HPI.          Medical History  Surgical History Family History Social History   Past Medical History:   Diagnosis Date    Atrial fibrillation (H)     Chronic kidney disease     Chronic kidney disease, stage 3b (H) 09/28/2023    Class 3 severe obesity due to excess calories with body mass index (BMI) of 40.0 to 44.9 in adult (H) 09/28/2023    Congestive heart failure (H)     COPD (chronic obstructive pulmonary disease) (H)     Coronary artery disease involving coronary bypass graft of native heart without angina pectoris 09/28/2023    Heart failure with reduced ejection fraction (H) 09/11/2023    HFrEF (heart failure with reduced ejection fraction) (H) 09/11/2023    Hyperlipidemia     " Hypertension     Ischemic cardiomyopathy 09/28/2023    Obese     Paroxysmal atrial fibrillation (H) 09/28/2023    Tobacco abuse 09/28/2023    Past Surgical History:   Procedure Laterality Date    CORONARY ANGIOGRAPHY ADULT ORDER      CORONARY ARTERY BYPASS Left 2014    2 vessels    CV CARDIOMEMS WITH RIGHT HEART CATH N/A 2/15/2024    Procedure: Pulmonary Arterial Pressure Sensor Placement;  Surgeon: Jacey Bhandari MD;  Location: [facility]    CV CORONARY ANGIOGRAM N/A 09/11/2023    Procedure: Coronary Angiogram;  Surgeon: Santy Esposito MD;  Location: [facility]    CV CORONARY ANGIOGRAM N/A 5/2/2024    Procedure: CV CORONARY ANGIOGRAM;  Surgeon: Santy Esposito MD;  Location: [facility]    CV LEFT HEART CATH N/A 09/11/2023    Procedure: Left Heart Catheterization;  Surgeon: Santy Esposito MD;  Location: [facility]    CV LEFT HEART CATH N/A 5/2/2024    Procedure: Left Heart Catheterization;  Surgeon: Santy Esposito MD;  Location: [facility]    CV PCI ANGIOPLASTY N/A 5/2/2024    Procedure: Percutaneous Transluminal Angioplasty;  Surgeon: Santy Esposito MD;  Location: [facility]    CV RIGHT HEART CATH MEASUREMENTS RECORDED N/A 09/11/2023    Procedure: Right Heart Catheterization;  Surgeon: Santy Esposito MD;  Location: [facility]    no family history of premature coronary artery disease Social History     Socioeconomic History    Marital status: Single     Spouse name: Not on file    Number of children: Not on file    Years of education: Not on file    Highest education level: Not on file   Occupational History    Occupation: Retired restaurant owner     Comment: [employer]   Tobacco Use    Smoking status: Every Day     Current packs/day: 0.50     Types: Cigarettes    Smokeless tobacco: Never    Tobacco comments:     Seen IP by CTTS on 9/11/23 and declined cessation services and materials   Substance and Sexual Activity     "Alcohol use: Not Currently    Drug use: Not on file    Sexual activity: Not on file   Other Topics Concern    Not on file   Social History Narrative    Currently lives in an assisted living facility. (Updated: 01/08/2024)     Social Determinants of Health     Financial Resource Strain: Not on file   Food Insecurity: Not on file   Transportation Needs: Not on file   Physical Activity: Not on file   Stress: Not on file   Social Connections: Not on file   Interpersonal Safety: Not on file   Housing Stability: Not on file           Lab Results    Chemistry/lipid CBC Cardiac Enzymes/BNP/TSH/INR   Lab Results   Component Value Date    CHOL 121 05/02/2024    HDL 27 (L) 05/02/2024    TRIG 219 (H) 05/02/2024    BUN 77.3 (H) 05/04/2024     (L) 05/04/2024    CO2 24 05/04/2024    Lab Results   Component Value Date    WBC 8.4 04/30/2024    HGB 11.3 (L) 04/30/2024    HCT 37.2 (L) 04/30/2024    MCV 95 04/30/2024     04/30/2024    Lab Results   Component Value Date    TSH 1.70 04/29/2024    INR 1.01 02/13/2023     No results found for: \"CKTOTAL\", \"CKMB\", \"TROPONINI\"       Weight:    Wt Readings from Last 3 Encounters:   05/04/24 122.9 kg (270 lb 14.4 oz)   04/25/24 126.1 kg (278 lb)   04/25/24 126.1 kg (278 lb)       Allergies  No Known Allergies      Surgical History  Past Surgical History:   Procedure Laterality Date    CORONARY ANGIOGRAPHY ADULT ORDER      CORONARY ARTERY BYPASS Left 2014    2 vessels    CV CARDIOMEMS WITH RIGHT HEART CATH N/A 2/15/2024    Procedure: Pulmonary Arterial Pressure Sensor Placement;  Surgeon: Jacey Bhandari MD;  Location: [facility]    CV CORONARY ANGIOGRAM N/A 09/11/2023    Procedure: Coronary Angiogram;  Surgeon: Santy Esposito MD;  Location: [facility]    CV CORONARY ANGIOGRAM N/A 5/2/2024    Procedure: CV CORONARY ANGIOGRAM;  Surgeon: Santy Esposito MD;  Location: [facility]    CV LEFT HEART CATH N/A 09/11/2023    Procedure: Left Heart " Catheterization;  Surgeon: Santy Esposito MD;  Location: [facility]    CV LEFT HEART CATH N/A 5/2/2024    Procedure: Left Heart Catheterization;  Surgeon: Santy Esposito MD;  Location: [facility]    CV PCI ANGIOPLASTY N/A 5/2/2024    Procedure: Percutaneous Transluminal Angioplasty;  Surgeon: Santy Esposito MD;  Location: [facility] CV    CV RIGHT HEART CATH MEASUREMENTS RECORDED N/A 09/11/2023    Procedure: Right Heart Catheterization;  Surgeon: Santy Esposito MD;  Location: [facility]       Social History  Tobacco:   History   Smoking Status    Every Day    Types: Cigarettes   Smokeless Tobacco    Never    Alcohol:   Social History    Substance and Sexual Activity      Alcohol use: Not Currently   Illicit Drugs:   History   Drug Use Not on file       Family History  Family History   Problem Relation Age of Onset    Cerebrovascular Disease Mother     Myocardial Infarction Father           Jacey Bhandari MD on 5/5/2024      cc: Reid Escobar

## 2024-05-05 NOTE — PLAN OF CARE
Problem: Skin Injury Risk Increased  Goal: Skin Health and Integrity  Outcome: Progressing  Intervention: Plan: Nurse Driven Intervention: Moisture Management  Recent Flowsheet Documentation  Taken 5/5/2024 0246 by Gonzalo Tomlinson RN  Moisture Interventions: Encourage regular toileting  Taken 5/4/2024 2033 by Gonzalo Tomlinson RN  Moisture Interventions: Encourage regular toileting     Problem: Heart Failure  Goal: Stable Heart Rate and Rhythm  Outcome: Progressing     Goal Outcome Evaluation:  Pt denies pain. Tele read SR with BBB. 1800 mL fluid restriction in place. Voiding freely in urinal. Right shin dressing changed. Able to make needs known.    Gonzalo Tomlinson, RN

## 2024-05-05 NOTE — PROGRESS NOTES
[facility]    Medicine Progress Note - Hospitalist Service    Date of Admission:  4/29/2024    Assessment & Plan   Zackary Hutchison is a 63-year-old man with history of paroxysmal atrial fibrillation on Eliquis, stage III CKD, CAD s/p CABG, ischemic cardiomyopathy, COPD, restless leg syndrome, hypertension, morbid obesity, ischemic cardiomyopathy, admitted on 4/9/2024 with acute exacerbation of HFrEF.    He was placed on furosemide drip on admission. Coronary angiogram performed 5/2/2024 revealed extensive coronary artery disease s/p PCI to ramus and LCx OM.  Per cardiology plan for triple therapy apixaban, aspirin and Plavix 3 months.    Hospital course was complicated by PRINCESS on stage III CKD possibly secondary to cardiorenal process versus diuretic therapy versus contrast exposure.  IV Lasix was stopped.  Renal function peaked at creatinine 2.6 on 5/4.  Oral Bumex was started by cardiology on 5/5.     Acute decompensated HFrEF  Biventricular heart failure s/p CardioMEMS in February 2024  Off furosemide drip and chlorothiazide due to PRINCESS  Carvedilol 3.125 mg twice daily  Hydralazine 10 mg 3 times daily  Imdur 180 mg daily  Spironolactone 50 mg daily for now   Started Bumex p.o. 5 mg twice daily on 5/5 per Cardiology     CAD s/p CABG s/p PCI with DARRELL (5/2)  Coronary angiogram performed 5/2/2024 revealed extensive coronary artery disease including 100% stenosis in both the ostium to proximal segments of LAD and RCA, s/p PCI to ramus and LCx OM.    - Per cardiology plan for triple therapy apixaban, aspirin and Plavix 3 months.    Atrial fibrillation  Heart rate is controlled  Amiodarone 200 mg daily  Carvedilol 3.125 mg twice daily  Apixaban 5 mg twice daily    Restless leg syndrome  Ropinirole 2 mg 3 times daily  Hold  mg gabapentin given PRINCESS on 5/5.  If renal function continues to improve, can resume at a lower dose per GFR.     Type 2 diabetes-new diagnosis  Hemoglobin A1c is elevated at  "7.8  Glucose is elevated  Patient declines further glucose checks and treatment with insulin  He agreed to discuss with diabetes educator.    PRINCESS on suspected stage III CKD  Possibly secondary to cardiorenal process vs contrast exposure from coronary angiogram.  Baseline creatinine 1.2-1.4.  Urology is following  Creatinine peaked at 2.6 on 5/4  - Daily BMP      COPD  Continue DuoNeb 4 times daily  Supplemental oxygen as needed    Constipation  Previously had good experience with milk of magnesium.  Concerned about vomiting with MiraLAX.  -Milk of magnesium. as needed      Diet: Low Saturated Fat Na <2400 mg  Fluid restriction 1800 ML FLUID    DVT Prophylaxis: DOAC  Ruvalcaba Catheter: Not present  Lines: None     Cardiac Monitoring: ACTIVE order. Indication: Acute decompensated heart failure (48 hours)  Code Status: Full Code      Clinically Significant Risk Factors           # Hypercalcemia: Highest Ca = 10.7 mg/dL in last 2 days, will monitor as appropriate   # Anion Gap Metabolic Acidosis: Highest Anion Gap = 21 mmol/L in last 2 days, will monitor and treat as appropriate       # Acute Kidney Injury, unspecified: based on a >150% or 0.3 mg/dL increase in last creatinine compared to past 90 day average, will monitor renal function  # Hypertension: Noted on problem list  # Acute heart failure with reduced ejection fraction: last echo with EF <40% and receiving IV diuretics      # DMII: A1C = 7.8 % (Ref range: <5.7 %) within past 6 months   # Severe Obesity: Estimated body mass index is 43.72 kg/m  as calculated from the following:    Height as of this encounter: 1.676 m (5' 6\").    Weight as of this encounter: 122.9 kg (270 lb 14.4 oz).        # Financial/Environmental Concerns: none  # Support System: poor social support noted in nursing assessment         Disposition Plan     Medically Ready for Discharge: Anticipated in 2-4 Days    Cely Rashid MD  Hospitalist Service  [facility]  Securely " message with Albertina (more info)  Text page via [facility] Paging/Directory   ______________________________________________________________________    Interval History   No acute events overnight. He reported intermittent wheezing and shortness of breath with ambulation.  Reports constipation.     Physical Exam   Vital Signs: Temp: 98.1  F (36.7  C) Temp src: Oral BP: (!) 149/77 Pulse: 74   Resp: 20 SpO2: 93 % O2 Device: None (Room air)    Weight: 270 lbs 14.4 oz    General appearance: Morbidly obese, sitting comfortably on the recliner, awake, Alert, Cooperative, not in any obvious distress and appears stated age   HEENT: Normocephalic, atraumatic, conjunctiva clear without icterus and ears without discharge  Lungs: Bilateral wheezes with diminished air entry.  Cardiovascular: Regular Rate and Rythm, normal apical impulse, normal S1 and S2, bilateral lower extremity edema, pitting   Abdomen: Soft, non-tender and Non-distended, active bowel sounds  Skin: Stasis dermatitis bilaterally and stasis ulcer in the lateral aspect of the right shin   Musculoskeletal: No bony deformities or joint tenderness. Normal ROM upon flexion & extension.   Neurologic: Alert & Oriented X 3, Facial symmetry preserved and upper & lower extremities moving well with symmetry  Psychiatric: Calm, normal eye contact and normal affect      Medical Decision Making       45 MINUTES SPENT BY ME on the date of service doing chart review, history, exam, documentation & further activities per the note.      Data     I have personally reviewed the following data over the past 24 hrs:    8.7  \   11.2 (L)   / 158     132 (L) 91 (L) 86.0 (H) /  172 (H)   4.2 25 2.32 (H) \       Imaging results reviewed over the past 24 hrs:   No results found for this or any previous visit (from the past 24 hour(s)).

## 2024-05-05 NOTE — PROGRESS NOTES
[facility]  [facility]   Follow up    Zackary Hutchison   MRN: [id number]  : 1960   DOA: 2024   CC: ARF/CKD      Assessment and Plan:    63 year old male     ARF/CKD; has underlying CKD with baseline CR 1.2-1.4; now rising CR in the setting of diuresis and  dye exposure and cardiorenal hemodynamics; CR has improved some today; following; d/w pt some follow up in renal clinic for CKD management  Volume status; continues to be elevated; s/p diuresis with IV bumex and held for  a couple of days and now resuming some oral bumex; follow need to adjust  CHF; EF 40%, CAD with recent cor angio; no PCI; diffuse disease  A fib, with chronic A/C; on eliquis and amio and BB  IVON with venous stasis changes  HTN; on coreg and hyralazine and imdur; bp generally 130-140s which is ok in this setting kenzie with attempting diuresis; avoid drops in bp  COPD, current smoker  RLS; on mirapex and gabapentin;  holding neurontin--would reduced dose of neurtontin due to altered GFR if resume  Hyperlipid  Mild hyponatremia    Subjective  Feels good today; overall slept ok and breathing is stable  Objective    Vital signs in last 24 hours  Temp:  [97.5  F (36.4  C)-98.1  F (36.7  C)] 97.9  F (36.6  C)  Pulse:  [73-77] 77  Resp:  [20] 20  BP: (131-167)/(67-80) 147/80  FiO2 (%):  [21 %] 21 %  SpO2:  [92 %-95 %] 94 %      Intake/Output last 3 shifts  I/O last 3 completed shifts:  In: 1240 [P.O.:1040]  Out: 1350 [Urine:1350]  Intake/Output this shift:  I/O this shift:  In: 240 [P.O.:240]  Out: -     Physical Exam  Alert/oriented x 3, awake, NAD  CV: RRR without murmur or rub  Lung: clear and equal; no extra sounds  Ab: soft and NT; not distended; normal bs  Ext: ++ edema and well perfused  Skin; no rash    Pertinent Labs     Last Renal Panel:  Sodium   Date Value Ref Range Status   2024 133 (L) 135 - 145 mmol/L Final     Comment:     Reference intervals for this test were  updated on 09/26/2023 to more accurately reflect our healthy population. There may be differences in the flagging of prior results with similar values performed with this method. Interpretation of those prior results can be made in the context of the updated reference intervals.      Potassium   Date Value Ref Range Status   05/04/2024 3.6 3.4 - 5.3 mmol/L Final   05/04/2024 3.6 3.4 - 5.3 mmol/L Final     Chloride   Date Value Ref Range Status   05/04/2024 88 (L) 98 - 107 mmol/L Final     Carbon Dioxide (CO2)   Date Value Ref Range Status   05/04/2024 24 22 - 29 mmol/L Final     Anion Gap   Date Value Ref Range Status   05/04/2024 21 (H) 7 - 15 mmol/L Final     Glucose   Date Value Ref Range Status   05/04/2024 150 (H) 70 - 99 mg/dL Final     GLUCOSE BY METER POCT   Date Value Ref Range Status   05/03/2024 275 (H) 70 - 99 mg/dL Final     Urea Nitrogen   Date Value Ref Range Status   05/04/2024 77.3 (H) 8.0 - 23.0 mg/dL Final     Creatinine   Date Value Ref Range Status   05/04/2024 2.60 (H) 0.67 - 1.17 mg/dL Final     GFR Estimate   Date Value Ref Range Status   05/04/2024 27 (L) >60 mL/min/1.73m2 Final     Calcium   Date Value Ref Range Status   05/04/2024 10.5 (H) 8.8 - 10.2 mg/dL Final     Phosphorus   Date Value Ref Range Status   04/30/2024 3.3 2.5 - 4.5 mg/dL Final     Albumin   Date Value Ref Range Status   04/30/2024 4.6 3.5 - 5.2 g/dL Final     Recent Labs   Lab 04/30/24  0458 04/29/24  1126   HGB 11.3* 13.1*          I reviewed all lab results  Felisha Lopez MD

## 2024-05-05 NOTE — TREATMENT PLAN
"BP (!) 147/80 (BP Location: Right arm)   Pulse 77   Temp 97.9  F (36.6  C) (Oral)   Resp 20   Ht 1.676 m (5' 6\")   Wt 122.9 kg (270 lb 14.4 oz)   SpO2 94%   BMI 43.72 kg/m         Intake/Output Summary (Last 24 hours) at 5/5/2024 1142  Last data filed at 5/5/2024 0900  Gross per 24 hour   Intake 1360 ml   Output 1950 ml   Net -590 ml       No Known Allergies     RCAT Treatment Plan    Patient Score: 5  Patient Acuity: 5    Clinical Indication for Therapy: copd    Therapy Ordered: Pt uses nebs at home prn.  Will change Douneb to Q6 prn per his home regimen. Will begin Albuterol 2 puffs QID with spacer and review use.  Pt agreeable to plan. Pt on RA.     Assessment Summary: Pt on RA. Good voicing. Pt alert and oriented. NPC. Pt speaks in complete sentences. Good voicing. No accessory muscle use. No nasal flaring. No retractions. Pt denies SOB.    Breath sounds decreased with fine exp wheezes noted, no significant change post tx. Reviewed MDI use and technique with patient. Spacer at bedside.      Elder Tariq, RT  5/5/2024   "

## 2024-05-06 ENCOUNTER — APPOINTMENT (OUTPATIENT)
Dept: ULTRASOUND IMAGING | Facility: HOSPITAL | Age: 64
DRG: 321 | End: 2024-05-06
Attending: INTERNAL MEDICINE
Payer: MEDICARE

## 2024-05-06 DIAGNOSIS — Z95.5 S/P CORONARY ARTERY STENT PLACEMENT: Primary | ICD-10-CM

## 2024-05-06 LAB
ALBUMIN MFR UR ELPH: 8.8 MG/DL
ANION GAP SERPL CALCULATED.3IONS-SCNC: 16 MMOL/L (ref 7–15)
BUN SERPL-MCNC: 84.8 MG/DL (ref 8–23)
CALCIUM SERPL-MCNC: 10.3 MG/DL (ref 8.8–10.2)
CHLORIDE SERPL-SCNC: 91 MMOL/L (ref 98–107)
CREAT SERPL-MCNC: 2.29 MG/DL (ref 0.67–1.17)
CREAT UR-MCNC: 63.4 MG/DL
DEPRECATED HCO3 PLAS-SCNC: 27 MMOL/L (ref 22–29)
EGFRCR SERPLBLD CKD-EPI 2021: 31 ML/MIN/1.73M2
GLUCOSE BLDC GLUCOMTR-MCNC: 141 MG/DL (ref 70–99)
GLUCOSE SERPL-MCNC: 133 MG/DL (ref 70–99)
HOLD SPECIMEN: NORMAL
MAGNESIUM SERPL-MCNC: 2.7 MG/DL (ref 1.7–2.3)
POTASSIUM SERPL-SCNC: 4 MMOL/L (ref 3.4–5.3)
PROT/CREAT 24H UR: 0.14 MG/MG CR (ref 0–0.2)
PTH-INTACT SERPL-MCNC: 71 PG/ML (ref 15–65)
SODIUM SERPL-SCNC: 134 MMOL/L (ref 135–145)

## 2024-05-06 PROCEDURE — 250N000013 HC RX MED GY IP 250 OP 250 PS 637: Performed by: INTERNAL MEDICINE

## 2024-05-06 PROCEDURE — 83521 IG LIGHT CHAINS FREE EACH: CPT | Performed by: INTERNAL MEDICINE

## 2024-05-06 PROCEDURE — 80048 BASIC METABOLIC PNL TOTAL CA: CPT | Performed by: INTERNAL MEDICINE

## 2024-05-06 PROCEDURE — 82306 VITAMIN D 25 HYDROXY: CPT | Performed by: PHYSICIAN ASSISTANT

## 2024-05-06 PROCEDURE — 86038 ANTINUCLEAR ANTIBODIES: CPT | Performed by: INTERNAL MEDICINE

## 2024-05-06 PROCEDURE — 250N000009 HC RX 250: Performed by: INTERNAL MEDICINE

## 2024-05-06 PROCEDURE — 36415 COLL VENOUS BLD VENIPUNCTURE: CPT | Performed by: INTERNAL MEDICINE

## 2024-05-06 PROCEDURE — 250N000009 HC RX 250: Performed by: HOSPITALIST

## 2024-05-06 PROCEDURE — 94640 AIRWAY INHALATION TREATMENT: CPT

## 2024-05-06 PROCEDURE — 99232 SBSQ HOSP IP/OBS MODERATE 35: CPT | Performed by: PHYSICIAN ASSISTANT

## 2024-05-06 PROCEDURE — 84156 ASSAY OF PROTEIN URINE: CPT | Performed by: INTERNAL MEDICINE

## 2024-05-06 PROCEDURE — 83970 ASSAY OF PARATHORMONE: CPT | Performed by: INTERNAL MEDICINE

## 2024-05-06 PROCEDURE — 86334 IMMUNOFIX E-PHORESIS SERUM: CPT | Performed by: PATHOLOGY

## 2024-05-06 PROCEDURE — 120N000001 HC R&B MED SURG/OB

## 2024-05-06 PROCEDURE — 999N000156 HC STATISTIC RCP CONSULT EA 30 MIN

## 2024-05-06 PROCEDURE — 94640 AIRWAY INHALATION TREATMENT: CPT | Mod: 76

## 2024-05-06 PROCEDURE — 999N000157 HC STATISTIC RCP TIME EA 10 MIN

## 2024-05-06 PROCEDURE — 86160 COMPLEMENT ANTIGEN: CPT | Performed by: INTERNAL MEDICINE

## 2024-05-06 PROCEDURE — 99233 SBSQ HOSP IP/OBS HIGH 50: CPT | Performed by: INTERNAL MEDICINE

## 2024-05-06 PROCEDURE — 76770 US EXAM ABDO BACK WALL COMP: CPT

## 2024-05-06 PROCEDURE — 84165 PROTEIN E-PHORESIS SERUM: CPT | Mod: TC | Performed by: PATHOLOGY

## 2024-05-06 PROCEDURE — 84155 ASSAY OF PROTEIN SERUM: CPT | Performed by: INTERNAL MEDICINE

## 2024-05-06 PROCEDURE — 83735 ASSAY OF MAGNESIUM: CPT | Performed by: INTERNAL MEDICINE

## 2024-05-06 PROCEDURE — 250N000013 HC RX MED GY IP 250 OP 250 PS 637: Performed by: PHYSICIAN ASSISTANT

## 2024-05-06 PROCEDURE — 99233 SBSQ HOSP IP/OBS HIGH 50: CPT | Performed by: HOSPITALIST

## 2024-05-06 PROCEDURE — 82610 CYSTATIN C: CPT | Performed by: PHYSICIAN ASSISTANT

## 2024-05-06 RX ORDER — TORSEMIDE 100 MG/1
100 TABLET ORAL DAILY
Status: DISCONTINUED | OUTPATIENT
Start: 2024-05-06 | End: 2024-05-07

## 2024-05-06 RX ORDER — IPRATROPIUM BROMIDE AND ALBUTEROL SULFATE 2.5; .5 MG/3ML; MG/3ML
3 SOLUTION RESPIRATORY (INHALATION) 3 TIMES DAILY
Status: DISCONTINUED | OUTPATIENT
Start: 2024-05-06 | End: 2024-05-08 | Stop reason: HOSPADM

## 2024-05-06 RX ORDER — TORSEMIDE 100 MG/1
100 TABLET ORAL
Status: DISCONTINUED | OUTPATIENT
Start: 2024-05-06 | End: 2024-05-06

## 2024-05-06 RX ORDER — IPRATROPIUM BROMIDE AND ALBUTEROL SULFATE 2.5; .5 MG/3ML; MG/3ML
3 SOLUTION RESPIRATORY (INHALATION)
Status: DISCONTINUED | OUTPATIENT
Start: 2024-05-06 | End: 2024-05-08 | Stop reason: HOSPADM

## 2024-05-06 RX ORDER — TORSEMIDE 100 MG/1
100 TABLET ORAL DAILY
Status: DISCONTINUED | OUTPATIENT
Start: 2024-05-06 | End: 2024-05-06

## 2024-05-06 RX ORDER — ALBUTEROL SULFATE 90 UG/1
2 AEROSOL, METERED RESPIRATORY (INHALATION)
Status: DISCONTINUED | OUTPATIENT
Start: 2024-05-06 | End: 2024-05-08 | Stop reason: HOSPADM

## 2024-05-06 RX ADMIN — IPRATROPIUM BROMIDE AND ALBUTEROL SULFATE 3 ML: .5; 3 SOLUTION RESPIRATORY (INHALATION) at 14:30

## 2024-05-06 RX ADMIN — HYDRALAZINE HYDROCHLORIDE 10 MG: 10 TABLET, FILM COATED ORAL at 08:13

## 2024-05-06 RX ADMIN — TORSEMIDE 100 MG: 100 TABLET ORAL at 17:36

## 2024-05-06 RX ADMIN — HYDRALAZINE HYDROCHLORIDE 10 MG: 10 TABLET, FILM COATED ORAL at 16:06

## 2024-05-06 RX ADMIN — AMIODARONE HYDROCHLORIDE 200 MG: 200 TABLET ORAL at 08:14

## 2024-05-06 RX ADMIN — ACETAMINOPHEN 650 MG: 325 TABLET ORAL at 15:06

## 2024-05-06 RX ADMIN — CARVEDILOL 3.12 MG: 3.12 TABLET, FILM COATED ORAL at 08:14

## 2024-05-06 RX ADMIN — DULOXETINE HYDROCHLORIDE 30 MG: 30 CAPSULE, DELAYED RELEASE ORAL at 08:14

## 2024-05-06 RX ADMIN — HYDRALAZINE HYDROCHLORIDE 10 MG: 10 TABLET, FILM COATED ORAL at 21:09

## 2024-05-06 RX ADMIN — ROPINIROLE HYDROCHLORIDE 2 MG: 1 TABLET, FILM COATED ORAL at 14:22

## 2024-05-06 RX ADMIN — ISOSORBIDE MONONITRATE 180 MG: 60 TABLET, EXTENDED RELEASE ORAL at 08:20

## 2024-05-06 RX ADMIN — BUMETANIDE 5 MG: 2 TABLET ORAL at 08:14

## 2024-05-06 RX ADMIN — ROPINIROLE HYDROCHLORIDE 2 MG: 1 TABLET, FILM COATED ORAL at 22:51

## 2024-05-06 RX ADMIN — ROPINIROLE HYDROCHLORIDE 2 MG: 1 TABLET, FILM COATED ORAL at 08:23

## 2024-05-06 RX ADMIN — APIXABAN 5 MG: 5 TABLET, FILM COATED ORAL at 08:14

## 2024-05-06 RX ADMIN — IPRATROPIUM BROMIDE AND ALBUTEROL SULFATE 3 ML: .5; 3 SOLUTION RESPIRATORY (INHALATION) at 08:41

## 2024-05-06 RX ADMIN — ASPIRIN 81 MG: 81 TABLET, COATED ORAL at 08:14

## 2024-05-06 RX ADMIN — IPRATROPIUM BROMIDE AND ALBUTEROL SULFATE 3 ML: .5; 3 SOLUTION RESPIRATORY (INHALATION) at 20:40

## 2024-05-06 RX ADMIN — ATORVASTATIN CALCIUM 40 MG: 40 TABLET, FILM COATED ORAL at 08:14

## 2024-05-06 RX ADMIN — APIXABAN 5 MG: 5 TABLET, FILM COATED ORAL at 21:08

## 2024-05-06 RX ADMIN — CARVEDILOL 3.12 MG: 3.12 TABLET, FILM COATED ORAL at 19:09

## 2024-05-06 RX ADMIN — OXYCODONE HYDROCHLORIDE 5 MG: 5 TABLET ORAL at 08:14

## 2024-05-06 RX ADMIN — CLOPIDOGREL BISULFATE 75 MG: 75 TABLET ORAL at 08:14

## 2024-05-06 RX ADMIN — OXYCODONE HYDROCHLORIDE 10 MG: 5 TABLET ORAL at 16:11

## 2024-05-06 RX ADMIN — SPIRONOLACTONE 50 MG: 25 TABLET, FILM COATED ORAL at 08:14

## 2024-05-06 ASSESSMENT — ACTIVITIES OF DAILY LIVING (ADL)
ADLS_ACUITY_SCORE: 34

## 2024-05-06 NOTE — PROGRESS NOTES
RENAL PROGRESS NOTE    ASSESSMENT & PLAN:   ARF/CKD; has underlying CKD with baseline CR 1.2-1.4; now rising CR in the setting of diuresis and  dye exposure and cardiorenal hemodynamics; not much improvement in creatinine today in the setting of continued brisk diuresis.  Continue diuresis, concern Bumex may be contributing to his myalgias, switching to torsemide.  Try torsemide 100 mg daily and follow need to titrate.  Urine this admit without microscopic hematuria or pyuria, 8 hyaline casts supportive of hypoperfusion injury.  No proteinuria on spot check.  Serologic and paraprotein workup sent by one of my partners, results pending.  Renal ultrasound this admit with a simple cyst, no mass or hydronephrosis.  Volume status; continues to be elevated; s/p diuresis with IV bumex and held for  a couple of days and now resuming oral diuretics, changed to torsemide with concern Bumex is contributing to myalgias.  CHF; EF 40%, CAD with recent cor angio; no PCI; diffuse disease  A fib, on eliquis, amio and BB  IVON with venous stasis changes  HTN; on coreg and hyralazine and imdur; bp generally 130-140s which is ok in this setting kenzie with attempting diuresis; avoid drops in bp  COPD, current smoker  RLS; on mirapex and gabapentin;  holding neurontin--would reduced dose of neurtontin due to altered GFR if resume  Hyperlipid  Mild hyponatremia  Mild hypercalcemia in the setting of diuresis.  PTH 71.  PTHrP, vitamin D, paraprotein evaluation pending    SUBJECTIVE:    Complains of generalized achiness  Denies any fevers or chills  No dysuria or gross hematuria  Denies worsening shortness of breath  Having lunch during encounter  OBJECTIVE:  Physical Exam   Temp: 97.6  F (36.4  C) Temp src: Oral BP: (!) 145/62 Pulse: 76   Resp: 20 SpO2: 97 % O2 Device: None (Room air)    Vitals:    05/03/24 0634 05/04/24 0826 05/06/24 0813   Weight: 120.2 kg (265 lb) 122.9 kg (270 lb 14.4 oz) 121.9 kg (268 lb 12.8 oz)     Vital Signs with  Ranges  Temp:  [97.5  F (36.4  C)-98.4  F (36.9  C)] 97.6  F (36.4  C)  Pulse:  [70-77] 76  Resp:  [20-21] 20  BP: (103-177)/(62-82) 145/62  SpO2:  [94 %-97 %] 97 %  I/O last 3 completed shifts:  In: 1640 [P.O.:1640]  Out: 3175 [Urine:3175]    Patient Vitals for the past 72 hrs:   Weight   05/06/24 0813 121.9 kg (268 lb 12.8 oz)   05/04/24 0826 122.9 kg (270 lb 14.4 oz)     Intake/Output Summary (Last 24 hours) at 5/6/2024 1412  Last data filed at 5/6/2024 1110  Gross per 24 hour   Intake 1120 ml   Output 2525 ml   Net -1405 ml       PHYSICAL EXAM:  Alert/oriented x 3, awake, NAD  CV: RRR without murmur or rub  Lung: clear and equal; no extra sounds  Ab: soft and NT; not distended; normal bs  Ext: ++ edema and well perfused  Skin; no rash    LABORATORY STUDIES:     Recent Labs   Lab 05/05/24  1245 04/30/24  0458   WBC 8.7 8.4   RBC 3.88* 3.93*   HGB 11.2* 11.3*   HCT 36.5* 37.2*    166       Basic Metabolic Panel:  Recent Labs   Lab 05/06/24  0741 05/05/24  1245 05/04/24  0622 05/03/24  1123 05/03/24  0814 05/03/24  0427 05/03/24  0008 05/02/24  1620 05/02/24  1615 05/02/24  0431 05/01/24  0440   * 132* 133*  --   --  137  --   --   --  135 136   POTASSIUM 4.0 4.2 3.6  3.6  --   --  4.0 3.5 3.4  --  3.2*  3.2* 3.7  3.7   CHLORIDE 91* 91* 88*  --   --  89*  --   --   --  88* 91*   CO2 27 25 24  --   --  30*  --   --   --  27 29   BUN 84.8* 86.0* 77.3*  --   --  48.7*  --   --   --  43.2* 34.7*   CR 2.29* 2.32* 2.60*  --   --  1.60*  --   --   --  1.37* 1.18*   * 172* 150* 275* 170* 156*  --   --    < > 144* 141*   RUSLAN 10.3* 10.7* 10.5*  --   --  10.9*  --   --   --  10.4* 10.2    < > = values in this interval not displayed.       INRNo lab results found in last 7 days.     Recent Labs   Lab Test 05/05/24  1245 04/30/24  0458 09/08/23  1900 02/13/23  2307   INR  --   --   --  1.01   WBC 8.7 8.4   < > 7.9   HGB 11.2* 11.3*   < > 13.0*    166   < > 143*    < > = values in this interval not  displayed.       Personally reviewed current labs    This note was dictated using voice recognition     Emanuel Higginbotham PA-C  [facility]  [phone]

## 2024-05-06 NOTE — PLAN OF CARE
Shift from 1500 to 2330-    Problem: Gas Exchange Impaired  Goal: Optimal Gas Exchange  Outcome: Progressing  Intervention: Optimize Oxygenation and Ventilation  Recent Flowsheet Documentation  Taken 5/5/2024 1910 by Kirti Lainez RN  Head of Bed (HOB) Positioning: HOB at 20-30 degrees  Taken 5/5/2024 1834 by Kirti Lainez RN  Head of Bed (HOB) Positioning: HOB at 20-30 degrees  Taken 5/5/2024 1622 by Kirti Lainez RN  Head of Bed (HOB) Positioning: HOB at 20-30 degrees  Taken 5/5/2024 1235 by Kirti Lainez RN  Head of Bed (HOB) Positioning: HOB at 20-30 degrees  Taken 5/5/2024 0856 by Kirti Lainez RN  Head of Bed (HOB) Positioning: HOB at 20-30 degrees       Goal Outcome Evaluation:  Patient continues to sit on edge of bed most of the evening.   Declined ambulating.   SOB with activity. Needed a neb on eves. Continued to have wheezing.   Continue on fluid restriction.   Needs encouragement to ambulate.  Refuses any bed alarms; informed refusal.    At around 1145, pt stated he has generalized body aches and feels cold. Given pain meds. No fever. Will report to oncoming RN.   Told pt not to get out of bed without help. And CNA will check VS now and again in an hour.

## 2024-05-06 NOTE — PROGRESS NOTES
Care Management Follow Up    Length of Stay (days): 7    Expected Discharge Date: 05/07/2024     Concerns to be Addressed: discharge planning     Patient plan of care discussed at interdisciplinary rounds: Yes    Anticipated Discharge Disposition:  Home with Homecare     Anticipated Discharge Services:  Homecare  Anticipated Discharge DME:  NA    Patient/family educated on Medicare website which has current facility and service quality ratings:  NA  Education Provided on the Discharge Plan:  Yes  Patient/Family in Agreement with the Plan:  Yes    Referrals Placed by CM/SW:  [facility]  Private pay costs discussed: Not applicable    Additional Information:  Discussed with MD. Discharge plan for patient is home with resumption of homecare - RN services via [facility].     Social HX: Lives alone at [facility] apartment. Report independent with ADLs, but needs assist with IADLs. Has walker. Open to [facility] for RN. Goal is to return home. Cab voucher or [employer] for transport.     CM will continue to follow care progression and aide in discharge planning as needed.     Maricruz Kaye RN

## 2024-05-06 NOTE — TREATMENT PLAN
RCAT Treatment Plan    Patient Score: 3  Patient Acuity: 5    Clinical Indication for Therapy: COPD    Therapy Ordered: duo neb 6x daily prn, albuterol inhaler qid    Assessment Summary: Pt called this morning for prn duo neb.  Pt was on scheduled duo neb yesterday before being changed to scheduled MDI.  Pt does not find the MDI effective and feels short of breath.  Pt states he was taking nebs on a scheduled basis at home prior to admission.  Pt given prn neb with increased aeration.  Pt has strong NPC.  Will change MDI to prn and add tid and q2prn duo nebs.    Encourage d/b and cough.  Will do rcat re-evaluation in 72 hours.    Ondina Drew, RT  5/6/2024

## 2024-05-06 NOTE — PROGRESS NOTES
HEART CARE NOTE          Assessment/Recommendations   1. Severe Biventricular dysfunction/HFrEF c/b severe ADHF  Assessment / Plan  Patient reports myalgias on PO bumex - will transition to oral torsemide and continue to monitor UOP and renal function closely  Patient is high risk for adverse cardiac events 2/2 severe biventricular dysfunction, multiple HFHs, non-compliance with dietary restrictions, renal dysfunction, CAD  GDMT as detailed below; repeat echo ordered     Current Pharmacotherapy AHA Guideline-Directed Medical Therapy   Lisinopril 5 mg daily  - held Lisinopril 20 mg twice daily   Carvedilol 3.125 mg BID  Carvedilol 25 mg twice daily   Spironolactone 50 mg daily Spironolactone 25 mg once daily   Hydralazine NA Hydralazine 100 mg three times daily   Isosorbide mononitrate 180 mg daily Isosorbide dinitrate 40 mg three times daily   SGLT2 inhibitor:Dapagliflozin/Empagliflozin - not started Dapagliflozin or Empagliflozin 10 mg daily      2. Atrial fibrillation  Assessment / Plan  Currently on apixaban and amiodarone     3. CAD c/b ICM  Assessment / Plan  S/p CABG 2015  Now s/op PCI to Ramus and OM with DARRELL  Denies chest pain or anginal equivalents  Continue ASA, atorvastatin, carvedilol, clopidogrel     4. PRINCESS on CKD  Assessment / Plan  Progressive; resume diuresis as above; continue to monitor UOP and renal function closely       Plan of care discussed on May 6, 2024 with patient at bedside, and primary team overseeing patient's care      History of Present Illness/Subjective    Mr. Zackary Hutchison is a 62 year old male with a PMHx significant for CAD s/p CABG, afib, HFrEF and CKD who presents to in ADHF      Today, Mr. Hutchison denies acute cardiac events or complaints; Management plan as detailed above     ECG: Personally reviewed. unchanged from previous tracings, normal sinus rhythm, nonspecific ST and T waves changes, PAC's noted.     Repeat echo cardiogram personally reviewed 5/2/24:  1. Technically  difficult study.  2. The left ventricle is normal in size. Image quality does not provide for  detailed assessment of LV systolic function, but is felt to be normal with a  visually estimated ejection fraction of roughly 55%.  3. No significant valvular heart disease is identified on this study though  the sensitivity, particularly of regurgitant lesions, is reduced due to poor  Doppler acoustics.  4. Right ventricular size and systolic performance could not be accurately  assessed due to inadequate visualization.     The acoustic quality of this study is fairly poor. If a more accurate  assessment of left ventricular systolicperformance, regional wall motion, and  other morphology/function is required; would recommend cardiac MRI or other  alternative imaging modality for further evaluation.     When compared to the prior real-time echocardiogram dated 25 December 2023,  the ejection fraction appears somewhat higher on the current study though  accurate comparison is somewhat limited due to suboptimal acoustic imaging not  only on the current examination, but also hold the prior study as well.     ECHO (personnaly Reviewed on 4/29/24):   1. Technically very difficult study.  2. Left ventricular chamber size and wall thickness are normal. Systolic  function is mildly reduced with global hypokinesis. The calculated left  ventricular ejection fraction is 41%.  3. Right ventricle is not well visualized. Right ventricular chamber size and  systolic function are grossly normal.  4. No hemodynamically significant valvular abnormalities although the cardiac  valves are not well visualized.  5. Compared to the prior study dated 9/9/2023, there has been no significant  change    Telemetry: personally reviewed May 6, 2024; notable for sinus rhythm     Lab results: personally reviewed May 6, 2024; notable for PRINCESS on CKD    Medical history and pertinent documents reviewed in Care Everywhere please where applicable see details  "above        Physical Examination Review of Systems   /73 (BP Location: Left arm, Cuff Size: Adult Regular)   Pulse 70   Temp 97.7  F (36.5  C) (Oral)   Resp 20   Ht 1.676 m (5' 6\")   Wt 122.9 kg (270 lb 14.4 oz)   SpO2 95%   BMI 43.72 kg/m    Body mass index is 43.72 kg/m .  Wt Readings from Last 3 Encounters:   05/04/24 122.9 kg (270 lb 14.4 oz)   04/25/24 126.1 kg (278 lb)   04/25/24 126.1 kg (278 lb)     General Appearance:   no distress, normal body habitus   ENT/Mouth: membranes moist, no oral lesions or bleeding gums.      EYES:  no scleral icterus, normal conjunctivae   Neck: no carotid bruits or thyromegaly   Chest/Lungs:   lungs are clear to auscultation, no rales or wheezing, equal chest wall expansion    Cardiovascular:   Regular. Normal first and second heart sounds with no murmurs, rubs, or gallops; the carotid, radial and posterior tibial pulses are intact, no JVD and mild LE edema bilaterally    Abdomen:  no organomegaly, masses, bruits, or tenderness; bowel sounds are present   Extremities: no cyanosis or clubbing   Skin: no xanthelasma, warm.    Neurologic: NAD     Psychiatric: alert and oriented x3, calm     A complete 10 systems ROS was reviewed  And is negative except what is listed in the HPI.          Medical History  Surgical History Family History Social History   Past Medical History:   Diagnosis Date    Atrial fibrillation (H)     Chronic kidney disease     Chronic kidney disease, stage 3b (H) 09/28/2023    Class 3 severe obesity due to excess calories with body mass index (BMI) of 40.0 to 44.9 in adult (H) 09/28/2023    Congestive heart failure (H)     COPD (chronic obstructive pulmonary disease) (H)     Coronary artery disease involving coronary bypass graft of native heart without angina pectoris 09/28/2023    Heart failure with reduced ejection fraction (H) 09/11/2023    HFrEF (heart failure with reduced ejection fraction) (H) 09/11/2023    Hyperlipidemia     Hypertension  "    Ischemic cardiomyopathy 09/28/2023    Obese     Paroxysmal atrial fibrillation (H) 09/28/2023    Tobacco abuse 09/28/2023    Past Surgical History:   Procedure Laterality Date    CORONARY ANGIOGRAPHY ADULT ORDER      CORONARY ARTERY BYPASS Left 2014    2 vessels    CV CARDIOMEMS WITH RIGHT HEART CATH N/A 2/15/2024    Procedure: Pulmonary Arterial Pressure Sensor Placement;  Surgeon: Jacey Bhandari MD;  Location: [facility]    CV CORONARY ANGIOGRAM N/A 09/11/2023    Procedure: Coronary Angiogram;  Surgeon: Santy Esposito MD;  Location: [facility]    CV CORONARY ANGIOGRAM N/A 5/2/2024    Procedure: CV CORONARY ANGIOGRAM;  Surgeon: Santy Esposito MD;  Location: [facility]    CV LEFT HEART CATH N/A 09/11/2023    Procedure: Left Heart Catheterization;  Surgeon: Santy Esposito MD;  Location: [facility]    CV LEFT HEART CATH N/A 5/2/2024    Procedure: Left Heart Catheterization;  Surgeon: Santy Esposito MD;  Location: [facility]    CV PCI ANGIOPLASTY N/A 5/2/2024    Procedure: Percutaneous Transluminal Angioplasty;  Surgeon: Santy Esposito MD;  Location: [facility]    CV RIGHT HEART CATH MEASUREMENTS RECORDED N/A 09/11/2023    Procedure: Right Heart Catheterization;  Surgeon: Santy Esposito MD;  Location: [facility]    no family history of premature coronary artery disease Social History     Socioeconomic History    Marital status: Single     Spouse name: Not on file    Number of children: Not on file    Years of education: Not on file    Highest education level: Not on file   Occupational History    Occupation: Retired restaurant owner     Comment: [employer]   Tobacco Use    Smoking status: Every Day     Current packs/day: 0.50     Types: Cigarettes    Smokeless tobacco: Never    Tobacco comments:     Seen IP by [facility] on 9/11/23 and declined cessation services and materials   Substance and Sexual Activity    Alcohol use: Not  "Currently    Drug use: Not on file    Sexual activity: Not on file   Other Topics Concern    Not on file   Social History Narrative    Currently lives in an assisted living facility. (Updated: 01/08/2024)     Social Determinants of Health     Financial Resource Strain: Not on file   Food Insecurity: Not on file   Transportation Needs: Not on file   Physical Activity: Not on file   Stress: Not on file   Social Connections: Not on file   Interpersonal Safety: Not on file   Housing Stability: Not on file           Lab Results    Chemistry/lipid CBC Cardiac Enzymes/BNP/TSH/INR   Lab Results   Component Value Date    CHOL 121 05/02/2024    HDL 27 (L) 05/02/2024    TRIG 219 (H) 05/02/2024    BUN 86.0 (H) 05/05/2024     (L) 05/05/2024    CO2 25 05/05/2024    Lab Results   Component Value Date    WBC 8.7 05/05/2024    HGB 11.2 (L) 05/05/2024    HCT 36.5 (L) 05/05/2024    MCV 94 05/05/2024     05/05/2024    Lab Results   Component Value Date    TSH 1.70 04/29/2024    INR 1.01 02/13/2023     No results found for: \"CKTOTAL\", \"CKMB\", \"TROPONINI\"       Weight:    Wt Readings from Last 3 Encounters:   05/04/24 122.9 kg (270 lb 14.4 oz)   04/25/24 126.1 kg (278 lb)   04/25/24 126.1 kg (278 lb)       Allergies  No Known Allergies      Surgical History  Past Surgical History:   Procedure Laterality Date    CORONARY ANGIOGRAPHY ADULT ORDER      CORONARY ARTERY BYPASS Left 2014    2 vessels    CV CARDIOMEMS WITH RIGHT HEART CATH N/A 2/15/2024    Procedure: Pulmonary Arterial Pressure Sensor Placement;  Surgeon: Jacey Bhandari MD;  Location: [facility]    CV CORONARY ANGIOGRAM N/A 09/11/2023    Procedure: Coronary Angiogram;  Surgeon: Santy Esposito MD;  Location: [facility] CATH [facility]    CV CORONARY ANGIOGRAM N/A 5/2/2024    Procedure: CV CORONARY ANGIOGRAM;  Surgeon: Santy Esposito MD;  Location: [facility] CATH [facility]    CV LEFT HEART CATH N/A 09/11/2023    Procedure: Left Heart Catheterization;  " Surgeon: Santy Esposito MD;  Location: [facility] CV    CV LEFT HEART CATH N/A 5/2/2024    Procedure: Left Heart Catheterization;  Surgeon: Santy Esposito MD;  Location: [facility] CATH [facility]    CV PCI ANGIOPLASTY N/A 5/2/2024    Procedure: Percutaneous Transluminal Angioplasty;  Surgeon: Santy Esposito MD;  Location: [facility]    CV RIGHT HEART CATH MEASUREMENTS RECORDED N/A 09/11/2023    Procedure: Right Heart Catheterization;  Surgeon: Santy Esposito MD;  Location: [facility] CATH LAB [facility]       Social History  Tobacco:   History   Smoking Status    Every Day    Types: Cigarettes   Smokeless Tobacco    Never    Alcohol:   Social History    Substance and Sexual Activity      Alcohol use: Not Currently   Illicit Drugs:   History   Drug Use Not on file       Family History  Family History   Problem Relation Age of Onset    Cerebrovascular Disease Mother     Myocardial Infarction Father           Jacey Bhandari MD on 5/6/2024      cc: Reid Escobar

## 2024-05-06 NOTE — PLAN OF CARE
Shift from 0700 to 1500-      Problem: Skin Injury Risk Increased  Goal: Skin Health and Integrity  5/5/2024 2230 by Kirti Lainez RN  Outcome: Progressing  5/5/2024 2230 by Kirti Lainez RN  Outcome: Progressing  Intervention: Plan: Nurse Driven Intervention: Moisture Management  Recent Flowsheet Documentation  Taken 5/5/2024 1910 by Kirti Lainez RN  Bathing/Skin Care:   bath, partial   linen changed  Taken 5/5/2024 1622 by Kirti Lainez RN  Moisture Interventions: Encourage regular toileting  Plan: Moisture Management: urinary collection device  Intervention: Optimize Skin Protection  Recent Flowsheet Documentation  Taken 5/5/2024 1910 by Kirti Lainez RN  Activity Management:   patient refuses activity   sitting, edge of bed  Head of Bed (HOB) Positioning: HOB at 20-30 degrees  Taken 5/5/2024 1834 by Kirti Lainez RN  Activity Management: patient refuses activity  Head of Bed (HOB) Positioning: HOB at 20-30 degrees  Taken 5/5/2024 1638 by Kirti Lainez RN  Activity Management: patient refuses activity  Taken 5/5/2024 1622 by Kirti Lainez RN  Pressure Reduction Techniques: weight shift assistance provided  Activity Management:   activity adjusted per tolerance   activity encouraged   patient refuses activity  Head of Bed (HOB) Positioning: HOB at 20-30 degrees  Taken 5/5/2024 1235 by Kirti Lainez RN  Activity Management:   patient refuses activity   sitting, edge of bed  Head of Bed (HOB) Positioning: HOB at 20-30 degrees  Taken 5/5/2024 0856 by Kirti Lainez RN  Activity Management:   patient refuses activity   sitting, edge of bed  Head of Bed (HOB) Positioning: HOB at 20-30 degrees       Goal Outcome Evaluation:    Problem: Heart Failure  Goal: Optimal Functional Ability  5/5/2024 2230 by Kirti Lainez RN  Outcome: Progressing  5/5/2024 2230 by Kirti Lainez RN  Outcome: Progressing  Intervention: Optimize Functional Ability  Recent Flowsheet Documentation  Taken 5/5/2024 1910 by  Kriener, Kirti, RN  Activity Management:   patient refuses activity   sitting, edge of bed  Taken 5/5/2024 1834 by Kirti Lainez RN  Activity Management: patient refuses activity  Taken 5/5/2024 1638 by Kirti Lainez RN  Activity Management: patient refuses activity  Taken 5/5/2024 1622 by Kirti Lainez RN  Activity Management:   activity adjusted per tolerance   activity encouraged   patient refuses activity  Taken 5/5/2024 1235 by Kirti Lainez RN  Activity Management:   patient refuses activity   sitting, edge of bed  Taken 5/5/2024 0856 by Kirti Lainez RN  Activity Management:   patient refuses activity   sitting, edge of bed  Patient sits on side of bed most of the day. Declined ambulation-informed refusal. Encouraged to ambulate.   Lower extremity venous stasis and wound. Wound change done today per protocol.   Continue on Fluid restriction.   Cr continued to increase; Pt also on diuretics and seeing nephrology.  Patient anxious about discharging to home. Wants to talk to doctors more about this tomorrow.  Continues to have expiratory wheezing. See resp. Meds in MAR.

## 2024-05-06 NOTE — PLAN OF CARE
Problem: Heart Failure  Goal: Optimal Cardiac Output  Intervention: Optimize Cardiac Output  Recent Flowsheet Documentation  Taken 5/6/2024 0400 by Inocencio Cuellar RN  Environmental Support: calm environment promoted  Taken 5/6/2024 0000 by Inocencio Cuellar RN  Environmental Support: calm environment promoted     Problem: Fluid Volume Excess  Goal: Fluid Balance  Outcome: Progressing     Problem: Activity Intolerance  Goal: Enhanced Capacity and Energy  Intervention: Optimize Activity Tolerance  Recent Flowsheet Documentation  Taken 5/6/2024 0400 by Inocencio Cuellar RN  Activity Management: activity adjusted per tolerance  Environmental Support: calm environment promoted  Taken 5/6/2024 0100 by Inocencio Cuellar RN  Activity Management:   activity adjusted per tolerance   sitting, edge of bed  Taken 5/6/2024 0000 by Inocencio Cuellar RN  Environmental Support: calm environment promoted     Problem: Gas Exchange Impaired  Goal: Optimal Gas Exchange  Outcome: Progressing  Intervention: Optimize Oxygenation and Ventilation  Recent Flowsheet Documentation  Taken 5/6/2024 0400 by Inocencio Cuellar RN  Airway/Ventilation Management: airway patency maintained  Taken 5/6/2024 0000 by Inocencio Cuellar RN  Airway/Ventilation Management: airway patency maintained   Goal Outcome Evaluation:       A/O, vss, on tele, SR w/BBB, afebrile, on RA; slight inspiratory and expiratory wheezing. C/o generalized muscle aches, prn oxy 10mg given. Voiding. BS+. On 1.8L fluid restriction. Up to chair at this time for comfort.

## 2024-05-06 NOTE — PLAN OF CARE
Reported pain in arms, and legs and neck. 5mg oxy in the morning and minimally effective. Tylenol requested and given in afternoon. One blood sugar check, glucose was 141 after lunch. Very wheezy. Nebs requested, and then scheduled by RT.

## 2024-05-07 LAB
ALBUMIN SERPL ELPH-MCNC: 5.1 G/DL (ref 3.7–5.1)
ALPHA1 GLOB SERPL ELPH-MCNC: 0.3 G/DL (ref 0.2–0.4)
ALPHA2 GLOB SERPL ELPH-MCNC: 0.8 G/DL (ref 0.5–0.9)
ANA SER QL IF: NEGATIVE
ANION GAP SERPL CALCULATED.3IONS-SCNC: 14 MMOL/L (ref 7–15)
B-GLOBULIN SERPL ELPH-MCNC: 1 G/DL (ref 0.6–1)
BUN SERPL-MCNC: 77.5 MG/DL (ref 8–23)
C3 SERPL-MCNC: 134 MG/DL (ref 81–157)
C4 SERPL-MCNC: 23 MG/DL (ref 13–39)
CALCIUM SERPL-MCNC: 10.5 MG/DL (ref 8.8–10.2)
CHLORIDE SERPL-SCNC: 96 MMOL/L (ref 98–107)
CREAT SERPL-MCNC: 1.96 MG/DL (ref 0.67–1.17)
CYSTATIN C (ROCHE): 2.7 MG/L (ref 0.6–1)
DEPRECATED HCO3 PLAS-SCNC: 27 MMOL/L (ref 22–29)
EGFRCR SERPLBLD CKD-EPI 2021: 37 ML/MIN/1.73M2
GAMMA GLOB SERPL ELPH-MCNC: 0.8 G/DL (ref 0.7–1.6)
GFR SERPL CREATININE-BSD FRML MDRD: 20 ML/MIN/1.73M2
GLUCOSE SERPL-MCNC: 144 MG/DL (ref 70–99)
KAPPA LC FREE SER-MCNC: 7.53 MG/DL (ref 0.33–1.94)
KAPPA LC FREE/LAMBDA FREE SER NEPH: 2.18 {RATIO} (ref 0.26–1.65)
LAMBDA LC FREE SERPL-MCNC: 3.46 MG/DL (ref 0.57–2.63)
M PROTEIN SERPL ELPH-MCNC: 0 G/DL
POTASSIUM SERPL-SCNC: 4.1 MMOL/L (ref 3.4–5.3)
PROT PATTERN SERPL ELPH-IMP: NORMAL
PROT PATTERN SERPL IFE-IMP: NORMAL
SODIUM SERPL-SCNC: 137 MMOL/L (ref 135–145)
TOTAL PROTEIN SERUM FOR ELP: 8 G/DL (ref 6.4–8.3)
VIT D+METAB SERPL-MCNC: 17 NG/ML (ref 20–50)

## 2024-05-07 PROCEDURE — 999N000157 HC STATISTIC RCP TIME EA 10 MIN

## 2024-05-07 PROCEDURE — 84165 PROTEIN E-PHORESIS SERUM: CPT | Mod: 26 | Performed by: PATHOLOGY

## 2024-05-07 PROCEDURE — 99233 SBSQ HOSP IP/OBS HIGH 50: CPT | Performed by: INTERNAL MEDICINE

## 2024-05-07 PROCEDURE — 99232 SBSQ HOSP IP/OBS MODERATE 35: CPT | Performed by: PHYSICIAN ASSISTANT

## 2024-05-07 PROCEDURE — 86334 IMMUNOFIX E-PHORESIS SERUM: CPT | Mod: 26 | Performed by: PATHOLOGY

## 2024-05-07 PROCEDURE — 99232 SBSQ HOSP IP/OBS MODERATE 35: CPT | Performed by: HOSPITALIST

## 2024-05-07 PROCEDURE — 250N000013 HC RX MED GY IP 250 OP 250 PS 637: Performed by: INTERNAL MEDICINE

## 2024-05-07 PROCEDURE — 94640 AIRWAY INHALATION TREATMENT: CPT | Mod: 76

## 2024-05-07 PROCEDURE — 82542 COL CHROMOTOGRAPHY QUAL/QUAN: CPT | Performed by: PHYSICIAN ASSISTANT

## 2024-05-07 PROCEDURE — 94640 AIRWAY INHALATION TREATMENT: CPT

## 2024-05-07 PROCEDURE — 120N000001 HC R&B MED SURG/OB

## 2024-05-07 PROCEDURE — G0463 HOSPITAL OUTPT CLINIC VISIT: HCPCS

## 2024-05-07 PROCEDURE — 250N000009 HC RX 250: Performed by: HOSPITALIST

## 2024-05-07 PROCEDURE — 36415 COLL VENOUS BLD VENIPUNCTURE: CPT | Performed by: INTERNAL MEDICINE

## 2024-05-07 PROCEDURE — 82374 ASSAY BLOOD CARBON DIOXIDE: CPT | Performed by: INTERNAL MEDICINE

## 2024-05-07 PROCEDURE — 82565 ASSAY OF CREATININE: CPT | Performed by: INTERNAL MEDICINE

## 2024-05-07 RX ORDER — TORSEMIDE 100 MG/1
100 TABLET ORAL
Status: DISCONTINUED | OUTPATIENT
Start: 2024-05-07 | End: 2024-05-08 | Stop reason: HOSPADM

## 2024-05-07 RX ADMIN — ATORVASTATIN CALCIUM 40 MG: 40 TABLET, FILM COATED ORAL at 07:58

## 2024-05-07 RX ADMIN — HYDRALAZINE HYDROCHLORIDE 10 MG: 10 TABLET, FILM COATED ORAL at 20:30

## 2024-05-07 RX ADMIN — CARVEDILOL 3.12 MG: 3.12 TABLET, FILM COATED ORAL at 17:17

## 2024-05-07 RX ADMIN — ASPIRIN 81 MG: 81 TABLET, COATED ORAL at 07:58

## 2024-05-07 RX ADMIN — HYDRALAZINE HYDROCHLORIDE 10 MG: 10 TABLET, FILM COATED ORAL at 13:18

## 2024-05-07 RX ADMIN — APIXABAN 5 MG: 5 TABLET, FILM COATED ORAL at 07:58

## 2024-05-07 RX ADMIN — IPRATROPIUM BROMIDE AND ALBUTEROL SULFATE 3 ML: .5; 3 SOLUTION RESPIRATORY (INHALATION) at 19:29

## 2024-05-07 RX ADMIN — TORSEMIDE 100 MG: 100 TABLET ORAL at 08:06

## 2024-05-07 RX ADMIN — ROPINIROLE HYDROCHLORIDE 2 MG: 1 TABLET, FILM COATED ORAL at 08:06

## 2024-05-07 RX ADMIN — SPIRONOLACTONE 50 MG: 25 TABLET, FILM COATED ORAL at 07:58

## 2024-05-07 RX ADMIN — HYDRALAZINE HYDROCHLORIDE 10 MG: 10 TABLET, FILM COATED ORAL at 07:58

## 2024-05-07 RX ADMIN — IPRATROPIUM BROMIDE AND ALBUTEROL SULFATE 3 ML: .5; 3 SOLUTION RESPIRATORY (INHALATION) at 06:15

## 2024-05-07 RX ADMIN — CARVEDILOL 3.12 MG: 3.12 TABLET, FILM COATED ORAL at 07:57

## 2024-05-07 RX ADMIN — ISOSORBIDE MONONITRATE 180 MG: 60 TABLET, EXTENDED RELEASE ORAL at 08:06

## 2024-05-07 RX ADMIN — DULOXETINE HYDROCHLORIDE 30 MG: 30 CAPSULE, DELAYED RELEASE ORAL at 07:58

## 2024-05-07 RX ADMIN — ROPINIROLE HYDROCHLORIDE 2 MG: 1 TABLET, FILM COATED ORAL at 22:05

## 2024-05-07 RX ADMIN — AMIODARONE HYDROCHLORIDE 200 MG: 200 TABLET ORAL at 07:58

## 2024-05-07 RX ADMIN — IPRATROPIUM BROMIDE AND ALBUTEROL SULFATE 3 ML: .5; 3 SOLUTION RESPIRATORY (INHALATION) at 14:17

## 2024-05-07 RX ADMIN — TORSEMIDE 100 MG: 100 TABLET ORAL at 17:22

## 2024-05-07 RX ADMIN — CLOPIDOGREL BISULFATE 75 MG: 75 TABLET ORAL at 08:00

## 2024-05-07 RX ADMIN — APIXABAN 5 MG: 5 TABLET, FILM COATED ORAL at 20:30

## 2024-05-07 RX ADMIN — ROPINIROLE HYDROCHLORIDE 2 MG: 1 TABLET, FILM COATED ORAL at 13:19

## 2024-05-07 ASSESSMENT — ACTIVITIES OF DAILY LIVING (ADL)
ADLS_ACUITY_SCORE: 33
ADLS_ACUITY_SCORE: 34
ADLS_ACUITY_SCORE: 33

## 2024-05-07 NOTE — PROGRESS NOTES
I came in to assess and administer scheduled neb. Pt refused neb tx due to earlier prn tx but states he's willing to take next scheduled dose at 1500. RT will follow.    Zach Joe, RT

## 2024-05-07 NOTE — PROGRESS NOTES
CLINICAL NUTRITION SERVICES    Reviewed nutrition risk factors due to LOS. Pt is tolerating diet, eating well per nursing documentation. Pt received diet education during his stay. The last date educated on heart healthy, low sodium with DM was 5/3/24.   RD will follow peripherally at this time, unless consulted.

## 2024-05-07 NOTE — PLAN OF CARE
Problem: Adult Inpatient Plan of Care  Goal: Plan of Care Review  Description: The Plan of Care Review/Shift note should be completed every shift.  The Outcome Evaluation is a brief statement about your assessment that the patient is improving, declining, or no change.  This information will be displayed automatically on your shift  note.  Outcome: Progressing     Problem: Heart Failure  Goal: Fluid and Electrolyte Balance  Outcome: Progressing     Problem: Cardiac Catheterization (Diagnostic/Interventional)  Goal: Stable Heart Rate and Rhythm  Outcome: Progressing   Goal Outcome Evaluation:       Pt is alert and oriented x4. On room air. Denies pain. VSS. Independent. On telemetry. Fluid fluid restriction 1800 ml. PV on right arm. Saline locked. Pt was awake most of the night.

## 2024-05-07 NOTE — PLAN OF CARE
Problem: Adult Inpatient Plan of Care  Goal: Absence of Hospital-Acquired Illness or Injury  Intervention: Identify and Manage Fall Risk  Recent Flowsheet Documentation  Taken 5/6/2024 1615 by Monse Feuntes, RN  Safety Promotion/Fall Prevention:   activity supervised   nonskid shoes/slippers when out of bed   lighting adjusted     Problem: Gas Exchange Impaired  Goal: Optimal Gas Exchange  Outcome: Progressing   Scheduled Douneb given by RT.  Problem: Adult Inpatient Plan of Care  Goal: Optimal Comfort and Wellbeing  Intervention: Monitor Pain and Promote Comfort  Recent Flowsheet Documentation  Taken 5/6/2024 1611 by Monse Fuentes, RN  Pain Management Interventions:   medication (see MAR)   emotional support   environmental changes   quiet environment facilitated   repositioned   therapeutic touch  PRN oxycodone given for arm pain with good relief.

## 2024-05-07 NOTE — PLAN OF CARE
Awake and calm. Denied pain. Neck arm leg pain from yesterday is resolved. Refused blood sugar check. Was 144 for glucose with BMP. 1 assist with walker. Appetite good. WOC saw leg wounds and changed dressings. Continued to refuse blood glucose checks. Sitting up on the edge of bed when awake. No pain. Well within fluid restriction. Lungs sound a little better today with milder wheezes.

## 2024-05-07 NOTE — PROGRESS NOTES
RENAL PROGRESS NOTE    ASSESSMENT & PLAN:   ARF/CKD; has underlying CKD with baseline CR 1.2-1.4; ARF this admit in the setting of diuresis, dye exposure and cardiorenal hemodynamics.  Some improvements in creatinine noted today in the setting of adequate diuresis.  Cardiology escalating diuretic regimen to torsemide 100 mg twice daily.  Will follow creatinine closely with more aggressive diuretic attempts.  Urine this admit without microscopic hematuria or pyuria, 8 hyaline casts supportive of hypoperfusion injury.  No proteinuria on spot check.  Serologic and paraprotein workup sent, results pending.  Renal ultrasound this admit with a simple cyst, no mass or hydronephrosis.  Volume status; continues to be elevated; s/p diuresis with IV bumex and held for a couple of days and now resuming oral diuretics, changed to torsemide on 5/6/2024 with concern Bumex is contributing to myalgias.  CHF; EF 40%, CAD with recent cor angio; no PCI; diffuse disease  A fib, on eliquis, amio and BB  IVON with venous stasis changes  HTN; on coreg and hyralazine and imdur; bp generally 120-140s which is ok in this setting kenzie with attempting diuresis; avoid drops in bp  COPD, current smoker  RLS; on mirapex and gabapentin;  holding neurontin--would reduced dose of neurtontin due to altered GFR if resumed  Hyperlipid  Mild hyponatremia  Mild hypercalcemia in the setting of diuresis.  PTH 71, vitamin D level low.  PTHrP, paraprotein evaluation pending    SUBJECTIVE:    Patient was seen bedside  Myalgias have improved  He is tired today  He is encouraged by his improving creatinine  Does not feel that his breathing is worsening  No urinary complaints  All questions answered  OBJECTIVE:  Physical Exam   Temp: 97.9  F (36.6  C) Temp src: Oral BP: 126/60 Pulse: 71   Resp: 20 SpO2: 93 % O2 Device: None (Room air)    Vitals:    05/04/24 0826 05/06/24 0813 05/07/24 0757   Weight: 122.9 kg (270 lb 14.4 oz) 121.9 kg (268 lb 12.8 oz) 121.8 kg  (268 lb 8.3 oz)     Vital Signs with Ranges  Temp:  [97.5  F (36.4  C)-97.9  F (36.6  C)] 97.9  F (36.6  C)  Pulse:  [71-79] 71  Resp:  [18-20] 20  BP: (109-174)/(52-74) 126/60  SpO2:  [93 %-97 %] 93 %  I/O last 3 completed shifts:  In: 1520 [P.O.:1520]  Out: 2365 [Urine:2365]    Patient Vitals for the past 72 hrs:   Weight   05/07/24 0757 121.8 kg (268 lb 8.3 oz)   05/06/24 0813 121.9 kg (268 lb 12.8 oz)     Intake/Output Summary (Last 24 hours) at 5/6/2024 1412  Last data filed at 5/6/2024 1110  Gross per 24 hour   Intake 1120 ml   Output 2525 ml   Net -1405 ml       PHYSICAL EXAM:  Alert/oriented x 3, awake, NAD  CV: RRR without murmur or rub  Lung: clear and equal; no extra sounds  Ab: soft and NT; not distended; normal bs  Ext: ++ edema and well perfused  Skin; no rash    LABORATORY STUDIES:     Recent Labs   Lab 05/05/24  1245   WBC 8.7   RBC 3.88*   HGB 11.2*   HCT 36.5*          Basic Metabolic Panel:  Recent Labs   Lab 05/07/24  0651 05/06/24  1421 05/06/24  0741 05/05/24  1245 05/04/24  0622 05/03/24  1123 05/03/24  0814 05/03/24  0427 05/03/24  0008 05/02/24  1615 05/02/24  0431     --  134* 132* 133*  --   --  137  --   --  135   POTASSIUM 4.1  --  4.0 4.2 3.6  3.6  --   --  4.0 3.5   < > 3.2*  3.2*   CHLORIDE 96*  --  91* 91* 88*  --   --  89*  --   --  88*   CO2 27  --  27 25 24  --   --  30*  --   --  27   BUN 77.5*  --  84.8* 86.0* 77.3*  --   --  48.7*  --   --  43.2*   CR 1.96*  --  2.29* 2.32* 2.60*  --   --  1.60*  --   --  1.37*   * 141* 133* 172* 150* 275*   < > 156*  --    < > 144*   RUSLAN 10.5*  --  10.3* 10.7* 10.5*  --   --  10.9*  --   --  10.4*    < > = values in this interval not displayed.       INRNo lab results found in last 7 days.     Recent Labs   Lab Test 05/05/24  1245 04/30/24  0458 09/08/23  1900 02/13/23  2307   INR  --   --   --  1.01   WBC 8.7 8.4   < > 7.9   HGB 11.2* 11.3*   < > 13.0*    166   < > 143*    < > = values in this interval not  displayed.       Personally reviewed current labs    This note was dictated using voice recognition     Emanuel Higginbotham PA-C  [facility]  [phone]

## 2024-05-07 NOTE — PROGRESS NOTES
[facility]  WO Nurse Inpatient Assessment     Consulted for: RLE    Summary:     Patient History (according to provider note(s):      HPI: Zackary Hutchison is a 63 year old male with h/o  chronic venous stasis, HF, CAD (CABG 2v), ICM, A fib (on apixaban), CKD 3, tobacco use, who presents to the ED for evaluation of shortness of breath and leg swelling. HE was seen by cardiology on Friday and recommended to come in for admission but declined despite cardiology concern for adverse cardiac events. Patient continues to be noncompliant with dietary restrictions and fluids. He complains of chest tightness, fatigues, CARNEY, SOB, orthopnea, PND and increased leg swelling.     Assessment:      Wound location: R lateral ankle/shin      4/30 5/7    Last photo: 5/7  Wound due to: Venous Ulcer  Wound history/plan of care: patient has had the wound for months per his report, PCP   Wound base: 90 % granulation tissue, 10 % fibrin     Palpation of the wound bed: normal      Drainage: small     Description of drainage: serosanguinous     Measurements (length x width x depth, in cm): 5  x 5  x  0.2 cm      Tunneling: N/A     Undermining: N/A  Periwound skin: Hemosiderin staining and Macerated      Color: normal and consistent with surrounding tissue      Temperature: warm  Odor: mild  Pain: mild and during dressing change, aching  Pain interventions prior to dressing change: slow and gentle cares   Treatment goal: Heal , Infection control/prevention, Increase granulation, Protection, Promote epidermal migration, Remove necrotic tissue, and Soften necrotic tissue  STATUS: evolving  Supplies ordered: at bedside       Treatment Plan:     RLE  Soak wound with vashe moistened gauze for 5 minutes, pat dry  Apply nickel thick layer of medihoney gel to wound  Cover with mepilex  Change dressing MWF+ PRN if soiled, saturated, falls off       Orders:  Reviewed, Written, and Updated    RECOMMEND PRIMARY TEAM ORDER: None, at this time  Education provided: plan of care and Infection prevention   Discussed plan of care with: Patient  WOC nurse follow-up plan: weekly  Notify WOC if wound(s) deteriorate.  Nursing to notify the Provider(s) and re-consult the WOC Nurse if new skin concern.    DATA:     Current support surface: Standard  Standard gel/foam mattress (IsoFlex, Atmos air, etc)  Containment of urine/stool: Continent of bladder and Continent of bowel  BMI: Body mass index is 43.34 kg/m .   Active diet order: Orders Placed This Encounter      Low Saturated Fat Na <2400 mg     Output: I/O last 3 completed shifts:  In: 1520 [P.O.:1520]  Out: 2365 [Urine:2365]     Labs:   Recent Labs   Lab 05/05/24  1245   HGB 11.2*   WBC 8.7     Pressure injury risk assessment:   Sensory Perception: 3-->slightly limited  Moisture: 3-->occasionally moist  Activity: 3-->walks occasionally  Mobility: 3-->slightly limited  Nutrition: 3-->adequate  Friction and Shear: 3-->no apparent problem  Simeon Score: 18    NOEL RocheN RN CWOCN  Pager no longer in use, please contact through [employer] group: [facility]

## 2024-05-07 NOTE — PROGRESS NOTES
[facility]    Medicine Progress Note - Hospitalist Service    Date of Admission:  4/29/2024    Assessment & Plan   Zackary Hutchison is a 63-year-old man with history of paroxysmal atrial fibrillation on Eliquis, stage III CKD, CAD s/p CABG, ischemic cardiomyopathy, COPD, restless leg syndrome, hypertension, morbid obesity, ischemic cardiomyopathy, admitted on 4/9/2024 with acute exacerbation of HFrEF.    He was placed on furosemide drip on admission. Coronary angiogram performed 5/2/2024 revealed extensive coronary artery disease s/p PCI to ramus and LCx OM.  Per cardiology plan for triple therapy apixaban, aspirin and Plavix 3 months.    Hospital course was complicated by PRINCESS on stage III CKD possibly secondary to cardiorenal process versus diuretic therapy versus contrast exposure.  IV Lasix was stopped.  Renal function peaked at creatinine 2.6 on 5/4.  Oral Bumex was started by cardiology on 5/5.     Acute decompensated HFrEF  Biventricular heart failure s/p CardioMEMS in February 2024  Off furosemide drip and chlorothiazide due to PRINCESS  Carvedilol 3.125 mg twice daily  Hydralazine 10 mg 3 times daily  Imdur 180 mg daily  Spironolactone 50 mg daily for now   Bumex switched to torsemide 5/6, changed to BID today    CAD s/p CABG s/p PCI with DARRELL (5/2)  Coronary angiogram performed 5/2/2024 revealed extensive coronary artery disease including 100% stenosis in both the ostium to proximal segments of LAD and RCA, s/p PCI to ramus and LCx OM.    - Per cardiology plan for triple therapy apixaban, aspirin and Plavix 3 months.  - Continue statins, coreg, Imdur    Atrial fibrillation  Heart rate is controlled  Amiodarone 200 mg daily  Carvedilol 3.125 mg twice daily  Apixaban 5 mg twice daily    Restless leg syndrome  Ropinirole 2 mg 3 times daily  Hold  mg gabapentin given PRINCESS on 5/5.  If renal function continues to improve, can resume at a lower dose per GFR.     Type 2 diabetes-new  "diagnosis  Hemoglobin A1c is elevated at 7.8  Patient declines further glucose checks and treatment with insulin  He wants to discuss with diabetes educator.    PRINCESS on suspected stage III CKD  Possibly secondary to cardiorenal process vs contrast exposure from coronary angiogram.  Baseline creatinine 1.2-1.4.    Creatinine peaked at 2.6, improved to 1.96 today  -Nephrology is following      COPD  Continue inhalers, supplemental o2    Constipation  Previously had good experience with milk of magnesium.  Concerned about vomiting with MiraLAX.  -Milk of magnesium. as needed      Diet: Low Saturated Fat Na <2400 mg  Fluid restriction 1800 ML FLUID    DVT Prophylaxis: DOAC  Ruvalcaba Catheter: Not present  Lines: None     Cardiac Monitoring: ACTIVE order. Indication: Acute decompensated heart failure (48 hours)  Code Status: Full Code      Clinically Significant Risk Factors           # Hypercalcemia: Highest Ca = 10.7 mg/dL in last 2 days, will monitor as appropriate         # Acute Kidney Injury, unspecified: based on a >150% or 0.3 mg/dL increase in last creatinine compared to past 90 day average, will monitor renal function  # Hypertension: Noted on problem list  # Chronic heart failure with reduced ejection fraction: last echo with EF <40%      # DMII: A1C = 7.8 % (Ref range: <5.7 %) within past 6 months   # Severe Obesity: Estimated body mass index is 43.34 kg/m  as calculated from the following:    Height as of this encounter: 1.676 m (5' 6\").    Weight as of this encounter: 121.8 kg (268 lb 8.3 oz).        # Financial/Environmental Concerns: none  # Support System: poor social support noted in nursing assessment         Disposition Plan     Medically Ready for Discharge: Anticipated in 2-4 Days    Amisha Colindres MD  Hospitalist Service  [facility]  Securely message with [employer] (more info)  Text page via [employer] Paging/Directory "   ______________________________________________________________________    Interval History   Chart reviewed and events noted.  Patient seen and examined.   No new worsening symptoms today. SOB improving. Wheezing occasionally. No dizziness, N/V.       Physical Exam   Vital Signs: Temp: 97.6  F (36.4  C) Temp src: Oral BP: 118/53 Pulse: 76   Resp: 20 SpO2: 94 % O2 Device: None (Room air)    Weight: 268 lbs 8.32 oz    General: Pleasant, obese male in NAD  HEENT:EOMI, AT,NC  CVS:RRR, no edema  RS:Diminished BS  Skin: Statis dermatitis b/l LE  Neurology:Grossly normal  Psy:Approrpiate affect        Medical Decision Making       45 MINUTES SPENT BY ME on the date of service doing chart review, history, exam, documentation & further activities per the note.        Plan d/w patient,  SW/CM      Data     I have personally reviewed the following data over the past 24 hrs:    N/A  \   N/A   / N/A     137 96 (L) 77.5 (H) /  144 (H)   4.1 27 1.96 (H) \       Imaging results reviewed over the past 24 hrs:   Recent Results (from the past 24 hour(s))   US Renal Complete Non-Vascular    Narrative    EXAM: US RENAL COMPLETE NON-VASCULAR  LOCATION: [facility]  DATE: 5/6/2024    INDICATION: PRINCESS , renal cyst, renal hypodensity on imaging in 2022  COMPARISON: Report from outside CT of the abdomen and pelvis 6/9/2022  TECHNIQUE: Routine Bilateral Renal and Bladder Ultrasound. Acoustic windows are limited due to large patient habitus and bowel gas.    FINDINGS:    RIGHT KIDNEY: 13.3 x 6.9 x 6.8 cm. Normal without hydronephrosis or masses.     LEFT KIDNEY: 12.0 x 6.1 x 7.2 cm. Normal without hydronephrosis or solid mass. There is a 1.5 x 1.6 cm cyst arising from the superior pole.    BLADDER: Normal.      Impression    IMPRESSION:    1.  Normal-sized kidneys without obstruction.  2.  1.6 cm cyst arising from the upper left kidney. A cyst of similar description was described on prior CT abdomen and pelvis. No  specific workup or follow-up is necessary.

## 2024-05-07 NOTE — PROGRESS NOTES
HEART CARE NOTE          Assessment/Recommendations   1. Severe Biventricular dysfunction/HFrEF c/b severe ADHF  Assessment / Plan  Resume torsemide frequency a BID and continue to monitor UOP and renal function closely  Patient is high risk for adverse cardiac events 2/2 severe biventricular dysfunction, multiple HFHs, non-compliance with dietary restrictions, renal dysfunction, CAD  GDMT as detailed below; repeat echo ordered     Current Pharmacotherapy AHA Guideline-Directed Medical Therapy   Lisinopril 5 mg daily  - held Lisinopril 20 mg twice daily   Carvedilol 3.125 mg BID  Carvedilol 25 mg twice daily   Spironolactone 50 mg daily Spironolactone 25 mg once daily   Hydralazine NA Hydralazine 100 mg three times daily   Isosorbide mononitrate 180 mg daily Isosorbide dinitrate 40 mg three times daily   SGLT2 inhibitor:Dapagliflozin/Empagliflozin - not started Dapagliflozin or Empagliflozin 10 mg daily      2. Atrial fibrillation  Assessment / Plan  Currently on apixaban and amiodarone     3. CAD c/b ICM  Assessment / Plan  S/p CABG 2015  Now s/op PCI to Ramus and OM with DARRELL  Denies chest pain or anginal equivalents  Continue ASA, atorvastatin, carvedilol, clopidogrel     4. PRINCESS on CKD  Assessment / Plan  Resolving; resume diuresis as above; continue to monitor UOP and renal function closely       Plan of care discussed on May 7, 2024 with patient at bedside, and primary team overseeing patient's care    History of Present Illness/Subjective    Mr. Zackary Hutchison is a 62 year old male with a PMHx significant for CAD s/p CABG, afib, HFrEF and CKD who presents to in ADHF      Today, Mr. Hutchison denies acute cardiac events or complaints; Management plan as detailed above     ECG: Personally reviewed. unchanged from previous tracings, normal sinus rhythm, nonspecific ST and T waves changes, PAC's noted.     Repeat echo cardiogram personally reviewed 5/2/24:  1. Technically difficult study.  2. The left ventricle is  normal in size. Image quality does not provide for  detailed assessment of LV systolic function, but is felt to be normal with a  visually estimated ejection fraction of roughly 55%.  3. No significant valvular heart disease is identified on this study though  the sensitivity, particularly of regurgitant lesions, is reduced due to poor  Doppler acoustics.  4. Right ventricular size and systolic performance could not be accurately  assessed due to inadequate visualization.     The acoustic quality of this study is fairly poor. If a more accurate  assessment of left ventricular systolicperformance, regional wall motion, and  other morphology/function is required; would recommend cardiac MRI or other  alternative imaging modality for further evaluation.     When compared to the prior real-time echocardiogram dated 25 December 2023,  the ejection fraction appears somewhat higher on the current study though  accurate comparison is somewhat limited due to suboptimal acoustic imaging not  only on the current examination, but also hold the prior study as well.     ECHO (personnaly Reviewed on 4/29/24):   1. Technically very difficult study.  2. Left ventricular chamber size and wall thickness are normal. Systolic  function is mildly reduced with global hypokinesis. The calculated left  ventricular ejection fraction is 41%.  3. Right ventricle is not well visualized. Right ventricular chamber size and  systolic function are grossly normal.  4. No hemodynamically significant valvular abnormalities although the cardiac  valves are not well visualized.  5. Compared to the prior study dated 9/9/2023, there has been no significant  change      Lab results: personally reviewed May 7, 2024; notable for resolving PRINCESS    Medical history and pertinent documents reviewed in Care Everywhere please where applicable see details above        Physical Examination Review of Systems   /63 (BP Location: Left arm)   Pulse 77   Temp 97.5  " F (36.4  C) (Oral)   Resp 18   Ht 1.676 m (5' 6\")   Wt 121.9 kg (268 lb 12.8 oz)   SpO2 95%   BMI 43.39 kg/m    Body mass index is 43.39 kg/m .  Wt Readings from Last 3 Encounters:   05/06/24 121.9 kg (268 lb 12.8 oz)   04/25/24 126.1 kg (278 lb)   04/25/24 126.1 kg (278 lb)     General Appearance:   no distress, normal body habitus   ENT/Mouth: membranes moist, no oral lesions or bleeding gums.      EYES:  no scleral icterus, normal conjunctivae   Neck: no carotid bruits or thyromegaly   Chest/Lungs:   lungs are clear to auscultation, no rales or wheezing, equal chest wall expansion    Cardiovascular:   Regular. Normal first and second heart sounds with no murmurs, rubs, or gallops; the carotid, radial and posterior tibial pulses are intact, no JVD and mild LE edema bilaterally    Abdomen:  no organomegaly, masses, bruits, or tenderness; bowel sounds are present   Extremities: no cyanosis or clubbing   Skin: no xanthelasma, warm.    Neurologic: NAD     Psychiatric: alert and oriented x3, calm     A complete 10 systems ROS was reviewed  And is negative except what is listed in the HPI.          Medical History  Surgical History Family History Social History   Past Medical History:   Diagnosis Date    Atrial fibrillation (H)     Chronic kidney disease     Chronic kidney disease, stage 3b (H) 09/28/2023    Class 3 severe obesity due to excess calories with body mass index (BMI) of 40.0 to 44.9 in adult (H) 09/28/2023    Congestive heart failure (H)     COPD (chronic obstructive pulmonary disease) (H)     Coronary artery disease involving coronary bypass graft of native heart without angina pectoris 09/28/2023    Heart failure with reduced ejection fraction (H) 09/11/2023    HFrEF (heart failure with reduced ejection fraction) (H) 09/11/2023    Hyperlipidemia     Hypertension     Ischemic cardiomyopathy 09/28/2023    Obese     Paroxysmal atrial fibrillation (H) 09/28/2023    Tobacco abuse 09/28/2023    Past " Surgical History:   Procedure Laterality Date    CORONARY ANGIOGRAPHY ADULT ORDER      CORONARY ARTERY BYPASS Left 2014    2 vessels    CV CARDIOMEMS WITH RIGHT HEART CATH N/A 2/15/2024    Procedure: Pulmonary Arterial Pressure Sensor Placement;  Surgeon: Jacey Bhandari MD;  Location: [facility]    CV CORONARY ANGIOGRAM N/A 09/11/2023    Procedure: Coronary Angiogram;  Surgeon: Santy Esposito MD;  Location: [facility]    CV CORONARY ANGIOGRAM N/A 5/2/2024    Procedure: CV CORONARY ANGIOGRAM;  Surgeon: Santy Esposito MD;  Location: [facility]    CV LEFT HEART CATH N/A 09/11/2023    Procedure: Left Heart Catheterization;  Surgeon: Santy Esposito MD;  Location: [facility]    CV LEFT HEART CATH N/A 5/2/2024    Procedure: Left Heart Catheterization;  Surgeon: Santy Esposito MD;  Location: [facility]    CV PCI ANGIOPLASTY N/A 5/2/2024    Procedure: Percutaneous Transluminal Angioplasty;  Surgeon: Santy Esposito MD;  Location: [facility]    CV RIGHT HEART CATH MEASUREMENTS RECORDED N/A 09/11/2023    Procedure: Right Heart Catheterization;  Surgeon: Santy Esposito MD;  Location: [facility]    no family history of premature coronary artery disease Social History     Socioeconomic History    Marital status: Single     Spouse name: Not on file    Number of children: Not on file    Years of education: Not on file    Highest education level: Not on file   Occupational History    Occupation: Retired restaurant owner     Comment: [employer]   Tobacco Use    Smoking status: Every Day     Current packs/day: 0.50     Types: Cigarettes    Smokeless tobacco: Never    Tobacco comments:     Seen IP by [facility] on 9/11/23 and declined cessation services and materials   Substance and Sexual Activity    Alcohol use: Not Currently    Drug use: Not on file    Sexual activity: Not on file   Other Topics Concern    Not on file   Social History Narrative     "Currently lives in an assisted living facility. (Updated: 01/08/2024)     Social Determinants of Health     Financial Resource Strain: Not on file   Food Insecurity: Not on file   Transportation Needs: Not on file   Physical Activity: Not on file   Stress: Not on file   Social Connections: Not on file   Interpersonal Safety: Not on file   Housing Stability: Not on file           Lab Results    Chemistry/lipid CBC Cardiac Enzymes/BNP/TSH/INR   Lab Results   Component Value Date    CHOL 121 05/02/2024    HDL 27 (L) 05/02/2024    TRIG 219 (H) 05/02/2024    BUN 84.8 (H) 05/06/2024     (L) 05/06/2024    CO2 27 05/06/2024    Lab Results   Component Value Date    WBC 8.7 05/05/2024    HGB 11.2 (L) 05/05/2024    HCT 36.5 (L) 05/05/2024    MCV 94 05/05/2024     05/05/2024    Lab Results   Component Value Date    TSH 1.70 04/29/2024    INR 1.01 02/13/2023     No results found for: \"CKTOTAL\", \"CKMB\", \"TROPONINI\"       Weight:    Wt Readings from Last 3 Encounters:   05/06/24 121.9 kg (268 lb 12.8 oz)   04/25/24 126.1 kg (278 lb)   04/25/24 126.1 kg (278 lb)       Allergies  No Known Allergies      Surgical History  Past Surgical History:   Procedure Laterality Date    CORONARY ANGIOGRAPHY ADULT ORDER      CORONARY ARTERY BYPASS Left 2014    2 vessels    CV CARDIOMEMS WITH RIGHT HEART CATH N/A 2/15/2024    Procedure: Pulmonary Arterial Pressure Sensor Placement;  Surgeon: Jacey Bhandari MD;  Location: [facility] CATH [facility]    CV CORONARY ANGIOGRAM N/A 09/11/2023    Procedure: Coronary Angiogram;  Surgeon: Santy Esposito MD;  Location: [facility] CATH LAB [facility]    CV CORONARY ANGIOGRAM N/A 5/2/2024    Procedure: CV CORONARY ANGIOGRAM;  Surgeon: Santy Esposito MD;  Location: [facility] CATH LAB [facility]    CV LEFT HEART CATH N/A 09/11/2023    Procedure: Left Heart Catheterization;  Surgeon: Santy Esposito MD;  Location: [facility] CATH LAB [facility]    CV LEFT HEART CATH N/A 5/2/2024    Procedure: Left Heart " Catheterization;  Surgeon: Santy Esposito MD;  Location: [facility] CATH LAB [facility]    CV PCI ANGIOPLASTY N/A 5/2/2024    Procedure: Percutaneous Transluminal Angioplasty;  Surgeon: Santy Esposito MD;  Location: [facility] LAB CV    CV RIGHT HEART CATH MEASUREMENTS RECORDED N/A 09/11/2023    Procedure: Right Heart Catheterization;  Surgeon: Santy Esposito MD;  Location: [facility] CATH LAB [facility]       Social History  Tobacco:   History   Smoking Status    Every Day    Types: Cigarettes   Smokeless Tobacco    Never    Alcohol:   Social History    Substance and Sexual Activity      Alcohol use: Not Currently   Illicit Drugs:   History   Drug Use Not on file       Family History  Family History   Problem Relation Age of Onset    Cerebrovascular Disease Mother     Myocardial Infarction Father           Jacey Bhandari MD on 5/7/2024      cc: Reid Escobar

## 2024-05-08 VITALS
SYSTOLIC BLOOD PRESSURE: 117 MMHG | RESPIRATION RATE: 20 BRPM | DIASTOLIC BLOOD PRESSURE: 55 MMHG | OXYGEN SATURATION: 96 % | BODY MASS INDEX: 42.86 KG/M2 | TEMPERATURE: 97.8 F | WEIGHT: 266.7 LBS | HEIGHT: 66 IN | HEART RATE: 77 BPM

## 2024-05-08 LAB
ANION GAP SERPL CALCULATED.3IONS-SCNC: 15 MMOL/L (ref 7–15)
BUN SERPL-MCNC: 68.2 MG/DL (ref 8–23)
CALCIUM SERPL-MCNC: 10.1 MG/DL (ref 8.8–10.2)
CHLORIDE SERPL-SCNC: 96 MMOL/L (ref 98–107)
CREAT SERPL-MCNC: 1.88 MG/DL (ref 0.67–1.17)
DEPRECATED HCO3 PLAS-SCNC: 26 MMOL/L (ref 22–29)
EGFRCR SERPLBLD CKD-EPI 2021: 39 ML/MIN/1.73M2
GLUCOSE SERPL-MCNC: 157 MG/DL (ref 70–99)
HOLD SPECIMEN: NORMAL
POTASSIUM SERPL-SCNC: 3.7 MMOL/L (ref 3.4–5.3)
SODIUM SERPL-SCNC: 137 MMOL/L (ref 135–145)

## 2024-05-08 PROCEDURE — 250N000011 HC RX IP 250 OP 636: Performed by: INTERNAL MEDICINE

## 2024-05-08 PROCEDURE — 250N000013 HC RX MED GY IP 250 OP 250 PS 637: Performed by: INTERNAL MEDICINE

## 2024-05-08 PROCEDURE — 80048 BASIC METABOLIC PNL TOTAL CA: CPT | Performed by: INTERNAL MEDICINE

## 2024-05-08 PROCEDURE — P9047 ALBUMIN (HUMAN), 25%, 50ML: HCPCS | Performed by: INTERNAL MEDICINE

## 2024-05-08 PROCEDURE — 99233 SBSQ HOSP IP/OBS HIGH 50: CPT | Performed by: INTERNAL MEDICINE

## 2024-05-08 PROCEDURE — 99232 SBSQ HOSP IP/OBS MODERATE 35: CPT | Performed by: PHYSICIAN ASSISTANT

## 2024-05-08 PROCEDURE — 250N000009 HC RX 250: Performed by: HOSPITALIST

## 2024-05-08 PROCEDURE — 36415 COLL VENOUS BLD VENIPUNCTURE: CPT | Performed by: INTERNAL MEDICINE

## 2024-05-08 PROCEDURE — 99239 HOSP IP/OBS DSCHRG MGMT >30: CPT | Performed by: INTERNAL MEDICINE

## 2024-05-08 PROCEDURE — 250N000013 HC RX MED GY IP 250 OP 250 PS 637: Performed by: PHYSICIAN ASSISTANT

## 2024-05-08 PROCEDURE — 94640 AIRWAY INHALATION TREATMENT: CPT | Mod: 76

## 2024-05-08 PROCEDURE — 999N000157 HC STATISTIC RCP TIME EA 10 MIN

## 2024-05-08 PROCEDURE — 250N000013 HC RX MED GY IP 250 OP 250 PS 637: Performed by: HOSPITALIST

## 2024-05-08 PROCEDURE — 94640 AIRWAY INHALATION TREATMENT: CPT

## 2024-05-08 RX ORDER — CLOPIDOGREL BISULFATE 75 MG/1
75 TABLET ORAL DAILY
Qty: 60 TABLET | Refills: 0 | Status: SHIPPED | OUTPATIENT
Start: 2024-05-09

## 2024-05-08 RX ORDER — POTASSIUM CHLORIDE 1.5 G/1.58G
40 POWDER, FOR SOLUTION ORAL ONCE
Status: DISCONTINUED | OUTPATIENT
Start: 2024-05-08 | End: 2024-05-08

## 2024-05-08 RX ORDER — POTASSIUM CHLORIDE 1.5 G/1.58G
20 POWDER, FOR SOLUTION ORAL ONCE
Qty: 1 PACKET | Refills: 0 | Status: COMPLETED | OUTPATIENT
Start: 2024-05-08 | End: 2024-05-08

## 2024-05-08 RX ORDER — TORSEMIDE 100 MG/1
100 TABLET ORAL
Qty: 60 TABLET | Refills: 0 | Status: ON HOLD | OUTPATIENT
Start: 2024-05-08 | End: 2024-05-20

## 2024-05-08 RX ORDER — ALBUMIN (HUMAN) 12.5 G/50ML
50 SOLUTION INTRAVENOUS ONCE
Status: COMPLETED | OUTPATIENT
Start: 2024-05-08 | End: 2024-05-08

## 2024-05-08 RX ORDER — ASPIRIN 81 MG/1
81 TABLET ORAL DAILY
DISCHARGE
Start: 2024-05-08 | End: 2024-05-09

## 2024-05-08 RX ADMIN — ATORVASTATIN CALCIUM 40 MG: 40 TABLET, FILM COATED ORAL at 08:17

## 2024-05-08 RX ADMIN — DULOXETINE HYDROCHLORIDE 30 MG: 30 CAPSULE, DELAYED RELEASE ORAL at 08:17

## 2024-05-08 RX ADMIN — ALBUMIN HUMAN 50 G: 0.25 SOLUTION INTRAVENOUS at 06:47

## 2024-05-08 RX ADMIN — ALBUTEROL SULFATE 2 PUFF: 90 AEROSOL, METERED RESPIRATORY (INHALATION) at 05:46

## 2024-05-08 RX ADMIN — APIXABAN 5 MG: 5 TABLET, FILM COATED ORAL at 08:17

## 2024-05-08 RX ADMIN — ASPIRIN 81 MG: 81 TABLET, COATED ORAL at 08:16

## 2024-05-08 RX ADMIN — TORSEMIDE 100 MG: 100 TABLET ORAL at 08:18

## 2024-05-08 RX ADMIN — Medication 1 LOZENGE: at 00:22

## 2024-05-08 RX ADMIN — SPIRONOLACTONE 50 MG: 25 TABLET, FILM COATED ORAL at 08:17

## 2024-05-08 RX ADMIN — CLOPIDOGREL BISULFATE 75 MG: 75 TABLET ORAL at 08:17

## 2024-05-08 RX ADMIN — ROPINIROLE HYDROCHLORIDE 2 MG: 1 TABLET, FILM COATED ORAL at 08:18

## 2024-05-08 RX ADMIN — ROPINIROLE HYDROCHLORIDE 2 MG: 1 TABLET, FILM COATED ORAL at 13:42

## 2024-05-08 RX ADMIN — AMIODARONE HYDROCHLORIDE 200 MG: 200 TABLET ORAL at 08:17

## 2024-05-08 RX ADMIN — HYDRALAZINE HYDROCHLORIDE 10 MG: 10 TABLET, FILM COATED ORAL at 13:42

## 2024-05-08 RX ADMIN — POTASSIUM CHLORIDE 20 MEQ: 1.5 POWDER, FOR SOLUTION ORAL at 09:10

## 2024-05-08 RX ADMIN — CARVEDILOL 3.12 MG: 3.12 TABLET, FILM COATED ORAL at 08:30

## 2024-05-08 RX ADMIN — IPRATROPIUM BROMIDE AND ALBUTEROL SULFATE 3 ML: .5; 3 SOLUTION RESPIRATORY (INHALATION) at 08:02

## 2024-05-08 RX ADMIN — HYDRALAZINE HYDROCHLORIDE 10 MG: 10 TABLET, FILM COATED ORAL at 08:17

## 2024-05-08 RX ADMIN — IPRATROPIUM BROMIDE AND ALBUTEROL SULFATE 3 ML: .5; 3 SOLUTION RESPIRATORY (INHALATION) at 13:21

## 2024-05-08 RX ADMIN — ISOSORBIDE MONONITRATE 180 MG: 60 TABLET, EXTENDED RELEASE ORAL at 08:19

## 2024-05-08 ASSESSMENT — ACTIVITIES OF DAILY LIVING (ADL)
ADLS_ACUITY_SCORE: 33

## 2024-05-08 NOTE — PROGRESS NOTES
HEART CARE NOTE          Assessment/Recommendations   1. Severe Biventricular dysfunction/HFrEF c/b severe ADHF  Assessment / Plan  Tolerating oral diuretic regimen - no changes at this time; continue torsemide 100 mg BID on discharge; hold metolazone on discharge; continue to monitor UOP and renal function closely in the outpatient setting  Patient is high risk for adverse cardiac events 2/2 severe biventricular dysfunction, multiple HFHs, non-compliance with dietary restrictions, renal dysfunction, CAD  GDMT as detailed below; repeat echo ordered     Current Pharmacotherapy AHA Guideline-Directed Medical Therapy   Lisinopril 5 mg daily  - held Lisinopril 20 mg twice daily   Carvedilol 3.125 mg BID  Carvedilol 25 mg twice daily   Spironolactone 50 mg daily Spironolactone 25 mg once daily   Hydralazine NA Hydralazine 100 mg three times daily   Isosorbide mononitrate 180 mg daily Isosorbide dinitrate 40 mg three times daily   SGLT2 inhibitor:Dapagliflozin/Empagliflozin - not started Dapagliflozin or Empagliflozin 10 mg daily      2. Atrial fibrillation  Assessment / Plan  Currently on apixaban and amiodarone     3. CAD c/b ICM  Assessment / Plan  S/p CABG 2015  Now s/op PCI to Ramus and OM with DARRELL  Denies chest pain or anginal equivalents  Continue ASA, atorvastatin, carvedilol, clopidogrel     4. PRINCESS on CKD  Assessment / Plan  Resolving; resume diuresis as above; continue to monitor UOP and renal function closely    Plan of care discussed on May 8, 2024 with patient at bedside, and primary team overseeing patient's care    Cardiology team will sign-off for now. Please do not hesitate to consult us again if new questions or concerns arise. Follow-up appointment will be arranged by [facility] clinic.       History of Present Illness/Subjective    Mr. Zackary Hutchison is a 62 year old male with a PMHx significant for CAD s/p CABG, afib, HFrEF and CKD who presents to in ADHF      Today, Mr. Hutchison denies acute cardiac events  or complaints; Management plan as detailed above     ECG: Personally reviewed. unchanged from previous tracings, normal sinus rhythm, nonspecific ST and T waves changes, PAC's noted.     Repeat echo cardiogram personally reviewed 5/2/24:  1. Technically difficult study.  2. The left ventricle is normal in size. Image quality does not provide for  detailed assessment of LV systolic function, but is felt to be normal with a  visually estimated ejection fraction of roughly 55%.  3. No significant valvular heart disease is identified on this study though  the sensitivity, particularly of regurgitant lesions, is reduced due to poor  Doppler acoustics.  4. Right ventricular size and systolic performance could not be accurately  assessed due to inadequate visualization.     The acoustic quality of this study is fairly poor. If a more accurate  assessment of left ventricular systolicperformance, regional wall motion, and  other morphology/function is required; would recommend cardiac MRI or other  alternative imaging modality for further evaluation.     When compared to the prior real-time echocardiogram dated 25 December 2023,  the ejection fraction appears somewhat higher on the current study though  accurate comparison is somewhat limited due to suboptimal acoustic imaging not  only on the current examination, but also hold the prior study as well.     ECHO (personnaly Reviewed on 4/29/24):   1. Technically very difficult study.  2. Left ventricular chamber size and wall thickness are normal. Systolic  function is mildly reduced with global hypokinesis. The calculated left  ventricular ejection fraction is 41%.  3. Right ventricle is not well visualized. Right ventricular chamber size and  systolic function are grossly normal.  4. No hemodynamically significant valvular abnormalities although the cardiac  valves are not well visualized.  5. Compared to the prior study dated 9/9/2023, there has been no  "significant  change     Telemetry: personally reviewed May 8, 2024; notable for sinus rhythm     Lab results: personally reviewed May 8, 2024; notable for resolving PRINCESS    Medical history and pertinent documents reviewed in Care Everywhere please where applicable see details above        Physical Examination Review of Systems   /64 (BP Location: Left arm)   Pulse 79   Temp 97.7  F (36.5  C) (Oral)   Resp 18   Ht 1.676 m (5' 6\")   Wt 121.8 kg (268 lb 8.3 oz)   SpO2 94%   BMI 43.34 kg/m    Body mass index is 43.34 kg/m .  Wt Readings from Last 3 Encounters:   05/07/24 121.8 kg (268 lb 8.3 oz)   04/25/24 126.1 kg (278 lb)   04/25/24 126.1 kg (278 lb)     General Appearance:   no distress, normal body habitus   ENT/Mouth: membranes moist, no oral lesions or bleeding gums.      EYES:  no scleral icterus, normal conjunctivae   Neck: no carotid bruits or thyromegaly   Chest/Lungs:   lungs are clear to auscultation, no rales or wheezing, equal chest wall expansion    Cardiovascular:   Regular. Normal first and second heart sounds with no murmurs, rubs, or gallops; the carotid, radial and posterior tibial pulses are intact, no JVD and mild LE edema bilaterally    Abdomen:  no organomegaly, masses, bruits, or tenderness; bowel sounds are present   Extremities: no cyanosis or clubbing   Skin: no xanthelasma, warm.    Neurologic: NAD     Psychiatric: alert and oriented x3, calm     A complete 10 systems ROS was reviewed  And is negative except what is listed in the HPI.          Medical History  Surgical History Family History Social History   Past Medical History:   Diagnosis Date    Atrial fibrillation (H)     Chronic kidney disease     Chronic kidney disease, stage 3b (H) 09/28/2023    Class 3 severe obesity due to excess calories with body mass index (BMI) of 40.0 to 44.9 in adult (H) 09/28/2023    Congestive heart failure (H)     COPD (chronic obstructive pulmonary disease) (H)     Coronary artery disease " involving coronary bypass graft of native heart without angina pectoris 09/28/2023    Heart failure with reduced ejection fraction (H) 09/11/2023    HFrEF (heart failure with reduced ejection fraction) (H) 09/11/2023    Hyperlipidemia     Hypertension     Ischemic cardiomyopathy 09/28/2023    Obese     Paroxysmal atrial fibrillation (H) 09/28/2023    Tobacco abuse 09/28/2023    Past Surgical History:   Procedure Laterality Date    CORONARY ANGIOGRAPHY ADULT ORDER      CORONARY ARTERY BYPASS Left 2014    2 vessels    CV CARDIOMEMS WITH RIGHT HEART CATH N/A 2/15/2024    Procedure: Pulmonary Arterial Pressure Sensor Placement;  Surgeon: Jacey Bhandari MD;  Location: [facility]    CV CORONARY ANGIOGRAM N/A 09/11/2023    Procedure: Coronary Angiogram;  Surgeon: Santy Esposito MD;  Location: [facility]    CV CORONARY ANGIOGRAM N/A 5/2/2024    Procedure: CV CORONARY ANGIOGRAM;  Surgeon: Santy Esposito MD;  Location: [facility]    CV LEFT HEART CATH N/A 09/11/2023    Procedure: Left Heart Catheterization;  Surgeon: Santy Esposito MD;  Location: [facility]    CV LEFT HEART CATH N/A 5/2/2024    Procedure: Left Heart Catheterization;  Surgeon: Santy Esposito MD;  Location: [facility]    CV PCI ANGIOPLASTY N/A 5/2/2024    Procedure: Percutaneous Transluminal Angioplasty;  Surgeon: Santy Esposito MD;  Location: [facility]    CV RIGHT HEART CATH MEASUREMENTS RECORDED N/A 09/11/2023    Procedure: Right Heart Catheterization;  Surgeon: Santy Esposito MD;  Location: [facility]    no family history of premature coronary artery disease Social History     Socioeconomic History    Marital status: Single     Spouse name: Not on file    Number of children: Not on file    Years of education: Not on file    Highest education level: Not on file   Occupational History    Occupation: Retired restaurant owner     Comment: [employer]   Tobacco Use     "Smoking status: Every Day     Current packs/day: 0.50     Types: Cigarettes    Smokeless tobacco: Never    Tobacco comments:     Seen IP by [facility] on 9/11/23 and declined cessation services and materials   Substance and Sexual Activity    Alcohol use: Not Currently    Drug use: Not on file    Sexual activity: Not on file   Other Topics Concern    Not on file   Social History Narrative    Currently lives in an assisted living facility. (Updated: 01/08/2024)     Social Determinants of Health     Financial Resource Strain: Not on file   Food Insecurity: Not on file   Transportation Needs: Not on file   Physical Activity: Not on file   Stress: Not on file   Social Connections: Not on file   Interpersonal Safety: Not on file   Housing Stability: Not on file           Lab Results    Chemistry/lipid CBC Cardiac Enzymes/BNP/TSH/INR   Lab Results   Component Value Date    CHOL 121 05/02/2024    HDL 27 (L) 05/02/2024    TRIG 219 (H) 05/02/2024    BUN 77.5 (H) 05/07/2024     05/07/2024    CO2 27 05/07/2024    Lab Results   Component Value Date    WBC 8.7 05/05/2024    HGB 11.2 (L) 05/05/2024    HCT 36.5 (L) 05/05/2024    MCV 94 05/05/2024     05/05/2024    Lab Results   Component Value Date    TSH 1.70 04/29/2024    INR 1.01 02/13/2023     No results found for: \"CKTOTAL\", \"CKMB\", \"TROPONINI\"       Weight:    Wt Readings from Last 3 Encounters:   05/07/24 121.8 kg (268 lb 8.3 oz)   04/25/24 126.1 kg (278 lb)   04/25/24 126.1 kg (278 lb)       Allergies  Allergies   Allergen Reactions    Bumex [Bumetanide] Muscle Pain (Myalgia)         Surgical History  Past Surgical History:   Procedure Laterality Date    CORONARY ANGIOGRAPHY ADULT ORDER      CORONARY ARTERY BYPASS Left 2014    2 vessels    CV CARDIOMEMS WITH RIGHT HEART CATH N/A 2/15/2024    Procedure: Pulmonary Arterial Pressure Sensor Placement;  Surgeon: Jacey Bhandari MD;  Location: [facility]    CV CORONARY ANGIOGRAM N/A 09/11/2023    " Procedure: Coronary Angiogram;  Surgeon: Santy Esposito MD;  Location: [facility]ATH [facility]    CV CORONARY ANGIOGRAM N/A 5/2/2024    Procedure: CV CORONARY ANGIOGRAM;  Surgeon: Santy Esposito MD;  Location: [facility]    CV LEFT HEART CATH N/A 09/11/2023    Procedure: Left Heart Catheterization;  Surgeon: Santy Esposito MD;  Location: [facility]    CV LEFT HEART CATH N/A 5/2/2024    Procedure: Left Heart Catheterization;  Surgeon: Santy Esposito MD;  Location: [facility]    CV PCI ANGIOPLASTY N/A 5/2/2024    Procedure: Percutaneous Transluminal Angioplasty;  Surgeon: Santy Esposito MD;  Location: [facility]    CV RIGHT HEART CATH MEASUREMENTS RECORDED N/A 09/11/2023    Procedure: Right Heart Catheterization;  Surgeon: Santy Esposito MD;  Location: [facility]ATH [facility]       Social History  Tobacco:   History   Smoking Status    Every Day    Types: Cigarettes   Smokeless Tobacco    Never    Alcohol:   Social History    Substance and Sexual Activity      Alcohol use: Not Currently   Illicit Drugs:   History   Drug Use Not on file       Family History  Family History   Problem Relation Age of Onset    Cerebrovascular Disease Mother     Myocardial Infarction Father           Jacey Bhandari MD on 5/8/2024      cc: Reid Escobar

## 2024-05-08 NOTE — DISCHARGE SUMMARY
"[facility]  Hospitalist Discharge Summary      Date of Admission:  4/29/2024  Date of Discharge:  5/8/2024  Discharging Provider: Jamila Chu MD  Discharge Service: Hospitalist Service    Discharge Diagnoses     Principal Problem:    Acute on chronic systolic CHF  Active Problems:    Ischemic cardiomyopathy    Coronary artery disease involving native coronary artery of native heart with unstable angina pectoris (H)    Paroxysmal atrial fib -- on Eliquis    Chronic kidney disease, stage 3b (H)    COPD (chronic obstructive pulmonary disease) (H)    Acute kidney injury superimposed on CKD  (H24)    Restless legs syndrome (RLS)    Primary hypertension    Hypokalemia    Long term current use of anticoagulant therapy    Morbid obesity with body mass index (BMI) of 40.0 or higher (H)    Hyperglycemia    Newly diagnosed diabetes (H)        Clinically Significant Risk Factors     # DMII: A1C = 7.8 % (Ref range: <5.7 %) within past 6 months  # Severe Obesity: Estimated body mass index is 43.05 kg/m  as calculated from the following:    Height as of this encounter: 1.676 m (5' 6\").    Weight as of this encounter: 121 kg (266 lb 11.2 oz).       Follow-ups Needed After Discharge   Follow-up Appointments     Follow-up and recommended labs and tests       * Follow up with primary care provider, Reid Escobar, within 7 days   to evaluate medication change and for hospital follow- up.  The following   labs/tests are recommended: Recheck BMP.          Discharge Disposition   Discharged to home  Condition at discharge: Stable    Hospital Course     Zackary Hutchison is a 63-year-old man with history of paroxysmal atrial fibrillation on Eliquis, stage III CKD, CAD s/p CABG, ischemic cardiomyopathy, COPD, restless leg syndrome, hypertension, morbid obesity, and ischemic cardiomyopathy.   He presented to the ED on 4/29/24 for evaluation of shortness of breath and leg swelling. He was seen by cardiology 3 days " prior and was recommended hospital admission. Patient was admitted on 4/9/2024 with acute exacerbation of HFrEF.     Patient was placed on furosemide drip on admission. Coronary angiogram performed on 5/2/2024 revealed extensive coronary artery disease, including 100% stenosis in both the ostium to proximal segments of LAD and RCA. He received PCI to ramus and LCx OM.  Per cardiology, the plan is to have ASA 75 mg daily for one week and Plavix 75 mg daily indefinitely while on Eliquis therapy.    Hospital course was complicated by PRINCESS on stage III CKD. Baseline creatinine is 1.2-1.4.   IV Lasix was stopped. Renal function peaked at creatinine 2.6.  It then started to improve. Oral Bumex was started by cardiology on 5/5.     During this admission, patient was found to have type 2 diabetes with Hemoglobin A1c 7.8. This is a new diagnosis for him. Patient declined glucose checks and treatment with insulin. He wants to follow up with his PCP and discuss with diabetes educator.      Chronic stable conditions:  Atrial fibrillation  Restless leg syndrome  COPD      Consultations This Hospital Stay   WOUND OSTOMY CONTINENCE NURSE  IP CONSULT  CORE CLINIC EVALUATION IP CONSULT  OCCUPATIONAL THERAPY ADULT IP CONSULT  NUTRITION SERVICES ADULT IP CONSULT  CARE MANAGEMENT / SOCIAL WORK IP CONSULT  PHYSICAL THERAPY ADULT IP CONSULT  NUTRITION SERVICES ADULT IP CONSULT  HOSPITALIST IP CONSULT  NUTRITION SERVICES ADULT IP CONSULT  CARDIAC REHAB IP CONSULT  PHARMACY IP CONSULT  NEPHROLOGY IP CONSULT  SMOKING CESSATION PROGRAM IP CONSULT    Code Status   Prior    Time Spent on this Encounter   I, Jamila Chu MD, personally saw the patient today and spent greater than 30 minutes discharging this patient.       Jamila Chu MD  [address] [address]  Phone: [phone]  Fax: [phone]  ______________________________________________________________________    Physical  Exam   Vital Signs: Temp: 97.8  F (36.6  C) Temp src: Oral BP: 117/55 Pulse: 77   Resp: 20 SpO2: 96 % O2 Device: None (Room air)    Weight: 266 lbs 11.2 oz    General appearance: not in acute distress  HEENT: PERRL, EOMI  Lungs: Clear breath sounds in bilateral lung fields  Cardiovascular: Regular rate and rhythm, normal S1-S2  Abdomen: Soft, non tender, no distension  Musculoskeletal: No joint swelling  Skin: No rash and no edema  Neurology: AAO ×3.  Cranial nerves II - XII normal.  Normal muscle strength in all four extremities.        Primary Care Physician   Reid Escobar    Discharge Orders      Cardiac Rehab  Referral      Ambulatory Cardiologist Referral      Follow-Up with Cardiology Amb[facility]IC[facility] Referral      Home Care Referral      Medication Therapy Management Referral      Reason aspirin not prescribed from this order set     Reason Lipid Lowering Medications not prescribed from this order set     Follow Up (TCM)    Follow up in renal clinic ([facility])  Call [phone] to make appt  Request pt to be seen in 1-3 months     Follow Up (TCM)    Nephrology follow-up  Appointment is with Sheryl PADILLA NP on 5/31/2024 at 1 PM  Please arrive 15 minutes early for paperwork  [facility]  [address] [place] [address], [facility], [place], [address]  Phone number: [phone]     Reason for your hospital stay    CHF exacerbation.     Follow-up and recommended labs and tests     * Follow up with primary care provider, Reid Escobar, within 7 days to evaluate medication change and for hospital follow- up.  The following labs/tests are recommended: Recheck BMP.     Activity    Your activity upon discharge: activity as tolerated     Diet    Follow this diet upon discharge: Orders Placed This Encounter      Fluid restriction 1800 ML FLUID      Low Saturated Fat Na <2400 mg       Significant Results and Procedures   Most Recent 3 CBC's:  Recent Labs   Lab Test  05/05/24  1245 04/30/24  0458 04/29/24  1126   WBC 8.7 8.4 9.2   HGB 11.2* 11.3* 13.1*   MCV 94 95 94    166 189     Most Recent 3 BMP's:  Recent Labs   Lab Test 05/08/24  0521 05/07/24  0651 05/06/24  1421 05/06/24  0741    137  --  134*   POTASSIUM 3.7 4.1  --  4.0   CHLORIDE 96* 96*  --  91*   CO2 26 27  --  27   BUN 68.2* 77.5*  --  84.8*   CR 1.88* 1.96*  --  2.29*   ANIONGAP 15 14  --  16*   RUSLAN 10.1 10.5*  --  10.3*   * 144* 141* 133*   ,   Results for orders placed or performed during the hospital encounter of 04/29/24   XR Chest Port 1 View    Narrative    EXAM: XR CHEST PORT 1 VIEW  LOCATION: [facility]  DATE: 4/29/2024    INDICATION: dyspnea  COMPARISON: 01/08/2024      Impression    IMPRESSION: No focal consolidation or edema. No pleural effusion or pneumothorax. Stable heart size and mediastinal contours postoperative findings from CABG.   US Renal Complete Non-Vascular    Narrative    EXAM: US RENAL COMPLETE NON-VASCULAR  LOCATION: [facility]  DATE: 5/6/2024    INDICATION: PRINCESS , renal cyst, renal hypodensity on imaging in 2022  COMPARISON: Report from outside CT of the abdomen and pelvis 6/9/2022  TECHNIQUE: Routine Bilateral Renal and Bladder Ultrasound. Acoustic windows are limited due to large patient habitus and bowel gas.    FINDINGS:    RIGHT KIDNEY: 13.3 x 6.9 x 6.8 cm. Normal without hydronephrosis or masses.     LEFT KIDNEY: 12.0 x 6.1 x 7.2 cm. Normal without hydronephrosis or solid mass. There is a 1.5 x 1.6 cm cyst arising from the superior pole.    BLADDER: Normal.      Impression    IMPRESSION:    1.  Normal-sized kidneys without obstruction.  2.  1.6 cm cyst arising from the upper left kidney. A cyst of similar description was described on prior CT abdomen and pelvis. No specific workup or follow-up is necessary.   Echocardiogram Complete    Narrative    [id number]  [id number]  [id number]  [id number]^LAURITA^[place] [address]     Name: KRISTINE JONES  MRN: [id number]  : 1960  Study Date: 2024 10:20 AM  Age: 63 yrs  Gender: Male  Patient Location: [facility]  Reason For Study: CHF  Ordering Physician: MEGGAN SHAY  Performed By: VY     BSA: 2.3 m2  Height: 66 in  Weight: 273 lb  HR: 94  BP: 121/56 mmHg  ______________________________________________________________________________  Procedure  Complete Portable Echo Adult. Definity (NDC #01258-479) given intravenously.  ______________________________________________________________________________  Interpretation Summary     1. Technically difficult study.  2. The left ventricle is normal in size. Image quality does not provide for  detailed assessment of LV systolic function, but is felt to be normal with a  visually estimated ejection fraction of roughly 55%.  3. No significant valvular heart disease is identified on this study though  the sensitivity, particularly of regurgitant lesions, is reduced due to poor  Doppler acoustics.  4. Right ventricular size and systolic performance could not be accurately  assessed due to inadequate visualization.     The acoustic quality of this study is fairly poor. If a more accurate  assessment of left ventricular systolicperformance, regional wall motion, and  other morphology/function is required; would recommend cardiac MRI or other  alternative imaging modality for further evaluation.     When compared to the prior real-time echocardiogram dated 2023,  the ejection fraction appears somewhat higher on the current study though  accurate comparison is somewhat limited due to suboptimal acoustic imaging not  only on the current examination, but also hold the prior study as well.  ______________________________________________________________________________  Left ventricle:  The left ventricle is normal in size. Image quality does not provide for  detailed  assessment of LV systolic function, but is felt to be normal with a  visually estimated ejection fraction of roughly 55%. There is probable normal  regional wall motion. Left ventricular wall thickness is normal.     Assessment of LV Diastolic Function: The cumulative findings are indeterminate  in the evaluation of diastolic function.     Right ventricle:  Right ventricular size and systolic performance could not be accurately  assessed due to inadequate visualization.     Left atrium:  There is borderline left atrial enlargement.     Right atrium:  The right atrium is not well-visualized.     IVC:  The IVC is not well-visualized.     Aortic valve:  The aortic valve is not well visualized, but suspected to be comprised of  three cusps. No significant aortic stenosis or aortic insufficiency is  detected on this study.     Mitral valve:  The mitral valve appears morphologically normal. There is suspected trace  mitral insufficiency.     Tricuspid valve:  The tricuspid valve is not well-visualized.     Pulmonic valve:  The pulmonic valve is not well-visualized.     Thoracic aorta:  There is borderline enlargement of the aortic root/proximal ascending aorta.     Pericardium:  There is no significant pericardial effusion.  ______________________________________________________________________________  ______________________________________________________________________________  MMode/2D Measurements & Calculations  Ao root diam: 4.1 cm  asc Aorta Diam: 4.2 cm  LVOT diam: 2.6 cm  LVOT area: 5.3 cm2  Ao root diam index Ht(cm/m): 2.4  Ao root diam index BSA (cm/m2): 1.8  Asc Ao diam index BSA (cm/m2): 1.8  Asc Ao diam index Ht(cm/m): 2.5  EF Biplane: 47.5 %  LA Volume (BP): 93.5 ml     LA Volume Index (BP): 40.8 ml/m2  LA Volume Indexed (AL/bp): 43.5 ml/m2  TAPSE: 1.7 cm     Doppler Measurements & Calculations  MV E max mariely: 108.0 cm/sec  MV A max mariely: 92.2 cm/sec  MV E/A: 1.2  MV dec slope: 598.0 cm/sec2  MV dec time:  0.18 sec  Ao V2 max: 206.0 cm/sec  Ao max P.0 mmHg  Ao V2 mean: 145.0 cm/sec  Ao mean PG: 10.0 mmHg  Ao V2 VTI: 39.1 cm  MARIUM(I,D): 3.2 cm2  MARIUM(V,D): 3.2 cm2     LV V1 max P.3 mmHg  LV V1 max: 125.0 cm/sec  LV V1 VTI: 23.8 cm  SV(LVOT): 126.4 ml  SI(LVOT): 55.2 ml/m2  AV Iraj Ratio (DI): 0.61  MARIUM Index (cm2/m2): 1.4  E/E': 9.8  E/E' av.7  Lateral E/e': 7.5  Medial E/e': 9.9  Peak E' Iraj: 11.0 cm/sec  RV S Iraj: 13.1 cm/sec     ______________________________________________________________________________  Report approved by: Aura Cruz 2024 02:20 PM         Cardiac Catheterization    Narrative      Ost LAD to Prox LAD lesion is 100% stenosed.    Ramus lesion is 95% stenosed.    Mid Cx to Dist Cx lesion is 80% stenosed.    1st Mrg lesion is 90% stenosed.    Prox RCA to Dist RCA lesion is 100% stenosed.    Mid LAD lesion is 70% stenosed.    1.  LAD is occluded proximally.  Mid to distal LAD fills well from large,   widely patent LIMA graft.  LAD proximal to graft insertion site has   calcified stenosis compromising flow to large proximal diagonal  2.  Calcified 90% proximal stenosis in medium size ramus branch.  Ostium   ?covered by occluded LAD stent.  3.  Severely calcified stenosis proximal segment of large first obtuse   marginal branch of left circumflex.  4.  Chronic total mid RCA occlusion.  PDA fills from patent free LAURI   graft.  Posterolateral branches fill from collaterals from left   circumflex.  5.  LVEDP = 30 mmHg  6.  Successful PCI ramus branch with 2.5 x 24 mm Synergy DARRLEL implant x 1.    30% eccentric residual with BROOKLYNN-3 flow.  7.  Successful PCI LCx OM branch with DARRELL implant x 2: 3.0 x 24 Synergy   DARRELL x 1, 2.5 x 16 Synergy DARRELL x 1.  0% residual, BROOKLYNN-3 flow.         Discharge Medications        Review of your medicines        UNREVIEWED medicines. Ask your doctor about these medicines        Dose / Directions   aspirin 81 MG EC tablet  Used for: Coronary artery  disease involving native heart without angina pectoris, unspecified vessel or lesion type  Ask about: Should I take this medication?      Dose: 81 mg  Take 1 tablet (81 mg) by mouth daily for 1 day  Refills: 0            START taking        Dose / Directions   clopidogrel 75 MG tablet  Commonly known as: PLAVIX  Used for: Coronary artery disease involving native coronary artery of native heart without angina pectoris      Dose: 75 mg  Take 1 tablet (75 mg) by mouth daily  Quantity: 60 tablet  Refills: 0     torsemide 100 MG tablet  Commonly known as: DEMADEX  Used for: Coronary artery disease involving native coronary artery of native heart without angina pectoris      Dose: 100 mg  Take 1 tablet (100 mg) by mouth 2 times daily  Quantity: 60 tablet  Refills: 0            CONTINUE these medicines which have NOT CHANGED        Dose / Directions   acetaminophen 500 MG tablet  Commonly known as: TYLENOL      Dose: 1,000 mg  Take 1,000 mg by mouth every 8 hours as needed  Refills: 0     * albuterol 108 (90 Base) MCG/ACT inhaler  Commonly known as: PROAIR HFA/PROVENTIL HFA/VENTOLIN HFA      Dose: 2 puff  Inhale 2 puffs into the lungs 4 times daily  Quantity: 18 g  Refills: 0     * albuterol (2.5 MG/3ML) 0.083% neb solution  Commonly known as: PROVENTIL      Dose: 2.5 mg  Take 1 vial (2.5 mg) by nebulization every 6 hours as needed for shortness of breath, wheezing or cough  Quantity: 90 mL  Refills: 0     amiodarone 200 MG tablet  Commonly known as: PACERONE  Used for: Paroxysmal atrial fib -- on Eliquis      Dose: 200 mg  Take 1 tablet (200 mg) by mouth daily  Quantity: 90 tablet  Refills: 0     apixaban ANTICOAGULANT 5 MG tablet  Commonly known as: ELIQUIS  Indication: Atrial Fibrillation Not Caused By A Heart Valve Problem  Used for: Paroxysmal atrial fib -- on Eliquis      Dose: 5 mg  Take 1 tablet (5 mg) by mouth 2 times daily  Quantity: 180 tablet  Refills: 3     atorvastatin 40 MG tablet  Commonly known as:  LIPITOR      Dose: 40 mg  Take 40 mg by mouth every morning  Refills: 0     carvedilol 3.125 MG tablet  Commonly known as: COREG  Used for: Heart failure with reduced ejection fraction (H)      Dose: 3.125 mg  Take 1 tablet (3.125 mg) by mouth 2 times daily (with meals)  Quantity: 180 tablet  Refills: 1     [employer] Vitamin B12 1000 MCG tablet  Generic drug: vitamin B-12      Dose: 1,000 mcg  Take 1,000 mcg by mouth daily  Refills: 0     DULoxetine 30 MG capsule  Commonly known as: CYMBALTA      Dose: 30 mg  Take 30 mg by mouth every morning  Refills: 0     hydrALAZINE 10 MG tablet  Commonly known as: APRESOLINE  Used for: Acute on chronic systolic CHF      Dose: 10 mg  Take 1 tablet (10 mg) by mouth 3 times daily  Quantity: 270 tablet  Refills: 3     ipratropium - albuterol 0.5 mg/2.5 mg/3 mL 0.5-2.5 (3) MG/3ML neb solution  Commonly known as: DUONEB      INHALE 3 ML EVERY 6 HOURS AS NEEDED FOR WHEEZING.  Refills: 0     isosorbide mononitrate 60 MG 24 hr tablet  Commonly known as: IMDUR  Used for: Coronary artery disease involving native heart without angina pectoris, unspecified vessel or lesion type      Dose: 180 mg  Take 3 tablets (180 mg) by mouth every morning  Quantity: 270 tablet  Refills: 1     nitroGLYcerin 0.4 MG sublingual tablet  Commonly known as: NITROSTAT  Used for: Ischemic cardiomyopathy      Dose: 0.4 mg  Place 1 tablet (0.4 mg) under the tongue every 5 minutes as needed If no relief after 3 tabs call 911;continue 1 tab every 5 min max 3 tab Indications: chest pain  Quantity: 100 tablet  Refills: 1     polyethylene glycol 17 GM/Dose powder  Commonly known as: MIRALAX  Used for: Constipation, unspecified constipation type      Dose: 17 g  Take 17 g by mouth 2 times daily as needed  Quantity: 510 g  Refills: 0     rOPINIRole 2 MG tablet  Commonly known as: REQUIP  Used for: Restless legs syndrome (RLS)      Dose: 2 mg  Take 1 tablet (2 mg) by mouth 3 times daily  Quantity: 270 tablet  Refills: 3      spironolactone 50 MG tablet  Commonly known as: ALDACTONE  Used for: Heart failure with reduced ejection fraction (H)      Dose: 50 mg  Take 1 tablet (50 mg) by mouth daily  Quantity: 90 tablet  Refills: 3           * This list has 2 medication(s) that are the same as other medications prescribed for you. Read the directions carefully, and ask your doctor or other care provider to review them with you.                STOP taking      aspirin 81 MG EC tablet        bumetanide 1 MG tablet  Commonly known as: BUMEX        gabapentin 300 MG capsule  Commonly known as: NEURONTIN        metolazone 2.5 MG tablet  Commonly known as: ZAROXOLYN                  Where to get your medicines        These medications were sent to [facility]/pharmacy #[id number] [address] [address]      Phone: [phone]   clopidogrel 75 MG tablet  torsemide 100 MG tablet          Allergies   Allergies   Allergen Reactions    Bumex [Bumetanide] Muscle Pain (Myalgia)

## 2024-05-08 NOTE — PLAN OF CARE
Problem: Adult Inpatient Plan of Care  Goal: Plan of Care Review  Description: The Plan of Care Review/Shift note should be completed every shift.  The Outcome Evaluation is a brief statement about your assessment that the patient is improving, declining, or no change.  This information will be displayed automatically on your shift  note.  Outcome: Progressing     Problem: Adult Inpatient Plan of Care  Goal: Absence of Hospital-Acquired Illness or Injury  Intervention: Identify and Manage Fall Risk  Recent Flowsheet Documentation  Taken 5/7/2024 1600 by Mary Jo Palm, RN  Safety Promotion/Fall Prevention:   lighting adjusted   mobility aid in reach   nonskid shoes/slippers when out of bed   safety round/check completed     Problem: Heart Failure  Goal: Fluid and Electrolyte Balance  Intervention: Monitor and Manage Fluid and Electrolyte Balance  Recent Flowsheet Documentation  Taken 5/7/2024 1600 by Mary Jo Palm, RN  Fluid/Electrolyte Management: (1800mL per orders) fluids restricted     Patient alert and oriented. VSS. Takes medications well. Ate supper, continues to refuse glucose checks, so insulin not given. Assisted with bath and walk this afternoon, which he did well at. Remains on tele. Fluid restriction 1800mL per orders. No complaints of pain. Dressing change to RLE due 5/8/23. Appears comfortable resting in bed at this time.

## 2024-05-08 NOTE — PROGRESS NOTES
RENAL PROGRESS NOTE    ASSESSMENT & PLAN:   ARF/CKD; has underlying CKD with baseline CR 1.2-1.4; ARF this admit in the setting of diuresis, dye exposure and cardiorenal hemodynamics.  Diuretics were held for couple of days, creatinine improving, converted to oral diuretic regiment, he continues to diurese adequately in the setting of slowly improving renal function.  Discussed with cardiology Dr. Bhandari, plan to discharge on 100 mg of torsemide twice daily and hold metolazone.  Outpatient nephrology follow-up arranged on 5/31/2024 at 1 PM with Sheryl PADILLA NP, orders entered into discharge navigator.  No objection to discharge from a nephrology standpoint. --Urine this admit without microscopic hematuria or pyuria, 8 hyaline casts supportive of hypoperfusion injury.  No proteinuria on spot check.  Serologic and paraprotein workup sent, results pending.  Renal ultrasound this admit with a simple cyst, no mass or hydronephrosis.  Volume status; slowly improving, adequate diuresis on torsemide 100 mg twice daily.  Holding metolazone upon discharge.  Avoid Bumex in the future, some concern of myalgias associated with bumetanide.  CHF; EF 40%, CAD with recent cor angio; no PCI; diffuse disease  A fib, on eliquis, amio and BB  IVON with venous stasis changes  HTN; on coreg and hyralazine and imdur; bp generally 120-140s which is ok in this setting kenzie with attempting diuresis; avoid drops in bp  COPD, current smoker  RLS; on mirapex and gabapentin;  holding neurontin--would reduced dose of neurtontin due to altered GFR if resumed  Hyperlipid  Mild hyponatremia  Mild hypercalcemia in the setting of diuresis.  PTH 71, vitamin D level low.  PTHrP, paraprotein evaluation pending    SUBJECTIVE:    Seen bedside  Dr. Bhandari cardiology also present  Feels his breathing is stable, denies worsening shortness of breath  Does mention high urine volumes  Discussed importance of abiding by fluid restriction, low-sodium diet upon  discharge  He is amendable to outpatient nephrology follow-up, he currently is not driving, transportation may be an issue, but he thinks he can have this resolved by the time follow-up as needed  All questions were answered    OBJECTIVE:  Physical Exam   Temp: 97.8  F (36.6  C) Temp src: Oral BP: 130/60 Pulse: 74   Resp: 20 SpO2: 95 % O2 Device: None (Room air)    Vitals:    05/06/24 0813 05/07/24 0757 05/08/24 0830   Weight: 121.9 kg (268 lb 12.8 oz) 121.8 kg (268 lb 8.3 oz) 121 kg (266 lb 11.2 oz)     Vital Signs with Ranges  Temp:  [97.6  F (36.4  C)-98.4  F (36.9  C)] 97.8  F (36.6  C)  Pulse:  [73-80] 74  Resp:  [18-20] 20  BP: (118-159)/(53-79) 130/60  FiO2 (%):  [21 %] 21 %  SpO2:  [93 %-96 %] 95 %  I/O last 3 completed shifts:  In: 718 [P.O.:718]  Out: 2925 [Urine:2925]    Patient Vitals for the past 72 hrs:   Weight   05/08/24 0830 121 kg (266 lb 11.2 oz)   05/07/24 0757 121.8 kg (268 lb 8.3 oz)   05/06/24 0813 121.9 kg (268 lb 12.8 oz)     Intake/Output Summary (Last 24 hours) at 5/6/2024 1412  Last data filed at 5/6/2024 1110  Gross per 24 hour   Intake 1120 ml   Output 2525 ml   Net -1405 ml       PHYSICAL EXAM:  Alert/oriented x 3, awake, NAD  CV: RRR without murmur or rub  Lung: clear and equal; no extra sounds  Ab: soft and NT; not distended; normal bs  Ext: ++ edema and well perfused  Skin; no rash    LABORATORY STUDIES:     Recent Labs   Lab 05/05/24  1245   WBC 8.7   RBC 3.88*   HGB 11.2*   HCT 36.5*          Basic Metabolic Panel:  Recent Labs   Lab 05/08/24  0521 05/07/24  0651 05/06/24  1421 05/06/24  0741 05/05/24  1245 05/04/24  0622 05/03/24  0814 05/03/24  0427    137  --  134* 132* 133*  --  137   POTASSIUM 3.7 4.1  --  4.0 4.2 3.6  3.6  --  4.0   CHLORIDE 96* 96*  --  91* 91* 88*  --  89*   CO2 26 27  --  27 25 24  --  30*   BUN 68.2* 77.5*  --  84.8* 86.0* 77.3*  --  48.7*   CR 1.88* 1.96*  --  2.29* 2.32* 2.60*  --  1.60*   * 144* 141* 133* 172* 150*   < > 156*    RULSAN 10.1 10.5*  --  10.3* 10.7* 10.5*  --  10.9*    < > = values in this interval not displayed.       INRNo lab results found in last 7 days.     Recent Labs   Lab Test 05/05/24  1245 04/30/24  0458 09/08/23  1900 02/13/23  2307   INR  --   --   --  1.01   WBC 8.7 8.4   < > 7.9   HGB 11.2* 11.3*   < > 13.0*    166   < > 143*    < > = values in this interval not displayed.       Personally reviewed current labs    This note was dictated using voice recognition     Emanuel Higginbotham PA-C  [facility]  [phone]

## 2024-05-08 NOTE — DISCHARGE SUMMARY
Pt is discharged home around 1400. Vitals stable. Denied any pain. Discharge instruction was given to patient, pt verbalized the understanding. Belongs was sent home with patient.     Karl Starr RN

## 2024-05-08 NOTE — PROGRESS NOTES
Care Management Discharge Note    Discharge Date: 05/08/2024       Discharge Disposition: Home, Home Care    Discharge Services: None    Discharge DME: None    Discharge Transportation: health plan transportation    Private pay costs discussed: Not applicable    Does the patient's insurance plan have a 3 day qualifying hospital stay waiver?  No    PAS Confirmation Code:    Patient/family educated on Medicare website which has current facility and service quality ratings:      Education Provided on the Discharge Plan: Yes  Persons Notified of Discharge Plans: Patient - Per Team   Patient/Family in Agreement with the Plan: yes    Handoff Referral Completed: Yes    Additional Information:  Patient to discharge to home with [facility] RN services, family to transport home.     Maricruz Kaye RN

## 2024-05-08 NOTE — PLAN OF CARE
Problem: Comorbidity Management  Goal: Maintenance of COPD Symptom Control  Intervention: Maintain COPD Symptom Control  Recent Flowsheet Documentation  Taken 5/8/2024 0100 by Alan Garcia RN  Supportive Measures: active listening utilized  Medication Review/Management: medications reviewed     Problem: Adult Inpatient Plan of Care  Goal: Absence of Hospital-Acquired Illness or Injury  Intervention: Prevent Skin Injury  Recent Flowsheet Documentation  Taken 5/8/2024 0100 by Alan Garcia RN  Body Position: position changed independently  Device Skin Pressure Protection: absorbent pad utilized/changed   Goal Outcome Evaluation:  Patient is alert and oriented x4, remains in fluid restriction of 1800ml daily. NSR with BBB on telemetry, refused wound care at this time requested to be done later. Patient requested for PRN inhaler for wheezing. Ambulate with stand-by assist with walker.   Vital signs:  Temp: 97.7  F (36.5  C) Temp src: Oral BP: 134/64 Pulse: 79   Resp: 18 SpO2: 94 % O2 Device: None (Room air)   and stable.

## 2024-05-09 ENCOUNTER — PATIENT OUTREACH (OUTPATIENT)
Dept: CARE COORDINATION | Facility: CLINIC | Age: 64
End: 2024-05-09
Payer: MEDICARE

## 2024-05-09 ENCOUNTER — TELEPHONE (OUTPATIENT)
Dept: CARDIOLOGY | Facility: CLINIC | Age: 64
End: 2024-05-09
Payer: MEDICARE

## 2024-05-09 NOTE — TELEPHONE ENCOUNTER
"Spoke with patient who was concerned if he should be taking both plavix and eliquis. I reviewed discharge paperwork and confirmed changes, including the start of Torsemide and Plavix and stop taking Bumex and metolazone at discharge. Has a follow up with WILLIE 05/15. Advised pt to watch sodium during this transitional period. Will monitor on HRS and MEMS. Pt also stated a nurse \"with an  accent\" left a voicemail but he \"couldn't understand it\". Pt also stated he lost the voicemail when I advised he listen to it again.     Manuelito Bianchi RN [facility]     "

## 2024-05-09 NOTE — PROGRESS NOTES
[facility]    Background: Transitional Care Management program identified per system criteria and reviewed by [facility] Resource [place] team for possible outreach.    Assessment: Upon chart review, [facility] Team member will not proceed with patient outreach related to this episode of Transitional Care Management program due to reason below:    Patient has a follow up appointment with an appropriate provider today for hospital discharge    Plan: Transitional Care Management episode addressed appropriately per reason noted above.      Katrina Marrufo MA  [facility] [place]    *[place]cted [place] Resource Team does NOT follow patient ongoing. Referrals are identified based on internal discharge reports and the outreach is to ensure patient has an understanding of their discharge instructions.

## 2024-05-11 PROBLEM — J44.9 COPD (CHRONIC OBSTRUCTIVE PULMONARY DISEASE) (H): Status: ACTIVE | Noted: 2023-12-22

## 2024-05-11 PROBLEM — I25.110 CORONARY ARTERY DISEASE INVOLVING NATIVE CORONARY ARTERY OF NATIVE HEART WITH UNSTABLE ANGINA PECTORIS (H): Status: ACTIVE | Noted: 2023-09-28

## 2024-05-11 PROBLEM — N18.9 ACUTE KIDNEY INJURY SUPERIMPOSED ON CKD (H): Status: ACTIVE | Noted: 2024-01-08

## 2024-05-11 LAB — PTH RELATED PROT SERPL-SCNC: 2.5 PMOL/L

## 2024-05-13 ENCOUNTER — HOSPITAL ENCOUNTER (INPATIENT)
Facility: HOSPITAL | Age: 64
LOS: 4 days | Discharge: HOME | DRG: 291 | End: 2024-05-20
Attending: EMERGENCY MEDICINE | Admitting: INTERNAL MEDICINE
Payer: MEDICARE

## 2024-05-13 ENCOUNTER — APPOINTMENT (OUTPATIENT)
Dept: CT IMAGING | Facility: HOSPITAL | Age: 64
DRG: 291 | End: 2024-05-13
Attending: EMERGENCY MEDICINE
Payer: MEDICARE

## 2024-05-13 ENCOUNTER — APPOINTMENT (OUTPATIENT)
Dept: RADIOLOGY | Facility: HOSPITAL | Age: 64
DRG: 291 | End: 2024-05-13
Attending: EMERGENCY MEDICINE
Payer: MEDICARE

## 2024-05-13 ENCOUNTER — APPOINTMENT (OUTPATIENT)
Dept: OCCUPATIONAL THERAPY | Facility: HOSPITAL | Age: 64
DRG: 291 | End: 2024-05-13
Attending: INTERNAL MEDICINE
Payer: MEDICARE

## 2024-05-13 ENCOUNTER — TELEPHONE (OUTPATIENT)
Dept: PHARMACY | Facility: OTHER | Age: 64
End: 2024-05-13

## 2024-05-13 DIAGNOSIS — R09.02 HYPOXIA: ICD-10-CM

## 2024-05-13 DIAGNOSIS — S81.801A OPEN WOUND OF KNEE, LEG, AND ANKLE, RIGHT, INITIAL ENCOUNTER: ICD-10-CM

## 2024-05-13 DIAGNOSIS — I25.10 CORONARY ARTERY DISEASE INVOLVING NATIVE CORONARY ARTERY OF NATIVE HEART WITHOUT ANGINA PECTORIS: ICD-10-CM

## 2024-05-13 DIAGNOSIS — S81.001A OPEN WOUND OF KNEE, LEG, AND ANKLE, RIGHT, INITIAL ENCOUNTER: ICD-10-CM

## 2024-05-13 DIAGNOSIS — S91.001A OPEN WOUND OF KNEE, LEG, AND ANKLE, RIGHT, INITIAL ENCOUNTER: ICD-10-CM

## 2024-05-13 DIAGNOSIS — E11.59 TYPE 2 DIABETES MELLITUS WITH OTHER CIRCULATORY COMPLICATION, UNSPECIFIED WHETHER LONG TERM INSULIN USE (H): ICD-10-CM

## 2024-05-13 DIAGNOSIS — M25.552 HIP PAIN, LEFT: ICD-10-CM

## 2024-05-13 DIAGNOSIS — S81.809A OPEN WOUND OF LOWER LIMB: Primary | ICD-10-CM

## 2024-05-13 DIAGNOSIS — I50.9 ACUTE ON CHRONIC CONGESTIVE HEART FAILURE, UNSPECIFIED HEART FAILURE TYPE (H): ICD-10-CM

## 2024-05-13 DIAGNOSIS — K21.9 GASTROESOPHAGEAL REFLUX DISEASE WITHOUT ESOPHAGITIS: ICD-10-CM

## 2024-05-13 DIAGNOSIS — I50.20 HEART FAILURE WITH REDUCED EJECTION FRACTION (H): ICD-10-CM

## 2024-05-13 DIAGNOSIS — B37.2 CANDIDIASIS OF SKIN: ICD-10-CM

## 2024-05-13 LAB
ALBUMIN UR-MCNC: NEGATIVE MG/DL
ANION GAP SERPL CALCULATED.3IONS-SCNC: 17 MMOL/L (ref 7–15)
APPEARANCE UR: CLEAR
BASOPHILS # BLD AUTO: 0.1 10E3/UL (ref 0–0.2)
BASOPHILS NFR BLD AUTO: 1 %
BILIRUB UR QL STRIP: NEGATIVE
BUN SERPL-MCNC: 38.9 MG/DL (ref 8–23)
CALCIUM SERPL-MCNC: 9.8 MG/DL (ref 8.8–10.2)
CHLORIDE SERPL-SCNC: 93 MMOL/L (ref 98–107)
COLOR UR AUTO: ABNORMAL
CREAT SERPL-MCNC: 1.66 MG/DL (ref 0.67–1.17)
DEPRECATED HCO3 PLAS-SCNC: 26 MMOL/L (ref 22–29)
EGFRCR SERPLBLD CKD-EPI 2021: 45 ML/MIN/1.73M2
EOSINOPHIL # BLD AUTO: 0.2 10E3/UL (ref 0–0.7)
EOSINOPHIL NFR BLD AUTO: 2 %
ERYTHROCYTE [DISTWIDTH] IN BLOOD BY AUTOMATED COUNT: 18.9 % (ref 10–15)
GLUCOSE BLDC GLUCOMTR-MCNC: 168 MG/DL (ref 70–99)
GLUCOSE BLDC GLUCOMTR-MCNC: 296 MG/DL (ref 70–99)
GLUCOSE SERPL-MCNC: 134 MG/DL (ref 70–99)
GLUCOSE UR STRIP-MCNC: NEGATIVE MG/DL
HCT VFR BLD AUTO: 38.1 % (ref 40–53)
HGB BLD-MCNC: 12.2 G/DL (ref 13.3–17.7)
HGB UR QL STRIP: NEGATIVE
HYALINE CASTS: 4 /LPF
IMM GRANULOCYTES # BLD: 0.1 10E3/UL
IMM GRANULOCYTES NFR BLD: 1 %
INR PPP: 1.3 (ref 0.85–1.15)
KETONES UR STRIP-MCNC: NEGATIVE MG/DL
LEUKOCYTE ESTERASE UR QL STRIP: NEGATIVE
LYMPHOCYTES # BLD AUTO: 1.5 10E3/UL (ref 0.8–5.3)
LYMPHOCYTES NFR BLD AUTO: 18 %
MAGNESIUM SERPL-MCNC: 1.9 MG/DL (ref 1.7–2.3)
MCH RBC QN AUTO: 29.4 PG (ref 26.5–33)
MCHC RBC AUTO-ENTMCNC: 32 G/DL (ref 31.5–36.5)
MCV RBC AUTO: 92 FL (ref 78–100)
MONOCYTES # BLD AUTO: 0.6 10E3/UL (ref 0–1.3)
MONOCYTES NFR BLD AUTO: 7 %
MUCOUS THREADS #/AREA URNS LPF: PRESENT /LPF
NEUTROPHILS # BLD AUTO: 6 10E3/UL (ref 1.6–8.3)
NEUTROPHILS NFR BLD AUTO: 71 %
NITRATE UR QL: NEGATIVE
NRBC # BLD AUTO: 0 10E3/UL
NRBC BLD AUTO-RTO: 0 /100
NT-PROBNP SERPL-MCNC: 197 PG/ML (ref 0–900)
PH UR STRIP: 6 [PH] (ref 5–7)
PLATELET # BLD AUTO: 225 10E3/UL (ref 150–450)
POTASSIUM SERPL-SCNC: 3.5 MMOL/L (ref 3.4–5.3)
RBC # BLD AUTO: 4.15 10E6/UL (ref 4.4–5.9)
RBC URINE: <1 /HPF
SODIUM SERPL-SCNC: 136 MMOL/L (ref 135–145)
SP GR UR STRIP: 1.01 (ref 1–1.03)
TROPONIN T SERPL HS-MCNC: 42 NG/L
TROPONIN T SERPL HS-MCNC: 46 NG/L
UROBILINOGEN UR STRIP-MCNC: <2 MG/DL
WBC # BLD AUTO: 8.5 10E3/UL (ref 4–11)
WBC URINE: <1 /HPF

## 2024-05-13 PROCEDURE — 96374 THER/PROPH/DIAG INJ IV PUSH: CPT

## 2024-05-13 PROCEDURE — 71045 X-RAY EXAM CHEST 1 VIEW: CPT

## 2024-05-13 PROCEDURE — 97535 SELF CARE MNGMENT TRAINING: CPT | Mod: GO

## 2024-05-13 PROCEDURE — 96375 TX/PRO/DX INJ NEW DRUG ADDON: CPT

## 2024-05-13 PROCEDURE — 80048 BASIC METABOLIC PNL TOTAL CA: CPT | Performed by: EMERGENCY MEDICINE

## 2024-05-13 PROCEDURE — 36415 COLL VENOUS BLD VENIPUNCTURE: CPT | Performed by: EMERGENCY MEDICINE

## 2024-05-13 PROCEDURE — G0463 HOSPITAL OUTPT CLINIC VISIT: HCPCS | Mod: 25

## 2024-05-13 PROCEDURE — 82962 GLUCOSE BLOOD TEST: CPT

## 2024-05-13 PROCEDURE — 250N000009 HC RX 250: Performed by: INTERNAL MEDICINE

## 2024-05-13 PROCEDURE — 250N000011 HC RX IP 250 OP 636: Performed by: EMERGENCY MEDICINE

## 2024-05-13 PROCEDURE — 250N000013 HC RX MED GY IP 250 OP 250 PS 637: Performed by: INTERNAL MEDICINE

## 2024-05-13 PROCEDURE — 250N000012 HC RX MED GY IP 250 OP 636 PS 637: Performed by: INTERNAL MEDICINE

## 2024-05-13 PROCEDURE — 83735 ASSAY OF MAGNESIUM: CPT | Performed by: EMERGENCY MEDICINE

## 2024-05-13 PROCEDURE — 84484 ASSAY OF TROPONIN QUANT: CPT | Performed by: EMERGENCY MEDICINE

## 2024-05-13 PROCEDURE — 94640 AIRWAY INHALATION TREATMENT: CPT

## 2024-05-13 PROCEDURE — 250N000013 HC RX MED GY IP 250 OP 250 PS 637: Performed by: EMERGENCY MEDICINE

## 2024-05-13 PROCEDURE — 73700 CT LOWER EXTREMITY W/O DYE: CPT | Mod: LT,MA

## 2024-05-13 PROCEDURE — G0378 HOSPITAL OBSERVATION PER HR: HCPCS

## 2024-05-13 PROCEDURE — 93005 ELECTROCARDIOGRAM TRACING: CPT | Performed by: EMERGENCY MEDICINE

## 2024-05-13 PROCEDURE — 999N000157 HC STATISTIC RCP TIME EA 10 MIN

## 2024-05-13 PROCEDURE — 99285 EMERGENCY DEPT VISIT HI MDM: CPT | Mod: 25

## 2024-05-13 PROCEDURE — 250N000013 HC RX MED GY IP 250 OP 250 PS 637: Performed by: NURSE PRACTITIONER

## 2024-05-13 PROCEDURE — 250N000011 HC RX IP 250 OP 636: Performed by: INTERNAL MEDICINE

## 2024-05-13 PROCEDURE — 99223 1ST HOSP IP/OBS HIGH 75: CPT | Mod: AI | Performed by: INTERNAL MEDICINE

## 2024-05-13 PROCEDURE — 99215 OFFICE O/P EST HI 40 MIN: CPT | Performed by: NURSE PRACTITIONER

## 2024-05-13 PROCEDURE — 99223 1ST HOSP IP/OBS HIGH 75: CPT | Performed by: INTERNAL MEDICINE

## 2024-05-13 PROCEDURE — 83880 ASSAY OF NATRIURETIC PEPTIDE: CPT | Performed by: EMERGENCY MEDICINE

## 2024-05-13 PROCEDURE — 85610 PROTHROMBIN TIME: CPT | Performed by: EMERGENCY MEDICINE

## 2024-05-13 PROCEDURE — 85025 COMPLETE CBC W/AUTO DIFF WBC: CPT | Performed by: EMERGENCY MEDICINE

## 2024-05-13 PROCEDURE — 250N000009 HC RX 250: Performed by: NURSE PRACTITIONER

## 2024-05-13 PROCEDURE — 97165 OT EVAL LOW COMPLEX 30 MIN: CPT | Mod: GO

## 2024-05-13 PROCEDURE — 81001 URINALYSIS AUTO W/SCOPE: CPT | Performed by: EMERGENCY MEDICINE

## 2024-05-13 PROCEDURE — 250N000009 HC RX 250: Performed by: EMERGENCY MEDICINE

## 2024-05-13 PROCEDURE — 73502 X-RAY EXAM HIP UNI 2-3 VIEWS: CPT

## 2024-05-13 RX ORDER — PANTOPRAZOLE SODIUM 40 MG/1
40 TABLET, DELAYED RELEASE ORAL
Status: DISCONTINUED | OUTPATIENT
Start: 2024-05-13 | End: 2024-05-20 | Stop reason: HOSPADM

## 2024-05-13 RX ORDER — POLYETHYLENE GLYCOL 3350 17 G/17G
17 POWDER, FOR SOLUTION ORAL 2 TIMES DAILY PRN
Status: DISCONTINUED | OUTPATIENT
Start: 2024-05-13 | End: 2024-05-20 | Stop reason: HOSPADM

## 2024-05-13 RX ORDER — ROPINIROLE 1 MG/1
2 TABLET, FILM COATED ORAL EVERY 6 HOURS
Status: DISCONTINUED | OUTPATIENT
Start: 2024-05-13 | End: 2024-05-20 | Stop reason: HOSPADM

## 2024-05-13 RX ORDER — HYDRALAZINE HYDROCHLORIDE 10 MG/1
10 TABLET, FILM COATED ORAL 3 TIMES DAILY
Status: DISCONTINUED | OUTPATIENT
Start: 2024-05-13 | End: 2024-05-15

## 2024-05-13 RX ORDER — ALBUTEROL SULFATE 0.83 MG/ML
SOLUTION RESPIRATORY (INHALATION)
Status: COMPLETED
Start: 2024-05-13 | End: 2024-05-13

## 2024-05-13 RX ORDER — FUROSEMIDE 10 MG/ML
40 INJECTION INTRAMUSCULAR; INTRAVENOUS ONCE
Status: COMPLETED | OUTPATIENT
Start: 2024-05-13 | End: 2024-05-13

## 2024-05-13 RX ORDER — ASPIRIN 81 MG/1
324 TABLET, CHEWABLE ORAL ONCE
Status: COMPLETED | OUTPATIENT
Start: 2024-05-13 | End: 2024-05-13

## 2024-05-13 RX ORDER — SPIRONOLACTONE 25 MG/1
50 TABLET ORAL DAILY
Status: DISCONTINUED | OUTPATIENT
Start: 2024-05-13 | End: 2024-05-20 | Stop reason: HOSPADM

## 2024-05-13 RX ORDER — TORSEMIDE 100 MG/1
100 TABLET ORAL
Status: DISCONTINUED | OUTPATIENT
Start: 2024-05-13 | End: 2024-05-14

## 2024-05-13 RX ORDER — OXYCODONE HYDROCHLORIDE 5 MG/1
10 TABLET ORAL ONCE
Status: COMPLETED | OUTPATIENT
Start: 2024-05-13 | End: 2024-05-13

## 2024-05-13 RX ORDER — DEXTROSE MONOHYDRATE 25 G/50ML
25-50 INJECTION, SOLUTION INTRAVENOUS
Status: DISCONTINUED | OUTPATIENT
Start: 2024-05-13 | End: 2024-05-20 | Stop reason: HOSPADM

## 2024-05-13 RX ORDER — OXYCODONE HYDROCHLORIDE 5 MG/1
5 TABLET ORAL EVERY 6 HOURS PRN
Status: DISCONTINUED | OUTPATIENT
Start: 2024-05-13 | End: 2024-05-13

## 2024-05-13 RX ORDER — ISOSORBIDE MONONITRATE 60 MG/1
180 TABLET, EXTENDED RELEASE ORAL EVERY MORNING
Status: DISCONTINUED | OUTPATIENT
Start: 2024-05-13 | End: 2024-05-20 | Stop reason: HOSPADM

## 2024-05-13 RX ORDER — NALOXONE HYDROCHLORIDE 0.4 MG/ML
0.4 INJECTION, SOLUTION INTRAMUSCULAR; INTRAVENOUS; SUBCUTANEOUS
Status: DISCONTINUED | OUTPATIENT
Start: 2024-05-13 | End: 2024-05-20 | Stop reason: HOSPADM

## 2024-05-13 RX ORDER — NALOXONE HYDROCHLORIDE 0.4 MG/ML
0.2 INJECTION, SOLUTION INTRAMUSCULAR; INTRAVENOUS; SUBCUTANEOUS
Status: DISCONTINUED | OUTPATIENT
Start: 2024-05-13 | End: 2024-05-20 | Stop reason: HOSPADM

## 2024-05-13 RX ORDER — METHYLPREDNISOLONE SODIUM SUCCINATE 40 MG/ML
40 INJECTION, POWDER, LYOPHILIZED, FOR SOLUTION INTRAMUSCULAR; INTRAVENOUS EVERY 8 HOURS
Status: DISCONTINUED | OUTPATIENT
Start: 2024-05-13 | End: 2024-05-14

## 2024-05-13 RX ORDER — ROPINIROLE 2 MG/1
2 TABLET, FILM COATED ORAL EVERY 6 HOURS
COMMUNITY

## 2024-05-13 RX ORDER — CARVEDILOL 3.12 MG/1
3.12 TABLET ORAL 2 TIMES DAILY WITH MEALS
Status: DISCONTINUED | OUTPATIENT
Start: 2024-05-13 | End: 2024-05-20 | Stop reason: HOSPADM

## 2024-05-13 RX ORDER — NICOTINE POLACRILEX 4 MG
15-30 LOZENGE BUCCAL
Status: DISCONTINUED | OUTPATIENT
Start: 2024-05-13 | End: 2024-05-20 | Stop reason: HOSPADM

## 2024-05-13 RX ORDER — METHOCARBAMOL 500 MG/1
500 TABLET, FILM COATED ORAL EVERY 6 HOURS
Status: DISCONTINUED | OUTPATIENT
Start: 2024-05-13 | End: 2024-05-14

## 2024-05-13 RX ORDER — ACETAMINOPHEN 325 MG/1
975 TABLET ORAL EVERY 6 HOURS
Status: DISCONTINUED | OUTPATIENT
Start: 2024-05-13 | End: 2024-05-20 | Stop reason: HOSPADM

## 2024-05-13 RX ORDER — HYDROMORPHONE HYDROCHLORIDE 1 MG/ML
0.3 INJECTION, SOLUTION INTRAMUSCULAR; INTRAVENOUS; SUBCUTANEOUS EVERY 6 HOURS PRN
Status: DISCONTINUED | OUTPATIENT
Start: 2024-05-13 | End: 2024-05-13

## 2024-05-13 RX ORDER — AMIODARONE HYDROCHLORIDE 200 MG/1
200 TABLET ORAL DAILY
Status: DISCONTINUED | OUTPATIENT
Start: 2024-05-13 | End: 2024-05-20 | Stop reason: HOSPADM

## 2024-05-13 RX ORDER — ATORVASTATIN CALCIUM 40 MG/1
40 TABLET, FILM COATED ORAL EVERY MORNING
Status: DISCONTINUED | OUTPATIENT
Start: 2024-05-13 | End: 2024-05-20 | Stop reason: HOSPADM

## 2024-05-13 RX ORDER — TORSEMIDE 100 MG/1
100 TABLET ORAL
Status: DISCONTINUED | OUTPATIENT
Start: 2024-05-13 | End: 2024-05-13

## 2024-05-13 RX ORDER — IPRATROPIUM BROMIDE AND ALBUTEROL SULFATE 2.5; .5 MG/3ML; MG/3ML
3 SOLUTION RESPIRATORY (INHALATION) ONCE
Status: COMPLETED | OUTPATIENT
Start: 2024-05-13 | End: 2024-05-13

## 2024-05-13 RX ORDER — NITROGLYCERIN 0.4 MG/1
0.4 TABLET SUBLINGUAL EVERY 5 MIN PRN
Status: DISCONTINUED | OUTPATIENT
Start: 2024-05-13 | End: 2024-05-20 | Stop reason: HOSPADM

## 2024-05-13 RX ORDER — DULOXETIN HYDROCHLORIDE 30 MG/1
30 CAPSULE, DELAYED RELEASE ORAL EVERY MORNING
Status: DISCONTINUED | OUTPATIENT
Start: 2024-05-13 | End: 2024-05-16

## 2024-05-13 RX ORDER — LIDOCAINE 50 MG/G
OINTMENT TOPICAL 4 TIMES DAILY
Status: DISCONTINUED | OUTPATIENT
Start: 2024-05-13 | End: 2024-05-16

## 2024-05-13 RX ORDER — IPRATROPIUM BROMIDE AND ALBUTEROL SULFATE 2.5; .5 MG/3ML; MG/3ML
3 SOLUTION RESPIRATORY (INHALATION)
Status: DISCONTINUED | OUTPATIENT
Start: 2024-05-13 | End: 2024-05-14

## 2024-05-13 RX ORDER — FUROSEMIDE 10 MG/ML
40 INJECTION INTRAMUSCULAR; INTRAVENOUS EVERY 8 HOURS
Status: DISCONTINUED | OUTPATIENT
Start: 2024-05-13 | End: 2024-05-13

## 2024-05-13 RX ORDER — ALBUTEROL SULFATE 90 UG/1
2 AEROSOL, METERED RESPIRATORY (INHALATION) EVERY 6 HOURS PRN
COMMUNITY

## 2024-05-13 RX ORDER — AMOXICILLIN 250 MG
1 CAPSULE ORAL AT BEDTIME
Status: DISCONTINUED | OUTPATIENT
Start: 2024-05-13 | End: 2024-05-15

## 2024-05-13 RX ORDER — HYDROMORPHONE HCL IN WATER/PF 6 MG/30 ML
0.5 PATIENT CONTROLLED ANALGESIA SYRINGE INTRAVENOUS
Status: DISCONTINUED | OUTPATIENT
Start: 2024-05-13 | End: 2024-05-13

## 2024-05-13 RX ORDER — CLOPIDOGREL BISULFATE 75 MG/1
75 TABLET ORAL DAILY
Status: DISCONTINUED | OUTPATIENT
Start: 2024-05-13 | End: 2024-05-20 | Stop reason: HOSPADM

## 2024-05-13 RX ORDER — LANOLIN ALCOHOL/MO/W.PET/CERES
1000 CREAM (GRAM) TOPICAL DAILY
Status: DISCONTINUED | OUTPATIENT
Start: 2024-05-13 | End: 2024-05-20 | Stop reason: HOSPADM

## 2024-05-13 RX ORDER — ASPIRIN 81 MG/1
81 TABLET, CHEWABLE ORAL DAILY
Status: DISCONTINUED | OUTPATIENT
Start: 2024-05-14 | End: 2024-05-13

## 2024-05-13 RX ORDER — ALBUTEROL SULFATE 90 UG/1
2 AEROSOL, METERED RESPIRATORY (INHALATION) EVERY 6 HOURS PRN
Status: DISCONTINUED | OUTPATIENT
Start: 2024-05-13 | End: 2024-05-20 | Stop reason: HOSPADM

## 2024-05-13 RX ADMIN — IPRATROPIUM BROMIDE AND ALBUTEROL SULFATE 3 ML: .5; 3 SOLUTION RESPIRATORY (INHALATION) at 20:56

## 2024-05-13 RX ADMIN — HYDRALAZINE HYDROCHLORIDE 10 MG: 10 TABLET, FILM COATED ORAL at 14:38

## 2024-05-13 RX ADMIN — CYANOCOBALAMIN TAB 1000 MCG 1000 MCG: 1000 TAB at 15:40

## 2024-05-13 RX ADMIN — HYDROMORPHONE HYDROCHLORIDE 0.5 MG: 0.2 INJECTION, SOLUTION INTRAMUSCULAR; INTRAVENOUS; SUBCUTANEOUS at 12:27

## 2024-05-13 RX ADMIN — IPRATROPIUM BROMIDE AND ALBUTEROL SULFATE 3 ML: .5; 3 SOLUTION RESPIRATORY (INHALATION) at 09:01

## 2024-05-13 RX ADMIN — OXYCODONE HYDROCHLORIDE 10 MG: 5 TABLET ORAL at 07:12

## 2024-05-13 RX ADMIN — INSULIN ASPART 1 UNITS: 100 INJECTION, SOLUTION INTRAVENOUS; SUBCUTANEOUS at 18:48

## 2024-05-13 RX ADMIN — SPIRONOLACTONE 50 MG: 25 TABLET, FILM COATED ORAL at 14:38

## 2024-05-13 RX ADMIN — ALBUTEROL SULFATE 2.5 MG: 2.5 SOLUTION RESPIRATORY (INHALATION) at 14:36

## 2024-05-13 RX ADMIN — ROPINIROLE HYDROCHLORIDE 2 MG: 1 TABLET, FILM COATED ORAL at 21:44

## 2024-05-13 RX ADMIN — TORSEMIDE 100 MG: 100 TABLET ORAL at 15:40

## 2024-05-13 RX ADMIN — HYDRALAZINE HYDROCHLORIDE 10 MG: 10 TABLET, FILM COATED ORAL at 21:43

## 2024-05-13 RX ADMIN — METHYLPREDNISOLONE SODIUM SUCCINATE 40 MG: 40 INJECTION, POWDER, FOR SOLUTION INTRAMUSCULAR; INTRAVENOUS at 15:41

## 2024-05-13 RX ADMIN — CLOPIDOGREL BISULFATE 75 MG: 75 TABLET ORAL at 14:38

## 2024-05-13 RX ADMIN — ROPINIROLE HYDROCHLORIDE 2 MG: 1 TABLET, FILM COATED ORAL at 15:42

## 2024-05-13 RX ADMIN — ASPIRIN 81 MG CHEWABLE TABLET 324 MG: 81 TABLET CHEWABLE at 12:18

## 2024-05-13 RX ADMIN — FUROSEMIDE 40 MG: 10 INJECTION, SOLUTION INTRAMUSCULAR; INTRAVENOUS at 12:32

## 2024-05-13 RX ADMIN — APIXABAN 5 MG: 5 TABLET, FILM COATED ORAL at 21:43

## 2024-05-13 RX ADMIN — LIDOCAINE: 50 OINTMENT TOPICAL at 18:46

## 2024-05-13 RX ADMIN — DULOXETINE HYDROCHLORIDE 30 MG: 30 CAPSULE, DELAYED RELEASE ORAL at 15:44

## 2024-05-13 RX ADMIN — DICLOFENAC 2 G: 10 GEL TOPICAL at 21:46

## 2024-05-13 RX ADMIN — ATORVASTATIN CALCIUM 40 MG: 40 TABLET, FILM COATED ORAL at 15:55

## 2024-05-13 RX ADMIN — ISOSORBIDE MONONITRATE 180 MG: 60 TABLET, EXTENDED RELEASE ORAL at 15:45

## 2024-05-13 RX ADMIN — SENNOSIDES AND DOCUSATE SODIUM 1 TABLET: 50; 8.6 TABLET ORAL at 21:44

## 2024-05-13 RX ADMIN — PANTOPRAZOLE SODIUM 40 MG: 40 TABLET, DELAYED RELEASE ORAL at 15:55

## 2024-05-13 RX ADMIN — AMIODARONE HYDROCHLORIDE 200 MG: 200 TABLET ORAL at 15:43

## 2024-05-13 RX ADMIN — DICLOFENAC 2 G: 10 GEL TOPICAL at 17:41

## 2024-05-13 RX ADMIN — CARVEDILOL 3.12 MG: 3.12 TABLET, FILM COATED ORAL at 17:41

## 2024-05-13 RX ADMIN — ACETAMINOPHEN 975 MG: 325 TABLET ORAL at 21:44

## 2024-05-13 RX ADMIN — METHOCARBAMOL 500 MG: 500 TABLET ORAL at 17:41

## 2024-05-13 ASSESSMENT — ACTIVITIES OF DAILY LIVING (ADL)
DEPENDENT_IADLS:: CLEANING;COOKING;MEAL PREPARATION;TRANSPORTATION
ADLS_ACUITY_SCORE: 40
ADLS_ACUITY_SCORE: 38
ADLS_ACUITY_SCORE: 38
ADLS_ACUITY_SCORE: 40
ADLS_ACUITY_SCORE: 40
ADLS_ACUITY_SCORE: 38
ADLS_ACUITY_SCORE: 36
ADLS_ACUITY_SCORE: 40
ADLS_ACUITY_SCORE: 36
ADLS_ACUITY_SCORE: 38
ADLS_ACUITY_SCORE: 36
ADLS_ACUITY_SCORE: 38
ADLS_ACUITY_SCORE: 36
ADLS_ACUITY_SCORE: 38

## 2024-05-13 ASSESSMENT — COLUMBIA-SUICIDE SEVERITY RATING SCALE - C-SSRS
2. HAVE YOU ACTUALLY HAD ANY THOUGHTS OF KILLING YOURSELF IN THE PAST MONTH?: NO
1. IN THE PAST MONTH, HAVE YOU WISHED YOU WERE DEAD OR WISHED YOU COULD GO TO SLEEP AND NOT WAKE UP?: NO
6. HAVE YOU EVER DONE ANYTHING, STARTED TO DO ANYTHING, OR PREPARED TO DO ANYTHING TO END YOUR LIFE?: NO

## 2024-05-13 ASSESSMENT — ENCOUNTER SYMPTOMS: FEVER: 0

## 2024-05-13 NOTE — CONSULTS
Care Management Initial Consult    General Information  Assessment completed with: Patient, pt  Type of CM/SW Visit: Initial Assessment    Primary Care Provider verified and updated as needed: Yes   Readmission within the last 30 days: current reason for admission unrelated to previous admission   Return Category: Progression of disease  Reason for Consult: discharge planning, length of stay  Advance Care Planning: Advance Care Planning Reviewed: no concerns identified, verified with patient     General Information Comments: lives alone and no svcs, uses [employer] and independent at [facility] w/walker, will need WC transport    Communication Assessment  Patient's communication style: spoken language (English or Bilingual)             Cognitive  Cognitive/Neuro/Behavioral: WDL                      Living Environment:   People in home: alone     Current living Arrangements: independent living facility      Able to return to prior arrangements: yes       Family/Social Support:  Care provided by: self  Provides care for: no one  Marital Status: Single  Sibling(s)          Description of Support System: Supportive, Involved    Support Assessment: Adequate family and caregiver support, Adequate social supports, Lacks adequate physical care    Current Resources:   Patient receiving home care services: No     Community Resources: None  Equipment currently used at home: walker, rolling, grab bar, toilet, grab bar, tub/shower, dressing device  Supplies currently used at home: None    Employment/Financial:  Employment Status: retired        Financial Concerns: none           Does the patient's insurance plan have a 3 day qualifying hospital stay waiver?  No    Lifestyle & Psychosocial Needs:  Social Determinants of Health     Food Insecurity: Not on file   Depression: Not on file   Housing Stability: Not on file   Tobacco Use: High Risk (5/13/2024)    Patient History     Smoking Tobacco Use: Every Day     Smokeless Tobacco Use: Never      Passive Exposure: Not on file   Financial Resource Strain: Not on file   Alcohol Use: Alcohol Misuse (11/2/2020)    [facility] [email or web address]    AUDIT-C     Frequency of Alcohol Consumption: 2-3 times a week     Average Number of Drinks: 3 or 4     Frequency of Binge Drinking: Less than monthly   Transportation Needs: Not on file   Physical Activity: Not on file   Interpersonal Safety: Not on file   Stress: Not on file   Social Connections: Not on file   Health Literacy: Not on file       Functional Status:  Prior to admission patient needed assistance:   Dependent ADLs:: Independent, Ambulation-walker  Dependent IADLs:: Cleaning, Cooking, Meal Preparation, Transportation  Assesssment of Functional Status: Not at baseline with ADL Functioning    Mental Health Status:  Mental Health Status: No Current Concerns       Chemical Dependency Status:                Values/Beliefs:  Spiritual, Cultural Beliefs, Jewish Practices, Values that affect care:                 Additional Information:  Assessed, lives alone and no svcs, uses 4[employer] and independent at [facility] w/walker, will need WC transport. CM to follow for discharge need. Discussed RABAGO.      Latoya Cuellar RN

## 2024-05-13 NOTE — TELEPHONE ENCOUNTER
MTM referral from: Transitions of Care (recent hospital discharge or ED visit)    MTM referral outreach attempt #2 on May 13, 2024 at 9:11 AM      Outcome: Patient not reachable after several attempts, sent [employer] message    Use [employer]iffin for the carrier/Plan on the flowsheet      [employer] Message Sent    Marizol Mosqueda CPhT  MTM

## 2024-05-13 NOTE — CONSULTS
HEART CARE NOTE        Thank you, Dr. Amador, for asking the [facility] Heart Care team to see Zackary Hutchison to evaluate ADHF.    Assessment/Recommendations     1. Severe Biventricular dysfunction/HFrEF c/b severe ADHF  Assessment / Plan  Patient is hypervolemic on physical exam; However, will transition to oral diuretic regimen and continue to monitor UOP and renal function closely given tenuous renal status as noted on most recent admission- discussed with Primary Attending, will proceed with gentle diuresis  Patient is high risk for adverse cardiac events 2/2 severe biventricular dysfunction, multiple HFHs, non-compliance with dietary restrictions, renal dysfunction, CAD  GDMT as detailed below     Current Pharmacotherapy AHA Guideline-Directed Medical Therapy   Lisinopril 5 mg daily  - held Lisinopril 20 mg twice daily   Carvedilol 3.125 mg BID  Carvedilol 25 mg twice daily   Spironolactone 50 mg daily Spironolactone 25 mg once daily   Hydralazine 10 mg TID Hydralazine 100 mg three times daily   Isosorbide mononitrate 180 mg daily Isosorbide dinitrate 40 mg three times daily   SGLT2 inhibitor:Dapagliflozin/Empagliflozin - not started Dapagliflozin or Empagliflozin 10 mg daily      2. Atrial fibrillation  Assessment / Plan  Currently on apixaban and amiodarone     3. CAD c/b ICM  Assessment / Plan  S/p CABG 2015  Now s/p PCI to Ramus and OM with DARRELL  Denies chest pain or anginal equivalents  Continue atorvastatin, carvedilol, clopidogrel     4.CKD  Assessment / Plan  Diuresis as above; continue to monitor UOP and renal function closely    5.Left hip bursitis  Assessment / Plan  Management and supportive care per primary team and orthopedic surgery    Clinically Significant Risk Factors Present on Admission               # Drug Induced Coagulation Defect: home medication list includes an anticoagulant medication  # Drug Induced Platelet Defect: home medication list includes an antiplatelet medication   #  "Hypertension: Noted on problem list  # Acute heart failure with reduced ejection fraction: last echo with EF <40% and receiving IV diuretics    # DMII: A1C = 7.8 % (Ref range: <5.7 %) within past 6 months    # Severe Obesity: Estimated body mass index is 42.29 kg/m  as calculated from the following:    Height as of this encounter: 1.676 m (5' 6\").    Weight as of this encounter: 118.8 kg (262 lb).       # Financial/Environmental Concerns:    # Support System: poor social support noted in nursing assessment        Cardiac Arrhythmia: Atrial fibrillation: Unspecified  Cardiomyopathy  Systolic acute    Fluid overload, unspecified, Other fluid overload, and Other disorders of electrolyte and fluid balance, not elsewhere classified    Acute kidney failure, unspecified  CKD POA List: Stage 4 (GFR 15-29)    65 minutes spent reviewing prior records (including documentation, laboratory studies, cardiac testing/imaging), history and physical exam, planning, and subsequent documentation.      History of Present Illness/Subjective    Mr. Zackary Hutchison is a 63 year old male with a PMHx significant for (per Epic notation) atrial fibrillation, stage III CKD, ischemic cardiomyopathy, COPD, HFrEF, hyperlipidemia, CAD s/p CABG, restless leg syndrome, hypertension, obesity, newly diagnosed type 2 diabetes, and tobacco use disorder admitted on 5/13/2024 with the ER with left hip pain, suspected exacerbation of HFpEF, and possible COPD exacerbation     Today, Mr. Hutchison denies acute cardiac events or complaints, but does endorse progressive left hip which prompted him to present to the ED for evaluation; Cardiac management as detailed above    ECG: Personally reviewed 5/13/24. normal sinus rhythm, PAC's noted.    ECHO (personnaly Reviewed on 5/13/24):   1. Technically difficult study.  2. The left ventricle is normal in size. Image quality does not provide for  detailed assessment of LV systolic function, but is felt to be normal with " "a  visually estimated ejection fraction of roughly 55%.  3. No significant valvular heart disease is identified on this study though  the sensitivity, particularly of regurgitant lesions, is reduced due to poor  Doppler acoustics.  4. Right ventricular size and systolic performance could not be accurately  assessed due to inadequate visualization.     The acoustic quality of this study is fairly poor. If a more accurate  assessment of left ventricular systolicperformance, regional wall motion, and  other morphology/function is required; would recommend cardiac MRI or other  alternative imaging modality for further evaluation.     When compared to the prior real-time echocardiogram dated 25 December 2023,  the ejection fraction appears somewhat higher on the current study though  accurate comparison is somewhat limited due to suboptimal acoustic imaging not  only on the current examination, but also hold the prior study as well.    Telemetry: personally reviewed May 13, 2024; notable for sinus rhythm     Lab results: personally reviewed May 13, 2024; notable for resolving PRINCESS    Medical history and pertinent documents reviewed in Care Everywhere please where applicable see details above          Physical Examination Review of Systems   BP (!) 141/105 (BP Location: Right arm, Patient Position: Dangled, Cuff Size: Adult Regular)   Pulse 81   Temp 98  F (36.7  C) (Oral)   Resp 23   Ht 1.676 m (5' 6\")   Wt 118.8 kg (262 lb)   SpO2 99%   BMI 42.29 kg/m    Body mass index is 42.29 kg/m .  Wt Readings from Last 3 Encounters:   05/13/24 118.8 kg (262 lb)   05/08/24 121 kg (266 lb 11.2 oz)   04/25/24 126.1 kg (278 lb)     General Appearance:   no distress, normal body habitus   ENT/Mouth: membranes moist, no oral lesions or bleeding gums.      EYES:  no scleral icterus, normal conjunctivae   Neck: no carotid bruits or thyromegaly   Chest/Lungs:   lungs are clear to auscultation, no rales or wheezing, equal chest wall " expansion    Cardiovascular:   Regular. Normal first and second heart sounds with no murmurs, rubs, or gallops; the carotid, radial and posterior tibial pulses are intact, + JVD and LE edema bilaterally    Abdomen:  no organomegaly, masses, bruits, or tenderness; bowel sounds are present   Extremities: no cyanosis or clubbing   Skin: no xanthelasma, warm.    Neurologic: NAD     Psychiatric: alert and oriented x3, calm     A complete 10 systems ROS was reviewed  And is negative except what is listed in the HPI.          Medical History  Surgical History Family History Social History   Past Medical History:   Diagnosis Date    Atrial fibrillation (H)     Chronic kidney disease     Chronic kidney disease, stage 3b (H) 09/28/2023    Class 3 severe obesity due to excess calories with body mass index (BMI) of 40.0 to 44.9 in adult (H) 09/28/2023    Congestive heart failure (H)     COPD (chronic obstructive pulmonary disease) (H)     Coronary artery disease involving coronary bypass graft of native heart without angina pectoris 09/28/2023    Heart failure with reduced ejection fraction (H) 09/11/2023    HFrEF (heart failure with reduced ejection fraction) (H) 09/11/2023    Hyperlipidemia     Hypertension     Ischemic cardiomyopathy 09/28/2023    Obese     Paroxysmal atrial fibrillation (H) 09/28/2023    Tobacco abuse 09/28/2023    Past Surgical History:   Procedure Laterality Date    CORONARY ANGIOGRAPHY ADULT ORDER      CORONARY ARTERY BYPASS Left 2014    2 vessels    CV CARDIOMEMS WITH RIGHT HEART CATH N/A 2/15/2024    Procedure: Pulmonary Arterial Pressure Sensor Placement;  Surgeon: Jacey Bhandari MD;  Location: [facility] CATH LAB CV    CV CORONARY ANGIOGRAM N/A 09/11/2023    Procedure: Coronary Angiogram;  Surgeon: Santy Esposito MD;  Location: [facility] CATH LAB CV    CV CORONARY ANGIOGRAM N/A 5/2/2024    Procedure: CV CORONARY ANGIOGRAM;  Surgeon: Santy Esposito MD;  Location: [facility] CATH LAB CV    CV  LEFT HEART CATH N/A 09/11/2023    Procedure: Left Heart Catheterization;  Surgeon: Santy Esposito MD;  Location: [facility] CATH LAB CV    CV LEFT HEART CATH N/A 5/2/2024    Procedure: Left Heart Catheterization;  Surgeon: Santy Esposito MD;  Location: [facility] CATH LAB CV    CV PCI ANGIOPLASTY N/A 5/2/2024    Procedure: Percutaneous Transluminal Angioplasty;  Surgeon: Santy Esposito MD;  Location: [facility] CATH LAB CV    CV RIGHT HEART CATH MEASUREMENTS RECORDED N/A 09/11/2023    Procedure: Right Heart Catheterization;  Surgeon: Santy Esposito MD;  Location: [facility] CATH LAB CV    no family history of premature coronary artery disease Social History     Socioeconomic History    Marital status: Single     Spouse name: Not on file    Number of children: Not on file    Years of education: Not on file    Highest education level: Not on file   Occupational History    Occupation: Retired restaurant owner     Comment: [employer]   Tobacco Use    Smoking status: Every Day     Current packs/day: 0.50     Types: Cigarettes    Smokeless tobacco: Never    Tobacco comments:     Seen IP by [facility] on 9/11/23 and declined cessation services and materials   Substance and Sexual Activity    Alcohol use: Not Currently    Drug use: Not on file    Sexual activity: Not on file   Other Topics Concern    Not on file   Social History Narrative    Currently lives in an assisted living facility. (Updated: 01/08/2024)     Social Determinants of Health     Financial Resource Strain: Not on file   Food Insecurity: Not on file   Transportation Needs: Not on file   Physical Activity: Not on file   Stress: Not on file   Social Connections: Not on file   Interpersonal Safety: Not on file   Housing Stability: Not on file           Lab Results    Chemistry/lipid CBC Cardiac Enzymes/BNP/TSH/INR   Lab Results   Component Value Date    CHOL 121 05/02/2024    HDL 27 (L) 05/02/2024    TRIG 219 (H) 05/02/2024    BUN 38.9 (H) 05/13/2024      "05/13/2024    CO2 26 05/13/2024    Lab Results   Component Value Date    WBC 8.5 05/13/2024    HGB 12.2 (L) 05/13/2024    HCT 38.1 (L) 05/13/2024    MCV 92 05/13/2024     05/13/2024    Lab Results   Component Value Date    TSH 1.70 04/29/2024    INR 1.30 (H) 05/13/2024     No results found for: \"CKTOTAL\", \"CKMB\", \"TROPONINI\"       Weight:    Wt Readings from Last 3 Encounters:   05/13/24 118.8 kg (262 lb)   05/08/24 121 kg (266 lb 11.2 oz)   04/25/24 126.1 kg (278 lb)       Allergies  Allergies   Allergen Reactions    Bumex [Bumetanide] Muscle Pain (Myalgia)         Surgical History  Past Surgical History:   Procedure Laterality Date    CORONARY ANGIOGRAPHY ADULT ORDER      CORONARY ARTERY BYPASS Left 2014    2 vessels    CV CARDIOMEMS WITH RIGHT HEART CATH N/A 2/15/2024    Procedure: Pulmonary Arterial Pressure Sensor Placement;  Surgeon: Jacey Bhandari MD;  Location: [facility] LAB CV    CV CORONARY ANGIOGRAM N/A 09/11/2023    Procedure: Coronary Angiogram;  Surgeon: Santy Esposito MD;  Location: [facility] CATH LAB CV    CV CORONARY ANGIOGRAM N/A 5/2/2024    Procedure: CV CORONARY ANGIOGRAM;  Surgeon: Santy Esposito MD;  Location: [facility] CATH LAB CV    CV LEFT HEART CATH N/A 09/11/2023    Procedure: Left Heart Catheterization;  Surgeon: Santy Esposito MD;  Location: [facility] CATH LAB CV    CV LEFT HEART CATH N/A 5/2/2024    Procedure: Left Heart Catheterization;  Surgeon: Santy Esposito MD;  Location: [facility] CATH LAB CV    CV PCI ANGIOPLASTY N/A 5/2/2024    Procedure: Percutaneous Transluminal Angioplasty;  Surgeon: Santy Esposito MD;  Location: [facility] LAB CV    CV RIGHT HEART CATH MEASUREMENTS RECORDED N/A 09/11/2023    Procedure: Right Heart Catheterization;  Surgeon: Santy Esposito MD;  Location: [facility] CV       Social History  Tobacco:   History   Smoking Status    Every Day    Types: Cigarettes   Smokeless Tobacco    Never    Alcohol:   Social History    " Substance and Sexual Activity      Alcohol use: Not Currently   Illicit Drugs:   History   Drug Use Not on file       Family History  Family History   Problem Relation Age of Onset    Cerebrovascular Disease Mother     Myocardial Infarction Father           Jacey Bhandari MD on 5/13/2024      cc: Reid Escobar,

## 2024-05-13 NOTE — PROGRESS NOTES
Occupational Therapy      05/13/24 1520   Appointment Info   Signing Clinician's Name / Credentials (OT) Carolyn Man OTR/L   Living Environment   People in Home facility resident   Current Living Arrangements assisted living   Home Accessibility no concerns   Transportation Anticipated health plan transportation   Living Environment Comments Lives in Senior apartment   Self-Care   Usual Activity Tolerance good   Current Activity Tolerance moderate   Regular Exercise No   Equipment Currently Used at Home walker, rolling;grab bar, toilet;grab bar, tub/shower;dressing device   Fall history within last six months no   Activity/Exercise/Self-Care Comment Ind. w/ ADL routine   Instrumental Activities of Daily Living (IADL)   IADL Comments Pt gets all meals from faciltiy   General Information   Onset of Illness/Injury or Date of Surgery 05/13/24   Referring Physician Connie Garay MD   Additional Occupational Profile Info/Pertinent History of Current Problem 63 year old male with history of atrial fibrillation, stage III CKD, ischemic cardiomyopathy, COPD, HFrEF, hyperlipidemia, CAD s/p CABG, restless leg syndrome, hypertension, obesity, newly diagnosed type 2 diabetes, and tobacco use disorder admitted on 5/13/2024 with the ER with left hip pain, suspected exacerbation of HFpEF, and possible COPD exacerbation.   Existing Precautions/Restrictions cardiac   Cognitive Status Examination   Orientation Status orientation to person, place and time   Range of Motion Comprehensive   General Range of Motion no range of motion deficits identified   Strength Comprehensive (MMT)   General Manual Muscle Testing (MMT) Assessment no strength deficits identified   Transfers   Transfers sit-stand transfer   Sit-Stand Transfer   Sit-Stand [place] (Transfers) supervision   Assistive Device (Sit-Stand Transfers) walker, 4-wheeled   Balance   Balance Assessment standing static balance   Sitting Balance: Static supervision    Position, Sitting Balance unsupported   Clinical Impression   Criteria for Skilled Therapeutic Interventions Met (OT) Yes, treatment indicated   OT Diagnosis decreased act. tolerance   OT Problem List-Impairments impacting ADL problems related to;mobility;strength   Assessment of Occupational Performance 1-3 Performance Deficits   Identified Performance Deficits endurance/act. tolerance   Planned Therapy Interventions (OT) ADL retraining;balance training;strengthening;transfer training   Clinical Decision Making Complexity (OT) problem focused assessment/low complexity   Risk & Benefits of therapy have been explained evaluation/treatment results reviewed;patient   OT Total Evaluation Time   OT Eval, Low Complexity Minutes (11715) 10   OT Goals   Therapy Frequency (OT) Daily   OT Predicted Duration/Target Date for Goal Attainment 05/20/24   OT Goals Cardiac Phase 1   OT: Understanding of cardiac education to maximize quality of life, condition management, and health outcomes Patient;Verbalize;Demonstrate   OT: Perform aerobic activity with stable cardiovascular response continuous;15 minutes;ambulation   OT: Functional/aerobic ambulation tolerance with stable cardiovascular response in order to return to home and community environment Modified independent;Greater than 300 feet;Rolling walker   Self-Care/Home Management   Self-Care/Home Mgmt/ADL, Compensatory, Meal Prep Minutes (27194) 12   Symptoms Noted During/After Treatment (Meal Preparation/Planning Training) fatigue   Treatment Detail/Skilled Intervention Pt up in bed upon OT arrival just got transfered up from ED- pt ed. on CHF booklet pt did not need another booklet- pt reporting it is very hard for him to follow a low sodium diet as the Senior living prepares all meals he did state that he has heard of the free low sodium meals offered and the dietition discussed. Pt also stated he does not track his weight OT ed. on importance of monitoring weight/low  sodium diet/exercises. SBA/CGA ambulation w/in room ~50ft in hallway w/ fww no LOB slow pace d/t hip pain no fx. Pt uses 4ww at home, BP slightly elevated after functional ambultion ~150s/70s. Pt up in recliner at end of session.   OT Discharge Planning   OT Plan review CHF education, endurance/act. tolerance- may be close to baseline then d/c?   OT Discharge Recommendation (DC Rec) home with assist   OT Rationale for DC Rec Pt lives in [facility]/senior living apartment and gets all meals- pt ind. w/ all ADL tasks expect pt to progress for safe d/c home when medically ready.   Total Session Time   Timed Code Treatment Minutes 12   Total Session Time (sum of timed and untimed services) 22      [facility]  OUTPATIENT OCCUPATIONAL THERAPY  EVALUATION  PLAN OF TREATMENT FOR OUTPATIENT REHABILITATION  (COMPLETE FOR INITIAL CLAIMS ONLY)  Patient's Last Name, First Name, M.I.  YOB: 1960  Zackary Hutchison                             Provider's Name  [facility] Medical Record No.  [id number]                             Onset Date:  05/13/24   Start of Care Date:      Type:     ___PT   _X_OT   ___SLP Medical Diagnosis:                       OT Diagnosis:  decreased act. tolerance Visits from SOC:  1     See note for plan of treatment, functional goals and certification details    I CERTIFY THE NEED FOR THESE SERVICES FURNISHED UNDER        THIS PLAN OF TREATMENT AND WHILE UNDER MY CARE     (Physician co-signature of this document indicates review and certification of the therapy plan).

## 2024-05-13 NOTE — ED NOTES
Bed: JNED-25  Expected date: 5/13/24  Expected time: 5:29 AM  Means of arrival: Ambulance  Comments:  53 M hip pain

## 2024-05-13 NOTE — DISCHARGE INSTRUCTIONS
WOC DISCHARGE INSTRUCTIONS:  Right shin wound(s): Daily  and PRN if dressing soiled, saturated or falls off   Soak wound with vashe moistened gauze for 5 minutes, pat dry  Apply nickel thick layer of medihoney gel to wound  Cover with mepilex  4.  Apply Sween cream to any and all areas of dry skin on both legs

## 2024-05-13 NOTE — PROGRESS NOTES
"RT busy with critical care patients, asked RN to perform Duoneb HHN tx. RT to follow.     BP (!) 155/70   Pulse 78   Temp 98  F (36.7  C) (Oral)   Resp 20   Ht 1.676 m (5' 6\")   Wt 118.8 kg (262 lb)   SpO2 98%   BMI 42.29 kg/m        "

## 2024-05-13 NOTE — ED NOTES
Pt ambulated with walker and a wheelchair behind him. O2 started was 100 and went down to 79 as he walked further. Pt had to sit down several times to catch his breath.

## 2024-05-13 NOTE — PROGRESS NOTES
PRN albuterol neb x1 given due to wheezing. Respirations in the low 20s. Patient is in room air, SpO2 98%. Increased aeration post neb treatment.     Lola Bianchi, RT

## 2024-05-13 NOTE — ED PROVIDER NOTES
Emergency Department Encounter     Evaluation Date & Time:   2024  5:31 AM    CHIEF COMPLAINT:  Hip Pain      Triage Note:Pt arrives via EMS from home. PT had left hip replacement 2 years ago. Yesterday morning he starting having pain in his hip but denies falling or trauma to the hip. Pt is able to walk and bear weight on hip. Last dose of tylenol was at 0200.            FINAL IMPRESSION:    ICD-10-CM    1. Acute on chronic congestive heart failure, unspecified heart failure type (H)  I50.9       2. Hip pain, left  M25.552       3. Hypoxia  R09.02           Impression and Plan     ED COURSE & MEDICAL DECISION MAKIN:48 AM I met with the patient, obtained history, performed an initial exam, and discussed options and plan for diagnostics and treatment here in the ED.   11:10 AM I rechecked on the patient and updated them.  11:28 AM I spoke to the admitting Hospitalist, Dr. Luu, who is agreeable for admission.     ED Course as of 24 1136   Mon May 13, 2024   0647 Patient's x-ray reviewed.  No obvious fracture seen I think it may be degenerative changes but I do see a lucency in the left lateral trochanter.  Will pare with clinical evaluation.   0720 His left hip is quite tender to palpation.  However, no real pain with passive range of motion to suggest an inflammatory or infectious arthritis.  I do wonder if he is does have an occult fracture there.  We will try with CT scan to look at the area but if there is a lot of shadowing he may need further imaging.  Otherwise in the meantime we will do oxycodone since he took acetaminophen already this morning and then reassess his pain.  He is having some wheezing and we do have smoke warnings out for [place] wildfires today.  With his history of COPD we will give a neb now.  Then, will reassess to see whether or not he will need further steroids or diuretics.  He does have a history of CHF and from a general fluid standpoint you can see that he  is fluid down from previous.  However, will need chest x-ray to make sure that he is not acquiring more fluid in his lungs as his an improvement after hospitalization has plateaued per the patient.  No infectious symptoms otherwise.  No new chest pain today or yesterday to otherwise suggest cardiac ischemia.  Hr seems stable and regular.  Will get EKG to confirm.  Check cri as on diuretics, and electrolytes.   0943 CT scan does have some artifact but no obvious fracture or loosening is seen.  Will see if patient can ambulate now on his hip after pain medication.  Initial troponin was ordered from triage and was nonspecifically elevated compared to his baseline but still I think within lab variation with underlying kidney disease so I am repeating that now but he has no chest pain.   0944 His chest x-ray does have some pulmonary vascularity but it is improved from previous.  Will have staff ambulate with oximetry and consider if will need admission versus discharge home for further pain management and or respiratory management.   1108 His chest x-ray showed possible mild congestion however is fairly similar to previous.  However, on getting the patient up to ambulate him he had significant pain in the left hip was not able to walk far and his oxygen saturations dropped to the 70s.  So, although he is not requiring oxygen at rest he would require it when up and we will plan to admit to the hospital for further evaluation of the left hip pain as well as his breathing.   1115 Nursing staff did attempt to ambulate the patient.  He notes that the pain is much worse with ambulation in his left hip, I do see on his previous chart that he has had myalgias related to Bum[place] before so would consider if his diuretics are worsening this.  However, seems more in the lateral trochanteric area.  Regardless, he did get hypoxia with ambulation to the upper 70s.  He is not on oxygen at home.   1129 I spoke with hospitalist.  They are  comfortable managing his congestive heart failure on the floor as it is long-term and chronic for him.  His left hip unfortunately is paining him to the point where he really is struggling with being up and that is worsening his breathing.  Again, no signs of infection or acute fracture.  Further management will be up to the admitting hospitalist.   1135 I had reviewed his previous echocardiogram showing good ejection fraction and his previous angiogram within the last month that shows the diffuse disease and is status post drug-eluting stents.       At the conclusion of the encounter I discussed the results of all the tests and the disposition. The questions were answered. The patient or family acknowledged understanding and was agreeable with the care plan.          0 minutes of critical care time        MEDICATIONS GIVEN IN THE EMERGENCY DEPARTMENT:  Medications   furosemide (LASIX) injection 40 mg (has no administration in time range)   HYDROmorphone (DILAUDID) injection 0.5 mg (has no administration in time range)   aspirin (ASA) chewable tablet 324 mg (has no administration in time range)   ipratropium - albuterol 0.5 mg/2.5 mg/3 mL (DUONEB) neb solution 3 mL (3 mLs Nebulization $Given 5/13/24 0901)   oxyCODONE (ROXICODONE) tablet 10 mg (10 mg Oral $Given 5/13/24 0712)       NEW PRESCRIPTIONS STARTED AT TODAY'S ED VISIT:  New Prescriptions    No medications on file       HPI     HPI     Zackary Hutchison is a 63 year old male with a pertinent history of hyperlipidemia, CABG, a fib on Eliquis, COPD, CKD3b, and CHF who presents to this ED by EMS for evaluation of left hip pain.    The patient reports that he has progressively worsening left hip pain that started on Sunday morning. No trauma or falls. He has hx of hip replacement 2 years ago. Patient pain worsens while prone. Pain is improved when he lays down on his right side. He was offered pain medicine and accepted.     Patient says that he has wheezing at  baseline. Patient has hx of tobacco abuse and still endorses smoking. He uses a Nebulizer 4 times daily. No recent weight changes. He is not on home oxygen.     Patient has cellulitis wound to his bilateral lower extremities. Patient applies Hydrocortisone medication. He does not use compressions. He uses a walker at baseline for ambulation.     Denies fevers or any other complaints at this time.     Per Chart Review:  He presented to [facility] on 4/29/24 for evaluation of shortness of breath and leg swelling. He was admitted. Patient was placed on furosemide drip on admission. Coronary angiogram performed on 5/2/2024 revealed extensive coronary artery disease, including 100% stenosis in both the ostium to proximal segments of LAD and RCA. He received PCI to ramus and LCx OM.  Per cardiology, the plan was to have ASA 75 mg daily for one week and Plavix 75 mg daily indefinitely while on Eliquis therapy. Hospital course was complicated by PRINCESS on stage III CKD.    During this admission, patient was found to have type 2 diabetes with Hemoglobin A1c 7.8     REVIEW OF SYSTEMS:  Review of Systems   Constitutional:  Negative for fever.   Musculoskeletal:         Positive for left hip pain   All other systems reviewed and are negative.    remainder of systems are all otherwise negative.        Medical History     Past Medical History:   Diagnosis Date    Atrial fibrillation (H)     Chronic kidney disease     Chronic kidney disease, stage 3b (H) 09/28/2023    Class 3 severe obesity due to excess calories with body mass index (BMI) of 40.0 to 44.9 in adult (H) 09/28/2023    Congestive heart failure (H)     COPD (chronic obstructive pulmonary disease) (H)     Coronary artery disease involving coronary bypass graft of native heart without angina pectoris 09/28/2023    Heart failure with reduced ejection fraction (H) 09/11/2023    HFrEF (heart failure with reduced ejection fraction) (H) 09/11/2023    Hyperlipidemia     Hypertension      Ischemic cardiomyopathy 09/28/2023    Obese     Paroxysmal atrial fibrillation (H) 09/28/2023    Tobacco abuse 09/28/2023       Past Surgical History:   Procedure Laterality Date    CORONARY ANGIOGRAPHY ADULT ORDER      CORONARY ARTERY BYPASS Left 2014    2 vessels    CV CARDIOMEMS WITH RIGHT HEART CATH N/A 2/15/2024    Procedure: Pulmonary Arterial Pressure Sensor Placement;  Surgeon: Jacey Bhandari MD;  Location: [facility]    CV CORONARY ANGIOGRAM N/A 09/11/2023    Procedure: Coronary Angiogram;  Surgeon: Santy Esposito MD;  Location: [facility]    CV CORONARY ANGIOGRAM N/A 5/2/2024    Procedure: CV CORONARY ANGIOGRAM;  Surgeon: Santy Esposito MD;  Location: [facility]    CV LEFT HEART CATH N/A 09/11/2023    Procedure: Left Heart Catheterization;  Surgeon: Santy Esposito MD;  Location: [facility]    CV LEFT HEART CATH N/A 5/2/2024    Procedure: Left Heart Catheterization;  Surgeon: Santy Esposito MD;  Location: [facility]    CV PCI ANGIOPLASTY N/A 5/2/2024    Procedure: Percutaneous Transluminal Angioplasty;  Surgeon: Santy Esposito MD;  Location: [facility]    CV RIGHT HEART CATH MEASUREMENTS RECORDED N/A 09/11/2023    Procedure: Right Heart Catheterization;  Surgeon: Santy Esposito MD;  Location: [facility]       Family History   Problem Relation Age of Onset    Cerebrovascular Disease Mother     Myocardial Infarction Father        Social History     Tobacco Use    Smoking status: Every Day     Current packs/day: 0.50     Types: Cigarettes    Smokeless tobacco: Never    Tobacco comments:     Seen IP by [facility] on 9/11/23 and declined cessation services and materials   Substance Use Topics    Alcohol use: Not Currently       acetaminophen (TYLENOL) 500 MG tablet  albuterol (PROAIR HFA/PROVENTIL HFA/VENTOLIN HFA) 108 (90 Base) MCG/ACT inhaler  albuterol (PROVENTIL) (2.5 MG/3ML) 0.083% neb solution  amiodarone (PACERONE)  "200 MG tablet  apixaban ANTICOAGULANT (ELIQUIS) 5 MG tablet  atorvastatin (LIPITOR) 40 MG tablet  carvedilol (COREG) 3.125 MG tablet  clopidogrel (PLAVIX) 75 MG tablet  [employer] VITAMIN B12 1000 MCG tablet  DULoxetine (CYMBALTA) 30 MG capsule  hydrALAZINE (APRESOLINE) 10 MG tablet  ipratropium - albuterol 0.5 mg/2.5 mg/3 mL (DUONEB) 0.5-2.5 (3) MG/3ML neb solution  isosorbide mononitrate (IMDUR) 60 MG 24 hr tablet  nitroGLYcerin (NITROSTAT) 0.4 MG sublingual tablet  polyethylene glycol (MIRALAX) 17 GM/Dose powder  rOPINIRole (REQUIP) 2 MG tablet  spironolactone (ALDACTONE) 50 MG tablet  torsemide (DEMADEX) 100 MG tablet        Physical Exam     First Vitals:  Patient Vitals for the past 24 hrs:   BP Temp Temp src Pulse Resp SpO2 Height Weight   05/13/24 1130 -- -- -- 83 -- 96 % -- --   05/13/24 1120 -- -- -- 82 -- 94 % -- --   05/13/24 1110 -- -- -- 77 -- 96 % -- --   05/13/24 1035 -- -- -- 96 25 97 % -- --   05/13/24 1034 -- -- -- 96 24 97 % -- --   05/13/24 1033 -- -- -- 96 17 97 % -- --   05/13/24 1016 -- -- -- 81 16 94 % -- --   05/13/24 1006 -- -- -- 81 24 96 % -- --   05/13/24 0946 -- -- -- 84 26 92 % -- --   05/13/24 0936 -- -- -- 82 26 94 % -- --   05/13/24 0933 -- -- -- -- -- 95 % -- --   05/13/24 0931 (!) 150/69 -- -- 82 30 94 % -- --   05/13/24 0926 -- -- -- 82 17 94 % -- --   05/13/24 0911 (!) 144/63 -- -- 82 -- 96 % -- --   05/13/24 0856 (!) 145/59 -- -- 83 -- 94 % -- --   05/13/24 0841 (!) 155/67 -- -- 86 -- 93 % -- --   05/13/24 0826 (!) 146/64 -- -- 78 -- 95 % -- --   05/13/24 0811 (!) 143/60 -- -- 77 -- 95 % -- --   05/13/24 0756 116/64 -- -- 77 -- 95 % -- --   05/13/24 0741 124/58 -- -- 73 -- 91 % -- --   05/13/24 0726 -- -- -- 80 -- 98 % -- --   05/13/24 0711 127/82 -- -- -- -- -- -- --   05/13/24 0533 (!) 155/70 98  F (36.7  C) Oral 78 20 98 % 1.676 m (5' 6\") 118.8 kg (262 lb)       PHYSICAL EXAM:   Constitutional:   laying down in bed, easily conversible    HENT:  Normocephalic, posterior pharynx " wnl, mucous membranes moist and dark pink   Eyes:  PERRL, EOMI, Conjunctiva normal, No discharge, no scleral icterus.  Respiratory:  Breathing easily, Audible upper airway wheezing on expiration. Slight crackles in the bilateral mid lings. He is otherwise clear to ausculation. Speaking in full sentences.   Cardiovascular:  Regular rate and rhythm. nl s1s2 0 murmurs, rubs, or gallops.  Peripheral pulses dp, pt, and radial are wnl.  Trace edema in the legs which are wrinkled.   GI:  Bowel sounds normal, Soft, No tenderness, No flank tenderness, nondistended.  :No CVA tenderness.   Musculoskeletal:  Moves all extremities.  No erythematous or swollen major joints, Tenderness over the great trochanter. Also tender over the pubic bone, Tender more anteriorly but less so laterally.    Integument: Very flaking skin. Small 1 cm scabbing on left anterior shin. No increased warmth. Areas of ecchymosis on forearms and dorsum of the hands from previous IV sticks. NOne are warm or tender.    Neurologic:  Alert & oriented x 3, Normal motor function, Normal sensory function, No focal deficits noted. Normal speech.  Psychiatric:  Affect normal, Judgment normal, Mood normal.     Results     LAB AND RADIOLOGY:  All pertinent labs reviewed and interpreted  Results for orders placed or performed during the hospital encounter of 05/13/24   XR Pelvis and Hip Left 2 Views     Status: None    Narrative    EXAM: PELVIS AND LEFT HIP 3 VIEWS  LOCATION: [facility]  DATE/TIME: 5/13/2024 6:20 AM CDT    INDICATION: Pain.  COMPARISON: None.      Impression    IMPRESSION:   1. A left hip arthroplasty is in place. No evidence of hardware failure.  2. No visualized acute fracture or malalignment of the pelvis or left hip.               XR Chest 1 View     Status: None    Narrative    EXAM: XR CHEST 1 VIEW  LOCATION: [facility]  DATE: 5/13/2024    INDICATION: Dyspnea. COPD. CHF.  COMPARISON:  4/29/2024.      Impression    IMPRESSION: Minimal streaky basilar opacities, probably atelectasis. Remaining lungs are clear. Stable mild cardiac silhouette enlargement. Post sternotomy. Slight vascular indistinctness could be from congestion. No pleural effusion or pneumothorax.     CT Hip Left w/o Contrast     Status: None    Narrative    EXAM: CT HIP LEFT W/O CONTRAST  LOCATION: [facility]  DATE: 5/13/2024    INDICATION: Atraumatic left hip pain.  COMPARISON: Same day radiographs.  TECHNIQUE: Noncontrast. Axial, sagittal and coronal thin-section reconstruction. Dose reduction techniques were used.     FINDINGS:     BONES:  -Left total hip arthroplasty. Streak artifact from orthopedic hardware limits evaluation of adjacent tissues. No evidence of loosening or periprosthetic fracture. No definite joint effusion.    SOFT TISSUES:  -Arterial vascular calcifications. Mild prostatomegaly. Circumferential bladder wall thickening.      Impression    IMPRESSION:  1.  Left total hip arthroplasty without evidence of loosening or periprosthetic fracture.  2.  Circumferential bladder wall thickening which may be accentuated by incomplete distention. Chronic urinary outlet obstruction may contribute to this appearance. Cystitis cannot be entirely excluded. Correlation with clinical symptoms and urinalysis   suggested.     INR     Status: Abnormal   Result Value Ref Range    INR 1.30 (H) 0.85 - 1.15   Basic metabolic panel     Status: Abnormal   Result Value Ref Range    Sodium 136 135 - 145 mmol/L    Potassium 3.5 3.4 - 5.3 mmol/L    Chloride 93 (L) 98 - 107 mmol/L    Carbon Dioxide (CO2) 26 22 - 29 mmol/L    Anion Gap 17 (H) 7 - 15 mmol/L    Urea Nitrogen 38.9 (H) 8.0 - 23.0 mg/dL    Creatinine 1.66 (H) 0.67 - 1.17 mg/dL    GFR Estimate 45 (L) >60 mL/min/1.73m2    Calcium 9.8 8.8 - 10.2 mg/dL    Glucose 134 (H) 70 - 99 mg/dL   Nt probnp inpatient (BNP)     Status: Normal   Result Value Ref Range    N  terminal Pro BNP Inpatient 197 0 - 900 pg/mL   Magnesium     Status: Normal   Result Value Ref Range    Magnesium 1.9 1.7 - 2.3 mg/dL   CBC with platelets and differential     Status: Abnormal   Result Value Ref Range    WBC Count 8.5 4.0 - 11.0 10e3/uL    RBC Count 4.15 (L) 4.40 - 5.90 10e6/uL    Hemoglobin 12.2 (L) 13.3 - 17.7 g/dL    Hematocrit 38.1 (L) 40.0 - 53.0 %    MCV 92 78 - 100 fL    MCH 29.4 26.5 - 33.0 pg    MCHC 32.0 31.5 - 36.5 g/dL    RDW 18.9 (H) 10.0 - 15.0 %    Platelet Count 225 150 - 450 10e3/uL    % Neutrophils 71 %    % Lymphocytes 18 %    % Monocytes 7 %    % Eosinophils 2 %    % Basophils 1 %    % Immature Granulocytes 1 %    NRBCs per 100 WBC 0 <1 /100    Absolute Neutrophils 6.0 1.6 - 8.3 10e3/uL    Absolute Lymphocytes 1.5 0.8 - 5.3 10e3/uL    Absolute Monocytes 0.6 0.0 - 1.3 10e3/uL    Absolute Eosinophils 0.2 0.0 - 0.7 10e3/uL    Absolute Basophils 0.1 0.0 - 0.2 10e3/uL    Absolute Immature Granulocytes 0.1 <=0.4 10e3/uL    Absolute NRBCs 0.0 10e3/uL   Troponin T, High Sensitivity     Status: Abnormal   Result Value Ref Range    Troponin T, High Sensitivity 46 (H) <=22 ng/L   Troponin T, High Sensitivity     Status: Abnormal   Result Value Ref Range    Troponin T, High Sensitivity 42 (H) <=22 ng/L   CBC with platelets differential     Status: Abnormal    Narrative    The following orders were created for panel order CBC with platelets differential.  Procedure                               Abnormality         Status                     ---------                               -----------         ------                     CBC with platelets and d...[[id number]]  Abnormal            Final result                 Please view results for these tests on the individual orders.         ECG:    Performed at: May 13, 2024, 7:47 AM, [facility] EMERGENCY DEPARTMENT      Impression: Sinus rhythm with Premature atrial complexes. Right bundle branch block. Abnormal ECG. When  compared with ECG of 02-MAY-2024 23:59, T wave inversion no longer evident in Inferior leads.      Rate: 72 BMP  Rhythm: Sinus rhythm with Premature atrial complexes    Axis: 95 89 80   MS Interval: 190 ms   QRS Interval: 140 ms   QTc Interval: 450/492 ms   ST Changes: None  Comparison: When compared with ECG of 02-MAY-2024 23:59, T wave inversion no longer evident in Inferior leads.    I have independently reviewed and interpreted the EKS(s) documented above    PROCEDURES:  Procedures:n/a      [facility] Documentation     Medical Decision Making  Obtained supplemental history:Supplemental history obtained?: No  Reviewed external records: External records reviewed?: Inpatient Record: [facility] ED (4/29/2024-5/9/2024)  Care impacted by chronic illness:Chronic Kidney Disease, Chronic Lung Disease, Heart Disease, Hyperlipidemia, and Other: CABG, a fib on Eliquis   Care significantly affected by social determinants of health:N/A  Did you consider but not order tests?: Work up considered but not performed and documented in chart, if applicable  Did you interpret images independently?: Independent interpretation of ECG and images noted in documentation, when applicable.  Consultation discussion with other provider:Did you involve another provider (consultant, , pharmacy, etc.)?: I discussed the care with another health care provider, see documentation for details.  Admit.         The creation of this record is based on the scribe s observations of the work being performed by Michael Castro and the provider s statements to them. This document has been checked and approved by MD Joanne Saucedo MD  Emergency Medicine  [facility] EMERGENCY DEPARTMENT         Joanne Amador MD  05/13/24 1133       Joanne Amador MD  05/13/24 1136

## 2024-05-13 NOTE — CONSULTS
[facility]  WOC Nurse Inpatient Assessment     Consulted for: right shin    Summary: 5/13 Patient seen in ED.  WOC team is familiar.  Patient stated while at home he did not continue with wound cares.    Patient History (according to provider note(s):      Zackary Hutchison is a 63 year old male with history of atrial fibrillation, stage III CKD, ischemic cardiomyopathy, COPD, HFrEF, hyperlipidemia, CAD s/p CABG, restless leg syndrome, hypertension, obesity, newly diagnosed type 2 diabetes, and tobacco use disorder admitted on 5/13/2024 with the ER with left hip pain, suspected exacerbation of HFpEF, and possible COPD exacerbation.     Assessment:      Areas visualized during today's visit: Lower extremities     Skin Injury Location: right shin      Last photo: 5/13  Skin injury due to: Venous stasis dermatitis  Skin history and plan of care:   Patient states wound been there for months  Affected area:      Skin assessment: Superficial scabbing     Measurements (length x width x depth, in cm) 3.5 cm  x 6 cm      Color: dusky and pink     Temperature  normal      Drainage: none .      Color: none      Odor: none  Pain: denies , none  Pain interventions prior to dressing change: N/A  Treatment goal: Heal , Increase moisture , and Protection  STATUS: initial assessment  Supplies ordered: ordered vashe, medihoney and supplies stored on unit       Treatment Plan:     Right shin wound(s): Daily  and PRN if dressing soiled, saturated or falls off   Soak wound with vashe moistened gauze for 5 minutes, pat dry  Apply nickel thick layer of medihoney gel to wound  Cover with mepilex  4.  Apply Sween cream to any and all areas of dry skin on both legs    Orders: Written    RECOMMEND PRIMARY TEAM ORDER: None, at this time  Education provided: plan of care  Discussed plan of care with: Patient  WOC nurse follow-up plan: weekly  Notify WOC if wound(s) deteriorate.  Nursing to notify the Provider(s) and  re-consult the [facility] Nurse if new skin concern.    DATA:     Current support surface: Standard  Standard Isoflex gel  Containment of urine/stool: Continent of bladder and Continent of bowel  BMI: Body mass index is 42.29 kg/m .   Active diet order: Orders Placed This Encounter      Combination Diet 2 gm NA Diet; No Caffeine Diet     Output: No intake/output data recorded.     Labs:   Recent Labs   Lab 05/13/24  0709   HGB 12.2*   INR 1.30*   WBC 8.5     Pressure injury risk assessment:   Sensory Perception: 3-->slightly limited  Moisture: 3-->occasionally moist  Activity: 3-->walks occasionally  Mobility: 3-->slightly limited  Nutrition: 3-->adequate  Friction and Shear: 3-->no apparent problem  Simeon Score: 18    PAOLO Carty RN [facility] services  Pager no longer in use, please contact through [employer] group: [facility] [employer]

## 2024-05-13 NOTE — ED NOTES
Bed: [place] [address]  Expected date:   Expected time:   Means of arrival:   Comments:  Room 25

## 2024-05-13 NOTE — PROGRESS NOTES
Patient admitted to room 3 at approximately 1520 via wheel chair from emergency room.  Reason for Admission: left hip pain - bursitis  Report received from:   Patient was accompanied by Self.  Discharge transportation provided by:  Patient ambulated/transferred:  with one assist. self.  Patient is alert and orientated x 4.  Outpatient Observation education provided to: (patient, family, friend)  MDRO Education done if applicable (MRSA, VRE, etc)  Safety risks were identified during admission:  fall.   Yellow risk/fall band applied:  Yes  Detailed Belongings: cell phone and clothing

## 2024-05-13 NOTE — CONSULTS
ORTHOPEDIC CONSULTATION    Consultation  Zackary Hutchison,  1960, MRN [id number]    CHF (congestive heart failure) (H)    PCP: Reid Escobar, [phone]   Code status:  Full Code       Extended Emergency Contact Information  Primary Emergency Contact: APRIL HUTCHISON  Home Phone: [phone]  Relation: Brother  Secondary Emergency Contact: [facility]  Work Phone: [phone]  Relation: Other         IMPRESSION:  Left hip pain status post total hip arthroplasty, pain seems likely to be due to a trochanteric bursitis     PLAN:  This patient was discussed with Dr. Cabrera, on-call surgeon for [facility] and they are in agreement with the following plan.   -No indication for urgent surgical intervention at this time.  Suspect his pain is likely due to a trochanteric bursitis.  Would recommend conservative management for this issue including anti-inflammatories, ice, rest  -Regarding anti-inflammatory choice could consider NSAIDs, systemic steroids, or steroid injection into the trochanteric bursa.  Patient does have CKD, decompensated heart failure, COPD.  Will discuss with hospitalist regarding ideal anti-inflammatory choice  -Weight-bear as tolerated  -PT/OT - gluteal stretching and strengthening  - Follow up at [facility] following discharge for outpatient PT    Thank you for including [facility] in the care of Zackary Hutchison. It has been a pleasure participating in their care.        CHIEF COMPLAINT: <principal problem not specified>    HISTORY OF PRESENT ILLNESS:  The patient is seen in orthopedic consultation at the request of Joanne Amador MD for left hip pain.  The patient is a 63 year old male    Today patient is experiencing left hip pain that began yesterday.  He woke up in the morning and noticed pain to the lateral aspect of his left hip that worsened throughout the day to the point of being barely able to weight-bear or ambulate.  He came into the emergency  "department for this pain and was also found to have decompensated heart failure for which she has been admitted.  He is status post total hip arthroplasty that was done 2 years ago at [facility] orth[facility].  He has not had any issues with his hip in the meantime.  He does not note any falls or other injuries to the hip that may have incited this new pain.  Pain does not radiate down his leg it stays mostly just in the lateral hip area.  He does not have any numbness or tingling in his leg.    ALLERGIES:   Review of patient's allergies indicates   Allergies   Allergen Reactions    Bumex [Bumetanide] Muscle Pain (Myalgia)         MEDICATIONS UPON ADMISSION:  Medications were reviewed.  They include:   (Not in a hospital admission)        SOCIAL HISTORY:   he  reports that he has been smoking cigarettes. He has never used smokeless tobacco. He reports that he does not currently use alcohol.      FAMILY HISTORY:  family history includes Cerebrovascular Disease in his mother; Myocardial Infarction in his father.      REVIEW OF SYSTEMS:   Reviewed with patient. See HPI, otherwise negative       PHYSICAL EXAMINATION:  Vitals: BP (!) 150/69   Pulse 82   Temp 98  F (36.7  C) (Oral)   Resp 28   Ht 1.676 m (5' 6\")   Wt 118.8 kg (262 lb)   SpO2 95%   BMI 42.29 kg/m    General: On examination, the patient is resting comfortably, NAD, awake, and alert and oriented to person, place, time, and, and general circumstances   SKIN: There is no evidence of erythema, warmth, swelling, deformity, crepitus, break to the skin, open wound.  Pulses:  Dorsalis pedis and posterior tibialis pulse is intact and equal bilaterally  Sensation: intact and equal bilaterally to the distal lower extremities.  Tenderness: Tender to palpation over the greater trochanter and lateral proximal femur  ROM: Able to perform hip flexion, quad extension, IR/ER with minimal pain elicited in active and passive range of motion    Contralateral side= Full range of " motion, Negative joint instability findings, 5/5 motor groups about the joint, Non-tender.       RADIOGRAPHIC EVALUATION:  Personally reviewed  EXAM: PELVIS AND LEFT HIP 3 VIEWS  LOCATION: [facility]  DATE/TIME: 5/13/2024 6:20 AM CDT     INDICATION: Pain.  COMPARISON: None.                                                                      IMPRESSION:   1. A left hip arthroplasty is in place. No evidence of hardware failure.  2. No visualized acute fracture or malalignment of the pelvis or left hip.      EXAM: CT HIP LEFT W/O CONTRAST  LOCATION: [facility]  DATE: 5/13/2024     INDICATION: Atraumatic left hip pain.  COMPARISON: Same day radiographs.  TECHNIQUE: Noncontrast. Axial, sagittal and coronal thin-section reconstruction. Dose reduction techniques were used.      FINDINGS:      BONES:  -Left total hip arthroplasty. Streak artifact from orthopedic hardware limits evaluation of adjacent tissues. No evidence of loosening or periprosthetic fracture. No definite joint effusion.     SOFT TISSUES:  -Arterial vascular calcifications. Mild prostatomegaly. Circumferential bladder wall thickening.                                                                      IMPRESSION:  1.  Left total hip arthroplasty without evidence of loosening or periprosthetic fracture.  2.  Circumferential bladder wall thickening which may be accentuated by incomplete distention. Chronic urinary outlet obstruction may contribute to this appearance. Cystitis cannot be entirely excluded. Correlation with clinical symptoms and urinalysis   suggested.      PERTINENT LABS:  Personally reviewed  Recent Labs   Lab Test 05/13/24  0709   INR 1.30*   HGB 12.2*            ALBINA CARMICHAEL PA-C  Date: 5/13/2024  Time: 2:35 PM  [facility]    CC1:   Joanne Amador MD

## 2024-05-13 NOTE — PLAN OF CARE
Problem: Adult Inpatient Plan of Care  Goal: Optimal Comfort and Wellbeing  Outcome: Progressing     Problem: Heart Failure  Goal: Effective Oxygenation and Ventilation  Outcome: Progressing   Goal Outcome Evaluation:         Pt has orthopnea. At rest and sitting he is able to tolerate room air. Pt reported pain rated 5/10 on left hip. Expiratory wheezes noted from lungs. PRN nebulizer administered by RT. Report called to short stay observation RN. Pt to transfer to room 3 in the short stay OBS unit.   Rohit Boothe RN  5/13/2024  3:08 PM

## 2024-05-13 NOTE — H&P
[facility]    History and Physical - Hospitalist Service       Date of Admission:  5/13/2024    Assessment & Plan   Zackary Hutchison is a 63 year old male with history of atrial fibrillation, stage III CKD, ischemic cardiomyopathy, COPD, HFrEF, hyperlipidemia, CAD s/p CABG, restless leg syndrome, hypertension, obesity, newly diagnosed type 2 diabetes, and tobacco use disorder admitted on 5/13/2024 with the ER with left hip pain, suspected exacerbation of HFpEF, and possible COPD exacerbation.    Intractable left hip pain  Left hip CT revealed left total hip arthroplasty without evidence of loosening or periprosthetic fracture.    Analgesics as needed  Follow-up orthopedic surgery consult  Consider consulting pain management team if left hip pain persists  PT/OT      Acute decompensated HFrEF  Hypervolemic on exam  Chest x-ray revealed cardiomegaly with possible congestion.    Hold PTA torsemide  Start IV furosemide 40 mg every 8 hours  Carvedilol 3.125 mg twice daily  Hydralazine 10 mg 3 times daily  Spironolactone 50 mg daily   Follow-up cardiology consult     Atrial fibrillation  Heart rate is controlled  Amiodarone 200 mg daily  Carvedilol 3.125 mg twice daily  Apixaban 5 mg twice daily     CAD s/p CABG  Coronary angiogram performed 5/2/2024 revealed extensive coronary artery disease including 100% stenosis in both the ostium to proximal segments of LAD and RCA, along with other significant stenoses for which cardiologist recommends apixaban and Plavix.     Plavix 75 mg daily  Apixaban 5 mg twice daily  Atorvastatin 40 mg every morning  Carvedilol 3.125 mg twice daily  Imdur 180 mg every morning  Atorvastatin 40 mg daily  Cardiology [id number] assistance     Restless leg syndrome  Ropinirole 2 mg 3 times daily     Hypertension  Continue spironolactone, hydralazine and carvedilol     Type 2 diabetes-new diagnosis  Hemoglobin A1c is elevated at 7.8  Glucose is elevated  Patient agreed  "with glucose checks and treatment with insulin as needed during his hospital stay  Insulin sliding scale for now  Monitor for hypoglycemia correct per protocol     Stage III CKD  Possibly secondary to cardiorenal process   IV furosemide 40 mg every 8 hours  Monitor BMP  Avoid nephrotoxin  Consider nephrology consult if creatinine is worsening     COPD  DuoNebs every 4 hours  IV methylprednisolone 40 mg every 8 hours  Supplemental oxygen as needed    Suspected stasis ulcer in the right shin  Follow-up consult to wound care nurse  Elevate lower extremities  Consider starting antibiotics if there is evidence of cellulitis or infected ulcer    Diet: Combination Diet 2 gm NA Diet; No Caffeine Diet (and additional linked orders)  Fluid restriction 1800 ML FLUID (and additional linked orders)  DVT Prophylaxis: DOAC  Ruvalcaba Catheter: Not present  Lines: None     Cardiac Monitoring: ACTIVE order. Indication: Acute decompensated heart failure (48 hours)  Code Status: Full Code    Clinically Significant Risk Factors Present on Admission               # Drug Induced Coagulation Defect: home medication list includes an anticoagulant medication  # Drug Induced Platelet Defect: home medication list includes an antiplatelet medication   # Hypertension: Noted on problem list  # Acute heart failure with reduced ejection fraction: last echo with EF <40% and receiving IV diuretics    # DMII: A1C = 7.8 % (Ref range: <5.7 %) within past 6 months    # Severe Obesity: Estimated body mass index is 42.29 kg/m  as calculated from the following:    Height as of this encounter: 1.676 m (5' 6\").    Weight as of this encounter: 118.8 kg (262 lb).         # Financial/Environmental Concerns:    # Support System: poor social support noted in nursing assessment         Disposition Plan     Medically Ready for Discharge: Anticipated in 2-4 Days           Connie Garay MD  Hospitalist Service  [facility]  Securely message " with Albertina (more info)  Text page via [facility] Paging/Directory     ______________________________________________________________________    Chief Complaint   Left hip pain    History is obtained from the patient    History of Present Illness   Zackary Hutchison is a 63 year old male with history of atrial fibrillation, stage III CKD, ischemic cardiomyopathy, COPD, HFrEF, hyperlipidemia, CAD s/p CABG, restless leg syndrome, hypertension, obesity, newly diagnosed type 2 diabetes, and tobacco use disorder who presents to the ER with left hip pain.    He was in his usual state of health until yesterday morning when he developed progressive left hip pain and difficulty with ambulation.  There was no history of trauma or fall.  In the ER, patient desaturated to 70s after attempting ambulation.  He endorsed wheezing but also felt his bilateral leg swelling has worsened.  He has chronic crusted lesion in the lateral aspect of the right shin with mild erythema, but no tenderness or differential warmth.    He was recently hospitalized (4/29/2024 - 5/8/2024) for acute exacerbation of HFrEF complicated by PRINCESS on stage III CKD deemed to be secondary to contrast exposure, cardiorenal process and effect of diuretic therapy.  He decline glucose checks or insulin regimen for newly diagnosed diabetes during that hospital stay.  However, he agreed with glucose checks and insulin treatment during this hospital stay.  Initial blood pressure in the ER was 116/64 with repeat of 150/69, respiratory rate 20, pulse 77, temperature 98  F and oxygen saturation of 95% on room air.  Notable laboratory findings include creatinine 1.66 compared to baseline of 1.2-1.4, creatinine 46 with a repeat of 42, hemoglobin 12.2, INR 1.3. Urinalysis did not suggest infectious process.  Chest radiograph revealed minimal streaky basilar opacities, probably atelectasis, stable mild cardiac silhouette enlargement, post sternotomy changes with slight vascular  indistinctness possibly from congestion.  Left hip CT revealed left total hip arthroplasty without evidence of loosening or periprosthetic fracture, circumferential bladder wall thickening possibly related to chronic urinary outlet obstruction.    He received aspirin, IV furosemide 40 mg, Dilaudid 0.5 mg, DuoNeb, and oxycodone in the ER.  He wants to be full code.    Past Medical History    Past Medical History:   Diagnosis Date    Atrial fibrillation (H)     Chronic kidney disease     Chronic kidney disease, stage 3b (H) 09/28/2023    Class 3 severe obesity due to excess calories with body mass index (BMI) of 40.0 to 44.9 in adult (H) 09/28/2023    Congestive heart failure (H)     COPD (chronic obstructive pulmonary disease) (H)     Coronary artery disease involving coronary bypass graft of native heart without angina pectoris 09/28/2023    Heart failure with reduced ejection fraction (H) 09/11/2023    HFrEF (heart failure with reduced ejection fraction) (H) 09/11/2023    Hyperlipidemia     Hypertension     Ischemic cardiomyopathy 09/28/2023    Obese     Paroxysmal atrial fibrillation (H) 09/28/2023    Tobacco abuse 09/28/2023       Past Surgical History   Past Surgical History:   Procedure Laterality Date    CORONARY ANGIOGRAPHY ADULT ORDER      CORONARY ARTERY BYPASS Left 2014    2 vessels    CV CARDIOMEMS WITH RIGHT HEART CATH N/A 2/15/2024    Procedure: Pulmonary Arterial Pressure Sensor Placement;  Surgeon: Jacey Bhandari MD;  Location: [facility] LAB CV    CV CORONARY ANGIOGRAM N/A 09/11/2023    Procedure: Coronary Angiogram;  Surgeon: Santy Esposito MD;  Location: [facility] CATH LAB CV    CV CORONARY ANGIOGRAM N/A 5/2/2024    Procedure: CV CORONARY ANGIOGRAM;  Surgeon: Santy Esposito MD;  Location: [facility] CATH LAB CV    CV LEFT HEART CATH N/A 09/11/2023    Procedure: Left Heart Catheterization;  Surgeon: Santy Esposito MD;  Location: [facility] CATH LAB CV    CV LEFT HEART CATH N/A 5/2/2024     Procedure: Left Heart Catheterization;  Surgeon: Santy Esposito MD;  Location: [facility] CV    CV PCI ANGIOPLASTY N/A 5/2/2024    Procedure: Percutaneous Transluminal Angioplasty;  Surgeon: Santy Esposito MD;  Location: [facility] CV    CV RIGHT HEART CATH MEASUREMENTS RECORDED N/A 09/11/2023    Procedure: Right Heart Catheterization;  Surgeon: Santy Esposito MD;  Location: [facility] CV       Prior to Admission Medications   Prior to Admission Medications   Prescriptions Last Dose Informant Patient Reported? Taking?   [employer] VITAMIN B12 1000 MCG tablet 5/12/2024 at am  Yes Yes   Sig: Take 1,000 mcg by mouth daily   DULoxetine (CYMBALTA) 30 MG capsule 5/12/2024 at am  Yes Yes   Sig: Take 30 mg by mouth every morning   acetaminophen (TYLENOL) 500 MG tablet 5/13/2024 at am  Yes Yes   Sig: Take 1,000 mg by mouth every 8 hours as needed   albuterol (PROAIR HFA/PROVENTIL HFA/VENTOLIN HFA) 108 (90 Base) MCG/ACT inhaler 5/12/2024 at pm  Yes Yes   Sig: Inhale 2 puffs into the lungs every 6 hours as needed for shortness of breath, wheezing or cough   amiodarone (PACERONE) 200 MG tablet 5/12/2024 at am  No Yes   Sig: Take 1 tablet (200 mg) by mouth daily   apixaban ANTICOAGULANT (ELIQUIS) 5 MG tablet 5/12/2024 at pm  No Yes   Sig: Take 1 tablet (5 mg) by mouth 2 times daily   atorvastatin (LIPITOR) 40 MG tablet 5/12/2024 at am  Yes Yes   Sig: Take 40 mg by mouth every morning   carvedilol (COREG) 3.125 MG tablet 5/12/2024 at pm  No Yes   Sig: Take 1 tablet (3.125 mg) by mouth 2 times daily (with meals)   clopidogrel (PLAVIX) 75 MG tablet 5/12/2024 at am  No Yes   Sig: Take 1 tablet (75 mg) by mouth daily   hydrALAZINE (APRESOLINE) 10 MG tablet 5/12/2024 at pm  No Yes   Sig: Take 1 tablet (10 mg) by mouth 3 times daily   ipratropium - albuterol 0.5 mg/2.5 mg/3 mL (DUONEB) 0.5-2.5 (3) MG/3ML neb solution 5/12/2024 at pm  Yes Yes   Sig: INHALE 3 ML EVERY 6 HOURS AS NEEDED FOR WHEEZING.   isosorbide  mononitrate (IMDUR) 60 MG 24 hr tablet 5/12/2024 at am  No Yes   Sig: Take 3 tablets (180 mg) by mouth every morning   nitroGLYcerin (NITROSTAT) 0.4 MG sublingual tablet Unknown at prn  No Yes   Sig: Place 1 tablet (0.4 mg) under the tongue every 5 minutes as needed If no relief after 3 tabs call 911;continue 1 tab every 5 min max 3 tab Indications: chest pain   polyethylene glycol (MIRALAX) 17 GM/Dose powder More than a month at prn  No Yes   Sig: Take 17 g by mouth 2 times daily as needed   rOPINIRole (REQUIP) 2 MG tablet 5/12/2024 at pm  Yes Yes   Sig: Take 2 mg by mouth every 6 hours   spironolactone (ALDACTONE) 50 MG tablet 5/12/2024 at am  No Yes   Sig: Take 1 tablet (50 mg) by mouth daily   torsemide (DEMADEX) 100 MG tablet 5/12/2024 at pm  No Yes   Sig: Take 1 tablet (100 mg) by mouth 2 times daily      Facility-Administered Medications: None        Review of Systems    The 10 point Review of Systems is negative other than noted in the HPI or here.     Physical Exam   Vital Signs: Temp: 98  F (36.7  C) Temp src: Oral BP: (!) 150/69 Pulse: 82   Resp: 28 SpO2: 95 % O2 Device: None (Room air)    Weight: 262 lbs 0 oz    General appearance: Morbidly obese, sitting comfortably on the recliner, awake, Alert, Cooperative, not in any obvious distress and appears stated age   HEENT: Normocephalic, atraumatic, conjunctiva clear without icterus and ears without discharge  Lungs: Bilateral wheezes with diminished air entry.  Cardiovascular: Regular Rate and Rythm, normal apical impulse, normal S1 and S2, bilateral lower extremity edema, pitting   Abdomen: Soft, non-tender and Non-distended, active bowel sounds  Skin: Stasis dermatitis bilaterally and crusted lesion in the lateral aspect of the right shin   Musculoskeletal: No bony deformities or joint tenderness. Normal ROM upon flexion & extension.   Neurologic: Alert & Oriented X 3, Facial symmetry preserved and upper & lower extremities moving well with  symmetry  Psychiatric: Calm, normal eye contact and normal affect      Medical Decision Making       60 MINUTES SPENT BY ME on the date of service doing chart review, history, exam, documentation & further activities per the note.      Data     I have personally reviewed the following data over the past 24 hrs:    8.5  \   12.2 (L)   / 225     136 93 (L) 38.9 (H) /  134 (H)   3.5 26 1.66 (H) \     Trop: 42 (H) BNP: 197     INR:  1.30 (H) PTT:  N/A   D-dimer:  N/A Fibrinogen:  N/A       Imaging results reviewed over the past 24 hrs:   Recent Results (from the past 24 hour(s))   XR Pelvis and Hip Left 2 Views    Narrative    EXAM: PELVIS AND LEFT HIP 3 VIEWS  LOCATION: [facility]  DATE/TIME: 5/13/2024 6:20 AM CDT    INDICATION: Pain.  COMPARISON: None.      Impression    IMPRESSION:   1. A left hip arthroplasty is in place. No evidence of hardware failure.  2. No visualized acute fracture or malalignment of the pelvis or left hip.               XR Chest 1 View    Narrative    EXAM: XR CHEST 1 VIEW  LOCATION: [facility]  DATE: 5/13/2024    INDICATION: Dyspnea. COPD. CHF.  COMPARISON: 4/29/2024.      Impression    IMPRESSION: Minimal streaky basilar opacities, probably atelectasis. Remaining lungs are clear. Stable mild cardiac silhouette enlargement. Post sternotomy. Slight vascular indistinctness could be from congestion. No pleural effusion or pneumothorax.     CT Hip Left w/o Contrast    Narrative    EXAM: CT HIP LEFT W/O CONTRAST  LOCATION: [facility]  DATE: 5/13/2024    INDICATION: Atraumatic left hip pain.  COMPARISON: Same day radiographs.  TECHNIQUE: Noncontrast. Axial, sagittal and coronal thin-section reconstruction. Dose reduction techniques were used.     FINDINGS:     BONES:  -Left total hip arthroplasty. Streak artifact from orthopedic hardware limits evaluation of adjacent tissues. No evidence of loosening or periprosthetic  fracture. No definite joint effusion.    SOFT TISSUES:  -Arterial vascular calcifications. Mild prostatomegaly. Circumferential bladder wall thickening.      Impression    IMPRESSION:  1.  Left total hip arthroplasty without evidence of loosening or periprosthetic fracture.  2.  Circumferential bladder wall thickening which may be accentuated by incomplete distention. Chronic urinary outlet obstruction may contribute to this appearance. Cystitis cannot be entirely excluded. Correlation with clinical symptoms and urinalysis   suggested.

## 2024-05-13 NOTE — ED TRIAGE NOTES
Pt arrives via EMS from home. PT had left hip replacement 2 years ago. Yesterday morning he starting having pain in his hip but denies falling or trauma to the hip. Pt is able to walk and bear weight on hip. Last dose of tylenol was at 0200.

## 2024-05-13 NOTE — PHARMACY-ADMISSION MEDICATION HISTORY
Pharmacist Admission Medication History    Admission medication history is complete. The information provided in this note is only as accurate as the sources available at the time of the update.    Information Source(s): Patient via in-person    Pertinent Information: Patient stated recent medication change from Bumex to Torsemide. Patient stated he takes Requip 2 mg every 6 hours - med list was updated accordingly.     Changes made to PTA medication list:  Added: None  Deleted: Albuterol neb  Changed:Requip 2 mg tid to 2 mg every 6 hours.     Allergies reviewed with patient and updates made in EHR: yes    Medication History Completed By: BRANDIE PHILLIPS RPH 5/13/2024 1:13 PM    PTA Med List   Medication Sig Last Dose    acetaminophen (TYLENOL) 500 MG tablet Take 1,000 mg by mouth every 8 hours as needed 5/13/2024 at am    albuterol (PROAIR HFA/PROVENTIL HFA/VENTOLIN HFA) 108 (90 Base) MCG/ACT inhaler Inhale 2 puffs into the lungs every 6 hours as needed for shortness of breath, wheezing or cough 5/12/2024 at pm    amiodarone (PACERONE) 200 MG tablet Take 1 tablet (200 mg) by mouth daily 5/12/2024 at am    apixaban ANTICOAGULANT (ELIQUIS) 5 MG tablet Take 1 tablet (5 mg) by mouth 2 times daily 5/12/2024 at pm    atorvastatin (LIPITOR) 40 MG tablet Take 40 mg by mouth every morning 5/12/2024 at am    carvedilol (COREG) 3.125 MG tablet Take 1 tablet (3.125 mg) by mouth 2 times daily (with meals) 5/12/2024 at pm    clopidogrel (PLAVIX) 75 MG tablet Take 1 tablet (75 mg) by mouth daily 5/12/2024 at am    [employer]S VITAMIN B12 1000 MCG tablet Take 1,000 mcg by mouth daily 5/12/2024 at am    DULoxetine (CYMBALTA) 30 MG capsule Take 30 mg by mouth every morning 5/12/2024 at am    hydrALAZINE (APRESOLINE) 10 MG tablet Take 1 tablet (10 mg) by mouth 3 times daily 5/12/2024 at pm    ipratropium - albuterol 0.5 mg/2.5 mg/3 mL (DUONEB) 0.5-2.5 (3) MG/3ML neb solution INHALE 3 ML EVERY 6 HOURS AS NEEDED FOR WHEEZING. 5/12/2024 at pm     isosorbide mononitrate (IMDUR) 60 MG 24 hr tablet Take 3 tablets (180 mg) by mouth every morning 5/12/2024 at am    nitroGLYcerin (NITROSTAT) 0.4 MG sublingual tablet Place 1 tablet (0.4 mg) under the tongue every 5 minutes as needed If no relief after 3 tabs call 911;continue 1 tab every 5 min max 3 tab Indications: chest pain Unknown at prn    polyethylene glycol (MIRALAX) 17 GM/Dose powder Take 17 g by mouth 2 times daily as needed More than a month at prn    rOPINIRole (REQUIP) 2 MG tablet Take 2 mg by mouth every 6 hours 5/12/2024 at pm    spironolactone (ALDACTONE) 50 MG tablet Take 1 tablet (50 mg) by mouth daily 5/12/2024 at am    torsemide (DEMADEX) 100 MG tablet Take 1 tablet (100 mg) by mouth 2 times daily 5/12/2024 at pm

## 2024-05-13 NOTE — CONSULTS
[facility] ACUTE PAIN SERVICE CONSULTATION   [facility], [facility], [facility], [facility], [place]     Date of Admission:  5/13/2024  Date of Consult (When I saw the patient): 05/13/24  Physician requesting consult:  Connie Garay      Assessment/Plan:     Zackary Hutchison is a 63 year old male who was admitted on 5/13/2024.  Pain team was asked to see the patient for 63 year old with intractable left hip pain possibly secondary to bursitis. Admitted for left hip pain, suspected exacerbation of HFpEF, and possible COPD exacerbation.  History of  atrial fibrillation, stage III CKD, ischemic cardiomyopathy, COPD, HFrEF, hyperlipidemia, CAD s/p CABG, restless leg syndrome, hypertension, obesity, newly diagnosed type 2 diabetes, and tobacco use disorder. Describes pain as 2-4/10 and dull ache at rest and 7-8/10 and sharp, stabbing with movement in the left hip. The patient does not smoke and denies chemical dependency history.     Opioid Induced Respiratory Depression Risk Assessment: high: COPD, HFrEF, CKD, Age, obesity, tobacco use, opioid naive     The patient's home MME was none mg daily. In the last 24 hours, he has utilized 0.5mg IV Dilaudid in ED and 10mg of oxycodone     PLAN:   1) Pain is consistent left hip pain, no acute injury, trauma, fever, chills. Differentials include Left hip pain status post total hip arthroplasty, pain seems likely to be due to a trochanteric bursitis vs MSK pain. Left hip CT revealed left total hip arthroplasty without evidence of loosening or periprosthetic fracture. Agree with PT and OT. Would use caution with opioids as high risk for opioid induced respiratory depression.  Given no acute injury or trauma and CT negative, opioids not recommended. Will maximize on topicals, APAP, muscle relaxant, ice, PT, OT, rest.  Cardiology, WOC, and Orthopedics consults reviewed.  Multimodal Medication Therapy  Topical: add lidocaine alternating with voltaren gel   NSAID'S: CrCl 53ml/min.  None given CKD and cardiac history  Steroids: Solu medrol 40mg q8h  Muscle Relaxants: add Robaxin qid   Adjuvants: add APAP 1G q6h   Antidepressants/anxiolytics: Cymbalta 30mg qam  Opioids: low dose Oxycodone 2.5 mg q6h prn x 24 hours  IV Pain medication: Dilaudid 0.3mg q6hprn - discontinue   Non-medication interventions: PT/OT  Constipation Prophylaxis: miralax bid prn, senna-docusate    -Opioid prescriber has been none  -MN  pulled from system on 5/13. This indicates   Gabapentin only , last 12/18/23  Discharge Recommendations - We recommend prescribing the following at the time of discharge: APAP, topicals, robaxin if effective, Opioids are not recommended.      History of Present Illness (HPI):       Zackary Hutchison is a 63 year old male who presented for leg/hip pain.  Past medical history as above. The pain is reported to be acute on chronic , located in the Left hip, and it does not radiate.  The patient denies nausea, vomiting, constipation, diarrhea, chest pain, dizziness, fever, chills, and paresthesia. He was in his usual state of health until yesterday morning when he developed progressive left hip pain and difficulty with ambulation.  There was no history of trauma or fall.  In the ER, patient desaturated to 70s after attempting ambulation.  He endorses wheezing, shortness of breath, and bilateral leg swelling but reports swelling is improving. He has chronic crusted lesion in the lateral aspect of the right shin with mild erythema, but no tenderness or differential warmth. He reports tobacco use and denies ETOH or drug use.     Per MN  review, the patient does not have an opioid tolerance. Opioid induced side effects noted and include: none     Reviewed medical record, labs, imaging, ED note, and care everywhere.     Past pain treatments have included: APAP, voltaren gel    Home pain medications/psych medications/anticoagulation medications include: Cymbalta    Last UDS: none recent     Medical  History   PAST MEDICAL HISTORY:   Past Medical History:   Diagnosis Date    Atrial fibrillation (H)     Chronic kidney disease     Chronic kidney disease, stage 3b (H) 09/28/2023    Class 3 severe obesity due to excess calories with body mass index (BMI) of 40.0 to 44.9 in adult (H) 09/28/2023    Congestive heart failure (H)     COPD (chronic obstructive pulmonary disease) (H)     Coronary artery disease involving coronary bypass graft of native heart without angina pectoris 09/28/2023    Heart failure with reduced ejection fraction (H) 09/11/2023    HFrEF (heart failure with reduced ejection fraction) (H) 09/11/2023    Hyperlipidemia     Hypertension     Ischemic cardiomyopathy 09/28/2023    Obese     Paroxysmal atrial fibrillation (H) 09/28/2023    Tobacco abuse 09/28/2023       PAST SURGICAL HISTORY:   Past Surgical History:   Procedure Laterality Date    CORONARY ANGIOGRAPHY ADULT ORDER      CORONARY ARTERY BYPASS Left 2014    2 vessels    CV CARDIOMEMS WITH RIGHT HEART CATH N/A 2/15/2024    Procedure: Pulmonary Arterial Pressure Sensor Placement;  Surgeon: Jacey Bhandari MD;  Location: [facility] CATH LAB CV    CV CORONARY ANGIOGRAM N/A 09/11/2023    Procedure: Coronary Angiogram;  Surgeon: Santy Esposito MD;  Location: [facility] CATH LAB CV    CV CORONARY ANGIOGRAM N/A 5/2/2024    Procedure: CV CORONARY ANGIOGRAM;  Surgeon: Santy Esposito MD;  Location: [facility] CATH LAB CV    CV LEFT HEART CATH N/A 09/11/2023    Procedure: Left Heart Catheterization;  Surgeon: Santy Esposito MD;  Location: [facility] CATH LAB CV    CV LEFT HEART CATH N/A 5/2/2024    Procedure: Left Heart Catheterization;  Surgeon: Santy Esposito MD;  Location: [facility] CATH LAB CV    CV PCI ANGIOPLASTY N/A 5/2/2024    Procedure: Percutaneous Transluminal Angioplasty;  Surgeon: Santy Esposito MD;  Location: [facility] CATH LAB CV    CV RIGHT HEART CATH MEASUREMENTS RECORDED N/A 09/11/2023    Procedure: Right Heart  Catheterization;  Surgeon: Santy Esposito MD;  Location: [facility] LAB CV       FAMILY HISTORY:   Family History   Problem Relation Age of Onset    Cerebrovascular Disease Mother     Myocardial Infarction Father        SOCIAL HISTORY:   Social History     Tobacco Use    Smoking status: Every Day     Current packs/day: 0.50     Types: Cigarettes    Smokeless tobacco: Never    Tobacco comments:     Seen IP by CTT[facility] on 9/11/23 and declined cessation services and materials   Substance Use Topics    Alcohol use: Not Currently        HEALTH & LIFESTYLE PRACTICES  Tobacco:  reports that he has been smoking cigarettes. He has never used smokeless tobacco.  Alcohol:  reports that he does not currently use alcohol.  Illicit drugs:  has no history on file for drug use.    Allergies  Allergies   Allergen Reactions    Bumex [Bumetanide] Muscle Pain (Myalgia)       Problem List  Patient Active Problem List    Diagnosis Date Noted    Unstable angina (H) 05/02/2024     Priority: Medium    CHF (congestive heart failure) (H) 04/29/2024     Priority: Medium    Acute decompensated heart failure (H) 04/29/2024     Priority: Medium    Hyperglycemia 04/29/2024     Priority: Medium    Open wound 04/29/2024     Priority: Medium    Newly diagnosed diabetes (H) 04/29/2024     Priority: Medium    On amiodarone therapy 04/02/2024     Priority: Medium    Primary hypertension 03/21/2024     Priority: Medium    Cellulitis 01/31/2024     Priority: Medium    Acute kidney injury superimposed on CKD  (H24) 01/08/2024     Priority: Medium    Acute on chronic systolic CHF 01/08/2024     Priority: Medium    Restless legs syndrome (RLS) 01/08/2024     Priority: Medium    COPD (chronic obstructive pulmonary disease) (H) 12/22/2023     Priority: Medium    Venous stasis dermatitis of both lower extremities 12/22/2023     Priority: Medium    Ischemic cardiomyopathy 09/28/2023     Priority: Medium    Coronary artery disease involving native coronary  artery of native heart with unstable angina pectoris (H) 09/28/2023     Priority: Medium    Paroxysmal atrial fib -- on Eliquis 09/28/2023     Priority: Medium    Class 3 severe obesity due to excess calories with body mass index (BMI) of 40.0 to 44.9 in adult (H) 09/28/2023     Priority: Medium    Chronic kidney disease, stage 3b (H) 09/28/2023     Priority: Medium    Smoker 09/28/2023     Priority: Medium    Heart failure with reduced ejection fraction (H) 09/11/2023     Priority: Medium    Abnormal electrocardiogram 09/08/2023     Priority: Medium    Cellulitis of left lower extremity 09/08/2023     Priority: Medium    Pseudo-obstruction of colon 06/09/2022     Priority: Medium    Generalized abdominal pain 03/28/2022     Priority: Medium    Hypokalemia 03/28/2022     Priority: Medium    Ileus (H) 03/28/2022     Priority: Medium    Skin ulcer of left pretibial region limited to breakdown of skin (H) 03/28/2022     Priority: Medium    Gait instability 09/14/2021     Priority: Medium    Hereditary and idiopathic peripheral neuropathy 09/14/2021     Priority: Medium    Chronic pain of both knees 09/14/2021     Priority: Medium    Onychomycosis 09/14/2021     Priority: Medium    Osteoarthritis of both knees 09/14/2021     Priority: Medium    Hematochezia 02/17/2021     Priority: Medium    Fall 01/27/2021     Priority: Medium    RBBB (right bundle branch block) 10/31/2020     Priority: Medium    Sepsis due to Streptococcus species (H) 10/28/2020     Priority: Medium    Hypoglycemia 08/25/2020     Priority: Medium    Proliferative vitreoretinopathy of left eye 05/24/2019     Priority: Medium     Formatting of this note might be different from the original.   Added automatically from request for surgery [id number]      Macula-off rhegmatogenous retinal detachment, left 05/06/2019     Priority: Medium     Formatting of this note might be different from the original.   Added automatically from request for surgery [id number]       Long term current use of anticoagulant therapy 02/06/2015     Priority: Medium     Formatting of this note might be different from the original.   Long term (current) use of anticoagulants      Postoperative anemia due to acute blood loss 01/24/2015     Priority: Medium    S/P CABG (coronary artery bypass graft) 01/24/2015     Priority: Medium    Low back pain 06/11/2014     Priority: Medium     Formatting of this note might be different from the original.   LBP (low back pain)      Rhinitis, allergic 06/11/2014     Priority: Medium    Morbid obesity with body mass index (BMI) of 40.0 or higher (H) 03/22/2013     Priority: Medium    Keratoconus, stable condition 06/26/2012     Priority: Medium    Chronic ischemic heart disease 01/28/2008     Priority: Medium     Formatting of this note might be different from the original.   LAD stents      Hyperlipidemia 06/07/2003     Priority: Medium       Prior to Admission Medications   Medications Prior to Admission   Medication Sig Dispense Refill Last Dose    acetaminophen (TYLENOL) 500 MG tablet Take 1,000 mg by mouth every 8 hours as needed   5/13/2024 at am    albuterol (PROAIR HFA/PROVENTIL HFA/VENTOLIN HFA) 108 (90 Base) MCG/ACT inhaler Inhale 2 puffs into the lungs every 6 hours as needed for shortness of breath, wheezing or cough   5/12/2024 at pm    amiodarone (PACERONE) 200 MG tablet Take 1 tablet (200 mg) by mouth daily 90 tablet 0 5/12/2024 at am    apixaban ANTICOAGULANT (ELIQUIS) 5 MG tablet Take 1 tablet (5 mg) by mouth 2 times daily 180 tablet 3 5/12/2024 at pm    atorvastatin (LIPITOR) 40 MG tablet Take 40 mg by mouth every morning   5/12/2024 at am    carvedilol (COREG) 3.125 MG tablet Take 1 tablet (3.125 mg) by mouth 2 times daily (with meals) 180 tablet 1 5/12/2024 at pm    clopidogrel (PLAVIX) 75 MG tablet Take 1 tablet (75 mg) by mouth daily 60 tablet 0 5/12/2024 at am    [employer] VITAMIN B12 1000 MCG tablet Take 1,000 mcg by mouth daily   5/12/2024 at am     "DULoxetine (CYMBALTA) 30 MG capsule Take 30 mg by mouth every morning   5/12/2024 at am    hydrALAZINE (APRESOLINE) 10 MG tablet Take 1 tablet (10 mg) by mouth 3 times daily 270 tablet 3 5/12/2024 at pm    ipratropium - albuterol 0.5 mg/2.5 mg/3 mL (DUONEB) 0.5-2.5 (3) MG/3ML neb solution INHALE 3 ML EVERY 6 HOURS AS NEEDED FOR WHEEZING.   5/12/2024 at pm    isosorbide mononitrate (IMDUR) 60 MG 24 hr tablet Take 3 tablets (180 mg) by mouth every morning 270 tablet 1 5/12/2024 at am    nitroGLYcerin (NITROSTAT) 0.4 MG sublingual tablet Place 1 tablet (0.4 mg) under the tongue every 5 minutes as needed If no relief after 3 tabs call 911;continue 1 tab every 5 min max 3 tab Indications: chest pain 100 tablet 1 Unknown at prn    polyethylene glycol (MIRALAX) 17 GM/Dose powder Take 17 g by mouth 2 times daily as needed 510 g 0 More than a month at prn    rOPINIRole (REQUIP) 2 MG tablet Take 2 mg by mouth every 6 hours   5/12/2024 at pm    spironolactone (ALDACTONE) 50 MG tablet Take 1 tablet (50 mg) by mouth daily 90 tablet 3 5/12/2024 at am    torsemide (DEMADEX) 100 MG tablet Take 1 tablet (100 mg) by mouth 2 times daily 60 tablet 0 5/12/2024 at pm       Review of Systems  Complete ROS reviewed, unless noted in HPI, all other systems reviewed (with patient) and all others found to be negative.      Objective:     Physical Exam:  BP (!) 150/89 (BP Location: Left arm)   Pulse 83   Temp 97.8  F (36.6  C) (Oral)   Resp 18   Ht 1.676 m (5' 6\")   Wt 118.8 kg (262 lb)   SpO2 96%   BMI 42.29 kg/m    Weight:   Vitals:    05/13/24 0533   Weight: 118.8 kg (262 lb)      Body mass index is 42.29 kg/m .    General Appearance:  Alert, cooperative, up in a chair, appears uncomfortable   Head:  Normocephalic, without obvious abnormality, atraumatic   Eyes:  PERRL, conjunctiva/corneas clear, EOM's intact   ENT/Throat: Lips, mucosa, and tongue normal; teeth and gums normal   Lymph/Neck: Supple, symmetrical, trachea midline "   Lungs:   Clear to auscultation bilaterally, respirations unlabored, room air    Chest Wall:  No tenderness or deformity   Cardiovascular/Heart:  Regular rate and rhythm, S1, S2 normal   Abdomen:   Soft, non-tender, bowel sounds active all four quadrants,  no masses, no organomegaly   Musculoskeletal: BLE edema    Skin: Mepilex to right shin, skin dry, chronic venous stasis changes   Neurologic: Alert and oriented X 3, Moves all 4 extremities     Psych: Affect is appropriate      Imaging: Reviewed I have personally reviewed pertinent notes, labs, tests, and radiologic imaging in patient's chart.  Labs: Reviewed I have personally reviewed pertinent notes, labs, tests, and radiologic imaging in patient's chart.  Notes: Reviewed I have personally reviewed pertinent notes, labs, tests, and radiologic imaging in patient's chart.    Total time spent 65 minutes with greater than 50% in consultation, education and coordination of care.   Also discussed with Pharmacist.   Treatment plan includes: multimodal pain approach, Hospital Medicine Service for medical management, Cardiology, Orthopedics, WOC.   Patient educated regarding: multimodal pain approach and medications as listed above.   Elements of Medical Decision Making as described above. High level of decision making required due to 1 or more chronic illness with severe exacerbation, progression, and side effects of treatment. Acute or chronic illness or injury or surgery. High risk therapy including opioids, high risk drug therapy including oral and/or parenteral controlled substances.    Patient is understanding of the plan. All questions and concerns addressed to patient's satisfaction.     Thank you for this consultation.    RENE PhelpsP-C  Acute Care Pain Management Program   [facility]   Monday-Friday 8a-4p   Page via Epic or Is That Odd

## 2024-05-14 ENCOUNTER — APPOINTMENT (OUTPATIENT)
Dept: PHYSICAL THERAPY | Facility: HOSPITAL | Age: 64
DRG: 291 | End: 2024-05-14
Attending: INTERNAL MEDICINE
Payer: MEDICARE

## 2024-05-14 LAB
ANION GAP SERPL CALCULATED.3IONS-SCNC: 16 MMOL/L (ref 7–15)
BUN SERPL-MCNC: 43.2 MG/DL (ref 8–23)
CALCIUM SERPL-MCNC: 9.9 MG/DL (ref 8.8–10.2)
CHLORIDE SERPL-SCNC: 92 MMOL/L (ref 98–107)
CREAT SERPL-MCNC: 1.5 MG/DL (ref 0.67–1.17)
DEPRECATED HCO3 PLAS-SCNC: 24 MMOL/L (ref 22–29)
EGFRCR SERPLBLD CKD-EPI 2021: 51 ML/MIN/1.73M2
GLUCOSE BLDC GLUCOMTR-MCNC: 320 MG/DL (ref 70–99)
GLUCOSE BLDC GLUCOMTR-MCNC: 348 MG/DL (ref 70–99)
GLUCOSE SERPL-MCNC: 303 MG/DL (ref 70–99)
MAGNESIUM SERPL-MCNC: 1.9 MG/DL (ref 1.7–2.3)
POTASSIUM SERPL-SCNC: 4 MMOL/L (ref 3.4–5.3)
SODIUM SERPL-SCNC: 132 MMOL/L (ref 135–145)

## 2024-05-14 PROCEDURE — 999N000157 HC STATISTIC RCP TIME EA 10 MIN

## 2024-05-14 PROCEDURE — G0378 HOSPITAL OBSERVATION PER HR: HCPCS

## 2024-05-14 PROCEDURE — 99233 SBSQ HOSP IP/OBS HIGH 50: CPT | Performed by: EMERGENCY MEDICINE

## 2024-05-14 PROCEDURE — 99214 OFFICE O/P EST MOD 30 MIN: CPT | Performed by: NURSE PRACTITIONER

## 2024-05-14 PROCEDURE — 99233 SBSQ HOSP IP/OBS HIGH 50: CPT | Performed by: INTERNAL MEDICINE

## 2024-05-14 PROCEDURE — 83735 ASSAY OF MAGNESIUM: CPT | Performed by: INTERNAL MEDICINE

## 2024-05-14 PROCEDURE — 250N000009 HC RX 250: Performed by: INTERNAL MEDICINE

## 2024-05-14 PROCEDURE — 82962 GLUCOSE BLOOD TEST: CPT

## 2024-05-14 PROCEDURE — 250N000013 HC RX MED GY IP 250 OP 250 PS 637: Performed by: INTERNAL MEDICINE

## 2024-05-14 PROCEDURE — 36415 COLL VENOUS BLD VENIPUNCTURE: CPT | Performed by: EMERGENCY MEDICINE

## 2024-05-14 PROCEDURE — 250N000011 HC RX IP 250 OP 636: Performed by: INTERNAL MEDICINE

## 2024-05-14 PROCEDURE — 97161 PT EVAL LOW COMPLEX 20 MIN: CPT | Mod: GP

## 2024-05-14 PROCEDURE — 250N000013 HC RX MED GY IP 250 OP 250 PS 637: Performed by: NURSE PRACTITIONER

## 2024-05-14 PROCEDURE — 80048 BASIC METABOLIC PNL TOTAL CA: CPT | Performed by: EMERGENCY MEDICINE

## 2024-05-14 PROCEDURE — 999N000156 HC STATISTIC RCP CONSULT EA 30 MIN

## 2024-05-14 PROCEDURE — 96376 TX/PRO/DX INJ SAME DRUG ADON: CPT

## 2024-05-14 PROCEDURE — 94640 AIRWAY INHALATION TREATMENT: CPT

## 2024-05-14 RX ORDER — METHYLPREDNISOLONE SODIUM SUCCINATE 40 MG/ML
40 INJECTION, POWDER, LYOPHILIZED, FOR SOLUTION INTRAMUSCULAR; INTRAVENOUS EVERY 24 HOURS
Status: DISCONTINUED | OUTPATIENT
Start: 2024-05-15 | End: 2024-05-18

## 2024-05-14 RX ORDER — IPRATROPIUM BROMIDE AND ALBUTEROL SULFATE 2.5; .5 MG/3ML; MG/3ML
3 SOLUTION RESPIRATORY (INHALATION)
Status: DISCONTINUED | OUTPATIENT
Start: 2024-05-14 | End: 2024-05-14

## 2024-05-14 RX ORDER — IPRATROPIUM BROMIDE AND ALBUTEROL SULFATE 2.5; .5 MG/3ML; MG/3ML
3 SOLUTION RESPIRATORY (INHALATION) EVERY 4 HOURS PRN
Status: DISCONTINUED | OUTPATIENT
Start: 2024-05-14 | End: 2024-05-20 | Stop reason: HOSPADM

## 2024-05-14 RX ORDER — METOLAZONE 2.5 MG/1
2.5 TABLET ORAL ONCE
Status: COMPLETED | OUTPATIENT
Start: 2024-05-14 | End: 2024-05-14

## 2024-05-14 RX ORDER — TORSEMIDE 100 MG/1
200 TABLET ORAL
Status: DISCONTINUED | OUTPATIENT
Start: 2024-05-14 | End: 2024-05-15

## 2024-05-14 RX ORDER — METHOCARBAMOL 750 MG/1
750 TABLET, FILM COATED ORAL EVERY 6 HOURS
Status: DISCONTINUED | OUTPATIENT
Start: 2024-05-14 | End: 2024-05-20 | Stop reason: HOSPADM

## 2024-05-14 RX ADMIN — METOLAZONE 2.5 MG: 2.5 TABLET ORAL at 08:42

## 2024-05-14 RX ADMIN — TORSEMIDE 200 MG: 100 TABLET ORAL at 08:16

## 2024-05-14 RX ADMIN — IPRATROPIUM BROMIDE AND ALBUTEROL SULFATE 3 ML: .5; 3 SOLUTION RESPIRATORY (INHALATION) at 07:27

## 2024-05-14 RX ADMIN — TORSEMIDE 200 MG: 100 TABLET ORAL at 15:12

## 2024-05-14 RX ADMIN — ACETAMINOPHEN 975 MG: 325 TABLET ORAL at 15:12

## 2024-05-14 RX ADMIN — AMIODARONE HYDROCHLORIDE 200 MG: 200 TABLET ORAL at 08:16

## 2024-05-14 RX ADMIN — HYDRALAZINE HYDROCHLORIDE 10 MG: 10 TABLET, FILM COATED ORAL at 13:37

## 2024-05-14 RX ADMIN — METHOCARBAMOL TABLETS 750 MG: 750 TABLET, COATED ORAL at 18:16

## 2024-05-14 RX ADMIN — ACETAMINOPHEN 975 MG: 325 TABLET ORAL at 08:12

## 2024-05-14 RX ADMIN — DULOXETINE HYDROCHLORIDE 30 MG: 30 CAPSULE, DELAYED RELEASE ORAL at 08:43

## 2024-05-14 RX ADMIN — METHOCARBAMOL 500 MG: 500 TABLET ORAL at 00:42

## 2024-05-14 RX ADMIN — DICLOFENAC 2 G: 10 GEL TOPICAL at 13:37

## 2024-05-14 RX ADMIN — METHOCARBAMOL TABLETS 750 MG: 750 TABLET, COATED ORAL at 11:49

## 2024-05-14 RX ADMIN — APIXABAN 5 MG: 5 TABLET, FILM COATED ORAL at 08:15

## 2024-05-14 RX ADMIN — CYANOCOBALAMIN TAB 1000 MCG 1000 MCG: 1000 TAB at 08:14

## 2024-05-14 RX ADMIN — ACETAMINOPHEN 975 MG: 325 TABLET ORAL at 02:04

## 2024-05-14 RX ADMIN — PANTOPRAZOLE SODIUM 40 MG: 40 TABLET, DELAYED RELEASE ORAL at 07:06

## 2024-05-14 RX ADMIN — SPIRONOLACTONE 50 MG: 25 TABLET, FILM COATED ORAL at 08:13

## 2024-05-14 RX ADMIN — CLOPIDOGREL BISULFATE 75 MG: 75 TABLET ORAL at 08:16

## 2024-05-14 RX ADMIN — METHYLPREDNISOLONE SODIUM SUCCINATE 40 MG: 40 INJECTION, POWDER, FOR SOLUTION INTRAMUSCULAR; INTRAVENOUS at 08:18

## 2024-05-14 RX ADMIN — LIDOCAINE: 50 OINTMENT TOPICAL at 11:49

## 2024-05-14 RX ADMIN — HYDRALAZINE HYDROCHLORIDE 10 MG: 10 TABLET, FILM COATED ORAL at 21:22

## 2024-05-14 RX ADMIN — CARVEDILOL 3.12 MG: 3.12 TABLET, FILM COATED ORAL at 08:15

## 2024-05-14 RX ADMIN — ROPINIROLE HYDROCHLORIDE 2 MG: 1 TABLET, FILM COATED ORAL at 08:09

## 2024-05-14 RX ADMIN — ROPINIROLE HYDROCHLORIDE 2 MG: 1 TABLET, FILM COATED ORAL at 02:07

## 2024-05-14 RX ADMIN — APIXABAN 5 MG: 5 TABLET, FILM COATED ORAL at 21:22

## 2024-05-14 RX ADMIN — ATORVASTATIN CALCIUM 40 MG: 40 TABLET, FILM COATED ORAL at 08:17

## 2024-05-14 RX ADMIN — OXYCODONE HYDROCHLORIDE 2.5 MG: 5 TABLET ORAL at 21:22

## 2024-05-14 RX ADMIN — LIDOCAINE: 50 OINTMENT TOPICAL at 07:07

## 2024-05-14 RX ADMIN — ACETAMINOPHEN 975 MG: 325 TABLET ORAL at 21:20

## 2024-05-14 RX ADMIN — METHOCARBAMOL 500 MG: 500 TABLET ORAL at 05:24

## 2024-05-14 RX ADMIN — DICLOFENAC 2 G: 10 GEL TOPICAL at 08:18

## 2024-05-14 RX ADMIN — METHYLPREDNISOLONE SODIUM SUCCINATE 40 MG: 40 INJECTION, POWDER, FOR SOLUTION INTRAMUSCULAR; INTRAVENOUS at 00:43

## 2024-05-14 RX ADMIN — ROPINIROLE HYDROCHLORIDE 2 MG: 1 TABLET, FILM COATED ORAL at 21:22

## 2024-05-14 RX ADMIN — INSULIN ASPART 2 UNITS: 100 INJECTION, SOLUTION INTRAVENOUS; SUBCUTANEOUS at 11:49

## 2024-05-14 RX ADMIN — LIDOCAINE: 50 OINTMENT TOPICAL at 15:11

## 2024-05-14 RX ADMIN — DICLOFENAC 2 G: 10 GEL TOPICAL at 21:21

## 2024-05-14 RX ADMIN — ROPINIROLE HYDROCHLORIDE 2 MG: 1 TABLET, FILM COATED ORAL at 15:12

## 2024-05-14 RX ADMIN — SENNOSIDES AND DOCUSATE SODIUM 1 TABLET: 50; 8.6 TABLET ORAL at 21:31

## 2024-05-14 RX ADMIN — DICLOFENAC 2 G: 10 GEL TOPICAL at 17:49

## 2024-05-14 RX ADMIN — IPRATROPIUM BROMIDE AND ALBUTEROL SULFATE 3 ML: .5; 3 SOLUTION RESPIRATORY (INHALATION) at 01:06

## 2024-05-14 RX ADMIN — CARVEDILOL 3.12 MG: 3.12 TABLET, FILM COATED ORAL at 18:16

## 2024-05-14 RX ADMIN — ISOSORBIDE MONONITRATE 180 MG: 60 TABLET, EXTENDED RELEASE ORAL at 08:11

## 2024-05-14 RX ADMIN — INSULIN ASPART 3 UNITS: 100 INJECTION, SOLUTION INTRAVENOUS; SUBCUTANEOUS at 17:46

## 2024-05-14 RX ADMIN — HYDRALAZINE HYDROCHLORIDE 10 MG: 10 TABLET, FILM COATED ORAL at 08:14

## 2024-05-14 ASSESSMENT — ACTIVITIES OF DAILY LIVING (ADL)
ADLS_ACUITY_SCORE: 40
ADLS_ACUITY_SCORE: 42
ADLS_ACUITY_SCORE: 35
ADLS_ACUITY_SCORE: 35
ADLS_ACUITY_SCORE: 36
ADLS_ACUITY_SCORE: 40
ADLS_ACUITY_SCORE: 36
ADLS_ACUITY_SCORE: 36
ADLS_ACUITY_SCORE: 42
ADLS_ACUITY_SCORE: 36
ADLS_ACUITY_SCORE: 40
ADLS_ACUITY_SCORE: 40
ADLS_ACUITY_SCORE: 36
ADLS_ACUITY_SCORE: 35
ADLS_ACUITY_SCORE: 40
ADLS_ACUITY_SCORE: 36
ADLS_ACUITY_SCORE: 40
ADLS_ACUITY_SCORE: 36
ADLS_ACUITY_SCORE: 35
ADLS_ACUITY_SCORE: 36
ADLS_ACUITY_SCORE: 40

## 2024-05-14 NOTE — PLAN OF CARE
"  Problem: Heart Failure  Goal: Improved Oral Intake  Outcome: Progressing     Problem: Pain Acute  Goal: Optimal Pain Control and Function  Outcome: Progressing  Intervention: Develop Pain Management Plan  Recent Flowsheet Documentation  Taken 5/14/2024 0249 by Esther Beal RN  Pain Management Interventions:   care clustered   relaxation techniques promoted   rest  Taken 5/14/2024 0204 by Esther Beal RN  Pain Management Interventions: medication (see MAR)  Taken 5/14/2024 0053 by Esther Beal RN  Pain Management Interventions: medication (see MAR)  Intervention: Prevent or Manage Pain  Recent Flowsheet Documentation  Taken 5/14/2024 0425 by Esther Beal RN  Medication Review/Management: medications reviewed  Taken 5/14/2024 0106 by Esther Beal RN  Medication Review/Management: medications reviewed     Problem: Adult Inpatient Plan of Care  Goal: Absence of Hospital-Acquired Illness or Injury  Intervention: Prevent Skin Injury  Recent Flowsheet Documentation  Taken 5/14/2024 0425 by Esther Beal RN  Body Position: position changed independently  Taken 5/14/2024 0106 by Esther Beal RN  Body Position: position changed independently     Problem: Adult Inpatient Plan of Care  Goal: Optimal Comfort and Wellbeing  Intervention: Monitor Pain and Promote Comfort  Recent Flowsheet Documentation  Taken 5/14/2024 0249 by Esther Beal RN  Pain Management Interventions:   care clustered   relaxation techniques promoted   rest  Taken 5/14/2024 0204 by Esther Beal RN  Pain Management Interventions: medication (see MAR)  Taken 5/14/2024 0053 by Esthre Beal RN  Pain Management Interventions: medication (see MAR)   Goal Outcome Evaluation:       Patient admitted for left hip pain. C/o of pain tonight; pain meds administered and was effective. AOX4. RA. Patient refused blood glucose monitoring at 0200. Re-approached around 0400/0500, but patient still saying \" No, not tonight\". On " fluid restriction. 2g sodium diet, no caffeine. On Mg and K protocol. Left PIV saline locked. Core clinic evaluation, Lymphedema, nutrition services, and PT/OT consult. Assist of 1. Used urinal at the bedside throughout the night.

## 2024-05-14 NOTE — PROGRESS NOTES
05/14/24 1100   Appointment Info   Signing Clinician's Name / Credentials (OT) Aditi Jarrett MOT OTR/L CLT   Appointment Cancel Comments (OT) patient declined lymphedema services. States he has tried compression and doesn't tolerate them. Encouraged elevation of LE's when not up and walking.

## 2024-05-14 NOTE — PROGRESS NOTES
Physical Therapy        05/14/24 1131   Appointment Info   Signing Clinician's Name / Credentials (PT) Porsha Hairston DPT   Quick Adds   Quick Adds Certification   Living Environment   People in Home facility resident   Current Living Arrangements independent living facility   Home Accessibility no concerns   Self-Care   Equipment Currently Used at Home walker, rolling;grab bar, toilet;grab bar, tub/shower;dressing device   Fall history within last six months no   General Information   Onset of Illness/Injury or Date of Surgery 05/13/24   Referring Physician Connie Garay MD   Patient/Family Therapy Goals Statement (PT) none stated   Pertinent History of Current Problem (include personal factors and/or comorbidities that impact the POC) 63 year old male with history of atrial fibrillation, stage III CKD, ischemic cardiomyopathy, COPD, HFrEF, hyperlipidemia, CAD s/p CABG, restless leg syndrome, hypertension, obesity, newly diagnosed type 2 diabetes, and tobacco use disorder admitted on 5/13/2024 with the ER with left hip pain, suspected exacerbation of HFpEF, and possible COPD exacerbation.   Existing Precautions/Restrictions fall   Cognition   Affect/Mental Status (Cognition) WFL   Pain Assessment   Patient Currently in Pain Yes, see Vital Sign flowsheet   Posture    Posture Forward head position   Transfers   Comment, (Transfers) sit<>stand modified independent with FWW   Gait/Stairs (Locomotion)   Comment, (Gait/Stairs) 55' with FWW, modified independent   Balance   Balance no deficits were identified   Sensory Examination   Sensory Perception patient reports no sensory changes   Coordination   Coordination no deficits were identified   Muscle Tone   Muscle Tone no deficits were identified   Clinical Impression   Criteria for Skilled Therapeutic Intervention Evaluation only   Clinical Presentation (PT Evaluation Complexity) stable   Clinical Presentation Rationale presents as medically diagnosed   Clinical  Decision Making (Complexity) low complexity   Risk & Benefits of therapy have been explained evaluation/treatment results reviewed;care plan/treatment goals reviewed;participants voiced agreement with care plan;participants included;patient   PT Total Evaluation Time   PT Kaylyn Low Complexity Minutes ([id number]) 12   PT Discharge Planning   PT Discharge Recommendation (DC Rec) home with outpatient physical therapy   PT Rationale for DC Rec pt able to ambulate short distances safely; may need to use w/c for long distances (e.g. meals) and/or add escort services at his facility. Recommend outpatient (or in-house) PT for trochanteric bursitis. No further acute PT needs as inpatient   PT Brief overview of current status passed PT; safe to return to apartment, may need to add half-way services   Total Session Time   Total Session Time (sum of timed and untimed services) 12         Porsha Hairston, DPT 5/14/2024

## 2024-05-14 NOTE — PROGRESS NOTES
[facility] ACUTE PAIN SERVICE    [facility], [facility], [facility], [facility], [place]   PAIN Progress Note    Assessment/Plan:  Zackary Hutchison is a 63 year old male who was admitted on 5/13/2024.  Pain team was asked to see the patient for 63 year old with intractable left hip pain possibly secondary to bursitis. Admitted for left hip pain, suspected exacerbation of HFpEF, and possible COPD exacerbation.  History of  atrial fibrillation, stage III CKD, ischemic cardiomyopathy, COPD, HFrEF, hyperlipidemia, CAD s/p CABG, restless leg syndrome, hypertension, obesity, newly diagnosed type 2 diabetes, and tobacco use disorder. The patient does not smoke and denies chemical dependency history.      Opioid Induced Respiratory Depression Risk Assessment: high: COPD, HFrEF, CKD, Age, obesity, tobacco use, opioid naive      PLAN:   1) Pain is consistent left hip pain, no acute injury, trauma, fever, chills. Differentials include trochanteric bursitis vs MSK pain. Left hip CT revealed left total hip arthroplasty without evidence of loosening or periprosthetic fracture. Agree with PT and OT. Would use caution with opioids as high risk for opioid induced respiratory depression.  Consider MRI imaging if continued pain and no improvement. Cardiology, WOC, and Orthopedics consults reviewed.  Multimodal Medication Therapy  Topical: lidocaine alternating with voltaren gel   NSAID'S: CrCl 53ml/min. None given CKD and cardiac history  Steroids: Solu medrol  Muscle Relaxants: Robaxin qid   Adjuvants: APAP 1G q6h   Antidepressants/anxiolytics: Cymbalta 30mg qam  Opioids: low dose Oxycodone 2.5 mg q6h prn x 24 hours -  Limit opioids as able, high risk for opioid induced respiratory depression as above. Given no acute injury or trauma and CT negative will maximize on topicals, APAP, muscle relaxant, ice, PT, OT, rest and recommended to limit opioids as able.   IV Pain medication: none  Non-medication interventions: PT/OT  Constipation  Prophylaxis: miralax bid prn, senna-docusate     -Opioid prescriber has been none  -[place]  pulled from system on 5/13. This indicates   Gabapentin only , last 12/18/23  Discharge Recommendations - We recommend prescribing the following at the time of discharge: APAP, topicals, robaxin if effective.     Subjective:  Patient sleeping when I enter the room, wakes to voice. Describes pain as 5-7/10 and aching, sharp in the left hip. He reports pain is not improving. Ambulating worsens pain. The patient denies nausea, vomiting, constipation, diarrhea, chest pain, shortness of breath, dizziness, fever, chills, and paresthesia.     Principal Problem:  <principal problem not specified>     Patient Active Problem List   Diagnosis    Abnormal electrocardiogram    Cellulitis of left lower extremity    Heart failure with reduced ejection fraction (H)    Ischemic cardiomyopathy    Coronary artery disease involving native coronary artery of native heart with unstable angina pectoris (H)    Paroxysmal atrial fib -- on Eliquis    Class 3 severe obesity due to excess calories with body mass index (BMI) of 40.0 to 44.9 in adult (H)    Chronic kidney disease, stage 3b (H)    Smoker    COPD (chronic obstructive pulmonary disease) (H)    Venous stasis dermatitis of both lower extremities    Acute kidney injury superimposed on CKD  (H24)    Acute on chronic systolic CHF    Restless legs syndrome (RLS)    Primary hypertension    Cellulitis    Chronic ischemic heart disease    Fall    Gait instability    Generalized abdominal pain    Hematochezia    Hereditary and idiopathic peripheral neuropathy    Hyperlipidemia    Hypoglycemia    Hypokalemia    Ileus (H)    Keratoconus, stable condition    Chronic pain of both knees    Long term current use of anticoagulant therapy    Low back pain    Macula-off rhegmatogenous retinal detachment, left    Morbid obesity with body mass index (BMI) of 40.0 or higher (H)    Onychomycosis    Osteoarthritis  "of both knees    Postoperative anemia due to acute blood loss    Proliferative vitreoretinopathy of left eye    Pseudo-obstruction of colon    Rhinitis, allergic    S/P CABG (coronary artery bypass graft)    Sepsis due to Streptococcus species (H)    Skin ulcer of left pretibial region limited to breakdown of skin (H)    RBBB (right bundle branch block)    On amiodarone therapy    CHF (congestive heart failure) (H)    Acute decompensated heart failure (H)    Hyperglycemia    Open wound    Newly diagnosed diabetes (H)    Unstable angina (H)        Objective:    History   Drug Use Not on file          Tobacco Use      Smoking status: Every Day        Packs/day: 0.50        Types: Cigarettes      Smokeless tobacco: Never      Tobacco comments: Seen IP by [facility] on 9/11/23 and declined cessation services and materials      Vital signs in last 24 hours:  /51 (BP Location: Left arm)   Pulse 86   Temp 98.2  F (36.8  C) (Oral)   Resp 18   Ht 1.676 m (5' 6\")   Wt 118.8 kg (262 lb)   SpO2 94%   BMI 42.29 kg/m      Weight:     Vitals:    05/13/24 0533   Weight: 118.8 kg (262 lb)      Weight change:   Body mass index is 42.29 kg/m .    Intake/Output last 3 shifts:  I/O last 3 completed shifts:  In: 1190 [P.O.:1190]  Out: 1425 [Urine:1425]  Intake/Output this shift:  I/O this shift:  In: 540 [P.O.:540]  Out: 750 [Urine:750]    Review of Systems:   As per subjective, all others negative.    Physical Exam:  General Appearance:  Alert, cooperative, no distress, resting in bed    Head:  Normocephalic, without obvious abnormality, atraumatic   Eyes:  PERRL, conjunctiva/corneas clear, EOM's intact   Nose: Nares normal, septum midline   Throat: Lips, mucosa, and tongue normal; teeth and gums normal   Neck: Supple, symmetrical, trachea midline   Back:   Symmetric, no curvature, ROM normal   Lungs:   Clear to auscultation bilaterally, respirations unlabored, room air    Heart:  Regular rate and rhythm, S1, S2 normal  "   Abdomen:   Soft, non-tender, bowel sounds active all four quadrants,  no masses, no organomegaly    Extremities: Extremities normal, atraumatic   Skin: Skin warm, dry    Neurologic: Alert and oriented X 3, Moves all 4 extremities   Psych: Affect is appropriate     Imaging: Reviewed I have personally reviewed pertinent notes, labs, tests, and radiologic imaging in patient's chart.  Labs: Reviewed I have personally reviewed pertinent notes, labs, tests, and radiologic imaging in patient's chart.  Notes: Reviewed I have personally reviewed pertinent notes, labs, tests, and radiologic imaging in patient's chart.    Total time spent 35 minutes with greater than 50% in consultation, education and coordination of care.   Treatment plan includes: multimodal pain approach, Hospital Medicine Service for medical management, Orthopedics, PT, OT.   Patient educated regarding: multimodal pain approach and medications as listed above.   Elements of Medical Decision Making as described above. High level of decision making required due to 1 or more chronic illness with severe exacerbation, progression, and side effects of treatment. Acute or chronic illness or injury or surgery. High risk therapy including opioids, high risk drug therapy including oral and/or parenteral controlled substances.    Patient is understanding of the plan. All questions and concerns addressed to patient's satisfaction.     RENE PhelpsP-C  Acute Care Pain Management Program  [facility]   Monday-Friday 8a-4p   Page via Epic or [employer]

## 2024-05-14 NOTE — PROGRESS NOTES
Care Management Follow Up    Length of Stay (days): 0    Expected Discharge Date: 05/14/2024     Concerns to be Addressed:   discharge planning  Patient plan of care discussed at interdisciplinary rounds: Yes    Anticipated Discharge Disposition:  home no needs   Anticipated Discharge Services:  n/a  Anticipated Discharge DME:  n/a    Patient/family educated on Medicare website which has current facility and service quality ratings:  n/a  Education Provided on the Discharge Plan:  yes  Patient/Family in Agreement with the Plan:  yes    Referrals Placed by CM/SW:  n/a  Private pay costs discussed: Not applicable    Additional Information:  No needs anticipated from CM at discharge per pt; independent at baseline. Care management to follow for discharge recommendations and progression of care through hospitalization. Family to transport home.  10:56 AM    Brenna Kjellberg, BSW LSW  5/14/2024

## 2024-05-14 NOTE — PLAN OF CARE
PRIMARY DIAGNOSIS: ACUTE PAIN  OUTPATIENT/OBSERVATION GOALS TO BE MET BEFORE DISCHARGE:  1. Pain Status: Improved-controlled with oral pain medications.    2. Return to near baseline physical activity: No    3. Cleared for discharge by consultants (if involved): Yes    Discharge Planner Nurse   Safe discharge environment identified: Yes  Barriers to discharge: Yes       Entered by: Esther Beal RN 05/14/2024 2:12 AM     Please review provider order for any additional goals.   Nurse to notify provider when observation goals have been met and patient is ready for discharge.Goal Outcome Evaluation:

## 2024-05-14 NOTE — TREATMENT PLAN
RCAT Treatment Plan    Patient Score: 12  Patient Acuity: 3    Clinical Indication for Therapy: history of bronchospasm    Therapy Ordered: DUONEB QID    Assessment Summary: RT to reassess in 3 days    Denver Cuellar RT  5/14/2024

## 2024-05-14 NOTE — PROGRESS NOTES
Daily Progress Note    Assessment/Plan:   63 year old male with history of atrial fibrillation, stage III CKD, ischemic cardiomyopathy, COPD, HFrEF, hyperlipidemia, CAD s/p CABG, restless leg syndrome, hypertension, obesity, newly diagnosed type 2 diabetes, and tobacco use disorder admitted on 5/13/2024 with the ER with left hip pain, suspected exacerbation of HFpEF, and possible COPD exacerbation.     Intractable left hip pain  Left hip CT revealed left total hip arthroplasty without evidence of loosening or periprosthetic fracture.  Seen by orthopedics and thought likely secondary to trochanteric bursitis, conservative management recommended including anti-inflammatories, ice and rest.   Would not use NSAIDs given renal failure.  Ultimately could also benefit from steroid injection.  Further management per orthopedics.  PT and OT recommended and will need follow-up at [facility] following discharge and outpatient PT.  Pain team also following.  Is on steroids for pulmonary purposes        Acute decompensated HFrEF  Hypervolemic on exam, torsemide increased today to 200 mg twice a day by cardiology  Chest x-ray revealed cardiomegaly with possible congestion  Continue to monitor response and renal function  Continue home CHF meds     Atrial fibrillation  Heart rate is controlled, continue home meds       CAD s/p CABG  Coronary angiogram performed 5/2/2024 revealed extensive coronary artery disease including 100% stenosis in both the ostium to proximal segments of LAD and RCA, along with other significant stenoses for which cardiologist recommends apixaban and Plavix.  Continue cardiac meds     Restless leg syndrome  Ropinirole 2 mg 3 times daily     Hypertension  Continue spironolactone, hydralazine and carvedilol     Type 2 diabetes-new diagnosis  Hemoglobin A1c is elevated at 7.8  Glucose is elevated, likely from high-dose steroids, will decrease dosage today  Patient agreed with glucose checks and  "treatment with insulin as needed during his hospital stay  Insulin sliding scale for now  Monitor for hypoglycemia correct per protocol     Stage III CKD  Possibly secondary to cardiorenal process   Creatinine improved today  Monitor BMP       COPD  DuoNebs every 4 hours  IV methylprednisolone 40 mg every 8 hours, will change to daily  Not requiring O2     Suspected stasis ulcer in the right shin  Follow-up consult to wound care nurse  Elevate lower extremities  Consider starting antibiotics if there is evidence of cellulitis or infected ulcer       Code Status: Full Code  Clinically Significant Risk Factors Present on Admission               # Drug Induced Coagulation Defect: home medication list includes an anticoagulant medication  # Drug Induced Platelet Defect: home medication list includes an antiplatelet medication   # Hypertension: Noted on problem list  # Acute heart failure with reduced ejection fraction: last echo with EF <40% and receiving IV diuretics    # DMII: A1C = 7.8 % (Ref range: <5.7 %) within past 6 months    # Severe Obesity: Estimated body mass index is 42.29 kg/m  as calculated from the following:    Height as of this encounter: 1.676 m (5' 6\").    Weight as of this encounter: 118.8 kg (262 lb).       # Financial/Environmental Concerns: none            Code status:Full Code        Barriers to Discharge: Need for further diuresis, continued hip pain    Disposition: Anticipate discharge in 1 to 2 days    Subjective:  Reports no change in his hip pain.  He reports his breathing is better.  His blood sugars have escalated likely from the steroids but he appears asymptomatic from this.  No chest pain, no fevers or chills or nausea or vomiting, no abdominal pain.        Current Medications Reviewed via EHR List    Objective:  Vital signs in last 24 hours:  [unfilled]  .prog  Weight:   @THISENCWEIGHTS(1)@  Weight change:   Body mass index is 42.29 kg/m .    Intake/Output last 3 shifts:  I/O last 3 " completed shifts:  In: 1190 [P.O.:1190]  Out: 1425 [Urine:1425]  Intake/Output this shift:  I/O this shift:  In: -   Out: 400 [Urine:400]    Physical Exam:  General: No apparent distress  CV: Mildly irregular  Lungs: Clear to auscultation  Abdomen:        Imaging:  Personally Reviewed.  CT Hip Left w/o Contrast    Result Date: 5/13/2024  EXAM: CT HIP LEFT W/O CONTRAST LOCATION: [facility] DATE: 5/13/2024 INDICATION: Atraumatic left hip pain. COMPARISON: Same day radiographs. TECHNIQUE: Noncontrast. Axial, sagittal and coronal thin-section reconstruction. Dose reduction techniques were used. FINDINGS: BONES: -Left total hip arthroplasty. Streak artifact from orthopedic hardware limits evaluation of adjacent tissues. No evidence of loosening or periprosthetic fracture. No definite joint effusion. SOFT TISSUES: -Arterial vascular calcifications. Mild prostatomegaly. Circumferential bladder wall thickening.     IMPRESSION: 1.  Left total hip arthroplasty without evidence of loosening or periprosthetic fracture. 2.  Circumferential bladder wall thickening which may be accentuated by incomplete distention. Chronic urinary outlet obstruction may contribute to this appearance. Cystitis cannot be entirely excluded. Correlation with clinical symptoms and urinalysis suggested.     XR Chest 1 View    Result Date: 5/13/2024  EXAM: XR CHEST 1 VIEW LOCATION: [facility] DATE: 5/13/2024 INDICATION: Dyspnea. COPD. CHF. COMPARISON: 4/29/2024.     IMPRESSION: Minimal streaky basilar opacities, probably atelectasis. Remaining lungs are clear. Stable mild cardiac silhouette enlargement. Post sternotomy. Slight vascular indistinctness could be from congestion. No pleural effusion or pneumothorax.     XR Pelvis and Hip Left 2 Views    Result Date: 5/13/2024  EXAM: PELVIS AND LEFT HIP 3 VIEWS LOCATION: [facility] DATE/TIME: 5/13/2024 6:20 AM CDT INDICATION: Pain.  COMPARISON: None.     IMPRESSION: 1. A left hip arthroplasty is in place. No evidence of hardware failure. 2. No visualized acute fracture or malalignment of the pelvis or left hip.        US Renal Complete Non-Vascular    Result Date: 5/6/2024  EXAM: US RENAL COMPLETE NON-VASCULAR LOCATION: [facility] DATE: 5/6/2024 INDICATION: PRINCESS , renal cyst, renal hypodensity on imaging in 2022 COMPARISON: Report from outside CT of the abdomen and pelvis 6/9/2022 TECHNIQUE: Routine Bilateral Renal and Bladder Ultrasound. Acoustic windows are limited due to large patient habitus and bowel gas. FINDINGS: RIGHT KIDNEY: 13.3 x 6.9 x 6.8 cm. Normal without hydronephrosis or masses. LEFT KIDNEY: 12.0 x 6.1 x 7.2 cm. Normal without hydronephrosis or solid mass. There is a 1.5 x 1.6 cm cyst arising from the superior pole. BLADDER: Normal.     IMPRESSION: 1.  Normal-sized kidneys without obstruction. 2.  1.6 cm cyst arising from the upper left kidney. A cyst of similar description was described on prior CT abdomen and pelvis. No specific workup or follow-up is necessary.    Cardiac Catheterization    Result Date: 5/2/2024    Ost LAD to Prox LAD lesion is 100% stenosed.   Ramus lesion is 95% stenosed.   Mid Cx to Dist Cx lesion is 80% stenosed.   1st Mrg lesion is 90% stenosed.   Prox RCA to Dist RCA lesion is 100% stenosed.   Mid LAD lesion is 70% stenosed. 1.  LAD is occluded proximally.  Mid to distal LAD fills well from large, widely patent LIMA graft.  LAD proximal to graft insertion site has calcified stenosis compromising flow to large proximal diagonal 2.  Calcified 90% proximal stenosis in medium size ramus branch.  Ostium ?covered by occluded LAD stent. 3.  Severely calcified stenosis proximal segment of large first obtuse marginal branch of left circumflex. 4.  Chronic total mid RCA occlusion.  PDA fills from patent free LAURI graft.  Posterolateral branches fill from collaterals from left circumflex.  5.  LVEDP = 30 mmHg 6.  Successful PCI ramus branch with 2.5 x 24 mm Synergy DARRELL implant x 1.  30% eccentric residual with BROOKLYNN-3 flow. 7.  Successful PCI LCx OM branch with DARRELL implant x 2: 3.0 x 24 Synergy DARRELL x 1, 2.5 x 16 Synergy DARRELL x 1.  0% residual, BROOKLYNN-3 flow.     Echocardiogram Complete    Result Date: 2024  [id number]^LAURITA^[place] [address]  Name: KRISTINE JONES MRN: [id number] : 1960 Study Date: 2024 10:20 AM Age: 63 yrs Gender: Male Patient Location: [facility] Reason For Study: CHF Ordering Physician: MEGGAN SHAY Performed By: VY  BSA: 2.3 m2 Height: 66 in Weight: 273 lb HR: 94 BP: 121/56 mmHg ______________________________________________________________________________ Procedure Complete Portable Echo Adult. Definity (NDC #19773-277) given intravenously. ______________________________________________________________________________ Interpretation Summary  1. Technically difficult study. 2. The left ventricle is normal in size. Image quality does not provide for detailed assessment of LV systolic function, but is felt to be normal with a visually estimated ejection fraction of roughly 55%. 3. No significant valvular heart disease is identified on this study though the sensitivity, particularly of regurgitant lesions, is reduced due to poor Doppler acoustics. 4. Right ventricular size and systolic performance could not be accurately assessed due to inadequate visualization.  The acoustic quality of this study is fairly poor. If a more accurate assessment of left ventricular systolicperformance, regional wall motion, and other morphology/function is required; would recommend cardiac MRI or other alternative imaging modality for further evaluation.  When compared to the prior real-time echocardiogram dated 2023, the ejection fraction appears somewhat higher on the current study though accurate  comparison is somewhat limited due to suboptimal acoustic imaging not only on the current examination, but also hold the prior study as well. ______________________________________________________________________________ Left ventricle: The left ventricle is normal in size. Image quality does not provide for detailed assessment of LV systolic function, but is felt to be normal with a visually estimated ejection fraction of roughly 55%. There is probable normal regional wall motion. Left ventricular wall thickness is normal.  Assessment of LV Diastolic Function: The cumulative findings are indeterminate in the evaluation of diastolic function.  Right ventricle: Right ventricular size and systolic performance could not be accurately assessed due to inadequate visualization.  Left atrium: There is borderline left atrial enlargement.  Right atrium: The right atrium is not well-visualized.  IVC: The IVC is not well-visualized.  Aortic valve: The aortic valve is not well visualized, but suspected to be comprised of three cusps. No significant aortic stenosis or aortic insufficiency is detected on this study.  Mitral valve: The mitral valve appears morphologically normal. There is suspected trace mitral insufficiency.  Tricuspid valve: The tricuspid valve is not well-visualized.  Pulmonic valve: The pulmonic valve is not well-visualized.  Thoracic aorta: There is borderline enlargement of the aortic root/proximal ascending aorta.  Pericardium: There is no significant pericardial effusion. ______________________________________________________________________________ ______________________________________________________________________________ MMode/2D Measurements & Calculations Ao root diam: 4.1 cm asc Aorta Diam: 4.2 cm LVOT diam: 2.6 cm LVOT area: 5.3 cm2 Ao root diam index Ht(cm/m): 2.4 Ao root diam index BSA (cm/m2): 1.8 Asc Ao diam index BSA (cm/m2): 1.8 Asc Ao diam index Ht(cm/m): 2.5 EF Biplane: 47.5 % LA Volume  "(BP): 93.5 ml  LA Volume Index (BP): 40.8 ml/m2 LA Volume Indexed (AL/bp): 43.5 ml/m2 TAPSE: 1.7 cm  Doppler Measurements & Calculations MV E max iraj: 108.0 cm/sec MV A max iraj: 92.2 cm/sec MV E/A: 1.2 MV dec slope: 598.0 cm/sec2 MV dec time: 0.18 sec Ao V2 max: 206.0 cm/sec Ao max P.0 mmHg Ao V2 mean: 145.0 cm/sec Ao mean PG: 10.0 mmHg Ao V2 VTI: 39.1 cm MARIUM(I,D): 3.2 cm2 MARIUM(V,D): 3.2 cm2  LV V1 max P.3 mmHg LV V1 max: 125.0 cm/sec LV V1 VTI: 23.8 cm SV(LVOT): 126.4 ml SI(LVOT): 55.2 ml/m2 AV Iraj Ratio (DI): 0.61 MARIUM Index (cm2/m2): 1.4 E/E': 9.8 E/E' av.7 Lateral E/e': 7.5 Medial E/e': 9.9 Peak E' Iraj: 11.0 cm/sec RV S Iraj: 13.1 cm/sec  ______________________________________________________________________________ Report approved by: Aura Cruz 2024 02:20 PM       XR Chest Port 1 View    Result Date: 2024  EXAM: XR CHEST PORT 1 VIEW LOCATION: [facility] DATE: 2024 INDICATION: dyspnea COMPARISON: 2024     IMPRESSION: No focal consolidation or edema. No pleural effusion or pneumothorax. Stable heart size and mediastinal contours postoperative findings from CABG.      Lab Results:  Personally Reviewed.   Fingerstick Blood Glucose: @[id number](POCGLUFGR:10)@    Last Hbg A1C: No results found for: \"HGBA1C\"   Lab Results   Component Value Date    INR 1.30 (H) 2024    INR 1.01 2023     Recent Results (from the past 24 hour(s))   Troponin T, High Sensitivity    Collection Time: 24 10:32 AM   Result Value Ref Range    Troponin T, High Sensitivity 42 (H) <=22 ng/L   UA with Microscopic reflex to Culture    Collection Time: 24 12:46 PM    Specimen: Urine, Clean Catch   Result Value Ref Range    Color Urine Light Yellow Colorless, Straw, Light Yellow, Yellow    Appearance Urine Clear Clear    Glucose Urine Negative Negative mg/dL    Bilirubin Urine Negative Negative    Ketones Urine Negative Negative mg/dL    Specific Gravity " Urine 1.015 1.001 - 1.030    Blood Urine Negative Negative    pH Urine 6.0 5.0 - 7.0    Protein Albumin Urine Negative Negative mg/dL    Urobilinogen Urine <2.0 <2.0 mg/dL    Nitrite Urine Negative Negative    Leukocyte Esterase Urine Negative Negative    Mucus Urine Present (A) None Seen /LPF    RBC Urine <1 <=2 /HPF    WBC Urine <1 <=5 /HPF    Hyaline Casts Urine 4 (H) <=2 /LPF   Glucose by meter    Collection Time: 05/13/24  6:07 PM   Result Value Ref Range    GLUCOSE BY METER POCT 168 (H) 70 - 99 mg/dL   Glucose by meter    Collection Time: 05/13/24  9:55 PM   Result Value Ref Range    GLUCOSE BY METER POCT 296 (H) 70 - 99 mg/dL           Advanced Care Planning    Medical Decision Making       50 MINUTES SPENT BY ME on the date of service doing chart review, history, exam, documentation & further activities per the note.      Demond Escobar MD  Date: 5/14/2024  Time: 8:00 AM  [facility]  Family Medicine

## 2024-05-14 NOTE — PROGRESS NOTES
HEART CARE NOTE          Assessment/Recommendations   1. Severe Biventricular dysfunction/HFrEF c/b severe ADHF  Assessment / Plan  Patient is hypervolemic on physical exam - continue oral diuretic regimen, will increase torsemide to 200 mg BID and continue to monitor UOP and renal function closely  Patient is high risk for adverse cardiac events 2/2 severe biventricular dysfunction, multiple HFHs, non-compliance with dietary restrictions, renal dysfunction, CAD  GDMT as detailed below     Current Pharmacotherapy AHA Guideline-Directed Medical Therapy   Lisinopril 5 mg daily  - held Lisinopril 20 mg twice daily   Carvedilol 3.125 mg BID  Carvedilol 25 mg twice daily   Spironolactone 50 mg daily Spironolactone 25 mg once daily   Hydralazine 10 mg TID Hydralazine 100 mg three times daily   Isosorbide mononitrate 180 mg daily Isosorbide dinitrate 40 mg three times daily   SGLT2 inhibitor:Dapagliflozin/Empagliflozin - not started Dapagliflozin or Empagliflozin 10 mg daily      2. Atrial fibrillation  Assessment / Plan  Currently on apixaban and amiodarone     3. CAD c/b ICM  Assessment / Plan  S/p CABG 2015  Now s/p PCI to Ramus and OM with DARRELL  Denies chest pain or anginal equivalents  Continue atorvastatin, carvedilol, clopidogrel     4.CKD  Assessment / Plan  Diuresis as above; continue to monitor UOP and renal function closely     5.Left hip bursitis  Assessment / Plan  Management and supportive care per primary team and orthopedic surgery       Plan of care discussed on May 14, 2024 with patient at bedside, and primary team overseeing patient's care      History of Present Illness/Subjective    Mr. Zackary Hutchison is a 63 year old male with a PMHx significant for (per Epic notation) atrial fibrillation, stage III CKD, ischemic cardiomyopathy, COPD, HFrEF, hyperlipidemia, CAD s/p CABG, restless leg syndrome, hypertension, obesity, newly diagnosed type 2 diabetes, and tobacco use disorder admitted on 5/13/2024 with the  "ER with left hip pain, suspected exacerbation of HFpEF, and possible COPD exacerbation      Today, Mr. Hutchison denies acute cardiac events or complaints; Cardiac management as detailed above     ECG: Personally reviewed 5/13/24. normal sinus rhythm, PAC's noted.     ECHO (personnaly Reviewed on 5/13/24):   1. Technically difficult study.  2. The left ventricle is normal in size. Image quality does not provide for  detailed assessment of LV systolic function, but is felt to be normal with a  visually estimated ejection fraction of roughly 55%.  3. No significant valvular heart disease is identified on this study though  the sensitivity, particularly of regurgitant lesions, is reduced due to poor  Doppler acoustics.  4. Right ventricular size and systolic performance could not be accurately  assessed due to inadequate visualization.     The acoustic quality of this study is fairly poor. If a more accurate  assessment of left ventricular systolicperformance, regional wall motion, and  other morphology/function is required; would recommend cardiac MRI or other  alternative imaging modality for further evaluation.     When compared to the prior real-time echocardiogram dated 25 December 2023,  the ejection fraction appears somewhat higher on the current study though  accurate comparison is somewhat limited due to suboptimal acoustic imaging not  only on the current examination, but also hold the prior study as well.    Telemetry: personally reviewed May 14, 2024; notable for sinus rhythm     Lab results: personally reviewed May 14, 2024; notable for hyponatremia, resolving PRINCESS    Medical history and pertinent documents reviewed in Care Everywhere please where applicable see details above        Physical Examination Review of Systems   /59 (BP Location: Left arm)   Pulse 86   Temp 98.1  F (36.7  C)   Resp 18   Ht 1.676 m (5' 6\")   Wt 118.8 kg (262 lb)   SpO2 94%   BMI 42.29 kg/m    Body mass index is 42.29 " kg/m .  Wt Readings from Last 3 Encounters:   05/13/24 118.8 kg (262 lb)   05/08/24 121 kg (266 lb 11.2 oz)   04/25/24 126.1 kg (278 lb)     General Appearance:   no distress, normal body habitus   ENT/Mouth: membranes moist, no oral lesions or bleeding gums.      EYES:  no scleral icterus, normal conjunctivae   Neck: no carotid bruits or thyromegaly   Chest/Lungs:   lungs are clear to auscultation, no rales or wheezing, equal chest wall expansion    Cardiovascular:   Regular. Normal first and second heart sounds with no murmurs, rubs, or gallops; the carotid, radial and posterior tibial pulses are intact, + JVD and LE edema bilaterally    Abdomen:  no organomegaly, masses, bruits, or tenderness; bowel sounds are present   Extremities: no cyanosis or clubbing   Skin: no xanthelasma, warm.    Neurologic: NAD     Psychiatric: alert and oriented x3, calm     A complete 10 systems ROS was reviewed  And is negative except what is listed in the HPI.          Medical History  Surgical History Family History Social History   Past Medical History:   Diagnosis Date    Atrial fibrillation (H)     Chronic kidney disease     Chronic kidney disease, stage 3b (H) 09/28/2023    Class 3 severe obesity due to excess calories with body mass index (BMI) of 40.0 to 44.9 in adult (H) 09/28/2023    Congestive heart failure (H)     COPD (chronic obstructive pulmonary disease) (H)     Coronary artery disease involving coronary bypass graft of native heart without angina pectoris 09/28/2023    Heart failure with reduced ejection fraction (H) 09/11/2023    HFrEF (heart failure with reduced ejection fraction) (H) 09/11/2023    Hyperlipidemia     Hypertension     Ischemic cardiomyopathy 09/28/2023    Obese     Paroxysmal atrial fibrillation (H) 09/28/2023    Tobacco abuse 09/28/2023    Past Surgical History:   Procedure Laterality Date    CORONARY ANGIOGRAPHY ADULT ORDER      CORONARY ARTERY BYPASS Left 2014    2 vessels    CV CARDIOMEMS WITH  RIGHT HEART CATH N/A 2/15/2024    Procedure: Pulmonary Arterial Pressure Sensor Placement;  Surgeon: Jacey Bhandari MD;  Location: [facility]    CV CORONARY ANGIOGRAM N/A 09/11/2023    Procedure: Coronary Angiogram;  Surgeon: Santy Esposito MD;  Location: [facility]    CV CORONARY ANGIOGRAM N/A 5/2/2024    Procedure: CV CORONARY ANGIOGRAM;  Surgeon: Santy Esposito MD;  Location: [facility]    CV LEFT HEART CATH N/A 09/11/2023    Procedure: Left Heart Catheterization;  Surgeon: Santy Esposito MD;  Location: [facility]    CV LEFT HEART CATH N/A 5/2/2024    Procedure: Left Heart Catheterization;  Surgeon: Santy Esposito MD;  Location: [facility]    CV PCI ANGIOPLASTY N/A 5/2/2024    Procedure: Percutaneous Transluminal Angioplasty;  Surgeon: Santy Esposito MD;  Location: [facility]    CV RIGHT HEART CATH MEASUREMENTS RECORDED N/A 09/11/2023    Procedure: Right Heart Catheterization;  Surgeon: Santy Esposito MD;  Location: [facility]    no family history of premature coronary artery disease Social History     Socioeconomic History    Marital status: Single     Spouse name: Not on file    Number of children: Not on file    Years of education: Not on file    Highest education level: Not on file   Occupational History    Occupation: Retired restaurant owner     Comment: [employer]   Tobacco Use    Smoking status: Every Day     Current packs/day: 0.50     Types: Cigarettes    Smokeless tobacco: Never    Tobacco comments:     Seen IP by [facility] on 9/11/23 and declined cessation services and materials   Substance and Sexual Activity    Alcohol use: Not Currently    Drug use: Not on file    Sexual activity: Not on file   Other Topics Concern    Not on file   Social History Narrative    Currently lives in an assisted living facility. (Updated: 01/08/2024)     Social Determinants of Health     Financial Resource Strain: Not on file   Food  "Insecurity: Not on file   Transportation Needs: Not on file   Physical Activity: Not on file   Stress: Not on file   Social Connections: Not on file   Interpersonal Safety: Not on file   Housing Stability: Not on file           Lab Results    Chemistry/lipid CBC Cardiac Enzymes/BNP/TSH/INR   Lab Results   Component Value Date    CHOL 121 05/02/2024    HDL 27 (L) 05/02/2024    TRIG 219 (H) 05/02/2024    BUN 38.9 (H) 05/13/2024     05/13/2024    CO2 26 05/13/2024    Lab Results   Component Value Date    WBC 8.5 05/13/2024    HGB 12.2 (L) 05/13/2024    HCT 38.1 (L) 05/13/2024    MCV 92 05/13/2024     05/13/2024    Lab Results   Component Value Date    TSH 1.70 04/29/2024    INR 1.30 (H) 05/13/2024     No results found for: \"CKTOTAL\", \"CKMB\", \"TROPONINI\"       Weight:    Wt Readings from Last 3 Encounters:   05/13/24 118.8 kg (262 lb)   05/08/24 121 kg (266 lb 11.2 oz)   04/25/24 126.1 kg (278 lb)       Allergies  Allergies   Allergen Reactions    Bumex [Bumetanide] Muscle Pain (Myalgia)         Surgical History  Past Surgical History:   Procedure Laterality Date    CORONARY ANGIOGRAPHY ADULT ORDER      CORONARY ARTERY BYPASS Left 2014    2 vessels    CV CARDIOMEMS WITH RIGHT HEART CATH N/A 2/15/2024    Procedure: Pulmonary Arterial Pressure Sensor Placement;  Surgeon: Jacey Bhandari MD;  Location: [facility]ATH [facility]    CV CORONARY ANGIOGRAM N/A 09/11/2023    Procedure: Coronary Angiogram;  Surgeon: Santy Esposito MD;  Location: [facility] CATH [facility]    CV CORONARY ANGIOGRAM N/A 5/2/2024    Procedure: CV CORONARY ANGIOGRAM;  Surgeon: Santy Esposito MD;  Location: [facility] CATH [facility]    CV LEFT HEART CATH N/A 09/11/2023    Procedure: Left Heart Catheterization;  Surgeon: Santy Esposito MD;  Location: [facility]ATH [facility]    CV LEFT HEART CATH N/A 5/2/2024    Procedure: Left Heart Catheterization;  Surgeon: Santy Esposito MD;  Location: [facility]ATH [facility]    CV PCI ANGIOPLASTY N/A " 5/2/2024    Procedure: Percutaneous Transluminal Angioplasty;  Surgeon: Santy Esposito MD;  Location: [facility] CATH [facility]    CV RIGHT HEART CATH MEASUREMENTS RECORDED N/A 09/11/2023    Procedure: Right Heart Catheterization;  Surgeon: Santy Esposito MD;  Location: [facility] CATH [facility]       Social History  Tobacco:   History   Smoking Status    Every Day    Types: Cigarettes   Smokeless Tobacco    Never    Alcohol:   Social History    Substance and Sexual Activity      Alcohol use: Not Currently   Illicit Drugs:   History   Drug Use Not on file       Family History  Family History   Problem Relation Age of Onset    Cerebrovascular Disease Mother     Myocardial Infarction Father           Jacey Bhandari MD on 5/14/2024      cc: Reid Escobar

## 2024-05-14 NOTE — PLAN OF CARE
Goal Outcome Evaluation:      Plan of Care Reviewed With: patient    Overall Patient Progress: no change    Patient alert and oriented x4. Patient continues to have left hip pain. He has been given voltaren gel and PO meds. Last blood sugar was 296 at 21:55. Patient is on Room Air. Patient uses the urinal for voiding. Patient is on an 1800 ml fluid restriction. Fluid will restart at midnight. Patient is an assist of 1 for mobility.

## 2024-05-14 NOTE — PROGRESS NOTES
"PRIMARY DIAGNOSIS: \"GENERIC\" NURSING  OUTPATIENT/OBSERVATION GOALS TO BE MET BEFORE DISCHARGE:  ADLs back to baseline: No    Activity and level of assistance: Up with standby assistance.    Pain status: Improved-controlled with oral pain medications.    Return to near baseline physical activity: No     Discharge Planner Nurse   Safe discharge environment identified: No  Barriers to discharge: Yes       Entered by: Shey Christensen RN 05/14/2024 11:22 AM   Pt A&Ox4. On room Air. VSS. Pt refused morning blood glucose monitoring and lymphedema wraps.    Please review provider order for any additional goals.   Nurse to notify provider when observation goals have been met and patient is ready for discharge.  "

## 2024-05-14 NOTE — TREATMENT PLAN
RCAT Treatment Plan    Patient Score: 2  Patient Acuity: 5    Clinical Indication for Therapy: history of bronchospasm    Therapy Ordered: Duoneb QID    Assessment Summary: Patient qualifies for PRN treatments.     RT Farhat  5/14/2024

## 2024-05-14 NOTE — PLAN OF CARE
Problem: Adult Inpatient Plan of Care  Goal: Plan of Care Review  Description: The Plan of Care Review/Shift note should be completed every shift.  The Outcome Evaluation is a brief statement about your assessment that the patient is improving, declining, or no change.  This information will be displayed automatically on your shift  note.  Outcome: Not Progressing  Flowsheets (Taken 5/14/2024 3402)  Plan of Care Reviewed With: patient  Overall Patient Progress: no change   Goal Outcome Evaluation:      Plan of Care Reviewed With: patient    Overall Patient Progress: no changeOverall Patient Progress: no change

## 2024-05-14 NOTE — PLAN OF CARE
Problem: Adult Inpatient Plan of Care  Goal: Plan of Care Review  Description: The Plan of Care Review/Shift note should be completed every shift.  The Outcome Evaluation is a brief statement about your assessment that the patient is improving, declining, or no change.  This information will be displayed automatically on your shift  note.  Outcome: Not Progressing  Flowsheets (Taken 5/14/2024 2139)  Plan of Care Reviewed With: patient  Overall Patient Progress: no change   Goal Outcome Evaluation:      Plan of Care Reviewed With: patient    Overall Patient Progress: no changeOverall Patient Progress: no change       Pt A&O. VSS. On Room air. Last , insulin given. Last Troponin 42.

## 2024-05-14 NOTE — PROGRESS NOTES
"Orthopedic Progress Note      Assessment:    Left hip pain status post total hip arthroplasty, pain seems likely to be due to a trochanteric bursitis     Plan:   -Continue conservative management for this issue including anti-inflammatories, ice, rest  -Discussed plan with hospitalist team, he is on IV steroid regimen, likely dose will be reduced today but even at lower dose systemic effects should help improve bursitis pain.  -Weight-bear as tolerated  -PT/OT - gluteal stretching and strengthening  - If he does not have any improvement in pain or if pain worsens could consider MRI to rule out nondisplaced occult fracture, but low suspicion at this time in absence of trauma  - Follow up at [facility] following discharge for outpatient PT      Subjective:    Patient reports feeling well today. Pain is not improving.  He is weightbearing and ambulating in room but it exacerbates pain.  All questions/concerns answered.      Objective:  /51 (BP Location: Left arm)   Pulse 86   Temp 98.2  F (36.8  C) (Oral)   Resp 18   Ht 1.676 m (5' 6\")   Wt 118.8 kg (262 lb)   SpO2 94%   BMI 42.29 kg/m      General: On examination, the patient is resting comfortably, NAD, awake, and alert and oriented to person, place, time, and, and general circumstances   SKIN: There is no evidence of erythema, warmth, swelling, deformity, crepitus, break to the skin, open wound.  Pulses:  Dorsalis pedis and posterior tibialis pulse is intact and equal bilaterally  Sensation: intact and equal bilaterally to the distal lower extremities.  Tenderness: Tender to palpation over the greater trochanter and lateral proximal femur  ROM: Able to perform hip flexion, quad extension, IR/ER with minimal pain elicited in active and passive range of motion     Contralateral side= Full range of motion, Negative joint instability findings, 5/5 motor groups about the joint, Non-tender.     Contralateral side= Full range of motion, Negative joint " instability findings, 5/5 motor groups about the joint, Non-tender.       Pertinent Labs   Lab Results: personally reviewed.   Lab Results   Component Value Date    INR 1.30 (H) 05/13/2024    INR 1.01 02/13/2023     Lab Results   Component Value Date    WBC 8.5 05/13/2024    HGB 12.2 (L) 05/13/2024    HCT 38.1 (L) 05/13/2024    MCV 92 05/13/2024     05/13/2024     Lab Results   Component Value Date     (L) 05/14/2024    CO2 24 05/14/2024         Report completed by:  ALBINA CARMICHAEL PA-C  Date: 05/14/2024  [facility]

## 2024-05-15 ENCOUNTER — APPOINTMENT (OUTPATIENT)
Dept: OCCUPATIONAL THERAPY | Facility: HOSPITAL | Age: 64
DRG: 291 | End: 2024-05-15
Payer: MEDICARE

## 2024-05-15 ENCOUNTER — APPOINTMENT (OUTPATIENT)
Dept: MRI IMAGING | Facility: HOSPITAL | Age: 64
DRG: 291 | End: 2024-05-15
Attending: EMERGENCY MEDICINE
Payer: MEDICARE

## 2024-05-15 LAB
ANION GAP SERPL CALCULATED.3IONS-SCNC: 16 MMOL/L (ref 7–15)
BUN SERPL-MCNC: 65.7 MG/DL (ref 8–23)
CALCIUM SERPL-MCNC: 10.4 MG/DL (ref 8.8–10.2)
CHLORIDE SERPL-SCNC: 84 MMOL/L (ref 98–107)
CREAT SERPL-MCNC: 1.88 MG/DL (ref 0.67–1.17)
DEPRECATED HCO3 PLAS-SCNC: 29 MMOL/L (ref 22–29)
EGFRCR SERPLBLD CKD-EPI 2021: 39 ML/MIN/1.73M2
ERYTHROCYTE [DISTWIDTH] IN BLOOD BY AUTOMATED COUNT: 18.6 % (ref 10–15)
GLUCOSE BLDC GLUCOMTR-MCNC: 162 MG/DL (ref 70–99)
GLUCOSE BLDC GLUCOMTR-MCNC: 260 MG/DL (ref 70–99)
GLUCOSE BLDC GLUCOMTR-MCNC: 317 MG/DL (ref 70–99)
GLUCOSE BLDC GLUCOMTR-MCNC: 317 MG/DL (ref 70–99)
GLUCOSE SERPL-MCNC: 153 MG/DL (ref 70–99)
HCT VFR BLD AUTO: 41.3 % (ref 40–53)
HGB BLD-MCNC: 13.2 G/DL (ref 13.3–17.7)
MAGNESIUM SERPL-MCNC: 2.1 MG/DL (ref 1.7–2.3)
MCH RBC QN AUTO: 29.5 PG (ref 26.5–33)
MCHC RBC AUTO-ENTMCNC: 32 G/DL (ref 31.5–36.5)
MCV RBC AUTO: 92 FL (ref 78–100)
PLATELET # BLD AUTO: 209 10E3/UL (ref 150–450)
POTASSIUM SERPL-SCNC: 3.3 MMOL/L (ref 3.4–5.3)
POTASSIUM SERPL-SCNC: 3.7 MMOL/L (ref 3.4–5.3)
RBC # BLD AUTO: 4.47 10E6/UL (ref 4.4–5.9)
SODIUM SERPL-SCNC: 129 MMOL/L (ref 135–145)
WBC # BLD AUTO: 13.1 10E3/UL (ref 4–11)

## 2024-05-15 PROCEDURE — 82374 ASSAY BLOOD CARBON DIOXIDE: CPT | Performed by: EMERGENCY MEDICINE

## 2024-05-15 PROCEDURE — 250N000013 HC RX MED GY IP 250 OP 250 PS 637: Performed by: NURSE PRACTITIONER

## 2024-05-15 PROCEDURE — 36415 COLL VENOUS BLD VENIPUNCTURE: CPT | Performed by: EMERGENCY MEDICINE

## 2024-05-15 PROCEDURE — 73721 MRI JNT OF LWR EXTRE W/O DYE: CPT | Mod: LT,MF

## 2024-05-15 PROCEDURE — G0378 HOSPITAL OBSERVATION PER HR: HCPCS

## 2024-05-15 PROCEDURE — 96375 TX/PRO/DX INJ NEW DRUG ADDON: CPT

## 2024-05-15 PROCEDURE — 97110 THERAPEUTIC EXERCISES: CPT | Mod: GO

## 2024-05-15 PROCEDURE — 84132 ASSAY OF SERUM POTASSIUM: CPT | Performed by: EMERGENCY MEDICINE

## 2024-05-15 PROCEDURE — 250N000011 HC RX IP 250 OP 636: Performed by: INTERNAL MEDICINE

## 2024-05-15 PROCEDURE — 99233 SBSQ HOSP IP/OBS HIGH 50: CPT | Performed by: INTERNAL MEDICINE

## 2024-05-15 PROCEDURE — 250N000011 HC RX IP 250 OP 636: Performed by: EMERGENCY MEDICINE

## 2024-05-15 PROCEDURE — 85027 COMPLETE CBC AUTOMATED: CPT | Performed by: EMERGENCY MEDICINE

## 2024-05-15 PROCEDURE — 250N000013 HC RX MED GY IP 250 OP 250 PS 637: Performed by: EMERGENCY MEDICINE

## 2024-05-15 PROCEDURE — 96376 TX/PRO/DX INJ SAME DRUG ADON: CPT

## 2024-05-15 PROCEDURE — 250N000013 HC RX MED GY IP 250 OP 250 PS 637

## 2024-05-15 PROCEDURE — 97535 SELF CARE MNGMENT TRAINING: CPT | Mod: GO

## 2024-05-15 PROCEDURE — 250N000013 HC RX MED GY IP 250 OP 250 PS 637: Performed by: INTERNAL MEDICINE

## 2024-05-15 PROCEDURE — P9047 ALBUMIN (HUMAN), 25%, 50ML: HCPCS | Performed by: INTERNAL MEDICINE

## 2024-05-15 PROCEDURE — 82962 GLUCOSE BLOOD TEST: CPT

## 2024-05-15 PROCEDURE — 83735 ASSAY OF MAGNESIUM: CPT | Performed by: EMERGENCY MEDICINE

## 2024-05-15 PROCEDURE — 99232 SBSQ HOSP IP/OBS MODERATE 35: CPT | Mod: FS | Performed by: EMERGENCY MEDICINE

## 2024-05-15 RX ORDER — HYDRALAZINE HYDROCHLORIDE 25 MG/1
25 TABLET, FILM COATED ORAL 3 TIMES DAILY
Status: DISCONTINUED | OUTPATIENT
Start: 2024-05-15 | End: 2024-05-20 | Stop reason: HOSPADM

## 2024-05-15 RX ORDER — ALBUMIN (HUMAN) 12.5 G/50ML
50 SOLUTION INTRAVENOUS ONCE
Status: COMPLETED | OUTPATIENT
Start: 2024-05-15 | End: 2024-05-15

## 2024-05-15 RX ORDER — POTASSIUM CHLORIDE 1500 MG/1
40 TABLET, EXTENDED RELEASE ORAL ONCE
Status: COMPLETED | OUTPATIENT
Start: 2024-05-15 | End: 2024-05-15

## 2024-05-15 RX ORDER — TORSEMIDE 100 MG/1
100 TABLET ORAL
Status: DISCONTINUED | OUTPATIENT
Start: 2024-05-15 | End: 2024-05-20

## 2024-05-15 RX ORDER — NICOTINE 21 MG/24HR
1 PATCH, TRANSDERMAL 24 HOURS TRANSDERMAL DAILY
Status: DISCONTINUED | OUTPATIENT
Start: 2024-05-15 | End: 2024-05-20 | Stop reason: HOSPADM

## 2024-05-15 RX ADMIN — ISOSORBIDE MONONITRATE 180 MG: 60 TABLET, EXTENDED RELEASE ORAL at 07:50

## 2024-05-15 RX ADMIN — APIXABAN 5 MG: 5 TABLET, FILM COATED ORAL at 21:37

## 2024-05-15 RX ADMIN — HYDRALAZINE HYDROCHLORIDE 25 MG: 25 TABLET ORAL at 21:38

## 2024-05-15 RX ADMIN — ATORVASTATIN CALCIUM 40 MG: 40 TABLET, FILM COATED ORAL at 07:48

## 2024-05-15 RX ADMIN — HYDRALAZINE HYDROCHLORIDE 25 MG: 25 TABLET ORAL at 14:06

## 2024-05-15 RX ADMIN — METHOCARBAMOL TABLETS 750 MG: 750 TABLET, COATED ORAL at 06:05

## 2024-05-15 RX ADMIN — OXYCODONE HYDROCHLORIDE 2.5 MG: 5 TABLET ORAL at 07:43

## 2024-05-15 RX ADMIN — SPIRONOLACTONE 50 MG: 25 TABLET, FILM COATED ORAL at 07:46

## 2024-05-15 RX ADMIN — CLOPIDOGREL BISULFATE 75 MG: 75 TABLET ORAL at 07:48

## 2024-05-15 RX ADMIN — POTASSIUM CHLORIDE 40 MEQ: 1500 TABLET, EXTENDED RELEASE ORAL at 11:37

## 2024-05-15 RX ADMIN — METHOCARBAMOL TABLETS 750 MG: 750 TABLET, COATED ORAL at 11:37

## 2024-05-15 RX ADMIN — APIXABAN 5 MG: 5 TABLET, FILM COATED ORAL at 07:45

## 2024-05-15 RX ADMIN — DICLOFENAC 2 G: 10 GEL TOPICAL at 14:06

## 2024-05-15 RX ADMIN — ACETAMINOPHEN 975 MG: 325 TABLET ORAL at 04:05

## 2024-05-15 RX ADMIN — ROPINIROLE HYDROCHLORIDE 2 MG: 1 TABLET, FILM COATED ORAL at 15:57

## 2024-05-15 RX ADMIN — CYANOCOBALAMIN TAB 1000 MCG 1000 MCG: 1000 TAB at 07:48

## 2024-05-15 RX ADMIN — TORSEMIDE 100 MG: 100 TABLET ORAL at 15:57

## 2024-05-15 RX ADMIN — ROPINIROLE HYDROCHLORIDE 2 MG: 1 TABLET, FILM COATED ORAL at 09:23

## 2024-05-15 RX ADMIN — LIDOCAINE: 50 OINTMENT TOPICAL at 21:40

## 2024-05-15 RX ADMIN — ACETAMINOPHEN 975 MG: 325 TABLET ORAL at 09:24

## 2024-05-15 RX ADMIN — NICOTINE 1 PATCH: 21 PATCH, EXTENDED RELEASE TRANSDERMAL at 12:42

## 2024-05-15 RX ADMIN — INSULIN ASPART 2 UNITS: 100 INJECTION, SOLUTION INTRAVENOUS; SUBCUTANEOUS at 12:43

## 2024-05-15 RX ADMIN — METHOCARBAMOL TABLETS 750 MG: 750 TABLET, COATED ORAL at 00:40

## 2024-05-15 RX ADMIN — LIDOCAINE: 50 OINTMENT TOPICAL at 11:43

## 2024-05-15 RX ADMIN — TORSEMIDE 200 MG: 100 TABLET ORAL at 07:47

## 2024-05-15 RX ADMIN — ALBUMIN HUMAN 50 G: 0.25 SOLUTION INTRAVENOUS at 06:46

## 2024-05-15 RX ADMIN — ROPINIROLE HYDROCHLORIDE 2 MG: 1 TABLET, FILM COATED ORAL at 21:40

## 2024-05-15 RX ADMIN — ACETAMINOPHEN 975 MG: 325 TABLET ORAL at 21:38

## 2024-05-15 RX ADMIN — AMIODARONE HYDROCHLORIDE 200 MG: 200 TABLET ORAL at 07:49

## 2024-05-15 RX ADMIN — METHYLPREDNISOLONE SODIUM SUCCINATE 40 MG: 40 INJECTION, POWDER, FOR SOLUTION INTRAMUSCULAR; INTRAVENOUS at 07:52

## 2024-05-15 RX ADMIN — ACETAMINOPHEN 975 MG: 325 TABLET ORAL at 15:57

## 2024-05-15 RX ADMIN — CARVEDILOL 3.12 MG: 3.12 TABLET, FILM COATED ORAL at 07:49

## 2024-05-15 RX ADMIN — LIDOCAINE: 50 OINTMENT TOPICAL at 06:06

## 2024-05-15 RX ADMIN — LIDOCAINE: 50 OINTMENT TOPICAL at 15:58

## 2024-05-15 RX ADMIN — METHOCARBAMOL TABLETS 750 MG: 750 TABLET, COATED ORAL at 18:01

## 2024-05-15 RX ADMIN — DULOXETINE HYDROCHLORIDE 30 MG: 30 CAPSULE, DELAYED RELEASE ORAL at 07:47

## 2024-05-15 RX ADMIN — CARVEDILOL 3.12 MG: 3.12 TABLET, FILM COATED ORAL at 18:01

## 2024-05-15 RX ADMIN — METHOCARBAMOL TABLETS 750 MG: 750 TABLET, COATED ORAL at 23:44

## 2024-05-15 RX ADMIN — HYDRALAZINE HYDROCHLORIDE 25 MG: 25 TABLET ORAL at 07:49

## 2024-05-15 RX ADMIN — ROPINIROLE HYDROCHLORIDE 2 MG: 1 TABLET, FILM COATED ORAL at 04:05

## 2024-05-15 RX ADMIN — DICLOFENAC 2 G: 10 GEL TOPICAL at 18:05

## 2024-05-15 RX ADMIN — INSULIN ASPART 2 UNITS: 100 INJECTION, SOLUTION INTRAVENOUS; SUBCUTANEOUS at 18:01

## 2024-05-15 RX ADMIN — INSULIN ASPART 1 UNITS: 100 INJECTION, SOLUTION INTRAVENOUS; SUBCUTANEOUS at 09:25

## 2024-05-15 RX ADMIN — DICLOFENAC 2 G: 10 GEL TOPICAL at 09:24

## 2024-05-15 NOTE — PROGRESS NOTES
[facility] ACUTE PAIN SERVICE    [facility], [facility], [place], [facility], [facility], [place]   PAIN Progress Note     Assessment/Plan:  Zackary Hutchison is a 63 year old male who was admitted on 5/13/2024.  Pain team was asked to see the patient for 63 year old with intractable left hip pain possibly secondary to bursitis. Admitted for left hip pain, suspected exacerbation of HFpEF, and possible COPD exacerbation.  History of  atrial fibrillation, stage III CKD, ischemic cardiomyopathy, COPD, HFrEF, hyperlipidemia, CAD s/p CABG, restless leg syndrome, hypertension, obesity, newly diagnosed type 2 diabetes, and tobacco use disorder. The patient does smoke and denies chemical dependency history.      Opioid Induced Respiratory Depression Risk Assessment: high: COPD, HFrEF, CKD, Age, obesity, tobacco use, opioid naive     Pt with headache today - states he does not have headaches at home - new here over the past 2 days - pain improved since admission but still high - agree with current pain regimen - would avoid opioids and continue current scheduled pain therapy - can use ice prn for hip pain/headaches (cool towel).     Pt smokes 10-12 cigarettes/day  - is not on nicotine replacement - will add nicotine patch at this time, encourage use of ice prn and continue current pain regimen - would avoid opioids      PLAN:   1) Pain is consistent left hip pain, no acute injury, trauma, fever, chills. Differentials include trochanteric bursitis vs MSK pain. Left hip CT revealed left total hip arthroplasty without evidence of loosening or periprosthetic fracture. Agree with PT and OT. Would use caution with opioids as high risk for opioid induced respiratory depression.  Consider MRI imaging if continued pain and no improvement. Cardiology, WOC, and Orthopedics consults reviewed.  Multimodal Medication Therapy  Topical: lidocaine alternating with voltaren gel  - QID  NSAID'S: CrCl 53ml/min. None given CKD and cardiac history  Steroids:  Solu medrol 40 mg IV q24h  Muscle Relaxants: Robaxin 750 mg qid   Adjuvants: APAP 975 mg po q6h - add nicotine patch 21 mg/24 hr, requip 2 mg po q6h  Antidepressants/anxiolytics: Cymbalta 30mg qam  Opioids: none  IV Pain medication: none  Non-medication interventions: PT/OT - add order to offer ice to hip qid prn   Constipation Prophylaxis: miralax bid prn, senna-docusate- discontinue - no opioids     -Opioid prescriber has been none  -MN  pulled from system on 5/13. This indicates   Gabapentin only , last 12/18/23  Discharge Recommendations - We recommend prescribing the following at the time of discharge: APAP, topicals, robaxin if effective.     Tricia ArchibaldD, BCPS  Acute Care Pain Management Program  [facility]   Monday-Friday 8a-4p   Page via Epic or [employer] ([employer]

## 2024-05-15 NOTE — PLAN OF CARE
PRIMARY DIAGNOSIS: ACUTE PAIN  OUTPATIENT/OBSERVATION GOALS TO BE MET BEFORE DISCHARGE:  1. Pain Status: Improved-controlled with oral pain medications.    2. Return to near baseline physical activity: Yes    3. Cleared for discharge by consultants (if involved): No    Discharge Planner Nurse   Safe discharge environment identified: Yes  Barriers to discharge: Yes       Entered by: Meagan Zee RN 05/15/2024 4:35 AM     Please review provider order for any additional goals.   Nurse to notify provider when observation goals have been met and patient is ready for discharge.Goal Outcome Evaluation:

## 2024-05-15 NOTE — PLAN OF CARE
Occupational Therapy Discharge Summary    Reason for therapy discharge:    All goals and outcomes met, no further needs identified.    Progress towards therapy goal(s). See goals on Care Plan in [facility] electronic health record for goal details.  Goals met    Therapy recommendation(s):    No further therapy is recommended.     ALISON Shi, OTR/L, 5/15/2024, 4:04 PM

## 2024-05-15 NOTE — PROGRESS NOTES
Neuro: A&Ox4.   Cardiac: NSR, VSS.   Respiratory: RA  GI/: Voiding spontaneously.in urinal No BM this shift.  Diet/appetite: Tolerating 2gm Na diet. Denies nausea.  Activity: Up with assist x 1  Pain: complains of pain so prn oxycodone given  Skin: No new deficits noted.  Lines: piv SL  Drains: no  Replacements: K protocol  Is in 1800 fluid restriction diet, complains of pain on left hip so prn oxycodone given (see MAR),refused to take BG and insulin

## 2024-05-15 NOTE — PLAN OF CARE
PRIMARY DIAGNOSIS: CONGESTIVE HEART FAILURE  OUTPATIENT/OBSERVATION GOALS TO BE MET BEFORE DISCHARGE:  Dyspnea improved and O2 sats >88% at RA or at prior home O2 therapy level: Yes        SpO2: 93 %, O2 Device: None (Room air)  Vitals:    05/13/24 0533   Weight: 118.8 kg (262 lb)        ECHO and other diagnostic testing complete (if applicable): Yes    Return to near baseline physical activity: No    Discharge Planner Nurse   Safe discharge environment identified: Yes  Barriers to discharge: Yes       Entered by: Suyapa Pennington RN 05/15/2024 2:54 PM     Please review provider order for any additional goals.   Nurse to notify provider when observation goals have been met and patient is ready for discharge.Goal Outcome Evaluation:       Pt rating headache and hip pain at 7/10, given heat packs for hip and nicotine patch/cold pack for headache. Pt moved between bed and chair independently. Using urinal and commode.

## 2024-05-15 NOTE — PROGRESS NOTES
[facility]    Medicine Progress Note - Hospitalist Service    Date of Admission:  5/13/2024    Assessment & Plan      63 year old male with history of atrial fibrillation, stage III CKD, ischemic cardiomyopathy, COPD, HFrEF, hyperlipidemia, CAD s/p CABG, restless leg syndrome, hypertension, obesity, newly diagnosed type 2 diabetes, and tobacco use disorder admitted on 5/13/2024 with the ER with left hip pain, suspected exacerbation of HFpEF, and possible COPD exacerbation.     Intractable left hip pain  Left hip CT revealed left total hip arthroplasty without evidence of loosening or periprosthetic fracture.  Seen by orthopedics and thought likely secondary to trochanteric bursitis, conservative management recommended including anti-inflammatories, ice and rest. Continue steroids.  Patient reports his pain is not improving. Pain team following-appreciate recommendations.   PT and OT for gluteal stretching and strengthening.  Follow-up at [facility] with outpatient PT after discharge.  Consider MRI if unable to bear weight per Ortho.    Acute decompensated HFrEF  Good response to diuretic regimen.  Torsemide decreased today to 100 mg BID per cardiology  Creatinine increased today-1.88. Continue to monitor renal function.          Diet: Fluid restriction 1800 ML FLUID (and additional linked orders)  2 Gram Sodium Diet    DVT Prophylaxis: DOAC  Ruvalcaba Catheter: Not present  Lines: None     Cardiac Monitoring: ACTIVE order. Indication: Acute decompensated heart failure (48 hours)  Code Status: Full Code      Clinically Significant Risk Factors Present on Admission        # Hypokalemia: Lowest K = 3.3 mmol/L in last 2 days, will replace as needed  # Hyponatremia: Lowest Na = 129 mmol/L in last 2 days, will monitor as appropriate   # Hypercalcemia: Highest Ca = 10.4 mg/dL in last 2 days, will monitor as appropriate     # Drug Induced Coagulation Defect: home medication list includes an  "anticoagulant medication  # Drug Induced Platelet Defect: home medication list includes an antiplatelet medication  # Acute Kidney Injury, unspecified: based on a >150% or 0.3 mg/dL increase in last creatinine compared to past 90 day average, will monitor renal function  # Hypertension: Noted on problem list  # Acute heart failure with reduced ejection fraction: last echo with EF <40% and receiving IV diuretics    # DMII: A1C = 7.8 % (Ref range: <5.7 %) within past 6 months    # Severe Obesity: Estimated body mass index is 42.29 kg/m  as calculated from the following:    Height as of this encounter: 1.676 m (5' 6\").    Weight as of this encounter: 118.8 kg (262 lb).       # Financial/Environmental Concerns: none         Disposition Plan     Medically Ready for Discharge: Anticipated Tomorrow           The patient's care was discussed with the Attending Physician, Dr. Quentin Escobar .    Toshia Camarillo NP  Hospitalist Service  [facility]  Securely message with [employer] (more info)  Text page via [employer] Paging/Directory   ______________________________________________________________________    Interval History       Physical Exam   Vital Signs: Temp: 97  F (36.1  C) Temp src: Oral BP: 109/67 Pulse: 75   Resp: 18 SpO2: 96 % O2 Device: None (Room air)    Weight: 262 lbs 0 oz    Constitutional: awake, alert, cooperative, no apparent distress, and appears stated age  Respiratory: No increased work of breathing, good air exchange, clear to auscultation bilaterally, no crackles or wheezing  Cardiovascular: Normal apical impulse, regular rate and rhythm, normal S1 and S2, no S3 or S4, and no murmur noted  GI: No scars, normal bowel sounds, soft, non-distended, non-tender, no masses palpated, no hepatosplenomegally    Medical Decision Making       40 MINUTES SPENT BY ME on the date of service doing chart review, history, exam, documentation & further activities per the note.  MANAGEMENT DISCUSSED " with the following over the past 24 hours: Kami Escobar   NOTE(S)/MEDICAL RECORDS REVIEWED over the past 24 hours: Dr. Escobar       Data     I have personally reviewed the following data over the past 24 hrs:    13.1 (H)  \   13.2 (L)   / 209     129 (L) 84 (L) 65.7 (H) /  162 (H)   3.3 (L) 29 1.88 (H) \       Imaging results reviewed over the past 24 hrs:   No results found for this or any previous visit (from the past 24 hour(s)).

## 2024-05-15 NOTE — PROGRESS NOTES
"Orthopedic Progress Note      Assessment:    Left hip pain status post total hip arthroplasty, pain seems likely to be due to a trochanteric bursitis     Plan:   -Continue conservative management for this issue including anti-inflammatories, ice, rest  -Continue steroids.  If medically stable and able to weightbear then okay to discharge from our perspective.  If unable to weightbear could consider MRI to rule out occult fracture  -Weight-bear as tolerated  -PT/OT - gluteal stretching and strengthening  - Follow up at [facility] following discharge for outpatient PT      Subjective:    Patient reports feeling well today. Pain is not improving.  He is weightbearing and ambulating in room but it exacerbates pain.  All questions/concerns answered.      Objective:  BP (!) 145/75 (BP Location: Left arm)   Pulse 75   Temp 97  F (36.1  C) (Oral)   Resp 18   Ht 1.676 m (5' 6\")   Wt 118.8 kg (262 lb)   SpO2 96%   BMI 42.29 kg/m      General: On examination, the patient is resting comfortably, NAD, awake, and alert and oriented to person, place, time, and, and general circumstances   SKIN: There is no evidence of erythema, warmth, swelling, deformity, crepitus, break to the skin, open wound.  Pulses:  Dorsalis pedis and posterior tibialis pulse is intact and equal bilaterally  Sensation: intact and equal bilaterally to the distal lower extremities.  Tenderness: Tender to palpation over the greater trochanter and lateral proximal femur  ROM: Able to perform hip flexion, quad extension, IR/ER with minimal pain elicited in active and passive range of motion     Contralateral side= Full range of motion, Negative joint instability findings, 5/5 motor groups about the joint, Non-tender.     Contralateral side= Full range of motion, Negative joint instability findings, 5/5 motor groups about the joint, Non-tender.       Pertinent Labs   Lab Results: personally reviewed.   Lab Results   Component Value Date    INR 1.30 " (H) 05/13/2024    INR 1.01 02/13/2023     Lab Results   Component Value Date    WBC 13.1 (H) 05/15/2024    HGB 13.2 (L) 05/15/2024    HCT 41.3 05/15/2024    MCV 92 05/15/2024     05/15/2024     Lab Results   Component Value Date     (L) 05/15/2024    CO2 29 05/15/2024         Report completed by:  ALBINA CARMICHAEL PA-C  Date: 05/15/2024  [facility]

## 2024-05-15 NOTE — PLAN OF CARE
PRIMARY DIAGNOSIS: ACUTE PAIN  OUTPATIENT/OBSERVATION GOALS TO BE MET BEFORE DISCHARGE:  1. Pain Status: Improved-controlled with oral pain medications.    2. Return to near baseline physical activity: Yes    3. Cleared for discharge by consultants (if involved): No    Discharge Planner Nurse   Safe discharge environment identified: Yes  Barriers to discharge: Yes       Entered by: Meagan Zee RN 05/15/2024 4:34 AM     Please review provider order for any additional goals.   Nurse to notify provider when observation goals have been met and patient is ready for discharge.Goal Outcome Evaluation:

## 2024-05-15 NOTE — PROGRESS NOTES
HEART CARE NOTE          Assessment/Recommendations     1. Severe Biventricular dysfunction/HFrEF c/b severe ADHF  Assessment / Plan  Good response to current diuretic regimen - reduce torsemide to 100 mg BID; continue to monitor UOP and renal function closely  Patient is high risk for adverse cardiac events 2/2 severe biventricular dysfunction, multiple HFHs, non-compliance with dietary restrictions, renal dysfunction, CAD  GDMT as detailed below     Current Pharmacotherapy AHA Guideline-Directed Medical Therapy   Lisinopril 5 mg daily  - held Lisinopril 20 mg twice daily   Carvedilol 3.125 mg BID  Carvedilol 25 mg twice daily   Spironolactone 50 mg daily Spironolactone 25 mg once daily   Hydralazine 10 mg TID Hydralazine 100 mg three times daily   Isosorbide mononitrate 180 mg daily Isosorbide dinitrate 40 mg three times daily   SGLT2 inhibitor:Dapagliflozin/Empagliflozin - not started Dapagliflozin or Empagliflozin 10 mg daily      2. Atrial fibrillation  Assessment / Plan  Currently on apixaban and amiodarone     3. CAD c/b ICM  Assessment / Plan  S/p CABG 2015  Now s/p PCI to Ramus and OM with DARRELL  Denies chest pain or anginal equivalents  Continue atorvastatin, carvedilol, clopidogrel     4.CKD  Assessment / Plan  Diuresis as above; continue to monitor UOP and renal function closely     5.Left hip bursitis  Assessment / Plan  Management and supportive care per primary team and orthopedic surgery  Given the degree of renal dysfunction, would avoid all NSAID use including topical NSAIDs which will still be systemically absorbed     Plan of care discussed on May 15, 2024 with patient at bedside, and primary team overseeing patient's care      History of Present Illness/Subjective    Mr. Zackary Hutchison is a 63 year old male with a PMHx significant for (per Epic notation) atrial fibrillation, stage III CKD, ischemic cardiomyopathy, COPD, HFrEF, hyperlipidemia, CAD s/p CABG, restless leg syndrome, hypertension,  obesity, newly diagnosed type 2 diabetes, and tobacco use disorder admitted on 5/13/2024 with the ER with left hip pain, suspected exacerbation of HFpEF, and possible COPD exacerbation      Today, Mr. Hutchison denies acute cardiac events or complaints; Cardiac management as detailed above     ECG: Personally reviewed 5/13/24. normal sinus rhythm, PAC's noted.     ECHO (personnaly Reviewed on 5/13/24):   1. Technically difficult study.  2. The left ventricle is normal in size. Image quality does not provide for  detailed assessment of LV systolic function, but is felt to be normal with a  visually estimated ejection fraction of roughly 55%.  3. No significant valvular heart disease is identified on this study though  the sensitivity, particularly of regurgitant lesions, is reduced due to poor  Doppler acoustics.  4. Right ventricular size and systolic performance could not be accurately  assessed due to inadequate visualization.     The acoustic quality of this study is fairly poor. If a more accurate  assessment of left ventricular systolicperformance, regional wall motion, and  other morphology/function is required; would recommend cardiac MRI or other  alternative imaging modality for further evaluation.     When compared to the prior real-time echocardiogram dated 25 December 2023,  the ejection fraction appears somewhat higher on the current study though  accurate comparison is somewhat limited due to suboptimal acoustic imaging not  only on the current examination, but also hold the prior study as well.    Telemetry: personally reviewed May 15, 2024; notable for sinus rhythm     Lab results: personally reviewed May 15, 2024; notable for hyponatremia and PRINCESS     Medical history and pertinent documents reviewed in Care Everywhere please where applicable see details above        Physical Examination Review of Systems   BP (!) 152/63 (BP Location: Left arm, Cuff Size: Adult Large)   Pulse 62   Temp 97  F (36.1  C)  "(Oral)   Resp 16   Ht 1.676 m (5' 6\")   Wt 118.8 kg (262 lb)   SpO2 95%   BMI 42.29 kg/m    Body mass index is 42.29 kg/m .  Wt Readings from Last 3 Encounters:   05/13/24 118.8 kg (262 lb)   05/08/24 121 kg (266 lb 11.2 oz)   04/25/24 126.1 kg (278 lb)     General Appearance:   no distress, normal body habitus   ENT/Mouth: membranes moist, no oral lesions or bleeding gums.      EYES:  no scleral icterus, normal conjunctivae   Neck: no carotid bruits or thyromegaly   Chest/Lungs:   lungs are clear to auscultation, no rales or wheezing, equal chest wall expansion    Cardiovascular:   Regular. Normal first and second heart sounds with no murmurs, rubs, or gallops; the carotid, radial and posterior tibial pulses are intact, no JVD and mild LE edema bilaterally    Abdomen:  no organomegaly, masses, bruits, or tenderness; bowel sounds are present   Extremities: no cyanosis or clubbing   Skin: no xanthelasma, warm.    Neurologic: NAD     Psychiatric: alert and oriented x3, calm     A complete 10 systems ROS was reviewed  And is negative except what is listed in the HPI.          Medical History  Surgical History Family History Social History   Past Medical History:   Diagnosis Date    Atrial fibrillation (H)     Chronic kidney disease     Chronic kidney disease, stage 3b (H) 09/28/2023    Class 3 severe obesity due to excess calories with body mass index (BMI) of 40.0 to 44.9 in adult (H) 09/28/2023    Congestive heart failure (H)     COPD (chronic obstructive pulmonary disease) (H)     Coronary artery disease involving coronary bypass graft of native heart without angina pectoris 09/28/2023    Heart failure with reduced ejection fraction (H) 09/11/2023    HFrEF (heart failure with reduced ejection fraction) (H) 09/11/2023    Hyperlipidemia     Hypertension     Ischemic cardiomyopathy 09/28/2023    Obese     Paroxysmal atrial fibrillation (H) 09/28/2023    Tobacco abuse 09/28/2023    Past Surgical History: "   Procedure Laterality Date    CORONARY ANGIOGRAPHY ADULT ORDER      CORONARY ARTERY BYPASS Left 2014    2 vessels    CV CARDIOMEMS WITH RIGHT HEART CATH N/A 2/15/2024    Procedure: Pulmonary Arterial Pressure Sensor Placement;  Surgeon: Jacey Bhandari MD;  Location: [facility]    CV CORONARY ANGIOGRAM N/A 09/11/2023    Procedure: Coronary Angiogram;  Surgeon: Santy Esposito MD;  Location: [facility]    CV CORONARY ANGIOGRAM N/A 5/2/2024    Procedure: CV CORONARY ANGIOGRAM;  Surgeon: Santy Esposito MD;  Location: [facility]    CV LEFT HEART CATH N/A 09/11/2023    Procedure: Left Heart Catheterization;  Surgeon: Santy Esposito MD;  Location: [facility]    CV LEFT HEART CATH N/A 5/2/2024    Procedure: Left Heart Catheterization;  Surgeon: Santy Esposito MD;  Location: [facility]    CV PCI ANGIOPLASTY N/A 5/2/2024    Procedure: Percutaneous Transluminal Angioplasty;  Surgeon: Santy Esposito MD;  Location: [facility]    CV RIGHT HEART CATH MEASUREMENTS RECORDED N/A 09/11/2023    Procedure: Right Heart Catheterization;  Surgeon: Santy Esposito MD;  Location: [facility]    no family history of premature coronary artery disease Social History     Socioeconomic History    Marital status: Single     Spouse name: Not on file    Number of children: Not on file    Years of education: Not on file    Highest education level: Not on file   Occupational History    Occupation: Retired restaurant owner     Comment: [employer]   Tobacco Use    Smoking status: Every Day     Current packs/day: 0.50     Types: Cigarettes    Smokeless tobacco: Never    Tobacco comments:     Seen IP by [facility] on 9/11/23 and declined cessation services and materials   Substance and Sexual Activity    Alcohol use: Not Currently    Drug use: Not on file    Sexual activity: Not on file   Other Topics Concern    Not on file   Social History Narrative    Currently lives in  "an assisted living facility. (Updated: 01/08/2024)     Social Determinants of Health     Financial Resource Strain: Not on file   Food Insecurity: Not on file   Transportation Needs: Not on file   Physical Activity: Not on file   Stress: Not on file   Social Connections: Not on file   Interpersonal Safety: Not on file   Housing Stability: Not on file           Lab Results    Chemistry/lipid CBC Cardiac Enzymes/BNP/TSH/INR   Lab Results   Component Value Date    CHOL 121 05/02/2024    HDL 27 (L) 05/02/2024    TRIG 219 (H) 05/02/2024    BUN 43.2 (H) 05/14/2024     (L) 05/14/2024    CO2 24 05/14/2024    Lab Results   Component Value Date    WBC 8.5 05/13/2024    HGB 12.2 (L) 05/13/2024    HCT 38.1 (L) 05/13/2024    MCV 92 05/13/2024     05/13/2024    Lab Results   Component Value Date    TSH 1.70 04/29/2024    INR 1.30 (H) 05/13/2024     No results found for: \"CKTOTAL\", \"CKMB\", \"TROPONINI\"       Weight:    Wt Readings from Last 3 Encounters:   05/13/24 118.8 kg (262 lb)   05/08/24 121 kg (266 lb 11.2 oz)   04/25/24 126.1 kg (278 lb)       Allergies  Allergies   Allergen Reactions    Bumex [Bumetanide] Muscle Pain (Myalgia)         Surgical History  Past Surgical History:   Procedure Laterality Date    CORONARY ANGIOGRAPHY ADULT ORDER      CORONARY ARTERY BYPASS Left 2014    2 vessels    CV CARDIOMEMS WITH RIGHT HEART CATH N/A 2/15/2024    Procedure: Pulmonary Arterial Pressure Sensor Placement;  Surgeon: Jacey Bhandari MD;  Location: [facility] CATH LAB CV    CV CORONARY ANGIOGRAM N/A 09/11/2023    Procedure: Coronary Angiogram;  Surgeon: Santy Esposito MD;  Location: [facility] CATH LAB CV    CV CORONARY ANGIOGRAM N/A 5/2/2024    Procedure: CV CORONARY ANGIOGRAM;  Surgeon: Santy Esposito MD;  Location: [facility] CATH LAB CV    CV LEFT HEART CATH N/A 09/11/2023    Procedure: Left Heart Catheterization;  Surgeon: Santy Esposito MD;  Location: [facility] CATH LAB [facility]    CV LEFT HEART CATH N/A " 5/2/2024    Procedure: Left Heart Catheterization;  Surgeon: Santy Esposito MD;  Location: [facility] CATH LAB CV    CV PCI ANGIOPLASTY N/A 5/2/2024    Procedure: Percutaneous Transluminal Angioplasty;  Surgeon: Santy Esposito MD;  Location: [facility] LAB CV    CV RIGHT HEART CATH MEASUREMENTS RECORDED N/A 09/11/2023    Procedure: Right Heart Catheterization;  Surgeon: Santy Esposito MD;  Location: [facility] CATH LAB CV       Social History  Tobacco:   History   Smoking Status    Every Day    Types: Cigarettes   Smokeless Tobacco    Never    Alcohol:   Social History    Substance and Sexual Activity      Alcohol use: Not Currently   Illicit Drugs:   History   Drug Use Not on file       Family History  Family History   Problem Relation Age of Onset    Cerebrovascular Disease Mother     Myocardial Infarction Father           Jacey Bhandari MD on 5/15/2024      cc: Reid Escobar

## 2024-05-15 NOTE — UTILIZATION REVIEW
"Admission Status; Secondary Review Determination     Under the authority of the Utilization Management Committee, the utilization review process indicated a secondary review on the above patient.  The review outcome is based on review of the medical records, discussions with staff, and applying clinical experience noted on the date of the review.          (x) Observation Status Appropriate - This patient does not meet hospital inpatient criteria and is placed in observation status. If this patient's primary payer is Medicare and was admitted as an inpatient, Condition Code 44 should be used and patient status changed to \"observation\".       RATIONALE FOR DETERMINATION     Mr. Hutchison is a 62 yo male who presents to the ED with CAD, HTN, restless leg syndrome who presents to the ED with left hip pain and CHF exacerbation.  Left hip CT without any abnormalities.  Able to ambulate with PT.  Ortho consulted, improved with IV steroids given and recommends discharge home if able to ambulate.  Cardiology consulted for CHF; titrating down oral diuretics today as labs reveal slight increase in creat.  On oral pain meds.     Spoke with Dr. Escobar and reviewed the case.     The severity of illness, intensity of service provided, expected LOS and risk for adverse outcome make the care appropriate for further observation; however, doesn't meet criteria for hospital inpatient admission.         The information on this document is developed by the utilization review team in order for the business office to ensure compliance.  This only denotes the appropriateness of proper admission status and does not reflect the quality of care rendered.         The definitions of Inpatient Status and Observation Status used in making the determination above are those provided in the CMS Coverage Manual, Chapter 1 and Chapter 6, section 70.4.        Sincerely,    Dee Dee Brar, DO  Utilization Review  Physician Advisor  [facility] " Services

## 2024-05-16 PROBLEM — S81.001A OPEN WOUND OF KNEE, LEG, AND ANKLE, RIGHT, INITIAL ENCOUNTER: Status: ACTIVE | Noted: 2024-05-16

## 2024-05-16 PROBLEM — M25.552 HIP PAIN, LEFT: Status: ACTIVE | Noted: 2024-05-16

## 2024-05-16 PROBLEM — S91.001A OPEN WOUND OF KNEE, LEG, AND ANKLE, RIGHT, INITIAL ENCOUNTER: Status: ACTIVE | Noted: 2024-05-16

## 2024-05-16 PROBLEM — S81.801A OPEN WOUND OF KNEE, LEG, AND ANKLE, RIGHT, INITIAL ENCOUNTER: Status: ACTIVE | Noted: 2024-05-16

## 2024-05-16 PROBLEM — S81.809A OPEN WOUND OF LOWER LIMB: Status: ACTIVE | Noted: 2024-05-16

## 2024-05-16 PROBLEM — I50.9 ACUTE ON CHRONIC CONGESTIVE HEART FAILURE, UNSPECIFIED HEART FAILURE TYPE (H): Status: ACTIVE | Noted: 2024-05-16

## 2024-05-16 PROBLEM — R09.02 HYPOXIA: Status: ACTIVE | Noted: 2024-05-16

## 2024-05-16 LAB
ALBUMIN SERPL BCG-MCNC: 5.5 G/DL (ref 3.5–5.2)
ALP SERPL-CCNC: 83 U/L (ref 40–150)
ALT SERPL W P-5'-P-CCNC: 23 U/L (ref 0–70)
ANION GAP SERPL CALCULATED.3IONS-SCNC: 20 MMOL/L (ref 7–15)
AST SERPL W P-5'-P-CCNC: 14 U/L (ref 0–45)
BILIRUB SERPL-MCNC: 0.5 MG/DL
BUN SERPL-MCNC: 90.9 MG/DL (ref 8–23)
CALCIUM SERPL-MCNC: 10.7 MG/DL (ref 8.8–10.2)
CHLORIDE SERPL-SCNC: 86 MMOL/L (ref 98–107)
CREAT SERPL-MCNC: 2.11 MG/DL (ref 0.67–1.17)
DEPRECATED HCO3 PLAS-SCNC: 26 MMOL/L (ref 22–29)
EGFRCR SERPLBLD CKD-EPI 2021: 34 ML/MIN/1.73M2
ERYTHROCYTE [DISTWIDTH] IN BLOOD BY AUTOMATED COUNT: 19 % (ref 10–15)
GLUCOSE BLDC GLUCOMTR-MCNC: 170 MG/DL (ref 70–99)
GLUCOSE BLDC GLUCOMTR-MCNC: 183 MG/DL (ref 70–99)
GLUCOSE BLDC GLUCOMTR-MCNC: 220 MG/DL (ref 70–99)
GLUCOSE BLDC GLUCOMTR-MCNC: 251 MG/DL (ref 70–99)
GLUCOSE BLDC GLUCOMTR-MCNC: 355 MG/DL (ref 70–99)
GLUCOSE BLDC GLUCOMTR-MCNC: 440 MG/DL (ref 70–99)
GLUCOSE SERPL-MCNC: 162 MG/DL (ref 70–99)
HCT VFR BLD AUTO: 47 % (ref 40–53)
HGB BLD-MCNC: 14.8 G/DL (ref 13.3–17.7)
MAGNESIUM SERPL-MCNC: 2.4 MG/DL (ref 1.7–2.3)
MCH RBC QN AUTO: 28.5 PG (ref 26.5–33)
MCHC RBC AUTO-ENTMCNC: 31.5 G/DL (ref 31.5–36.5)
MCV RBC AUTO: 90 FL (ref 78–100)
PLATELET # BLD AUTO: 231 10E3/UL (ref 150–450)
POTASSIUM SERPL-SCNC: 3.2 MMOL/L (ref 3.4–5.3)
POTASSIUM SERPL-SCNC: 3.4 MMOL/L (ref 3.4–5.3)
PROT SERPL-MCNC: 9.4 G/DL (ref 6.4–8.3)
RBC # BLD AUTO: 5.2 10E6/UL (ref 4.4–5.9)
SODIUM SERPL-SCNC: 132 MMOL/L (ref 135–145)
WBC # BLD AUTO: 11.4 10E3/UL (ref 4–11)

## 2024-05-16 PROCEDURE — 36415 COLL VENOUS BLD VENIPUNCTURE: CPT | Performed by: EMERGENCY MEDICINE

## 2024-05-16 PROCEDURE — 250N000013 HC RX MED GY IP 250 OP 250 PS 637: Performed by: INTERNAL MEDICINE

## 2024-05-16 PROCEDURE — 84132 ASSAY OF SERUM POTASSIUM: CPT | Performed by: EMERGENCY MEDICINE

## 2024-05-16 PROCEDURE — 250N000013 HC RX MED GY IP 250 OP 250 PS 637: Performed by: EMERGENCY MEDICINE

## 2024-05-16 PROCEDURE — G0378 HOSPITAL OBSERVATION PER HR: HCPCS

## 2024-05-16 PROCEDURE — 250N000013 HC RX MED GY IP 250 OP 250 PS 637: Performed by: PHYSICIAN ASSISTANT

## 2024-05-16 PROCEDURE — 250N000011 HC RX IP 250 OP 636: Performed by: INTERNAL MEDICINE

## 2024-05-16 PROCEDURE — 83735 ASSAY OF MAGNESIUM: CPT | Performed by: EMERGENCY MEDICINE

## 2024-05-16 PROCEDURE — 250N000013 HC RX MED GY IP 250 OP 250 PS 637

## 2024-05-16 PROCEDURE — 250N000013 HC RX MED GY IP 250 OP 250 PS 637: Performed by: FAMILY MEDICINE

## 2024-05-16 PROCEDURE — 82962 GLUCOSE BLOOD TEST: CPT

## 2024-05-16 PROCEDURE — 250N000011 HC RX IP 250 OP 636: Performed by: EMERGENCY MEDICINE

## 2024-05-16 PROCEDURE — 120N000001 HC R&B MED SURG/OB

## 2024-05-16 PROCEDURE — 250N000013 HC RX MED GY IP 250 OP 250 PS 637: Performed by: NURSE PRACTITIONER

## 2024-05-16 PROCEDURE — 99207 PR APP CREDIT; MD BILLING SHARED VISIT: CPT | Mod: FS

## 2024-05-16 PROCEDURE — 82040 ASSAY OF SERUM ALBUMIN: CPT | Performed by: EMERGENCY MEDICINE

## 2024-05-16 PROCEDURE — 99232 SBSQ HOSP IP/OBS MODERATE 35: CPT | Mod: FS | Performed by: EMERGENCY MEDICINE

## 2024-05-16 PROCEDURE — P9047 ALBUMIN (HUMAN), 25%, 50ML: HCPCS | Performed by: INTERNAL MEDICINE

## 2024-05-16 PROCEDURE — 99233 SBSQ HOSP IP/OBS HIGH 50: CPT | Performed by: INTERNAL MEDICINE

## 2024-05-16 PROCEDURE — 85027 COMPLETE CBC AUTOMATED: CPT | Performed by: EMERGENCY MEDICINE

## 2024-05-16 RX ORDER — DULOXETIN HYDROCHLORIDE 60 MG/1
60 CAPSULE, DELAYED RELEASE ORAL EVERY MORNING
Status: DISCONTINUED | OUTPATIENT
Start: 2024-05-17 | End: 2024-05-20 | Stop reason: HOSPADM

## 2024-05-16 RX ORDER — LIDOCAINE 50 MG/G
OINTMENT TOPICAL EVERY 6 HOURS
Status: DISCONTINUED | OUTPATIENT
Start: 2024-05-16 | End: 2024-05-20 | Stop reason: HOSPADM

## 2024-05-16 RX ORDER — POTASSIUM CHLORIDE 1500 MG/1
40 TABLET, EXTENDED RELEASE ORAL ONCE
Status: COMPLETED | OUTPATIENT
Start: 2024-05-16 | End: 2024-05-16

## 2024-05-16 RX ORDER — DULOXETIN HYDROCHLORIDE 30 MG/1
30 CAPSULE, DELAYED RELEASE ORAL ONCE
Status: COMPLETED | OUTPATIENT
Start: 2024-05-16 | End: 2024-05-16

## 2024-05-16 RX ORDER — OXYCODONE HYDROCHLORIDE 5 MG/1
5 TABLET ORAL EVERY 4 HOURS PRN
Status: DISCONTINUED | OUTPATIENT
Start: 2024-05-16 | End: 2024-05-17

## 2024-05-16 RX ORDER — OXYCODONE HYDROCHLORIDE 5 MG/1
5 TABLET ORAL
Status: COMPLETED | OUTPATIENT
Start: 2024-05-16 | End: 2024-05-16

## 2024-05-16 RX ORDER — ALBUMIN (HUMAN) 12.5 G/50ML
50 SOLUTION INTRAVENOUS ONCE
Status: COMPLETED | OUTPATIENT
Start: 2024-05-16 | End: 2024-05-16

## 2024-05-16 RX ADMIN — ACETAMINOPHEN 975 MG: 325 TABLET ORAL at 20:44

## 2024-05-16 RX ADMIN — ROPINIROLE HYDROCHLORIDE 2 MG: 1 TABLET, FILM COATED ORAL at 20:46

## 2024-05-16 RX ADMIN — DULOXETINE HYDROCHLORIDE 30 MG: 30 CAPSULE, DELAYED RELEASE ORAL at 09:10

## 2024-05-16 RX ADMIN — OXYCODONE HYDROCHLORIDE 5 MG: 5 TABLET ORAL at 21:33

## 2024-05-16 RX ADMIN — HYDRALAZINE HYDROCHLORIDE 25 MG: 25 TABLET ORAL at 14:50

## 2024-05-16 RX ADMIN — Medication 2 G: at 20:50

## 2024-05-16 RX ADMIN — ROPINIROLE HYDROCHLORIDE 2 MG: 1 TABLET, FILM COATED ORAL at 09:04

## 2024-05-16 RX ADMIN — CARVEDILOL 3.12 MG: 3.12 TABLET, FILM COATED ORAL at 18:29

## 2024-05-16 RX ADMIN — METHOCARBAMOL TABLETS 750 MG: 750 TABLET, COATED ORAL at 11:23

## 2024-05-16 RX ADMIN — SPIRONOLACTONE 50 MG: 25 TABLET, FILM COATED ORAL at 09:00

## 2024-05-16 RX ADMIN — OXYCODONE HYDROCHLORIDE 5 MG: 5 TABLET ORAL at 03:30

## 2024-05-16 RX ADMIN — APIXABAN 5 MG: 5 TABLET, FILM COATED ORAL at 09:00

## 2024-05-16 RX ADMIN — ATORVASTATIN CALCIUM 40 MG: 40 TABLET, FILM COATED ORAL at 08:59

## 2024-05-16 RX ADMIN — APIXABAN 5 MG: 5 TABLET, FILM COATED ORAL at 20:46

## 2024-05-16 RX ADMIN — CYANOCOBALAMIN TAB 1000 MCG 1000 MCG: 1000 TAB at 09:00

## 2024-05-16 RX ADMIN — ACETAMINOPHEN 975 MG: 325 TABLET ORAL at 08:59

## 2024-05-16 RX ADMIN — NICOTINE 1 PATCH: 21 PATCH, EXTENDED RELEASE TRANSDERMAL at 08:55

## 2024-05-16 RX ADMIN — DULOXETINE HYDROCHLORIDE 30 MG: 30 CAPSULE, DELAYED RELEASE ORAL at 14:50

## 2024-05-16 RX ADMIN — LIDOCAINE: 50 OINTMENT TOPICAL at 23:23

## 2024-05-16 RX ADMIN — CARVEDILOL 3.12 MG: 3.12 TABLET, FILM COATED ORAL at 09:00

## 2024-05-16 RX ADMIN — INSULIN ASPART 1 UNITS: 100 INJECTION, SOLUTION INTRAVENOUS; SUBCUTANEOUS at 09:17

## 2024-05-16 RX ADMIN — LIDOCAINE: 50 OINTMENT TOPICAL at 14:51

## 2024-05-16 RX ADMIN — ISOSORBIDE MONONITRATE 180 MG: 60 TABLET, EXTENDED RELEASE ORAL at 08:59

## 2024-05-16 RX ADMIN — POTASSIUM CHLORIDE 40 MEQ: 1500 TABLET, EXTENDED RELEASE ORAL at 11:23

## 2024-05-16 RX ADMIN — HYDRALAZINE HYDROCHLORIDE 25 MG: 25 TABLET ORAL at 20:48

## 2024-05-16 RX ADMIN — DICLOFENAC 2 G: 10 GEL TOPICAL at 10:31

## 2024-05-16 RX ADMIN — METHOCARBAMOL TABLETS 750 MG: 750 TABLET, COATED ORAL at 23:23

## 2024-05-16 RX ADMIN — ALBUMIN HUMAN 50 G: 0.25 SOLUTION INTRAVENOUS at 20:37

## 2024-05-16 RX ADMIN — AMIODARONE HYDROCHLORIDE 200 MG: 200 TABLET ORAL at 09:00

## 2024-05-16 RX ADMIN — CLOPIDOGREL BISULFATE 75 MG: 75 TABLET ORAL at 09:00

## 2024-05-16 RX ADMIN — PANTOPRAZOLE SODIUM 40 MG: 40 TABLET, DELAYED RELEASE ORAL at 09:00

## 2024-05-16 RX ADMIN — METHOCARBAMOL TABLETS 750 MG: 750 TABLET, COATED ORAL at 18:30

## 2024-05-16 RX ADMIN — METHOCARBAMOL TABLETS 750 MG: 750 TABLET, COATED ORAL at 05:41

## 2024-05-16 RX ADMIN — INSULIN ASPART 2 UNITS: 100 INJECTION, SOLUTION INTRAVENOUS; SUBCUTANEOUS at 14:59

## 2024-05-16 RX ADMIN — INSULIN ASPART 1 UNITS: 100 INJECTION, SOLUTION INTRAVENOUS; SUBCUTANEOUS at 16:42

## 2024-05-16 RX ADMIN — HYDRALAZINE HYDROCHLORIDE 25 MG: 25 TABLET ORAL at 09:00

## 2024-05-16 RX ADMIN — ROPINIROLE HYDROCHLORIDE 2 MG: 1 TABLET, FILM COATED ORAL at 02:40

## 2024-05-16 RX ADMIN — ACETAMINOPHEN 975 MG: 325 TABLET ORAL at 02:40

## 2024-05-16 RX ADMIN — ROPINIROLE HYDROCHLORIDE 2 MG: 1 TABLET, FILM COATED ORAL at 15:52

## 2024-05-16 RX ADMIN — Medication 2 G: at 18:30

## 2024-05-16 RX ADMIN — ACETAMINOPHEN 975 MG: 325 TABLET ORAL at 15:52

## 2024-05-16 RX ADMIN — LIDOCAINE: 50 OINTMENT TOPICAL at 09:11

## 2024-05-16 ASSESSMENT — ACTIVITIES OF DAILY LIVING (ADL)
VISION_MANAGEMENT: GLASSES
ADLS_ACUITY_SCORE: 35
ADLS_ACUITY_SCORE: 27
ADLS_ACUITY_SCORE: 35
ADLS_ACUITY_SCORE: 27
ADLS_ACUITY_SCORE: 35
ADLS_ACUITY_SCORE: 36
ADLS_ACUITY_SCORE: 36
ADLS_ACUITY_SCORE: 35
CHANGE_IN_FUNCTIONAL_STATUS_SINCE_ONSET_OF_CURRENT_ILLNESS/INJURY: NO
ADLS_ACUITY_SCORE: 27
ADLS_ACUITY_SCORE: 27
WALKING_OR_CLIMBING_STAIRS: AMBULATION DIFFICULTY, REQUIRES EQUIPMENT
ADLS_ACUITY_SCORE: 35
ADLS_ACUITY_SCORE: 35
ADLS_ACUITY_SCORE: 27
ADLS_ACUITY_SCORE: 27
ADLS_ACUITY_SCORE: 36
WEAR_GLASSES_OR_BLIND: YES
DOING_ERRANDS_INDEPENDENTLY_DIFFICULTY: NO
ADLS_ACUITY_SCORE: 27
EQUIPMENT_CURRENTLY_USED_AT_HOME: WALKER, ROLLING
HEARING_DIFFICULTY_OR_DEAF: NO
ADLS_ACUITY_SCORE: 26
ADLS_ACUITY_SCORE: 35
DIFFICULTY_COMMUNICATING: NO
DIFFICULTY_EATING/SWALLOWING: NO
ADLS_ACUITY_SCORE: 36
CONCENTRATING,_REMEMBERING_OR_MAKING_DECISIONS_DIFFICULTY: NO
ADLS_ACUITY_SCORE: 35
ADLS_ACUITY_SCORE: 35
ADLS_ACUITY_SCORE: 36
WALKING_OR_CLIMBING_STAIRS_DIFFICULTY: YES
ADLS_ACUITY_SCORE: 27
DRESSING/BATHING_DIFFICULTY: NO
FALL_HISTORY_WITHIN_LAST_SIX_MONTHS: NO
TOILETING_ISSUES: NO

## 2024-05-16 NOTE — PROGRESS NOTES
Care Management Follow Up    Length of Stay (days): 0    Expected Discharge Date: 05/16/2024     Concerns to be Addressed:   discharge planning  Patient plan of care discussed at interdisciplinary rounds: Yes    Anticipated Discharge Disposition:  home no needs   Anticipated Discharge Services:  n/a  Anticipated Discharge DME:  n/a    Patient/family educated on Medicare website which has current facility and service quality ratings:  n/a  Education Provided on the Discharge Plan:  yes  Patient/Family in Agreement with the Plan:  yes    Referrals Placed by CM/SW:  n/a  Private pay costs discussed: Not applicable    Additional Information:  No needs anticipated from CM at discharge per pt; independent at baseline. Care management to follow for discharge recommendations and progression of care through hospitalization. Family to transport home.  11:35 AM    Brenna Kjellberg, BSW LSW  5/16/2024

## 2024-05-16 NOTE — PROGRESS NOTES
Assumed care for this patient at 1500. Patient was lying in bed with eyes open. Still waiting for PICC team to placed IV. Patient IV has been out since morning, AM nurse called PICC. Waiting for PICC to insert new IV. Tried once at beginning of shift, not successful. Patient is admitted with CHF. Inspiratory and expiratory wheezes. Bilateral lower extremity trace edema.  Oxygen saturation in the mid 90's at room air.

## 2024-05-16 NOTE — PROGRESS NOTES
"PRIMARY DIAGNOSIS: \"GENERIC\" NURSING  OUTPATIENT/OBSERVATION GOALS TO BE MET BEFORE DISCHARGE:  ADLs back to baseline:  in progress    Activity and level of assistance: Ambulating independently.    Pain status: Improved-controlled with oral pain medications.    Return to near baseline physical activity: Yes     Discharge Planner Nurse   Safe discharge environment identified: No  Barriers to discharge: No       Entered by: Tasha Akins RN 05/16/2024 2:44 AM   Patient alert and orient x 4 c/o pain 6/10 on left hip, heat pack applied and muscle relax was given with relief.Patient is standby with transfer vi walker.  Please review provider order for any additional goals.   Nurse to notify provider when observation goals have been met and patient is ready for discharge.  "

## 2024-05-16 NOTE — PLAN OF CARE
Goal Outcome Evaluation:PRIMARY DIAGNOSIS: Hip Pain  OUTPATIENT/OBSERVATION GOALS TO BE MET BEFORE DISCHARGE:  1. Pain Status: improves with oral meds, Voltaren and lidocaine cream.     2. Return to near baseline physical activity: No    3. Cleared for discharge by consultants (if involved): No    Discharge Planner Nurse   Safe discharge environment identified: Yes  Barriers to discharge: Yes       Entered by: Matthew Pickens RN 05/15/2024 7:17 PM  Pt is alert and oriented X 4. C/O pain on the left hip. 950 mg Tylenol, Voltaren and lidocaine cream, and heat pack  were given with relief. Pt is on Tele with NS rhythm. Pt is going to have MRI done. Sitting I the chair watching TV.

## 2024-05-16 NOTE — PROGRESS NOTES
"PRIMARY DIAGNOSIS: \"GENERIC\" NURSING  OUTPATIENT/OBSERVATION GOALS TO BE MET BEFORE DISCHARGE:  ADLs back to baseline: Yes    Activity and level of assistance: Up with standby assistance.    Pain status: Improved-controlled with oral pain medications.    Return to near baseline physical activity: Yes     Discharge Planner Nurse   Safe discharge environment identified: Yes  Barriers to discharge: Yes       Entered by: Tasha Akins RN 05/16/2024 6:10 AM     Please review provider order for any additional goals.   Nurse to notify provider when observation goals have been met and patient is ready for discharge.  "

## 2024-05-16 NOTE — PLAN OF CARE
Goal Outcome Evaluation:      Plan of Care Reviewed With: patient    Overall Patient Progress: improvingOverall Patient Progress: improving       Pt is A&Ox4. Has been sleeping through afternoon.

## 2024-05-16 NOTE — PLAN OF CARE
PRIMARY DIAGNOSIS: Hip Pain  OUTPATIENT/OBSERVATION GOALS TO BE MET BEFORE DISCHARGE:  1. Pain Status: improves with oral meds, Voltaren and lidocaine cream.        2. Return to near baseline physical activity: No    3. Cleared for discharge by consultants (if involved): No    Discharge Planner Nurse   Safe discharge environment identified: Yes  Barriers to discharge: No       Entered by: Matthew Pickens RN 05/15/2024 10:40 PM    Pt is alert and oriented X 4 C/O pain, 6/10 950 mg Tylenol, Lidocaine cream were given with relief. Pt went down to MRI.

## 2024-05-16 NOTE — PROGRESS NOTES
"[facility] ACUTE PAIN SERVICE    [facility], [facility], [facility], [facility], [place]   PAIN Progress Note     Assessment/Plan:  Zackary Hutchison is a 63 year old male who was admitted on 5/13/2024.  Pain team was asked to see the patient for 63 year old with intractable left hip pain possibly secondary to bursitis. Admitted for left hip pain, suspected exacerbation of HFpEF, and possible COPD exacerbation.  History of  atrial fibrillation, stage III CKD, ischemic cardiomyopathy, COPD, HFrEF, hyperlipidemia, CAD s/p CABG, restless leg syndrome, hypertension, obesity, newly diagnosed type 2 diabetes, and tobacco use disorder. The patient does smoke and denies chemical dependency history.      Opioid Induced Respiratory Depression Risk Assessment: high: COPD, HFrEF, CKD, Age, obesity, tobacco use, opioid naive      Headache improved today - states is \"coming back\" this AM - asking for coffee - notified RN. Ice helps with headaches and heat helps with hip pain.     MRI ordered d/t ongoing pain. Per ortho, \"MRI reviewed with Dr. Teran.  No acute findings, no indication for occult fracture, implant loosening, soft tissue injuries.  Also no noted bursitis on imaging.  Ultimately there are no significant findings requiring surgical intervention and would continue conservative cares.  Exam is still most consistent with a bursitis given tenderness over the greater trochanter. Continue conservative management for this issue including anti-inflammatories, ice, rest. Continue steroids.  If medically stable and able to weightbear then okay to discharge from our perspective.\"    Patient with ongoing hip pain, received a one time dose of oxycodone overnight with no effect per patient report - doesn't feel pain medications have been effective - missed his steroid dose this AM d/t loss of IV access - steroids would be most effective for pain management at this time. Will increase cymbalta home dose for pain management while " mitigating unintentional oversedation given risk factors for resp depression. Pain rated 7/10 during assessment. States overnight he does not receive topicals - ordered QID so during day only - will change to q6h alternating.           PLAN:   1) Pain is consistent left hip pain, no acute injury, trauma, fever, chills. Differentials include trochanteric bursitis vs MSK pain. Left hip CT revealed left total hip arthroplasty without evidence of loosening or periprosthetic fracture. Agree with PT and OT. Would use caution with opioids as high risk for opioid induced respiratory depression.  Consider MRI imaging if continued pain and no improvement. Cardiology, WOC, and Orthopedics consults reviewed.  Multimodal Medication Therapy  Topical: lidocaine alternating with voltaren gel  - QID - change to q6h  NSAID'S: CrCl 53ml/min. None given CKD and cardiac history  Steroids: Solu medrol 40 mg IV q24h  Muscle Relaxants: Robaxin 750 mg qid   Adjuvants: APAP 975 mg po q6h, continue nicotine patch 21 mg/24 hr, requip 2 mg po q6h  Antidepressants/anxiolytics: Cymbalta 30mg qam - increase to 60 mg daily - give one time dose of 30 mg now to make 60 mg dose today  Opioids: none  IV Pain medication: none  Non-medication interventions: PT/OT, ice, heat  Constipation Prophylaxis: miralax bid prn     -Opioid prescriber has been none  -MN  pulled from system on 5/13. This indicates   Gabapentin only , last 12/18/23  Discharge Recommendations - We recommend prescribing the following at the time of discharge: APAP, topicals, robaxin if effective.      Alex Manuel, PharmD, BCPS  Acute Care Pain Management Program  [facility]   Monday-Friday 8a-4p   Page via Epic or HearMe[employer]

## 2024-05-16 NOTE — PROGRESS NOTES
[facility]    Medicine Progress Note - Hospitalist Service    Date of Admission:  5/13/2024    Assessment & Plan   63 year old male with history of atrial fibrillation, stage III CKD, ischemic cardiomyopathy, COPD, HFrEF, hyperlipidemia, CAD s/p CABG, restless leg syndrome, hypertension, obesity, newly diagnosed type 2 diabetes, and tobacco use disorder admitted on 5/13/2024 with the ER with left hip pain, suspected exacerbation of HFpEF, and possible COPD exacerbation.     Intractable left hip pain  Left hip CT revealed left total hip arthroplasty without evidence of loosening or periprosthetic fracture.  Seen by orthopedics and thought likely secondary to trochanteric bursitis, conservative management recommended including anti-inflammatories, ice and rest. Continue steroids.   Patient reports his pain is still not improving. Oxycodone 5mg x1 overnight. Will discuss with pain team.  PT and OT for gluteal stretching and strengthening.  Follow-up at [facility] with outpatient PT after discharge.  MRI-No evidence of hardware complication. No periprosthetic fracture.    Acute decompensated HFrEF  Good response to diuretic regimen.  Torsemide held today per cardiology.   Creatinine increased today-2.11  Albumin x1 today per cardiology. Continue to monitor renal function.    Hypokalemia 2/2 diuretics  RN managed protocol        Diet: Fluid restriction 1800 ML FLUID (and additional linked orders)  2 Gram Sodium Diet    DVT Prophylaxis: DOAC  [place] Catheter: Not present  Lines: None     Cardiac Monitoring: ACTIVE order. Indication: Acute decompensated heart failure (48 hours)  Code Status: Full Code      Clinically Significant Risk Factors Present on Admission        # Hypokalemia: Lowest K = 3.2 mmol/L in last 2 days, will replace as needed  # Hyponatremia: Lowest Na = 129 mmol/L in last 2 days, will monitor as appropriate   # Hypercalcemia: Highest Ca = 10.7 mg/dL in last 2 days, will  "monitor as appropriate   # Anion Gap Metabolic Acidosis: Highest Anion Gap = 20 mmol/L in last 2 days, will monitor and treat as appropriate   # Drug Induced Coagulation Defect: home medication list includes an anticoagulant medication  # Drug Induced Platelet Defect: home medication list includes an antiplatelet medication  # Acute Kidney Injury, unspecified: based on a >150% or 0.3 mg/dL increase in last creatinine compared to past 90 day average, will monitor renal function  # Hypertension: Noted on problem list  # Acute heart failure with reduced ejection fraction: last echo with EF <40% and receiving IV diuretics    # DMII: A1C = 7.8 % (Ref range: <5.7 %) within past 6 months    # Severe Obesity: Estimated body mass index is 42.29 kg/m  as calculated from the following:    Height as of this encounter: 1.676 m (5' 6\").    Weight as of this encounter: 118.8 kg (262 lb).       # Financial/Environmental Concerns: none         Disposition Plan     Medically Ready for Discharge: Anticipated Tomorrow           The patient's care was discussed with the Attending Physician, Dr. Escobar .    Toshia Camarillo NP  Hospitalist Service  [facility]  Securely message with [employer] (more info)  Text page via [employer] Paging/Directory   ______________________________________________________________________    Interval History       Physical Exam   Vital Signs: Temp: 98.1  F (36.7  C) Temp src: Oral BP: (!) 144/70 Pulse: 80   Resp: 18 SpO2: 97 % O2 Device: None (Room air)    Weight: 262 lbs 0 oz    Constitutional: awake, alert, cooperative, no apparent distress, and appears stated age  Respiratory: No increased work of breathing, good air exchange, clear to auscultation bilaterally, no crackles or wheezing  Cardiovascular: Normal apical impulse, regular rate and rhythm, normal S1 and S2, no S3 or S4, and no murmur noted  GI: No scars, normal bowel sounds, soft, non-distended, non-tender, no masses palpated, " no hepatosplenomegally    Medical Decision Making       40 MINUTES SPENT BY ME on the date of service doing chart review, history, exam, documentation & further activities per the note.  Tests ORDERED & REVIEWED in the past 24 hours:  Tests personally interpreted in the past 24 hours:      Data     I have personally reviewed the following data over the past 24 hrs:    11.4 (H)  \   14.8   / 231     132 (L) 86 (L) 90.9 (H) /  170 (H)   3.2 (L) 26 2.11 (H) \     ALT: 23 AST: 14 AP: 83 TBILI: 0.5   ALB: 5.5 (H) TOT PROTEIN: 9.4 (H) LIPASE: N/A       Imaging results reviewed over the past 24 hrs:   Recent Results (from the past 24 hour(s))   MR Hip Left w/o Contrast    Narrative    EXAM: MR HIP LEFT W/O CONTRAST  LOCATION: [facility]  DATE: 5/15/2024    INDICATION: Pain; Hip pain; Hip pain without trauma or other complicating feature; No hip x ray result available  COMPARISON: CT left hip 5/13/2024  TECHNIQUE: Unenhanced.    FINDINGS:    LEFT HIP:   -Left total hip arthroplasty in place. Associated susceptibility artifact limits evaluation of adjacent structures. The distal most aspect of the femoral stem is not included in the field-of-view. Visualized portions demonstrate no evidence of hardware   complication. No periprosthetic fracture. No significant joint effusion.    MUSCLES AND TENDONS:   -Gluteal: No tendon tear or tendinopathy. No trochanteric bursitis.  -Proximal hamstring: Mostly excluded from the field-of-view.   -Iliopsoas: No tendon tear or tendinopathy. No bursitis.  -Rectus femoris origin: No tear or tendinopathy.    BONES:   -No fracture or concerning marrow replacing lesion.     SOFT TISSUES:   -Severe fatty atrophy of the gluteus minimus muscle. Mild fatty atrophy of the gluteus medius and gluteus yareli muscles.  -No acute muscular injury.    INTRA-PELVIC CONTENTS:  -Visualized portions are grossly unremarkable.      Impression    IMPRESSION:  1.  Left total hip  arthroplasty. Visualized portions demonstrate no evidence of hardware complication. No periprosthetic fracture.

## 2024-05-16 NOTE — CARE PLAN
PRIMARY DIAGNOSIS: ACUTE PAIN  OUTPATIENT/OBSERVATION GOALS TO BE MET BEFORE DISCHARGE:  1. Pain Status: Improved-controlled with oral pain medications.    2. Return to near baseline physical activity: Yes    3. Cleared for discharge by consultants (if involved): No    Discharge Planner Nurse   Safe discharge environment identified: Yes  Barriers to discharge: Yes       Entered by: Christa Cantrell RN 05/16/2024 10:29 AM     Please review provider order for any additional goals.   Nurse to notify provider when observation goals have been met and patient is ready for discharge.    Pt is A&Ox4, reported 6/10 pain, see MAR. Reported numbness in LE. Denies N/V/D and dizziness

## 2024-05-16 NOTE — PROGRESS NOTES
HEART CARE NOTE          Assessment/Recommendations   1. Severe Biventricular dysfunction/HFrEF c/b severe ADHF  Assessment / Plan  PRINCESS noted on al labs - hold diuretic, give IV albumin and continue to monitor UOP and renal function closely  Patient is high risk for adverse cardiac events 2/2 severe biventricular dysfunction, multiple HFHs, non-compliance with dietary restrictions, renal dysfunction, CAD  GDMT as detailed below     Current Pharmacotherapy AHA Guideline-Directed Medical Therapy   Lisinopril 5 mg daily  - held Lisinopril 20 mg twice daily   Carvedilol 3.125 mg BID  Carvedilol 25 mg twice daily   Spironolactone 50 mg daily Spironolactone 25 mg once daily   Hydralazine 10 mg TID Hydralazine 100 mg three times daily   Isosorbide mononitrate 180 mg daily Isosorbide dinitrate 40 mg three times daily   SGLT2 inhibitor:Dapagliflozin/Empagliflozin - not started Dapagliflozin or Empagliflozin 10 mg daily      2. Atrial fibrillation  Assessment / Plan  Currently on apixaban and amiodarone     3. CAD c/b ICM  Assessment / Plan  S/p CABG 2015  Now s/p PCI to Ramus and OM with DARRELL  Denies chest pain or anginal equivalents  Continue atorvastatin, carvedilol, clopidogrel     4.CKD  Assessment / Plan  Diuresis as above; continue to monitor UOP and renal function closely     5.Left hip bursitis  Assessment / Plan  Management and supportive care per primary team and orthopedic surgery  Given the degree of renal dysfunction, would avoid all NSAID use including topical NSAIDs which will still be systemically absorbed     Plan of care discussed on May 16, 2024 with patient at bedside, and primary team overseeing patient's care        History of Present Illness/Subjective    Mr. Zackary Hutchison is a 63 year old male with a PMHx significant for (per Epic notation) atrial fibrillation, stage III CKD, ischemic cardiomyopathy, COPD, HFrEF, hyperlipidemia, CAD s/p CABG, restless leg syndrome, hypertension, obesity, newly  "diagnosed type 2 diabetes, and tobacco use disorder admitted on 5/13/2024 with the ER with left hip pain, suspected exacerbation of HFpEF, and possible COPD exacerbation      Today, Mr. Hutchison denies acute cardiac events or complaints; Cardiac management as detailed above     ECG: Personally reviewed 5/13/24. normal sinus rhythm, PAC's noted.     ECHO (personnaly Reviewed on 5/13/24):   1. Technically difficult study.  2. The left ventricle is normal in size. Image quality does not provide for  detailed assessment of LV systolic function, but is felt to be normal with a  visually estimated ejection fraction of roughly 55%.  3. No significant valvular heart disease is identified on this study though  the sensitivity, particularly of regurgitant lesions, is reduced due to poor  Doppler acoustics.  4. Right ventricular size and systolic performance could not be accurately  assessed due to inadequate visualization.     The acoustic quality of this study is fairly poor. If a more accurate  assessment of left ventricular systolicperformance, regional wall motion, and  other morphology/function is required; would recommend cardiac MRI or other  alternative imaging modality for further evaluation.     When compared to the prior real-time echocardiogram dated 25 December 2023,  the ejection fraction appears somewhat higher on the current study though  accurate comparison is somewhat limited due to suboptimal acoustic imaging not  only on the current examination, but also hold the prior study as well.    Telemetry: personally reviewed May 16, 2024; notable for sinus rhythm     Lab results: personally reviewed May 16, 2024; notable for PRINCESS on CKD    Medical history and pertinent documents reviewed in Care Everywhere please where applicable see details above        Physical Examination Review of Systems   /53 (BP Location: Right arm)   Pulse 72   Temp 98  F (36.7  C) (Oral)   Resp 18   Ht 1.676 m (5' 6\")   Wt 118.8 " kg (262 lb)   SpO2 91%   BMI 42.29 kg/m    Body mass index is 42.29 kg/m .  Wt Readings from Last 3 Encounters:   05/13/24 118.8 kg (262 lb)   05/08/24 121 kg (266 lb 11.2 oz)   04/25/24 126.1 kg (278 lb)     General Appearance:   no distress, normal body habitus   ENT/Mouth: membranes moist, no oral lesions or bleeding gums.      EYES:  no scleral icterus, normal conjunctivae   Neck: no carotid bruits or thyromegaly   Chest/Lungs:   lungs are clear to auscultation, no rales or wheezing, equal chest wall expansion    Cardiovascular:   Regular. Normal first and second heart sounds with no murmurs, rubs, or gallops; the carotid, radial and posterior tibial pulses are intact, + JVD and mild LE edema bilaterally    Abdomen:  no organomegaly, masses, bruits, or tenderness; bowel sounds are present   Extremities: no cyanosis or clubbing   Skin: no xanthelasma, warm.    Neurologic: NAD     Psychiatric: alert and oriented x3, calm     A complete 10 systems ROS was reviewed  And is negative except what is listed in the HPI.          Medical History  Surgical History Family History Social History   Past Medical History:   Diagnosis Date    Atrial fibrillation (H)     Chronic kidney disease     Chronic kidney disease, stage 3b (H) 09/28/2023    Class 3 severe obesity due to excess calories with body mass index (BMI) of 40.0 to 44.9 in adult (H) 09/28/2023    Congestive heart failure (H)     COPD (chronic obstructive pulmonary disease) (H)     Coronary artery disease involving coronary bypass graft of native heart without angina pectoris 09/28/2023    Heart failure with reduced ejection fraction (H) 09/11/2023    HFrEF (heart failure with reduced ejection fraction) (H) 09/11/2023    Hyperlipidemia     Hypertension     Ischemic cardiomyopathy 09/28/2023    Obese     Paroxysmal atrial fibrillation (H) 09/28/2023    Tobacco abuse 09/28/2023    Past Surgical History:   Procedure Laterality Date    CORONARY ANGIOGRAPHY ADULT ORDER       CORONARY ARTERY BYPASS Left 2014    2 vessels    CV CARDIOMEMS WITH RIGHT HEART CATH N/A 2/15/2024    Procedure: Pulmonary Arterial Pressure Sensor Placement;  Surgeon: Jacey Bhandari MD;  Location: [facility]    CV CORONARY ANGIOGRAM N/A 09/11/2023    Procedure: Coronary Angiogram;  Surgeon: Santy Esposito MD;  Location: [facility]    CV CORONARY ANGIOGRAM N/A 5/2/2024    Procedure: CV CORONARY ANGIOGRAM;  Surgeon: Santy Esposito MD;  Location: [facility]    CV LEFT HEART CATH N/A 09/11/2023    Procedure: Left Heart Catheterization;  Surgeon: Santy Esposito MD;  Location: [facility]    CV LEFT HEART CATH N/A 5/2/2024    Procedure: Left Heart Catheterization;  Surgeon: Santy Epsosito MD;  Location: [facility]    CV PCI ANGIOPLASTY N/A 5/2/2024    Procedure: Percutaneous Transluminal Angioplasty;  Surgeon: Sanyt Esposito MD;  Location: [facility]    CV RIGHT HEART CATH MEASUREMENTS RECORDED N/A 09/11/2023    Procedure: Right Heart Catheterization;  Surgeon: Santy Esposito MD;  Location: [facility]    no family history of premature coronary artery disease Social History     Socioeconomic History    Marital status: Single     Spouse name: Not on file    Number of children: Not on file    Years of education: Not on file    Highest education level: Not on file   Occupational History    Occupation: Retired restaurant owner     Comment: [employer]   Tobacco Use    Smoking status: Every Day     Current packs/day: 0.50     Types: Cigarettes    Smokeless tobacco: Never    Tobacco comments:     Seen IP by [facility] on 9/11/23 and declined cessation services and materials   Substance and Sexual Activity    Alcohol use: Not Currently    Drug use: Not on file    Sexual activity: Not on file   Other Topics Concern    Not on file   Social History Narrative    Currently lives in an assisted living facility. (Updated: 01/08/2024)     Social  "Determinants of Health     Financial Resource Strain: Not on file   Food Insecurity: Not on file   Transportation Needs: Not on file   Physical Activity: Not on file   Stress: Not on file   Social Connections: Not on file   Interpersonal Safety: Not on file   Housing Stability: Not on file           Lab Results    Chemistry/lipid CBC Cardiac Enzymes/BNP/TSH/INR   Lab Results   Component Value Date    CHOL 121 05/02/2024    HDL 27 (L) 05/02/2024    TRIG 219 (H) 05/02/2024    BUN 65.7 (H) 05/15/2024     (L) 05/15/2024    CO2 29 05/15/2024    Lab Results   Component Value Date    WBC 13.1 (H) 05/15/2024    HGB 13.2 (L) 05/15/2024    HCT 41.3 05/15/2024    MCV 92 05/15/2024     05/15/2024    Lab Results   Component Value Date    TSH 1.70 04/29/2024    INR 1.30 (H) 05/13/2024     No results found for: \"CKTOTAL\", \"CKMB\", \"TROPONINI\"       Weight:    Wt Readings from Last 3 Encounters:   05/13/24 118.8 kg (262 lb)   05/08/24 121 kg (266 lb 11.2 oz)   04/25/24 126.1 kg (278 lb)       Allergies  Allergies   Allergen Reactions    Bumex [Bumetanide] Muscle Pain (Myalgia)         Surgical History  Past Surgical History:   Procedure Laterality Date    CORONARY ANGIOGRAPHY ADULT ORDER      CORONARY ARTERY BYPASS Left 2014    2 vessels    CV CARDIOMEMS WITH RIGHT HEART CATH N/A 2/15/2024    Procedure: Pulmonary Arterial Pressure Sensor Placement;  Surgeon: Jacey Bhandari MD;  Location: [facility] CATH [facility]    CV CORONARY ANGIOGRAM N/A 09/11/2023    Procedure: Coronary Angiogram;  Surgeon: Santy Esposito MD;  Location: [facility] CATH LAB [facility]    CV CORONARY ANGIOGRAM N/A 5/2/2024    Procedure: CV CORONARY ANGIOGRAM;  Surgeon: Santy Esposito MD;  Location: [facility] CATH LAB [facility]    CV LEFT HEART CATH N/A 09/11/2023    Procedure: Left Heart Catheterization;  Surgeon: Santy Esposito MD;  Location: [facility] CATH [facility]    CV LEFT HEART CATH N/A 5/2/2024    Procedure: Left Heart Catheterization;  Surgeon: Vaibhav" Santy MOHAN MD;  Location: [facility] CATH LAB CV    CV PCI ANGIOPLASTY N/A 5/2/2024    Procedure: Percutaneous Transluminal Angioplasty;  Surgeon: Santy Esposito MD;  Location: [facility] CATH LAB [facility]    CV RIGHT HEART CATH MEASUREMENTS RECORDED N/A 09/11/2023    Procedure: Right Heart Catheterization;  Surgeon: Santy Esposito MD;  Location: [facility] CATH LAB [facility]       Social History  Tobacco:   History   Smoking Status    Every Day    Types: Cigarettes   Smokeless Tobacco    Never    Alcohol:   Social History    Substance and Sexual Activity      Alcohol use: Not Currently   Illicit Drugs:   History   Drug Use Not on file       Family History  Family History   Problem Relation Age of Onset    Cerebrovascular Disease Mother     Myocardial Infarction Father           Jacey Bhandari MD on 5/16/2024      cc: Reid Escobar

## 2024-05-16 NOTE — CARE PLAN
PRIMARY DIAGNOSIS: ACUTE PAIN  OUTPATIENT/OBSERVATION GOALS TO BE MET BEFORE DISCHARGE:  1. Pain Status: Improved-controlled with oral pain medications.    2. Return to near baseline physical activity: No    3. Cleared for discharge by consultants (if involved): No    Discharge Planner Nurse   Safe discharge environment identified: No  Barriers to discharge: Yes       Entered by: Christa Cantrell RN 05/16/2024 2:19 PM     Please review provider order for any additional goals.   Nurse to notify provider when observation goals have been met and patient is ready for discharge.    Pt currently sleeping.

## 2024-05-16 NOTE — PLAN OF CARE
Problem: Adult Inpatient Plan of Care  Goal: Absence of Hospital-Acquired Illness or Injury  Outcome: Progressing  Intervention: Identify and Manage Fall Risk  Recent Flowsheet Documentation  Taken 5/16/2024 0018 by aTsha Akins RN  Safety Promotion/Fall Prevention:   activity supervised   clutter free environment maintained   nonskid shoes/slippers when out of bed   room door open  Intervention: Prevent Skin Injury  Recent Flowsheet Documentation  Taken 5/16/2024 0018 by Tasha Akins RN  Body Position: position changed independently  Intervention: Prevent Infection  Recent Flowsheet Documentation  Taken 5/16/2024 0018 by Tasha Akins RN  Infection Prevention: hand hygiene promoted  Goal: Optimal Comfort and Wellbeing  Outcome: Progressing  Goal: Readiness for Transition of Care  Outcome: Progressing     Problem: Heart Failure  Goal: Optimal Functional Ability  Outcome: Progressing  Goal: Improved Oral Intake  Outcome: Progressing   Goal Outcome Evaluation:         Patient alert and orient x 4 c/o pain 7/10 on Left hip,heat pack and cold pack applied without relief.MD was updated prn oxycodone 5 mg was given with relief.Patient is standby transfer.Patient use bedside urinal voiding adequate.Patient is on tele  ns with bbb.

## 2024-05-16 NOTE — CARE PLAN
PRIMARY Concern: Hip pain  SAFETY RISK Concerns (fall risk, behaviors, etc.): fall risk      Isolation/Type: none  Tests/Procedures for NEXT shift: TBD  Consults? (Pending/following, signed-off?) PT/OT, pain service  Other Important info for NEXT shift: none  Anticipated DC date & active delays: inpatient  __________________________________________________________  Orientation/Cognitive: A&Ox4  Mobility Level/Assist Equipment: A1, GB/W  Antibiotics & Plan (IV/po, length of tx left): none  Pain Management: cymbalta, ointment, tylenol, muscle relax  Tele/VS/O2: Tele is NS with BBB, VSS, RA  ABNL Lab/BG: , K+ 3.2  Diet: fluid restriction 1800, 2g Na+ diet  Bowel/Bladder: continent, bedside urinal  Skin Concerns: bilateral LE peeling, shin wound/scab  Drains/Devices: PICC to assist

## 2024-05-16 NOTE — PROGRESS NOTES
"Orthopedic Progress Note      Assessment:    Left hip pain status post total hip arthroplasty, pain seems likely to be due to a trochanteric bursitis but no signs on imaging    Plan:   - MRI reviewed with Dr. Teran.  No acute findings, no indication for occult fracture, implant loosening, soft tissue injuries.  Also no noted bursitis on imaging.  Ultimately there are no significant findings requiring surgical intervention and would continue conservative cares.  Exam is still most consistent with a bursitis given tenderness over the greater trochanter  -Continue conservative management for this issue including anti-inflammatories, ice, rest  -Continue steroids.  If medically stable and able to weightbear then okay to discharge from our perspective.   -Weight-bear as tolerated  -PT/OT - gluteal stretching and strengthening  - Follow up at [facility] following discharge for outpatient PT      Subjective:    Patient reports feeling well today. Pain is not improving.  He is weightbearing and ambulating in room but it exacerbates pain.  All questions/concerns answered.      Objective:  /76 (BP Location: Right arm)   Pulse 87   Temp 97.5  F (36.4  C) (Oral)   Resp 18   Ht 1.676 m (5' 6\")   Wt 118.8 kg (262 lb)   SpO2 96%   BMI 42.29 kg/m      General: On examination, the patient is resting comfortably, NAD, awake, and alert and oriented to person, place, time, and, and general circumstances   SKIN: There is no evidence of erythema, warmth, swelling, deformity, crepitus, break to the skin, open wound.  Pulses:  Dorsalis pedis and posterior tibialis pulse is intact and equal bilaterally  Sensation: intact and equal bilaterally to the distal lower extremities.  Tenderness: Tender to palpation over the greater trochanter and lateral proximal femur  ROM: Able to perform hip flexion, quad extension, IR/ER with minimal pain elicited in active and passive range of motion    Contralateral side= Full range of " motion, Negative joint instability findings, 5/5 motor groups about the joint, Non-tender.       Pertinent Labs   Lab Results: personally reviewed.   Lab Results   Component Value Date    INR 1.30 (H) 05/13/2024    INR 1.01 02/13/2023     Lab Results   Component Value Date    WBC 11.4 (H) 05/16/2024    HGB 14.8 05/16/2024    HCT 47.0 05/16/2024    MCV 90 05/16/2024     05/16/2024     Lab Results   Component Value Date     (L) 05/16/2024    CO2 26 05/16/2024         Report completed by:  ALBINA CARMICHAEL PA-C  Date: 05/16/2024  [facility]     5-10

## 2024-05-17 LAB
ANION GAP SERPL CALCULATED.3IONS-SCNC: 19 MMOL/L (ref 7–15)
ATRIAL RATE - MUSE: 72 BPM
BUN SERPL-MCNC: 92 MG/DL (ref 8–23)
CALCIUM SERPL-MCNC: 10.6 MG/DL (ref 8.8–10.2)
CHLORIDE SERPL-SCNC: 90 MMOL/L (ref 98–107)
CREAT SERPL-MCNC: 1.97 MG/DL (ref 0.67–1.17)
DEPRECATED HCO3 PLAS-SCNC: 25 MMOL/L (ref 22–29)
DIASTOLIC BLOOD PRESSURE - MUSE: NORMAL MMHG
EGFRCR SERPLBLD CKD-EPI 2021: 37 ML/MIN/1.73M2
ERYTHROCYTE [DISTWIDTH] IN BLOOD BY AUTOMATED COUNT: 18.4 % (ref 10–15)
GLUCOSE BLDC GLUCOMTR-MCNC: 127 MG/DL (ref 70–99)
GLUCOSE BLDC GLUCOMTR-MCNC: 175 MG/DL (ref 70–99)
GLUCOSE BLDC GLUCOMTR-MCNC: 186 MG/DL (ref 70–99)
GLUCOSE BLDC GLUCOMTR-MCNC: 232 MG/DL (ref 70–99)
GLUCOSE BLDC GLUCOMTR-MCNC: 292 MG/DL (ref 70–99)
GLUCOSE BLDC GLUCOMTR-MCNC: 344 MG/DL (ref 70–99)
GLUCOSE SERPL-MCNC: 145 MG/DL (ref 70–99)
HCT VFR BLD AUTO: 41.5 % (ref 40–53)
HGB BLD-MCNC: 13.3 G/DL (ref 13.3–17.7)
INTERPRETATION ECG - MUSE: NORMAL
MAGNESIUM SERPL-MCNC: 2.6 MG/DL (ref 1.7–2.3)
MCH RBC QN AUTO: 29 PG (ref 26.5–33)
MCHC RBC AUTO-ENTMCNC: 32 G/DL (ref 31.5–36.5)
MCV RBC AUTO: 91 FL (ref 78–100)
P AXIS - MUSE: 95 DEGREES
PLATELET # BLD AUTO: 179 10E3/UL (ref 150–450)
POTASSIUM SERPL-SCNC: 3.2 MMOL/L (ref 3.4–5.3)
POTASSIUM SERPL-SCNC: 4.2 MMOL/L (ref 3.4–5.3)
PR INTERVAL - MUSE: 190 MS
QRS DURATION - MUSE: 140 MS
QT - MUSE: 450 MS
QTC - MUSE: 492 MS
R AXIS - MUSE: 89 DEGREES
RBC # BLD AUTO: 4.58 10E6/UL (ref 4.4–5.9)
SODIUM SERPL-SCNC: 134 MMOL/L (ref 135–145)
SYSTOLIC BLOOD PRESSURE - MUSE: NORMAL MMHG
T AXIS - MUSE: 80 DEGREES
VENTRICULAR RATE- MUSE: 72 BPM
WBC # BLD AUTO: 9.3 10E3/UL (ref 4–11)

## 2024-05-17 PROCEDURE — 250N000013 HC RX MED GY IP 250 OP 250 PS 637: Performed by: NURSE PRACTITIONER

## 2024-05-17 PROCEDURE — 250N000011 HC RX IP 250 OP 636: Performed by: INTERNAL MEDICINE

## 2024-05-17 PROCEDURE — 250N000013 HC RX MED GY IP 250 OP 250 PS 637

## 2024-05-17 PROCEDURE — 36415 COLL VENOUS BLD VENIPUNCTURE: CPT | Performed by: HOSPITALIST

## 2024-05-17 PROCEDURE — 250N000009 HC RX 250: Performed by: EMERGENCY MEDICINE

## 2024-05-17 PROCEDURE — 250N000013 HC RX MED GY IP 250 OP 250 PS 637: Performed by: HOSPITALIST

## 2024-05-17 PROCEDURE — 250N000011 HC RX IP 250 OP 636: Performed by: EMERGENCY MEDICINE

## 2024-05-17 PROCEDURE — 120N000001 HC R&B MED SURG/OB

## 2024-05-17 PROCEDURE — 250N000013 HC RX MED GY IP 250 OP 250 PS 637: Performed by: INTERNAL MEDICINE

## 2024-05-17 PROCEDURE — 85027 COMPLETE CBC AUTOMATED: CPT | Performed by: EMERGENCY MEDICINE

## 2024-05-17 PROCEDURE — 99233 SBSQ HOSP IP/OBS HIGH 50: CPT | Performed by: INTERNAL MEDICINE

## 2024-05-17 PROCEDURE — P9047 ALBUMIN (HUMAN), 25%, 50ML: HCPCS | Performed by: INTERNAL MEDICINE

## 2024-05-17 PROCEDURE — 36415 COLL VENOUS BLD VENIPUNCTURE: CPT | Performed by: EMERGENCY MEDICINE

## 2024-05-17 PROCEDURE — 83735 ASSAY OF MAGNESIUM: CPT | Performed by: EMERGENCY MEDICINE

## 2024-05-17 PROCEDURE — 99232 SBSQ HOSP IP/OBS MODERATE 35: CPT | Mod: FS | Performed by: HOSPITALIST

## 2024-05-17 PROCEDURE — 250N000013 HC RX MED GY IP 250 OP 250 PS 637: Performed by: PHYSICIAN ASSISTANT

## 2024-05-17 PROCEDURE — 84132 ASSAY OF SERUM POTASSIUM: CPT | Performed by: HOSPITALIST

## 2024-05-17 PROCEDURE — 80048 BASIC METABOLIC PNL TOTAL CA: CPT | Performed by: EMERGENCY MEDICINE

## 2024-05-17 PROCEDURE — 99207 PR APP CREDIT; MD BILLING SHARED VISIT: CPT | Mod: FS

## 2024-05-17 RX ORDER — ALBUMIN (HUMAN) 12.5 G/50ML
50 SOLUTION INTRAVENOUS ONCE
Status: COMPLETED | OUTPATIENT
Start: 2024-05-17 | End: 2024-05-17

## 2024-05-17 RX ORDER — OXYCODONE HYDROCHLORIDE 5 MG/1
5 TABLET ORAL EVERY 6 HOURS PRN
Status: DISCONTINUED | OUTPATIENT
Start: 2024-05-17 | End: 2024-05-20

## 2024-05-17 RX ORDER — POTASSIUM CHLORIDE 1.5 G/1.58G
40 POWDER, FOR SOLUTION ORAL ONCE
Status: COMPLETED | OUTPATIENT
Start: 2024-05-17 | End: 2024-05-17

## 2024-05-17 RX ADMIN — HYDRALAZINE HYDROCHLORIDE 25 MG: 25 TABLET ORAL at 19:58

## 2024-05-17 RX ADMIN — METHOCARBAMOL TABLETS 750 MG: 750 TABLET, COATED ORAL at 19:10

## 2024-05-17 RX ADMIN — CYANOCOBALAMIN TAB 1000 MCG 1000 MCG: 1000 TAB at 08:53

## 2024-05-17 RX ADMIN — INSULIN ASPART 3 UNITS: 100 INJECTION, SOLUTION INTRAVENOUS; SUBCUTANEOUS at 12:14

## 2024-05-17 RX ADMIN — Medication 2 G: at 09:22

## 2024-05-17 RX ADMIN — OXYCODONE HYDROCHLORIDE 5 MG: 5 TABLET ORAL at 09:22

## 2024-05-17 RX ADMIN — POTASSIUM CHLORIDE 40 MEQ: 1.5 POWDER, FOR SOLUTION ORAL at 08:51

## 2024-05-17 RX ADMIN — ROPINIROLE HYDROCHLORIDE 2 MG: 1 TABLET, FILM COATED ORAL at 21:02

## 2024-05-17 RX ADMIN — Medication 2 G: at 03:41

## 2024-05-17 RX ADMIN — ACETAMINOPHEN 975 MG: 325 TABLET ORAL at 08:53

## 2024-05-17 RX ADMIN — HYDRALAZINE HYDROCHLORIDE 25 MG: 25 TABLET ORAL at 14:21

## 2024-05-17 RX ADMIN — APIXABAN 5 MG: 5 TABLET, FILM COATED ORAL at 19:59

## 2024-05-17 RX ADMIN — NICOTINE 1 PATCH: 21 PATCH, EXTENDED RELEASE TRANSDERMAL at 08:54

## 2024-05-17 RX ADMIN — TORSEMIDE 100 MG: 100 TABLET ORAL at 08:52

## 2024-05-17 RX ADMIN — ROPINIROLE HYDROCHLORIDE 2 MG: 1 TABLET, FILM COATED ORAL at 14:22

## 2024-05-17 RX ADMIN — ROPINIROLE HYDROCHLORIDE 2 MG: 1 TABLET, FILM COATED ORAL at 03:41

## 2024-05-17 RX ADMIN — CARVEDILOL 3.12 MG: 3.12 TABLET, FILM COATED ORAL at 19:10

## 2024-05-17 RX ADMIN — OXYCODONE HYDROCHLORIDE 5 MG: 5 TABLET ORAL at 03:41

## 2024-05-17 RX ADMIN — TORSEMIDE 100 MG: 100 TABLET ORAL at 16:23

## 2024-05-17 RX ADMIN — CLOPIDOGREL BISULFATE 75 MG: 75 TABLET ORAL at 08:53

## 2024-05-17 RX ADMIN — Medication 2 G: at 19:12

## 2024-05-17 RX ADMIN — ALBUMIN HUMAN 50 G: 0.25 SOLUTION INTRAVENOUS at 08:41

## 2024-05-17 RX ADMIN — METHOCARBAMOL TABLETS 750 MG: 750 TABLET, COATED ORAL at 12:15

## 2024-05-17 RX ADMIN — ACETAMINOPHEN 975 MG: 325 TABLET ORAL at 21:00

## 2024-05-17 RX ADMIN — METHYLPREDNISOLONE SODIUM SUCCINATE 40 MG: 40 INJECTION, POWDER, FOR SOLUTION INTRAMUSCULAR; INTRAVENOUS at 08:43

## 2024-05-17 RX ADMIN — ATORVASTATIN CALCIUM 40 MG: 40 TABLET, FILM COATED ORAL at 08:53

## 2024-05-17 RX ADMIN — LIDOCAINE: 50 OINTMENT TOPICAL at 07:12

## 2024-05-17 RX ADMIN — ISOSORBIDE MONONITRATE 180 MG: 60 TABLET, EXTENDED RELEASE ORAL at 09:25

## 2024-05-17 RX ADMIN — AMIODARONE HYDROCHLORIDE 200 MG: 200 TABLET ORAL at 09:21

## 2024-05-17 RX ADMIN — PANTOPRAZOLE SODIUM 40 MG: 40 TABLET, DELAYED RELEASE ORAL at 08:51

## 2024-05-17 RX ADMIN — METHOCARBAMOL TABLETS 750 MG: 750 TABLET, COATED ORAL at 07:12

## 2024-05-17 RX ADMIN — LIDOCAINE: 50 OINTMENT TOPICAL at 20:57

## 2024-05-17 RX ADMIN — APIXABAN 5 MG: 5 TABLET, FILM COATED ORAL at 08:53

## 2024-05-17 RX ADMIN — HYDRALAZINE HYDROCHLORIDE 25 MG: 25 TABLET ORAL at 08:52

## 2024-05-17 RX ADMIN — ACETAMINOPHEN 975 MG: 325 TABLET ORAL at 03:40

## 2024-05-17 RX ADMIN — CARVEDILOL 3.12 MG: 3.12 TABLET, FILM COATED ORAL at 08:53

## 2024-05-17 RX ADMIN — DULOXETINE HYDROCHLORIDE 60 MG: 60 CAPSULE, DELAYED RELEASE PELLETS ORAL at 08:41

## 2024-05-17 RX ADMIN — INSULIN ASPART 2 UNITS: 100 INJECTION, SOLUTION INTRAVENOUS; SUBCUTANEOUS at 19:09

## 2024-05-17 RX ADMIN — SPIRONOLACTONE 50 MG: 25 TABLET, FILM COATED ORAL at 08:52

## 2024-05-17 ASSESSMENT — ACTIVITIES OF DAILY LIVING (ADL)
ADLS_ACUITY_SCORE: 26

## 2024-05-17 NOTE — PLAN OF CARE
PRIMARY DIAGNOSIS: ACUTE PAIN  OUTPATIENT/OBSERVATION GOALS TO BE MET BEFORE DISCHARGE:  1. Pain Status: Improved-controlled with oral pain medications.    2. Return to near baseline physical activity: No    3. Cleared for discharge by consultants (if involved): No    Discharge Planner Nurse   Safe discharge environment identified: No  Barriers to discharge: Yes       Entered by: Milly Saldaña RN 05/17/2024 2:28 AM     Please review provider order for any additional goals.   Nurse to notify provider when observation goals have been met and patient is ready for discharge.Goal Outcome Evaluation:    Pt Aox4, RA, c/o pain 7/10 in hip and lower back. Tylenol given but couldn't relief pain. MD paged and oxycodone and heating pump ordered. Pt had no IV , swat team paged and inserted IV.  Albumin  infusion completed this evening.

## 2024-05-17 NOTE — PROGRESS NOTES
HEART CARE NOTE          Assessment/Recommendations     1. Severe Biventricular dysfunction/HFrEF c/b severe ADHF  Assessment / Plan  Resume oral diuretic regimen, give IV albumin and continue to monitor UOP and renal function closely - plan to discharge on torsemide 100 mg BID  Patient is high risk for adverse cardiac events 2/2 severe biventricular dysfunction, multiple HFHs, non-compliance with dietary restrictions, renal dysfunction, CAD  GDMT as detailed below     Current Pharmacotherapy AHA Guideline-Directed Medical Therapy   Lisinopril 5 mg daily  - held Lisinopril 20 mg twice daily   Carvedilol 3.125 mg BID  Carvedilol 25 mg twice daily   Spironolactone 50 mg daily Spironolactone 25 mg once daily   Hydralazine 10 mg TID Hydralazine 100 mg three times daily   Isosorbide mononitrate 180 mg daily Isosorbide dinitrate 40 mg three times daily   SGLT2 inhibitor:Dapagliflozin/Empagliflozin - not started Dapagliflozin or Empagliflozin 10 mg daily      2. Atrial fibrillation  Assessment / Plan  Currently on apixaban and amiodarone     3. CAD c/b ICM  Assessment / Plan  S/p CABG 2015  Now s/p PCI to Ramus and OM with DARRELL  Denies chest pain or anginal equivalents  Continue atorvastatin, carvedilol, clopidogrel     4.CKD  Assessment / Plan  Diuresis as above; continue to monitor UOP and renal function closely     5.Left hip bursitis  Assessment / Plan  Management and supportive care per primary team and orthopedic surgery  Given the degree of renal dysfunction, would avoid all NSAID use including topical NSAIDs which will still be systemically absorbed     Plan of care discussed on May 17, 2024 with patient at bedside, and primary team overseeing patient's care    Cardiology team will sign-off for now. Please do not hesitate to consult us again if new questions or concerns arise. Follow-up appointment will be arranged by CORE/HF clinic.       History of Present Illness/Subjective    Mr. Zackary Hutchison is a 63 year old  male with a PMHx significant for (per Epic notation) atrial fibrillation, stage III CKD, ischemic cardiomyopathy, COPD, HFrEF, hyperlipidemia, CAD s/p CABG, restless leg syndrome, hypertension, obesity, newly diagnosed type 2 diabetes, and tobacco use disorder admitted on 5/13/2024 with the ER with left hip pain, suspected exacerbation of HFpEF, and possible COPD exacerbation      Today, Mr. Hutchison denies acute cardiac events or complaints; Cardiac management as detailed above     ECG: Personally reviewed 5/13/24. normal sinus rhythm, PAC's noted.     ECHO (personnaly Reviewed on 5/13/24):   1. Technically difficult study.  2. The left ventricle is normal in size. Image quality does not provide for  detailed assessment of LV systolic function, but is felt to be normal with a  visually estimated ejection fraction of roughly 55%.  3. No significant valvular heart disease is identified on this study though  the sensitivity, particularly of regurgitant lesions, is reduced due to poor  Doppler acoustics.  4. Right ventricular size and systolic performance could not be accurately  assessed due to inadequate visualization.     The acoustic quality of this study is fairly poor. If a more accurate  assessment of left ventricular systolicperformance, regional wall motion, and  other morphology/function is required; would recommend cardiac MRI or other  alternative imaging modality for further evaluation.     When compared to the prior real-time echocardiogram dated 25 December 2023,  the ejection fraction appears somewhat higher on the current study though  accurate comparison is somewhat limited due to suboptimal acoustic imaging not  only on the current examination, but also hold the prior study as well.    Telemetry: personally reviewed May 17, 2024; notable for sinus rhythm     Lab results: personally reviewed May 17, 2024; notable for resolving PRINCESS and hyponatremia    Medical history and pertinent documents reviewed in  "Care Everywhere please where applicable see details above        Physical Examination Review of Systems   /57 (BP Location: Left arm)   Pulse 79   Temp 98.4  F (36.9  C) (Oral)   Resp 18   Ht 1.676 m (5' 6\")   Wt 118.8 kg (262 lb)   SpO2 96%   BMI 42.29 kg/m    Body mass index is 42.29 kg/m .  Wt Readings from Last 3 Encounters:   05/13/24 118.8 kg (262 lb)   05/08/24 121 kg (266 lb 11.2 oz)   04/25/24 126.1 kg (278 lb)     General Appearance:   no distress, normal body habitus   ENT/Mouth: membranes moist, no oral lesions or bleeding gums.      EYES:  no scleral icterus, normal conjunctivae   Neck: no carotid bruits or thyromegaly   Chest/Lungs:   lungs are clear to auscultation, no rales or wheezing, equal chest wall expansion    Cardiovascular:   Regular. Normal first and second heart sounds with no murmurs, rubs, or gallops; the carotid, radial and posterior tibial pulses are intact, no JVD and mild LE edema bilaterally    Abdomen:  no organomegaly, masses, bruits, or tenderness; bowel sounds are present   Extremities: no cyanosis or clubbing   Skin: no xanthelasma, warm.    Neurologic: NAD     Psychiatric: alert and oriented x3, calm     A complete 10 systems ROS was reviewed  And is negative except what is listed in the HPI.          Medical History  Surgical History Family History Social History   Past Medical History:   Diagnosis Date    Atrial fibrillation (H)     Chronic kidney disease     Chronic kidney disease, stage 3b (H) 09/28/2023    Class 3 severe obesity due to excess calories with body mass index (BMI) of 40.0 to 44.9 in adult (H) 09/28/2023    Congestive heart failure (H)     COPD (chronic obstructive pulmonary disease) (H)     Coronary artery disease involving coronary bypass graft of native heart without angina pectoris 09/28/2023    Heart failure with reduced ejection fraction (H) 09/11/2023    HFrEF (heart failure with reduced ejection fraction) (H) 09/11/2023    Hyperlipidemia  "    Hypertension     Ischemic cardiomyopathy 09/28/2023    Obese     Paroxysmal atrial fibrillation (H) 09/28/2023    Tobacco abuse 09/28/2023    Past Surgical History:   Procedure Laterality Date    CORONARY ANGIOGRAPHY ADULT ORDER      CORONARY ARTERY BYPASS Left 2014    2 vessels    CV CARDIOMEMS WITH RIGHT HEART CATH N/A 2/15/2024    Procedure: Pulmonary Arterial Pressure Sensor Placement;  Surgeon: Jacey Bhandari MD;  Location: [facility]    CV CORONARY ANGIOGRAM N/A 09/11/2023    Procedure: Coronary Angiogram;  Surgeon: Santy Esposito MD;  Location: [facility]    CV CORONARY ANGIOGRAM N/A 5/2/2024    Procedure: CV CORONARY ANGIOGRAM;  Surgeon: Santy Esposito MD;  Location: [facility]    CV LEFT HEART CATH N/A 09/11/2023    Procedure: Left Heart Catheterization;  Surgeon: Santy Esposito MD;  Location: [facility]    CV LEFT HEART CATH N/A 5/2/2024    Procedure: Left Heart Catheterization;  Surgeon: Santy Esposito MD;  Location: [facility]    CV PCI ANGIOPLASTY N/A 5/2/2024    Procedure: Percutaneous Transluminal Angioplasty;  Surgeon: Santy Esposito MD;  Location: [facility]    CV RIGHT HEART CATH MEASUREMENTS RECORDED N/A 09/11/2023    Procedure: Right Heart Catheterization;  Surgeon: Santy Esposito MD;  Location: [facility]    no family history of premature coronary artery disease Social History     Socioeconomic History    Marital status: Single     Spouse name: Not on file    Number of children: Not on file    Years of education: Not on file    Highest education level: Not on file   Occupational History    Occupation: Retired restaurant owner     Comment: [employer]   Tobacco Use    Smoking status: Every Day     Current packs/day: 0.50     Types: Cigarettes    Smokeless tobacco: Never    Tobacco comments:     Seen IP by [facility] on 9/11/23 and declined cessation services and materials   Substance and Sexual Activity     "Alcohol use: Not Currently    Drug use: Not on file    Sexual activity: Not on file   Other Topics Concern    Not on file   Social History Narrative    Currently lives in an assisted living facility. (Updated: 01/08/2024)     Social Determinants of Health     Financial Resource Strain: Not on file   Food Insecurity: Not on file   Transportation Needs: Not on file   Physical Activity: Not on file   Stress: Not on file   Social Connections: Not on file   Interpersonal Safety: Not on file   Housing Stability: Not on file           Lab Results    Chemistry/lipid CBC Cardiac Enzymes/BNP/TSH/INR   Lab Results   Component Value Date    CHOL 121 05/02/2024    HDL 27 (L) 05/02/2024    TRIG 219 (H) 05/02/2024    BUN 90.9 (H) 05/16/2024     (L) 05/16/2024    CO2 26 05/16/2024    Lab Results   Component Value Date    WBC 11.4 (H) 05/16/2024    HGB 14.8 05/16/2024    HCT 47.0 05/16/2024    MCV 90 05/16/2024     05/16/2024    Lab Results   Component Value Date    TSH 1.70 04/29/2024    INR 1.30 (H) 05/13/2024     No results found for: \"CKTOTAL\", \"CKMB\", \"TROPONINI\"       Weight:    Wt Readings from Last 3 Encounters:   05/13/24 118.8 kg (262 lb)   05/08/24 121 kg (266 lb 11.2 oz)   04/25/24 126.1 kg (278 lb)       Allergies  Allergies   Allergen Reactions    Bumex [Bumetanide] Muscle Pain (Myalgia)         Surgical History  Past Surgical History:   Procedure Laterality Date    CORONARY ANGIOGRAPHY ADULT ORDER      CORONARY ARTERY BYPASS Left 2014    2 vessels    CV CARDIOMEMS WITH RIGHT HEART CATH N/A 2/15/2024    Procedure: Pulmonary Arterial Pressure Sensor Placement;  Surgeon: Jacey Bhandari MD;  Location: [facility] CATH [facility]    CV CORONARY ANGIOGRAM N/A 09/11/2023    Procedure: Coronary Angiogram;  Surgeon: Santy Esposito MD;  Location: [facility] CATH LAB [facility]    CV CORONARY ANGIOGRAM N/A 5/2/2024    Procedure: CV CORONARY ANGIOGRAM;  Surgeon: Santy Esposito MD;  Location: [facility] CATH LAB [facility]    CV " LEFT HEART CATH N/A 09/11/2023    Procedure: Left Heart Catheterization;  Surgeon: Santy Esposito MD;  Location: [facility] CATH LAB CV    CV LEFT HEART CATH N/A 5/2/2024    Procedure: Left Heart Catheterization;  Surgeon: Santy Esposito MD;  Location: [facility] CATH LAB [facility]    CV PCI ANGIOPLASTY N/A 5/2/2024    Procedure: Percutaneous Transluminal Angioplasty;  Surgeon: Santy Esposito MD;  Location: [facility] CATH LAB [facility]    CV RIGHT HEART CATH MEASUREMENTS RECORDED N/A 09/11/2023    Procedure: Right Heart Catheterization;  Surgeon: Santy Esposito MD;  Location: [facility] CATH LAB [facility]       Social History  Tobacco:   History   Smoking Status    Every Day    Types: Cigarettes   Smokeless Tobacco    Never    Alcohol:   Social History    Substance and Sexual Activity      Alcohol use: Not Currently   Illicit Drugs:   History   Drug Use Not on file       Family History  Family History   Problem Relation Age of Onset    Cerebrovascular Disease Mother     Myocardial Infarction Father           Jaecy Bhandari MD on 5/17/2024      cc: Reid Escobar

## 2024-05-17 NOTE — PROGRESS NOTES
PRIMARY DIAGNOSIS: ACUTE PAIN  OUTPATIENT/OBSERVATION GOALS TO BE MET BEFORE DISCHARGE:  1. Pain Status: Improved with use of alternative comfort measures i.e.: heat    2. Return to near baseline physical activity: Yes    3. Cleared for discharge by consultants (if involved): No    Discharge Planner Nurse   Safe discharge environment identified: Yes  Barriers to discharge: Yes       Entered by: Sobeida Merino RN 05/17/2024 2:34 PM   Patient did not need or take many PRNs for pain. Slept majority of shift with aqua K pad with no complaints of pain.   Please review provider order for any additional goals.   Nurse to notify provider when observation goals have been met and patient is ready for discharge.

## 2024-05-17 NOTE — PROGRESS NOTES
Pt transferred from sliding scale unit. Particuiar with cares, made requests when getting settled in room. Encouraged to oder meal tray due to kitchen closing soon. Up from w/c with walker and assist two.

## 2024-05-17 NOTE — PLAN OF CARE
PRIMARY DIAGNOSIS: ACUTE PAIN  OUTPATIENT/OBSERVATION GOALS TO BE MET BEFORE DISCHARGE:  1. Pain Status: Improved-controlled with oral pain medications.    2. Return to near baseline physical activity: No    3. Cleared for discharge by consultants (if involved): No    Discharge Planner Nurse   Safe discharge environment identified: No  Barriers to discharge: Yes       Entered by: Milly Saldaña RN 05/17/2024 4:48 AM     Please review provider order for any additional goals.   Nurse to notify provider when observation goals have been met and patient is ready for discharge.Goal Outcome Evaluation:      Pt Alert, c/o of pain 7/10, Oxycodone Q4hr given per order, heating pump, volteran and lidocaine cream applied for pain mgt.

## 2024-05-17 NOTE — PROGRESS NOTES
"[facility] ACUTE PAIN SERVICE    [facility], [facility], [facility], [facility], [place]   PAIN Progress Note     Assessment/Plan:  Zackary Hutchison is a 63 year old male who was admitted on 5/13/2024.  Pain team was asked to see the patient for 63 year old with intractable left hip pain possibly secondary to bursitis. Admitted for left hip pain, suspected exacerbation of HFpEF, and possible COPD exacerbation.  History of  atrial fibrillation, stage III CKD, ischemic cardiomyopathy, COPD, HFrEF, hyperlipidemia, CAD s/p CABG, restless leg syndrome, hypertension, obesity, newly diagnosed type 2 diabetes, and tobacco use disorder. The patient does smoke and denies chemical dependency history.      Opioid Induced Respiratory Depression Risk Assessment: high: COPD, HFrEF, CKD, Age, obesity, tobacco use, opioid naive      MRI ordered d/t ongoing pain. Per ortho, \"MRI reviewed with Dr. Teran.  No acute findings, no indication for occult fracture, implant loosening, soft tissue injuries.  Also no noted bursitis on imaging.  Ultimately there are no significant findings requiring surgical intervention and would continue conservative cares.  Exam is still most consistent with a bursitis given tenderness over the greater trochanter. Continue conservative management for this issue including anti-inflammatories, ice, rest. Continue steroids.  If medically stable and able to weightbear then okay to discharge from our perspective.\"     Patient feels pain has improved today compared to days prior, heating pad ordered has offered most relief per patient report.  Patient reports pain in hip is localized and feels like pressure. Had IV steroids this AM (skipped dose yesterday), cymbalta has been increased heating pad has been offered and oxycodone ordered overnight for pain - likely all helping in combination - eyelids over pupil line during assessment, has used x 4 doses of oxycodone since 10pm last night        PLAN:   1) Pain is " consistent left hip pain, no acute injury, trauma, fever, chills. Differentials include trochanteric bursitis vs MSK pain. Left hip CT revealed left total hip arthroplasty without evidence of loosening or periprosthetic fracture. Agree with PT and OT. Would use caution with opioids as high risk for opioid induced respiratory depression.  Consider MRI imaging if continued pain and no improvement. Cardiology, WOC, and Orthopedics consults reviewed.  Multimodal Medication Therapy  Topical: lidocaine alternating with voltaren gel q6h  NSAID'S: CrCl 53ml/min. None given CKD and cardiac history  Steroids: Solu medrol 40 mg IV q24h  Muscle Relaxants: Robaxin 750 mg qid   Adjuvants: APAP 975 mg po q6h, continue nicotine patch 21 mg/24 hr, requip 2 mg po q6h  Antidepressants/anxiolytics: Home cymbalta 30mg qam - increased to 60 mg daily   Opioids: oxycodone 2.5 - 5 mg po q4h prn ordered overnight - decrease to q6h prn - continue to taper off.   IV Pain medication: none  Non-medication interventions: PT/OT, ice, heat  Constipation Prophylaxis: miralax bid prn     -Opioid prescriber has been none  -[place]  pulled from system on 5/13. This indicates   Gabapentin only , last 12/18/23  Discharge Recommendations - We recommend prescribing the following at the time of discharge: APAP, topicals, robaxin, increased dose of home cymbalta, no opioids recommended      Tricia ArchibaldD, BCPS  Acute Care Pain Management Program  [facility]   Monday-Friday 8a-4p   Page via Epic or [employer]

## 2024-05-17 NOTE — PLAN OF CARE
"PRIMARY DIAGNOSIS: \"GENERIC\" NURSING  OUTPATIENT/OBSERVATION GOALS TO BE MET BEFORE DISCHARGE:  ADLs back to baseline: No    Activity and level of assistance: Up with standby assistance.    Pain status: Improved-controlled with oral pain medications.    Return to near baseline physical activity: No     Discharge Planner Nurse   Safe discharge environment identified: No  Barriers to discharge: Yes       Entered by: Milly Saldaña RN 05/17/2024 2:37 AM     Please review provider order for any additional goals.   Nurse to notify provider when observation goals have been met and patient is ready for discharge.Goal Outcome Evaluation:                        "

## 2024-05-17 NOTE — PROGRESS NOTES
[facility]    Medicine Progress Note - Hospitalist Service    Date of Admission:  5/13/2024    Assessment & Plan   63 year old male with history of atrial fibrillation, stage III CKD, ischemic cardiomyopathy, COPD, HFrEF, hyperlipidemia, CAD s/p CABG, restless leg syndrome, hypertension, obesity, newly diagnosed type 2 diabetes, and tobacco use disorder admitted on 5/13/2024 with the ER with left hip pain, suspected exacerbation of HFpEF, and possible COPD exacerbation.     Intractable left hip pain  Left hip CT revealed left total hip arthroplasty without evidence of loosening or periprosthetic fracture.  Seen by orthopedics and thought likely secondary to trochanteric bursitis, although not seen  on MRI. conservative management recommended including anti-inflammatories, ice and rest. Continue steroids.   Patient reports that heating pad overnight less effective.  He is unsure if the oxycodone that was ordered last night is working for him. Pain team following.  PT and OT for gluteal stretching and strengthening.  Follow-up at [facility] with outpatient PT after discharge.  MRI-No evidence of hardware complication. No periprosthetic fracture.    Acute decompensated HFrEF  Good response to diuretic regimen.    Creatinine trending down-1.97 from 2.11 after torsemide held and albumin x 1 given 5/16/2024  Continue to monitor renal function.  Cardiology managing.    Hypokalemia 2/2 diuretics  RN managed protocol          Diet: Fluid restriction 1800 ML FLUID (and additional linked orders)  2 Gram Sodium Diet    DVT Prophylaxis: DOAC  Ruvalcaba Catheter: Not present  Lines: None     Cardiac Monitoring: None  Code Status: Full Code      Clinically Significant Risk Factors Present on Admission        # Hypokalemia: Lowest K = 3.2 mmol/L in last 2 days, will replace as needed    # Hypercalcemia: Highest Ca = 10.7 mg/dL in last 2 days, will monitor as appropriate   # Anion Gap Metabolic Acidosis:  "Highest Anion Gap = 20 mmol/L in last 2 days, will monitor and treat as appropriate   # Drug Induced Coagulation Defect: home medication list includes an anticoagulant medication  # Drug Induced Platelet Defect: home medication list includes an antiplatelet medication  # Acute Kidney Injury, unspecified: based on a >150% or 0.3 mg/dL increase in last creatinine compared to past 90 day average, will monitor renal function  # Hypertension: Noted on problem list  # Chronic heart failure with reduced ejection fraction: last echo with EF <40%    # DMII: A1C = 7.8 % (Ref range: <5.7 %) within past 6 months    # Severe Obesity: Estimated body mass index is 42.29 kg/m  as calculated from the following:    Height as of this encounter: 1.676 m (5' 6\").    Weight as of this encounter: 118.8 kg (262 lb).       # Financial/Environmental Concerns: none         Disposition Plan     Medically Ready for Discharge: Anticipated Tomorrow           The patient's care was discussed with the Attending Physician, Dr. Landis .    Toshia Camarillo NP  Hospitalist Service  [facility]  Securely message with [employer] (more info)  Text page via [employer] Paging/Directory   ______________________________________________________________________    Interval History       Physical Exam   Vital Signs: Temp: 98.2  F (36.8  C) Temp src: Oral BP: (!) 141/65 Pulse: 76   Resp: 15 SpO2: 91 % O2 Device: None (Room air)    Weight: 262 lbs 0 oz    Constitutional: awake, alert, cooperative, no apparent distress, and appears stated age  Respiratory: No increased work of breathing, good air exchange, clear to auscultation bilaterally, no crackles or wheezing  Cardiovascular: Normal apical impulse, regular rate and rhythm, normal S1 and S2, no S3 or S4, and no murmur noted  GI: No scars, normal bowel sounds, soft, non-distended, non-tender, no masses palpated, no hepatosplenomegally    Medical Decision Making       40 MINUTES SPENT BY ME on " the date of service doing chart review, history, exam, documentation & further activities per the note.  MANAGEMENT DISCUSSED with the following over the past 24 hours: Dr. Landis   NOTE(S)/MEDICAL RECORDS REVIEWED over the past 24 hours: Dr. Landis       Data     I have personally reviewed the following data over the past 24 hrs:    9.3  \   13.3   / 179     134 (L) 90 (L) 92.0 (H) /  344 (H)   3.2 (L) 25 1.97 (H) \       Imaging results reviewed over the past 24 hrs:   No results found for this or any previous visit (from the past 24 hour(s)).

## 2024-05-18 LAB
ANION GAP SERPL CALCULATED.3IONS-SCNC: 17 MMOL/L (ref 7–15)
BUN SERPL-MCNC: 99.3 MG/DL (ref 8–23)
CALCIUM SERPL-MCNC: 11.3 MG/DL (ref 8.8–10.2)
CHLORIDE SERPL-SCNC: 90 MMOL/L (ref 98–107)
CREAT SERPL-MCNC: 2.25 MG/DL (ref 0.67–1.17)
DEPRECATED HCO3 PLAS-SCNC: 29 MMOL/L (ref 22–29)
EGFRCR SERPLBLD CKD-EPI 2021: 32 ML/MIN/1.73M2
ERYTHROCYTE [DISTWIDTH] IN BLOOD BY AUTOMATED COUNT: 18.1 % (ref 10–15)
GLUCOSE BLDC GLUCOMTR-MCNC: 158 MG/DL (ref 70–99)
GLUCOSE BLDC GLUCOMTR-MCNC: 307 MG/DL (ref 70–99)
GLUCOSE BLDC GLUCOMTR-MCNC: 395 MG/DL (ref 70–99)
GLUCOSE SERPL-MCNC: 169 MG/DL (ref 70–99)
HCT VFR BLD AUTO: 42.2 % (ref 40–53)
HGB BLD-MCNC: 13.2 G/DL (ref 13.3–17.7)
MAGNESIUM SERPL-MCNC: 2.7 MG/DL (ref 1.7–2.3)
MCH RBC QN AUTO: 28.6 PG (ref 26.5–33)
MCHC RBC AUTO-ENTMCNC: 31.3 G/DL (ref 31.5–36.5)
MCV RBC AUTO: 91 FL (ref 78–100)
PLATELET # BLD AUTO: 183 10E3/UL (ref 150–450)
POTASSIUM SERPL-SCNC: 3.8 MMOL/L (ref 3.4–5.3)
RBC # BLD AUTO: 4.62 10E6/UL (ref 4.4–5.9)
SODIUM SERPL-SCNC: 136 MMOL/L (ref 135–145)
WBC # BLD AUTO: 12.2 10E3/UL (ref 4–11)

## 2024-05-18 PROCEDURE — 85027 COMPLETE CBC AUTOMATED: CPT

## 2024-05-18 PROCEDURE — 82374 ASSAY BLOOD CARBON DIOXIDE: CPT

## 2024-05-18 PROCEDURE — 250N000013 HC RX MED GY IP 250 OP 250 PS 637: Performed by: PHYSICIAN ASSISTANT

## 2024-05-18 PROCEDURE — 250N000011 HC RX IP 250 OP 636: Performed by: EMERGENCY MEDICINE

## 2024-05-18 PROCEDURE — 36415 COLL VENOUS BLD VENIPUNCTURE: CPT

## 2024-05-18 PROCEDURE — 99233 SBSQ HOSP IP/OBS HIGH 50: CPT | Performed by: STUDENT IN AN ORGANIZED HEALTH CARE EDUCATION/TRAINING PROGRAM

## 2024-05-18 PROCEDURE — 250N000013 HC RX MED GY IP 250 OP 250 PS 637: Performed by: INTERNAL MEDICINE

## 2024-05-18 PROCEDURE — 250N000013 HC RX MED GY IP 250 OP 250 PS 637: Performed by: HOSPITALIST

## 2024-05-18 PROCEDURE — 250N000013 HC RX MED GY IP 250 OP 250 PS 637: Performed by: NURSE PRACTITIONER

## 2024-05-18 PROCEDURE — 250N000013 HC RX MED GY IP 250 OP 250 PS 637

## 2024-05-18 PROCEDURE — 250N000012 HC RX MED GY IP 250 OP 636 PS 637: Performed by: STUDENT IN AN ORGANIZED HEALTH CARE EDUCATION/TRAINING PROGRAM

## 2024-05-18 PROCEDURE — 120N000001 HC R&B MED SURG/OB

## 2024-05-18 PROCEDURE — 83735 ASSAY OF MAGNESIUM: CPT | Performed by: HOSPITALIST

## 2024-05-18 RX ORDER — DEXAMETHASONE 2 MG/1
4 TABLET ORAL EVERY 12 HOURS SCHEDULED
Status: DISCONTINUED | OUTPATIENT
Start: 2024-05-18 | End: 2024-05-20 | Stop reason: HOSPADM

## 2024-05-18 RX ORDER — OXYCODONE HYDROCHLORIDE 5 MG/1
5 TABLET ORAL ONCE
Status: COMPLETED | OUTPATIENT
Start: 2024-05-18 | End: 2024-05-18

## 2024-05-18 RX ADMIN — SPIRONOLACTONE 50 MG: 25 TABLET, FILM COATED ORAL at 10:19

## 2024-05-18 RX ADMIN — DULOXETINE HYDROCHLORIDE 60 MG: 60 CAPSULE, DELAYED RELEASE PELLETS ORAL at 10:20

## 2024-05-18 RX ADMIN — ATORVASTATIN CALCIUM 40 MG: 40 TABLET, FILM COATED ORAL at 10:19

## 2024-05-18 RX ADMIN — DEXAMETHASONE 4 MG: 2 TABLET ORAL at 21:27

## 2024-05-18 RX ADMIN — METHOCARBAMOL TABLETS 750 MG: 750 TABLET, COATED ORAL at 07:24

## 2024-05-18 RX ADMIN — OXYCODONE HYDROCHLORIDE 5 MG: 5 TABLET ORAL at 06:39

## 2024-05-18 RX ADMIN — LIDOCAINE: 50 OINTMENT TOPICAL at 22:53

## 2024-05-18 RX ADMIN — LIDOCAINE: 50 OINTMENT TOPICAL at 06:29

## 2024-05-18 RX ADMIN — ROPINIROLE HYDROCHLORIDE 2 MG: 1 TABLET, FILM COATED ORAL at 21:27

## 2024-05-18 RX ADMIN — PANTOPRAZOLE SODIUM 40 MG: 40 TABLET, DELAYED RELEASE ORAL at 07:24

## 2024-05-18 RX ADMIN — HYDRALAZINE HYDROCHLORIDE 25 MG: 25 TABLET ORAL at 21:27

## 2024-05-18 RX ADMIN — APIXABAN 5 MG: 5 TABLET, FILM COATED ORAL at 10:20

## 2024-05-18 RX ADMIN — Medication 2 G: at 18:18

## 2024-05-18 RX ADMIN — OXYCODONE HYDROCHLORIDE 5 MG: 5 TABLET ORAL at 10:33

## 2024-05-18 RX ADMIN — METHOCARBAMOL TABLETS 750 MG: 750 TABLET, COATED ORAL at 01:13

## 2024-05-18 RX ADMIN — LIDOCAINE: 50 OINTMENT TOPICAL at 16:36

## 2024-05-18 RX ADMIN — ACETAMINOPHEN 975 MG: 325 TABLET ORAL at 21:27

## 2024-05-18 RX ADMIN — CARVEDILOL 3.12 MG: 3.12 TABLET, FILM COATED ORAL at 10:19

## 2024-05-18 RX ADMIN — OXYCODONE HYDROCHLORIDE 5 MG: 5 TABLET ORAL at 22:52

## 2024-05-18 RX ADMIN — OXYCODONE HYDROCHLORIDE 5 MG: 5 TABLET ORAL at 16:36

## 2024-05-18 RX ADMIN — ROPINIROLE HYDROCHLORIDE 2 MG: 1 TABLET, FILM COATED ORAL at 14:04

## 2024-05-18 RX ADMIN — CYANOCOBALAMIN TAB 1000 MCG 1000 MCG: 1000 TAB at 10:20

## 2024-05-18 RX ADMIN — METHOCARBAMOL TABLETS 750 MG: 750 TABLET, COATED ORAL at 18:15

## 2024-05-18 RX ADMIN — HYDRALAZINE HYDROCHLORIDE 25 MG: 25 TABLET ORAL at 10:19

## 2024-05-18 RX ADMIN — Medication 2 G: at 07:25

## 2024-05-18 RX ADMIN — ACETAMINOPHEN 975 MG: 325 TABLET ORAL at 03:14

## 2024-05-18 RX ADMIN — AMIODARONE HYDROCHLORIDE 200 MG: 200 TABLET ORAL at 10:21

## 2024-05-18 RX ADMIN — ISOSORBIDE MONONITRATE 180 MG: 60 TABLET, EXTENDED RELEASE ORAL at 10:21

## 2024-05-18 RX ADMIN — ACETAMINOPHEN 975 MG: 325 TABLET ORAL at 14:04

## 2024-05-18 RX ADMIN — NICOTINE 1 PATCH: 21 PATCH, EXTENDED RELEASE TRANSDERMAL at 10:33

## 2024-05-18 RX ADMIN — TORSEMIDE 100 MG: 100 TABLET ORAL at 10:21

## 2024-05-18 RX ADMIN — Medication 2 G: at 01:18

## 2024-05-18 RX ADMIN — INSULIN ASPART 1 UNITS: 100 INJECTION, SOLUTION INTRAVENOUS; SUBCUTANEOUS at 12:02

## 2024-05-18 RX ADMIN — LIDOCAINE: 50 OINTMENT TOPICAL at 10:21

## 2024-05-18 RX ADMIN — ACETAMINOPHEN 975 MG: 325 TABLET ORAL at 10:20

## 2024-05-18 RX ADMIN — METHYLPREDNISOLONE SODIUM SUCCINATE 40 MG: 40 INJECTION, POWDER, FOR SOLUTION INTRAMUSCULAR; INTRAVENOUS at 10:22

## 2024-05-18 RX ADMIN — CARVEDILOL 3.12 MG: 3.12 TABLET, FILM COATED ORAL at 18:15

## 2024-05-18 RX ADMIN — ROPINIROLE HYDROCHLORIDE 2 MG: 1 TABLET, FILM COATED ORAL at 10:20

## 2024-05-18 RX ADMIN — APIXABAN 5 MG: 5 TABLET, FILM COATED ORAL at 21:27

## 2024-05-18 RX ADMIN — METHOCARBAMOL TABLETS 750 MG: 750 TABLET, COATED ORAL at 12:02

## 2024-05-18 RX ADMIN — HYDRALAZINE HYDROCHLORIDE 25 MG: 25 TABLET ORAL at 14:04

## 2024-05-18 RX ADMIN — CLOPIDOGREL BISULFATE 75 MG: 75 TABLET ORAL at 10:20

## 2024-05-18 RX ADMIN — Medication 2 G: at 12:02

## 2024-05-18 RX ADMIN — INSULIN ASPART 3 UNITS: 100 INJECTION, SOLUTION INTRAVENOUS; SUBCUTANEOUS at 16:34

## 2024-05-18 RX ADMIN — OXYCODONE HYDROCHLORIDE 5 MG: 5 TABLET ORAL at 03:17

## 2024-05-18 RX ADMIN — ROPINIROLE HYDROCHLORIDE 2 MG: 1 TABLET, FILM COATED ORAL at 03:15

## 2024-05-18 ASSESSMENT — ACTIVITIES OF DAILY LIVING (ADL)
ADLS_ACUITY_SCORE: 26
ADLS_ACUITY_SCORE: 26
ADLS_ACUITY_SCORE: 27
ADLS_ACUITY_SCORE: 26
ADLS_ACUITY_SCORE: 27
ADLS_ACUITY_SCORE: 26
ADLS_ACUITY_SCORE: 26
ADLS_ACUITY_SCORE: 27
ADLS_ACUITY_SCORE: 26
ADLS_ACUITY_SCORE: 27
ADLS_ACUITY_SCORE: 26
ADLS_ACUITY_SCORE: 26
ADLS_ACUITY_SCORE: 27
ADLS_ACUITY_SCORE: 26
ADLS_ACUITY_SCORE: 26

## 2024-05-18 NOTE — CONSULTS
NUTRITION EDUCATION      REASON FOR ASSESSMENT:  Consulted to educate pt on 2 gram Na diet  Also noted pt with newly diagnosed DM 2    NUTRITION HISTORY:  Information obtained from pt    Pt states he's had low sodium diet ed multiple times.  He does not want diabetic diet ed at this time-he wants to talk to his doctor first    CURRENT DIET:  2 gram na w/ 1800 ml FR    NUTRITION DIAGNOSIS:  Food- and nutrition-related knowledge deficit R/t new diagnosis of DM2 as evidenced by pt has not had diabetic diet instruction in the past    INTERVENTIONS:  Did not provide diet instruction today as pt declined. Will follow up in a few days to see if he's open to diet education

## 2024-05-18 NOTE — PLAN OF CARE
Problem: Adult Inpatient Plan of Care  Goal: Plan of Care Review  Description: The Plan of Care Review/Shift note should be completed every shift.  The Outcome Evaluation is a brief statement about your assessment that the patient is improving, declining, or no change.  This information will be displayed automatically on your shift  note.  5/18/2024 0303 by Reid Torres RN  Outcome: Progressing  5/18/2024 0301 by Reid Torres RN  Outcome: Progressing     Problem: Adult Inpatient Plan of Care  Goal: Optimal Comfort and Wellbeing  Intervention: Monitor Pain and Promote Comfort  Recent Flowsheet Documentation  Taken 5/18/2024 0120 by Reid Torres RN  Pain Management Interventions:   medication (see MAR)   heat applied   massage provided   repositioned  Taken 5/17/2024 2202 by Reid Torres RN  Pain Management Interventions:   medication (see MAR)   heat applied  Taken 5/17/2024 2145 by Reid Torres RN  Pain Management Interventions:   heat applied   medication (see MAR)  Taken 5/17/2024 2008 by Reid Torres RN  Pain Management Interventions:   medication (see MAR)   heat applied     Problem: Heart Failure  Goal: Fluid and Electrolyte Balance  Intervention: Monitor and Manage Fluid and Electrolyte Balance  Recent Flowsheet Documentation  Taken 5/18/2024 0120 by Reid Torres RN  Fluid/Electrolyte Management: fluids restricted  Taken 5/17/2024 2008 by Reid Torres RN  Fluid/Electrolyte Management: fluids restricted     Problem: Comorbidity Management  Goal: Blood Glucose Levels Within Targeted Range  Outcome: Progressing  Intervention: Monitor and Manage Glycemia  Recent Flowsheet Documentation  Taken 5/18/2024 0120 by Reid Torres RN  Medication Review/Management: medications reviewed  Taken 5/17/2024 2008 by Reid Torres RN  Medication Review/Management: medications reviewed    Goal Outcome Evaluation:    Patient AOX4. Able to make needs known. Denies SOB and  nausea. Continues to report ongoing L hip pain 6-7/10; administered scheduled Robaxin, Voltaren, Lidocaine, and tylenol with reports of minimal relief. Additionally administered PRN oxycodone with reports of minimal relief; provided repositioning and light massage with minimal relief. Contacted hospitalist regarding one time dose oxycodone; order provided for additional one time dose of 5 mg oxycodone. Instructed patient in utilizing ice for pain management; patient amenable to this. Patient reports moderate relief from heat pad application and will continue as tolerated. BG @ HS - 186; refused 0200 BG check. Patient aware of and compliant with current fluid restriction. Expresses frustration with current hospitalization.

## 2024-05-18 NOTE — PLAN OF CARE
Problem: Adult Inpatient Plan of Care  Goal: Absence of Hospital-Acquired Illness or Injury  Intervention: Identify and Manage Fall Risk  Recent Flowsheet Documentation  Taken 5/18/2024 1015 by Paredes, Lisa, RN  Safety Promotion/Fall Prevention:   activity supervised   safety round/check completed     Problem: Adult Inpatient Plan of Care  Goal: Absence of Hospital-Acquired Illness or Injury  Intervention: Prevent and Manage VTE (Venous Thromboembolism) Risk  Recent Flowsheet Documentation  Taken 5/18/2024 1015 by Lisa Paredes RN  VTE Prevention/Management: SCDs (sequential compression devices) off     Problem: Adult Inpatient Plan of Care  Goal: Optimal Comfort and Wellbeing  Intervention: Monitor Pain and Promote Comfort  Recent Flowsheet Documentation  Taken 5/18/2024 1404 by Lisa Pareeds RN  Pain Management Interventions: medication (see MAR)  Taken 5/18/2024 1130 by Lisa Paredes RN  Pain Management Interventions:   care clustered   distraction   emotional support   pillow support provided   quiet environment facilitated   rest  Taken 5/18/2024 1033 by Lisa Paredes RN  Pain Management Interventions: medication (see MAR)  Taken 5/18/2024 1020 by Lisa Paredes RN  Pain Management Interventions: medication (see MAR)     Problem: Heart Failure  Goal: Optimal Coping  Intervention: Support Psychosocial Response  Recent Flowsheet Documentation  Taken 5/18/2024 1015 by Lisa Paredes RN  Supportive Measures: problem-solving facilitated     Problem: Heart Failure  Goal: Optimal Functional Ability  Intervention: Optimize Functional Ability  Recent Flowsheet Documentation  Taken 5/18/2024 1209 by Lisa Paredes RN  Activity Management:   ambulated in room   up in chair  Taken 5/18/2024 1015 by Lisa Paredes RN  Activity Management: activity adjusted per tolerance     Problem: Heart Failure  Goal: Fluid and Electrolyte Balance  Intervention: Monitor and  Manage Fluid and Electrolyte Balance  Recent Flowsheet Documentation  Taken 5/18/2024 1015 by Lisa Paredes RN  Fluid/Electrolyte Management: fluids restricted     Problem: Pain Acute  Goal: Optimal Pain Control and Function  Intervention: Develop Pain Management Plan  Recent Flowsheet Documentation  Taken 5/18/2024 1404 by Lisa Paredes RN  Pain Management Interventions: medication (see MAR)  Taken 5/18/2024 1130 by Lisa Paredes RN  Pain Management Interventions:   care clustered   distraction   emotional support   pillow support provided   quiet environment facilitated   rest  Taken 5/18/2024 1033 by Lisa Paredes RN  Pain Management Interventions: medication (see MAR)  Taken 5/18/2024 1020 by Lisa Paredes RN  Pain Management Interventions: medication (see MAR)  Intervention: Prevent or Manage Pain  Recent Flowsheet Documentation  Taken 5/18/2024 1015 by Lisa Paredes RN  Bowel Elimination Promotion: commode/bedpan at bedside  Medication Review/Management: medications reviewed  Intervention: Optimize Psychosocial Wellbeing  Recent Flowsheet Documentation  Taken 5/18/2024 1015 by Lisa Paredes RN  Supportive Measures: problem-solving facilitated   Goal Outcome Evaluation:  Patient alert and oriented x4. Moving independently in the room. Up in chair in afternoon. 2 gm Na diet with 1800 ml fluid restriction. Vitals stable on room air. Refused to have blood sugar checked in AM.  in afternoon. Pain in L hip managed using scheduled tylenol, scheduled robaxin, scheduled lidocaine, scheduled Voltaren, PRN 5 mg oxycodone and intermittent T pump use. Switched to oral steroids. Potassium and magnesium protocol. Saline locked. Voiding adequately using urinal at bedside.

## 2024-05-18 NOTE — PROGRESS NOTES
[facility]    Medicine Progress Note - Hospitalist Service    Date of Admission:  5/13/2024    Assessment & Plan   63 year old male with history of atrial fibrillation, stage III CKD, ischemic cardiomyopathy, COPD, HFrEF, hyperlipidemia, CAD s/p CABG, restless leg syndrome, hypertension, obesity, newly diagnosed type 2 diabetes, and tobacco use disorder admitted on 5/13/2024 with the ER with left hip pain, suspected exacerbation of HFpEF, and possible COPD exacerbation.     Intractable left hip pain  Left hip CT revealed left total hip arthroplasty without evidence of loosening or periprosthetic fracture.  Seen by orthopedics and thought likely secondary to trochanteric bursitis, although not seen  on MRI. conservative management recommended including anti-inflammatories, ice and rest. Continue steroids. (Switched to IV Solu-Medrol to oral dexamethasone)  PT and OT for gluteal stretching and strengthening.    Follow-up at [facility] with outpatient PT after discharge.  MRI-No evidence of hardware complication. No periprosthetic fracture.  Patient reports having persistent pain with mild improvement     Acute decompensated HFrEF  Good response to diuretic regimen.    Hold spironolactone and torsemide for uptrending creatinine  Cardiology consult appreciated.  Now has signed off     Hypokalemia 2/2 diuretics  RN managed protocol         Diet: Fluid restriction 1800 ML FLUID (and additional linked orders)  2 Gram Sodium Diet    DVT Prophylaxis: DOAC  Ruvalcaba Catheter: Not present  Lines: None     Cardiac Monitoring: None  Code Status: Full Code      Clinically Significant Risk Factors        # Hypokalemia: Lowest K = 3.2 mmol/L in last 2 days, will replace as needed    # Hypercalcemia: Highest Ca = 11.3 mg/dL in last 2 days, will monitor as appropriate   # Anion Gap Metabolic Acidosis: Highest Anion Gap = 19 mmol/L in last 2 days, will monitor and treat as appropriate     # Acute Kidney  "Injury, unspecified: based on a >150% or 0.3 mg/dL increase in last creatinine compared to past 90 day average, will monitor renal function  # Hypertension: Noted on problem list  # Chronic heart failure with reduced ejection fraction: last echo with EF <40%      # DMII: A1C = 7.8 % (Ref range: <5.7 %) within past 6 months, PRESENT ON ADMISSION  # Severe Obesity: Estimated body mass index is 42.29 kg/m  as calculated from the following:    Height as of this encounter: 1.676 m (5' 6\").    Weight as of this encounter: 118.8 kg (262 lb)., PRESENT ON ADMISSION     # Financial/Environmental Concerns: none         Disposition Plan     Medically Ready for Discharge: Anticipated Tomorrow       Sonia Mcfarland MD  Hospitalist Service  [facility]  Securely message with [employer] (more info)  Text page via [employer] Paging/Directory   ______________________________________________________________________    Interval History   Patient new to me today.  Chart, labs and imaging results reviewed.  Patient lying on his right side afraid of moving due to pain.  He states pain can go up to 9 whenever he makes movement.  Overall pt states that pain improvement is suboptimal.  Management plan discussed with the patient and he expressed understanding.    Physical Exam   Vital Signs: Temp: 97.9  F (36.6  C) Temp src: Oral BP: 126/73 Pulse: 74   Resp: 18 SpO2: 95 % O2 Device: None (Room air)    Weight: 262 lbs 0 oz    General Appearance: No distress noted  Respiratory: Good air entry bilaterally  Cardiovascular: S1 and S2 were heard  GI: Soft abdomen, no tenderness, normoactive bowel sounds  Skin: Intact and warm      Medical Decision Making       50 MINUTES SPENT BY ME on the date of service doing chart review, history, exam, documentation & further activities per the note.      Data     "

## 2024-05-19 LAB
ANION GAP SERPL CALCULATED.3IONS-SCNC: 18 MMOL/L (ref 7–15)
BUN SERPL-MCNC: 102.4 MG/DL (ref 8–23)
CALCIUM SERPL-MCNC: 10.6 MG/DL (ref 8.8–10.2)
CHLORIDE SERPL-SCNC: 90 MMOL/L (ref 98–107)
CREAT SERPL-MCNC: 1.89 MG/DL (ref 0.67–1.17)
DEPRECATED HCO3 PLAS-SCNC: 25 MMOL/L (ref 22–29)
EGFRCR SERPLBLD CKD-EPI 2021: 39 ML/MIN/1.73M2
ERYTHROCYTE [DISTWIDTH] IN BLOOD BY AUTOMATED COUNT: 17.9 % (ref 10–15)
GLUCOSE BLDC GLUCOMTR-MCNC: 199 MG/DL (ref 70–99)
GLUCOSE BLDC GLUCOMTR-MCNC: 208 MG/DL (ref 70–99)
GLUCOSE BLDC GLUCOMTR-MCNC: 299 MG/DL (ref 70–99)
GLUCOSE BLDC GLUCOMTR-MCNC: 371 MG/DL (ref 70–99)
GLUCOSE SERPL-MCNC: 176 MG/DL (ref 70–99)
HCT VFR BLD AUTO: 43.4 % (ref 40–53)
HGB BLD-MCNC: 14 G/DL (ref 13.3–17.7)
MAGNESIUM SERPL-MCNC: 3 MG/DL (ref 1.7–2.3)
MCH RBC QN AUTO: 29.1 PG (ref 26.5–33)
MCHC RBC AUTO-ENTMCNC: 32.3 G/DL (ref 31.5–36.5)
MCV RBC AUTO: 90 FL (ref 78–100)
PLATELET # BLD AUTO: 176 10E3/UL (ref 150–450)
POTASSIUM SERPL-SCNC: 3.7 MMOL/L (ref 3.4–5.3)
RBC # BLD AUTO: 4.81 10E6/UL (ref 4.4–5.9)
SODIUM SERPL-SCNC: 133 MMOL/L (ref 135–145)
WBC # BLD AUTO: 13.1 10E3/UL (ref 4–11)

## 2024-05-19 PROCEDURE — 250N000013 HC RX MED GY IP 250 OP 250 PS 637

## 2024-05-19 PROCEDURE — 250N000012 HC RX MED GY IP 250 OP 636 PS 637: Performed by: STUDENT IN AN ORGANIZED HEALTH CARE EDUCATION/TRAINING PROGRAM

## 2024-05-19 PROCEDURE — 120N000001 HC R&B MED SURG/OB

## 2024-05-19 PROCEDURE — 36415 COLL VENOUS BLD VENIPUNCTURE: CPT | Performed by: STUDENT IN AN ORGANIZED HEALTH CARE EDUCATION/TRAINING PROGRAM

## 2024-05-19 PROCEDURE — 84166 PROTEIN E-PHORESIS/URINE/CSF: CPT | Performed by: PATHOLOGY

## 2024-05-19 PROCEDURE — 250N000013 HC RX MED GY IP 250 OP 250 PS 637: Performed by: NURSE PRACTITIONER

## 2024-05-19 PROCEDURE — 250N000013 HC RX MED GY IP 250 OP 250 PS 637: Performed by: PHYSICIAN ASSISTANT

## 2024-05-19 PROCEDURE — 83735 ASSAY OF MAGNESIUM: CPT | Performed by: STUDENT IN AN ORGANIZED HEALTH CARE EDUCATION/TRAINING PROGRAM

## 2024-05-19 PROCEDURE — 99233 SBSQ HOSP IP/OBS HIGH 50: CPT | Performed by: STUDENT IN AN ORGANIZED HEALTH CARE EDUCATION/TRAINING PROGRAM

## 2024-05-19 PROCEDURE — 80048 BASIC METABOLIC PNL TOTAL CA: CPT | Performed by: STUDENT IN AN ORGANIZED HEALTH CARE EDUCATION/TRAINING PROGRAM

## 2024-05-19 PROCEDURE — 250N000013 HC RX MED GY IP 250 OP 250 PS 637: Performed by: INTERNAL MEDICINE

## 2024-05-19 PROCEDURE — 85027 COMPLETE CBC AUTOMATED: CPT | Performed by: STUDENT IN AN ORGANIZED HEALTH CARE EDUCATION/TRAINING PROGRAM

## 2024-05-19 RX ADMIN — APIXABAN 5 MG: 5 TABLET, FILM COATED ORAL at 08:34

## 2024-05-19 RX ADMIN — MICONAZOLE NITRATE: 20 POWDER TOPICAL at 21:55

## 2024-05-19 RX ADMIN — NICOTINE 1 PATCH: 21 PATCH, EXTENDED RELEASE TRANSDERMAL at 08:36

## 2024-05-19 RX ADMIN — ACETAMINOPHEN 975 MG: 325 TABLET ORAL at 20:58

## 2024-05-19 RX ADMIN — LIDOCAINE: 50 OINTMENT TOPICAL at 16:22

## 2024-05-19 RX ADMIN — Medication 2 G: at 18:28

## 2024-05-19 RX ADMIN — ROPINIROLE HYDROCHLORIDE 2 MG: 1 TABLET, FILM COATED ORAL at 14:08

## 2024-05-19 RX ADMIN — CARVEDILOL 3.12 MG: 3.12 TABLET, FILM COATED ORAL at 08:34

## 2024-05-19 RX ADMIN — HYDRALAZINE HYDROCHLORIDE 25 MG: 25 TABLET ORAL at 20:58

## 2024-05-19 RX ADMIN — INSULIN GLARGINE 10 UNITS: 100 INJECTION, SOLUTION SUBCUTANEOUS at 11:32

## 2024-05-19 RX ADMIN — OXYCODONE HYDROCHLORIDE 5 MG: 5 TABLET ORAL at 17:52

## 2024-05-19 RX ADMIN — OXYCODONE HYDROCHLORIDE 5 MG: 5 TABLET ORAL at 11:39

## 2024-05-19 RX ADMIN — APIXABAN 5 MG: 5 TABLET, FILM COATED ORAL at 20:58

## 2024-05-19 RX ADMIN — Medication 2 G: at 08:32

## 2024-05-19 RX ADMIN — METHOCARBAMOL TABLETS 750 MG: 750 TABLET, COATED ORAL at 00:18

## 2024-05-19 RX ADMIN — ACETAMINOPHEN 975 MG: 325 TABLET ORAL at 11:32

## 2024-05-19 RX ADMIN — METHOCARBAMOL TABLETS 750 MG: 750 TABLET, COATED ORAL at 11:32

## 2024-05-19 RX ADMIN — AMIODARONE HYDROCHLORIDE 200 MG: 200 TABLET ORAL at 08:34

## 2024-05-19 RX ADMIN — ACETAMINOPHEN 975 MG: 325 TABLET ORAL at 14:08

## 2024-05-19 RX ADMIN — METHOCARBAMOL TABLETS 750 MG: 750 TABLET, COATED ORAL at 17:52

## 2024-05-19 RX ADMIN — LIDOCAINE: 50 OINTMENT TOPICAL at 22:05

## 2024-05-19 RX ADMIN — Medication 2 G: at 00:19

## 2024-05-19 RX ADMIN — ROPINIROLE HYDROCHLORIDE 2 MG: 1 TABLET, FILM COATED ORAL at 04:00

## 2024-05-19 RX ADMIN — HYDRALAZINE HYDROCHLORIDE 25 MG: 25 TABLET ORAL at 08:33

## 2024-05-19 RX ADMIN — LIDOCAINE: 50 OINTMENT TOPICAL at 06:28

## 2024-05-19 RX ADMIN — PANTOPRAZOLE SODIUM 40 MG: 40 TABLET, DELAYED RELEASE ORAL at 06:28

## 2024-05-19 RX ADMIN — CARVEDILOL 3.12 MG: 3.12 TABLET, FILM COATED ORAL at 17:52

## 2024-05-19 RX ADMIN — LIDOCAINE: 50 OINTMENT TOPICAL at 11:32

## 2024-05-19 RX ADMIN — ROPINIROLE HYDROCHLORIDE 2 MG: 1 TABLET, FILM COATED ORAL at 21:00

## 2024-05-19 RX ADMIN — ATORVASTATIN CALCIUM 40 MG: 40 TABLET, FILM COATED ORAL at 08:34

## 2024-05-19 RX ADMIN — CYANOCOBALAMIN TAB 1000 MCG 1000 MCG: 1000 TAB at 08:33

## 2024-05-19 RX ADMIN — ISOSORBIDE MONONITRATE 180 MG: 60 TABLET, EXTENDED RELEASE ORAL at 08:34

## 2024-05-19 RX ADMIN — Medication 2 G: at 13:20

## 2024-05-19 RX ADMIN — DULOXETINE HYDROCHLORIDE 60 MG: 60 CAPSULE, DELAYED RELEASE PELLETS ORAL at 08:33

## 2024-05-19 RX ADMIN — DEXAMETHASONE 4 MG: 2 TABLET ORAL at 08:33

## 2024-05-19 RX ADMIN — METHOCARBAMOL TABLETS 750 MG: 750 TABLET, COATED ORAL at 06:26

## 2024-05-19 RX ADMIN — ROPINIROLE HYDROCHLORIDE 2 MG: 1 TABLET, FILM COATED ORAL at 08:34

## 2024-05-19 RX ADMIN — CLOPIDOGREL BISULFATE 75 MG: 75 TABLET ORAL at 08:34

## 2024-05-19 RX ADMIN — HYDRALAZINE HYDROCHLORIDE 25 MG: 25 TABLET ORAL at 13:20

## 2024-05-19 RX ADMIN — DEXAMETHASONE 4 MG: 2 TABLET ORAL at 20:58

## 2024-05-19 RX ADMIN — ACETAMINOPHEN 975 MG: 325 TABLET ORAL at 04:00

## 2024-05-19 ASSESSMENT — ACTIVITIES OF DAILY LIVING (ADL)
ADLS_ACUITY_SCORE: 27
ADLS_ACUITY_SCORE: 26
ADLS_ACUITY_SCORE: 27
ADLS_ACUITY_SCORE: 26
ADLS_ACUITY_SCORE: 27

## 2024-05-19 NOTE — PLAN OF CARE
Problem: Adult Inpatient Plan of Care  Goal: Optimal Comfort and Wellbeing  Intervention: Monitor Pain and Promote Comfort  Recent Flowsheet Documentation  Taken 5/19/2024 1139 by Lisa Paredes RN  Pain Management Interventions: medication (see MAR)  Taken 5/19/2024 1132 by Lisa Paredes RN  Pain Management Interventions: medication (see MAR)  Taken 5/19/2024 0930 by Lisa Paredes RN  Pain Management Interventions:   care clustered   distraction   emotional support   quiet environment facilitated   rest  Taken 5/19/2024 0833 by Lisa Paredes RN  Pain Management Interventions: medication (see MAR)     Problem: Heart Failure  Goal: Optimal Coping  Intervention: Support Psychosocial Response  Recent Flowsheet Documentation  Taken 5/19/2024 0833 by Lisa Paredes RN  Supportive Measures:   active listening utilized   positive reinforcement provided     Problem: Heart Failure  Goal: Optimal Functional Ability  Intervention: Optimize Functional Ability  Recent Flowsheet Documentation  Taken 5/19/2024 1041 by Lisa Paredes RN  Activity Management: back to bed  Taken 5/19/2024 0930 by Lisa Paredes RN  Activity Management:   ambulated in room   up in chair  Taken 5/19/2024 0833 by Lisa Paredes RN  Activity Management: activity adjusted per tolerance     Problem: Pain Acute  Goal: Optimal Pain Control and Function  Intervention: Develop Pain Management Plan  Recent Flowsheet Documentation  Taken 5/19/2024 1139 by Lisa Paredes RN  Pain Management Interventions: medication (see MAR)  Taken 5/19/2024 1132 by Lisa Paredes RN  Pain Management Interventions: medication (see MAR)  Taken 5/19/2024 0930 by Lisa Paredes RN  Pain Management Interventions:   care clustered   distraction   emotional support   quiet environment facilitated   rest  Taken 5/19/2024 0833 by Lisa Paredes RN  Pain Management Interventions: medication (see MAR)      Problem: Comorbidity Management  Goal: Blood Glucose Levels Within Targeted Range  Intervention: Monitor and Manage Glycemia  Recent Flowsheet Documentation  Taken 5/19/2024 7926 by Lisa Paredes RN  Medication Review/Management: medications reviewed   Goal Outcome Evaluation:  Patient alert and oriented x4. Moving independently in the room. Up in chair in AM. 2 gm Na diet with 1800 ml fluid restriction. Vitals stable on room air. Blood sugars stable. Pain in L hip managed using scheduled tylenol, scheduled robaxin, scheduled lidocaine, scheduled Voltaren, PRN 5 mg oxycodone and intermittent T pump use. Potassium and magnesium protocol. Saline locked. Voiding adequately using urinal at bedside. Patient reported last BM was 1 week ago, encouraged to take some stool softeners/laxatives but patient refused.

## 2024-05-19 NOTE — PLAN OF CARE
Problem: Comorbidity Management  Goal: Maintenance of Heart Failure Symptom Control  Outcome: Progressing     Problem: Comorbidity Management  Goal: Blood Glucose Levels Within Targeted Range  Outcome: Progressing     Problem: Pain Acute  Goal: Optimal Pain Control and Function  Outcome: Progressing  Intervention: Optimize Psychosocial Wellbeing  Recent Flowsheet Documentation  Taken 5/18/2024 1638 by Devyn Brown RN  Supportive Measures:   active listening utilized   positive reinforcement provided   Goal Outcome Evaluation:                    Patient A and O times 4. Independent in the room. Blood sugars 395 before dinner and 307 at HS. Pain managed with scheduled robaxin, scheduled lidocaine, diclofenac, tylenol and PRN oxy. Oxy given twice this shift. VSS. Mag and K protocols, recheck both in morning.

## 2024-05-19 NOTE — PLAN OF CARE
Problem: Adult Inpatient Plan of Care  Goal: Plan of Care Review  Description: The Plan of Care Review/Shift note should be completed every shift.  The Outcome Evaluation is a brief statement about your assessment that the patient is improving, declining, or no change.  This information will be displayed automatically on your shift  note.  Outcome: Progressing   Problem: Adult Inpatient Plan of Care  Goal: Optimal Comfort and Wellbeing  Intervention: Monitor Pain and Promote Comfort  Recent Flowsheet Documentation  Taken 5/19/2024 0445 by Reid Torres RN  Pain Management Interventions:   medication (see MAR)   heat applied   pillow support provided   care clustered   quiet environment facilitated  Taken 5/19/2024 0000 by Reid Torres RN  Pain Management Interventions:   care clustered   distraction   emotional support   quiet environment facilitated   rest   heat applied  Taken 5/18/2024 2337 by Reid Torres RN  Pain Management Interventions:   medication (see MAR)   emotional support   heat applied   quiet environment facilitated     Problem: Heart Failure  Goal: Fluid and Electrolyte Balance  Outcome: Progressing  Intervention: Monitor and Manage Fluid and Electrolyte Balance  Recent Flowsheet Documentation  Taken 5/19/2024 0000 by Reid Torres RN  Fluid/Electrolyte Management: fluids adjusted    Problem: Comorbidity Management  Goal: Blood Glucose Levels Within Targeted Range  Outcome: Progressing  Intervention: Monitor and Manage Glycemia  Recent Flowsheet Documentation  Taken 5/19/2024 0000 by Reid Torres RN  Medication Review/Management: medications reviewed    Goal Outcome Evaluation:    Patient AOX4. Able to make needs known. Reports improvement from yesterday; less pain overall. Continue to report low-moderate pain levels through L hip and L LB; administered scheduled Tylenol, Robaxin, Voltaren, and Lidocaine with additional heat application and patient reports relief with  this. Refused 0200 BG check. Abiding by fluid restriction. Voiding without issue utilizing urinal at bedside overnight.

## 2024-05-20 ENCOUNTER — OFFICE VISIT (OUTPATIENT)
Dept: CARDIOLOGY | Facility: CLINIC | Age: 64
End: 2024-05-20
Payer: MEDICARE

## 2024-05-20 VITALS
HEIGHT: 66 IN | SYSTOLIC BLOOD PRESSURE: 180 MMHG | TEMPERATURE: 98.1 F | WEIGHT: 227.4 LBS | OXYGEN SATURATION: 96 % | BODY MASS INDEX: 36.55 KG/M2 | HEART RATE: 67 BPM | RESPIRATION RATE: 18 BRPM | DIASTOLIC BLOOD PRESSURE: 79 MMHG

## 2024-05-20 DIAGNOSIS — I50.20 HEART FAILURE WITH REDUCED EJECTION FRACTION (H): Primary | ICD-10-CM

## 2024-05-20 LAB
ALBUMIN SERPL BCG-MCNC: 5.1 G/DL (ref 3.5–5.2)
ALP SERPL-CCNC: 76 U/L (ref 40–150)
ALT SERPL W P-5'-P-CCNC: 17 U/L (ref 0–70)
ANION GAP SERPL CALCULATED.3IONS-SCNC: 16 MMOL/L (ref 7–15)
AST SERPL W P-5'-P-CCNC: 14 U/L (ref 0–45)
BILIRUB SERPL-MCNC: 0.5 MG/DL
BUN SERPL-MCNC: 89 MG/DL (ref 8–23)
CALCIUM SERPL-MCNC: 11 MG/DL (ref 8.8–10.2)
CHLORIDE SERPL-SCNC: 90 MMOL/L (ref 98–107)
CREAT SERPL-MCNC: 1.65 MG/DL (ref 0.67–1.17)
DEPRECATED HCO3 PLAS-SCNC: 25 MMOL/L (ref 22–29)
EGFRCR SERPLBLD CKD-EPI 2021: 46 ML/MIN/1.73M2
ERYTHROCYTE [DISTWIDTH] IN BLOOD BY AUTOMATED COUNT: 17.7 % (ref 10–15)
GLUCOSE BLDC GLUCOMTR-MCNC: 182 MG/DL (ref 70–99)
GLUCOSE BLDC GLUCOMTR-MCNC: 307 MG/DL (ref 70–99)
GLUCOSE SERPL-MCNC: 192 MG/DL (ref 70–99)
HCT VFR BLD AUTO: 42.8 % (ref 40–53)
HGB BLD-MCNC: 14 G/DL (ref 13.3–17.7)
MAGNESIUM SERPL-MCNC: 2.9 MG/DL (ref 1.7–2.3)
MCH RBC QN AUTO: 29.4 PG (ref 26.5–33)
MCHC RBC AUTO-ENTMCNC: 32.7 G/DL (ref 31.5–36.5)
MCV RBC AUTO: 90 FL (ref 78–100)
PLATELET # BLD AUTO: 160 10E3/UL (ref 150–450)
POTASSIUM SERPL-SCNC: 3.9 MMOL/L (ref 3.4–5.3)
PROT SERPL-MCNC: 9 G/DL (ref 6.4–8.3)
RBC # BLD AUTO: 4.77 10E6/UL (ref 4.4–5.9)
SODIUM SERPL-SCNC: 131 MMOL/L (ref 135–145)
TOTAL PROTEIN SERUM FOR ELP: 8.6 G/DL (ref 6.4–8.3)
WBC # BLD AUTO: 13.1 10E3/UL (ref 4–11)

## 2024-05-20 PROCEDURE — 250N000013 HC RX MED GY IP 250 OP 250 PS 637: Performed by: NURSE PRACTITIONER

## 2024-05-20 PROCEDURE — 250N000013 HC RX MED GY IP 250 OP 250 PS 637: Performed by: INTERNAL MEDICINE

## 2024-05-20 PROCEDURE — 93264 REM MNTR WRLS P-ART PRS SNR: CPT | Performed by: INTERNAL MEDICINE

## 2024-05-20 PROCEDURE — 250N000013 HC RX MED GY IP 250 OP 250 PS 637: Performed by: PHYSICIAN ASSISTANT

## 2024-05-20 PROCEDURE — 84165 PROTEIN E-PHORESIS SERUM: CPT | Mod: TC | Performed by: PATHOLOGY

## 2024-05-20 PROCEDURE — 83735 ASSAY OF MAGNESIUM: CPT | Performed by: STUDENT IN AN ORGANIZED HEALTH CARE EDUCATION/TRAINING PROGRAM

## 2024-05-20 PROCEDURE — 84155 ASSAY OF PROTEIN SERUM: CPT | Performed by: STUDENT IN AN ORGANIZED HEALTH CARE EDUCATION/TRAINING PROGRAM

## 2024-05-20 PROCEDURE — 36415 COLL VENOUS BLD VENIPUNCTURE: CPT | Performed by: STUDENT IN AN ORGANIZED HEALTH CARE EDUCATION/TRAINING PROGRAM

## 2024-05-20 PROCEDURE — 99239 HOSP IP/OBS DSCHRG MGMT >30: CPT | Performed by: STUDENT IN AN ORGANIZED HEALTH CARE EDUCATION/TRAINING PROGRAM

## 2024-05-20 PROCEDURE — 80053 COMPREHEN METABOLIC PANEL: CPT | Performed by: STUDENT IN AN ORGANIZED HEALTH CARE EDUCATION/TRAINING PROGRAM

## 2024-05-20 PROCEDURE — 250N000012 HC RX MED GY IP 250 OP 636 PS 637: Performed by: STUDENT IN AN ORGANIZED HEALTH CARE EDUCATION/TRAINING PROGRAM

## 2024-05-20 PROCEDURE — 85027 COMPLETE CBC AUTOMATED: CPT | Performed by: STUDENT IN AN ORGANIZED HEALTH CARE EDUCATION/TRAINING PROGRAM

## 2024-05-20 PROCEDURE — 82542 COL CHROMOTOGRAPHY QUAL/QUAN: CPT | Performed by: STUDENT IN AN ORGANIZED HEALTH CARE EDUCATION/TRAINING PROGRAM

## 2024-05-20 PROCEDURE — G0463 HOSPITAL OUTPT CLINIC VISIT: HCPCS

## 2024-05-20 PROCEDURE — 250N000013 HC RX MED GY IP 250 OP 250 PS 637

## 2024-05-20 RX ORDER — TORSEMIDE 100 MG/1
100 TABLET ORAL DAILY
DISCHARGE
Start: 2024-05-20 | End: 2024-05-30

## 2024-05-20 RX ORDER — LANCETS
EACH MISCELLANEOUS
Qty: 100 EACH | Refills: 6 | Status: SHIPPED | OUTPATIENT
Start: 2024-05-20 | End: 2024-05-20

## 2024-05-20 RX ORDER — METHOCARBAMOL 750 MG/1
750 TABLET, FILM COATED ORAL EVERY 6 HOURS
Qty: 60 TABLET | Refills: 0 | Status: SHIPPED | OUTPATIENT
Start: 2024-05-20 | End: 2024-06-03

## 2024-05-20 RX ORDER — PANTOPRAZOLE SODIUM 40 MG/1
40 TABLET, DELAYED RELEASE ORAL
Qty: 30 TABLET | Refills: 1 | Status: SHIPPED | OUTPATIENT
Start: 2024-05-21 | End: 2024-07-31

## 2024-05-20 RX ORDER — OXYCODONE HYDROCHLORIDE 5 MG/1
2.5 TABLET ORAL 3 TIMES DAILY PRN
Qty: 6 TABLET | Refills: 0 | Status: SHIPPED | OUTPATIENT
Start: 2024-05-20 | End: 2024-06-03

## 2024-05-20 RX ORDER — LIDOCAINE 50 MG/G
OINTMENT TOPICAL EVERY 6 HOURS
Qty: 50 G | Refills: 1 | Status: SHIPPED | OUTPATIENT
Start: 2024-05-20 | End: 2024-06-03

## 2024-05-20 RX ORDER — TORSEMIDE 100 MG/1
100 TABLET ORAL DAILY
Status: DISCONTINUED | OUTPATIENT
Start: 2024-05-21 | End: 2024-05-20 | Stop reason: HOSPADM

## 2024-05-20 RX ORDER — DEXAMETHASONE 4 MG/1
4 TABLET ORAL EVERY 12 HOURS
Qty: 4 TABLET | Refills: 0 | Status: SHIPPED | OUTPATIENT
Start: 2024-05-20 | End: 2024-06-03

## 2024-05-20 RX ORDER — LANCETS 28 GAUGE
EACH MISCELLANEOUS
Qty: 100 EACH | Refills: 0 | Status: SHIPPED | OUTPATIENT
Start: 2024-05-20 | End: 2024-08-13

## 2024-05-20 RX ORDER — OXYCODONE HYDROCHLORIDE 5 MG/1
5 TABLET ORAL 3 TIMES DAILY PRN
Status: DISCONTINUED | OUTPATIENT
Start: 2024-05-20 | End: 2024-05-20 | Stop reason: HOSPADM

## 2024-05-20 RX ORDER — ACETAMINOPHEN 500 MG
1000 TABLET ORAL 2 TIMES DAILY
Qty: 60 TABLET | Refills: 1 | Status: SHIPPED | OUTPATIENT
Start: 2024-05-20 | End: 2024-06-03

## 2024-05-20 RX ORDER — LANCETS
EACH MISCELLANEOUS
Qty: 100 EACH | Refills: 6 | Status: SHIPPED | OUTPATIENT
Start: 2024-05-20 | End: 2024-08-13

## 2024-05-20 RX ORDER — OXYCODONE HYDROCHLORIDE 5 MG/1
5 TABLET ORAL 3 TIMES DAILY PRN
Qty: 6 TABLET | Refills: 0 | Status: SHIPPED | OUTPATIENT
Start: 2024-05-20 | End: 2024-06-03

## 2024-05-20 RX ADMIN — HYDRALAZINE HYDROCHLORIDE 25 MG: 25 TABLET ORAL at 14:11

## 2024-05-20 RX ADMIN — AMIODARONE HYDROCHLORIDE 200 MG: 200 TABLET ORAL at 08:12

## 2024-05-20 RX ADMIN — ISOSORBIDE MONONITRATE 180 MG: 60 TABLET, EXTENDED RELEASE ORAL at 08:11

## 2024-05-20 RX ADMIN — Medication 2 G: at 08:10

## 2024-05-20 RX ADMIN — ACETAMINOPHEN 975 MG: 325 TABLET ORAL at 15:45

## 2024-05-20 RX ADMIN — METHOCARBAMOL TABLETS 750 MG: 750 TABLET, COATED ORAL at 00:02

## 2024-05-20 RX ADMIN — PANTOPRAZOLE SODIUM 40 MG: 40 TABLET, DELAYED RELEASE ORAL at 06:16

## 2024-05-20 RX ADMIN — ROPINIROLE HYDROCHLORIDE 2 MG: 1 TABLET, FILM COATED ORAL at 15:46

## 2024-05-20 RX ADMIN — ROPINIROLE HYDROCHLORIDE 2 MG: 1 TABLET, FILM COATED ORAL at 08:11

## 2024-05-20 RX ADMIN — DEXAMETHASONE 4 MG: 2 TABLET ORAL at 08:12

## 2024-05-20 RX ADMIN — ACETAMINOPHEN 975 MG: 325 TABLET ORAL at 03:21

## 2024-05-20 RX ADMIN — CARVEDILOL 3.12 MG: 3.12 TABLET, FILM COATED ORAL at 08:11

## 2024-05-20 RX ADMIN — Medication 2 G: at 12:25

## 2024-05-20 RX ADMIN — Medication 2 G: at 01:33

## 2024-05-20 RX ADMIN — ROPINIROLE HYDROCHLORIDE 2 MG: 1 TABLET, FILM COATED ORAL at 03:09

## 2024-05-20 RX ADMIN — APIXABAN 5 MG: 5 TABLET, FILM COATED ORAL at 08:11

## 2024-05-20 RX ADMIN — LIDOCAINE: 50 OINTMENT TOPICAL at 06:21

## 2024-05-20 RX ADMIN — DULOXETINE HYDROCHLORIDE 60 MG: 60 CAPSULE, DELAYED RELEASE PELLETS ORAL at 08:12

## 2024-05-20 RX ADMIN — METHOCARBAMOL TABLETS 750 MG: 750 TABLET, COATED ORAL at 14:11

## 2024-05-20 RX ADMIN — MICONAZOLE NITRATE: 20 POWDER TOPICAL at 08:13

## 2024-05-20 RX ADMIN — METHOCARBAMOL TABLETS 750 MG: 750 TABLET, COATED ORAL at 06:17

## 2024-05-20 RX ADMIN — HYDRALAZINE HYDROCHLORIDE 25 MG: 25 TABLET ORAL at 08:12

## 2024-05-20 RX ADMIN — OXYCODONE HYDROCHLORIDE 5 MG: 5 TABLET ORAL at 05:17

## 2024-05-20 RX ADMIN — NICOTINE 1 PATCH: 21 PATCH, EXTENDED RELEASE TRANSDERMAL at 08:11

## 2024-05-20 RX ADMIN — ATORVASTATIN CALCIUM 40 MG: 40 TABLET, FILM COATED ORAL at 08:11

## 2024-05-20 RX ADMIN — CYANOCOBALAMIN TAB 1000 MCG 1000 MCG: 1000 TAB at 08:11

## 2024-05-20 RX ADMIN — ACETAMINOPHEN 975 MG: 325 TABLET ORAL at 08:12

## 2024-05-20 RX ADMIN — CLOPIDOGREL BISULFATE 75 MG: 75 TABLET ORAL at 08:11

## 2024-05-20 ASSESSMENT — ACTIVITIES OF DAILY LIVING (ADL)
ADLS_ACUITY_SCORE: 27

## 2024-05-20 NOTE — PLAN OF CARE
Problem: Comorbidity Management  Goal: Blood Glucose Levels Within Targeted Range  Outcome: Progressing     Problem: Pain Acute  Goal: Optimal Pain Control and Function  Intervention: Optimize Psychosocial Wellbeing  Recent Flowsheet Documentation  Taken 5/19/2024 1800 by Devyn Brown RN  Supportive Measures: active listening utilized   Goal Outcome Evaluation:                    Patient A and O times 4, independent in his room. Pain managed with scheduled lidocaine, diclofenac along with PRN oxy. Blood sugars  371 at dinner and 299 at HS. Insulin given in both instance, patient resistant to any teaching regarding diabetes and insulin. Patient has redness and skin breakdown under right breast.  Nystatin ordered and applied.

## 2024-05-20 NOTE — PLAN OF CARE
Goal Outcome Evaluation:      Problem: Adult Inpatient Plan of Care  Goal: Absence of Hospital-Acquired Illness or Injury  Intervention: Prevent Infection  Recent Flowsheet Documentation  Taken 5/20/2024 0810 by Alana Pereira RN  Infection Prevention:   hand hygiene promoted   personal protective equipment utilized   rest/sleep promoted   single patient room provided     Problem: Adult Inpatient Plan of Care  Goal: Optimal Comfort and Wellbeing  Intervention: Monitor Pain and Promote Comfort  Recent Flowsheet Documentation  Taken 5/20/2024 0810 by Alana Pereira RN  Pain Management Interventions:   medication (see MAR)   distraction   diversional activity provided   emotional support   pillow support provided   repositioned   rest     Problem: Comorbidity Management  Goal: Blood Glucose Levels Within Targeted Range  Intervention: Monitor and Manage Glycemia  Recent Flowsheet Documentation  Taken 5/20/2024 0810 by Alana Pereira RN  Medication Review/Management: medications reviewed     Patient is alert and oriented x 4.  Cooperative, intermittently irritable yet overall pleasant with interactions.  Continues to have left hip pain managed with scheduled oral meds, topical creams, and alternating heat and ice packs.  Mag and potassium protocols ordered and lab rechecks due in the morning 5/21/24.  Red yeast rash under right breast.  Cleansed, dried, miconazole powder and inter dry applied.  Educated on insulin administration. Patient was open to being educated and verbalized and demonstrated understanding.  Awaiting discharge medications and glucose monitoring supplies to completed discharge educations.  Wheelchair transport set up, pickup time between [id number].  Continue plan of care.

## 2024-05-20 NOTE — CARE PLAN
Pt discharged to assisted living by EMS via wheelchair at 5:50 pm. Writer teach how to use BS equipment and insulin administration before discharge. All pt belonging was returned to pt. Denies any pain and no concern at the time of discharge.

## 2024-05-20 NOTE — CONSULTS
CLINICAL NUTRITION EDUCATION NOTE  Follow up for newly dx DM diet ed    Pt declines diet education at this time. He wuld like to speak to his PCP first regarding his DM.   Pt did accept diabetic diet handouts - these were placed in his welcome folder: Carbohydrate counting for people with DM and using a nutrition label - carbohydrates    Recommend referral for OP Dietitian consult for DM diet ed

## 2024-05-20 NOTE — PLAN OF CARE
Problem: Adult Inpatient Plan of Care  Goal: Plan of Care Review  Description: The Plan of Care Review/Shift note should be completed every shift.  The Outcome Evaluation is a brief statement about your assessment that the patient is improving, declining, or no change.  This information will be displayed automatically on your shift  note.  Outcome: Progressing     Problem: Adult Inpatient Plan of Care  Goal: Absence of Hospital-Acquired Illness or Injury  Intervention: Prevent Skin Injury  Recent Flowsheet Documentation  Taken 5/20/2024 0009 by Reid Torres RN  Body Position:   position changed independently   sitting up in bed     Problem: Adult Inpatient Plan of Care  Goal: Absence of Hospital-Acquired Illness or Injury  Intervention: Prevent Infection  Recent Flowsheet Documentation  Taken 5/20/2024 0009 by Reid Torres RN  Infection Prevention: hand hygiene promoted     Problem: Pain Acute  Goal: Optimal Pain Control and Function  Intervention: Prevent or Manage Pain  Recent Flowsheet Documentation  Taken 5/20/2024 0009 by Reid Torres RN  Medication Review/Management: medications reviewed     Problem: Comorbidity Management  Goal: Blood Glucose Levels Within Targeted Range  Outcome: Progressing  Intervention: Monitor and Manage Glycemia  Recent Flowsheet Documentation  Taken 5/20/2024 0009 by Reid Torres RN  Medication Review/Management: medications reviewed      Problem: Heart Failure  Goal: Fluid and Electrolyte Balance  Outcome: Progressing       Goal Outcome Evaluation:    Patient AOX4. Able to make needs known. VSS. Continues to report moderate pain levels through L hip and LB; administered scheduled Robaxin, Tylenol, Voltaren, Lidocaine with reports of moderate relief following. Displays ongoing redness, rash, and moisture under R breast with additional reports of discomfort; area was dried and ABD dressing was placed to prevent further friction, skin injury, and skin breakdown  with reports of minor relief. Refused 0200 BG check overnight. Up in chair overnight and does not appear to be sleeping well. Compliant with fluid restriction.

## 2024-05-20 NOTE — PROGRESS NOTES
Care Management Discharge Note    Discharge Date: 05/20/2024       Discharge Disposition: Home    Discharge Services: None    Discharge DME: None    Discharge Transportation: agency    Private pay costs discussed: transportation costs    Does the patient's insurance plan have a 3 day qualifying hospital stay waiver?  No    PAS Confirmation Code: N/A  Patient/family educated on Medicare website which has current facility and service quality ratings: no    Education Provided on the Discharge Plan: Yes  Persons Notified of Discharge Plans: patient  Patient/Family in Agreement with the Plan: yes    Handoff Referral Completed: Yes    Additional Information:  Plan is for Pt to discharge home with family when ready.    2:41 PM  RN requested for CM to set-up wheelchair transport to bring Pt home later this afternoon.     [facility] wheelchair transport pick-up window: [phone]. Nursing updated who informed Pt.      MARIUSZ Aguilar

## 2024-05-20 NOTE — PROGRESS NOTES
9:17 PM    Paged by RN about area of skin breakdown under patient's breast. Will place for miconazole powder. Consider wound consult for further eval if deemed necessary from primary team.     Farnaz Joseph,   House Officer

## 2024-05-20 NOTE — PROGRESS NOTES
[facility]  WOC Nurse Inpatient Assessment     Consulted for: right shin    Summary: 5/13 Patient seen in ED.  WOC team is familiar.  Patient stated while at home he did not continue with wound cares.    Patient History (according to provider note(s):      Zackary Hutchison is a 63 year old male with history of atrial fibrillation, stage III CKD, ischemic cardiomyopathy, COPD, HFrEF, hyperlipidemia, CAD s/p CABG, restless leg syndrome, hypertension, obesity, newly diagnosed type 2 diabetes, and tobacco use disorder admitted on 5/13/2024 with the ER with left hip pain, suspected exacerbation of HFpEF, and possible COPD exacerbation.     Assessment:      Areas visualized during today's visit: Lower extremities     Skin Injury Location: right shin       5/13 5/20  Last photo: 5/20  Skin injury due to: Venous stasis dermatitis  Skin history and plan of care:   Patient states wound been there for months  Affected area:      Skin assessment: Superficial scabbing     Measurements (length x width x depth, in cm) 4.5 cm  x 2.5 cm      Color: dusky and pale     Temperature  normal      Drainage: none .      Color: none      Odor: none  Pain: denies , none  Pain interventions prior to dressing change: N/A  Treatment goal: Heal , Increase moisture , and Protection  STATUS: evolving  Supplies ordered: ordered vashe, medihoney and supplies stored on unit       Treatment Plan:     Right shin wound(s): Daily  and PRN if dressing soiled, saturated or falls off   Soak wound with vashe moistened gauze for 5 minutes, pat dry  Apply nickel thick layer of medihoney gel to wound  Cover with mepilex  4.  Apply Sween cream to any and all areas of dry skin on both legs    Orders: Reviewed    RECOMMEND PRIMARY TEAM ORDER: None, at this time  Education provided: plan of care  Discussed plan of care with: Patient  WOC nurse follow-up plan: weekly  Notify WOC if  wound(s) deteriorate.  Nursing to notify the Provider(s) and re-consult the [facility] Nurse if new skin concern.    DATA:     Current support surface: Standard  Standard Isoflex gel  Containment of urine/stool: Continent of bladder and Continent of bowel  BMI: Body mass index is 36.7 kg/m .   Active diet order: Orders Placed This Encounter      2 Gram Sodium Diet     Output: I/O last 3 completed shifts:  In: 1070 [P.O.:1070]  Out: 2250 [Urine:2250]     Labs:   Recent Labs   Lab 05/20/24  0533   ALBUMIN 5.1   HGB 14.0   WBC 13.1*     Pressure injury risk assessment:   Sensory Perception: 3-->slightly limited  Moisture: 3-->occasionally moist  Activity: 3-->walks occasionally  Mobility: 3-->slightly limited  Nutrition: 4-->excellent  Friction and Shear: 2-->potential problem  Simeon Score: 18    NOEL CartyN RN [facility] services  Pager no longer in use, please contact through [employer] group: [facility]

## 2024-05-20 NOTE — PROGRESS NOTES
"[facility] ACUTE INPATIENT PAIN SERVICE    [facility], [facility], [facility], [facility], [place]   PAIN Progress Note    Assessment/Plan:  Zackary Hutchison is a 63 year old male who was admitted on 5/13/2024.  Pain team was asked to see the patient for  intractable left hip pain possibly secondary to bursitis. Admitted for left hip pain, suspected exacerbation of HFpEF, and possible COPD exacerbation.  History of  atrial fibrillation, stage III CKD, ischemic cardiomyopathy, COPD, HFrEF, hyperlipidemia, CAD s/p CABG, restless leg syndrome, hypertension, obesity, newly diagnosed type 2 diabetes, and tobacco use disorder. The patient does smoke and denies chemical dependency history.      Opioid Induced Respiratory Depression Risk Assessment: high: COPD, HFrEF, CKD, Age, obesity, tobacco use, opioid naive      MRI ordered d/t ongoing pain. Per ortho, \"MRI reviewed with Dr. Teran.  No acute findings, no indication for occult fracture, implant loosening, soft tissue injuries.  Also no noted bursitis on imaging.  Ultimately there are no significant findings requiring surgical intervention and would continue conservative cares.  Exam is still most consistent with a bursitis given tenderness over the greater trochanter. Continue conservative management for this issue including anti-inflammatories, ice, rest. Continue steroids.  If medically stable and able to weightbear then okay to discharge from our perspective.\"    Patient feels left hip improved a little, especially overnight, was able to get some sleep. After he starts moving around, pain returns, pain is constant deep ache, and sharp at times.  Pain up to 8 when really bad, he feels the Voltaren Gel is helping. He is not sure if methocarbamol is helping, but still feels some \"tightness\", and not reporting muscle spasms. Will continue oxycodone, but decrease to tid prn. Patient continues on dexamethasone. I encouraged him to alternate heat with ice.    Discussed plan " with Dr. Mcfarland       PLAN:   1) Pain is consistent left hip pain, no acute injury, trauma, fever, chills. Differentials include trochanteric bursitis vs MSK pain. Left hip CT revealed left total hip arthroplasty without evidence of loosening or periprosthetic fracture. Agree with PT and OT. Would use caution with opioids as high risk for opioid induced respiratory depression.  Consider MRI imaging if continued pain and no improvement. Cardiology, WOC, and Orthopedics consults reviewed.  Multimodal Medication Therapy  Topical: lidocaine alternating with voltaren gel q6h  NSAID'S: CrCl 50ml/min. None given CKD and cardiac history  Steroids: dexamethasone 4 mg q 12 h x 5 days starting on 5/18  Muscle Relaxants: Robaxin 750 mg qid   Adjuvants: APAP 975 mg po q6h, continue nicotine patch 21 mg/24 hr, requip 2 mg po q6h  Antidepressants/anxiolytics: Home cymbalta 30mg qam - increased to 60 mg daily   Opioids: oxycodone 2.5 - 5 mg po tid prn.   IV Pain medication: none  Non-medication interventions: PT/OT, ice, heat  Constipation Prophylaxis: miralax bid prn     -Opioid prescriber has been none  -MN  pulled from system on 5/13. This indicates   Gabapentin only , last 12/18/23  Discharge Recommendations - We recommend prescribing the following at the time of discharge:  small supply of oxycodone -dosing 2.5 - 5 mg tid prn. Small supply of methocarbamol 500 mg tid prn, and Voltaren gel appears to be helpful      Monse [facility]  Acute Care Pain Management Program   Hours of pain coverage Mon-Fri 8-1600, afterhours please call the house officer   [facility] (WW, JNs, SD, RH)   Page via [employer]

## 2024-05-20 NOTE — DISCHARGE SUMMARY
"[facility]  Hospitalist Discharge Summary      Date of Admission:  5/13/2024  Date of Discharge:  5/20/2024  Discharging Provider: Sonia Mcfarland MD  Discharge Service: Hospitalist Service    Discharge Diagnoses   Intractable left hip pain  Type 2 diabetes mellitus, new diagnosis  Acute decompensated HFrEF  Acute on chronic HFpEF  Severe biventricular dysfunction  Hypokalemia due to diuretics  Hypercalcemia  Hypermagnesemia  High protein albumin gap    Clinically Significant Risk Factors     # DMII: A1C = 7.8 % (Ref range: <5.7 %) within past 6 months  # Obesity: Estimated body mass index is 36.7 kg/m  as calculated from the following:    Height as of this encounter: 1.676 m (5' 6\").    Weight as of this encounter: 103.1 kg (227 lb 6.4 oz).       Follow-ups Needed After Discharge   Follow-up Appointments     Follow Up Care      Please follow-up at [facility] if pain is not improving. Call   [phone] to make an appointment        Follow-up and recommended labs and tests       Follow up with primary care provider, Reid Escobar, within 7 days for   hospital follow- up.  No follow up labs or test are needed. Pt needs to   follow with PCP, diabetic educator, cardiology          Unresulted Labs Ordered in the Past 30 Days of this Admission       Date and Time Order Name Status Description    5/20/2024  5:33 AM Protein Electrophoresis, Serum In process     5/20/2024 12:02 AM PTH Related Peptide Test In process     5/19/2024  2:57 PM Protein electrophoresis random urine In process         These results will be followed up by PCP    Discharge Disposition   Discharged to home  Condition at discharge: Stable    Hospital Course   63 year old male with history of atrial fibrillation, stage III CKD, ischemic cardiomyopathy, COPD, HFrEF, hyperlipidemia, CAD s/p CABG, restless leg syndrome, hypertension, obesity, newly diagnosed type 2 diabetes, and tobacco use disorder admitted on " 5/13/2024 with the ER with left hip pain, suspected exacerbation of HFpEF, and possible COPD exacerbation.   Intractable left hip pain  Left hip CT revealed left total hip arthroplasty without evidence of loosening or periprosthetic fracture.  Seen by orthopedics and thought likely secondary to trochanteric bursitis, although not seen  on MRI. conservative management recommended including anti-inflammatories, ice and rest. Continue steroids. (Switched to IV Solu-Medrol to oral dexamethasone)  PT and OT for gluteal stretching and strengthening.    Follow-up at [facility] with outpatient PT after discharge.  MRI-No evidence of hardware complication. No periprosthetic fracture.  Patient reports having persistent pain with mild improvement  Type 2 diabetes mellitus, new diagnosis  - recent hemoglobin A1c 7.8  -High intensity insulin sliding scale  -Accu-Cheks  -Hypoglycemia protocol  -Start Lantus 10 units daily due to worsening of hyperglycemia because of steroid  -Plan to adequate patient on self injection of Lantus as we prepare for discharge  -Outpatient diabetic educator referral on discharge navigator   Acute decompensated HFrEF  Acute on chronic HFpEF, severe biventricular dysfunction  Hold spironolactone and reduce torsemide for now.   Cardiology consult appreciated.  Now has signed off.     Hypokalemia 2/2 diuretics  RN managed protocol  Hypercalcemia  Hypermagnesemia  Check for PTHRP, result pending   Consider comprehensive workup of hypercalcemia as an outpatient  Hypomagnesemia most likely related to kidney impairment  High protein albumin gap  ?  Unclear etiology  Check for serum and urine electrophoresis, result pending    Patient is clinically stable enough to discharge home.  Patient is still having considerable pain on his left hip.  Patient has been integrated about Lantus self injection the duration of steroid therapy.  Since also diagnosed with new onset type 2 diabetes mellitus.  Needs to  initiate appropriate therapy with his PCP.  Also placed outpatient diabetic educator and nutrition referral.        Consultations This Hospital Stay   PHYSICAL THERAPY ADULT IP CONSULT  OCCUPATIONAL THERAPY ADULT IP CONSULT  WOUND OSTOMY CONTINENCE NURSE  IP CONSULT  ORTHOPEDIC SURGERY IP CONSULT  CORE CLINIC EVALUATION IP CONSULT  OCCUPATIONAL THERAPY ADULT IP CONSULT  NUTRITION SERVICES ADULT IP CONSULT  CARE MANAGEMENT / SOCIAL WORK IP CONSULT  CARDIOLOGY IP CONSULT  LYMPHEDEMA THERAPY IP CONSULT  WOUND OSTOMY CONTINENCE NURSE  IP CONSULT  PAIN MANAGEMENT ADULT IP CONSULT    Code Status   Full Code    Time Spent on this Encounter   I, Sonia Mcfarland MD, personally saw the patient today and spent greater than 30 minutes discharging this patient.       Sonia Mcfarland MD  [address] [address]  Phone: [phone]  Fax: [phone]  ______________________________________________________________________    Physical Exam   Vital Signs: Temp: 98.1  F (36.7  C) Temp src: Oral BP: (!) 163/81 Pulse: 67   Resp: 18 SpO2: 96 % O2 Device: None (Room air)    Weight: 227 lbs 6.4 oz    General Appearance:  No distress noted  Respiratory: Good air entry bilaterally  Cardiovascular: S1 and S2 were heard  GI: Soft abdomen, no tenderness, normoactive bowel sounds  Skin: Intact and warm               Primary Care Physician   Reid Escobar    Discharge Orders      Adult Diabetes Education  Referral      Follow Up Care    Please follow-up at [facility] if pain is not improving. Call [phone] to make an appointment     Activity     Weight bearing as tolerated    Weight bearing as tolerated on your operative extremity.     Reason for your hospital stay    Left hip pain     Follow-up and recommended labs and tests     Follow up with primary care provider, Reid Escobar, within 7 days for hospital follow- up.  No follow up labs or test  are needed. Pt needs to follow with PCP, diabetic educator, cardiology     Activity    Your activity upon discharge: activity as tolerated     Diet    Follow this diet upon discharge: Orders Placed This Encounter      Fluid restriction 1800 ML FLUID      2 Gram Sodium Diet       Significant Results and Procedures       Discharge Medications   Current Discharge Medication List        START taking these medications    Details   blood glucose (NO BRAND SPECIFIED) test strip Use to test blood sugar 3 times daily or as directed. To accompany: Blood Glucose Monitor Brands: per insurance.  Qty: 100 strip, Refills: 6    Associated Diagnoses: Type 2 diabetes mellitus with other circulatory complication, unspecified whether long term insulin use (H)      blood glucose monitoring (NO BRAND SPECIFIED) meter device kit Use to test blood sugar 3 times daily or as directed. Preferred blood glucose meter OR supplies to accompany: Blood Glucose Monitor Brands: per insurance.  Qty: 1 kit, Refills: 0    Associated Diagnoses: Type 2 diabetes mellitus with other circulatory complication, unspecified whether long term insulin use (H)      dexAMETHasone (DECADRON) 4 MG tablet Take 1 tablet (4 mg) by mouth every 12 hours for 2 days  Qty: 4 tablet, Refills: 0    Associated Diagnoses: Hip pain, left      diclofenac (VOLTAREN) 1 % topical gel Apply 2 g topically every 6 hours  Qty: 150 g, Refills: 1    Associated Diagnoses: Hip pain, left      insulin glargine (LANTUS PEN) 100 UNIT/ML pen Inject 12 Units Subcutaneous every morning (before breakfast) for 2 days  Qty: 1 mL, Refills: 0    Comments: If Lantus is not covered by insurance, may substitute Basaglar or Semglee or other insulin glargine product per insurance preference at same dose and frequency.    Associated Diagnoses: Type 2 diabetes mellitus with other circulatory complication, unspecified whether long term insulin use (H)      lidocaine (XYLOCAINE) 5 % external ointment Apply  topically every 6 hours  Qty: 50 g, Refills: 1    Associated Diagnoses: Hip pain, left      methocarbamol (ROBAXIN) 750 MG tablet Take 1 tablet (750 mg) by mouth every 6 hours  Qty: 60 tablet, Refills: 0    Associated Diagnoses: Hip pain, left      miconazole (MICATIN) 2 % external powder Apply topically 2 times daily  Qty: 85 g, Refills: 1    Associated Diagnoses: Candidiasis of skin      !! oxyCODONE (ROXICODONE) 5 MG tablet Take 1 tablet (5 mg) by mouth 3 times daily as needed for severe pain  Qty: 6 tablet, Refills: 0    Associated Diagnoses: Hip pain, left      !! oxyCODONE (ROXICODONE) 5 MG tablet Take 0.5 tablets (2.5 mg) by mouth 3 times daily as needed for moderate pain  Qty: 6 tablet, Refills: 0    Associated Diagnoses: Hip pain, left      pantoprazole (PROTONIX) 40 MG EC tablet Take 1 tablet (40 mg) by mouth every morning (before breakfast)  Qty: 30 tablet, Refills: 1    Associated Diagnoses: Gastroesophageal reflux disease without esophagitis      thin (NO BRAND SPECIFIED) lancets Use with lanceting device. To accompany: Blood Glucose Monitor Brands: per insurance.  Qty: 100 each, Refills: 6    Comments: Check three times daily prior to meal  Associated Diagnoses: Type 2 diabetes mellitus with other circulatory complication, unspecified whether long term insulin use (H)       !! - Potential duplicate medications found. Please discuss with provider.        CONTINUE these medications which have CHANGED    Details   acetaminophen (TYLENOL) 500 MG tablet Take 2 tablets (1,000 mg) by mouth 2 times daily  Qty: 60 tablet, Refills: 1    Associated Diagnoses: Hip pain, left      torsemide (DEMADEX) 100 MG tablet Take 1 tablet (100 mg) by mouth daily    Associated Diagnoses: Coronary artery disease involving native coronary artery of native heart without angina pectoris           CONTINUE these medications which have NOT CHANGED    Details   albuterol (PROAIR HFA/PROVENTIL HFA/VENTOLIN HFA) 108 (90 Base) MCG/ACT  inhaler Inhale 2 puffs into the lungs every 6 hours as needed for shortness of breath, wheezing or cough      amiodarone (PACERONE) 200 MG tablet Take 1 tablet (200 mg) by mouth daily  Qty: 90 tablet, Refills: 0    Associated Diagnoses: Paroxysmal atrial fibrillation (H)      apixaban ANTICOAGULANT (ELIQUIS) 5 MG tablet Take 1 tablet (5 mg) by mouth 2 times daily  Qty: 180 tablet, Refills: 3    Associated Diagnoses: Paroxysmal atrial fibrillation (H)      atorvastatin (LIPITOR) 40 MG tablet Take 40 mg by mouth every morning      carvedilol (COREG) 3.125 MG tablet Take 1 tablet (3.125 mg) by mouth 2 times daily (with meals)  Qty: 180 tablet, Refills: 1    Associated Diagnoses: Heart failure with reduced ejection fraction (H)      clopidogrel (PLAVIX) 75 MG tablet Take 1 tablet (75 mg) by mouth daily  Qty: 60 tablet, Refills: 0    Associated Diagnoses: Coronary artery disease involving native coronary artery of native heart without angina pectoris      [employer] VITAMIN B12 1000 MCG tablet Take 1,000 mcg by mouth daily      DULoxetine (CYMBALTA) 30 MG capsule Take 30 mg by mouth every morning      hydrALAZINE (APRESOLINE) 10 MG tablet Take 1 tablet (10 mg) by mouth 3 times daily  Qty: 270 tablet, Refills: 3    Associated Diagnoses: Acute on chronic systolic congestive heart failure (H)      ipratropium - albuterol 0.5 mg/2.5 mg/3 mL (DUONEB) 0.5-2.5 (3) MG/3ML neb solution INHALE 3 ML EVERY 6 HOURS AS NEEDED FOR WHEEZING.      isosorbide mononitrate (IMDUR) 60 MG 24 hr tablet Take 3 tablets (180 mg) by mouth every morning  Qty: 270 tablet, Refills: 1    Associated Diagnoses: Coronary artery disease involving native heart without angina pectoris, unspecified vessel or lesion type      nitroGLYcerin (NITROSTAT) 0.4 MG sublingual tablet Place 1 tablet (0.4 mg) under the tongue every 5 minutes as needed If no relief after 3 tabs call 911;continue 1 tab every 5 min max 3 tab Indications: chest pain  Qty: 100 tablet, Refills: 1     Associated Diagnoses: Ischemic cardiomyopathy      polyethylene glycol (MIRALAX) 17 GM/Dose powder Take 17 g by mouth 2 times daily as needed  Qty: 510 g, Refills: 0    Associated Diagnoses: Constipation, unspecified constipation type      rOPINIRole (REQUIP) 2 MG tablet Take 2 mg by mouth every 6 hours           STOP taking these medications       spironolactone (ALDACTONE) 50 MG tablet Comments:   Reason for Stopping:             Allergies   Allergies   Allergen Reactions    Bumex [Bumetanide] Muscle Pain (Myalgia)

## 2024-05-21 DIAGNOSIS — Z09 HOSPITAL DISCHARGE FOLLOW-UP: ICD-10-CM

## 2024-05-21 LAB
ALBUMIN SERPL ELPH-MCNC: 5.3 G/DL (ref 3.7–5.1)
ALPHA1 GLOB SERPL ELPH-MCNC: 0.3 G/DL (ref 0.2–0.4)
ALPHA2 GLOB SERPL ELPH-MCNC: 0.9 G/DL (ref 0.5–0.9)
B-GLOBULIN SERPL ELPH-MCNC: 1.1 G/DL (ref 0.6–1)
GAMMA GLOB SERPL ELPH-MCNC: 1 G/DL (ref 0.7–1.6)
M PROTEIN SERPL ELPH-MCNC: 0 G/DL
PROT PATTERN SERPL ELPH-IMP: ABNORMAL
PROT PATTERN UR ELPH-IMP: NORMAL

## 2024-05-21 PROCEDURE — 84165 PROTEIN E-PHORESIS SERUM: CPT | Mod: 26 | Performed by: PATHOLOGY

## 2024-05-21 PROCEDURE — 84166 PROTEIN E-PHORESIS/URINE/CSF: CPT | Mod: 26 | Performed by: PATHOLOGY

## 2024-05-22 ENCOUNTER — PATIENT OUTREACH (OUTPATIENT)
Dept: CARE COORDINATION | Facility: CLINIC | Age: 64
End: 2024-05-22
Payer: MEDICARE

## 2024-05-22 LAB — PTH RELATED PROT SERPL-SCNC: <2 PMOL/L

## 2024-05-22 NOTE — PROGRESS NOTES
General acute hospital Contact  [facility]/Voicemail     Clinical Data: Post-Discharge Outreach     Outreach attempted x 2.  Left message on patient's voicemail, providing [facility]'s central phone number of [phone] ([phone]) for questions/concerns and/or to schedule an appt with an [facility] provider, if they do not have a PCP.      Plan:  General acute hospital will do no further outreaches at this time.           JOY Arellano  [phone]  [facility]

## 2024-05-23 ENCOUNTER — TELEPHONE (OUTPATIENT)
Dept: PHARMACY | Facility: OTHER | Age: 64
End: 2024-05-23
Payer: MEDICARE

## 2024-05-23 NOTE — TELEPHONE ENCOUNTER
MTM referral from: Transitions of Care (recent hospital discharge or ED visit)    MTM referral outreach attempt #2 on May 23, 2024 at 10:56 AM      Outcome: Patient not reachable after several attempts, sent [employer] message    Use [employer] for the carrier/Plan on the flowsheet      [employer] Message Sent    Marizol Mosqueda CPhT  MTM

## 2024-05-28 ENCOUNTER — ALLIED HEALTH/NURSE VISIT (OUTPATIENT)
Dept: CARDIOLOGY | Facility: CLINIC | Age: 64
End: 2024-05-28
Payer: MEDICARE

## 2024-05-28 DIAGNOSIS — I50.20 HFREF (HEART FAILURE WITH REDUCED EJECTION FRACTION) (H): Primary | ICD-10-CM

## 2024-05-28 PROCEDURE — 99207 PR NO CHARGE LOS: CPT | Performed by: INTERNAL MEDICINE

## 2024-05-30 ENCOUNTER — OFFICE VISIT (OUTPATIENT)
Dept: CARDIOLOGY | Facility: CLINIC | Age: 64
End: 2024-05-30
Payer: MEDICARE

## 2024-05-30 VITALS
DIASTOLIC BLOOD PRESSURE: 54 MMHG | RESPIRATION RATE: 18 BRPM | WEIGHT: 272 LBS | SYSTOLIC BLOOD PRESSURE: 96 MMHG | HEART RATE: 84 BPM | BODY MASS INDEX: 43.9 KG/M2

## 2024-05-30 DIAGNOSIS — I50.23 ACUTE ON CHRONIC SYSTOLIC HEART FAILURE (H): Primary | ICD-10-CM

## 2024-05-30 DIAGNOSIS — I25.10 CORONARY ARTERY DISEASE INVOLVING NATIVE CORONARY ARTERY OF NATIVE HEART WITHOUT ANGINA PECTORIS: ICD-10-CM

## 2024-05-30 PROCEDURE — G2211 COMPLEX E/M VISIT ADD ON: HCPCS | Performed by: INTERNAL MEDICINE

## 2024-05-30 PROCEDURE — 99214 OFFICE O/P EST MOD 30 MIN: CPT | Performed by: INTERNAL MEDICINE

## 2024-05-30 RX ORDER — TORSEMIDE 100 MG/1
100 TABLET ORAL 2 TIMES DAILY
Qty: 180 TABLET | Refills: 3 | Status: SHIPPED | OUTPATIENT
Start: 2024-05-30

## 2024-05-30 NOTE — PROGRESS NOTES
HEART CARE NOTE          Assessment/Recommendations     1. HFimpEF c/b severe ADHF  Assessment / Plan  Hypervolemic on physical exam; estimate ~ 20 lbs of water weight - will resume prior torsemide dosing of 100 mg BID (reduced at recent hospital discharge); patient decline IV diuretic in clinic as well as readmission at this time - [facility] will continue to follow-up closely; [facility] will call Monday to follow-up  Patient is high risk for adverse cardiac events 2/2 severe biventricular dysfunction, multiple HFHs, non-compliance with dietary restrictions, renal dysfunction, CAD  GDMT as detailed below     Current Pharmacotherapy AHA Guideline-Directed Medical Therapy   Lisinopril 5 mg daily  - held Lisinopril 20 mg twice daily   Carvedilol 3.125 mg BID  Carvedilol 25 mg twice daily   Spironolactone 50 mg daily Spironolactone 25 mg once daily   Hydralazine 10 mg TID Hydralazine 100 mg three times daily   Isosorbide mononitrate 180 mg daily Isosorbide dinitrate 40 mg three times daily   SGLT2 inhibitor:Dapagliflozin/Empagliflozin - not started Dapagliflozin or Empagliflozin 10 mg daily      2. Atrial fibrillation  Assessment / Plan  Currently on apixaban and amiodarone     3. CAD c/b ICM  Assessment / Plan  S/p CABG 2015  Now s/p PCI to Ramus and OM with DARRELL  Denies chest pain or anginal equivalents  Continue atorvastatin, carvedilol, clopidogrel     4.CKD  Assessment / Plan  Diuresis as above; continue to monitor UOP and renal function closely    35 minutes spent reviewing prior records (including documentation, laboratory studies, cardiac testing/imaging), history and physical exam, planning, and subsequent documentation.      The longitudinal plan of care for HFimpEF was addressed during this visit. Due to the added complexity in care, I will continue to support Mr. Zackary Hutchison in the subsequent management of this condition(s) and with the ongoing continuity of care of this condition(s).      History of  Present Illness/Subjective    Mr. Zackary Hucthison is a 63 year old male with a PMHx significant for (per Epic notation) atrial fibrillation, stage III CKD, ischemic cardiomyopathy, COPD, HFrEF, hyperlipidemia, CAD s/p CABG, restless leg syndrome, hypertension, obesity, newly diagnosed type 2 diabetes, and tobacco use disorder admitted on 5/13/2024 with the ER with left hip pain, suspected exacerbation of HFpEF, and possible COPD exacerbation      Today, Mr. Hutchison endorses progressive fluid retention and dyspnea; Cardiac management as detailed above     ECG: Personally reviewed 5/13/24. normal sinus rhythm, PAC's noted.     ECHO (personnaly Reviewed on 5/13/24):   1. Technically difficult study.  2. The left ventricle is normal in size. Image quality does not provide for  detailed assessment of LV systolic function, but is felt to be normal with a  visually estimated ejection fraction of roughly 55%.  3. No significant valvular heart disease is identified on this study though  the sensitivity, particularly of regurgitant lesions, is reduced due to poor  Doppler acoustics.  4. Right ventricular size and systolic performance could not be accurately  assessed due to inadequate visualization.     The acoustic quality of this study is fairly poor. If a more accurate  assessment of left ventricular systolicperformance, regional wall motion, and  other morphology/function is required; would recommend cardiac MRI or other  alternative imaging modality for further evaluation.     When compared to the prior real-time echocardiogram dated 25 December 2023,  the ejection fraction appears somewhat higher on the current study though  accurate comparison is somewhat limited due to suboptimal acoustic imaging not  only on the current examination, but also hold the prior study as well.    Lab results: personally reviewed May 30, 2024; notable for resolving PRINCESS    Medical history and pertinent documents reviewed in Care Everywhere please  where applicable see details above        Physical Examination Review of Systems   BP 96/54 (BP Location: Left arm, Patient Position: Sitting, Cuff Size: Adult Large)   Pulse 84   Resp 18   Wt 123.4 kg (272 lb)   BMI 43.90 kg/m    Body mass index is 43.9 kg/m .  Wt Readings from Last 3 Encounters:   05/30/24 123.4 kg (272 lb)   05/20/24 103.1 kg (227 lb 6.4 oz)   05/08/24 121 kg (266 lb 11.2 oz)     General Appearance:   no distress, normal body habitus   ENT/Mouth: membranes moist, no oral lesions or bleeding gums.      EYES:  no scleral icterus, normal conjunctivae   Neck: no carotid bruits or thyromegaly   Chest/Lungs:   lungs are clear to auscultation, no rales or wheezing, equal chest wall expansion    Cardiovascular:   Regular. Normal first and second heart sounds with no murmurs, rubs, or gallops; the carotid, radial and posterior tibial pulses are intact, + LE and weeping LE edema bilaterally    Abdomen:  no organomegaly, masses, bruits, or tenderness; bowel sounds are present   Extremities: no cyanosis or clubbing   Skin: no xanthelasma, warm.    Neurologic: NAD     Psychiatric: alert and oriented x3, calm     A complete 10 systems ROS was reviewed  And is negative except what is listed in the HPI.          Medical History  Surgical History Family History Social History   Past Medical History:   Diagnosis Date    Atrial fibrillation (H)     Chronic kidney disease     Chronic kidney disease, stage 3b (H) 09/28/2023    Class 3 severe obesity due to excess calories with body mass index (BMI) of 40.0 to 44.9 in adult (H) 09/28/2023    Congestive heart failure (H)     COPD (chronic obstructive pulmonary disease) (H)     Coronary artery disease involving coronary bypass graft of native heart without angina pectoris 09/28/2023    Heart failure with reduced ejection fraction (H) 09/11/2023    HFrEF (heart failure with reduced ejection fraction) (H) 09/11/2023    Hyperlipidemia     Hypertension     Ischemic  cardiomyopathy 09/28/2023    Obese     Paroxysmal atrial fibrillation (H) 09/28/2023    Tobacco abuse 09/28/2023    Past Surgical History:   Procedure Laterality Date    CORONARY ANGIOGRAPHY ADULT ORDER      CORONARY ARTERY BYPASS Left 2014    2 vessels    CV CARDIOMEMS WITH RIGHT HEART CATH N/A 2/15/2024    Procedure: Pulmonary Arterial Pressure Sensor Placement;  Surgeon: Jacey Bhandari MD;  Location: [facility]    CV CORONARY ANGIOGRAM N/A 09/11/2023    Procedure: Coronary Angiogram;  Surgeon: Santy Esposito MD;  Location: [facility]    CV CORONARY ANGIOGRAM N/A 5/2/2024    Procedure: CV CORONARY ANGIOGRAM;  Surgeon: Santy Esposito MD;  Location: [facility]    CV LEFT HEART CATH N/A 09/11/2023    Procedure: Left Heart Catheterization;  Surgeon: Santy Esposito MD;  Location: [facility]    CV LEFT HEART CATH N/A 5/2/2024    Procedure: Left Heart Catheterization;  Surgeon: Santy Esposito MD;  Location: [facility]    CV PCI ANGIOPLASTY N/A 5/2/2024    Procedure: Percutaneous Transluminal Angioplasty;  Surgeon: Santy Esposito MD;  Location: [facility]    CV RIGHT HEART CATH MEASUREMENTS RECORDED N/A 09/11/2023    Procedure: Right Heart Catheterization;  Surgeon: Santy Esposito MD;  Location: [facility]    no family history of premature coronary artery disease Social History     Socioeconomic History    Marital status: Single     Spouse name: Not on file    Number of children: Not on file    Years of education: Not on file    Highest education level: Not on file   Occupational History    Occupation: Retired restaurant owner     Comment: [employer]   Tobacco Use    Smoking status: Every Day     Current packs/day: 0.50     Types: Cigarettes    Smokeless tobacco: Never    Tobacco comments:     Seen IP by CTTS on 9/11/23 and declined cessation services and materials   Substance and Sexual Activity    Alcohol use: Not Currently     "Drug use: Not on file    Sexual activity: Not on file   Other Topics Concern    Not on file   Social History Narrative    Currently lives in an assisted living facility. (Updated: 01/08/2024)     Social Determinants of Health     Financial Resource Strain: Not on file   Food Insecurity: Not on file   Transportation Needs: Not on file   Physical Activity: Not on file   Stress: Not on file   Social Connections: Not on file   Interpersonal Safety: Not on file   Housing Stability: Not on file           Lab Results    Chemistry/lipid CBC Cardiac Enzymes/BNP/TSH/INR   Lab Results   Component Value Date    CHOL 121 05/02/2024    HDL 27 (L) 05/02/2024    TRIG 219 (H) 05/02/2024    BUN 89.0 (H) 05/20/2024     (L) 05/20/2024    CO2 25 05/20/2024    Lab Results   Component Value Date    WBC 13.1 (H) 05/20/2024    HGB 14.0 05/20/2024    HCT 42.8 05/20/2024    MCV 90 05/20/2024     05/20/2024    Lab Results   Component Value Date    TSH 1.70 04/29/2024    INR 1.30 (H) 05/13/2024     No results found for: \"CKTOTAL\", \"CKMB\", \"TROPONINI\"       Weight:    Wt Readings from Last 3 Encounters:   05/30/24 123.4 kg (272 lb)   05/20/24 103.1 kg (227 lb 6.4 oz)   05/08/24 121 kg (266 lb 11.2 oz)       Allergies  Allergies   Allergen Reactions    Bumex [Bumetanide] Muscle Pain (Myalgia)         Surgical History  Past Surgical History:   Procedure Laterality Date    CORONARY ANGIOGRAPHY ADULT ORDER      CORONARY ARTERY BYPASS Left 2014    2 vessels    CV CARDIOMEMS WITH RIGHT HEART CATH N/A 2/15/2024    Procedure: Pulmonary Arterial Pressure Sensor Placement;  Surgeon: Jacey Bhandari MD;  Location: [facility] CATH [facility]    CV CORONARY ANGIOGRAM N/A 09/11/2023    Procedure: Coronary Angiogram;  Surgeon: Santy Esposito MD;  Location: [facility] CATH LAB [facility]    CV CORONARY ANGIOGRAM N/A 5/2/2024    Procedure: CV CORONARY ANGIOGRAM;  Surgeon: Santy Esposito MD;  Location: [facility] CATH LAB [facility]    CV LEFT HEART CATH N/A " 09/11/2023    Procedure: Left Heart Catheterization;  Surgeon: Santy Esposito MD;  Location: [facility] CATH LAB [facility]    CV LEFT HEART CATH N/A 5/2/2024    Procedure: Left Heart Catheterization;  Surgeon: Santy Esposito MD;  Location: [facility] CATH LAB [facility]    CV PCI ANGIOPLASTY N/A 5/2/2024    Procedure: Percutaneous Transluminal Angioplasty;  Surgeon: Santy Esposito MD;  Location: [facility] CATH LAB [facility]    CV RIGHT HEART CATH MEASUREMENTS RECORDED N/A 09/11/2023    Procedure: Right Heart Catheterization;  Surgeon: Santy Esposito MD;  Location: [facility] CATH LAB [facility]       Social History  Tobacco:   History   Smoking Status    Every Day    Types: Cigarettes   Smokeless Tobacco    Never    Alcohol:   Social History    Substance and Sexual Activity      Alcohol use: Not Currently   Illicit Drugs:   History   Drug Use Not on file       Family History  Family History   Problem Relation Age of Onset    Cerebrovascular Disease Mother     Myocardial Infarction Father           Jacey Bhandari MD on 5/30/2024      cc: Reid Escobar

## 2024-05-30 NOTE — PATIENT INSTRUCTIONS
Thank you for allowing the [facility] to be a part of your care. Please pay close attention to the following medications as they have been changed during this visit.    1.) Please start taking torsemide 100 BID

## 2024-05-30 NOTE — LETTER
5/30/2024    Reid Escobar MD  [address] [place] [address]    RE: Zackary Hutchison       Dear Colleague,     I had the pleasure of seeing Zackary Hutchison in the [facility].    HEART CARE NOTE          Assessment/Recommendations     1. HFimpEF c/b severe ADHF  Assessment / Plan  Hypervolemic on physical exam; estimate ~ 20 lbs of water weight - will resume prior torsemide dosing of 100 mg BID (reduced at recent hospital discharge); patient decline IV diuretic in clinic as well as readmission at this time - [facility] will continue to follow-up closely; [facility] will call Monday to follow-up  Patient is high risk for adverse cardiac events 2/2 severe biventricular dysfunction, multiple HFHs, non-compliance with dietary restrictions, renal dysfunction, CAD  GDMT as detailed below     Current Pharmacotherapy AHA Guideline-Directed Medical Therapy   Lisinopril 5 mg daily  - held Lisinopril 20 mg twice daily   Carvedilol 3.125 mg BID  Carvedilol 25 mg twice daily   Spironolactone 50 mg daily Spironolactone 25 mg once daily   Hydralazine 10 mg TID Hydralazine 100 mg three times daily   Isosorbide mononitrate 180 mg daily Isosorbide dinitrate 40 mg three times daily   SGLT2 inhibitor:Dapagliflozin/Empagliflozin - not started Dapagliflozin or Empagliflozin 10 mg daily      2. Atrial fibrillation  Assessment / Plan  Currently on apixaban and amiodarone     3. CAD c/b ICM  Assessment / Plan  S/p CABG 2015  Now s/p PCI to Ramus and OM with DARRLEL  Denies chest pain or anginal equivalents  Continue atorvastatin, carvedilol, clopidogrel     4.CKD  Assessment / Plan  Diuresis as above; continue to monitor UOP and renal function closely    35 minutes spent reviewing prior records (including documentation, laboratory studies, cardiac testing/imaging), history and physical exam, planning, and subsequent documentation.      The longitudinal plan of care for HFimpEF was addressed during this visit.  Due to the added complexity in care, I will continue to support Mr. Zackary Hutchison in the subsequent management of this condition(s) and with the ongoing continuity of care of this condition(s).      History of Present Illness/Subjective    Mr. Zackary Hutchison is a 63 year old male with a PMHx significant for (per Epic notation) atrial fibrillation, stage III CKD, ischemic cardiomyopathy, COPD, HFrEF, hyperlipidemia, CAD s/p CABG, restless leg syndrome, hypertension, obesity, newly diagnosed type 2 diabetes, and tobacco use disorder admitted on 5/13/2024 with the ER with left hip pain, suspected exacerbation of HFpEF, and possible COPD exacerbation      Today, Mr. Hutchison endorses progressive fluid retention and dyspnea; Cardiac management as detailed above     ECG: Personally reviewed 5/13/24. normal sinus rhythm, PAC's noted.     ECHO (personnaly Reviewed on 5/13/24):   1. Technically difficult study.  2. The left ventricle is normal in size. Image quality does not provide for  detailed assessment of LV systolic function, but is felt to be normal with a  visually estimated ejection fraction of roughly 55%.  3. No significant valvular heart disease is identified on this study though  the sensitivity, particularly of regurgitant lesions, is reduced due to poor  Doppler acoustics.  4. Right ventricular size and systolic performance could not be accurately  assessed due to inadequate visualization.     The acoustic quality of this study is fairly poor. If a more accurate  assessment of left ventricular systolicperformance, regional wall motion, and  other morphology/function is required; would recommend cardiac MRI or other  alternative imaging modality for further evaluation.     When compared to the prior real-time echocardiogram dated 25 December 2023,  the ejection fraction appears somewhat higher on the current study though  accurate comparison is somewhat limited due to suboptimal acoustic imaging not  only on the  current examination, but also hold the prior study as well.    Lab results: personally reviewed May 30, 2024; notable for resolving PRINCESS    Medical history and pertinent documents reviewed in Care Everywhere please where applicable see details above        Physical Examination Review of Systems   BP 96/54 (BP Location: Left arm, Patient Position: Sitting, Cuff Size: Adult Large)   Pulse 84   Resp 18   Wt 123.4 kg (272 lb)   BMI 43.90 kg/m    Body mass index is 43.9 kg/m .  Wt Readings from Last 3 Encounters:   05/30/24 123.4 kg (272 lb)   05/20/24 103.1 kg (227 lb 6.4 oz)   05/08/24 121 kg (266 lb 11.2 oz)     General Appearance:   no distress, normal body habitus   ENT/Mouth: membranes moist, no oral lesions or bleeding gums.      EYES:  no scleral icterus, normal conjunctivae   Neck: no carotid bruits or thyromegaly   Chest/Lungs:   lungs are clear to auscultation, no rales or wheezing, equal chest wall expansion    Cardiovascular:   Regular. Normal first and second heart sounds with no murmurs, rubs, or gallops; the carotid, radial and posterior tibial pulses are intact, + LE and weeping LE edema bilaterally    Abdomen:  no organomegaly, masses, bruits, or tenderness; bowel sounds are present   Extremities: no cyanosis or clubbing   Skin: no xanthelasma, warm.    Neurologic: NAD     Psychiatric: alert and oriented x3, calm     A complete 10 systems ROS was reviewed  And is negative except what is listed in the HPI.          Medical History  Surgical History Family History Social History   Past Medical History:   Diagnosis Date    Atrial fibrillation (H)     Chronic kidney disease     Chronic kidney disease, stage 3b (H) 09/28/2023    Class 3 severe obesity due to excess calories with body mass index (BMI) of 40.0 to 44.9 in adult (H) 09/28/2023    Congestive heart failure (H)     COPD (chronic obstructive pulmonary disease) (H)     Coronary artery disease involving coronary bypass graft of native heart without  angina pectoris 09/28/2023    Heart failure with reduced ejection fraction (H) 09/11/2023    HFrEF (heart failure with reduced ejection fraction) (H) 09/11/2023    Hyperlipidemia     Hypertension     Ischemic cardiomyopathy 09/28/2023    Obese     Paroxysmal atrial fibrillation (H) 09/28/2023    Tobacco abuse 09/28/2023    Past Surgical History:   Procedure Laterality Date    CORONARY ANGIOGRAPHY ADULT ORDER      CORONARY ARTERY BYPASS Left 2014    2 vessels    CV CARDIOMEMS WITH RIGHT HEART CATH N/A 2/15/2024    Procedure: Pulmonary Arterial Pressure Sensor Placement;  Surgeon: Jacey Bhandari MD;  Location: [facility]    CV CORONARY ANGIOGRAM N/A 09/11/2023    Procedure: Coronary Angiogram;  Surgeon: Santy Esposito MD;  Location: [facility]    CV CORONARY ANGIOGRAM N/A 5/2/2024    Procedure: CV CORONARY ANGIOGRAM;  Surgeon: Santy Esposito MD;  Location: [facility]    CV LEFT HEART CATH N/A 09/11/2023    Procedure: Left Heart Catheterization;  Surgeon: Santy Esposito MD;  Location: [facility]    CV LEFT HEART CATH N/A 5/2/2024    Procedure: Left Heart Catheterization;  Surgeon: Santy Esposito MD;  Location: [facility]    CV PCI ANGIOPLASTY N/A 5/2/2024    Procedure: Percutaneous Transluminal Angioplasty;  Surgeon: Santy Esposito MD;  Location: [facility]    CV RIGHT HEART CATH MEASUREMENTS RECORDED N/A 09/11/2023    Procedure: Right Heart Catheterization;  Surgeon: Santy Esposito MD;  Location: [facility]    no family history of premature coronary artery disease Social History     Socioeconomic History    Marital status: Single     Spouse name: Not on file    Number of children: Not on file    Years of education: Not on file    Highest education level: Not on file   Occupational History    Occupation: Retired restaurant owner     Comment: [employer]   Tobacco Use    Smoking status: Every Day     Current packs/day: 0.50      "Types: Cigarettes    Smokeless tobacco: Never    Tobacco comments:     Seen IP by CTTS on 9/11/23 and declined cessation services and materials   Substance and Sexual Activity    Alcohol use: Not Currently    Drug use: Not on file    Sexual activity: Not on file   Other Topics Concern    Not on file   Social History Narrative    Currently lives in an assisted living facility. (Updated: 01/08/2024)     Social Determinants of Health     Financial Resource Strain: Not on file   Food Insecurity: Not on file   Transportation Needs: Not on file   Physical Activity: Not on file   Stress: Not on file   Social Connections: Not on file   Interpersonal Safety: Not on file   Housing Stability: Not on file           Lab Results    Chemistry/lipid CBC Cardiac Enzymes/BNP/TSH/INR   Lab Results   Component Value Date    CHOL 121 05/02/2024    HDL 27 (L) 05/02/2024    TRIG 219 (H) 05/02/2024    BUN 89.0 (H) 05/20/2024     (L) 05/20/2024    CO2 25 05/20/2024    Lab Results   Component Value Date    WBC 13.1 (H) 05/20/2024    HGB 14.0 05/20/2024    HCT 42.8 05/20/2024    MCV 90 05/20/2024     05/20/2024    Lab Results   Component Value Date    TSH 1.70 04/29/2024    INR 1.30 (H) 05/13/2024     No results found for: \"CKTOTAL\", \"CKMB\", \"TROPONINI\"       Weight:    Wt Readings from Last 3 Encounters:   05/30/24 123.4 kg (272 lb)   05/20/24 103.1 kg (227 lb 6.4 oz)   05/08/24 121 kg (266 lb 11.2 oz)       Allergies  Allergies   Allergen Reactions    Bumex [Bumetanide] Muscle Pain (Myalgia)         Surgical History  Past Surgical History:   Procedure Laterality Date    CORONARY ANGIOGRAPHY ADULT ORDER      CORONARY ARTERY BYPASS Left 2014    2 vessels    CV CARDIOMEMS WITH RIGHT HEART CATH N/A 2/15/2024    Procedure: Pulmonary Arterial Pressure Sensor Placement;  Surgeon: Jacey Bhandari MD;  Location: [facility] CATH LAB [facility]    CV CORONARY ANGIOGRAM N/A 09/11/2023    Procedure: Coronary Angiogram;  Surgeon: Santy Esposito " MD KIMBERLEE;  Location: [facility] CATH [facility]    CV CORONARY ANGIOGRAM N/A 5/2/2024    Procedure: CV CORONARY ANGIOGRAM;  Surgeon: Santy Esposito MD;  Location: [facility]    CV LEFT HEART CATH N/A 09/11/2023    Procedure: Left Heart Catheterization;  Surgeon: Santy Esposito MD;  Location: [facility]    CV LEFT HEART CATH N/A 5/2/2024    Procedure: Left Heart Catheterization;  Surgeon: Santy Esposito MD;  Location: [facility]    CV PCI ANGIOPLASTY N/A 5/2/2024    Procedure: Percutaneous Transluminal Angioplasty;  Surgeon: Santy Esposito MD;  Location: [facility] CATH LAB [facility]    CV RIGHT HEART CATH MEASUREMENTS RECORDED N/A 09/11/2023    Procedure: Right Heart Catheterization;  Surgeon: Santy Esposito MD;  Location: [facility] CATH LAB CV       Social History  Tobacco:   History   Smoking Status    Every Day    Types: Cigarettes   Smokeless Tobacco    Never    Alcohol:   Social History    Substance and Sexual Activity      Alcohol use: Not Currently   Illicit Drugs:   History   Drug Use Not on file       Family History  Family History   Problem Relation Age of Onset    Cerebrovascular Disease Mother     Myocardial Infarction Father           Jacey Bhandari MD on 5/30/2024      cc: Reid Escobar      Thank you for allowing me to participate in the care of your patient.      Sincerely,     Jacey Bhandari MD     [facility]  cc:   Referred Self,

## 2024-06-03 ENCOUNTER — HOSPITAL ENCOUNTER (INPATIENT)
Facility: HOSPITAL | Age: 64
LOS: 7 days | Discharge: HOME | DRG: 291 | End: 2024-06-10
Attending: STUDENT IN AN ORGANIZED HEALTH CARE EDUCATION/TRAINING PROGRAM | Admitting: INTERNAL MEDICINE
Payer: MEDICARE

## 2024-06-03 ENCOUNTER — APPOINTMENT (OUTPATIENT)
Dept: CT IMAGING | Facility: HOSPITAL | Age: 64
DRG: 291 | End: 2024-06-03
Attending: STUDENT IN AN ORGANIZED HEALTH CARE EDUCATION/TRAINING PROGRAM
Payer: MEDICARE

## 2024-06-03 DIAGNOSIS — E11.69 TYPE 2 DIABETES MELLITUS WITH OTHER SPECIFIED COMPLICATION, WITHOUT LONG-TERM CURRENT USE OF INSULIN (H): ICD-10-CM

## 2024-06-03 DIAGNOSIS — B36.9 FUNGAL RASH OF TORSO: ICD-10-CM

## 2024-06-03 DIAGNOSIS — I48.91 RAPID ATRIAL FIBRILLATION (H): ICD-10-CM

## 2024-06-03 DIAGNOSIS — E11.9 NEWLY DIAGNOSED DIABETES (H): ICD-10-CM

## 2024-06-03 DIAGNOSIS — L03.119 CELLULITIS OF ANTERIOR LOWER LEG: Primary | ICD-10-CM

## 2024-06-03 DIAGNOSIS — L03.119 CELLULITIS OF ANTERIOR LOWER LEG: ICD-10-CM

## 2024-06-03 DIAGNOSIS — R09.02 HYPOXIA: ICD-10-CM

## 2024-06-03 DIAGNOSIS — L03.116 CELLULITIS OF LEFT LOWER EXTREMITY: ICD-10-CM

## 2024-06-03 DIAGNOSIS — I50.9 ACUTE ON CHRONIC CONGESTIVE HEART FAILURE, UNSPECIFIED HEART FAILURE TYPE (H): ICD-10-CM

## 2024-06-03 DIAGNOSIS — M25.552 HIP PAIN, LEFT: ICD-10-CM

## 2024-06-03 DIAGNOSIS — E87.6 HYPOKALEMIA: ICD-10-CM

## 2024-06-03 PROBLEM — J96.21 ACUTE AND CHRONIC RESPIRATORY FAILURE WITH HYPOXIA (H): Status: ACTIVE | Noted: 2024-06-03

## 2024-06-03 PROBLEM — E66.01 CLASS 3 SEVERE OBESITY DUE TO EXCESS CALORIES WITH BODY MASS INDEX (BMI) OF 40.0 TO 44.9 IN ADULT (H): Status: ACTIVE | Noted: 2023-09-28

## 2024-06-03 PROBLEM — E66.813 CLASS 3 SEVERE OBESITY DUE TO EXCESS CALORIES WITH BODY MASS INDEX (BMI) OF 40.0 TO 44.9 IN ADULT (H): Status: ACTIVE | Noted: 2023-09-28

## 2024-06-03 LAB
ALBUMIN SERPL BCG-MCNC: 3.9 G/DL (ref 3.5–5.2)
ALP SERPL-CCNC: 100 U/L (ref 40–150)
ALT SERPL W P-5'-P-CCNC: 19 U/L (ref 0–70)
ANION GAP SERPL CALCULATED.3IONS-SCNC: 14 MMOL/L (ref 7–15)
APTT PPP: 28 SECONDS (ref 22–38)
AST SERPL W P-5'-P-CCNC: 13 U/L (ref 0–45)
BASE EXCESS BLDV CALC-SCNC: 10.1 MMOL/L (ref -3–3)
BASOPHILS # BLD AUTO: 0 10E3/UL (ref 0–0.2)
BASOPHILS NFR BLD AUTO: 0 %
BILIRUB SERPL-MCNC: 0.3 MG/DL
BUN SERPL-MCNC: 29.9 MG/DL (ref 8–23)
CALCIUM SERPL-MCNC: 8.7 MG/DL (ref 8.8–10.2)
CHLORIDE SERPL-SCNC: 93 MMOL/L (ref 98–107)
CREAT SERPL-MCNC: 1.23 MG/DL (ref 0.67–1.17)
CRP SERPL-MCNC: 41.4 MG/L
DEPRECATED HCO3 PLAS-SCNC: 31 MMOL/L (ref 22–29)
EGFRCR SERPLBLD CKD-EPI 2021: 65 ML/MIN/1.73M2
EOSINOPHIL # BLD AUTO: 0.2 10E3/UL (ref 0–0.7)
EOSINOPHIL NFR BLD AUTO: 2 %
ERYTHROCYTE [DISTWIDTH] IN BLOOD BY AUTOMATED COUNT: 19.7 % (ref 10–15)
FLUAV RNA SPEC QL NAA+PROBE: NEGATIVE
FLUBV RNA RESP QL NAA+PROBE: NEGATIVE
GLUCOSE BLDC GLUCOMTR-MCNC: 181 MG/DL (ref 70–99)
GLUCOSE BLDC GLUCOMTR-MCNC: 324 MG/DL (ref 70–99)
GLUCOSE BLDC GLUCOMTR-MCNC: 422 MG/DL (ref 70–99)
GLUCOSE SERPL-MCNC: 315 MG/DL (ref 70–99)
HCO3 BLDV-SCNC: 35 MMOL/L (ref 21–28)
HCT VFR BLD AUTO: 37.4 % (ref 40–53)
HGB BLD-MCNC: 11.5 G/DL (ref 13.3–17.7)
IMM GRANULOCYTES # BLD: 0.1 10E3/UL
IMM GRANULOCYTES NFR BLD: 1 %
INR PPP: 0.97 (ref 0.85–1.15)
LACTATE SERPL-SCNC: 2 MMOL/L (ref 0.7–2)
LYMPHOCYTES # BLD AUTO: 1 10E3/UL (ref 0.8–5.3)
LYMPHOCYTES NFR BLD AUTO: 10 %
MAGNESIUM SERPL-MCNC: 1.7 MG/DL (ref 1.7–2.3)
MCH RBC QN AUTO: 29 PG (ref 26.5–33)
MCHC RBC AUTO-ENTMCNC: 30.7 G/DL (ref 31.5–36.5)
MCV RBC AUTO: 94 FL (ref 78–100)
MONOCYTES # BLD AUTO: 0.6 10E3/UL (ref 0–1.3)
MONOCYTES NFR BLD AUTO: 6 %
NEUTROPHILS # BLD AUTO: 7.5 10E3/UL (ref 1.6–8.3)
NEUTROPHILS NFR BLD AUTO: 81 %
NRBC # BLD AUTO: 0 10E3/UL
NRBC BLD AUTO-RTO: 0 /100
NT-PROBNP SERPL-MCNC: 558 PG/ML (ref 0–900)
O2/TOTAL GAS SETTING VFR VENT: 40 %
OXYHGB MFR BLDV: 39 % (ref 70–75)
PCO2 BLDV: 60 MM HG (ref 40–50)
PH BLDV: 7.38 [PH] (ref 7.32–7.43)
PLATELET # BLD AUTO: 138 10E3/UL (ref 150–450)
PO2 BLDV: 23 MM HG (ref 25–47)
POTASSIUM SERPL-SCNC: 2.8 MMOL/L (ref 3.4–5.3)
POTASSIUM SERPL-SCNC: 3.2 MMOL/L (ref 3.4–5.3)
POTASSIUM SERPL-SCNC: 3.5 MMOL/L (ref 3.4–5.3)
PROT SERPL-MCNC: 7.4 G/DL (ref 6.4–8.3)
RBC # BLD AUTO: 3.97 10E6/UL (ref 4.4–5.9)
RSV RNA SPEC NAA+PROBE: NEGATIVE
SAO2 % BLDV: 41.4 % (ref 70–75)
SARS-COV-2 RNA RESP QL NAA+PROBE: NEGATIVE
SODIUM SERPL-SCNC: 138 MMOL/L (ref 135–145)
TROPONIN T SERPL HS-MCNC: 35 NG/L
TROPONIN T SERPL HS-MCNC: 40 NG/L
WBC # BLD AUTO: 9.3 10E3/UL (ref 4–11)

## 2024-06-03 PROCEDURE — 87149 DNA/RNA DIRECT PROBE: CPT | Performed by: STUDENT IN AN ORGANIZED HEALTH CARE EDUCATION/TRAINING PROGRAM

## 2024-06-03 PROCEDURE — 85025 COMPLETE CBC W/AUTO DIFF WBC: CPT | Performed by: STUDENT IN AN ORGANIZED HEALTH CARE EDUCATION/TRAINING PROGRAM

## 2024-06-03 PROCEDURE — 85730 THROMBOPLASTIN TIME PARTIAL: CPT | Performed by: STUDENT IN AN ORGANIZED HEALTH CARE EDUCATION/TRAINING PROGRAM

## 2024-06-03 PROCEDURE — 82805 BLOOD GASES W/O2 SATURATION: CPT | Performed by: STUDENT IN AN ORGANIZED HEALTH CARE EDUCATION/TRAINING PROGRAM

## 2024-06-03 PROCEDURE — 250N000011 HC RX IP 250 OP 636: Performed by: EMERGENCY MEDICINE

## 2024-06-03 PROCEDURE — G1010 CDSM STANSON: HCPCS

## 2024-06-03 PROCEDURE — G0463 HOSPITAL OUTPT CLINIC VISIT: HCPCS

## 2024-06-03 PROCEDURE — 96365 THER/PROPH/DIAG IV INF INIT: CPT

## 2024-06-03 PROCEDURE — 83735 ASSAY OF MAGNESIUM: CPT | Performed by: STUDENT IN AN ORGANIZED HEALTH CARE EDUCATION/TRAINING PROGRAM

## 2024-06-03 PROCEDURE — 250N000013 HC RX MED GY IP 250 OP 250 PS 637: Performed by: STUDENT IN AN ORGANIZED HEALTH CARE EDUCATION/TRAINING PROGRAM

## 2024-06-03 PROCEDURE — 82962 GLUCOSE BLOOD TEST: CPT

## 2024-06-03 PROCEDURE — 36415 COLL VENOUS BLD VENIPUNCTURE: CPT | Performed by: STUDENT IN AN ORGANIZED HEALTH CARE EDUCATION/TRAINING PROGRAM

## 2024-06-03 PROCEDURE — 36415 COLL VENOUS BLD VENIPUNCTURE: CPT | Performed by: INTERNAL MEDICINE

## 2024-06-03 PROCEDURE — 83880 ASSAY OF NATRIURETIC PEPTIDE: CPT | Performed by: STUDENT IN AN ORGANIZED HEALTH CARE EDUCATION/TRAINING PROGRAM

## 2024-06-03 PROCEDURE — 250N000012 HC RX MED GY IP 250 OP 636 PS 637: Performed by: INTERNAL MEDICINE

## 2024-06-03 PROCEDURE — 36415 COLL VENOUS BLD VENIPUNCTURE: CPT | Performed by: EMERGENCY MEDICINE

## 2024-06-03 PROCEDURE — 87637 SARSCOV2&INF A&B&RSV AMP PRB: CPT | Performed by: STUDENT IN AN ORGANIZED HEALTH CARE EDUCATION/TRAINING PROGRAM

## 2024-06-03 PROCEDURE — 250N000011 HC RX IP 250 OP 636: Performed by: INTERNAL MEDICINE

## 2024-06-03 PROCEDURE — 94660 CPAP INITIATION&MGMT: CPT

## 2024-06-03 PROCEDURE — 93005 ELECTROCARDIOGRAM TRACING: CPT | Performed by: STUDENT IN AN ORGANIZED HEALTH CARE EDUCATION/TRAINING PROGRAM

## 2024-06-03 PROCEDURE — P9047 ALBUMIN (HUMAN), 25%, 50ML: HCPCS | Performed by: INTERNAL MEDICINE

## 2024-06-03 PROCEDURE — 999N000157 HC STATISTIC RCP TIME EA 10 MIN

## 2024-06-03 PROCEDURE — 94640 AIRWAY INHALATION TREATMENT: CPT

## 2024-06-03 PROCEDURE — 999N000156 HC STATISTIC RCP CONSULT EA 30 MIN

## 2024-06-03 PROCEDURE — 250N000009 HC RX 250: Performed by: INTERNAL MEDICINE

## 2024-06-03 PROCEDURE — 250N000011 HC RX IP 250 OP 636: Performed by: STUDENT IN AN ORGANIZED HEALTH CARE EDUCATION/TRAINING PROGRAM

## 2024-06-03 PROCEDURE — 86140 C-REACTIVE PROTEIN: CPT | Performed by: INTERNAL MEDICINE

## 2024-06-03 PROCEDURE — 96375 TX/PRO/DX INJ NEW DRUG ADDON: CPT

## 2024-06-03 PROCEDURE — 120N000004 HC R&B MS OVERFLOW

## 2024-06-03 PROCEDURE — 83605 ASSAY OF LACTIC ACID: CPT | Performed by: STUDENT IN AN ORGANIZED HEALTH CARE EDUCATION/TRAINING PROGRAM

## 2024-06-03 PROCEDURE — 84484 ASSAY OF TROPONIN QUANT: CPT | Performed by: INTERNAL MEDICINE

## 2024-06-03 PROCEDURE — 250N000013 HC RX MED GY IP 250 OP 250 PS 637: Performed by: INTERNAL MEDICINE

## 2024-06-03 PROCEDURE — 82040 ASSAY OF SERUM ALBUMIN: CPT | Performed by: STUDENT IN AN ORGANIZED HEALTH CARE EDUCATION/TRAINING PROGRAM

## 2024-06-03 PROCEDURE — 250N000009 HC RX 250: Performed by: STUDENT IN AN ORGANIZED HEALTH CARE EDUCATION/TRAINING PROGRAM

## 2024-06-03 PROCEDURE — 99285 EMERGENCY DEPT VISIT HI MDM: CPT | Mod: 25

## 2024-06-03 PROCEDURE — 94640 AIRWAY INHALATION TREATMENT: CPT | Mod: 76

## 2024-06-03 PROCEDURE — 99223 1ST HOSP IP/OBS HIGH 75: CPT | Mod: AI | Performed by: INTERNAL MEDICINE

## 2024-06-03 PROCEDURE — 87186 SC STD MICRODIL/AGAR DIL: CPT | Performed by: STUDENT IN AN ORGANIZED HEALTH CARE EDUCATION/TRAINING PROGRAM

## 2024-06-03 PROCEDURE — 99223 1ST HOSP IP/OBS HIGH 75: CPT | Performed by: INTERNAL MEDICINE

## 2024-06-03 PROCEDURE — 84484 ASSAY OF TROPONIN QUANT: CPT | Performed by: STUDENT IN AN ORGANIZED HEALTH CARE EDUCATION/TRAINING PROGRAM

## 2024-06-03 PROCEDURE — 84132 ASSAY OF SERUM POTASSIUM: CPT | Performed by: INTERNAL MEDICINE

## 2024-06-03 PROCEDURE — 84132 ASSAY OF SERUM POTASSIUM: CPT | Performed by: EMERGENCY MEDICINE

## 2024-06-03 PROCEDURE — 5A09357 ASSISTANCE WITH RESPIRATORY VENTILATION, LESS THAN 24 CONSECUTIVE HOURS, CONTINUOUS POSITIVE AIRWAY PRESSURE: ICD-10-PCS | Performed by: INTERNAL MEDICINE

## 2024-06-03 PROCEDURE — 85610 PROTHROMBIN TIME: CPT | Performed by: STUDENT IN AN ORGANIZED HEALTH CARE EDUCATION/TRAINING PROGRAM

## 2024-06-03 RX ORDER — FUROSEMIDE 10 MG/ML
40 INJECTION INTRAMUSCULAR; INTRAVENOUS ONCE
Status: COMPLETED | OUTPATIENT
Start: 2024-06-03 | End: 2024-06-03

## 2024-06-03 RX ORDER — PANTOPRAZOLE SODIUM 40 MG/1
40 TABLET, DELAYED RELEASE ORAL
Status: DISCONTINUED | OUTPATIENT
Start: 2024-06-03 | End: 2024-06-10 | Stop reason: HOSPADM

## 2024-06-03 RX ORDER — METOPROLOL TARTRATE 1 MG/ML
5 INJECTION, SOLUTION INTRAVENOUS ONCE
Status: COMPLETED | OUTPATIENT
Start: 2024-06-03 | End: 2024-06-03

## 2024-06-03 RX ORDER — ACETAMINOPHEN 650 MG/1
650 SUPPOSITORY RECTAL EVERY 4 HOURS PRN
Status: DISCONTINUED | OUTPATIENT
Start: 2024-06-03 | End: 2024-06-10 | Stop reason: HOSPADM

## 2024-06-03 RX ORDER — POTASSIUM CHLORIDE 1500 MG/1
40 TABLET, EXTENDED RELEASE ORAL ONCE
Status: COMPLETED | OUTPATIENT
Start: 2024-06-03 | End: 2024-06-03

## 2024-06-03 RX ORDER — HYDRALAZINE HYDROCHLORIDE 10 MG/1
10 TABLET, FILM COATED ORAL 3 TIMES DAILY
Status: DISCONTINUED | OUTPATIENT
Start: 2024-06-03 | End: 2024-06-10 | Stop reason: HOSPADM

## 2024-06-03 RX ORDER — NICOTINE 21 MG/24HR
1 PATCH, TRANSDERMAL 24 HOURS TRANSDERMAL DAILY
Status: DISCONTINUED | OUTPATIENT
Start: 2024-07-15 | End: 2024-06-03

## 2024-06-03 RX ORDER — ONDANSETRON 4 MG/1
4 TABLET, ORALLY DISINTEGRATING ORAL EVERY 6 HOURS PRN
Status: DISCONTINUED | OUTPATIENT
Start: 2024-06-03 | End: 2024-06-10 | Stop reason: HOSPADM

## 2024-06-03 RX ORDER — ISOSORBIDE MONONITRATE 60 MG/1
180 TABLET, EXTENDED RELEASE ORAL EVERY MORNING
Status: DISCONTINUED | OUTPATIENT
Start: 2024-06-03 | End: 2024-06-10 | Stop reason: HOSPADM

## 2024-06-03 RX ORDER — NICOTINE 21 MG/24HR
1 PATCH, TRANSDERMAL 24 HOURS TRANSDERMAL DAILY
Status: DISCONTINUED | OUTPATIENT
Start: 2024-06-03 | End: 2024-06-07

## 2024-06-03 RX ORDER — CALCIUM CARBONATE 500 MG/1
1000 TABLET, CHEWABLE ORAL 4 TIMES DAILY PRN
Status: DISCONTINUED | OUTPATIENT
Start: 2024-06-03 | End: 2024-06-10 | Stop reason: HOSPADM

## 2024-06-03 RX ORDER — ONDANSETRON 2 MG/ML
4 INJECTION INTRAMUSCULAR; INTRAVENOUS EVERY 6 HOURS PRN
Status: DISCONTINUED | OUTPATIENT
Start: 2024-06-03 | End: 2024-06-10 | Stop reason: HOSPADM

## 2024-06-03 RX ORDER — NICOTINE 21 MG/24HR
1 PATCH, TRANSDERMAL 24 HOURS TRANSDERMAL DAILY
Status: DISCONTINUED | OUTPATIENT
Start: 2024-06-03 | End: 2024-06-03

## 2024-06-03 RX ORDER — IOPAMIDOL 755 MG/ML
90 INJECTION, SOLUTION INTRAVASCULAR ONCE
Status: COMPLETED | OUTPATIENT
Start: 2024-06-03 | End: 2024-06-03

## 2024-06-03 RX ORDER — POLYETHYLENE GLYCOL 3350 17 G/17G
17 POWDER, FOR SOLUTION ORAL 2 TIMES DAILY PRN
Status: DISCONTINUED | OUTPATIENT
Start: 2024-06-03 | End: 2024-06-07

## 2024-06-03 RX ORDER — DEXTROSE MONOHYDRATE 25 G/50ML
25-50 INJECTION, SOLUTION INTRAVENOUS
Status: DISCONTINUED | OUTPATIENT
Start: 2024-06-03 | End: 2024-06-10 | Stop reason: HOSPADM

## 2024-06-03 RX ORDER — IPRATROPIUM BROMIDE AND ALBUTEROL SULFATE 2.5; .5 MG/3ML; MG/3ML
3 SOLUTION RESPIRATORY (INHALATION) ONCE
Status: COMPLETED | OUTPATIENT
Start: 2024-06-03 | End: 2024-06-03

## 2024-06-03 RX ORDER — FUROSEMIDE 10 MG/ML
80 INJECTION INTRAMUSCULAR; INTRAVENOUS EVERY 8 HOURS
Status: DISCONTINUED | OUTPATIENT
Start: 2024-06-03 | End: 2024-06-05

## 2024-06-03 RX ORDER — CARVEDILOL 3.12 MG/1
6.25 TABLET ORAL ONCE
Status: DISCONTINUED | OUTPATIENT
Start: 2024-06-03 | End: 2024-06-03

## 2024-06-03 RX ORDER — ALBUTEROL SULFATE 0.83 MG/ML
2.5 SOLUTION RESPIRATORY (INHALATION) EVERY 4 HOURS PRN
Status: DISCONTINUED | OUTPATIENT
Start: 2024-06-03 | End: 2024-06-10 | Stop reason: HOSPADM

## 2024-06-03 RX ORDER — NALOXONE HYDROCHLORIDE 0.4 MG/ML
0.2 INJECTION, SOLUTION INTRAMUSCULAR; INTRAVENOUS; SUBCUTANEOUS
Status: DISCONTINUED | OUTPATIENT
Start: 2024-06-03 | End: 2024-06-10 | Stop reason: HOSPADM

## 2024-06-03 RX ORDER — NICOTINE POLACRILEX 4 MG
15-30 LOZENGE BUCCAL
Status: DISCONTINUED | OUTPATIENT
Start: 2024-06-03 | End: 2024-06-10 | Stop reason: HOSPADM

## 2024-06-03 RX ORDER — NALOXONE HYDROCHLORIDE 0.4 MG/ML
0.4 INJECTION, SOLUTION INTRAMUSCULAR; INTRAVENOUS; SUBCUTANEOUS
Status: DISCONTINUED | OUTPATIENT
Start: 2024-06-03 | End: 2024-06-10 | Stop reason: HOSPADM

## 2024-06-03 RX ORDER — CEFAZOLIN SODIUM 1 G/3ML
1 INJECTION, POWDER, FOR SOLUTION INTRAMUSCULAR; INTRAVENOUS EVERY 8 HOURS
Status: DISCONTINUED | OUTPATIENT
Start: 2024-06-03 | End: 2024-06-07

## 2024-06-03 RX ORDER — LIDOCAINE 40 MG/G
CREAM TOPICAL
Status: DISCONTINUED | OUTPATIENT
Start: 2024-06-03 | End: 2024-06-10 | Stop reason: HOSPADM

## 2024-06-03 RX ORDER — ROPINIROLE 1 MG/1
2 TABLET, FILM COATED ORAL 4 TIMES DAILY
Status: DISCONTINUED | OUTPATIENT
Start: 2024-06-03 | End: 2024-06-10 | Stop reason: HOSPADM

## 2024-06-03 RX ORDER — NITROGLYCERIN 0.4 MG/1
0.4 TABLET SUBLINGUAL EVERY 5 MIN PRN
Status: DISCONTINUED | OUTPATIENT
Start: 2024-06-03 | End: 2024-06-10 | Stop reason: HOSPADM

## 2024-06-03 RX ORDER — AMOXICILLIN 250 MG
1 CAPSULE ORAL 2 TIMES DAILY PRN
Status: DISCONTINUED | OUTPATIENT
Start: 2024-06-03 | End: 2024-06-10 | Stop reason: HOSPADM

## 2024-06-03 RX ORDER — AMOXICILLIN 250 MG
2 CAPSULE ORAL 2 TIMES DAILY PRN
Status: DISCONTINUED | OUTPATIENT
Start: 2024-06-03 | End: 2024-06-10 | Stop reason: HOSPADM

## 2024-06-03 RX ORDER — CLOPIDOGREL BISULFATE 75 MG/1
75 TABLET ORAL DAILY
Status: DISCONTINUED | OUTPATIENT
Start: 2024-06-03 | End: 2024-06-10 | Stop reason: HOSPADM

## 2024-06-03 RX ORDER — OXYCODONE HYDROCHLORIDE 5 MG/1
5 TABLET ORAL EVERY 8 HOURS PRN
Status: DISCONTINUED | OUTPATIENT
Start: 2024-06-03 | End: 2024-06-09

## 2024-06-03 RX ORDER — METHOCARBAMOL 500 MG/1
500 TABLET, FILM COATED ORAL 4 TIMES DAILY PRN
Status: DISCONTINUED | OUTPATIENT
Start: 2024-06-03 | End: 2024-06-09

## 2024-06-03 RX ORDER — ALBUMIN (HUMAN) 12.5 G/50ML
50 SOLUTION INTRAVENOUS DAILY
Status: DISCONTINUED | OUTPATIENT
Start: 2024-06-03 | End: 2024-06-10 | Stop reason: HOSPADM

## 2024-06-03 RX ORDER — ACETAMINOPHEN 325 MG/1
650 TABLET ORAL EVERY 4 HOURS PRN
Status: DISCONTINUED | OUTPATIENT
Start: 2024-06-03 | End: 2024-06-10 | Stop reason: HOSPADM

## 2024-06-03 RX ORDER — CARVEDILOL 3.12 MG/1
3.12 TABLET ORAL 2 TIMES DAILY WITH MEALS
Status: DISCONTINUED | OUTPATIENT
Start: 2024-06-03 | End: 2024-06-10 | Stop reason: HOSPADM

## 2024-06-03 RX ORDER — POTASSIUM CHLORIDE 7.45 MG/ML
10 INJECTION INTRAVENOUS ONCE
Status: COMPLETED | OUTPATIENT
Start: 2024-06-03 | End: 2024-06-03

## 2024-06-03 RX ORDER — FUROSEMIDE 10 MG/ML
80 INJECTION INTRAMUSCULAR; INTRAVENOUS EVERY 8 HOURS
Status: DISCONTINUED | OUTPATIENT
Start: 2024-06-03 | End: 2024-06-03

## 2024-06-03 RX ORDER — IPRATROPIUM BROMIDE AND ALBUTEROL SULFATE 2.5; .5 MG/3ML; MG/3ML
3 SOLUTION RESPIRATORY (INHALATION)
Status: DISCONTINUED | OUTPATIENT
Start: 2024-06-03 | End: 2024-06-08

## 2024-06-03 RX ORDER — AMIODARONE HYDROCHLORIDE 200 MG/1
200 TABLET ORAL DAILY
Status: DISCONTINUED | OUTPATIENT
Start: 2024-06-03 | End: 2024-06-04

## 2024-06-03 RX ORDER — ATORVASTATIN CALCIUM 40 MG/1
40 TABLET, FILM COATED ORAL EVERY MORNING
Status: DISCONTINUED | OUTPATIENT
Start: 2024-06-03 | End: 2024-06-10 | Stop reason: HOSPADM

## 2024-06-03 RX ORDER — ACETAMINOPHEN 500 MG
1000 TABLET ORAL 2 TIMES DAILY PRN
COMMUNITY

## 2024-06-03 RX ORDER — METHYLPREDNISOLONE SODIUM SUCCINATE 125 MG/2ML
60 INJECTION, POWDER, LYOPHILIZED, FOR SOLUTION INTRAMUSCULAR; INTRAVENOUS EVERY 8 HOURS
Status: DISCONTINUED | OUTPATIENT
Start: 2024-06-03 | End: 2024-06-04

## 2024-06-03 RX ADMIN — CEFAZOLIN 1 G: 1 INJECTION, POWDER, FOR SOLUTION INTRAMUSCULAR; INTRAVENOUS at 11:14

## 2024-06-03 RX ADMIN — IPRATROPIUM BROMIDE AND ALBUTEROL SULFATE 3 ML: .5; 3 SOLUTION RESPIRATORY (INHALATION) at 05:53

## 2024-06-03 RX ADMIN — ROPINIROLE HYDROCHLORIDE 2 MG: 1 TABLET, FILM COATED ORAL at 16:24

## 2024-06-03 RX ADMIN — INSULIN ASPART 1 UNITS: 100 INJECTION, SOLUTION INTRAVENOUS; SUBCUTANEOUS at 11:01

## 2024-06-03 RX ADMIN — CEFAZOLIN 1 G: 1 INJECTION, POWDER, FOR SOLUTION INTRAMUSCULAR; INTRAVENOUS at 18:13

## 2024-06-03 RX ADMIN — OXYCODONE HYDROCHLORIDE 5 MG: 5 TABLET ORAL at 11:38

## 2024-06-03 RX ADMIN — ISOSORBIDE MONONITRATE 180 MG: 60 TABLET, EXTENDED RELEASE ORAL at 11:47

## 2024-06-03 RX ADMIN — IPRATROPIUM BROMIDE AND ALBUTEROL SULFATE 3 ML: .5; 3 SOLUTION RESPIRATORY (INHALATION) at 19:48

## 2024-06-03 RX ADMIN — IOPAMIDOL 90 ML: 755 INJECTION, SOLUTION INTRAVENOUS at 07:49

## 2024-06-03 RX ADMIN — NICOTINE 1 PATCH: 21 PATCH, EXTENDED RELEASE TRANSDERMAL at 11:43

## 2024-06-03 RX ADMIN — IPRATROPIUM BROMIDE AND ALBUTEROL SULFATE 3 ML: .5; 3 SOLUTION RESPIRATORY (INHALATION) at 12:14

## 2024-06-03 RX ADMIN — IPRATROPIUM BROMIDE AND ALBUTEROL SULFATE 3 ML: .5; 3 SOLUTION RESPIRATORY (INHALATION) at 16:03

## 2024-06-03 RX ADMIN — APIXABAN 5 MG: 5 TABLET, FILM COATED ORAL at 11:10

## 2024-06-03 RX ADMIN — POTASSIUM CHLORIDE 10 MEQ: 7.46 INJECTION, SOLUTION INTRAVENOUS at 06:54

## 2024-06-03 RX ADMIN — POTASSIUM CHLORIDE 40 MEQ: 1500 TABLET, EXTENDED RELEASE ORAL at 07:49

## 2024-06-03 RX ADMIN — POTASSIUM CHLORIDE 40 MEQ: 1500 TABLET, EXTENDED RELEASE ORAL at 15:27

## 2024-06-03 RX ADMIN — INSULIN GLARGINE 12 UNITS: 100 INJECTION, SOLUTION SUBCUTANEOUS at 11:10

## 2024-06-03 RX ADMIN — ALBUMIN HUMAN 50 G: 0.25 SOLUTION INTRAVENOUS at 11:55

## 2024-06-03 RX ADMIN — ROPINIROLE HYDROCHLORIDE 2 MG: 1 TABLET, FILM COATED ORAL at 11:10

## 2024-06-03 RX ADMIN — ALBUTEROL SULFATE 2.5 MG: 2.5 SOLUTION RESPIRATORY (INHALATION) at 23:05

## 2024-06-03 RX ADMIN — FUROSEMIDE 80 MG: 10 INJECTION, SOLUTION INTRAMUSCULAR; INTRAVENOUS at 10:49

## 2024-06-03 RX ADMIN — CARVEDILOL 3.12 MG: 3.12 TABLET, FILM COATED ORAL at 18:45

## 2024-06-03 RX ADMIN — METHYLPREDNISOLONE SODIUM SUCCINATE 62.5 MG: 125 INJECTION, POWDER, FOR SOLUTION INTRAMUSCULAR; INTRAVENOUS at 11:08

## 2024-06-03 RX ADMIN — DICLOFENAC SODIUM 2 G: 10 GEL TOPICAL at 15:31

## 2024-06-03 RX ADMIN — METHYLPREDNISOLONE SODIUM SUCCINATE 62.5 MG: 125 INJECTION, POWDER, FOR SOLUTION INTRAMUSCULAR; INTRAVENOUS at 18:38

## 2024-06-03 RX ADMIN — ROPINIROLE HYDROCHLORIDE 2 MG: 1 TABLET, FILM COATED ORAL at 20:25

## 2024-06-03 RX ADMIN — FUROSEMIDE 80 MG: 10 INJECTION, SOLUTION INTRAMUSCULAR; INTRAVENOUS at 18:40

## 2024-06-03 RX ADMIN — CLOPIDOGREL BISULFATE 75 MG: 75 TABLET ORAL at 11:39

## 2024-06-03 RX ADMIN — METOPROLOL TARTRATE 5 MG: 5 INJECTION INTRAVENOUS at 11:05

## 2024-06-03 RX ADMIN — FUROSEMIDE 40 MG: 10 INJECTION, SOLUTION INTRAMUSCULAR; INTRAVENOUS at 06:34

## 2024-06-03 RX ADMIN — ATORVASTATIN CALCIUM 40 MG: 40 TABLET, FILM COATED ORAL at 11:37

## 2024-06-03 RX ADMIN — PANTOPRAZOLE SODIUM 40 MG: 40 TABLET, DELAYED RELEASE ORAL at 11:10

## 2024-06-03 RX ADMIN — APIXABAN 5 MG: 5 TABLET, FILM COATED ORAL at 20:26

## 2024-06-03 RX ADMIN — HYDRALAZINE HYDROCHLORIDE 10 MG: 10 TABLET, FILM COATED ORAL at 15:28

## 2024-06-03 RX ADMIN — AMIODARONE HYDROCHLORIDE 200 MG: 200 TABLET ORAL at 11:38

## 2024-06-03 ASSESSMENT — ACTIVITIES OF DAILY LIVING (ADL)
ADLS_ACUITY_SCORE: 38
ADLS_ACUITY_SCORE: 40
ADLS_ACUITY_SCORE: 38
ADLS_ACUITY_SCORE: 40
DEPENDENT_IADLS:: CLEANING;COOKING;MEAL PREPARATION;TRANSPORTATION
ADLS_ACUITY_SCORE: 38
ADLS_ACUITY_SCORE: 38
ADLS_ACUITY_SCORE: 40
ADLS_ACUITY_SCORE: 40
ADLS_ACUITY_SCORE: 38

## 2024-06-03 NOTE — H&P
[facility]    History and Physical - Hospitalist Service       Date of Admission:  6/3/2024    Assessment & Plan      Zackary Hutchison is a 63 year old male with PMH of atrial fibrillation, CKD 3, CAD s/p CABG, ischemic CMO, HTN, CHF, HLD, COPD, recently diagnosed DM2, tobacco use disorder, restless leg syndrome, s/p left LARON, recent on May 13-20 hospitalization for CHF exacerbation, admitted on 6/3/2024 with:    #Acute on chronic CHF with biventricular dysfunction.  Recent on May 13-20 hospitalization for CHF and COPD exacerbation.  States compliance with sodium restricted diet, medications.  Cardiac monitoring/mems at home.  Discharge weight 227.5 LBS-Per DC summary.  proBNP normal at 558.  Echo 5/1/2024: LVEF 55%.  -S/p 40 mg IV Lasix at 6:34 AM.  -Continue diuresis, per CHF order set, given patient's PTA dose of torsemide 100 mg twice daily, will diurese with 80 mg Lasix every 8 hours.  -Hold PT torsemide, spironolactone while on IV Lasix.  -Continue PT Coreg, hydralazine.  -Daily weights, close monitoring UO, renal function, electrolytes while diuresing.  -Telemetry  -Cardiology consult to optimize med management. OP pt of Dr. Perdomo.    #Acute respiratory failure with hypoxia and hypercarbia, likely due to CHF exacerbation.  VBG 7.3 8/23/60.  On BiPAP.  Tolerating well.  -PE, pulmonary infiltrates, pleural effusion were ruled out by CTA chest.  -Continue BiPAP, wean as able to  -Check ABGs  -Supplemental oxygen to keep SpO2 above 92%, given history of CAD, CHF    #Coronary disease, s/p CABG.  Chest pain-free.  Chronically mild elevated troponin.  Coronary angiogram 5/2/2024:    Ost LAD to Prox LAD lesion is 100% stenosed.    Ramus lesion is 95% stenosed.    Mid Cx to Dist Cx lesion is 80% stenosed.    1st Mrg lesion is 90% stenosed.    Prox RCA to Dist RCA lesion is 100% stenosed.    Mid LAD lesion is 70% stenosed.  -Cardiology recommended 75 mg of daily Plavix indefinitely while on  "Eliquis, as well as baby aspirin.  -Continue PTA Imdur 60 mg, as needed nitroglycerin.  -Mild elevated troponin 35, likely due to demand ischemia of CHF exacerbation, hypoxia, rapid A-fib in the setting of CKD 3.  Diuresing, rate control, supplemental oxygen as above.  -Serial troponin    #Hypokalemia at 2.8.  #Low normal magnesium 1.7.  Both due to chronic diuresis.  -Replacement protocols    #Hypocalcemia, likely due to vitamin D deficiency.  Hypercalcemia noted during recent hospitalization.  Total vitamin D low at 17.  PTH RP less than 2 on 5/20/2024.    #Paroxysmal atrial fibrillation with RVR on admission.  HR 140s.  After BiPAP and diuresis, HR down to 103.  TSH on 4/29/2024 normal at 1.7.  -One-time dose IV metoprolol 5 mg  -Resume PTA Coreg, after one-time dose of 6.25 mg.  -Resume PTA amiodarone.    #Essential hypertension.  -Resume PTA Coreg, hydralazine  -Diurese  -As needed IV hydralazine    #Thrombocytopenia at 138.  Most recent platelets on 5/20 at 160.  No known history of thrombocytopenia.  Some dilutional component, with hematocrit of 37, previously normal.  -Monitor for signs of mucocutaneous hemorrhage, none seen on admission.  -Daily labs.    #IDDM, diagnosed earlier this month.  A1c 7.8.  -PTA 12 units of Lantus, will continue.  -Accu-Cheks, SSI NovoLog every 4 hours while NPO  -Diabetic diet, 60 gm carbs,when off BiPAP    #Pulmonary nodule, RLL.  Described as \"tiny\".  -Patient is at high risk for pulmonary malignancy, given tobacco use disorder.  -Follow-up CT chest in 12 months.    #COPD with acute exacerbation.  Active smoking likely contributing.  CT chest negative for pulmonary infiltrates.  -Solu-Medrol, DuoNeb 4 times daily, as needed albuterol neb.  -Smoking cessation.    #Tobacco use disorder.  -Nicotine replacement with patch.  -Counseling on smoking cessation.    #Stage III CKD.  Possibly secondary to cardiorenal process.  Creatinine 1.23, down from 1.65 at discharge on " "5/20/2024.  Diuresing as above.  -Monitor renal function while diuresing.    #Probable stasis ulcers of bilateral lower legs.    #Probable lower leg cellulitis.  Patient reports increase in lower leg hyperemia, new warmth.  Afebrile.  WBC 9.3.  BC obtained 6/4 at 9 AM.  -Cefazolin, follow BC  -WOC  -Check inflammatory markers    Diet:  NPO while on bipap, then 2 g sodium, diabetic 60 gm carb  DVT Prophylaxis: DOAC  Ruvalcaba Catheter: Not present  Lines: None     Cardiac Monitoring: None  Code Status:  Full code    Clinically Significant Risk Factors Present on Admission     # Hypokalemia: Lowest K = 2.8 mmol/L in last 2 days, will replace as needed        # Drug Induced Coagulation Defect: home medication list includes an anticoagulant medication  # Drug Induced Platelet Defect: home medication list includes an antiplatelet medication   # Hypertension: Noted on problem list  # Acute heart failure with reduced ejection fraction: last echo with EF <40% and receiving IV diuretics    # DMII: A1C = 7.8 % (Ref range: <5.7 %) within past 6 months   # Severe Obesity: Estimated body mass index is 43.9 kg/m  as calculated from the following:    Height as of 5/13/24: 1.676 m (5' 6\").    Weight as of 5/30/24: 123.4 kg (272 lb).       # Financial/Environmental Concerns:           Disposition Plan   Medically Ready for Discharge: Anticipated in 2-4 Days    Shanel Navarro MD  Hospitalist Service  [facility]  Securely message with [email or web address] (more info)  Text page via [facility] Paging/Directory   ______________________________________________________________________    Chief Complaint   Dyspnea, leg edema    History is obtained from the patient, electronic health record, and emergency department physician    History of Present Illness   Zackary Hutchison is a 63 year old male with PMH of atrial fibrillation, CKD 3, CAD s/p CABG, ischemic CMO, HTN, CHF, HLD, COPD, recently diagnosed DM2, tobacco use disorder, restless leg " syndrome, recent on May 13-20 hospitalization for CHF exacerbation, presented to ED for evaluation of worsening dyspnea and leg edema.  After recent hospitalization he felt well for 4 to 5 days, then started noticing increased leg edema.  Reportedly having mems in place, checking weight daily, compliant with low-sodium and fluid restriction, as well as medications.  By EMS O2 sats in 80s.  CPAP given by EMS.  Dyspnea improved.  Patient denies fevers, chills, productive cough, chest pain, cardiac palpitations.  He endorses increased leg edema, redness and warmth.  Few scabs on the lower legs, without drainage.  In ED was given 40 mg of IV Lasix, 40 meq p.o. and 10 mill equivalents IV potassium chloride, started on BiPAP for combined hypoxic hypercarbic respiratory failure.  By the time I seen patient in ED, he still on BiPAP, voided twice.  A-fib with RVR on admission, HR in 140s, improved, HR in low 100s.    Past Medical History    Past Medical History:   Diagnosis Date    Atrial fibrillation (H)     Chronic kidney disease     Chronic kidney disease, stage 3b (H) 09/28/2023    Class 3 severe obesity due to excess calories with body mass index (BMI) of 40.0 to 44.9 in adult (H) 09/28/2023    Congestive heart failure (H)     COPD (chronic obstructive pulmonary disease) (H)     Coronary artery disease involving coronary bypass graft of native heart without angina pectoris 09/28/2023    Heart failure with reduced ejection fraction (H) 09/11/2023    HFrEF (heart failure with reduced ejection fraction) (H) 09/11/2023    Hyperlipidemia     Hypertension     Ischemic cardiomyopathy 09/28/2023    Obese     Paroxysmal atrial fibrillation (H) 09/28/2023    Tobacco abuse 09/28/2023       Past Surgical History   Past Surgical History:   Procedure Laterality Date    CORONARY ANGIOGRAPHY ADULT ORDER      CORONARY ARTERY BYPASS Left 2014    2 vessels    CV CARDIOMEMS WITH RIGHT HEART CATH N/A 2/15/2024    Procedure: Pulmonary  Arterial Pressure Sensor Placement;  Surgeon: Jacey Bhandari MD;  Location: [facility] CATH LAB CV    CV CORONARY ANGIOGRAM N/A 09/11/2023    Procedure: Coronary Angiogram;  Surgeon: Santy Esposito MD;  Location: [facility] CATH LAB CV    CV CORONARY ANGIOGRAM N/A 5/2/2024    Procedure: CV CORONARY ANGIOGRAM;  Surgeon: Santy Esposito MD;  Location: [facility] CATH LAB CV    CV LEFT HEART CATH N/A 09/11/2023    Procedure: Left Heart Catheterization;  Surgeon: Santy Esposito MD;  Location: [facility] CATH LAB CV    CV LEFT HEART CATH N/A 5/2/2024    Procedure: Left Heart Catheterization;  Surgeon: Santy Esposito MD;  Location: [facility] CATH LAB CV    CV PCI ANGIOPLASTY N/A 5/2/2024    Procedure: Percutaneous Transluminal Angioplasty;  Surgeon: Santy Esposito MD;  Location: [facility] LAB CV    CV RIGHT HEART CATH MEASUREMENTS RECORDED N/A 09/11/2023    Procedure: Right Heart Catheterization;  Surgeon: Santy Esposito MD;  Location: [facility] CATH LAB CV     Prior to Admission Medications   Prior to Admission Medications   Prescriptions Last Dose Informant Patient Reported? Taking?   [employer] VITAMIN B12 1000 MCG tablet   Yes No   Sig: Take 1,000 mcg by mouth daily   DULoxetine (CYMBALTA) 30 MG capsule   Yes No   Sig: Take 30 mg by mouth every morning   Patient not taking: Reported on 5/30/2024   acetaminophen (TYLENOL) 500 MG tablet   No No   Sig: Take 2 tablets (1,000 mg) by mouth 2 times daily   albuterol (PROAIR HFA/PROVENTIL HFA/VENTOLIN HFA) 108 (90 Base) MCG/ACT inhaler   Yes No   Sig: Inhale 2 puffs into the lungs every 6 hours as needed for shortness of breath, wheezing or cough   amiodarone (PACERONE) 200 MG tablet   No No   Sig: Take 1 tablet (200 mg) by mouth daily   apixaban ANTICOAGULANT (ELIQUIS) 5 MG tablet   No No   Sig: Take 1 tablet (5 mg) by mouth 2 times daily   atorvastatin (LIPITOR) 40 MG tablet   Yes No   Sig: Take 40 mg by mouth every morning   blood glucose (NO BRAND SPECIFIED)  lancets standard   No No   Sig: Use to test blood sugar 3 times daily or as directed.   Patient not taking: Reported on 5/30/2024   blood glucose (NO BRAND SPECIFIED) lancets standard   No No   Sig: Use to test blood sugar 3 times daily or as directed.   Patient not taking: Reported on 5/30/2024   blood glucose (NO BRAND SPECIFIED) test strip   No No   Sig: Use to test blood sugar 3 times daily or as directed. To accompany: Blood Glucose Monitor Brands: per insurance.   Patient not taking: Reported on 5/30/2024   blood glucose monitoring (FREESTYLE) lancets   No No   Sig: Use to test blood sugar 3 times daily or as directed.   Patient not taking: Reported on 5/30/2024   blood glucose monitoring (FREESTYLE) lancets   No No   Sig: Use to test blood sugar 3 times daily or as directed.   Patient not taking: Reported on 5/30/2024   blood glucose monitoring (NO BRAND SPECIFIED) meter device kit   No No   Sig: Use to test blood sugar 3 times daily or as directed. Preferred blood glucose meter OR supplies to accompany: Blood Glucose Monitor Brands: per insurance.   Patient not taking: Reported on 5/30/2024   carvedilol (COREG) 3.125 MG tablet   No No   Sig: Take 1 tablet (3.125 mg) by mouth 2 times daily (with meals)   clopidogrel (PLAVIX) 75 MG tablet   No No   Sig: Take 1 tablet (75 mg) by mouth daily   dexAMETHasone (DECADRON) 4 MG tablet   No No   Sig: Take 1 tablet (4 mg) by mouth every 12 hours for 2 days   diclofenac (VOLTAREN) 1 % topical gel   No No   Sig: Apply 2 g topically every 6 hours   hydrALAZINE (APRESOLINE) 10 MG tablet   No No   Sig: Take 1 tablet (10 mg) by mouth 3 times daily   insulin glargine (LANTUS PEN) 100 UNIT/ML pen   No No   Sig: Inject 12 Units Subcutaneous every morning (before breakfast) for 2 days   ipratropium - albuterol 0.5 mg/2.5 mg/3 mL (DUONEB) 0.5-2.5 (3) MG/3ML neb solution   Yes No   Sig: INHALE 3 ML EVERY 6 HOURS AS NEEDED FOR WHEEZING.   isosorbide mononitrate (IMDUR) 60 MG 24 hr  tablet   No No   Sig: Take 3 tablets (180 mg) by mouth every morning   lidocaine (XYLOCAINE) 5 % external ointment   No No   Sig: Apply topically every 6 hours   Patient not taking: Reported on 5/30/2024   methocarbamol (ROBAXIN) 750 MG tablet   No No   Sig: Take 1 tablet (750 mg) by mouth every 6 hours   miconazole (MICATIN) 2 % external powder   No No   Sig: Apply topically 2 times daily   Patient not taking: Reported on 5/30/2024   nitroGLYcerin (NITROSTAT) 0.4 MG sublingual tablet   No No   Sig: Place 1 tablet (0.4 mg) under the tongue every 5 minutes as needed If no relief after 3 tabs call 911;continue 1 tab every 5 min max 3 tab Indications: chest pain   oxyCODONE (ROXICODONE) 5 MG tablet   No No   Sig: Take 1 tablet (5 mg) by mouth 3 times daily as needed for severe pain   Patient not taking: Reported on 5/30/2024   oxyCODONE (ROXICODONE) 5 MG tablet   No No   Sig: Take 0.5 tablets (2.5 mg) by mouth 3 times daily as needed for moderate pain   Patient not taking: Reported on 5/30/2024   pantoprazole (PROTONIX) 40 MG EC tablet   No No   Sig: Take 1 tablet (40 mg) by mouth every morning (before breakfast)   polyethylene glycol (MIRALAX) 17 GM/Dose powder   No No   Sig: Take 17 g by mouth 2 times daily as needed   rOPINIRole (REQUIP) 2 MG tablet   Yes No   Sig: Take 2 mg by mouth every 6 hours   thin (NO BRAND SPECIFIED) lancets   No No   Sig: Use with lanceting device. To accompany: Blood Glucose Monitor Brands: per insurance.   Patient not taking: Reported on 5/30/2024   torsemide (DEMADEX) 100 MG tablet   No No   Sig: Take 1 tablet (100 mg) by mouth 2 times daily      Facility-Administered Medications: None        Review of Systems    The 10 point Review of Systems is negative other than noted in the HPI or here.     Physical Exam   Vital Signs:     BP: 129/58 Pulse: 102   Resp: 22 SpO2: 92 % O2 Device: None (Room air) Oxygen Delivery: 3 LPM  Weight: 0 lbs 0 oz  General: Alert and oriented x 3. Not in obvious  distress.  BiPAP in place.  HEENT: NC, AT. Neck- supple, No JVP elevation, lymphadenopathy or thyromegaly. Trachea-central.  Chest: Expiratory wheezes.  Heart: S1S2 irregular irregular. No M/R/G.  Abdomen: Obese.  Soft. NT, ND. No organomegaly. Bowel sounds- active.  Back: No spine tenderness. No CVA tenderness.  Extremities: 2+ pitting leg edema, hyperemia and warmth, healing anterior lower leg wounds.  Peripheral pulses 2+ bilaterally.  Neuro: Cranial nerves 1-12 grossly normal. No focal neurological deficit    Medical Decision Making     80 MINUTES SPENT BY ME on the date of service doing chart review, history, exam, documentation & further activities per the note.      Data     I have personally reviewed the following data over the past 24 hrs:    9.3  \   11.5 (L)   / 138 (L)     138 93 (L) 29.9 (H) /  315 (H)   2.8 (L) 31 (H) 1.23 (H) \     ALT: 19 AST: 13 AP: 100 TBILI: 0.3   ALB: 3.9 TOT PROTEIN: 7.4 LIPASE: N/A     Trop: 35 (H) BNP: 558     Procal: N/A CRP: N/A Lactic Acid: 2.0       INR:  0.97 PTT:  28   D-dimer:  N/A Fibrinogen:  N/A       Imaging results reviewed over the past 24 hrs:   Recent Results (from the past 24 hour(s))   CT Chest Pulmonary Embolism w Contrast    Narrative    EXAM: CT CHEST PULMONARY EMBOLISM W CONTRAST  LOCATION: [facility]  DATE: 6/3/2024    INDICATION: sob  COMPARISON: None.  TECHNIQUE: CT chest pulmonary angiogram during arterial phase injection of IV contrast. Multiplanar reformats and MIP reconstructions were performed. Dose reduction techniques were used.   CONTRAST: IsoVue 370 90mL    FINDINGS:  ANGIOGRAM CHEST: Mild dilatation of the central pulmonary arteries. A CardioMEMS device is seen within the left lower lobar pulmonary artery. Limited evaluation of the more distal pulmonary arteries due to respiratory motion and streak artifact from the   patient's arms. Within limitations, no definite pulmonary arterial filling defect identified.  Dilatation of the ascending thoracic aorta measuring up to 4.2 cm. No evidence of dissection. Mild calcified atherosclerotic plaque. RV/LV ratio is less than 1.    LUNGS AND PLEURA: Patent central airways. Mild bronchial wall thickening with areas of mucous plugging in the right lower lobe. No focal consolidation or pleural effusion. 0.2 cm right lower lobe nodule (series 6, image 199).    MEDIASTINUM/AXILLAE: Cardiomegaly. No thoracic lymphadenopathy.    CORONARY ARTERY CALCIFICATION: Previous intervention (stents or CABG).    UPPER ABDOMEN: Normal.    MUSCULOSKELETAL: Thoracic spondylosis. No destructive osseous lesion. Median sternotomy. Chronic appearing deformity of the left humeral head. Old bilateral rib fracture deformities.      Impression    IMPRESSION:  1.  Limited examination as above without definite acute pulmonary embolism.  2.  No focal consolidation or pleural effusion.  3.  Tiny right lower lobe pulmonary nodule. Per Fleischner Society guidelines, if the patient is at low risk for pulmonary malignancy, no dedicated imaging follow-up is required. If the patient is at high risk for pulmonary malignancy, consider optional   follow-up CT chest in 12 months.

## 2024-06-03 NOTE — PHARMACY-ADMISSION MEDICATION HISTORY
Pharmacist Admission Medication History    Admission medication history is complete. The information provided in this note is only as accurate as the sources available at the time of the update.    Information Source(s): Patient via in-person    Pertinent Information: Last doses of home medications were yesterday 6/2/24.     Changes made to PTA medication list:  Added: None  Deleted: Dexamethasone, Duloxetine, Lantus, Lidocaine, Methocarbamol, Miconazole, Oxycodone   Changed: Tylenol to prn, Diclofenac gel to prn    Allergies reviewed with patient and updates made in EHR: yes    Medication History Completed By: BRANDIE PHILLIPS RPH 6/3/2024 10:21 AM    PTA Med List   Medication Sig Last Dose    acetaminophen (TYLENOL) 500 MG tablet Take 1,000 mg by mouth 2 times daily as needed for mild pain 6/3/2024 at 00:00    albuterol (PROAIR HFA/PROVENTIL HFA/VENTOLIN HFA) 108 (90 Base) MCG/ACT inhaler Inhale 2 puffs into the lungs every 6 hours as needed for shortness of breath, wheezing or cough Past Month at prn    amiodarone (PACERONE) 200 MG tablet Take 1 tablet (200 mg) by mouth daily 6/2/2024 at am    apixaban ANTICOAGULANT (ELIQUIS) 5 MG tablet Take 1 tablet (5 mg) by mouth 2 times daily 6/2/2024 at am    atorvastatin (LIPITOR) 40 MG tablet Take 40 mg by mouth every morning 6/2/2024 at am    carvedilol (COREG) 3.125 MG tablet Take 1 tablet (3.125 mg) by mouth 2 times daily (with meals) 6/2/2024 at pm    clopidogrel (PLAVIX) 75 MG tablet Take 1 tablet (75 mg) by mouth daily 6/2/2024 at am    CVS VITAMIN B12 1000 MCG tablet Take 1,000 mcg by mouth daily 6/2/2024 at am    diclofenac (VOLTAREN) 1 % topical gel Apply 2 g topically every 6 hours as needed for moderate pain Past Month at prn    hydrALAZINE (APRESOLINE) 10 MG tablet Take 1 tablet (10 mg) by mouth 3 times daily 6/2/2024 at pm    ipratropium - albuterol 0.5 mg/2.5 mg/3 mL (DUONEB) 0.5-2.5 (3) MG/3ML neb solution INHALE 3 ML EVERY 6 HOURS AS NEEDED FOR WHEEZING.  Past Month at prn    isosorbide mononitrate (IMDUR) 60 MG 24 hr tablet Take 3 tablets (180 mg) by mouth every morning 6/2/2024 at am    nitroGLYcerin (NITROSTAT) 0.4 MG sublingual tablet Place 1 tablet (0.4 mg) under the tongue every 5 minutes as needed If no relief after 3 tabs call 911;continue 1 tab every 5 min max 3 tab Indications: chest pain Unknown at prn    pantoprazole (PROTONIX) 40 MG EC tablet Take 1 tablet (40 mg) by mouth every morning (before breakfast) 6/2/2024 at am    polyethylene glycol (MIRALAX) 17 GM/Dose powder Take 17 g by mouth 2 times daily as needed Past Month at prn    rOPINIRole (REQUIP) 2 MG tablet Take 2 mg by mouth every 6 hours 6/3/2024 at 00:00    torsemide (DEMADEX) 100 MG tablet Take 1 tablet (100 mg) by mouth 2 times daily 6/2/2024 at pm

## 2024-06-03 NOTE — CONSULTS
Care Management Initial Consult    General Information  Assessment completed with: Patient,    Type of CM/SW Visit: Initial Assessment    Primary Care Provider verified and updated as needed:     Readmission within the last 30 days:        Reason for Consult: discharge planning  Advance Care Planning:            Communication Assessment  Patient's communication style: spoken language (English or Bilingual)             Cognitive  Cognitive/Neuro/Behavioral: WDL                      Living Environment:   People in home: alone     Current living Arrangements: independent living facility      Able to return to prior arrangements: yes       Family/Social Support:  Care provided by: self  Provides care for: no one  Marital Status: Single  Sibling(s)          Description of Support System: Supportive, Involved    Support Assessment: Adequate family and caregiver support    Current Resources:   Patient receiving home care services: No     Community Resources: None  Equipment currently used at home: walker, rolling  Supplies currently used at home: None    Employment/Financial:  Employment Status: retired        Financial Concerns: none           Does the patient's insurance plan have a 3 day qualifying hospital stay waiver?  No    Lifestyle & Psychosocial Needs:  Social Determinants of Health     Food Insecurity: Not on file   Depression: Not on file   Housing Stability: Not on file   Tobacco Use: High Risk (5/30/2024)    Patient History     Smoking Tobacco Use: Every Day     Smokeless Tobacco Use: Never     Passive Exposure: Not on file   Financial Resource Strain: Not on file   Alcohol Use: Alcohol Misuse (11/2/2020)    Received from [facility] [employer]    AUDIT-[facility]     Frequency of Alcohol Consumption: 2-3 times a week     Average Number of Drinks: 3 or 4     Frequency of Binge Drinking: Less than monthly   Transportation Needs: Not on file   Physical Activity: Not on file   Interpersonal Safety: Not on file    Stress: Not on file   Social Connections: Not on file   Health Literacy: Not on file       Functional Status:  Prior to admission patient needed assistance:   Dependent ADLs:: Independent, Ambulation-walker  Dependent IADLs:: Cleaning, Cooking, Meal Preparation, Transportation       Mental Health Status:  Mental Health Status: No Current Concerns       Chemical Dependency Status:  Chemical Dependency Status: No Current Concerns             Values/Beliefs:  Spiritual, Cultural Beliefs, Evangelical Practices, Values that affect care: no               Additional Information:  Pt lives at [facility] alone. He uses a walker for ambulation. He is independent with ADLs and facility provides house keeping and meals.     Unknown discharge needs, OT consult pending. Likely can return to [facility]. Has metro mobility.     CM will continue to monitor progression of care, review team recommendations and provide discharge planning assist as needed.       NOEL WoodsW

## 2024-06-03 NOTE — ED NOTES
Bed: JNED-03  Expected date: 6/3/24  Expected time: 5:32 AM  Means of arrival: Ambulance  Comments:  63 M Ned Gonzales

## 2024-06-03 NOTE — PLAN OF CARE
RCAT Treatment Plan    Patient Score: 10  Patient Acuity: 4    Clinical Indication for Therapy: history of bronchospasm    Therapy Ordered: Duo neb    Assessment Summary: Continue with current treatment    Michael Durant, RT  6/3/2024

## 2024-06-03 NOTE — DISCHARGE INSTRUCTIONS
BLE - daily  Cleanse BLE with Vashe (moisten wash cloth and lay over affected areas for 10 minutes)  Apply Sween Cream to skin.  Leave open to air.  (If any drainage noted, may cover with Mepilex and change every 3 days)

## 2024-06-03 NOTE — ED TRIAGE NOTES
Pt arrived via [facility], SOB increased since Wednesday, EMS have Pt on Bipap, legs weeping on arrival, had angio with stent placement 3 weeks ago

## 2024-06-03 NOTE — CONSULTS
HEART CARE NOTE        Thank you, Dr. Navarro, for asking the [facility] Heart Care team to see Zackary Hutchison to evaluate ADHF.    Assessment/Recommendations   1. HFimpEF c/b severe ADHF  Assessment / Plan  Hypervolemic on physical exam; estimate ~ 20 lbs of water weight  continue IV diuresis and continue to monitor UOP and renal function closely  Patient is high risk for adverse cardiac events 2/2 severe biventricular dysfunction, multiple HFHs, non-compliance with dietary restrictions, renal dysfunction, CAD  GDMT as detailed below     Current Pharmacotherapy AHA Guideline-Directed Medical Therapy   Lisinopril 5 mg daily  - held Lisinopril 20 mg twice daily   Carvedilol 3.125 mg BID  Carvedilol 25 mg twice daily   Spironolactone 50 mg daily Spironolactone 25 mg once daily   Hydralazine 10 mg TID Hydralazine 100 mg three times daily   Isosorbide mononitrate 180 mg daily Isosorbide dinitrate 40 mg three times daily   SGLT2 inhibitor:Dapagliflozin/Empagliflozin - not started Dapagliflozin or Empagliflozin 10 mg daily      2. Atrial fibrillation  Assessment / Plan  Currently on apixaban and amiodarone     3. CAD c/b ICM  Assessment / Plan  S/p CABG 2015  Now s/p PCI to Ramus and OM with DARRELL  Denies chest pain or anginal equivalents  Continue atorvastatin, carvedilol, clopidogrel     4.CKD  Assessment / Plan  Diuresis as above; continue to monitor UOP and renal function closely    5.DM2  Assessment / Plan  NIDDM; management per primary team - no changes to regimen at this time    Clinically Significant Risk Factors Present on Admission        # Hypokalemia: Lowest K = 2.8 mmol/L in last 2 days, will replace as needed        # Drug Induced Coagulation Defect: home medication list includes an anticoagulant medication  # Drug Induced Platelet Defect: home medication list includes an antiplatelet medication   # Hypertension: Noted on problem list  # Acute heart failure with reduced ejection fraction: last echo with  "EF <40% and receiving IV diuretics    # DMII: A1C = 7.8 % (Ref range: <5.7 %) within past 6 months    # Severe Obesity: Estimated body mass index is 43.9 kg/m  as calculated from the following:    Height as of 5/13/24: 1.676 m (5' 6\").    Weight as of 5/30/24: 123.4 kg (272 lb).       # Financial/Environmental Concerns:          Cardiac Arrhythmia: Atrial fibrillation: Unspecified  Cardiomyopathy  Systolic acute    Hypokalemia, Fluid overload, unspecified, Other fluid overload, and Other disorders of electrolyte and fluid balance, not elsewhere classified    CKD POA List: Stage 3b (GFR 30-44)    60 minutes spent reviewing prior records (including documentation, laboratory studies, cardiac testing/imaging), history and physical exam, planning, and subsequent documentation.    History of Present Illness/Subjective    Mr. Zackary Hutchison is a 63 year old male with a PMHx significant for (per Epic notation) atrial fibrillation, stage III CKD, ischemic cardiomyopathy, COPD, HFrEF, hyperlipidemia, CAD s/p CABG, restless leg syndrome, hypertension, obesity, newly diagnosed type 2 diabetes, and tobacco use disorder admitted on 5/13/2024 with the ER with left hip pain, suspected exacerbation of HFpEF, and possible COPD exacerbation      Today, Mr. Hutchison endorses progressive fluid retention and dyspnea; was somnolent; Cardiac management as detailed above     ECG: Personally reviewed 6/3/24; Afib with RVR     ECHO (personnaly Reviewed on 5/13/24):   1. Technically difficult study.  2. The left ventricle is normal in size. Image quality does not provide for  detailed assessment of LV systolic function, but is felt to be normal with a  visually estimated ejection fraction of roughly 55%.  3. No significant valvular heart disease is identified on this study though  the sensitivity, particularly of regurgitant lesions, is reduced due to poor  Doppler acoustics.  4. Right ventricular size and systolic performance could not be " accurately  assessed due to inadequate visualization.     The acoustic quality of this study is fairly poor. If a more accurate  assessment of left ventricular systolicperformance, regional wall motion, and  other morphology/function is required; would recommend cardiac MRI or other  alternative imaging modality for further evaluation.     When compared to the prior real-time echocardiogram dated 25 December 2023,  the ejection fraction appears somewhat higher on the current study though  accurate comparison is somewhat limited due to suboptimal acoustic imaging not  only on the current examination, but also hold the prior study as well.     Telemetry: personally reviewed Ngozi 3, 2024; notable for atrial fibrillation     Lab results: personally reviewed Ngozi 3, 2024; notable for hypokalemia    Medical history and pertinent documents reviewed in Care Everywhere please where applicable see details above          Physical Examination Review of Systems   BP (!) 152/61   Pulse 102   Temp 99.4  F (37.4  C) (Axillary)   Resp 20   SpO2 97%   There is no height or weight on file to calculate BMI.  Wt Readings from Last 3 Encounters:   05/30/24 123.4 kg (272 lb)   05/20/24 103.1 kg (227 lb 6.4 oz)   05/08/24 121 kg (266 lb 11.2 oz)     General Appearance:   no distress   ENT/Mouth: membranes moist, no oral lesions or bleeding gums.      EYES:  no scleral icterus, normal conjunctivae   Neck: no carotid bruits or thyromegaly   Chest/Lungs:   lungs are clear to auscultation, no rales or wheezing, equal chest wall expansion    Cardiovascular:   Irregular. Normal first and second heart sounds with no murmurs, rubs, or gallops; the carotid, radial and posterior tibial pulses are intact, + JVD and LE edema bilaterally    Abdomen:  no organomegaly, masses, bruits, or tenderness; bowel sounds are present   Extremities: no cyanosis or clubbing   Skin: no xanthelasma, warm.    Neurologic: NAD     Psychiatric: Somnolent    A complete  10 systems ROS was reviewed  And is negative except what is listed in the HPI.          Medical History  Surgical History Family History Social History   Past Medical History:   Diagnosis Date    Atrial fibrillation (H)     Chronic kidney disease     Chronic kidney disease, stage 3b (H) 09/28/2023    Class 3 severe obesity due to excess calories with body mass index (BMI) of 40.0 to 44.9 in adult (H) 09/28/2023    Congestive heart failure (H)     COPD (chronic obstructive pulmonary disease) (H)     Coronary artery disease involving coronary bypass graft of native heart without angina pectoris 09/28/2023    Heart failure with reduced ejection fraction (H) 09/11/2023    HFrEF (heart failure with reduced ejection fraction) (H) 09/11/2023    Hyperlipidemia     Hypertension     Ischemic cardiomyopathy 09/28/2023    Obese     Paroxysmal atrial fibrillation (H) 09/28/2023    Tobacco abuse 09/28/2023    Past Surgical History:   Procedure Laterality Date    CORONARY ANGIOGRAPHY ADULT ORDER      CORONARY ARTERY BYPASS Left 2014    2 vessels    CV CARDIOMEMS WITH RIGHT HEART CATH N/A 2/15/2024    Procedure: Pulmonary Arterial Pressure Sensor Placement;  Surgeon: Jacey Bhandari MD;  Location: [facility] CV    CV CORONARY ANGIOGRAM N/A 09/11/2023    Procedure: Coronary Angiogram;  Surgeon: Santy Esposito MD;  Location: [facility] CATH LAB CV    CV CORONARY ANGIOGRAM N/A 5/2/2024    Procedure: CV CORONARY ANGIOGRAM;  Surgeon: Santy Esposito MD;  Location: [facility] CATH LAB CV    CV LEFT HEART CATH N/A 09/11/2023    Procedure: Left Heart Catheterization;  Surgeon: Santy Esposito MD;  Location: [facility] CATH LAB CV    CV LEFT HEART CATH N/A 5/2/2024    Procedure: Left Heart Catheterization;  Surgeon: Santy Esposito MD;  Location: [facility] CATH LAB CV    CV PCI ANGIOPLASTY N/A 5/2/2024    Procedure: Percutaneous Transluminal Angioplasty;  Surgeon: Santy Esposito MD;  Location: [facility] CATH LAB CV    CV  "RIGHT HEART CATH MEASUREMENTS RECORDED N/A 09/11/2023    Procedure: Right Heart Catheterization;  Surgeon: Santy Esposito MD;  Location: [facility] CATH LAB CV    no family history of premature coronary artery disease Social History     Socioeconomic History    Marital status: Single     Spouse name: Not on file    Number of children: Not on file    Years of education: Not on file    Highest education level: Not on file   Occupational History    Occupation: Retired restaurant owner     Comment: [employer]   Tobacco Use    Smoking status: Every Day     Current packs/day: 0.50     Types: Cigarettes    Smokeless tobacco: Never    Tobacco comments:     Seen IP by [facility] on 9/11/23 and declined cessation services and materials   Substance and Sexual Activity    Alcohol use: Not Currently    Drug use: Not on file    Sexual activity: Not on file   Other Topics Concern    Not on file   Social History Narrative    Currently lives in an assisted living facility. (Updated: 01/08/2024)     Social Determinants of Health     Financial Resource Strain: Not on file   Food Insecurity: Not on file   Transportation Needs: Not on file   Physical Activity: Not on file   Stress: Not on file   Social Connections: Not on file   Interpersonal Safety: Not on file   Housing Stability: Not on file           Lab Results    Chemistry/lipid CBC Cardiac Enzymes/BNP/TSH/INR   Lab Results   Component Value Date    CHOL 121 05/02/2024    HDL 27 (L) 05/02/2024    TRIG 219 (H) 05/02/2024    BUN 29.9 (H) 06/03/2024     06/03/2024    CO2 31 (H) 06/03/2024    Lab Results   Component Value Date    WBC 9.3 06/03/2024    HGB 11.5 (L) 06/03/2024    HCT 37.4 (L) 06/03/2024    MCV 94 06/03/2024     (L) 06/03/2024    Lab Results   Component Value Date    TSH 1.70 04/29/2024    INR 0.97 06/03/2024     No results found for: \"CKTOTAL\", \"CKMB\", \"TROPONINI\"       Weight:    Wt Readings from Last 3 Encounters:   05/30/24 123.4 kg (272 lb)   05/20/24 103.1 kg " (227 lb 6.4 oz)   05/08/24 121 kg (266 lb 11.2 oz)       Allergies  Allergies   Allergen Reactions    Bumex [Bumetanide] Muscle Pain (Myalgia)         Surgical History  Past Surgical History:   Procedure Laterality Date    CORONARY ANGIOGRAPHY ADULT ORDER      CORONARY ARTERY BYPASS Left 2014    2 vessels    CV CARDIOMEMS WITH RIGHT HEART CATH N/A 2/15/2024    Procedure: Pulmonary Arterial Pressure Sensor Placement;  Surgeon: Jacey Bhandari MD;  Location: [facility] CATH LAB CV    CV CORONARY ANGIOGRAM N/A 09/11/2023    Procedure: Coronary Angiogram;  Surgeon: Santy Esposito MD;  Location: [facility] CATH LAB CV    CV CORONARY ANGIOGRAM N/A 5/2/2024    Procedure: CV CORONARY ANGIOGRAM;  Surgeon: Santy Esposito MD;  Location: [facility] CATH LAB CV    CV LEFT HEART CATH N/A 09/11/2023    Procedure: Left Heart Catheterization;  Surgeon: Santy Esposito MD;  Location: [facility] CATH LAB CV    CV LEFT HEART CATH N/A 5/2/2024    Procedure: Left Heart Catheterization;  Surgeon: Santy Esposito MD;  Location: [facility] CATH LAB CV    CV PCI ANGIOPLASTY N/A 5/2/2024    Procedure: Percutaneous Transluminal Angioplasty;  Surgeon: Santy Esposito MD;  Location: [facility] CATH LAB CV    CV RIGHT HEART CATH MEASUREMENTS RECORDED N/A 09/11/2023    Procedure: Right Heart Catheterization;  Surgeon: Santy Esposito MD;  Location: [facility] CATH LAB CV       Social History  Tobacco:   History   Smoking Status    Every Day    Types: Cigarettes   Smokeless Tobacco    Never    Alcohol:   Social History    Substance and Sexual Activity      Alcohol use: Not Currently   Illicit Drugs:   History   Drug Use Not on file       Family History  Family History   Problem Relation Age of Onset    Cerebrovascular Disease Mother     Myocardial Infarction Father           Jacey Bhandari MD on 6/3/2024      cc: Reid Escobar

## 2024-06-03 NOTE — CONSULTS
[facility] Nurse Inpatient Assessment     Consulted for: BLE    Summary: Patient known to [facility] team for frequent recent admissions    Patient History (according to provider note(s):    Assessment & Plan  Zackary Hutchison is a 63 year old male with PMH of atrial fibrillation, CKD 3, CAD s/p CABG, ischemic CMO, HTN, CHF, HLD, COPD, recently diagnosed DM2, tobacco use disorder, restless leg syndrome, s/p left LARON, recent on May 13-20 hospitalization for CHF exacerbation, admitted on 6/3/2024 with:     #Acute on chronic CHF with biventricular dysfunction.  Recent on May 13-20 hospitalization for CHF and COPD exacerbation.  States compliance with sodium restricted diet, medications.  Cardiac monitoring/mems at home.  Discharge weight 227.5 LBS-Per DC summary.  proBNP normal at 558.  Echo 5/1/2024: LVEF 55%.  -S/p 40 mg IV Lasix at 6:34 AM.  -Continue diuresis, per CHF order set, given patient's PTA dose of torsemide 100 mg twice daily, will diurese with 80 mg Lasix every 8 hours.  -Hold PT torsemide, spironolactone while on IV Lasix.  -Continue PT Coreg, hydralazine.  -Daily weights, close monitoring UO, renal function, electrolytes while diuresing.  -Telemetry  -Cardiology consult to optimize med management. OP pt of Dr. Perdomo.     #Acute respiratory failure with hypoxia and hypercarbia, likely due to CHF exacerbation.  VBG 7.3 8/23/60.  On BiPAP.  Tolerating well.  -PE, pulmonary infiltrates, pleural effusion were ruled out by CTA chest.  -Continue BiPAP, wean as able to  -Check ABGs  -Supplemental oxygen to keep SpO2 above 92%, given history of CAD, CHF     #Coronary disease, s/p CABG.  Chest pain-free.  Chronically mild elevated troponin.  Coronary angiogram 5/2/2024:    Ost LAD to Prox LAD lesion is 100% stenosed.    Ramus lesion is 95% stenosed.    Mid Cx to Dist Cx lesion is 80% stenosed.    1st Mrg lesion is 90% stenosed.    Prox RCA to Dist RCA lesion is 100% stenosed.    Mid LAD  lesion is 70% stenosed.  -Cardiology recommended 75 mg of daily Plavix indefinitely while on Eliquis, as well as baby aspirin.  -Continue PTA Imdur 60 mg, as needed nitroglycerin.  -Mild elevated troponin 35, likely due to demand ischemia of CHF exacerbation, hypoxia, rapid A-fib in the setting of CKD 3.  Diuresing, rate control, supplemental oxygen as above.  -Serial troponin     #Hypokalemia at 2.8.  #Low normal magnesium 1.7.  Both due to chronic diuresis.  -Replacement protocols     #Hypocalcemia, likely due to vitamin D deficiency.  Hypercalcemia noted during recent hospitalization.  Total vitamin D low at 17.  PTH RP less than 2 on 5/20/2024.    Assessment:    Skin Injury Location: BLE; L>R    Last photo: 6/3/24  Skin injury due to: Venous stasis dermatitis and likely cellulitis  Skin history and plan of care: patient states he is unable to wrap legs at home; discussed Velcro wrap products - but expressed willingness to receive information on AVS to see if that type of product might work for him.  Affected area:      Skin assessment: Intact, Dry/flaky, and Dry drainage     Measurements (length x width x depth, in cm) Multiple scattered areas of dried drainage scattered throughout     Color: red     Temperature  normal      Drainage: none .      Color: none      Odor: none  Pain: moderate, constant  Pain interventions prior to dressing change: patient tolerated well and slow and gentle cares   Treatment goal: Increase moisture   STATUS: initial assessment this admission  Supplies ordered: ordered Vashe      Treatment Plan:   BLE - daily  Cleanse BLE with Vashe (moisten wash cloth and lay over affected areas for 10 minutes)  Apply Sween Cream to skin.  Leave open to air.  (If any drainage noted, may cover with Mepilex and change every 3 days)    Orders: Written    RECOMMEND PRIMARY TEAM ORDER: None, at this time  Education provided: plan of care  Discussed plan of care with: Patient  WO nurse follow-up plan:  weekly  Notify [facility] if wound(s) deteriorate.  Nursing to notify the Provider(s) and re-consult the WO Nurse if new skin concern.    DATA:     Current support surface: Standard  Standard Isoflex gel  Containment of urine/stool: Incontinence Protocol  BMI: There is no height or weight on file to calculate BMI.   Active diet order: Orders Placed This Encounter      Moderate Consistent Carb (60 g CHO per Meal) Diet     Output: No intake/output data recorded.     Labs:   Recent Labs   Lab 06/03/24  0553   ALBUMIN 3.9   HGB 11.5*   INR 0.97   WBC 9.3     Pressure injury risk assessment:   Sensory Perception: 3-->slightly limited  Moisture: 3-->occasionally moist  Activity: 3-->walks occasionally  Mobility: 3-->slightly limited  Nutrition: 4-->excellent  Friction and Shear: 2-->potential problem  Simeon Score: 18    Naheed Domingo, MSN RN CWOCN  Pager no longer is use, please contact through [employer] group: [facility]

## 2024-06-03 NOTE — ED NOTES
"EMERGENCY DEPARTMENT PROGRESS NOTE         ED COURSE AND MEDICAL DECISION MAKING  Patient was signed out to me by Dr. Morin at 7:45 AM      Zackary Hutchison is a 63 year old male who presents with shortness of breath for last 3 to 4 days. He says his breathing and leg swelling are \"the worst it has been\".    7:50 AM I spoke to Dr. Navarro, Hospitalist, who agrees to take the patient.     LAB  Pertinent labs results reviewed   Results for orders placed or performed during the hospital encounter of 06/03/24   Comprehensive metabolic panel   Result Value Ref Range    Sodium 138 135 - 145 mmol/L    Potassium 2.8 (L) 3.4 - 5.3 mmol/L    Carbon Dioxide (CO2) 31 (H) 22 - 29 mmol/L    Anion Gap 14 7 - 15 mmol/L    Urea Nitrogen 29.9 (H) 8.0 - 23.0 mg/dL    Creatinine 1.23 (H) 0.67 - 1.17 mg/dL    GFR Estimate 65 >60 mL/min/1.73m2    Calcium 8.7 (L) 8.8 - 10.2 mg/dL    Chloride 93 (L) 98 - 107 mmol/L    Glucose 315 (H) 70 - 99 mg/dL    Alkaline Phosphatase 100 40 - 150 U/L    AST 13 0 - 45 U/L    ALT 19 0 - 70 U/L    Protein Total 7.4 6.4 - 8.3 g/dL    Albumin 3.9 3.5 - 5.2 g/dL    Bilirubin Total 0.3 <=1.2 mg/dL   Result Value Ref Range    Troponin T, High Sensitivity 35 (H) <=22 ng/L   Result Value Ref Range    Magnesium 1.7 1.7 - 2.3 mg/dL   Blood gas venous   Result Value Ref Range    pH Venous 7.38 7.32 - 7.43    pCO2 Venous 60 (H) 40 - 50 mm Hg    pO2 Venous 23 (L) 25 - 47 mm Hg    Bicarbonate Venous 35 (H) 21 - 28 mmol/L    Base Excess/Deficit Venous 10.1 (H) -3.0 - 3.0 mmol/L    FIO2 40     Oxyhemoglobin Venous 39 (L) 70 - 75 %    O2 Sat, Venous 41.4 (L) 70.0 - 75.0 %   Nt probnp inpatient (BNP)   Result Value Ref Range    N terminal Pro BNP Inpatient 558 0 - 900 pg/mL   Result Value Ref Range    INR 0.97 0.85 - 1.15   Partial thromboplastin time   Result Value Ref Range    aPTT 28 22 - 38 Seconds   Lactic acid whole blood with 1x repeat in 2 hr when >2   Result Value Ref Range    Lactic Acid, Initial 2.0 0.7 - 2.0 mmol/L "   CBC with platelets and differential   Result Value Ref Range    WBC Count 9.3 4.0 - 11.0 10e3/uL    RBC Count 3.97 (L) 4.40 - 5.90 10e6/uL    Hemoglobin 11.5 (L) 13.3 - 17.7 g/dL    Hematocrit 37.4 (L) 40.0 - 53.0 %    MCV 94 78 - 100 fL    MCH 29.0 26.5 - 33.0 pg    MCHC 30.7 (L) 31.5 - 36.5 g/dL    RDW 19.7 (H) 10.0 - 15.0 %    Platelet Count 138 (L) 150 - 450 10e3/uL    % Neutrophils 81 %    % Lymphocytes 10 %    % Monocytes 6 %    % Eosinophils 2 %    % Basophils 0 %    % Immature Granulocytes 1 %    NRBCs per 100 WBC 0 <1 /100    Absolute Neutrophils 7.5 1.6 - 8.3 10e3/uL    Absolute Lymphocytes 1.0 0.8 - 5.3 10e3/uL    Absolute Monocytes 0.6 0.0 - 1.3 10e3/uL    Absolute Eosinophils 0.2 0.0 - 0.7 10e3/uL    Absolute Basophils 0.0 0.0 - 0.2 10e3/uL    Absolute Immature Granulocytes 0.1 <=0.4 10e3/uL    Absolute NRBCs 0.0 10e3/uL         RADIOLOGY    Pertinent imaging reviewed   Please see official radiology report.  MR Hip Left w/o Contrast    Result Date: 5/15/2024  EXAM: MR HIP LEFT W/O CONTRAST LOCATION: [facility] DATE: 5/15/2024 INDICATION: Pain; Hip pain; Hip pain without trauma or other complicating feature; No hip x ray result available COMPARISON: CT left hip 5/13/2024 TECHNIQUE: Unenhanced. FINDINGS: LEFT HIP: -Left total hip arthroplasty in place. Associated susceptibility artifact limits evaluation of adjacent structures. The distal most aspect of the femoral stem is not included in the field-of-view. Visualized portions demonstrate no evidence of hardware complication. No periprosthetic fracture. No significant joint effusion. MUSCLES AND TENDONS: -Gluteal: No tendon tear or tendinopathy. No trochanteric bursitis. -Proximal hamstring: Mostly excluded from the field-of-view. -Iliopsoas: No tendon tear or tendinopathy. No bursitis. -Rectus femoris origin: No tear or tendinopathy. BONES: -No fracture or concerning marrow replacing lesion. SOFT TISSUES: -Severe fatty atrophy  of the gluteus minimus muscle. Mild fatty atrophy of the gluteus medius and gluteus yareli muscles. -No acute muscular injury. INTRA-PELVIC CONTENTS: -Visualized portions are grossly unremarkable.     IMPRESSION: 1.  Left total hip arthroplasty. Visualized portions demonstrate no evidence of hardware complication. No periprosthetic fracture.    CT Hip Left w/o Contrast    Result Date: 5/13/2024  EXAM: CT HIP LEFT W/O CONTRAST LOCATION: [facility] DATE: 5/13/2024 INDICATION: Atraumatic left hip pain. COMPARISON: Same day radiographs. TECHNIQUE: Noncontrast. Axial, sagittal and coronal thin-section reconstruction. Dose reduction techniques were used. FINDINGS: BONES: -Left total hip arthroplasty. Streak artifact from orthopedic hardware limits evaluation of adjacent tissues. No evidence of loosening or periprosthetic fracture. No definite joint effusion. SOFT TISSUES: -Arterial vascular calcifications. Mild prostatomegaly. Circumferential bladder wall thickening.     IMPRESSION: 1.  Left total hip arthroplasty without evidence of loosening or periprosthetic fracture. 2.  Circumferential bladder wall thickening which may be accentuated by incomplete distention. Chronic urinary outlet obstruction may contribute to this appearance. Cystitis cannot be entirely excluded. Correlation with clinical symptoms and urinalysis suggested.     XR Chest 1 View    Result Date: 5/13/2024  EXAM: XR CHEST 1 VIEW LOCATION: [facility] DATE: 5/13/2024 INDICATION: Dyspnea. COPD. CHF. COMPARISON: 4/29/2024.     IMPRESSION: Minimal streaky basilar opacities, probably atelectasis. Remaining lungs are clear. Stable mild cardiac silhouette enlargement. Post sternotomy. Slight vascular indistinctness could be from congestion. No pleural effusion or pneumothorax.     XR Pelvis and Hip Left 2 Views    Result Date: 5/13/2024  EXAM: PELVIS AND LEFT HIP 3 VIEWS LOCATION: [facility]  HOSPITAL DATE/TIME: 5/13/2024 6:20 AM CDT INDICATION: Pain. COMPARISON: None.     IMPRESSION: 1. A left hip arthroplasty is in place. No evidence of hardware failure. 2. No visualized acute fracture or malalignment of the pelvis or left hip.        US Renal Complete Non-Vascular    Result Date: 5/6/2024  EXAM: US RENAL COMPLETE NON-VASCULAR LOCATION: [facility] DATE: 5/6/2024 INDICATION: PRINCESS , renal cyst, renal hypodensity on imaging in 2022 COMPARISON: Report from outside CT of the abdomen and pelvis 6/9/2022 TECHNIQUE: Routine Bilateral Renal and Bladder Ultrasound. Acoustic windows are limited due to large patient habitus and bowel gas. FINDINGS: RIGHT KIDNEY: 13.3 x 6.9 x 6.8 cm. Normal without hydronephrosis or masses. LEFT KIDNEY: 12.0 x 6.1 x 7.2 cm. Normal without hydronephrosis or solid mass. There is a 1.5 x 1.6 cm cyst arising from the superior pole. BLADDER: Normal.     IMPRESSION: 1.  Normal-sized kidneys without obstruction. 2.  1.6 cm cyst arising from the upper left kidney. A cyst of similar description was described on prior CT abdomen and pelvis. No specific workup or follow-up is necessary.      FINAL IMPRESSION    ***

## 2024-06-03 NOTE — ED PROVIDER NOTES
"  Emergency Department Encounter         FINAL IMPRESSION:  Shortness of breath          ED COURSE AND MEDICAL DECISION MAKING       ED Course as of 06/03/24 0738   Mon Jun 03, 2024   0637 EKG today is A-fib with RVR with rate of 121, large amount of ectopy and underlying static, when compared to May 13, the inversions laterally and depressions do appear to be chronic.  QTc today is 562.       Patient is a morbidly obese 63-year-old hypertensive diabetic hyperlipidemia smoker with recent cardiac stenting as well as recent admission for intractable hip pain as well as asthma versus heart failure exacerbation, here from home with worsening breathing over the last 3 to 4 days.    When EMS arrived, patient was hypoxic in the field in the 80s, had variable blood pressures that were hypertensive and hypotensive.  But on arrival patient was normotensive.  Patient reports worsening breathing \"that is the worst it has been.\"  No abdominal pain, bowel movement changes or dysuria.  Patient states his legs are \"the worst of ever seen.\"    On arrival patient does not look well.  Is on CPAP from EMS.  This was transitioned over to BiPAP.  He is alert and oriented and able to give a history although dyspneic.  Audible wheezing.  Lungs do sound wheezy.  He has a morbidly obese abdomen.  Lower extremities with obvious weeping with soaked lower extremity clothing.  His feet are red and irritated.  No obvious necrotic toes or feet.    DuoNeb ordered.  Cardiopulmonary, metabolic and infectious workup initiated.  Will admit.                   Medical Decision Making  Obtained supplemental history:Supplemental history obtained?: No  Reviewed external records: External records reviewed?: Documented in chart and Inpatient Record:   4/29 admission as well as a 513 admission  Care impacted by chronic illness:Anticoagulated State, Chronic Kidney Disease, Chronic Lung Disease, Diabetes, Heart Disease, Hyperlipidemia, Hypertension, Peripheral " Vascular Disease, and Smoking / Nicotine Use  Care significantly affected by social determinants of health:N/A  Did you consider but not order tests?: Work up considered but not performed and documented in chart, if applicable  Did you interpret images independently?: Independent interpretation of ECG and images noted in documentation, when applicable.  Consultation discussion with other provider:Did you involve another provider (consultant, , pharmacy, etc.)?: No  Admit.              EKG        Critical Care     Performed by: Reid Morin or    Authorized by: Reid Morin  Total critical care time:  minutes  Critical care was necessary to treat or prevent imminent or life-threatening deterioration of the following conditions:   Critical care was time spent personally by me on the following activities: development of treatment plan with patient or surrogate, discussions with consultants, examination of patient, evaluation of patient's response to treatment, obtaining history from patient or surrogate, ordering and performing treatments and interventions, ordering and review of laboratory studies, ordering and review of radiographic studies, re-evaluation of patient's condition and monitoring for potential decompensation.  Critical care time was exclusive of separately billable procedures and treating other patients.'    At the conclusion of the encounter I discussed the results of all the tests and the disposition. The questions were answered. The patient or family acknowledged understanding and was agreeable with the care plan.        MEDICATIONS GIVEN IN THE EMERGENCY DEPARTMENT:  Medications   ipratropium - albuterol 0.5 mg/2.5 mg/3 mL (DUONEB) neb solution 3 mL (has no administration in time range)       NEW PRESCRIPTIONS STARTED AT TODAY'S ED VISIT:  New Prescriptions    No medications on file       HPI     Patient is a morbidly obese 63-year-old hypertensive diabetic hyperlipidemic patient with a history of A-fib on  Eliquis, history of ischemic cardiomyopathy, tobacco use as well as heart failure with reduced ejection fraction, here with worsening breathing for last 3 to 4 days.  Recent admissions for heart failure exacerbation as well as stent placement within the last 2 months.  No fevers, chills, nausea vomiting.  No abdominal pain.  No syncope.  Patient reports worsening lower extremity edema.          MEDICAL HISTORY     Past Medical History:   Diagnosis Date    Atrial fibrillation (H)     Chronic kidney disease     Chronic kidney disease, stage 3b (H) 09/28/2023    Class 3 severe obesity due to excess calories with body mass index (BMI) of 40.0 to 44.9 in adult (H) 09/28/2023    Congestive heart failure (H)     COPD (chronic obstructive pulmonary disease) (H)     Coronary artery disease involving coronary bypass graft of native heart without angina pectoris 09/28/2023    Heart failure with reduced ejection fraction (H) 09/11/2023    HFrEF (heart failure with reduced ejection fraction) (H) 09/11/2023    Hyperlipidemia     Hypertension     Ischemic cardiomyopathy 09/28/2023    Obese     Paroxysmal atrial fibrillation (H) 09/28/2023    Tobacco abuse 09/28/2023       Past Surgical History:   Procedure Laterality Date    CORONARY ANGIOGRAPHY ADULT ORDER      CORONARY ARTERY BYPASS Left 2014    2 vessels    CV CARDIOMEMS WITH RIGHT HEART CATH N/A 2/15/2024    Procedure: Pulmonary Arterial Pressure Sensor Placement;  Surgeon: Jacey Bhandari MD;  Location: [facility] CATH LAB CV    CV CORONARY ANGIOGRAM N/A 09/11/2023    Procedure: Coronary Angiogram;  Surgeon: Santy Esposito MD;  Location: [facility] CATH LAB CV    CV CORONARY ANGIOGRAM N/A 5/2/2024    Procedure: CV CORONARY ANGIOGRAM;  Surgeon: Santy Esposito MD;  Location: [facility] CATH LAB CV    CV LEFT HEART CATH N/A 09/11/2023    Procedure: Left Heart Catheterization;  Surgeon: Santy Esposito MD;  Location: [facility] CATH LAB CV    CV LEFT HEART CATH N/A 5/2/2024     Procedure: Left Heart Catheterization;  Surgeon: Santy Esposito MD;  Location: [facility] CV    CV PCI ANGIOPLASTY N/A 5/2/2024    Procedure: Percutaneous Transluminal Angioplasty;  Surgeon: Santy Esposito MD;  Location: [facility] CV    CV RIGHT HEART CATH MEASUREMENTS RECORDED N/A 09/11/2023    Procedure: Right Heart Catheterization;  Surgeon: Santy Esposito MD;  Location: [facility] CV       Social History     Tobacco Use    Smoking status: Every Day     Current packs/day: 0.50     Types: Cigarettes    Smokeless tobacco: Never    Tobacco comments:     Seen IP by CTTS on 9/11/23 and declined cessation services and materials   Substance Use Topics    Alcohol use: Not Currently       acetaminophen (TYLENOL) 500 MG tablet  albuterol (PROAIR HFA/PROVENTIL HFA/VENTOLIN HFA) 108 (90 Base) MCG/ACT inhaler  amiodarone (PACERONE) 200 MG tablet  apixaban ANTICOAGULANT (ELIQUIS) 5 MG tablet  atorvastatin (LIPITOR) 40 MG tablet  blood glucose (NO BRAND SPECIFIED) lancets standard  blood glucose (NO BRAND SPECIFIED) lancets standard  blood glucose (NO BRAND SPECIFIED) test strip  blood glucose monitoring (FREESTYLE) lancets  blood glucose monitoring (FREESTYLE) lancets  blood glucose monitoring (NO BRAND SPECIFIED) meter device kit  carvedilol (COREG) 3.125 MG tablet  clopidogrel (PLAVIX) 75 MG tablet  [employer] VITAMIN B12 1000 MCG tablet  dexAMETHasone (DECADRON) 4 MG tablet  diclofenac (VOLTAREN) 1 % topical gel  DULoxetine (CYMBALTA) 30 MG capsule  hydrALAZINE (APRESOLINE) 10 MG tablet  insulin glargine (LANTUS PEN) 100 UNIT/ML pen  ipratropium - albuterol 0.5 mg/2.5 mg/3 mL (DUONEB) 0.5-2.5 (3) MG/3ML neb solution  isosorbide mononitrate (IMDUR) 60 MG 24 hr tablet  lidocaine (XYLOCAINE) 5 % external ointment  methocarbamol (ROBAXIN) 750 MG tablet  miconazole (MICATIN) 2 % external powder  nitroGLYcerin (NITROSTAT) 0.4 MG sublingual tablet  oxyCODONE (ROXICODONE) 5 MG tablet  oxyCODONE  (ROXICODONE) 5 MG tablet  pantoprazole (PROTONIX) 40 MG EC tablet  polyethylene glycol (MIRALAX) 17 GM/Dose powder  rOPINIRole (REQUIP) 2 MG tablet  thin (NO BRAND SPECIFIED) lancets  torsemide (DEMADEX) 100 MG tablet            PHYSICAL EXAM     /72   Pulse 103   Resp 23   SpO2 100%       PHYSICAL EXAM:     General: Patient appears uncomfortable  HEENT: Moist mucous membranes,  No head trauma.    Cardiovascular: Normal rate, normal rhythm, n bilateral weeping lower extremity edema no appreciable murmur.  Respiratory: No signs of respiratory distress, lungs are clear to auscultation bilaterally with no wheezes rhonchi or rales.  Abdominal: Soft, nontender, nondistended, no palpable masses, no guarding, no rebound  Musculoskeletal: Full range of motion of joints, no deformities appreciated.  Neurological: Alert and oriented, grossly neurologically intact.  Psychological: Normal affect and mood.  Integument: No rashes appreciated          RESULTS       Labs Ordered and Resulted from Time of ED Arrival to Time of ED Departure - No data to display    CT Chest Pulmonary Embolism w Contrast    (Results Pending)         PROCEDURES:  Procedures:  Procedures         Reid Morin DO  Emergency Medicine  [facility] EMERGENCY DEPARTMENT       Reid Morin DO  06/03/24 0759

## 2024-06-04 ENCOUNTER — TELEPHONE (OUTPATIENT)
Dept: CARDIOLOGY | Facility: CLINIC | Age: 64
End: 2024-06-04
Payer: MEDICARE

## 2024-06-04 DIAGNOSIS — I50.9 ACUTE DECOMPENSATED HEART FAILURE (H): Primary | ICD-10-CM

## 2024-06-04 LAB
ALBUMIN SERPL BCG-MCNC: 4.2 G/DL (ref 3.5–5.2)
ALP SERPL-CCNC: 65 U/L (ref 40–150)
ALT SERPL W P-5'-P-CCNC: 15 U/L (ref 0–70)
ANION GAP SERPL CALCULATED.3IONS-SCNC: 17 MMOL/L (ref 7–15)
AST SERPL W P-5'-P-CCNC: 9 U/L (ref 0–45)
ATRIAL RATE - MUSE: 110 BPM
B-OH-BUTYR SERPL-SCNC: <0.18 MMOL/L
BASE EXCESS BLDV CALC-SCNC: 4.8 MMOL/L (ref -3–3)
BASOPHILS # BLD AUTO: 0 10E3/UL (ref 0–0.2)
BASOPHILS NFR BLD AUTO: 0 %
BILIRUB SERPL-MCNC: 0.5 MG/DL
BUN SERPL-MCNC: 30 MG/DL (ref 8–23)
CALCIUM SERPL-MCNC: 8.7 MG/DL (ref 8.8–10.2)
CHLORIDE SERPL-SCNC: 93 MMOL/L (ref 98–107)
CREAT SERPL-MCNC: 1.08 MG/DL (ref 0.67–1.17)
DEPRECATED HCO3 PLAS-SCNC: 26 MMOL/L (ref 22–29)
DIASTOLIC BLOOD PRESSURE - MUSE: NORMAL MMHG
EGFRCR SERPLBLD CKD-EPI 2021: 76 ML/MIN/1.73M2
EOSINOPHIL # BLD AUTO: 0 10E3/UL (ref 0–0.7)
EOSINOPHIL NFR BLD AUTO: 0 %
ERYTHROCYTE [DISTWIDTH] IN BLOOD BY AUTOMATED COUNT: 20 % (ref 10–15)
GLUCOSE BLDC GLUCOMTR-MCNC: 268 MG/DL (ref 70–99)
GLUCOSE BLDC GLUCOMTR-MCNC: 296 MG/DL (ref 70–99)
GLUCOSE BLDC GLUCOMTR-MCNC: 340 MG/DL (ref 70–99)
GLUCOSE BLDC GLUCOMTR-MCNC: 389 MG/DL (ref 70–99)
GLUCOSE BLDC GLUCOMTR-MCNC: 395 MG/DL (ref 70–99)
GLUCOSE BLDC GLUCOMTR-MCNC: 409 MG/DL (ref 70–99)
GLUCOSE SERPL-MCNC: 447 MG/DL (ref 70–99)
HCO3 BLDV-SCNC: 30 MMOL/L (ref 21–28)
HCT VFR BLD AUTO: 34.8 % (ref 40–53)
HGB BLD-MCNC: 10.7 G/DL (ref 13.3–17.7)
IMM GRANULOCYTES # BLD: 0.1 10E3/UL
IMM GRANULOCYTES NFR BLD: 1 %
INTERPRETATION ECG - MUSE: NORMAL
LACTATE SERPL-SCNC: 4.2 MMOL/L (ref 0.7–2)
LACTATE SERPL-SCNC: 4.9 MMOL/L (ref 0.7–2)
LYMPHOCYTES # BLD AUTO: 0.4 10E3/UL (ref 0.8–5.3)
LYMPHOCYTES NFR BLD AUTO: 4 %
MAGNESIUM SERPL-MCNC: 1.7 MG/DL (ref 1.7–2.3)
MCH RBC QN AUTO: 28.7 PG (ref 26.5–33)
MCHC RBC AUTO-ENTMCNC: 30.7 G/DL (ref 31.5–36.5)
MCV RBC AUTO: 93 FL (ref 78–100)
MONOCYTES # BLD AUTO: 0.3 10E3/UL (ref 0–1.3)
MONOCYTES NFR BLD AUTO: 3 %
NEUTROPHILS # BLD AUTO: 8.6 10E3/UL (ref 1.6–8.3)
NEUTROPHILS NFR BLD AUTO: 92 %
NRBC # BLD AUTO: 0 10E3/UL
NRBC BLD AUTO-RTO: 0 /100
O2/TOTAL GAS SETTING VFR VENT: 21 %
OXYHGB MFR BLDV: 55 % (ref 70–75)
P AXIS - MUSE: NORMAL DEGREES
PCO2 BLDV: 47 MM HG (ref 40–50)
PH BLDV: 7.41 [PH] (ref 7.32–7.43)
PLATELET # BLD AUTO: 122 10E3/UL (ref 150–450)
PO2 BLDV: 30 MM HG (ref 25–47)
POTASSIUM SERPL-SCNC: 3.6 MMOL/L (ref 3.4–5.3)
PR INTERVAL - MUSE: 184 MS
PROT SERPL-MCNC: 7.4 G/DL (ref 6.4–8.3)
QRS DURATION - MUSE: 144 MS
QT - MUSE: 392 MS
QTC - MUSE: 508 MS
R AXIS - MUSE: 88 DEGREES
RBC # BLD AUTO: 3.73 10E6/UL (ref 4.4–5.9)
SAO2 % BLDV: 56 % (ref 70–75)
SODIUM SERPL-SCNC: 136 MMOL/L (ref 135–145)
SYSTOLIC BLOOD PRESSURE - MUSE: NORMAL MMHG
T AXIS - MUSE: 2 DEGREES
TROPONIN T SERPL HS-MCNC: 26 NG/L
VENTRICULAR RATE- MUSE: 101 BPM
WBC # BLD AUTO: 9.3 10E3/UL (ref 4–11)

## 2024-06-04 PROCEDURE — 84484 ASSAY OF TROPONIN QUANT: CPT | Performed by: INTERNAL MEDICINE

## 2024-06-04 PROCEDURE — 250N000011 HC RX IP 250 OP 636: Performed by: INTERNAL MEDICINE

## 2024-06-04 PROCEDURE — 250N000013 HC RX MED GY IP 250 OP 250 PS 637: Performed by: INTERNAL MEDICINE

## 2024-06-04 PROCEDURE — 250N000009 HC RX 250: Performed by: INTERNAL MEDICINE

## 2024-06-04 PROCEDURE — 82805 BLOOD GASES W/O2 SATURATION: CPT | Performed by: INTERNAL MEDICINE

## 2024-06-04 PROCEDURE — 94640 AIRWAY INHALATION TREATMENT: CPT

## 2024-06-04 PROCEDURE — 999N000157 HC STATISTIC RCP TIME EA 10 MIN

## 2024-06-04 PROCEDURE — 83605 ASSAY OF LACTIC ACID: CPT | Performed by: INTERNAL MEDICINE

## 2024-06-04 PROCEDURE — 36415 COLL VENOUS BLD VENIPUNCTURE: CPT | Performed by: INTERNAL MEDICINE

## 2024-06-04 PROCEDURE — 99207 PR APP CREDIT; MD BILLING SHARED VISIT: CPT | Performed by: NURSE PRACTITIONER

## 2024-06-04 PROCEDURE — 120N000004 HC R&B MS OVERFLOW

## 2024-06-04 PROCEDURE — P9047 ALBUMIN (HUMAN), 25%, 50ML: HCPCS | Performed by: INTERNAL MEDICINE

## 2024-06-04 PROCEDURE — 85025 COMPLETE CBC W/AUTO DIFF WBC: CPT | Performed by: INTERNAL MEDICINE

## 2024-06-04 PROCEDURE — 99233 SBSQ HOSP IP/OBS HIGH 50: CPT | Performed by: INTERNAL MEDICINE

## 2024-06-04 PROCEDURE — 99222 1ST HOSP IP/OBS MODERATE 55: CPT | Mod: FS | Performed by: INTERNAL MEDICINE

## 2024-06-04 PROCEDURE — 82010 KETONE BODYS QUAN: CPT | Performed by: HOSPITALIST

## 2024-06-04 PROCEDURE — 93005 ELECTROCARDIOGRAM TRACING: CPT

## 2024-06-04 PROCEDURE — 250N000013 HC RX MED GY IP 250 OP 250 PS 637: Performed by: NURSE PRACTITIONER

## 2024-06-04 PROCEDURE — 93010 ELECTROCARDIOGRAM REPORT: CPT | Mod: HIP | Performed by: STUDENT IN AN ORGANIZED HEALTH CARE EDUCATION/TRAINING PROGRAM

## 2024-06-04 PROCEDURE — 80053 COMPREHEN METABOLIC PANEL: CPT | Performed by: INTERNAL MEDICINE

## 2024-06-04 PROCEDURE — 93005 ELECTROCARDIOGRAM TRACING: CPT | Performed by: INTERNAL MEDICINE

## 2024-06-04 PROCEDURE — 83735 ASSAY OF MAGNESIUM: CPT | Performed by: INTERNAL MEDICINE

## 2024-06-04 PROCEDURE — 94640 AIRWAY INHALATION TREATMENT: CPT | Mod: 76

## 2024-06-04 RX ORDER — AMIODARONE HYDROCHLORIDE 200 MG/1
200 TABLET ORAL 2 TIMES DAILY
Status: DISCONTINUED | OUTPATIENT
Start: 2024-06-04 | End: 2024-06-10 | Stop reason: HOSPADM

## 2024-06-04 RX ORDER — AMIODARONE HYDROCHLORIDE 200 MG/1
200 TABLET ORAL 2 TIMES DAILY
Status: DISCONTINUED | OUTPATIENT
Start: 2024-06-04 | End: 2024-06-04

## 2024-06-04 RX ORDER — AMIODARONE HYDROCHLORIDE 200 MG/1
200 TABLET ORAL DAILY
Status: DISCONTINUED | OUTPATIENT
Start: 2024-06-19 | End: 2024-06-10 | Stop reason: HOSPADM

## 2024-06-04 RX ADMIN — CARVEDILOL 3.12 MG: 3.12 TABLET, FILM COATED ORAL at 17:22

## 2024-06-04 RX ADMIN — NITROGLYCERIN 0.4 MG: 0.4 TABLET, ORALLY DISINTEGRATING SUBLINGUAL at 09:36

## 2024-06-04 RX ADMIN — ACETAMINOPHEN 650 MG: 325 TABLET ORAL at 14:41

## 2024-06-04 RX ADMIN — HYDRALAZINE HYDROCHLORIDE 10 MG: 10 TABLET, FILM COATED ORAL at 21:14

## 2024-06-04 RX ADMIN — ALBUMIN HUMAN 50 G: 0.25 SOLUTION INTRAVENOUS at 09:12

## 2024-06-04 RX ADMIN — CLOPIDOGREL BISULFATE 75 MG: 75 TABLET ORAL at 09:07

## 2024-06-04 RX ADMIN — CARVEDILOL 3.12 MG: 3.12 TABLET, FILM COATED ORAL at 09:05

## 2024-06-04 RX ADMIN — ACETAMINOPHEN 650 MG: 325 TABLET ORAL at 21:14

## 2024-06-04 RX ADMIN — HYDRALAZINE HYDROCHLORIDE 10 MG: 10 TABLET, FILM COATED ORAL at 13:12

## 2024-06-04 RX ADMIN — INSULIN ASPART 4 UNITS: 100 INJECTION, SOLUTION INTRAVENOUS; SUBCUTANEOUS at 09:01

## 2024-06-04 RX ADMIN — APIXABAN 5 MG: 5 TABLET, FILM COATED ORAL at 09:08

## 2024-06-04 RX ADMIN — METHYLPREDNISOLONE SODIUM SUCCINATE 62.5 MG: 125 INJECTION, POWDER, FOR SOLUTION INTRAMUSCULAR; INTRAVENOUS at 03:06

## 2024-06-04 RX ADMIN — OXYCODONE HYDROCHLORIDE 5 MG: 5 TABLET ORAL at 23:44

## 2024-06-04 RX ADMIN — INSULIN ASPART 6 UNITS: 100 INJECTION, SOLUTION INTRAVENOUS; SUBCUTANEOUS at 13:05

## 2024-06-04 RX ADMIN — INSULIN ASPART 6 UNITS: 100 INJECTION, SOLUTION INTRAVENOUS; SUBCUTANEOUS at 17:21

## 2024-06-04 RX ADMIN — APIXABAN 5 MG: 5 TABLET, FILM COATED ORAL at 21:14

## 2024-06-04 RX ADMIN — AMIODARONE HYDROCHLORIDE 200 MG: 200 TABLET ORAL at 21:14

## 2024-06-04 RX ADMIN — ROPINIROLE HYDROCHLORIDE 2 MG: 1 TABLET, FILM COATED ORAL at 21:15

## 2024-06-04 RX ADMIN — FUROSEMIDE 80 MG: 10 INJECTION, SOLUTION INTRAMUSCULAR; INTRAVENOUS at 10:40

## 2024-06-04 RX ADMIN — DICLOFENAC SODIUM 2 G: 10 GEL TOPICAL at 13:07

## 2024-06-04 RX ADMIN — ROPINIROLE HYDROCHLORIDE 2 MG: 1 TABLET, FILM COATED ORAL at 17:22

## 2024-06-04 RX ADMIN — ISOSORBIDE MONONITRATE 180 MG: 60 TABLET, EXTENDED RELEASE ORAL at 09:06

## 2024-06-04 RX ADMIN — CEFAZOLIN 1 G: 1 INJECTION, POWDER, FOR SOLUTION INTRAMUSCULAR; INTRAVENOUS at 03:06

## 2024-06-04 RX ADMIN — IPRATROPIUM BROMIDE AND ALBUTEROL SULFATE 3 ML: .5; 3 SOLUTION RESPIRATORY (INHALATION) at 17:25

## 2024-06-04 RX ADMIN — AMIODARONE HYDROCHLORIDE 150 MG: 1.5 INJECTION, SOLUTION INTRAVENOUS at 10:21

## 2024-06-04 RX ADMIN — IPRATROPIUM BROMIDE AND ALBUTEROL SULFATE 3 ML: .5; 3 SOLUTION RESPIRATORY (INHALATION) at 08:39

## 2024-06-04 RX ADMIN — PANTOPRAZOLE SODIUM 40 MG: 40 TABLET, DELAYED RELEASE ORAL at 06:00

## 2024-06-04 RX ADMIN — ATORVASTATIN CALCIUM 40 MG: 40 TABLET, FILM COATED ORAL at 09:07

## 2024-06-04 RX ADMIN — OXYCODONE HYDROCHLORIDE 5 MG: 5 TABLET ORAL at 15:37

## 2024-06-04 RX ADMIN — FUROSEMIDE 80 MG: 10 INJECTION, SOLUTION INTRAMUSCULAR; INTRAVENOUS at 03:07

## 2024-06-04 RX ADMIN — CEFAZOLIN 1 G: 1 INJECTION, POWDER, FOR SOLUTION INTRAMUSCULAR; INTRAVENOUS at 18:34

## 2024-06-04 RX ADMIN — IPRATROPIUM BROMIDE AND ALBUTEROL SULFATE 3 ML: .5; 3 SOLUTION RESPIRATORY (INHALATION) at 20:13

## 2024-06-04 RX ADMIN — NITROGLYCERIN 0.4 MG: 0.4 TABLET, ORALLY DISINTEGRATING SUBLINGUAL at 07:14

## 2024-06-04 RX ADMIN — HYDRALAZINE HYDROCHLORIDE 10 MG: 10 TABLET, FILM COATED ORAL at 09:08

## 2024-06-04 RX ADMIN — CEFAZOLIN 1 G: 1 INJECTION, POWDER, FOR SOLUTION INTRAMUSCULAR; INTRAVENOUS at 10:36

## 2024-06-04 RX ADMIN — AMIODARONE HYDROCHLORIDE 200 MG: 200 TABLET ORAL at 09:08

## 2024-06-04 RX ADMIN — NICOTINE 1 PATCH: 21 PATCH, EXTENDED RELEASE TRANSDERMAL at 09:11

## 2024-06-04 RX ADMIN — FUROSEMIDE 80 MG: 10 INJECTION, SOLUTION INTRAMUSCULAR; INTRAVENOUS at 18:34

## 2024-06-04 RX ADMIN — ALBUTEROL SULFATE 2.5 MG: 2.5 SOLUTION RESPIRATORY (INHALATION) at 05:35

## 2024-06-04 RX ADMIN — ROPINIROLE HYDROCHLORIDE 2 MG: 1 TABLET, FILM COATED ORAL at 09:06

## 2024-06-04 ASSESSMENT — ACTIVITIES OF DAILY LIVING (ADL)
ADLS_ACUITY_SCORE: 40
ADLS_ACUITY_SCORE: 27
ADLS_ACUITY_SCORE: 40
ADLS_ACUITY_SCORE: 27
ADLS_ACUITY_SCORE: 40
ADLS_ACUITY_SCORE: 27
ADLS_ACUITY_SCORE: 40
ADLS_ACUITY_SCORE: 27

## 2024-06-04 NOTE — PLAN OF CARE
Vitally stable, denies pain, elevated blood sugar, sepsis protocol fired, lactic 4.2, two hour recheck 4.9, Dr. Sutton notified and new orders placed.    Dyspnea on exertion, expiratory wheezes, nonproductive cough, remains on RA throughout the night.  BLE +2 edema, weeping, pt refusing to elevate, dangling at bedside.  Tele: NSR, inverted T-wave.    0714 Pt c/o chest tightness 4/10, does not radiate, expiratory wheezes noted and unchanged from previous assessment.  BP slightly elevated, tachy, sitting at edge of bed dangling.  PRN nitroglycerine given with chest tightness almost completely resolved.  AM shift notified MD for further instruction.    Goal Outcome Evaluation:  Problem: Comorbidity Management  Goal: Blood Glucose Levels Within Targeted Range  Outcome: Not Progressing  Intervention: Monitor and Manage Glycemia  Recent Flowsheet Documentation  Taken 6/4/2024 0040 by Ana Luisa Pollard RN  Medication Review/Management: medications reviewed     Problem: Comorbidity Management  Goal: Maintenance of Heart Failure Symptom Control  Outcome: Not Progressing  Intervention: Maintain Heart Failure Management  Recent Flowsheet Documentation  Taken 6/4/2024 0040 by Ana Luisa Pollard, RN  Medication Review/Management: medications reviewed     Problem: Comorbidity Management  Goal: Blood Pressure in Desired Range  Outcome: Progressing  Intervention: Maintain Blood Pressure Management  Recent Flowsheet Documentation  Taken 6/4/2024 0040 by Ana Luisa Pollard, RN  Medication Review/Management: medications reviewed

## 2024-06-04 NOTE — PLAN OF CARE
tonight with high-dose methylpred q8h. Recevied 12u Lantus this morning but BG up-trending during the day. Will increase long-acting coverage.    - Lantus 25u ordered for tonight, may need more tomorrow if not ready for steroid dose reduction

## 2024-06-04 NOTE — PROGRESS NOTES
HEART CARE NOTE          Assessment/Recommendations   1. HFimpEF c/b severe ADHF  Assessment / Plan  Diuresing well on current regimen - no changes at this time; continue to monitor UOP and renal function closely  Patient is high risk for adverse cardiac events 2/2 severe biventricular dysfunction, multiple HFHs, non-compliance with dietary restrictions, renal dysfunction, CAD  GDMT as detailed below     Current Pharmacotherapy AHA Guideline-Directed Medical Therapy   Lisinopril 5 mg daily  - held Lisinopril 20 mg twice daily   Carvedilol 3.125 mg BID  Carvedilol 25 mg twice daily   Spironolactone 50 mg daily Spironolactone 25 mg once daily   Hydralazine 10 mg TID Hydralazine 100 mg three times daily   Isosorbide mononitrate 180 mg daily Isosorbide dinitrate 40 mg three times daily   SGLT2 inhibitor:Dapagliflozin/Empagliflozin - not started Dapagliflozin or Empagliflozin 10 mg daily      2. Atrial fibrillation  Assessment / Plan  Currently on apixaban and amiodarone; intermittent episodes of afib with RVR - amiodarone bolus given; EP team consulted regarding further recs - please see detailed notes on chart     3. CAD c/b ICM  Assessment / Plan  S/p CABG 2015  Now s/p recent PCI to Ramus and OM with DARRELL  Patient with chest pain this morning in the setting if afib with RVR which was relieved by sublingual nitroglycerin - will work on HR arrhythmia control for now; plan for coronary angiogram +/- PCI if chest pain occurs  Continue atorvastatin, carvedilol, clopidogrel     4.CKD  Assessment / Plan  Diuresis as above; continue to monitor UOP and renal function closely     5.DM2  Assessment / Plan  NIDDM; management per primary team - no changes to regimen at this time    Plan of care discussed on June 4, 2024 with patient at bedside, and primary team overseeing patient's care    History of Present Illness/Subjective    Mr. Zackary Hutchison is a 63 year old male with a PMHx significant for (per Epic notation) atrial  fibrillation, stage III CKD, ischemic cardiomyopathy, COPD, HFrEF, hyperlipidemia, CAD s/p CABG, restless leg syndrome, hypertension, obesity, newly diagnosed type 2 diabetes, and tobacco use disorder admitted on 5/13/2024 with the ER with left hip pain, suspected exacerbation of HFpEF, and possible COPD exacerbation      Today, Mr. Hutchison  denies acute cardiac events or complaints; Cardiac management as detailed above     ECG: Personally reviewed 6/3/24; Afib with RVR     ECHO (personnaly Reviewed on 5/13/24):   1. Technically difficult study.  2. The left ventricle is normal in size. Image quality does not provide for  detailed assessment of LV systolic function, but is felt to be normal with a  visually estimated ejection fraction of roughly 55%.  3. No significant valvular heart disease is identified on this study though  the sensitivity, particularly of regurgitant lesions, is reduced due to poor  Doppler acoustics.  4. Right ventricular size and systolic performance could not be accurately  assessed due to inadequate visualization.     The acoustic quality of this study is fairly poor. If a more accurate  assessment of left ventricular systolicperformance, regional wall motion, and  other morphology/function is required; would recommend cardiac MRI or other  alternative imaging modality for further evaluation.     When compared to the prior real-time echocardiogram dated 25 December 2023,  the ejection fraction appears somewhat higher on the current study though  accurate comparison is somewhat limited due to suboptimal acoustic imaging not  only on the current examination, but also hold the prior study as well.    Telemetry: personally reviewed June 4, 2024; notable for afib with RVR     Lab results: personally reviewed June 4, 2024; notable for resolving PRINCESS    Medical history and pertinent documents reviewed in Care Everywhere please where applicable see details above        Physical Examination Review of  "Systems   /69   Pulse 98   Temp 98.5  F (36.9  C) (Axillary)   Resp 22   Ht 1.676 m (5' 6\")   Wt 120.8 kg (266 lb 4.8 oz)   SpO2 92%   BMI 42.98 kg/m    Body mass index is 42.98 kg/m .  Wt Readings from Last 3 Encounters:   06/04/24 120.8 kg (266 lb 4.8 oz)   05/30/24 123.4 kg (272 lb)   05/20/24 103.1 kg (227 lb 6.4 oz)     General Appearance:   no distress, normal body habitus   ENT/Mouth: membranes moist, no oral lesions or bleeding gums.      EYES:  no scleral icterus, normal conjunctivae   Neck: no carotid bruits or thyromegaly   Chest/Lungs:   lungs are clear to auscultation, no rales or wheezing, equal chest wall expansion    Cardiovascular:   Irregular. Normal first and second heart sounds with no murmurs, rubs, or gallops; the carotid, radial and posterior tibial pulses are intact, + JVD and LE edema bilaterally    Abdomen:  no organomegaly, masses, bruits, or tenderness; bowel sounds are present   Extremities: no cyanosis or clubbing   Skin: no xanthelasma, warm.    Neurologic: NAD     Psychiatric: alert and oriented x3, calm     A complete 10 systems ROS was reviewed  And is negative except what is listed in the HPI.          Medical History  Surgical History Family History Social History   Past Medical History:   Diagnosis Date    Atrial fibrillation (H)     Chronic kidney disease     Chronic kidney disease, stage 3b (H) 09/28/2023    Class 3 severe obesity due to excess calories with body mass index (BMI) of 40.0 to 44.9 in adult (H) 09/28/2023    Congestive heart failure (H)     COPD (chronic obstructive pulmonary disease) (H)     Coronary artery disease involving coronary bypass graft of native heart without angina pectoris 09/28/2023    Heart failure with reduced ejection fraction (H) 09/11/2023    HFrEF (heart failure with reduced ejection fraction) (H) 09/11/2023    Hyperlipidemia     Hypertension     Ischemic cardiomyopathy 09/28/2023    Obese     Paroxysmal atrial fibrillation (H) " 09/28/2023    Tobacco abuse 09/28/2023    Past Surgical History:   Procedure Laterality Date    CORONARY ANGIOGRAPHY ADULT ORDER      CORONARY ARTERY BYPASS Left 2014    2 vessels    CV CARDIOMEMS WITH RIGHT HEART CATH N/A 2/15/2024    Procedure: Pulmonary Arterial Pressure Sensor Placement;  Surgeon: Jacey Bhandari MD;  Location: [facility] CATH LAB CV    CV CORONARY ANGIOGRAM N/A 09/11/2023    Procedure: Coronary Angiogram;  Surgeon: Santy Esposito MD;  Location: [facility] CATH LAB CV    CV CORONARY ANGIOGRAM N/A 5/2/2024    Procedure: CV CORONARY ANGIOGRAM;  Surgeon: Santy Esposito MD;  Location: [facility] CATH LAB CV    CV LEFT HEART CATH N/A 09/11/2023    Procedure: Left Heart Catheterization;  Surgeon: Santy Esposito MD;  Location: [facility] CATH LAB CV    CV LEFT HEART CATH N/A 5/2/2024    Procedure: Left Heart Catheterization;  Surgeon: aSnty Esposito MD;  Location: [facility] CATH LAB CV    CV PCI ANGIOPLASTY N/A 5/2/2024    Procedure: Percutaneous Transluminal Angioplasty;  Surgeon: Santy Esposito MD;  Location: [facility] CATH LAB CV    CV RIGHT HEART CATH MEASUREMENTS RECORDED N/A 09/11/2023    Procedure: Right Heart Catheterization;  Surgeon: Santy Esposito MD;  Location: [facility] CATH LAB CV    no family history of premature coronary artery disease Social History     Socioeconomic History    Marital status: Single     Spouse name: Not on file    Number of children: Not on file    Years of education: Not on file    Highest education level: Not on file   Occupational History    Occupation: Retired restaurant owner     Comment: [employer]   Tobacco Use    Smoking status: Every Day     Current packs/day: 0.50     Types: Cigarettes    Smokeless tobacco: Never    Tobacco comments:     Seen IP by CTTS on 9/11/23 and declined cessation services and materials   Substance and Sexual Activity    Alcohol use: Not Currently    Drug use: Not on file    Sexual activity: Not on file   Other Topics Concern  "   Not on file   Social History Narrative    Currently lives in an assisted living facility. (Updated: 01/08/2024)     Social Determinants of Health     Financial Resource Strain: Not on file   Food Insecurity: Not on file   Transportation Needs: Not on file   Physical Activity: Not on file   Stress: Not on file   Social Connections: Not on file   Interpersonal Safety: Not on file   Housing Stability: Not on file           Lab Results    Chemistry/lipid CBC Cardiac Enzymes/BNP/TSH/INR   Lab Results   Component Value Date    CHOL 121 05/02/2024    HDL 27 (L) 05/02/2024    TRIG 219 (H) 05/02/2024    BUN 30.0 (H) 06/04/2024     06/04/2024    CO2 26 06/04/2024    Lab Results   Component Value Date    WBC 9.3 06/04/2024    HGB 10.7 (L) 06/04/2024    HCT 34.8 (L) 06/04/2024    MCV 93 06/04/2024     (L) 06/04/2024    Lab Results   Component Value Date    TSH 1.70 04/29/2024    INR 0.97 06/03/2024     No results found for: \"CKTOTAL\", \"CKMB\", \"TROPONINI\"       Weight:    Wt Readings from Last 3 Encounters:   06/04/24 120.8 kg (266 lb 4.8 oz)   05/30/24 123.4 kg (272 lb)   05/20/24 103.1 kg (227 lb 6.4 oz)       Allergies  Allergies   Allergen Reactions    Bumex [Bumetanide] Muscle Pain (Myalgia)         Surgical History  Past Surgical History:   Procedure Laterality Date    CORONARY ANGIOGRAPHY ADULT ORDER      CORONARY ARTERY BYPASS Left 2014    2 vessels    CV CARDIOMEMS WITH RIGHT HEART CATH N/A 2/15/2024    Procedure: Pulmonary Arterial Pressure Sensor Placement;  Surgeon: Jacey Bhandari MD;  Location: [facility] CATH LAB CV    CV CORONARY ANGIOGRAM N/A 09/11/2023    Procedure: Coronary Angiogram;  Surgeon: Santy Esposito MD;  Location: [facility] CATH LAB CV    CV CORONARY ANGIOGRAM N/A 5/2/2024    Procedure: CV CORONARY ANGIOGRAM;  Surgeon: Santy Esposito MD;  Location: [facility] CATH LAB CV    CV LEFT HEART CATH N/A 09/11/2023    Procedure: Left Heart Catheterization;  Surgeon: Santy Esposito, " MD;  Location: [facility] CV    CV LEFT HEART CATH N/A 5/2/2024    Procedure: Left Heart Catheterization;  Surgeon: Santy Esposito MD;  Location: [facility] CATH LAB CV    CV PCI ANGIOPLASTY N/A 5/2/2024    Procedure: Percutaneous Transluminal Angioplasty;  Surgeon: Santy Esposito MD;  Location: [facility] CV    CV RIGHT HEART CATH MEASUREMENTS RECORDED N/A 09/11/2023    Procedure: Right Heart Catheterization;  Surgeon: Santy Esposito MD;  Location: [facility] CV       Social History  Tobacco:   History   Smoking Status    Every Day    Types: Cigarettes   Smokeless Tobacco    Never    Alcohol:   Social History    Substance and Sexual Activity      Alcohol use: Not Currently   Illicit Drugs:   History   Drug Use Not on file       Family History  Family History   Problem Relation Age of Onset    Cerebrovascular Disease Mother     Myocardial Infarction Father           Jacey Bhandari MD on 6/4/2024      cc: Reid Escobar

## 2024-06-04 NOTE — SIGNIFICANT EVENT
Significant Event Note    Time of event: 3:26 AM June 4, 2024    Description of event:  -RN reported lactic acid 4.9, up from 4.2  -Patient is not septic, currently on IV cefazolin for probable cellulitis of lower extremity  -Afebrile with normal vital signs  -Blood glucose at bedtime 422, treated with Lantus 25 units and insulin aspart 5 units; last blood glucose 395 at 1 AM  -Currently on IV Solu-Medrol for COPD exacerbation and IV furosemide for CHF exacerbation    Plan:  -Check beta hydroxybutyrate, BMP, VBG to rule out DKA as alternative diagnosis for lactic acidosis  -Discontinue IV Solu-Medrol    Discussed with: Charge RN    Carlos Alberto Sutton MD

## 2024-06-04 NOTE — PROGRESS NOTES
"Care Management Follow Up    Length of Stay (days): 1    Expected Discharge Date: 06/05/2024     Concerns to be Addressed:  IV Lasix, IV ABX, PT/OT recommendtions.     Patient plan of care discussed at interdisciplinary rounds: Yes    Anticipated Discharge Disposition: TBD     Anticipated Discharge Services:    Anticipated Discharge DME:      Patient/family educated on Medicare website which has current facility and service quality ratings:    Education Provided on the Discharge Plan:    Patient/Family in Agreement with the Plan:      Referrals Placed by CM/SW:  none at this time  Private pay costs discussed: Not applicable    Additional Information:  Social history per previous CM note:  \"Pt lives at [facility] alone. He uses a walker for ambulation. He is independent with ADLs and facility provides house keeping and meals. Has [employer] for transport.\"    Awaiting therapy recs    Per chart review pt not medically ready remains on IV Lasix and IV ABX.    RNCM to follow for medical progression, recommendations, and final discharge plan.         Katrina Bowser RN      "

## 2024-06-04 NOTE — PLAN OF CARE
Goal Outcome Evaluation:      Plan of Care Reviewed With: patient    Overall Patient Progress: improvingOverall Patient Progress: improving    Outcome Evaluation: Pt denies SOB and oxygen maintained in the 90's. Inspiratory and expiratory wheezes when sleeping. Given prn neb. BS at HS was 422, given Novolog 5U and notified MD. Ordered and given Lantus 25U. No other orders at this time. Input 450 ml and output 750 ml.

## 2024-06-04 NOTE — PLAN OF CARE
"  Patient is A/O.  Patient has reported consistent pain at left hip, PRN diclofenac given in 1300 hour.  Patient reported 4/10 chest pain in 0700 hour, night shift nurse gave PRN nitroglycerine, patient reported it alleviated pain.  EKG ordered.  In the 0900 hour patient reported his chest pain was back 5/10 described at tightness that radiated to both arms.  HR elevated up to 140s.  MD, and Cards notified, second dose of nitroglycerin given.  Amiodarone bolus was given, maintenance drip not ordered because patient receives PO amiodarone, rate has been 90-110s since and he has denied any type of chest pain.  EP consult ordered.  Patient on 1 L NC, lung sounds are diminished with exhale wheeze.  Blood sugars elevated, insulin orders now include CHO coverage.  Receiving IV lasix and albumin this shift.    Tele: A-Fib RVR    Problem: Adult Inpatient Plan of Care  Goal: Plan of Care Review  Description: The Plan of Care Review/Shift note should be completed every shift.  The Outcome Evaluation is a brief statement about your assessment that the patient is improving, declining, or no change.  This information will be displayed automatically on your shift  note.  Outcome: Progressing  Goal: Patient-Specific Goal (Individualized)  Description: You can add care plan individualizations to a care plan. Examples of Individualization might be:  \"Parent requests to be called daily at 9am for status\", \"I have a hard time hearing out of my right ear\", or \"Do not touch me to wake me up as it startles  me\".  Outcome: Progressing  Goal: Absence of Hospital-Acquired Illness or Injury  Outcome: Progressing  Intervention: Identify and Manage Fall Risk  Recent Flowsheet Documentation  Taken 6/4/2024 1147 by Stefano Rodríguez, RN  Safety Promotion/Fall Prevention:   clutter free environment maintained   increased rounding and observation   lighting adjusted   mobility aid in reach   nonskid shoes/slippers when out of bed   patient and " family education   room organization consistent   safety round/check completed  Taken 6/4/2024 0900 by Stefano Rodríguez, RN  Safety Promotion/Fall Prevention:   clutter free environment maintained   increased rounding and observation   lighting adjusted   mobility aid in reach   nonskid shoes/slippers when out of bed   patient and family education   room organization consistent   safety round/check completed  Intervention: Prevent Skin Injury  Recent Flowsheet Documentation  Taken 6/4/2024 1147 by Stefano Rodríguez, RN  Skin Protection:   incontinence pads utilized   pulse oximeter probe site changed   skin to device areas padded   skin to skin areas padded   transparent dressing maintained  Device Skin Pressure Protection:   absorbent pad utilized/changed   skin-to-device areas padded   skin-to-skin areas padded   tubing/devices free from skin contact  Taken 6/4/2024 0900 by Stefano Rodríguez RN  Skin Protection:   incontinence pads utilized   pulse oximeter probe site changed   skin to device areas padded   skin to skin areas padded   transparent dressing maintained  Device Skin Pressure Protection:   absorbent pad utilized/changed   skin-to-device areas padded   skin-to-skin areas padded   tubing/devices free from skin contact  Intervention: Prevent and Manage VTE (Venous Thromboembolism) Risk  Recent Flowsheet Documentation  Taken 6/4/2024 1147 by Stefano Rodríguez RN  VTE Prevention/Management:   compression stockings off   SCDs (sequential compression devices) off  Taken 6/4/2024 0900 by Stefano Rodríguez RN  VTE Prevention/Management:   compression stockings off   SCDs (sequential compression devices) off  Intervention: Prevent Infection  Recent Flowsheet Documentation  Taken 6/4/2024 1147 by Stefano Rodríguez RN  Infection Prevention:   rest/sleep promoted   hand hygiene promoted  Taken 6/4/2024 0900 by Stefano Rodríguez RN  Infection Prevention:   rest/sleep promoted   hand hygiene promoted  Goal: Optimal  Comfort and Wellbeing  Outcome: Progressing  Intervention: Monitor Pain and Promote Comfort  Recent Flowsheet Documentation  Taken 6/4/2024 1312 by Stefano Rodríguez, RN  Pain Management Interventions: (diclofenac) medication (see MAR)  Taken 6/4/2024 1145 by Stefano Rdoríguez, RN  Pain Management Interventions: (want to wait and see if it get better on own)   pillow support provided   quiet environment facilitated  Taken 6/4/2024 0948 by Stefano Rodríguez, RN  Pain Management Interventions: MD notified (comment)  Taken 6/4/2024 0941 by Stefano Rodríguez, RN  Pain Management Interventions:   MD notified (comment)   medication (see MAR)  Taken 6/4/2024 0928 by Stefano Rodríguez, RN  Pain Management Interventions: MD notified (comment)  Taken 6/4/2024 0900 by Stefano Rodríguez, RN  Pain Management Interventions:   distraction   emotional support    Otto Rodríguez RN

## 2024-06-04 NOTE — PROGRESS NOTES
[facility]    Medicine Progress Note - Hospitalist Service    Date of Admission:  6/3/2024    Assessment & Plan      Zackary Hutchison is a 63 year old male with PMH of atrial fibrillation, CKD 3, CAD s/p CABG, ischemic CMO, HTN, CHF, HLD, COPD, recently diagnosed DM2, tobacco use disorder, restless leg syndrome, s/p left LARON, recent on May 13-20 hospitalization for CHF exacerbation, admitted on 6/3/2024 with:    #Acute on chronic CHF with biventricular dysfunction.  Recent on May 13-20 hospitalization for CHF and COPD exacerbation.  States compliance with sodium restricted diet, medications.  Cardiac monitoring/mems at home.  Discharge weight 227.5 LBS-Per DC summary.  Floor scale weight 6/four 265.6 LBS.  proBNP normal at 558.  Echo 5/1/2024: LVEF 55%.  -S/p 40 mg IV Lasix at 6:34 AM.  -Continue diuresis, per CHF order set, given patient's PTA dose of torsemide 100 mg twice daily, will diurese with 80 mg Lasix every 8 hours.  -Hold PT torsemide, spironolactone while on IV Lasix.  -Continue PT Coreg, hydralazine.  -Daily weights, close monitoring UO, renal function, electrolytes while diuresing.  -Telemetry  -Cardiology consult to optimize med management. D/w Dr. Perdomo.    #Acute respiratory failure with hypoxia and hypercarbia, likely due to CHF exacerbation.  VBG 7.3 8/23/60.  Required BiPAP in ER, was quickly weaned off BiPAP.  -PE, pulmonary infiltrates, pleural effusion were ruled out by CTA chest.  -Supplemental oxygen to keep SpO2 above 92%, given history of CAD, CHF  -Treating underlying pathology    #Coronary disease, s/p CABG in 2015. Chronically mild elevated troponin.  Coronary angiogram 5/2/2024:    Ost LAD to Prox LAD lesion is 100% stenosed.    Ramus lesion is 95% stenosed.    Mid Cx to Dist Cx lesion is 80% stenosed.    1st Mrg lesion is 90% stenosed.    Prox RCA to Dist RCA lesion is 100% stenosed.    Mid LAD lesion is 70% stenosed.  -Cardiology recommended 75 mg of daily  "Plavix indefinitely while on Eliquis, as well as baby aspirin.  -Continue PTA Imdur 60 mg, as needed nitroglycerin.  -Mild elevated troponin 35, likely due to demand ischemia of CHF exacerbation, hypoxia, rapid A-fib in the setting of CKD 3.  Diuresing, rate control, supplemental oxygen as above.  -Cardiology following    #Hypokalemia  # Hypomagnesemia, both due to chronic diuresis.  -Replacement protocols    #Hypocalcemia, likely due to vitamin D deficiency.  Hypercalcemia noted during recent hospitalization.  Total vitamin D low at 17.  PTH RP less than 2 on 5/20/2024.    #Paroxysmal atrial fibrillation with persistent RVR.  -140s.  Initially responded to BiPAP treatment and diuresis.  TSH on 4/29/2024 normal at 1.7.  -One-time dose IV metoprolol 5 mg 6/3  -Amiodarone load 6/4, per cardiology  -Continue PTA Coreg and amiodarone  -Cardiology requested EP team consult    #Essential hypertension.  -On Coreg, hydralazine  -Diuresing  -As needed IV hydralazine    #Thrombocytopenia at 138.  Most recent platelets on 5/20 at 160.  No known history of thrombocytopenia.  Some dilutional component, with hematocrit of 37, previously normal.  -Monitor for signs of mucocutaneous hemorrhage, none seen on admission.  -Daily labs.    #IDDM, diagnosed earlier this month.  A1c 7.8.  -PTA 12 units of Lantus X 2 days after DC, per discharge summary from 5/20.  -Accu-Cheks, sliding scale insulin + prandial novolog. Was hyperglycemic overnight with BG up to  447 in setting of IV steroids.  -Diabetic diet, 60 gm carbs    #Pulmonary nodule, RLL.  Described as \"tiny\".  -Patient is at high risk for pulmonary malignancy, given tobacco use disorder.  -Follow-up CT chest in 12 months.    #COPD with acute exacerbation.  Active smoking likely contributing.  CT chest negative for pulmonary infiltrates.  -Solu-Medrol, DuoNeb 4 times daily, as needed albuterol neb.  Solu-Medrol was discontinued 6/4, due to hyperglycemia.  -Smoking " "cessation.    #Tobacco use disorder.  -Nicotine replacement with patch.  -Counseling on smoking cessation.  Patient stated no interest in smoking cessation.    #Stage III CKD.  Possibly secondary to cardiorenal process.  Creatinine 1.23, down from 1.65 at discharge on 5/20/2024.  Diuresing as above.  -Monitor renal function while diuresing.    #Probable stasis ulcers of bilateral lower legs.    #Probable lower leg cellulitis.  Patient reports increase in lower leg hyperemia, new warmth.  Afebrile.  WBC 9.3.  BC obtained 6/4 at 9 AM.  -Cefazolin, follow BC  -WOC  -Check inflammatory markers    Diet:  NPO while on bipap, then 2 g sodium, diabetic 60 gm carb  DVT Prophylaxis: DOAC  Ruvalcaba Catheter: Not present  Lines: None     Cardiac Monitoring: None  Code Status:  Full code      Diet: Fluid restriction 1800 ML FLUID (and additional linked orders)  Moderate Consistent Carb (60 g CHO per Meal) Diet    DVT Prophylaxis: DOAC  Ruvalcaba Catheter: Not present  Lines: None     Cardiac Monitoring: ACTIVE order. Indication: Acute decompensated heart failure (48 hours)  Code Status: Full Code      Clinically Significant Risk Factors        # Hypokalemia: Lowest K = 2.8 mmol/L in last 2 days, will replace as needed         # Thrombocytopenia: Lowest platelets = 122 in last 2 days, will monitor for bleeding   # Hypertension: Noted on problem list  # Acute heart failure with reduced ejection fraction: last echo with EF <40% and receiving IV diuretics            # DMII: A1C = 7.8 % (Ref range: <5.7 %) within past 6 months, PRESENT ON ADMISSION  # Severe Obesity: Estimated body mass index is 42.98 kg/m  as calculated from the following:    Height as of this encounter: 1.676 m (5' 6\").    Weight as of this encounter: 120.8 kg (266 lb 4.8 oz)., PRESENT ON ADMISSION     # Financial/Environmental Concerns: none      Disposition Plan   Medically Ready for Discharge: Anticipated in 2-4 Days    Shanel Navarro MD  Hospitalist Service  M " [facility]  Securely message with [employer] (more info)  Text page via [employer] Paging/Directory   ______________________________________________________________________    Interval History   Patient developed chest pain at rest today.  In the setting of RVR, HR up to 140s.  Chest pain was elevated with 2 nitroglycerin.  Troponin mildly chronically elevated.  EKG earlier this morning without ischemic changes, sinus tachycardia with .    Physical Exam   Vital Signs: Temp: 97.6  F (36.4  C) Temp src: Oral BP: 131/61 Pulse: 118   Resp: 20 SpO2: 93 % O2 Device: None (Room air)    Weight: 266 lbs 4.8 oz  General: Alert and oriented x 3. Not in obvious distress.  Sitting on the edge of the bed.  HEENT: NC, AT. Neck- supple, No JVP elevation, lymphadenopathy or thyromegaly. Trachea-central.  Chest: Expiratory wheezes.  Heart: S1S2 irregular irregular. No M/R/G.  Abdomen: Obese.  Soft. NT, ND. No organomegaly. Bowel sounds- active.  Back: No spine tenderness. No CVA tenderness.  Extremities: 2+ pitting leg edema, hyperemia and warmth, healing anterior lower leg wounds.  Peripheral pulses 2+ bilaterally.  Neuro: Cranial nerves 1-12 grossly normal. No focal neurological deficit    Medical Decision Making     50 MINUTES SPENT BY ME on the date of service doing chart review, history, exam, documentation & further activities per the note.     Data   I have personally reviewed the following data over the past 24 hrs:  Imaging results reviewed over the past 24 hrs:   No results found for this or any previous visit (from the past 24 hour(s)).  This document is created with the help of Dragon dictation system. All grammatical errors are unintentional.

## 2024-06-04 NOTE — CONSULTS
NUTRITION EDUCATION      REASON FOR ASSESSMENT:  Consulted to educate pt on 2 gram na diet    NUTRITION HISTORY:  Information obtained from pt:  Pt states he has had multiple educations on 2 gram Na diet.    He resides [facility] alone.  His meals are prepared for him.  He is working to get [employer] meals twice per day( which are low in sodium)    CURRENT DIET:  Moderate consistent CHO ( 60 g CHO with each meal) with FR of 1800 ml/day    INTERVENTIONS:  Pt declined diet education today  I  Goals:      Follow low sodium consistent CHO diet    Follow Up/Monitoring:      Will follow for LOS

## 2024-06-04 NOTE — CONSULTS
[facility]  Cardiac Electrophysiology  [address]  [place] [address]   Office: [phone]  Fax: [phone]     Cardiac Electrophysiology Consultation    Patient: Zackary Hutchison   : 1960     Referring Provider: Dr. Bhandari    CHIEF COMPLAINT/REASON FOR CONSULTATION  Atrial fibrillation with rapid ventricular response    Assessment/Recommendations   [id number]/Paroxysmal vs Stage 3B/Persistent atrial fibrillation and possible atrial flutter: Recurrence of AF likely triggered by acute heart failure and hypokalemia.  Ventricular rate slowed after IV amiodarone bolus.  Occasional brief breakthroughs of sinus rhythm.  He would be a candidate for catheter ablation once his heart failure status has stabilized, he has had frequent hospitalizations for acute heart failure.  In the interim, reloaded on amiodarone.  -- Potassium replacement per protocol  -- Increase amiodarone to 200 mg twice daily x 2 weeks, then 200 mg daily    [id number] score of 5 for age 65-74, CAD, HTN, HF, T2DM  -- Continue Eliquis 5 mg twice daily for stroke prophylaxis         History of Present Illness   Zackary Hutchison is a 63 year old male has a history of atrial fibrillation, RBBB, chronic systolic heart failure (LVEF 41% by 2023 TTE), CAD with prior CABG (2015), HTN, type 2 diabetes, stage III CKD, history of GI bleeding, s/p left RHA, obesity, tobacco use.  He recently saw Dr. Bee and discussed possible ablation-to be considered once fluid stability has been obtained.  He was admitted 6/3/2024 for acute on chronic heart failure with respiratory failure.  Significant hypokalemia at 2.8.    Arrhythmia history  Dx/date: Persistent atrial fibrillation diagnosed 2019.  AFL reported 2020.  Sx: Fatigue, dyspnea on exertion  [id number] score:5 for age 65-74, CAD, HTN, HF, T2DM  Oral anticoagulation: Eliquis 5 mg twice daily  Antiarrhythmic medications, AV raudel blocking agents:  "sotalol (?-8/2020 - inefficacy), amiodarone (8/2020-present)   Procedures  DCCV: 6/17/2019  Ablation: N/A    Earlier today, he had a couple of episodes of moderate chest discomfort/tightness with radiation to bilateral arms, though unclear whether due to CAD or AF-RVR.  He presently denies chest discomfort, palpitations, lightheadedness, presyncope, or syncope.  He continues to have significant fatigue and shortness of breath as well as lower extremity edema.       Physical Examination  Review of Systems   VITALS: /62 (BP Location: Left arm, Patient Position: Right side)   Pulse 98   Temp 97.6  F (36.4  C) (Oral)   Resp 20   Ht 1.676 m (5' 6\")   Wt 120.5 kg (265 lb 9.6 oz)   SpO2 93%   BMI 42.87 kg/m    Wt Readings from Last 3 Encounters:   06/04/24 120.5 kg (265 lb 9.6 oz)   05/30/24 123.4 kg (272 lb)   05/20/24 103.1 kg (227 lb 6.4 oz)       Intake/Output Summary (Last 24 hours) at 6/4/2024 1105  Last data filed at 6/4/2024 0904  Gross per 24 hour   Intake 1803 ml   Output 2975 ml   Net -1172 ml     CONSTITUTIONAL: no distress  EYES:  Conjunctivae pink, sclerae clear.    E/N/T:  Oral mucosa pink, moist mucous membranes  RESPIRATORY:  Respiratory effort is normal, expiratory wheeze  CARDIOVASCULAR: Irregular, normal S1 and S2  GASTROINTESTINAL:  Abdomen without masses or tenderness  EXTREMITIES:  No clubbing or cyanosis.  Bilateral lower extremity edema  MUSCULOSKELETAL:  Overall grossly normal muscle strength  SKIN:  Overall, skin warm and dry.  NEURO/PSYCH:  Oriented x 3 with normal affect.    ROS: 10 point ROS neg other than the symptoms noted above in the HPI.       Medical History  Surgical History   Past Medical History:   Diagnosis Date    Atrial fibrillation (H)     Chronic kidney disease     Chronic kidney disease, stage 3b (H) 09/28/2023    Class 3 severe obesity due to excess calories with body mass index (BMI) of 40.0 to 44.9 in adult (H) 09/28/2023    Congestive heart failure (H)     COPD " (chronic obstructive pulmonary disease) (H)     Coronary artery disease involving coronary bypass graft of native heart without angina pectoris 09/28/2023    Heart failure with reduced ejection fraction (H) 09/11/2023    HFrEF (heart failure with reduced ejection fraction) (H) 09/11/2023    Hyperlipidemia     Hypertension     Ischemic cardiomyopathy 09/28/2023    Obese     Paroxysmal atrial fibrillation (H) 09/28/2023    Tobacco abuse 09/28/2023    Past Surgical History:   Procedure Laterality Date    CORONARY ANGIOGRAPHY ADULT ORDER      CORONARY ARTERY BYPASS Left 2014    2 vessels    CV CARDIOMEMS WITH RIGHT HEART CATH N/A 2/15/2024    Procedure: Pulmonary Arterial Pressure Sensor Placement;  Surgeon: Jacey Bhandari MD;  Location: [facility]    CV CORONARY ANGIOGRAM N/A 09/11/2023    Procedure: Coronary Angiogram;  Surgeon: Santy Esposito MD;  Location: [facility]    CV CORONARY ANGIOGRAM N/A 5/2/2024    Procedure: CV CORONARY ANGIOGRAM;  Surgeon: Santy Esposito MD;  Location: [facility]    CV LEFT HEART CATH N/A 09/11/2023    Procedure: Left Heart Catheterization;  Surgeon: Santy Esposito MD;  Location: [facility]    CV LEFT HEART CATH N/A 5/2/2024    Procedure: Left Heart Catheterization;  Surgeon: Santy Esposito MD;  Location: [facility]    CV PCI ANGIOPLASTY N/A 5/2/2024    Procedure: Percutaneous Transluminal Angioplasty;  Surgeon: Santy Esposito MD;  Location: [facility]    CV RIGHT HEART CATH MEASUREMENTS RECORDED N/A 09/11/2023    Procedure: Right Heart Catheterization;  Surgeon: Santy Esposito MD;  Location: [facility]         Family History Social History   Family History   Problem Relation Age of Onset    Cerebrovascular Disease Mother     Myocardial Infarction Father         Social History     Tobacco Use    Smoking status: Every Day     Current packs/day: 0.50     Types: Cigarettes    Smokeless tobacco:  Never    Tobacco comments:     Seen IP by [facility] on 9/11/23 and declined cessation services and materials   Substance Use Topics    Alcohol use: Not Currently         Medications  Allergies     Current Facility-Administered Medications:     acetaminophen (TYLENOL) tablet 650 mg, 650 mg, Oral, Q4H PRN **OR** acetaminophen (TYLENOL) Suppository 650 mg, 650 mg, Rectal, Q4H PRN, Shanel Navarro MD    albumin human 25 % injection 50 g, 50 g, Intravenous, Daily, Jacey Bhandari MD, Last Rate: 0 mL/hr at 06/03/24 1300, 50 g at 06/04/24 0912    albuterol (PROVENTIL) neb solution 2.5 mg, 2.5 mg, Nebulization, Q4H PRN, Shanel Navarro MD, 2.5 mg at 06/04/24 0535    amiodarone (PACERONE) tablet 200 mg, 200 mg, Oral, Daily, Shanel Navarro MD, 200 mg at 06/04/24 0908    apixaban ANTICOAGULANT (ELIQUIS) tablet 5 mg, 5 mg, Oral, BID, Shanel Navarro MD, 5 mg at 06/04/24 0908    atorvastatin (LIPITOR) tablet 40 mg, 40 mg, Oral, QAM, Shanel Navarro MD, 40 mg at 06/04/24 0907    calcium carbonate (TUMS) chewable tablet 1,000 mg, 1,000 mg, Oral, 4x Daily PRN, Shanel Navarro MD    carvedilol (COREG) tablet 3.125 mg, 3.125 mg, Oral, BID w/meals, Shanel Navarro MD, 3.125 mg at 06/04/24 0905    ceFAZolin (ANCEF) 1 g vial to attach to  ml bag for ADULT or 50 ml bag for PEDS, 1 g, Intravenous, Q8H, Shanel Navarro MD, Last Rate: 0 mL/hr at 06/03/24 1200, 1 g at 06/04/24 1036    clopidogrel (PLAVIX) tablet 75 mg, 75 mg, Oral, Daily, Shanel Navarro MD, 75 mg at 06/04/24 0907    glucose gel 15-30 g, 15-30 g, Oral, Q15 Min PRN **OR** dextrose 50 % injection 25-50 mL, 25-50 mL, Intravenous, Q15 Min PRN **OR** glucagon injection 1 mg, 1 mg, Subcutaneous, Q15 Min PRN, Shanel Navarro MD    diclofenac (VOLTAREN) 1 % topical gel 2 g, 2 g, Topical, Q6H PRN, Shanel Navarro MD, 2 g at 06/03/24 1531    furosemide (LASIX) injection 80 mg, 80 mg, Intravenous, Q8H, Shanel Navarro MD, 80 mg at  06/04/24 1040    hydrALAZINE (APRESOLINE) tablet 10 mg, 10 mg, Oral, TID, Shanel Navarro MD, 10 mg at 06/04/24 0908    insulin aspart (NovoLOG) injection (RAPID ACTING), 1-10 Units, Subcutaneous, TID SAUNDRA, Carlos Alberto Sutton MD, 9 Units at 06/04/24 0900    insulin aspart (NovoLOG) injection (RAPID ACTING), 1-7 Units, Subcutaneous, At Bedtime, Carlos Alberto Sutton MD    insulin aspart (NovoLOG) injection (RAPID ACTING), , Subcutaneous, TID SAUNDRA, Shanel Navarro MD, 4 Units at 06/04/24 0901    insulin glargine (LANTUS PEN) injection 25 Units, 25 Units, Subcutaneous, At Bedtime, Vasile Stallworth MD, 25 Units at 06/03/24 2322    ipratropium - albuterol 0.5 mg/2.5 mg/3 mL (DUONEB) neb solution 3 mL, 3 mL, Nebulization, 4x daily, Shanel Navarro MD, 3 mL at 06/04/24 0839    isosorbide mononitrate (IMDUR) 24 hr tablet 180 mg, 180 mg, Oral, QAM, Shanel Navarro MD, 180 mg at 06/04/24 0906    lidocaine (LMX4) cream, , Topical, Q1H PRN, Shanel Navarro MD    lidocaine 1 % 0.1-1 mL, 0.1-1 mL, Other, Q1H PRN, Shanel Navarro MD    melatonin tablet 1 mg, 1 mg, Oral, At Bedtime PRN, Shanel Navarro MD    methocarbamol (ROBAXIN) tablet 500 mg, 500 mg, Oral, 4x Daily PRN, Shanel Navarro MD    naloxone (NARCAN) injection 0.2 mg, 0.2 mg, Intravenous, Q2 Min PRN **OR** naloxone (NARCAN) injection 0.4 mg, 0.4 mg, Intravenous, Q2 Min PRN **OR** naloxone (NARCAN) injection 0.2 mg, 0.2 mg, Intramuscular, Q2 Min PRN **OR** naloxone (NARCAN) injection 0.4 mg, 0.4 mg, Intramuscular, Q2 Min PRN, Shanel Navarro MD    nicotine (NICODERM CQ) 21 MG/24HR 24 hr patch 1 patch, 1 patch, Transdermal, Daily, 1 patch at 06/04/24 0911 **FOLLOWED BY** [DISCONTINUED] nicotine (NICODERM CQ) 14 MG/24HR 24 hr patch 1 patch, 1 patch, Transdermal, Daily **FOLLOWED BY** [DISCONTINUED] nicotine (NICODERM CQ) 7 MG/24HR 24 hr patch 1 patch, 1 patch, Transdermal, Daily, Shanel Navarro MD    nitroGLYcerin (NITROSTAT)  sublingual tablet 0.4 mg, 0.4 mg, Sublingual, Q5 Min PRN, Shanel Navarro MD, 0.4 mg at 06/04/24 0936    ondansetron (ZOFRAN ODT) ODT tab 4 mg, 4 mg, Oral, Q6H PRN **OR** ondansetron (ZOFRAN) injection 4 mg, 4 mg, Intravenous, Q6H PRN, Shanel Navarro MD    oxyCODONE (ROXICODONE) tablet 5 mg, 5 mg, Oral, Q8H PRN, Shanel Navarro MD, 5 mg at 06/03/24 1138    pantoprazole (PROTONIX) EC tablet 40 mg, 40 mg, Oral, QAM AC, Shanel Navarro MD, 40 mg at 06/04/24 0600    Patient is already receiving anticoagulation with heparin, enoxaparin (LOVENOX), warfarin (COUMADIN)  or other anticoagulant medication, , Does not apply, Continuous PRN, Shanel Navarro MD    polyethylene glycol (MIRALAX) powder 17 g, 17 g, Oral, BID PRN, Shanel Navarro MD    rOPINIRole (REQUIP) tablet 2 mg, 2 mg, Oral, 4x Daily, Shanel Navarro MD, 2 mg at 06/04/24 0906    senna-docusate (SENOKOT-S/PERICOLACE) 8.6-50 MG per tablet 1 tablet, 1 tablet, Oral, BID PRN **OR** senna-docusate (SENOKOT-S/PERICOLACE) 8.6-50 MG per tablet 2 tablet, 2 tablet, Oral, BID PRN, Shanel Navarro MD    sodium chloride (PF) 0.9% PF flush 3 mL, 3 mL, Intracatheter, Q8H, Shanel Navarro MD, 3 mL at 06/04/24 0919    sodium chloride (PF) 0.9% PF flush 3 mL, 3 mL, Intracatheter, q1 min prn, Shanel Navarro MD     Allergies   Allergen Reactions    Bumex [Bumetanide] Muscle Pain (Myalgia)          Lab Results    Chemistry CBC Cardiac Enzymes/BNP/TSH/INR   Recent Labs   Lab Test 06/04/24  0733 06/04/24  0603 06/04/24 0428   NA  --   --  136   POTASSIUM  --   --  3.6   CHLORIDE  --   --  93*   CO2  --   --  26   *   < > 447*   BUN  --   --  30.0*   CR  --   --  1.08   GFRESTIMATED  --   --  76   RUSLAN  --   --  8.7*    < > = values in this interval not displayed.     Recent Labs   Lab Test 06/04/24 0428 06/03/24  0553 05/20/24  0533   CR 1.08 1.23* 1.65*          Recent Labs   Lab Test 06/04/24 0428   WBC 9.3   HGB 10.7*   HCT 34.8*  "  MCV 93   *     Recent Labs   Lab Test 06/04/24  0428 06/03/24  0553 05/20/24  0533   HGB 10.7* 11.5* 14.0    No results for input(s): \"TROPONINI\" in the last 57343 hours.  Recent Labs   Lab Test 06/03/24  0553 05/13/24  0709 04/29/24  1126   NTBNPI 558 197 160     Recent Labs   Lab Test 04/29/24  1126   TSH 1.70     Recent Labs   Lab Test 06/03/24  0553 05/13/24  0709 02/13/23  2307   INR 0.97 1.30* 1.01         Data Review    ECGs (all tracings independently reviewed)  6/4/2024: Atrial flutter versus coarse atrial fibrillation at 101 bpm  6/3/2024: A-fib with RVR at 121 bpm  5/13/2024: Sinus rhythm at 72 bpm, PACs,  ms, QT/QTc 450/492 ms    Telemetry shows A-fib with ventricular rates in the 100s, occasional sinus beats    Echo done 5/1/2024:  1. Technically difficult study.  2. The left ventricle is normal in size. Image quality does not provide for  detailed assessment of LV systolic function, but is felt to be normal with a  visually estimated ejection fraction of roughly 55%.  3. No significant valvular heart disease is identified on this study though  the sensitivity, particularly of regurgitant lesions, is reduced due to poor  Doppler acoustics.  4. Right ventricular size and systolic performance could not be accurately  assessed due to inadequate visualization.    Cardiac catheterization done 5/2/2024:    Ost LAD to Prox LAD lesion is 100% stenosed.    Ramus lesion is 95% stenosed.    Mid Cx to Dist Cx lesion is 80% stenosed.    1st Mrg lesion is 90% stenosed.    Prox RCA to Dist RCA lesion is 100% stenosed.    Mid LAD lesion is 70% stenosed.     1.  LAD is occluded proximally.  Mid to distal LAD fills well from large, widely patent LIMA graft.  LAD proximal to graft insertion site has calcified stenosis compromising flow to large proximal diagonal  2.  Calcified 90% proximal stenosis in medium size ramus branch.  Ostium ?covered by occluded LAD stent.  3.  Severely calcified stenosis " proximal segment of large first obtuse marginal branch of left circumflex.  4.  Chronic total mid RCA occlusion.  PDA fills from patent free LAURI graft.  Posterolateral branches fill from collaterals from left circumflex.  5.  LVEDP = 30 mmHg  6.  Successful PCI ramus branch with 2.5 x 24 mm Synergy DARRELL implant x 1.  30% eccentric residual with BROOKLYNN-3 flow.  7.  Successful PCI LCx OM branch with DARRELL implant x 2: 3.0 x 24 Synergy DARRELL x 1, 2.5 x 16 Synergy DARRELL x 1.  0% residual, BROOKLYNN-3 flow.

## 2024-06-04 NOTE — PROGRESS NOTES
0930.  Patient reports chest pain returned 5/10, tightness center chest, radiation to right and left arms.  A-Fib 110-125, /79 after morning BP meds.  MD and Cards paged, second PRN nitroglycerin given    Otto Rodríguez RN

## 2024-06-05 ENCOUNTER — APPOINTMENT (OUTPATIENT)
Dept: OCCUPATIONAL THERAPY | Facility: HOSPITAL | Age: 64
DRG: 291 | End: 2024-06-05
Attending: INTERNAL MEDICINE
Payer: MEDICARE

## 2024-06-05 LAB
ANION GAP SERPL CALCULATED.3IONS-SCNC: 12 MMOL/L (ref 7–15)
ATRIAL RATE - MUSE: 89 BPM
BUN SERPL-MCNC: 38.1 MG/DL (ref 8–23)
CALCIUM SERPL-MCNC: 8.7 MG/DL (ref 8.8–10.2)
CHLORIDE SERPL-SCNC: 94 MMOL/L (ref 98–107)
CREAT SERPL-MCNC: 1.05 MG/DL (ref 0.67–1.17)
DEPRECATED HCO3 PLAS-SCNC: 29 MMOL/L (ref 22–29)
DIASTOLIC BLOOD PRESSURE - MUSE: NORMAL MMHG
EGFRCR SERPLBLD CKD-EPI 2021: 79 ML/MIN/1.73M2
ERYTHROCYTE [DISTWIDTH] IN BLOOD BY AUTOMATED COUNT: 19.9 % (ref 10–15)
GLUCOSE BLDC GLUCOMTR-MCNC: 154 MG/DL (ref 70–99)
GLUCOSE BLDC GLUCOMTR-MCNC: 174 MG/DL (ref 70–99)
GLUCOSE BLDC GLUCOMTR-MCNC: 197 MG/DL (ref 70–99)
GLUCOSE BLDC GLUCOMTR-MCNC: 346 MG/DL (ref 70–99)
GLUCOSE SERPL-MCNC: 255 MG/DL (ref 70–99)
HCT VFR BLD AUTO: 33.1 % (ref 40–53)
HGB BLD-MCNC: 10.1 G/DL (ref 13.3–17.7)
INTERPRETATION ECG - MUSE: NORMAL
LACTATE SERPL-SCNC: 2.3 MMOL/L (ref 0.7–2)
MAGNESIUM SERPL-MCNC: 1.8 MG/DL (ref 1.7–2.3)
MCH RBC QN AUTO: 28.3 PG (ref 26.5–33)
MCHC RBC AUTO-ENTMCNC: 30.5 G/DL (ref 31.5–36.5)
MCV RBC AUTO: 93 FL (ref 78–100)
P AXIS - MUSE: 88 DEGREES
PLATELET # BLD AUTO: 125 10E3/UL (ref 150–450)
POTASSIUM SERPL-SCNC: 3.1 MMOL/L (ref 3.4–5.3)
POTASSIUM SERPL-SCNC: 3.2 MMOL/L (ref 3.4–5.3)
POTASSIUM SERPL-SCNC: 3.3 MMOL/L (ref 3.4–5.3)
POTASSIUM SERPL-SCNC: 3.7 MMOL/L (ref 3.4–5.3)
PR INTERVAL - MUSE: 176 MS
QRS DURATION - MUSE: 142 MS
QT - MUSE: 450 MS
QTC - MUSE: 547 MS
R AXIS - MUSE: 80 DEGREES
RBC # BLD AUTO: 3.57 10E6/UL (ref 4.4–5.9)
SODIUM SERPL-SCNC: 135 MMOL/L (ref 135–145)
SYSTOLIC BLOOD PRESSURE - MUSE: NORMAL MMHG
T AXIS - MUSE: -19 DEGREES
VENTRICULAR RATE- MUSE: 89 BPM
WBC # BLD AUTO: 8.2 10E3/UL (ref 4–11)

## 2024-06-05 PROCEDURE — 84132 ASSAY OF SERUM POTASSIUM: CPT | Performed by: INTERNAL MEDICINE

## 2024-06-05 PROCEDURE — 93010 ELECTROCARDIOGRAM REPORT: CPT | Mod: HIP | Performed by: INTERNAL MEDICINE

## 2024-06-05 PROCEDURE — 83735 ASSAY OF MAGNESIUM: CPT | Performed by: INTERNAL MEDICINE

## 2024-06-05 PROCEDURE — 120N000004 HC R&B MS OVERFLOW

## 2024-06-05 PROCEDURE — 97110 THERAPEUTIC EXERCISES: CPT | Mod: GO

## 2024-06-05 PROCEDURE — P9047 ALBUMIN (HUMAN), 25%, 50ML: HCPCS | Performed by: INTERNAL MEDICINE

## 2024-06-05 PROCEDURE — 258N000003 HC RX IP 258 OP 636: Performed by: INTERNAL MEDICINE

## 2024-06-05 PROCEDURE — 999N000157 HC STATISTIC RCP TIME EA 10 MIN

## 2024-06-05 PROCEDURE — 999N000147 HC STATISTIC PT IP EVAL DEFER

## 2024-06-05 PROCEDURE — 99233 SBSQ HOSP IP/OBS HIGH 50: CPT | Performed by: INTERNAL MEDICINE

## 2024-06-05 PROCEDURE — 250N000013 HC RX MED GY IP 250 OP 250 PS 637: Performed by: INTERNAL MEDICINE

## 2024-06-05 PROCEDURE — 85027 COMPLETE CBC AUTOMATED: CPT | Performed by: INTERNAL MEDICINE

## 2024-06-05 PROCEDURE — 250N000012 HC RX MED GY IP 250 OP 636 PS 637: Performed by: INTERNAL MEDICINE

## 2024-06-05 PROCEDURE — 94640 AIRWAY INHALATION TREATMENT: CPT

## 2024-06-05 PROCEDURE — 94640 AIRWAY INHALATION TREATMENT: CPT | Mod: 76

## 2024-06-05 PROCEDURE — 83605 ASSAY OF LACTIC ACID: CPT | Performed by: INTERNAL MEDICINE

## 2024-06-05 PROCEDURE — 99232 SBSQ HOSP IP/OBS MODERATE 35: CPT | Performed by: NURSE PRACTITIONER

## 2024-06-05 PROCEDURE — 93005 ELECTROCARDIOGRAM TRACING: CPT

## 2024-06-05 PROCEDURE — 999N000248 HC STATISTIC IV INSERT WITH US BY RN

## 2024-06-05 PROCEDURE — 97165 OT EVAL LOW COMPLEX 30 MIN: CPT | Mod: GO

## 2024-06-05 PROCEDURE — 250N000011 HC RX IP 250 OP 636: Performed by: INTERNAL MEDICINE

## 2024-06-05 PROCEDURE — G0463 HOSPITAL OUTPT CLINIC VISIT: HCPCS

## 2024-06-05 PROCEDURE — 97535 SELF CARE MNGMENT TRAINING: CPT | Mod: GO

## 2024-06-05 PROCEDURE — 80048 BASIC METABOLIC PNL TOTAL CA: CPT | Performed by: INTERNAL MEDICINE

## 2024-06-05 PROCEDURE — 250N000013 HC RX MED GY IP 250 OP 250 PS 637: Performed by: NURSE PRACTITIONER

## 2024-06-05 PROCEDURE — 250N000009 HC RX 250: Performed by: INTERNAL MEDICINE

## 2024-06-05 PROCEDURE — 36415 COLL VENOUS BLD VENIPUNCTURE: CPT | Performed by: INTERNAL MEDICINE

## 2024-06-05 RX ORDER — POTASSIUM CHLORIDE 1500 MG/1
40 TABLET, EXTENDED RELEASE ORAL 2 TIMES DAILY
Status: DISCONTINUED | OUTPATIENT
Start: 2024-06-05 | End: 2024-06-10 | Stop reason: HOSPADM

## 2024-06-05 RX ORDER — POTASSIUM CHLORIDE 1500 MG/1
40 TABLET, EXTENDED RELEASE ORAL ONCE
Status: COMPLETED | OUTPATIENT
Start: 2024-06-05 | End: 2024-06-05

## 2024-06-05 RX ORDER — PREDNISONE 20 MG/1
40 TABLET ORAL DAILY
Status: DISCONTINUED | OUTPATIENT
Start: 2024-06-05 | End: 2024-06-09

## 2024-06-05 RX ADMIN — ROPINIROLE HYDROCHLORIDE 2 MG: 1 TABLET, FILM COATED ORAL at 17:08

## 2024-06-05 RX ADMIN — FUROSEMIDE 80 MG: 10 INJECTION, SOLUTION INTRAMUSCULAR; INTRAVENOUS at 01:35

## 2024-06-05 RX ADMIN — FUROSEMIDE 20 MG/HR: 10 INJECTION, SOLUTION INTRAVENOUS at 22:20

## 2024-06-05 RX ADMIN — PANTOPRAZOLE SODIUM 40 MG: 40 TABLET, DELAYED RELEASE ORAL at 06:32

## 2024-06-05 RX ADMIN — MICONAZOLE NITRATE: 20 OINTMENT TOPICAL at 21:27

## 2024-06-05 RX ADMIN — AMIODARONE HYDROCHLORIDE 200 MG: 200 TABLET ORAL at 08:41

## 2024-06-05 RX ADMIN — HYDRALAZINE HYDROCHLORIDE 10 MG: 10 TABLET, FILM COATED ORAL at 08:41

## 2024-06-05 RX ADMIN — ALBUMIN HUMAN 50 G: 0.25 SOLUTION INTRAVENOUS at 08:39

## 2024-06-05 RX ADMIN — APIXABAN 5 MG: 5 TABLET, FILM COATED ORAL at 08:41

## 2024-06-05 RX ADMIN — ACETAMINOPHEN 650 MG: 325 TABLET ORAL at 03:55

## 2024-06-05 RX ADMIN — FUROSEMIDE 20 MG/HR: 10 INJECTION, SOLUTION INTRAVENOUS at 17:17

## 2024-06-05 RX ADMIN — METHOCARBAMOL 500 MG: 500 TABLET ORAL at 09:11

## 2024-06-05 RX ADMIN — FUROSEMIDE 20 MG/HR: 10 INJECTION, SOLUTION INTRAVENOUS at 13:00

## 2024-06-05 RX ADMIN — CLOPIDOGREL BISULFATE 75 MG: 75 TABLET ORAL at 08:43

## 2024-06-05 RX ADMIN — INSULIN ASPART 6 UNITS: 100 INJECTION, SOLUTION INTRAVENOUS; SUBCUTANEOUS at 13:15

## 2024-06-05 RX ADMIN — CARVEDILOL 3.12 MG: 3.12 TABLET, FILM COATED ORAL at 17:08

## 2024-06-05 RX ADMIN — ROPINIROLE HYDROCHLORIDE 2 MG: 1 TABLET, FILM COATED ORAL at 08:43

## 2024-06-05 RX ADMIN — CARVEDILOL 3.12 MG: 3.12 TABLET, FILM COATED ORAL at 08:41

## 2024-06-05 RX ADMIN — HYDRALAZINE HYDROCHLORIDE 10 MG: 10 TABLET, FILM COATED ORAL at 13:02

## 2024-06-05 RX ADMIN — APIXABAN 5 MG: 5 TABLET, FILM COATED ORAL at 20:23

## 2024-06-05 RX ADMIN — ROPINIROLE HYDROCHLORIDE 2 MG: 1 TABLET, FILM COATED ORAL at 13:16

## 2024-06-05 RX ADMIN — NICOTINE 1 PATCH: 21 PATCH, EXTENDED RELEASE TRANSDERMAL at 09:10

## 2024-06-05 RX ADMIN — INSULIN ASPART: 100 INJECTION, SOLUTION INTRAVENOUS; SUBCUTANEOUS at 08:48

## 2024-06-05 RX ADMIN — INSULIN ASPART 6 UNITS: 100 INJECTION, SOLUTION INTRAVENOUS; SUBCUTANEOUS at 18:34

## 2024-06-05 RX ADMIN — ATORVASTATIN CALCIUM 40 MG: 40 TABLET, FILM COATED ORAL at 08:42

## 2024-06-05 RX ADMIN — MICONAZOLE NITRATE: 20 OINTMENT TOPICAL at 14:29

## 2024-06-05 RX ADMIN — ACETAMINOPHEN 650 MG: 325 TABLET ORAL at 17:08

## 2024-06-05 RX ADMIN — HYDRALAZINE HYDROCHLORIDE 10 MG: 10 TABLET, FILM COATED ORAL at 20:23

## 2024-06-05 RX ADMIN — IPRATROPIUM BROMIDE AND ALBUTEROL SULFATE 3 ML: .5; 3 SOLUTION RESPIRATORY (INHALATION) at 20:09

## 2024-06-05 RX ADMIN — CEFAZOLIN 1 G: 1 INJECTION, POWDER, FOR SOLUTION INTRAMUSCULAR; INTRAVENOUS at 01:36

## 2024-06-05 RX ADMIN — IPRATROPIUM BROMIDE AND ALBUTEROL SULFATE 3 ML: .5; 3 SOLUTION RESPIRATORY (INHALATION) at 11:38

## 2024-06-05 RX ADMIN — IPRATROPIUM BROMIDE AND ALBUTEROL SULFATE 3 ML: .5; 3 SOLUTION RESPIRATORY (INHALATION) at 16:26

## 2024-06-05 RX ADMIN — PREDNISONE 40 MG: 20 TABLET ORAL at 13:01

## 2024-06-05 RX ADMIN — POTASSIUM CHLORIDE 40 MEQ: 1500 TABLET, EXTENDED RELEASE ORAL at 13:01

## 2024-06-05 RX ADMIN — ISOSORBIDE MONONITRATE 180 MG: 60 TABLET, EXTENDED RELEASE ORAL at 08:44

## 2024-06-05 RX ADMIN — POTASSIUM CHLORIDE 40 MEQ: 1500 TABLET, EXTENDED RELEASE ORAL at 20:22

## 2024-06-05 RX ADMIN — CEFAZOLIN 1 G: 1 INJECTION, POWDER, FOR SOLUTION INTRAMUSCULAR; INTRAVENOUS at 17:09

## 2024-06-05 RX ADMIN — FUROSEMIDE 80 MG: 10 INJECTION, SOLUTION INTRAMUSCULAR; INTRAVENOUS at 09:14

## 2024-06-05 RX ADMIN — ROPINIROLE HYDROCHLORIDE 2 MG: 1 TABLET, FILM COATED ORAL at 21:20

## 2024-06-05 RX ADMIN — POTASSIUM CHLORIDE 40 MEQ: 1500 TABLET, EXTENDED RELEASE ORAL at 08:42

## 2024-06-05 RX ADMIN — OXYCODONE HYDROCHLORIDE 5 MG: 5 TABLET ORAL at 10:36

## 2024-06-05 RX ADMIN — DICLOFENAC SODIUM 2 G: 10 GEL TOPICAL at 06:36

## 2024-06-05 RX ADMIN — CEFAZOLIN 1 G: 1 INJECTION, POWDER, FOR SOLUTION INTRAMUSCULAR; INTRAVENOUS at 10:12

## 2024-06-05 RX ADMIN — AMIODARONE HYDROCHLORIDE 200 MG: 200 TABLET ORAL at 20:23

## 2024-06-05 RX ADMIN — IPRATROPIUM BROMIDE AND ALBUTEROL SULFATE 3 ML: .5; 3 SOLUTION RESPIRATORY (INHALATION) at 08:02

## 2024-06-05 ASSESSMENT — ACTIVITIES OF DAILY LIVING (ADL)
ADLS_ACUITY_SCORE: 27
ADLS_ACUITY_SCORE: 26
ADLS_ACUITY_SCORE: 27
ADLS_ACUITY_SCORE: 26
ADLS_ACUITY_SCORE: 27
ADLS_ACUITY_SCORE: 26
ADLS_ACUITY_SCORE: 27
ADLS_ACUITY_SCORE: 26
ADLS_ACUITY_SCORE: 26
ADLS_ACUITY_SCORE: 27
ADLS_ACUITY_SCORE: 26
ADLS_ACUITY_SCORE: 26
ADLS_ACUITY_SCORE: 27
ADLS_ACUITY_SCORE: 26
ADLS_ACUITY_SCORE: 27
ADLS_ACUITY_SCORE: 26
ADLS_ACUITY_SCORE: 26

## 2024-06-05 NOTE — CONSULTS
[facility] Nurse Inpatient Assessment     Consulted for: BLE; Added perineum/perianal area    Summary: Patient known to [facility] team for frequent recent admissions    Patient History (according to provider note(s):    Assessment & Plan  Zackary Hutchison is a 63 year old male with PMH of atrial fibrillation, CKD 3, CAD s/p CABG, ischemic CMO, HTN, CHF, HLD, COPD, recently diagnosed DM2, tobacco use disorder, restless leg syndrome, s/p left LARON, recent on May 13-20 hospitalization for CHF exacerbation, admitted on 6/3/2024 with:     #Acute on chronic CHF with biventricular dysfunction.  Recent on May 13-20 hospitalization for CHF and COPD exacerbation.  States compliance with sodium restricted diet, medications.  Cardiac monitoring/mems at home.  Discharge weight 227.5 LBS-Per DC summary.  proBNP normal at 558.  Echo 5/1/2024: LVEF 55%.  -S/p 40 mg IV Lasix at 6:34 AM.  -Continue diuresis, per CHF order set, given patient's PTA dose of torsemide 100 mg twice daily, will diurese with 80 mg Lasix every 8 hours.  -Hold PT torsemide, spironolactone while on IV Lasix.  -Continue PT Coreg, hydralazine.  -Daily weights, close monitoring UO, renal function, electrolytes while diuresing.  -Telemetry  -Cardiology consult to optimize med management. OP pt of Dr. Perdomo.     #Acute respiratory failure with hypoxia and hypercarbia, likely due to CHF exacerbation.  VBG 7.3 8/23/60.  On BiPAP.  Tolerating well.  -PE, pulmonary infiltrates, pleural effusion were ruled out by CTA chest.  -Continue BiPAP, wean as able to  -Check ABGs  -Supplemental oxygen to keep SpO2 above 92%, given history of CAD, CHF     #Coronary disease, s/p CABG.  Chest pain-free.  Chronically mild elevated troponin.  Coronary angiogram 5/2/2024:    Ost LAD to Prox LAD lesion is 100% stenosed.    Ramus lesion is 95% stenosed.    Mid Cx to Dist Cx lesion is 80% stenosed.    1st Mrg lesion is 90% stenosed.    Prox RCA to Dist RCA lesion is  100% stenosed.    Mid LAD lesion is 70% stenosed.  -Cardiology recommended 75 mg of daily Plavix indefinitely while on Eliquis, as well as baby aspirin.  -Continue PTA Imdur 60 mg, as needed nitroglycerin.  -Mild elevated troponin 35, likely due to demand ischemia of CHF exacerbation, hypoxia, rapid A-fib in the setting of CKD 3.  Diuresing, rate control, supplemental oxygen as above.  -Serial troponin     #Hypokalemia at 2.8.  #Low normal magnesium 1.7.  Both due to chronic diuresis.  -Replacement protocols     #Hypocalcemia, likely due to vitamin D deficiency.  Hypercalcemia noted during recent hospitalization.  Total vitamin D low at 17.  PTH RP less than 2 on 5/20/2024.    Assessment:    Skin Injury Location: B groin/perineum/perianal    Skin injury due to: Fungal rash  and Moisture associated skin damage (MASD)  Skin history and plan of care: Patient states Primofit leak and he prefers to use urinal. Given skin injury will recommend to not use Primofit at this time.  Affected area:      Skin assessment: Intact, Erythema, Lesions-satellite, and irritated feeling per patient     Color: red     Temperature  normal      Drainage: none .      Color: none      Odor: none  Pain: mild  Pain interventions prior to dressing change: patient tolerated well  Treatment goal: Heal  and Protection  STATUS: initial assessment  Supplies ordered: ordered Critic Aid - recommended to provider as it is a ph order    Skin Injury Location: BLE; L>R (from 6/3/23 assessment)    Last photo: 6/3/24  Skin injury due to: Venous stasis dermatitis and likely cellulitis  Skin history and plan of care: patient states he is unable to wrap legs at home; discussed Velcro wrap products - but expressed willingness to receive information on AVS to see if that type of product might work for him.  Affected area:      Skin assessment: Intact, Dry/flaky, and Dry drainage     Measurements (length x width x depth, in cm) Multiple scattered areas of dried  drainage scattered throughout     Color: red     Temperature  normal      Drainage: none .      Color: none      Odor: none  Pain: moderate, constant  Pain interventions prior to dressing change: patient tolerated well and slow and gentle cares   Treatment goal: Increase moisture   STATUS: initial assessment this admission  Supplies ordered: ordered Vashe      Treatment Plan:   BLE - daily  Cleanse BLE (including both feet) with Vashe (moisten wash cloth and lay over affected areas for 10 minutes)  Apply Sween Cream to skin.  Leave open to air.  (If any drainage noted, may cover with Mepilex and change every 3 days)    No Primofit use as this may be contributing to moisture issues in B groin/perineum    Critic Aid AF per provider order     Orders: Reviewed, Written, and Updated    RECOMMEND PRIMARY TEAM ORDER: Antifungal Critic Aid AF  Education provided: plan of care  Discussed plan of care with: Patient  WOC nurse follow-up plan: weekly  Notify WOC if wound(s) deteriorate.  Nursing to notify the Provider(s) and re-consult the WOC Nurse if new skin concern.    DATA:     Current support surface: Standard  Standard Isoflex gel  Containment of urine/stool: Incontinence Protocol  BMI: Body mass index is 43.24 kg/m .   Active diet order: Orders Placed This Encounter      Moderate Consistent Carb (60 g CHO per Meal) Diet     Output: I/O last 3 completed shifts:  In: 1728 [P.O.:1628; I.V.:100]  Out: 2100 [Urine:2100]     Labs:   Recent Labs   Lab 06/05/24  0521 06/04/24  0428 06/03/24  0553   ALBUMIN  --  4.2 3.9   HGB 10.1* 10.7* 11.5*   INR  --   --  0.97   WBC 8.2 9.3 9.3     Pressure injury risk assessment:   Sensory Perception: 4-->no impairment  Moisture: 3-->occasionally moist  Activity: 3-->walks occasionally  Mobility: 3-->slightly limited  Nutrition: 3-->adequate  Friction and Shear: 2-->potential problem  Simeon Score: 18    Naheed Domingo, MSN RN CWOCN  Pager no longer is use, please contact through [email or web address]    [facility] group: [facility]

## 2024-06-05 NOTE — PLAN OF CARE
"  Problem: Adult Inpatient Plan of Care  Goal: Plan of Care Review  Description: The Plan of Care Review/Shift note should be completed every shift.  The Outcome Evaluation is a brief statement about your assessment that the patient is improving, declining, or no change.  This information will be displayed automatically on your shift  note.  Outcome: Progressing  Flowsheets (Taken 6/4/2024 2215)  Plan of Care Reviewed With:   patient   family  Goal: Patient-Specific Goal (Individualized)  Description: You can add care plan individualizations to a care plan. Examples of Individualization might be:  \"Parent requests to be called daily at 9am for status\", \"I have a hard time hearing out of my right ear\", or \"Do not touch me to wake me up as it startles  me\".  Outcome: Progressing  Goal: Absence of Hospital-Acquired Illness or Injury  Outcome: Progressing  Intervention: Identify and Manage Fall Risk  Recent Flowsheet Documentation  Taken 6/4/2024 1600 by Devyn Hancock RN  Safety Promotion/Fall Prevention:   clutter free environment maintained   increased rounding and observation   lighting adjusted   mobility aid in reach   nonskid shoes/slippers when out of bed   patient and family education   room organization consistent   safety round/check completed  Intervention: Prevent Infection  Recent Flowsheet Documentation  Taken 6/4/2024 1600 by Devyn Hancock RN  Infection Prevention:   rest/sleep promoted   hand hygiene promoted  Goal: Optimal Comfort and Wellbeing  Outcome: Progressing  Intervention: Monitor Pain and Promote Comfort  Recent Flowsheet Documentation  Taken 6/4/2024 2000 by Devyn Hancock RN  Pain Management Interventions: medication (see MAR)  Taken 6/4/2024 1600 by Devyn Hancock RN  Pain Management Interventions: medication (see MAR)  Goal: Readiness for Transition of Care  Outcome: Progressing     Problem: Skin Injury Risk Increased  Goal: Skin Health and Integrity  Outcome: " Progressing  Intervention: Plan: Nurse Driven Intervention: Moisture Management  Recent Flowsheet Documentation  Taken 6/4/2024 1745 by Devyn Hancock RN  Bathing/Skin Care:   shower   linen changed   electrode patches/site rotation  Intervention: Optimize Skin Protection  Recent Flowsheet Documentation  Taken 6/4/2024 1815 by Devyn Hancock RN  Activity Management: up in chair  Taken 6/4/2024 1745 by Devyn Hancock RN  Activity Management: ambulated to bathroom  Intervention: Promote and Optimize Oral Intake  Recent Flowsheet Documentation  Taken 6/4/2024 1600 by Devyn Hancock RN  Oral Nutrition Promotion: physical activity promoted     Problem: Comorbidity Management  Goal: Maintenance of COPD Symptom Control  Outcome: Progressing  Intervention: Maintain COPD Symptom Control  Recent Flowsheet Documentation  Taken 6/4/2024 1600 by Devyn Hancock RN  Medication Review/Management: medications reviewed  Goal: Blood Glucose Levels Within Targeted Range  Outcome: Progressing  Intervention: Monitor and Manage Glycemia  Recent Flowsheet Documentation  Taken 6/4/2024 1600 by Devyn Hancock RN  Glycemic Management: blood glucose monitored  Medication Review/Management: medications reviewed  Goal: Maintenance of Heart Failure Symptom Control  Outcome: Progressing  Intervention: Maintain Heart Failure Management  Recent Flowsheet Documentation  Taken 6/4/2024 1600 by Devyn Hancock RN  Medication Review/Management: medications reviewed  Goal: Blood Pressure in Desired Range  Outcome: Progressing  Intervention: Maintain Blood Pressure Management  Recent Flowsheet Documentation  Taken 6/4/2024 1600 by Devyn Hancock RN  Medication Review/Management: medications reviewed     Problem: Heart Failure  Goal: Optimal Coping  Outcome: Progressing  Goal: Optimal Cardiac Output  Outcome: Progressing  Goal: Stable Heart Rate and Rhythm  Outcome: Progressing  Goal: Optimal Functional Ability  Outcome: Progressing  Intervention:  Optimize Functional Ability  Recent Flowsheet Documentation  Taken 6/4/2024 1815 by Devyn Hancock RN  Activity Management: up in chair  Taken 6/4/2024 1745 by Devyn Hancock RN  Activity Management: ambulated to bathroom  Goal: Fluid and Electrolyte Balance  Outcome: Progressing  Intervention: Monitor and Manage Fluid and Electrolyte Balance  Recent Flowsheet Documentation  Taken 6/4/2024 1600 by Devyn Hancock RN  Fluid/Electrolyte Management: fluids restricted  Goal: Improved Oral Intake  Outcome: Progressing  Intervention: Promote and Optimize Nutrition Intake  Recent Flowsheet Documentation  Taken 6/4/2024 1600 by Devyn Hancock RN  Oral Nutrition Promotion: physical activity promoted  Goal: Effective Oxygenation and Ventilation  Outcome: Progressing  Intervention: Promote Airway Secretion Clearance  Recent Flowsheet Documentation  Taken 6/4/2024 2000 by Devyn Hancock RN  Cough And Deep Breathing: done independently per patient  Taken 6/4/2024 1815 by Devyn Hancock RN  Activity Management: up in chair  Taken 6/4/2024 1745 by Devyn Hancock RN  Activity Management: ambulated to bathroom  Taken 6/4/2024 1600 by Devyn Hancock RN  Cough And Deep Breathing: done independently per patient  Goal: Effective Breathing Pattern During Sleep  Outcome: Progressing  Intervention: Monitor and Manage Obstructive Sleep Apnea  Recent Flowsheet Documentation  Taken 6/4/2024 1600 by Devyn Hancock RN  Medication Review/Management: medications reviewed     Problem: Infection  Goal: Absence of Infection Signs and Symptoms  Outcome: Progressing  Goal Outcome Evaluation:      Plan of Care Reviewed With: patient, family    Pt A/O x 4. Vitally stable & saturating well on RA - lungs diminished with expiratory wheezing, pt denies SOB at rest but endorses CARNEY & orthopnea. Tele shows sinus rhythm with frequent PACs. Pt received shower this evening - activity was fairly well tolerated. Wound cares performed for bilateral LE per  WOC orders. Endorses pain of bilateral LE - improved following PRN tylenol/oxy. Will continue to monitor.     Devyn Hancock RN on 6/4/2024 at 10:23 PM

## 2024-06-05 NOTE — PLAN OF CARE
"  Problem: Adult Inpatient Plan of Care  Goal: Absence of Hospital-Acquired Illness or Injury  Outcome: Progressing  Intervention: Identify and Manage Fall Risk  Recent Flowsheet Documentation  Taken 2024 by Shannan Tan RN  Safety Promotion/Fall Prevention:   clutter free environment maintained   increased rounding and observation   lighting adjusted   mobility aid in reach   nonskid shoes/slippers when out of bed   patient and family education   room organization consistent   safety round/check completed  Intervention: Prevent Skin Injury  Recent Flowsheet Documentation  Taken 2024 by Shannan Tan RN  Body Position: position changed independently  Intervention: Prevent Infection  Recent Flowsheet Documentation  Taken 2024 by Shannan Tan RN  Infection Prevention:   rest/sleep promoted   hand hygiene promoted     Problem: Comorbidity Management  Goal: Blood Pressure in Desired Range  Outcome: Progressing   Goal Outcome Evaluation:      Plan of Care Reviewed With: patient    Overall Patient Progress: improvingOverall Patient Progress: improving    BP (!) 146/68 (BP Location: Right arm)   Pulse 91   Temp 98  F (36.7  C) (Oral)   Resp 18   Ht 1.676 m (5' 6\")   Wt 121.5 kg (267 lb 14.4 oz)   SpO2 94%   BMI 43.24 kg/m       Pt is A&Ox4, on RA, NSR w/ PAC's, and assistx1. Pt declines the use of the bed alarm r/t him wanting to sit on the edge of the bed, calls appropriately. Pt c/o hip and back pain, PRN robaxin and PRN oxycodone given. 1800 ml fluid restrcition in place. Mg and K protocol, recheck Mg in the AM and K at 1701.     Lasix gtt started at 20 ml/hr.     B, 174  Shannan Tan RN        "

## 2024-06-05 NOTE — PROGRESS NOTES
Physical Therapy: Orders received. Chart reviewed and discussed with care team.? Physical Therapy not indicated due to no acute PT needs. OT to follow for CHF education and endurance training.? Defer discharge recommendations to OT.? Will complete orders.      Claribel Tracy, PT, DPT

## 2024-06-05 NOTE — PROGRESS NOTES
[facility]  Cardiac Electrophysiology  [address]  [place] [address]   Office: [phone]  Fax: [phone]     Cardiac Electrophysiology Progress Note    Patient: Zackary Hutchison   : 1960   Date of admission: 6/3/2024    Assessment/Recommendations   [id number]/Paroxysmal vs Stage 3B/Persistent atrial fibrillation and possible atrial flutter: Recurrence of AF likely triggered by acute heart failure and hypokalemia.  Ventricular rates controlled and increased time in sinus rhythm with amiodarone reloading.  -- Consideration for catheter ablation once heart failure and diabetes have stabilized  -- Continue amiodarone to 200 mg twice daily x 2 weeks, then 200 mg daily  --Follow up with Toshia Khanna CNP 2024     [id number] score of 5 for age 65-74, CAD, HTN, HF, T2DM  -- Continue Eliquis 5 mg twice daily for stroke prophylaxis    EP service signing off, but happy to see him again if the need arises       Interval Events   No events overnight.  Heart rate and rhythm better controlled; ventricular rates controlled, more frequent sinus rhythm.  He continues to have shortness of breath and lower extremity edema.  He denies chest discomfort, awareness of arrhythmia, palpitations, lightheadedness, presyncope, or syncope.  Says he is a bit overwhelmed with new diagnosis of diabetes, but is tired of hospitalizations, so more motivated to work on heart failure and DM.    Arrhythmia history  Dx/date: Persistent atrial fibrillation diagnosed 2019.  AFL reported 2020.  Sx: Fatigue, dyspnea on exertion  [id number] score:5 for age 65-74, CAD, HTN, HF, T2DM  Oral anticoagulation: Eliquis 5 mg twice daily  Antiarrhythmic medications, AV raudel blocking agents: sotalol (?-2020 - inefficacy), amiodarone (2020-present)   Procedures  DCCV: 2019  Ablation: N/A        Data Review    EKGs:  2024: Sinus rhythm at 89 bpm, PACs,  ms, QT/QTc 450/547  ms  6/4/2024: Atrial flutter versus coarse atrial fibrillation at 101 bpm  6/3/2024: A-fib with RVR at 121 bpm  5/13/2024: Sinus rhythm at 72 bpm, PACs,  ms, QT/QTc 450/492 ms  Telemetry: AF/SR 80s  (all tracings independently reviewed)    Echo done 5/1/2024:  1. Technically difficult study.  2. The left ventricle is normal in size. Image quality does not provide for  detailed assessment of LV systolic function, but is felt to be normal with a  visually estimated ejection fraction of roughly 55%.  3. No significant valvular heart disease is identified on this study though  the sensitivity, particularly of regurgitant lesions, is reduced due to poor  Doppler acoustics.  4. Right ventricular size and systolic performance could not be accurately  assessed due to inadequate visualization.     Cardiac catheterization done 5/2/2024:    Ost LAD to Prox LAD lesion is 100% stenosed.    Ramus lesion is 95% stenosed.    Mid Cx to Dist Cx lesion is 80% stenosed.    1st Mrg lesion is 90% stenosed.    Prox RCA to Dist RCA lesion is 100% stenosed.    Mid LAD lesion is 70% stenosed.     1.  LAD is occluded proximally.  Mid to distal LAD fills well from large, widely patent LIMA graft.  LAD proximal to graft insertion site has calcified stenosis compromising flow to large proximal diagonal  2.  Calcified 90% proximal stenosis in medium size ramus branch.  Ostium ?covered by occluded LAD stent.  3.  Severely calcified stenosis proximal segment of large first obtuse marginal branch of left circumflex.  4.  Chronic total mid RCA occlusion.  PDA fills from patent free LAURI graft.  Posterolateral branches fill from collaterals from left circumflex.  5.  LVEDP = 30 mmHg  6.  Successful PCI ramus branch with 2.5 x 24 mm Synergy DARRELL implant x 1.  30% eccentric residual with BROOKLYNN-3 flow.  7.  Successful PCI LCx OM branch with DARRELL implant x 2: 3.0 x 24 Synergy DARRELL x 1, 2.5 x 16 Synergy DARRELL x 1.  0% residual, BROOKLYNN-3 flow.     Physical  "Examination    VITALS: /64 (BP Location: Right arm)   Pulse 88   Temp 97.7  F (36.5  C) (Oral)   Resp 18   Ht 1.676 m (5' 6\")   Wt 121.5 kg (267 lb 14.4 oz)   SpO2 95%   BMI 43.24 kg/m    Wt Readings from Last 3 Encounters:   06/05/24 121.5 kg (267 lb 14.4 oz)   05/30/24 123.4 kg (272 lb)   05/20/24 103.1 kg (227 lb 6.4 oz)       Intake/Output Summary (Last 24 hours) at 6/5/2024 1615  Last data filed at 6/5/2024 1317  Gross per 24 hour   Intake 2578 ml   Output 2300 ml   Net 278 ml     CONSTITUTIONAL: no distress  EYES:  Conjunctivae pink, sclerae clear.    E/N/T:  Oral mucosa pink, moist mucous membranes  RESPIRATORY:  Respiratory effort is normal  CARDIOVASCULAR:  RRR,  normal S1 and S2  GASTROINTESTINAL:  Abdomen without masses or tenderness  EXTREMITIES:  No clubbing or cyanosis.  Bilateral lower extremity edema  MUSCULOSKELETAL:  Overall grossly normal muscle strength  SKIN:   Overall, skin warm and dry.  NEURO/PSYCH:  Oriented x 3 with normal affect.       Medications  Allergies   Current Facility-Administered Medications   Medication Dose Route Frequency Provider Last Rate Last Admin    albumin human 25 % injection 50 g  50 g Intravenous Daily Jacey Bhandari MD 0 mL/hr at 06/03/24 1300 50 g at 06/05/24 0839    amiodarone (PACERONE) tablet 200 mg  200 mg Oral BID BrandonNegrita nieto, APRN CNP   200 mg at 06/05/24 0841    Followed by    [START ON 6/19/2024] amiodarone (PACERONE) tablet 200 mg  200 mg Oral Daily BrandonKatherineoe, APRN CNP        apixaban ANTICOAGULANT (ELIQUIS) tablet 5 mg  5 mg Oral BID Shanel Navarro MD   5 mg at 06/05/24 0841    atorvastatin (LIPITOR) tablet 40 mg  40 mg Oral QAM Shanel Navarro MD   40 mg at 06/05/24 0842    carvedilol (COREG) tablet 3.125 mg  3.125 mg Oral BID w/meals Shanel Navarro MD   3.125 mg at 06/05/24 0841    ceFAZolin (ANCEF) 1 g vial to attach to  ml bag for ADULT or 50 ml bag for PEDS  1 g Intravenous Q8H Shanel Navarro MD 0 " mL/hr at 06/03/24 1200 1 g at 06/05/24 1012    clopidogrel (PLAVIX) tablet 75 mg  75 mg Oral Daily Shanel Navarro MD   75 mg at 06/05/24 0843    hydrALAZINE (APRESOLINE) tablet 10 mg  10 mg Oral TID Shanel Navarro MD   10 mg at 06/05/24 1302    insulin aspart (NovoLOG) injection (RAPID ACTING)  1-10 Units Subcutaneous TID  Carlos Alberto Sutton MD   2 Units at 06/05/24 1314    insulin aspart (NovoLOG) injection (RAPID ACTING)  1-7 Units Subcutaneous At Bedtime Carlos Alberto Sutton MD   3 Units at 06/04/24 2115    insulin aspart (NovoLOG) injection (RAPID ACTING)   Subcutaneous TID  Shanel Navarro MD   6 Units at 06/05/24 1315    insulin glargine (LANTUS PEN) injection 25 Units  25 Units Subcutaneous At Bedtime Vasile Stallworth MD   25 Units at 06/04/24 2116    ipratropium - albuterol 0.5 mg/2.5 mg/3 mL (DUONEB) neb solution 3 mL  3 mL Nebulization 4x daily Shanel Navarro MD   3 mL at 06/05/24 1138    isosorbide mononitrate (IMDUR) 24 hr tablet 180 mg  180 mg Oral QAM Shanel Navarro MD   180 mg at 06/05/24 0844    Miconazole Nitrate 2 % ointment   Topical BID Shanel Navarro MD   Given at 06/05/24 1429    nicotine (NICODERM CQ) 21 MG/24HR 24 hr patch 1 patch  1 patch Transdermal Daily Shanel Navarro MD   1 patch at 06/05/24 0910    pantoprazole (PROTONIX) EC tablet 40 mg  40 mg Oral QAM  Shanel Navarro MD   40 mg at 06/05/24 0632    potassium chloride barbara ER (KLOR-CON M20) CR tablet 40 mEq  40 mEq Oral BID Shanel Navarro MD        predniSONE (DELTASONE) tablet 40 mg  40 mg Oral Daily Shanel Navarro MD   40 mg at 06/05/24 1301    rOPINIRole (REQUIP) tablet 2 mg  2 mg Oral 4x Daily Shanel Navarro MD   2 mg at 06/05/24 1316    sodium chloride (PF) 0.9% PF flush 3 mL  3 mL Intracatheter Q8H Shanel Navarro MD   3 mL at 06/05/24 0852        Allergies   Allergen Reactions    Bumex [Bumetanide] Muscle Pain (Myalgia)          Lab Results    Chemistry CBC  "Cardiac Enzymes/BNP/TSH/INR   Recent Labs   Lab Test 06/05/24  1343 06/05/24  1125 06/05/24  0747 06/05/24  0521   NA  --   --   --  135   POTASSIUM 3.3*  --    < > 3.1*   CHLORIDE  --   --   --  94*   CO2  --   --   --  29   GLC  --  174*   < > 255*   BUN  --   --   --  38.1*   CR  --   --   --  1.05   GFRESTIMATED  --   --   --  79   RUSLAN  --   --   --  8.7*    < > = values in this interval not displayed.     Recent Labs   Lab Test 06/05/24  0521 06/04/24  0428 06/03/24  0553   CR 1.05 1.08 1.23*          Recent Labs   Lab Test 06/05/24  0521   WBC 8.2   HGB 10.1*   HCT 33.1*   MCV 93   *     Recent Labs   Lab Test 06/05/24  0521 06/04/24  0428 06/03/24  0553   HGB 10.1* 10.7* 11.5*    No results for input(s): \"TROPONINI\" in the last 96554 hours.  Recent Labs   Lab Test 06/03/24  0553 05/13/24  0709 04/29/24  1126   NTBNPI 558 197 160     Recent Labs   Lab Test 04/29/24  1126   TSH 1.70     Recent Labs   Lab Test 06/03/24  0553 05/13/24  0709 02/13/23  2307   INR 0.97 1.30* 1.01        Medical History  Surgical History   Past Medical History:   Diagnosis Date    Atrial fibrillation (H)     Chronic kidney disease     Chronic kidney disease, stage 3b (H) 09/28/2023    Class 3 severe obesity due to excess calories with body mass index (BMI) of 40.0 to 44.9 in adult (H) 09/28/2023    Congestive heart failure (H)     COPD (chronic obstructive pulmonary disease) (H)     Coronary artery disease involving coronary bypass graft of native heart without angina pectoris 09/28/2023    Heart failure with reduced ejection fraction (H) 09/11/2023    HFrEF (heart failure with reduced ejection fraction) (H) 09/11/2023    Hyperlipidemia     Hypertension     Ischemic cardiomyopathy 09/28/2023    Obese     Paroxysmal atrial fibrillation (H) 09/28/2023    Tobacco abuse 09/28/2023    Past Surgical History:   Procedure Laterality Date    CORONARY ANGIOGRAPHY ADULT ORDER      CORONARY ARTERY BYPASS Left 2014    2 vessels    CV " CARDIOMEMS WITH RIGHT HEART CATH N/A 2/15/2024    Procedure: Pulmonary Arterial Pressure Sensor Placement;  Surgeon: Jacey Bhandari MD;  Location: [facility] CATH LAB CV    CV CORONARY ANGIOGRAM N/A 09/11/2023    Procedure: Coronary Angiogram;  Surgeon: Santy Esposito MD;  Location: [facility] CATH LAB CV    CV CORONARY ANGIOGRAM N/A 5/2/2024    Procedure: CV CORONARY ANGIOGRAM;  Surgeon: Santy Esposito MD;  Location: [facility] CATH LAB CV    CV LEFT HEART CATH N/A 09/11/2023    Procedure: Left Heart Catheterization;  Surgeon: Santy Esposito MD;  Location: [facility] CATH LAB CV    CV LEFT HEART CATH N/A 5/2/2024    Procedure: Left Heart Catheterization;  Surgeon: Santy Esposito MD;  Location: [facility] CATH LAB CV    CV PCI ANGIOPLASTY N/A 5/2/2024    Procedure: Percutaneous Transluminal Angioplasty;  Surgeon: Santy Esposito MD;  Location: [facility] CATH LAB CV    CV RIGHT HEART CATH MEASUREMENTS RECORDED N/A 09/11/2023    Procedure: Right Heart Catheterization;  Surgeon: Santy Esposito MD;  Location: [facility] CATH LAB CV         Family History Social History   Family History   Problem Relation Age of Onset    Cerebrovascular Disease Mother     Myocardial Infarction Father         Social History     Tobacco Use    Smoking status: Every Day     Current packs/day: 0.50     Types: Cigarettes    Smokeless tobacco: Never    Tobacco comments:     Seen IP by CTTS on 9/11/23 and declined cessation services and materials   Substance Use Topics    Alcohol use: Not Currently

## 2024-06-05 NOTE — PROGRESS NOTES
[facility] Progress Note - Hospitalist Service    Date of Admission:  6/3/2024    Assessment & Plan      Zackary Hutchison is a 63 year old male with PMH of atrial fibrillation, CKD 3, CAD s/p CABG, ischemic CMO, HTN, CHF, HLD, COPD, recently diagnosed DM2, tobacco use disorder, restless leg syndrome, s/p left LARON, recent on May 13-20 hospitalization for CHF exacerbation, admitted on 6/3/2024 with:    #Acute on chronic CHF with biventricular dysfunction.  Recent on May 13-20 hospitalization for CHF and COPD exacerbation.  States compliance with sodium restricted diet, medications.  Cardiac monitoring/mems at home.  Discharge weight 227.5 LBS-Per DC summary.  Floor scale weight 6/four 265.6 LBS.  proBNP normal at 558.  Echo 5/1/2024: LVEF 55%.  -S/p 80 mg IV Lasix 3 times daily, weight is rising.  -Started Lasix drip at 20 mg/h 6/5.  -Hold PT torsemide, spironolactone while on IV Lasix.  -Continue PT Coreg, hydralazine.  -Daily weights, close monitoring UO, renal function, electrolytes while diuresing.  -Telemetry  -Cardiology consult to optimize med management. D/w Dr. Perdomo.  -Neurology enforcement of importance of dietary restrictions    #Acute respiratory failure with hypoxia and hypercarbia, likely due to CHF exacerbation.  VBG 7.3 8/23/60.  Required BiPAP in ER, was quickly weaned off BiPAP.  -PE, pulmonary infiltrates, pleural effusion were ruled out by CTA chest.  -Supplemental oxygen to keep SpO2 above 92%, given history of CAD, CHF  -Treating underlying pathology    #Coronary disease, s/p CABG in 2015. Chronically mild elevated troponin.  Coronary angiogram 5/2/2024:    Ost LAD to Prox LAD lesion is 100% stenosed.    Ramus lesion is 95% stenosed.    Mid Cx to Dist Cx lesion is 80% stenosed.    1st Mrg lesion is 90% stenosed.    Prox RCA to Dist RCA lesion is 100% stenosed.    Mid LAD lesion is 70% stenosed.  -Cardiology recommended 75 mg of daily Plavix indefinitely while  "on Eliquis, as well as baby aspirin.  -Continue PTA Imdur 60 mg, as needed nitroglycerin.  -Mild elevated troponin 35, likely due to demand ischemia of CHF exacerbation, hypoxia, rapid A-fib in the setting of CKD 3.  Diuresing, rate control, supplemental oxygen as above.  -Cardiology following    #Hypokalemia  # Hypomagnesemia, both due to chronic diuresis.  -Replacement protocols    #Hypocalcemia, likely due to vitamin D deficiency.  Hypercalcemia noted during recent hospitalization.  Total vitamin D low at 17.  PTH RP less than 2 on 5/20/2024.    #Paroxysmal atrial fibrillation with persistent RVR.  -140s.  Initially responded to BiPAP treatment and diuresis.  TSH on 4/29/2024 normal at 1.7.  -One-time dose IV metoprolol 5 mg 6/3  -Amiodarone load 6/4, per cardiology  -Continue PTA Coreg and amiodarone  -Cardiology  EP consult on 6/4, recommended patient will be candidate for catheter ablation after optimization of his CHF.    #Essential hypertension.  -On Coreg, hydralazine  -Diuresing  -As needed IV hydralazine    #Thrombocytopenia at 138.  Most recent platelets on 5/20 at 160.  No known history of thrombocytopenia.  Some dilutional component, with hematocrit of 37 on admission.  No signs of mucocutaneous hemorrhage., previously normal.  -Daily labs.    #IDDM, diagnosed earlier this month.  A1c 7.8.  -PTA 12 units of Lantus X 2 days after DC, per discharge summary from 5/20.  -Accu-Cheks, sliding scale insulin + prandial novolog. Was hyperglycemic overnight with BG up to  447 in setting of IV steroids.  Now on p.o. prednisone 40 mg.  -Diabetic diet, 60 gm carbs    #Pulmonary nodule, RLL.  Described as \"tiny\".  -Patient is at high risk for pulmonary malignancy, given tobacco use disorder.  -Follow-up CT chest in 12 months.    #COPD with acute exacerbation.  Active smoking likely contributing.  CT chest negative for pulmonary infiltrates.  -Solu-Medrol, DuoNeb 4 times daily, as needed albuterol neb.  " "Solu-Medrol was discontinued 6/4, due to hyperglycemia.  On 40 mg of prednisone now.  -Smoking cessation.    #Tobacco use disorder.  -Nicotine replacement with patch.  -Counseling on smoking cessation.  Patient stated no interest in smoking cessation.    #Stage III CKD.  Possibly secondary to cardiorenal process.  Creatinine 1.23, down from 1.65 at discharge on 5/20/2024.  Diuresing as above.  -Monitor renal function while diuresing.    # Venous stasis dermatitis unlikely cellulitis  #Stasis ulcers of bilateral lower legs.     Patient reports increase in lower leg hyperemia, new warmth.  Afebrile.  WBC 9.3.  BC obtained 6/4 at 9 AM-NGTD.  Patient unable to wrap legs at home.  -Cefazolin, BC NGTD  -WOC following, recommendations reviewed, ordered Critic Aid.  -Check inflammatory markers    Diet:  diabetic 60 gm carb  DVT Prophylaxis: DOAC  Ruvalcaba Catheter: Not present  Lines: None     Cardiac Monitoring: None  Code Status:  Full code    Diet: Fluid restriction 1800 ML FLUID (and additional linked orders)  Moderate Consistent Carb (60 g CHO per Meal) Diet    DVT Prophylaxis: DOAC  Ruvalcaba Catheter: Not present  Lines: None     Cardiac Monitoring: ACTIVE order. Indication: Acute decompensated heart failure (48 hours)  Code Status: Full Code      Clinically Significant Risk Factors   # Hypokalemia: Lowest K = 3.1 mmol/L in last 2 days, will replace as needed         # Thrombocytopenia: Lowest platelets = 122 in last 2 days, will monitor for bleeding   # Hypertension: Noted on problem list  # Acute heart failure with reduced ejection fraction: last echo with EF <40% and receiving IV diuretics      # DMII: A1C = 7.8 % (Ref range: <5.7 %) within past 6 months, PRESENT ON ADMISSION  # Severe Obesity: Estimated body mass index is 42.98 kg/m  as calculated from the following:    Height as of this encounter: 1.676 m (5' 6\").    Weight as of this encounter: 120.8 kg (266 lb 4.8 oz)., PRESENT ON ADMISSION     # " Financial/Environmental Concerns: none      Disposition Plan   Medically Ready for Discharge: Anticipated in 2-4 Days    Shanel Navarro MD  Hospitalist Service  [facility]  Securely message with [employer] (more info)  Text page via [employer] Paging/Directory   ______________________________________________________________________    Interval History   No recurrence of chest pain.  Good urine output with IV Lasix.  Weight is 2 pounds up from yesterday, verified it was charted by floor scale.  Ongoing dyspnea, dry cough.  EKG this morning with sinus rhythm, PACs, RBBB.  D/W POC with Dr. Bhandari, diuresis changed to Lasix drip.    Physical Exam   Vital Signs: Temp: 98  F (36.7  C) Temp src: Oral BP: (!) 146/68 Pulse: 91   Resp: 18 SpO2: 94 % O2 Device: None (Room air) Oxygen Delivery: 1 LPM  Weight: 267 lbs 14.4 oz  General: Alert and oriented x 3. Not in obvious distress.  Sitting on the edge of the bed.  HEENT: NC, AT. Neck- supple, No JVP elevation, lymphadenopathy or thyromegaly. Trachea-central.  Chest: Expiratory wheezes, diffuse bilaterally, bases diminished.  Heart: S1S2 irregular irregular. No M/R/G.  Abdomen: Obese.  Soft. NT, ND. No organomegaly. Bowel sounds- active.  Back: No spine tenderness. No CVA tenderness.  Extremities: 2+ pitting leg edema, hyperemia and warmth, healing anterior lower leg wounds.  Peripheral pulses 2+ bilaterally.  Neuro: Cranial nerves 1-12 grossly normal. No focal neurological deficit    Medical Decision Making     50 MINUTES SPENT BY ME on the date of service doing chart review, history, exam, documentation & further activities per the note.     Data   I have personally reviewed the following data over the past 24 hrs:  Imaging results reviewed over the past 24 hrs:     This document is created with the help of Dragon dictation system. All grammatical errors are unintentional.

## 2024-06-05 NOTE — PROGRESS NOTES
06/05/24 1340   Appointment Info   Signing Clinician's Name / Credentials (OT) Maricruz RAM agus MerinoFrank CHAIM/L   Living Environment   People in Home facility resident   Current Living Arrangements independent living facility   Home Accessibility no concerns   Transportation Anticipated agency   Self-Care   Current Activity Tolerance poor   Equipment Currently Used at Home walker, rolling   Activity/Exercise/Self-Care Comment Independent with ADLs of dressing, bathing, toileting.  Pt has assist with meals and housekeeping   General Information   Onset of Illness/Injury or Date of Surgery 06/03/24   Referring Physician Melanie   Patient/Family Therapy Goal Statement (OT) home   Additional Occupational Profile Info/Pertinent History of Current Problem Pt admitted [facility] CHF   Cognitive Status Examination   Cognitive Status Comments Pt listens to CHF education but does not offer much reply to ed.  Pt focused on pain and has limited insight into value of act or education re CHF.   Visual Perception   Visual Impairment/Limitations WNL   Sensory   Sensory Quick Adds sensation intact   Posture   Posture not impaired   Range of Motion Comprehensive   General Range of Motion no range of motion deficits identified   Bed Mobility   Comment (Bed Mobility) SBA   Transfers   Transfer Comments NT, pt refused   Balance   Balance Comments Independent with sitting   Activities of Daily Living   BADL Assessment/Intervention   (Pt declined dressing, has AE.  Pt will need assist with ADLs due to limited endurance and mobility.)   Clinical Impression   Criteria for Skilled Therapeutic Interventions Met (OT) Yes, treatment indicated   OT Diagnosis imppaired endurance and independence with ADLs   OT Problem List-Impairments impacting ADL activity tolerance impaired;balance;mobility   Assessment of Occupational Performance 1-3 Performance Deficits   Planned Therapy Interventions (OT) ADL retraining;balance training;transfer  training  (education, endurance)   Risk & Benefits of therapy have been explained evaluation/treatment results reviewed;participants included;patient   Clinical Impression Comments Pt seen bedside for OT eval and treatment.  Pt demonstrates decreased activity tolerance and mobility for ADLs.  Pt was provided CHF education.  OT to continue to address act tolerance and ADLs.  Recommend return to home with assist for ADLs as needed.   OT Total Evaluation Time   OT Eval, Low Complexity Minutes (21836) 10   OT Goals   Therapy Frequency (OT) 5 times/week   OT Predicted Duration/Target Date for Goal Attainment 06/12/24   OT Goals Lower Body Dressing;Transfers;OT Goal 1;OT Goal 2   OT: Lower Body Dressing Modified independent   OT: Transfer Modified independent   OT: Goal 1 Pt will tolerate 10 minutes aerobic act while maintaining vital signs WNL   OT: Goal 2 Pt will verbalize understanding of CHF education provided   Self-Care/Home Management   Self-Care/Home Mgmt/ADL, Compensatory, Meal Prep Minutes (44366) 10   Treatment Detail/Skilled Intervention OT eval completed.  Pt not agreeable to most act due to pain.  Reviewed CHF education booklet.  Pt has received booklet in past and does not want another booklet.  Pt listened to education but did not reply to most questions.   Therapeutic Procedures/Exercise   Therapeutic Procedure: strength, endurance, ROM, flexibillity minutes (37390) 8   Symptoms Noted During/After Treatment fatigue   Treatment Detail/Skilled Intervention Pt needs encouragement to participate in some ex in sitting.  Reviewed PLB tech.  Pt was able to complete ankle pumps while seated EOB x 10 reps.  Pt completed B shoulder flexion ex x 11 reps with cues to pair with PLB.  Pt did not want to complete further ex.  Gave recommendations for completing ex on his own.   OT Discharge Planning   OT Plan CHF, check with RN re meds, walk, ex   OT Discharge Recommendation (DC Rec) home with assist   OT Rationale for  DC Rec Recommend home iwth assist as pt is likely close to baseline.  Pt currently in independent living and may benefit from higher level of care.   OT Brief overview of current status SBA with bed mobiltiy.

## 2024-06-05 NOTE — PLAN OF CARE
Problem: Adult Inpatient Plan of Care  Goal: Absence of Hospital-Acquired Illness or Injury  Intervention: Prevent Skin Injury  Recent Flowsheet Documentation  Taken 6/5/2024 0355 by Tomas Lynch RN  Body Position: position changed independently  Taken 6/4/2024 2347 by Tomas Lynch RN  Body Position:   position changed independently   side-lying  Skin Protection:   adhesive use limited   incontinence pads utilized   pulse oximeter probe site changed  Device Skin Pressure Protection:   adhesive use limited   tubing/devices free from skin contact     Problem: Comorbidity Management  Goal: Maintenance of Heart Failure Symptom Control  Outcome: Progressing  Intervention: Maintain Heart Failure Management  Recent Flowsheet Documentation  Taken 6/4/2024 2347 by Tomas Lynch RN  Medication Review/Management: medications reviewed     Problem: Comorbidity Management  Goal: Blood Pressure in Desired Range  Outcome: Progressing  Intervention: Maintain Blood Pressure Management  Recent Flowsheet Documentation  Taken 6/4/2024 2347 by Tomas Lynch RN  Medication Review/Management: medications reviewed     Problem: Heart Failure  Goal: Fluid and Electrolyte Balance  Outcome: Progressing  Intervention: Monitor and Manage Fluid and Electrolyte Balance  Recent Flowsheet Documentation  Taken 6/4/2024 2347 by Tomas Lynch RN  Fluid/Electrolyte Management: fluids restricted   Pt alert and oriented x4, endorsed hip pain and headache, PRN  oxycodone and tylenol given. Pt up most of the night, diuretics given, still reporting SOB with activity. Denied chest pain and nausea. Care is ongoing.

## 2024-06-05 NOTE — PROGRESS NOTES
HEART CARE NOTE          Assessment/Recommendations     1. HFimpEF c/b severe ADHF  Assessment / Plan  Inadequate diuresis - transition to furosemide gtt; continue albumin bolus; continue to monitor UOP and renal function closely  Patient is high risk for adverse cardiac events 2/2 severe biventricular dysfunction, multiple HFHs, non-compliance with dietary restrictions, renal dysfunction, CAD  GDMT as detailed below     Current Pharmacotherapy AHA Guideline-Directed Medical Therapy   Lisinopril 5 mg daily  - held Lisinopril 20 mg twice daily   Carvedilol 3.125 mg BID  Carvedilol 25 mg twice daily   Spironolactone 50 mg daily Spironolactone 25 mg once daily   Hydralazine 10 mg TID Hydralazine 100 mg three times daily   Isosorbide mononitrate 180 mg daily Isosorbide dinitrate 40 mg three times daily   SGLT2 inhibitor:Dapagliflozin/Empagliflozin - not started Dapagliflozin or Empagliflozin 10 mg daily      2. Atrial fibrillation  Assessment / Plan  Currently on apixaban and amiodarone; EP team consulted; EP Team Please contact their team directly with any questions or concerns regarding rate, rhythm, device.     3. CAD c/b ICM  Assessment / Plan  S/p CABG 2015  Now s/p recent PCI to [facility]us and OM with DARRELL  Patient with chest pain this admission in the setting if afib with RVR which was relieved by sublingual nitroglycerin - continue HR/arrhythmia control for now; plan for coronary angiogram +/- PCI if chest pain occurs  Current denies chest pain or anginal equivalents or recurrence in the last 24 hrs - discussed potential coronary targets with IC  Continue atorvastatin, carvedilol, clopidogrel     4.CKD  Assessment / Plan  Diuresis as above; continue to monitor UOP and renal function closely     5.DM2  Assessment / Plan  NIDDM; management per primary team - no changes to regimen at this time       Plan of care discussed on June 5, 2024 with patient at bedside, and primary team overseeing patient's care      History  of Present Illness/Subjective    Mr. Zackary Hutchison is a 63 year old male with a PMHx significant for (per Epic notation) atrial fibrillation, stage III CKD, ischemic cardiomyopathy, COPD, HFrEF, hyperlipidemia, CAD s/p CABG, restless leg syndrome, hypertension, obesity, newly diagnosed type 2 diabetes, and tobacco use disorder admitted on 5/13/2024 with the ER with left hip pain, suspected exacerbation of HFpEF, and possible COPD exacerbation      Today, Mr. Hutchison  denies acute cardiac events or complaints; Cardiac management as detailed above     ECG: Personally reviewed 6/3/24; Afib with RVR     ECHO (personnaly Reviewed on 5/13/24):   1. Technically difficult study.  2. The left ventricle is normal in size. Image quality does not provide for  detailed assessment of LV systolic function, but is felt to be normal with a  visually estimated ejection fraction of roughly 55%.  3. No significant valvular heart disease is identified on this study though  the sensitivity, particularly of regurgitant lesions, is reduced due to poor  Doppler acoustics.  4. Right ventricular size and systolic performance could not be accurately  assessed due to inadequate visualization.     The acoustic quality of this study is fairly poor. If a more accurate  assessment of left ventricular systolicperformance, regional wall motion, and  other morphology/function is required; would recommend cardiac MRI or other  alternative imaging modality for further evaluation.     When compared to the prior real-time echocardiogram dated 25 December 2023,  the ejection fraction appears somewhat higher on the current study though  accurate comparison is somewhat limited due to suboptimal acoustic imaging not  only on the current examination, but also hold the prior study as well.    Telemetry: personally reviewed June 5, 2024; notable for atrial fibrillation     Lab results: personally reviewed June 5, 2024; notable for hypokalemia    Medical history and  "pertinent documents reviewed in Care Everywhere please where applicable see details above        Physical Examination Review of Systems   /79 (BP Location: Right arm)   Pulse 106   Temp 97.5  F (36.4  C) (Oral)   Resp 18   Ht 1.676 m (5' 6\")   Wt 121.5 kg (267 lb 14.4 oz)   SpO2 98%   BMI 43.24 kg/m    Body mass index is 43.24 kg/m .  Wt Readings from Last 3 Encounters:   06/05/24 121.5 kg (267 lb 14.4 oz)   05/30/24 123.4 kg (272 lb)   05/20/24 103.1 kg (227 lb 6.4 oz)     General Appearance:   no distress, normal body habitus   ENT/Mouth: membranes moist, no oral lesions or bleeding gums.      EYES:  no scleral icterus, normal conjunctivae   Neck: no carotid bruits or thyromegaly   Chest/Lungs:   lungs are clear to auscultation, no rales or wheezing, equal chest wall expansion    Cardiovascular:   Irregular. Normal first and second heart sounds with no murmurs, rubs, or gallops; the carotid, radial and posterior tibial pulses are intact, + JVD and LE  edema bilaterally    Abdomen:  no organomegaly, masses, bruits, or tenderness; bowel sounds are present   Extremities: no cyanosis or clubbing   Skin: no xanthelasma, warm.    Neurologic: NAD     Psychiatric: alert and oriented x3, calm     A complete 10 systems ROS was reviewed  And is negative except what is listed in the HPI.          Medical History  Surgical History Family History Social History   Past Medical History:   Diagnosis Date    Atrial fibrillation (H)     Chronic kidney disease     Chronic kidney disease, stage 3b (H) 09/28/2023    Class 3 severe obesity due to excess calories with body mass index (BMI) of 40.0 to 44.9 in adult (H) 09/28/2023    Congestive heart failure (H)     COPD (chronic obstructive pulmonary disease) (H)     Coronary artery disease involving coronary bypass graft of native heart without angina pectoris 09/28/2023    Heart failure with reduced ejection fraction (H) 09/11/2023    HFrEF (heart failure with reduced " ejection fraction) (H) 09/11/2023    Hyperlipidemia     Hypertension     Ischemic cardiomyopathy 09/28/2023    Obese     Paroxysmal atrial fibrillation (H) 09/28/2023    Tobacco abuse 09/28/2023    Past Surgical History:   Procedure Laterality Date    CORONARY ANGIOGRAPHY ADULT ORDER      CORONARY ARTERY BYPASS Left 2014    2 vessels    CV CARDIOMEMS WITH RIGHT HEART CATH N/A 2/15/2024    Procedure: Pulmonary Arterial Pressure Sensor Placement;  Surgeon: Jacey Bhandari MD;  Location: [facility]    CV CORONARY ANGIOGRAM N/A 09/11/2023    Procedure: Coronary Angiogram;  Surgeon: Santy Esposito MD;  Location: [facility]    CV CORONARY ANGIOGRAM N/A 5/2/2024    Procedure: CV CORONARY ANGIOGRAM;  Surgeon: Santy Esposito MD;  Location: [facility]    CV LEFT HEART CATH N/A 09/11/2023    Procedure: Left Heart Catheterization;  Surgeon: Santy Esposito MD;  Location: [facility]    CV LEFT HEART CATH N/A 5/2/2024    Procedure: Left Heart Catheterization;  Surgeon: Santy Esposito MD;  Location: [facility]    CV PCI ANGIOPLASTY N/A 5/2/2024    Procedure: Percutaneous Transluminal Angioplasty;  Surgeon: Santy Esposito MD;  Location: [facility]    CV RIGHT HEART CATH MEASUREMENTS RECORDED N/A 09/11/2023    Procedure: Right Heart Catheterization;  Surgeon: Santy Esposito MD;  Location: [facility]    no family history of premature coronary artery disease Social History     Socioeconomic History    Marital status: Single     Spouse name: Not on file    Number of children: Not on file    Years of education: Not on file    Highest education level: Not on file   Occupational History    Occupation: Retired restaurant owner     Comment: [employer]   Tobacco Use    Smoking status: Every Day     Current packs/day: 0.50     Types: Cigarettes    Smokeless tobacco: Never    Tobacco comments:     Seen IP by [facility] on 9/11/23 and declined cessation services  "and materials   Substance and Sexual Activity    Alcohol use: Not Currently    Drug use: Not on file    Sexual activity: Not on file   Other Topics Concern    Not on file   Social History Narrative    Currently lives in an assisted living facility. (Updated: 01/08/2024)     Social Determinants of Health     Financial Resource Strain: Not on file   Food Insecurity: Not on file   Transportation Needs: Not on file   Physical Activity: Not on file   Stress: Not on file   Social Connections: Not on file   Interpersonal Safety: Not on file   Housing Stability: Not on file           Lab Results    Chemistry/lipid CBC Cardiac Enzymes/BNP/TSH/INR   Lab Results   Component Value Date    CHOL 121 05/02/2024    HDL 27 (L) 05/02/2024    TRIG 219 (H) 05/02/2024    BUN 38.1 (H) 06/05/2024     06/05/2024    CO2 29 06/05/2024    Lab Results   Component Value Date    WBC 8.2 06/05/2024    HGB 10.1 (L) 06/05/2024    HCT 33.1 (L) 06/05/2024    MCV 93 06/05/2024     (L) 06/05/2024    Lab Results   Component Value Date    TSH 1.70 04/29/2024    INR 0.97 06/03/2024     No results found for: \"CKTOTAL\", \"CKMB\", \"TROPONINI\"       Weight:    Wt Readings from Last 3 Encounters:   06/05/24 121.5 kg (267 lb 14.4 oz)   05/30/24 123.4 kg (272 lb)   05/20/24 103.1 kg (227 lb 6.4 oz)       Allergies  Allergies   Allergen Reactions    Bumex [Bumetanide] Muscle Pain (Myalgia)         Surgical History  Past Surgical History:   Procedure Laterality Date    CORONARY ANGIOGRAPHY ADULT ORDER      CORONARY ARTERY BYPASS Left 2014    2 vessels    CV CARDIOMEMS WITH RIGHT HEART CATH N/A 2/15/2024    Procedure: Pulmonary Arterial Pressure Sensor Placement;  Surgeon: Jacey Bhandari MD;  Location: [facility]    CV CORONARY ANGIOGRAM N/A 09/11/2023    Procedure: Coronary Angiogram;  Surgeon: Santy Esposito MD;  Location: [facility] CATH [facility]    CV CORONARY ANGIOGRAM N/A 5/2/2024    Procedure: CV CORONARY ANGIOGRAM;  Surgeon: " Santy Esposito MD;  Location: [facility] CATH [facility]    CV LEFT HEART CATH N/A 09/11/2023    Procedure: Left Heart Catheterization;  Surgeon: Santy Esposito MD;  Location: [facility] CATH [facility]    CV LEFT HEART CATH N/A 5/2/2024    Procedure: Left Heart Catheterization;  Surgeon: Santy Esposito MD;  Location: [facility] CATH [facility]    CV PCI ANGIOPLASTY N/A 5/2/2024    Procedure: Percutaneous Transluminal Angioplasty;  Surgeon: Santy Esposito MD;  Location: [facility] CATH [facility]    CV RIGHT HEART CATH MEASUREMENTS RECORDED N/A 09/11/2023    Procedure: Right Heart Catheterization;  Surgeon: Santy Esposito MD;  Location: [facility] CATH [facility]       Social History  Tobacco:   History   Smoking Status    Every Day    Types: Cigarettes   Smokeless Tobacco    Never    Alcohol:   Social History    Substance and Sexual Activity      Alcohol use: Not Currently   Illicit Drugs:   History   Drug Use Not on file       Family History  Family History   Problem Relation Age of Onset    Cerebrovascular Disease Mother     Myocardial Infarction Father           Jacey Bhandari MD on 6/5/2024      cc: Reid Escobar

## 2024-06-06 ENCOUNTER — APPOINTMENT (OUTPATIENT)
Dept: OCCUPATIONAL THERAPY | Facility: HOSPITAL | Age: 64
DRG: 291 | End: 2024-06-06
Payer: MEDICARE

## 2024-06-06 ENCOUNTER — TELEPHONE (OUTPATIENT)
Dept: CARDIOLOGY | Facility: CLINIC | Age: 64
End: 2024-06-06
Payer: MEDICARE

## 2024-06-06 DIAGNOSIS — I48.91 A-FIB (H): Primary | ICD-10-CM

## 2024-06-06 PROBLEM — E55.9 VITAMIN D DEFICIENCY: Status: ACTIVE | Noted: 2024-06-06

## 2024-06-06 LAB
ANION GAP SERPL CALCULATED.3IONS-SCNC: 13 MMOL/L (ref 7–15)
BUN SERPL-MCNC: 34.5 MG/DL (ref 8–23)
CALCIUM SERPL-MCNC: 9 MG/DL (ref 8.8–10.2)
CHLORIDE SERPL-SCNC: 98 MMOL/L (ref 98–107)
CREAT SERPL-MCNC: 1.04 MG/DL (ref 0.67–1.17)
CRP SERPL-MCNC: 15.6 MG/L
DEPRECATED HCO3 PLAS-SCNC: 30 MMOL/L (ref 22–29)
EGFRCR SERPLBLD CKD-EPI 2021: 80 ML/MIN/1.73M2
ENTEROCOCCUS FAECALIS: NOT DETECTED
ENTEROCOCCUS FAECIUM: NOT DETECTED
ERYTHROCYTE [DISTWIDTH] IN BLOOD BY AUTOMATED COUNT: 20.1 % (ref 10–15)
GLUCOSE BLDC GLUCOMTR-MCNC: 139 MG/DL (ref 70–99)
GLUCOSE BLDC GLUCOMTR-MCNC: 143 MG/DL (ref 70–99)
GLUCOSE BLDC GLUCOMTR-MCNC: 203 MG/DL (ref 70–99)
GLUCOSE BLDC GLUCOMTR-MCNC: 270 MG/DL (ref 70–99)
GLUCOSE SERPL-MCNC: 175 MG/DL (ref 70–99)
HCT VFR BLD AUTO: 36.2 % (ref 40–53)
HGB BLD-MCNC: 11.2 G/DL (ref 13.3–17.7)
LISTERIA SPECIES (DETECTED/NOT DETECTED): NOT DETECTED
MAGNESIUM SERPL-MCNC: 2 MG/DL (ref 1.7–2.3)
MCH RBC QN AUTO: 28.9 PG (ref 26.5–33)
MCHC RBC AUTO-ENTMCNC: 30.9 G/DL (ref 31.5–36.5)
MCV RBC AUTO: 93 FL (ref 78–100)
PLATELET # BLD AUTO: 147 10E3/UL (ref 150–450)
POTASSIUM SERPL-SCNC: 3.8 MMOL/L (ref 3.4–5.3)
PROCALCITONIN SERPL IA-MCNC: 0.1 NG/ML
RBC # BLD AUTO: 3.88 10E6/UL (ref 4.4–5.9)
SODIUM SERPL-SCNC: 141 MMOL/L (ref 135–145)
STAPHYLOCOCCUS AUREUS: NOT DETECTED
STAPHYLOCOCCUS EPIDERMIDIS: NOT DETECTED
STAPHYLOCOCCUS LUGDUNENSIS: NOT DETECTED
STAPHYLOCOCCUS SPECIES: NOT DETECTED
STREPTOCOCCUS AGALACTIAE: NOT DETECTED
STREPTOCOCCUS ANGINOSUS GROUP: NOT DETECTED
STREPTOCOCCUS PNEUMONIAE: NOT DETECTED
STREPTOCOCCUS PYOGENES: NOT DETECTED
STREPTOCOCCUS SPECIES: NOT DETECTED
WBC # BLD AUTO: 6.9 10E3/UL (ref 4–11)

## 2024-06-06 PROCEDURE — 80048 BASIC METABOLIC PNL TOTAL CA: CPT | Performed by: INTERNAL MEDICINE

## 2024-06-06 PROCEDURE — 86140 C-REACTIVE PROTEIN: CPT | Performed by: INTERNAL MEDICINE

## 2024-06-06 PROCEDURE — 120N000004 HC R&B MS OVERFLOW

## 2024-06-06 PROCEDURE — 97535 SELF CARE MNGMENT TRAINING: CPT | Mod: GO

## 2024-06-06 PROCEDURE — 94640 AIRWAY INHALATION TREATMENT: CPT

## 2024-06-06 PROCEDURE — 250N000011 HC RX IP 250 OP 636: Performed by: INTERNAL MEDICINE

## 2024-06-06 PROCEDURE — 250N000012 HC RX MED GY IP 250 OP 636 PS 637: Performed by: INTERNAL MEDICINE

## 2024-06-06 PROCEDURE — 250N000009 HC RX 250: Performed by: INTERNAL MEDICINE

## 2024-06-06 PROCEDURE — 250N000013 HC RX MED GY IP 250 OP 250 PS 637: Performed by: NURSE PRACTITIONER

## 2024-06-06 PROCEDURE — 258N000003 HC RX IP 258 OP 636: Mod: JZ | Performed by: INTERNAL MEDICINE

## 2024-06-06 PROCEDURE — 99233 SBSQ HOSP IP/OBS HIGH 50: CPT | Performed by: INTERNAL MEDICINE

## 2024-06-06 PROCEDURE — 84145 PROCALCITONIN (PCT): CPT | Performed by: INTERNAL MEDICINE

## 2024-06-06 PROCEDURE — 250N000013 HC RX MED GY IP 250 OP 250 PS 637: Performed by: INTERNAL MEDICINE

## 2024-06-06 PROCEDURE — 85027 COMPLETE CBC AUTOMATED: CPT | Performed by: INTERNAL MEDICINE

## 2024-06-06 PROCEDURE — 83735 ASSAY OF MAGNESIUM: CPT | Performed by: INTERNAL MEDICINE

## 2024-06-06 PROCEDURE — 250N000011 HC RX IP 250 OP 636: Mod: JZ | Performed by: INTERNAL MEDICINE

## 2024-06-06 PROCEDURE — P9047 ALBUMIN (HUMAN), 25%, 50ML: HCPCS | Performed by: INTERNAL MEDICINE

## 2024-06-06 PROCEDURE — 94640 AIRWAY INHALATION TREATMENT: CPT | Mod: 76

## 2024-06-06 PROCEDURE — 97110 THERAPEUTIC EXERCISES: CPT | Mod: GO

## 2024-06-06 PROCEDURE — 36415 COLL VENOUS BLD VENIPUNCTURE: CPT | Performed by: INTERNAL MEDICINE

## 2024-06-06 PROCEDURE — 87040 BLOOD CULTURE FOR BACTERIA: CPT | Performed by: INTERNAL MEDICINE

## 2024-06-06 PROCEDURE — 999N000157 HC STATISTIC RCP TIME EA 10 MIN

## 2024-06-06 RX ORDER — ERGOCALCIFEROL 1.25 MG/1
50000 CAPSULE, LIQUID FILLED ORAL
Qty: 12 CAPSULE | Refills: 0 | Status: DISCONTINUED | OUTPATIENT
Start: 2024-06-06 | End: 2024-06-10 | Stop reason: HOSPADM

## 2024-06-06 RX ADMIN — CLOPIDOGREL BISULFATE 75 MG: 75 TABLET ORAL at 09:04

## 2024-06-06 RX ADMIN — POTASSIUM CHLORIDE 40 MEQ: 1500 TABLET, EXTENDED RELEASE ORAL at 21:17

## 2024-06-06 RX ADMIN — INSULIN ASPART 9 UNITS: 100 INJECTION, SOLUTION INTRAVENOUS; SUBCUTANEOUS at 18:00

## 2024-06-06 RX ADMIN — OXYCODONE HYDROCHLORIDE 5 MG: 5 TABLET ORAL at 13:06

## 2024-06-06 RX ADMIN — MICONAZOLE NITRATE: 20 OINTMENT TOPICAL at 21:22

## 2024-06-06 RX ADMIN — ROPINIROLE HYDROCHLORIDE 2 MG: 1 TABLET, FILM COATED ORAL at 09:06

## 2024-06-06 RX ADMIN — IPRATROPIUM BROMIDE AND ALBUTEROL SULFATE 3 ML: .5; 3 SOLUTION RESPIRATORY (INHALATION) at 15:03

## 2024-06-06 RX ADMIN — ERGOCALCIFEROL 50000 UNITS: 1.25 CAPSULE, LIQUID FILLED ORAL at 16:26

## 2024-06-06 RX ADMIN — INSULIN ASPART 13 UNITS: 100 INJECTION, SOLUTION INTRAVENOUS; SUBCUTANEOUS at 13:01

## 2024-06-06 RX ADMIN — OXYCODONE HYDROCHLORIDE 5 MG: 5 TABLET ORAL at 22:31

## 2024-06-06 RX ADMIN — IPRATROPIUM BROMIDE AND ALBUTEROL SULFATE 3 ML: .5; 3 SOLUTION RESPIRATORY (INHALATION) at 07:34

## 2024-06-06 RX ADMIN — MICONAZOLE NITRATE: 20 OINTMENT TOPICAL at 09:07

## 2024-06-06 RX ADMIN — IPRATROPIUM BROMIDE AND ALBUTEROL SULFATE 3 ML: .5; 3 SOLUTION RESPIRATORY (INHALATION) at 20:10

## 2024-06-06 RX ADMIN — DICLOFENAC SODIUM 2 G: 10 GEL TOPICAL at 09:07

## 2024-06-06 RX ADMIN — HYDRALAZINE HYDROCHLORIDE 10 MG: 10 TABLET, FILM COATED ORAL at 13:06

## 2024-06-06 RX ADMIN — CEFAZOLIN 1 G: 1 INJECTION, POWDER, FOR SOLUTION INTRAMUSCULAR; INTRAVENOUS at 09:25

## 2024-06-06 RX ADMIN — ROPINIROLE HYDROCHLORIDE 2 MG: 1 TABLET, FILM COATED ORAL at 12:46

## 2024-06-06 RX ADMIN — FUROSEMIDE 20 MG/HR: 10 INJECTION, SOLUTION INTRAVENOUS at 09:15

## 2024-06-06 RX ADMIN — METHOCARBAMOL 500 MG: 500 TABLET ORAL at 02:29

## 2024-06-06 RX ADMIN — AMIODARONE HYDROCHLORIDE 200 MG: 200 TABLET ORAL at 09:04

## 2024-06-06 RX ADMIN — NICOTINE 1 PATCH: 21 PATCH, EXTENDED RELEASE TRANSDERMAL at 09:05

## 2024-06-06 RX ADMIN — APIXABAN 5 MG: 5 TABLET, FILM COATED ORAL at 21:17

## 2024-06-06 RX ADMIN — FUROSEMIDE 20 MG/HR: 10 INJECTION, SOLUTION INTRAVENOUS at 14:24

## 2024-06-06 RX ADMIN — APIXABAN 5 MG: 5 TABLET, FILM COATED ORAL at 09:04

## 2024-06-06 RX ADMIN — PANTOPRAZOLE SODIUM 40 MG: 40 TABLET, DELAYED RELEASE ORAL at 08:50

## 2024-06-06 RX ADMIN — CEFAZOLIN 1 G: 1 INJECTION, POWDER, FOR SOLUTION INTRAMUSCULAR; INTRAVENOUS at 21:51

## 2024-06-06 RX ADMIN — AMIODARONE HYDROCHLORIDE 200 MG: 200 TABLET ORAL at 21:17

## 2024-06-06 RX ADMIN — HYDRALAZINE HYDROCHLORIDE 10 MG: 10 TABLET, FILM COATED ORAL at 09:04

## 2024-06-06 RX ADMIN — HYDRALAZINE HYDROCHLORIDE 10 MG: 10 TABLET, FILM COATED ORAL at 21:18

## 2024-06-06 RX ADMIN — FUROSEMIDE 20 MG/HR: 10 INJECTION, SOLUTION INTRAVENOUS at 20:25

## 2024-06-06 RX ADMIN — INSULIN ASPART 13 UNITS: 100 INJECTION, SOLUTION INTRAVENOUS; SUBCUTANEOUS at 08:51

## 2024-06-06 RX ADMIN — POTASSIUM CHLORIDE 40 MEQ: 1500 TABLET, EXTENDED RELEASE ORAL at 09:04

## 2024-06-06 RX ADMIN — CEFAZOLIN 1 G: 1 INJECTION, POWDER, FOR SOLUTION INTRAMUSCULAR; INTRAVENOUS at 02:08

## 2024-06-06 RX ADMIN — ISOSORBIDE MONONITRATE 180 MG: 60 TABLET, EXTENDED RELEASE ORAL at 09:05

## 2024-06-06 RX ADMIN — ATORVASTATIN CALCIUM 40 MG: 40 TABLET, FILM COATED ORAL at 09:04

## 2024-06-06 RX ADMIN — CARVEDILOL 3.12 MG: 3.12 TABLET, FILM COATED ORAL at 09:05

## 2024-06-06 RX ADMIN — ROPINIROLE HYDROCHLORIDE 2 MG: 1 TABLET, FILM COATED ORAL at 16:26

## 2024-06-06 RX ADMIN — PREDNISONE 40 MG: 20 TABLET ORAL at 09:04

## 2024-06-06 RX ADMIN — FUROSEMIDE 20 MG/HR: 10 INJECTION, SOLUTION INTRAVENOUS at 03:41

## 2024-06-06 RX ADMIN — CARVEDILOL 3.12 MG: 3.12 TABLET, FILM COATED ORAL at 18:00

## 2024-06-06 RX ADMIN — ROPINIROLE HYDROCHLORIDE 2 MG: 1 TABLET, FILM COATED ORAL at 21:19

## 2024-06-06 RX ADMIN — OXYCODONE HYDROCHLORIDE 5 MG: 5 TABLET ORAL at 00:27

## 2024-06-06 RX ADMIN — ALBUMIN HUMAN 50 G: 0.25 SOLUTION INTRAVENOUS at 07:55

## 2024-06-06 ASSESSMENT — ACTIVITIES OF DAILY LIVING (ADL)
ADLS_ACUITY_SCORE: 27
ADLS_ACUITY_SCORE: 31
ADLS_ACUITY_SCORE: 27
ADLS_ACUITY_SCORE: 31
ADLS_ACUITY_SCORE: 31
ADLS_ACUITY_SCORE: 27
ADLS_ACUITY_SCORE: 27
ADLS_ACUITY_SCORE: 26
ADLS_ACUITY_SCORE: 27
ADLS_ACUITY_SCORE: 27
ADLS_ACUITY_SCORE: 30
ADLS_ACUITY_SCORE: 27
ADLS_ACUITY_SCORE: 31
ADLS_ACUITY_SCORE: 26
ADLS_ACUITY_SCORE: 31
ADLS_ACUITY_SCORE: 31
ADLS_ACUITY_SCORE: 27

## 2024-06-06 NOTE — PLAN OF CARE
Problem: Comorbidity Management  Goal: Blood Glucose Levels Within Targeted Range  Outcome: Not Progressing     Problem: Adult Inpatient Plan of Care  Goal: Plan of Care Review  Description: The Plan of Care Review/Shift note should be completed every shift.  The Outcome Evaluation is a brief statement about your assessment that the patient is improving, declining, or no change.  This information will be displayed automatically on your shift  note.  Outcome: Progressing  Flowsheets (Taken 6/5/2024 2226)  Plan of Care Reviewed With: patient     Problem: Skin Injury Risk Increased  Goal: Skin Health and Integrity  Outcome: Progressing  Intervention: Optimize Skin Protection  Recent Flowsheet Documentation  Taken 6/5/2024 1656 by Devi Castellanos  Activity Management: sitting, edge of bed   Goal Outcome Evaluation:      Plan of Care Reviewed With: patient      Good shift. No complains of SOB. Vital Signs stable. Wound care done as ordered. Maintained fluid restriction. Blood sugars remained elevated. Adequate output.

## 2024-06-06 NOTE — PROGRESS NOTES
[facility]AT Treatment Plan    Patient Score: 10  Patient Acuity: 4    Clinical Indication for Therapy: bronchospasm and history of bronchospasm    Therapy Ordered: Duoneb TID/prn    Assessment Summary: Pt is on RA with audible expiratory wheeze and upper airway wheeze. There is minimal change post nebulizer.  Pt is receiving IV lasix with good ouput. Pt would like to continue nebulizers.     Maryse Broussard, RT  6/6/2024

## 2024-06-06 NOTE — PROGRESS NOTES
"Care Management Follow Up    Length of Stay (days): 3    Expected Discharge Date: 06/08/2024     Concerns to be Addressed:   medical progression, remains on Lasix gtt   Patient plan of care discussed at interdisciplinary rounds: Yes    Anticipated Discharge Disposition: Home     Anticipated Discharge Services:    Anticipated Discharge DME:      Patient/family educated on Medicare website which has current facility and service quality ratings:    Education Provided on the Discharge Plan:    Patient/Family in Agreement with the Plan:      Referrals Placed by CM/SW:    Private pay costs discussed: Not applicable    Additional Information:  Social history per previous CM note:  \"Pt lives at [facility] alone. He uses a walker for ambulation. He is independent with ADLs and facility provides house keeping and meals. Has [employer] for transport.\"    Therapy recommendation is home with assist as pt is likely close to baseline. Pt currently lives alone in an Independent living facility and per OT note, may benefit from higher level of care. CM went to pt room to discuss this and pt told CM that he can get increased services any time he needs but at this time feels he is managing just fine on his own.     Per provider update pt will be here a few more days still on lasix gtt.    RNCM to follow for medical progression, recommendations, and final discharge plan.       Katrina Bowser RN      "

## 2024-06-06 NOTE — PROGRESS NOTES
..Heart Failure Care Map  GOALS TO BE MET BEFORE DISCHARGE:    1. Decrease congestion and/or edema with diuretic therapy to achieve near optimal volume status.     Dyspnea improved: No, further care required to meet this goal. Please explain Still  Needing diuretics  on Lasix gtt at 20 mg /hr   Edema improved: No, further care required to meet this goal. Please explain has 2+ LE edema         Last 24 hour I/O:   Intake/Output Summary (Last 24 hours) at 6/6/2024 1724  Last data filed at 6/6/2024 1627  Gross per 24 hour   Intake 1714 ml   Output 5125 ml   Net -3411 ml           Net I/O and Weights since admission:   05/07 2300 - 06/06 2259  In: 6195 [P.O.:5240; I.V.:455]  Out: 04291 [Urine:98291]  Net: -5330     Vitals:    06/03/24 1200 06/04/24 0045 06/04/24 0948 06/05/24 0343   Weight: 121.3 kg (267 lb 8 oz) 120.8 kg (266 lb 4.8 oz) 120.5 kg (265 lb 9.6 oz) 121.5 kg (267 lb 14.4 oz)    06/06/24 1018   Weight: 118.5 kg (261 lb 4.8 oz)       2.  O2 sats > 90% on room air, or at prior home O2 therapy level.      Able to wean O2 this shift to keep sats above 90%?: Yes, satisfactory for discharge.   Does patient use Home O2? No          Current oxygenation status:   SpO2: 100 %     O2 Device: None (Room air),      3.  Tolerates ambulation and mobility near baseline.     Ambulation: No, further care required to meet this goal. Please explain Becomes wheezy and Short of breath with activity only managed to get up at the side of bed    Times patient ambulated with staff this shift: 0    Please review the Heart Failure Care Map for additional HF goal outcomes.    Juli Uriarte RN  6/6/2024

## 2024-06-06 NOTE — PROGRESS NOTES
HEART CARE NOTE          Assessment/Recommendations   1. HFimpEF c/b severe ADHF  Assessment / Plan  Diuresing well on current regimen - no changes at this time; continue albumin bolus; continue to monitor UOP and renal function closely  Patient is high risk for adverse cardiac events 2/2 severe biventricular dysfunction, multiple HFHs, non-compliance with dietary restrictions, renal dysfunction, CAD  GDMT as detailed below     Current Pharmacotherapy AHA Guideline-Directed Medical Therapy   Lisinopril 5 mg daily  - held Lisinopril 20 mg twice daily   Carvedilol 3.125 mg BID  Carvedilol 25 mg twice daily   Spironolactone 50 mg daily Spironolactone 25 mg once daily   Hydralazine 10 mg TID Hydralazine 100 mg three times daily   Isosorbide mononitrate 180 mg daily Isosorbide dinitrate 40 mg three times daily   SGLT2 inhibitor:Dapagliflozin/Empagliflozin - not started Dapagliflozin or Empagliflozin 10 mg daily      2. Atrial fibrillation  Assessment / Plan  Currently on apixaban and amiodarone; EP team consulted; EP Team Please contact their team directly with any questions or concerns regarding rate, rhythm, device.     3. CAD c/b ICM  Assessment / Plan  S/p CABG 2015  Now s/p recent PCI to Ramus and OM with DARRELL  Patient with chest pain this admission in the setting if afib with RVR which was relieved by sublingual nitroglycerin - continue HR/arrhythmia control for now; plan for coronary angiogram +/- PCI if chest pain occurs  Current denies chest pain or anginal equivalents or recurrence in the last 24 hrs - discussed potential coronary targets with IC  Continue atorvastatin, carvedilol, clopidogrel     4.CKD  Assessment / Plan  Diuresis as above; continue to monitor UOP and renal function closely     5.DM2  Assessment / Plan  NIDDM; management per primary team - no changes to regimen at this time    Plan of care discussed on June 6, 2024 with patient at bedside, and primary team overseeing patient's care    History  of Present Illness/Subjective    Mr. Zackary Hutchison is a 63 year old male with a PMHx significant for (per Epic notation) atrial fibrillation, stage III CKD, ischemic cardiomyopathy, COPD, HFrEF, hyperlipidemia, CAD s/p CABG, restless leg syndrome, hypertension, obesity, newly diagnosed type 2 diabetes, and tobacco use disorder admitted on 5/13/2024 with the ER with left hip pain, suspected exacerbation of HFpEF, and possible COPD exacerbation      Today, Mr. Hutchison  denies acute cardiac events or complaints; Cardiac management as detailed above     ECG: Personally reviewed 6/3/24; Afib with RVR     ECHO (personnaly Reviewed on 5/13/24):   1. Technically difficult study.  2. The left ventricle is normal in size. Image quality does not provide for  detailed assessment of LV systolic function, but is felt to be normal with a  visually estimated ejection fraction of roughly 55%.  3. No significant valvular heart disease is identified on this study though  the sensitivity, particularly of regurgitant lesions, is reduced due to poor  Doppler acoustics.  4. Right ventricular size and systolic performance could not be accurately  assessed due to inadequate visualization.     The acoustic quality of this study is fairly poor. If a more accurate  assessment of left ventricular systolicperformance, regional wall motion, and  other morphology/function is required; would recommend cardiac MRI or other  alternative imaging modality for further evaluation.     When compared to the prior real-time echocardiogram dated 25 December 2023,  the ejection fraction appears somewhat higher on the current study though  accurate comparison is somewhat limited due to suboptimal acoustic imaging not  only on the current examination, but also hold the prior study as well.    Telemetry: personally reviewed June 6, 2024; notable for atrial fibrillation     Lab results: personally reviewed June 6, 2024; notable for stable renal function    Medical  "history and pertinent documents reviewed in Care Everywhere please where applicable see details above        Physical Examination Review of Systems   /65 (BP Location: Right arm)   Pulse 86   Temp 97.5  F (36.4  C) (Oral)   Resp 20   Ht 1.676 m (5' 6\")   Wt 121.5 kg (267 lb 14.4 oz)   SpO2 94%   BMI 43.24 kg/m    Body mass index is 43.24 kg/m .  Wt Readings from Last 3 Encounters:   06/05/24 121.5 kg (267 lb 14.4 oz)   05/30/24 123.4 kg (272 lb)   05/20/24 103.1 kg (227 lb 6.4 oz)     General Appearance:   no distress, normal body habitus   ENT/Mouth: membranes moist, no oral lesions or bleeding gums.      EYES:  no scleral icterus, normal conjunctivae   Neck: no carotid bruits or thyromegaly   Chest/Lungs:   lungs are clear to auscultation, no rales or wheezing, equal chest wall expansion    Cardiovascular:   Irregular. Normal first and second heart sounds with no murmurs, rubs, or gallops; the carotid, radial and posterior tibial pulses are intact, + JVD and LE edema bilaterally    Abdomen:  no organomegaly, masses, bruits, or tenderness; bowel sounds are present   Extremities: no cyanosis or clubbing   Skin: NAD   Neurologic:    Psychiatric: alert and oriented x3, calm     A complete 10 systems ROS was reviewed  And is negative except what is listed in the HPI.          Medical History  Surgical History Family History Social History   Past Medical History:   Diagnosis Date    Atrial fibrillation (H)     Chronic kidney disease     Chronic kidney disease, stage 3b (H) 09/28/2023    Class 3 severe obesity due to excess calories with body mass index (BMI) of 40.0 to 44.9 in adult (H) 09/28/2023    Congestive heart failure (H)     COPD (chronic obstructive pulmonary disease) (H)     Coronary artery disease involving coronary bypass graft of native heart without angina pectoris 09/28/2023    Heart failure with reduced ejection fraction (H) 09/11/2023    HFrEF (heart failure with reduced ejection fraction) " (H) 09/11/2023    Hyperlipidemia     Hypertension     Ischemic cardiomyopathy 09/28/2023    Obese     Paroxysmal atrial fibrillation (H) 09/28/2023    Tobacco abuse 09/28/2023    Past Surgical History:   Procedure Laterality Date    CORONARY ANGIOGRAPHY ADULT ORDER      CORONARY ARTERY BYPASS Left 2014    2 vessels    CV CARDIOMEMS WITH RIGHT HEART CATH N/A 2/15/2024    Procedure: Pulmonary Arterial Pressure Sensor Placement;  Surgeon: Jacey Bhandari MD;  Location: [facility]    CV CORONARY ANGIOGRAM N/A 09/11/2023    Procedure: Coronary Angiogram;  Surgeon: Santy Esposito MD;  Location: [facility]    CV CORONARY ANGIOGRAM N/A 5/2/2024    Procedure: CV CORONARY ANGIOGRAM;  Surgeon: Santy Esposito MD;  Location: [facility]    CV LEFT HEART CATH N/A 09/11/2023    Procedure: Left Heart Catheterization;  Surgeon: Santy Esposito MD;  Location: [facility]    CV LEFT HEART CATH N/A 5/2/2024    Procedure: Left Heart Catheterization;  Surgeon: Santy Esposito MD;  Location: [facility]    CV PCI ANGIOPLASTY N/A 5/2/2024    Procedure: Percutaneous Transluminal Angioplasty;  Surgeon: Santy Esposito MD;  Location: [facility]    CV RIGHT HEART CATH MEASUREMENTS RECORDED N/A 09/11/2023    Procedure: Right Heart Catheterization;  Surgeon: Santy Esposito MD;  Location: [facility]    no family history of premature coronary artery disease Social History     Socioeconomic History    Marital status: Single     Spouse name: Not on file    Number of children: Not on file    Years of education: Not on file    Highest education level: Not on file   Occupational History    Occupation: Retired restaurant owner     Comment: [employer]   Tobacco Use    Smoking status: Every Day     Current packs/day: 0.50     Types: Cigarettes    Smokeless tobacco: Never    Tobacco comments:     Seen IP by [facility] on 9/11/23 and declined cessation services and materials  "  Substance and Sexual Activity    Alcohol use: Not Currently    Drug use: Not on file    Sexual activity: Not on file   Other Topics Concern    Not on file   Social History Narrative    Currently lives in an assisted living facility. (Updated: 01/08/2024)     Social Determinants of Health     Financial Resource Strain: Not on file   Food Insecurity: Not on file   Transportation Needs: Not on file   Physical Activity: Not on file   Stress: Not on file   Social Connections: Not on file   Interpersonal Safety: Not on file   Housing Stability: Not on file           Lab Results    Chemistry/lipid CBC Cardiac Enzymes/BNP/TSH/INR   Lab Results   Component Value Date    CHOL 121 05/02/2024    HDL 27 (L) 05/02/2024    TRIG 219 (H) 05/02/2024    BUN 34.5 (H) 06/06/2024     06/06/2024    CO2 30 (H) 06/06/2024    Lab Results   Component Value Date    WBC 6.9 06/06/2024    HGB 11.2 (L) 06/06/2024    HCT 36.2 (L) 06/06/2024    MCV 93 06/06/2024     (L) 06/06/2024    Lab Results   Component Value Date    TSH 1.70 04/29/2024    INR 0.97 06/03/2024     No results found for: \"CKTOTAL\", \"CKMB\", \"TROPONINI\"       Weight:    Wt Readings from Last 3 Encounters:   06/05/24 121.5 kg (267 lb 14.4 oz)   05/30/24 123.4 kg (272 lb)   05/20/24 103.1 kg (227 lb 6.4 oz)       Allergies  Allergies   Allergen Reactions    Bumex [Bumetanide] Muscle Pain (Myalgia)         Surgical History  Past Surgical History:   Procedure Laterality Date    CORONARY ANGIOGRAPHY ADULT ORDER      CORONARY ARTERY BYPASS Left 2014    2 vessels    CV CARDIOMEMS WITH RIGHT HEART CATH N/A 2/15/2024    Procedure: Pulmonary Arterial Pressure Sensor Placement;  Surgeon: Jacey Bhandari MD;  Location: [facility]ATH [facility]    CV CORONARY ANGIOGRAM N/A 09/11/2023    Procedure: Coronary Angiogram;  Surgeon: Santy Esposito MD;  Location: [facility] CATH [facility]    CV CORONARY ANGIOGRAM N/A 5/2/2024    Procedure: CV CORONARY ANGIOGRAM;  Surgeon: Santy Espostio" MD KIMBERLEE;  Location: [facility] CATH [facility]    CV LEFT HEART CATH N/A 09/11/2023    Procedure: Left Heart Catheterization;  Surgeon: Santy Esposito MD;  Location: [facility] CATH [facility]    CV LEFT HEART CATH N/A 5/2/2024    Procedure: Left Heart Catheterization;  Surgeon: Santy Esposito MD;  Location: [facility] CATH [facility]    CV PCI ANGIOPLASTY N/A 5/2/2024    Procedure: Percutaneous Transluminal Angioplasty;  Surgeon: Santy Esposito MD;  Location: [facility] CATH [facility]    CV RIGHT HEART CATH MEASUREMENTS RECORDED N/A 09/11/2023    Procedure: Right Heart Catheterization;  Surgeon: Santy Esposito MD;  Location: [facility] CATH LAB [facility]       Social History  Tobacco:   History   Smoking Status    Every Day    Types: Cigarettes   Smokeless Tobacco    Never    Alcohol:   Social History    Substance and Sexual Activity      Alcohol use: Not Currently   Illicit Drugs:   History   Drug Use Not on file       Family History  Family History   Problem Relation Age of Onset    Cerebrovascular Disease Mother     Myocardial Infarction Father           Jacey Bhandari MD on 6/6/2024      cc: Reid Escobar

## 2024-06-06 NOTE — PLAN OF CARE
Problem: Heart Failure  Goal: Stable Heart Rate and Rhythm  6/6/2024 0625 by Sarah Escobar RN  Outcome: Progressing  6/6/2024 0623 by Sarah Escobar RN  Outcome: Progressing   Goal Outcome Evaluation:       He continues in sinus rhythm with BBB & frequent PACs.  Problem: Heart Failure  Goal: Fluid and Electrolyte Balance  6/6/2024 0625 by Sarah Escobar RN  Outcome: Progressing  6/6/2024 0623 by Sarah Escobar RN  Outcome: Progressing  Intervention: Monitor and Manage Fluid and Electrolyte Balance  Recent Flowsheet Documentation  Taken 6/6/2024 0345 by Sarah Escobar RN  Fluid/Electrolyte Management: (1800 cc) fluids restricted  Taken 6/6/2024 0015 by Sarah Escobar RN  Fluid/Electrolyte Management: (1800 cc) fluids restricted   His lasix drip is infusing at 2mg/hour.  He has voided 2225cc tonight.    Problem: Heart Failure  Goal: Effective Oxygenation and Ventilation  Outcome: Not Progressing  Intervention: Promote Airway Secretion Clearance  Recent Flowsheet Documentation  Taken 6/6/2024 0345 by Sarah Escobar RN  Activity Management: sitting, edge of bed  Taken 6/6/2024 0015 by Sarah Escobar RN  Activity Management: sitting, edge of bed   His lungs continue to have wheezes thru out.  He states the nebs help for a little bit, but then the wheezing comes back.  Problem: Pain Acute  Goal: Optimal Pain Control and Function  Outcome: Not Progressing  Intervention: Develop Pain Management Plan  Recent Flowsheet Documentation  Taken 6/6/2024 0027 by Sarah Escobar RN  Pain Management Interventions: medication (see MAR)   He c/o lower back pain & right hip pain.  He said the tylenol at 5pm didn't help at all.  He took oxycodone 5mg at 0030 with good relief for his hip.  He took robaxin at 0230 to help with his back.

## 2024-06-06 NOTE — PROGRESS NOTES
SPIRITUAL HEALTH SERVICES Note     Saw pt Zackary Hutchison and administered Yarsanism sacrament of anointing for the healing of the sick.    Fr. Shahid Brown

## 2024-06-06 NOTE — PROGRESS NOTES
[facility]    Medicine Progress Note - Hospitalist Service    Date of Admission:  6/3/2024    Assessment & Plan      Zackary Hutchison is a 63 year old male with PMH of atrial fibrillation, CKD 3, CAD s/p CABG, ischemic CMO, HTN, CHF, HLD, COPD, recently diagnosed DM2, tobacco use disorder, restless leg syndrome, s/p left LARON, recent on May 13-20 hospitalization for CHF exacerbation, admitted on 6/3/2024 with:    #Acute on chronic CHF with biventricular dysfunction.  Recent on May 13-20 hospitalization for CHF and COPD exacerbation.  States compliance with sodium restricted diet, medications.  Cardiac monitoring/mems at home.  Discharge weight 227.5 LBS-Per DC summary.  Floor scale weight 6/four 265.6 LBS.  proBNP normal at 558.  Echo 5/1/2024: LVEF 55%.  -S/p 80 mg IV Lasix 3 times daily, weight was rising.  -Started Lasix drip at 20 mg/h 6/5, diuresing well, will continue with the drip.  -Hold PT torsemide, spironolactone while on IV Lasix.  -Continue PT Coreg, hydralazine.  -Daily weights, close monitoring UO, renal function, electrolytes while diuresing.  -Telemetry  -Cardiology consult to optimize med management. D/w Dr. Perdomo.  -Enforcement of importance of dietary restrictions    #Acute respiratory failure with hypoxia and hypercarbia-resolved, likely due to CHF exacerbation.  VBG 7.3 8/23/60.  Required BiPAP in ER, was quickly weaned off BiPAP.  -PE, pulmonary infiltrates, pleural effusion were ruled out by CTA chest.  -Supplemental oxygen to keep SpO2 above 92%, given history of CAD, CHF  -Treating underlying pathology    #Coronary disease, s/p CABG in 2015. Chronically mild elevated troponin.  Coronary angiogram 5/2/2024:    Ost LAD to Prox LAD lesion is 100% stenosed.    Ramus lesion is 95% stenosed.    Mid Cx to Dist Cx lesion is 80% stenosed.    1st Mrg lesion is 90% stenosed.    Prox RCA to Dist RCA lesion is 100% stenosed.    Mid LAD lesion is 70% stenosed.  -Cardiology  "recommended 75 mg of daily Plavix indefinitely while on Eliquis, as well as baby aspirin.  -Continue PTA Imdur 60 mg, as needed nitroglycerin.  -Mild elevated troponin 35, likely due to demand ischemia of CHF exacerbation, hypoxia, rapid A-fib in the setting of CKD 3.  Diuresing, rate control, supplemental oxygen as above.  -Cardiology following    #Hypokalemia  # Hypomagnesemia, both due to chronic diuresis.  -Replacement protocols    #Hypocalcemia, likely due to vitamin D deficiency.  Hypercalcemia noted during recent hospitalization.  Total vitamin D low at 17.  PTH RP less than 2 on 5/20/2024.    #Paroxysmal atrial fibrillation with persistent RVR.  -140s.  Initially responded to BiPAP treatment and diuresis.  TSH on 4/29/2024 normal at 1.7.  -One-time dose IV metoprolol 5 mg 6/3  -Amiodarone load 6/4, per cardiology  -Continue PTA Coreg and amiodarone  -Cardiology  EP consult on 6/4, recommended patient will be candidate for catheter ablation after optimization of his CHF.    #Essential hypertension.  -On Coreg, hydralazine  -Diuresing  -As needed IV hydralazine    #Thrombocytopenia at 138.  Most recent platelets on 5/20 at 160.  No known history of thrombocytopenia.  Some dilutional component, with hematocrit of 37 on admission.  No signs of mucocutaneous hemorrhage., previously normal.  -Daily labs.    #IDDM, diagnosed earlier this month.  A1c 7.8.  -PTA 12 units of Lantus X 2 days after DC, per discharge summary from 5/20.  -Accu-Cheks, sliding scale insulin + prandial novolog. Was hyperglycemic overnight with BG up to  447 in setting of IV steroids.  Now on p.o. prednisone 40 mg.  -Diabetic diet, 60 gm carbs    #Pulmonary nodule, RLL.  Described as \"tiny\".  -Patient is at high risk for pulmonary malignancy, given tobacco use disorder.  -Follow-up CT chest in 12 months.    #COPD with acute exacerbation.  Active smoking likely contributing.  CT chest negative for pulmonary infiltrates.  -Solu-Medrol, " DuoNeb 4 times daily, as needed albuterol neb.  Solu-Medrol was discontinued 6/4, due to hyperglycemia.  On 40 mg of prednisone now.  -Smoking cessation.    #Tobacco use disorder.  -Nicotine replacement with patch.  -Counseling on smoking cessation.  Patient stated no interest in smoking cessation.    #Stage III CKD.  Possibly secondary to cardiorenal process.  Creatinine 1.23, down from 1.65 at discharge on 5/20/2024.  Diuresing as above.  -Monitor renal function while diuresing.    # Venous stasis dermatitis unlikely cellulitis  #Stasis ulcers of bilateral lower legs.    #Gram-positive bacteremia.  1/2 bottles BC from 6/3/2024 resulted in [facility], on incubation day #3.  Contaminant versus true bacteremia.  Patient was afebrile, with normal WBCs on admission remains to hospitalization.  Numerous stasis ulcers.  Patient unable to wrap legs at home.  -Cefazolin since admission, continue  -6/6 obtain surveillance BC  -WOC following, recommendations reviewed, added critic Aid.  -Check inflammatory markers    Diet:  diabetic 60 gm carb  DVT Prophylaxis: DOAC  Ruvalcaba Catheter: Not present  Lines: None     Cardiac Monitoring: None  Code Status:  Full code    Diet: Fluid restriction 1800 ML FLUID (and additional linked orders)  Moderate Consistent Carb (60 g CHO per Meal) Diet    DVT Prophylaxis: DOAC  Ruvalcaba Catheter: Not present  Lines: None     Cardiac Monitoring: ACTIVE order. Indication: Acute decompensated heart failure (48 hours)  Code Status: Full Code      Clinically Significant Risk Factors   # Hypokalemia: Lowest K = 3.1 mmol/L in last 2 days, will replace as needed         # Thrombocytopenia: Lowest platelets = 125 in last 2 days, will monitor for bleeding   # Hypertension: Noted on problem list  # Acute heart failure with reduced ejection fraction: last echo with EF <40% and receiving IV diuretics      # DMII: A1C = 7.8 % (Ref range: <5.7 %) within past 6 months, PRESENT ON ADMISSION  # Severe Obesity: Estimated  "body mass index is 42.98 kg/m  as calculated from the following:    Height as of this encounter: 1.676 m (5' 6\").    Weight as of this encounter: 120.8 kg (266 lb 4.8 oz)., PRESENT ON ADMISSION     # Financial/Environmental Concerns: none      Disposition Plan   Medically Ready for Discharge: Anticipated in 2-4 Days    Shanel Navarro MD  Hospitalist Service  [facility]  Securely message with [employer] (more info)  Text page via [facility] Paging/Directory   ______________________________________________________________________    Interval History   No recurrence of chest pain.  Good urine output with IV Lasix.  4950 mL in the last 24 hours.  Weight is 6 pounds down in 24 hours.    Telemetry reviewed-remains in NSR.  D/W POC with Dr. Bhandari, diuresis changed to Lasix drip.    Physical Exam   Vital Signs: Temp: 97.6  F (36.4  C) Temp src: Oral BP: 135/76 Pulse: 87   Resp: 20 SpO2: 96 % O2 Device: None (Room air)    Weight: 267 lbs 14.4 oz  General: Alert and oriented x 3. Not in obvious distress.  Sitting on the edge of the bed.  HEENT: NC, AT. Neck- supple, No JVP elevation, lymphadenopathy or thyromegaly. Trachea-central.  Chest: Expiratory wheezes, diffuse bilaterally, bases diminished.  Heart: S1S2 irregular irregular. No M/R/G.  Abdomen: Obese.  Soft. NT, ND. No organomegaly. Bowel sounds- active.  Back: No spine tenderness. No CVA tenderness.  Extremities: 2+ pitting leg edema, hyperemia and warmth, healing anterior lower leg wounds.  Peripheral pulses 2+ bilaterally.  Neuro: Cranial nerves 1-12 grossly normal. No focal neurological deficit    Medical Decision Making     50 MINUTES SPENT BY ME on the date of service doing chart review, history, exam, documentation & further activities per the note.     Data   I have personally reviewed the following data over the past 24 hrs:  Imaging results reviewed over the past 24 hrs:     This document is created with the help of [employer] evangelista " system. All grammatical errors are unintentional.

## 2024-06-07 LAB
ANION GAP SERPL CALCULATED.3IONS-SCNC: 15 MMOL/L (ref 7–15)
BUN SERPL-MCNC: 35.6 MG/DL (ref 8–23)
CALCIUM SERPL-MCNC: 9.3 MG/DL (ref 8.8–10.2)
CHLORIDE SERPL-SCNC: 102 MMOL/L (ref 98–107)
CREAT SERPL-MCNC: 1.13 MG/DL (ref 0.67–1.17)
DEPRECATED HCO3 PLAS-SCNC: 29 MMOL/L (ref 22–29)
EGFRCR SERPLBLD CKD-EPI 2021: 72 ML/MIN/1.73M2
ERYTHROCYTE [SEDIMENTATION RATE] IN BLOOD BY WESTERGREN METHOD: 113 MM/HR (ref 0–20)
GLUCOSE BLDC GLUCOMTR-MCNC: 107 MG/DL (ref 70–99)
GLUCOSE BLDC GLUCOMTR-MCNC: 138 MG/DL (ref 70–99)
GLUCOSE BLDC GLUCOMTR-MCNC: 218 MG/DL (ref 70–99)
GLUCOSE BLDC GLUCOMTR-MCNC: 248 MG/DL (ref 70–99)
GLUCOSE SERPL-MCNC: 116 MG/DL (ref 70–99)
LACTATE SERPL-SCNC: 1.3 MMOL/L (ref 0.7–2)
MAGNESIUM SERPL-MCNC: 2.1 MG/DL (ref 1.7–2.3)
POTASSIUM SERPL-SCNC: 3.6 MMOL/L (ref 3.4–5.3)
SODIUM SERPL-SCNC: 146 MMOL/L (ref 135–145)

## 2024-06-07 PROCEDURE — 258N000003 HC RX IP 258 OP 636: Mod: JZ | Performed by: INTERNAL MEDICINE

## 2024-06-07 PROCEDURE — 94640 AIRWAY INHALATION TREATMENT: CPT

## 2024-06-07 PROCEDURE — 250N000013 HC RX MED GY IP 250 OP 250 PS 637: Performed by: INTERNAL MEDICINE

## 2024-06-07 PROCEDURE — 250N000011 HC RX IP 250 OP 636: Performed by: INTERNAL MEDICINE

## 2024-06-07 PROCEDURE — 250N000011 HC RX IP 250 OP 636: Mod: JZ | Performed by: INTERNAL MEDICINE

## 2024-06-07 PROCEDURE — 999N000157 HC STATISTIC RCP TIME EA 10 MIN

## 2024-06-07 PROCEDURE — 83735 ASSAY OF MAGNESIUM: CPT | Performed by: INTERNAL MEDICINE

## 2024-06-07 PROCEDURE — 97110 THERAPEUTIC EXERCISES: CPT | Mod: GO

## 2024-06-07 PROCEDURE — 80048 BASIC METABOLIC PNL TOTAL CA: CPT | Performed by: INTERNAL MEDICINE

## 2024-06-07 PROCEDURE — 99233 SBSQ HOSP IP/OBS HIGH 50: CPT | Performed by: INTERNAL MEDICINE

## 2024-06-07 PROCEDURE — 85652 RBC SED RATE AUTOMATED: CPT | Performed by: INTERNAL MEDICINE

## 2024-06-07 PROCEDURE — 83605 ASSAY OF LACTIC ACID: CPT | Performed by: INTERNAL MEDICINE

## 2024-06-07 PROCEDURE — 250N000013 HC RX MED GY IP 250 OP 250 PS 637: Performed by: NURSE PRACTITIONER

## 2024-06-07 PROCEDURE — 97535 SELF CARE MNGMENT TRAINING: CPT | Mod: GO

## 2024-06-07 PROCEDURE — 94640 AIRWAY INHALATION TREATMENT: CPT | Mod: 76

## 2024-06-07 PROCEDURE — 36415 COLL VENOUS BLD VENIPUNCTURE: CPT | Performed by: INTERNAL MEDICINE

## 2024-06-07 PROCEDURE — 120N000004 HC R&B MS OVERFLOW

## 2024-06-07 PROCEDURE — P9047 ALBUMIN (HUMAN), 25%, 50ML: HCPCS | Mod: JZ | Performed by: INTERNAL MEDICINE

## 2024-06-07 PROCEDURE — 250N000012 HC RX MED GY IP 250 OP 636 PS 637: Performed by: INTERNAL MEDICINE

## 2024-06-07 PROCEDURE — 250N000009 HC RX 250: Performed by: INTERNAL MEDICINE

## 2024-06-07 RX ORDER — CEFAZOLIN SODIUM 2 G/100ML
2 INJECTION, SOLUTION INTRAVENOUS EVERY 8 HOURS
Status: DISCONTINUED | OUTPATIENT
Start: 2024-06-07 | End: 2024-06-10

## 2024-06-07 RX ORDER — NICOTINE 21 MG/24HR
1 PATCH, TRANSDERMAL 24 HOURS TRANSDERMAL DAILY
Status: DISCONTINUED | OUTPATIENT
Start: 2024-06-07 | End: 2024-06-10 | Stop reason: HOSPADM

## 2024-06-07 RX ORDER — TORSEMIDE 100 MG/1
100 TABLET ORAL
Status: DISCONTINUED | OUTPATIENT
Start: 2024-06-07 | End: 2024-06-10 | Stop reason: HOSPADM

## 2024-06-07 RX ORDER — POLYETHYLENE GLYCOL 3350 17 G/17G
17 POWDER, FOR SOLUTION ORAL 2 TIMES DAILY PRN
Status: DISCONTINUED | OUTPATIENT
Start: 2024-06-07 | End: 2024-06-08

## 2024-06-07 RX ADMIN — MICONAZOLE NITRATE: 20 OINTMENT TOPICAL at 20:35

## 2024-06-07 RX ADMIN — CLOPIDOGREL BISULFATE 75 MG: 75 TABLET ORAL at 08:41

## 2024-06-07 RX ADMIN — CEFAZOLIN 1 G: 1 INJECTION, POWDER, FOR SOLUTION INTRAMUSCULAR; INTRAVENOUS at 13:26

## 2024-06-07 RX ADMIN — METHOCARBAMOL 500 MG: 500 TABLET ORAL at 06:18

## 2024-06-07 RX ADMIN — IPRATROPIUM BROMIDE AND ALBUTEROL SULFATE 3 ML: .5; 3 SOLUTION RESPIRATORY (INHALATION) at 19:30

## 2024-06-07 RX ADMIN — AMIODARONE HYDROCHLORIDE 200 MG: 200 TABLET ORAL at 20:34

## 2024-06-07 RX ADMIN — ALBUMIN HUMAN 50 G: 0.25 SOLUTION INTRAVENOUS at 07:47

## 2024-06-07 RX ADMIN — CEFAZOLIN SODIUM 2 G: 2 INJECTION, SOLUTION INTRAVENOUS at 20:33

## 2024-06-07 RX ADMIN — POTASSIUM CHLORIDE 40 MEQ: 1500 TABLET, EXTENDED RELEASE ORAL at 20:34

## 2024-06-07 RX ADMIN — ROPINIROLE HYDROCHLORIDE 2 MG: 1 TABLET, FILM COATED ORAL at 17:24

## 2024-06-07 RX ADMIN — APIXABAN 5 MG: 5 TABLET, FILM COATED ORAL at 08:41

## 2024-06-07 RX ADMIN — FUROSEMIDE 20 MG/HR: 10 INJECTION, SOLUTION INTRAVENOUS at 08:32

## 2024-06-07 RX ADMIN — HYDRALAZINE HYDROCHLORIDE 10 MG: 10 TABLET, FILM COATED ORAL at 20:34

## 2024-06-07 RX ADMIN — PREDNISONE 40 MG: 20 TABLET ORAL at 08:41

## 2024-06-07 RX ADMIN — CARVEDILOL 3.12 MG: 3.12 TABLET, FILM COATED ORAL at 17:23

## 2024-06-07 RX ADMIN — ROPINIROLE HYDROCHLORIDE 2 MG: 1 TABLET, FILM COATED ORAL at 20:33

## 2024-06-07 RX ADMIN — TORSEMIDE 100 MG: 100 TABLET ORAL at 12:34

## 2024-06-07 RX ADMIN — ISOSORBIDE MONONITRATE 180 MG: 60 TABLET, EXTENDED RELEASE ORAL at 08:42

## 2024-06-07 RX ADMIN — INSULIN ASPART 12 UNITS: 100 INJECTION, SOLUTION INTRAVENOUS; SUBCUTANEOUS at 07:45

## 2024-06-07 RX ADMIN — HYDRALAZINE HYDROCHLORIDE 10 MG: 10 TABLET, FILM COATED ORAL at 08:41

## 2024-06-07 RX ADMIN — TORSEMIDE 100 MG: 100 TABLET ORAL at 17:23

## 2024-06-07 RX ADMIN — IPRATROPIUM BROMIDE AND ALBUTEROL SULFATE 3 ML: .5; 3 SOLUTION RESPIRATORY (INHALATION) at 07:49

## 2024-06-07 RX ADMIN — AMIODARONE HYDROCHLORIDE 200 MG: 200 TABLET ORAL at 08:41

## 2024-06-07 RX ADMIN — POTASSIUM CHLORIDE 40 MEQ: 1500 TABLET, EXTENDED RELEASE ORAL at 08:41

## 2024-06-07 RX ADMIN — ROPINIROLE HYDROCHLORIDE 2 MG: 1 TABLET, FILM COATED ORAL at 12:30

## 2024-06-07 RX ADMIN — PANTOPRAZOLE SODIUM 40 MG: 40 TABLET, DELAYED RELEASE ORAL at 06:18

## 2024-06-07 RX ADMIN — IPRATROPIUM BROMIDE AND ALBUTEROL SULFATE 3 ML: .5; 3 SOLUTION RESPIRATORY (INHALATION) at 16:41

## 2024-06-07 RX ADMIN — MICONAZOLE NITRATE: 20 OINTMENT TOPICAL at 08:43

## 2024-06-07 RX ADMIN — CEFAZOLIN 1 G: 1 INJECTION, POWDER, FOR SOLUTION INTRAMUSCULAR; INTRAVENOUS at 06:18

## 2024-06-07 RX ADMIN — INSULIN ASPART 10 UNITS: 100 INJECTION, SOLUTION INTRAVENOUS; SUBCUTANEOUS at 12:28

## 2024-06-07 RX ADMIN — IPRATROPIUM BROMIDE AND ALBUTEROL SULFATE 3 ML: .5; 3 SOLUTION RESPIRATORY (INHALATION) at 12:14

## 2024-06-07 RX ADMIN — HYDRALAZINE HYDROCHLORIDE 10 MG: 10 TABLET, FILM COATED ORAL at 13:26

## 2024-06-07 RX ADMIN — OXYCODONE HYDROCHLORIDE 5 MG: 5 TABLET ORAL at 12:40

## 2024-06-07 RX ADMIN — SENNOSIDES AND DOCUSATE SODIUM 2 TABLET: 8.6; 5 TABLET ORAL at 17:59

## 2024-06-07 RX ADMIN — ATORVASTATIN CALCIUM 40 MG: 40 TABLET, FILM COATED ORAL at 08:41

## 2024-06-07 RX ADMIN — NICOTINE 1 PATCH: 14 PATCH, EXTENDED RELEASE TRANSDERMAL at 13:30

## 2024-06-07 RX ADMIN — APIXABAN 5 MG: 5 TABLET, FILM COATED ORAL at 20:34

## 2024-06-07 RX ADMIN — CARVEDILOL 3.12 MG: 3.12 TABLET, FILM COATED ORAL at 08:41

## 2024-06-07 RX ADMIN — ROPINIROLE HYDROCHLORIDE 2 MG: 1 TABLET, FILM COATED ORAL at 07:00

## 2024-06-07 RX ADMIN — ACETAMINOPHEN 650 MG: 325 TABLET ORAL at 02:05

## 2024-06-07 RX ADMIN — FUROSEMIDE 20 MG/HR: 10 INJECTION, SOLUTION INTRAVENOUS at 02:05

## 2024-06-07 RX ADMIN — INSULIN ASPART 13 UNITS: 100 INJECTION, SOLUTION INTRAVENOUS; SUBCUTANEOUS at 18:04

## 2024-06-07 RX ADMIN — NICOTINE 1 PATCH: 21 PATCH, EXTENDED RELEASE TRANSDERMAL at 08:42

## 2024-06-07 ASSESSMENT — ACTIVITIES OF DAILY LIVING (ADL)
ADLS_ACUITY_SCORE: 27

## 2024-06-07 NOTE — PROGRESS NOTES
HEART CARE NOTE          Assessment/Recommendations     1. HFimpEF c/b severe ADHF  Assessment / Plan  Transition to oral diuretic regimen; continue to monitor UOP and renal function closely  Patient is high risk for adverse cardiac events 2/2 severe biventricular dysfunction, multiple HFHs, non-compliance with dietary restrictions, renal dysfunction, CAD  GDMT as detailed below     Current Pharmacotherapy AHA Guideline-Directed Medical Therapy   Lisinopril 5 mg daily  - held Lisinopril 20 mg twice daily   Carvedilol 3.125 mg BID  Carvedilol 25 mg twice daily   Spironolactone 50 mg daily Spironolactone 25 mg once daily   Hydralazine 10 mg TID Hydralazine 100 mg three times daily   Isosorbide mononitrate 180 mg daily Isosorbide dinitrate 40 mg three times daily   SGLT2 inhibitor:Dapagliflozin/Empagliflozin - not started Dapagliflozin or Empagliflozin 10 mg daily      2. Atrial fibrillation  Assessment / Plan  Currently on apixaban and amiodarone; EP team consulted; EP Team Please contact their team directly with any questions or concerns regarding rate, rhythm, device.     3. CAD c/b ICM  Assessment / Plan  S/p CABG 2015  Now s/p recent PCI to Ramus and OM with DARRELL  Patient with chest pain this admission in the setting if afib with RVR which was relieved by sublingual nitroglycerin - continue HR/arrhythmia control for now; plan for coronary angiogram +/- PCI if chest pain occurs  Current denies chest pain or anginal equivalents or recurrence in the last 24 hrs - discussed potential coronary targets with IC  Continue atorvastatin, carvedilol, clopidogrel     4.CKD  Assessment / Plan  Diuresis as above; continue to monitor UOP and renal function closely     5.DM2  Assessment / Plan  NIDDM; management per primary team - no changes to regimen at this time    Plan of care discussed on June 7, 2024 with patient at bedside, and primary team overseeing patient's care    History of Present Illness/Subjective    Mr. Raymundo  Kianna is a 63 year old male with a PMHx significant for (per Epic notation) atrial fibrillation, stage III CKD, ischemic cardiomyopathy, COPD, HFrEF, hyperlipidemia, CAD s/p CABG, restless leg syndrome, hypertension, obesity, newly diagnosed type 2 diabetes, and tobacco use disorder admitted on 5/13/2024 with the ER with left hip pain, suspected exacerbation of HFpEF, and possible COPD exacerbation      Today, Mr. Hutchison  denies acute cardiac events or complaints; Cardiac management as detailed above     ECG: Personally reviewed 6/3/24; Afib with RVR     ECHO (personnaly Reviewed on 5/13/24):   1. Technically difficult study.  2. The left ventricle is normal in size. Image quality does not provide for  detailed assessment of LV systolic function, but is felt to be normal with a  visually estimated ejection fraction of roughly 55%.  3. No significant valvular heart disease is identified on this study though  the sensitivity, particularly of regurgitant lesions, is reduced due to poor  Doppler acoustics.  4. Right ventricular size and systolic performance could not be accurately  assessed due to inadequate visualization.     The acoustic quality of this study is fairly poor. If a more accurate  assessment of left ventricular systolicperformance, regional wall motion, and  other morphology/function is required; would recommend cardiac MRI or other  alternative imaging modality for further evaluation.     When compared to the prior real-time echocardiogram dated 25 December 2023,  the ejection fraction appears somewhat higher on the current study though  accurate comparison is somewhat limited due to suboptimal acoustic imaging not  only on the current examination, but also hold the prior study as well.    Telemetry: personally reviewed June 7, 2024; notable for atrial fibrillation     Lab results: personally reviewed June 7, 2024; notable for stable renal function    Medical history and pertinent documents reviewed in  "Care Everywhere please where applicable see details above        Physical Examination Review of Systems   /62 (BP Location: Right arm)   Pulse 82   Temp 97.7  F (36.5  C) (Oral)   Resp 20   Ht 1.676 m (5' 6\")   Wt 118.5 kg (261 lb 4.8 oz)   SpO2 94%   BMI 42.17 kg/m    Body mass index is 42.17 kg/m .  Wt Readings from Last 3 Encounters:   06/06/24 118.5 kg (261 lb 4.8 oz)   05/30/24 123.4 kg (272 lb)   05/20/24 103.1 kg (227 lb 6.4 oz)     General Appearance:   no distress, normal body habitus   ENT/Mouth: membranes moist, no oral lesions or bleeding gums.      EYES:  no scleral icterus, normal conjunctivae   Neck: no carotid bruits or thyromegaly   Chest/Lungs:   lungs are clear to auscultation, no rales or wheezing, equal chest wall expansion    Cardiovascular:   Irregular. Normal first and second heart sounds with no murmurs, rubs, or gallops; the carotid, radial and posterior tibial pulses are intact, + JVD and LE edema bilaterally    Abdomen:  no organomegaly, masses, bruits, or tenderness; bowel sounds are present   Extremities: no cyanosis or clubbing   Skin: no xanthelasma, warm.    Neurologic: NAD     Psychiatric: alert and oriented x3, calm     A complete 10 systems ROS was reviewed  And is negative except what is listed in the HPI.          Medical History  Surgical History Family History Social History   Past Medical History:   Diagnosis Date    Atrial fibrillation (H)     Chronic kidney disease     Chronic kidney disease, stage 3b (H) 09/28/2023    Class 3 severe obesity due to excess calories with body mass index (BMI) of 40.0 to 44.9 in adult (H) 09/28/2023    Congestive heart failure (H)     COPD (chronic obstructive pulmonary disease) (H)     Coronary artery disease involving coronary bypass graft of native heart without angina pectoris 09/28/2023    Heart failure with reduced ejection fraction (H) 09/11/2023    HFrEF (heart failure with reduced ejection fraction) (H) 09/11/2023    " Hyperlipidemia     Hypertension     Ischemic cardiomyopathy 09/28/2023    Obese     Paroxysmal atrial fibrillation (H) 09/28/2023    Tobacco abuse 09/28/2023    Past Surgical History:   Procedure Laterality Date    CORONARY ANGIOGRAPHY ADULT ORDER      CORONARY ARTERY BYPASS Left 2014    2 vessels    CV CARDIOMEMS WITH RIGHT HEART CATH N/A 2/15/2024    Procedure: Pulmonary Arterial Pressure Sensor Placement;  Surgeon: Jacey Bhandari MD;  Location: [facility]    CV CORONARY ANGIOGRAM N/A 09/11/2023    Procedure: Coronary Angiogram;  Surgeon: Santy Esposito MD;  Location: [facility]    CV CORONARY ANGIOGRAM N/A 5/2/2024    Procedure: CV CORONARY ANGIOGRAM;  Surgeon: Santy Esposito MD;  Location: [facility]    CV LEFT HEART CATH N/A 09/11/2023    Procedure: Left Heart Catheterization;  Surgeon: Santy Esposito MD;  Location: [facility]    CV LEFT HEART CATH N/A 5/2/2024    Procedure: Left Heart Catheterization;  Surgeon: Santy Esposito MD;  Location: [facility]    CV PCI ANGIOPLASTY N/A 5/2/2024    Procedure: Percutaneous Transluminal Angioplasty;  Surgeon: Santy Esposito MD;  Location: [facility]    CV RIGHT HEART CATH MEASUREMENTS RECORDED N/A 09/11/2023    Procedure: Right Heart Catheterization;  Surgeon: Santy Esposito MD;  Location: [facility]    no family history of premature coronary artery disease Social History     Socioeconomic History    Marital status: Single     Spouse name: Not on file    Number of children: Not on file    Years of education: Not on file    Highest education level: Not on file   Occupational History    Occupation: Retired restaurant owner     Comment: [employer]   Tobacco Use    Smoking status: Every Day     Current packs/day: 0.50     Types: Cigarettes    Smokeless tobacco: Never    Tobacco comments:     Seen IP by [facility] on 9/11/23 and declined cessation services and materials   Substance and Sexual  "Activity    Alcohol use: Not Currently    Drug use: Not on file    Sexual activity: Not on file   Other Topics Concern    Not on file   Social History Narrative    Currently lives in an assisted living facility. (Updated: 01/08/2024)     Social Determinants of Health     Financial Resource Strain: Not on file   Food Insecurity: Not on file   Transportation Needs: Not on file   Physical Activity: Not on file   Stress: Not on file   Social Connections: Not on file   Interpersonal Safety: Not on file   Housing Stability: Not on file           Lab Results    Chemistry/lipid CBC Cardiac Enzymes/BNP/TSH/INR   Lab Results   Component Value Date    CHOL 121 05/02/2024    HDL 27 (L) 05/02/2024    TRIG 219 (H) 05/02/2024    BUN 34.5 (H) 06/06/2024     06/06/2024    CO2 30 (H) 06/06/2024    Lab Results   Component Value Date    WBC 6.9 06/06/2024    HGB 11.2 (L) 06/06/2024    HCT 36.2 (L) 06/06/2024    MCV 93 06/06/2024     (L) 06/06/2024    Lab Results   Component Value Date    TSH 1.70 04/29/2024    INR 0.97 06/03/2024     No results found for: \"CKTOTAL\", \"CKMB\", \"TROPONINI\"       Weight:    Wt Readings from Last 3 Encounters:   06/06/24 118.5 kg (261 lb 4.8 oz)   05/30/24 123.4 kg (272 lb)   05/20/24 103.1 kg (227 lb 6.4 oz)       Allergies  Allergies   Allergen Reactions    Bumex [Bumetanide] Muscle Pain (Myalgia)         Surgical History  Past Surgical History:   Procedure Laterality Date    CORONARY ANGIOGRAPHY ADULT ORDER      CORONARY ARTERY BYPASS Left 2014    2 vessels    CV CARDIOMEMS WITH RIGHT HEART CATH N/A 2/15/2024    Procedure: Pulmonary Arterial Pressure Sensor Placement;  Surgeon: Jacey Bhandari MD;  Location: [facility] CATH LAB CV    CV CORONARY ANGIOGRAM N/A 09/11/2023    Procedure: Coronary Angiogram;  Surgeon: Santy Esposito MD;  Location: [facility] LAB CV    CV CORONARY ANGIOGRAM N/A 5/2/2024    Procedure: CV CORONARY ANGIOGRAM;  Surgeon: Santy Esposito MD;  [facility] CATH LAB CV    CV LEFT HEART CATH N/A 09/11/2023    Procedure: Left Heart Catheterization;  Surgeon: Santy Esposito MD;  Location: [facility] CATH LAB CV    CV LEFT HEART CATH N/A 5/2/2024    Procedure: Left Heart Catheterization;  Surgeon: Santy Esposito MD;  Location: [facility] LAB CV    CV PCI ANGIOPLASTY N/A 5/2/2024    Procedure: Percutaneous Transluminal Angioplasty;  Surgeon: Santy Esposito MD;  Location: [facility] CV    CV RIGHT HEART CATH MEASUREMENTS RECORDED N/A 09/11/2023    Procedure: Right Heart Catheterization;  Surgeon: Santy Esposito MD;  Location: [facility] CATH LAB CV       Social History  Tobacco:   History   Smoking Status    Every Day    Types: Cigarettes   Smokeless Tobacco    Never    Alcohol:   Social History    Substance and Sexual Activity      Alcohol use: Not Currently   Illicit Drugs:   History   Drug Use Not on file       Family History  Family History   Problem Relation Age of Onset    Cerebrovascular Disease Mother     Myocardial Infarction Father           Jacey Bhandari MD on 6/7/2024      cc: Reid Escobar

## 2024-06-07 NOTE — PLAN OF CARE
"  Problem: Adult Inpatient Plan of Care  Goal: Patient-Specific Goal (Individualized)  Description: You can add care plan individualizations to a care plan. Examples of Individualization might be:  \"Parent requests to be called daily at 9am for status\", \"I have a hard time hearing out of my right ear\", or \"Do not touch me to wake me up as it startles  me\".  Outcome: Progressing  Goal: Absence of Hospital-Acquired Illness or Injury  Outcome: Progressing  Intervention: Identify and Manage Fall Risk  Recent Flowsheet Documentation  Taken 6/7/2024 1645 by Juli Uriarte RN  Safety Promotion/Fall Prevention:   activity supervised   room organization consistent   supervised activity   nonskid shoes/slippers when out of bed   increase visualization of patient   clutter free environment maintained  Taken 6/7/2024 1307 by Juli Uriarte RN  Safety Promotion/Fall Prevention:   activity supervised   room organization consistent   supervised activity   nonskid shoes/slippers when out of bed   increase visualization of patient   clutter free environment maintained  Taken 6/7/2024 0935 by Juli Uriarte RN  Safety Promotion/Fall Prevention:   activity supervised   room organization consistent   supervised activity   nonskid shoes/slippers when out of bed   increase visualization of patient   clutter free environment maintained  Intervention: Prevent Skin Injury  Recent Flowsheet Documentation  Taken 6/7/2024 1645 by Juli Uriarte RN  Body Position: position changed independently  Taken 6/7/2024 1307 by Juli Uriarte RN  Body Position: position changed independently  Taken 6/7/2024 0935 by Juli Uriarte RN  Body Position: position changed independently  Goal: Optimal Comfort and Wellbeing  Outcome: Progressing   Goal Outcome Evaluation:  Worked with OT today had to be premedicated with 5 mg of Oxycodone before the activity Leg dressing done soaked with Vasshe solution then Sween cream applied  .Lung " sounds with Inspiratory and Expiratory wheezing verbalized that Nebulization helped  .Lasix gtt D'cd and started on Torsemide tablet and continue to monitor intake and output .Given 2 Tablets of senna for constipation.

## 2024-06-07 NOTE — PLAN OF CARE
Heart Failure Care Map  GOALS TO BE MET BEFORE DISCHARGE:    1. Decrease congestion and/or edema with diuretic therapy to achieve near optimal volume status.     Dyspnea improved: No, further care required to meet this goal. Please explain CARNEY   Edema improved: No, further care required to meet this goal. Please explain +2LE edema        Last 24 hour I/O:   Intake/Output Summary (Last 24 hours) at 6/7/2024 0604  Last data filed at 6/7/2024 0440  Gross per 24 hour   Intake 2175 ml   Output 3625 ml   Net -1450 ml           Net I/O and Weights since admission:   05/08 0700 - 06/07 0659  In: 7348 [P.O.:6053; I.V.:795]  Out: 24184 [Urine:62260]  Net: -6052     Vitals:    06/03/24 1200 06/04/24 0045 06/04/24 0948 06/05/24 0343   Weight: 121.3 kg (267 lb 8 oz) 120.8 kg (266 lb 4.8 oz) 120.5 kg (265 lb 9.6 oz) 121.5 kg (267 lb 14.4 oz)    06/06/24 1018 06/07/24 0440   Weight: 118.5 kg (261 lb 4.8 oz) 117.9 kg (259 lb 14.4 oz)       2.  O2 sats > 90% on room air, or at prior home O2 therapy level.      Able to wean O2 this shift to keep sats above 90%?: Yes, satisfactory for discharge.   Does patient use Home O2? No          Current oxygenation status:   SpO2: 94 %     O2 Device: None (Room air),      3.  Tolerates ambulation and mobility near baseline.     Ambulation: No, further care required to meet this goal. Please explain Gets dyspneic very quick w/ exertion. Unable to do long distances   Times patient ambulated with staff this shift: 0    Please review the Heart Failure Care Map for additional HF goal outcomes.    Idania Nettles RN  6/7/2024     Goal Outcome Evaluation:       SR w/ BBB and frequent PACs. Pt.'s lungs sound wheezy - PRN neb given that pt. Reported helped a little. K and Mg protocol ran, recheck in AM. Pt continues on IV lasix gtt at 20mg/hr.

## 2024-06-07 NOTE — PROGRESS NOTES
[facility]    Medicine Progress Note - Hospitalist Service    Date of Admission:  6/3/2024    Assessment & Plan   63 year old male with history of atrial fibrillation, CKD 3, CAD s/p CABG, ischemic cardiomyopathy, HTN, CHF, HLD, COPD, recently diagnosed DM2, tobacco use disorder, restless leg syndrome, s/p left LARON, recent hospitalization for CHF exacerbation May 13-20 , admitted on 6/3/2024 with acute on chronic diastolic and systolic CHF exacerbation     Acute on chronic CHF with biventricular dysfunction.  Recent hospitalization for CHF and COPD exacerbation last month.  States compliance with sodium restricted diet and medications.  Cardiac monitoring/mems at home.  Discharge weight 227.5 LBS per DC summary.  Floor scale weight on admission 265.6 LBS.  proBNP normal    Echo 5/1/2024: LVEF 55%.  -S/p 80 mg IV Lasix 3 times daily, weight was rising.  -Started Lasix drip at 20 mg/h 6/5,  -currently holding PTA spironolactone    -Continue home Coreg, hydralazine.  -cardiology following     Acute respiratory failure with hypoxia and hypercarbia-resolved, likely due to CHF exacerbation.  VBG 7.3 8/23/60.  Required BiPAP in ER, was quickly weaned off BiPAP.  -PE, pulmonary infiltrates, pleural effusion were ruled out by CTA chest.       Coronary disease, s/p CABG in 2015. Chronically mild elevated troponin.  Coronary angiogram 5/2/2024:    Ost LAD to Prox LAD lesion is 100% stenosed.    Ramus lesion is 95% stenosed.    Mid Cx to Dist Cx lesion is 80% stenosed.    1st Mrg lesion is 90% stenosed.    Prox RCA to Dist RCA lesion is 100% stenosed.    Mid LAD lesion is 70% stenosed.  -Cardiology recommended 75 mg of daily Plavix indefinitely while on Eliquis, as well as baby aspirin.  -Continue PTA Imdur 60 mg, as needed nitroglycerin.  -Mild elevated troponin 35, likely due to demand ischemia of CHF   -Cardiology following        Hypocalcemia, likely due to vitamin D deficiency.  Hypercalcemia noted  "during recent hospitalization.  Total vitamin D low at 17.  PTH RP less than 2 on 5/20/2024.  - resolved        Paroxysmal atrial fibrillation with persistent RVR.  -140s.  Initially responded to BiPAP treatment and diuresis.  TSH on 4/29/2024 normal at 1.7.  -One-time dose IV metoprolol 5 mg 6/3  -Amiodarone load 6/4, per cardiology  -Continue PTA Coreg and amiodarone  -Cardiology  EP consult on 6/4, recommended patient will be candidate for catheter ablation after optimization of his CHF.  - plan outpatient follow up        Thrombocytopenia - stable.  Most recent platelets on 5/20 at 160.  No known history of thrombocytopenia.  Some dilutional component, with hematocrit of 37 on admission.  No signs of mucocutaneous hemorrhage., previously normal.  -trend     IDDM, diagnosed earlier this month.  A1c 7.8.  -PTA 12 units of Lantus X 2 days after DC, per discharge summary from 5/20.  -Accu-Cheks, sliding scale insulin + prandial novolog. Was hyperglycemic then with BG up to  447 in setting of IV steroids.  -Now on p.o. prednisone 40 mg.  -continue current insulin management      Pulmonary nodule, RLL.  Described as \"tiny\".  -Patient is at high risk for pulmonary malignancy, given tobacco use disorder.  -Follow-up CT chest in 12 months.     COPD with acute exacerbation.  Active smoking likely contributing.  CT chest negative for pulmonary infiltrates.  -Solu-Medrol, DuoNeb 4 times daily, as needed albuterol neb.  Solu-Medrol was discontinued 6/4, due to hyperglycemia.  On 40 mg of prednisone now, will plan taper  -Smoking cessation.     Tobacco use disorder.  -Nicotine replacement with patch.  -Counseling on smoking cessation.  Patient stated no interest in smoking cessation.     CKD3.  Possibly secondary to cardiorenal process.  Creatinine stable  Diuresing as above.  -Monitor renal function         Venous stasis dermatitis unlikely cellulitis  Stasis ulcers of bilateral lower legs.    Gram-positive bacteremia. " " 1/2 bottles BC from 6/3/2024 resulted in [facility], on incubation day #3.  Contaminant versus true bacteremia.  Patient was afebrile, with normal WBCs on admission remains to hospitalization.  Numerous stasis ulcers.  Patient unable to wrap legs at home.  -Cefazolin since admission, continue  -6/6 obtain surveillance BC  -WOC following, recommendations reviewed, added critic Aid.  -Checked ESR which is elevated and rather nonsepcific given above issues       Headache:   - reduce dose of nicotine patch, continue tylenol, add prn tramadol       Diet: Fluid restriction 1800 ML FLUID (and additional linked orders)  Moderate Consistent Carb (60 g CHO per Meal) Diet    DVT Prophylaxis: DOAC  Ruvalcaba Catheter: Not present  Lines: None     Cardiac Monitoring: ACTIVE order. Indication: Acute decompensated heart failure (48 hours)  Code Status: Full Code      Clinically Significant Risk Factors         # Hypernatremia: Highest Na = 146 mmol/L in last 2 days, will monitor as appropriate          # Hypertension: Noted on problem list  # Acute heart failure with reduced ejection fraction: last echo with EF <40% and receiving IV diuretics            # DMII: A1C = 7.8 % (Ref range: <5.7 %) within past 6 months   # Severe Obesity: Estimated body mass index is 41.95 kg/m  as calculated from the following:    Height as of this encounter: 1.676 m (5' 6\").    Weight as of this encounter: 117.9 kg (259 lb 14.4 oz).      # Financial/Environmental Concerns: none         Disposition Plan   Medically Ready for Discharge: Anticipated Tomorrow      Lakhwinder Huff DO  Hospitalist Service  [facility]  Securely message with [employer] (more info)  Text page via [employer] Paging/Directory   ______________________________________________________________________    Interval History   NAD. Complains of headache this AM     Physical Exam   Vital Signs: Temp: 97.5  F (36.4  C) Temp src: Oral BP: 118/66 Pulse: 97   Resp: 18 SpO2: 95 % " O2 Device: None (Room air)    Weight: 259 lbs 14.4 oz  General: NAD  RESPIRATORY: Breathing nonlabored  CARDIOVASCULAR: 1+ le edema bilat.   NEUROLOGIC: Motor and sensory intact, speech clear        >50 MINUTES SPENT BY ME on the date of service doing chart review, history, exam, documentation & further activities per the note.  Data

## 2024-06-08 ENCOUNTER — APPOINTMENT (OUTPATIENT)
Dept: OCCUPATIONAL THERAPY | Facility: HOSPITAL | Age: 64
DRG: 291 | End: 2024-06-08
Payer: MEDICARE

## 2024-06-08 LAB
ANION GAP SERPL CALCULATED.3IONS-SCNC: 14 MMOL/L (ref 7–15)
BACTERIA BLD CULT: NO GROWTH
BUN SERPL-MCNC: 38.2 MG/DL (ref 8–23)
CALCIUM SERPL-MCNC: 10.1 MG/DL (ref 8.8–10.2)
CHLORIDE SERPL-SCNC: 98 MMOL/L (ref 98–107)
CREAT SERPL-MCNC: 1.2 MG/DL (ref 0.67–1.17)
DEPRECATED HCO3 PLAS-SCNC: 30 MMOL/L (ref 22–29)
EGFRCR SERPLBLD CKD-EPI 2021: 67 ML/MIN/1.73M2
ERYTHROCYTE [DISTWIDTH] IN BLOOD BY AUTOMATED COUNT: 19.8 % (ref 10–15)
GLUCOSE BLDC GLUCOMTR-MCNC: 122 MG/DL (ref 70–99)
GLUCOSE BLDC GLUCOMTR-MCNC: 154 MG/DL (ref 70–99)
GLUCOSE BLDC GLUCOMTR-MCNC: 180 MG/DL (ref 70–99)
GLUCOSE BLDC GLUCOMTR-MCNC: 232 MG/DL (ref 70–99)
GLUCOSE SERPL-MCNC: 120 MG/DL (ref 70–99)
HCT VFR BLD AUTO: 38.2 % (ref 40–53)
HGB BLD-MCNC: 11.6 G/DL (ref 13.3–17.7)
MAGNESIUM SERPL-MCNC: 2 MG/DL (ref 1.7–2.3)
MCH RBC QN AUTO: 28.5 PG (ref 26.5–33)
MCHC RBC AUTO-ENTMCNC: 30.4 G/DL (ref 31.5–36.5)
MCV RBC AUTO: 94 FL (ref 78–100)
PLATELET # BLD AUTO: 185 10E3/UL (ref 150–450)
POTASSIUM SERPL-SCNC: 3.6 MMOL/L (ref 3.4–5.3)
RBC # BLD AUTO: 4.07 10E6/UL (ref 4.4–5.9)
SODIUM SERPL-SCNC: 142 MMOL/L (ref 135–145)
WBC # BLD AUTO: 7.7 10E3/UL (ref 4–11)

## 2024-06-08 PROCEDURE — 250N000009 HC RX 250: Performed by: INTERNAL MEDICINE

## 2024-06-08 PROCEDURE — 999N000156 HC STATISTIC RCP CONSULT EA 30 MIN

## 2024-06-08 PROCEDURE — 97535 SELF CARE MNGMENT TRAINING: CPT | Mod: GO

## 2024-06-08 PROCEDURE — 94640 AIRWAY INHALATION TREATMENT: CPT

## 2024-06-08 PROCEDURE — 80048 BASIC METABOLIC PNL TOTAL CA: CPT | Performed by: INTERNAL MEDICINE

## 2024-06-08 PROCEDURE — 99232 SBSQ HOSP IP/OBS MODERATE 35: CPT | Performed by: INTERNAL MEDICINE

## 2024-06-08 PROCEDURE — 83735 ASSAY OF MAGNESIUM: CPT | Performed by: INTERNAL MEDICINE

## 2024-06-08 PROCEDURE — 120N000004 HC R&B MS OVERFLOW

## 2024-06-08 PROCEDURE — 999N000157 HC STATISTIC RCP TIME EA 10 MIN

## 2024-06-08 PROCEDURE — 250N000013 HC RX MED GY IP 250 OP 250 PS 637: Performed by: INTERNAL MEDICINE

## 2024-06-08 PROCEDURE — 36415 COLL VENOUS BLD VENIPUNCTURE: CPT | Performed by: INTERNAL MEDICINE

## 2024-06-08 PROCEDURE — 250N000012 HC RX MED GY IP 250 OP 636 PS 637: Performed by: INTERNAL MEDICINE

## 2024-06-08 PROCEDURE — 250N000011 HC RX IP 250 OP 636: Mod: JZ | Performed by: INTERNAL MEDICINE

## 2024-06-08 PROCEDURE — 250N000013 HC RX MED GY IP 250 OP 250 PS 637: Performed by: NURSE PRACTITIONER

## 2024-06-08 PROCEDURE — 94640 AIRWAY INHALATION TREATMENT: CPT | Mod: 76

## 2024-06-08 PROCEDURE — 85027 COMPLETE CBC AUTOMATED: CPT | Performed by: INTERNAL MEDICINE

## 2024-06-08 PROCEDURE — P9047 ALBUMIN (HUMAN), 25%, 50ML: HCPCS | Mod: JZ | Performed by: INTERNAL MEDICINE

## 2024-06-08 RX ORDER — POLYETHYLENE GLYCOL 3350 17 G/17G
17 POWDER, FOR SOLUTION ORAL 2 TIMES DAILY
Status: DISCONTINUED | OUTPATIENT
Start: 2024-06-08 | End: 2024-06-10 | Stop reason: HOSPADM

## 2024-06-08 RX ORDER — ALBUTEROL SULFATE 90 UG/1
2 AEROSOL, METERED RESPIRATORY (INHALATION) 4 TIMES DAILY
Status: DISCONTINUED | OUTPATIENT
Start: 2024-06-08 | End: 2024-06-08

## 2024-06-08 RX ORDER — ALBUTEROL SULFATE 90 UG/1
2 AEROSOL, METERED RESPIRATORY (INHALATION) EVERY 4 HOURS PRN
Status: DISCONTINUED | OUTPATIENT
Start: 2024-06-08 | End: 2024-06-10 | Stop reason: HOSPADM

## 2024-06-08 RX ORDER — IPRATROPIUM BROMIDE AND ALBUTEROL SULFATE 2.5; .5 MG/3ML; MG/3ML
3 SOLUTION RESPIRATORY (INHALATION)
Status: DISCONTINUED | OUTPATIENT
Start: 2024-06-09 | End: 2024-06-10 | Stop reason: HOSPADM

## 2024-06-08 RX ORDER — IPRATROPIUM BROMIDE AND ALBUTEROL SULFATE 2.5; .5 MG/3ML; MG/3ML
3 SOLUTION RESPIRATORY (INHALATION)
Status: DISCONTINUED | OUTPATIENT
Start: 2024-06-08 | End: 2024-06-08

## 2024-06-08 RX ADMIN — INSULIN ASPART 12 UNITS: 100 INJECTION, SOLUTION INTRAVENOUS; SUBCUTANEOUS at 12:08

## 2024-06-08 RX ADMIN — TORSEMIDE 100 MG: 100 TABLET ORAL at 08:17

## 2024-06-08 RX ADMIN — ISOSORBIDE MONONITRATE 180 MG: 60 TABLET, EXTENDED RELEASE ORAL at 08:16

## 2024-06-08 RX ADMIN — HYDRALAZINE HYDROCHLORIDE 10 MG: 10 TABLET, FILM COATED ORAL at 13:00

## 2024-06-08 RX ADMIN — ROPINIROLE HYDROCHLORIDE 2 MG: 1 TABLET, FILM COATED ORAL at 08:15

## 2024-06-08 RX ADMIN — ALBUTEROL SULFATE 2.5 MG: 2.5 SOLUTION RESPIRATORY (INHALATION) at 21:41

## 2024-06-08 RX ADMIN — CLOPIDOGREL BISULFATE 75 MG: 75 TABLET ORAL at 08:16

## 2024-06-08 RX ADMIN — IPRATROPIUM BROMIDE AND ALBUTEROL SULFATE 3 ML: .5; 3 SOLUTION RESPIRATORY (INHALATION) at 11:34

## 2024-06-08 RX ADMIN — CEFAZOLIN SODIUM 2 G: 2 INJECTION, SOLUTION INTRAVENOUS at 21:09

## 2024-06-08 RX ADMIN — CEFAZOLIN SODIUM 2 G: 2 INJECTION, SOLUTION INTRAVENOUS at 05:54

## 2024-06-08 RX ADMIN — ALBUMIN HUMAN 50 G: 0.25 SOLUTION INTRAVENOUS at 08:00

## 2024-06-08 RX ADMIN — APIXABAN 5 MG: 5 TABLET, FILM COATED ORAL at 21:10

## 2024-06-08 RX ADMIN — HYDRALAZINE HYDROCHLORIDE 10 MG: 10 TABLET, FILM COATED ORAL at 08:17

## 2024-06-08 RX ADMIN — MICONAZOLE NITRATE: 20 OINTMENT TOPICAL at 21:10

## 2024-06-08 RX ADMIN — IPRATROPIUM BROMIDE AND ALBUTEROL SULFATE 3 ML: .5; 3 SOLUTION RESPIRATORY (INHALATION) at 07:41

## 2024-06-08 RX ADMIN — POLYETHYLENE GLYCOL 3350 17 G: 17 POWDER, FOR SOLUTION ORAL at 21:09

## 2024-06-08 RX ADMIN — AMIODARONE HYDROCHLORIDE 200 MG: 200 TABLET ORAL at 08:17

## 2024-06-08 RX ADMIN — SENNOSIDES AND DOCUSATE SODIUM 2 TABLET: 8.6; 5 TABLET ORAL at 08:12

## 2024-06-08 RX ADMIN — CEFAZOLIN SODIUM 2 G: 2 INJECTION, SOLUTION INTRAVENOUS at 14:58

## 2024-06-08 RX ADMIN — INSULIN ASPART 10 UNITS: 100 INJECTION, SOLUTION INTRAVENOUS; SUBCUTANEOUS at 08:21

## 2024-06-08 RX ADMIN — AMIODARONE HYDROCHLORIDE 200 MG: 200 TABLET ORAL at 21:09

## 2024-06-08 RX ADMIN — ATORVASTATIN CALCIUM 40 MG: 40 TABLET, FILM COATED ORAL at 08:16

## 2024-06-08 RX ADMIN — MICONAZOLE NITRATE: 20 OINTMENT TOPICAL at 08:29

## 2024-06-08 RX ADMIN — METHOCARBAMOL 500 MG: 500 TABLET ORAL at 15:47

## 2024-06-08 RX ADMIN — ROPINIROLE HYDROCHLORIDE 2 MG: 1 TABLET, FILM COATED ORAL at 21:17

## 2024-06-08 RX ADMIN — APIXABAN 5 MG: 5 TABLET, FILM COATED ORAL at 08:16

## 2024-06-08 RX ADMIN — ROPINIROLE HYDROCHLORIDE 2 MG: 1 TABLET, FILM COATED ORAL at 11:45

## 2024-06-08 RX ADMIN — INSULIN ASPART 10 UNITS: 100 INJECTION, SOLUTION INTRAVENOUS; SUBCUTANEOUS at 18:50

## 2024-06-08 RX ADMIN — POLYETHYLENE GLYCOL 3350 17 G: 17 POWDER, FOR SOLUTION ORAL at 08:12

## 2024-06-08 RX ADMIN — TRAMADOL HYDROCHLORIDE 25 MG: 50 TABLET, COATED ORAL at 15:51

## 2024-06-08 RX ADMIN — TORSEMIDE 100 MG: 100 TABLET ORAL at 17:28

## 2024-06-08 RX ADMIN — HYDRALAZINE HYDROCHLORIDE 10 MG: 10 TABLET, FILM COATED ORAL at 21:09

## 2024-06-08 RX ADMIN — PREDNISONE 40 MG: 20 TABLET ORAL at 08:17

## 2024-06-08 RX ADMIN — POTASSIUM CHLORIDE 40 MEQ: 1500 TABLET, EXTENDED RELEASE ORAL at 21:09

## 2024-06-08 RX ADMIN — OXYCODONE HYDROCHLORIDE 5 MG: 5 TABLET ORAL at 13:00

## 2024-06-08 RX ADMIN — CARVEDILOL 3.12 MG: 3.12 TABLET, FILM COATED ORAL at 17:28

## 2024-06-08 RX ADMIN — PANTOPRAZOLE SODIUM 40 MG: 40 TABLET, DELAYED RELEASE ORAL at 08:17

## 2024-06-08 RX ADMIN — ALBUTEROL SULFATE 2 PUFF: 90 AEROSOL, METERED RESPIRATORY (INHALATION) at 17:20

## 2024-06-08 RX ADMIN — CARVEDILOL 3.12 MG: 3.12 TABLET, FILM COATED ORAL at 08:16

## 2024-06-08 RX ADMIN — POTASSIUM CHLORIDE 40 MEQ: 1500 TABLET, EXTENDED RELEASE ORAL at 08:17

## 2024-06-08 RX ADMIN — NICOTINE 1 PATCH: 14 PATCH, EXTENDED RELEASE TRANSDERMAL at 08:27

## 2024-06-08 RX ADMIN — ROPINIROLE HYDROCHLORIDE 2 MG: 1 TABLET, FILM COATED ORAL at 15:47

## 2024-06-08 ASSESSMENT — ACTIVITIES OF DAILY LIVING (ADL)
ADLS_ACUITY_SCORE: 27

## 2024-06-08 NOTE — PLAN OF CARE
Problem: Heart Failure  Goal: Optimal Coping  Outcome: Progressing  Intervention: Support Psychosocial Response  Recent Flowsheet Documentation  Taken 6/8/2024 0000 by Alexx Adkins RN  Supportive Measures: active listening utilized  Taken 6/7/2024 2000 by Alexx Adkins RN  Supportive Measures: active listening utilized  Goal: Optimal Cardiac Output  Outcome: Progressing  Intervention: Optimize Cardiac Output  Recent Flowsheet Documentation  Taken 6/8/2024 0000 by Alexx Adkins RN  Environmental Support: calm environment promoted  Taken 6/7/2024 2000 by Alexx Adkins RN  Environmental Support: calm environment promoted  Goal: Stable Heart Rate and Rhythm  Outcome: Progressing  Goal: Optimal Functional Ability  Outcome: Progressing  Intervention: Optimize Functional Ability  Recent Flowsheet Documentation  Taken 6/8/2024 0000 by Alexx Adkins RN  Self-Care Promotion: independence encouraged  Activity Management: (given Pain medication before ambulation with OT) sitting, edge of bed  Taken 6/7/2024 2000 by Alexx Adkins RN  Self-Care Promotion: independence encouraged  Activity Management: (given Pain medication before ambulation with OT) sitting, edge of bed  Goal: Fluid and Electrolyte Balance  Outcome: Progressing  Intervention: Monitor and Manage Fluid and Electrolyte Balance  Recent Flowsheet Documentation  Taken 6/8/2024 0000 by Alexx Adkins RN  Fluid/Electrolyte Management: fluids restricted  Taken 6/7/2024 2000 by Alexx Adkins RN  Fluid/Electrolyte Management: fluids restricted  Goal: Improved Oral Intake  Outcome: Progressing  Intervention: Promote and Optimize Nutrition Intake  Recent Flowsheet Documentation  Taken 6/8/2024 0000 by Alexx Adkins RN  Oral Nutrition Promotion: physical activity promoted  Taken 6/7/2024 2000 by Alexx Adkins RN  Oral Nutrition Promotion: physical activity promoted  Goal:  Effective Oxygenation and Ventilation  Outcome: Progressing  Intervention: Promote Airway Secretion Clearance  Recent Flowsheet Documentation  Taken 6/8/2024 0000 by Alexx Adkins RN  Cough And Deep Breathing: done independently per patient  Activity Management: (given Pain medication before ambulation with OT) sitting, edge of bed  Taken 6/7/2024 2000 by Alexx Adkins RN  Cough And Deep Breathing: done independently per patient  Activity Management: (given Pain medication before ambulation with OT) sitting, edge of bed  Intervention: Optimize Oxygenation and Ventilation  Recent Flowsheet Documentation  Taken 6/8/2024 0000 by Alexx Adkins RN  Head of Bed (HOB) Positioning: HOB at 20-30 degrees  Taken 6/7/2024 2000 by Alexx Adkins RN  Head of Bed (HOB) Positioning: HOB at 20-30 degrees  Goal: Effective Breathing Pattern During Sleep  Outcome: Progressing  Intervention: Monitor and Manage Obstructive Sleep Apnea  Recent Flowsheet Documentation  Taken 6/8/2024 0000 by Alexx Adkins RN  Medication Review/Management: medications reviewed  Taken 6/7/2024 2000 by Alexx Adkins RN  Medication Review/Management: medications reviewed     Problem: Infection  Goal: Absence of Infection Signs and Symptoms  Outcome: Not Progressing   Goal Outcome Evaluation:    Heart Failure Care Map  GOALS TO BE MET BEFORE DISCHARGE:    1. Decrease congestion and/or edema with diuretic therapy to achieve near optimal volume status.     Dyspnea improved: Yes, satisfactory for discharge.   Edema improved: Yes, satisfactory for discharge.        Last 24 hour I/O:   Intake/Output Summary (Last 24 hours) at 6/8/2024 0528  Last data filed at 6/8/2024 0421  Gross per 24 hour   Intake 1237 ml   Output 4525 ml   Net -3288 ml           Net I/O and Weights since admission:   05/09 0700 - 06/08 0659  In: 8585 [P.O.:7290; I.V.:795]  Out: 98295 [Urine:35645]  Net: -9340     Vitals:    06/03/24 1200  06/04/24 0045 06/04/24 0948 06/05/24 0343   Weight: 121.3 kg (267 lb 8 oz) 120.8 kg (266 lb 4.8 oz) 120.5 kg (265 lb 9.6 oz) 121.5 kg (267 lb 14.4 oz)    06/06/24 1018 06/07/24 0440 06/08/24 0300   Weight: 118.5 kg (261 lb 4.8 oz) 117.9 kg (259 lb 14.4 oz) 116.8 kg (257 lb 9.6 oz)       2.  O2 sats > 90% on room air, or at prior home O2 therapy level.      Able to wean O2 this shift to keep sats above 90%?: Yes, satisfactory for discharge.   Does patient use Home O2? No          Current oxygenation status:   SpO2: 96 %     O2 Device: None (Room air),      3.  Tolerates ambulation and mobility near baseline.     Ambulation: No, further care required to meet this goal. Please explain Remained in room for duration of HS/NOC.  Ambulation around room throughout shift.   Times patient ambulated with staff this shift: 0.    Cardiac:  NSR with BBB, rate 80-90's.     Respiratory:  Rate 16-20, non-labored.  Sat's: Maintaining mid 90's at RA.  LS: Exp wheezes throughout all fields bilat..  Diminished LLL.  Neuro:  WNL.  GI:  Passing flatus.  No BM for HS/NOC.  Refused Poly Eth glycol for NOC.  BS: Hypoactive X4 quadrants.  :  Good UOP (see I/O).  Down +2 lbs over last 24 hours.  Wound:  Bilat LE weeping minimal serous fluid.  Dusky, jeremias skin color.  C/o sites itching.  Denies. Pain.  Pain:  Denies.  Ambulation:  Up ad maria luisa in room, independently.  Labs:  AM Labs pending.  Plan:  Monitor bilat LE wound sites. IV Abx.  Monitor blood sugar levels.  Diuresis.      Please review the Heart Failure Care Map for additional HF goal outcomes.    Alexx Adkins RN  6/8/2024

## 2024-06-08 NOTE — TREATMENT PLAN
"/58 (BP Location: Right arm)   Pulse 87   Temp 97.6  F (36.4  C) (Oral)   Resp 18   Ht 1.676 m (5' 6\")   Wt 116.8 kg (257 lb 9.6 oz)   SpO2 93%   BMI 41.58 kg/m          Allergies   Allergen Reactions    Bumex [Bumetanide] Muscle Pain (Myalgia)        Intake/Output Summary (Last 24 hours) at 6/8/2024 1214  Last data filed at 6/8/2024 1100  Gross per 24 hour   Intake 1182 ml   Output 3675 ml   Net -2493 ml      RCAT Treatment Plan    Patient Score: 9    Patient Acuity: 4    Clinical Indication for Therapy: copd - home regimen.     Therapy Ordered: Will change scheduled Douneb to prn. Will begin scheduled MDI of Albuteorl QID.  Pt currently familiar with MDI use - has prn nebs and MDI at home.  IS QID. Encourage ambulation as tolerated.     Assessment Summary: Pt alert and oriented. No cough. Good voicing. Pt speaks in complete sentences. No accessory muscle use. No nasal flaring. No retractions. Pt denies SOB. Pt on RA. Breath sounds decreased with some exp wheezing noted, no change post tx. Will have pt use scheduled MDI.         Elder Tariq, RT   6/8/2024         "

## 2024-06-08 NOTE — PROGRESS NOTES
[facility]    Medicine Progress Note - Hospitalist Service    Date of Admission:  6/3/2024    Assessment & Plan   63 year old male with history of atrial fibrillation, CKD 3, CAD s/p CABG, ischemic cardiomyopathy, HTN, CHF, HLD, COPD, recently diagnosed DM2, tobacco use disorder, restless leg syndrome, s/p left LARON, recent hospitalization for CHF exacerbation May 13-20 , admitted on 6/3/2024 with acute on chronic diastolic and systolic CHF exacerbation     Acute on chronic CHF with biventricular dysfunction.  Recent hospitalization for CHF and COPD exacerbation last month.  States compliance with sodium restricted diet and medications.  Cardiac monitoring/mems at home.  Discharge weight 227.5 LBS per DC summary.  Floor scale weight on admission 265.6 LBS.  proBNP normal    Echo 5/1/2024: LVEF ~55%.  -transitioned to torsemide from IV lasix 6/7  -currently holding PTA spironolactone    -Continue home Coreg, hydralazine.  -cardiology following  -continue current torsemide     Acute respiratory failure with hypoxia and hypercarbia-resolved, likely due to CHF exacerbation.  VBG 7.3 8/23/60.  Required BiPAP in ER, was quickly weaned off BiPAP.  -PE, pulmonary infiltrates, pleural effusion were ruled out by CTA chest.       Coronary disease, s/p CABG in 2015. Chronically mild elevated troponin.  Coronary angiogram 5/2/2024:    Ost LAD to Prox LAD lesion is 100% stenosed.    Ramus lesion is 95% stenosed.    Mid Cx to Dist Cx lesion is 80% stenosed.    1st Mrg lesion is 90% stenosed.    Prox RCA to Dist RCA lesion is 100% stenosed.    Mid LAD lesion is 70% stenosed.  -Cardiology recommended 75 mg of daily Plavix indefinitely while on Eliquis, as well as baby aspirin.  -Continue PTA Imdur 60 mg, as needed nitroglycerin.  -Mild elevated troponin 35, likely due to demand ischemia of CHF   -Cardiology following        Hypocalcemia, likely due to vitamin D deficiency.  Hypercalcemia noted during recent  "hospitalization.  Total vitamin D low at 17.  PTH RP less than 2 on 5/20/2024.  - resolved        Paroxysmal atrial fibrillation with persistent RVR.  -140s.  Initially responded to BiPAP treatment and diuresis.  TSH on 4/29/2024 normal at 1.7.  -One-time dose IV metoprolol 5 mg 6/3  -Amiodarone load 6/4, per cardiology  -Continue PTA Coreg and amiodarone  -Cardiology  EP consult on 6/4, recommended patient will be candidate for catheter ablation after optimization of his CHF.  - plan outpatient follow up        Thrombocytopenia - now resolved. Unclear etiology but could've been dilutional. PLT now normal.        IDDM, diagnosed earlier this month.  A1c 7.8.  -PTA 12 units of Lantus X 2 days after DC, per discharge summary from 5/20.  -Accu-Cheks, sliding scale insulin + prandial novolog. Was hyperglycemic then with BG up to  447 in setting of IV steroids.  -Now on p.o. prednisone 40 mg.  -continue current insulin management   - monitor for need for dose reduction when steroids are completed after 6/9     Pulmonary nodule, RLL.  Described as \"tiny\".  -Patient is at high risk for pulmonary malignancy, given tobacco use disorder.  -Follow-up CT chest in 12 months.     COPD with acute exacerbation.  Active smoking likely contributing.  CT chest negative for pulmonary infiltrates.  -Solu-Medrol, DuoNeb 4 times daily, as needed albuterol neb.  Solu-Medrol was discontinued 6/4, due to hyperglycemia.  On 40 mg of prednisone now, will plan 5 day burst without taper  -Smoking cessation.     Tobacco use disorder.  -Nicotine replacement with patch.  -Counseling on smoking cessation.  Patient stated no interest in smoking cessation.     CKD3.  Possibly secondary to cardiorenal process.  Creatinine within previous baseline   Diuresing as above.  -Monitor renal function daily       Venous stasis dermatitis unlikely cellulitis  Stasis ulcers of bilateral lower legs.    1/2 bottles BC from 6/3/2024 resulted in staph hominis " "on incubation day #3 and is likely contaminant. Patient was afebrile, with normal WBC on mutliple checks. Repeat blood cultures NGTD  Numerous stasis ulcers.  Patient unable to wrap legs at home.  -on Cefazolin since admission, continue for now and plan discharge on short course keflex  -WOC following, recommendations reviewed, added critic Aid.  -Checked ESR which is elevated and rather nonsepcific given above issues       Headache: improved  - reduced dose of nicotine patch, continue tylenol, add prn tramadol       Diet: Fluid restriction 1800 ML FLUID (and additional linked orders)  Moderate Consistent Carb (60 g CHO per Meal) Diet    DVT Prophylaxis: DOAC  Ruvalcaba Catheter: Not present  Lines: None     Cardiac Monitoring: ACTIVE order. Indication: Acute decompensated heart failure (48 hours)  Code Status: Full Code      Clinically Significant Risk Factors         # Hypernatremia: Highest Na = 146 mmol/L in last 2 days, will monitor as appropriate          # Hypertension: Noted on problem list    # Acute heart failure with reduced ejection fraction: last echo with EF <40% and receiving IV diuretics            # DMII: A1C = 7.8 % (Ref range: <5.7 %) within past 6 months   # Severe Obesity: Estimated body mass index is 41.58 kg/m  as calculated from the following:    Height as of this encounter: 1.676 m (5' 6\").    Weight as of this encounter: 116.8 kg (257 lb 9.6 oz).        # Financial/Environmental Concerns: none         Disposition Plan   Medically Ready for Discharge: Anticipated Tomorrow      Lakhwinder Huff DO  Hospitalist Service  [facility]  Securely message with [employer] (more info)  Text page via [employer] Paging/Directory   ______________________________________________________________________    Interval History   NAD. Headaches improved    Physical Exam   Vital Signs: Temp: 97.6  F (36.4  C) Temp src: Oral BP: 114/58 Pulse: 87   Resp: 18 SpO2: 93 % O2 Device: None (Room air)  "   Weight: 257 lbs 9.6 oz  General: NAD  RESPIRATORY: Breathing nonlabored  CARDIOVASCULAR: Trace le edema bilat.   NEUROLOGIC: Motor and sensory intact, speech clear        >45 MINUTES SPENT BY ME on the date of service doing chart review, history, exam, documentation & further activities per the note.  Data

## 2024-06-08 NOTE — PROGRESS NOTES
"  HEART CARE CONSULTATON NOTE        Assessment/Recommendations   Assessment/Plan:  Acute on chronic congestive heart failure with improved ejection fraction  Severe biventricular dysfunction  Atrial fibrillation now on amiodarone, intermittent controlled heart rate  Coronary artery disease status post bypass 2015, recent PCI of ramus and OM  CKD  Diabetes mellitus type 2    Plan:   Continue amiodarone for atrial fibrillation  2. Continue carvedilol 3.125 mg twice daily  3. hydralazine 3 times daily  4. Continue Isordil mononitrate  5.  Continue torsemide 100 mg twice daily.  6.  Patient remains on IV antibiotics for lower extremity cellulitis    Discharge 1 to 2 days    Clinically Significant Risk Factors         # Hypernatremia: Highest Na = 146 mmol/L in last 2 days, will monitor as appropriate              # Hypertension: Noted on problem list  # Acute heart failure with reduced ejection fraction: last echo with EF <40% and receiving IV diuretics              # DMII: A1C = 7.8 % (Ref range: <5.7 %) within past 6 months       # Severe Obesity: Estimated body mass index is 41.58 kg/m  as calculated from the following:    Height as of this encounter: 1.676 m (5' 6\").    Weight as of this encounter: 116.8 kg (257 lb 9.6 oz).          # Financial/Environmental Concerns: none                 History of Present Illness/Subjective    HPI: Zackary Hutchison is a 63 year old male complex cardiac history who presented to [facility] [place] with severe lower extremity swelling, cellulitis, decompensated heart failure with diuresed relatively well.  Lower extreme edema is improving.  Continues to have dyspnea on exertion but does note improvement in distance before having to rest.  No active chest pain.  Pain in lower extremity is improving.  Remains on IV antibiotics.    Reviewed telemetry demonstrates intermittent atrial fibrillation.  Currently in sinus rhythm.  Remains in amiodarone.  EP notes reviewed.  Reviewed note from " "Dr. Bhandari          Lab Results    Chemistry/lipid CBC Cardiac Enzymes/BNP/TSH/INR   Recent Labs   Lab Test 05/02/24  1620   CHOL 121   HDL 27*   LDL 50   TRIG 219*     Recent Labs   Lab Test 05/02/24  1620   LDL 50     Recent Labs   Lab Test 06/08/24  1117 06/08/24  0731 06/08/24  0455   NA  --   --  142   POTASSIUM  --   --  3.6   CHLORIDE  --   --  98   CO2  --   --  30*   *   < > 120*   BUN  --   --  38.2*   CR  --   --  1.20*   GFRESTIMATED  --   --  67   RUSLAN  --   --  10.1    < > = values in this interval not displayed.     Recent Labs   Lab Test 06/08/24  0455 06/07/24  0449 06/06/24  0512   CR 1.20* 1.13 1.04     Recent Labs   Lab Test 04/29/24  1126   A1C 7.8*          Recent Labs   Lab Test 06/08/24  0455   WBC 7.7   HGB 11.6*   HCT 38.2*   MCV 94        Recent Labs   Lab Test 06/08/24  0455 06/06/24  0512 06/05/24  0521   HGB 11.6* 11.2* 10.1*    No results for input(s): \"TROPONINI\" in the last 77189 hours.  Recent Labs   Lab Test 06/03/24  0553 05/13/24  0709 04/29/24  1126   NTBNPI 558 197 160     Recent Labs   Lab Test 04/29/24  1126   TSH 1.70     Recent Labs   Lab Test 06/03/24  0553 05/13/24  0709 02/13/23  2307   INR 0.97 1.30* 1.01        Medical History  Surgical History Family History Social History   Past Medical History:   Diagnosis Date    Atrial fibrillation (H)     Chronic kidney disease     Chronic kidney disease, stage 3b (H) 09/28/2023    Class 3 severe obesity due to excess calories with body mass index (BMI) of 40.0 to 44.9 in adult (H) 09/28/2023    Congestive heart failure (H)     COPD (chronic obstructive pulmonary disease) (H)     Coronary artery disease involving coronary bypass graft of native heart without angina pectoris 09/28/2023    Heart failure with reduced ejection fraction (H) 09/11/2023    HFrEF (heart failure with reduced ejection fraction) (H) 09/11/2023    Hyperlipidemia     Hypertension     Ischemic cardiomyopathy 09/28/2023    Obese     Paroxysmal " atrial fibrillation (H) 09/28/2023    Tobacco abuse 09/28/2023     Past Surgical History:   Procedure Laterality Date    CORONARY ANGIOGRAPHY ADULT ORDER      CORONARY ARTERY BYPASS Left 2014    2 vessels    CV CARDIOMEMS WITH RIGHT HEART CATH N/A 2/15/2024    Procedure: Pulmonary Arterial Pressure Sensor Placement;  Surgeon: Jacey Bhandari MD;  Location: [facility]    CV CORONARY ANGIOGRAM N/A 09/11/2023    Procedure: Coronary Angiogram;  Surgeon: Santy Esposito MD;  Location: [facility]    CV CORONARY ANGIOGRAM N/A 5/2/2024    Procedure: CV CORONARY ANGIOGRAM;  Surgeon: Santy Esposito MD;  Location: [facility]    CV LEFT HEART CATH N/A 09/11/2023    Procedure: Left Heart Catheterization;  Surgeon: Santy Esposito MD;  Location: [facility]    CV LEFT HEART CATH N/A 5/2/2024    Procedure: Left Heart Catheterization;  Surgeon: Santy Esposito MD;  Location: [facility]    CV PCI ANGIOPLASTY N/A 5/2/2024    Procedure: Percutaneous Transluminal Angioplasty;  Surgeon: Santy Esposito MD;  Location: [facility]    CV RIGHT HEART CATH MEASUREMENTS RECORDED N/A 09/11/2023    Procedure: Right Heart Catheterization;  Surgeon: Santy Esposito MD;  Location: [facility]     Family History   Problem Relation Age of Onset    Cerebrovascular Disease Mother     Myocardial Infarction Father         Social History     Socioeconomic History    Marital status: Single     Spouse name: Not on file    Number of children: Not on file    Years of education: Not on file    Highest education level: Not on file   Occupational History    Occupation: Retired restaurant owner     Comment: [employer]   Tobacco Use    Smoking status: Every Day     Current packs/day: 0.50     Types: Cigarettes    Smokeless tobacco: Never    Tobacco comments:     Seen IP by [facility] on 9/11/23 and declined cessation services and materials   Substance and Sexual Activity    Alcohol use:  Not Currently    Drug use: Not on file    Sexual activity: Not on file   Other Topics Concern    Not on file   Social History Narrative    Currently lives in an assisted living facility. (Updated: 01/08/2024)     Social Determinants of Health     Financial Resource Strain: Not on file   Food Insecurity: Not on file   Transportation Needs: Not on file   Physical Activity: Not on file   Stress: Not on file   Social Connections: Not on file   Interpersonal Safety: Not on file   Housing Stability: Not on file         Medications  Allergies   No current outpatient medications on file.        Allergies   Allergen Reactions    Bumex [Bumetanide] Muscle Pain (Myalgia)         Anthony Husain,

## 2024-06-08 NOTE — PLAN OF CARE
"  Problem: Adult Inpatient Plan of Care  Goal: Plan of Care Review  Description: The Plan of Care Review/Shift note should be completed every shift.  The Outcome Evaluation is a brief statement about your assessment that the patient is improving, declining, or no change.  This information will be displayed automatically on your shift  note.  Outcome: Progressing  Goal: Patient-Specific Goal (Individualized)  Description: You can add care plan individualizations to a care plan. Examples of Individualization might be:  \"Parent requests to be called daily at 9am for status\", \"I have a hard time hearing out of my right ear\", or \"Do not touch me to wake me up as it startles  me\".  Outcome: Progressing  Goal: Absence of Hospital-Acquired Illness or Injury  Outcome: Progressing  Intervention: Identify and Manage Fall Risk  Recent Flowsheet Documentation  Taken 6/8/2024 1824 by Juli Uriarte, RN  Safety Promotion/Fall Prevention:   activity supervised   room organization consistent   supervised activity   nonskid shoes/slippers when out of bed   increase visualization of patient   clutter free environment maintained  Taken 6/8/2024 1337 by Juli Uriarte, RN  Safety Promotion/Fall Prevention:   activity supervised   room organization consistent   supervised activity   nonskid shoes/slippers when out of bed   increase visualization of patient   clutter free environment maintained  Taken 6/8/2024 0752 by Juli Uriarte, RN  Safety Promotion/Fall Prevention:   activity supervised   room organization consistent   supervised activity   nonskid shoes/slippers when out of bed   increase visualization of patient   clutter free environment maintained  Intervention: Prevent Skin Injury  Recent Flowsheet Documentation  Taken 6/8/2024 1824 by Juli Uriarte, RN  Body Position: position changed independently  Skin Protection:   adhesive use limited   protective footwear used  Device Skin Pressure Protection:   adhesive use " limited   pressure points protected  Taken 6/8/2024 1337 by Juli Uriarte RN  Body Position: position changed independently  Skin Protection:   adhesive use limited   protective footwear used  Device Skin Pressure Protection:   adhesive use limited   pressure points protected  Taken 6/8/2024 0752 by Juli Uriarte RN  Body Position: position changed independently  Skin Protection:   adhesive use limited   protective footwear used  Device Skin Pressure Protection:   adhesive use limited   pressure points protected  Intervention: Prevent Infection  Recent Flowsheet Documentation  Taken 6/8/2024 1824 by Juli Uriarte RN  Infection Prevention:   hand hygiene promoted   personal protective equipment utilized   rest/sleep promoted   single patient room provided   visitors restricted/screened  Taken 6/8/2024 1337 by Juli Uriarte RN  Infection Prevention:   hand hygiene promoted   personal protective equipment utilized   rest/sleep promoted   single patient room provided   visitors restricted/screened  Taken 6/8/2024 0752 by Juli Uriarte RN  Infection Prevention:   hand hygiene promoted   personal protective equipment utilized   rest/sleep promoted   single patient room provided   visitors restricted/screened  Goal: Optimal Comfort and Wellbeing  Outcome: Progressing  Intervention: Monitor Pain and Promote Comfort  Recent Flowsheet Documentation  Taken 6/8/2024 1551 by Juli Uriarte RN  Pain Management Interventions: medication (see MAR)  Taken 6/8/2024 1300 by Juli Uriarte RN  Pain Management Interventions: medication (see MAR)  Taken 6/8/2024 0756 by Juli Uriarte RN  Pain Management Interventions: declines   Goal Outcome Evaluation:  Was up and walked in the hallway with OT  tolerated the activity well had to give Robaxin post the activity  . Walked to the bathroom and had a shower and the only struggle we had was getting in and out of the tub otherwise he was independent  .

## 2024-06-09 ENCOUNTER — APPOINTMENT (OUTPATIENT)
Dept: OCCUPATIONAL THERAPY | Facility: HOSPITAL | Age: 64
DRG: 291 | End: 2024-06-09
Payer: MEDICARE

## 2024-06-09 LAB
ANION GAP SERPL CALCULATED.3IONS-SCNC: 12 MMOL/L (ref 7–15)
BACTERIA BLD CULT: ABNORMAL
BACTERIA BLD CULT: ABNORMAL
BUN SERPL-MCNC: 44.5 MG/DL (ref 8–23)
CALCIUM SERPL-MCNC: 9.6 MG/DL (ref 8.8–10.2)
CHLORIDE SERPL-SCNC: 96 MMOL/L (ref 98–107)
CREAT SERPL-MCNC: 1.25 MG/DL (ref 0.67–1.17)
DEPRECATED HCO3 PLAS-SCNC: 31 MMOL/L (ref 22–29)
EGFRCR SERPLBLD CKD-EPI 2021: 64 ML/MIN/1.73M2
GLUCOSE BLDC GLUCOMTR-MCNC: 111 MG/DL (ref 70–99)
GLUCOSE BLDC GLUCOMTR-MCNC: 181 MG/DL (ref 70–99)
GLUCOSE BLDC GLUCOMTR-MCNC: 215 MG/DL (ref 70–99)
GLUCOSE BLDC GLUCOMTR-MCNC: 94 MG/DL (ref 70–99)
GLUCOSE SERPL-MCNC: 107 MG/DL (ref 70–99)
MAGNESIUM SERPL-MCNC: 2.1 MG/DL (ref 1.7–2.3)
POTASSIUM SERPL-SCNC: 3.7 MMOL/L (ref 3.4–5.3)
SODIUM SERPL-SCNC: 139 MMOL/L (ref 135–145)

## 2024-06-09 PROCEDURE — 250N000013 HC RX MED GY IP 250 OP 250 PS 637: Performed by: INTERNAL MEDICINE

## 2024-06-09 PROCEDURE — 97110 THERAPEUTIC EXERCISES: CPT | Mod: GO

## 2024-06-09 PROCEDURE — 99232 SBSQ HOSP IP/OBS MODERATE 35: CPT | Performed by: INTERNAL MEDICINE

## 2024-06-09 PROCEDURE — 250N000013 HC RX MED GY IP 250 OP 250 PS 637: Performed by: NURSE PRACTITIONER

## 2024-06-09 PROCEDURE — 999N000157 HC STATISTIC RCP TIME EA 10 MIN

## 2024-06-09 PROCEDURE — 250N000011 HC RX IP 250 OP 636: Mod: JZ | Performed by: INTERNAL MEDICINE

## 2024-06-09 PROCEDURE — 36415 COLL VENOUS BLD VENIPUNCTURE: CPT | Performed by: INTERNAL MEDICINE

## 2024-06-09 PROCEDURE — 97535 SELF CARE MNGMENT TRAINING: CPT | Mod: GO

## 2024-06-09 PROCEDURE — 120N000004 HC R&B MS OVERFLOW

## 2024-06-09 PROCEDURE — 250N000009 HC RX 250: Performed by: INTERNAL MEDICINE

## 2024-06-09 PROCEDURE — 94640 AIRWAY INHALATION TREATMENT: CPT

## 2024-06-09 PROCEDURE — 80048 BASIC METABOLIC PNL TOTAL CA: CPT | Performed by: INTERNAL MEDICINE

## 2024-06-09 PROCEDURE — 83735 ASSAY OF MAGNESIUM: CPT | Performed by: INTERNAL MEDICINE

## 2024-06-09 PROCEDURE — 94640 AIRWAY INHALATION TREATMENT: CPT | Mod: 76

## 2024-06-09 PROCEDURE — P9047 ALBUMIN (HUMAN), 25%, 50ML: HCPCS | Mod: JZ | Performed by: INTERNAL MEDICINE

## 2024-06-09 PROCEDURE — 250N000012 HC RX MED GY IP 250 OP 636 PS 637: Performed by: INTERNAL MEDICINE

## 2024-06-09 RX ORDER — OXYCODONE HYDROCHLORIDE 5 MG/1
5 TABLET ORAL EVERY 4 HOURS PRN
Status: DISCONTINUED | OUTPATIENT
Start: 2024-06-09 | End: 2024-06-10 | Stop reason: HOSPADM

## 2024-06-09 RX ORDER — METHOCARBAMOL 750 MG/1
750 TABLET, FILM COATED ORAL 4 TIMES DAILY PRN
Status: DISCONTINUED | OUTPATIENT
Start: 2024-06-09 | End: 2024-06-10 | Stop reason: HOSPADM

## 2024-06-09 RX ADMIN — HYDRALAZINE HYDROCHLORIDE 10 MG: 10 TABLET, FILM COATED ORAL at 13:53

## 2024-06-09 RX ADMIN — IPRATROPIUM BROMIDE AND ALBUTEROL SULFATE 3 ML: .5; 3 SOLUTION RESPIRATORY (INHALATION) at 07:59

## 2024-06-09 RX ADMIN — ROPINIROLE HYDROCHLORIDE 2 MG: 1 TABLET, FILM COATED ORAL at 16:05

## 2024-06-09 RX ADMIN — ROPINIROLE HYDROCHLORIDE 2 MG: 1 TABLET, FILM COATED ORAL at 08:29

## 2024-06-09 RX ADMIN — INSULIN ASPART 12 UNITS: 100 INJECTION, SOLUTION INTRAVENOUS; SUBCUTANEOUS at 18:21

## 2024-06-09 RX ADMIN — OXYCODONE HYDROCHLORIDE 5 MG: 5 TABLET ORAL at 10:54

## 2024-06-09 RX ADMIN — CARVEDILOL 3.12 MG: 3.12 TABLET, FILM COATED ORAL at 08:27

## 2024-06-09 RX ADMIN — AMIODARONE HYDROCHLORIDE 200 MG: 200 TABLET ORAL at 08:27

## 2024-06-09 RX ADMIN — POLYETHYLENE GLYCOL 3350 17 G: 17 POWDER, FOR SOLUTION ORAL at 08:30

## 2024-06-09 RX ADMIN — CARVEDILOL 3.12 MG: 3.12 TABLET, FILM COATED ORAL at 17:21

## 2024-06-09 RX ADMIN — NICOTINE 1 PATCH: 14 PATCH, EXTENDED RELEASE TRANSDERMAL at 08:28

## 2024-06-09 RX ADMIN — PANTOPRAZOLE SODIUM 40 MG: 40 TABLET, DELAYED RELEASE ORAL at 08:27

## 2024-06-09 RX ADMIN — CEFAZOLIN SODIUM 2 G: 2 INJECTION, SOLUTION INTRAVENOUS at 13:43

## 2024-06-09 RX ADMIN — IPRATROPIUM BROMIDE AND ALBUTEROL SULFATE 3 ML: .5; 3 SOLUTION RESPIRATORY (INHALATION) at 11:31

## 2024-06-09 RX ADMIN — PREDNISONE 40 MG: 20 TABLET ORAL at 08:28

## 2024-06-09 RX ADMIN — APIXABAN 5 MG: 5 TABLET, FILM COATED ORAL at 08:28

## 2024-06-09 RX ADMIN — OXYCODONE HYDROCHLORIDE 5 MG: 5 TABLET ORAL at 02:02

## 2024-06-09 RX ADMIN — HYDRALAZINE HYDROCHLORIDE 10 MG: 10 TABLET, FILM COATED ORAL at 21:50

## 2024-06-09 RX ADMIN — INSULIN ASPART 13 UNITS: 100 INJECTION, SOLUTION INTRAVENOUS; SUBCUTANEOUS at 07:32

## 2024-06-09 RX ADMIN — ISOSORBIDE MONONITRATE 180 MG: 60 TABLET, EXTENDED RELEASE ORAL at 08:29

## 2024-06-09 RX ADMIN — POLYETHYLENE GLYCOL 3350 17 G: 17 POWDER, FOR SOLUTION ORAL at 21:49

## 2024-06-09 RX ADMIN — ROPINIROLE HYDROCHLORIDE 2 MG: 1 TABLET, FILM COATED ORAL at 11:58

## 2024-06-09 RX ADMIN — TORSEMIDE 100 MG: 100 TABLET ORAL at 17:21

## 2024-06-09 RX ADMIN — CEFAZOLIN SODIUM 2 G: 2 INJECTION, SOLUTION INTRAVENOUS at 21:49

## 2024-06-09 RX ADMIN — INSULIN ASPART 10 UNITS: 100 INJECTION, SOLUTION INTRAVENOUS; SUBCUTANEOUS at 12:17

## 2024-06-09 RX ADMIN — ATORVASTATIN CALCIUM 40 MG: 40 TABLET, FILM COATED ORAL at 08:27

## 2024-06-09 RX ADMIN — CLOPIDOGREL BISULFATE 75 MG: 75 TABLET ORAL at 08:28

## 2024-06-09 RX ADMIN — ROPINIROLE HYDROCHLORIDE 2 MG: 1 TABLET, FILM COATED ORAL at 21:50

## 2024-06-09 RX ADMIN — POTASSIUM CHLORIDE 40 MEQ: 1500 TABLET, EXTENDED RELEASE ORAL at 21:50

## 2024-06-09 RX ADMIN — TORSEMIDE 100 MG: 100 TABLET ORAL at 08:28

## 2024-06-09 RX ADMIN — IPRATROPIUM BROMIDE AND ALBUTEROL SULFATE 3 ML: .5; 3 SOLUTION RESPIRATORY (INHALATION) at 20:01

## 2024-06-09 RX ADMIN — MICONAZOLE NITRATE: 20 OINTMENT TOPICAL at 08:29

## 2024-06-09 RX ADMIN — HYDRALAZINE HYDROCHLORIDE 10 MG: 10 TABLET, FILM COATED ORAL at 08:27

## 2024-06-09 RX ADMIN — TRAMADOL HYDROCHLORIDE 25 MG: 50 TABLET, COATED ORAL at 08:36

## 2024-06-09 RX ADMIN — SENNOSIDES AND DOCUSATE SODIUM 2 TABLET: 8.6; 5 TABLET ORAL at 08:33

## 2024-06-09 RX ADMIN — APIXABAN 5 MG: 5 TABLET, FILM COATED ORAL at 21:50

## 2024-06-09 RX ADMIN — CEFAZOLIN SODIUM 2 G: 2 INJECTION, SOLUTION INTRAVENOUS at 06:17

## 2024-06-09 RX ADMIN — POTASSIUM CHLORIDE 40 MEQ: 1500 TABLET, EXTENDED RELEASE ORAL at 08:27

## 2024-06-09 RX ADMIN — AMIODARONE HYDROCHLORIDE 200 MG: 200 TABLET ORAL at 21:50

## 2024-06-09 RX ADMIN — IPRATROPIUM BROMIDE AND ALBUTEROL SULFATE 3 ML: .5; 3 SOLUTION RESPIRATORY (INHALATION) at 15:56

## 2024-06-09 RX ADMIN — ALBUMIN HUMAN 50 G: 0.25 SOLUTION INTRAVENOUS at 07:33

## 2024-06-09 ASSESSMENT — ACTIVITIES OF DAILY LIVING (ADL)
ADLS_ACUITY_SCORE: 27

## 2024-06-09 NOTE — PLAN OF CARE
Problem: Adult Inpatient Plan of Care  Goal: Plan of Care Review  Description: The Plan of Care Review/Shift note should be completed every shift.  The Outcome Evaluation is a brief statement about your assessment that the patient is improving, declining, or no change.  This information will be displayed automatically on your shift  note.  Outcome: Progressing  Goal: Absence of Hospital-Acquired Illness or Injury  Outcome: Progressing  Intervention: Identify and Manage Fall Risk  Recent Flowsheet Documentation  Taken 6/9/2024 1636 by Juli Uriarte RN  Safety Promotion/Fall Prevention:   activity supervised   room organization consistent   supervised activity   nonskid shoes/slippers when out of bed   increase visualization of patient   clutter free environment maintained  Taken 6/9/2024 1243 by Juli Uriarte RN  Safety Promotion/Fall Prevention:   activity supervised   room organization consistent   supervised activity   nonskid shoes/slippers when out of bed   increase visualization of patient   clutter free environment maintained  Taken 6/9/2024 0744 by Juli Uriarte RN  Safety Promotion/Fall Prevention:   activity supervised   room organization consistent   supervised activity   nonskid shoes/slippers when out of bed   increase visualization of patient   clutter free environment maintained  Intervention: Prevent Skin Injury  Recent Flowsheet Documentation  Taken 6/9/2024 1636 by Juli Uriarte RN  Body Position: position changed independently  Skin Protection:   adhesive use limited   protective footwear used  Device Skin Pressure Protection:   adhesive use limited   pressure points protected  Taken 6/9/2024 1243 by Juli Uriarte RN  Body Position: position changed independently  Skin Protection:   adhesive use limited   protective footwear used  Device Skin Pressure Protection:   adhesive use limited   pressure points protected  Taken 6/9/2024 0744 by Juli Uriarte RN  Body  Position: position changed independently  Skin Protection:   adhesive use limited   protective footwear used  Device Skin Pressure Protection:   adhesive use limited   pressure points protected  Intervention: Prevent Infection  Recent Flowsheet Documentation  Taken 6/9/2024 1636 by Juli Uriarte RN  Infection Prevention:   hand hygiene promoted   personal protective equipment utilized   rest/sleep promoted   single patient room provided   visitors restricted/screened  Taken 6/9/2024 1243 by Juli Uriarte RN  Infection Prevention:   hand hygiene promoted   personal protective equipment utilized   rest/sleep promoted   single patient room provided   visitors restricted/screened  Taken 6/9/2024 0744 by Juli Uriarte, RN  Infection Prevention:   hand hygiene promoted   personal protective equipment utilized   rest/sleep promoted   single patient room provided   visitors restricted/screened  Goal: Optimal Comfort and Wellbeing  Outcome: Progressing  Intervention: Monitor Pain and Promote Comfort  Recent Flowsheet Documentation  Taken 6/9/2024 1054 by Juli Uriarte, RN  Pain Management Interventions: medication (see MAR)   Goal Outcome Evaluation:  Up in the hallway and tolerated the activity well  required pain medication post ambulation Dressing on both LE done  The current medical regimen is effective;  continue present plan and medications.  complained of hip pain 8/10 with relief from Oxycodone 5 mg

## 2024-06-09 NOTE — PROGRESS NOTES
"  HEART CARE CONSULTATON NOTE        Assessment/Recommendations   Assessment/Plan:  Acute on chronic congestive heart failure with improved ejection fraction  Severe biventricular dysfunction  Atrial fibrillation now on amiodarone, intermittent controlled heart rate  Coronary artery disease status post bypass 2015, recent PCI of ramus and OM  CKD  Diabetes mellitus type 2    Plan:   Continue amiodarone for atrial fibrillation  2.  Continue carvedilol 3.125 mg twice daily  3.  hydralazine 3 times daily  4.  Continue Isordil mononitrate  5.  Continue torsemide 100 mg twice daily.  6.  Patient remains on IV antibiotics for lower extremity cellulitis    Discharge 1 to 2 days    Clinically Significant Risk Factors                      # Hypertension: Noted on problem list    # Acute heart failure with reduced ejection fraction: last echo with EF <40% and receiving IV diuretics              # DMII: A1C = 7.8 % (Ref range: <5.7 %) within past 6 months       # Severe Obesity: Estimated body mass index is 41.34 kg/m  as calculated from the following:    Height as of this encounter: 1.676 m (5' 6\").    Weight as of this encounter: 116.2 kg (256 lb 1.6 oz).          # Financial/Environmental Concerns: none                 History of Present Illness/Subjective    HPI: Zackary Hutchison is a 63 year old male complex cardiac history who presented to [facility] with severe lower extremity swelling, cellulitis, decompensated heart failure with diuresed relatively well.  Lower extreme edema is improving.  Continues to have dyspnea on exertion but does note improvement in distance before having to rest.  No active chest pain.  Pain in lower extremity is improving.  Remains on IV antibiotics.    S: Remaining in sinus rhythm.  Remains in amiodarone.  Cr: 1.2 today. Severe pain in lower ext.      Echo:   1. Technically difficult study.  2. The left ventricle is normal in size. Image quality does not provide for  detailed assessment " "of LV systolic function, but is felt to be normal with a  visually estimated ejection fraction of roughly 55%.  3. No significant valvular heart disease is identified on this study though  the sensitivity, particularly of regurgitant lesions, is reduced due to poor  Doppler acoustics.  4. Right ventricular size and systolic performance could not be accurately  assessed due to inadequate visualization.             Lab Results    Chemistry/lipid CBC Cardiac Enzymes/BNP/TSH/INR   Recent Labs   Lab Test 05/02/24  1620   CHOL 121   HDL 27*   LDL 50   TRIG 219*     Recent Labs   Lab Test 05/02/24  1620   LDL 50     Recent Labs   Lab Test 06/08/24  1117 06/08/24  0731 06/08/24  0455   NA  --   --  142   POTASSIUM  --   --  3.6   CHLORIDE  --   --  98   CO2  --   --  30*   *   < > 120*   BUN  --   --  38.2*   CR  --   --  1.20*   GFRESTIMATED  --   --  67   RUSLAN  --   --  10.1    < > = values in this interval not displayed.     Recent Labs   Lab Test 06/08/24  0455 06/07/24  0449 06/06/24  0512   CR 1.20* 1.13 1.04     Recent Labs   Lab Test 04/29/24  1126   A1C 7.8*          Recent Labs   Lab Test 06/08/24  0455   WBC 7.7   HGB 11.6*   HCT 38.2*   MCV 94        Recent Labs   Lab Test 06/08/24  0455 06/06/24  0512 06/05/24  0521   HGB 11.6* 11.2* 10.1*    No results for input(s): \"TROPONINI\" in the last 09468 hours.  Recent Labs   Lab Test 06/03/24  0553 05/13/24  0709 04/29/24  1126   NTBNPI 558 197 160     Recent Labs   Lab Test 04/29/24  1126   TSH 1.70     Recent Labs   Lab Test 06/03/24  0553 05/13/24  0709 02/13/23  2307   INR 0.97 1.30* 1.01        Medical History  Surgical History Family History Social History   Past Medical History:   Diagnosis Date    Atrial fibrillation (H)     Chronic kidney disease     Chronic kidney disease, stage 3b (H) 09/28/2023    Class 3 severe obesity due to excess calories with body mass index (BMI) of 40.0 to 44.9 in adult (H) 09/28/2023    Congestive heart failure (H)     " COPD (chronic obstructive pulmonary disease) (H)     Coronary artery disease involving coronary bypass graft of native heart without angina pectoris 09/28/2023    Heart failure with reduced ejection fraction (H) 09/11/2023    HFrEF (heart failure with reduced ejection fraction) (H) 09/11/2023    Hyperlipidemia     Hypertension     Ischemic cardiomyopathy 09/28/2023    Obese     Paroxysmal atrial fibrillation (H) 09/28/2023    Tobacco abuse 09/28/2023     Past Surgical History:   Procedure Laterality Date    CORONARY ANGIOGRAPHY ADULT ORDER      CORONARY ARTERY BYPASS Left 2014    2 vessels    CV CARDIOMEMS WITH RIGHT HEART CATH N/A 2/15/2024    Procedure: Pulmonary Arterial Pressure Sensor Placement;  Surgeon: Jacey Bhandari MD;  Location: [facility]    CV CORONARY ANGIOGRAM N/A 09/11/2023    Procedure: Coronary Angiogram;  Surgeon: Santy Esposito MD;  Location: [facility]    CV CORONARY ANGIOGRAM N/A 5/2/2024    Procedure: CV CORONARY ANGIOGRAM;  Surgeon: Santy Esposito MD;  Location: [facility]    CV LEFT HEART CATH N/A 09/11/2023    Procedure: Left Heart Catheterization;  Surgeon: Santy Esposito MD;  Location: [facility]    CV LEFT HEART CATH N/A 5/2/2024    Procedure: Left Heart Catheterization;  Surgeon: Santy Esposito MD;  Location: [facility]    CV PCI ANGIOPLASTY N/A 5/2/2024    Procedure: Percutaneous Transluminal Angioplasty;  Surgeon: Santy Esposito MD;  Location: [facility]    CV RIGHT HEART CATH MEASUREMENTS RECORDED N/A 09/11/2023    Procedure: Right Heart Catheterization;  Surgeon: Santy Esposito MD;  Location: [facility]     Family History   Problem Relation Age of Onset    Cerebrovascular Disease Mother     Myocardial Infarction Father         Social History     Socioeconomic History    Marital status: Single     Spouse name: Not on file    Number of children: Not on file    Years of education: Not on file     Highest education level: Not on file   Occupational History    Occupation: Retired restaurant owner     Comment: [employer]   Tobacco Use    Smoking status: Every Day     Current packs/day: 0.50     Types: Cigarettes    Smokeless tobacco: Never    Tobacco comments:     Seen IP by [facility] on 9/11/23 and declined cessation services and materials   Substance and Sexual Activity    Alcohol use: Not Currently    Drug use: Not on file    Sexual activity: Not on file   Other Topics Concern    Not on file   Social History Narrative    Currently lives in an assisted living facility. (Updated: 01/08/2024)     Social Determinants of Health     Financial Resource Strain: Not on file   Food Insecurity: Not on file   Transportation Needs: Not on file   Physical Activity: Not on file   Stress: Not on file   Social Connections: Not on file   Interpersonal Safety: Not on file   Housing Stability: Not on file         Medications  Allergies   No current outpatient medications on file.        Allergies   Allergen Reactions    Bumex [Bumetanide] Muscle Pain (Myalgia)         Anthony Husain, DO

## 2024-06-09 NOTE — TREATMENT PLAN
[facility] Treatment Plan    Patient Score: 9  Patient Acuity: 4    Clinical Indication for Therapy: COPD- home regimen    Therapy Ordered: Albuterol inhaler QID    Assessment Summary:  Patient has a history of COPD. Patient denies SOB. BS expiratory wheezes.  Post neb: increase aeration. Patient requesting to continue nebulizer as patient feels inhalers are not helping. Patient states uses duo neb at home.    Plan: Albuterol inhaler PRN, DuoNeb QID    Arlene Bianchi, RT  6/8/2024

## 2024-06-09 NOTE — PROGRESS NOTES
[facility]    Medicine Progress Note - Hospitalist Service    Date of Admission:  6/3/2024    Assessment & Plan   63 year old male with history of atrial fibrillation, CKD 3, CAD s/p CABG, ischemic cardiomyopathy, HTN, CHF, HLD, COPD, recently diagnosed DM2, tobacco use disorder, restless leg syndrome, s/p left LARON, recent hospitalization for CHF exacerbation May 13-20 , admitted on 6/3/2024 with acute on chronic diastolic and systolic CHF exacerbation     Acute on chronic CHF with biventricular dysfunction.  Recent hospitalization for CHF and COPD exacerbation last month.  States compliance with sodium restricted diet and medications.  Cardiac monitoring/mems at home.  Discharge weight 227.5 LBS per DC summary.  Floor scale weight on admission 265.6 LBS.  proBNP normal    Echo 5/1/2024: LVEF ~55%.  -transitioned to torsemide from IV lasix 6/7  -currently holding PTA spironolactone    -Continue home Coreg, hydralazine.  -cardiology following  -continue current torsemide       Acute respiratory failure with hypoxia and hypercarbia-resolved, likely due to CHF exacerbation and possible COPD exacerbation.  VBG 7.3 8/23/60.  Required BiPAP in ER, was quickly weaned off BiPAP.  - CTA chest negative for PE, pulmonary infiltrates or effusion       Coronary disease, s/p CABG in 2015. Chronically mild elevated troponin.  Coronary angiogram 5/2/2024:    Ost LAD to Prox LAD lesion is 100% stenosed.    Ramus lesion is 95% stenosed.    Mid Cx to Dist Cx lesion is 80% stenosed.    1st Mrg lesion is 90% stenosed.    Prox RCA to Dist RCA lesion is 100% stenosed.    Mid LAD lesion is 70% stenosed.  -Cardiology recommended 75 mg of daily Plavix indefinitely while on Eliquis, as well as baby aspirin.  -Continue PTA Imdur 60 mg, as needed nitroglycerin.  -Mild elevated troponin 35, likely due to demand ischemia of CHF   -Cardiology following        Hypocalcemia, likely due to vitamin D deficiency.  Hypercalcemia  "noted during recent hospitalization.  Total vitamin D low at 17.  PTH RP less than 2 on 5/20/2024.  - resolved        Paroxysmal atrial fibrillation with persistent RVR.  -140s.  Initially responded to BiPAP treatment and diuresis.  TSH on 4/29/2024 normal at 1.7.  -One-time dose IV metoprolol 5 mg 6/3  -Amiodarone load 6/4, per cardiology  -Continue PTA Coreg and amiodarone  - continue eliquis   -Cardiology  EP consult on 6/4, recommended patient will be candidate for catheter ablation after optimization of his CHF.  - plan outpatient follow up        Thrombocytopenia - now resolved. Unclear etiology but could've been dilutional. PLT now normal.        IDDM, diagnosed earlier this month.  A1c 7.8.  -PTA 12 units of Lantus X 2 days after DC, per discharge summary from 5/20.  - Was hyperglycemic then with BG up to  447 in setting of IV steroids.  -continue current insulin management   - monitor for need for dose reduction when steroids are completed after 6/9       Pulmonary nodule, RLL.  Described as \"tiny\".  -Patient is at high risk for pulmonary malignancy, given tobacco use disorder.  -Follow-up CT chest in 12 months.       COPD with acute exacerbation.  Active smoking likely contributing.  CT chest negative for pulmonary infiltrates.  - DuoNeb 4 times daily, as needed albuterol neb.  Solu-Medrol was discontinued 6/4, due to hyperglycemia.    - completed prednisone 5 day burst without taper  -Smoking cessation.       Tobacco use disorder.  -Nicotine replacement with patch.  -Counseling on smoking cessation.  Patient stated no interest in smoking cessation.       CKD3.  Possibly secondary to cardiorenal process.  Creatinine within previous baseline   Diuresing as above.  -Monitor renal function daily       Venous stasis dermatitis unlikely cellulitis  Stasis ulcers of bilateral lower legs.    1/2 bottles BC from 6/3/2024 resulted in staph hominis on incubation day #3 and is likely contaminant. Patient was " "afebrile, with normal WBC on mutliple checks. Repeat blood cultures NGTD  Numerous stasis ulcers.  Patient unable to wrap legs at home.  -on Cefazolin since admission, continue for now and plan discharge on short course keflex  -WOC following, recommendations reviewed, added critic Aid.  -Checked ESR which is elevated and rather nonsepcific given above issues       Headache: improved  - reduced dose of nicotine patch, continue tylenol, add prn tramadol    Chronic left hip pain: previous evaluation negative for any acute process  - ok to increase robaxin and oxycodone for now  - plan outpatient referral to his primary ortho service, [facility]       Diet: Fluid restriction 1800 ML FLUID (and additional linked orders)  Moderate Consistent Carb (60 g CHO per Meal) Diet    DVT Prophylaxis: DOAC  [place] Catheter: Not present  Lines: None     Cardiac Monitoring: ACTIVE order. Indication: Acute decompensated heart failure (48 hours)  Code Status: Full Code      Clinically Significant Risk Factors                  # Hypertension: Noted on problem list    # Acute heart failure with reduced ejection fraction: last echo with EF <40% and receiving IV diuretics            # DMII: A1C = 7.8 % (Ref range: <5.7 %) within past 6 months   # Severe Obesity: Estimated body mass index is 41.34 kg/m  as calculated from the following:    Height as of this encounter: 1.676 m (5' 6\").    Weight as of this encounter: 116.2 kg (256 lb 1.6 oz).        # Financial/Environmental Concerns: none         Disposition Plan   Medically Ready for Discharge: Anticipated Tomorrow      Lakhwinder Huff DO  Hospitalist Service  [facility]  Securely message with [employer] (more info)  Text page via [employer] Paging/Directory   ______________________________________________________________________    Interval History   NAD. Headaches improved    Physical Exam   Vital Signs: Temp: 97.6  F (36.4  C) Temp src: Oral BP: 133/71 Pulse: 81   Resp: 18 " SpO2: 98 % O2 Device: None (Room air)    Weight: 256 lbs 1.6 oz  General: NAD  RESPIRATORY: Breathing nonlabored  CARDIOVASCULAR: Trace le edema bilat.   NEUROLOGIC: Motor and sensory intact, speech clear        >45 MINUTES SPENT BY ME on the date of service doing chart review, history, exam, documentation & further activities per the note.  Data

## 2024-06-09 NOTE — PLAN OF CARE
"  Problem: Adult Inpatient Plan of Care  Goal: Plan of Care Review  Description: The Plan of Care Review/Shift note should be completed every shift.  The Outcome Evaluation is a brief statement about your assessment that the patient is improving, declining, or no change.  This information will be displayed automatically on your shift  note.  Outcome: Progressing  Goal: Patient-Specific Goal (Individualized)  Description: You can add care plan individualizations to a care plan. Examples of Individualization might be:  \"Parent requests to be called daily at 9am for status\", \"I have a hard time hearing out of my right ear\", or \"Do not touch me to wake me up as it startles  me\".  Outcome: Progressing  Goal: Absence of Hospital-Acquired Illness or Injury  Outcome: Progressing  Intervention: Identify and Manage Fall Risk  Recent Flowsheet Documentation  Taken 6/9/2024 0400 by Alexx Adkins RN  Safety Promotion/Fall Prevention:   activity supervised   room organization consistent   supervised activity   nonskid shoes/slippers when out of bed   increase visualization of patient   clutter free environment maintained  Taken 6/9/2024 0000 by Alexx Adkins RN  Safety Promotion/Fall Prevention:   activity supervised   room organization consistent   supervised activity   nonskid shoes/slippers when out of bed   increase visualization of patient   clutter free environment maintained  Intervention: Prevent Skin Injury  Recent Flowsheet Documentation  Taken 6/9/2024 0400 by Alexx Adkins, RN  Body Position: position changed independently  Skin Protection:   adhesive use limited   protective footwear used  Device Skin Pressure Protection:   adhesive use limited   pressure points protected  Taken 6/9/2024 0000 by Alexx Adkins RN  Body Position: position changed independently  Skin Protection:   adhesive use limited   protective footwear used  Device Skin Pressure Protection:   adhesive use " limited   pressure points protected  Intervention: Prevent Infection  Recent Flowsheet Documentation  Taken 6/9/2024 0400 by Alexx Adkins, RN  Infection Prevention:   hand hygiene promoted   personal protective equipment utilized   rest/sleep promoted   single patient room provided   visitors restricted/screened  Taken 6/9/2024 0000 by Alexx Adkins, RN  Infection Prevention:   hand hygiene promoted   personal protective equipment utilized   rest/sleep promoted   single patient room provided   visitors restricted/screened  Goal: Optimal Comfort and Wellbeing  Outcome: Progressing  Intervention: Monitor Pain and Promote Comfort  Recent Flowsheet Documentation  Taken 6/9/2024 0413 by Alexx Adkins, RN  Pain Management Interventions:   distraction   pillow support provided   relaxation techniques promoted   repositioned   rest   emotional support  Taken 6/9/2024 0400 by Alexx Adkins, RN  Pain Management Interventions:   distraction   environmental changes   pillow support provided   relaxation techniques promoted   repositioned   rest   medication (see MAR)  Taken 6/9/2024 0157 by Alexx Adkins, RN  Pain Management Interventions:   medication (see MAR)   emotional support   cold applied   pain management plan reviewed with patient/caregiver   pillow support provided   repositioned   rest   quiet environment facilitated  Taken 6/9/2024 0000 by Alexx Adkins RN  Pain Management Interventions:   medication (see MAR)   emotional support   environmental changes   pillow support provided   relaxation techniques promoted   repositioned   rest   quiet environment facilitated  Goal: Readiness for Transition of Care  Outcome: Progressing   Goal Outcome Evaluation:    Cardiac:  NSR/SD with occasional BBB, rate 70-80's.  Respiratory:  Rate 16-20, non-labored.  Sat's:  Maintaining mid 90's at RA.  LS: Diminished in the bases, bilat.  Neuro:  WNL.  GI:  Passing flatus.  BS:  Present X 4 quadrants.  :  Voiding.  Good UOP (see I/O).  Pain:  C/o R hip pain.  Treated with PRN X1, 5 mg PO Oxycodone and cold pack.  No further interventions.  Ambulation:  Up with assist X1.  Labs:  AM Labs pending.  Plan:  IV Abx.  Taper down steroids.  Pending possible discharge today.

## 2024-06-10 ENCOUNTER — APPOINTMENT (OUTPATIENT)
Dept: OCCUPATIONAL THERAPY | Facility: HOSPITAL | Age: 64
DRG: 291 | End: 2024-06-10
Payer: MEDICARE

## 2024-06-10 VITALS
DIASTOLIC BLOOD PRESSURE: 70 MMHG | SYSTOLIC BLOOD PRESSURE: 113 MMHG | OXYGEN SATURATION: 92 % | RESPIRATION RATE: 18 BRPM | TEMPERATURE: 97.8 F | HEIGHT: 66 IN | BODY MASS INDEX: 40.88 KG/M2 | WEIGHT: 254.4 LBS | HEART RATE: 81 BPM

## 2024-06-10 LAB
ANION GAP SERPL CALCULATED.3IONS-SCNC: 15 MMOL/L (ref 7–15)
ATRIAL RATE - MUSE: 166 BPM
BUN SERPL-MCNC: 44.1 MG/DL (ref 8–23)
CALCIUM SERPL-MCNC: 9.7 MG/DL (ref 8.8–10.2)
CHLORIDE SERPL-SCNC: 93 MMOL/L (ref 98–107)
CREAT SERPL-MCNC: 1.24 MG/DL (ref 0.67–1.17)
DEPRECATED HCO3 PLAS-SCNC: 29 MMOL/L (ref 22–29)
DIASTOLIC BLOOD PRESSURE - MUSE: 82 MMHG
EGFRCR SERPLBLD CKD-EPI 2021: 65 ML/MIN/1.73M2
GLUCOSE BLDC GLUCOMTR-MCNC: 187 MG/DL (ref 70–99)
GLUCOSE BLDC GLUCOMTR-MCNC: 81 MG/DL (ref 70–99)
GLUCOSE SERPL-MCNC: 241 MG/DL (ref 70–99)
INTERPRETATION ECG - MUSE: NORMAL
MAGNESIUM SERPL-MCNC: 2 MG/DL (ref 1.7–2.3)
P AXIS - MUSE: NORMAL DEGREES
POTASSIUM SERPL-SCNC: 3.8 MMOL/L (ref 3.4–5.3)
PR INTERVAL - MUSE: NORMAL MS
QRS DURATION - MUSE: 128 MS
QT - MUSE: 396 MS
QTC - MUSE: 562 MS
R AXIS - MUSE: 103 DEGREES
SODIUM SERPL-SCNC: 137 MMOL/L (ref 135–145)
SYSTOLIC BLOOD PRESSURE - MUSE: 141 MMHG
T AXIS - MUSE: -88 DEGREES
VENTRICULAR RATE- MUSE: 121 BPM

## 2024-06-10 PROCEDURE — 250N000013 HC RX MED GY IP 250 OP 250 PS 637: Performed by: INTERNAL MEDICINE

## 2024-06-10 PROCEDURE — 250N000011 HC RX IP 250 OP 636: Mod: JZ | Performed by: INTERNAL MEDICINE

## 2024-06-10 PROCEDURE — 83735 ASSAY OF MAGNESIUM: CPT | Performed by: INTERNAL MEDICINE

## 2024-06-10 PROCEDURE — 999N000157 HC STATISTIC RCP TIME EA 10 MIN

## 2024-06-10 PROCEDURE — 99239 HOSP IP/OBS DSCHRG MGMT >30: CPT | Performed by: INTERNAL MEDICINE

## 2024-06-10 PROCEDURE — 99233 SBSQ HOSP IP/OBS HIGH 50: CPT | Performed by: INTERNAL MEDICINE

## 2024-06-10 PROCEDURE — 94640 AIRWAY INHALATION TREATMENT: CPT | Mod: 76

## 2024-06-10 PROCEDURE — 97535 SELF CARE MNGMENT TRAINING: CPT | Mod: GO

## 2024-06-10 PROCEDURE — 97110 THERAPEUTIC EXERCISES: CPT | Mod: GO

## 2024-06-10 PROCEDURE — 94640 AIRWAY INHALATION TREATMENT: CPT

## 2024-06-10 PROCEDURE — P9047 ALBUMIN (HUMAN), 25%, 50ML: HCPCS | Mod: JZ | Performed by: INTERNAL MEDICINE

## 2024-06-10 PROCEDURE — 250N000013 HC RX MED GY IP 250 OP 250 PS 637: Performed by: NURSE PRACTITIONER

## 2024-06-10 PROCEDURE — 80048 BASIC METABOLIC PNL TOTAL CA: CPT | Performed by: INTERNAL MEDICINE

## 2024-06-10 PROCEDURE — 250N000009 HC RX 250: Performed by: INTERNAL MEDICINE

## 2024-06-10 PROCEDURE — 36415 COLL VENOUS BLD VENIPUNCTURE: CPT | Performed by: INTERNAL MEDICINE

## 2024-06-10 RX ORDER — CEPHALEXIN 500 MG/1
500 CAPSULE ORAL 4 TIMES DAILY
Qty: 20 CAPSULE | Refills: 0 | Status: SHIPPED | OUTPATIENT
Start: 2024-06-10 | End: 2024-06-15

## 2024-06-10 RX ORDER — AMIODARONE HYDROCHLORIDE 200 MG/1
TABLET ORAL
Qty: 108 TABLET | Refills: 0 | Status: SHIPPED | OUTPATIENT
Start: 2024-06-10 | End: 2024-08-13 | Stop reason: DRUGHIGH

## 2024-06-10 RX ORDER — POTASSIUM CHLORIDE 1500 MG/1
40 TABLET, EXTENDED RELEASE ORAL DAILY
Qty: 14 TABLET | Refills: 0 | Status: SHIPPED | OUTPATIENT
Start: 2024-06-10 | End: 2024-07-01

## 2024-06-10 RX ORDER — INSULIN ASPART 100 [IU]/ML
INJECTION, SOLUTION INTRAVENOUS; SUBCUTANEOUS
Qty: 15 ML | Refills: 1 | Status: SHIPPED | OUTPATIENT
Start: 2024-06-10 | End: 2024-06-10

## 2024-06-10 RX ORDER — INSULIN LISPRO 200 [IU]/ML
10 INJECTION, SOLUTION SUBCUTANEOUS
Qty: 15 ML | Refills: 1 | Status: SHIPPED | OUTPATIENT
Start: 2024-06-10 | End: 2024-08-13

## 2024-06-10 RX ORDER — CEPHALEXIN 500 MG/1
500 CAPSULE ORAL 4 TIMES DAILY
Status: DISCONTINUED | OUTPATIENT
Start: 2024-06-10 | End: 2024-06-10 | Stop reason: HOSPADM

## 2024-06-10 RX ORDER — TRAMADOL HYDROCHLORIDE 25 MG/1
25 TABLET, COATED ORAL EVERY 6 HOURS PRN
Qty: 10 TABLET | Refills: 0 | Status: SHIPPED | OUTPATIENT
Start: 2024-06-10 | End: 2024-07-31

## 2024-06-10 RX ORDER — METHOCARBAMOL 750 MG/1
750 TABLET, FILM COATED ORAL 4 TIMES DAILY PRN
Qty: 30 TABLET | Refills: 0 | Status: SHIPPED | OUTPATIENT
Start: 2024-06-10 | End: 2024-07-31

## 2024-06-10 RX ADMIN — PANTOPRAZOLE SODIUM 40 MG: 40 TABLET, DELAYED RELEASE ORAL at 08:13

## 2024-06-10 RX ADMIN — INSULIN ASPART 12 UNITS: 100 INJECTION, SOLUTION INTRAVENOUS; SUBCUTANEOUS at 12:53

## 2024-06-10 RX ADMIN — HYDRALAZINE HYDROCHLORIDE 10 MG: 10 TABLET, FILM COATED ORAL at 13:04

## 2024-06-10 RX ADMIN — ALBUMIN HUMAN 50 G: 0.25 SOLUTION INTRAVENOUS at 08:09

## 2024-06-10 RX ADMIN — ATORVASTATIN CALCIUM 40 MG: 40 TABLET, FILM COATED ORAL at 08:12

## 2024-06-10 RX ADMIN — POLYETHYLENE GLYCOL 3350 17 G: 17 POWDER, FOR SOLUTION ORAL at 08:11

## 2024-06-10 RX ADMIN — ISOSORBIDE MONONITRATE 180 MG: 60 TABLET, EXTENDED RELEASE ORAL at 08:13

## 2024-06-10 RX ADMIN — OXYCODONE HYDROCHLORIDE 5 MG: 5 TABLET ORAL at 09:58

## 2024-06-10 RX ADMIN — NICOTINE 1 PATCH: 14 PATCH, EXTENDED RELEASE TRANSDERMAL at 08:11

## 2024-06-10 RX ADMIN — ROPINIROLE HYDROCHLORIDE 2 MG: 1 TABLET, FILM COATED ORAL at 08:12

## 2024-06-10 RX ADMIN — CEPHALEXIN 500 MG: 500 CAPSULE ORAL at 08:47

## 2024-06-10 RX ADMIN — HYDRALAZINE HYDROCHLORIDE 10 MG: 10 TABLET, FILM COATED ORAL at 08:12

## 2024-06-10 RX ADMIN — IPRATROPIUM BROMIDE AND ALBUTEROL SULFATE 3 ML: .5; 3 SOLUTION RESPIRATORY (INHALATION) at 08:27

## 2024-06-10 RX ADMIN — APIXABAN 5 MG: 5 TABLET, FILM COATED ORAL at 08:12

## 2024-06-10 RX ADMIN — POTASSIUM CHLORIDE 40 MEQ: 1500 TABLET, EXTENDED RELEASE ORAL at 08:12

## 2024-06-10 RX ADMIN — AMIODARONE HYDROCHLORIDE 200 MG: 200 TABLET ORAL at 08:12

## 2024-06-10 RX ADMIN — INSULIN ASPART 12 UNITS: 100 INJECTION, SOLUTION INTRAVENOUS; SUBCUTANEOUS at 08:15

## 2024-06-10 RX ADMIN — CLOPIDOGREL BISULFATE 75 MG: 75 TABLET ORAL at 08:13

## 2024-06-10 RX ADMIN — TRAMADOL HYDROCHLORIDE 25 MG: 50 TABLET, COATED ORAL at 09:03

## 2024-06-10 RX ADMIN — TORSEMIDE 100 MG: 100 TABLET ORAL at 08:13

## 2024-06-10 RX ADMIN — ROPINIROLE HYDROCHLORIDE 2 MG: 1 TABLET, FILM COATED ORAL at 12:52

## 2024-06-10 RX ADMIN — CEPHALEXIN 500 MG: 500 CAPSULE ORAL at 12:52

## 2024-06-10 RX ADMIN — CARVEDILOL 3.12 MG: 3.12 TABLET, FILM COATED ORAL at 08:12

## 2024-06-10 RX ADMIN — CEFAZOLIN SODIUM 2 G: 2 INJECTION, SOLUTION INTRAVENOUS at 06:10

## 2024-06-10 ASSESSMENT — ACTIVITIES OF DAILY LIVING (ADL)
ADLS_ACUITY_SCORE: 27

## 2024-06-10 NOTE — PROGRESS NOTES
"Care Management Follow Up    Length of Stay (days): 7    Expected Discharge Date: 06/10/2024     Concerns to be Addressed:     Care progression  Patient plan of care discussed at interdisciplinary rounds: Yes    Anticipated Discharge Disposition: Home with assist     Anticipated Discharge Services:  NA  Anticipated Discharge DME:  NA    Patient/family educated on Medicare website which has current facility and service quality ratings:  NA  Education Provided on the Discharge Plan:  Yes per team  Patient/Family in Agreement with the Plan:  NA    Referrals Placed by CM/SW:  NA  Private pay costs discussed: Transportation costs    Additional Information:  1140 called Shreya Brown at [facility] [phone] and left a message for admission.    Social Hx: \"Live alone at [facility]. Use walker. Independent at baseline, get meals and housekeeping at [facility]. Therapy recs are home with assist. Pt told CM that if he needs increased services he can get at [facility]. [employer] [facility] vs medical transport.\"     RNCM to follow for medical progression, recommendations, and final discharge plan.     Lola Jaffe RN     1215 rec'd page from bedside nurse, Farnaz GILL, patient would like transport home.  Met patient at bedside to discuss transport. He prefer a [employer] transport.    Discussed out of pocket cost of [employer] medical transportation by wheelchair with patient. Patient agreed with the plan to have transportation arranged by [employer] transport.       Called [employer] [facility] to set up a WC transport between [phone]  Paged bedside nurse  Informed HUC  "

## 2024-06-10 NOTE — DISCHARGE SUMMARY
[facility]  Hospitalist Discharge Summary      Date of Admission:  6/3/2024  Date of Discharge:  6/10/2024  Discharging Provider: Lakhwinder Huff,   Discharge Service: Hospitalist Service        Follow-ups Needed After Discharge   Follow-up Appointments     Follow-up and recommended labs and tests       Follow up with primary care provider, Reid Escobar, within 3-5 days,   to evaluate treatment change and for hospital follow- up. The following   labs/tests are recommended: BMP. Review home glucoses            Unresulted Labs Ordered in the Past 30 Days of this Admission       Date and Time Order Name Status Description    6/6/2024 10:35 AM Blood Culture Hand, Left Preliminary     6/6/2024 10:35 AM Blood Culture Hand, Left Preliminary         These results will be followed up by Hospitalist results pool    Discharge Disposition   Discharged to home  Condition at discharge: Stable      Hospital Course   63 year old male with history of atrial fibrillation, CKD 3, CAD s/p CABG, ischemic cardiomyopathy, HTN, CHF, HLD, COPD, recently diagnosed DM2, tobacco use disorder, restless leg syndrome, s/p left LARON, recent hospitalization for CHF exacerbation May 13-20 , admitted on 6/3/2024 with acute on chronic diastolic and systolic CHF exacerbation     Acute on chronic CHF with biventricular dysfunction.  Recent hospitalization for CHF and COPD exacerbation last month.  States compliance with sodium restricted diet and medications.  Cardiac monitoring/mems at home.  Discharge weight 227.5 LBS per DC summary.  Floor scale weight on admission 265.6 LBS.  proBNP normal    Echo 5/1/2024: LVEF ~55%.  - discharge weight on day of discharge is 254 lbs  -transitioned to torsemide from IV lasix 6/7  - discharge on torsemide 100mg BID   -Continue home Coreg, hydralazine.  -cardiology follow up  - plan recheck renal labs and BP at time of PCP follow up in 3-5 days           Acute respiratory failure  with hypoxia and hypercarbia-resolved, likely due to CHF exacerbation and possible COPD exacerbation.  VBG 7.3 8/23/60.  Required BiPAP in ER, was quickly weaned off BiPAP.  - CTA chest negative for PE, pulmonary infiltrates or effusion        Coronary disease, s/p CABG in 2015. Chronically mild elevated troponin.  Coronary angiogram 5/2/2024:    Ost LAD to Prox LAD lesion is 100% stenosed.    Ramus lesion is 95% stenosed.    Mid Cx to Dist Cx lesion is 80% stenosed.    1st Mrg lesion is 90% stenosed.    Prox RCA to Dist RCA lesion is 100% stenosed.    Mid LAD lesion is 70% stenosed.  -Cardiology recommended 75 mg of daily Plavix indefinitely while on Eliquis, as well as baby aspirin.  -Continue PTA Imdur 60 mg, as needed nitroglycerin.  -Mild elevated troponin 35, likely due to demand ischemia of CHF        Hypocalcemia, likely due to vitamin D deficiency.  Hypercalcemia noted during recent hospitalization.  Total vitamin D low at 17.  PTH RP less than 2 on 5/20/2024.  - resolved, serum calcium normal on day of discharge         Paroxysmal atrial fibrillation with persistent RVR.  -140s.  Initially responded to BiPAP treatment and diuresis.  TSH on 4/29/2024 normal at 1.7.  -One-time dose IV metoprolol 5 mg 6/3  -Amiodarone load 6/4, per cardiology  -Continue PTA Coreg and plan amiodarone 200mg BID for next 9 days and then resume daily amiodarone 200mg therafter  - continue eliquis   -Cardiology  EP consult on 6/4, recommended patient will be candidate for catheter ablation after optimization of his CHF.  - plan outpatient follow up         Thrombocytopenia - now resolved. Unclear etiology but could've been dilutional. PLT now normal.        IDDM, diagnosed earlier this month.  A1c 7.8.  -PTA 12 units of Lantus X 2 days after DC, per discharge summary from 5/20.  - Was hyperglycemic then with BG up to  447 in setting of IV steroids.  - based on trend of inpatient glucoses, will discharge on lantus 30U at  "bedtime and fixed dose lispro 10U TID with meals  - monitor and record home glucoses and bring to PCP appointment later this week      Pulmonary nodule, RLL.  Described as \"tiny\".  -Patient is at high risk for pulmonary malignancy, given tobacco use disorder.  -Follow-up CT chest in 12 months.        COPD with acute exacerbation.  Active smoking likely contributing.  CT chest negative for pulmonary infiltrates.  - continue home inhalers after dishcharge   - completed prednisone 5 day burst without taper  - Smoking cessation stressed           CKD3.  Possibly secondary to cardiorenal process.  Creatinine within previous baseline   -- recheck GFR as outpatient for stability         Venous stasis dermatitis with possible cellulitis  Stasis ulcers of bilateral lower legs.    1/2 bottles BC from 6/3/2024 resulted in staph hominis on incubation day #3 and is likely contaminant. Patient was afebrile, with normal WBC on mutliple checks. Repeat blood cultures NGTD  Numerous stasis ulcers.  Patient unable to wrap legs at home.  -on Cefazolin since admission, plan discharge on short course keflex  -Checked ESR which is elevated and rather nonsepcific given above issues        Chronic left hip pain: previous evaluation negative for any acute process  - ok to increase robaxin and send home with Rx for robaxin and tramadol if needed  - patient encouraged to follow up with his primary ortho service, [facility]    Consultations This Hospital Stay   CORE CLINIC EVALUATION IP CONSULT  OCCUPATIONAL THERAPY ADULT IP CONSULT  NUTRITION SERVICES ADULT IP CONSULT  CARE MANAGEMENT / SOCIAL WORK IP CONSULT  CARDIOLOGY IP CONSULT  SMOKING CESSATION PROGRAM IP CONSULT  WOUND OSTOMY CONTINENCE NURSE  IP CONSULT  PHYSICAL THERAPY ADULT IP CONSULT  OCCUPATIONAL THERAPY ADULT IP CONSULT  ELECTROPHYSIOLOGY IP CONSULT  WOUND OSTOMY CONTINENCE NURSE  IP CONSULT    Code Status   Full Code    Time Spent on this Encounter   I, Lakhwinder Huff DO, " personally saw the patient today and spent greater than 30 minutes discharging this patient.       Lakhwinder Huff DO  [facility]  [address] [place] [id number]  Phone: [phone]  Fax: [phone]  ______________________________________________________________________    Physical Exam   Vital Signs: Temp: 97.8  F (36.6  C) Temp src: Oral BP: 113/70 Pulse: 81   Resp: 18 SpO2: 92 % O2 Device: None (Room air)    Weight: 254 lbs 6.4 oz    General: NAD  HEENT: Without congestion or inflammation  RESPIRATORY: Respirations nonlabored  CARDIOVASCULAR: Trace le edema bilat.  ABDOMEN: soft, non-tender   NEUROLOGIC: No focal arm or leg weakness, speech is clear    Primary Care Physician   Reid Escobar    Discharge Orders      Medication Therapy Management Referral      Reason for your hospital stay    CHF     Follow-up and recommended labs and tests     Follow up with primary care provider, Reid Escobar, within 3-5 days, to evaluate treatment change and for hospital follow- up. The following labs/tests are recommended: BMP. Review home glucoses     Activity    Your activity upon discharge: activity as tolerated     Diet    Follow this diet upon discharge: Orders Placed This Encounter      Fluid restriction 1800 ML FLUID      Moderate Consistent Carb (60 g CHO per Meal) Diet     \    Discharge Medications   Current Discharge Medication List        START taking these medications    Details   cephALEXin (KEFLEX) 500 MG capsule Take 1 capsule (500 mg) by mouth 4 times daily for 5 days  Qty: 20 capsule, Refills: 0    Associated Diagnoses: Cellulitis of anterior lower leg      insulin glargine (LANTUS PEN) 100 UNIT/ML pen Inject 30 Units Subcutaneous at bedtime  Qty: 15 mL, Refills: 1    Comments: If Lantus is not covered by insurance, may substitute Basaglar or Semglee or other insulin glargine product per insurance preference at same dose and frequency.     Associated Diagnoses: Type 2 diabetes mellitus with other specified complication, without long-term current use of insulin (H)      Insulin Lispro (HUMALOG KWIKPEN) 200 UNIT/ML soln Inject 10 Units Subcutaneous 3 times daily (before meals)  Qty: 15 mL, Refills: 1    Associated Diagnoses: Newly diagnosed diabetes (H)      methocarbamol (ROBAXIN) 750 MG tablet Take 1 tablet (750 mg) by mouth 4 times daily as needed for muscle spasms  Qty: 30 tablet, Refills: 0    Associated Diagnoses: Hip pain, left      potassium chloride barbara ER (KLOR-CON M20) 20 MEQ CR tablet Take 2 tablets (40 mEq) by mouth daily  Qty: 14 tablet, Refills: 0    Associated Diagnoses: Hypokalemia      traMADol 25 MG TABS tablet Take 1 tablet (25 mg) by mouth every 6 hours as needed for headaches or breakthrough pain  Qty: 10 tablet, Refills: 0    Associated Diagnoses: Hip pain, left           CONTINUE these medications which have CHANGED    Details   amiodarone (PACERONE) 200 MG tablet Take 1 tablet (200 mg) by mouth 2 times daily for 9 days, THEN 1 tablet (200 mg) daily for 90 days. Please contact your cardiology team or PCP for additional refills. Do not stop taking amiodarone without further instructions to do so  Qty: 108 tablet, Refills: 0    Associated Diagnoses: Rapid atrial fibrillation (H)           CONTINUE these medications which have NOT CHANGED    Details   acetaminophen (TYLENOL) 500 MG tablet Take 1,000 mg by mouth 2 times daily as needed for mild pain      albuterol (PROAIR HFA/PROVENTIL HFA/VENTOLIN HFA) 108 (90 Base) MCG/ACT inhaler Inhale 2 puffs into the lungs every 6 hours as needed for shortness of breath, wheezing or cough      apixaban ANTICOAGULANT (ELIQUIS) 5 MG tablet Take 1 tablet (5 mg) by mouth 2 times daily  Qty: 180 tablet, Refills: 3    Associated Diagnoses: Paroxysmal atrial fibrillation (H)      atorvastatin (LIPITOR) 40 MG tablet Take 40 mg by mouth every morning      carvedilol (COREG) 3.125 MG tablet Take 1  tablet (3.125 mg) by mouth 2 times daily (with meals)  Qty: 180 tablet, Refills: 1    Associated Diagnoses: Heart failure with reduced ejection fraction (H)      clopidogrel (PLAVIX) 75 MG tablet Take 1 tablet (75 mg) by mouth daily  Qty: 60 tablet, Refills: 0    Associated Diagnoses: Coronary artery disease involving native coronary artery of native heart without angina pectoris      [employer] VITAMIN B12 1000 MCG tablet Take 1,000 mcg by mouth daily      diclofenac (VOLTAREN) 1 % topical gel Apply 2 g topically every 6 hours as needed for moderate pain      hydrALAZINE (APRESOLINE) 10 MG tablet Take 1 tablet (10 mg) by mouth 3 times daily  Qty: 270 tablet, Refills: 3    Associated Diagnoses: Acute on chronic systolic congestive heart failure (H)      ipratropium - albuterol 0.5 mg/2.5 mg/3 mL (DUONEB) 0.5-2.5 (3) MG/3ML neb solution INHALE 3 ML EVERY 6 HOURS AS NEEDED FOR WHEEZING.      isosorbide mononitrate (IMDUR) 60 MG 24 hr tablet Take 3 tablets (180 mg) by mouth every morning  Qty: 270 tablet, Refills: 1    Associated Diagnoses: Coronary artery disease involving native heart without angina pectoris, unspecified vessel or lesion type      nitroGLYcerin (NITROSTAT) 0.4 MG sublingual tablet Place 1 tablet (0.4 mg) under the tongue every 5 minutes as needed If no relief after 3 tabs call 911;continue 1 tab every 5 min max 3 tab Indications: chest pain  Qty: 100 tablet, Refills: 1    Associated Diagnoses: Ischemic cardiomyopathy      pantoprazole (PROTONIX) 40 MG EC tablet Take 1 tablet (40 mg) by mouth every morning (before breakfast)  Qty: 30 tablet, Refills: 1    Associated Diagnoses: Gastroesophageal reflux disease without esophagitis      polyethylene glycol (MIRALAX) 17 GM/Dose powder Take 17 g by mouth 2 times daily as needed  Qty: 510 g, Refills: 0    Associated Diagnoses: Constipation, unspecified constipation type      rOPINIRole (REQUIP) 2 MG tablet Take 2 mg by mouth every 6 hours      torsemide (DEMADEX)  100 MG tablet Take 1 tablet (100 mg) by mouth 2 times daily  Qty: 180 tablet, Refills: 3    Associated Diagnoses: Coronary artery disease involving native coronary artery of native heart without angina pectoris      !! blood glucose (NO BRAND SPECIFIED) lancets standard Use to test blood sugar 3 times daily or as directed.  Qty: 50 Lancet, Refills: 0    Associated Diagnoses: Type 2 diabetes mellitus with other circulatory complication, unspecified whether long term insulin use (H)      !! blood glucose (NO BRAND SPECIFIED) lancets standard Use to test blood sugar 3 times daily or as directed.  Qty: 100 Lancet, Refills: 0    Associated Diagnoses: Type 2 diabetes mellitus with other circulatory complication, unspecified whether long term insulin use (H)      blood glucose (NO BRAND SPECIFIED) test strip Use to test blood sugar 3 times daily or as directed. To accompany: Blood Glucose Monitor Brands: per insurance.  Qty: 100 strip, Refills: 6    Associated Diagnoses: Type 2 diabetes mellitus with other circulatory complication, unspecified whether long term insulin use (H)      !! blood glucose monitoring (FREESTYLE) lancets Use to test blood sugar 3 times daily or as directed.  Qty: 100 each, Refills: 0    Associated Diagnoses: Type 2 diabetes mellitus with other circulatory complication, unspecified whether long term insulin use (H)      !! blood glucose monitoring (FREESTYLE) lancets Use to test blood sugar 3 times daily or as directed.  Qty: 100 each, Refills: 0    Associated Diagnoses: Type 2 diabetes mellitus with other circulatory complication, unspecified whether long term insulin use (H)      blood glucose monitoring (NO BRAND SPECIFIED) meter device kit Use to test blood sugar 3 times daily or as directed. Preferred blood glucose meter OR supplies to accompany: Blood Glucose Monitor Brands: per insurance.  Qty: 1 kit, Refills: 0    Associated Diagnoses: Type 2 diabetes mellitus with other circulatory  complication, unspecified whether long term insulin use (H)      !! thin (NO BRAND SPECIFIED) lancets Use with lanceting device. To accompany: Blood Glucose Monitor Brands: per insurance.  Qty: 100 each, Refills: 6    Comments: Check three times daily prior to meal  Associated Diagnoses: Type 2 diabetes mellitus with other circulatory complication, unspecified whether long term insulin use (H)       !! - Potential duplicate medications found. Please discuss with provider.        Allergies   Allergies   Allergen Reactions    Bumex [Bumetanide] Muscle Pain (Myalgia)

## 2024-06-10 NOTE — PLAN OF CARE
Problem: Heart Failure  Goal: Optimal Coping  Outcome: Progressing  Intervention: Support Psychosocial Response  Recent Flowsheet Documentation  Taken 6/10/2024 0400 by Alexx Adkins RN  Supportive Measures: active listening utilized  Taken 6/10/2024 0000 by Alexx Adkins RN  Supportive Measures: active listening utilized  Taken 6/9/2024 2000 by Alexx Adkins RN  Supportive Measures: active listening utilized  Goal: Optimal Cardiac Output  Outcome: Progressing  Intervention: Optimize Cardiac Output  Recent Flowsheet Documentation  Taken 6/10/2024 0400 by Alexx Adkins RN  Environmental Support: calm environment promoted  Taken 6/10/2024 0000 by Alexx Adkins RN  Environmental Support: calm environment promoted  Taken 6/9/2024 2000 by Alexx Adkins RN  Environmental Support: calm environment promoted  Goal: Stable Heart Rate and Rhythm  Outcome: Progressing  Goal: Optimal Functional Ability  Outcome: Progressing  Intervention: Optimize Functional Ability  Recent Flowsheet Documentation  Taken 6/10/2024 0400 by Alexx Adkins RN  Self-Care Promotion: independence encouraged  Activity Management: (given Pain medication before ambulation with OT) sitting, edge of bed  Taken 6/10/2024 0000 by Alexx Adkins RN  Self-Care Promotion: independence encouraged  Activity Management: (given Pain medication before ambulation with OT) sitting, edge of bed  Taken 6/9/2024 2000 by Alexx Adkins RN  Self-Care Promotion: independence encouraged  Activity Management: (given Pain medication before ambulation with OT) sitting, edge of bed  Goal: Fluid and Electrolyte Balance  Outcome: Progressing  Intervention: Monitor and Manage Fluid and Electrolyte Balance  Recent Flowsheet Documentation  Taken 6/10/2024 0400 by Alexx Adkins RN  Fluid/Electrolyte Management: fluids restricted  Taken 6/10/2024 0000 by Alexx Adkins  RN  Fluid/Electrolyte Management: fluids restricted  Taken 6/9/2024 2000 by Alexx Adkins RN  Fluid/Electrolyte Management: fluids restricted  Goal: Improved Oral Intake  Outcome: Progressing  Intervention: Promote and Optimize Nutrition Intake  Recent Flowsheet Documentation  Taken 6/10/2024 0400 by Alexx Adkins RN  Oral Nutrition Promotion: physical activity promoted  Taken 6/10/2024 0000 by Alexx Adkins RN  Oral Nutrition Promotion: physical activity promoted  Taken 6/9/2024 2000 by Alexx Adkins RN  Oral Nutrition Promotion: physical activity promoted  Goal: Effective Oxygenation and Ventilation  Outcome: Progressing  Intervention: Promote Airway Secretion Clearance  Recent Flowsheet Documentation  Taken 6/10/2024 0400 by Alexx Adkins RN  Cough And Deep Breathing: done independently per patient  Activity Management: (given Pain medication before ambulation with OT) sitting, edge of bed  Taken 6/10/2024 0000 by Alexx Adkins RN  Cough And Deep Breathing: done independently per patient  Activity Management: (given Pain medication before ambulation with OT) sitting, edge of bed  Taken 6/9/2024 2000 by Alexx Adkins RN  Cough And Deep Breathing: done independently per patient  Activity Management: (given Pain medication before ambulation with OT) sitting, edge of bed  Intervention: Optimize Oxygenation and Ventilation  Recent Flowsheet Documentation  Taken 6/10/2024 0400 by Alexx Adkins RN  Head of Bed (HOB) Positioning: HOB at 20-30 degrees  Taken 6/10/2024 0000 by Alexx Adkins RN  Head of Bed (HOB) Positioning: HOB at 20-30 degrees  Taken 6/9/2024 2000 by Alexx Adkins RN  Head of Bed (HOB) Positioning: HOB at 20-30 degrees  Goal: Effective Breathing Pattern During Sleep  Outcome: Progressing  Intervention: Monitor and Manage Obstructive Sleep Apnea  Recent Flowsheet Documentation  Taken 6/10/2024 0400 by  Alexx Adkins, RN  Medication Review/Management: medications reviewed  Taken 6/10/2024 0000 by Alexx Adkins RN  Medication Review/Management: medications reviewed  Taken 6/9/2024 2000 by Alexx Adkins, RN  Medication Review/Management: medications reviewed   Goal Outcome Evaluation:         Heart Failure Care Map  GOALS TO BE MET BEFORE DISCHARGE:    1. Decrease congestion and/or edema with diuretic therapy to achieve near optimal volume status.     Dyspnea improved: Yes, satisfactory for discharge.   Edema improved: Yes, satisfactory for discharge.        Last 24 hour I/O:   Intake/Output Summary (Last 24 hours) at 6/10/2024 0524  Last data filed at 6/10/2024 0350  Gross per 24 hour   Intake 1670 ml   Output 4450 ml   Net -2780 ml           Net I/O and Weights since admission:   05/11 0700 - 06/10 0659  In: 21439 [P.O.:9738; I.V.:795]  Out: 14623 [Urine:47395]  Net: -49507     Vitals:    06/03/24 1200 06/04/24 0045 06/04/24 0948 06/05/24 0343   Weight: 121.3 kg (267 lb 8 oz) 120.8 kg (266 lb 4.8 oz) 120.5 kg (265 lb 9.6 oz) 121.5 kg (267 lb 14.4 oz)    06/06/24 1018 06/07/24 0440 06/08/24 0300 06/09/24 0300   Weight: 118.5 kg (261 lb 4.8 oz) 117.9 kg (259 lb 14.4 oz) 116.8 kg (257 lb 9.6 oz) 116.2 kg (256 lb 1.6 oz)    06/10/24 0346   Weight: 115.4 kg (254 lb 6.4 oz)       2.  O2 sats > 90% on room air, or at prior home O2 therapy level.      Able to wean O2 this shift to keep sats above 90%?: Yes, satisfactory for discharge.   Does patient use Home O2? No          Current oxygenation status:   SpO2: 93 %     O2 Device: None (Room air),      3.  Tolerates ambulation and mobility near baseline.     Ambulation: No, further care required to meet this goal. Please explain Ambulated in room throughout HS/NOC.     Times patient ambulated with staff this shift: 1.    Cardiac:  NSR/SD with occasional BBB, rate 80's.  Respiratory:  Rate 16-20, non-labored.  Sat's:  Maintaining mid 90's at RA.   LS: Diminished in the bases, bilat.  Neuro:  WNL.  GI:  Passing flatus.  BS: Present X 4 quadrants.  No BM for HS/NOC.   :  Voiding.  Good UOP (see I/O).  Pain:  Denies.   Ambulation:  Up with assist X1, with walker.  Labs:  AM Labs pending.  Plan:  IV Abx. Diuresis.  Monitor I/O's and daily weights. Taper down steroids.  Pending possible discharge today.        Please review the Heart Failure Care Map for additional HF goal outcomes.    Alexx Adkins, SHARMILA  6/10/2024

## 2024-06-10 NOTE — PLAN OF CARE
Heart Failure Care Map  GOALS TO BE MET BEFORE DISCHARGE:    1. Decrease congestion and/or edema with diuretic therapy to achieve near optimal volume status.     Dyspnea improved: No, further care required to meet this goal. Please explain Pt continues to endorse dyspnea upon exertion.    Edema improved: No, further care required to meet this goal. Please explain Pt continues to have BLE edema.        Last 24 hour I/O:   Intake/Output Summary (Last 24 hours) at 6/10/2024 1135  Last data filed at 6/10/2024 0830  Gross per 24 hour   Intake 1820 ml   Output 4775 ml   Net -2955 ml           Net I/O and Weights since admission:   05/11 1500 - 06/10 1459  In: 01083 [P.O.:07196; I.V.:795]  Out: 87088 [Urine:49027]  Net: -31341     Vitals:    06/03/24 1200 06/04/24 0045 06/04/24 0948 06/05/24 0343   Weight: 121.3 kg (267 lb 8 oz) 120.8 kg (266 lb 4.8 oz) 120.5 kg (265 lb 9.6 oz) 121.5 kg (267 lb 14.4 oz)    06/06/24 1018 06/07/24 0440 06/08/24 0300 06/09/24 0300   Weight: 118.5 kg (261 lb 4.8 oz) 117.9 kg (259 lb 14.4 oz) 116.8 kg (257 lb 9.6 oz) 116.2 kg (256 lb 1.6 oz)    06/10/24 0346   Weight: 115.4 kg (254 lb 6.4 oz)       2.  O2 sats > 90% on room air, or at prior home O2 therapy level.      Able to wean O2 this shift to keep sats above 90%?: Yes, satisfactory for discharge.   Does patient use Home O2? No          Current oxygenation status:   SpO2: 95 %     O2 Device: None (Room air),      3.  Tolerates ambulation and mobility near baseline.     Ambulation: Yes, satisfactory for discharge.   Times patient ambulated with staff this shift: 2    Pt adhering to fluid restriction, on PO ABX and diuretics. WC completed to BLE this morning. Pt c/o headache and back pain this morning. PRN oxycodone and tramadol given and effective. Pt discharging this afternoon back to [facility] via WC transport. All discharge instructions went over with pt.     Please review the Heart Failure Care Map for additional HF goal outcomes.    Farnaz GAY  SHARMILA Sharpe  6/10/2024

## 2024-06-10 NOTE — PROGRESS NOTES
Care Management Discharge Note    Discharge Date: 06/10/2024       Discharge Disposition: Home    Discharge Services: Transportation Services    Discharge DME: None    Discharge Transportation: agency    Private pay costs discussed: transportation costs    Does the patient's insurance plan have a 3 day qualifying hospital stay waiver?  No    PAS Confirmation Code:  NA  Patient/family educated on Medicare website which has current facility and service quality ratings: no    Education Provided on the Discharge Plan: Yes per team  Persons Notified of Discharge Plans: Patient  Patient/Family in Agreement with the Plan: yes    Handoff Referral Completed: Yes    Additional Information:  Patient discharge to home via [facility] transportation between [id number].     Lola Jaffe RN

## 2024-06-10 NOTE — PROGRESS NOTES
HEART CARE NOTE          Assessment/Recommendations   1. HFimpEF c/b severe ADHF  Assessment / Plan  Tolerating oral diuretic regimen - no changes at this time; continue to monitor UOP and renal function closely  Patient is high risk for adverse cardiac events 2/2 severe biventricular dysfunction, multiple HFHs, non-compliance with dietary restrictions, renal dysfunction, CAD  GDMT as detailed below     Current Pharmacotherapy AHA Guideline-Directed Medical Therapy   Lisinopril 5 mg daily  - held Lisinopril 20 mg twice daily   Carvedilol 3.125 mg BID  Carvedilol 25 mg twice daily   Spironolactone 50 mg daily Spironolactone 25 mg once daily   Hydralazine 10 mg TID Hydralazine 100 mg three times daily   Isosorbide mononitrate 180 mg daily Isosorbide dinitrate 40 mg three times daily   SGLT2 inhibitor:Dapagliflozin/Empagliflozin - not started Dapagliflozin or Empagliflozin 10 mg daily      2. Atrial fibrillation  Assessment / Plan  Currently on apixaban and amiodarone; EP team consulted; EP Team Please contact their team directly with any questions or concerns regarding rate, rhythm, device.     3. CAD c/b ICM  Assessment / Plan  S/p CABG 2015  Now s/p recent PCI to [facility] and OM with DARRELL  Patient with chest pain this admission in the setting if afib with RVR which was relieved by sublingual nitroglycerin - continue HR/arrhythmia control for now; plan for coronary angiogram +/- PCI if chest pain occurs  Current denies chest pain or anginal equivalents or recurrence in the last 24 hrs - discussed potential coronary targets with IC  Continue atorvastatin, carvedilol, clopidogrel     4.CKD  Assessment / Plan  Diuresis as above; continue to monitor UOP and renal function closely     5.DM2  Assessment / Plan  NIDDM; management per primary team - no changes to regimen at this time    Plan of care discussed on Ngozi 10, 2024 with patient at bedside, and primary team overseeing patient's care    Cardiology team will sign-off  for now. Please do not hesitate to consult us again if new questions or concerns arise. Follow-up appointment will be arranged by [facility]/HF clinic.         History of Present Illness/Subjective    Mr. Zackary Hutchison is a 63 year old male with a PMHx significant for (per Epic notation) atrial fibrillation, stage III CKD, ischemic cardiomyopathy, COPD, HFrEF, hyperlipidemia, CAD s/p CABG, restless leg syndrome, hypertension, obesity, newly diagnosed type 2 diabetes, and tobacco use disorder admitted on 5/13/2024 with the ER with left hip pain, suspected exacerbation of HFpEF, and possible COPD exacerbation      Today, Mr. Hutchison  denies acute cardiac events or complaints; Cardiac management as detailed above     ECG: Personally reviewed 6/3/24; Afib with RVR     ECHO (personnaly Reviewed on 5/13/24):   1. Technically difficult study.  2. The left ventricle is normal in size. Image quality does not provide for  detailed assessment of LV systolic function, but is felt to be normal with a  visually estimated ejection fraction of roughly 55%.  3. No significant valvular heart disease is identified on this study though  the sensitivity, particularly of regurgitant lesions, is reduced due to poor  Doppler acoustics.  4. Right ventricular size and systolic performance could not be accurately  assessed due to inadequate visualization.     The acoustic quality of this study is fairly poor. If a more accurate  assessment of left ventricular systolicperformance, regional wall motion, and  other morphology/function is required; would recommend cardiac MRI or other  alternative imaging modality for further evaluation.     When compared to the prior real-time echocardiogram dated 25 December 2023,  the ejection fraction appears somewhat higher on the current study though  accurate comparison is somewhat limited due to suboptimal acoustic imaging not  only on the current examination, but also hold the prior study as well.    Telemetry:  "personally reviewed Ngozi 10, 2024; notable for atrial fibrillation     Lab results: personally reviewed Ngozi 10, 2024; notable for stable renal function    Medical history and pertinent documents reviewed in Care Everywhere please where applicable see details above        Physical Examination Review of Systems   /73 (BP Location: Left arm)   Pulse 80   Temp 97.6  F (36.4  C) (Oral)   Resp 18   Ht 1.676 m (5' 6\")   Wt 115.4 kg (254 lb 6.4 oz)   SpO2 93%   BMI 41.06 kg/m    Body mass index is 41.06 kg/m .  Wt Readings from Last 3 Encounters:   06/10/24 115.4 kg (254 lb 6.4 oz)   05/30/24 123.4 kg (272 lb)   05/20/24 103.1 kg (227 lb 6.4 oz)     General Appearance:   no distress, normal body habitus   ENT/Mouth: membranes moist, no oral lesions or bleeding gums.      EYES:  no scleral icterus, normal conjunctivae   Neck: no carotid bruits or thyromegaly   Chest/Lungs:   lungs are clear to auscultation, no rales or wheezing; equal chest wall expansion    Cardiovascular:   Irregular. Normal first and second heart sounds with no murmurs, rubs, or gallops; the carotid, radial and posterior tibial pulses are intact, + JVD and LE edema bilaterally    Abdomen:  no organomegaly, masses, bruits, or tenderness; bowel sounds are present   Extremities: no cyanosis or clubbing   Skin: no xanthelasma, warm.    Neurologic: NAD     Psychiatric: alert and oriented x3, calm     A complete 10 systems ROS was reviewed  And is negative except what is listed in the HPI.          Medical History  Surgical History Family History Social History   Past Medical History:   Diagnosis Date    Atrial fibrillation (H)     Chronic kidney disease     Chronic kidney disease, stage 3b (H) 09/28/2023    Class 3 severe obesity due to excess calories with body mass index (BMI) of 40.0 to 44.9 in adult (H) 09/28/2023    Congestive heart failure (H)     COPD (chronic obstructive pulmonary disease) (H)     Coronary artery disease involving " coronary bypass graft of native heart without angina pectoris 09/28/2023    Heart failure with reduced ejection fraction (H) 09/11/2023    HFrEF (heart failure with reduced ejection fraction) (H) 09/11/2023    Hyperlipidemia     Hypertension     Ischemic cardiomyopathy 09/28/2023    Obese     Paroxysmal atrial fibrillation (H) 09/28/2023    Tobacco abuse 09/28/2023    Past Surgical History:   Procedure Laterality Date    CORONARY ANGIOGRAPHY ADULT ORDER      CORONARY ARTERY BYPASS Left 2014    2 vessels    CV CARDIOMEMS WITH RIGHT HEART CATH N/A 2/15/2024    Procedure: Pulmonary Arterial Pressure Sensor Placement;  Surgeon: Jacey Bhandari MD;  Location: [facility] C[facility]    CV CORONARY ANGIOGRAM N/A 09/11/2023    Procedure: Coronary Angiogram;  Surgeon: Santy Esposito MD;  Location: [facility]    CV CORONARY ANGIOGRAM N/A 5/2/2024    Procedure: CV CORONARY ANGIOGRAM;  Surgeon: Santy Esposito MD;  Location: [facility]    CV LEFT HEART CATH N/A 09/11/2023    Procedure: Left Heart Catheterization;  Surgeon: Santy Esposito MD;  Location: [facility]    CV LEFT HEART CATH N/A 5/2/2024    Procedure: Left Heart Catheterization;  Surgeon: Santy Esposito MD;  Location: [facility]    CV PCI ANGIOPLASTY N/A 5/2/2024    Procedure: Percutaneous Transluminal Angioplasty;  Surgeon: Santy Esposito MD;  Location: [facility]    CV RIGHT HEART CATH MEASUREMENTS RECORDED N/A 09/11/2023    Procedure: Right Heart Catheterization;  Surgeon: Santy Esposito MD;  Location: [facility]    no family history of premature coronary artery disease Social History     Socioeconomic History    Marital status: Single     Spouse name: Not on file    Number of children: Not on file    Years of education: Not on file    Highest education level: Not on file   Occupational History    Occupation: Retired restaurant owner     Comment: [employer]   Tobacco Use    Smoking  "status: Every Day     Current packs/day: 0.50     Types: Cigarettes    Smokeless tobacco: Never    Tobacco comments:     Seen IP by [facility] on 9/11/23 and declined cessation services and materials   Substance and Sexual Activity    Alcohol use: Not Currently    Drug use: Not on file    Sexual activity: Not on file   Other Topics Concern    Not on file   Social History Narrative    Currently lives in an assisted living facility. (Updated: 01/08/2024)     Social Determinants of Health     Financial Resource Strain: Not on file   Food Insecurity: Not on file   Transportation Needs: Not on file   Physical Activity: Not on file   Stress: Not on file   Social Connections: Not on file   Interpersonal Safety: Not on file   Housing Stability: Not on file           Lab Results    Chemistry/lipid CBC Cardiac Enzymes/BNP/TSH/INR   Lab Results   Component Value Date    CHOL 121 05/02/2024    HDL 27 (L) 05/02/2024    TRIG 219 (H) 05/02/2024    BUN 44.5 (H) 06/09/2024     06/09/2024    CO2 31 (H) 06/09/2024    Lab Results   Component Value Date    WBC 7.7 06/08/2024    HGB 11.6 (L) 06/08/2024    HCT 38.2 (L) 06/08/2024    MCV 94 06/08/2024     06/08/2024    Lab Results   Component Value Date    TSH 1.70 04/29/2024    INR 0.97 06/03/2024     No results found for: \"CKTOTAL\", \"CKMB\", \"TROPONINI\"       Weight:    Wt Readings from Last 3 Encounters:   06/10/24 115.4 kg (254 lb 6.4 oz)   05/30/24 123.4 kg (272 lb)   05/20/24 103.1 kg (227 lb 6.4 oz)       Allergies  Allergies   Allergen Reactions    Bumex [Bumetanide] Muscle Pain (Myalgia)         Surgical History  Past Surgical History:   Procedure Laterality Date    CORONARY ANGIOGRAPHY ADULT ORDER      CORONARY ARTERY BYPASS Left 2014    2 vessels    CV CARDIOMEMS WITH RIGHT HEART CATH N/A 2/15/2024    Procedure: Pulmonary Arterial Pressure Sensor Placement;  Surgeon: Jacey Bhandari MD;  Location: [facility] CATH LAB CV    CV CORONARY ANGIOGRAM N/A 09/11/2023    " Procedure: Coronary Angiogram;  Surgeon: Santy Esposito MD;  Location: [facility] CATH LAB CV    CV CORONARY ANGIOGRAM N/A 5/2/2024    Procedure: CV CORONARY ANGIOGRAM;  Surgeon: Santy Esposito MD;  Location: [facility]    CV LEFT HEART CATH N/A 09/11/2023    Procedure: Left Heart Catheterization;  Surgeon: Santy Esposito MD;  Location: [facility]    CV LEFT HEART CATH N/A 5/2/2024    Procedure: Left Heart Catheterization;  Surgeon: Santy Esposito MD;  Location: [facility]    CV PCI ANGIOPLASTY N/A 5/2/2024    Procedure: Percutaneous Transluminal Angioplasty;  Surgeon: Santy Esposito MD;  Location: [facility] CATH LAB CV    CV RIGHT HEART CATH MEASUREMENTS RECORDED N/A 09/11/2023    Procedure: Right Heart Catheterization;  Surgeon: Santy Esposito MD;  Location: [facility] CATH LAB CV       Social History  Tobacco:   History   Smoking Status    Every Day    Types: Cigarettes   Smokeless Tobacco    Never    Alcohol:   Social History    Substance and Sexual Activity      Alcohol use: Not Currently   Illicit Drugs:   History   Drug Use Not on file       Family History  Family History   Problem Relation Age of Onset    Cerebrovascular Disease Mother     Myocardial Infarction Father           Jacey Bhandari MD on 6/10/2024      cc: Reid Escobar

## 2024-06-11 LAB
BACTERIA BLD CULT: NO GROWTH
BACTERIA BLD CULT: NO GROWTH

## 2024-06-11 NOTE — PLAN OF CARE
Occupational Therapy Discharge Summary    Reason for therapy discharge:    Discharged to home.    Progress towards therapy goal(s). See goals on Care Plan in [facility] electronic health record for goal details.  Goals met    Therapy recommendation(s):    Recommend assist with wound care

## 2024-06-12 ENCOUNTER — PATIENT OUTREACH (OUTPATIENT)
Dept: CARE COORDINATION | Facility: CLINIC | Age: 64
End: 2024-06-12
Payer: MEDICARE

## 2024-06-12 NOTE — PROGRESS NOTES
Connected Care Resource Center Contact  [facility]/Voicemail     Clinical Data: Post-Discharge Outreach     Outreach attempted x 2.  Left message on patient's voicemail, providing [facility]'s central phone number of [phone] ([phone]) for questions/concerns and/or to schedule an appt with an [facility] provider, if they do not have a PCP.      Plan:  [facility] will do no further outreaches at this time.       MARIUSZ Bhagat  Connected Care Resource Center, [facility]    *Connected Care Resource Team does NOT follow patient ongoing. Referrals are identified based on internal discharge reports and the outreach is to ensure patient has an understanding of their discharge instructions.

## 2024-06-13 ENCOUNTER — TELEPHONE (OUTPATIENT)
Dept: PHARMACY | Facility: OTHER | Age: 64
End: 2024-06-13
Payer: MEDICARE

## 2024-06-13 NOTE — TELEPHONE ENCOUNTER
MTM referral from: Transitions of Care (recent hospital discharge or ED visit)    MTM referral outreach attempt #2 on June 13, 2024 at 9:32 AM      Outcome: Patient not reachable after several attempts, sent Adv[employer] message    Use griffin for the carrier/Plan on the flowsheet      ditlot Message Sent    Marizol Mosqueda CPhT  MTM

## 2024-06-17 ENCOUNTER — TELEPHONE (OUTPATIENT)
Dept: CARDIOLOGY | Facility: CLINIC | Age: 64
End: 2024-06-17
Payer: MEDICARE

## 2024-06-17 NOTE — TELEPHONE ENCOUNTER
Spoke with Zackary, relayed many concerns about comfort and pain and inability to eat within our guidelines d/t food that he receives downstairs at facility. Also claims to have extreme thirst. Stated he does his own wound cares. Advised to call [facility] today to set up deliveries asap!     Will monitor [employer] (advised to do that as we haven't seen recent readings).    Manuelito Bianchi RN [facility]

## 2024-06-19 ENCOUNTER — TELEPHONE (OUTPATIENT)
Dept: CARDIOLOGY | Facility: CLINIC | Age: 64
End: 2024-06-19
Payer: MEDICARE

## 2024-06-20 ENCOUNTER — OFFICE VISIT (OUTPATIENT)
Dept: CARDIOLOGY | Facility: CLINIC | Age: 64
End: 2024-06-20
Payer: MEDICARE

## 2024-06-20 ENCOUNTER — TELEPHONE (OUTPATIENT)
Dept: CARDIOLOGY | Facility: CLINIC | Age: 64
End: 2024-06-20

## 2024-06-20 DIAGNOSIS — I25.9 CHRONIC ISCHEMIC HEART DISEASE: Primary | Chronic | ICD-10-CM

## 2024-06-20 DIAGNOSIS — I50.33 ACUTE ON CHRONIC DIASTOLIC CONGESTIVE HEART FAILURE (H): Primary | ICD-10-CM

## 2024-06-20 PROCEDURE — 93264 REM MNTR WRLS P-ART PRS SNR: CPT | Performed by: INTERNAL MEDICINE

## 2024-06-20 RX ORDER — METOLAZONE 2.5 MG/1
TABLET ORAL
Qty: 12 TABLET | Refills: 1 | Status: SHIPPED | OUTPATIENT
Start: 2024-06-20 | End: 2024-07-01

## 2024-06-20 NOTE — TELEPHONE ENCOUNTER
[facility]:   [employer] Daily Alert     June 20, 2024    Alert(s): Symptom and Weight 260#    Current diuretic dose: Torsemide 100mg bid     Recent plan of care changes: patient experiencing wt gain up about 8# presently with abdominal bloating, some LE edema. He is back to the Assisted Living environment and has been eating the meals there which are not low sodium He has been encouraged to start [facility]. So far has not done so. He has been experiencing some transmission issues with the [facility] and tech support has been initiated.     Goal Weight Range: 252#      Time spent in review this day: 20 min.    Discussed with Dr. Bhandari, will add Metolazone 2.5mg once per week with weekly labs the day following either by home care or in person at the outpt lab.     Heather HUERTA RN BSN, CHFN, PCCN-K      Future Appointments   Date Time Provider Department Center   6/27/2024  2:20 PM Jacey Bhandari MD [facility]   6/28/2024  4:00 AM [facility] CARDIOMEMS [facility] N   7/3/2024  2:00 PM Yolis Gates, RD [facility] HP   7/22/2024  4:00 AM [facility] CARDIOMEMS [facility]N [facility]   7/29/2024  4:00 AM [facility] CARDIOMEMS [facility]V [facility]   7/30/2024  2:10 PM Toshia Khanna, NP [facility]RSJN [facility]V [facility]   7/31/2024  2:10 [facility]   8/22/2024  4:00 AM [facility] CARDIOMEMS [facility]JN [facility]V [facility]   8/29/2024  4:00 AM [facility] CARDIOMEMS [facility]N FV [facility]N   9/23/2024  4:00 AM [facility] CARDIOMEMS [facility]N FV [facility]N   10/24/2024  4:00 AM [facility] CARDIOMEMS [facility]N FV [facility]N   11/25/2024  4:00 AM [facility] CARDIOMEMS [facility]JN MHFV S[facility]N   12/26/2024  4:00 AM [facility] CARDIOMEMS [facility]N F[facility] N

## 2024-06-24 NOTE — PROGRESS NOTES
[facility] - [facility]:  CardioMems Routine Remote Evaluation     Dates: 3/19/24 through 4/18/24    Adjustments made in the last month: Currently using Torsemide 100mg bid and Weekly Metolazone 2.5mg     These adjustments have been discussed with the patient/caregiver and they express verbal understanding.    Threshold goal  Settings: pending    Heather HUERTA RN BSN, CHFN, PCCN-K      Readings:     Future Appointments   Date Time Provider Department Center   6/27/2024  2:20 PM Jacey Bhandari MD [facility]   6/28/2024  4:00 AM [facility] CARDIOMEMS [facility]JN MHFV [facility]N   7/3/2024  2:00 PM Yolis Gates, RD [facility]   7/22/2024  4:00 AM [facility] CARDIOMEMS [facility]JN MHFV [facility]N   7/29/2024  4:00 AM [facility] CARDIOMEMS [facility]N M[facility]V [facility]N   7/30/2024  2:10 PM Toshia Khanna, NP HRSJN MHFV S[facility]N   7/31/2024  2:10 PM Toshia Khanna, NP [facility]V [facility]WW   8/22/2024  4:00 AM [facility] CARDIOMEMS [facility]JN M[facility]V S[facility]N   8/29/2024  4:00 AM [facility] CARDIOMEMS [facility]N M[facility]V [facility]N   9/23/2024  4:00 AM [facility] CARDIOMEMS [facility]N M[facility]V [facility]N   10/24/2024  4:00 AM [facility] CARDIOMEMS [facility]N M[facility]V [facility]   11/25/2024  4:00 AM [facility] CARDIOMEMS [facility]N M[facility]V [facility]   12/26/2024  4:00 AM [facility] CARDIOMEMS [facility] M[facility]V [facility]N      I have reviewed Heather Armenta RN BSN, CHFN's note and agree.    Jacey Bhandari MD., MHS

## 2024-06-24 NOTE — PROGRESS NOTES
[facility] - [facility].O.R[facility] Clinic:  CardioMems Routine Remote Evaluation     Dates: 4-19-24 through 5-20-24    Adjustments made in the last month: Torsemide 100mg bid, Metolazone 2.5mg weekly on Thursday's    No adjustments made this month.  Discussed with patient/caregiver and they will continue current plan of care.     Threshold Goal Settings:  pending    Heather HUERTA RN BSN, CHFN, PCCN-K      Readings:           Future Appointments   Date Time Provider Department Center   6/27/2024  2:20 PM Jacey Bhandari MD [facility]N [facility]V [facility]N   6/28/2024  4:00 AM [facility] CARDIOMEMS [facility]N M[facility]V [facility]N   7/3/2024  2:00 PM Yolis Gates, RD [facility] HP   7/22/2024  4:00 AM [facility] CARDIOMEMS [facility]N M[facility]V [facility]N   7/29/2024  4:00 AM [facility] CARDIOMEMS [facility]N M[facility]V [facility]N   7/30/2024  2:10 PM Toshia Khanna, NP [facility]RSJN M[facility]V [facility]N   7/31/2024  2:10 PM Toshia Khanna, NP [facility] M[facility]V WBWW   8/22/2024  4:00 AM [facility] CARDIOMEMS [facility]JN M[facility]V [facility]N   8/29/2024  4:00 AM [facility] CARDIOMEMS [facility]N M[facility]V [facility]N   9/23/2024  4:00 AM [facility] CARDIOMEMS [facility]   10/24/2024  4:00 AM [facility] CARDIOMEMS [facility]   11/25/2024  4:00 AM [facility] CARDIOMEMS [facility]V [facility]N   12/26/2024  4:00 AM [facility] CARDIOMEMS [facility]N M[facility]V [facility]N      I have reviewed Heather Armenta RN BSN, CHFN's note and agree.    Jacey Bhandari MD., S

## 2024-06-28 ENCOUNTER — ALLIED HEALTH/NURSE VISIT (OUTPATIENT)
Dept: CARDIOLOGY | Facility: CLINIC | Age: 64
End: 2024-06-28
Payer: MEDICARE

## 2024-06-28 ENCOUNTER — TELEPHONE (OUTPATIENT)
Dept: CARDIOLOGY | Facility: CLINIC | Age: 64
End: 2024-06-28

## 2024-06-28 DIAGNOSIS — E87.6 HYPOKALEMIA: ICD-10-CM

## 2024-06-28 DIAGNOSIS — I50.20 HEART FAILURE WITH REDUCED EJECTION FRACTION (H): Primary | ICD-10-CM

## 2024-06-28 DIAGNOSIS — I50.33 ACUTE ON CHRONIC DIASTOLIC CONGESTIVE HEART FAILURE (H): Primary | ICD-10-CM

## 2024-06-28 DIAGNOSIS — I50.33 ACUTE ON CHRONIC DIASTOLIC CONGESTIVE HEART FAILURE (H): ICD-10-CM

## 2024-06-28 PROCEDURE — 99457 RPM TX MGMT 1ST 20 MIN: CPT | Performed by: INTERNAL MEDICINE

## 2024-06-28 PROCEDURE — 99454 REM MNTR PHYSIOL PARAM 16-30: CPT | Performed by: INTERNAL MEDICINE

## 2024-06-28 NOTE — TELEPHONE ENCOUNTER
LM on pt's voicemail to get BMP tomorrow and take 2.5 mg Metolazone on Monday. [facility] will follow up with him on Monday morning.     Manuelito Bianchi RN [facility]

## 2024-06-28 NOTE — TELEPHONE ENCOUNTER
"Spoke w/ pt. He reports some ongoing SOB lately, but not really worse than baseline & not like it was when he went into the hospital last time. He also wonders if the humidity is playing a role in it. He is using his nebulizer as needed. BLE swelling is present still, pt reports they are \"big\", but not \"exceptionally big\" & look like they're improving. Abdominal bloating always present.    Pt thinks his weight also showed improvement in part from metolazone dose last week, but also resolution of constipation he was experiencing. Pt did not take a metolazone dose this week.     Pt has difficulty w/ transportation. He can take a cab if needed, but this can get expensive. Advised pt that we would like BMP to evaluate kidney function & lyes w/ metolazone use, especially prior to additional use. Pt will do his best to go to the OP lab at [facility] tomorrow AM for BMP, order is in.     Unfortunately, pt's HHC has not resumed yet. Reviewed PCP's note from 6/20, sounds like they LM for pt to call them back and let them know where they wanted HHC referral sent to. I strongly encouraged pt to call them after getting off the phone w/ me to let them know so that can get started & we can coordinate w/ HHC for blood work going forward.     Pt re-scheduled appt w/ Dr. Bhandari for next available, 7/31.     CHAPARRITA Ann RN     "

## 2024-06-28 NOTE — TELEPHONE ENCOUNTER
Pt's weight trend lately per HRS:      Called pt, no answer. LM to call [facility] back to review HF status. Pt did not come to scheduled appt w/ Dr. Bhandari yesterday. Pt is on torsemide 100mg BID & was instructed to start ongoing once weekly metolazone 2.5mg w/ labs the following day on 6/20/24 per Dr. Bhandari. Weight came down significantly to 250lbs on 6/25, but no BMP was done. Last creatinine was 1.24 on 6/10 when pt discharged from [facility] [place]. Pt's weight trending up again. Requested pt call [facility] to review HF status, medications, labs, rescheduling appt.     CHAPARRITA Ann RN     Time spent: 10 min

## 2024-06-28 NOTE — TELEPHONE ENCOUNTER
Spoke w/ Dr. Bhandari in person regarding note below/ pt status & that pt will do his best to get BMP tomorrow morning. She made recommendation for pt to take metolazone 2.5mg on Monday 7/1 to manage fluid status, even if pt unable to go get BMP done tomorrow, but preference would be to have BMP done. Pt's last metolazone dose was 6/20 evening.     Called pt to update him w/ this recommendation, no answer. LM to call [facility] back to review.     CHAPARRITA Ann RN

## 2024-06-29 ENCOUNTER — LAB (OUTPATIENT)
Dept: LAB | Facility: HOSPITAL | Age: 64
End: 2024-06-29
Payer: MEDICARE

## 2024-06-29 DIAGNOSIS — I50.33 ACUTE ON CHRONIC DIASTOLIC CONGESTIVE HEART FAILURE (H): ICD-10-CM

## 2024-06-29 LAB
ANION GAP SERPL CALCULATED.3IONS-SCNC: 13 MMOL/L (ref 7–15)
BUN SERPL-MCNC: 24.4 MG/DL (ref 8–23)
CALCIUM SERPL-MCNC: 8.8 MG/DL (ref 8.8–10.2)
CHLORIDE SERPL-SCNC: 84 MMOL/L (ref 98–107)
CREAT SERPL-MCNC: 1.08 MG/DL (ref 0.67–1.17)
DEPRECATED HCO3 PLAS-SCNC: 35 MMOL/L (ref 22–29)
EGFRCR SERPLBLD CKD-EPI 2021: 76 ML/MIN/1.73M2
GLUCOSE SERPL-MCNC: 434 MG/DL (ref 70–99)
POTASSIUM SERPL-SCNC: 2.7 MMOL/L (ref 3.4–5.3)
SODIUM SERPL-SCNC: 132 MMOL/L (ref 135–145)

## 2024-06-29 PROCEDURE — 36415 COLL VENOUS BLD VENIPUNCTURE: CPT

## 2024-06-29 PROCEDURE — 80048 BASIC METABOLIC PNL TOTAL CA: CPT

## 2024-07-01 RX ORDER — POTASSIUM CHLORIDE 1500 MG/1
40 TABLET, EXTENDED RELEASE ORAL DAILY
Qty: 180 TABLET | Refills: 1 | Status: SHIPPED | OUTPATIENT
Start: 2024-07-01

## 2024-07-01 RX ORDER — SPIRONOLACTONE 25 MG/1
25 TABLET ORAL DAILY
Qty: 90 TABLET | Refills: 1 | Status: SHIPPED | OUTPATIENT
Start: 2024-07-01 | End: 2024-07-12

## 2024-07-01 RX ORDER — METOLAZONE 2.5 MG/1
TABLET ORAL
Qty: 12 TABLET | Refills: 1 | Status: SHIPPED | OUTPATIENT
Start: 2024-07-01 | End: 2024-07-12

## 2024-07-01 NOTE — TELEPHONE ENCOUNTER
Pt called back & does have a good supply of unexpired potassium chloride 20mEq at home.     Discussed w/ Dr. Bhandari in person. She made recommendation for pt to HOLD on taking any metolazone 2.5mg doses until potassium is replaced and BMP is rechecked. She recommended pt take potassium chloride 60mEq today, tomorrow & Wed 7/3 in the AM. Then have BMP checked. Further plan regarding metolazone will be made pending labs. Additionally, pt is to still start spironolactone 25mg daily. Once he starts this, he is to reduce potassium chloride to 40mEq daily.     Pt requested his medication & refills be sent to [facility] on [address] & he will get them shipped to him.     Pt will get a ride via [employer] to [facility] OP lab on Wed 7/3, encouraged pt to do this earlier in the day so that Dr. Bhandari has adequate time to review labs & make recommendations. Pt verbalized understanding.     CHAPARRITA Ann RN

## 2024-07-01 NOTE — TELEPHONE ENCOUNTER
Spoke w/ Dr. Bhandari in person. Historically it has taken pt some time to start new medications due to transportation issues. Per pt's med list, he should be taking potassium chloride 40mEq daily. Dr. Bhandari made recommendation for pt to start taking potassium chloride 60mEq daily until he can start spironolactone 25mg daily, then go back to taking potassium chloride 40mEq daily. Will update pt w/ lab results and recommendations.     CHAPARRITA Ann RN

## 2024-07-01 NOTE — TELEPHONE ENCOUNTER
"Briefly spoke with Zackary. He stated he has not taken potassium since he ran out 2 weeks ago. He was taking his short supply of it at discharge, which was only 2 weeks. He thought that was all he had to take. I advised him that this could be the source of his low K+ labs. He was downstairs and stated he will call [facility] back when he's in his apartment again to go over the potassium Rx he stated he might have from \"before\". He also confirmed any new prescriptions or refills will need to be mailed and he likely will not be able to receive/start them for \"a few days\".     Manuelito Bianchi RN [facility]     "

## 2024-07-01 NOTE — TELEPHONE ENCOUNTER
Jacey Bhandari MD  P [facility]  Please contact Zackary and update him regarding the test results. Please instruct him to start taking 2.5 metolazone on Mondays (starting today) with follow-up BMP the next day. Please also instruct him to start taking spironolactone 25 mg daily.    Thanks;  ~ Wen

## 2024-07-01 NOTE — TELEPHONE ENCOUNTER
Called pt, no answer. LM to call [facility] back asap to review lab results and recommendations from Dr. Bhandari.     CHAPARRITA Ann RN

## 2024-07-03 ENCOUNTER — LAB (OUTPATIENT)
Dept: LAB | Facility: HOSPITAL | Age: 64
End: 2024-07-03
Payer: MEDICARE

## 2024-07-03 DIAGNOSIS — I50.20 HEART FAILURE WITH REDUCED EJECTION FRACTION (H): ICD-10-CM

## 2024-07-03 LAB
ANION GAP SERPL CALCULATED.3IONS-SCNC: 14 MMOL/L (ref 7–15)
BUN SERPL-MCNC: 22.4 MG/DL (ref 8–23)
CALCIUM SERPL-MCNC: 9.3 MG/DL (ref 8.8–10.2)
CHLORIDE SERPL-SCNC: 92 MMOL/L (ref 98–107)
CREAT SERPL-MCNC: 1.18 MG/DL (ref 0.67–1.17)
DEPRECATED HCO3 PLAS-SCNC: 30 MMOL/L (ref 22–29)
EGFRCR SERPLBLD CKD-EPI 2021: 68 ML/MIN/1.73M2
GLUCOSE SERPL-MCNC: 266 MG/DL (ref 70–99)
POTASSIUM SERPL-SCNC: 3.5 MMOL/L (ref 3.4–5.3)
SODIUM SERPL-SCNC: 136 MMOL/L (ref 135–145)

## 2024-07-03 PROCEDURE — 80048 BASIC METABOLIC PNL TOTAL CA: CPT

## 2024-07-03 PROCEDURE — 36415 COLL VENOUS BLD VENIPUNCTURE: CPT

## 2024-07-03 NOTE — TELEPHONE ENCOUNTER
Discussed w/ Dr. Bhandari in person. She reviewed and agrees with plan for pt to take metolazone 2.5mg x1 dose and have BMP at least on Monday 7/8. Recommended for pt to stay on potassium chloride 60mEq daily until he starts spironolactone 25mg daily, then to start taking 40mEq daily.     Called pt and updated him w/ recommendations from Dr. Bhandari. Pt verbalized understanding and wrote down instructions. He will take the metolazone dose tonight, he has not yet taken his second dose of torsemide for the day. No further questions at this time.     CHAPARRITA Ann RN

## 2024-07-03 NOTE — TELEPHONE ENCOUNTER
Pt is attempting to get a cab to get BMP done this afternoon at [facility].     CHAPARRITA Ann RN

## 2024-07-03 NOTE — TELEPHONE ENCOUNTER
Dr. Bhandari,     BMP is resulted. Creatinine 1.18, K 3.5. This is after x3 days (including today) of potassium chloride 60mEq daily. Has not started spironolactone 25mg daily yet. Weight up to 264lbs today, was 259lbs yesterday and 261.5lbs the day prior. /80 today.    Recommend pt take metolazone 2.5mg x1 dose? Also to remain on potassium chloride 60mEq daily, until he can start spironolactone 25mg daily and then go down to 40mEq daily? As we discussed, difficult to get labs d/t transportation. Could request he get them as soon as he is able? Let me know your recommendations.     Time spent: 10 min    CHAPARRITA Ann RN

## 2024-07-06 ENCOUNTER — LAB (OUTPATIENT)
Dept: LAB | Facility: HOSPITAL | Age: 64
End: 2024-07-06
Payer: MEDICARE

## 2024-07-06 DIAGNOSIS — I50.33 ACUTE ON CHRONIC DIASTOLIC CONGESTIVE HEART FAILURE (H): ICD-10-CM

## 2024-07-06 LAB
ANION GAP SERPL CALCULATED.3IONS-SCNC: 17 MMOL/L (ref 7–15)
BUN SERPL-MCNC: 36.7 MG/DL (ref 8–23)
CALCIUM SERPL-MCNC: 8.9 MG/DL (ref 8.8–10.2)
CHLORIDE SERPL-SCNC: 86 MMOL/L (ref 98–107)
CREAT SERPL-MCNC: 1.32 MG/DL (ref 0.67–1.17)
DEPRECATED HCO3 PLAS-SCNC: 29 MMOL/L (ref 22–29)
EGFRCR SERPLBLD CKD-EPI 2021: 60 ML/MIN/1.73M2
GLUCOSE SERPL-MCNC: 425 MG/DL (ref 70–99)
POTASSIUM SERPL-SCNC: 3.3 MMOL/L (ref 3.4–5.3)
SODIUM SERPL-SCNC: 132 MMOL/L (ref 135–145)

## 2024-07-06 PROCEDURE — 80048 BASIC METABOLIC PNL TOTAL CA: CPT

## 2024-07-06 PROCEDURE — 36415 COLL VENOUS BLD VENIPUNCTURE: CPT

## 2024-07-12 ENCOUNTER — TELEPHONE (OUTPATIENT)
Dept: CARDIOLOGY | Facility: CLINIC | Age: 64
End: 2024-07-12
Payer: MEDICARE

## 2024-07-12 DIAGNOSIS — I50.20 HEART FAILURE WITH REDUCED EJECTION FRACTION (H): Primary | ICD-10-CM

## 2024-07-12 DIAGNOSIS — I50.33 ACUTE ON CHRONIC DIASTOLIC CONGESTIVE HEART FAILURE (H): ICD-10-CM

## 2024-07-12 RX ORDER — SPIRONOLACTONE 25 MG/1
25 TABLET ORAL DAILY
Qty: 96 TABLET | Refills: 1 | Status: SHIPPED | OUTPATIENT
Start: 2024-07-12 | End: 2024-09-13

## 2024-07-12 RX ORDER — METOLAZONE 2.5 MG/1
TABLET ORAL
Qty: 12 TABLET | Refills: 1 | Status: SHIPPED | OUTPATIENT
Start: 2024-07-12 | End: 2024-07-31

## 2024-07-12 NOTE — TELEPHONE ENCOUNTER
Patient contacted with wt gain and CMEMS elevated to 16, goal of 8 wt gain is 8-9#  From his baseline.     He is using Torsemide 100mg bid and Spironolactone 25mg daily. PRN Metolazone 2.5mg which he is now planning to use on Sunday's and obtain transportation to the lab on Monday;s for BMP. He will have homecare for his wound care, but not the lab draws.     His K Level was lower to 3.3 on 7/6/24. Dr. Bhandari reviewed and advises Spironolactone 50mg on Sundays with the use of the Metolazone. NIKOLAY Bhandari/Abraham    Orders placed and patient notified of plan of care. Heather HUERTA RN BSN, CHFN, PCCN-K

## 2024-07-15 ENCOUNTER — LAB REQUISITION (OUTPATIENT)
Dept: LAB | Facility: HOSPITAL | Age: 64
End: 2024-07-15
Payer: MEDICARE

## 2024-07-15 DIAGNOSIS — I13.0 HYPERTENSIVE HEART AND CHRONIC KIDNEY DISEASE WITH HEART FAILURE AND STAGE 1 THROUGH STAGE 4 CHRONIC KIDNEY DISEASE, OR UNSPECIFIED CHRONIC KIDNEY DISEASE (H): ICD-10-CM

## 2024-07-15 LAB
ANION GAP SERPL CALCULATED.3IONS-SCNC: 14 MMOL/L (ref 7–15)
BUN SERPL-MCNC: 26.8 MG/DL (ref 8–23)
CALCIUM SERPL-MCNC: 9.8 MG/DL (ref 8.8–10.2)
CHLORIDE SERPL-SCNC: 90 MMOL/L (ref 98–107)
CREAT SERPL-MCNC: 1.29 MG/DL (ref 0.67–1.17)
EGFRCR SERPLBLD CKD-EPI 2021: 62 ML/MIN/1.73M2
GLUCOSE SERPL-MCNC: 184 MG/DL (ref 70–99)
HCO3 SERPL-SCNC: 31 MMOL/L (ref 22–29)
POTASSIUM SERPL-SCNC: 3.7 MMOL/L (ref 3.4–5.3)
SODIUM SERPL-SCNC: 135 MMOL/L (ref 135–145)

## 2024-07-15 PROCEDURE — 80048 BASIC METABOLIC PNL TOTAL CA: CPT | Mod: ORL | Performed by: FAMILY MEDICINE

## 2024-07-22 ENCOUNTER — LAB REQUISITION (OUTPATIENT)
Dept: LAB | Facility: HOSPITAL | Age: 64
End: 2024-07-22
Payer: MEDICARE

## 2024-07-22 ENCOUNTER — APPOINTMENT (OUTPATIENT)
Dept: CARDIOLOGY | Facility: CLINIC | Age: 64
End: 2024-07-22
Payer: MEDICARE

## 2024-07-22 DIAGNOSIS — I50.20 HFREF (HEART FAILURE WITH REDUCED EJECTION FRACTION) (H): Primary | ICD-10-CM

## 2024-07-22 DIAGNOSIS — I87.2 VENOUS INSUFFICIENCY (CHRONIC) (PERIPHERAL): ICD-10-CM

## 2024-07-22 DIAGNOSIS — I48.0 PAROXYSMAL ATRIAL FIBRILLATION (H): ICD-10-CM

## 2024-07-22 DIAGNOSIS — I25.10 ATHEROSCLEROTIC HEART DISEASE OF NATIVE CORONARY ARTERY WITHOUT ANGINA PECTORIS: ICD-10-CM

## 2024-07-22 LAB
ANION GAP SERPL CALCULATED.3IONS-SCNC: 17 MMOL/L (ref 7–15)
BUN SERPL-MCNC: 39 MG/DL (ref 8–23)
CALCIUM SERPL-MCNC: 9.9 MG/DL (ref 8.8–10.4)
CHLORIDE SERPL-SCNC: 89 MMOL/L (ref 98–107)
CREAT SERPL-MCNC: 1.4 MG/DL (ref 0.67–1.17)
EGFRCR SERPLBLD CKD-EPI 2021: 56 ML/MIN/1.73M2
GLUCOSE SERPL-MCNC: 241 MG/DL (ref 70–99)
HCO3 SERPL-SCNC: 29 MMOL/L (ref 22–29)
POTASSIUM SERPL-SCNC: 3.8 MMOL/L (ref 3.4–5.3)
SODIUM SERPL-SCNC: 135 MMOL/L (ref 135–145)

## 2024-07-22 PROCEDURE — 99207 PR NO CHARGE LOS: CPT

## 2024-07-22 PROCEDURE — 80048 BASIC METABOLIC PNL TOTAL CA: CPT | Mod: ORL | Performed by: INTERNAL MEDICINE

## 2024-07-23 NOTE — PROGRESS NOTES
[facility]:   HRS ([place]) Routine Remote Evaluation    HRS Enrollment date: 2/23/24     Dates: 4/26/24 through 5/28/24    This is not the first billing cycle.    Alerts in the last month: None    No adjustments made this month.  Discussed with patient/caregiver and they will continue current plan of care.     Readings:     Inadequate transmissions during this billing period to submit billing.       Total Minutes Spent: 0 minutes     Anh Velasquez CMA    Future Appointments   Date Time Provider Department Center   7/29/2024  4:00 AM [facility]   7/30/2024  2:10 PM Toshia Khanna NP [facility]   7/31/2024 12:20 [facility]   8/22/2024  4:00 AM [facility]   8/29/2024  4:00 AM [facility]   9/23/2024  4:00 AM [facility]   9/30/2024  4:00 AM [facility]   10/24/2024  4:00 AM [facility]   11/25/2024  4:00 AM [facility]   12/26/2024  4:00 AM [facility]     I have reviewed Anh Velasquez CMA 's note and agree.    Jacey Bhandari MD., MHS

## 2024-07-25 ENCOUNTER — TELEPHONE (OUTPATIENT)
Dept: CARDIOLOGY | Facility: CLINIC | Age: 64
End: 2024-07-25
Payer: MEDICARE

## 2024-07-25 NOTE — TELEPHONE ENCOUNTER
MICHELLE Bhandari    Received a call from SHARMILA Barton w/ [facility] to HF nurse line to report elevated HR. Pt's HR is reading 121 & 122bpm on HRS home monitoring program. Pt recently took a shower & was doing more activity. SHARMILA Barton checked apical pulse during phone call & it was improved to right around 100 & reports it sounds regular. Pt was going to sit and rest and recheck in another 10-20 min.     Pt did recheck w/ the pulse oximeter and reading came through at 116. Pt reports it was bouncing between 80s-110s, unsure if truly accurate. Pt confirms he has been taking amiodarone 200mg daily, coreg 3.125mg BID, Eliquis 5mg BID. He does not know if/when he is in Afib, but he denies new symptoms and states he feels like his usual self. BP today 128/74 & SpO2 95%.     Pt will see Toshia for EP follow up next week on 7/30 & you on 7/31. Please let me know if you have any new recommendations at this time, otherwise we will continue to monitor.    Time spent: 10 min    CHAPARRITA Ann RN

## 2024-07-26 NOTE — TELEPHONE ENCOUNTER
"Chief Complaint   Patient presents with   • Rash     on face, hands, legs and genital area X 2 days        HISTORY OF PRESENT ILLNESS: Patient is a 17 m.o. male who presents today with approx 2 days of spreading rash.  Started on hand and face and is spread to genital area, legs and feet.  Dad notes it is on his palms.  Have not noticed an lesions inside his mouth.  He has been a bit more fussy but has wanted snacks today. No fevers he is aware of. Slight runny nose.  No vomiting.  No ear pulling.  No others at home with rash.  Has been around his cousins this week as they are here from [place].  Dad is unaware however of anyone else having the rash.     There are no active problems to display for this patient.      Allergies:Patient has no known allergies.    No current [facility]-ordered outpatient prescriptions on file.     No current [facility]-ordered facility-administered medications on file.        No past medical history on file.         No family status information on file.   No family history on file. no pertinent FH    ROS:  Review of Systems   Constitutional: SEE HPI  HENT: SEE SHPI  Eyes: Negative for ocular drainage.   Respiratory: Negative for cough, visible sputum production, signs of respiratory distress or wheezing.    Cardiovascular: Negative for cyanosis or syncope.   Gastrointestinal: Negative for nausea, vomiting or diarrhea. No change in bowel pattern.   Genitourinary: No change in urinary pattern  Skin: SEE HPI    Exam:  Pulse 132, temperature 37.1 °C (98.8 °F), height 0.864 m (2' 10\"), weight 13.2 kg (29 lb), SpO2 98 %.  General:  Well nourished, well developed male in NAD; nontoxic appearing, active, actively reaching for snacks.   HEAD: Normocephalic, atraumatic.  EYES: PERRL.  No conjunctival injection or discharge.   EARS:  Canals are patent. Right TM: no erythema/bulging. Left TM: no erythema/bulging  NOSE: Nares are patent and free of congestion.  THROAT: Oropharynx with two tiny aphthous " HR WNL today per HRS at 91 & 97bpm. Will close encounter.     CHAPARRITA Ann RN    appearing ulcerations, moist mucus membranes. Pharynx without erythema, tonsils normal.  NECK: Supple, no lymphadenopathy or masses.   HEART: Regular rate and rhythm without murmur. Brachial and femoral pulses are 2+ and equal.   LUNGS: Clear bilaterally to auscultation, no wheezes or rhonchi. No retractions, nasal flaring, or distress noted.  ABDOMEN: Normal bowel sounds, soft and non-tender without organomegaly or masses.   MUSCULOSKELETAL: Spine is straight. Extremities are without abnormalities. Moves all extremities well and symmetrically with normal tone.   NEURO: Active, alert, oriented per age.   SKIN: . Skin is warm, dry, and pink.  There is maculopapular and veiscular rash on hands/palms, groin, trunk and around mouth.   No crusting or drainage.  Patient irritated by me examining his hands were most of the vesicular component of the rash is.       Assessment/Plan:  1. Hand, foot and mouth disease         -consistent with HFM discussed with dad  Patient is otherwise well appearing in clinic, afebrile and otherwise benign exam.    -He is taking in fluids and as of this afternoon eating snacks readily.   -supportive care discussed, education and contact precautions emphasized.   -RTC precautions, PCP follow up      Supportive care, differential diagnoses, and indications for immediate follow-up discussed with patient's parent  Pathogenesis of diagnosis discussed including typical length and natural progression.   Instructed to return to clinic or nearest emergency department for any change in condition, further concerns, or worsening of symptoms.  Patient's parent states understanding of the plan of care and discharge instructions.      Catrina Cohen P.A.-C.

## 2024-07-29 ENCOUNTER — ALLIED HEALTH/NURSE VISIT (OUTPATIENT)
Dept: CARDIOLOGY | Facility: CLINIC | Age: 64
End: 2024-07-29
Payer: MEDICARE

## 2024-07-29 ENCOUNTER — HOSPITAL ENCOUNTER (EMERGENCY)
Facility: HOSPITAL | Age: 64
Discharge: HOME | End: 2024-07-29
Attending: EMERGENCY MEDICINE | Admitting: EMERGENCY MEDICINE
Payer: MEDICARE

## 2024-07-29 ENCOUNTER — LAB REQUISITION (OUTPATIENT)
Dept: LAB | Facility: HOSPITAL | Age: 64
End: 2024-07-29
Payer: MEDICARE

## 2024-07-29 VITALS
OXYGEN SATURATION: 97 % | SYSTOLIC BLOOD PRESSURE: 124 MMHG | HEART RATE: 90 BPM | WEIGHT: 261 LBS | HEIGHT: 66 IN | BODY MASS INDEX: 41.95 KG/M2 | RESPIRATION RATE: 16 BRPM | DIASTOLIC BLOOD PRESSURE: 58 MMHG | TEMPERATURE: 99.1 F

## 2024-07-29 DIAGNOSIS — I50.33 ACUTE ON CHRONIC DIASTOLIC CONGESTIVE HEART FAILURE (H): Primary | ICD-10-CM

## 2024-07-29 DIAGNOSIS — I87.2 VENOUS INSUFFICIENCY (CHRONIC) (PERIPHERAL): ICD-10-CM

## 2024-07-29 DIAGNOSIS — L03.116 CELLULITIS OF LEFT LOWER LEG: ICD-10-CM

## 2024-07-29 LAB
ANION GAP SERPL CALCULATED.3IONS-SCNC: 16 MMOL/L (ref 7–15)
BUN SERPL-MCNC: 39 MG/DL (ref 8–23)
CALCIUM SERPL-MCNC: 9.5 MG/DL (ref 8.8–10.4)
CHLORIDE SERPL-SCNC: 88 MMOL/L (ref 98–107)
CREAT SERPL-MCNC: 1.33 MG/DL (ref 0.67–1.17)
EGFRCR SERPLBLD CKD-EPI 2021: 59 ML/MIN/1.73M2
GLUCOSE SERPL-MCNC: 300 MG/DL (ref 70–99)
HCO3 SERPL-SCNC: 29 MMOL/L (ref 22–29)
POTASSIUM SERPL-SCNC: 3.3 MMOL/L (ref 3.4–5.3)
SODIUM SERPL-SCNC: 133 MMOL/L (ref 135–145)

## 2024-07-29 PROCEDURE — 99284 EMERGENCY DEPT VISIT MOD MDM: CPT

## 2024-07-29 PROCEDURE — 99454 REM MNTR PHYSIOL PARAM 16-30: CPT | Performed by: INTERNAL MEDICINE

## 2024-07-29 PROCEDURE — 80048 BASIC METABOLIC PNL TOTAL CA: CPT | Mod: ORL | Performed by: INTERNAL MEDICINE

## 2024-07-29 PROCEDURE — 250N000013 HC RX MED GY IP 250 OP 250 PS 637

## 2024-07-29 RX ORDER — DOXYCYCLINE 100 MG/1
100 CAPSULE ORAL 2 TIMES DAILY
Qty: 14 CAPSULE | Refills: 0 | Status: SHIPPED | OUTPATIENT
Start: 2024-07-29 | End: 2024-08-05

## 2024-07-29 RX ORDER — OXYCODONE HYDROCHLORIDE 5 MG/1
5 TABLET ORAL ONCE
Status: COMPLETED | OUTPATIENT
Start: 2024-07-29 | End: 2024-07-29

## 2024-07-29 RX ORDER — CEPHALEXIN 500 MG/1
500 CAPSULE ORAL 4 TIMES DAILY
Qty: 28 CAPSULE | Refills: 0 | Status: SHIPPED | OUTPATIENT
Start: 2024-07-29 | End: 2024-08-05

## 2024-07-29 RX ORDER — OXYCODONE HYDROCHLORIDE 5 MG/1
5 TABLET ORAL EVERY 8 HOURS PRN
Qty: 10 TABLET | Refills: 0 | Status: SHIPPED | OUTPATIENT
Start: 2024-07-29 | End: 2024-09-17

## 2024-07-29 RX ADMIN — OXYCODONE HYDROCHLORIDE 5 MG: 5 TABLET ORAL at 08:17

## 2024-07-29 RX ADMIN — OXYCODONE HYDROCHLORIDE 5 MG: 5 TABLET ORAL at 09:40

## 2024-07-29 ASSESSMENT — ACTIVITIES OF DAILY LIVING (ADL)
ADLS_ACUITY_SCORE: 38
ADLS_ACUITY_SCORE: 39

## 2024-07-29 ASSESSMENT — COLUMBIA-SUICIDE SEVERITY RATING SCALE - C-SSRS
1. IN THE PAST MONTH, HAVE YOU WISHED YOU WERE DEAD OR WISHED YOU COULD GO TO SLEEP AND NOT WAKE UP?: NO
2. HAVE YOU ACTUALLY HAD ANY THOUGHTS OF KILLING YOURSELF IN THE PAST MONTH?: NO
6. HAVE YOU EVER DONE ANYTHING, STARTED TO DO ANYTHING, OR PREPARED TO DO ANYTHING TO END YOUR LIFE?: NO

## 2024-07-29 NOTE — ED TRIAGE NOTES
Pt here from home for left lower leg pain.  Pt states that he has had lower leg pain for quite some time.  Pt states that he has home health care who comes in and wraps his legs.  Pt states that the pain was so bad last night that he removed the wrap.  Pt took 1000 mg of tylenol around 0100.       Triage Assessment (Adult)       Row Name 07/29/24 0758          Triage Assessment    Airway WDL WDL        Respiratory WDL    Respiratory WDL WDL        Skin Circulation/Temperature WDL    Skin Circulation/Temperature WDL temperature  left lower leg redness     Skin Temperature warm        Peripheral/Neurovascular WDL    Peripheral Neurovascular WDL WDL        Cognitive/Neuro/Behavioral WDL    Cognitive/Neuro/Behavioral WDL WDL

## 2024-07-29 NOTE — ED NOTES
Leg wounds redressed with Xeroform, abd, kerlix and coban. Minimal pain noted during dressing change.

## 2024-07-29 NOTE — ED NOTES
Bed: JNED-10  Expected date:   Expected time:   Means of arrival: Ambulance  Comments:  Roy  leg pain

## 2024-07-29 NOTE — ED PROVIDER NOTES
"Emergency Department Midlevel Supervisory Note     I had a face to face encounter with this patient seen by the Advanced Practice Provider (PATRICIA). I personally made/approved the management plan and take responsibility for the patient management. I personally saw patient and performed a substantive portion of the visit including all aspects of the medical decision making.     ED Course:  8:12 AM Shadia Linton PA-C staffed patient with me. I agree with their assessment and plan of management, and I will see the patient.  8:13 AM  I met with the patient to introduce myself, gather additional history, perform my initial exam, and discuss the plan.     Brief HPI:     Zackary Hutchison is a 63 year old male who presents for evaluation of left leg pain.     The patient reports left leg pain that worsened over the weekend, he states the leg has increased in redness. He is on Eliquis and has not missed any doses. He states he is unable to control the pain. Patient has been taking Tylenol and Oxycodone that was previously prescribed to him. He has run out of the oxycodone and has no relief with Tylenol.     Of note, the patient has chronic wounds which is cared for by a wound nurse. The wound care nurse was going to care for him today, but he could not control the leg pain.     Denies fever, chills.     I, Caroline Henderson, am serving as a scribe to document services personally performed by Shaw Salcedo DO, based on my observations and the provider's statements to me.   I, Shaw Salcedo DO, attest that Caroline Henderson, was acting in a scribe capacity, has observed my performance of the services and has documented them in accordance with my direction.    Brief Physical Exam: /51   Pulse 90   Temp 99.1  F (37.3  C) (Oral)   Resp 16   Ht 1.676 m (5' 6\")   Wt 118.4 kg (261 lb)   SpO2 95%   BMI 42.13 kg/m    Physical Exam  Vitals and nursing note reviewed.   Constitutional:       General: He is " not in acute distress.     Appearance: Normal appearance.   HENT:      Head: Normocephalic and atraumatic.      Nose: Nose normal.      Mouth/Throat:      Mouth: Mucous membranes are moist.   Eyes:      Extraocular Movements: Extraocular movements intact.   Cardiovascular:      Rate and Rhythm: Normal rate and regular rhythm.      Pulses: Normal pulses.           Radial pulses are 2+ on the right side and 2+ on the left side.        Dorsalis pedis pulses are 2+ on the right side and 2+ on the left side.   Pulmonary:      Effort: Pulmonary effort is normal.   Musculoskeletal:      Comments: B/l LE with chronic appearing superficial wounds with mild edema and erythema.  Left > right   Skin:     Findings: No rash.   Neurological:      General: No focal deficit present.      Mental Status: He is alert. Mental status is at baseline.      Comments: Fluent speech   Psychiatric:         Mood and Affect: Mood normal.         Behavior: Behavior normal.            MDM:  Pt seen in conjunction with PATRICIA Shadia Linton.  Pt here with increasing pain/redness to left lower leg.  Pt has chronic leg wounds that are wrapped and managed by home health care.  Pt reports he's out of his oxycodone he's been taking since June. Here mainly for pain as legs more uncomfortable.  Pt reports redness improved actually, but exam concerning for cellulitis and given comorbidities, would start on cephalexin/doxy for strep/mrsa coverage. Rx for oxycodone, continued outpatient follow up.  Pt is already on anticoagulation and compliant. Do not have a strong suspicion for DVT and do not feel emergent Ultrasound indicated.      1. Cellulitis of left lower leg        Consults:  none    Labs and Imaging:       I have reviewed the relevant laboratory studies above.    I independently interpreted the following imaging study(s):   none    EKG:   None.    Procedures:  I was present for the key portions of procedures documented in PATRICIA/midlevel note, see midlevel  note for further details.    Shaw Salcedo [facility] EMERGENCY DEPARTMENT  [facility] [facility] [id number] [phone]     Shaw Salcedo MD  07/29/24 0881

## 2024-07-29 NOTE — DISCHARGE INSTRUCTIONS
You were seen in the emergency department for evaluation of left lower leg pain.  It appears that you have a skin infection called cellulitis.  I prescribed you two antibiotics to help treat this infection. Please take these as prescribed.     Continue taking tylenol for your pain. I also prescribed you a few tablets of oxycodone to use as needed. Please use these sparingly.     You have been prescribed a narcotic pain medication that has risk for addiction with prolonged use, so please use sparingly.  Additionally, narcotics are medications that are sedating (will make you sleepy), so do not drive or operate machinery while taking this medication.  Avoid alcohol or other sedating medicines such as benzodiazepines while taking narcotics due to risk for increased sedation and difficulty breathing.      Narcotics will cause constipation.  If you need to take this medication please consider taking an over-the-counter stool softener and laxative, such as Senna Plus, to prevent constipation from developing.  Nausea is a side effect of narcotic use.  Possible additional side effects include vomiting, itching, and dizziness/lightheadedness.    Please schedule an appointment with your primary care doctor in 3 days to check on your leg.    Return to the emergency department for fever, worsening redness, or any other concerning symptoms.

## 2024-07-29 NOTE — ED PROVIDER NOTES
Emergency Department Encounter   NAME: Zackary Hutchison  AGE: 63 year old male  YOB: 1960  MRN: [id number]    PCP: Reid Escobar  ED PROVIDER: Shadia Linton PA-C    Evaluation Date & Time:   7/29/2024  8:00 AM    CHIEF COMPLAINT:  Leg Pain      Impression and Plan   MDM: 63-year-old male with a history of paroxysmal atrial fibrillation chronically on Eliquis, CKD, CHF, CAD s/p CABG, hypertension, hyperlipidemia, and COPD presents for evaluation of left lower leg pain.  He has had chronic wounds on bilateral lower extremities for months.  He has a wound care nurse coming to care for these at home.  He noticed worsening redness and pain of the left lower leg over the last weekend.  Denies any fever, chills, trauma.  Denies any missed doses of his Eliquis.  On arrival here patient is vitally stable.  No tachycardia, fever, hypoxia.  On my examination patient is in no acute distress.  Left lower extremity with flaking skin.  Erythema and warmth.  Tenderness palpation of anterior lower leg.  Neurovascularly intact.  Full range of motion of ankle and knee.  No calf tenderness.  Bilateral lower extremities are equal in size.    Given anticoagulated status without any missed doses and no lower extremity asymmetry, I have low suspicion for DVT.  Examination is not consistent with arterial occlusion.  No trauma or bony tenderness concerning for acute fracture.  Examination is most consistent with cellulitis.  I discussed this and plans for treatment with Keflex as well as doxycycline for MRSA coverage given chronic wounds.  Patient is concerned about how he will manage the pain at home.  I encouraged him to continue using Tylenol and elevating his legs. I also prescribed him a few tablets of oxycodone to use as needed for breakthrough pain.  We discussed use and side effects of narcotic medication.  I encouraged close PCP follow-up in the next 3 to 5 days for wound recheck.  He has a home wound care nurse who is  coming to his home later today for dressing changes.  We reviewed signs of worsening infection.  Patient arrived via EMS and is not able to transport himself to the pharmacy to get these medications today.  These will be sent to across the street to the [facility] who will then deliver them to the emergency department for him thanks to the helpful coordination of our pharmacist here in the emergency department.  We discussed strict return precautions and patient was discharged home in stable condition.    ED Course as of 07/29/24 1112   Mon Jul 29, 2024   0813 I have staffed the patient with Dr. Salcedo, ED physician, who will evaluate the patient and agrees with all aspects of today's care.          Medical Decision Making  Obtained supplemental history:Supplemental history obtained?: No  Reviewed external records: External records reviewed?: No per chart review, [facility] admission 6/3 - 6/10/2024.  Admitted for acute on chronic systolic CHF.  At that time we will question the possible cellulitis of left lower extremity.  Cefazolin during admission and discharged on a short course of Keflex.  Care impacted by chronic illness:Anticoagulated State  Care significantly affected by social determinants of health:No Transportation for Health Care unable to  prescription  Did you consider but not order tests?: Work up considered but not performed and documented in chart, if applicable  Did you interpret images independently?: Independent interpretation of ECG and images noted in documentation, when applicable.  Consultation discussion with other provider:Did you involve another provider (consultant, , pharmacy, etc.)?: I discussed the care with another health care provider, see documentation for details.  Discussed case with pharmacy to facilitate medication arriving from [facility] outpatient pharmacy to emergency department.  Discharge. I prescribed additional prescription strength medication(s) as  charted. N/A.   At the conclusion of the encounter I discussed the results of all the tests and the disposition. The questions were answered. The patient or family acknowledged understanding and was agreeable with the care plan.    FINAL IMPRESSION:    ICD-10-CM    1. Cellulitis of left lower leg  L03.116             MEDICATIONS GIVEN IN THE EMERGENCY DEPARTMENT:  Medications   oxyCODONE (ROXICODONE) tablet 5 mg (5 mg Oral $Given 7/29/24 0817)   oxyCODONE (ROXICODONE) tablet 5 mg (5 mg Oral $Given 7/29/24 0940)         NEW PRESCRIPTIONS STARTED AT TODAY'S ED VISIT:  New Prescriptions    CEPHALEXIN (KEFLEX) 500 MG CAPSULE    Take 1 capsule (500 mg) by mouth 4 times daily for 7 days    DOXYCYCLINE HYCLATE (VIBRAMYCIN) 100 MG CAPSULE    Take 1 capsule (100 mg) by mouth 2 times daily for 7 days    OXYCODONE (ROXICODONE) 5 MG TABLET    Take 1 tablet (5 mg) by mouth every 8 hours as needed for pain         HPI   Patient information was obtained from: patient   Use of Intrepreter: N/A     Zackary Hutchison is a 63 year old male with a pertinent history of Paroxysmal a fib on Eliquis, CKD, CHF, CAD s/p CABG, HTN, HLD, & COPD who presents to the ED by EMS for evaluation of left leg pain.  Patient has chronic bilateral lower extremity wounds.  Over the weekend his left lower leg had increasing redness and pain.  No associated swelling, fever, chills, numbness or tingling.  No trauma.  He has been taking Tylenol without much relief.  He ran out of a short course of oxycodone that he had for some hip pain.      REVIEW OF SYSTEMS:  Pertinent positive and negative symptoms per HPI.       Medical History     Past Medical History:   Diagnosis Date    Atrial fibrillation (H)     Chronic kidney disease     Chronic kidney disease, stage 3b (H) 09/28/2023    Class 3 severe obesity due to excess calories with body mass index (BMI) of 40.0 to 44.9 in adult (H) 09/28/2023    Congestive heart failure (H)     COPD (chronic obstructive pulmonary  disease) (H)     Coronary artery disease involving coronary bypass graft of native heart without angina pectoris 09/28/2023    Heart failure with reduced ejection fraction (H) 09/11/2023    HFrEF (heart failure with reduced ejection fraction) (H) 09/11/2023    Hyperlipidemia     Hypertension     Ischemic cardiomyopathy 09/28/2023    Obese     Paroxysmal atrial fibrillation (H) 09/28/2023    Tobacco abuse 09/28/2023       Past Surgical History:   Procedure Laterality Date    CORONARY ANGIOGRAPHY ADULT ORDER      CORONARY ARTERY BYPASS Left 2014    2 vessels    CV CARDIOMEMS WITH RIGHT HEART CATH N/A 2/15/2024    Procedure: Pulmonary Arterial Pressure Sensor Placement;  Surgeon: Jacey Bhandari MD;  Location: [facility]    CV CORONARY ANGIOGRAM N/A 09/11/2023    Procedure: Coronary Angiogram;  Surgeon: Santy Esposito MD;  Location: [facility]    CV CORONARY ANGIOGRAM N/A 5/2/2024    Procedure: CV CORONARY ANGIOGRAM;  Surgeon: Santy Esposito MD;  Location: [facility]    CV LEFT HEART CATH N/A 09/11/2023    Procedure: Left Heart Catheterization;  Surgeon: Santy Esposito MD;  Location: [facility]    CV LEFT HEART CATH N/A 5/2/2024    Procedure: Left Heart Catheterization;  Surgeon: Santy Esposito MD;  Location: [facility]    CV PCI ANGIOPLASTY N/A 5/2/2024    Procedure: Percutaneous Transluminal Angioplasty;  Surgeon: Santy Esposito MD;  Location: [facility]    CV RIGHT HEART CATH MEASUREMENTS RECORDED N/A 09/11/2023    Procedure: Right Heart Catheterization;  Surgeon: Santy Esposito MD;  Location: [facility]       Family History   Problem Relation Age of Onset    Cerebrovascular Disease Mother     Myocardial Infarction Father        Social History     Tobacco Use    Smoking status: Every Day     Current packs/day: 0.50     Types: Cigarettes    Smokeless tobacco: Never    Tobacco comments:     Seen IP by CTTS on 9/11/23 and  "declined cessation services and materials   Substance Use Topics    Alcohol use: Not Currently       cephALEXin (KEFLEX) 500 MG capsule  doxycycline hyclate (VIBRAMYCIN) 100 MG capsule  oxyCODONE (ROXICODONE) 5 MG tablet  acetaminophen (TYLENOL) 500 MG tablet  albuterol (PROAIR HFA/PROVENTIL HFA/VENTOLIN HFA) 108 (90 Base) MCG/ACT inhaler  amiodarone (PACERONE) 200 MG tablet  apixaban ANTICOAGULANT (ELIQUIS) 5 MG tablet  atorvastatin (LIPITOR) 40 MG tablet  blood glucose (NO BRAND SPECIFIED) lancets standard  blood glucose (NO BRAND SPECIFIED) lancets standard  blood glucose (NO BRAND SPECIFIED) test strip  blood glucose monitoring (FREESTYLE) lancets  blood glucose monitoring (FREESTYLE) lancets  blood glucose monitoring (NO BRAND SPECIFIED) meter device kit  carvedilol (COREG) 3.125 MG tablet  clopidogrel (PLAVIX) 75 MG tablet  [employer] VITAMIN B12 1000 MCG tablet  diclofenac (VOLTAREN) 1 % topical gel  hydrALAZINE (APRESOLINE) 10 MG tablet  insulin glargine (LANTUS PEN) 100 UNIT/ML pen  Insulin Lispro (HUMALOG KWIKPEN) 200 UNIT/ML soln  ipratropium - albuterol 0.5 mg/2.5 mg/3 mL (DUONEB) 0.5-2.5 (3) MG/3ML neb solution  isosorbide mononitrate (IMDUR) 60 MG 24 hr tablet  methocarbamol (ROBAXIN) 750 MG tablet  metolazone (ZAROXOLYN) 2.5 MG tablet  nitroGLYcerin (NITROSTAT) 0.4 MG sublingual tablet  pantoprazole (PROTONIX) 40 MG EC tablet  polyethylene glycol (MIRALAX) 17 GM/Dose powder  potassium chloride barbara ER (KLOR-CON M20) 20 MEQ CR tablet  rOPINIRole (REQUIP) 2 MG tablet  spironolactone (ALDACTONE) 25 MG tablet  thin (NO BRAND SPECIFIED) lancets  torsemide (DEMADEX) 100 MG tablet  traMADol 25 MG TABS tablet          Physical Exam     First Vitals:  Patient Vitals for the past 24 hrs:   BP Temp Temp src Pulse Resp SpO2 Height Weight   07/29/24 0830 124/58 -- -- 90 -- 97 % -- --   07/29/24 0755 118/51 99.1  F (37.3  C) Oral 90 16 95 % 1.676 m (5' 6\") 118.4 kg (261 lb)       PHYSICAL EXAM:   General Appearance:  " Alert, cooperative, no distress, appears stated age  Cardiovascular: 2+ bilateral DP & PT pulses.   Musculoskeletal: Moving all extremities. No gross deformities. Full ankle and knee ROM. No bony tenderness of knee or ankle.   Integument: Warm, no rashes. Flaking skin over left lower leg with overlying erythema and warmth. No drainage.     Neurologic: Alert and orientated x3.  Bilateral lower extremity sensation intact light touch.  Psych: Normal mood and affect      Results     LAB:  All pertinent labs reviewed and interpreted  Labs Ordered and Resulted from Time of ED Arrival to Time of ED Departure - No data to display    RADIOLOGY:  No orders to display       Shadia Linton PA-C   Emergency Medicine   [facility] EMERGENCY DEPARTMENT       Shadia iLnton PA-C  07/29/24 1110

## 2024-07-31 ENCOUNTER — OFFICE VISIT (OUTPATIENT)
Dept: CARDIOLOGY | Facility: CLINIC | Age: 64
End: 2024-07-31
Payer: MEDICARE

## 2024-07-31 VITALS
SYSTOLIC BLOOD PRESSURE: 108 MMHG | WEIGHT: 260 LBS | BODY MASS INDEX: 41.97 KG/M2 | HEART RATE: 80 BPM | RESPIRATION RATE: 18 BRPM | DIASTOLIC BLOOD PRESSURE: 62 MMHG

## 2024-07-31 DIAGNOSIS — I50.33 ACUTE ON CHRONIC DIASTOLIC CONGESTIVE HEART FAILURE (H): Primary | ICD-10-CM

## 2024-07-31 PROCEDURE — G2211 COMPLEX E/M VISIT ADD ON: HCPCS | Performed by: INTERNAL MEDICINE

## 2024-07-31 PROCEDURE — 99214 OFFICE O/P EST MOD 30 MIN: CPT | Performed by: INTERNAL MEDICINE

## 2024-07-31 RX ORDER — METOLAZONE 2.5 MG/1
2.5 TABLET ORAL WEEKLY
COMMUNITY
Start: 2024-07-31

## 2024-07-31 NOTE — PROGRESS NOTES
HEART CARE NOTE          Assessment/Recommendations     1. HFimpEF c/b severe ADHF  Assessment / Plan  Tolerating oral diuretic regimen; volume status remains elevated although improved - continue torsemide 100 mg BID; increase frequency of metolazone to BID  Patient is high risk for adverse cardiac events 2/2 severe biventricular dysfunction, multiple HFHs, non-compliance with dietary restrictions, renal dysfunction, CAD  GDMT as detailed below     Current Pharmacotherapy AHA Guideline-Directed Medical Therapy   Lisinopril 5 mg daily  - held Lisinopril 20 mg twice daily   Carvedilol 3.125 mg BID  Carvedilol 25 mg twice daily   Spironolactone 25 mg daily and 50 mg on metolazone days Spironolactone 25 mg once daily   Hydralazine 10 mg TID Hydralazine 100 mg three times daily   Isosorbide mononitrate 180 mg daily Isosorbide dinitrate 40 mg three times daily   SGLT2 inhibitor:Dapagliflozin/Empagliflozin - not started Dapagliflozin or Empagliflozin 10 mg daily      2. Atrial fibrillation  Assessment / Plan  Currently on apixaban and amiodarone; EP team consulted; EP Team Please contact their team directly with any questions or concerns regarding rate, rhythm, device.     3. CAD c/b ICM  Assessment / Plan  S/p CABG 2015 with recent PCI to [facility] and OM with [facility]  Patient with chest pain this admission in the setting if afib with RVR which was relieved by sublingual nitroglycerin - continue HR/arrhythmia control for now; plan for coronary angiogram +/- PCI if chest pain occurs  Current denies chest pain or anginal equivalents or recurrence in the last 24 hrs - discussed potential coronary targets with IC  Continue atorvastatin, carvedilol, clopidogrel     4.CKD  Assessment / Plan  Repeat BMP ordered     5.DM2  Assessment / Plan  NIDDM; management per PMD    30 minutes spent reviewing prior records (including documentation, laboratory studies, cardiac testing/imaging), history and physical exam, planning, and subsequent  documentation.    The longitudinal plan of care for HFpEF was addressed during this visit. Due to the added complexity in care, I will continue to support Mr. Zackary Hutchison in the subsequent management of this condition(s) and with the ongoing continuity of care of this condition(s).      History of Present Illness/Subjective    Mr. Zackary Hutchison is a 63 year old male with a PMHx significant for (per Epic notation) atrial fibrillation, stage III CKD, ischemic cardiomyopathy, COPD, HFrEF, hyperlipidemia, CAD s/p CABG, restless leg syndrome, hypertension, obesity, newly diagnosed type 2 diabetes, and tobacco use disorder admitted on 5/13/2024 with the ER with left hip pain, suspected exacerbation of HFpEF, and possible COPD exacerbation      Today, Mr. Hutchison  denies acute cardiac events or complaints; Cardiac management as detailed above     ECG: Personally reviewed 6/3/24; Afib with RVR     ECHO (personnaly Reviewed on 5/13/24):   1. Technically difficult study.  2. The left ventricle is normal in size. Image quality does not provide for  detailed assessment of LV systolic function, but is felt to be normal with a  visually estimated ejection fraction of roughly 55%.  3. No significant valvular heart disease is identified on this study though  the sensitivity, particularly of regurgitant lesions, is reduced due to poor  Doppler acoustics.  4. Right ventricular size and systolic performance could not be accurately  assessed due to inadequate visualization.     The acoustic quality of this study is fairly poor. If a more accurate  assessment of left ventricular systolicperformance, regional wall motion, and  other morphology/function is required; would recommend cardiac MRI or other  alternative imaging modality for further evaluation.     When compared to the prior real-time echocardiogram dated 25 December 2023,  the ejection fraction appears somewhat higher on the current study though  accurate comparison is somewhat  limited due to suboptimal acoustic imaging not  only on the current examination, but also hold the prior study as well.    Lab results: personally reviewed July 31, 2024; notable for PRINCESS - repeat BMP ordered    Medical history and pertinent documents reviewed in Care Everywhere please where applicable see details above        Physical Examination Review of Systems   /62 (BP Location: Right arm, Patient Position: Sitting, Cuff Size: Adult Large)   Pulse 80   Resp 18   Wt 117.9 kg (260 lb)   BMI 41.97 kg/m    Body mass index is 41.97 kg/m .  Wt Readings from Last 3 Encounters:   07/31/24 117.9 kg (260 lb)   07/29/24 118.4 kg (261 lb)   06/10/24 115.4 kg (254 lb 6.4 oz)     General Appearance:   no distress, normal body habitus   ENT/Mouth: membranes moist, no oral lesions or bleeding gums.      EYES:  no scleral icterus, normal conjunctivae   Neck: no carotid bruits or thyromegaly   Chest/Lungs:   lungs are clear to auscultation, no rales or wheezing, equal chest wall expansion    Cardiovascular:   Irregular. Normal first and second heart sounds with no murmurs, rubs, or gallops; the carotid, radial and posterior tibial pulses are intact, + JVD and LE edema bilaterally    Abdomen:  no organomegaly, masses, bruits, or tenderness; bowel sounds are present   Extremities: no cyanosis or clubbing   Skin: no xanthelasma, warm.    Neurologic: NAD     Psychiatric: alert and oriented x3, calm     A complete 10 systems ROS was reviewed  And is negative except what is listed in the HPI.          Medical History  Surgical History Family History Social History   Past Medical History:   Diagnosis Date    Atrial fibrillation (H)     Chronic kidney disease     Chronic kidney disease, stage 3b (H) 09/28/2023    Class 3 severe obesity due to excess calories with body mass index (BMI) of 40.0 to 44.9 in adult (H) 09/28/2023    Congestive heart failure (H)     COPD (chronic obstructive pulmonary disease) (H)     Coronary artery  disease involving coronary bypass graft of native heart without angina pectoris 09/28/2023    Heart failure with reduced ejection fraction (H) 09/11/2023    HFrEF (heart failure with reduced ejection fraction) (H) 09/11/2023    Hyperlipidemia     Hypertension     Ischemic cardiomyopathy 09/28/2023    Obese     Paroxysmal atrial fibrillation (H) 09/28/2023    Tobacco abuse 09/28/2023    Past Surgical History:   Procedure Laterality Date    CORONARY ANGIOGRAPHY ADULT ORDER      CORONARY ARTERY BYPASS Left 2014    2 vessels    CV CARDIOMEMS WITH RIGHT HEART CATH N/A 2/15/2024    Procedure: Pulmonary Arterial Pressure Sensor Placement;  Surgeon: Jacey Bhandari MD;  Location: [facility]    CV CORONARY ANGIOGRAM N/A 09/11/2023    Procedure: Coronary Angiogram;  Surgeon: Santy Esposito MD;  Location: [facility]    CV CORONARY ANGIOGRAM N/A 5/2/2024    Procedure: CV CORONARY ANGIOGRAM;  Surgeon: Santy Esposito MD;  Location: [facility]    CV LEFT HEART CATH N/A 09/11/2023    Procedure: Left Heart Catheterization;  Surgeon: Santy Esposito MD;  Location: [facility]    CV LEFT HEART CATH N/A 5/2/2024    Procedure: Left Heart Catheterization;  Surgeon: Santy Esposito MD;  Location: [facility]    CV PCI ANGIOPLASTY N/A 5/2/2024    Procedure: Percutaneous Transluminal Angioplasty;  Surgeon: Santy Esposito MD;  Location: [facility]    CV RIGHT HEART CATH MEASUREMENTS RECORDED N/A 09/11/2023    Procedure: Right Heart Catheterization;  Surgeon: Santy Esposito MD;  Location: [facility]    no family history of premature coronary artery disease Social History     Socioeconomic History    Marital status: Single     Spouse name: Not on file    Number of children: Not on file    Years of education: Not on file    Highest education level: Not on file   Occupational History    Occupation: Retired restaurant owner     Comment: [employer]   Tobacco Use  "   Smoking status: Every Day     Current packs/day: 0.50     Types: Cigarettes    Smokeless tobacco: Never    Tobacco comments:     Seen IP by CTTS on 9/11/23 and declined cessation services and materials   Substance and Sexual Activity    Alcohol use: Not Currently    Drug use: Not on file    Sexual activity: Not on file   Other Topics Concern    Not on file   Social History Narrative    Currently lives in an assisted living facility. (Updated: 01/08/2024)     Social Determinants of Health     Financial Resource Strain: Not on file   Food Insecurity: Not on file   Transportation Needs: Not on file   Physical Activity: Not on file   Stress: Not on file   Social Connections: Not on file   Interpersonal Safety: Not on file   Housing Stability: Not on file           Lab Results    Chemistry/lipid CBC Cardiac Enzymes/BNP/TSH/INR   Lab Results   Component Value Date    CHOL 121 05/02/2024    HDL 27 (L) 05/02/2024    TRIG 219 (H) 05/02/2024    BUN 39.0 (H) 07/29/2024     (L) 07/29/2024    CO2 29 07/29/2024    Lab Results   Component Value Date    WBC 7.7 06/08/2024    HGB 11.6 (L) 06/08/2024    HCT 38.2 (L) 06/08/2024    MCV 94 06/08/2024     06/08/2024    Lab Results   Component Value Date    TSH 1.70 04/29/2024    INR 0.97 06/03/2024     No results found for: \"CKTOTAL\", \"CKMB\", \"TROPONINI\"       Weight:    Wt Readings from Last 3 Encounters:   07/31/24 117.9 kg (260 lb)   07/29/24 118.4 kg (261 lb)   06/10/24 115.4 kg (254 lb 6.4 oz)       Allergies  Allergies   Allergen Reactions    Bumex [Bumetanide] Muscle Pain (Myalgia)         Surgical History  Past Surgical History:   Procedure Laterality Date    CORONARY ANGIOGRAPHY ADULT ORDER      CORONARY ARTERY BYPASS Left 2014    2 vessels    CV CARDIOMEMS WITH RIGHT HEART CATH N/A 2/15/2024    Procedure: Pulmonary Arterial Pressure Sensor Placement;  Surgeon: Jacey Bhandari MD;  Location: [facility]    CV CORONARY ANGIOGRAM N/A 09/11/2023    " Procedure: Coronary Angiogram;  Surgeon: Santy Esposito MD;  Location: [facility]ATH [facility]    CV CORONARY ANGIOGRAM N/A 5/2/2024    Procedure: CV CORONARY ANGIOGRAM;  Surgeon: Santy Esposito MD;  Location: [facility]    CV LEFT HEART CATH N/A 09/11/2023    Procedure: Left Heart Catheterization;  Surgeon: Santy Esposito MD;  Location: [facility]    CV LEFT HEART CATH N/A 5/2/2024    Procedure: Left Heart Catheterization;  Surgeon: Santy Esposito MD;  Location: [facility]    CV PCI ANGIOPLASTY N/A 5/2/2024    Procedure: Percutaneous Transluminal Angioplasty;  Surgeon: Santy Esposito MD;  Location: [facility]ATH [facility]    CV RIGHT HEART CATH MEASUREMENTS RECORDED N/A 09/11/2023    Procedure: Right Heart Catheterization;  Surgeon: Santy Esposito MD;  Location: [facility]ATH [facility]       Social History  Tobacco:   History   Smoking Status    Every Day    Types: Cigarettes   Smokeless Tobacco    Never    Alcohol:   Social History    Substance and Sexual Activity      Alcohol use: Not Currently   Illicit Drugs:   History   Drug Use Not on file       Family History  Family History   Problem Relation Age of Onset    Cerebrovascular Disease Mother     Myocardial Infarction Father           Jacey Bhandari MD on 7/31/2024      cc: Reid Escobar

## 2024-07-31 NOTE — PATIENT INSTRUCTIONS
Reordered CT with updated codes.   Thank you for allowing the [facility] to be a part of your care. Please pay close attention to the following medications as they have been changed during this visit.    1.) Please take metolazone 2.5 mg TWO TIMES per week on Sundays and Wednesdays.    2.) Please continue to have blood work drawn on Mondays to follow your potassium and renal function

## 2024-07-31 NOTE — LETTER
7/31/2024    Reid Escobar MD  [address] [place] [address]    RE: Zackary Hutchison       Dear Colleague,     I had the pleasure of seeing Zackary Hutchison in the [facility].    HEART CARE NOTE          Assessment/Recommendations     1. HFimpEF c/b severe ADHF  Assessment / Plan  Tolerating oral diuretic regimen; volume status remains elevated although improved - continue torsemide 100 mg BID; increase frequency of metolazone to BID  Patient is high risk for adverse cardiac events 2/2 severe biventricular dysfunction, multiple HFHs, non-compliance with dietary restrictions, renal dysfunction, CAD  GDMT as detailed below     Current Pharmacotherapy AHA Guideline-Directed Medical Therapy   Lisinopril 5 mg daily  - held Lisinopril 20 mg twice daily   Carvedilol 3.125 mg BID  Carvedilol 25 mg twice daily   Spironolactone 25 mg daily and 50 mg on metolazone days Spironolactone 25 mg once daily   Hydralazine 10 mg TID Hydralazine 100 mg three times daily   Isosorbide mononitrate 180 mg daily Isosorbide dinitrate 40 mg three times daily   SGLT2 inhibitor:Dapagliflozin/Empagliflozin - not started Dapagliflozin or Empagliflozin 10 mg daily      2. Atrial fibrillation  Assessment / Plan  Currently on apixaban and amiodarone; EP team consulted; EP Team Please contact their team directly with any questions or concerns regarding rate, rhythm, device.     3. CAD c/b ICM  Assessment / Plan  S/p CABG 2015 with recent PCI to [facility] and  with [facility]  Patient with chest pain this admission in the setting if afib with RVR which was relieved by sublingual nitroglycerin - continue HR/arrhythmia control for now; plan for coronary angiogram +/- PCI if chest pain occurs  Current denies chest pain or anginal equivalents or recurrence in the last 24 hrs - discussed potential coronary targets with IC  Continue atorvastatin, carvedilol, clopidogrel     4.CKD  Assessment / Plan  Repeat BMP ordered      5.DM2  Assessment / Plan  NIDDM; management per PMD    30 minutes spent reviewing prior records (including documentation, laboratory studies, cardiac testing/imaging), history and physical exam, planning, and subsequent documentation.    The longitudinal plan of care for HFpEF was addressed during this visit. Due to the added complexity in care, I will continue to support Mr. Zackary Hutchison in the subsequent management of this condition(s) and with the ongoing continuity of care of this condition(s).      History of Present Illness/Subjective    Mr. Zackary Hutchison is a 63 year old male with a PMHx significant for (per Epic notation) atrial fibrillation, stage III CKD, ischemic cardiomyopathy, COPD, HFrEF, hyperlipidemia, CAD s/p CABG, restless leg syndrome, hypertension, obesity, newly diagnosed type 2 diabetes, and tobacco use disorder admitted on 5/13/2024 with the ER with left hip pain, suspected exacerbation of HFpEF, and possible COPD exacerbation      Today, Mr. Hutchison  denies acute cardiac events or complaints; Cardiac management as detailed above     ECG: Personally reviewed 6/3/24; Afib with RVR     ECHO (personnaly Reviewed on 5/13/24):   1. Technically difficult study.  2. The left ventricle is normal in size. Image quality does not provide for  detailed assessment of LV systolic function, but is felt to be normal with a  visually estimated ejection fraction of roughly 55%.  3. No significant valvular heart disease is identified on this study though  the sensitivity, particularly of regurgitant lesions, is reduced due to poor  Doppler acoustics.  4. Right ventricular size and systolic performance could not be accurately  assessed due to inadequate visualization.     The acoustic quality of this study is fairly poor. If a more accurate  assessment of left ventricular systolicperformance, regional wall motion, and  other morphology/function is required; would recommend cardiac MRI or other  alternative imaging  modality for further evaluation.     When compared to the prior real-time echocardiogram dated 25 December 2023,  the ejection fraction appears somewhat higher on the current study though  accurate comparison is somewhat limited due to suboptimal acoustic imaging not  only on the current examination, but also hold the prior study as well.    Lab results: personally reviewed July 31, 2024; notable for PRINCESS - repeat BMP ordered    Medical history and pertinent documents reviewed in Care Everywhere please where applicable see details above        Physical Examination Review of Systems   /62 (BP Location: Right arm, Patient Position: Sitting, Cuff Size: Adult Large)   Pulse 80   Resp 18   Wt 117.9 kg (260 lb)   BMI 41.97 kg/m    Body mass index is 41.97 kg/m .  Wt Readings from Last 3 Encounters:   07/31/24 117.9 kg (260 lb)   07/29/24 118.4 kg (261 lb)   06/10/24 115.4 kg (254 lb 6.4 oz)     General Appearance:   no distress, normal body habitus   ENT/Mouth: membranes moist, no oral lesions or bleeding gums.      EYES:  no scleral icterus, normal conjunctivae   Neck: no carotid bruits or thyromegaly   Chest/Lungs:   lungs are clear to auscultation, no rales or wheezing, equal chest wall expansion    Cardiovascular:   Irregular. Normal first and second heart sounds with no murmurs, rubs, or gallops; the carotid, radial and posterior tibial pulses are intact, + JVD and LE edema bilaterally    Abdomen:  no organomegaly, masses, bruits, or tenderness; bowel sounds are present   Extremities: no cyanosis or clubbing   Skin: no xanthelasma, warm.    Neurologic: NAD     Psychiatric: alert and oriented x3, calm     A complete 10 systems ROS was reviewed  And is negative except what is listed in the HPI.          Medical History  Surgical History Family History Social History   Past Medical History:   Diagnosis Date    Atrial fibrillation (H)     Chronic kidney disease     Chronic kidney disease, stage 3b (H) 09/28/2023     Class 3 severe obesity due to excess calories with body mass index (BMI) of 40.0 to 44.9 in adult (H) 09/28/2023    Congestive heart failure (H)     COPD (chronic obstructive pulmonary disease) (H)     Coronary artery disease involving coronary bypass graft of native heart without angina pectoris 09/28/2023    Heart failure with reduced ejection fraction (H) 09/11/2023    HFrEF (heart failure with reduced ejection fraction) (H) 09/11/2023    Hyperlipidemia     Hypertension     Ischemic cardiomyopathy 09/28/2023    Obese     Paroxysmal atrial fibrillation (H) 09/28/2023    Tobacco abuse 09/28/2023    Past Surgical History:   Procedure Laterality Date    CORONARY ANGIOGRAPHY ADULT ORDER      CORONARY ARTERY BYPASS Left 2014    2 vessels    CV CARDIOMEMS WITH RIGHT HEART CATH N/A 2/15/2024    Procedure: Pulmonary Arterial Pressure Sensor Placement;  Surgeon: Jacey Bhandari MD;  Location: [facility] CATH LAB CV    CV CORONARY ANGIOGRAM N/A 09/11/2023    Procedure: Coronary Angiogram;  Surgeon: Santy Esposito MD;  Location: [facility] CATH LAB CV    CV CORONARY ANGIOGRAM N/A 5/2/2024    Procedure: CV CORONARY ANGIOGRAM;  Surgeon: Santy Esposito MD;  Location: [facility] CATH LAB CV    CV LEFT HEART CATH N/A 09/11/2023    Procedure: Left Heart Catheterization;  Surgeon: Santy Esposito MD;  Location: [facility] CATH LAB CV    CV LEFT HEART CATH N/A 5/2/2024    Procedure: Left Heart Catheterization;  Surgeon: Santy Esposito MD;  Location: [facility] CATH LAB CV    CV PCI ANGIOPLASTY N/A 5/2/2024    Procedure: Percutaneous Transluminal Angioplasty;  Surgeon: Santy Esposito MD;  Location: [facility] CATH LAB CV    CV RIGHT HEART CATH MEASUREMENTS RECORDED N/A 09/11/2023    Procedure: Right Heart Catheterization;  Surgeon: Santy Esposito MD;  Location: [facility] CATH LAB CV    no family history of premature coronary artery disease Social History     Socioeconomic History    Marital status: Single     Spouse  "name: Not on file    Number of children: Not on file    Years of education: Not on file    Highest education level: Not on file   Occupational History    Occupation: Retired restaurant owner     Comment: [employer]   Tobacco Use    Smoking status: Every Day     Current packs/day: 0.50     Types: Cigarettes    Smokeless tobacco: Never    Tobacco comments:     Seen IP by [facility] on 9/11/23 and declined cessation services and materials   Substance and Sexual Activity    Alcohol use: Not Currently    Drug use: Not on file    Sexual activity: Not on file   Other Topics Concern    Not on file   Social History Narrative    Currently lives in an assisted living facility. (Updated: 01/08/2024)     Social Determinants of Health     Financial Resource Strain: Not on file   Food Insecurity: Not on file   Transportation Needs: Not on file   Physical Activity: Not on file   Stress: Not on file   Social Connections: Not on file   Interpersonal Safety: Not on file   Housing Stability: Not on file           Lab Results    Chemistry/lipid CBC Cardiac Enzymes/BNP/TSH/INR   Lab Results   Component Value Date    CHOL 121 05/02/2024    HDL 27 (L) 05/02/2024    TRIG 219 (H) 05/02/2024    BUN 39.0 (H) 07/29/2024     (L) 07/29/2024    CO2 29 07/29/2024    Lab Results   Component Value Date    WBC 7.7 06/08/2024    HGB 11.6 (L) 06/08/2024    HCT 38.2 (L) 06/08/2024    MCV 94 06/08/2024     06/08/2024    Lab Results   Component Value Date    TSH 1.70 04/29/2024    INR 0.97 06/03/2024     No results found for: \"CKTOTAL\", \"CKMB\", \"TROPONINI\"       Weight:    Wt Readings from Last 3 Encounters:   07/31/24 117.9 kg (260 lb)   07/29/24 118.4 kg (261 lb)   06/10/24 115.4 kg (254 lb 6.4 oz)       Allergies  Allergies   Allergen Reactions    Bumex [Bumetanide] Muscle Pain (Myalgia)         Surgical History  Past Surgical History:   Procedure Laterality Date    CORONARY ANGIOGRAPHY ADULT ORDER      CORONARY ARTERY BYPASS Left 2014    2 vessels "    CV CARDIOMEMS WITH RIGHT HEART CATH N/A 2/15/2024    Procedure: Pulmonary Arterial Pressure Sensor Placement;  Surgeon: Jacey Bhandari MD;  Location: [facility] CATH LAB CV    CV CORONARY ANGIOGRAM N/A 09/11/2023    Procedure: Coronary Angiogram;  Surgeon: Santy Esposito MD;  Location: [facility] CATH LAB CV    CV CORONARY ANGIOGRAM N/A 5/2/2024    Procedure: CV CORONARY ANGIOGRAM;  Surgeon: Santy Esposito MD;  Location: [facility] CATH LAB CV    CV LEFT HEART CATH N/A 09/11/2023    Procedure: Left Heart Catheterization;  Surgeon: Santy Esposito MD;  Location: [facility] CATH LAB CV    CV LEFT HEART CATH N/A 5/2/2024    Procedure: Left Heart Catheterization;  Surgeon: Santy Esposito MD;  Location: [facility] CATH LAB CV    CV PCI ANGIOPLASTY N/A 5/2/2024    Procedure: Percutaneous Transluminal Angioplasty;  Surgeon: Santy Esposito MD;  Location: [facility] CATH LAB CV    CV RIGHT HEART CATH MEASUREMENTS RECORDED N/A 09/11/2023    Procedure: Right Heart Catheterization;  Surgeon: Santy Esposito MD;  Location: [facility] CATH LAB CV       Social History  Tobacco:   History   Smoking Status    Every Day    Types: Cigarettes   Smokeless Tobacco    Never    Alcohol:   Social History    Substance and Sexual Activity      Alcohol use: Not Currently   Illicit Drugs:   History   Drug Use Not on file       Family History  Family History   Problem Relation Age of Onset    Cerebrovascular Disease Mother     Myocardial Infarction Father       Jacey Bhandari MD on 7/31/2024      cc: Reid Escobar    Thank you for allowing me to participate in the care of your patient.      Sincerely,     Jacey Bhandari MD     [facility]  cc:   Jacey Bhandari MD  [address]  [place] [address]

## 2024-08-01 NOTE — PROGRESS NOTES
[facility]:   HRS ([email or web address]) Routine Remote Evaluation    HRS Enrollment date: 2/23/24     Dates: 5/29/24 through 6/28/24    This is not the first billing cycle.    Alerts in the last month:     6/20/24 - Weight Alert  6/28/24 - Weight Alert    These adjustments have been discussed with the patient/caregiver and they express verbal understanding.    Readings:         Total Minutes Spent: 30 minutes     Anh Velasquez CMA    Future Appointments   Date Time Provider Department Center   8/13/2024  2:10 PM Toshia Khanna, NP [facility]   8/22/2024  4:00 AM [facility]   8/29/2024  4:00 AM [facility]   9/23/2024  4:00 AM [facility]   9/30/2024  4:00 AM [facility]   10/24/2024  4:00 AM [facility]   11/25/2024  4:00 AM [facility]   12/26/2024  4:00 AM [facility]     I have reviewed Anh Velasquez CMA 's note and agree.    Jacey Bhandari MD., MHS

## 2024-08-05 ENCOUNTER — LAB REQUISITION (OUTPATIENT)
Dept: LAB | Facility: HOSPITAL | Age: 64
End: 2024-08-05
Payer: MEDICARE

## 2024-08-05 DIAGNOSIS — I50.42 CHRONIC COMBINED SYSTOLIC (CONGESTIVE) AND DIASTOLIC (CONGESTIVE) HEART FAILURE (H): ICD-10-CM

## 2024-08-05 DIAGNOSIS — I87.2 VENOUS INSUFFICIENCY (CHRONIC) (PERIPHERAL): ICD-10-CM

## 2024-08-05 LAB
ANION GAP SERPL CALCULATED.3IONS-SCNC: 17 MMOL/L (ref 7–15)
BUN SERPL-MCNC: 68.5 MG/DL (ref 8–23)
CALCIUM SERPL-MCNC: 10 MG/DL (ref 8.8–10.4)
CHLORIDE SERPL-SCNC: 84 MMOL/L (ref 98–107)
CREAT SERPL-MCNC: 1.7 MG/DL (ref 0.67–1.17)
EGFRCR SERPLBLD CKD-EPI 2021: 44 ML/MIN/1.73M2
GLUCOSE SERPL-MCNC: 236 MG/DL (ref 70–99)
HCO3 SERPL-SCNC: 31 MMOL/L (ref 22–29)
POTASSIUM SERPL-SCNC: 3.3 MMOL/L (ref 3.4–5.3)
SODIUM SERPL-SCNC: 132 MMOL/L (ref 135–145)

## 2024-08-05 PROCEDURE — 80048 BASIC METABOLIC PNL TOTAL CA: CPT | Mod: ORL | Performed by: INTERNAL MEDICINE

## 2024-08-06 ENCOUNTER — TELEPHONE (OUTPATIENT)
Dept: CARDIOLOGY | Facility: CLINIC | Age: 64
End: 2024-08-06
Payer: MEDICARE

## 2024-08-06 NOTE — TELEPHONE ENCOUNTER
Pt returned call to review lab results and recommendations from TONY Rodríguez CNP in Dr. Bhandari's absence. Pt verbalized understanding. Pt will reduce metolazone 2.5mg back to once weekly on Sunday's w/ BMP weekly on Monday's. Pt's weight came down to 249lbs on HRS yesterday, this was the lowest pt has been in months per trend review. Pt is up to 255lbs today, denies feeling any increased HF symptoms w/ this & is consistent with where he has been. Will continue to follow trend. Pt thinks he's been drinking more fluids w/ constipation as he reports his PCP said he could take 3-4 doses of Miralax per day if having acute issues. Encouraged pt to reach out to see if senna or something else may be a better option. Also encouraged pt to increase potassium rich foods in his diet. Pt verbalized understanding.      CHAPARRITA Ann RN

## 2024-08-06 NOTE — TELEPHONE ENCOUNTER
Michelle Iyer, TONY CNP  Heather Armenta RN  Given worsening renal function let's reduce Metolazone to once weekly every Sunday and continue on weekly lab every Monday.  K+ is mildly low. Encourage high K+ diet.  Thank you!  LENA WILEY for pt to return call to review instructions  SK

## 2024-08-06 NOTE — TELEPHONE ENCOUNTER
Heather Armenta RN Yangzom, Chime, APRN CNP  Cc: Jacey Bhandari MD Chime, Paul is contacted to confirm that he used Metolazone on both Thurs /Sun this past week. He uses additional Spironolactone and potassium on the days of the Metolazone. Heather HUERTA RN BSN, CHFN, PCCN-K  His wt is down 7# over this past few days, he feels well. Less LE edema noted.     Any additional recommendations at this time.?     Heather

## 2024-08-12 ENCOUNTER — LAB REQUISITION (OUTPATIENT)
Dept: LAB | Facility: HOSPITAL | Age: 64
End: 2024-08-12
Payer: MEDICARE

## 2024-08-12 DIAGNOSIS — I87.2 VENOUS INSUFFICIENCY (CHRONIC) (PERIPHERAL): ICD-10-CM

## 2024-08-12 DIAGNOSIS — I50.42 CHRONIC COMBINED SYSTOLIC (CONGESTIVE) AND DIASTOLIC (CONGESTIVE) HEART FAILURE (H): ICD-10-CM

## 2024-08-12 PROBLEM — I48.19 PERSISTENT ATRIAL FIBRILLATION (H): Status: ACTIVE | Noted: 2024-08-12

## 2024-08-12 LAB
ANION GAP SERPL CALCULATED.3IONS-SCNC: 14 MMOL/L (ref 7–15)
BUN SERPL-MCNC: 44.4 MG/DL (ref 8–23)
CALCIUM SERPL-MCNC: 10.2 MG/DL (ref 8.8–10.4)
CHLORIDE SERPL-SCNC: 86 MMOL/L (ref 98–107)
CREAT SERPL-MCNC: 1.28 MG/DL (ref 0.67–1.17)
EGFRCR SERPLBLD CKD-EPI 2021: 62 ML/MIN/1.73M2
GLUCOSE SERPL-MCNC: 216 MG/DL (ref 70–99)
HCO3 SERPL-SCNC: 30 MMOL/L (ref 22–29)
POTASSIUM SERPL-SCNC: 3.6 MMOL/L (ref 3.4–5.3)
SODIUM SERPL-SCNC: 130 MMOL/L (ref 135–145)

## 2024-08-12 PROCEDURE — 80048 BASIC METABOLIC PNL TOTAL CA: CPT | Mod: ORL | Performed by: INTERNAL MEDICINE

## 2024-08-12 NOTE — PROGRESS NOTES
Thank you, Dr. Bee, for asking the [facility] team to see Mr. Zackary Hutchison to evaluate PAF vs persistent AF on amiodarone therapy.    Assessment/Recommendations     Assessment/Plan:    Diagnoses and all orders for this visit:  Paroxysmal atrial fibrillation (H)  Persistent atrial fibrillation (H)  On amiodarone therapy  diagnosed with AF RVR 6/14/2019, confirmed by EKG, rate 160 bpm.  Paroxysmal A-fib since.  First noted to have atrial flutter with 2 1 conduction 8/22/2020, confirmed by EKG, rate 147 bpm.  History of  right bundle branch block noted 1/26/2015  -Sx: fatigue, dyspnea on exertion   -Amiodarone 200 mg daily + carvedilol 3.125 mg twice daily  -Home ECG monitoring: None.   -We discussed amiodarone use and reviewed potential risks including thyroid, hepatic, ocular and skin toxicities.  If amiodarone is continued longer term, we discussed the need for interval monitoring of thyroid, liver, lung and eye function and the recommendation for avoidance of prolonged direct sun exposure.  -we discussed the ongoing importance of lifestyle modification (maintaining a healthy weight, sleep apnea diagnosis and management, alcohol avoidance) as part of a long term strategy for atrial fibrillation management  -We reviewed the pathophysiology of atrial fibrillation and management considerations including stroke risk and anticoagulation, rate control, cardioversion, antiarrhythmic drug therapy, and catheter ablation.  Antiarrhythmic drug options discussed.   We discussed atrial fibrillation ablation procedures, anticipated success rates, the potential need for re-do ablation vs addition of anti-arrhythmic drugs, procedural risks (including groin bleeding, tamponade, phrenic or esophageal injury, stroke, pulmonary vein stenosis) and recovery expectations.  --ALT 6/4/24: 15       [id number] score of 4 due to age, HF, HTN, CAD and on Eliquis. No bleeding issues reported, missed PM dose of Eliquis on  8/6/24.      Heart failure with reduced ejection fraction (H)  - LVEF 41% by 12/2023 TTE   -Managed by HF/CORE clinic, CardioMEMS monitoring  -He was evaluated by Dr. Bhandari in clinic on 7/31, he was still showing signs of some mild fluid overload however improved since his torsemide dose was increased to 100 mg twice daily at the time of visit, metolazone was increased to twice weekly on Sunday.   -Additional GDMT includes carvedilol 3.125 mg twice daily, spironolactone 25 mg daily (M - Saturday) and 50 mg daily on metolazone days (Sundays), hydralazine 10 mg 3 times daily, isosorbide mononitrate 180 mg daily, lisinopril on hold, SGLT2 not initiated  -QRS duration 142 ms, known RBBB since 2015 , no device      Primary hypertension  --well controlled, 114/56    PLAN:   Check TSH, Amiodarone level  Continue Amiodarone 200 mg daily  Continue Carvedilol 3.125 mg BID  Continue Eliquis BID for stroke prevention   Continue GDMT per HF/CORE clinic  Will proceed with scheduling catheter ablation with Dr Bee (recommended back in April 2024 but deferred at that time due to fluid overload)  Tolerated laying in supine position today for several minutes on exam table.      History of Present Illness/Subjective     Zackary Hutchison is a very pleasant 63 year old male who comes in today for EP follow up PAF vs persistent AF on amiodarone therapy.     Zackary Hutchison has a known history of 2-vessel CABG (2015) - LIMA->LAD, left radial->RPDA. Occluded natives with patent grafts at angiography 8/2019, paroxysmal atrial fibrillation diagnosed June 2019, paroxysmal atrial flutter noted August 2020, ischemic cardiomyopathy hypertension, right bundle branch block noted 2015, chronic kidney disease stage III, tobacco abuse, COPD, morbid obesity.      Arrhythmia hx  Dx/date: diagnosed with AF RVR 6/14/2019, confirmed by EKG, rate 160 bpm.  Paroxysmal A-fib since.  First noted to have atrial flutter with 2 1 conduction 8/22/2020, confirmed by  EKG, rate 147 bpm.  History of  right bundle branch block noted 1/26/2015.   Sx: fatigue, dyspnea on exertion   Prior AAD, AV raudel blocking agents: Amiodarone  OAC: Eliquis  Procedures  DCCV: 6/17/19 (200 J X1, successful)  Ablation: cassy Raymundo is currently maintaining normal sinus rhythm on his rhythm control strategy consisting of amiodarone 200 mg daily in addition to carvedilol 3.125 mg daily.  Ventricular rates are well-controlled in the 80s today.  He continues to report persistent fatigue and wheezing on exertion along with shortness of breath on exertion, denies CP or near syncope.  Patient was able to lay flat in the supine position today on exam table for several minutes before experiencing increased wheezing. He was previously diagnosed with COPD. He is a longtime smoker, continues to smoke 10 to 15 cigarettes daily with no plans for cessation. He does continue to wrap his legs to manage his bilateral lower extremity swelling.  Heart failure with reduced ejection fraction bandaged by the heart failure/CORE clinic along with CardioMEMS monitoring.  He was recently evaluated by Dr. Bhandari on 7/31, at the time of visit, his torsemide was increased to 100 mg twice daily, metolazone weekly dose was also increased due to ongoing mild euvolemia.  Weight is down 2 pounds over the last 2 weeks, he continues to report persistent fatigue and wheezing with shortness of breath on exertion, suspect his wheezing and shortness of breath on exertion related to COPD and not due to fluid overload at this time.  He remains on Eliquis twice daily for stroke prevention, he did miss his p.m. dose of Eliquis 1 week ago.  He denies any bleeding episodes.  He did consult with Dr. Bee regarding catheter ablation back in April, at the time of consult, he was showing signs of fluid overload so he was referred back to heart failure clinic for further management of his fluid overload.     Cardiographics (reviewed):  EKG  6/5/24 NSR with PACs RBBB 89 bpm  ms QT/QTC: 450/547 ms  EKG 4/2/24 NSR RBBB 96 bpm  ms QT/QTC: 406/512 ms  EKG done 2/15/24 NSR RBBB 77 bpm  ms QT/QTC: 430/486 ms  EKG done 12/14/23 NSR RBBB 74 bpm  ms QT/QTC: 444/492 ms  EKG done 9/8/23 NSR RBBB 69 bpm  ms QT/QTC: 432/462 ms  EKG done 9/18/20 AF RVR RBBB 118 bpm  bpm QT/QTC: 366/513 ms  EKG done 9/17/20 AF RVR RBBB 120 bpm  ms QT/QTC: 358/505 ms  EKG done 8/22/20 AFL 2:1 RBBB 147 bpm  ms QT/QTC: 336/525 ms  EKG done 6/14/19 AF RVR RBBB 116 bpm  ms QTC: 346/480 ms        TTE 12/25/23  1. Technically very difficult study.  2. Left ventricular chamber size and wall thickness are normal. Systolic  function is mildly reduced with global hypokinesis. The calculated left  ventricular ejection fraction is 41%.  3. Right ventricle is not well visualized. Right ventricular chamber size and  systolic function are grossly normal.  4. No hemodynamically significant valvular abnormalities although the cardiac  valves are not well visualized.  5. Compared to the prior study dated 9/9/2023, there has been no significant  change.        Coronary Cath 9/11/23  1.  Chronic total occlusion proximal LAD and stented segment  2.  Moderate calcific disease in medium size circumflex OM1 branch.  Smaller tortuous AV groove circumflex branch has 70% calcified narrowing and diffuse distal disease.  3.  Chronic total occlusion mid right coronary artery.  4.  Widely patent large caliber LIMA to mid LAD with good distal and proximal runoff.  5.  Widely patent free LAURI graft to mid PDA.  Diffuse disease in posterolateral system which is never well visualized.  6.  Right heart cath data:  RA 12   RV 38/13  PA 41/20  mean 27  PAWP 16  CO 6.6  CI 2.2          Problem List:  Patient Active Problem List   Diagnosis    Abnormal electrocardiogram    Cellulitis of left lower extremity    Heart failure with reduced ejection fraction (H)     Ischemic cardiomyopathy    Coronary artery disease involving native coronary artery of native heart with unstable angina pectoris (H)    Paroxysmal atrial fib -- on Eliquis    Class 3 severe obesity due to excess calories with body mass index (BMI) of 40.0 to 44.9 in adult (H)    Chronic kidney disease, stage 3b (H)    Smoker    COPD (chronic obstructive pulmonary disease) (H)    Venous stasis dermatitis of both lower extremities    Acute kidney injury superimposed on CKD  (H24)    Acute on chronic systolic CHF    Restless legs syndrome (RLS)    Primary hypertension    Cellulitis    Chronic ischemic heart disease    Fall    Gait instability    Generalized abdominal pain    Hematochezia    Hereditary and idiopathic peripheral neuropathy    Hyperlipidemia    Hypoglycemia    Hypokalemia    Ileus (H)    Keratoconus, stable condition    Chronic pain of both knees    Long term current use of anticoagulant therapy    Low back pain    Macula-off rhegmatogenous retinal detachment, left    Morbid obesity with body mass index (BMI) of 40.0 or higher (H)    Onychomycosis    Osteoarthritis of both knees    Postoperative anemia due to acute blood loss    Proliferative vitreoretinopathy of left eye    Pseudo-obstruction of colon    Rhinitis, allergic    S/P CABG (coronary artery bypass graft)    Sepsis due to Streptococcus species (H)    Skin ulcer of left pretibial region limited to breakdown of skin (H)    RBBB (right bundle branch block)    On amiodarone therapy    CHF (congestive heart failure) (H)    Acute decompensated heart failure (H)    Hyperglycemia    Open wound    Newly diagnosed diabetes (H)    Unstable angina (H)    Open wound of lower limb    Hypoxia    Hip pain, left    Open wound of knee, leg, and ankle, right, initial encounter    Acute on chronic congestive heart failure, unspecified heart failure type (H)    Rapid atrial fibrillation (H)    Acute and chronic respiratory failure with hypoxia (H)    Cellulitis of  anterior lower leg    Vitamin D deficiency    Persistent atrial fibrillation (H)     Revi  e  Physical Examination Review of Systems   w [facility]  There were no vitals taken for this visit.  There is no height or weight on file to calculate BMI.  Wt Readings from Last 3 Encounters:   07/31/24 117.9 kg (260 lb)   07/29/24 118.4 kg (261 lb)   06/10/24 115.4 kg (254 lb 6.4 oz)     General Appearance:   Alert, well-appearing and in no acute distress. Smells of cigarette smoke.    HEENT: Atraumatic, normocephalic.  No scleral icterus, normal conjunctivae; mucous membranes pink and moist.     Chest: Chest symmetric, spine straight.   Lungs:   Respirations unlabored: Lungs with expiratory wheezing in all lobes.    Cardiovascular:   Normal first and second heart sounds with no murmurs, rubs, or gallops.  Regular, regular.   Normal JVD, 2+ BLE pitting edema. Leg wraps in place.        Extremities: No cyanosis or clubbing   Musculoskeletal: Moves all extremities   Skin: Warm, dry, intact.    Neurologic: Mood and affect are appropriate, alert and oriented to person, place, time, and situation. Ambulates with assist of 4 wheeled walker.      ROS: 10 point ROS neg other than the symptoms noted above in the HPI.     Medical History  Surgical History Family History Social History     Past Medical History:   Diagnosis Date    Atrial fibrillation (H)     Chronic kidney disease     Chronic kidney disease, stage 3b (H) 09/28/2023    Class 3 severe obesity due to excess calories with body mass index (BMI) of 40.0 to 44.9 in adult (H) 09/28/2023    Congestive heart failure (H)     COPD (chronic obstructive pulmonary disease) (H)     Coronary artery disease involving coronary bypass graft of native heart without angina pectoris 09/28/2023    Heart failure with reduced ejection fraction (H) 09/11/2023    HFrEF (heart failure with reduced ejection fraction) (H) 09/11/2023    Hyperlipidemia     Hypertension     Ischemic cardiomyopathy  09/28/2023    Obese     Paroxysmal atrial fibrillation (H) 09/28/2023    Tobacco abuse 09/28/2023    Past Surgical History:   Procedure Laterality Date    CORONARY ANGIOGRAPHY ADULT ORDER      CORONARY ARTERY BYPASS Left 2014    2 vessels    CV CARDIOMEMS WITH RIGHT HEART CATH N/A 2/15/2024    Procedure: Pulmonary Arterial Pressure Sensor Placement;  Surgeon: Jacey Bhandari MD;  Location: [facility]    CV CORONARY ANGIOGRAM N/A 09/11/2023    Procedure: Coronary Angiogram;  Surgeon: Santy Esposito MD;  Location: [facility]    CV CORONARY ANGIOGRAM N/A 5/2/2024    Procedure: CV CORONARY ANGIOGRAM;  Surgeon: Santy Esposito MD;  Location: [facility]    CV LEFT HEART CATH N/A 09/11/2023    Procedure: Left Heart Catheterization;  Surgeon: Santy Esposito MD;  Location: [facility]    CV LEFT HEART CATH N/A 5/2/2024    Procedure: Left Heart Catheterization;  Surgeon: Santy Esposito MD;  Location: [facility]    CV PCI ANGIOPLASTY N/A 5/2/2024    Procedure: Percutaneous Transluminal Angioplasty;  Surgeon: Santy Esposito MD;  Location: [facility]    CV RIGHT HEART CATH MEASUREMENTS RECORDED N/A 09/11/2023    Procedure: Right Heart Catheterization;  Surgeon: Santy Esposito MD;  Location: [facility]    Family History   Problem Relation Age of Onset    Cerebrovascular Disease Mother     Myocardial Infarction Father     History   Smoking Status    Every Day    Types: Cigarettes   Smokeless Tobacco    Never     Social History    Substance and Sexual Activity      Alcohol use: Not Currently       Medications  Allergies     Current Outpatient Medications   Medication Sig Dispense Refill    acetaminophen (TYLENOL) 500 MG tablet Take 1,000 mg by mouth 2 times daily as needed for mild pain      albuterol (PROAIR HFA/PROVENTIL HFA/VENTOLIN HFA) 108 (90 Base) MCG/ACT inhaler Inhale 2 puffs into the lungs every 6 hours as needed for shortness of  breath, wheezing or cough      amiodarone (PACERONE) 200 MG tablet Take 1 tablet (200 mg) by mouth 2 times daily for 9 days, THEN 1 tablet (200 mg) daily for 90 days. Please contact your cardiology team or PCP for additional refills. Do not stop taking amiodarone without further instructions to do so 108 tablet 0    apixaban ANTICOAGULANT (ELIQUIS) 5 MG tablet Take 1 tablet (5 mg) by mouth 2 times daily 180 tablet 3    atorvastatin (LIPITOR) 40 MG tablet Take 40 mg by mouth every morning      blood glucose (NO BRAND SPECIFIED) lancets standard Use to test blood sugar 3 times daily or as directed. (Patient not taking: Reported on 5/30/2024) 50 Lancet 0    blood glucose (NO BRAND SPECIFIED) lancets standard Use to test blood sugar 3 times daily or as directed. (Patient not taking: Reported on 5/30/2024) 100 Lancet 0    blood glucose (NO BRAND SPECIFIED) test strip Use to test blood sugar 3 times daily or as directed. To accompany: Blood Glucose Monitor Brands: per insurance. (Patient not taking: Reported on 5/30/2024) 100 strip 6    blood glucose monitoring (FREESTYLE) lancets Use to test blood sugar 3 times daily or as directed. (Patient not taking: Reported on 5/30/2024) 100 each 0    blood glucose monitoring (FREESTYLE) lancets Use to test blood sugar 3 times daily or as directed. (Patient not taking: Reported on 5/30/2024) 100 each 0    blood glucose monitoring (NO BRAND SPECIFIED) meter device kit Use to test blood sugar 3 times daily or as directed. Preferred blood glucose meter OR supplies to accompany: Blood Glucose Monitor Brands: per insurance. (Patient not taking: Reported on 5/30/2024) 1 kit 0    carvedilol (COREG) 3.125 MG tablet Take 1 tablet (3.125 mg) by mouth 2 times daily (with meals) 180 tablet 1    clopidogrel (PLAVIX) 75 MG tablet Take 1 tablet (75 mg) by mouth daily 60 tablet 0    [employer] VITAMIN B12 1000 MCG tablet Take 1,000 mcg by mouth daily      diclofenac (VOLTAREN) 1 % topical gel Apply 2 g  topically every 6 hours as needed for moderate pain      hydrALAZINE (APRESOLINE) 10 MG tablet Take 1 tablet (10 mg) by mouth 3 times daily 270 tablet 3    insulin glargine (LANTUS PEN) 100 UNIT/ML pen Inject 30 Units Subcutaneous at bedtime (Patient not taking: Reported on 7/31/2024) 15 mL 1    Insulin Lispro (HUMALOG KWIKPEN) 200 UNIT/ML soln Inject 10 Units Subcutaneous 3 times daily (before meals) (Patient not taking: Reported on 7/31/2024) 15 mL 1    ipratropium - albuterol 0.5 mg/2.5 mg/3 mL (DUONEB) 0.5-2.5 (3) MG/3ML neb solution INHALE 3 ML EVERY 6 HOURS AS NEEDED FOR WHEEZING.      isosorbide mononitrate (IMDUR) 60 MG 24 hr tablet Take 3 tablets (180 mg) by mouth every morning 270 tablet 1    metolazone (ZAROXOLYN) 2.5 MG tablet Take 2.5 mg by mouth once a week on Sunday, 30 mintues before your torsemide. Have labs drawn weekly on Monday.      nitroGLYcerin (NITROSTAT) 0.4 MG sublingual tablet Place 1 tablet (0.4 mg) under the tongue every 5 minutes as needed If no relief after 3 tabs call 911;continue 1 tab every 5 min max 3 tab Indications: chest pain 100 tablet 1    oxyCODONE (ROXICODONE) 5 MG tablet Take 1 tablet (5 mg) by mouth every 8 hours as needed for pain 10 tablet 0    polyethylene glycol (MIRALAX) 17 GM/Dose powder Take 17 g by mouth 2 times daily as needed 510 g 0    potassium chloride barbara ER (KLOR-CON M20) 20 MEQ CR tablet Take 2 tablets (40 mEq) by mouth daily 180 tablet 1    rOPINIRole (REQUIP) 2 MG tablet Take 2 mg by mouth every 6 hours      spironolactone (ALDACTONE) 25 MG tablet Take 1 tablet (25 mg) by mouth daily Use 2 tablets (50mg) daily on Sundays with the use of the Metolazone 96 tablet 1    thin (NO BRAND SPECIFIED) lancets Use with lanceting device. To accompany: Blood Glucose Monitor Brands: per insurance. (Patient not taking: Reported on 5/30/2024) 100 each 6    torsemide (DEMADEX) 100 MG tablet Take 1 tablet (100 mg) by mouth 2 times daily 180 tablet 3      Allergies  "  Allergen Reactions    Bumex [Bumetanide] Muscle Pain (Myalgia)      Medical, surgical, family, social history, and medications were all reviewed and updated as necessary.   Lab Results    Chemistry/lipid CBC Cardiac Enzymes/BNP/TSH/INR   Recent Labs   Lab Test 05/02/24  1620   CHOL 121   HDL 27*   LDL 50   TRIG 219*     Recent Labs   Lab Test 05/02/24  1620   LDL 50     Recent Labs   Lab Test 08/05/24  1528   *   POTASSIUM 3.3*   CHLORIDE 84*   CO2 31*   *   BUN 68.5*   CR 1.70*   GFRESTIMATED 44*   RUSLAN 10.0     Recent Labs   Lab Test 08/05/24  1528 07/29/24  1330 07/22/24  1428   CR 1.70* 1.33* 1.40*     Recent Labs   Lab Test 04/29/24  1126   A1C 7.8*          Recent Labs   Lab Test 06/08/24  0455   WBC 7.7   HGB 11.6*   HCT 38.2*   MCV 94        Recent Labs   Lab Test 06/08/24  0455 06/06/24  0512 06/05/24  0521   HGB 11.6* 11.2* 10.1*    No results for input(s): \"TROPONINI\" in the last [id number] hours.  Recent Labs   Lab Test 06/03/24  0553 05/13/24  0709 04/29/24  1126   NTBNPI 558 197 160     Recent Labs   Lab Test 04/29/24  1126   TSH 1.70     Recent Labs   Lab Test 06/03/24  0553 05/13/24  0709 02/13/23  2307   INR 0.97 1.30* 1.01          Total Time- 44 minutes spent on date of encounter doing chart review, history and exam, documentation and further activities as noted above.  This note has been dictated using voice recognition software. Any grammatical, typographical, or context distortions are unintentional and inherent to the software.    Toshia Khanna [facility]  [phone]                       "

## 2024-08-13 ENCOUNTER — OFFICE VISIT (OUTPATIENT)
Dept: CARDIOLOGY | Facility: CLINIC | Age: 64
End: 2024-08-13
Attending: INTERNAL MEDICINE
Payer: MEDICARE

## 2024-08-13 VITALS
HEART RATE: 88 BPM | DIASTOLIC BLOOD PRESSURE: 56 MMHG | RESPIRATION RATE: 18 BRPM | SYSTOLIC BLOOD PRESSURE: 114 MMHG | BODY MASS INDEX: 41.64 KG/M2 | WEIGHT: 258 LBS

## 2024-08-13 DIAGNOSIS — Z79.899 ON AMIODARONE THERAPY: ICD-10-CM

## 2024-08-13 DIAGNOSIS — I50.20 HEART FAILURE WITH REDUCED EJECTION FRACTION (H): ICD-10-CM

## 2024-08-13 DIAGNOSIS — I48.19 PERSISTENT ATRIAL FIBRILLATION (H): ICD-10-CM

## 2024-08-13 DIAGNOSIS — I10 PRIMARY HYPERTENSION: ICD-10-CM

## 2024-08-13 DIAGNOSIS — I48.0 PAROXYSMAL ATRIAL FIBRILLATION (H): Primary | ICD-10-CM

## 2024-08-13 LAB — TSH SERPL DL<=0.005 MIU/L-ACNC: 1.27 UIU/ML (ref 0.3–4.2)

## 2024-08-13 PROCEDURE — 36415 COLL VENOUS BLD VENIPUNCTURE: CPT | Performed by: NURSE PRACTITIONER

## 2024-08-13 PROCEDURE — 80151 DRUG ASSAY AMIODARONE: CPT | Mod: 90 | Performed by: NURSE PRACTITIONER

## 2024-08-13 PROCEDURE — 99215 OFFICE O/P EST HI 40 MIN: CPT | Performed by: NURSE PRACTITIONER

## 2024-08-13 PROCEDURE — 84443 ASSAY THYROID STIM HORMONE: CPT | Performed by: NURSE PRACTITIONER

## 2024-08-13 PROCEDURE — 99000 SPECIMEN HANDLING OFFICE-LAB: CPT | Performed by: NURSE PRACTITIONER

## 2024-08-13 RX ORDER — AMIODARONE HYDROCHLORIDE 200 MG/1
200 TABLET ORAL DAILY
Qty: 90 TABLET | Refills: 3 | Status: SHIPPED | OUTPATIENT
Start: 2024-08-13

## 2024-08-13 NOTE — LETTER
8/13/2024    Reid Escobar MD  [address] [place] [address]    RE: Zackary Hutchison       Dear Colleague,     I had the pleasure of seeing Zackary Hutchison in the [facility].    Thank you, Dr. Bee, for asking the [facility] team to see Mr. Zackary Hutchison to evaluate PAF vs persistent AF on amiodarone therapy.    Assessment/Recommendations     Assessment/Plan:    Diagnoses and all orders for this visit:  Paroxysmal atrial fibrillation (H)  Persistent atrial fibrillation (H)  On amiodarone therapy  diagnosed with AF RVR 6/14/2019, confirmed by EKG, rate 160 bpm.  Paroxysmal A-fib since.  First noted to have atrial flutter with 2 1 conduction 8/22/2020, confirmed by EKG, rate 147 bpm.  History of  right bundle branch block noted 1/26/2015  -Sx: fatigue, dyspnea on exertion   -Amiodarone 200 mg daily + carvedilol 3.125 mg twice daily  -Home ECG monitoring: None.   -We discussed amiodarone use and reviewed potential risks including thyroid, hepatic, ocular and skin toxicities.  If amiodarone is continued longer term, we discussed the need for interval monitoring of thyroid, liver, lung and eye function and the recommendation for avoidance of prolonged direct sun exposure.  -we discussed the ongoing importance of lifestyle modification (maintaining a healthy weight, sleep apnea diagnosis and management, alcohol avoidance) as part of a long term strategy for atrial fibrillation management  -We reviewed the pathophysiology of atrial fibrillation and management considerations including stroke risk and anticoagulation, rate control, cardioversion, antiarrhythmic drug therapy, and catheter ablation.  Antiarrhythmic drug options discussed.   We discussed atrial fibrillation ablation procedures, anticipated success rates, the potential need for re-do ablation vs addition of anti-arrhythmic drugs, procedural risks (including groin bleeding, tamponade, phrenic or esophageal  injury, stroke, pulmonary vein stenosis) and recovery expectations.  --ALT 6/4/24: 15       [id number] score of 4 due to age, HF, HTN, CAD and on Eliquis. No bleeding issues reported, missed PM dose of Eliquis on 8/6/24.      Heart failure with reduced ejection fraction (H)  - LVEF 41% by 12/2023 TTE   -Managed by HF/CORE clinic, CardioMEMS monitoring  -He was evaluated by Dr. Bhandari in clinic on 7/31, he was still showing signs of some mild fluid overload however improved since his torsemide dose was increased to 100 mg twice daily at the time of visit, metolazone was increased to twice weekly on Sunday.   -Additional GDMT includes carvedilol 3.125 mg twice daily, spironolactone 25 mg daily (M - Saturday) and 50 mg daily on metolazone days (Sundays), hydralazine 10 mg 3 times daily, isosorbide mononitrate 180 mg daily, lisinopril on hold, SGLT2 not initiated  -QRS duration 142 ms, known RBBB since 2015 , no device      Primary hypertension  --well controlled, 114/56    PLAN:   Check TSH, Amiodarone level  Continue Amiodarone 200 mg daily  Continue Carvedilol 3.125 mg BID  Continue Eliquis BID for stroke prevention   Continue GDMT per HF/CORE clinic  Will proceed with scheduling catheter ablation with Dr Bee (recommended back in April 2024 but deferred at that time due to fluid overload)  Tolerated laying in supine position today for several minutes on exam table.      History of Present Illness/Subjective     Zackary Hutchison is a very pleasant 63 year old male who comes in today for EP follow up PAF vs persistent AF on amiodarone therapy.     Zackary Hutchison has a known history of 2-vessel CABG (2015) - LIMA->LAD, left radial->RPDA. Occluded natives with patent grafts at angiography 8/2019, paroxysmal atrial fibrillation diagnosed June 2019, paroxysmal atrial flutter noted August 2020, ischemic cardiomyopathy hypertension, right bundle branch block noted 2015, chronic kidney disease stage III, tobacco abuse, COPD,  morbid obesity.      Arrhythmia hx  Dx/date: diagnosed with AF RVR 6/14/2019, confirmed by EKG, rate 160 bpm.  Paroxysmal A-fib since.  First noted to have atrial flutter with 2 1 conduction 8/22/2020, confirmed by EKG, rate 147 bpm.  History of  right bundle branch block noted 1/26/2015.   Sx: fatigue, dyspnea on exertion   Prior AAD, AV raudel blocking agents: Amiodarone  OAC: Eliquis  Procedures  DCCV: 6/17/19 (200 J X1, successful)  Ablation: cassy Raymundo is currently maintaining normal sinus rhythm on his rhythm control strategy consisting of amiodarone 200 mg daily in addition to carvedilol 3.125 mg daily.  Ventricular rates are well-controlled in the 80s today.  He continues to report persistent fatigue and wheezing on exertion along with shortness of breath on exertion, denies CP or near syncope.  Patient was able to lay flat in the supine position today on exam table for several minutes before experiencing increased wheezing. He was previously diagnosed with COPD. He is a longtime smoker, continues to smoke 10 to 15 cigarettes daily with no plans for cessation. He does continue to wrap his legs to manage his bilateral lower extremity swelling.  Heart failure with reduced ejection fraction bandaged by the heart failure/CORE clinic along with CardioMEMS monitoring.  He was recently evaluated by Dr. Bhandari on 7/31, at the time of visit, his torsemide was increased to 100 mg twice daily, metolazone weekly dose was also increased due to ongoing mild euvolemia.  Weight is down 2 pounds over the last 2 weeks, he continues to report persistent fatigue and wheezing with shortness of breath on exertion, suspect his wheezing and shortness of breath on exertion related to COPD and not due to fluid overload at this time.  He remains on Eliquis twice daily for stroke prevention, he did miss his p.m. dose of Eliquis 1 week ago.  He denies any bleeding episodes.  He did consult with Dr. Bee regarding catheter  ablation back in April, at the time of consult, he was showing signs of fluid overload so he was referred back to heart failure clinic for further management of his fluid overload.     Cardiographics (reviewed):  EKG 6/5/24 NSR with PACs RBBB 89 bpm  ms QT/QTC: 450/547 ms  EKG 4/2/24 NSR RBBB 96 bpm  ms QT/QTC: 406/512 ms  EKG done 2/15/24 NSR RBBB 77 bpm  ms QT/QTC: 430/486 ms  EKG done 12/14/23 NSR RBBB 74 bpm  ms QT/QTC: 444/492 ms  EKG done 9/8/23 NSR RBBB 69 bpm  ms QT/QTC: 432/462 ms  EKG done 9/18/20 AF RVR RBBB 118 bpm  bpm QT/QTC: 366/513 ms  EKG done 9/17/20 AF RVR RBBB 120 bpm  ms QT/QTC: 358/505 ms  EKG done 8/22/20 AFL 2:1 RBBB 147 bpm  ms QT/QTC: 336/525 ms  EKG done 6/14/19 AF RVR RBBB 116 bpm  ms QTC: 346/480 ms        TTE 12/25/23  1. Technically very difficult study.  2. Left ventricular chamber size and wall thickness are normal. Systolic  function is mildly reduced with global hypokinesis. The calculated left  ventricular ejection fraction is 41%.  3. Right ventricle is not well visualized. Right ventricular chamber size and  systolic function are grossly normal.  4. No hemodynamically significant valvular abnormalities although the cardiac  valves are not well visualized.  5. Compared to the prior study dated 9/9/2023, there has been no significant  change.        Coronary Cath 9/11/23  1.  Chronic total occlusion proximal LAD and stented segment  2.  Moderate calcific disease in medium size circumflex OM1 branch.  Smaller tortuous AV groove circumflex branch has 70% calcified narrowing and diffuse distal disease.  3.  Chronic total occlusion mid right coronary artery.  4.  Widely patent large caliber LIMA to mid LAD with good distal and proximal runoff.  5.  Widely patent free LAURI graft to mid PDA.  Diffuse disease in posterolateral system which is never well visualized.  6.  Right heart cath data:  RA 12   RV 38/13  PA 41/20  mean  27  PAWP 16  CO 6.6  CI 2.2          Problem List:  Patient Active Problem List   Diagnosis     Abnormal electrocardiogram     Cellulitis of left lower extremity     Heart failure with reduced ejection fraction (H)     Ischemic cardiomyopathy     Coronary artery disease involving native coronary artery of native heart with unstable angina pectoris (H)     Paroxysmal atrial fib -- on Eliquis     Class 3 severe obesity due to excess calories with body mass index (BMI) of 40.0 to 44.9 in adult (H)     Chronic kidney disease, stage 3b (H)     Smoker     COPD (chronic obstructive pulmonary disease) (H)     Venous stasis dermatitis of both lower extremities     Acute kidney injury superimposed on CKD  (H24)     Acute on chronic systolic CHF     Restless legs syndrome (RLS)     Primary hypertension     Cellulitis     Chronic ischemic heart disease     Fall     Gait instability     Generalized abdominal pain     Hematochezia     Hereditary and idiopathic peripheral neuropathy     Hyperlipidemia     Hypoglycemia     Hypokalemia     Ileus (H)     Keratoconus, stable condition     Chronic pain of both knees     Long term current use of anticoagulant therapy     Low back pain     Macula-off rhegmatogenous retinal detachment, left     Morbid obesity with body mass index (BMI) of 40.0 or higher (H)     Onychomycosis     Osteoarthritis of both knees     Postoperative anemia due to acute blood loss     Proliferative vitreoretinopathy of left eye     Pseudo-obstruction of colon     Rhinitis, allergic     S/P CABG (coronary artery bypass graft)     Sepsis due to Streptococcus species (H)     Skin ulcer of left pretibial region limited to breakdown of skin (H)     RBBB (right bundle branch block)     On amiodarone therapy     CHF (congestive heart failure) (H)     Acute decompensated heart failure (H)     Hyperglycemia     Open wound     Newly diagnosed diabetes (H)     Unstable angina (H)     Open wound of lower limb     Hypoxia      Hip pain, left     Open wound of knee, leg, and ankle, right, initial encounter     Acute on chronic congestive heart failure, unspecified heart failure type (H)     Rapid atrial fibrillation (H)     Acute and chronic respiratory failure with hypoxia (H)     Cellulitis of anterior lower leg     Vitamin D deficiency     Persistent atrial fibrillation (H)     Revi  e  Physical Examination Review of Systems   [facility]  There were no vitals taken for this visit.  There is no height or weight on file to calculate BMI.  Wt Readings from Last 3 Encounters:   07/31/24 117.9 kg (260 lb)   07/29/24 118.4 kg (261 lb)   06/10/24 115.4 kg (254 lb 6.4 oz)     General Appearance:   Alert, well-appearing and in no acute distress. Smells of cigarette smoke.    HEENT: Atraumatic, normocephalic.  No scleral icterus, normal conjunctivae; mucous membranes pink and moist.     Chest: Chest symmetric, spine straight.   Lungs:   Respirations unlabored: Lungs with expiratory wheezing in all lobes.    Cardiovascular:   Normal first and second heart sounds with no murmurs, rubs, or gallops.  Regular, regular.   Normal JVD, 2+ BLE pitting edema. Leg wraps in place.        Extremities: No cyanosis or clubbing   Musculoskeletal: Moves all extremities   Skin: Warm, dry, intact.    Neurologic: Mood and affect are appropriate, alert and oriented to person, place, time, and situation. Ambulates with assist of 4 wheeled walker.      ROS: 10 point ROS neg other than the symptoms noted above in the HPI.     Medical History  Surgical History Family History Social History     Past Medical History:   Diagnosis Date     Atrial fibrillation (H)      Chronic kidney disease      Chronic kidney disease, stage 3b (H) 09/28/2023     Class 3 severe obesity due to excess calories with body mass index (BMI) of 40.0 to 44.9 in adult (H) 09/28/2023     Congestive heart failure (H)      COPD (chronic obstructive pulmonary disease) (H)      Coronary artery disease  involving coronary bypass graft of native heart without angina pectoris 09/28/2023     Heart failure with reduced ejection fraction (H) 09/11/2023     HFrEF (heart failure with reduced ejection fraction) (H) 09/11/2023     Hyperlipidemia      Hypertension      Ischemic cardiomyopathy 09/28/2023     Obese      Paroxysmal atrial fibrillation (H) 09/28/2023     Tobacco abuse 09/28/2023    Past Surgical History:   Procedure Laterality Date     CORONARY ANGIOGRAPHY ADULT ORDER       CORONARY ARTERY BYPASS Left 2014    2 vessels     CV CARDIOMEMS WITH RIGHT HEART CATH N/A 2/15/2024    Procedure: Pulmonary Arterial Pressure Sensor Placement;  Surgeon: Jacey Bhandari MD;  Location: [facility] CATH LAB CV     CV CORONARY ANGIOGRAM N/A 09/11/2023    Procedure: Coronary Angiogram;  Surgeon: Santy Esposito MD;  Location: [facility] CATH LAB CV     CV CORONARY ANGIOGRAM N/A 5/2/2024    Procedure: CV CORONARY ANGIOGRAM;  Surgeon: Santy Esposito MD;  Location: [facility] CATH LAB CV     CV LEFT HEART CATH N/A 09/11/2023    Procedure: Left Heart Catheterization;  Surgeon: Santy Esposito MD;  Location: [facility] CATH LAB CV     CV LEFT HEART CATH N/A 5/2/2024    Procedure: Left Heart Catheterization;  Surgeon: Santy Esposito MD;  Location: [facility] CATH LAB CV     CV PCI ANGIOPLASTY N/A 5/2/2024    Procedure: Percutaneous Transluminal Angioplasty;  Surgeon: Santy Esposito MD;  Location: [facility] CATH LAB CV     CV RIGHT HEART CATH MEASUREMENTS RECORDED N/A 09/11/2023    Procedure: Right Heart Catheterization;  Surgeon: Santy Esposito MD;  Location: [facility] CATH LAB CV    Family History   Problem Relation Age of Onset     Cerebrovascular Disease Mother      Myocardial Infarction Father     History   Smoking Status     Every Day     Types: Cigarettes   Smokeless Tobacco     Never     Social History    Substance and Sexual Activity      Alcohol use: Not Currently       Medications  Allergies     Current Outpatient  Medications   Medication Sig Dispense Refill     acetaminophen (TYLENOL) 500 MG tablet Take 1,000 mg by mouth 2 times daily as needed for mild pain       albuterol (PROAIR HFA/PROVENTIL HFA/VENTOLIN HFA) 108 (90 Base) MCG/ACT inhaler Inhale 2 puffs into the lungs every 6 hours as needed for shortness of breath, wheezing or cough       amiodarone (PACERONE) 200 MG tablet Take 1 tablet (200 mg) by mouth 2 times daily for 9 days, THEN 1 tablet (200 mg) daily for 90 days. Please contact your cardiology team or PCP for additional refills. Do not stop taking amiodarone without further instructions to do so 108 tablet 0     apixaban ANTICOAGULANT (ELIQUIS) 5 MG tablet Take 1 tablet (5 mg) by mouth 2 times daily 180 tablet 3     atorvastatin (LIPITOR) 40 MG tablet Take 40 mg by mouth every morning       blood glucose (NO BRAND SPECIFIED) lancets standard Use to test blood sugar 3 times daily or as directed. (Patient not taking: Reported on 5/30/2024) 50 Lancet 0     blood glucose (NO BRAND SPECIFIED) lancets standard Use to test blood sugar 3 times daily or as directed. (Patient not taking: Reported on 5/30/2024) 100 Lancet 0     blood glucose (NO BRAND SPECIFIED) test strip Use to test blood sugar 3 times daily or as directed. To accompany: Blood Glucose Monitor Brands: per insurance. (Patient not taking: Reported on 5/30/2024) 100 strip 6     blood glucose monitoring (FREESTYLE) lancets Use to test blood sugar 3 times daily or as directed. (Patient not taking: Reported on 5/30/2024) 100 each 0     blood glucose monitoring (FREESTYLE) lancets Use to test blood sugar 3 times daily or as directed. (Patient not taking: Reported on 5/30/2024) 100 each 0     blood glucose monitoring (NO BRAND SPECIFIED) meter device kit Use to test blood sugar 3 times daily or as directed. Preferred blood glucose meter OR supplies to accompany: Blood Glucose Monitor Brands: per insurance. (Patient not taking: Reported on 5/30/2024) 1 kit 0      carvedilol (COREG) 3.125 MG tablet Take 1 tablet (3.125 mg) by mouth 2 times daily (with meals) 180 tablet 1     clopidogrel (PLAVIX) 75 MG tablet Take 1 tablet (75 mg) by mouth daily 60 tablet 0     [employer]S VITAMIN B12 1000 MCG tablet Take 1,000 mcg by mouth daily       diclofenac (VOLTAREN) 1 % topical gel Apply 2 g topically every 6 hours as needed for moderate pain       hydrALAZINE (APRESOLINE) 10 MG tablet Take 1 tablet (10 mg) by mouth 3 times daily 270 tablet 3     insulin glargine (LANTUS PEN) 100 UNIT/ML pen Inject 30 Units Subcutaneous at bedtime (Patient not taking: Reported on 7/31/2024) 15 mL 1     Insulin Lispro (HUMALOG KWIKPEN) 200 UNIT/ML soln Inject 10 Units Subcutaneous 3 times daily (before meals) (Patient not taking: Reported on 7/31/2024) 15 mL 1     ipratropium - albuterol 0.5 mg/2.5 mg/3 mL (DUONEB) 0.5-2.5 (3) MG/3ML neb solution INHALE 3 ML EVERY 6 HOURS AS NEEDED FOR WHEEZING.       isosorbide mononitrate (IMDUR) 60 MG 24 hr tablet Take 3 tablets (180 mg) by mouth every morning 270 tablet 1     metolazone (ZAROXOLYN) 2.5 MG tablet Take 2.5 mg by mouth once a week on Sunday, 30 mintues before your torsemide. Have labs drawn weekly on Monday.       nitroGLYcerin (NITROSTAT) 0.4 MG sublingual tablet Place 1 tablet (0.4 mg) under the tongue every 5 minutes as needed If no relief after 3 tabs call 911;continue 1 tab every 5 min max 3 tab Indications: chest pain 100 tablet 1     oxyCODONE (ROXICODONE) 5 MG tablet Take 1 tablet (5 mg) by mouth every 8 hours as needed for pain 10 tablet 0     polyethylene glycol (MIRALAX) 17 GM/Dose powder Take 17 g by mouth 2 times daily as needed 510 g 0     potassium chloride barbara ER (KLOR-CON M20) 20 MEQ CR tablet Take 2 tablets (40 mEq) by mouth daily 180 tablet 1     rOPINIRole (REQUIP) 2 MG tablet Take 2 mg by mouth every 6 hours       spironolactone (ALDACTONE) 25 MG tablet Take 1 tablet (25 mg) by mouth daily Use 2 tablets (50mg) daily on Sundays with the  "use of the Metolazone 96 tablet 1     thin (NO BRAND SPECIFIED) lancets Use with lanceting device. To accompany: Blood Glucose Monitor Brands: per insurance. (Patient not taking: Reported on 5/30/2024) 100 each 6     torsemide (DEMADEX) 100 MG tablet Take 1 tablet (100 mg) by mouth 2 times daily 180 tablet 3      Allergies   Allergen Reactions     Bumex [Bumetanide] Muscle Pain (Myalgia)      Medical, surgical, family, social history, and medications were all reviewed and updated as necessary.   Lab Results    Chemistry/lipid CBC Cardiac Enzymes/BNP/TSH/INR   Recent Labs   Lab Test 05/02/24  1620   CHOL 121   HDL 27*   LDL 50   TRIG 219*     Recent Labs   Lab Test 05/02/24  1620   LDL 50     Recent Labs   Lab Test 08/05/24  1528   *   POTASSIUM 3.3*   CHLORIDE 84*   CO2 31*   *   BUN 68.5*   CR 1.70*   GFRESTIMATED 44*   RUSLAN 10.0     Recent Labs   Lab Test 08/05/24  1528 07/29/24  1330 07/22/24  1428   CR 1.70* 1.33* 1.40*     Recent Labs   Lab Test 04/29/24  1126   A1C 7.8*          Recent Labs   Lab Test 06/08/24  0455   WBC 7.7   HGB 11.6*   HCT 38.2*   MCV 94        Recent Labs   Lab Test 06/08/24  0455 06/06/24  0512 06/05/24  0521   HGB 11.6* 11.2* 10.1*    No results for input(s): \"TROPONINI\" in the last 13067 hours.  Recent Labs   Lab Test 06/03/24  0553 05/13/24  0709 04/29/24  1126   NTBNPI 558 197 160     Recent Labs   Lab Test 04/29/24  1126   TSH 1.70     Recent Labs   Lab Test 06/03/24  0553 05/13/24  0709 02/13/23  2307   INR 0.97 1.30* 1.01          Total Time- 44 minutes spent on date of encounter doing chart review, history and exam, documentation and further activities as noted above.  This note has been dictated using voice recognition software. Any grammatical, typographical, or context distortions are unintentional and inherent to the software.    Toshia Khanna [facility]  [phone]                         Thank you for allowing me to participate " in the care of your patient.      Sincerely,     Toshia Khanna NP     [facility]  cc:   Jose Bee MD  [address]  [place] [address]

## 2024-08-13 NOTE — PATIENT INSTRUCTIONS
Zackary Hutchison,    It was a pleasure to see you today at the [facility].     My recommendations after this visit include:    Check TSH, Amiodarone level  Continue Amiodarone 200 mg daily  Continue Carvedilol 3.125 mg BID  Continue Eliquis for stroke prevention   Will proceed with scheduling catheter ablation with Dr Bee (recommended in April 2024 but deferred at that time due to fluid overload)      Toshia Khanna CNP  [facility], Electrophysiology  [phone]  EP nurses [phone]

## 2024-08-14 ENCOUNTER — DOCUMENTATION ONLY (OUTPATIENT)
Dept: CARDIOLOGY | Facility: CLINIC | Age: 64
End: 2024-08-14
Payer: MEDICARE

## 2024-08-14 ENCOUNTER — MYC MEDICAL ADVICE (OUTPATIENT)
Dept: CARDIOLOGY | Facility: CLINIC | Age: 64
End: 2024-08-14

## 2024-08-14 ENCOUNTER — TELEPHONE (OUTPATIENT)
Dept: CARDIOLOGY | Facility: CLINIC | Age: 64
End: 2024-08-14
Payer: MEDICARE

## 2024-08-14 ENCOUNTER — PREP FOR PROCEDURE (OUTPATIENT)
Dept: CARDIOLOGY | Facility: CLINIC | Age: 64
End: 2024-08-14
Payer: MEDICARE

## 2024-08-14 DIAGNOSIS — J44.9 CHRONIC OBSTRUCTIVE PULMONARY DISEASE, UNSPECIFIED COPD TYPE (H): ICD-10-CM

## 2024-08-14 DIAGNOSIS — I48.19 PERSISTENT ATRIAL FIBRILLATION (H): Primary | ICD-10-CM

## 2024-08-14 DIAGNOSIS — I48.0 PAROXYSMAL ATRIAL FIBRILLATION (H): Primary | ICD-10-CM

## 2024-08-14 DIAGNOSIS — Z79.899 ON AMIODARONE THERAPY: ICD-10-CM

## 2024-08-14 DIAGNOSIS — Z72.0 TOBACCO ABUSE: ICD-10-CM

## 2024-08-14 RX ORDER — SODIUM CHLORIDE 9 MG/ML
100 INJECTION, SOLUTION INTRAVENOUS CONTINUOUS
Status: CANCELLED | OUTPATIENT
Start: 2024-08-14

## 2024-08-14 RX ORDER — LIDOCAINE 40 MG/G
CREAM TOPICAL
Status: CANCELLED | OUTPATIENT
Start: 2024-08-14

## 2024-08-14 NOTE — TELEPHONE ENCOUNTER
1st Attempt to contact pt. LM with direct number requesting return call to discuss option for pulmonary medicine for optimizing COPD management.       PVI prepped and sent to scheduling.       Demetrice Henriquez RN

## 2024-08-14 NOTE — TELEPHONE ENCOUNTER
Jose Bee MD Duhamel, Kristine, NP; P [facility] Ep Support [facility]  Thanks Toshia!    Sounds good - it sounds like he is doing somewhat better.  We can plan for PVI, general anesthesia, continue apixaban, hold amiodarone 7 days prior and likely resume for 6-12 weeks post ablation.      In parallel, it may be reasonable to offer him a consultation with pulmonary medicine for optimizing COPD management.    Thank you,  Cayla          Previous Messages       ----- Message -----  From: Toshia Khanna NP  Sent: 8/13/2024   4:29 PM CDT  To: Jose Bee MD; *  Subject: ablation                                        Hey there, I followed up with Zackary today in clinic.  Dr. Bee he met with you back in April for ablation consult.  At the time of your visit, he was showing signs of fluid overload so he was referred back to the heart failure/CORE clinic with recommendations to follow-up to reevaluate for readiness with proceeding with catheter ablation.    He did see Dr. Bhandari on 7/31, at the time of that visit he was mildly euvolemic on exam so his torsemide was increased to twice daily along with his weekly metolazone dosing.  Overall, he was not showing any significant signs of fluid overload today, I do feel that his shortness of breath on exertion and wheezing is more related to his chronic COPD.  I did have him lay in the supine position today on the exam table which he tolerated for a good 3 minutes before he needed to sit up due to the significance of his wheezing (COPD related), not due to fluid overload.    He is maintaining normal sinus rhythm on his amiodarone + carvedilol, he feels he is ready to proceed with ablation in the next couple of months.  Not sure if you want to see him again back in clinic or if you are okay with scheduling him for catheter ablation. I let him know someone would be reaching out to him to discuss scheduling either an office visit vs the ablation  procedure.    Thanks,  Toshia

## 2024-08-14 NOTE — PROGRESS NOTES
H&P:  Card OV  Date:  EP PATRICIA [] PVI  Order Case Req Y  Order Set Y Lab/EKG   Orders [] Imaging Order None     AC Eliquis-CONTINUE   AAD Amiodarone-Hold 7 days prior   PPI/H2 Blocker Start Protonix 40mg Daily 3 days prior, 6wk post   Diuretics Spironolactone, Torsemide   DM/GLP-1 DM Meds- None  GLP-1- None     Jose Bee MD Duhamel, Toshia, NP; P [facility] Ep Support Pool - [facility]  Thanks Toshia!    Sounds good - it sounds like he is doing somewhat better.  We can plan for PVI, general anesthesia, continue apixaban, hold amiodarone 7 days prior and likely resume for 6-12 weeks post ablation.      In parallel, it may be reasonable to offer him a consultation with pulmonary medicine for optimizing COPD management.    Thank you,  Hildajerrod Raymundo Pollylinh, 1960, [phone]  Home:[phone] (home) Cell:[phone] (mobile)  Emergency Contact: APRIL JONES [phone]  PCP: Reid Escobar, [phone]    Important patient information for [facility]/Cath Lab staff : None    [facility] EP Cath Lab Procedure Order   Ablation Type:  Atrial Fibrillation (Paroxsymal)  Ordering Provider: Dr Rohit Evans Ordered and Prepped: 8/14/2024 Demetrice Henriquez RN    Scheduling Information:  Anticipated Case Duration:  Standard ( Case per day SA 2:1, DW 5:1, KA 3:1)   Scheduling Timeframe:  Next Available  Scheduling Restrictions: None  Scheduling Contact: Please contact pt to schedule, if you are unable to schedule date within the next 24 hours please contact pt to update on scheduling process  EP RN Follow Up Apt: Schedule EP RN PC visit 3-4 days s/p PVI  MD Preference: Scheduling with ordering provider  Current Device/Device Co Needed for Procedure: None NoneNone  Pre-Procedural Testing needed: None  Mapping System Required:  Carto (Dr Bee and Dr Palacios)  ICE Needed:  Yes  Anesthesia:General Anesthesia    [facility] EP Cath Lab Prep   H&P:  Compled by cardiology on 8/14/24 if scheduled within 30 days, pt will need RN education and  Labs apt within 3 days of procedure. If pts PVI scheduled outside of 30 days, pt to schedule H&P with EP PATRICIA, RN Teach, and Labs within 30 days of PVI  Pre-op Labs: CBC, BMP, Beta HcG if appropriate, and INR if on Warfarin will be ordered AM of procedure, if not completed at pre-op H&P within 7 days of procedure.  T&S Pre-Procedure Review: Does not need for PVI procedures  Medical Records Pertinent for Procedure:   EKG  6/3/24, Echo 4/29/24  Iodinated Contrast Dye Allergies (Does not include Shellfish, Egg, and/or Iodine Allergy): NA  GLP-1 Protocol: If on Dulaglutide (Trulicity) (weekly)- Injection hold 7 days prior to procedure  , Exenatide extended release (Bydureon bcise) (weekly)- Injection hold 7 days prior to procedure, Exenatide (Byetta) (twice daily)- Oral Tablet hold day prior and morning of procedure and for Injection hold 7 days prior to procedure, Semaglutide (Ozempic) (weekly)- Injection and Oral hold 7 days prior to procedure, Liraglutide (Victoza, Saxenda) (daily)- Injection hold day prior and morning of procedure  Follow Up S/P: EP RN 3-4 PC Visit and EP PATRICIA 6wk (To be scheduled at time of case scheduling)    Allergies   Allergen Reactions    Bumex [Bumetanide] Muscle Pain (Myalgia)       Current Outpatient Medications:     acetaminophen (TYLENOL) 500 MG tablet, Take 1,000 mg by mouth 2 times daily as needed for mild pain, Disp: , Rfl:     albuterol (PROAIR HFA/PROVENTIL HFA/VENTOLIN HFA) 108 (90 Base) MCG/ACT inhaler, Inhale 2 puffs into the lungs every 6 hours as needed for shortness of breath, wheezing or cough, Disp: , Rfl:     amiodarone (PACERONE) 200 MG tablet, Take 1 tablet (200 mg) by mouth daily, Disp: 90 tablet, Rfl: 3    apixaban ANTICOAGULANT (ELIQUIS) 5 MG tablet, Take 1 tablet (5 mg) by mouth 2 times daily, Disp: 180 tablet, Rfl: 3    atorvastatin (LIPITOR) 40 MG tablet, Take 40 mg by mouth every morning, Disp: , Rfl:     carvedilol (COREG) 3.125 MG tablet, Take 1 tablet (3.125 mg) by  mouth 2 times daily (with meals), Disp: 180 tablet, Rfl: 1    clopidogrel (PLAVIX) 75 MG tablet, Take 1 tablet (75 mg) by mouth daily, Disp: 60 tablet, Rfl: 0    [employer] VITAMIN B12 1000 MCG tablet, Take 1,000 mcg by mouth daily, Disp: , Rfl:     diclofenac (VOLTAREN) 1 % topical gel, Apply 2 g topically every 6 hours as needed for moderate pain, Disp: , Rfl:     hydrALAZINE (APRESOLINE) 10 MG tablet, Take 1 tablet (10 mg) by mouth 3 times daily, Disp: 270 tablet, Rfl: 3    ipratropium - albuterol 0.5 mg/2.5 mg/3 mL (DUONEB) 0.5-2.5 (3) MG/3ML neb solution, INHALE 3 ML EVERY 6 HOURS AS NEEDED FOR WHEEZING., Disp: , Rfl:     isosorbide mononitrate (IMDUR) 60 MG 24 hr tablet, Take 3 tablets (180 mg) by mouth every morning, Disp: 270 tablet, Rfl: 1    metolazone (ZAROXOLYN) 2.5 MG tablet, Take 2.5 mg by mouth once a week on Sunday, 30 mintues before your torsemide. Have labs drawn weekly on Monday., Disp: , Rfl:     nitroGLYcerin (NITROSTAT) 0.4 MG sublingual tablet, Place 1 tablet (0.4 mg) under the tongue every 5 minutes as needed If no relief after 3 tabs call 911;continue 1 tab every 5 min max 3 tab Indications: chest pain, Disp: 100 tablet, Rfl: 1    oxyCODONE (ROXICODONE) 5 MG tablet, Take 1 tablet (5 mg) by mouth every 8 hours as needed for pain, Disp: 10 tablet, Rfl: 0    polyethylene glycol (MIRALAX) 17 GM/Dose powder, Take 17 g by mouth 2 times daily as needed, Disp: 510 g, Rfl: 0    potassium chloride barbara ER (KLOR-CON M20) 20 MEQ CR tablet, Take 2 tablets (40 mEq) by mouth daily, Disp: 180 tablet, Rfl: 1    rOPINIRole (REQUIP) 2 MG tablet, Take 2 mg by mouth every 6 hours, Disp: , Rfl:     spironolactone (ALDACTONE) 25 MG tablet, Take 1 tablet (25 mg) by mouth daily Use 2 tablets (50mg) daily on Sundays with the use of the Metolazone, Disp: 96 tablet, Rfl: 1    torsemide (DEMADEX) 100 MG tablet, Take 1 tablet (100 mg) by mouth 2 times daily, Disp: 180 tablet, Rfl: 3    Documentation Date:8/14/2024 12:38  PM  Demetrice Henriquez RN\

## 2024-08-15 NOTE — TELEPHONE ENCOUNTER
Pt was called, corresponding information/recommendations reviewed, verbalized understanding, has no further questions at this time, and contact information was given for further concerns/questions  Pt has made apt with pulm in Oct.   8/15/2024 12:15 PM  Zulma Torres RN

## 2024-08-16 LAB
AMIODARONE SERPL-MCNC: 0.8 UG/ML
DESETHYLAMIODARONE SERPL-MCNC: 0.4 UG/ML

## 2024-08-19 ENCOUNTER — LAB REQUISITION (OUTPATIENT)
Dept: LAB | Facility: HOSPITAL | Age: 64
End: 2024-08-19
Payer: MEDICARE

## 2024-08-19 DIAGNOSIS — I50.42 CHRONIC COMBINED SYSTOLIC (CONGESTIVE) AND DIASTOLIC (CONGESTIVE) HEART FAILURE (H): ICD-10-CM

## 2024-08-19 DIAGNOSIS — I87.2 VENOUS INSUFFICIENCY (CHRONIC) (PERIPHERAL): ICD-10-CM

## 2024-08-19 DIAGNOSIS — I25.5 ISCHEMIC CARDIOMYOPATHY: ICD-10-CM

## 2024-08-19 LAB
ANION GAP SERPL CALCULATED.3IONS-SCNC: 17 MMOL/L (ref 7–15)
BUN SERPL-MCNC: 35 MG/DL (ref 8–23)
CALCIUM SERPL-MCNC: 9.8 MG/DL (ref 8.8–10.4)
CHLORIDE SERPL-SCNC: 88 MMOL/L (ref 98–107)
CREAT SERPL-MCNC: 1.36 MG/DL (ref 0.67–1.17)
EGFRCR SERPLBLD CKD-EPI 2021: 58 ML/MIN/1.73M2
GLUCOSE SERPL-MCNC: 144 MG/DL (ref 70–99)
HCO3 SERPL-SCNC: 27 MMOL/L (ref 22–29)
POTASSIUM SERPL-SCNC: 3.9 MMOL/L (ref 3.4–5.3)
SODIUM SERPL-SCNC: 132 MMOL/L (ref 135–145)

## 2024-08-19 PROCEDURE — 36415 COLL VENOUS BLD VENIPUNCTURE: CPT | Mod: ORL | Performed by: INTERNAL MEDICINE

## 2024-08-19 PROCEDURE — 80048 BASIC METABOLIC PNL TOTAL CA: CPT | Mod: ORL | Performed by: INTERNAL MEDICINE

## 2024-08-22 NOTE — PROGRESS NOTES
[facility]:   HRS ([facility]) Routine Remote Evaluation    HRS Enrollment date: 2/23/24     Dates: 6/29/24 through 7/29/24    This is not the first billing cycle.    Alerts in the last month:     7/25/24 - Weight Alert    No adjustments made this month.  Discussed with patient/caregiver and they will continue current plan of care.     Readings:         Total Minutes Spent: 0 minutes     Anh Velasquez CMA    Future Appointments   Date Time Provider Department Center   8/29/2024  4:00 AM [facility]   9/23/2024  4:00 AM [facility]   9/30/2024  4:00 AM [facility]   10/1/2024  1:45 PM [facility] PFT RM 2 [facility]   10/1/2024  3:15 PM Sarah Ni, NP [facility]   10/24/2024  4:00 AM [facility]   10/31/2024  4:00 AM [facility]   11/25/2024  4:00 AM [facility]   12/2/2024  4:00 AM [facility]   12/26/2024  4:00 AM [facility]     I have reviewed Anh Velasquez CMA 's note and agree.    Jacey Bhandari MD., MHS

## 2024-08-26 ENCOUNTER — LAB REQUISITION (OUTPATIENT)
Dept: LAB | Facility: HOSPITAL | Age: 64
End: 2024-08-26
Payer: MEDICARE

## 2024-08-26 DIAGNOSIS — I50.42 CHRONIC COMBINED SYSTOLIC (CONGESTIVE) AND DIASTOLIC (CONGESTIVE) HEART FAILURE (H): ICD-10-CM

## 2024-08-26 DIAGNOSIS — I87.2 VENOUS INSUFFICIENCY (CHRONIC) (PERIPHERAL): ICD-10-CM

## 2024-08-26 DIAGNOSIS — I13.0 HYPERTENSIVE HEART AND CHRONIC KIDNEY DISEASE WITH HEART FAILURE AND STAGE 1 THROUGH STAGE 4 CHRONIC KIDNEY DISEASE, OR UNSPECIFIED CHRONIC KIDNEY DISEASE (H): ICD-10-CM

## 2024-08-26 LAB
ANION GAP SERPL CALCULATED.3IONS-SCNC: 16 MMOL/L (ref 7–15)
BUN SERPL-MCNC: 41.7 MG/DL (ref 8–23)
CALCIUM SERPL-MCNC: 9.5 MG/DL (ref 8.8–10.4)
CHLORIDE SERPL-SCNC: 89 MMOL/L (ref 98–107)
CREAT SERPL-MCNC: 1.48 MG/DL (ref 0.67–1.17)
EGFRCR SERPLBLD CKD-EPI 2021: 52 ML/MIN/1.73M2
GLUCOSE SERPL-MCNC: 299 MG/DL (ref 70–99)
HCO3 SERPL-SCNC: 28 MMOL/L (ref 22–29)
POTASSIUM SERPL-SCNC: 3.6 MMOL/L (ref 3.4–5.3)
SODIUM SERPL-SCNC: 133 MMOL/L (ref 135–145)

## 2024-08-26 PROCEDURE — 80048 BASIC METABOLIC PNL TOTAL CA: CPT | Mod: ORL | Performed by: INTERNAL MEDICINE

## 2024-08-29 ENCOUNTER — ALLIED HEALTH/NURSE VISIT (OUTPATIENT)
Dept: CARDIOLOGY | Facility: CLINIC | Age: 64
End: 2024-08-29
Payer: MEDICARE

## 2024-08-29 DIAGNOSIS — I50.33 ACUTE ON CHRONIC DIASTOLIC CONGESTIVE HEART FAILURE (H): Primary | ICD-10-CM

## 2024-08-29 PROCEDURE — 99454 REM MNTR PHYSIOL PARAM 16-30: CPT | Performed by: INTERNAL MEDICINE

## 2024-09-03 ENCOUNTER — MYC MEDICAL ADVICE (OUTPATIENT)
Dept: ADMINISTRATIVE | Facility: CLINIC | Age: 64
End: 2024-09-03

## 2024-09-03 ENCOUNTER — LAB REQUISITION (OUTPATIENT)
Dept: LAB | Facility: HOSPITAL | Age: 64
End: 2024-09-03
Payer: MEDICARE

## 2024-09-03 DIAGNOSIS — I50.42 CHRONIC COMBINED SYSTOLIC (CONGESTIVE) AND DIASTOLIC (CONGESTIVE) HEART FAILURE (H): ICD-10-CM

## 2024-09-03 DIAGNOSIS — I50.20 HEART FAILURE WITH REDUCED EJECTION FRACTION (H): Primary | ICD-10-CM

## 2024-09-03 DIAGNOSIS — I87.2 VENOUS INSUFFICIENCY (CHRONIC) (PERIPHERAL): ICD-10-CM

## 2024-09-03 DIAGNOSIS — J44.9 CHRONIC OBSTRUCTIVE PULMONARY DISEASE, UNSPECIFIED (H): ICD-10-CM

## 2024-09-03 LAB
ANION GAP SERPL CALCULATED.3IONS-SCNC: 17 MMOL/L (ref 7–15)
BUN SERPL-MCNC: 39.6 MG/DL (ref 8–23)
CALCIUM SERPL-MCNC: 9.8 MG/DL (ref 8.8–10.4)
CHLORIDE SERPL-SCNC: 90 MMOL/L (ref 98–107)
CREAT SERPL-MCNC: 1.26 MG/DL (ref 0.67–1.17)
EGFRCR SERPLBLD CKD-EPI 2021: 63 ML/MIN/1.73M2
GLUCOSE SERPL-MCNC: 240 MG/DL (ref 70–99)
HCO3 SERPL-SCNC: 27 MMOL/L (ref 22–29)
POTASSIUM SERPL-SCNC: 3.8 MMOL/L (ref 3.4–5.3)
SODIUM SERPL-SCNC: 134 MMOL/L (ref 135–145)

## 2024-09-03 PROCEDURE — 80048 BASIC METABOLIC PNL TOTAL CA: CPT | Mod: ORL | Performed by: INTERNAL MEDICINE

## 2024-09-09 ENCOUNTER — LAB REQUISITION (OUTPATIENT)
Dept: LAB | Facility: HOSPITAL | Age: 64
End: 2024-09-09
Payer: MEDICARE

## 2024-09-09 DIAGNOSIS — I13.0 HYPERTENSIVE HEART AND CHRONIC KIDNEY DISEASE WITH HEART FAILURE AND STAGE 1 THROUGH STAGE 4 CHRONIC KIDNEY DISEASE, OR UNSPECIFIED CHRONIC KIDNEY DISEASE (H): ICD-10-CM

## 2024-09-09 LAB
ANION GAP SERPL CALCULATED.3IONS-SCNC: 15 MMOL/L (ref 7–15)
BUN SERPL-MCNC: 41.7 MG/DL (ref 8–23)
CALCIUM SERPL-MCNC: 9.9 MG/DL (ref 8.8–10.4)
CHLORIDE SERPL-SCNC: 89 MMOL/L (ref 98–107)
CREAT SERPL-MCNC: 1.27 MG/DL (ref 0.67–1.17)
EGFRCR SERPLBLD CKD-EPI 2021: 63 ML/MIN/1.73M2
GLUCOSE SERPL-MCNC: 235 MG/DL (ref 70–99)
HCO3 SERPL-SCNC: 27 MMOL/L (ref 22–29)
POTASSIUM SERPL-SCNC: 3.6 MMOL/L (ref 3.4–5.3)
SODIUM SERPL-SCNC: 131 MMOL/L (ref 135–145)

## 2024-09-09 PROCEDURE — 80048 BASIC METABOLIC PNL TOTAL CA: CPT | Mod: ORL | Performed by: INTERNAL MEDICINE

## 2024-09-10 ENCOUNTER — TELEPHONE (OUTPATIENT)
Dept: CARDIOLOGY | Facility: CLINIC | Age: 64
End: 2024-09-10
Payer: MEDICARE

## 2024-09-10 NOTE — TELEPHONE ENCOUNTER
Reviewed with [facility] nurse Lucita today the continued need for weekly labs due to his ongoing use of Metolazone. It would be necessary in the management of his diuretics and allow for closer monitoring of his kidney function.   She inquired if he exhibited non-compliance to his medications or plan of care, and this was denied by staff here. He appears to be trying to adhere to his regimen, but does lack social support.  Heather HUERTA RN BSN, CHFN, PCCN-K

## 2024-09-10 NOTE — PROGRESS NOTES
[facility]:  [employer] Routine Remote Evaluation     Dates: 5/20/24 through 6/20/24    Adjustments made in the last month: none    No adjustments made this month.  Discussed with patient/caregiver and they will continue current plan of care.     Goal PAD Settings:  12    Heather HUERTA RN BSN, CHFN, PCCN-K      Readings:            Future Appointments   Date Time Provider Department Center   9/23/2024  4:00 AM [facility]   9/30/2024  4:00 AM [facility]   10/1/2024  1:45 PM [facility] PFT RM 2 [facility]   10/1/2024  3:15 PM Sarah Ni, NP [facility]   10/24/2024  4:00 AM [facility]   10/31/2024  4:00 AM [facility]   11/25/2024  4:00 AM [facility]   12/2/2024  4:00 AM [facility]   12/26/2024  4:00 AM [facility] N      I have reviewed Heather Armenta RN BSN, CHFN's note and agree.    Jacey Bhandari MD., MHS

## 2024-09-10 NOTE — TELEPHONE ENCOUNTER
[facility] Call Center    Phone Message    May a detailed message be left on voicemail: yes     Reason for Call: Other: some concerns/questions for nurse:  Is pt actually weighing himself each day as his weight does not seem to be changing in spite of water retention.  Concerns about other compliance items.  Please call Lucita to discuss.     Action Taken: Message routed to:  [facility] ([facility] cardio    Travel Screening: Not Applicable    Thank you!  [facility]       Date of Service:

## 2024-09-12 NOTE — PROGRESS NOTES
[facility]:   [facility] ([email or web address]) Routine Remote Evaluation    HRS Enrollment date: 2/23/24     Dates: 7/30/24 through 8/29/24    This is not the first billing cycle.    Alerts in the last month: None    No adjustments made this month.  Discussed with patient/caregiver and they will continue current plan of care.     Readings:         Total Minutes Spent: 0 minutes     Anh Velasquez CMA    Future Appointments   Date Time Provider Department Center   9/23/2024  4:00 AM [facility]   9/30/2024  4:00 AM [facility]   10/1/2024  1:45 PM [facility] PFT RM 2 [facility]   10/1/2024  3:15 PM Sarah Ni, NP [facility]   10/24/2024  4:00 AM [facility]   10/31/2024  4:00 AM [facility]   11/25/2024  4:00 AM [facility]   12/2/2024  4:00 AM [facility]   12/26/2024  4:00 AM [facility]     I have reviewed Anh Velasquez CMA 's note and agree.    Jacey Bhandari MD., MHS

## 2024-09-13 ENCOUNTER — TELEPHONE (OUTPATIENT)
Dept: CARDIOLOGY | Facility: CLINIC | Age: 64
End: 2024-09-13
Payer: MEDICARE

## 2024-09-13 DIAGNOSIS — I50.20 HEART FAILURE WITH REDUCED EJECTION FRACTION (H): Primary | ICD-10-CM

## 2024-09-13 RX ORDER — SPIRONOLACTONE 25 MG/1
TABLET ORAL
Qty: 96 TABLET | Refills: 1 | Status: SHIPPED | OUTPATIENT
Start: 2024-09-13 | End: 2024-09-17

## 2024-09-13 RX ORDER — SPIRONOLACTONE 25 MG/1
TABLET ORAL
Qty: 96 TABLET | Refills: 1 | Status: SHIPPED | OUTPATIENT
Start: 2024-09-13 | End: 2024-09-13

## 2024-09-13 NOTE — TELEPHONE ENCOUNTER
Patient called asking if he could have his Metolazone medication time changed from Sundays to Wednesday.  [facility] reported they would be able to take his labs on Thursday.  If he can switch this to Wednesday should he still take his dose Sunday, and then on Wednesday with his lab to follow Thursday?  Thank you for your help.     Otto Bhandari

## 2024-09-13 NOTE — TELEPHONE ENCOUNTER
Called patient and reported the following:     -Take Metolazone 2.5 mg on Wednesday 30 minutes before his Torsemide  -Take the 50 mg Spironolactone tablets on Wednesday  -Labs will be taken on Thursdays     Patient reported understanding to the change, and knows to not take the Metolazone this Sunday (9/15/24).     Otto Rodríguez RN     ------------------------------------------------------     Jacey Bhandari MD [place], Stefano HENDRICKS RN  Please instruct him starting next week to not take the usual dose on Monday but, instead, on Wednesday next week with labs on Thursday,    Nereyda Carver

## 2024-09-13 NOTE — TELEPHONE ENCOUNTER
Patient called asking if he could have his Metolazone medication time changed from Sundays to Wednesday.  Message left with [facility] to see if they can accommodate his labs on Thursday instead of Monday.  If he can switch this to Wednesday should he still take his dose Sunday, and then on Wednesday with his lab to follow Thursday?  Thank you for your help.    Otto Rodríguez RN

## 2024-09-17 ENCOUNTER — APPOINTMENT (OUTPATIENT)
Dept: CT IMAGING | Facility: HOSPITAL | Age: 64
DRG: 552 | End: 2024-09-17
Attending: EMERGENCY MEDICINE
Payer: MEDICARE

## 2024-09-17 ENCOUNTER — APPOINTMENT (OUTPATIENT)
Dept: MRI IMAGING | Facility: HOSPITAL | Age: 64
DRG: 552 | End: 2024-09-17
Attending: NURSE PRACTITIONER
Payer: MEDICARE

## 2024-09-17 ENCOUNTER — HOSPITAL ENCOUNTER (INPATIENT)
Facility: HOSPITAL | Age: 64
LOS: 1 days | Discharge: HOME HEALTH | DRG: 552 | End: 2024-09-18
Attending: EMERGENCY MEDICINE | Admitting: FAMILY MEDICINE
Payer: MEDICARE

## 2024-09-17 ENCOUNTER — APPOINTMENT (OUTPATIENT)
Dept: RADIOLOGY | Facility: HOSPITAL | Age: 64
DRG: 552 | End: 2024-09-17
Attending: EMERGENCY MEDICINE
Payer: MEDICARE

## 2024-09-17 DIAGNOSIS — S81.801A OPEN WOUND OF BOTH LOWER EXTREMITIES, INITIAL ENCOUNTER: ICD-10-CM
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
## 2024-09-17 DIAGNOSIS — S32.048A OTHER CLOSED FRACTURE OF FOURTH LUMBAR VERTEBRA, INITIAL ENCOUNTER (H): ICD-10-CM

## 2024-09-17 DIAGNOSIS — E11.9 TYPE 2 DIABETES MELLITUS WITHOUT COMPLICATION, WITHOUT LONG-TERM CURRENT USE OF INSULIN (H): Primary | ICD-10-CM

## 2024-09-17 DIAGNOSIS — S81.801A OPEN WOUND OF KNEE, LEG, AND ANKLE, RIGHT, INITIAL ENCOUNTER: ICD-10-CM

## 2024-09-17 DIAGNOSIS — S81.001A OPEN WOUND OF KNEE, LEG, AND ANKLE, RIGHT, INITIAL ENCOUNTER: ICD-10-CM
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
## 2024-09-17 DIAGNOSIS — S81.802A OPEN WOUND OF BOTH LOWER EXTREMITIES, INITIAL ENCOUNTER: ICD-10-CM

## 2024-09-17 DIAGNOSIS — S09.90XA INJURY OF HEAD, INITIAL ENCOUNTER: ICD-10-CM

## 2024-09-17 DIAGNOSIS — S91.001A OPEN WOUND OF KNEE, LEG, AND ANKLE, RIGHT, INITIAL ENCOUNTER: ICD-10-CM

## 2024-09-17 DIAGNOSIS — R53.1 GENERALIZED WEAKNESS: ICD-10-CM

## 2024-09-17 LAB
ALBUMIN SERPL BCG-MCNC: 3.8 G/DL (ref 3.5–5.2)
ALBUMIN UR-MCNC: NEGATIVE MG/DL
ALP SERPL-CCNC: 75 U/L (ref 40–150)
ALT SERPL W P-5'-P-CCNC: 16 U/L (ref 0–70)
ANION GAP SERPL CALCULATED.3IONS-SCNC: 10 MMOL/L (ref 7–15)
ANION GAP SERPL CALCULATED.3IONS-SCNC: 12 MMOL/L (ref 7–15)
APPEARANCE UR: CLEAR
AST SERPL W P-5'-P-CCNC: 14 U/L (ref 0–45)
BASOPHILS # BLD AUTO: 0.1 10E3/UL (ref 0–0.2)
BASOPHILS # BLD AUTO: 0.1 10E3/UL (ref 0–0.2)
BASOPHILS NFR BLD AUTO: 1 %
BASOPHILS NFR BLD AUTO: 1 %
BILIRUB SERPL-MCNC: 0.3 MG/DL
BILIRUB UR QL STRIP: NEGATIVE
BUN SERPL-MCNC: 27.1 MG/DL (ref 8–23)
BUN SERPL-MCNC: 30.6 MG/DL (ref 8–23)
CALCIUM SERPL-MCNC: 8.6 MG/DL (ref 8.8–10.4)
CALCIUM SERPL-MCNC: 8.7 MG/DL (ref 8.8–10.4)
CHLORIDE SERPL-SCNC: 103 MMOL/L (ref 98–107)
CHLORIDE SERPL-SCNC: 103 MMOL/L (ref 98–107)
COLOR UR AUTO: COLORLESS
CREAT SERPL-MCNC: 1.12 MG/DL (ref 0.67–1.17)
CREAT SERPL-MCNC: 1.28 MG/DL (ref 0.67–1.17)
EGFRCR SERPLBLD CKD-EPI 2021: 62 ML/MIN/1.73M2
EGFRCR SERPLBLD CKD-EPI 2021: 73 ML/MIN/1.73M2
EOSINOPHIL # BLD AUTO: 0.1 10E3/UL (ref 0–0.7)
EOSINOPHIL # BLD AUTO: 0.2 10E3/UL (ref 0–0.7)
EOSINOPHIL NFR BLD AUTO: 2 %
EOSINOPHIL NFR BLD AUTO: 2 %
ERYTHROCYTE [DISTWIDTH] IN BLOOD BY AUTOMATED COUNT: 20.9 % (ref 10–15)
ERYTHROCYTE [DISTWIDTH] IN BLOOD BY AUTOMATED COUNT: 20.9 % (ref 10–15)
GLUCOSE BLDC GLUCOMTR-MCNC: 127 MG/DL (ref 70–99)
GLUCOSE BLDC GLUCOMTR-MCNC: 152 MG/DL (ref 70–99)
GLUCOSE SERPL-MCNC: 212 MG/DL (ref 70–99)
GLUCOSE SERPL-MCNC: 250 MG/DL (ref 70–99)
GLUCOSE UR STRIP-MCNC: NEGATIVE MG/DL
HBA1C MFR BLD: 8.4 %
HCO3 SERPL-SCNC: 27 MMOL/L (ref 22–29)
HCO3 SERPL-SCNC: 27 MMOL/L (ref 22–29)
HCT VFR BLD AUTO: 36.9 % (ref 40–53)
HCT VFR BLD AUTO: 39.8 % (ref 40–53)
HGB BLD-MCNC: 11 G/DL (ref 13.3–17.7)
HGB BLD-MCNC: 11.5 G/DL (ref 13.3–17.7)
HGB UR QL STRIP: NEGATIVE
IMM GRANULOCYTES # BLD: 0.1 10E3/UL
IMM GRANULOCYTES # BLD: 0.1 10E3/UL
IMM GRANULOCYTES NFR BLD: 1 %
IMM GRANULOCYTES NFR BLD: 1 %
KETONES UR STRIP-MCNC: NEGATIVE MG/DL
LEUKOCYTE ESTERASE UR QL STRIP: NEGATIVE
LYMPHOCYTES # BLD AUTO: 1.1 10E3/UL (ref 0.8–5.3)
LYMPHOCYTES # BLD AUTO: 1.2 10E3/UL (ref 0.8–5.3)
LYMPHOCYTES NFR BLD AUTO: 15 %
LYMPHOCYTES NFR BLD AUTO: 15 %
MCH RBC QN AUTO: 27.5 PG (ref 26.5–33)
MCH RBC QN AUTO: 28 PG (ref 26.5–33)
MCHC RBC AUTO-ENTMCNC: 28.9 G/DL (ref 31.5–36.5)
MCHC RBC AUTO-ENTMCNC: 29.8 G/DL (ref 31.5–36.5)
MCV RBC AUTO: 94 FL (ref 78–100)
MCV RBC AUTO: 95 FL (ref 78–100)
MONOCYTES # BLD AUTO: 0.5 10E3/UL (ref 0–1.3)
MONOCYTES # BLD AUTO: 0.6 10E3/UL (ref 0–1.3)
MONOCYTES NFR BLD AUTO: 7 %
MONOCYTES NFR BLD AUTO: 8 %
NEUTROPHILS # BLD AUTO: 5.4 10E3/UL (ref 1.6–8.3)
NEUTROPHILS # BLD AUTO: 5.7 10E3/UL (ref 1.6–8.3)
NEUTROPHILS NFR BLD AUTO: 73 %
NEUTROPHILS NFR BLD AUTO: 74 %
NITRATE UR QL: NEGATIVE
NRBC # BLD AUTO: 0 10E3/UL
NRBC # BLD AUTO: 0 10E3/UL
NRBC BLD AUTO-RTO: 0 /100
NRBC BLD AUTO-RTO: 0 /100
NT-PROBNP SERPL-MCNC: 171 PG/ML (ref 0–900)
PH UR STRIP: 5.5 [PH] (ref 5–7)
PLATELET # BLD AUTO: 168 10E3/UL (ref 150–450)
PLATELET # BLD AUTO: 188 10E3/UL (ref 150–450)
POTASSIUM SERPL-SCNC: 3.6 MMOL/L (ref 3.4–5.3)
POTASSIUM SERPL-SCNC: 3.7 MMOL/L (ref 3.4–5.3)
PROT SERPL-MCNC: 6.9 G/DL (ref 6.4–8.3)
RBC # BLD AUTO: 3.93 10E6/UL (ref 4.4–5.9)
RBC # BLD AUTO: 4.18 10E6/UL (ref 4.4–5.9)
RBC URINE: 0 /HPF
SODIUM SERPL-SCNC: 140 MMOL/L (ref 135–145)
SODIUM SERPL-SCNC: 142 MMOL/L (ref 135–145)
SP GR UR STRIP: 1.02 (ref 1–1.03)
TROPONIN T SERPL HS-MCNC: 36 NG/L
TROPONIN T SERPL HS-MCNC: 37 NG/L
UROBILINOGEN UR STRIP-MCNC: <2 MG/DL
WBC # BLD AUTO: 7.3 10E3/UL (ref 4–11)
WBC # BLD AUTO: 7.7 10E3/UL (ref 4–11)
WBC URINE: 0 /HPF

## 2024-09-17 PROCEDURE — 81001 URINALYSIS AUTO W/SCOPE: CPT | Performed by: EMERGENCY MEDICINE

## 2024-09-17 PROCEDURE — 36415 COLL VENOUS BLD VENIPUNCTURE: CPT | Performed by: EMERGENCY MEDICINE

## 2024-09-17 PROCEDURE — 85025 COMPLETE CBC W/AUTO DIFF WBC: CPT | Performed by: FAMILY MEDICINE

## 2024-09-17 PROCEDURE — 250N000011 HC RX IP 250 OP 636: Performed by: INTERNAL MEDICINE

## 2024-09-17 PROCEDURE — 250N000012 HC RX MED GY IP 250 OP 636 PS 637: Performed by: INTERNAL MEDICINE

## 2024-09-17 PROCEDURE — 72141 MRI NECK SPINE W/O DYE: CPT | Mod: MG

## 2024-09-17 PROCEDURE — 99285 EMERGENCY DEPT VISIT HI MDM: CPT | Mod: 25

## 2024-09-17 PROCEDURE — 72131 CT LUMBAR SPINE W/O DYE: CPT | Mod: MF

## 2024-09-17 PROCEDURE — 99207 PR APP CREDIT; MD BILLING SHARED VISIT: CPT | Performed by: INTERNAL MEDICINE

## 2024-09-17 PROCEDURE — G1010 CDSM STANSON: HCPCS

## 2024-09-17 PROCEDURE — 99223 1ST HOSP IP/OBS HIGH 75: CPT | Mod: AI | Performed by: INTERNAL MEDICINE

## 2024-09-17 PROCEDURE — 72125 CT NECK SPINE W/O DYE: CPT | Mod: MF

## 2024-09-17 PROCEDURE — 36415 COLL VENOUS BLD VENIPUNCTURE: CPT | Performed by: FAMILY MEDICINE

## 2024-09-17 PROCEDURE — 99207 PR APP CREDIT; MD BILLING SHARED VISIT: CPT | Performed by: FAMILY MEDICINE

## 2024-09-17 PROCEDURE — 99418 PROLNG IP/OBS E/M EA 15 MIN: CPT | Mod: AI | Performed by: INTERNAL MEDICINE

## 2024-09-17 PROCEDURE — 250N000013 HC RX MED GY IP 250 OP 250 PS 637: Performed by: INTERNAL MEDICINE

## 2024-09-17 PROCEDURE — 71045 X-RAY EXAM CHEST 1 VIEW: CPT

## 2024-09-17 PROCEDURE — 250N000009 HC RX 250: Performed by: INTERNAL MEDICINE

## 2024-09-17 PROCEDURE — 250N000013 HC RX MED GY IP 250 OP 250 PS 637: Performed by: EMERGENCY MEDICINE

## 2024-09-17 PROCEDURE — 999N000156 HC STATISTIC RCP CONSULT EA 30 MIN

## 2024-09-17 PROCEDURE — 250N000013 HC RX MED GY IP 250 OP 250 PS 637: Performed by: FAMILY MEDICINE

## 2024-09-17 PROCEDURE — 85025 COMPLETE CBC W/AUTO DIFF WBC: CPT | Performed by: EMERGENCY MEDICINE

## 2024-09-17 PROCEDURE — 99222 1ST HOSP IP/OBS MODERATE 55: CPT | Performed by: INTERNAL MEDICINE

## 2024-09-17 PROCEDURE — 83036 HEMOGLOBIN GLYCOSYLATED A1C: CPT | Performed by: INTERNAL MEDICINE

## 2024-09-17 PROCEDURE — 72148 MRI LUMBAR SPINE W/O DYE: CPT | Mod: MG

## 2024-09-17 PROCEDURE — 120N000001 HC R&B MED SURG/OB

## 2024-09-17 PROCEDURE — 250N000009 HC RX 250: Performed by: FAMILY MEDICINE

## 2024-09-17 PROCEDURE — 82962 GLUCOSE BLOOD TEST: CPT

## 2024-09-17 PROCEDURE — 93005 ELECTROCARDIOGRAM TRACING: CPT | Performed by: EMERGENCY MEDICINE

## 2024-09-17 PROCEDURE — 999N000157 HC STATISTIC RCP TIME EA 10 MIN

## 2024-09-17 PROCEDURE — 94640 AIRWAY INHALATION TREATMENT: CPT | Mod: 76

## 2024-09-17 PROCEDURE — 84484 ASSAY OF TROPONIN QUANT: CPT | Performed by: EMERGENCY MEDICINE

## 2024-09-17 PROCEDURE — 83880 ASSAY OF NATRIURETIC PEPTIDE: CPT | Performed by: EMERGENCY MEDICINE

## 2024-09-17 PROCEDURE — 80053 COMPREHEN METABOLIC PANEL: CPT | Performed by: EMERGENCY MEDICINE

## 2024-09-17 PROCEDURE — 70450 CT HEAD/BRAIN W/O DYE: CPT | Mod: MG

## 2024-09-17 RX ORDER — ATORVASTATIN CALCIUM 40 MG/1
40 TABLET, FILM COATED ORAL EVERY MORNING
Status: DISCONTINUED | OUTPATIENT
Start: 2024-09-17 | End: 2024-09-18 | Stop reason: HOSPADM

## 2024-09-17 RX ORDER — FUROSEMIDE 10 MG/ML
40 INJECTION INTRAMUSCULAR; INTRAVENOUS ONCE
Status: COMPLETED | OUTPATIENT
Start: 2024-09-17 | End: 2024-09-17

## 2024-09-17 RX ORDER — METOLAZONE 2.5 MG/1
2.5 TABLET ORAL WEEKLY
Status: DISCONTINUED | OUTPATIENT
Start: 2024-09-17 | End: 2024-09-18 | Stop reason: HOSPADM

## 2024-09-17 RX ORDER — ALBUTEROL SULFATE 90 UG/1
2 AEROSOL, METERED RESPIRATORY (INHALATION) EVERY 6 HOURS PRN
Status: DISCONTINUED | OUTPATIENT
Start: 2024-09-17 | End: 2024-09-18 | Stop reason: HOSPADM

## 2024-09-17 RX ORDER — OXYCODONE AND ACETAMINOPHEN 5; 325 MG/1; MG/1
1 TABLET ORAL ONCE
Status: COMPLETED | OUTPATIENT
Start: 2024-09-17 | End: 2024-09-17

## 2024-09-17 RX ORDER — HYDROMORPHONE HYDROCHLORIDE 1 MG/ML
0.5 INJECTION, SOLUTION INTRAMUSCULAR; INTRAVENOUS; SUBCUTANEOUS
Status: DISCONTINUED | OUTPATIENT
Start: 2024-09-17 | End: 2024-09-18 | Stop reason: HOSPADM

## 2024-09-17 RX ORDER — ACETAMINOPHEN 650 MG/1
650 SUPPOSITORY RECTAL EVERY 4 HOURS PRN
Status: DISCONTINUED | OUTPATIENT
Start: 2024-09-17 | End: 2024-09-18 | Stop reason: HOSPADM

## 2024-09-17 RX ORDER — ROPINIROLE 1 MG/1
2 TABLET, FILM COATED ORAL ONCE
Status: COMPLETED | OUTPATIENT
Start: 2024-09-17 | End: 2024-09-17

## 2024-09-17 RX ORDER — ACETAMINOPHEN 325 MG/1
650 TABLET ORAL EVERY 4 HOURS PRN
Status: DISCONTINUED | OUTPATIENT
Start: 2024-09-17 | End: 2024-09-18 | Stop reason: HOSPADM

## 2024-09-17 RX ORDER — SPIRONOLACTONE 25 MG/1
50 TABLET ORAL
COMMUNITY

## 2024-09-17 RX ORDER — ROPINIROLE 1 MG/1
2 TABLET, FILM COATED ORAL EVERY 6 HOURS
Status: DISCONTINUED | OUTPATIENT
Start: 2024-09-17 | End: 2024-09-18 | Stop reason: HOSPADM

## 2024-09-17 RX ORDER — LIDOCAINE 40 MG/G
CREAM TOPICAL
Status: DISCONTINUED | OUTPATIENT
Start: 2024-09-17 | End: 2024-09-18 | Stop reason: HOSPADM

## 2024-09-17 RX ORDER — DEXTROSE MONOHYDRATE 25 G/50ML
25-50 INJECTION, SOLUTION INTRAVENOUS
Status: DISCONTINUED | OUTPATIENT
Start: 2024-09-17 | End: 2024-09-18 | Stop reason: HOSPADM

## 2024-09-17 RX ORDER — NALOXONE HYDROCHLORIDE 0.4 MG/ML
0.4 INJECTION, SOLUTION INTRAMUSCULAR; INTRAVENOUS; SUBCUTANEOUS
Status: DISCONTINUED | OUTPATIENT
Start: 2024-09-17 | End: 2024-09-18 | Stop reason: HOSPADM

## 2024-09-17 RX ORDER — IPRATROPIUM BROMIDE AND ALBUTEROL SULFATE 2.5; .5 MG/3ML; MG/3ML
3 SOLUTION RESPIRATORY (INHALATION)
Status: DISCONTINUED | OUTPATIENT
Start: 2024-09-17 | End: 2024-09-18 | Stop reason: HOSPADM

## 2024-09-17 RX ORDER — POLYETHYLENE GLYCOL 3350 17 G/17G
17 POWDER, FOR SOLUTION ORAL 2 TIMES DAILY PRN
Status: DISCONTINUED | OUTPATIENT
Start: 2024-09-17 | End: 2024-09-18 | Stop reason: HOSPADM

## 2024-09-17 RX ORDER — CLOPIDOGREL BISULFATE 75 MG/1
75 TABLET ORAL DAILY
Status: DISCONTINUED | OUTPATIENT
Start: 2024-09-17 | End: 2024-09-18 | Stop reason: HOSPADM

## 2024-09-17 RX ORDER — NALOXONE HYDROCHLORIDE 0.4 MG/ML
0.2 INJECTION, SOLUTION INTRAMUSCULAR; INTRAVENOUS; SUBCUTANEOUS
Status: DISCONTINUED | OUTPATIENT
Start: 2024-09-17 | End: 2024-09-18 | Stop reason: HOSPADM

## 2024-09-17 RX ORDER — IPRATROPIUM BROMIDE AND ALBUTEROL SULFATE 2.5; .5 MG/3ML; MG/3ML
3 SOLUTION RESPIRATORY (INHALATION) EVERY 4 HOURS PRN
Status: DISCONTINUED | OUTPATIENT
Start: 2024-09-17 | End: 2024-09-17

## 2024-09-17 RX ORDER — IPRATROPIUM BROMIDE AND ALBUTEROL SULFATE 2.5; .5 MG/3ML; MG/3ML
1 SOLUTION RESPIRATORY (INHALATION) EVERY 6 HOURS PRN
Status: DISCONTINUED | OUTPATIENT
Start: 2024-09-17 | End: 2024-09-18 | Stop reason: HOSPADM

## 2024-09-17 RX ORDER — TORSEMIDE 100 MG/1
100 TABLET ORAL 2 TIMES DAILY
Status: DISCONTINUED | OUTPATIENT
Start: 2024-09-17 | End: 2024-09-18 | Stop reason: HOSPADM

## 2024-09-17 RX ORDER — NICOTINE 21 MG/24HR
1 PATCH, TRANSDERMAL 24 HOURS TRANSDERMAL DAILY
Status: DISCONTINUED | OUTPATIENT
Start: 2024-09-17 | End: 2024-09-18 | Stop reason: HOSPADM

## 2024-09-17 RX ORDER — CARVEDILOL 3.12 MG/1
3.12 TABLET ORAL 2 TIMES DAILY WITH MEALS
Status: DISCONTINUED | OUTPATIENT
Start: 2024-09-17 | End: 2024-09-18 | Stop reason: HOSPADM

## 2024-09-17 RX ORDER — AMIODARONE HYDROCHLORIDE 200 MG/1
200 TABLET ORAL DAILY
Status: DISCONTINUED | OUTPATIENT
Start: 2024-09-17 | End: 2024-09-18 | Stop reason: HOSPADM

## 2024-09-17 RX ORDER — NICOTINE POLACRILEX 4 MG
15-30 LOZENGE BUCCAL
Status: DISCONTINUED | OUTPATIENT
Start: 2024-09-17 | End: 2024-09-18 | Stop reason: HOSPADM

## 2024-09-17 RX ORDER — ISOSORBIDE MONONITRATE 60 MG/1
180 TABLET, EXTENDED RELEASE ORAL EVERY MORNING
Status: DISCONTINUED | OUTPATIENT
Start: 2024-09-17 | End: 2024-09-18 | Stop reason: HOSPADM

## 2024-09-17 RX ORDER — SPIRONOLACTONE 25 MG/1
25 TABLET ORAL DAILY
COMMUNITY

## 2024-09-17 RX ORDER — NITROGLYCERIN 0.4 MG/1
0.4 TABLET SUBLINGUAL EVERY 5 MIN PRN
Status: DISCONTINUED | OUTPATIENT
Start: 2024-09-17 | End: 2024-09-18 | Stop reason: HOSPADM

## 2024-09-17 RX ORDER — SPIRONOLACTONE 25 MG/1
25 TABLET ORAL DAILY
Status: DISCONTINUED | OUTPATIENT
Start: 2024-09-17 | End: 2024-09-18 | Stop reason: HOSPADM

## 2024-09-17 RX ORDER — HYDRALAZINE HYDROCHLORIDE 10 MG/1
10 TABLET, FILM COATED ORAL 3 TIMES DAILY
Status: DISCONTINUED | OUTPATIENT
Start: 2024-09-17 | End: 2024-09-18 | Stop reason: HOSPADM

## 2024-09-17 RX ADMIN — IPRATROPIUM BROMIDE AND ALBUTEROL SULFATE 3 ML: .5; 3 SOLUTION RESPIRATORY (INHALATION) at 06:18

## 2024-09-17 RX ADMIN — TORSEMIDE 100 MG: 100 TABLET ORAL at 21:20

## 2024-09-17 RX ADMIN — FUROSEMIDE 40 MG: 10 INJECTION, SOLUTION INTRAVENOUS at 15:07

## 2024-09-17 RX ADMIN — AMIODARONE HYDROCHLORIDE 200 MG: 200 TABLET ORAL at 07:55

## 2024-09-17 RX ADMIN — IPRATROPIUM BROMIDE AND ALBUTEROL SULFATE 3 ML: .5; 3 SOLUTION RESPIRATORY (INHALATION) at 16:14

## 2024-09-17 RX ADMIN — ROPINIROLE HYDROCHLORIDE 2 MG: 1 TABLET, FILM COATED ORAL at 06:17

## 2024-09-17 RX ADMIN — ROPINIROLE HYDROCHLORIDE 2 MG: 1 TABLET, FILM COATED ORAL at 21:20

## 2024-09-17 RX ADMIN — DICLOFENAC SODIUM 2 G: 10 GEL TOPICAL at 19:22

## 2024-09-17 RX ADMIN — IPRATROPIUM BROMIDE AND ALBUTEROL SULFATE 3 ML: .5; 3 SOLUTION RESPIRATORY (INHALATION) at 14:19

## 2024-09-17 RX ADMIN — ATORVASTATIN CALCIUM 40 MG: 40 TABLET, FILM COATED ORAL at 07:55

## 2024-09-17 RX ADMIN — CARVEDILOL 3.12 MG: 3.12 TABLET, FILM COATED ORAL at 19:20

## 2024-09-17 RX ADMIN — ROPINIROLE HYDROCHLORIDE 2 MG: 1 TABLET, FILM COATED ORAL at 14:04

## 2024-09-17 RX ADMIN — TORSEMIDE 100 MG: 100 TABLET ORAL at 07:57

## 2024-09-17 RX ADMIN — NICOTINE 1 PATCH: 14 PATCH, EXTENDED RELEASE TRANSDERMAL at 07:58

## 2024-09-17 RX ADMIN — IPRATROPIUM BROMIDE AND ALBUTEROL SULFATE 3 ML: .5; 3 SOLUTION RESPIRATORY (INHALATION) at 20:41

## 2024-09-17 RX ADMIN — HYDRALAZINE HYDROCHLORIDE 10 MG: 10 TABLET, FILM COATED ORAL at 21:20

## 2024-09-17 RX ADMIN — Medication 1 LOZENGE: at 18:53

## 2024-09-17 RX ADMIN — ROPINIROLE HYDROCHLORIDE 2 MG: 1 TABLET, FILM COATED ORAL at 07:56

## 2024-09-17 RX ADMIN — OXYCODONE HYDROCHLORIDE AND ACETAMINOPHEN 1 TABLET: 5; 325 TABLET ORAL at 04:55

## 2024-09-17 RX ADMIN — HYDRALAZINE HYDROCHLORIDE 10 MG: 10 TABLET, FILM COATED ORAL at 14:09

## 2024-09-17 RX ADMIN — INSULIN ASPART 1 UNITS: 100 INJECTION, SOLUTION INTRAVENOUS; SUBCUTANEOUS at 18:29

## 2024-09-17 ASSESSMENT — ACTIVITIES OF DAILY LIVING (ADL)
ADLS_ACUITY_SCORE: 38
ADLS_ACUITY_SCORE: 49
ADLS_ACUITY_SCORE: 38
ADLS_ACUITY_SCORE: 38
ADLS_ACUITY_SCORE: 39
DEPENDENT_IADLS:: CLEANING;COOKING;TRANSPORTATION
ADLS_ACUITY_SCORE: 49
ADLS_ACUITY_SCORE: 48
ADLS_ACUITY_SCORE: 49
ADLS_ACUITY_SCORE: 35
ADLS_ACUITY_SCORE: 35
ADLS_ACUITY_SCORE: 38
ADLS_ACUITY_SCORE: 49
ADLS_ACUITY_SCORE: 38
ADLS_ACUITY_SCORE: 35
ADLS_ACUITY_SCORE: 38
ADLS_ACUITY_SCORE: 49

## 2024-09-17 NOTE — ED NOTES
Pt has multiple wounds. 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 Pt also has bilateral forearm wounds that are currently dressed from a number of falls in the past week. Bruising also present bilateral arms.

## 2024-09-17 NOTE — CONSULTS
"  Thank you,  No ref. provider found, for asking the [facility] team to see Zackary Hutchison to evaluate antiplatelet therapy.      Assessment/Recommendations   Assessment:    Fall, recurrent probably mechanical  Coronary artery disease status post CABG and status post recent PCI to ramus intermedius and obtuse marginal in May 2024, no angina, no significant troponin elevation  Chronic heart failure with intermediate ejection fraction, at baseline  Paroxysmal atrial fibrillation presently in sinus rhythm  Chronic kidney disease      Plan:  High risk of bleeding on combinations of clopidogrel and apixaban in setting of unstable gait frequent falls  It has been over 4 months since elective stenting of left circumflex and ramus intermedius.  If necessary clopidogrel can be held indefinitely  He should be considered for left atrial appendage occlusion as outpatient  Continue current CHF medications    Clinically Significant Risk Factors Present on Admission          # Hypocalcemia: Lowest Ca = 8.6 mg/dL in last 2 days, will monitor and replace as appropriate      # Drug Induced Coagulation Defect: home medication list includes an anticoagulant medication    # Drug Induced Platelet Defect: home medication list includes an antiplatelet medication       # Hypertension: Noted on problem list  # Chronic heart failure with reduced ejection fraction: last echo with EF <40%         # DMII: A1C = N/A within past 6 months        # Severe Obesity: Estimated body mass index is 41.97 kg/m  as calculated from the following:    Height as of this encounter: 1.676 m (5' 6\").    Weight as of this encounter: 117.9 kg (260 lb).           # Financial/Environmental Concerns:                  History of Present Illness/Subjective    Mr. Zackary Hutchison is a 63 year old male who was admitted after falling at home.  He reports that went to sleep in his usual recliner and then he woke up laying on the floor in the morning.  He does not " recall falling or passing out.  There are concerns about potential need for neurosurgical intervention and current use of clopidogrel and apixaban.  The patient has been taking apixaban for paroxysmal atrial fibrillation and clopidogrel because of recent PCI to obtuse marginal and ramus intermedius in May 2024.  He has not had any recent chest pains.  He is not aware when he is atrial fibrillation.  He reports that his weight has been stable.  He has chronic swelling of lower extremities which has been improving.    ECG: Personally reviewed.  Normal sinus rhythm incomplete right bundle branch block.    ECHO (personnaly Reviewed): May 2024  1. Technically difficult study.  2. The left ventricle is normal in size. Image quality does not provide for  detailed assessment of LV systolic function, but is felt to be normal with a  visually estimated ejection fraction of roughly 55%.  3. No significant valvular heart disease is identified on this study though  the sensitivity, particularly of regurgitant lesions, is reduced due to poor  Doppler acoustics.  4. Right ventricular size and systolic performance could not be accurately  assessed due to inadequate visualization.    Coronary angiogram: May 2024  1.  LAD is occluded proximally.  Mid to distal LAD fills well from large, widely patent LIMA graft.  LAD proximal to graft insertion site has calcified stenosis compromising flow to large proximal diagonal  2.  Calcified 90% proximal stenosis in medium size ramus branch.  Ostium ?covered by occluded LAD stent.  3.  Severely calcified stenosis proximal segment of large first obtuse marginal branch of left circumflex.  4.  Chronic total mid RCA occlusion.  PDA fills from patent free LAURI graft.  Posterolateral branches fill from collaterals from left circumflex.  5.  LVEDP = 30 mmHg  6.  Successful PCI ramus branch with 2.5 x 24 mm Synergy DARRELL implant x 1.  30% eccentric residual with BROOKLYNN-3 flow.  7.  Successful PCI LCx OM  "branch with DARRELL implant x 2: 3.0 x 24 Synergy DARRELL x 1, 2.5 x 16 Synergy DARRELL x 1.  0% residual, BROOKLYNN-3 flow.     Physical Examination Review of Systems   /59   Pulse 79   Temp 98.3  F (36.8  C) (Oral)   Resp 29   Ht 1.676 m (5' 6\")   Wt 117.9 kg (260 lb)   SpO2 93%   BMI 41.97 kg/m    Body mass index is 41.97 kg/m .  Wt Readings from Last 3 Encounters:   09/17/24 117.9 kg (260 lb)   08/13/24 117 kg (258 lb)   07/31/24 117.9 kg (260 lb)       Intake/Output Summary (Last 24 hours) at 9/17/2024 0840  Last data filed at 9/17/2024 0314  Gross per 24 hour   Intake 400 ml   Output --   Net 400 ml     General Appearance:   Alert, cooperative, no distress, appears stated age   Head/ENT: Normocephalic, without obvious abnormality. Membranes moist.      EYES:  No scleral icterus   Neck: Supple, symmetrical, trachea midline, no adenopathy, thyroid: not enlarged, symmetric, no carotid bruit or JVD   Chest/Lungs:   Wheezes bilaterally   Cardiovascular:   Regular rhythm, S1, S2 normal, no murmur, rub or gallop.   Abdomen:  Soft, non-tender, bowel sounds active all four quadrants,  no masses, no organomegaly   Extremities: Compressive wraps on both lower extremities                10 system review of systems completed see history of present illness and inpatient H&P (reviewed) for further details.          Lab Results    Chemistry/lipid CBC Cardiac Enzymes/BNP/TSH/INR   Recent Labs   Lab Test 05/02/24  1620   CHOL 121   HDL 27*   LDL 50   TRIG 219*     Recent Labs   Lab Test 05/02/24  1620   LDL 50     Recent Labs   Lab Test 09/17/24  0325      POTASSIUM 3.7   CHLORIDE 103   CO2 27   *   BUN 30.6*   CR 1.28*   GFRESTIMATED 62   RUSLAN 8.6*     Recent Labs   Lab Test 09/17/24  0325 09/09/24  1110 09/03/24  1418   CR 1.28* 1.27* 1.26*     Recent Labs   Lab Test 04/29/24  1126   A1C 7.8*          Recent Labs   Lab Test 09/17/24  0325   WBC 7.7   HGB 11.0*   HCT 36.9*   MCV 94        Recent Labs   Lab Test " "09/17/24  0325 06/08/24  0455 06/06/24  0512   HGB 11.0* 11.6* 11.2*    No results for input(s): \"TROPONINI\" in the last 21427 hours.  Recent Labs   Lab Test 09/17/24  0325 06/03/24  0553 05/13/24  0709   NTBNPI 171 558 197     Recent Labs   Lab Test 08/13/24  1501   TSH 1.27     Recent Labs   Lab Test 06/03/24  0553 05/13/24  0709 02/13/23  2307   INR 0.97 1.30* 1.01        Medical History  Surgical History Family History Social History   Past Medical History:   Diagnosis Date    Atrial fibrillation (H)     Chronic kidney disease     Chronic kidney disease, stage 3b (H) 09/28/2023    Class 3 severe obesity due to excess calories with body mass index (BMI) of 40.0 to 44.9 in adult (H) 09/28/2023    Congestive heart failure (H)     COPD (chronic obstructive pulmonary disease) (H)     Coronary artery disease involving coronary bypass graft of native heart without angina pectoris 09/28/2023    Heart failure with reduced ejection fraction (H) 09/11/2023    HFrEF (heart failure with reduced ejection fraction) (H) 09/11/2023    Hyperlipidemia     Hypertension     Ischemic cardiomyopathy 09/28/2023    Obese     Paroxysmal atrial fibrillation (H) 09/28/2023    Tobacco abuse 09/28/2023     Past Surgical History:   Procedure Laterality Date    CORONARY ANGIOGRAPHY ADULT ORDER      CORONARY ARTERY BYPASS Left 2014    2 vessels    CV CARDIOMEMS WITH RIGHT HEART CATH N/A 2/15/2024    Procedure: Pulmonary Arterial Pressure Sensor Placement;  Surgeon: Jacey Bhandari MD;  Location: [facility] CATH LAB CV    CV CORONARY ANGIOGRAM N/A 09/11/2023    Procedure: Coronary Angiogram;  Surgeon: Santy Esposito MD;  Location: [facility] CATH LAB CV    CV CORONARY ANGIOGRAM N/A 5/2/2024    Procedure: CV CORONARY ANGIOGRAM;  Surgeon: Santy Esposito MD;  Location: [facility] CATH LAB CV    CV LEFT HEART CATH N/A 09/11/2023    Procedure: Left Heart Catheterization;  Surgeon: Santy Esposito MD;  Location: [facility] CATH LAB CV    CV LEFT " HEART CATH N/A 5/2/2024    Procedure: Left Heart Catheterization;  Surgeon: Santy Esposito MD;  Location: [facility] CATH LAB CV    CV PCI ANGIOPLASTY N/A 5/2/2024    Procedure: Percutaneous Transluminal Angioplasty;  Surgeon: Santy Esposito MD;  Location: [facility] LAB CV    CV RIGHT HEART CATH MEASUREMENTS RECORDED N/A 09/11/2023    Procedure: Right Heart Catheterization;  Surgeon: Santy Esposito MD;  Location: [facility] CATH LAB CV     No premature CAD, SCD,cardiomyopathy   Social History     Socioeconomic History    Marital status: Single     Spouse name: Not on file    Number of children: Not on file    Years of education: Not on file    Highest education level: Not on file   Occupational History    Occupation: Retired restaurant owner     Comment: [employer]   Tobacco Use    Smoking status: Every Day     Current packs/day: 0.50     Types: Cigarettes    Smokeless tobacco: Never    Tobacco comments:     Seen IP by [facility] on 9/11/23 and declined cessation services and materials   Substance and Sexual Activity    Alcohol use: Not Currently    Drug use: Not on file    Sexual activity: Not on file   Other Topics Concern    Not on file   Social History Narrative    Currently lives in an assisted living facility. (Updated: 01/08/2024)     Social Determinants of Health     Financial Resource Strain: Not on file   Food Insecurity: Not on file   Transportation Needs: Not on file   Physical Activity: Not on file   Stress: Not on file   Social Connections: Not on file   Interpersonal Safety: Not on file   Housing Stability: Not on file         Medications  Allergies   Current Facility-Administered Medications Ordered in Epic   Medication Dose Route Frequency Provider Last Rate Last Admin    acetaminophen (TYLENOL) tablet 650 mg  650 mg Oral Q4H PRN Maryse Vargas DO        Or    acetaminophen (TYLENOL) Suppository 650 mg  650 mg Rectal Q4H PRN Maryse Vargas DO        albuterol (PROVENTIL  HFA/VENTOLIN HFA) inhaler  2 puff Inhalation Q6H PRN Maryse Vargas DO        amiodarone (PACERONE) tablet 200 mg  200 mg Oral Daily Maryse Vargas DO   200 mg at 09/17/24 0755    [Held by provider] apixaban ANTICOAGULANT (ELIQUIS) tablet 5 mg  5 mg Oral BID Maryse Vargas DO        atorvastatin (LIPITOR) tablet 40 mg  40 mg Oral QAM Maryse Vargas DO   40 mg at 09/17/24 0755    carvedilol (COREG) tablet 3.125 mg  3.125 mg Oral BID w/meals Maryse Vargas DO        [Held by provider] clopidogrel (PLAVIX) tablet 75 mg  75 mg Oral Daily Maryse Vargas DO        diclofenac (VOLTAREN) 1 % topical gel 2 g  2 g Topical Q6H PRN Maryse Vargas DO        hydrALAZINE (APRESOLINE) tablet 10 mg  10 mg Oral TID Maryse Vargas DO        HYDROmorphone (PF) (DILAUDID) injection 0.5 mg  0.5 mg Intravenous Q3H PRN Rosie Laurent MD        ipratropium - albuterol 0.5 mg/2.5 mg/3 mL (DUONEB) neb solution 3 mL  1 vial Nebulization Q6H PRN Maryse Vargas DO        isosorbide mononitrate (IMDUR) 24 hr tablet 180 mg  180 mg Oral Maryse Johnson DO        lidocaine (LMX4) cream   Topical Q1H PRN Rosie Laurent MD        lidocaine 1 % 0.1-1 mL  0.1-1 mL Other Q1H PRN Rosie Laurent MD        [Held by provider] metolazone (ZAROXOLYN) tablet 2.5 mg  2.5 mg Oral Weekly Maryse Vargas DO        naloxone (NARCAN) injection 0.2 mg  0.2 mg Intravenous Q2 Min PRN Rosie Laurent MD        Or    naloxone (NARCAN) injection 0.4 mg  0.4 mg Intravenous Q2 Min PRN Rosie Laurent MD        Or    naloxone (NARCAN) injection 0.2 mg  0.2 mg Intramuscular Q2 Min PRN Rosie Laurent MD        Or    naloxone (NARCAN) injection 0.4 mg  0.4 mg Intramuscular Q2 Min PRN Rosie Laurent MD        nicotine (NICODERM CQ) 14 MG/24HR 24 hr patch 1 patch  1 patch Transdermal Daily Rosie Laurent MD   1 patch at 09/17/24 0753     nitroGLYcerin (NITROSTAT) sublingual tablet 0.4 mg  0.4 mg Sublingual Q5 Min PRN Maryse Vargas DO        Patient is already receiving anticoagulation with heparin, enoxaparin (LOVENOX), warfarin (COUMADIN)  or other anticoagulant medication   Does not apply Continuous PRN Rosie Laurent MD        polyethylene glycol (MIRALAX) Packet 17 g  17 g Oral BID PRN Maryse Vargas DO        rOPINIRole (REQUIP) tablet 2 mg  2 mg Oral Q6H Maryse Vargas DO   2 mg at 09/17/24 0756    sodium chloride (PF) 0.9% PF flush 3 mL  3 mL Intracatheter Q8H Rosie Laurent MD   3 mL at 09/17/24 0618    sodium chloride (PF) 0.9% PF flush 3 mL  3 mL Intracatheter q1 min prn Rosie Laurent MD        spironolactone (ALDACTONE) tablet 25 mg  25 mg Oral Daily Maryse Vargas DO        torsemide (DEMADEX) tablet 100 mg  100 mg Oral BID Maryse Vargas DO   100 mg at 09/17/24 0757     Current Outpatient Medications Ordered in Epic   Medication Sig Dispense Refill    acetaminophen (TYLENOL) 500 MG tablet Take 1,000 mg by mouth 2 times daily as needed for mild pain      albuterol (PROAIR HFA/PROVENTIL HFA/VENTOLIN HFA) 108 (90 Base) MCG/ACT inhaler Inhale 2 puffs into the lungs every 6 hours as needed for shortness of breath, wheezing or cough      amiodarone (PACERONE) 200 MG tablet Take 1 tablet (200 mg) by mouth daily 90 tablet 3    apixaban ANTICOAGULANT (ELIQUIS) 5 MG tablet Take 1 tablet (5 mg) by mouth 2 times daily 180 tablet 3    atorvastatin (LIPITOR) 40 MG tablet Take 40 mg by mouth every morning      carvedilol (COREG) 3.125 MG tablet Take 1 tablet (3.125 mg) by mouth 2 times daily (with meals) 180 tablet 1    clopidogrel (PLAVIX) 75 MG tablet Take 1 tablet (75 mg) by mouth daily 60 tablet 0    [employer] VITAMIN B12 1000 MCG tablet Take 1,000 mcg by mouth daily      diclofenac (VOLTAREN) 1 % topical gel Apply 2 g topically every 6 hours as needed for moderate pain       hydrALAZINE (APRESOLINE) 10 MG tablet Take 1 tablet (10 mg) by mouth 3 times daily 270 tablet 3    ipratropium - albuterol 0.5 mg/2.5 mg/3 mL (DUONEB) 0.5-2.5 (3) MG/3ML neb solution Take 1 vial by nebulization every 6 hours as needed for wheezing.      isosorbide mononitrate (IMDUR) 60 MG 24 hr tablet Take 3 tablets (180 mg) by mouth every morning 270 tablet 1    metolazone (ZAROXOLYN) 2.5 MG tablet Take 2.5 mg by mouth once a week. on Wednesday, 30 mintues before your torsemide. Have labs drawn weekly on Thursday.      nitroGLYcerin (NITROSTAT) 0.4 MG sublingual tablet Place 1 tablet (0.4 mg) under the tongue every 5 minutes as needed If no relief after 3 tabs call 911;continue 1 tab every 5 min max 3 tab Indications: chest pain 100 tablet 1    polyethylene glycol (MIRALAX) 17 GM/Dose powder Take 17 g by mouth 2 times daily as needed 510 g 0    potassium chloride barbara ER (KLOR-CON M20) 20 MEQ CR tablet Take 2 tablets (40 mEq) by mouth daily 180 tablet 1    rOPINIRole (REQUIP) 2 MG tablet Take 2 mg by mouth every 6 hours      spironolactone (ALDACTONE) 25 MG tablet Take 25 mg by mouth daily.      spironolactone (ALDACTONE) 25 MG tablet Take 50 mg by mouth every 7 days. Take 2 tablet once a week on Wednesday along with Metolazone      torsemide (DEMADEX) 100 MG tablet Take 1 tablet (100 mg) by mouth 2 times daily 180 tablet 3       Allergies   Allergen Reactions    Bumex [Bumetanide] Muscle Pain (Myalgia)

## 2024-09-17 NOTE — CONSULTS
"[facility]    Neurosurgery Consultation     Date of Admission:  9/17/2024  Date of Consult (When I saw the patient): 09/17/24    Assessment & Plan   Zackary Hutchison is a 63 year old male who was admitted on 9/17/2024. I was asked to see the patient for \"fx at endplate on L4, C4-C5 protrusion.\"    Active Problems:    Fall at home, initial encounter    Assessment: large left C4-5 disc osteophyte complex resulting in moderate to severe spinal canal stenosis, probable acute to subacute fracture involving the right superior lateral endplate osteophyte at L4    Plan:   -Bedrest for now  -Cervical and lumbar MRI for further evaluation  -Pain management per primary service  -Further recommendations pending imaging    I have discussed the following assessment and plan with Dr. Dangelo who is in agreement with initial plan and will follow up with further consultation recommendations.    Caroline Carter, ZEINA  [facility] Neurosurgery  [address] [place] [id number]  Tel [phone]  Fax [phone]  Securely message with Vocera (more info)  Text page via [employer] Paging/Directory     Code Status    Full Code    Reason for Consult   Reason for consult: I was asked by Dr. Rosie Tracy to evaluate this patient for \"fx at endplate on L4, C4-C5 protrusion.\"    Primary Care Physician   Reid Escobar    Chief Complaint   Falls    History is obtained from the patient and EMR.     History of Present Illness   Zackary Hutchison is a 63 year old male who presents with 3 unwitnessed falls over the past week. Since that time has reported neck and low back pain. He denies any radicular arm or leg pain, however states his legs feel generally sore. Has baseline neuropathy in his feet which remain unchanged. No paresthesias in his arms. No overt weakness. No bowel/bladder complaints or foot drop.     Past Medical History   I have reviewed this patient's medical history and updated it with " pertinent information if needed.   Past Medical History:   Diagnosis Date    Atrial fibrillation (H)     Chronic kidney disease     Chronic kidney disease, stage 3b (H) 09/28/2023    Class 3 severe obesity due to excess calories with body mass index (BMI) of 40.0 to 44.9 in adult (H) 09/28/2023    Congestive heart failure (H)     COPD (chronic obstructive pulmonary disease) (H)     Coronary artery disease involving coronary bypass graft of native heart without angina pectoris 09/28/2023    Heart failure with reduced ejection fraction (H) 09/11/2023    HFrEF (heart failure with reduced ejection fraction) (H) 09/11/2023    Hyperlipidemia     Hypertension     Ischemic cardiomyopathy 09/28/2023    Obese     Paroxysmal atrial fibrillation (H) 09/28/2023    Tobacco abuse 09/28/2023       Past Surgical History   I have reviewed this patient's surgical history and updated it with pertinent information if needed.  Past Surgical History:   Procedure Laterality Date    CORONARY ANGIOGRAPHY ADULT ORDER      CORONARY ARTERY BYPASS Left 2014    2 vessels    CV CARDIOMEMS WITH RIGHT HEART CATH N/A 2/15/2024    Procedure: Pulmonary Arterial Pressure Sensor Placement;  Surgeon: Jacey Bhandari MD;  Location: [facility] CV    CV CORONARY ANGIOGRAM N/A 09/11/2023    Procedure: Coronary Angiogram;  Surgeon: Santy Esposito MD;  Location: [facility] LAB CV    CV CORONARY ANGIOGRAM N/A 5/2/2024    Procedure: CV CORONARY ANGIOGRAM;  Surgeon: Santy Esposito MD;  Location: [facility] LAB CV    CV LEFT HEART CATH N/A 09/11/2023    Procedure: Left Heart Catheterization;  Surgeon: Santy Esposito MD;  Location: [facility] CATH LAB CV    CV LEFT HEART CATH N/A 5/2/2024    Procedure: Left Heart Catheterization;  Surgeon: Santy Esposito MD;  Location: [facility] CV    CV PCI ANGIOPLASTY N/A 5/2/2024    Procedure: Percutaneous Transluminal Angioplasty;  Surgeon: Santy Esposito MD;  Location: [facility]  CV    CV RIGHT HEART CATH MEASUREMENTS RECORDED N/A 09/11/2023    Procedure: Right Heart Catheterization;  Surgeon: Santy Esposito MD;  Location: [facility] LAB CV       Prior to Admission Medications   Prior to Admission Medications   Prescriptions Last Dose Informant Patient Reported? Taking?   [facility] VITAMIN B12 1000 MCG tablet 9/16/2024 at am Self Yes Yes   Sig: Take 1,000 mcg by mouth daily   acetaminophen (TYLENOL) 500 MG tablet Unknown at prn Self Yes Yes   Sig: Take 1,000 mg by mouth 2 times daily as needed for mild pain   albuterol (PROAIR HFA/PROVENTIL HFA/VENTOLIN HFA) 108 (90 Base) MCG/ACT inhaler Unknown at prn Self Yes Yes   Sig: Inhale 2 puffs into the lungs every 6 hours as needed for shortness of breath, wheezing or cough   amiodarone (PACERONE) 200 MG tablet 9/16/2024 at am Self No Yes   Sig: Take 1 tablet (200 mg) by mouth daily   apixaban ANTICOAGULANT (ELIQUIS) 5 MG tablet 9/16/2024 at am Self No Yes   Sig: Take 1 tablet (5 mg) by mouth 2 times daily   atorvastatin (LIPITOR) 40 MG tablet 9/16/2024 at am Self Yes Yes   Sig: Take 40 mg by mouth every morning   carvedilol (COREG) 3.125 MG tablet 9/16/2024 at pm Self No Yes   Sig: Take 1 tablet (3.125 mg) by mouth 2 times daily (with meals)   clopidogrel (PLAVIX) 75 MG tablet 9/16/2024 at am Self No Yes   Sig: Take 1 tablet (75 mg) by mouth daily   diclofenac (VOLTAREN) 1 % topical gel Unknown at prn Self Yes Yes   Sig: Apply 2 g topically every 6 hours as needed for moderate pain   hydrALAZINE (APRESOLINE) 10 MG tablet 9/16/2024 at pm Self No Yes   Sig: Take 1 tablet (10 mg) by mouth 3 times daily   ipratropium - albuterol 0.5 mg/2.5 mg/3 mL (DUONEB) 0.5-2.5 (3) MG/3ML neb solution Unknown at prn Self Yes Yes   Sig: Take 1 vial by nebulization every 6 hours as needed for wheezing.   isosorbide mononitrate (IMDUR) 60 MG 24 hr tablet 9/16/2024 at am Self No Yes   Sig: Take 3 tablets (180 mg) by mouth every morning   metolazone (ZAROXOLYN) 2.5 MG  tablet 9/11/2024 at am Self Yes Yes   Sig: Take 2.5 mg by mouth once a week. on Wednesday, 30 mintues before your torsemide. Have labs drawn weekly on Thursday.   nitroGLYcerin (NITROSTAT) 0.4 MG sublingual tablet Unknown at prn Self No Yes   Sig: Place 1 tablet (0.4 mg) under the tongue every 5 minutes as needed If no relief after 3 tabs call 911;continue 1 tab every 5 min max 3 tab Indications: chest pain   polyethylene glycol (MIRALAX) 17 GM/Dose powder Unknown at prn Self No Yes   Sig: Take 17 g by mouth 2 times daily as needed   potassium chloride barbara ER (KLOR-CON M20) 20 MEQ CR tablet 9/16/2024 at am Self No Yes   Sig: Take 2 tablets (40 mEq) by mouth daily   rOPINIRole (REQUIP) 2 MG tablet 9/16/2024 at pm Self Yes Yes   Sig: Take 2 mg by mouth every 6 hours   spironolactone (ALDACTONE) 25 MG tablet 9/16/2024 at am Self Yes Yes   Sig: Take 25 mg by mouth daily.   spironolactone (ALDACTONE) 25 MG tablet 9/11/2024 at am Self Yes Yes   Sig: Take 50 mg by mouth every 7 days. Take 2 tablet once a week on Wednesday along with Metolazone   torsemide (DEMADEX) 100 MG tablet 9/16/2024 at pm Self No Yes   Sig: Take 1 tablet (100 mg) by mouth 2 times daily      Facility-Administered Medications: None     Allergies   Allergies   Allergen Reactions    Bumex [Bumetanide] Muscle Pain (Myalgia)       Social History   I have reviewed this patient's social history and updated it with pertinent information if needed. Zackary Hutchison  reports that he has been smoking cigarettes. He has never used smokeless tobacco. He reports that he does not currently use alcohol.    Family History   I have reviewed this patient's family history and updated it with pertinent information if needed.   Family History   Problem Relation Age of Onset    Cerebrovascular Disease Mother     Myocardial Infarction Father        Review of Systems   10 point ROS negative except noted above.     Physical Exam   Temp: 98.3  F (36.8  C) Temp src: Oral BP: 135/66  "Pulse: 79   Resp: 17 SpO2: 96 % O2 Device: None (Room air)    Vital Signs with Ranges  Temp:  [98.3  F (36.8  C)] 98.3  F (36.8  C)  Pulse:  [79-85] 79  Resp:  [15-29] 17  BP: (109-160)/(55-69) 135/66  SpO2:  [93 %-98 %] 96 %  260 lbs 0 oz     , Blood pressure 135/66, pulse 79, temperature 98.3  F (36.8  C), temperature source Oral, resp. rate 17, height 1.676 m (5' 6\"), weight 117.9 kg (260 lb), SpO2 96%.  260 lbs 0 oz    NEUROLOGICAL EXAMINATION:   Mental status:  Alert and Oriented x 3, speech is fluent.  Motor:  Strength is 5/5 throughout the upper and lower extremities  Shoulder Abduction:  Right:  4/5   Left:  5/5  Biceps:                      Right:  5/5   Left:  5/5  Triceps:                     Right:  5/5   Left:  5/5  Wrist Extensors:       Right:  5/5   Left:  5/5  Wrist Flexors:           Right:  5/5   Left:  5/5  interosseus :            Right:  5/5   Left:  5/5   Hip Flexor:                Right: 5/5  Left:  5/5  Hip Adductor:             Right:  5/5  Left:  5/5  Hip Abductor:             Right:  5/5  Left:  5/5  Gastroc Soleus:        Right:  5/5  Left:  5/5  Tib/Ant:                      Right:  5/5  Left:  5/5  EHL:                     Right:  5/5  Left:  5/5  Sensation:  intact  Reflexes:   Negative Babinski.  Negative Clonus.      Tenderness to palpation of the lumbar spine.   Data     CBC RESULTS:   Recent Labs   Lab Test 09/17/24  0850   WBC 7.3   RBC 4.18*   HGB 11.5*   HCT 39.8*   MCV 95   MCH 27.5   MCHC 28.9*   RDW 20.9*        Basic Metabolic Panel:  Lab Results   Component Value Date     09/17/2024      Lab Results   Component Value Date    POTASSIUM 3.6 09/17/2024     Lab Results   Component Value Date    CHLORIDE 103 09/17/2024     Lab Results   Component Value Date    RUSLAN 8.7 09/17/2024     Lab Results   Component Value Date    CO2 27 09/17/2024     Lab Results   Component Value Date    BUN 27.1 09/17/2024     Lab Results   Component Value Date    CR 1.12 09/17/2024 "     Lab Results   Component Value Date     09/17/2024    GLC 81 06/10/2024     INR:  Lab Results   Component Value Date    INR 0.97 06/03/2024    INR 1.30 05/13/2024    INR 1.01 02/13/2023

## 2024-09-17 NOTE — CONSULTS
Care Management Initial Consult    General Information  Assessment completed with: Patient,    Type of CM/SW Visit: Initial Assessment    Primary Care Provider verified and updated as needed: Yes   Readmission within the last 30 days: no previous admission in last 30 days      Reason for Consult: discharge planning  Advance Care Planning: Advance Care Planning Reviewed: no concerns identified          Communication Assessment  Patient's communication style: spoken language (English or Bilingual)             Cognitive  Cognitive/Neuro/Behavioral: WDL                      Living Environment:   People in home: alone     Current living Arrangements: independent living facility      Able to return to prior arrangements: yes       Family/Social Support:  Care provided by: self  Provides care for: no one  Marital Status: Single  Support system: Sibling(s)          Description of Support System: Supportive    Support Assessment: Patient communicates needs well met    Current Resources:   Patient receiving home care services: Yes  Skilled Home Care Services: Skilled Nursing     Community Resources: [facility], [facility]  Equipment currently used at home: walker, rolling, wheelchair, manual, grab bar, tub/shower, grab bar, toilet  Supplies currently used at home: Wound Care Supplies    Employment/Financial:  Employment Status: retired        Financial Concerns: none   Referral to Financial Worker: No       Does the patient's insurance plan have a 3 day qualifying hospital stay waiver?  No    Lifestyle & Psychosocial Needs:  Social Determinants of Health     Food Insecurity: Not on file   Depression: Not on file   Housing Stability: Not on file   Tobacco Use: High Risk (8/13/2024)    Patient History     Smoking Tobacco Use: Every Day     Smokeless Tobacco Use: Never     Passive Exposure: Not on file   Financial Resource Strain: Not on file   Alcohol Use: Alcohol Misuse (11/2/2020)    Received from [facility],  [facility]    AUDIT-C     Frequency of Alcohol Consumption: 2-3 times a week     Average Number of Drinks: 3 or 4     Frequency of Binge Drinking: Less than monthly   Transportation Needs: Not on file   Physical Activity: Not on file   Interpersonal Safety: Not on file   Stress: Not on file   Social Connections: Not on file   Health Literacy: Not on file       Functional Status:  Prior to admission patient needed assistance:   Dependent ADLs:: Ambulation-walker, Wheelchair-independent, Independent  Dependent IADLs:: Cleaning, Cooking, Transportation       Mental Health Status:  Mental Health Status: No Current Concerns       Chemical Dependency Status:  Chemical Dependency Status: No Current Concerns             Values/Beliefs:  Spiritual, Cultural Beliefs, Faith Practices, Values that affect care: no               Discussed  Partnership in Safe Discharge Planning  document with patient/family: No    Additional Information:  CM met with patient at bedside to perform initial assessment due to elevated risk score.  Zackary lives at [facility] in independent living.  He gets assistance from facility with meals and housekeeping.  Zackary ambulates with a wheeled walker at baseline but reports using a manual wheelchair recently due to weakness.  Independent with ADLs and most IADLs.  Uses facility van or [employer] for transportation.  Gets home care RN visits for wound care and lab draws through [facility].  Patient reports he has a meeting with [facility] this week to assist with getting medical assistance and waiver reinstated.  Will need wheelchair transport arranged for discharge.    Next Steps: CM to follow for medical progression of care, discharge recommendations, and final discharge plan.       Franco Chapa CRNI

## 2024-09-17 NOTE — ED PROVIDER NOTES
EMERGENCY DEPARTMENT ENCOUNTER      NAME: Zackary Hutchison  AGE: 63 year old male  YOB: 1960  MRN: [id number]  EVALUATION DATE & TIME: 9/17/2024  3:00 AM    PCP: Reid Escobar    ED PROVIDER: Obdulio Chaparro M.D.      Chief Complaint   Patient presents with    Fall         FINAL IMPRESSION:  1. Fall at home, initial encounter    2. Other closed fracture of fourth lumbar vertebra, initial encounter (H)    3. Injury of head, initial encounter    4. Generalized weakness          ED COURSE & MEDICAL DECISION MAKING:    3:12 AM I met with the patient for the initial interview and physical examination. Discussed plan for treatment and workup in the ED.      63 year old male presents to the Emergency Department for evaluation of fall with generalized weakness and low back pain.  Patient reports he has fallen 3 times in the last week at his independent living complex.  He normally gets around with a walker.  Tonight's episode occurred when he fell asleep in a recliner and slumped out of it, hitting his head on the ground.  He complains of low back pain but relates this to  fall a week ago.  He seems somewhat diffusely weak in upper and lower extremities but symmetric on exam.  He is able to ambulate about 30 feet to the bathroom but has to stop on the return trip and sit down due to weakness and fatigue.  He is chronically anticoagulated on Eliquis for atrial fibrillation.  He also has a history of CHF.  He arrives to the emergency department vitally stable.  He underwent a lab and trauma imaging evaluation.  Imaging notable for a age-indeterminate possibly acute or subacute L4 osteophyte fracture which could correlate with the patient's low back discomfort.  Patient also has a fairly significant disc protrusion at C4-C5 in the cervical region.  He does not seem to have any symptoms or exam findings that would clearly suggest that this is acute or necessarily symptomatic.  Discussed case with neurosurgery.  He  will need admission to the hospital for pain management, therapy evaluations, possibly placement.  Neurosurgery can evaluate him inpatient and decide on need for any further imaging or bracing.  Other labs were obtained and reviewed.  EKG shows sinus rhythm without any acute ischemic changes with a right bundle branch block morphology.  High-sensitivity troponin mildly elevated, will plan to repeat at 2-hour interval.  Discussed case with hospitalist Dr Laurent for admission.    At the conclusion of the encounter I discussed the results of all of the tests and the disposition. The questions were answered. The patient or family acknowledged understanding and was agreeable with the care plan.       Medical Decision Making  Obtained supplemental history:Supplemental history obtained?: No  Reviewed external records: External records reviewed?: No  Care impacted by chronic illness:Anticoagulated State, Chronic Kidney Disease, Chronic Lung Disease, Heart Disease, Hyperlipidemia, Hypertension, and Smoking / Nicotine Use  Care significantly affected by social determinants of health:N/A  Did you consider but not order tests?: Work up considered but not performed and documented in chart, if applicable  Did you interpret images independently?: Independent interpretation of ECG and images noted in documentation, when applicable.  Consultation discussion with other provider:Did you involve another provider (consultant, MH, pharmacy, etc.)?: I discussed the care with another health care provider, see documentation for details.  Admit.      MEDICATIONS GIVEN IN THE EMERGENCY:  Medications   rOPINIRole (REQUIP) tablet 2 mg (has no administration in time range)   HYDROmorphone (PF) (DILAUDID) injection 0.5 mg (has no administration in time range)   nicotine (NICODERM CQ) 14 MG/24HR 24 hr patch 1 patch (has no administration in time range)   lidocaine 1 % 0.1-1 mL (has no administration in time range)   lidocaine (LMX4) cream (has no  administration in time range)   sodium chloride (PF) 0.9% PF flush 3 mL (has no administration in time range)   sodium chloride (PF) 0.9% PF flush 3 mL (has no administration in time range)   Patient is already receiving anticoagulation with heparin, enoxaparin (LOVENOX), warfarin (COUMADIN)  or other anticoagulant medication (has no administration in time range)   naloxone (NARCAN) injection 0.2 mg (has no administration in time range)     Or   naloxone (NARCAN) injection 0.4 mg (has no administration in time range)     Or   naloxone (NARCAN) injection 0.2 mg (has no administration in time range)     Or   naloxone (NARCAN) injection 0.4 mg (has no administration in time range)   ipratropium - albuterol 0.5 mg/2.5 mg/3 mL (DUONEB) neb solution 3 mL (has no administration in time range)   oxyCODONE-acetaminophen (PERCOCET) 5-325 MG per tablet 1 tablet (1 tablet Oral $Given 9/17/24 8634)       NEW PRESCRIPTIONS STARTED AT TODAY'S ER VISIT  New Prescriptions    No medications on file          =================================================================    HPI    Patient information was obtained from: Patient    Use of : N/A        Zackary Hutchison is a 63 year old male with a pertinent history of chronic ischemic heart disease, hyperlipidemia, s/p CABG, cellulitis, HFrEF, CAD, CKD, paroxysmal atrial fibrillation on Eliquis, obesity, COPD, smoking, hypertension, sepsis, right bundle branch block, CHF, and atrial fibrillation who presents to this ED by EMS for evaluation after a fall.     Patient reports that he was sleeping in a recliner when he was pitched forward and woke up as he was falling to the floor, landing on his right shoulder and right head. There was no loss of consciousness. He notes that this is the third time this has happened to him within the past week. Patient endorses abrasions to his nose and forehead from these falls. He also states that he has been feeling more tired and weak lately.  Following his first fall within the past 3 instances, he states that he has had ongoing lower right back pain. Patient also endorses worsened leg swelling over the past couple of weeks.     Patient lives independently at a senior living facility; [facility]. He uses a walker at baseline, however has been using a wheelchair over the last couple of days due to his increased weakness.     Patient denies any other symptoms at this time.     REVIEW OF SYSTEMS   All systems reviewed and negative except as noted in HPI.    PAST MEDICAL HISTORY:  Past Medical History:   Diagnosis Date    Atrial fibrillation (H)     Chronic kidney disease     Chronic kidney disease, stage 3b (H) 09/28/2023    Class 3 severe obesity due to excess calories with body mass index (BMI) of 40.0 to 44.9 in adult (H) 09/28/2023    Congestive heart failure (H)     COPD (chronic obstructive pulmonary disease) (H)     Coronary artery disease involving coronary bypass graft of native heart without angina pectoris 09/28/2023    Heart failure with reduced ejection fraction (H) 09/11/2023    HFrEF (heart failure with reduced ejection fraction) (H) 09/11/2023    Hyperlipidemia     Hypertension     Ischemic cardiomyopathy 09/28/2023    Obese     Paroxysmal atrial fibrillation (H) 09/28/2023    Tobacco abuse 09/28/2023       PAST SURGICAL HISTORY:  Past Surgical History:   Procedure Laterality Date    CORONARY ANGIOGRAPHY ADULT ORDER      CORONARY ARTERY BYPASS Left 2014    2 vessels    CV CARDIOMEMS WITH RIGHT HEART CATH N/A 2/15/2024    Procedure: Pulmonary Arterial Pressure Sensor Placement;  Surgeon: Jacey Bhandari MD;  Location: [facility] CATH LAB CV    CV CORONARY ANGIOGRAM N/A 09/11/2023    Procedure: Coronary Angiogram;  Surgeon: Santy Esposito MD;  Location: [facility] CATH LAB CV    CV CORONARY ANGIOGRAM N/A 5/2/2024    Procedure: CV CORONARY ANGIOGRAM;  Surgeon: Satny Esposito MD;  Location: [facility] CATH LAB CV    CV LEFT  HEART CATH N/A 09/11/2023    Procedure: Left Heart Catheterization;  Surgeon: Santy Esposito MD;  Location: [facility] CV    CV LEFT HEART CATH N/A 5/2/2024    Procedure: Left Heart Catheterization;  Surgeon: Santy Esposito MD;  Location: [facility] CV    CV PCI ANGIOPLASTY N/A 5/2/2024    Procedure: Percutaneous Transluminal Angioplasty;  Surgeon: Santy Esposito MD;  Location: [facility] CV    CV RIGHT HEART CATH MEASUREMENTS RECORDED N/A 09/11/2023    Procedure: Right Heart Catheterization;  Surgeon: Santy Esposito MD;  Location: [facility] CV           CURRENT MEDICATIONS:    Current Facility-Administered Medications   Medication Dose Route Frequency Provider Last Rate Last Admin    HYDROmorphone (PF) (DILAUDID) injection 0.5 mg  0.5 mg Intravenous Q3H PRN Rosie Laurent MD        ipratropium - albuterol 0.5 mg/2.5 mg/3 mL (DUONEB) neb solution 3 mL  3 mL Nebulization Q4H PRN Rosie Laurent MD        lidocaine (LMX4) cream   Topical Q1H PRN Rosie Laurent MD        lidocaine 1 % 0.1-1 mL  0.1-1 mL Other Q1H PRN Rosie Laurent MD        naloxone (NARCAN) injection 0.2 mg  0.2 mg Intravenous Q2 Min PRN Rosie Laurent MD        Or    naloxone (NARCAN) injection 0.4 mg  0.4 mg Intravenous Q2 Min PRN Rosie Laurent MD        Or    naloxone (NARCAN) injection 0.2 mg  0.2 mg Intramuscular Q2 Min PRN Rosie Laurent MD        Or    naloxone (NARCAN) injection 0.4 mg  0.4 mg Intramuscular Q2 Min PRN Rosie Laurent MD        nicotine (NICODERM CQ) 14 MG/24HR 24 hr patch 1 patch  1 patch Transdermal Daily Rosie Laurent MD        Patient is already receiving anticoagulation with heparin, enoxaparin (LOVENOX), warfarin (COUMADIN)  or other anticoagulant medication   Does not apply Continuous PRN Rosie Laurent MD        rOPINIRole (REQUIP) tablet 2 mg  2 mg Oral Once Rosie Laurent MD        sodium chloride (PF) 0.9% PF flush 3 mL  3 mL Intracatheter Q8H Mehran,  MD Rosie        sodium chloride (PF) 0.9% PF flush 3 mL  3 mL Intracatheter q1 min prn Rosie Laurent MD         Current Outpatient Medications   Medication Sig Dispense Refill    acetaminophen (TYLENOL) 500 MG tablet Take 1,000 mg by mouth 2 times daily as needed for mild pain      albuterol (PROAIR HFA/PROVENTIL HFA/VENTOLIN HFA) 108 (90 Base) MCG/ACT inhaler Inhale 2 puffs into the lungs every 6 hours as needed for shortness of breath, wheezing or cough      amiodarone (PACERONE) 200 MG tablet Take 1 tablet (200 mg) by mouth daily 90 tablet 3    apixaban ANTICOAGULANT (ELIQUIS) 5 MG tablet Take 1 tablet (5 mg) by mouth 2 times daily 180 tablet 3    atorvastatin (LIPITOR) 40 MG tablet Take 40 mg by mouth every morning      carvedilol (COREG) 3.125 MG tablet Take 1 tablet (3.125 mg) by mouth 2 times daily (with meals) 180 tablet 1    clopidogrel (PLAVIX) 75 MG tablet Take 1 tablet (75 mg) by mouth daily 60 tablet 0    [employer] VITAMIN B12 1000 MCG tablet Take 1,000 mcg by mouth daily      diclofenac (VOLTAREN) 1 % topical gel Apply 2 g topically every 6 hours as needed for moderate pain      hydrALAZINE (APRESOLINE) 10 MG tablet Take 1 tablet (10 mg) by mouth 3 times daily 270 tablet 3    ipratropium - albuterol 0.5 mg/2.5 mg/3 mL (DUONEB) 0.5-2.5 (3) MG/3ML neb solution Take 1 vial by nebulization every 6 hours as needed for wheezing.      isosorbide mononitrate (IMDUR) 60 MG 24 hr tablet Take 3 tablets (180 mg) by mouth every morning 270 tablet 1    metolazone (ZAROXOLYN) 2.5 MG tablet Take 2.5 mg by mouth once a week. on Wednesday, 30 mintues before your torsemide. Have labs drawn weekly on Thursday.      nitroGLYcerin (NITROSTAT) 0.4 MG sublingual tablet Place 1 tablet (0.4 mg) under the tongue every 5 minutes as needed If no relief after 3 tabs call 911;continue 1 tab every 5 min max 3 tab Indications: chest pain 100 tablet 1    polyethylene glycol (MIRALAX) 17 GM/Dose powder Take 17 g by mouth 2 times daily  "as needed 510 g 0    potassium chloride barbara ER (KLOR-CON M20) 20 MEQ CR tablet Take 2 tablets (40 mEq) by mouth daily 180 tablet 1    rOPINIRole (REQUIP) 2 MG tablet Take 2 mg by mouth every 6 hours      spironolactone (ALDACTONE) 25 MG tablet Take 25 mg by mouth daily.      spironolactone (ALDACTONE) 25 MG tablet Take 50 mg by mouth every 7 days. Take 2 tablet once a week on Wednesday along with Metolazone      torsemide (DEMADEX) 100 MG tablet Take 1 tablet (100 mg) by mouth 2 times daily 180 tablet 3         ALLERGIES:  Allergies   Allergen Reactions    Bumex [Bumetanide] Muscle Pain (Myalgia)       FAMILY HISTORY:  Family History   Problem Relation Age of Onset    Cerebrovascular Disease Mother     Myocardial Infarction Father        SOCIAL HISTORY:   Social History     Socioeconomic History    Marital status: Single   Occupational History    Occupation: Retired restaurant owner     Comment: [employer]   Tobacco Use    Smoking status: Every Day     Current packs/day: 0.50     Types: Cigarettes    Smokeless tobacco: Never    Tobacco comments:     Seen IP by [facility] on 9/11/23 and declined cessation services and materials   Substance and Sexual Activity    Alcohol use: Not Currently   Social History Narrative    Currently lives in an assisted living facility. (Updated: 01/08/2024)       VITALS:  /65   Pulse 81   Temp 98.3  F (36.8  C) (Oral)   Resp 27   Ht 1.676 m (5' 6\")   Wt 117.9 kg (260 lb)   SpO2 97%   BMI 41.97 kg/m      PHYSICAL EXAM    Constitutional: Elderly male patient, laying in bed, no acute distress  HENT: Normocephalic, Atraumatic. Neck Supple.  Eyes: EOMI, Conjunctiva normal.  Respiratory: Breathing comfortably on room air. Speaks full sentences easily. Lungs clear to ascultation.  Cardiovascular: Normal heart rate, Regular rhythm. No peripheral edema.  Abdomen: Soft, nontender  Musculoskeletal: Good range of motion in all major joints. No major deformities noted.  Integument: Warm, " Dry.  Neurologic: Alert & awake, Normal motor function, Normal sensory function, No focal deficits noted.   Psychiatric: Cooperative. Affect appropriate.     LAB:  All pertinent labs reviewed and interpreted.  Labs Ordered and Resulted from Time of ED Arrival to Time of ED Departure   TROPONIN T, HIGH SENSITIVITY - Abnormal       Result Value    Troponin T, High Sensitivity 37 (*)    CBC WITH PLATELETS AND DIFFERENTIAL - Abnormal    WBC Count 7.7      RBC Count 3.93 (*)     Hemoglobin 11.0 (*)     Hematocrit 36.9 (*)     MCV 94      MCH 28.0      MCHC 29.8 (*)     RDW 20.9 (*)     Platelet Count 188      % Neutrophils 73      % Lymphocytes 15      % Monocytes 8      % Eosinophils 2      % Basophils 1      % Immature Granulocytes 1      NRBCs per 100 WBC 0      Absolute Neutrophils 5.7      Absolute Lymphocytes 1.2      Absolute Monocytes 0.6      Absolute Eosinophils 0.2      Absolute Basophils 0.1      Absolute Immature Granulocytes 0.1      Absolute NRBCs 0.0     ROUTINE UA WITH MICROSCOPIC REFLEX TO CULTURE - Normal    Color Urine Colorless      Appearance Urine Clear      Glucose Urine Negative      Bilirubin Urine Negative      Ketones Urine Negative      Specific Gravity Urine 1.016      Blood Urine Negative      pH Urine 5.5      Protein Albumin Urine Negative      Urobilinogen Urine <2.0      Nitrite Urine Negative      Leukocyte Esterase Urine Negative      RBC Urine 0      WBC Urine 0     NT PROBNP INPATIENT - Normal    N terminal Pro BNP Inpatient 171     COMPREHENSIVE METABOLIC PANEL   TROPONIN T, HIGH SENSITIVITY   BASIC METABOLIC PANEL       RADIOLOGY:  Reviewed all pertinent imaging. Please see official radiology report.  CT Lumbar Spine w/o Contrast   Final Result   IMPRESSION:   1.  Probable acute to subacute fracture involving the right superior lateral endplate osteophyte at L4.   2.  Spondylosis with central canal and foraminal stenosis as described above.      CT Cervical Spine w/o Contrast    Final Result   IMPRESSION:   HEAD CT:   1.  No acute intracranial process.      CERVICAL SPINE CT:   1.  No CT evidence for acute fracture or post traumatic subluxation.   2.  Large left paracentral C4-C5 disc osteophyte complex resulting in moderate to severe spinal canal stenosis.      Head CT w/o contrast   Final Result   IMPRESSION:   HEAD CT:   1.  No acute intracranial process.      CERVICAL SPINE CT:   1.  No CT evidence for acute fracture or post traumatic subluxation.   2.  Large left paracentral C4-C5 disc osteophyte complex resulting in moderate to severe spinal canal stenosis.      XR Chest 1 View   Final Result   IMPRESSION: Sternotomy. Mild cardiac enlargement. Pulmonary vascularity within normal limits. No acute infiltrates or consolidation. No pneumothorax. Old left posterior rib fractures. Otherwise unremarkable. No definite acute findings.                          EKG:    Performed at: 334    Impression: Sinus rhythm, right bundle branch block, no acute ischemic changes    Rate: 83  Rhythm: Sinus  Axis: Normal  MS Interval: 120  QRS Interval: 134  QTc Interval: 528  ST Changes: None significant  Comparison: Compared to June 5, 2024, no significant change    I have independently reviewed and interpreted the EKG(s) documented above.    PROCEDURES:   None      I, Damon Bowen, am serving as a scribe to document services personally performed by Dr. Obdulio Chaparro, based on my observation and the provider's statements to me. I, Obdulio Chaparro MD attest that Damon Bowen is acting in a scribe capacity, has observed my performance of the services and has documented them in accordance with my direction.    Obdulio Chaparro M.D.  Emergency Medicine  [facility] EMERGENCY DEPARTMENT  [address] [place] [id number]  [phone]  Dept: [phone]      Obdulio Chaparro MD  09/17/24 0525

## 2024-09-17 NOTE — PRE-PROCEDURE
Pt transferred to room 34, put head flat in order to get on the cart for MRI.  Pt's color turned red, and pt became SOB and audible wheezes. Called primary and updated neurosurg.  Rescheduled MRI in order to coordinate care.

## 2024-09-17 NOTE — MEDICATION SCRIBE - ADMISSION MEDICATION HISTORY
Medication Scribe Admission Medication History    Admission medication history is complete. The information provided in this note is only as accurate as the sources available at the time of the update.    Information Source(s): Patient via in-person    Pertinent Information: Patient provided updated PTA med list.    Changes made to PTA medication list:  Added: None  Deleted: Oxycodone (per patient)  Changed: None    Allergies reviewed with patient and updates made in EHR: yes    Medication History Completed By: Miguel Preciado 9/17/2024 5:06 AM    PTA Med List   Medication Sig Last Dose    acetaminophen (TYLENOL) 500 MG tablet Take 1,000 mg by mouth 2 times daily as needed for mild pain Unknown at prn    albuterol (PROAIR HFA/PROVENTIL HFA/VENTOLIN HFA) 108 (90 Base) MCG/ACT inhaler Inhale 2 puffs into the lungs every 6 hours as needed for shortness of breath, wheezing or cough Unknown at prn    amiodarone (PACERONE) 200 MG tablet Take 1 tablet (200 mg) by mouth daily 9/16/2024 at am    apixaban ANTICOAGULANT (ELIQUIS) 5 MG tablet Take 1 tablet (5 mg) by mouth 2 times daily 9/16/2024 at am    atorvastatin (LIPITOR) 40 MG tablet Take 40 mg by mouth every morning 9/16/2024 at am    carvedilol (COREG) 3.125 MG tablet Take 1 tablet (3.125 mg) by mouth 2 times daily (with meals) 9/16/2024 at pm    clopidogrel (PLAVIX) 75 MG tablet Take 1 tablet (75 mg) by mouth daily 9/16/2024 at am    [employer]S VITAMIN B12 1000 MCG tablet Take 1,000 mcg by mouth daily 9/16/2024 at am    diclofenac (VOLTAREN) 1 % topical gel Apply 2 g topically every 6 hours as needed for moderate pain Unknown at prn    hydrALAZINE (APRESOLINE) 10 MG tablet Take 1 tablet (10 mg) by mouth 3 times daily 9/16/2024 at pm    ipratropium - albuterol 0.5 mg/2.5 mg/3 mL (DUONEB) 0.5-2.5 (3) MG/3ML neb solution Take 1 vial by nebulization every 6 hours as needed for wheezing. Unknown at prn    isosorbide mononitrate (IMDUR) 60 MG 24 hr tablet Take 3 tablets (180  mg) by mouth every morning 9/16/2024 at am    metolazone (ZAROXOLYN) 2.5 MG tablet Take 2.5 mg by mouth once a week. on Wednesday, 30 mintues before your torsemide. Have labs drawn weekly on Thursday. 9/11/2024 at am    nitroGLYcerin (NITROSTAT) 0.4 MG sublingual tablet Place 1 tablet (0.4 mg) under the tongue every 5 minutes as needed If no relief after 3 tabs call 911;continue 1 tab every 5 min max 3 tab Indications: chest pain Unknown at prn    polyethylene glycol (MIRALAX) 17 GM/Dose powder Take 17 g by mouth 2 times daily as needed Unknown at prn    potassium chloride barbara ER (KLOR-CON M20) 20 MEQ CR tablet Take 2 tablets (40 mEq) by mouth daily 9/16/2024 at am    rOPINIRole (REQUIP) 2 MG tablet Take 2 mg by mouth every 6 hours 9/16/2024 at pm    spironolactone (ALDACTONE) 25 MG tablet Take 25 mg by mouth daily. 9/16/2024 at am    spironolactone (ALDACTONE) 25 MG tablet Take 50 mg by mouth every 7 days. Take 2 tablet once a week on Wednesday along with Metolazone 9/11/2024 at am    torsemide (DEMADEX) 100 MG tablet Take 1 tablet (100 mg) by mouth 2 times daily 9/16/2024 at pm

## 2024-09-17 NOTE — ED TRIAGE NOTES
"Patient arrives via [facility] from [facility].  Patient reports falling asleep in the recliner.  He reports waking up as he was \"falling\" from the recliner to the floor.  He believes his right shoulder struck the walker on the way down, and he reports the right side of his head hit the carpeted floor.  No LOC.  Patient reports this is the 3rd time he has fallen this way in the past week.  Patient takes Elequis.  Easy respirations.  Skin warm and dry.  Multiple bandages noted to bilateral arms; patient reports skin tears from previous falls.  Patient able to stand and pivot from EMS gurney to bed.       Triage Assessment (Adult)       Row Name 09/17/24 0309          Triage Assessment    Airway WDL WDL        Respiratory WDL    Respiratory WDL WDL        Peripheral/Neurovascular WDL    Peripheral Neurovascular WDL WDL                     " no tinnitus

## 2024-09-17 NOTE — PROGRESS NOTES
[facility]    PROGRESS NOTE - Hospitalist Service    ASSESSMENT AND PLAN     Active Problems:    Generalized weakness    Injury of head, initial encounter    Fall at home, initial encounter    Other closed fracture of fourth lumbar vertebra, initial encounter (H)    Zackary Hutchison is a 63 year old male with PMH significant for atrial fibrillation, CAD s/p CABG, HFrEF, ischemic cardiomyopathy, CKD stage IIIb, COPD, RLS, hypertension and hyperlipidemia who is admitted on 9/17/2024 due to frequent falls, probable acute to subacute fracture involving right superior lateral endplate osteophyte at L4, and large left paracentral C4-C5 disc osteophyte complex resulting in moderate to severe spinal canal stenosis.     Frequent falls  Acute to subacute fracture involving right superior lateral endplate osteophyte at L4  Large left paracentral C4-C5 disc osteophyte complex  Strict bedrest.    CT lumbar spine shows probable acute to subacute fracture involving the right superior lateral endplate osteophyte at L4.    Neurosurgery recommending MRI of the cervical and lumbar spine  PRN pain control   PT/OT  Patient with difficulty tolerating MRI.    New diagnosis of diabetes type 2  No home antihyperglycemic's  A1c in April was 7.8.  Recheck A1c  Initiate sliding scale insulin with mealtime coverage  Consider Jardiance at discharge    Chronic heart failure   Ischemic cardiomyopathy  CAD s/p stenting 5/2024  Right bundle branch block  EF noted to be 55% on echo from April 2024.  On atorvastatin, carvedilol, hydralazine, metolazone, spironolactone, torsemide, Imdur and Plavix prior to arrival  Hold Plavix currently for possible surgical intervention  Appreciate cardiology input that Plavix may be held indefinitely  Mild troponin elevation.  No significant changes to EKG compared to previous  Would recommend sleep study at discharge.  Follow-up with cardiology outpatient for consideration of left atrial  "appendage occlusion.    Atrial fibrillation  Continue amiodarone and carvedilol.  Eliquis on hold  Rate controlled currently.    Chronic lymphedema  Lymphedema therapy here     Hypertension-   Continue home carvedilol, hydralazine, spironolactone, torsemide and Imdur     COPD-   No evidence of acute exacerbation   PRN DuoNebs.    CKD stage IIIb  Baseline creatinine of 1.3  No evidence of acute kidney injury     Tobacco use- Smokes 1 PPD.    He is agreeable to nicotine patch.     Barriers to discharge: Neurosurgical recommendations, PT/OT eval    Anticipated length of stay: 2 days    Medically Ready for Discharge: Anticipated in 2-4 Days    Clinically Significant Risk Factors Present on Admission          # Hypocalcemia: Lowest Ca = 8.6 mg/dL in last 2 days, will monitor and replace as appropriate      # Drug Induced Coagulation Defect: home medication list includes an anticoagulant medication  # Drug Induced Platelet Defect: home medication list includes an antiplatelet medication   # Hypertension: Noted on problem list  # Chronic heart failure with reduced ejection fraction: last echo with EF <40%       # DMII: A1C = N/A within past 6 months    # Severe Obesity: Estimated body mass index is 41.97 kg/m  as calculated from the following:    Height as of this encounter: 1.676 m (5' 6\").    Weight as of this encounter: 117.9 kg (260 lb).       # Financial/Environmental Concerns: none        Subjective:  Patient is seen in a chair.  Denies any focal weakness to any of his extremities.  Does complain of chronic tingling to his bilateral toes which is unchanged.  States he fell out of his recliner and hit his head and right shoulder after sleeping.  States he has had difficulty lying flat due to breathing issues.    PHYSICAL EXAM  Temp:  [98.3  F (36.8  C)] 98.3  F (36.8  C)  Pulse:  [79-85] 79  Resp:  [15-29] 17  BP: (109-160)/(55-69) 135/66  SpO2:  [93 %-98 %] 96 %  Wt Readings from Last 1 Encounters:   09/17/24 117.9 " kg (260 lb)       Intake/Output Summary (Last 24 hours) at 9/17/2024 1310  Last data filed at 9/17/2024 1245  Gross per 24 hour   Intake 400 ml   Output 700 ml   Net -300 ml      Body mass index is 41.97 kg/m .    GENRL: Alert and answering questions appropriately. Not in acute distress. Sitting in a chair   CHEST: Diminished throughout.  Crackles to bilateral bases.  Breathing easily   HEART: Regular rate   EXTRM: Lymphedema wraps noted.    NEURO:  strength intact bilaterally.  Able to lift legs and move arms equally.  Strength intact.    PSYCH: Normal affect and mood.   INTGM: Some erythema noted superior to the lymphedema wraps.    Medical Decision Making       55 MINUTES SPENT BY ME on the date of service doing chart review, history, exam, documentation & further activities per the note.      PERTINENT LABS/IMAGING:  Results for orders placed or performed during the hospital encounter of 09/17/24   Head CT w/o contrast    Impression    IMPRESSION:  HEAD CT:  1.  No acute intracranial process.    CERVICAL SPINE CT:  1.  No CT evidence for acute fracture or post traumatic subluxation.  2.  Large left paracentral C4-C5 disc osteophyte complex resulting in moderate to severe spinal canal stenosis.   CT Cervical Spine w/o Contrast    Impression    IMPRESSION:  HEAD CT:  1.  No acute intracranial process.    CERVICAL SPINE CT:  1.  No CT evidence for acute fracture or post traumatic subluxation.  2.  Large left paracentral C4-C5 disc osteophyte complex resulting in moderate to severe spinal canal stenosis.   CT Lumbar Spine w/o Contrast    Impression    IMPRESSION:  1.  Probable acute to subacute fracture involving the right superior lateral endplate osteophyte at L4.  2.  Spondylosis with central canal and foraminal stenosis as described above.   XR Chest 1 View    Impression    IMPRESSION: Sternotomy. Mild cardiac enlargement. Pulmonary vascularity within normal limits. No acute infiltrates or consolidation. No  "pneumothorax. Old left posterior rib fractures. Otherwise unremarkable. No definite acute findings.                Most Recent 3 CBC's:  Recent Labs   Lab Test 09/17/24  0850 09/17/24  0325 06/08/24  0455   WBC 7.3 7.7 7.7   HGB 11.5* 11.0* 11.6*   MCV 95 94 94    188 185     Most Recent 3 BMP's:  Recent Labs   Lab Test 09/17/24  0850 09/17/24  0325 09/09/24  1110    142 131*   POTASSIUM 3.6 3.7 3.6   CHLORIDE 103 103 89*   CO2 27 27 27   BUN 27.1* 30.6* 41.7*   CR 1.12 1.28* 1.27*   ANIONGAP 10 12 15   RUSLAN 8.7* 8.6* 9.9   * 250* 235*     Most Recent 2 LFT's:  Recent Labs   Lab Test 09/17/24  0325 06/04/24  0428   AST 14 9   ALT 16 15   ALKPHOS 75 65   BILITOTAL 0.3 0.5       Recent Labs   Lab Test 05/02/24  1620   CHOL 121   HDL 27*   LDL 50   TRIG 219*     Recent Labs   Lab Test 05/02/24  1620   LDL 50     Recent Labs   Lab Test 09/17/24  0850      POTASSIUM 3.6   CHLORIDE 103   CO2 27   *   BUN 27.1*   CR 1.12   GFRESTIMATED 73   RUSLAN 8.7*     Recent Labs   Lab Test 04/29/24  1126   A1C 7.8*     Recent Labs   Lab Test 09/17/24  0850 09/17/24  0325 06/08/24  0455   HGB 11.5* 11.0* 11.6*     No results for input(s): \"TROPONINI\" in the last 93660 hours.  Recent Labs   Lab Test 09/17/24  0325 06/03/24  0553 05/13/24  0709   NTBNPI 171 558 197     Recent Labs   Lab Test 08/13/24  1501   TSH 1.27     Recent Labs   Lab Test 06/03/24  0553 05/13/24  0709 02/13/23  2307   INR 0.97 1.30* 1.01       Maryse Vargas, DO  Hospitalist Service  [facility]      "

## 2024-09-17 NOTE — H&P
[facility]    History and Physical - Hospitalist Service       Date of Admission:  9/17/2024    Assessment & Plan      Zackary Hutchison is a 63 year old male with PMH significant for atrial fibrillation, CAD s/p CABG, HFrEF, ischemic cardiomyopathy, CKD stage IIIb, COPD, RLS, hypertension and hyperlipidemia who is admitted on 9/17/2024 due to frequent falls, probable acute to subacute fracture involving right superior lateral endplate osteophyte at L4, and large left paracentral C4-C5 disc osteophyte complex resulting in moderate to severe spinal canal stenosis.    # Frequent falls- Strict bedrest.  Fall precautions.  CT lumbar spine shows probable acute to subacute fracture involving the right superior lateral endplate osteophyte at L4.  ED physician spoke with neurosurgery on-call and patient will be seen in consultation. PRN pain control with Dilaudid.    # Acute to subacute fracture involving right superior lateral endplate osteophyte at L4- Plan per neurosurgery. PRN pain control with Dilaudid.    # Large left paracentral C4-C5 disc osteophyte complex-  Resulting in moderate to severe spinal canal stenosis.  Plan per neurosurgery.    # Hypertension- BP stable.  Review home meds when verified by pharmacist.  Monitor vital signs.    # COPD- No acute exacerbation.  Patient counseled on smoking cessation. PRN DuoNebs.    # Tobacco use- Smokes 1 PPD.  Patient counseled on smoking cessation.  He is agreeable to nicotine patch.        Diet: Combination Diet Low Saturated Fat Na <2400mg Diet, No Caffeine Diet  DVT Prophylaxis: Chronically on Eliquis  Ruvalcaba Catheter: Not present  Lines: None     Cardiac Monitoring: ACTIVE order. Indication: Pain  Code Status: Full Code per patient.    Clinically Significant Risk Factors Present on Admission               # Drug Induced Coagulation Defect: home medication list includes an anticoagulant medication  # Drug Induced Platelet Defect: home medication list  "includes an antiplatelet medication   # Hypertension: Noted on problem list  # Chronic heart failure with reduced ejection fraction: last echo with EF <40%       # DMII: A1C = N/A within past 6 months    # Severe Obesity: Estimated body mass index is 41.97 kg/m  as calculated from the following:    Height as of this encounter: 1.676 m (5' 6\").    Weight as of this encounter: 117.9 kg (260 lb).         # Financial/Environmental Concerns:                 Disposition Plan   Anticipate greater than 2 midnight Inpatient hospital stay.           Rosie Stern MD  Hospitalist Service  [facility]  Securely message with [employer] (more info)  Text page via [facility] Paging/Directory     ______________________________________________________________________    Chief Complaint   Frequent falls    History is obtained from the patient, ED Physician and chart review.    History of Present Illness   Zackary Hutchison is a 63 year old male with PMH significant for atrial fibrillation, CAD s/p CABG, HFrEF, ischemic cardiomyopathy, CKD stage IIIb, COPD, hypertension and hyperlipidemia who presents to ED after falling out of his recliner.  Patient says he was sleeping in his chair and woke up right before he hit the floor.  He hit his head on either the table or chair leg (he is not sure which one).  No loss of consciousness.  He pushed medical alert for assistance since he was unable to get up on his own.  He reports 6/10 lower back pain.  He denies any neck pain but says that it feels \"tight\".  Patient denies chest pain, palpitations, lightheadedness, dizziness, nausea, vomiting, diarrhea, abdominal pain, fever or chills.  Patient currently appears in no acute distress.      Past Medical History    Past Medical History:   Diagnosis Date    Atrial fibrillation (H)     Chronic kidney disease     Chronic kidney disease, stage 3b (H) 09/28/2023    Class 3 severe obesity due to excess calories with body mass index (BMI) of " 40.0 to 44.9 in adult (H) 09/28/2023    Congestive heart failure (H)     COPD (chronic obstructive pulmonary disease) (H)     Coronary artery disease involving coronary bypass graft of native heart without angina pectoris 09/28/2023    Heart failure with reduced ejection fraction (H) 09/11/2023    HFrEF (heart failure with reduced ejection fraction) (H) 09/11/2023    Hyperlipidemia     Hypertension     Ischemic cardiomyopathy 09/28/2023    Obese     Paroxysmal atrial fibrillation (H) 09/28/2023    Tobacco abuse 09/28/2023       Past Surgical History   Past Surgical History:   Procedure Laterality Date    CORONARY ANGIOGRAPHY ADULT ORDER      CORONARY ARTERY BYPASS Left 2014    2 vessels    CV CARDIOMEMS WITH RIGHT HEART CATH N/A 2/15/2024    Procedure: Pulmonary Arterial Pressure Sensor Placement;  Surgeon: Jacey Bhandari MD;  Location: [facility]    CV CORONARY ANGIOGRAM N/A 09/11/2023    Procedure: Coronary Angiogram;  Surgeon: Santy Esposito MD;  Location: [facility]    CV CORONARY ANGIOGRAM N/A 5/2/2024    Procedure: CV CORONARY ANGIOGRAM;  Surgeon: Santy Esposito MD;  Location: [facility]    CV LEFT HEART CATH N/A 09/11/2023    Procedure: Left Heart Catheterization;  Surgeon: Santy Esposito MD;  Location: [facility]    CV LEFT HEART CATH N/A 5/2/2024    Procedure: Left Heart Catheterization;  Surgeon: Santy Esposito MD;  Location: [facility]    CV PCI ANGIOPLASTY N/A 5/2/2024    Procedure: Percutaneous Transluminal Angioplasty;  Surgeon: Santy Esposito MD;  Location: [facility]    CV RIGHT HEART CATH MEASUREMENTS RECORDED N/A 09/11/2023    Procedure: Right Heart Catheterization;  Surgeon: Santy Esposito MD;  Location: [facility]       Prior to Admission Medications   Prior to Admission Medications   Prescriptions Last Dose Informant Patient Reported? Taking?   [employer] VITAMIN B12 1000 MCG tablet 9/16/2024 at am Self Yes  Yes   Sig: Take 1,000 mcg by mouth daily   acetaminophen (TYLENOL) 500 MG tablet Unknown at prn Self Yes Yes   Sig: Take 1,000 mg by mouth 2 times daily as needed for mild pain   albuterol (PROAIR HFA/PROVENTIL HFA/VENTOLIN HFA) 108 (90 Base) MCG/ACT inhaler Unknown at prn Self Yes Yes   Sig: Inhale 2 puffs into the lungs every 6 hours as needed for shortness of breath, wheezing or cough   amiodarone (PACERONE) 200 MG tablet 9/16/2024 at am Self No Yes   Sig: Take 1 tablet (200 mg) by mouth daily   apixaban ANTICOAGULANT (ELIQUIS) 5 MG tablet 9/16/2024 at am Self No Yes   Sig: Take 1 tablet (5 mg) by mouth 2 times daily   atorvastatin (LIPITOR) 40 MG tablet 9/16/2024 at am Self Yes Yes   Sig: Take 40 mg by mouth every morning   carvedilol (COREG) 3.125 MG tablet 9/16/2024 at pm Self No Yes   Sig: Take 1 tablet (3.125 mg) by mouth 2 times daily (with meals)   clopidogrel (PLAVIX) 75 MG tablet 9/16/2024 at am Self No Yes   Sig: Take 1 tablet (75 mg) by mouth daily   diclofenac (VOLTAREN) 1 % topical gel Unknown at prn Self Yes Yes   Sig: Apply 2 g topically every 6 hours as needed for moderate pain   hydrALAZINE (APRESOLINE) 10 MG tablet 9/16/2024 at pm Self No Yes   Sig: Take 1 tablet (10 mg) by mouth 3 times daily   ipratropium - albuterol 0.5 mg/2.5 mg/3 mL (DUONEB) 0.5-2.5 (3) MG/3ML neb solution Unknown at prn Self Yes Yes   Sig: Take 1 vial by nebulization every 6 hours as needed for wheezing.   isosorbide mononitrate (IMDUR) 60 MG 24 hr tablet 9/16/2024 at am Self No Yes   Sig: Take 3 tablets (180 mg) by mouth every morning   metolazone (ZAROXOLYN) 2.5 MG tablet 9/11/2024 at am Self Yes Yes   Sig: Take 2.5 mg by mouth once a week. on Wednesday, 30 mintues before your torsemide. Have labs drawn weekly on Thursday.   nitroGLYcerin (NITROSTAT) 0.4 MG sublingual tablet Unknown at prn Self No Yes   Sig: Place 1 tablet (0.4 mg) under the tongue every 5 minutes as needed If no relief after 3 tabs call 911;continue 1  tab every 5 min max 3 tab Indications: chest pain   polyethylene glycol (MIRALAX) 17 GM/Dose powder Unknown at prn Self No Yes   Sig: Take 17 g by mouth 2 times daily as needed   potassium chloride barbara ER (KLOR-CON M20) 20 MEQ CR tablet 9/16/2024 at am Self No Yes   Sig: Take 2 tablets (40 mEq) by mouth daily   rOPINIRole (REQUIP) 2 MG tablet 9/16/2024 at pm Self Yes Yes   Sig: Take 2 mg by mouth every 6 hours   spironolactone (ALDACTONE) 25 MG tablet 9/16/2024 at am Self Yes Yes   Sig: Take 25 mg by mouth daily.   spironolactone (ALDACTONE) 25 MG tablet 9/11/2024 at am Self Yes Yes   Sig: Take 50 mg by mouth every 7 days. Take 2 tablet once a week on Wednesday along with Metolazone   torsemide (DEMADEX) 100 MG tablet 9/16/2024 at pm Self No Yes   Sig: Take 1 tablet (100 mg) by mouth 2 times daily      Facility-Administered Medications: None        Review of Systems    The 10 point Review of Systems is negative other than noted in the HPI.    Social History   I have reviewed this patient's social history and updated it with pertinent information if needed.  Social History     Tobacco Use    Smoking status: Every Day     Current packs/day: 0.50     Types: Cigarettes    Smokeless tobacco: Never    Tobacco comments:     Seen IP by [facility] on 9/11/23 and declined cessation services and materials   Substance Use Topics    Alcohol use: Not Currently         Family History   I have reviewed this patient's family history and updated it with pertinent information if needed.  Family History   Problem Relation Age of Onset    Cerebrovascular Disease Mother     Myocardial Infarction Father         Physical Exam   Vital Signs: Temp: 98.3  F (36.8  C) Temp src: Oral BP: 136/65 Pulse: 81   Resp: 27 SpO2: 97 % O2 Device: None (Room air)    Weight: 260 lbs 0 oz    General Appearance: AAOx3, NAD, morbidly obese  Respiratory: CTAB  Cardiovascular: Irregular, S1-S2  GI: +BS, soft, nontender, central obesity  Skin: Dry flaking skin on  face.  Chronic bilateral lower extremity wounds that are currently wrapped in bandages.  Other: Lower extremities currently wrapped in bandages.    Medical Decision Making       50 MINUTES SPENT BY ME on the date of service doing chart review, history, exam, documentation & further activities per the note.      Data     I have personally reviewed the following data over the past 24 hrs:    7.7  \   11.0 (L)   / 188     N/A N/A N/A /  N/A   N/A N/A N/A \     Trop: 37 (H) BNP: 171       Imaging results reviewed over the past 24 hrs:   Recent Results (from the past 24 hour(s))   XR Chest 1 View    Narrative    EXAM: XR CHEST 1 VIEW  LOCATION: [facility]  DATE: 9/17/2024    INDICATION: Fall, weakness  COMPARISON: 5/13/2024      Impression    IMPRESSION: Sternotomy. Mild cardiac enlargement. Pulmonary vascularity within normal limits. No acute infiltrates or consolidation. No pneumothorax. Old left posterior rib fractures. Otherwise unremarkable. No definite acute findings.              Head CT w/o contrast    Narrative    EXAM: CT HEAD W/O CONTRAST, CT CERVICAL SPINE W/O CONTRAST  LOCATION: [facility]  DATE: 9/17/2024    INDICATION: Fall, head injury, anticoagulated  COMPARISON: None.  TECHNIQUE:   1) Routine CT Head without IV contrast. Multiplanar reformats. Dose reduction techniques were used.  2) Routine CT Cervical Spine without IV contrast. Multiplanar reformats. Dose reduction techniques were used.    FINDINGS:   HEAD CT:   INTRACRANIAL CONTENTS: No intracranial hemorrhage, extraaxial collection, or mass effect.  No CT evidence of acute infarct. Moderate presumed chronic small vessel ischemic changes. Moderate generalized volume loss. No hydrocephalus.     VISUALIZED ORBITS/SINUSES/MASTOIDS: Prior bilateral cataract surgery. Visualized portions of the orbits are otherwise unremarkable. No significant paranasal sinus mucosal disease. No middle ear or mastoid  effusion.    BONES/SOFT TISSUES: No acute abnormality.    CERVICAL SPINE CT:   VERTEBRA: Normal vertebral body heights and alignment. No acute compression fracture or posttraumatic subluxation.     CANAL/FORAMINA: Large left paracentral C4-C5 disc osteophyte complex resulting in moderate to severe spinal canal stenosis.    PARASPINAL: No prevertebral edema.      Impression    IMPRESSION:  HEAD CT:  1.  No acute intracranial process.    CERVICAL SPINE CT:  1.  No CT evidence for acute fracture or post traumatic subluxation.  2.  Large left paracentral C4-C5 disc osteophyte complex resulting in moderate to severe spinal canal stenosis.   CT Cervical Spine w/o Contrast    Narrative    EXAM: CT HEAD W/O CONTRAST, CT CERVICAL SPINE W/O CONTRAST  LOCATION: [facility]  DATE: 9/17/2024    INDICATION: Fall, head injury, anticoagulated  COMPARISON: None.  TECHNIQUE:   1) Routine CT Head without IV contrast. Multiplanar reformats. Dose reduction techniques were used.  2) Routine CT Cervical Spine without IV contrast. Multiplanar reformats. Dose reduction techniques were used.    FINDINGS:   HEAD CT:   INTRACRANIAL CONTENTS: No intracranial hemorrhage, extraaxial collection, or mass effect.  No CT evidence of acute infarct. Moderate presumed chronic small vessel ischemic changes. Moderate generalized volume loss. No hydrocephalus.     VISUALIZED ORBITS/SINUSES/MASTOIDS: Prior bilateral cataract surgery. Visualized portions of the orbits are otherwise unremarkable. No significant paranasal sinus mucosal disease. No middle ear or mastoid effusion.    BONES/SOFT TISSUES: No acute abnormality.    CERVICAL SPINE CT:   VERTEBRA: Normal vertebral body heights and alignment. No acute compression fracture or posttraumatic subluxation.     CANAL/FORAMINA: Large left paracentral C4-C5 disc osteophyte complex resulting in moderate to severe spinal canal stenosis.    PARASPINAL: No prevertebral edema.       Impression    IMPRESSION:  HEAD CT:  1.  No acute intracranial process.    CERVICAL SPINE CT:  1.  No CT evidence for acute fracture or post traumatic subluxation.  2.  Large left paracentral C4-C5 disc osteophyte complex resulting in moderate to severe spinal canal stenosis.   CT Lumbar Spine w/o Contrast    Narrative    EXAM: CT LUMBAR SPINE W/O CONTRAST  LOCATION: [facility]  DATE: 9/17/2024    INDICATION: Low back pain, fall  COMPARISON: None.  TECHNIQUE: Routine CT Lumbar Spine without IV contrast. Multiplanar reformats. Dose reduction techniques were used.    FINDINGS:  Nomenclature is based on 5 lumbar type vertebral bodies. Very mild levoscoliosis. Slight degenerative retrolisthesis of L3 on L4. Otherwise vertebral alignment anatomic. Normal vertebral body heights. Probable acute to subacute fracture involving the   right superior lateral endplate osteophyte at L4 seen best on the coronal reformations (images 22 through 31). No other fractures are identified. No posttraumatic subluxation. No destructive bony lesion. No extraspinal soft tissue abnormality. Mild   degenerative changes sacroiliac joints. Incompletely imaged internal fixation hardware in the left ilium. Osteopenia.    T11-T12: Normal disc height. No herniation. Normal facets. No spinal canal or neural foraminal stenosis.    T12-L1: Normal disc height. No herniation. Normal facets. No spinal canal or neural foraminal stenosis.    L1-L2: Normal disc height. No herniation. No spinal canal stenosis. Mild facet hypertrophic changes with mild foraminal narrowing.    L2-L3: Mild disc space narrowing. Mild disc bulge indents the ventral thecal sac. Mild facet hypertrophic changes contribute to moderate left and mild right foraminal narrowing    L3-L4: Moderate disc space narrowing with vacuum cleft disc. Mild disc bulge indents the ventral thecal sac and contributes to mild central canal stenosis. Mild facet hypertrophic changes  contribute to moderate foraminal narrowing.    L4-L5: Mild disc space narrowing with vacuum cleft disc. Mild disc bulge indents the ventral thecal sac and contributes to mild central canal stenosis. Mild facet hypertrophic changes contribute to moderate foraminal narrowing.    L5-S1: Mild disc space narrowing. Mild disc bulge partially effaces the ventral epidural fat. Mild facet hypertrophic changes contribute to marked right and mild left foraminal narrowing.    Thickened left adrenal gland maintains its adreniform shape and may be related to adrenal hyperplasia. Marked atherosclerosis abdominal aorta and iliac arteries. Desiccated fecal material in the visualized portions of the rectum raising the possibility   of constipation.      Impression    IMPRESSION:  1.  Probable acute to subacute fracture involving the right superior lateral endplate osteophyte at L4.  2.  Spondylosis with central canal and foraminal stenosis as described above.

## 2024-09-17 NOTE — PROGRESS NOTES
Contacted regarding 62 yo male on Eliquis presenting to ER with frequent falls. Complains of low back pain.    Imaging reveals L4 superior endplate osteophyte fracture.  In addition cervical spine reveals large left C4-C5 HNP.    Imaging:    CERVICAL SPINE CT:  1.  No CT evidence for acute fracture or post traumatic subluxation.  2.  Large left paracentral C4-C5 disc osteophyte complex resulting in moderate to severe spinal canal stenosis.    CT lumbar spine:  IMPRESSION:  1.  Probable acute to subacute fracture involving the right superior lateral endplate osteophyte at L4.  2.  Spondylosis with central canal and foraminal stenosis as described above.    RECOMMENDATIONS:  Bed rest for now.  Will likely obtain MRI for further evaluation.  NS will see in consultation for further evaluation.    Molly Caceres MPAS  [facility]  [address] [address]    Tel [phone]  Pager [phone]

## 2024-09-17 NOTE — TREATMENT PLAN
RCAT Treatment Plan    Patient Score: 9  Patient Acuity: 4    Clinical Indication for Therapy: bronchospasm and history of bronchospasm    Therapy Ordered: DUONEB QID     Assessment Summary: I assessed this patient and upon entering room wheezing was audible. I auscultated to confirm wheezing bilaterally. Neb given. Patient mentioned he takes neb tx about 4 times a day at home. I recommend DUONEB QID. RT will reassess as needed.    Zach Joe, RT  9/17/2024

## 2024-09-17 NOTE — PLAN OF CARE
Goal Outcome Evaluation:      Plan of Care Reviewed With: patient          Outcome Evaluation: Anticipate return to senior independent living facilty at discharge.

## 2024-09-17 NOTE — ED NOTES
Bed: JNED-A  Expected date: 9/17/24  Expected time: 2:49 AM  Means of arrival: Ambulance  Comments:  Christian  63 M--from Assisted Living, fell out of recliner, on thinners, hit head, VSS

## 2024-09-18 ENCOUNTER — APPOINTMENT (OUTPATIENT)
Dept: PHYSICAL THERAPY | Facility: HOSPITAL | Age: 64
DRG: 552 | End: 2024-09-18
Attending: INTERNAL MEDICINE
Payer: MEDICARE

## 2024-09-18 ENCOUNTER — APPOINTMENT (OUTPATIENT)
Dept: OCCUPATIONAL THERAPY | Facility: HOSPITAL | Age: 64
DRG: 552 | End: 2024-09-18
Attending: INTERNAL MEDICINE
Payer: MEDICARE

## 2024-09-18 VITALS
HEIGHT: 66 IN | HEART RATE: 79 BPM | WEIGHT: 263.89 LBS | BODY MASS INDEX: 42.41 KG/M2 | RESPIRATION RATE: 18 BRPM | DIASTOLIC BLOOD PRESSURE: 56 MMHG | OXYGEN SATURATION: 93 % | SYSTOLIC BLOOD PRESSURE: 122 MMHG | TEMPERATURE: 97.9 F

## 2024-09-18 LAB
ATRIAL RATE - MUSE: 83 BPM
DIASTOLIC BLOOD PRESSURE - MUSE: 58 MMHG
GLUCOSE BLDC GLUCOMTR-MCNC: 134 MG/DL (ref 70–99)
GLUCOSE BLDC GLUCOMTR-MCNC: 136 MG/DL (ref 70–99)
GLUCOSE BLDC GLUCOMTR-MCNC: 147 MG/DL (ref 70–99)
GLUCOSE BLDC GLUCOMTR-MCNC: 196 MG/DL (ref 70–99)
GLUCOSE BLDC GLUCOMTR-MCNC: 217 MG/DL (ref 70–99)
INTERPRETATION ECG - MUSE: NORMAL
P AXIS - MUSE: NORMAL DEGREES
PR INTERVAL - MUSE: NORMAL MS
QRS DURATION - MUSE: 134 MS
QT - MUSE: 450 MS
QTC - MUSE: 528 MS
R AXIS - MUSE: 88 DEGREES
SYSTOLIC BLOOD PRESSURE - MUSE: 133 MMHG
T AXIS - MUSE: 56 DEGREES
VENTRICULAR RATE- MUSE: 83 BPM

## 2024-09-18 PROCEDURE — 250N000013 HC RX MED GY IP 250 OP 250 PS 637: Performed by: FAMILY MEDICINE

## 2024-09-18 PROCEDURE — 97535 SELF CARE MNGMENT TRAINING: CPT | Mod: GO

## 2024-09-18 PROCEDURE — 97116 GAIT TRAINING THERAPY: CPT | Mod: GP

## 2024-09-18 PROCEDURE — 999N000157 HC STATISTIC RCP TIME EA 10 MIN

## 2024-09-18 PROCEDURE — G0463 HOSPITAL OUTPT CLINIC VISIT: HCPCS | Mod: 25

## 2024-09-18 PROCEDURE — 99239 HOSP IP/OBS DSCHRG MGMT >30: CPT | Performed by: INTERNAL MEDICINE

## 2024-09-18 PROCEDURE — 250N000013 HC RX MED GY IP 250 OP 250 PS 637: Performed by: INTERNAL MEDICINE

## 2024-09-18 PROCEDURE — 99232 SBSQ HOSP IP/OBS MODERATE 35: CPT | Performed by: INTERNAL MEDICINE

## 2024-09-18 PROCEDURE — 250N000009 HC RX 250: Performed by: INTERNAL MEDICINE

## 2024-09-18 PROCEDURE — 94640 AIRWAY INHALATION TREATMENT: CPT

## 2024-09-18 PROCEDURE — 97161 PT EVAL LOW COMPLEX 20 MIN: CPT | Mod: GP

## 2024-09-18 PROCEDURE — 97166 OT EVAL MOD COMPLEX 45 MIN: CPT | Mod: GO

## 2024-09-18 RX ORDER — DAPAGLIFLOZIN 5 MG/1
5 TABLET, FILM COATED ORAL DAILY
Qty: 90 TABLET | Refills: 1 | Status: SHIPPED | OUTPATIENT
Start: 2024-09-18

## 2024-09-18 RX ADMIN — ISOSORBIDE MONONITRATE 180 MG: 60 TABLET, EXTENDED RELEASE ORAL at 09:04

## 2024-09-18 RX ADMIN — HYDRALAZINE HYDROCHLORIDE 10 MG: 10 TABLET, FILM COATED ORAL at 09:04

## 2024-09-18 RX ADMIN — ACETAMINOPHEN 650 MG: 325 TABLET ORAL at 09:04

## 2024-09-18 RX ADMIN — METOLAZONE 2.5 MG: 2.5 TABLET ORAL at 07:07

## 2024-09-18 RX ADMIN — DICLOFENAC SODIUM 2 G: 10 GEL TOPICAL at 09:11

## 2024-09-18 RX ADMIN — ROPINIROLE HYDROCHLORIDE 2 MG: 1 TABLET, FILM COATED ORAL at 14:00

## 2024-09-18 RX ADMIN — SPIRONOLACTONE 25 MG: 25 TABLET, FILM COATED ORAL at 09:04

## 2024-09-18 RX ADMIN — IPRATROPIUM BROMIDE AND ALBUTEROL SULFATE 3 ML: .5; 3 SOLUTION RESPIRATORY (INHALATION) at 08:15

## 2024-09-18 RX ADMIN — INSULIN ASPART 1 UNITS: 100 INJECTION, SOLUTION INTRAVENOUS; SUBCUTANEOUS at 08:06

## 2024-09-18 RX ADMIN — TORSEMIDE 100 MG: 100 TABLET ORAL at 17:47

## 2024-09-18 RX ADMIN — IPRATROPIUM BROMIDE AND ALBUTEROL SULFATE 3 ML: .5; 3 SOLUTION RESPIRATORY (INHALATION) at 11:43

## 2024-09-18 RX ADMIN — ROPINIROLE HYDROCHLORIDE 2 MG: 1 TABLET, FILM COATED ORAL at 01:49

## 2024-09-18 RX ADMIN — CARVEDILOL 3.12 MG: 3.12 TABLET, FILM COATED ORAL at 09:04

## 2024-09-18 RX ADMIN — ROPINIROLE HYDROCHLORIDE 2 MG: 1 TABLET, FILM COATED ORAL at 09:04

## 2024-09-18 RX ADMIN — ATORVASTATIN CALCIUM 40 MG: 40 TABLET, FILM COATED ORAL at 09:04

## 2024-09-18 RX ADMIN — NICOTINE 1 PATCH: 14 PATCH, EXTENDED RELEASE TRANSDERMAL at 09:05

## 2024-09-18 RX ADMIN — CARVEDILOL 3.12 MG: 3.12 TABLET, FILM COATED ORAL at 17:47

## 2024-09-18 RX ADMIN — AMIODARONE HYDROCHLORIDE 200 MG: 200 TABLET ORAL at 09:04

## 2024-09-18 RX ADMIN — DICLOFENAC SODIUM 2 G: 10 GEL TOPICAL at 01:10

## 2024-09-18 RX ADMIN — INSULIN ASPART 2 UNITS: 100 INJECTION, SOLUTION INTRAVENOUS; SUBCUTANEOUS at 12:34

## 2024-09-18 RX ADMIN — TORSEMIDE 100 MG: 100 TABLET ORAL at 09:04

## 2024-09-18 ASSESSMENT — ACTIVITIES OF DAILY LIVING (ADL)
ADLS_ACUITY_SCORE: 37
ADLS_ACUITY_SCORE: 33
ADLS_ACUITY_SCORE: 37
ADLS_ACUITY_SCORE: 37
ADLS_ACUITY_SCORE: 33
ADLS_ACUITY_SCORE: 37
ADLS_ACUITY_SCORE: 33
ADLS_ACUITY_SCORE: 37
ADLS_ACUITY_SCORE: 37
ADLS_ACUITY_SCORE: 33
ADLS_ACUITY_SCORE: 33
ADLS_ACUITY_SCORE: 37
ADLS_ACUITY_SCORE: 33
ADLS_ACUITY_SCORE: 37

## 2024-09-18 NOTE — PLAN OF CARE
Goal Outcome Evaluation:  Pt is A&Ox4, up with assist x1 using walker. Pt reports chronic numbness/tingling in rodney LE, pulses present. Rodney LE with 2+ edema, redness and open wounds which were being treated at home. Pt reported pain in back ad rodney LE, requested volteren gel and received scheduled requip at HS with some effect. Pt reported mild difficulty breathing, occasional congested non- productive cough, LS diminished with exp wheezes, sats mid 90's on RA, received diuretic. Voiding WNL. VSS, labs stable  Problem: Adult Inpatient Plan of Care  Goal: Optimal Comfort and Wellbeing  Outcome: Progressing  Intervention: Monitor Pain and Promote Comfort  Recent Flowsheet Documentation  Taken 9/17/2024 1955 by Pippa Toussaint RN  Pain Management Interventions:   breathing exercises   distraction   emotional support   repositioned   pillow support provided     Problem: Chronic Kidney Disease  Goal: Electrolyte Balance  Outcome: Progressing  Goal: Fluid Balance  Outcome: Progressing  Goal: Optimal Functional Ability  Outcome: Progressing  Goal: Optimal Oral Intake  Outcome: Progressing  Goal: Acceptable Pain Control  Outcome: Progressing  Intervention: Prevent or Manage Pain  Recent Flowsheet Documentation  Taken 9/17/2024 1955 by Pippa Toussaint, RN  Pain Management Interventions:   breathing exercises   distraction   emotional support   repositioned   pillow support provided     Problem: Fall Injury Risk  Goal: Absence of Fall and Fall-Related Injury  Outcome: Progressing  Intervention: Promote Injury-Free Environment  Recent Flowsheet Documentation  Taken 9/17/2024 1954 by Pippa Toussaint RN  Safety Promotion/Fall Prevention:   activity supervised   assistive device/personal items within reach   patient and family education   nonskid shoes/slippers when out of bed     Problem: Comorbidity Management  Goal: Maintenance of COPD Symptom Control  Outcome: Progressing  Goal: Maintenance of Heart Failure Symptom  Control  Outcome: Progressing  Goal: Blood Pressure in Desired Range  Outcome: Progressing     Problem: Cardiac Impairment  Goal: Optimal Activity Tolerance  Outcome: Progressing     Problem: Pain Acute  Goal: Optimal Pain Control and Function  Outcome: Progressing  Intervention: Develop Pain Management Plan  Recent Flowsheet Documentation  Taken 9/17/2024 1955 by Pippa Toussaint RN  Pain Management Interventions:   breathing exercises   distraction   emotional support   repositioned   pillow support provided     Problem: Fluid Volume Excess  Goal: Fluid Balance  Outcome: Progressing     Problem: Wound  Goal: Optimal Functional Ability  Outcome: Progressing  Goal: Absence of Infection Signs and Symptoms  Outcome: Progressing  Goal: Improved Oral Intake  Outcome: Progressing  Goal: Skin Health and Integrity  Outcome: Progressing  Intervention: Optimize Skin Protection  Recent Flowsheet Documentation  Taken 9/17/2024 1954 by Pippa Toussaint RN  Head of Bed (HOB) Positioning: HOB at 30-45 degrees     Problem: Orthopaedic Fracture  Goal: Fracture Stability  Outcome: Progressing  Goal: Optimal Functional Ability  Outcome: Progressing  Goal: Absence of Infection Signs and Symptoms  Outcome: Progressing  Goal: Effective Tissue Perfusion  Outcome: Progressing  Goal: Optimal Pain Control and Function  Outcome: Progressing  Intervention: Manage Acute Orthopaedic-Related Pain  Recent Flowsheet Documentation  Taken 9/17/2024 1955 by Pippa Toussaint RN  Pain Management Interventions:   breathing exercises   distraction   emotional support   repositioned   pillow support provided  Goal: Effective Oxygenation and Ventilation  Outcome: Progressing  Intervention: Optimize Oxygenation and Ventilation  Recent Flowsheet Documentation  Taken 9/17/2024 1954 by Pippa Toussaint RN  Head of Bed (HOB) Positioning: HOB at 30-45 degrees     Problem: Adult Inpatient Plan of Care  Goal: Absence of Hospital-Acquired Illness or  Injury  Intervention: Identify and Manage Fall Risk  Recent Flowsheet Documentation  Taken 9/17/2024 1954 by Pippa Toussaint, RN  Safety Promotion/Fall Prevention:   activity supervised   assistive device/personal items within reach   patient and family education   nonskid shoes/slippers when out of bed  Goal: Readiness for Transition of Care  Intervention: Mutually Develop Transition Plan  Recent Flowsheet Documentation  Taken 9/17/2024 2000 by Pippa Toussaint, RN  Equipment Currently Used at Home: walker, rolling     Problem: Wound  Goal: Optimal Pain Control and Function  Intervention: Prevent or Manage Pain  Recent Flowsheet Documentation  Taken 9/17/2024 1955 by Pippa Toussaint, RN  Pain Management Interventions:   breathing exercises   distraction   emotional support   repositioned   pillow support provided

## 2024-09-18 NOTE — PROGRESS NOTES
Physical Therapy        09/18/24 1007   Appointment Info   Signing Clinician's Name / Credentials (PT) Porsha Hairston DPMERRITT   Living Environment   People in Home alone   Current Living Arrangements independent living facility   Home Accessibility no concerns   Self-Care   Equipment Currently Used at Home   (4WW, w/c)   Fall history within last six months yes   Number of times patient has fallen within last six months 3   Activity/Exercise/Self-Care Comment pt does ADLs and laundry, has assistance with meals, housekeeping, driving   General Information   Onset of Illness/Injury or Date of Surgery 09/18/24   Referring Physician Maryse Vargas,    Patient/Family Therapy Goals Statement (PT) none stated   Pertinent History of Current Problem (include personal factors and/or comorbidities that impact the POC) 63 year old male who presents with 3 unwitnessed falls over the past week. Since that time has reported neck and low back pain. Imaging revealed cervical stenosis.   Existing Precautions/Restrictions fall   Cognition   Affect/Mental Status (Cognition) WNL   Pain Assessment   Patient Currently in Pain Yes, see Vital Sign flowsheet   Integumentary/Edema   Integumentary/Edema   (LE dressings C/D/I)   Posture    Posture Forward head position   Range of Motion (ROM)   Range of Motion ROM is WFL   Strength (Manual Muscle Testing)   Strength (Manual Muscle Testing) Deficits observed during functional mobility   Bed Mobility   Comment, (Bed Mobility) pt in chair before/after PT   Transfers   Comment, (Transfers) SBA sit <> stand from chair   Gait/Stairs (Locomotion)   Comment, (Gait/Stairs) 3' without AD, CGA, wide MARINA   Balance   Balance Comments impaired   Sensory Examination   Sensory Perception patient reports no sensory changes   Sensory Perception Comments neuropathy as at baseline   Clinical Impression   Criteria for Skilled Therapeutic Intervention Yes, treatment indicated   PT Diagnosis (PT) difficulty  walking   Influenced by the following impairments pain, weakness   Functional limitations due to impairments limited household mobility   Clinical Presentation (PT Evaluation Complexity) stable   Clinical Presentation Rationale presents as medically diagnosed   Clinical Decision Making (Complexity) moderate complexity   Planned Therapy Interventions (PT) balance training;bed mobility training;gait training;neuromuscular re-education;transfer training   PT Total Evaluation Time   PT Eval, Moderate Complexity Minutes (82765) 10   Physical Therapy Goals   PT Frequency One time eval and treatment only   PT Predicted Duration/Target Date for Goal Attainment 09/18/24   PT Goals Gait   PT: Gait Supervision/stand-by assist;100 feet;Rolling walker;Goal Met   Interventions   Interventions Quick Adds Gait Training   Gait Training   Gait Training Minutes (58771) 15   Symptoms Noted During/After Treatment (Gait Training) fatigue   Treatment Detail/Skilled Intervention 4WW, cues for posture, walker placement, breathing, forward gaze   Distance in Feet 80'x2 with seated rest between   [place] Level (Gait Training) stand-by assist   Pattern Analysis (Gait Training) swing-through gait   PT Discharge Planning   PT Plan bring 4WW for GT, tranfsfers   PT Discharge Recommendation (DC Rec) home with home care physical therapy   PT Rationale for DC Rec able to ambulate household distances, has w/c to get to meals. Needs PT for strengthening and fall risk reduction   PT Brief overview of current status sufficiently mobile for d/c with walker, but not at baseline   Total Session Time   Timed Code Treatment Minutes 15   Total Session Time (sum of timed and untimed services) 25     Physical Therapy Discharge Summary    Reason for therapy discharge:    Discharged to home with home therapy.    Progress towards therapy goal(s). See goals on Care Plan in [facility] electronic health record for goal details.  Goals met    Therapy recommendation(s):     Continued therapy is recommended.  Rationale/Recommendations:  home PT to continue strengthening and fall risk reduction.        Porsha Hairston, DPT 9/18/2024

## 2024-09-18 NOTE — PROGRESS NOTES
Care Management Discharge Note    Discharge Date: 09/18/2024       Discharge Disposition: Home with Home Care    Discharge Services: Home Care    Discharge DME: None    Discharge Transportation: agency    Private pay costs discussed: transportation costs    Does the patient's insurance plan have a 3 day qualifying hospital stay waiver?  No    PAS Confirmation Code: N/A  Patient/family educated on Medicare website which has current facility and service quality ratings: no    Education Provided on the Discharge Plan: Yes  Persons Notified of Discharge Plans: Patient  Patient/Family in Agreement with the Plan: yes    Handoff Referral Completed: No, handoff not indicated or clinically appropriate    Additional Information:  Plan is for Pt to discharge home at [facility] with resumption of home care services. Pt is open to [facility].    1:07 PM  SW met and spoke with Pt regarding plans for discharging home with home care today. Pt confirmed he needs hospital wheelchair transport at discharge.     2:13 PM  [facility] wheelchair transport pick-up window: [id number]. No earlier transportation was available at the time CM called. Care Team updated. Nursing reported that they will update Pt regarding transport timing.     Home care orders sent to [facility].    CM will sign off. Please contact CM if any additional needs arise prior to discharge.      MARIUSZ Aguilar

## 2024-09-18 NOTE — PLAN OF CARE
Problem: Chronic Kidney Disease  Goal: Optimal Functional Ability  Outcome: Progressing   Goal Outcome Evaluation:       Patient alert and oriented X4. Up with assist of one patient not wanting alarms placed. Education provided regarding patient recent multiple falls and being on blood thinners. Patient verbalizes understanding. Patient to discharge back home later this evening.

## 2024-09-18 NOTE — PLAN OF CARE
"Pt is A&Ox4, calm, cooperative  - vitals stable, except expiratory wheezes   - weeping wounds           - Right shin: foam applied this shift          - Left shin/calf: gauze wrapped          - Forehead: open to air  - TELE: A-fib with frequent PACs  - Potassium protocol, recheck this AM  - Pt had mild pain to his thighs & knees; diclofenac applied  - Pt got frustrated at this morning with the bed alarm on, and stated he would refuse it if he can't sit on the edge of the bed without it going off.   - Pt was up to bathroom with walker, brushed teeth, peed, back to bed.       Problem: Chronic Kidney Disease  Goal: Electrolyte Balance  Outcome: Not Progressing     Problem: Comorbidity Management  Goal: Maintenance of COPD Symptom Control  Outcome: Not Progressing  Intervention: Maintain COPD Symptom Control  Recent Flowsheet Documentation  Taken 9/18/2024 0045 by Sobeida Rahman RN  Supportive Measures:   active listening utilized   verbalization of feelings encouraged   positive reinforcement provided  Medication Review/Management: medications reviewed     Problem: Adult Inpatient Plan of Care  Goal: Plan of Care Review  Description: The Plan of Care Review/Shift note should be completed every shift.  The Outcome Evaluation is a brief statement about your assessment that the patient is improving, declining, or no change.  This information will be displayed automatically on your shift  note.  Outcome: Progressing  Flowsheets (Taken 9/18/2024 0406)  Plan of Care Reviewed With: patient  Overall Patient Progress: no change  Goal: Patient-Specific Goal (Individualized)  Description: You can add care plan individualizations to a care plan. Examples of Individualization might be:  \"Parent requests to be called daily at 9am for status\", \"I have a hard time hearing out of my right ear\", or \"Do not touch me to wake me up as it startles  me\".  Outcome: Progressing  Goal: Absence of Hospital-Acquired Illness or " Injury  Outcome: Progressing  Intervention: Identify and Manage Fall Risk  Recent Flowsheet Documentation  Taken 9/18/2024 0045 by Sobeida Rahman RN  Safety Promotion/Fall Prevention:   activity supervised   assistive device/personal items within reach   mobility aid in reach   nonskid shoes/slippers when out of bed   clutter free environment maintained   patient and family education   safety round/check completed  Intervention: Prevent Skin Injury  Recent Flowsheet Documentation  Taken 9/18/2024 0045 by Sobeida Rahman RN  Body Position: sitting up in bed  Skin Protection:   adhesive use limited   incontinence pads utilized  Device Skin Pressure Protection:   absorbent pad utilized/changed   adhesive use limited  Taken 9/18/2024 0004 by Sobeida Rahman RN  Body Position: sitting up in bed  Intervention: Prevent and Manage VTE (Venous Thromboembolism) Risk  Recent Flowsheet Documentation  Taken 9/18/2024 0045 by Sobeida Rahman RN  VTE Prevention/Management: patient refused intervention  Goal: Optimal Comfort and Wellbeing  Outcome: Progressing  Intervention: Monitor Pain and Promote Comfort  Recent Flowsheet Documentation  Taken 9/18/2024 0045 by Sobeida Rahman RN  Pain Management Interventions: (diclofenac given , declined any other med)   medication (see MAR)   medication offered but refused   emotional support   distraction   pillow support provided  Goal: Readiness for Transition of Care  Outcome: Progressing     Problem: Chronic Kidney Disease  Goal: Optimal Coping with Chronic Illness  Outcome: Progressing  Intervention: Support Psychosocial Response  Recent Flowsheet Documentation  Taken 9/18/2024 0045 by Sobeida Rahman RN  Supportive Measures:   active listening utilized   verbalization of feelings encouraged   positive reinforcement provided  Goal: Fluid Balance  Outcome: Progressing  Intervention: Monitor and Manage Hypervolemia  Recent Flowsheet Documentation  Taken 9/18/2024 0045 by  Balestri, Sobeida, RN  Skin Protection:   adhesive use limited   incontinence pads utilized  Goal: Optimal Functional Ability  Outcome: Progressing  Intervention: Optimize Functional Ability  Recent Flowsheet Documentation  Taken 9/18/2024 0045 by Sobeida Rahman RN  Activity Management: sitting, edge of bed  Goal: Absence of Anemia Signs and Symptoms  Outcome: Progressing  Intervention: Manage Signs of Anemia and Bleeding  Recent Flowsheet Documentation  Taken 9/18/2024 0045 by Sobeida Rahman RN  Bleeding Precautions: monitored for signs of bleeding  Environmental Support: calm environment promoted  Goal: Optimal Oral Intake  Outcome: Progressing  Goal: Acceptable Pain Control  Outcome: Progressing  Intervention: Prevent or Manage Pain  Recent Flowsheet Documentation  Taken 9/18/2024 0045 by Sobeida Rahman RN  Pain Management Interventions: (diclofenac given , declined any other med)   medication (see MAR)   medication offered but refused   emotional support   distraction   pillow support provided  Goal: Minimize Renal Failure Effects  Outcome: Progressing  Intervention: Monitor and Support Renal Function  Recent Flowsheet Documentation  Taken 9/18/2024 0045 by Sobeida Rahman RN  Medication Review/Management: medications reviewed     Problem: Fall Injury Risk  Goal: Absence of Fall and Fall-Related Injury  Outcome: Progressing  Intervention: Identify and Manage Contributors  Recent Flowsheet Documentation  Taken 9/18/2024 0045 by Sobeida Rahman RN  Medication Review/Management: medications reviewed  Intervention: Promote Injury-Free Environment  Recent Flowsheet Documentation  Taken 9/18/2024 0045 by Sobeida Rahman RN  Safety Promotion/Fall Prevention:   activity supervised   assistive device/personal items within reach   mobility aid in reach   nonskid shoes/slippers when out of bed   clutter free environment maintained   patient and family education   safety round/check completed      Problem: Comorbidity Management  Goal: Blood Glucose Levels Within Targeted Range  Outcome: Progressing  Intervention: Monitor and Manage Glycemia  Recent Flowsheet Documentation  Taken 9/18/2024 0045 by Sobeida Rahman RN  Glycemic Management: blood glucose monitored  Medication Review/Management: medications reviewed  Goal: Maintenance of Heart Failure Symptom Control  Outcome: Progressing  Intervention: Maintain Heart Failure Management  Recent Flowsheet Documentation  Taken 9/18/2024 0045 by Sobeida Rahman RN  Medication Review/Management: medications reviewed  Goal: Blood Pressure in Desired Range  Outcome: Progressing  Intervention: Maintain Blood Pressure Management  Recent Flowsheet Documentation  Taken 9/18/2024 0045 by Sobeida Rahman RN  Medication Review/Management: medications reviewed     Problem: Cardiac Impairment  Goal: Optimal Activity Tolerance  Outcome: Progressing     Problem: Pain Acute  Goal: Optimal Pain Control and Function  Outcome: Progressing  Intervention: Develop Pain Management Plan  Recent Flowsheet Documentation  Taken 9/18/2024 0045 by Sobeida Rahman RN  Pain Management Interventions: (diclofenac given , declined any other med)   medication (see MAR)   medication offered but refused   emotional support   distraction   pillow support provided  Intervention: Prevent or Manage Pain  Recent Flowsheet Documentation  Taken 9/18/2024 0045 by Sobeida Rahman RN  Sensory Stimulation Regulation:   care clustered   lighting decreased  Medication Review/Management: medications reviewed  Intervention: Optimize Psychosocial Wellbeing  Recent Flowsheet Documentation  Taken 9/18/2024 0045 by Sobeida Rahman RN  Supportive Measures:   active listening utilized   verbalization of feelings encouraged   positive reinforcement provided     Problem: Fluid Volume Excess  Goal: Fluid Balance  Outcome: Progressing  Intervention: Monitor and Manage Hypervolemia  Recent Flowsheet  Documentation  Taken 9/18/2024 0045 by Sobeida Rahman RN  Skin Protection:   adhesive use limited   incontinence pads utilized     Problem: Wound  Goal: Optimal Coping  Outcome: Progressing  Intervention: Support Patient and Family Response  Recent Flowsheet Documentation  Taken 9/18/2024 0045 by Sobeida Rahman RN  Supportive Measures:   active listening utilized   verbalization of feelings encouraged   positive reinforcement provided  Goal: Optimal Functional Ability  Outcome: Progressing  Intervention: Optimize Functional Ability  Recent Flowsheet Documentation  Taken 9/18/2024 0045 by Sobeida Rahman RN  Activity Management: sitting, edge of bed  Goal: Absence of Infection Signs and Symptoms  Outcome: Progressing  Goal: Improved Oral Intake  Outcome: Progressing  Goal: Optimal Pain Control and Function  Outcome: Progressing  Intervention: Prevent or Manage Pain  Recent Flowsheet Documentation  Taken 9/18/2024 0045 by Sobeida Rahman RN  Pain Management Interventions: (diclofenac given , declined any other med)   medication (see MAR)   medication offered but refused   emotional support   distraction   pillow support provided  Goal: Skin Health and Integrity  Outcome: Progressing  Intervention: Optimize Skin Protection  Recent Flowsheet Documentation  Taken 9/18/2024 0045 by Sobeida Rahman RN  Pressure Reduction Techniques:   frequent weight shift encouraged   heels elevated off bed  Skin Protection:   adhesive use limited   incontinence pads utilized  Activity Management: sitting, edge of bed  Head of Bed (HOB) Positioning: HOB at 30-45 degrees  Goal: Optimal Wound Healing  Outcome: Progressing     Problem: Orthopaedic Fracture  Goal: Bowel Elimination  Outcome: Progressing  Goal: Absence of Embolism Signs and Symptoms  Outcome: Progressing  Intervention: Prevent or Manage Embolism Risk  Recent Flowsheet Documentation  Taken 9/18/2024 0045 by Sobeida Rahman RN  VTE Prevention/Management:  patient refused intervention  Goal: Fracture Stability  Outcome: Progressing  Goal: Optimal Functional Ability  Outcome: Progressing  Intervention: Optimize Functional Ability  Recent Flowsheet Documentation  Taken 9/18/2024 0045 by Sobeida Rahman RN  Activity Management: sitting, edge of bed  Positioning/Transfer Devices:   pillows   in use  Goal: Absence of Infection Signs and Symptoms  Outcome: Progressing  Goal: Effective Tissue Perfusion  Outcome: Progressing  Goal: Optimal Pain Control and Function  Outcome: Progressing  Intervention: Manage Acute Orthopaedic-Related Pain  Recent Flowsheet Documentation  Taken 9/18/2024 0045 by Sobeida Rahman RN  Pain Management Interventions: (diclofenac given , declined any other med)   medication (see MAR)   medication offered but refused   emotional support   distraction   pillow support provided  Goal: Effective Oxygenation and Ventilation  Outcome: Progressing  Intervention: Promote Airway Secretion Clearance  Recent Flowsheet Documentation  Taken 9/18/2024 0045 by Sobeida Rahman RN  Cough And Deep Breathing: done independently per patient  Activity Management: sitting, edge of bed  Intervention: Optimize Oxygenation and Ventilation  Recent Flowsheet Documentation  Taken 9/18/2024 0045 by Sobeida Rahman RN  Head of Bed (HOB) Positioning: HOB at 30-45 degrees   Goal Outcome Evaluation:      Plan of Care Reviewed With: patient    Overall Patient Progress: no changeOverall Patient Progress: no change

## 2024-09-18 NOTE — PLAN OF CARE
Lymphedema Discharge Summary    Reason for therapy discharge:    Discharged to home.    Progress towards therapy goal(s). See goals on Care Plan in [facility] electronic health record for goal details.  Goals met    Therapy recommendation(s):    No further therapy is recommended.    Aditi ROSAS, OTR/L, CLT 9/18/2024 , 3:02 PM      Goal Outcome Evaluation:

## 2024-09-18 NOTE — PLAN OF CARE
Goal Outcome Evaluation:       C/o of restless legs. Warm blanket applied and Voltaren. Patient has exp wheezes. Patient able to go to MRI. Monitor show SR with frequent PACs. MRI results back. Neuro surgery called and patient can get out of bed.

## 2024-09-18 NOTE — DISCHARGE SUMMARY
"[facility] [place]  Hospitalist Discharge Summary      Date of Admission:  9/17/2024  Date of Discharge:  9/18/2024  Discharging Provider: Maryse Vargas DO  Discharge Service: Hospitalist Service    Discharge Diagnoses   Cervical stenosis  Frequent falls  New diagnosis diabetes type 2  Chronic heart failure  Right bundle branch block  Chronic lymphedema    Clinically Significant Risk Factors     # DMII: A1C = 8.4 % (Ref range: <5.7 %) within past 6 months  # Severe Obesity: Estimated body mass index is 42.59 kg/m  as calculated from the following:    Height as of this encounter: 1.676 m (5' 6\").    Weight as of this encounter: 119.7 kg (263 lb 14.3 oz).       Follow-ups Needed After Discharge   Follow-up Appointments     Follow-up and recommended labs and tests       Please follow up with Dr. Dangelo at [facility].    Please call the clinic at [phone] to schedule your appointment.        Follow-up and recommended labs and tests       Follow up with primary care provider, Reid Escobar, within 3-4 days.        Follow up regarding new diabetes with A1c of 7.8.  Farxiga initiated.  Follow-up with neurosurgery.            Unresulted Labs Ordered in the Past 30 Days of this Admission       No orders found for last 31 day(s).            Discharge Disposition   Discharged to home with home care  Condition at discharge: Stable    Hospital Course   Patient is a 63-year-old male who presented from home after falling out of his recliner and hitting his arm and head.  Was found to have cervical stenosis.  MRIs were obtained.  He was evaluated by neurosurgery and will plan to follow-up with them as an outpatient.  Cardiology gave recommendations regarding holding Eliquis and Plavix for any procedures.  Recommendation is to hold apixaban 3 days before surgery and to hold Plavix for 7 days before surgery.  Patient was found stable and discharged home with home " care.    Consultations This Hospital Stay   NEUROSURGERY IP CONSULT  CARDIOLOGY IP CONSULT  LYMPHEDEMA THERAPY IP CONSULT  CARE MANAGEMENT / SOCIAL WORK IP CONSULT  OCCUPATIONAL THERAPY ADULT IP CONSULT  PHYSICAL THERAPY ADULT IP CONSULT  WOUND OSTOMY CONTINENCE NURSE  IP CONSULT    Code Status   Full Code    Time Spent on this Encounter   I, Maryse Vargas DO, personally saw the patient today and spent greater than 30 minutes discharging this patient.       Maryse Vargas DO  [address] [address]  Phone: [phone]  Fax: [phone]  ______________________________________________________________________    Physical Exam   Vital Signs: Temp: 97.9  F (36.6  C) Temp src: Oral BP: 107/66 Pulse: 85   Resp: 18 SpO2: 92 % O2 Device: None (Room air)    Weight: 263 lbs 14.25 oz         Primary Care Physician   Reid Escobar    Discharge Orders      Adult Sleep Eval & Management  Referral      Home Care Referral      Follow-up and recommended labs and tests     Please follow up with Dr. Dangelo at [facility].  Please call the clinic at [phone] to schedule your appointment.     Reason for your hospital stay    Fall with cervical spine stenosis, new diagnosis of diabetes     Follow-up and recommended labs and tests     Follow up with primary care provider, Reid Escobar, within 3-4 days.      Follow up regarding new diabetes with A1c of 7.8.  Farxiga initiated.  Follow-up with neurosurgery.     Activity    Your activity upon discharge: activity as tolerated     Diet    Follow this diet upon discharge: Current Diet:Orders Placed This Encounter      Combination Diet Moderate Consistent Carb (60 g CHO per Meal) Diet       Significant Results and Procedures   Most Recent 3 CBC's:  Recent Labs   Lab Test 09/17/24  0850 09/17/24  0325 06/08/24  0455   WBC 7.3 7.7 7.7   HGB 11.5* 11.0* 11.6*   MCV 95 94 94   PLT  168 188 185     Most Recent 3 BMP's:  Recent Labs   Lab Test 09/18/24  1224 09/18/24  1126 09/18/24  0745 09/17/24  1807 09/17/24  0850 09/17/24  0325 09/09/24  1110   NA  --   --   --   --  140 142 131*   POTASSIUM  --   --   --   --  3.6 3.7 3.6   CHLORIDE  --   --   --   --  103 103 89*   CO2  --   --   --   --  27 27 27   BUN  --   --   --   --  27.1* 30.6* 41.7*   CR  --   --   --   --  1.12 1.28* 1.27*   ANIONGAP  --   --   --   --  10 12 15   RUSLAN  --   --   --   --  8.7* 8.6* 9.9   * 196* 147*   < > 212* 250* 235*    < > = values in this interval not displayed.     Most Recent 2 LFT's:  Recent Labs   Lab Test 09/17/24 0325 06/04/24  0428   AST 14 9   ALT 16 15   ALKPHOS 75 65   BILITOTAL 0.3 0.5   ,   Results for orders placed or performed during the hospital encounter of 09/17/24   Head CT w/o contrast    Narrative    EXAM: CT HEAD W/O CONTRAST, CT CERVICAL SPINE W/O CONTRAST  LOCATION: [facility]  DATE: 9/17/2024    INDICATION: Fall, head injury, anticoagulated  COMPARISON: None.  TECHNIQUE:   1) Routine CT Head without IV contrast. Multiplanar reformats. Dose reduction techniques were used.  2) Routine CT Cervical Spine without IV contrast. Multiplanar reformats. Dose reduction techniques were used.    FINDINGS:   HEAD CT:   INTRACRANIAL CONTENTS: No intracranial hemorrhage, extraaxial collection, or mass effect.  No CT evidence of acute infarct. Moderate presumed chronic small vessel ischemic changes. Moderate generalized volume loss. No hydrocephalus.     VISUALIZED ORBITS/SINUSES/MASTOIDS: Prior bilateral cataract surgery. Visualized portions of the orbits are otherwise unremarkable. No significant paranasal sinus mucosal disease. No middle ear or mastoid effusion.    BONES/SOFT TISSUES: No acute abnormality.    CERVICAL SPINE CT:   VERTEBRA: Normal vertebral body heights and alignment. No acute compression fracture or posttraumatic subluxation.     CANAL/FORAMINA: Large  left paracentral C4-C5 disc osteophyte complex resulting in moderate to severe spinal canal stenosis.    PARASPINAL: No prevertebral edema.      Impression    IMPRESSION:  HEAD CT:  1.  No acute intracranial process.    CERVICAL SPINE CT:  1.  No CT evidence for acute fracture or post traumatic subluxation.  2.  Large left paracentral C4-C5 disc osteophyte complex resulting in moderate to severe spinal canal stenosis.   CT Cervical Spine w/o Contrast    Narrative    EXAM: CT HEAD W/O CONTRAST, CT CERVICAL SPINE W/O CONTRAST  LOCATION: [facility]  DATE: 9/17/2024    INDICATION: Fall, head injury, anticoagulated  COMPARISON: None.  TECHNIQUE:   1) Routine CT Head without IV contrast. Multiplanar reformats. Dose reduction techniques were used.  2) Routine CT Cervical Spine without IV contrast. Multiplanar reformats. Dose reduction techniques were used.    FINDINGS:   HEAD CT:   INTRACRANIAL CONTENTS: No intracranial hemorrhage, extraaxial collection, or mass effect.  No CT evidence of acute infarct. Moderate presumed chronic small vessel ischemic changes. Moderate generalized volume loss. No hydrocephalus.     VISUALIZED ORBITS/SINUSES/MASTOIDS: Prior bilateral cataract surgery. Visualized portions of the orbits are otherwise unremarkable. No significant paranasal sinus mucosal disease. No middle ear or mastoid effusion.    BONES/SOFT TISSUES: No acute abnormality.    CERVICAL SPINE CT:   VERTEBRA: Normal vertebral body heights and alignment. No acute compression fracture or posttraumatic subluxation.     CANAL/FORAMINA: Large left paracentral C4-C5 disc osteophyte complex resulting in moderate to severe spinal canal stenosis.    PARASPINAL: No prevertebral edema.      Impression    IMPRESSION:  HEAD CT:  1.  No acute intracranial process.    CERVICAL SPINE CT:  1.  No CT evidence for acute fracture or post traumatic subluxation.  2.  Large left paracentral C4-C5 disc osteophyte complex  resulting in moderate to severe spinal canal stenosis.   CT Lumbar Spine w/o Contrast    Narrative    EXAM: CT LUMBAR SPINE W/O CONTRAST  LOCATION: [facility]  DATE: 9/17/2024    INDICATION: Low back pain, fall  COMPARISON: None.  TECHNIQUE: Routine CT Lumbar Spine without IV contrast. Multiplanar reformats. Dose reduction techniques were used.    FINDINGS:  Nomenclature is based on 5 lumbar type vertebral bodies. Very mild levoscoliosis. Slight degenerative retrolisthesis of L3 on L4. Otherwise vertebral alignment anatomic. Normal vertebral body heights. Probable acute to subacute fracture involving the   right superior lateral endplate osteophyte at L4 seen best on the coronal reformations (images 22 through 31). No other fractures are identified. No posttraumatic subluxation. No destructive bony lesion. No extraspinal soft tissue abnormality. Mild   degenerative changes sacroiliac joints. Incompletely imaged internal fixation hardware in the left ilium. Osteopenia.    T11-T12: Normal disc height. No herniation. Normal facets. No spinal canal or neural foraminal stenosis.    T12-L1: Normal disc height. No herniation. Normal facets. No spinal canal or neural foraminal stenosis.    L1-L2: Normal disc height. No herniation. No spinal canal stenosis. Mild facet hypertrophic changes with mild foraminal narrowing.    L2-L3: Mild disc space narrowing. Mild disc bulge indents the ventral thecal sac. Mild facet hypertrophic changes contribute to moderate left and mild right foraminal narrowing    L3-L4: Moderate disc space narrowing with vacuum cleft disc. Mild disc bulge indents the ventral thecal sac and contributes to mild central canal stenosis. Mild facet hypertrophic changes contribute to moderate foraminal narrowing.    L4-L5: Mild disc space narrowing with vacuum cleft disc. Mild disc bulge indents the ventral thecal sac and contributes to mild central canal stenosis. Mild facet  hypertrophic changes contribute to moderate foraminal narrowing.    L5-S1: Mild disc space narrowing. Mild disc bulge partially effaces the ventral epidural fat. Mild facet hypertrophic changes contribute to marked right and mild left foraminal narrowing.    Thickened left adrenal gland maintains its adreniform shape and may be related to adrenal hyperplasia. Marked atherosclerosis abdominal aorta and iliac arteries. Desiccated fecal material in the visualized portions of the rectum raising the possibility   of constipation.      Impression    IMPRESSION:  1.  Probable acute to subacute fracture involving the right superior lateral endplate osteophyte at L4.  2.  Spondylosis with central canal and foraminal stenosis as described above.   XR Chest 1 View    Narrative    EXAM: XR CHEST 1 VIEW  LOCATION: [facility]  DATE: 9/17/2024    INDICATION: Fall, weakness  COMPARISON: 5/13/2024      Impression    IMPRESSION: Sternotomy. Mild cardiac enlargement. Pulmonary vascularity within normal limits. No acute infiltrates or consolidation. No pneumothorax. Old left posterior rib fractures. Otherwise unremarkable. No definite acute findings.              MR Lumbar Spine w/o Contrast    Narrative    EXAM: MR LUMBAR SPINE W/O CONTRAST  LOCATION: [facility]  DATE: 9/17/2024    INDICATION: Abnormal CT.  COMPARISON: Lumbar spine CT September 17, 2024  TECHNIQUE: Routine Lumbar Spine MRI without IV contrast.    FINDINGS:   Nomenclature is based on 5 lumbar type vertebral bodies. Mild degenerative endplate change at T12/L1-L4/L5. The vertebral bodies of the lumbar spine have normal stature, alignment, and marrow signal intensity. Normal distal spinal cord and cauda equina   with conus medullaris at the L2/L3 level. The partially imaged intra-abdominal contents are unremarkable. Unremarkable visualized bony pelvis.    T12-L1: Normal disc signal and disc height. No posterior disc bulge  or spinal canal narrowing. No neural foraminal narrowing.     L1-L2: Normal disc signal and disc height. Posterior annular fissure. No neural foraminal narrowing.    L2-L3: Symmetric disc bulge, posterior annular fissure and facet arthropathy with moderate spinal canal narrowing. Moderate bilateral neural foraminal narrowing.     L3-L4: Mild loss of disc signal and disc height. Symmetric disc bulge, facet arthropathy and ligamentum flavum thickening with moderate spinal canal narrowing. Severe right and moderate left neural foraminal narrowing.    L4-L5: Symmetric disc bulge, posterior annular fissure and facet arthropathy with mild spinal canal narrowing. Severe bilateral neural foraminal narrowing.    L5-S1: No posterior disc bulge or spinal canal narrowing. Moderate bilateral facet arthropathy. Severe right and moderate left neural foraminal narrowing.      Impression    IMPRESSION:  1.  Negative for compression fracture involving the superior plate of L4.  2.  Multilevel degenerative disc disease of the lumbar spine with symmetric disc bulges, most pronounced at the L2/L3 and L3/L4 levels where there is moderate spinal canal narrowing.  3.  Multilevel posterolateral disc disease and facet arthropathy with multilevel neural foraminal narrowing as described above.   MR Cervical Spine w/o Contrast    Narrative    EXAM: MR CERVICAL SPINE W/O CONTRAST  LOCATION: [facility]  DATE: 9/17/2024    INDICATION: Disc protrusion.  COMPARISON: None.  TECHNIQUE: MRI Cervical Spine without IV contrast.    FINDINGS:   Retrolisthesis of C4 on C5 measuring 1 mm. Anterior marginal osteophytes at C3/C4-C5/C6. The vertebral bodies of the cervical spine otherwise have normal stature, alignment, and marrow signal intensity. No evidence of signal abnormality or expansion   within the cervical spinal cord. No extraspinal abnormality.    Craniovertebral junction and C1-C2: Normal.    C2-C3: No posterior disc bulge  or spinal canal narrowing. No neural foraminal narrowing.     C3-C4: Broad bar disc osteophyte complex with moderate spinal canal narrowing. Uncovertebral joint disease and facet arthropathy with severe left neural foraminal narrowing. No right neural foraminal narrowing.     C4-C5: Broad bar disc osteophyte complex, eccentric to left with moderate to severe spinal canal narrowing and compression of the cervical spinal cord without evidence of edema or myelomalacia. Uncovertebral joint disease and facet arthropathy with   severe left and moderate right neural foraminal narrowing.     C5-C6: Moderate loss of disc signal and disc height. Symmetric disc bulge and advanced facet arthropathy with moderate spinal canal narrowing and mild compression of the cervical spinal cord without evidence of edema or myelomalacia. Severe bilateral   neural foraminal narrowing.     C6-C7: No posterior disc bulge or spinal canal narrowing. Uncovertebral joint disease and facet arthropathy with severe bilateral neural foraminal narrowing.     C7-T1: No posterior disc bulge or spinal canal narrowing. Uncovertebral joint disease and facet arthropathy with moderate bilateral neural foraminal narrowing.      Impression    IMPRESSION:  1.  At the C4/C5 level, there is a broad bar disc osteophyte complex, eccentric to left contributing to moderate to severe spinal canal narrowing and compression of the cervical spinal cord without evidence of edema or myelomalacia.  2.  At the C5/C6 level, there is a symmetric disc bulge and advanced facet arthropathy with moderate spinal canal narrowing and mild compression of the cervical spinal cord without evidence of edema or myelomalacia.  3.  Multilevel uncovertebral joint disease and facet arthropathy with severe multilevel neural foraminal narrowing as described above.         Discharge Medications   Current Discharge Medication List        START taking these medications    Details   dapagliflozin  (FARXIGA) 5 MG TABS tablet Take 1 tablet (5 mg) by mouth daily.  Qty: 90 tablet, Refills: 1    Associated Diagnoses: Type 2 diabetes mellitus without complication, without long-term current use of insulin (H)           CONTINUE these medications which have NOT CHANGED    Details   acetaminophen (TYLENOL) 500 MG tablet Take 1,000 mg by mouth 2 times daily as needed for mild pain      albuterol (PROAIR HFA/PROVENTIL HFA/VENTOLIN HFA) 108 (90 Base) MCG/ACT inhaler Inhale 2 puffs into the lungs every 6 hours as needed for shortness of breath, wheezing or cough      amiodarone (PACERONE) 200 MG tablet Take 1 tablet (200 mg) by mouth daily  Qty: 90 tablet, Refills: 3    Associated Diagnoses: Persistent atrial fibrillation (H); On amiodarone therapy      apixaban ANTICOAGULANT (ELIQUIS) 5 MG tablet Take 1 tablet (5 mg) by mouth 2 times daily  Qty: 180 tablet, Refills: 3    Associated Diagnoses: Paroxysmal atrial fibrillation (H)      atorvastatin (LIPITOR) 40 MG tablet Take 40 mg by mouth every morning      carvedilol (COREG) 3.125 MG tablet Take 1 tablet (3.125 mg) by mouth 2 times daily (with meals)  Qty: 180 tablet, Refills: 1    Associated Diagnoses: Heart failure with reduced ejection fraction (H)      clopidogrel (PLAVIX) 75 MG tablet Take 1 tablet (75 mg) by mouth daily  Qty: 60 tablet, Refills: 0    Associated Diagnoses: Coronary artery disease involving native coronary artery of native heart without angina pectoris      [employer] VITAMIN B12 1000 MCG tablet Take 1,000 mcg by mouth daily      diclofenac (VOLTAREN) 1 % topical gel Apply 2 g topically every 6 hours as needed for moderate pain      hydrALAZINE (APRESOLINE) 10 MG tablet Take 1 tablet (10 mg) by mouth 3 times daily  Qty: 270 tablet, Refills: 3    Associated Diagnoses: Acute on chronic systolic congestive heart failure (H)      ipratropium - albuterol 0.5 mg/2.5 mg/3 mL (DUONEB) 0.5-2.5 (3) MG/3ML neb solution Take 1 vial by nebulization every 6 hours as  needed for wheezing.      isosorbide mononitrate (IMDUR) 60 MG 24 hr tablet Take 3 tablets (180 mg) by mouth every morning  Qty: 270 tablet, Refills: 1    Associated Diagnoses: Coronary artery disease involving native heart without angina pectoris, unspecified vessel or lesion type      metolazone (ZAROXOLYN) 2.5 MG tablet Take 2.5 mg by mouth once a week. on Wednesday, 30 mintues before your torsemide. Have labs drawn weekly on Thursday.    Associated Diagnoses: Acute on chronic diastolic congestive heart failure (H)      nitroGLYcerin (NITROSTAT) 0.4 MG sublingual tablet Place 1 tablet (0.4 mg) under the tongue every 5 minutes as needed If no relief after 3 tabs call 911;continue 1 tab every 5 min max 3 tab Indications: chest pain  Qty: 100 tablet, Refills: 1    Associated Diagnoses: Ischemic cardiomyopathy      polyethylene glycol (MIRALAX) 17 GM/Dose powder Take 17 g by mouth 2 times daily as needed  Qty: 510 g, Refills: 0    Associated Diagnoses: Constipation, unspecified constipation type      potassium chloride barbara ER (KLOR-CON M20) 20 MEQ CR tablet Take 2 tablets (40 mEq) by mouth daily  Qty: 180 tablet, Refills: 1    Associated Diagnoses: Hypokalemia      rOPINIRole (REQUIP) 2 MG tablet Take 2 mg by mouth every 6 hours      !! spironolactone (ALDACTONE) 25 MG tablet Take 25 mg by mouth daily.      !! spironolactone (ALDACTONE) 25 MG tablet Take 50 mg by mouth every 7 days. Take 2 tablet once a week on Wednesday along with Metolazone      torsemide (DEMADEX) 100 MG tablet Take 1 tablet (100 mg) by mouth 2 times daily  Qty: 180 tablet, Refills: 3    Associated Diagnoses: Coronary artery disease involving native coronary artery of native heart without angina pectoris       !! - Potential duplicate medications found. Please discuss with provider.        Allergies   Allergies   Allergen Reactions    Bumex [Bumetanide] Muscle Pain (Myalgia)

## 2024-09-18 NOTE — DISCHARGE INSTRUCTIONS
BLE - every 3 day  Cleanse with saline, gently dry.  Cover with Mepilex.    Head - apply Sween cream to area of injury daily.

## 2024-09-18 NOTE — PROGRESS NOTES
"    Cardiology Progress Note    Assessment:  Fall, recurrent probably mechanical  Coronary artery disease status post CABG and status post recent PCI to ramus intermedius and obtuse marginal in May 2024, no angina, no significant troponin elevation  Chronic heart failure with intermediate ejection fraction, at baseline  Paroxysmal atrial fibrillation presently in sinus rhythm  Chronic kidney disease      Plan:  Continue current cardiac medications  Continue Plavix and apixaban for now.  Hold apixaban 3 days before surgery and Plavix 7 days before surgery.  Resume both after surgery when safe from surgical standpoint  Discussed with neurosurgical PA    Cardiology will sign off    Subjective:   Denies any new cardiac complaints    Objective:   /63 (BP Location: Left arm)   Pulse 81   Temp 98.3  F (36.8  C) (Oral)   Resp 20   Ht 1.676 m (5' 6\")   Wt 119.7 kg (263 lb 14.3 oz)   SpO2 95%   BMI 42.59 kg/m      Intake/Output Summary (Last 24 hours) at 9/18/2024 1047  Last data filed at 9/18/2024 0923  Gross per 24 hour   Intake 815 ml   Output 1850 ml   Net -1035 ml         Physical Exam:  GENERAL: no distress  NECK: No JVD  LUNGS: Clear to auscultation.  CARDIAC: regular  rhythm, S1 & S2 normal.  No heaves, thrills, gallops or murmurs.  ABDOMEN: flat, negative hepatosplenomegaly, soft and non-tender.  EXTREMITIES: No evidence of cyanosis, clubbing 2+ edema.    Current Facility-Administered Medications Ordered in Epic   Medication Dose Route Frequency Provider Last Rate Last Admin    acetaminophen (TYLENOL) tablet 650 mg  650 mg Oral Q4H PRN Maryse Vargas DO   650 mg at 09/18/24 0904    Or    acetaminophen (TYLENOL) Suppository 650 mg  650 mg Rectal Q4H PRN Maryse Vargas DO        albuterol (PROVENTIL HFA/VENTOLIN HFA) inhaler  2 puff Inhalation Q6H PRN Maryse Vargas DO        amiodarone (PACERONE) tablet 200 mg  200 mg Oral Daily Maryse Vargas DO   200 mg at " 09/18/24 0904    [Held by provider] apixaban ANTICOAGULANT (ELIQUIS) tablet 5 mg  5 mg Oral BID Maryse Vargas DO        atorvastatin (LIPITOR) tablet 40 mg  40 mg Oral QAM Maryse Vargas DO   40 mg at 09/18/24 0904    benzocaine-menthol (CHLORASEPTIC) 6-10 MG lozenge 1 lozenge  1 lozenge Buccal Q1H PRN Maryse Vargas DO   1 lozenge at 09/17/24 1853    carvedilol (COREG) tablet 3.125 mg  3.125 mg Oral BID w/meals Maryse Vargas DO   3.125 mg at 09/18/24 0904    [Held by provider] clopidogrel (PLAVIX) tablet 75 mg  75 mg Oral Daily Maryse Vargas DO        glucose gel 15-30 g  15-30 g Oral Q15 Min PRN Maryse Vargas DO        Or    dextrose 50 % injection 25-50 mL  25-50 mL Intravenous Q15 Min PRN Maryse Vargas DO        Or    glucagon injection 1 mg  1 mg Subcutaneous Q15 Min PRN Maryse Vargas DO        diclofenac (VOLTAREN) 1 % topical gel 2 g  2 g Topical Q6H PRN Maryse Vargas DO   2 g at 09/18/24 0911    hydrALAZINE (APRESOLINE) tablet 10 mg  10 mg Oral TID Maryse Vargas DO   10 mg at 09/18/24 0904    HYDROmorphone (PF) (DILAUDID) injection 0.5 mg  0.5 mg Intravenous Q3H PRN Rosie Laurent MD        [START ON 9/19/2024] influenza vac high-dose quad (FLUZONE HD) injection RENZO 0.7 mL  0.7 mL Intramuscular Prior to discharge Maryse Vargas DO        insulin aspart (NovoLOG) injection (RAPID ACTING)  1-7 Units Subcutaneous TID AC Maryse Vargas DO   1 Units at 09/18/24 0806    insulin aspart (NovoLOG) injection (RAPID ACTING)  1-5 Units Subcutaneous At Bedtime Maryse Vargas DO        insulin aspart (NovoLOG) injection (RAPID ACTING)   Subcutaneous TID w/meals Maryse Vargas DO   4 Units at 09/18/24 0923    ipratropium - albuterol 0.5 mg/2.5 mg/3 mL (DUONEB) neb solution 3 mL  1 vial Nebulization Q6H PRN Maryse Vargas DO   3 mL at  09/17/24 1614    ipratropium - albuterol 0.5 mg/2.5 mg/3 mL (DUONEB) neb solution 3 mL  3 mL Nebulization 4x daily Maryse Vargas DO   3 mL at 09/18/24 0815    isosorbide mononitrate (IMDUR) 24 hr tablet 180 mg  180 mg Oral QAM Maryse Vargas DO   180 mg at 09/18/24 0904    lidocaine (LMX4) cream   Topical Q1H PRN Rosie Laurent MD        lidocaine 1 % 0.1-1 mL  0.1-1 mL Other Q1H PRN Rosie Laurent MD        metolazone (ZAROXOLYN) tablet 2.5 mg  2.5 mg Oral Weekly Maryse Vargas DO   2.5 mg at 09/18/24 0707    naloxone (NARCAN) injection 0.2 mg  0.2 mg Intravenous Q2 Min PRN Rosie Laurent MD        Or    naloxone (NARCAN) injection 0.4 mg  0.4 mg Intravenous Q2 Min PRN Rosie Laurent MD        Or    naloxone (NARCAN) injection 0.2 mg  0.2 mg Intramuscular Q2 Min PRN Rosie Laurent MD        Or    naloxone (NARCAN) injection 0.4 mg  0.4 mg Intramuscular Q2 Min PRN Rosie Laurent MD        nicotine (NICODERM CQ) 14 MG/24HR 24 hr patch 1 patch  1 patch Transdermal Daily Rosie Laurent MD   1 patch at 09/18/24 0905    nitroGLYcerin (NITROSTAT) sublingual tablet 0.4 mg  0.4 mg Sublingual Q5 Min PRN Maryse Vargas DO        Patient is already receiving anticoagulation with heparin, enoxaparin (LOVENOX), warfarin (COUMADIN)  or other anticoagulant medication   Does not apply Continuous PRN Rosie Laurent MD        polyethylene glycol (MIRALAX) Packet 17 g  17 g Oral BID PRN Maryse Vargas DO        rOPINIRole (REQUIP) tablet 2 mg  2 mg Oral Q6H Maryse Vargas DO   2 mg at 09/18/24 0904    sodium chloride (PF) 0.9% PF flush 3 mL  3 mL Intracatheter Q8H Rosie Laurent MD   3 mL at 09/18/24 0557    sodium chloride (PF) 0.9% PF flush 3 mL  3 mL Intracatheter q1 min prn Rosie Laurent MD        spironolactone (ALDACTONE) tablet 25 mg  25 mg Oral Daily Maryse Vargas DO   25 mg at 09/18/24 0904    torsemide (DEMADEX) tablet  100 mg  100 mg Oral BID Maryse Vargas, DO   100 mg at 09/18/24 0904     No current [facility]-ordered outpatient medications on file.       Cardiographics:    Telemetry: Sinus rhythm no significant arrhythmias    ECHO (personnaly Reviewed): May 2024  1. Technically difficult study.  2. The left ventricle is normal in size. Image quality does not provide for  detailed assessment of LV systolic function, but is felt to be normal with a  visually estimated ejection fraction of roughly 55%.  3. No significant valvular heart disease is identified on this study though  the sensitivity, particularly of regurgitant lesions, is reduced due to poor  Doppler acoustics.  4. Right ventricular size and systolic performance could not be accurately  assessed due to inadequate visualization.     Coronary angiogram: May 2024  1.  LAD is occluded proximally.  Mid to distal LAD fills well from large, widely patent LIMA graft.  LAD proximal to graft insertion site has calcified stenosis compromising flow to large proximal diagonal  2.  Calcified 90% proximal stenosis in medium size ramus branch.  Ostium ?covered by occluded LAD stent.  3.  Severely calcified stenosis proximal segment of large first obtuse marginal branch of left circumflex.  4.  Chronic total mid RCA occlusion.  PDA fills from patent free LAURI graft.  Posterolateral branches fill from collaterals from left circumflex.  5.  LVEDP = 30 mmHg  6.  Successful PCI ramus branch with 2.5 x 24 mm Synergy DARRELL implant x 1.  30% eccentric residual with BROOKLYNN-3 flow.  7.  Successful PCI LCx OM branch with DARRELL implant x 2: 3.0 x 24 Synergy DARRELL x 1, 2.5 x 16 Synergy DARRELL x 1.  0% residual, BROOKLYNN-3 flow.     Lab Results    Chemistry/lipid CBC Cardiac Enzymes/BNP/TSH/INR   Recent Labs   Lab Test 05/02/24  1620   CHOL 121   HDL 27*   LDL 50   TRIG 219*     Recent Labs   Lab Test 05/02/24  1620   LDL 50     Recent Labs   Lab Test 09/18/24  0745 09/17/24  1807 09/17/24  0850   NA  --    "--  140   POTASSIUM  --   --  3.6   CHLORIDE  --   --  103   CO2  --   --  27   *   < > 212*   BUN  --   --  27.1*   CR  --   --  1.12   GFRESTIMATED  --   --  73   RUSLAN  --   --  8.7*    < > = values in this interval not displayed.     Recent Labs   Lab Test 09/17/24  0850 09/17/24  0325 09/09/24  1110   CR 1.12 1.28* 1.27*     Recent Labs   Lab Test 09/17/24  0850 04/29/24  1126   A1C 8.4* 7.8*          Recent Labs   Lab Test 09/17/24  0850   WBC 7.3   HGB 11.5*   HCT 39.8*   MCV 95        Recent Labs   Lab Test 09/17/24  0850 09/17/24  0325 06/08/24  0455   HGB 11.5* 11.0* 11.6*    No results for input(s): \"TROPONINI\" in the last 81494 hours.  Recent Labs   Lab Test 09/17/24 0325 06/03/24  0553 05/13/24  0709   NTBNPI 171 558 197     Recent Labs   Lab Test 08/13/24  1501   TSH 1.27     Recent Labs   Lab Test 06/03/24  0553 05/13/24  0709 02/13/23  2307   INR 0.97 1.30* 1.01                   "

## 2024-09-18 NOTE — PROGRESS NOTES
Pt is not appropriate for skilled OT services at this time due to declining need, stating he wanted pain meds prior to moving, feels confident in returning home, has nec. equipment-sock aide, reacher, shower chair and wheelchair  Will defer to PT for mobility.  Plan was discussed with pt. Will D/C current OT orders. Thank you.    9/18/2024 by Theodora Griffin OTR/OLIVIA

## 2024-09-18 NOTE — CONSULTS
[facility]  WO Nurse Inpatient Assessment     Consulted for: BLE; Head    Summary: Patient with falls at home    Patient History (according to provider note(s):    History of Present Illness  Zackary Hutchison is a 63 year old male who presents with 3 unwitnessed falls over the past week. Since that time has reported neck and low back pain. He denies any radicular arm or leg pain, however states his legs feel generally sore. Has baseline neuropathy in his feet which remain unchanged. No paresthesias in his arms. No overt weakness. No bowel/bladder complaints or foot drop.     Assessment:    Skin Injury Location:        LLE                                                             RLE                                                          R arm/shoulder                                           Head  Last photo: 9/18/24  Skin injury due to:  Bruising and traumatic injury  Skin history and plan of care: See above  Affected area:      Skin assessment: Denudement     Head abrasion measures approximately 3 cm x 2.5 cm and covered with dried drainage.     RLE with abrasion measuring approximately 4 cm x 5 cm with small amount of bloody drainage;.     LLE with multiple scattered 0.5 cm x 0.5 cm or less open areas and this is likely r/t venous insufficiency and ulceration.     RUE/Shoulder with intact bruised skin.      Odor: none  Pain: denies   Pain interventions prior to dressing change: patient tolerated well and slow and gentle cares   Treatment goal: Heal , Maintain (prevention of deterioration), and Protection  STATUS: initial assessment  Supplies ordered: supplies stored on unit    Treatment Plan:   BLE - every 3 day  Cleanse with saline, gently dry.  Cover with Mepilex.    Head - apply Sween cream to area of injury daily.    Orders: Written    RECOMMEND PRIMARY TEAM ORDER: None, at this time  Education provided: plan of care  Discussed plan of care with: Patient  [facility] nurse follow-up plan:  weekly  Notify [facility] if wound(s) deteriorate.  Nursing to notify the Provider(s) and re-consult the [facility] Nurse if new skin concern.    DATA:     Current support surface: Standard  Standard gel mattress (Isoflex)  Containment of urine/stool: Incontinence Protocol  BMI: Body mass index is 42.59 kg/m .   Active diet order: Orders Placed This Encounter      Combination Diet Moderate Consistent Carb (60 g CHO per Meal) Diet      Diet     Output: I/O last 3 completed shifts:  In: 455 [P.O.:450; I.V.:5]  Out: 2350 [Urine:2350]     Labs:   Recent Labs   Lab 09/17/24  0850 09/17/24  0325   ALBUMIN  --  3.8   HGB 11.5* 11.0*   WBC 7.3 7.7   A1C 8.4*  --      Pressure injury risk assessment:   Sensory Perception: 3-->slightly limited  Moisture: 3-->occasionally moist  Activity: 3-->walks occasionally  Mobility: 2-->very limited  Nutrition: 3-->adequate  Friction and Shear: 3-->no apparent problem  Simeon Score: 17    Naheed Domingo, MSN RN CWOCN  Pager no longer is use, please contact through Qon[employer] group: [facility] [employer]

## 2024-09-18 NOTE — PROGRESS NOTES
[facility]    Neurosurgery Progress Note    Date of Service (when I saw the patient): 09/18/2024     Assessment & Plan   Zackary Hutchison is a 63 year old male who presents with 3 unwitnessed falls over the past week. Since that time has reported neck and low back pain. Imaging revealed cervical stenosis.     Today he was seen sitting in the chair. No new symptoms overnight. Reports ongoing intermittent neck pain. No radicular arm pain, paresthesias or overt weakness. Reports his legs feel generally sore.     Plan:  -No plans for urgent/emergent surgery  -Will facilitate outpatient follow up with Dr. Dangelo to discuss surgical options for cervical spine findings  -Discussed with cardiology regarding timeframe of holding medications prior to any surgical procedure. Cardiology note details recommendations  -[place] to [place] [facility] [place]  -Neurosurgery will contact patient regarding follow up  -Neurosurgery will sign off, please page with questions     I have discussed the following assessment and plan Dr. Dangelo  who is in agreement with initial plan and will follow up with further consultation recommendations.    Caroline Carter, ZEINA  [facility] Neurosurgery  [address] [place] [address]  Tel [phone]  Fax [phone]  Securely message with [employer] (more info)  Text page via fitaborate Paging/Directory     Interval History   Stable    Physical Exam   Temp: 98.3  F (36.8  C) Temp src: Oral BP: 116/63 Pulse: 81   Resp: 20 SpO2: 95 % O2 Device: None (Room air)    Vitals:    09/17/24 0308 09/17/24 1954   Weight: 117.9 kg (260 lb) 119.7 kg (263 lb 14.3 oz)     Vital Signs with Ranges  Temp:  [97.4  F (36.3  C)-98.6  F (37  C)] 98.3  F (36.8  C)  Pulse:  [81-97] 81  Resp:  [20-43] 20  BP: (116-147)/(60-71) 116/63  SpO2:  [93 %-96 %] 95 %  I/O last 3 completed shifts:  In: 455 [P.O.:450; I.V.:5]  Out: 2350 [Urine:2350]     , Blood pressure 116/63, pulse 81,  "temperature 98.3  F (36.8  C), temperature source Oral, resp. rate 20, height 1.676 m (5' 6\"), weight 119.7 kg (263 lb 14.3 oz), SpO2 95%.  263 lbs 14.25 oz  HEENT:  Normocephalic.  PERRLA.  EOM s intact.    Neck:  Supple, non-tender, without lymphadenopathy.  Heart:  No peripheral edema  Lungs:  No SOB  Abdomen:  Soft, non-tender, non-distended.   Skin:  Warm and dry, good capillary refill.  Extremities:  Good radial and dorsalis pedis pulses bilaterally, no edema, cyanosis or clubbing.    NEUROLOGICAL EXAMINATION:   Mental status:  Alert and Oriented x 3, speech is fluent.  Motor:  Strength is 5/5 throughout the upper and lower extremities  Shoulder Abduction:  Right:  4/5   Left:  5/5  Biceps:                      Right:  5/5   Left:  5/5  Triceps:                     Right:  5/5   Left:  5/5  Wrist Extensors:       Right:  5/5   Left:  5/5  Wrist Flexors:           Right:  5/5   Left:  5/5  interosseus :            Right:  5/5   Left:  5/5   Hip Flexor:                Right: 5/5  Left:  5/5  Hip Adductor:             Right:  5/5  Left:  5/5  Hip Abductor:             Right:  5/5  Left:  5/5  Gastroc Soleus:        Right:  5/5  Left:  5/5  Tib/Ant:                      Right:  5/5  Left:  5/5  EHL:                     Right:  5/5  Left:  5/5  Sensation:  intact  Reflexes:   Negative Babinski.  Negative Clonus.       Tenderness to palpation of the lumbar spine.      Medications   Current Facility-Administered Medications   Medication Dose Route Frequency Provider Last Rate Last Admin    Patient is already receiving anticoagulation with heparin, enoxaparin (LOVENOX), warfarin (COUMADIN)  or other anticoagulant medication   Does not apply Continuous PRN Rosie Laurent MD         Current Facility-Administered Medications   Medication Dose Route Frequency Provider Last Rate Last Admin    amiodarone (PACERONE) tablet 200 mg  200 mg Oral Daily Maryse Vargas DO   200 mg at 09/18/24 0904    [Held by " provider] apixaban ANTICOAGULANT (ELIQUIS) tablet 5 mg  5 mg Oral BID Maryse Vargas DO        atorvastatin (LIPITOR) tablet 40 mg  40 mg Oral TERRA Maryse Vargas DO   40 mg at 09/18/24 0904    carvedilol (COREG) tablet 3.125 mg  3.125 mg Oral BID w/meals Maryse Vargas DO   3.125 mg at 09/18/24 0904    [Held by provider] clopidogrel (PLAVIX) tablet 75 mg  75 mg Oral Daily Maryse Vargas DO        hydrALAZINE (APRESOLINE) tablet 10 mg  10 mg Oral TID Maryse Vargas DO   10 mg at 09/18/24 0904    [START ON 9/19/2024] influenza vac high-dose quad (FLUZONE HD) injection RENZO 0.7 mL  0.7 mL Intramuscular Prior to discharge Maryse Vargas DO        insulin aspart (NovoLOG) injection (RAPID ACTING)  1-7 Units Subcutaneous TID AC Maryse Vargas DO   1 Units at 09/18/24 0806    insulin aspart (NovoLOG) injection (RAPID ACTING)  1-5 Units Subcutaneous At Bedtime Maryse Vargas DO        insulin aspart (NovoLOG) injection (RAPID ACTING)   Subcutaneous TID w/meals Maryse Vargas DO   4 Units at 09/18/24 0923    ipratropium - albuterol 0.5 mg/2.5 mg/3 mL (DUONEB) neb solution 3 mL  3 mL Nebulization 4x daily Maryse Vargas DO   3 mL at 09/18/24 0815    isosorbide mononitrate (IMDUR) 24 hr tablet 180 mg  180 mg Oral Maryse Johnson DO   180 mg at 09/18/24 0904    metolazone (ZAROXOLYN) tablet 2.5 mg  2.5 mg Oral Weekly Maryse Vargas DO   2.5 mg at 09/18/24 0707    nicotine (NICODERM CQ) 14 MG/24HR 24 hr patch 1 patch  1 patch Transdermal Daily Rosie Laurent MD   1 patch at 09/18/24 0905    rOPINIRole (REQUIP) tablet 2 mg  2 mg Oral Q6H Maryse Vargas DO   2 mg at 09/18/24 0904    sodium chloride (PF) 0.9% PF flush 3 mL  3 mL Intracatheter Q8H Rosie Laurent MD   3 mL at 09/18/24 0557    spironolactone (ALDACTONE) tablet 25 mg  25 mg Oral Daily Maryse Vargas  DO Hiral   25 mg at 09/18/24 0904    torsemide (DEMADEX) tablet 100 mg  100 mg Oral BID Maryse Vargas DO Hiral   100 mg at 09/18/24 0904       Data     CBC RESULTS:   Recent Labs   Lab Test 09/17/24  0850   WBC 7.3   RBC 4.18*   HGB 11.5*   HCT 39.8*   MCV 95   MCH 27.5   MCHC 28.9*   RDW 20.9*        Basic Metabolic Panel:  Lab Results   Component Value Date     09/17/2024      Lab Results   Component Value Date    POTASSIUM 3.6 09/17/2024     Lab Results   Component Value Date    CHLORIDE 103 09/17/2024     Lab Results   Component Value Date    RUSLAN 8.7 09/17/2024     Lab Results   Component Value Date    CO2 27 09/17/2024     Lab Results   Component Value Date    BUN 27.1 09/17/2024     Lab Results   Component Value Date    CR 1.12 09/17/2024     Lab Results   Component Value Date     09/18/2024     INR:  Lab Results   Component Value Date    INR 0.97 06/03/2024    INR 1.30 05/13/2024    INR 1.01 02/13/2023

## 2024-09-18 NOTE — PROGRESS NOTES
Neurosurgery progress note:    Lumbar MRI without acute fracture  Cervical MRI with multi level disease.    Imaging:    Lumbar MRI:    IMPRESSION:  1.  Negative for compression fracture involving the superior plate of L4.  2.  Multilevel degenerative disc disease of the lumbar spine with symmetric disc bulges, most pronounced at the L2/L3 and L3/L4 levels where there is moderate spinal canal narrowing.  3.  Multilevel posterolateral disc disease and facet arthropathy with multilevel neural foraminal narrowing as described above.    Cervical MRI:    IMPRESSION:  1.  At the C4/C5 level, there is a broad bar disc osteophyte complex, eccentric to left contributing to moderate to severe spinal canal narrowing and compression of the cervical spinal cord without evidence of edema or myelomalacia.  2.  At the C5/C6 level, there is a symmetric disc bulge and advanced facet arthropathy with moderate spinal canal narrowing and mild compression of the cervical spinal cord without evidence of edema or myelomalacia.  3.  Multilevel uncovertebral joint disease and facet arthropathy with severe multilevel neural foraminal narrowing as described above.    RECOMMENDATIONS:  Okay to be up with assist.  Neurosurgery will see tomorrow discuss multi-level cervical pathology.    CATIA William  [facility] Neurosurgery  [address]  [address] [place] [id number]    Tel [phone]  Pager [phone]

## 2024-09-19 ENCOUNTER — PATIENT OUTREACH (OUTPATIENT)
Dept: CARE COORDINATION | Facility: CLINIC | Age: 64
End: 2024-09-19
Payer: MEDICARE

## 2024-09-19 DIAGNOSIS — Z09 HOSPITAL DISCHARGE FOLLOW-UP: ICD-10-CM

## 2024-09-19 NOTE — PROGRESS NOTES
[facility]    Background: Transitional Care Management program identified per system criteria and reviewed by [facility] team for possible outreach.    Assessment: Upon chart review, [facility] Team member will not proceed with patient outreach related to this episode of Transitional Care Management program due to reason below:    Patient has a follow up appointment with an appropriate provider today for hospital discharge    Plan: Transitional Care Management episode addressed appropriately per reason noted above.      Katrina Marrufo MA  [facility]    *[facility] Resource Team does NOT follow patient ongoing. Referrals are identified based on internal discharge reports and the outreach is to ensure patient has an understanding of their discharge instructions.

## 2024-09-19 NOTE — PLAN OF CARE
Problem: Adult Inpatient Plan of Care  Goal: Plan of Care Review  Description: The Plan of Care Review/Shift note should be completed every shift.  The Outcome Evaluation is a brief statement about your assessment that the patient is improving, declining, or no change.  This information will be displayed automatically on your shift  note.  Outcome: Progressing   Goal Outcome Evaluation:                    Pt. Stable for discharge. VSS. . Left at around 8pm with [employer] transportation. Continue with POC.

## 2024-09-19 NOTE — TELEPHONE ENCOUNTER
Patient called to report [facility] was not able to draw his blood labs at his residence today.  Patient stated he is unable to get to [facility] outpatient today for his blood labs.  He reported he was going to set up a ride with [employer] to get to [facility] for his lab work on 9/20/2024.    Otto Rodríguez RN

## 2024-09-20 NOTE — TELEPHONE ENCOUNTER
Attempted to call patient again with no answer.  Patient is aware that he needs to come in for blood lab as of 9/19/2024, and reported he will set up his ride with [employer].    Otto Rodríguez RN

## 2024-09-20 NOTE — TELEPHONE ENCOUNTER
Called patient and left a voice mail to assess his weight, HF symptoms, and confirm he has scheduled his [employer] ride for his lab work.  Patient was unable to have his lab draw done with [facility] yesterday.  [facility] reported understanding to be prepared for that procedure on Thursdays moving forward.    Otto Rodríguez RN

## 2024-09-21 ENCOUNTER — LAB (OUTPATIENT)
Dept: LAB | Facility: HOSPITAL | Age: 64
End: 2024-09-21
Payer: MEDICARE

## 2024-09-21 DIAGNOSIS — I50.33 ACUTE ON CHRONIC DIASTOLIC CONGESTIVE HEART FAILURE (H): ICD-10-CM

## 2024-09-21 LAB
ANION GAP SERPL CALCULATED.3IONS-SCNC: 18 MMOL/L (ref 7–15)
BUN SERPL-MCNC: 45.5 MG/DL (ref 8–23)
CALCIUM SERPL-MCNC: 9.9 MG/DL (ref 8.8–10.4)
CHLORIDE SERPL-SCNC: 88 MMOL/L (ref 98–107)
CREAT SERPL-MCNC: 1.44 MG/DL (ref 0.67–1.17)
EGFRCR SERPLBLD CKD-EPI 2021: 54 ML/MIN/1.73M2
GLUCOSE SERPL-MCNC: 223 MG/DL (ref 70–99)
HCO3 SERPL-SCNC: 27 MMOL/L (ref 22–29)
POTASSIUM SERPL-SCNC: 3.4 MMOL/L (ref 3.4–5.3)
SODIUM SERPL-SCNC: 133 MMOL/L (ref 135–145)

## 2024-09-21 PROCEDURE — 36415 COLL VENOUS BLD VENIPUNCTURE: CPT

## 2024-09-21 PROCEDURE — 82565 ASSAY OF CREATININE: CPT

## 2024-09-23 ENCOUNTER — APPOINTMENT (OUTPATIENT)
Dept: CARDIOLOGY | Facility: CLINIC | Age: 64
End: 2024-09-23
Payer: MEDICARE

## 2024-09-23 DIAGNOSIS — I50.20 HEART FAILURE WITH REDUCED EJECTION FRACTION (H): Primary | ICD-10-CM

## 2024-09-23 PROCEDURE — 99207 PR NO CHARGE LOS: CPT

## 2024-09-24 ENCOUNTER — TELEPHONE (OUTPATIENT)
Dept: CARDIOLOGY | Facility: CLINIC | Age: 64
End: 2024-09-24

## 2024-09-24 ENCOUNTER — OFFICE VISIT (OUTPATIENT)
Dept: NEUROSURGERY | Facility: CLINIC | Age: 64
End: 2024-09-24
Payer: MEDICARE

## 2024-09-24 VITALS
HEIGHT: 66 IN | WEIGHT: 263 LBS | SYSTOLIC BLOOD PRESSURE: 126 MMHG | HEART RATE: 84 BPM | DIASTOLIC BLOOD PRESSURE: 62 MMHG | BODY MASS INDEX: 42.27 KG/M2 | OXYGEN SATURATION: 95 %

## 2024-09-24 DIAGNOSIS — G99.2 STENOSIS OF CERVICAL SPINE WITH MYELOPATHY (H): Primary | ICD-10-CM

## 2024-09-24 DIAGNOSIS — M48.02 STENOSIS OF CERVICAL SPINE WITH MYELOPATHY (H): Primary | ICD-10-CM

## 2024-09-24 PROCEDURE — 99214 OFFICE O/P EST MOD 30 MIN: CPT | Performed by: SURGERY

## 2024-09-24 ASSESSMENT — PAIN SCALES - GENERAL: PAINLEVEL: NO PAIN (0)

## 2024-09-24 NOTE — TELEPHONE ENCOUNTER
Patient called to report he is anticipated to have Neurosurgery procedure (please reference Dr Dangelo's) note.  In his consultation Dr Dangelo wishes to have patient be off of Eliquis for 5 days before surgery, and 5 days following.  Patient is requesting to speak with Cardiology to see if this would be okay for him.      Otto Rodríguez RN

## 2024-09-24 NOTE — LETTER
"9/24/2024      Zackary Hutchison  [address]  [facility] [address]      Dear Colleague,    Thank you for referring your patient, Zackary Hutchison, to the [facility]. Please see a copy of my visit note below.    NEUROSURGERY FOLLOW UP  NOTE    Zackary Hutchison comes today in follow-up. Patient is a 63 yo male who has had several recent falls. He doesn't know why but he just tends to fall. He thinks maybe he was asleep when he fell. But also states he doesn't sleep walk. A little bit of fine motor difficulties with upper extremities. He notes when he eats a popsicle he will drop that particularly when he falls asleep. He is more quick to falling asleep now. Baseline peripheral neuropathy in feet. No other paresthesias. Sharp pain in legs and feet. Not in neck. No weakness. Constipated otherwise no bowel or bladder loss.     PHYSICAL EXAM:   Constitutional: /62   Pulse 84   Ht 1.676 m (5' 6\")   Wt 119.3 kg (263 lb)   SpO2 95%   BMI 42.45 kg/m       Mental Status: A & O in no acute distress.  Affect is appropriate.  Speech is fluent.  Recent and remote memory are intact.  Attention span and concentration are normal.     Motor:  Normal bulk and tone all muscle groups of upper and lower extremities.     IMAGING:   I personally reviewed all radiographic images        CONSULTATION ASSESSMENT AND PLAN:    Patient is a 64-year-old male who presents with recent onset of imbalance with multiple falls as well as fine motor difficulties.  Patient has baseline neuropathy. MRI of his cervical spine which shows moderate to severe spinal canal stenosis at the cervical 4-5 level as well as moderate spinal canal stenosis at the cervical 5-6 level and moderate spinal canal narrowing at the cervical 3-4.  There appears to be more significant compression at the cervical 4-5.  Patient also moderate spinal canal stenosis at lumbar 2-4.  His symptoms appear to be more related to cervical myelopathy.  Patient " has a significant cardiac history with recent CABG and more recently cardiac stenosis in May of 2024.  Given his elevated risk of bleeding because of his multiple falls it was discussed holding Plavix but remaining on apixaban. He also was recommended left atrial appendage occlusion.    Recommend cervical spine decompression when feasible.  It is unclear if he needs another cardiac procedure prior to surgery and if able to hold apixaban att this time. He feels like his symptoms came on suddenly and he is worried about waiting to have cervical decompressive surgery. He would also need to obtain cervical 4-view xray's to assess alignment and rule out instability prior to a cervical 3-cervical 6 laminoplasty. Risks and benefits of surgery discussed including but not limited to infection, hematoma, nerve damage including paralysis, post op radiculitis, durotomy, inadequate symptom relief, post laminoplasty kyphosis, neck pain, hardware malfunction, risks associated with the use of general anesthesia.       Generally patients hold apixaban 5 days before a neurosurgical procedure and typically 5-7 days after surgery with Lovenox being started on post op day 2. He will discuss with his cardiologist risk and benefits of holding antiplatelets as well if he needs additional cardiac surgery to minimize risks of undergoing general anesthesia.     Sarah Dangelo MD      CC:     Reid Escobar  [address] [place] [address]      Again, thank you for allowing me to participate in the care of your patient.        Sincerely,        Sarah Dangelo MD

## 2024-09-24 NOTE — NURSING NOTE
"Zackary Hutchison is a 64 year old male who presents for:  Chief Complaint   Patient presents with    Consult     Cervical  Review MRIs  Several falls recently - ED  Low back pain - after fall  Before falls - \"cracking noise\" in head when turning neck        Initial Vitals:  /62   Pulse 84   Ht 5' 6\" (1.676 m)   Wt 263 lb (119.3 kg)   SpO2 95%   BMI 42.45 kg/m   Estimated body mass index is 42.45 kg/m  as calculated from the following:    Height as of this encounter: 5' 6\" (1.676 m).    Weight as of this encounter: 263 lb (119.3 kg).. Body surface area is 2.36 meters squared. BP completed using cuff size: regular  No Pain (0)    Neck Disability Index (  Avinash H. and [employer] C. 1991. All rights reserved.; used with permission)    SECTION 1 - PAIN INTENSITY: The pain is moderate at the moment.  SECTION 2 - PERSONAL CARE: I can look after myself normally without causing extra neck pain.  SECTION 3 - LIFTING: Neck pain prevents me from lifting heavy weights, but I can manage light weights if they are conveniently positioned.  SECTION 4 - READING: I can read as much as I want with slight neck pain.  SECTION 5 - HEADACHES: I have no headaches at all.  SECTION 6 - CONCENTRATION: I can concentrate fully with slight difficulty.        SECTION 9 - SLEEPING: My sleep is completely disturbed for up to 5-7 hours.  SECTION 10 - RECREATION: I am able to engage in most, but not all of my recreational activities because of pain in my neck.  Count: 8  Sum: 14  Raw Score: /50: 17.5  Neck Disability Index Score: (%): 35 %       Michael Hayes  "

## 2024-09-24 NOTE — PROGRESS NOTES
"NEUROSURGERY FOLLOW UP  NOTE    Zackary Hutchison comes today in follow-up. Patient is a 65 yo male who has had several recent falls. He doesn't know why but he just tends to fall. He thinks maybe he was asleep when he fell. But also states he doesn't sleep walk. A little bit of fine motor difficulties with upper extremities. He notes when he eats a popsicle he will drop that particularly when he falls asleep. He is more quick to falling asleep now. Baseline peripheral neuropathy in feet. No other paresthesias. Sharp pain in legs and feet. Not in neck. No weakness. Constipated otherwise no bowel or bladder loss.     PHYSICAL EXAM:   Constitutional: /62   Pulse 84   Ht 1.676 m (5' 6\")   Wt 119.3 kg (263 lb)   SpO2 95%   BMI 42.45 kg/m       Mental Status: A & O in no acute distress.  Affect is appropriate.  Speech is fluent.  Recent and remote memory are intact.  Attention span and concentration are normal.     Motor:  Normal bulk and tone all muscle groups of upper and lower extremities.     IMAGING:   I personally reviewed all radiographic images        CONSULTATION ASSESSMENT AND PLAN:    Patient is a 64-year-old male who presents with recent onset of imbalance with multiple falls as well as fine motor difficulties.  Patient has baseline neuropathy. MRI of his cervical spine which shows moderate to severe spinal canal stenosis at the cervical 4-5 level as well as moderate spinal canal stenosis at the cervical 5-6 level and moderate spinal canal narrowing at the cervical 3-4.  There appears to be more significant compression at the cervical 4-5.  Patient also moderate spinal canal stenosis at lumbar 2-4.  His symptoms appear to be more related to cervical myelopathy.  Patient has a significant cardiac history with recent CABG and more recently cardiac stenosis in May of 2024.  Given his elevated risk of bleeding because of his multiple falls it was discussed holding Plavix but remaining on apixaban. He also " was recommended left atrial appendage occlusion.    Recommend cervical spine decompression when feasible.  It is unclear if he needs another cardiac procedure prior to surgery and if able to hold apixaban att this time. He feels like his symptoms came on suddenly and he is worried about waiting to have cervical decompressive surgery. He would also need to obtain cervical 4-view xray's to assess alignment and rule out instability prior to a cervical 3-cervical 6 laminoplasty. Risks and benefits of surgery discussed including but not limited to infection, hematoma, nerve damage including paralysis, post op radiculitis, durotomy, inadequate symptom relief, post laminoplasty kyphosis, neck pain, hardware malfunction, risks associated with the use of general anesthesia.       Generally patients hold apixaban 5 days before a neurosurgical procedure and typically 5-7 days after surgery with Lovenox being started on post op day 2. He will discuss with his cardiologist risk and benefits of holding antiplatelets as well if he needs additional cardiac surgery to minimize risks of undergoing general anesthesia.     Sarah Dangelo MD      CC:     Reid Escobar  [address] [place] [address]

## 2024-09-24 NOTE — TELEPHONE ENCOUNTER
Charlie Bee,     See below for recent neurosurgery consult with Dr. Dangelo, sounds like she has questions regarding when possible PVI should be.  His back surgery would require eliquis hold for 5 days prior and 5 days post.  Let us know what you think  Thank you!  Angie    Recommend cervical laminoplasty cervical 3-cervical 6 pending review of cervical 4-view xray's. 4 months out from stents. Cardiology mention OK to hold Plavix however remains on eliquis. Would need to hold 5 days before and typically 5 days after surgery with Lovenox being started on post op day 2.   Also would he need his ablation prior.  He is a little worried regarding not proceeding quickly because his symptoms did come on quickly. He will obtain a cervical 4-view xray as soon as able.      Sarah Dangelo MD

## 2024-09-26 ENCOUNTER — LAB REQUISITION (OUTPATIENT)
Dept: LAB | Facility: HOSPITAL | Age: 64
End: 2024-09-26
Payer: MEDICARE

## 2024-09-26 DIAGNOSIS — I50.42 CHRONIC COMBINED SYSTOLIC (CONGESTIVE) AND DIASTOLIC (CONGESTIVE) HEART FAILURE (H): ICD-10-CM

## 2024-09-26 DIAGNOSIS — I13.0 HYPERTENSIVE HEART AND CHRONIC KIDNEY DISEASE WITH HEART FAILURE AND STAGE 1 THROUGH STAGE 4 CHRONIC KIDNEY DISEASE, OR UNSPECIFIED CHRONIC KIDNEY DISEASE (H): ICD-10-CM

## 2024-09-26 DIAGNOSIS — I87.2 VENOUS INSUFFICIENCY (CHRONIC) (PERIPHERAL): ICD-10-CM

## 2024-09-26 LAB
ANION GAP SERPL CALCULATED.3IONS-SCNC: 15 MMOL/L (ref 7–15)
BUN SERPL-MCNC: 41.7 MG/DL (ref 8–23)
CALCIUM SERPL-MCNC: 9.9 MG/DL (ref 8.8–10.4)
CHLORIDE SERPL-SCNC: 92 MMOL/L (ref 98–107)
CREAT SERPL-MCNC: 1.55 MG/DL (ref 0.67–1.17)
EGFRCR SERPLBLD CKD-EPI 2021: 49 ML/MIN/1.73M2
GLUCOSE SERPL-MCNC: 186 MG/DL (ref 70–99)
HCO3 SERPL-SCNC: 29 MMOL/L (ref 22–29)
POTASSIUM SERPL-SCNC: 3.9 MMOL/L (ref 3.4–5.3)
SODIUM SERPL-SCNC: 136 MMOL/L (ref 135–145)

## 2024-09-26 PROCEDURE — 80048 BASIC METABOLIC PNL TOTAL CA: CPT | Mod: ORL | Performed by: INTERNAL MEDICINE

## 2024-09-27 NOTE — TELEPHONE ENCOUNTER
Sarah Dangelo MD William, Amila Dilusha, MD; Stefano Rodríguez, RN; Zulma Moreno RNYesterday (7:44 AM)       I generally hold 5 days now. On average if they hold only 3 days I lose close to a liter of blood or more in the majority of these patients. I adjusted to 5 days to help that and it seems to be better. Normal blood loss is 300 ml or even less.     Jose Bee MD Cebuhar, David B, SHARMILA; Zulma Moreno, RN; Sarah Dangelo MD2 days ago     AW  Okay to hold apixaban for 3 days prior to surgery, and resume post-operatively at the direction of the surgical team, no bridging necessary.  Can revisit AF ablation timing with him, though would at this juncture defer until post planned surgery given 3mo requirement for uninterrupted anticoagulation post ablation.    Thanks,  Jose Bee MD  9/25/2024  4:08 PM

## 2024-09-30 ENCOUNTER — ALLIED HEALTH/NURSE VISIT (OUTPATIENT)
Dept: CARDIOLOGY | Facility: CLINIC | Age: 64
End: 2024-09-30
Payer: MEDICARE

## 2024-09-30 DIAGNOSIS — I50.20 HFREF (HEART FAILURE WITH REDUCED EJECTION FRACTION) (H): Primary | ICD-10-CM

## 2024-09-30 PROCEDURE — 99454 REM MNTR PHYSIOL PARAM 16-30: CPT | Performed by: INTERNAL MEDICINE

## 2024-09-30 NOTE — UTILIZATION REVIEW
Medicare Post-Discharge Review      Admission Status; Secondary Review Determination       As part of the [facility] Utilization review plan, a self-audit is done on Medicare inpatient admission with less than 2 midnights stay. The 2014 IPPS Final Rule allows outpatient billing in the event that a hospital determines that an inpatient admission was not medically necessary under utilization review process.      (x) Outpatient status would be Appropriate- Short Stay- Post discharge review.     RATIONALE FOR DETERMINATION   Zackary Hutchison is a 64 yr old male who presented after fall.  Identified to have cervical stenosis and possible L4 compression fracture that was negative on MRI.  He was seen by cardiology with recommendations for anticoagulation/antiplatelet therapy in case neurosurgery indicated surgical procedure needed however deemed not necessary at time of hospitalization.  Patient discharged after one night.      Patient was admitted and discharge after one night stay. Record was sent by  for  Medicare short stay review by Medical Director. Based on the  severity of illness, intensity of service provided, expected LOS and risk for adverse outcome make the care appropriate for further outpatient/observation; however, doesn't meet criteria for hospital inpatient admission.           The information on this document is developed by the utilization review team in order for the business office to ensure compliance.  This only denotes the appropriateness of proper admission status and does not reflect the quality of care rendered.         The definitions of Inpatient Status and Observation Status used in making the determination above are those provided in the CMS Coverage Manual, Chapter 1 and Chapter 6, section 70.4.      Sincerely,     Aditi Jung MD  Utilization Review  Medical Director  [facility].

## 2024-10-01 ENCOUNTER — OFFICE VISIT (OUTPATIENT)
Dept: PULMONOLOGY | Facility: CLINIC | Age: 64
End: 2024-10-01
Payer: MEDICARE

## 2024-10-01 VITALS
OXYGEN SATURATION: 95 % | HEART RATE: 86 BPM | WEIGHT: 260 LBS | SYSTOLIC BLOOD PRESSURE: 122 MMHG | DIASTOLIC BLOOD PRESSURE: 68 MMHG | BODY MASS INDEX: 41.97 KG/M2

## 2024-10-01 DIAGNOSIS — J44.9 CHRONIC OBSTRUCTIVE PULMONARY DISEASE, UNSPECIFIED COPD TYPE (H): ICD-10-CM

## 2024-10-01 DIAGNOSIS — Z72.0 TOBACCO ABUSE: ICD-10-CM

## 2024-10-01 DIAGNOSIS — J44.9 COPD, MODERATE (H): ICD-10-CM

## 2024-10-01 DIAGNOSIS — I48.19 PERSISTENT ATRIAL FIBRILLATION (H): ICD-10-CM

## 2024-10-01 DIAGNOSIS — Z79.899 ON AMIODARONE THERAPY: ICD-10-CM

## 2024-10-01 DIAGNOSIS — R06.09 DYSPNEA ON EXERTION: Primary | ICD-10-CM

## 2024-10-01 PROCEDURE — 99207 PR NO BILLABLE SERVICE THIS VISIT: CPT | Performed by: INTERNAL MEDICINE

## 2024-10-01 PROCEDURE — 94729 DIFFUSING CAPACITY: CPT | Mod: 26 | Performed by: INTERNAL MEDICINE

## 2024-10-01 PROCEDURE — 94060 EVALUATION OF WHEEZING: CPT | Mod: 26 | Performed by: INTERNAL MEDICINE

## 2024-10-01 PROCEDURE — 94726 PLETHYSMOGRAPHY LUNG VOLUMES: CPT | Mod: 26 | Performed by: INTERNAL MEDICINE

## 2024-10-01 PROCEDURE — 99214 OFFICE O/P EST MOD 30 MIN: CPT | Performed by: NURSE PRACTITIONER

## 2024-10-01 PROCEDURE — 99204 OFFICE O/P NEW MOD 45 MIN: CPT | Performed by: NURSE PRACTITIONER

## 2024-10-01 RX ORDER — IPRATROPIUM BROMIDE AND ALBUTEROL SULFATE 2.5; .5 MG/3ML; MG/3ML
1 SOLUTION RESPIRATORY (INHALATION) EVERY 6 HOURS PRN
Qty: 180 ML | Refills: 5 | Status: SHIPPED | OUTPATIENT
Start: 2024-10-01

## 2024-10-01 NOTE — PROGRESS NOTES
Pulmonary Clinic Consultation          Assessment/Plan:     64 year old male with a history of afib on amiodarone, HFrEF, DM II, presenting for evaluation of shortness of breath and to have pulmonary function testing performed.      COPD, mild  Nicotine dependence  Current smoker with 50-pack-year smoking history.  He presents today as he was told to have lung function testing prior to his upcoming ablation.  He endorses shortness of breath for years that has been worsening over the past several months.  Some cough and mucus production.  Appears he was on Spiriva in the past but he does not remember much about this.  He is currently using DuoNebs 4 times daily and albuterol inhaler as needed.  He continues to smoke and he is not interested in quitting at this time.  Pulmonary function testing with reduced FVC and FEV1, with a nonobstructive ratio.  This can indicate restriction, he does have a mild restrictive physiology with TLC 75% predicted.  Diffusion capacity is normal.  Since he has normal lung parenchyma per imaging, the mild restriction is most likely due to body habitus.  His shortness of breath may be due to a mild obstructive process due to his smoking history, could also be due to cardiac disease, obesity, and deconditioning.    Plan:  - reviewed PFT results with patient today.  -Start Anoro Ellipta (umeclidinium/vilanterol) one inhalation daily.  -Continue DuoNebs or albuterol inhaler as needed  -He is due for all respiratory vaccinations, discussed to these today, he will think about this.  - Action plan: prednisone 40mg x5 days, + azithromycin x5 days.      Follow-up:  - as needed per patient request    Sarah Ni CNP  Pulmonary Medicine  [facility]  [phone]       CC:     Lung function testing     HPI:     64 year old male with a history of afib on amiodarone, HFrEF, DM II, presenting for evaluation of shortness of breath and to have pulmonary  function testing performed.      He presents today as he was told to have lung function testing prior to ablation.  SOB for years.   SOB has worsened lately, over last several months.   Once in awhile cough/mucous production.   Cardiology wants him to have an ablation.   Spiriva back in 2019, can't remember much about this.  Duonebs 4 times/day, does find benefit from this.   Albuterol not every day.    No concerns for asthma.   Smoking hx:  started at age 13, 1PPD, continues to smoke. Not ready to quit at this time.     Medical records reviewed for this visit include PCP notes, cardiology notes.     ROS:     A 12-system review was obtained and was negative with the exception of the symptoms endorsed in the HPI.       Medical history:       PMH:  Past Medical History:   Diagnosis Date    Atrial fibrillation (H)     Chronic kidney disease     Chronic kidney disease, stage 3b (H) 09/28/2023    Class 3 severe obesity due to excess calories with body mass index (BMI) of 40.0 to 44.9 in adult (H) 09/28/2023    Congestive heart failure (H)     COPD (chronic obstructive pulmonary disease) (H)     Coronary artery disease involving coronary bypass graft of native heart without angina pectoris 09/28/2023    Heart failure with reduced ejection fraction (H) 09/11/2023    HFrEF (heart failure with reduced ejection fraction) (H) 09/11/2023    Hyperlipidemia     Hypertension     Ischemic cardiomyopathy 09/28/2023    Obese     Paroxysmal atrial fibrillation (H) 09/28/2023    Tobacco abuse 09/28/2023       PSH:  Past Surgical History:   Procedure Laterality Date    CORONARY ANGIOGRAPHY ADULT ORDER      CORONARY ARTERY BYPASS Left 2014    2 vessels    CV CARDIOMEMS WITH RIGHT HEART CATH N/A 2/15/2024    Procedure: Pulmonary Arterial Pressure Sensor Placement;  Surgeon: Jacey Bhandari MD;  Location: [facility] CATH LAB CV    CV CORONARY ANGIOGRAM N/A 09/11/2023    Procedure: Coronary Angiogram;  Surgeon: Santy Esposito MD;   Location: [facility] CATH LAB CV    CV CORONARY ANGIOGRAM N/A 5/2/2024    Procedure: CV CORONARY ANGIOGRAM;  Surgeon: Santy Esposito MD;  Location: [facility] CATH LAB CV    CV LEFT HEART CATH N/A 09/11/2023    Procedure: Left Heart Catheterization;  Surgeon: aSnty Esposito MD;  Location: [facility] CATH LAB CV    CV LEFT HEART CATH N/A 5/2/2024    Procedure: Left Heart Catheterization;  Surgeon: Santy Esposito MD;  Location: [facility] CATH LAB CV    CV PCI ANGIOPLASTY N/A 5/2/2024    Procedure: Percutaneous Transluminal Angioplasty;  Surgeon: Santy Esposito MD;  Location: [facility] CATH LAB CV    CV RIGHT HEART CATH MEASUREMENTS RECORDED N/A 09/11/2023    Procedure: Right Heart Catheterization;  Surgeon: Santy Esposito MD;  Location: [facility] CATH LAB CV       Allergies:  Allergies   Allergen Reactions    Bumex [Bumetanide] Muscle Pain (Myalgia)       Family Hx:  Family History   Problem Relation Age of Onset    Cerebrovascular Disease Mother     Myocardial Infarction Father        Social Hx:  Social History     Socioeconomic History    Marital status: Single     Spouse name: Not on file    Number of children: Not on file    Years of education: Not on file    Highest education level: Not on file   Occupational History    Occupation: Retired restaurant owner     Comment: [employer]   Tobacco Use    Smoking status: Every Day     Current packs/day: 0.50     Types: Cigarettes    Smokeless tobacco: Never    Tobacco comments:     Seen IP by [facility] on 9/11/23 and declined cessation services and materials   Substance and Sexual Activity    Alcohol use: Not Currently    Drug use: Not on file    Sexual activity: Not on file   Other Topics Concern    Not on file   Social History Narrative    Currently lives in an assisted living facility. (Updated: 01/08/2024)     Social Determinants of Health     Financial Resource Strain: Low Risk  (9/18/2024)    Financial Resource Strain     Within the past 12 months, have you or your  family members you live with been unable to get utilities (heat, electricity) when it was really needed?: No   Food Insecurity: Low Risk  (9/18/2024)    Food Insecurity     Within the past 12 months, did you worry that your food would run out before you got money to buy more?: No     Within the past 12 months, did the food you bought just not last and you didn t have money to get more?: No   Transportation Needs: Low Risk  (9/18/2024)    Transportation Needs     Within the past 12 months, has lack of transportation kept you from medical appointments, getting your medicines, non-medical meetings or appointments, work, or from getting things that you need?: No   Physical Activity: Not on file   Stress: Not on file   Social Connections: Not on file   Interpersonal Safety: High Risk (9/17/2024)    Interpersonal Safety     Do you feel physically and emotionally safe where you currently live?: No     Within the past 12 months, have you been hit, slapped, kicked or otherwise physically hurt by someone?: No     Within the past 12 months, have you been humiliated or emotionally abused in other ways by your partner or ex-partner?: No   Housing Stability: Low Risk  (9/18/2024)    Housing Stability     Do you have housing? : Yes     Are you worried about losing your housing?: No       Current Meds:  Current Outpatient Medications   Medication Sig Dispense Refill    acetaminophen (TYLENOL) 500 MG tablet Take 1,000 mg by mouth 2 times daily as needed for mild pain      albuterol (PROAIR HFA/PROVENTIL HFA/VENTOLIN HFA) 108 (90 Base) MCG/ACT inhaler Inhale 2 puffs into the lungs every 6 hours as needed for shortness of breath, wheezing or cough      amiodarone (PACERONE) 200 MG tablet Take 1 tablet (200 mg) by mouth daily 90 tablet 3    apixaban ANTICOAGULANT (ELIQUIS) 5 MG tablet Take 1 tablet (5 mg) by mouth 2 times daily 180 tablet 3    atorvastatin (LIPITOR) 40 MG tablet Take 40 mg by mouth every morning      carvedilol (COREG)  3.125 MG tablet Take 1 tablet (3.125 mg) by mouth 2 times daily (with meals) 180 tablet 1    clopidogrel (PLAVIX) 75 MG tablet Take 1 tablet (75 mg) by mouth daily 60 tablet 0    [employer] VITAMIN B12 1000 MCG tablet Take 1,000 mcg by mouth daily      dapagliflozin (FARXIGA) 5 MG TABS tablet Take 1 tablet (5 mg) by mouth daily. 90 tablet 1    diclofenac (VOLTAREN) 1 % topical gel Apply 2 g topically every 6 hours as needed for moderate pain      hydrALAZINE (APRESOLINE) 10 MG tablet Take 1 tablet (10 mg) by mouth 3 times daily 270 tablet 3    ipratropium - albuterol 0.5 mg/2.5 mg/3 mL (DUONEB) 0.5-2.5 (3) MG/3ML neb solution Take 1 vial by nebulization every 6 hours as needed for wheezing.      isosorbide mononitrate (IMDUR) 60 MG 24 hr tablet Take 3 tablets (180 mg) by mouth every morning 270 tablet 1    metolazone (ZAROXOLYN) 2.5 MG tablet Take 2.5 mg by mouth once a week. on Wednesday, 30 mintues before your torsemide. Have labs drawn weekly on Thursday.      nitroGLYcerin (NITROSTAT) 0.4 MG sublingual tablet Place 1 tablet (0.4 mg) under the tongue every 5 minutes as needed If no relief after 3 tabs call 911;continue 1 tab every 5 min max 3 tab Indications: chest pain 100 tablet 1    polyethylene glycol (MIRALAX) 17 GM/Dose powder Take 17 g by mouth 2 times daily as needed 510 g 0    potassium chloride barbara ER (KLOR-CON M20) 20 MEQ CR tablet Take 2 tablets (40 mEq) by mouth daily 180 tablet 1    rOPINIRole (REQUIP) 2 MG tablet Take 2 mg by mouth every 6 hours      spironolactone (ALDACTONE) 25 MG tablet Take 25 mg by mouth daily.      spironolactone (ALDACTONE) 25 MG tablet Take 50 mg by mouth every 7 days. Take 2 tablet once a week on Wednesday along with Metolazone      torsemide (DEMADEX) 100 MG tablet Take 1 tablet (100 mg) by mouth 2 times daily 180 tablet 3          Physical Exam:     /68 (BP Location: Left arm, Patient Position: Sitting, Cuff Size: Adult Large)   Pulse 86   Wt 117.9 kg (260 lb)   SpO2  95%   BMI 41.97 kg/m    Gen: adult male, obese, appears in NAD  HEENT: clear conjunctivae, moist mucous membranes  CV: RRR, no M/G/R  Resp: expiratory wheezes.  Respirations even and unlabored.  On RA.   Skin: no apparent rashes on visible skin  Ext: no cyanosis, clubbing or edema  Neuro: alert and answering questions appropriately       Data:     Labs:  reviewed    Imaging studies:  I have personally reviewed all pertinent imaging studies and PFT results unless otherwise noted.    Recent Results (from the past 744 hour(s))   XR Chest 1 View    Narrative    EXAM: XR CHEST 1 VIEW  LOCATION: [facility]  DATE: 9/17/2024    INDICATION: Fall, weakness  COMPARISON: 5/13/2024      Impression    IMPRESSION: Sternotomy. Mild cardiac enlargement. Pulmonary vascularity within normal limits. No acute infiltrates or consolidation. No pneumothorax. Old left posterior rib fractures. Otherwise unremarkable. No definite acute findings.              Head CT w/o contrast    Narrative    EXAM: CT HEAD W/O CONTRAST, CT CERVICAL SPINE W/O CONTRAST  LOCATION: [facility]  DATE: 9/17/2024    INDICATION: Fall, head injury, anticoagulated  COMPARISON: None.  TECHNIQUE:   1) Routine CT Head without IV contrast. Multiplanar reformats. Dose reduction techniques were used.  2) Routine CT Cervical Spine without IV contrast. Multiplanar reformats. Dose reduction techniques were used.    FINDINGS:   HEAD CT:   INTRACRANIAL CONTENTS: No intracranial hemorrhage, extraaxial collection, or mass effect.  No CT evidence of acute infarct. Moderate presumed chronic small vessel ischemic changes. Moderate generalized volume loss. No hydrocephalus.     VISUALIZED ORBITS/SINUSES/MASTOIDS: Prior bilateral cataract surgery. Visualized portions of the orbits are otherwise unremarkable. No significant paranasal sinus mucosal disease. No middle ear or mastoid effusion.    BONES/SOFT TISSUES: No acute  abnormality.    CERVICAL SPINE CT:   VERTEBRA: Normal vertebral body heights and alignment. No acute compression fracture or posttraumatic subluxation.     CANAL/FORAMINA: Large left paracentral C4-C5 disc osteophyte complex resulting in moderate to severe spinal canal stenosis.    PARASPINAL: No prevertebral edema.      Impression    IMPRESSION:  HEAD CT:  1.  No acute intracranial process.    CERVICAL SPINE CT:  1.  No CT evidence for acute fracture or post traumatic subluxation.  2.  Large left paracentral C4-C5 disc osteophyte complex resulting in moderate to severe spinal canal stenosis.   CT Cervical Spine w/o Contrast    Narrative    EXAM: CT HEAD W/O CONTRAST, CT CERVICAL SPINE W/O CONTRAST  LOCATION: [facility]  DATE: 9/17/2024    INDICATION: Fall, head injury, anticoagulated  COMPARISON: None.  TECHNIQUE:   1) Routine CT Head without IV contrast. Multiplanar reformats. Dose reduction techniques were used.  2) Routine CT Cervical Spine without IV contrast. Multiplanar reformats. Dose reduction techniques were used.    FINDINGS:   HEAD CT:   INTRACRANIAL CONTENTS: No intracranial hemorrhage, extraaxial collection, or mass effect.  No CT evidence of acute infarct. Moderate presumed chronic small vessel ischemic changes. Moderate generalized volume loss. No hydrocephalus.     VISUALIZED ORBITS/SINUSES/MASTOIDS: Prior bilateral cataract surgery. Visualized portions of the orbits are otherwise unremarkable. No significant paranasal sinus mucosal disease. No middle ear or mastoid effusion.    BONES/SOFT TISSUES: No acute abnormality.    CERVICAL SPINE CT:   VERTEBRA: Normal vertebral body heights and alignment. No acute compression fracture or posttraumatic subluxation.     CANAL/FORAMINA: Large left paracentral C4-C5 disc osteophyte complex resulting in moderate to severe spinal canal stenosis.    PARASPINAL: No prevertebral edema.      Impression    IMPRESSION:  HEAD CT:  1.  No acute  intracranial process.    CERVICAL SPINE CT:  1.  No CT evidence for acute fracture or post traumatic subluxation.  2.  Large left paracentral C4-C5 disc osteophyte complex resulting in moderate to severe spinal canal stenosis.   CT Lumbar Spine w/o Contrast    Narrative    EXAM: CT LUMBAR SPINE W/O CONTRAST  LOCATION: [facility]  DATE: 9/17/2024    INDICATION: Low back pain, fall  COMPARISON: None.  TECHNIQUE: Routine CT Lumbar Spine without IV contrast. Multiplanar reformats. Dose reduction techniques were used.    FINDINGS:  Nomenclature is based on 5 lumbar type vertebral bodies. Very mild levoscoliosis. Slight degenerative retrolisthesis of L3 on L4. Otherwise vertebral alignment anatomic. Normal vertebral body heights. Probable acute to subacute fracture involving the   right superior lateral endplate osteophyte at L4 seen best on the coronal reformations (images 22 through 31). No other fractures are identified. No posttraumatic subluxation. No destructive bony lesion. No extraspinal soft tissue abnormality. Mild   degenerative changes sacroiliac joints. Incompletely imaged internal fixation hardware in the left ilium. Osteopenia.    T11-T12: Normal disc height. No herniation. Normal facets. No spinal canal or neural foraminal stenosis.    T12-L1: Normal disc height. No herniation. Normal facets. No spinal canal or neural foraminal stenosis.    L1-L2: Normal disc height. No herniation. No spinal canal stenosis. Mild facet hypertrophic changes with mild foraminal narrowing.    L2-L3: Mild disc space narrowing. Mild disc bulge indents the ventral thecal sac. Mild facet hypertrophic changes contribute to moderate left and mild right foraminal narrowing    L3-L4: Moderate disc space narrowing with vacuum cleft disc. Mild disc bulge indents the ventral thecal sac and contributes to mild central canal stenosis. Mild facet hypertrophic changes contribute to moderate foraminal  narrowing.    L4-L5: Mild disc space narrowing with vacuum cleft disc. Mild disc bulge indents the ventral thecal sac and contributes to mild central canal stenosis. Mild facet hypertrophic changes contribute to moderate foraminal narrowing.    L5-S1: Mild disc space narrowing. Mild disc bulge partially effaces the ventral epidural fat. Mild facet hypertrophic changes contribute to marked right and mild left foraminal narrowing.    Thickened left adrenal gland maintains its adreniform shape and may be related to adrenal hyperplasia. Marked atherosclerosis abdominal aorta and iliac arteries. Desiccated fecal material in the visualized portions of the rectum raising the possibility   of constipation.      Impression    IMPRESSION:  1.  Probable acute to subacute fracture involving the right superior lateral endplate osteophyte at L4.  2.  Spondylosis with central canal and foraminal stenosis as described above.   MR Cervical Spine w/o Contrast    Narrative    EXAM: MR CERVICAL SPINE W/O CONTRAST  LOCATION: [facility]  DATE: 9/17/2024    INDICATION: Disc protrusion.  COMPARISON: None.  TECHNIQUE: MRI Cervical Spine without IV contrast.    FINDINGS:   Retrolisthesis of C4 on C5 measuring 1 mm. Anterior marginal osteophytes at C3/C4-C5/C6. The vertebral bodies of the cervical spine otherwise have normal stature, alignment, and marrow signal intensity. No evidence of signal abnormality or expansion   within the cervical spinal cord. No extraspinal abnormality.    Craniovertebral junction and C1-C2: Normal.    C2-C3: No posterior disc bulge or spinal canal narrowing. No neural foraminal narrowing.     C3-C4: Broad bar disc osteophyte complex with moderate spinal canal narrowing. Uncovertebral joint disease and facet arthropathy with severe left neural foraminal narrowing. No right neural foraminal narrowing.     C4-C5: Broad bar disc osteophyte complex, eccentric to left with moderate to severe  spinal canal narrowing and compression of the cervical spinal cord without evidence of edema or myelomalacia. Uncovertebral joint disease and facet arthropathy with   severe left and moderate right neural foraminal narrowing.     C5-C6: Moderate loss of disc signal and disc height. Symmetric disc bulge and advanced facet arthropathy with moderate spinal canal narrowing and mild compression of the cervical spinal cord without evidence of edema or myelomalacia. Severe bilateral   neural foraminal narrowing.     C6-C7: No posterior disc bulge or spinal canal narrowing. Uncovertebral joint disease and facet arthropathy with severe bilateral neural foraminal narrowing.     C7-T1: No posterior disc bulge or spinal canal narrowing. Uncovertebral joint disease and facet arthropathy with moderate bilateral neural foraminal narrowing.      Impression    IMPRESSION:  1.  At the C4/C5 level, there is a broad bar disc osteophyte complex, eccentric to left contributing to moderate to severe spinal canal narrowing and compression of the cervical spinal cord without evidence of edema or myelomalacia.  2.  At the C5/C6 level, there is a symmetric disc bulge and advanced facet arthropathy with moderate spinal canal narrowing and mild compression of the cervical spinal cord without evidence of edema or myelomalacia.  3.  Multilevel uncovertebral joint disease and facet arthropathy with severe multilevel neural foraminal narrowing as described above.     MR Lumbar Spine w/o Contrast    Narrative    EXAM: MR LUMBAR SPINE W/O CONTRAST  LOCATION: [facility]  DATE: 9/17/2024    INDICATION: Abnormal CT.  COMPARISON: Lumbar spine CT September 17, 2024  TECHNIQUE: Routine Lumbar Spine MRI without IV contrast.    FINDINGS:   Nomenclature is based on 5 lumbar type vertebral bodies. Mild degenerative endplate change at T12/L1-L4/L5. The vertebral bodies of the lumbar spine have normal stature, alignment, and marrow  signal intensity. Normal distal spinal cord and cauda equina   with conus medullaris at the L2/L3 level. The partially imaged intra-abdominal contents are unremarkable. Unremarkable visualized bony pelvis.    T12-L1: Normal disc signal and disc height. No posterior disc bulge or spinal canal narrowing. No neural foraminal narrowing.     L1-L2: Normal disc signal and disc height. Posterior annular fissure. No neural foraminal narrowing.    L2-L3: Symmetric disc bulge, posterior annular fissure and facet arthropathy with moderate spinal canal narrowing. Moderate bilateral neural foraminal narrowing.     L3-L4: Mild loss of disc signal and disc height. Symmetric disc bulge, facet arthropathy and ligamentum flavum thickening with moderate spinal canal narrowing. Severe right and moderate left neural foraminal narrowing.    L4-L5: Symmetric disc bulge, posterior annular fissure and facet arthropathy with mild spinal canal narrowing. Severe bilateral neural foraminal narrowing.    L5-S1: No posterior disc bulge or spinal canal narrowing. Moderate bilateral facet arthropathy. Severe right and moderate left neural foraminal narrowing.      Impression    IMPRESSION:  1.  Negative for compression fracture involving the superior plate of L4.  2.  Multilevel degenerative disc disease of the lumbar spine with symmetric disc bulges, most pronounced at the L2/L3 and L3/L4 levels where there is moderate spinal canal narrowing.  3.  Multilevel posterolateral disc disease and facet arthropathy with multilevel neural foraminal narrowing as described above.         Pulmonary Function Testing

## 2024-10-01 NOTE — PATIENT INSTRUCTIONS
It was a pleasure to see you in clinic today.   Here is what we discussed:    Start Anoro Ellipta, one puff daily.  Let us know if there are cost/coverage issues at the pharmacy.  Continue Duonebs or Albuterol inhaler every 4-6 hours as needed for shortness of breath or wheezing.  Consider:  annual influenza vaccine, COVID booster, pneumonia vaccine (prevnar 20), and RSV vaccine.   Call my nurse, Cyril ([phone]) with any change or worsening of your breathing.  We can then send out your action plan medications (prednisone + antibiotic).   Follow-up as needed.     Sarah Ni, CNP  Pulmonary Medicine  [facility]  [phone]

## 2024-10-02 LAB
DLCOCOR-%PRED-PRE: 76 %
DLCOCOR-PRE: 18.07 ML/MIN/MMHG
DLCOUNC-%PRED-PRE: 68 %
DLCOUNC-PRE: 16.26 ML/MIN/MMHG
DLCOUNC-PRED: 23.58 ML/MIN/MMHG
ERV-%PRED-PRE: 17 %
ERV-PRE: 0.23 L
ERV-PRED: 1.29 L
EXPTIME-PRE: 6.83 SEC
FEF2575-%PRED-POST: 37 %
FEF2575-%PRED-PRE: 34 %
FEF2575-POST: 0.85 L/SEC
FEF2575-PRE: 0.79 L/SEC
FEF2575-PRED: 2.28 L/SEC
FEFMAX-%PRED-PRE: 51 %
FEFMAX-PRE: 4.06 L/SEC
FEFMAX-PRED: 7.86 L/SEC
FEV1-%PRED-PRE: 51 %
FEV1-PRE: 1.42 L
FEV1FEV6-PRE: 68 %
FEV1FEV6-PRED: 78 %
FEV1FVC-PRE: 68 %
FEV1FVC-PRED: 78 %
FEV1SVC-PRE: 63 %
FEV1SVC-PRED: 71 %
FIFMAX-PRE: 4.08 L/SEC
FRCPLETH-%PRED-PRE: 80 %
FRCPLETH-PRE: 2.76 L
FRCPLETH-PRED: 3.43 L
FVC-%PRED-PRE: 59 %
FVC-PRE: 2.08 L
FVC-PRED: 3.52 L
IC-%PRED-PRE: 78 %
IC-PRE: 2.03 L
IC-PRED: 2.58 L
RVPLETH-%PRED-PRE: 104 %
RVPLETH-PRE: 2.53 L
RVPLETH-PRED: 2.42 L
TLCPLETH-%PRED-PRE: 75 %
TLCPLETH-PRE: 4.8 L
TLCPLETH-PRED: 6.31 L
VA-%PRED-PRE: 70 %
VA-PRE: 3.96 L
VC-%PRED-PRE: 58 %
VC-PRE: 2.26 L
VC-PRED: 3.89 L

## 2024-10-03 ENCOUNTER — LAB REQUISITION (OUTPATIENT)
Dept: LAB | Facility: HOSPITAL | Age: 64
End: 2024-10-03
Payer: MEDICARE

## 2024-10-03 DIAGNOSIS — I13.0 HYPERTENSIVE HEART AND CHRONIC KIDNEY DISEASE WITH HEART FAILURE AND STAGE 1 THROUGH STAGE 4 CHRONIC KIDNEY DISEASE, OR UNSPECIFIED CHRONIC KIDNEY DISEASE (H): ICD-10-CM

## 2024-10-03 DIAGNOSIS — I50.42 CHRONIC COMBINED SYSTOLIC (CONGESTIVE) AND DIASTOLIC (CONGESTIVE) HEART FAILURE (H): ICD-10-CM

## 2024-10-03 LAB
ANION GAP SERPL CALCULATED.3IONS-SCNC: 15 MMOL/L (ref 7–15)
BUN SERPL-MCNC: 46.4 MG/DL (ref 8–23)
CALCIUM SERPL-MCNC: 9.7 MG/DL (ref 8.8–10.4)
CHLORIDE SERPL-SCNC: 92 MMOL/L (ref 98–107)
CREAT SERPL-MCNC: 1.65 MG/DL (ref 0.67–1.17)
EGFRCR SERPLBLD CKD-EPI 2021: 46 ML/MIN/1.73M2
GLUCOSE SERPL-MCNC: 231 MG/DL (ref 70–99)
HCO3 SERPL-SCNC: 27 MMOL/L (ref 22–29)
POTASSIUM SERPL-SCNC: 3.8 MMOL/L (ref 3.4–5.3)
SODIUM SERPL-SCNC: 134 MMOL/L (ref 135–145)

## 2024-10-03 PROCEDURE — 80048 BASIC METABOLIC PNL TOTAL CA: CPT | Mod: ORL | Performed by: INTERNAL MEDICINE

## 2024-10-03 NOTE — PROGRESS NOTES
[facility]:   [facility] Routine Remote Evaluation    HRS Enrollment date: 2/23/24     Dates: 8/30/24 through 9/30/24    This is not the first billing cycle.    Alerts in the last month: None    No adjustments made this month.  Discussed with patient/caregiver and they will continue current plan of care.       Readings:       Total Minutes Spent: 0 minutes     Anh Velasquez CMA    Future Appointments   Date Time Provider Department Center   10/24/2024  4:00 AM [facility]   10/31/2024  4:00 AM [facility]   11/25/2024  4:00 AM [facility]   12/2/2024  4:00 AM [facility]   12/26/2024  4:00 AM [facility]     I have reviewed Anh Velasquez CMA 's note and agree.    Jacey Bhandari MD., MHS

## 2024-10-10 ENCOUNTER — LAB REQUISITION (OUTPATIENT)
Dept: LAB | Facility: HOSPITAL | Age: 64
End: 2024-10-10
Payer: MEDICARE

## 2024-10-10 DIAGNOSIS — I87.2 VENOUS INSUFFICIENCY (CHRONIC) (PERIPHERAL): ICD-10-CM

## 2024-10-10 DIAGNOSIS — I25.10 ATHEROSCLEROTIC HEART DISEASE OF NATIVE CORONARY ARTERY WITHOUT ANGINA PECTORIS: ICD-10-CM

## 2024-10-10 DIAGNOSIS — I25.5 ISCHEMIC CARDIOMYOPATHY: ICD-10-CM

## 2024-10-10 LAB
ANION GAP SERPL CALCULATED.3IONS-SCNC: 17 MMOL/L (ref 7–15)
BUN SERPL-MCNC: 43.9 MG/DL (ref 8–23)
CALCIUM SERPL-MCNC: 9.9 MG/DL (ref 8.8–10.4)
CHLORIDE SERPL-SCNC: 91 MMOL/L (ref 98–107)
CREAT SERPL-MCNC: 1.5 MG/DL (ref 0.67–1.17)
EGFRCR SERPLBLD CKD-EPI 2021: 51 ML/MIN/1.73M2
GLUCOSE SERPL-MCNC: 174 MG/DL (ref 70–99)
HCO3 SERPL-SCNC: 28 MMOL/L (ref 22–29)
POTASSIUM SERPL-SCNC: 3.4 MMOL/L (ref 3.4–5.3)
SODIUM SERPL-SCNC: 136 MMOL/L (ref 135–145)

## 2024-10-10 PROCEDURE — 80048 BASIC METABOLIC PNL TOTAL CA: CPT | Mod: ORL | Performed by: INTERNAL MEDICINE

## 2024-10-17 ENCOUNTER — LAB REQUISITION (OUTPATIENT)
Dept: LAB | Facility: HOSPITAL | Age: 64
End: 2024-10-17
Payer: MEDICARE

## 2024-10-17 DIAGNOSIS — I87.2 VENOUS INSUFFICIENCY (CHRONIC) (PERIPHERAL): ICD-10-CM

## 2024-10-17 DIAGNOSIS — I50.42 CHRONIC COMBINED SYSTOLIC (CONGESTIVE) AND DIASTOLIC (CONGESTIVE) HEART FAILURE (H): ICD-10-CM

## 2024-10-17 LAB
ANION GAP SERPL CALCULATED.3IONS-SCNC: 18 MMOL/L (ref 7–15)
BUN SERPL-MCNC: 46.8 MG/DL (ref 8–23)
CALCIUM SERPL-MCNC: 10 MG/DL (ref 8.8–10.4)
CHLORIDE SERPL-SCNC: 87 MMOL/L (ref 98–107)
CREAT SERPL-MCNC: 1.69 MG/DL (ref 0.67–1.17)
EGFRCR SERPLBLD CKD-EPI 2021: 44 ML/MIN/1.73M2
GLUCOSE SERPL-MCNC: 203 MG/DL (ref 70–99)
HCO3 SERPL-SCNC: 29 MMOL/L (ref 22–29)
POTASSIUM SERPL-SCNC: 3.8 MMOL/L (ref 3.4–5.3)
SODIUM SERPL-SCNC: 134 MMOL/L (ref 135–145)

## 2024-10-17 PROCEDURE — 80048 BASIC METABOLIC PNL TOTAL CA: CPT | Mod: ORL | Performed by: INTERNAL MEDICINE

## 2024-10-22 ENCOUNTER — TELEPHONE (OUTPATIENT)
Dept: NEUROSURGERY | Facility: CLINIC | Age: 64
End: 2024-10-22
Payer: MEDICARE

## 2024-10-22 NOTE — TELEPHONE ENCOUNTER
Left message for Zackary to return call to discuss cardio clearance before scheduling spinal surgery.  Carly Dukes RN, CNRN

## 2024-10-22 NOTE — TELEPHONE ENCOUNTER
[facility] Call Center    Phone Message    May a detailed message be left on voicemail: yes     Reason for Call: Other: Patient is calling stating that we were going to reach out to cardiology to see how long the patient should be off blood thinners to have his procedure. He is wondering if we have done that and if he can schedule his procedure. Please call back to discuss further.      Action Taken: Message routed to:  Other: [facility]    Travel Screening: Not Applicable

## 2024-10-23 ENCOUNTER — TELEPHONE (OUTPATIENT)
Dept: NEUROSURGERY | Facility: CLINIC | Age: 64
End: 2024-10-23
Payer: MEDICARE

## 2024-10-23 NOTE — TELEPHONE ENCOUNTER
[facility] Call Center    Phone Message    May a detailed message be left on voicemail: yes     Reason for Call: Other: Pt is calling and would like a call back to discuss getting an injection. Please call Pt back to discuss.      Action Taken: Message routed to:  Other: [facility]    Travel Screening: Not Applicable     Date of Service:

## 2024-10-23 NOTE — TELEPHONE ENCOUNTER
Called and let Zackary know he should get cardio clearance before proceeding with proposed surgery. He voiced agreement.  Carly Dukes RN, CNRN

## 2024-10-24 ENCOUNTER — OFFICE VISIT (OUTPATIENT)
Dept: CARDIOLOGY | Facility: CLINIC | Age: 64
End: 2024-10-24
Payer: MEDICARE

## 2024-10-24 ENCOUNTER — LAB REQUISITION (OUTPATIENT)
Dept: LAB | Facility: HOSPITAL | Age: 64
End: 2024-10-24
Payer: MEDICARE

## 2024-10-24 DIAGNOSIS — I50.42 CHRONIC COMBINED SYSTOLIC (CONGESTIVE) AND DIASTOLIC (CONGESTIVE) HEART FAILURE (H): ICD-10-CM

## 2024-10-24 DIAGNOSIS — I50.20 HFREF (HEART FAILURE WITH REDUCED EJECTION FRACTION) (H): Primary | ICD-10-CM

## 2024-10-24 DIAGNOSIS — I25.5 ISCHEMIC CARDIOMYOPATHY: ICD-10-CM

## 2024-10-24 DIAGNOSIS — I87.2 VENOUS INSUFFICIENCY (CHRONIC) (PERIPHERAL): ICD-10-CM

## 2024-10-24 LAB
ANION GAP SERPL CALCULATED.3IONS-SCNC: 16 MMOL/L (ref 7–15)
BUN SERPL-MCNC: 42.1 MG/DL (ref 8–23)
CALCIUM SERPL-MCNC: 9.7 MG/DL (ref 8.8–10.4)
CHLORIDE SERPL-SCNC: 88 MMOL/L (ref 98–107)
CREAT SERPL-MCNC: 1.8 MG/DL (ref 0.67–1.17)
EGFRCR SERPLBLD CKD-EPI 2021: 41 ML/MIN/1.73M2
GLUCOSE SERPL-MCNC: 182 MG/DL (ref 70–99)
HCO3 SERPL-SCNC: 27 MMOL/L (ref 22–29)
POTASSIUM SERPL-SCNC: 3.6 MMOL/L (ref 3.4–5.3)
SODIUM SERPL-SCNC: 131 MMOL/L (ref 135–145)

## 2024-10-24 PROCEDURE — 80048 BASIC METABOLIC PNL TOTAL CA: CPT | Mod: ORL | Performed by: INTERNAL MEDICINE

## 2024-10-24 PROCEDURE — 93264 REM MNTR WRLS P-ART PRS SNR: CPT | Performed by: INTERNAL MEDICINE

## 2024-10-28 DIAGNOSIS — I50.33 ACUTE ON CHRONIC DIASTOLIC CONGESTIVE HEART FAILURE (H): ICD-10-CM

## 2024-10-28 RX ORDER — METOLAZONE 2.5 MG/1
2.5 TABLET ORAL WEEKLY
Qty: 12 TABLET | Refills: 1 | Status: SHIPPED | OUTPATIENT
Start: 2024-10-28

## 2024-10-31 ENCOUNTER — HOSPITAL ENCOUNTER (EMERGENCY)
Facility: HOSPITAL | Age: 64
Discharge: HOME | End: 2024-10-31
Attending: EMERGENCY MEDICINE | Admitting: EMERGENCY MEDICINE
Payer: MEDICARE

## 2024-10-31 VITALS
SYSTOLIC BLOOD PRESSURE: 133 MMHG | BODY MASS INDEX: 40.66 KG/M2 | TEMPERATURE: 97.7 F | HEART RATE: 96 BPM | DIASTOLIC BLOOD PRESSURE: 75 MMHG | RESPIRATION RATE: 19 BRPM | WEIGHT: 253 LBS | HEIGHT: 66 IN | OXYGEN SATURATION: 95 %

## 2024-10-31 DIAGNOSIS — I83.899 BLEEDING FROM VARICOSE VEIN: ICD-10-CM

## 2024-10-31 LAB
ANION GAP SERPL CALCULATED.3IONS-SCNC: 17 MMOL/L (ref 7–15)
BUN SERPL-MCNC: 56.6 MG/DL (ref 8–23)
CALCIUM SERPL-MCNC: 10.1 MG/DL (ref 8.8–10.4)
CHLORIDE SERPL-SCNC: 89 MMOL/L (ref 98–107)
CREAT SERPL-MCNC: 1.67 MG/DL (ref 0.67–1.17)
EGFRCR SERPLBLD CKD-EPI 2021: 45 ML/MIN/1.73M2
GLUCOSE SERPL-MCNC: 229 MG/DL (ref 70–99)
HCO3 SERPL-SCNC: 29 MMOL/L (ref 22–29)
POTASSIUM SERPL-SCNC: 3.7 MMOL/L (ref 3.4–5.3)
SODIUM SERPL-SCNC: 135 MMOL/L (ref 135–145)

## 2024-10-31 PROCEDURE — 99283 EMERGENCY DEPT VISIT LOW MDM: CPT

## 2024-10-31 PROCEDURE — 80048 BASIC METABOLIC PNL TOTAL CA: CPT | Performed by: EMERGENCY MEDICINE

## 2024-10-31 PROCEDURE — 36415 COLL VENOUS BLD VENIPUNCTURE: CPT | Performed by: EMERGENCY MEDICINE

## 2024-10-31 ASSESSMENT — ACTIVITIES OF DAILY LIVING (ADL)
ADLS_ACUITY_SCORE: 0

## 2024-10-31 NOTE — ED NOTES
Bed: JNED-K  Expected date:   Expected time:   Means of arrival:   Comments:  Christian, 54M, bleed on thinners

## 2024-10-31 NOTE — ED PROVIDER NOTES
EMERGENCY DEPARTMENT ENCOUNTER      NAME: Zackary Hutchison  AGE: 64 year old male  YOB: 1960  MRN: [id number]  EVALUATION DATE & TIME: 10/31/2024 11:43 AM    PCP: Reid Escobar    ED PROVIDER: Pat Arnold MD      Chief Complaint   Patient presents with    right foot bleeding         FINAL IMPRESSION:  1. Bleeding from varicose vein          ED COURSE & MEDICAL DECISION MAKING:    Pertinent Labs & Imaging studies reviewed. (See chart for details)    11:56 AM I introduced myself to the patient, obtained patient history, performed a physical exam, and discussed plan for ED workup including potential diagnostic laboratory/imaging studies and interventions.  1:01 PM We discussed plans for discharge including supportive cares, symptomatic treatment, outpatient follow up, and reasons to return to the emergency department.    64 year old male presents to the Emergency Department for evaluation of bleeding from an area of varicose veins in the left medial foot.  He is anticoagulated.  He states that he has chronic lymphedema for which he does wraps and has wound care follow him.  He was scheduled to have them come today and so he was removing his wraps when he noticed sudden bleeding from the left medial foot area where he has a collection of varicose veins.  He had difficulty controlling this so called EMS and this was controlled with pressure and gauze.  At this time he has no active bleeding.  I do the see the area of varicose veins that are likely the source of the bleeding but I did wipe away dried blood and there is no sign of any active bleeding here.  Nonadherent gauze placed with Curlex wrap and had no further bleeding here.  He was ambulating here to the bathroom and had no further bleeding with that as well.  He was observed here for about an hour without recurrence.  Discussed following up with his wound care team.  We discussed a nonstick dressing to help reduce trauma to the area for  rebleeding.  He did asked that I check his BMP as he was due for this and could not get this done as the wound care visit did not happen today so I did check this.  He will follow-up with his doctor for these results.  He was in agreement with this plan.  Give indications for return emergency department.  We discussed wound care.  He was discharged home in stable condition.         At the conclusion of the encounter I discussed the results of all of the tests and the disposition. The questions were answered. The patient or family acknowledged understanding and was agreeable with the care plan.       Medical Decision Making  Obtained supplemental history:Supplemental history obtained?: No  Reviewed external records: External records reviewed?: No  Care impacted by chronic illness:Anticoagulated State, Chronic Kidney Disease, Diabetes, Heart Disease, Hyperlipidemia, Hypertension, and Smoking / Nicotine Use  Care significantly affected by social determinants of health:N/A  Did you consider but not order tests?: Work up considered but not performed and documented in chart, if applicable  Did you interpret images independently?: Independent interpretation of ECG and images noted in documentation, when applicable.  Consultation discussion with other provider:Did you involve another provider (consultant, , pharmacy, etc.)?: No  Discharge. No recommendations on prescription strength medication(s). See documentation for any additional details.    Not Applicable      MEDICATIONS GIVEN IN THE EMERGENCY:  Medications - No data to display    NEW PRESCRIPTIONS STARTED AT TODAY'S ER VISIT  New Prescriptions    No medications on file          =================================================================    HPI    Patient information was obtained from: the patient    Use of : N/A        Zackary Hutchison is a 64 year old male with a pertinent history of chronic ischemic heart disease, chronic kidney disease stage IIIb,  "coronary artery disease, paroxysmal atrial fibrillation on Eliquis, hereditary and idiopathic peripheral neuropathy, hyperlipidemia, hypertension, tobacco use, venous stasis dermatitis of both lower extremities and status post CABG who presents to this ED for evaluation of bleeding from the left foot.  Patient presents from independent living for acute onset of bleeding that began shortly prior to arrival.  He at baseline has lymphedema and wraps the legs with wound nurse following him and seeing him twice a week.  He states he went to unwrap his wraps this morning when he suddenly developed bleeding from the left medial foot area.  He states this was \"shooting\" out.  EMS was called and they were able to control the bleeding with pressure and gauze.  He is anticoagulated with Plavix and Eliquis.  He reports history of varicose veins.  Denies any pain at this time.  Bleeding is currently controlled.  Denies any trauma.  He states that his leg swelling is at baseline and unchanged and has no new erythema or new wounds otherwise.  No new numbness, tingling, or weakness.  He states he was due to get a BMP today that was post to be drawn by the wound nurse but had to cancel the appointment because he is here so he is asking if we could obtain this.  He reports that his cardiologist follows this with his diuretics.          REVIEW OF SYSTEMS   Pertinent positives and negatives are documented in the HPI. All other systems reviewed and are negative.      PAST MEDICAL HISTORY:  Past Medical History:   Diagnosis Date    Atrial fibrillation (H)     Chronic kidney disease     Chronic kidney disease, stage 3b (H) 09/28/2023    Class 3 severe obesity due to excess calories with body mass index (BMI) of 40.0 to 44.9 in adult (H) 09/28/2023    Congestive heart failure (H)     COPD (chronic obstructive pulmonary disease) (H)     Coronary artery disease involving coronary bypass graft of native heart without angina pectoris " 09/28/2023    Heart failure with reduced ejection fraction (H) 09/11/2023    HFrEF (heart failure with reduced ejection fraction) (H) 09/11/2023    Hyperlipidemia     Hypertension     Ischemic cardiomyopathy 09/28/2023    Obese     Paroxysmal atrial fibrillation (H) 09/28/2023    Tobacco abuse 09/28/2023       PAST SURGICAL HISTORY:  Past Surgical History:   Procedure Laterality Date    CORONARY ANGIOGRAPHY ADULT ORDER      CORONARY ARTERY BYPASS Left 2014    2 vessels    CV CARDIOMEMS WITH RIGHT HEART CATH N/A 2/15/2024    Procedure: Pulmonary Arterial Pressure Sensor Placement;  Surgeon: Jacey Bhandari MD;  Location: [facility] CATH LAB CV    CV CORONARY ANGIOGRAM N/A 09/11/2023    Procedure: Coronary Angiogram;  Surgeon: Santy Esposito MD;  Location: [facility] CATH LAB CV    CV CORONARY ANGIOGRAM N/A 5/2/2024    Procedure: CV CORONARY ANGIOGRAM;  Surgeon: Santy Esposito MD;  Location: [facility] CATH LAB CV    CV LEFT HEART CATH N/A 09/11/2023    Procedure: Left Heart Catheterization;  Surgeon: Santy Esposito MD;  Location: [facility] CATH LAB CV    CV LEFT HEART CATH N/A 5/2/2024    Procedure: Left Heart Catheterization;  Surgeon: Santy Esposito MD;  Location: [facility] CATH LAB CV    CV PCI ANGIOPLASTY N/A 5/2/2024    Procedure: Percutaneous Transluminal Angioplasty;  Surgeon: Santy Esposito MD;  Location: [facility] CATH LAB CV    CV RIGHT HEART CATH MEASUREMENTS RECORDED N/A 09/11/2023    Procedure: Right Heart Catheterization;  Surgeon: Santy Esposito MD;  Location: [facility] CATH LAB CV           CURRENT MEDICATIONS:    acetaminophen (TYLENOL) 500 MG tablet  albuterol (PROAIR HFA/PROVENTIL HFA/VENTOLIN HFA) 108 (90 Base) MCG/ACT inhaler  amiodarone (PACERONE) 200 MG tablet  apixaban ANTICOAGULANT (ELIQUIS) 5 MG tablet  atorvastatin (LIPITOR) 40 MG tablet  carvedilol (COREG) 3.125 MG tablet  clopidogrel (PLAVIX) 75 MG tablet  [facility] VITAMIN B12 1000 MCG tablet  dapagliflozin (FARXIGA) 5 MG TABS  tablet  diclofenac (VOLTAREN) 1 % topical gel  hydrALAZINE (APRESOLINE) 10 MG tablet  ipratropium - albuterol 0.5 mg/2.5 mg/3 mL (DUONEB) 0.5-2.5 (3) MG/3ML neb solution  isosorbide mononitrate (IMDUR) 60 MG 24 hr tablet  metolazone (ZAROXOLYN) 2.5 MG tablet  nitroGLYcerin (NITROSTAT) 0.4 MG sublingual tablet  polyethylene glycol (MIRALAX) 17 GM/Dose powder  potassium chloride barbara ER (KLOR-CON M20) 20 MEQ CR tablet  rOPINIRole (REQUIP) 2 MG tablet  spironolactone (ALDACTONE) 25 MG tablet  spironolactone (ALDACTONE) 25 MG tablet  torsemide (DEMADEX) 100 MG tablet  umeclidinium-vilanterol (ANORO ELLIPTA) 62.5-25 MCG/ACT oral inhaler        ALLERGIES:  Allergies   Allergen Reactions    Bumex [Bumetanide] Muscle Pain (Myalgia)       FAMILY HISTORY:  Family History   Problem Relation Age of Onset    Cerebrovascular Disease Mother     Myocardial Infarction Father        SOCIAL HISTORY:   Social History     Socioeconomic History    Marital status: Single   Occupational History    Occupation: Retired restaurant owner     Comment: [employer]   Tobacco Use    Smoking status: Every Day     Current packs/day: 0.50     Types: Cigarettes    Smokeless tobacco: Never    Tobacco comments:     Seen IP by [facility] on 9/11/23 and declined cessation services and materials   Substance and Sexual Activity    Alcohol use: Not Currently   Social History Narrative    Currently lives in an assisted living facility. (Updated: 01/08/2024)     Social Drivers of Health     Financial Resource Strain: Low Risk  (9/18/2024)    Financial Resource Strain     Within the past 12 months, have you or your family members you live with been unable to get utilities (heat, electricity) when it was really needed?: No   Food Insecurity: Low Risk  (9/18/2024)    Food Insecurity     Within the past 12 months, did you worry that your food would run out before you got money to buy more?: No     Within the past 12 months, did the food you bought just not last and you  "didn t have money to get more?: No   Transportation Needs: Low Risk  (9/18/2024)    Transportation Needs     Within the past 12 months, has lack of transportation kept you from medical appointments, getting your medicines, non-medical meetings or appointments, work, or from getting things that you need?: No   Interpersonal Safety: High Risk (9/17/2024)    Interpersonal Safety     Do you feel physically and emotionally safe where you currently live?: No     Within the past 12 months, have you been hit, slapped, kicked or otherwise physically hurt by someone?: No     Within the past 12 months, have you been humiliated or emotionally abused in other ways by your partner or ex-partner?: No   Housing Stability: Low Risk  (9/18/2024)    Housing Stability     Do you have housing? : Yes     Are you worried about losing your housing?: No       VITALS:  /75   Pulse 96   Temp 97.7  F (36.5  C) (Oral)   Resp 19   Ht 1.676 m (5' 6\")   Wt 114.8 kg (253 lb)   SpO2 95%   BMI 40.84 kg/m      PHYSICAL EXAM    Physical Exam  Constitutional: Well developed, Well nourished, NAD, GCS 15  HENT: Normocephalic, Atraumatic, Bilateral external ears normal, Oropharynx normal, mucous membranes moist, Nose normal. Neck-  Normal range of motion, No tenderness, Supple, No stridor.    Eyes: PERRL, EOMI, Conjunctiva normal, No discharge.   Respiratory: Normal breath sounds, No respiratory distress, No wheezing or crackles, Speaks in full sentences easily.    Cardiovascular: Normal heart rate, Regular rhythm, No murmurs, No rubs, No gallops. 2+ radial pulses bilaterally  GI: Bowel sounds normal, Soft, No tenderness, No masses, No rebound or guarding.  Musculoskeletal: 2+ DP pulses. Baseline bilateral lower extremity edema per patient.  Good range of motion in all major joints. No tenderness to palpation or major deformities noted.  Collection of varicose veins noted in the medial left foot area that was likely the source of the bleeding " with some dried blood that was wiped off easily without any active bleeding.  No tenderness or surrounding erythema or signs of infection.  Integument: Warm, Dry  Neurologic: Alert & oriented x 3, 5/5 strength in all 4 extremities bilaterally. Sensation intact to light touch in all 4 extremities and the face bilaterally. No focal deficits noted. Normal gait.   Able to ambulate here without difficulty  Psychiatric: Affect normal, Judgment normal, Mood normal. Cooperative.      LAB:  All pertinent labs reviewed and interpreted.  Results for orders placed or performed during the hospital encounter of 10/31/24   Basic metabolic panel   Result Value Ref Range    Sodium 135 135 - 145 mmol/L    Potassium 3.7 3.4 - 5.3 mmol/L    Chloride 89 (L) 98 - 107 mmol/L    Carbon Dioxide (CO2) 29 22 - 29 mmol/L    Anion Gap 17 (H) 7 - 15 mmol/L    Urea Nitrogen 56.6 (H) 8.0 - 23.0 mg/dL    Creatinine 1.67 (H) 0.67 - 1.17 mg/dL    GFR Estimate 45 (L) >60 mL/min/1.73m2    Calcium 10.1 8.8 - 10.4 mg/dL    Glucose 229 (H) 70 - 99 mg/dL                  I, Leona Grove, am serving as a scribe to document services personally performed by Pat Arnold MD based on my observation and the provider's statements to me. I, Pat Arnold MD, attest that Leona Grove is acting in a scribe capacity, has observed my performance of the services and has documented them in accordance with my direction.    Pat Arnold MD  [facility] EMERGENCY DEPARTMENT  [address] [place] [id number]  [phone]     Pat Arnold MD  11/03/24 3077

## 2024-10-31 NOTE — ED TRIAGE NOTES
Pt brought in by EMS from [facility] due to right foot bleeding. Per EMS bleeding came from a pin point hole while the pt unwrapped his legs. Foot is now covered with kerlix wrap.  Pt is on Plavix ans Eliquis. Pt has hx of COPD, DM2, 5 stents placement and triple bipass.      Triage Assessment (Adult)       Row Name 10/31/24 1149          Triage Assessment    Airway WDL WDL        Respiratory WDL    Respiratory WDL WDL        Skin Circulation/Temperature WDL    Skin Circulation/Temperature WDL WDL        Cardiac WDL    Cardiac WDL WDL        Peripheral/Neurovascular WDL    Peripheral Neurovascular WDL X;neurovascular assessment lower        Cognitive/Neuro/Behavioral WDL    Cognitive/Neuro/Behavioral WDL WDL        LLE Neurovascular Assessment    Temperature LLE warm     Color LLE red     Sensation LLE tenderness present        RLE Neurovascular Assessment    Temperature RLE warm     Color RLE red;jeremias     Sensation RLE tenderness present

## 2024-10-31 NOTE — DISCHARGE INSTRUCTIONS
Follow-up with your wound care team and see if you can maybe reschedule them to come out tomorrow instead of today.  Also please follow-up with your primary care doctor next week for recheck.  Can change her bandages as you had been previously.  Having a nonstick dressing will help to not accidentally open up your varicose veins with the prior dressings you had on your legs.  The BMP redo for today was drawn and your doctor can follow-up on this.    Return to the ER for any new or worsening concerns.

## 2024-11-02 ENCOUNTER — HOSPITAL ENCOUNTER (EMERGENCY)
Facility: HOSPITAL | Age: 64
Discharge: HOME | End: 2024-11-02
Attending: EMERGENCY MEDICINE | Admitting: EMERGENCY MEDICINE
Payer: MEDICARE

## 2024-11-02 VITALS
RESPIRATION RATE: 20 BRPM | TEMPERATURE: 97.6 F | OXYGEN SATURATION: 93 % | HEART RATE: 88 BPM | BODY MASS INDEX: 40.34 KG/M2 | WEIGHT: 251 LBS | SYSTOLIC BLOOD PRESSURE: 122 MMHG | HEIGHT: 66 IN | DIASTOLIC BLOOD PRESSURE: 64 MMHG

## 2024-11-02 DIAGNOSIS — I83.892 BLEEDING FROM VARICOSE VEINS OF LOWER EXTREMITY, LEFT: ICD-10-CM

## 2024-11-02 PROCEDURE — 99283 EMERGENCY DEPT VISIT LOW MDM: CPT

## 2024-11-02 ASSESSMENT — ACTIVITIES OF DAILY LIVING (ADL)
ADLS_ACUITY_SCORE: 0
ADLS_ACUITY_SCORE: 0

## 2024-11-02 NOTE — ED NOTES
Bed: JNED-26  Expected date: 11/2/24  Expected time: 6:36 PM  Means of arrival: Ambulance  Comments:  Wade  64M  Bleeding Varicose Vein

## 2024-11-03 NOTE — ED TRIAGE NOTES
Triage Assessment (Adult)       Row Name 11/02/24 8761          Triage Assessment    Airway WDL WDL        Respiratory WDL    Respiratory WDL WDL        Skin Circulation/Temperature WDL    Skin Circulation/Temperature WDL WDL        Cardiac WDL    Cardiac WDL WDL        Peripheral/Neurovascular WDL    Peripheral Neurovascular WDL WDL        Cognitive/Neuro/Behavioral WDL    Cognitive/Neuro/Behavioral WDL WDL                     Patient was at home and had a varicose vain in his left ankle burst. He stated the same thing happen last week on the same foot and EMS was able to get the bleeding under control.  Patient is on Eliquis for since bypass surgery eleven years ago.

## 2024-11-03 NOTE — ED NOTES
Feet washed and left foot bandaged with vaseline gauze, telfa, 4x4s, deena and coban.  Walking boot applied.

## 2024-11-03 NOTE — ED PROVIDER NOTES
EMERGENCY DEPARTMENT ENCOUNTER      NAME: Zackary Hutchison  AGE: 64 year old male  YOB: 1960  MRN: [id number]  EVALUATION DATE & TIME: 11/2/2024  6:44 PM    PCP: Reid Escobar    ED PROVIDER: Ladonna Laguerre M.D.      Chief Complaint   Patient presents with    Coagulation Disorder     Patient was at home and had a varicose vain in his left ankle burst. He stated the same thing happen last week on the same foot and EMS was able to get the bleeding under control.  Patient is on Eliquis for since bypass surgery eleven years ago.         FINAL IMPRESSION:  1. Bleeding from varicose veins of lower extremity, left        ED COURSE & MEDICAL DECISION MAKING:    Pertinent Labs & Imaging studies reviewed. (See chart for details)  ED Course as of 11/02/24 1942   Sat Nov 02, 2024 1912 Patient is a pleasant 64-year-old male comes in today for evaluation of bleeding from his left medial foot/ankle.  He had an episode of similar on Wednesday.  He is on Eliquis.  It was bandaged and he took the bandage off and then it started squirting blood.  His foot is covered in dried blood.  There is a tiny area where varicose vein is red at the surface that was oozing initially.  I held direct pressure with my gloved finger for about 5 minutes and it stopped.  I then put Surgicel glue over the top.  We will put the patient in a boot for a few days to protect it.  He says it sore but also now he has had 2 bleeds in the last 4 days.  He can follow-up with his primary care next week and hopefully they can get him out of the boot.  I think we will just help protect the ankle.  I discussed this with him and he is comfortable with the plan.  Vital signs were unremarkable here and he is nontoxic-appearing and bleeding is under good control.  I think we get him discharged home once we get his foot cleaned up and a boot in place.  He is comfortable with that plan.       Medical Decision Making    History:  Supplemental history from:  None  External Record(s) reviewed: I reviewed the follow-up visit from 10/3/2024 with patient's primary care clinic.  At that time they had noted that the patient is currently on Plavix and Eliquis.    Work Up:  Emergent/Severe conditions considered and evaluated for: Anemia, hemorrhage  I independently reviewed and interpreted none.   In additional to work up documented, I considered the following work up: Consider checking hemoglobin by patient's vital signs were unremarkable and he is not ill-appearing.  Medications given that require intensive monitoring for toxicity: None    External consultation:  Discussion of management with another provider: None    Complicating factors:  Care impacted by chronic illness: Atrial fibrillation, chronic ischemic heart disease, hypertension, hyperlipidemia    Disposition considerations: Discharge  Prescriptions considered/prescribed: None        At the conclusion of the encounter I discussed  the results of all of the tests and the disposition with patient.   All questions were answered.  The patient acknowledged understanding and was involved in the decision making regarding the overall care plan.      I discussed with patient the utility, limitations and findings of the exam/interventions/studies done during this visit as well as the list of differential diagnosis and symptoms to monitor/return to ER for.  Additional verbal discharge instructions were provided.     MEDICATIONS GIVEN IN THE EMERGENCY:  Medications - No data to display    NEW PRESCRIPTIONS STARTED AT TODAY'S ER VISIT  New Prescriptions    No medications on file          =================================================================    HPI    Triage Note:      Triage Assessment (Adult)       Row Name 11/02/24 2549          Triage Assessment    Airway WDL WDL        Respiratory WDL    Respiratory WDL WDL        Skin Circulation/Temperature WDL    Skin Circulation/Temperature WDL WDL        Cardiac WDL     Cardiac WDL WDL        Peripheral/Neurovascular WDL    Peripheral Neurovascular WDL WDL        Cognitive/Neuro/Behavioral WDL    Cognitive/Neuro/Behavioral WDL WDL                     Patient was at home and had a varicose vain in his left ankle burst. He stated the same thing happen last week on the same foot and EMS was able to get the bleeding under control.  Patient is on Eliquis for since bypass surgery eleven years ago.     Use of : None      Zackary Hutchison is a 64 year old male who presents for evaluation of bleeding from his left medial ankle/foot from a varicose vein.    PAST MEDICAL HISTORY:  Past Medical History:   Diagnosis Date    Atrial fibrillation (H)     Chronic kidney disease     Chronic kidney disease, stage 3b (H) 09/28/2023    Class 3 severe obesity due to excess calories with body mass index (BMI) of 40.0 to 44.9 in adult (H) 09/28/2023    Congestive heart failure (H)     COPD (chronic obstructive pulmonary disease) (H)     Coronary artery disease involving coronary bypass graft of native heart without angina pectoris 09/28/2023    Heart failure with reduced ejection fraction (H) 09/11/2023    HFrEF (heart failure with reduced ejection fraction) (H) 09/11/2023    Hyperlipidemia     Hypertension     Ischemic cardiomyopathy 09/28/2023    Obese     Paroxysmal atrial fibrillation (H) 09/28/2023    Tobacco abuse 09/28/2023       PAST SURGICAL HISTORY:  Past Surgical History:   Procedure Laterality Date    CORONARY ANGIOGRAPHY ADULT ORDER      CORONARY ARTERY BYPASS Left 2014    2 vessels    CV CARDIOMEMS WITH RIGHT HEART CATH N/A 2/15/2024    Procedure: Pulmonary Arterial Pressure Sensor Placement;  Surgeon: Jacey Bhandari MD;  Location: [facility] CATH LAB CV    CV CORONARY ANGIOGRAM N/A 09/11/2023    Procedure: Coronary Angiogram;  Surgeon: Santy Esposito MD;  Location: [facility] CATH LAB CV    CV CORONARY ANGIOGRAM N/A 5/2/2024    Procedure: CV CORONARY ANGIOGRAM;  Surgeon: Vaibhav  Santy MOHAN MD;  Location: [facility] CV    CV LEFT HEART CATH N/A 09/11/2023    Procedure: Left Heart Catheterization;  Surgeon: Santy Esposito MD;  Location: [facility] LAB CV    CV LEFT HEART CATH N/A 5/2/2024    Procedure: Left Heart Catheterization;  Surgeon: Santy Esposito MD;  Location: [facility] LAB CV    CV PCI ANGIOPLASTY N/A 5/2/2024    Procedure: Percutaneous Transluminal Angioplasty;  Surgeon: Santy Esposito MD;  Location: [facility]    CV RIGHT HEART CATH MEASUREMENTS RECORDED N/A 09/11/2023    Procedure: Right Heart Catheterization;  Surgeon: Santy Esposito MD;  Location: [facility]       CURRENT MEDICATIONS:    No current facility-administered medications for this encounter.    Current Outpatient Medications:     acetaminophen (TYLENOL) 500 MG tablet, Take 1,000 mg by mouth 2 times daily as needed for mild pain, Disp: , Rfl:     albuterol (PROAIR HFA/PROVENTIL HFA/VENTOLIN HFA) 108 (90 Base) MCG/ACT inhaler, Inhale 2 puffs into the lungs every 6 hours as needed for shortness of breath, wheezing or cough, Disp: , Rfl:     amiodarone (PACERONE) 200 MG tablet, Take 1 tablet (200 mg) by mouth daily, Disp: 90 tablet, Rfl: 3    apixaban ANTICOAGULANT (ELIQUIS) 5 MG tablet, Take 1 tablet (5 mg) by mouth 2 times daily, Disp: 180 tablet, Rfl: 3    atorvastatin (LIPITOR) 40 MG tablet, Take 40 mg by mouth every morning, Disp: , Rfl:     carvedilol (COREG) 3.125 MG tablet, Take 1 tablet (3.125 mg) by mouth 2 times daily (with meals), Disp: 180 tablet, Rfl: 1    clopidogrel (PLAVIX) 75 MG tablet, Take 1 tablet (75 mg) by mouth daily, Disp: 60 tablet, Rfl: 0    CVS VITAMIN B12 1000 MCG tablet, Take 1,000 mcg by mouth daily, Disp: , Rfl:     dapagliflozin (FARXIGA) 5 MG TABS tablet, Take 1 tablet (5 mg) by mouth daily., Disp: 90 tablet, Rfl: 1    diclofenac (VOLTAREN) 1 % topical gel, Apply 2 g topically every 6 hours as needed for moderate pain, Disp: , Rfl:      hydrALAZINE (APRESOLINE) 10 MG tablet, Take 1 tablet (10 mg) by mouth 3 times daily, Disp: 270 tablet, Rfl: 3    ipratropium - albuterol 0.5 mg/2.5 mg/3 mL (DUONEB) 0.5-2.5 (3) MG/3ML neb solution, Take 1 vial (3 mLs) by nebulization every 6 hours as needed for wheezing., Disp: 180 mL, Rfl: 5    isosorbide mononitrate (IMDUR) 60 MG 24 hr tablet, Take 3 tablets (180 mg) by mouth every morning, Disp: 270 tablet, Rfl: 1    metolazone (ZAROXOLYN) 2.5 MG tablet, Take 1 tablet (2.5 mg) by mouth once a week. on Wednesday, 30 mintues before your torsemide. Have labs drawn weekly on Thursday., Disp: 12 tablet, Rfl: 1    nitroGLYcerin (NITROSTAT) 0.4 MG sublingual tablet, Place 1 tablet (0.4 mg) under the tongue every 5 minutes as needed If no relief after 3 tabs call 911;continue 1 tab every 5 min max 3 tab Indications: chest pain, Disp: 100 tablet, Rfl: 1    polyethylene glycol (MIRALAX) 17 GM/Dose powder, Take 17 g by mouth 2 times daily as needed, Disp: 510 g, Rfl: 0    potassium chloride barbara ER (KLOR-CON M20) 20 MEQ CR tablet, Take 2 tablets (40 mEq) by mouth daily, Disp: 180 tablet, Rfl: 1    rOPINIRole (REQUIP) 2 MG tablet, Take 2 mg by mouth every 6 hours, Disp: , Rfl:     spironolactone (ALDACTONE) 25 MG tablet, Take 25 mg by mouth daily., Disp: , Rfl:     spironolactone (ALDACTONE) 25 MG tablet, Take 50 mg by mouth every 7 days. Take 2 tablet once a week on Wednesday along with Metolazone, Disp: , Rfl:     torsemide (DEMADEX) 100 MG tablet, Take 1 tablet (100 mg) by mouth 2 times daily, Disp: 180 tablet, Rfl: 3    umeclidinium-vilanterol (ANORO ELLIPTA) 62.5-25 MCG/ACT oral inhaler, Inhale 1 puff into the lungs daily., Disp: 60 each, Rfl: 11    ALLERGIES:  Allergies   Allergen Reactions    Bumex [Bumetanide] Muscle Pain (Myalgia)       FAMILY HISTORY:  Family History   Problem Relation Age of Onset    Cerebrovascular Disease Mother     Myocardial Infarction Father        SOCIAL HISTORY:   Social History      Socioeconomic History    Marital status: Single   Occupational History    Occupation: Retired restaurant owner     Comment: [employer]   Tobacco Use    Smoking status: Every Day     Current packs/day: 0.50     Types: Cigarettes    Smokeless tobacco: Never    Tobacco comments:     Seen IP by [facility] on 9/11/23 and declined cessation services and materials   Substance and Sexual Activity    Alcohol use: Not Currently   Social History Narrative    Currently lives in an assisted living facility. (Updated: 01/08/2024)     Social Drivers of Health     Financial Resource Strain: Low Risk  (9/18/2024)    Financial Resource Strain     Within the past 12 months, have you or your family members you live with been unable to get utilities (heat, electricity) when it was really needed?: No   Food Insecurity: Low Risk  (9/18/2024)    Food Insecurity     Within the past 12 months, did you worry that your food would run out before you got money to buy more?: No     Within the past 12 months, did the food you bought just not last and you didn t have money to get more?: No   Transportation Needs: Low Risk  (9/18/2024)    Transportation Needs     Within the past 12 months, has lack of transportation kept you from medical appointments, getting your medicines, non-medical meetings or appointments, work, or from getting things that you need?: No   Interpersonal Safety: High Risk (9/17/2024)    Interpersonal Safety     Do you feel physically and emotionally safe where you currently live?: No     Within the past 12 months, have you been hit, slapped, kicked or otherwise physically hurt by someone?: No     Within the past 12 months, have you been humiliated or emotionally abused in other ways by your partner or ex-partner?: No   Housing Stability: Low Risk  (9/18/2024)    Housing Stability     Do you have housing? : Yes     Are you worried about losing your housing?: No       PHYSICAL EXAM    VITAL SIGNS: /66   Pulse 91   Temp 97.6  F  "(36.4  C) (Oral)   Resp 20   Ht 1.676 m (5' 6\")   Wt 113.9 kg (251 lb)   SpO2 94%   BMI 40.51 kg/m     GENERAL: Awake, alert, answering questions appropriately, no acute distress  SPEECH:  Easy to understand speech, Normal volume and robert  PULMONARY: No respiratory distress  CARDIOVASCULAR: Regular rate and rhythm, Distal pulses present and normal.  EXTREMITIES: Multiple varicose veins to foot.  2 areas that look like they had a recent bleeding.  1 with some active oozing.  Pressure was held and bleeding stopped.  Dried blood to the foot  PSYCH: Normal mood and affect     PROCEDURES:   PROCEDURE: Laceration Repair   INDICATIONS: Laceration   PROCEDURE PROVIDER: Dr Ladonna Laguerre   SITE: Left medial ankle   TYPE/SIZE: simple and clean  0.3 cm (total length)   FUNCTIONAL ASSESSMENT: Distal sensation, circulation, and motor intact   MEDICATION: 0 mLs of    PREPARATION: none   DEBRIDEMENT: no debridement   CLOSURE:  Superficial layer closed with Dermabond (medical glue)    Total number of sutures/staples placed: 0       Ladonna Laguerre M.D.  Emergency Medicine  [facility] EMERGENCY DEPARTMENT  [address] [place] [id number] [phone]  Dept: [phone]       Ladonna Laguerre MD  11/02/24 1945    "

## 2024-11-03 NOTE — DISCHARGE INSTRUCTIONS
You were seen today for bleeding from a varicose vein.  If it happens again take your finger and put pressure on the area and hold for 10 minutes.

## 2024-11-06 ENCOUNTER — OFFICE VISIT (OUTPATIENT)
Dept: CARDIOLOGY | Facility: CLINIC | Age: 64
End: 2024-11-06
Attending: INTERNAL MEDICINE
Payer: MEDICARE

## 2024-11-06 VITALS
RESPIRATION RATE: 20 BRPM | DIASTOLIC BLOOD PRESSURE: 60 MMHG | BODY MASS INDEX: 41.8 KG/M2 | WEIGHT: 259 LBS | HEART RATE: 100 BPM | SYSTOLIC BLOOD PRESSURE: 138 MMHG

## 2024-11-06 DIAGNOSIS — N18.31 TYPE 2 DIABETES MELLITUS WITH STAGE 3A CHRONIC KIDNEY DISEASE, WITHOUT LONG-TERM CURRENT USE OF INSULIN (H): ICD-10-CM

## 2024-11-06 DIAGNOSIS — E66.813 CLASS 3 DRUG-INDUCED OBESITY WITH SERIOUS COMORBIDITY AND BODY MASS INDEX (BMI) OF 45.0 TO 49.9 IN ADULT (H): ICD-10-CM

## 2024-11-06 DIAGNOSIS — E11.22 TYPE 2 DIABETES MELLITUS WITH STAGE 3A CHRONIC KIDNEY DISEASE, WITHOUT LONG-TERM CURRENT USE OF INSULIN (H): ICD-10-CM

## 2024-11-06 DIAGNOSIS — I25.118 CORONARY ARTERY DISEASE INVOLVING NATIVE CORONARY ARTERY OF NATIVE HEART WITH OTHER FORM OF ANGINA PECTORIS (H): Primary | ICD-10-CM

## 2024-11-06 DIAGNOSIS — N18.31 STAGE 3A CHRONIC KIDNEY DISEASE (H): ICD-10-CM

## 2024-11-06 DIAGNOSIS — I25.10 CORONARY ARTERY DISEASE INVOLVING NATIVE CORONARY ARTERY OF NATIVE HEART WITHOUT ANGINA PECTORIS: ICD-10-CM

## 2024-11-06 DIAGNOSIS — I50.20 HEART FAILURE WITH REDUCED EJECTION FRACTION (H): ICD-10-CM

## 2024-11-06 DIAGNOSIS — I48.11 LONGSTANDING PERSISTENT ATRIAL FIBRILLATION (H): ICD-10-CM

## 2024-11-06 DIAGNOSIS — I50.32 CHRONIC DIASTOLIC CONGESTIVE HEART FAILURE (H): ICD-10-CM

## 2024-11-06 DIAGNOSIS — E66.1 CLASS 3 DRUG-INDUCED OBESITY WITH SERIOUS COMORBIDITY AND BODY MASS INDEX (BMI) OF 45.0 TO 49.9 IN ADULT (H): ICD-10-CM

## 2024-11-06 PROCEDURE — 99214 OFFICE O/P EST MOD 30 MIN: CPT | Performed by: INTERNAL MEDICINE

## 2024-11-06 PROCEDURE — G2211 COMPLEX E/M VISIT ADD ON: HCPCS | Performed by: INTERNAL MEDICINE

## 2024-11-06 RX ORDER — ROPINIROLE 5 MG/1
5 TABLET, FILM COATED ORAL 3 TIMES DAILY
COMMUNITY
Start: 2024-10-04

## 2024-11-06 RX ORDER — CLOPIDOGREL BISULFATE 75 MG/1
75 TABLET ORAL DAILY
Qty: 60 TABLET | Refills: 3 | Status: SHIPPED | OUTPATIENT
Start: 2024-11-06

## 2024-11-06 RX ORDER — CARVEDILOL 3.12 MG/1
3.12 TABLET ORAL 2 TIMES DAILY WITH MEALS
Qty: 180 TABLET | Refills: 3 | Status: SHIPPED | OUTPATIENT
Start: 2024-11-06

## 2024-11-06 RX ORDER — DULOXETIN HYDROCHLORIDE 30 MG/1
30 CAPSULE, DELAYED RELEASE ORAL DAILY
COMMUNITY
Start: 2024-10-01 | End: 2025-10-01

## 2024-11-06 NOTE — LETTER
11/6/2024    Reid Escobar MD  [address] [place] [address]    RE: Zackary Hutchison       Dear Colleague,     I had the pleasure of seeing Zackary Hutchison in the [facility].    HEART CARE ENCOUNTER CONSULTATON NOTE      [facility]  [phone]      Assessment/Recommendations   Assessment:   Acute on chronic chronic heart failure with preserved ejection fraction.  Patient has chronic decompensated heart failure.  New [place] Heart Association class III  Recent anginal chest pain episodes requiring nitroglycerin, new  Coronary Artery Disease s/p CABG (LIMA to LAD), PCI May 2024 to OM and Ramus  Chronic kidney disease stage III, GFR 45  Diabetes mellitus type 2 with chronic kidney disease, non-insulin-dependent.  Poorly controlled A1c 8.4  Atrial fibrillation on amiodarone    Plan:  Recommend nuclear stress testing to reevaluate his chest pain episodes that he had recently.    Currently anticoagulant with Eliquis 5 mg twice daily  On antiplatelet therapy with Plavix 75 mg daily due to recent PCI in May 2024  Currently on guideline directed medical therapy with Farxiga 5 mg daily, carvedilol 3.125 mg twice daily hydralazine and Imdur for afterload reduction.  Spironolactone 25 mg daily  Currently on torsemide 100 mg twice daily for diuretic therapy.  Also on metolazone 2.5 mg once weekly on Wednesday 1.  Preoperative cardiovascular risk assessment: Neuro spine surgery, cervical  Assessment of preoperative cardiac risk: He has 2 to active cardiac conditions (unstable coronary syndromes, decompensated HF, significant arrhythmias, or severe valvular disease), has  known coronary artery disease, has  4 clinical risk factors (ischemic heart disease, prior heart failure, cerebrovascular disease, diabetes mellitus, and renal insufficiency), and has a functional capacity < than 4 METs.     Creatinine   Date Value Ref Range Status   10/31/2024 1.67 (H) 0.67 - 1.17 mg/dL Final        Surgical Recommendation(s):   From a cardiovascular standpoint he is at high risk for cardiovascular complications due to to active cardiac conditions, known coronary disease, functional capacity less than 4 METS and for chronic clinical risk factors including ischemic heart disease, heart failure, diabetes and renal insufficiency.  Before considering any surgical intervention risk versus benefits need to be weighed with the patient.  Risk of cardiac complications greater than 10%.  Recommend nuclear stress testing given recent anginal chest pain requiring nitroglycerin.  This is a first time he is that the use nitroglycerin on 2 separate episodes last week in the past 6 months.  At this time do not recommend spine surgery until further evaluation including stress testing is completed.  If his stress test is negative for evidence of ischemia he would be at intermediate to high risk of cardiovascular complications due to his chronic cardiovascular issues.         History of Present Illness/Subjective    HPI: Zackary Hutchison is a 64 year old male chronic decompensated heart failure, chronic anticoagulation, obesity, diabetes, renal insufficiency, coronary artery disease with recent unstable anginal symptoms who presents to cardiology clinic for cardiovascular risk assessment prior to spinal surgery.    Patient is clearly at high risk due to his active conditions and clinical risk factors.  He has evidence of chronic decompensated heart failure which he remains decompensated.  He has recent evidence of unstable anginal symptoms requiring nitroglycerin over the past 2 weeks.    He has concern for active nonhealing wounds in his left lower extremity.  This would also increase his risk for infection in his spine.    He is unable to perform 4 METS of activity which also increases risk for cardiovascular complications.    Outlined my concern regarding proceeding with spinal surgery given his risk factors.      ECG: Sinus  rhythm with first-degree AV block, right bundle branch block with wide QRS of 134 ms, heart rate 83 bpm.  Anterior lateral ST segment abnormalities.  No significant change when compared to June 5, 2024 with exception of no PACs noted on current study.    Coronary Angiogram: May 2024.  Reviewed.  He had progression in his coronary artery disease requiring 2 separate areas of intervention.    Echocardiogram Results: May 2024  1. Technically difficult study.  2. The left ventricle is normal in size. Image quality does not provide for  detailed assessment of LV systolic function, but is felt to be normal with a  visually estimated ejection fraction of roughly 55%.  3. No significant valvular heart disease is identified on this study though  the sensitivity, particularly of regurgitant lesions, is reduced due to poor  Doppler acoustics.  4. Right ventricular size and systolic performance could not be accurately  assessed due to inadequate visualization       Physical Examination  Review of Systems   Vitals: /60 (BP Location: Right arm, Patient Position: Sitting, Cuff Size: Adult Large)   Pulse 100   Resp 20   Wt 117.5 kg (259 lb)   BMI 41.80 kg/m    BMI= Body mass index is 41.8 kg/m .  Wt Readings from Last 3 Encounters:   11/06/24 117.5 kg (259 lb)   11/02/24 113.9 kg (251 lb)   10/31/24 114.8 kg (253 lb)        Obese, pleasant   ENT/Mouth: membranes moist, no oral lesions or bleeding gums.      EYES:  no scleral icterus, normal conjunctivae       Chest/Lungs:   Decreased breath sounds throughout.   Cardiovascular:   Regular.  Faint systolic murmur.  Evidence of pitting edema in the lower extremity that are wrapped.   Abdomen:  no tenderness; bowel sounds are present   Extremities: no cyanosis or clubbing.  In wheelchair   Skin: Patient has weeping wounds in the left lower extremity.   Neurologic: normal  bilateral, no tremors     Psychiatric: alert and oriented x3, calm        Please refer above for  cardiac ROS details.        Medical History  Surgical History Family History Social History   Past Medical History:   Diagnosis Date     Atrial fibrillation (H)      Chronic kidney disease      Chronic kidney disease, stage 3b (H) 09/28/2023     Class 3 severe obesity due to excess calories with body mass index (BMI) of 40.0 to 44.9 in adult (H) 09/28/2023     Congestive heart failure (H)      COPD (chronic obstructive pulmonary disease) (H)      Coronary artery disease involving coronary bypass graft of native heart without angina pectoris 09/28/2023     Heart failure with reduced ejection fraction (H) 09/11/2023     HFrEF (heart failure with reduced ejection fraction) (H) 09/11/2023     Hyperlipidemia      Hypertension      Ischemic cardiomyopathy 09/28/2023     Obese      Paroxysmal atrial fibrillation (H) 09/28/2023     Tobacco abuse 09/28/2023     Past Surgical History:   Procedure Laterality Date     CORONARY ANGIOGRAPHY ADULT ORDER       CORONARY ARTERY BYPASS Left 2014    2 vessels     CV CARDIOMEMS WITH RIGHT HEART CATH N/A 2/15/2024    Procedure: Pulmonary Arterial Pressure Sensor Placement;  Surgeon: Jacey Bhandari MD;  Location: [facility] CATH LAB CV     CV CORONARY ANGIOGRAM N/A 09/11/2023    Procedure: Coronary Angiogram;  Surgeon: Santy Esposito MD;  Location: [facility] CATH LAB CV     CV CORONARY ANGIOGRAM N/A 5/2/2024    Procedure: CV CORONARY ANGIOGRAM;  Surgeon: Santy Esposito MD;  Location: [facility] CATH LAB CV     CV LEFT HEART CATH N/A 09/11/2023    Procedure: Left Heart Catheterization;  Surgeon: Santy Esposito MD;  Location: [facility] CATH LAB CV     CV LEFT HEART CATH N/A 5/2/2024    Procedure: Left Heart Catheterization;  Surgeon: Santy Esposito MD;  Location: [facility] CATH LAB CV     CV PCI ANGIOPLASTY N/A 5/2/2024    Procedure: Percutaneous Transluminal Angioplasty;  Surgeon: Santy Esposito MD;  Location: [facility] CATH LAB CV     CV RIGHT HEART CATH MEASUREMENTS  RECORDED N/A 09/11/2023    Procedure: Right Heart Catheterization;  Surgeon: Santy Esposito MD;  Location: [facility] LAB CV     Family History   Problem Relation Age of Onset     Cerebrovascular Disease Mother      Myocardial Infarction Father         Social History     Socioeconomic History     Marital status: Single     Spouse name: Not on file     Number of children: Not on file     Years of education: Not on file     Highest education level: Not on file   Occupational History     Occupation: Retired restaurant owner     Comment: [employer]   Tobacco Use     Smoking status: Every Day     Current packs/day: 0.50     Types: Cigarettes     Smokeless tobacco: Never     Tobacco comments:     Seen IP by [facility] on 9/11/23 and declined cessation services and materials   Substance and Sexual Activity     Alcohol use: Not Currently     Drug use: Not on file     Sexual activity: Not on file   Other Topics Concern     Not on file   Social History Narrative    Currently lives in an assisted living facility. (Updated: 01/08/2024)     Social Drivers of Health     Financial Resource Strain: Low Risk  (9/18/2024)    Financial Resource Strain      Within the past 12 months, have you or your family members you live with been unable to get utilities (heat, electricity) when it was really needed?: No   Food Insecurity: Low Risk  (9/18/2024)    Food Insecurity      Within the past 12 months, did you worry that your food would run out before you got money to buy more?: No      Within the past 12 months, did the food you bought just not last and you didn t have money to get more?: No   Transportation Needs: Low Risk  (9/18/2024)    Transportation Needs      Within the past 12 months, has lack of transportation kept you from medical appointments, getting your medicines, non-medical meetings or appointments, work, or from getting things that you need?: No   Physical Activity: Not on file   Stress: Not on file   Social Connections: Not  on file   Interpersonal Safety: High Risk (9/17/2024)    Interpersonal Safety      Do you feel physically and emotionally safe where you currently live?: No      Within the past 12 months, have you been hit, slapped, kicked or otherwise physically hurt by someone?: No      Within the past 12 months, have you been humiliated or emotionally abused in other ways by your partner or ex-partner?: No   Housing Stability: Low Risk  (9/18/2024)    Housing Stability      Do you have housing? : Yes      Are you worried about losing your housing?: No           Medications  Allergies   Current Outpatient Medications   Medication Sig Dispense Refill     acetaminophen (TYLENOL) 500 MG tablet Take 1,000 mg by mouth 2 times daily as needed for mild pain       albuterol (PROAIR HFA/PROVENTIL HFA/VENTOLIN HFA) 108 (90 Base) MCG/ACT inhaler Inhale 2 puffs into the lungs every 6 hours as needed for shortness of breath, wheezing or cough       amiodarone (PACERONE) 200 MG tablet Take 1 tablet (200 mg) by mouth daily 90 tablet 3     apixaban ANTICOAGULANT (ELIQUIS) 5 MG tablet Take 1 tablet (5 mg) by mouth 2 times daily 180 tablet 3     atorvastatin (LIPITOR) 40 MG tablet Take 40 mg by mouth every morning       carvedilol (COREG) 3.125 MG tablet Take 1 tablet (3.125 mg) by mouth 2 times daily (with meals) 180 tablet 1     clopidogrel (PLAVIX) 75 MG tablet Take 1 tablet (75 mg) by mouth daily 60 tablet 0     [employer] VITAMIN B12 1000 MCG tablet Take 1,000 mcg by mouth daily       dapagliflozin (FARXIGA) 5 MG TABS tablet Take 1 tablet (5 mg) by mouth daily. 90 tablet 1     diclofenac (VOLTAREN) 1 % topical gel Apply 2 g topically every 6 hours as needed for moderate pain       DULoxetine (CYMBALTA) 30 MG capsule Take 30 mg by mouth daily.       hydrALAZINE (APRESOLINE) 10 MG tablet Take 1 tablet (10 mg) by mouth 3 times daily 270 tablet 3     ipratropium - albuterol 0.5 mg/2.5 mg/3 mL (DUONEB) 0.5-2.5 (3) MG/3ML neb solution Take 1 vial (3 mLs)  by nebulization every 6 hours as needed for wheezing. 180 mL 5     isosorbide mononitrate (IMDUR) 60 MG 24 hr tablet Take 3 tablets (180 mg) by mouth every morning 270 tablet 1     metolazone (ZAROXOLYN) 2.5 MG tablet Take 1 tablet (2.5 mg) by mouth once a week. on Wednesday, 30 mintues before your torsemide. Have labs drawn weekly on Thursday. 12 tablet 1     nitroGLYcerin (NITROSTAT) 0.4 MG sublingual tablet Place 1 tablet (0.4 mg) under the tongue every 5 minutes as needed If no relief after 3 tabs call 911;continue 1 tab every 5 min max 3 tab Indications: chest pain 100 tablet 1     polyethylene glycol (MIRALAX) 17 GM/Dose powder Take 17 g by mouth 2 times daily as needed 510 g 0     potassium chloride barbara ER (KLOR-CON M20) 20 MEQ CR tablet Take 2 tablets (40 mEq) by mouth daily 180 tablet 1     rOPINIRole (REQUIP) 5 MG tablet Take 5 mg by mouth 3 times daily.       spironolactone (ALDACTONE) 25 MG tablet Take 25 mg by mouth daily.       spironolactone (ALDACTONE) 25 MG tablet Take 50 mg by mouth every 7 days. Take 2 tablet once a week on Wednesday along with Metolazone       torsemide (DEMADEX) 100 MG tablet Take 1 tablet (100 mg) by mouth 2 times daily 180 tablet 3     umeclidinium-vilanterol (ANORO ELLIPTA) 62.5-25 MCG/ACT oral inhaler Inhale 1 puff into the lungs daily. 60 each 11       Allergies   Allergen Reactions     Bumex [Bumetanide] Muscle Pain (Myalgia)          Lab Results    Chemistry/lipid CBC Cardiac Enzymes/BNP/TSH/INR   Recent Labs   Lab Test 05/02/24  1620   CHOL 121   HDL 27*   LDL 50   TRIG 219*     Recent Labs   Lab Test 05/02/24  1620   LDL 50     Recent Labs   Lab Test 10/31/24  1231      POTASSIUM 3.7   CHLORIDE 89*   CO2 29   *   BUN 56.6*   CR 1.67*   GFRESTIMATED 45*   RUSLAN 10.1     Recent Labs   Lab Test 10/31/24  1231 10/24/24  1220 10/17/24  1538   CR 1.67* 1.80* 1.69*     Recent Labs   Lab Test 09/17/24  0850 04/29/24  1126   A1C 8.4* 7.8*          Recent Labs   Lab  "Test 09/17/24  0850   WBC 7.3   HGB 11.5*   HCT 39.8*   MCV 95        Recent Labs   Lab Test 09/17/24  0850 09/17/24  0325 06/08/24  0455   HGB 11.5* 11.0* 11.6*    No results for input(s): \"TROPONINI\" in the last [id number] hours.  Recent Labs   Lab Test 09/17/24  0325 06/03/24  0553 05/13/24  0709   NTBNPI 171 558 197     Recent Labs   Lab Test 08/13/24  1501   TSH 1.27     Recent Labs   Lab Test 06/03/24  0553 05/13/24  0709 02/13/23  2307   INR 0.97 1.30* 1.01        Anthony Husain DO  Cardiologist     33 minutes spent by me on the date of the encounter doing chart review, history and exam, documentation and further activities per the note    The longitudinal plan of care for the diagnosis(es)/condition(s) as documented were addressed during this visit. Due to the added complexity in care, I will continue to support Zackary in the subsequent management and with ongoing continuity of care.  Will follow-up on stress testing results                            Thank you for allowing me to participate in the care of your patient.      Sincerely,     Anthony Husain DO     [facility]  cc:   Sarah Dangelo MD  [phone]      "

## 2024-11-06 NOTE — PATIENT INSTRUCTIONS
Please contact direct nurses line Monday through Friday 8 AM to 5 PM @ [phone]    After-hours contact cardiology office at [phone].    Plan:    Recommend stress test at [facility]

## 2024-11-06 NOTE — PROGRESS NOTES
HEART CARE ENCOUNTER CONSULTATON NOTE      [facility]  [phone]      Assessment/Recommendations   Assessment:   Acute on chronic chronic heart failure with preserved ejection fraction.  Patient has chronic decompensated heart failure.  New York Heart Association class III  Recent anginal chest pain episodes requiring nitroglycerin, new  Coronary Artery Disease s/p CABG (LIMA to LAD), PCI May 2024 to OM and Ramus  Chronic kidney disease stage III, GFR 45  Diabetes mellitus type 2 with chronic kidney disease, non-insulin-dependent.  Poorly controlled A1c 8.4  Atrial fibrillation on amiodarone    Plan:  Recommend nuclear stress testing to reevaluate his chest pain episodes that he had recently.    Currently anticoagulant with Eliquis 5 mg twice daily  On antiplatelet therapy with Plavix 75 mg daily due to recent PCI in May 2024  Currently on guideline directed medical therapy with Farxiga 5 mg daily, carvedilol 3.125 mg twice daily hydralazine and Imdur for afterload reduction.  Spironolactone 25 mg daily  Currently on torsemide 100 mg twice daily for diuretic therapy.  Also on metolazone 2.5 mg once weekly on Wednesday 1.  Preoperative cardiovascular risk assessment: Neuro spine surgery, cervical  Assessment of preoperative cardiac risk: He has 2 to active cardiac conditions (unstable coronary syndromes, decompensated HF, significant arrhythmias, or severe valvular disease), has  known coronary artery disease, has  4 clinical risk factors (ischemic heart disease, prior heart failure, cerebrovascular disease, diabetes mellitus, and renal insufficiency), and has a functional capacity < than 4 METs.     Creatinine   Date Value Ref Range Status   10/31/2024 1.67 (H) 0.67 - 1.17 mg/dL Final       Surgical Recommendation(s):   From a cardiovascular standpoint he is at high risk for cardiovascular complications due to to active cardiac conditions, known coronary disease, functional capacity less  than 4 METS and for chronic clinical risk factors including ischemic heart disease, heart failure, diabetes and renal insufficiency.  Before considering any surgical intervention risk versus benefits need to be weighed with the patient.  Risk of cardiac complications greater than 10%.  Recommend nuclear stress testing given recent anginal chest pain requiring nitroglycerin.  This is a first time he is that the use nitroglycerin on 2 separate episodes last week in the past 6 months.  At this time do not recommend spine surgery until further evaluation including stress testing is completed.  If his stress test is negative for evidence of ischemia he would be at intermediate to high risk of cardiovascular complications due to his chronic cardiovascular issues.         History of Present Illness/Subjective    HPI: Zackary Hutchison is a 64 year old male chronic decompensated heart failure, chronic anticoagulation, obesity, diabetes, renal insufficiency, coronary artery disease with recent unstable anginal symptoms who presents to cardiology clinic for cardiovascular risk assessment prior to spinal surgery.    Patient is clearly at high risk due to his active conditions and clinical risk factors.  He has evidence of chronic decompensated heart failure which he remains decompensated.  He has recent evidence of unstable anginal symptoms requiring nitroglycerin over the past 2 weeks.    He has concern for active nonhealing wounds in his left lower extremity.  This would also increase his risk for infection in his spine.    He is unable to perform 4 METS of activity which also increases risk for cardiovascular complications.    Outlined my concern regarding proceeding with spinal surgery given his risk factors.      ECG: Sinus rhythm with first-degree AV block, right bundle branch block with wide QRS of 134 ms, heart rate 83 bpm.  Anterior lateral ST segment abnormalities.  No significant change when compared to June 5, 2024 with  exception of no PACs noted on current study.    Coronary Angiogram: May 2024.  Reviewed.  He had progression in his coronary artery disease requiring 2 separate areas of intervention.    Echocardiogram Results: May 2024  1. Technically difficult study.  2. The left ventricle is normal in size. Image quality does not provide for  detailed assessment of LV systolic function, but is felt to be normal with a  visually estimated ejection fraction of roughly 55%.  3. No significant valvular heart disease is identified on this study though  the sensitivity, particularly of regurgitant lesions, is reduced due to poor  Doppler acoustics.  4. Right ventricular size and systolic performance could not be accurately  assessed due to inadequate visualization       Physical Examination  Review of Systems   Vitals: /60 (BP Location: Right arm, Patient Position: Sitting, Cuff Size: Adult Large)   Pulse 100   Resp 20   Wt 117.5 kg (259 lb)   BMI 41.80 kg/m    BMI= Body mass index is 41.8 kg/m .  Wt Readings from Last 3 Encounters:   11/06/24 117.5 kg (259 lb)   11/02/24 113.9 kg (251 lb)   10/31/24 114.8 kg (253 lb)        Obese, pleasant   ENT/Mouth: membranes moist, no oral lesions or bleeding gums.      EYES:  no scleral icterus, normal conjunctivae       Chest/Lungs:   Decreased breath sounds throughout.   Cardiovascular:   Regular.  Faint systolic murmur.  Evidence of pitting edema in the lower extremity that are wrapped.   Abdomen:  no tenderness; bowel sounds are present   Extremities: no cyanosis or clubbing.  In wheelchair   Skin: Patient has weeping wounds in the left lower extremity.   Neurologic: normal  bilateral, no tremors     Psychiatric: alert and oriented x3, calm        Please refer above for cardiac ROS details.        Medical History  Surgical History Family History Social History   Past Medical History:   Diagnosis Date    Atrial fibrillation (H)     Chronic kidney disease     Chronic kidney  disease, stage 3b (H) 09/28/2023    Class 3 severe obesity due to excess calories with body mass index (BMI) of 40.0 to 44.9 in adult (H) 09/28/2023    Congestive heart failure (H)     COPD (chronic obstructive pulmonary disease) (H)     Coronary artery disease involving coronary bypass graft of native heart without angina pectoris 09/28/2023    Heart failure with reduced ejection fraction (H) 09/11/2023    HFrEF (heart failure with reduced ejection fraction) (H) 09/11/2023    Hyperlipidemia     Hypertension     Ischemic cardiomyopathy 09/28/2023    Obese     Paroxysmal atrial fibrillation (H) 09/28/2023    Tobacco abuse 09/28/2023     Past Surgical History:   Procedure Laterality Date    CORONARY ANGIOGRAPHY ADULT ORDER      CORONARY ARTERY BYPASS Left 2014    2 vessels    CV CARDIOMEMS WITH RIGHT HEART CATH N/A 2/15/2024    Procedure: Pulmonary Arterial Pressure Sensor Placement;  Surgeon: Jacey Bhandari MD;  Location: [facility] CATH LAB CV    CV CORONARY ANGIOGRAM N/A 09/11/2023    Procedure: Coronary Angiogram;  Surgeon: Santy Esposito MD;  Location: [facility] CATH LAB CV    CV CORONARY ANGIOGRAM N/A 5/2/2024    Procedure: CV CORONARY ANGIOGRAM;  Surgeon: Santy Esposito MD;  Location: [facility] CATH LAB CV    CV LEFT HEART CATH N/A 09/11/2023    Procedure: Left Heart Catheterization;  Surgeon: Santy Esposito MD;  Location: [facility] CATH LAB CV    CV LEFT HEART CATH N/A 5/2/2024    Procedure: Left Heart Catheterization;  Surgeon: Santy Esposito MD;  Location: [facility] CATH LAB CV    CV PCI ANGIOPLASTY N/A 5/2/2024    Procedure: Percutaneous Transluminal Angioplasty;  Surgeon: Santy Esposito MD;  Location: [facility] CATH LAB CV    CV RIGHT HEART CATH MEASUREMENTS RECORDED N/A 09/11/2023    Procedure: Right Heart Catheterization;  Surgeon: Santy Esposito MD;  Location: [facility] CATH LAB CV     Family History   Problem Relation Age of Onset    Cerebrovascular Disease Mother     Myocardial  Infarction Father         Social History     Socioeconomic History    Marital status: Single     Spouse name: Not on file    Number of children: Not on file    Years of education: Not on file    Highest education level: Not on file   Occupational History    Occupation: Retired restaurant owner     Comment: [employer]   Tobacco Use    Smoking status: Every Day     Current packs/day: 0.50     Types: Cigarettes    Smokeless tobacco: Never    Tobacco comments:     Seen IP by [facility] on 9/11/23 and declined cessation services and materials   Substance and Sexual Activity    Alcohol use: Not Currently    Drug use: Not on file    Sexual activity: Not on file   Other Topics Concern    Not on file   Social History Narrative    Currently lives in an assisted living facility. (Updated: 01/08/2024)     Social Drivers of Health     Financial Resource Strain: Low Risk  (9/18/2024)    Financial Resource Strain     Within the past 12 months, have you or your family members you live with been unable to get utilities (heat, electricity) when it was really needed?: No   Food Insecurity: Low Risk  (9/18/2024)    Food Insecurity     Within the past 12 months, did you worry that your food would run out before you got money to buy more?: No     Within the past 12 months, did the food you bought just not last and you didn t have money to get more?: No   Transportation Needs: Low Risk  (9/18/2024)    Transportation Needs     Within the past 12 months, has lack of transportation kept you from medical appointments, getting your medicines, non-medical meetings or appointments, work, or from getting things that you need?: No   Physical Activity: Not on file   Stress: Not on file   Social Connections: Not on file   Interpersonal Safety: High Risk (9/17/2024)    Interpersonal Safety     Do you feel physically and emotionally safe where you currently live?: No     Within the past 12 months, have you been hit, slapped, kicked or otherwise physically hurt  by someone?: No     Within the past 12 months, have you been humiliated or emotionally abused in other ways by your partner or ex-partner?: No   Housing Stability: Low Risk  (9/18/2024)    Housing Stability     Do you have housing? : Yes     Are you worried about losing your housing?: No           Medications  Allergies   Current Outpatient Medications   Medication Sig Dispense Refill    acetaminophen (TYLENOL) 500 MG tablet Take 1,000 mg by mouth 2 times daily as needed for mild pain      albuterol (PROAIR HFA/PROVENTIL HFA/VENTOLIN HFA) 108 (90 Base) MCG/ACT inhaler Inhale 2 puffs into the lungs every 6 hours as needed for shortness of breath, wheezing or cough      amiodarone (PACERONE) 200 MG tablet Take 1 tablet (200 mg) by mouth daily 90 tablet 3    apixaban ANTICOAGULANT (ELIQUIS) 5 MG tablet Take 1 tablet (5 mg) by mouth 2 times daily 180 tablet 3    atorvastatin (LIPITOR) 40 MG tablet Take 40 mg by mouth every morning      carvedilol (COREG) 3.125 MG tablet Take 1 tablet (3.125 mg) by mouth 2 times daily (with meals) 180 tablet 1    clopidogrel (PLAVIX) 75 MG tablet Take 1 tablet (75 mg) by mouth daily 60 tablet 0    [employer] VITAMIN B12 1000 MCG tablet Take 1,000 mcg by mouth daily      dapagliflozin (FARXIGA) 5 MG TABS tablet Take 1 tablet (5 mg) by mouth daily. 90 tablet 1    diclofenac (VOLTAREN) 1 % topical gel Apply 2 g topically every 6 hours as needed for moderate pain      DULoxetine (CYMBALTA) 30 MG capsule Take 30 mg by mouth daily.      hydrALAZINE (APRESOLINE) 10 MG tablet Take 1 tablet (10 mg) by mouth 3 times daily 270 tablet 3    ipratropium - albuterol 0.5 mg/2.5 mg/3 mL (DUONEB) 0.5-2.5 (3) MG/3ML neb solution Take 1 vial (3 mLs) by nebulization every 6 hours as needed for wheezing. 180 mL 5    isosorbide mononitrate (IMDUR) 60 MG 24 hr tablet Take 3 tablets (180 mg) by mouth every morning 270 tablet 1    metolazone (ZAROXOLYN) 2.5 MG tablet Take 1 tablet (2.5 mg) by mouth once a week. on  "Wednesday, 30 mintues before your torsemide. Have labs drawn weekly on Thursday. 12 tablet 1    nitroGLYcerin (NITROSTAT) 0.4 MG sublingual tablet Place 1 tablet (0.4 mg) under the tongue every 5 minutes as needed If no relief after 3 tabs call 911;continue 1 tab every 5 min max 3 tab Indications: chest pain 100 tablet 1    polyethylene glycol (MIRALAX) 17 GM/Dose powder Take 17 g by mouth 2 times daily as needed 510 g 0    potassium chloride barbara ER (KLOR-CON M20) 20 MEQ CR tablet Take 2 tablets (40 mEq) by mouth daily 180 tablet 1    rOPINIRole (REQUIP) 5 MG tablet Take 5 mg by mouth 3 times daily.      spironolactone (ALDACTONE) 25 MG tablet Take 25 mg by mouth daily.      spironolactone (ALDACTONE) 25 MG tablet Take 50 mg by mouth every 7 days. Take 2 tablet once a week on Wednesday along with Metolazone      torsemide (DEMADEX) 100 MG tablet Take 1 tablet (100 mg) by mouth 2 times daily 180 tablet 3    umeclidinium-vilanterol (ANORO ELLIPTA) 62.5-25 MCG/ACT oral inhaler Inhale 1 puff into the lungs daily. 60 each 11       Allergies   Allergen Reactions    Bumex [Bumetanide] Muscle Pain (Myalgia)          Lab Results    Chemistry/lipid CBC Cardiac Enzymes/BNP/TSH/INR   Recent Labs   Lab Test 05/02/24  1620   CHOL 121   HDL 27*   LDL 50   TRIG 219*     Recent Labs   Lab Test 05/02/24  1620   LDL 50     Recent Labs   Lab Test 10/31/24  1231      POTASSIUM 3.7   CHLORIDE 89*   CO2 29   *   BUN 56.6*   CR 1.67*   GFRESTIMATED 45*   RUSLAN 10.1     Recent Labs   Lab Test 10/31/24  1231 10/24/24  1220 10/17/24  1538   CR 1.67* 1.80* 1.69*     Recent Labs   Lab Test 09/17/24  0850 04/29/24  1126   A1C 8.4* 7.8*          Recent Labs   Lab Test 09/17/24  0850   WBC 7.3   HGB 11.5*   HCT 39.8*   MCV 95        Recent Labs   Lab Test 09/17/24  0850 09/17/24  0325 06/08/24  0455   HGB 11.5* 11.0* 11.6*    No results for input(s): \"TROPONINI\" in the last 12626 hours.  Recent Labs   Lab Test 09/17/24  0325 " 06/03/24  0553 05/13/24  0709   NTBNPI [phone]     Recent Labs   Lab Test 08/13/24  1501   TSH 1.27     Recent Labs   Lab Test 06/03/24  0553 05/13/24  0709 02/13/23  2307   INR 0.97 1.30* 1.01        Anthony Husain DO  Cardiologist     33 minutes spent by me on the date of the encounter doing chart review, history and exam, documentation and further activities per the note    The longitudinal plan of care for the diagnosis(es)/condition(s) as documented were addressed during this visit. Due to the added complexity in care, I will continue to support Zackary in the subsequent management and with ongoing continuity of care.  Will follow-up on stress testing results

## 2024-11-07 ENCOUNTER — LAB REQUISITION (OUTPATIENT)
Dept: LAB | Facility: HOSPITAL | Age: 64
End: 2024-11-07
Payer: MEDICARE

## 2024-11-07 DIAGNOSIS — I50.42 CHRONIC COMBINED SYSTOLIC (CONGESTIVE) AND DIASTOLIC (CONGESTIVE) HEART FAILURE (H): ICD-10-CM

## 2024-11-07 LAB
ANION GAP SERPL CALCULATED.3IONS-SCNC: 17 MMOL/L (ref 7–15)
BUN SERPL-MCNC: 44 MG/DL (ref 8–23)
CALCIUM SERPL-MCNC: 9.8 MG/DL (ref 8.8–10.4)
CHLORIDE SERPL-SCNC: 87 MMOL/L (ref 98–107)
CREAT SERPL-MCNC: 1.63 MG/DL (ref 0.67–1.17)
EGFRCR SERPLBLD CKD-EPI 2021: 46 ML/MIN/1.73M2
GLUCOSE SERPL-MCNC: 168 MG/DL (ref 70–99)
HCO3 SERPL-SCNC: 27 MMOL/L (ref 22–29)
POTASSIUM SERPL-SCNC: 3.5 MMOL/L (ref 3.4–5.3)
SODIUM SERPL-SCNC: 131 MMOL/L (ref 135–145)

## 2024-11-07 PROCEDURE — 80048 BASIC METABOLIC PNL TOTAL CA: CPT | Mod: ORL | Performed by: INTERNAL MEDICINE

## 2024-11-08 ENCOUNTER — TELEPHONE (OUTPATIENT)
Dept: CARDIOLOGY | Facility: CLINIC | Age: 64
End: 2024-11-08
Payer: MEDICARE

## 2024-11-08 NOTE — TELEPHONE ENCOUNTER
MICHELLE Bhandari-    Pt called to report that he had severe, uncomfortable BLE cramping s/p his weekly metolazone 2.5mg this past Wednesday. This was odd because he has been on this regimen for quite some time and typically has not had issues w/ this. I did review w/ him that per HRS, his weight came down quite well from 257.5lbs to 251.7lbs, and so this larger drop and fluid shift could be contributing. Reviewed pt's BMP from yesterday w/ K WNL. The cramping was resolved today.     Advised pt to keep us posted and let us know if this happens again this coming week. Breathing has improved since pulm started him on a different inhaler. Needing inhaler and nebs less frequent. LLE swelling > RLE, but in general has improved and is still getting twice weekly wound care from [facility]. [facility] reading haven't been quality waveforms last few days, pt will keep trying.     Of note, he's sched to have a stress test on 11/18 as follow up to his appt w/ Dr. Husain on 11/6 which was for cardiac risk assessment before tentative neurosurgery. Pt had recent chest pain relieved by nitroglycerin.     [facility] will continue to follow pt's trends, HF status.     CHAPARRITA Ann RN

## 2024-11-14 ENCOUNTER — LAB REQUISITION (OUTPATIENT)
Dept: LAB | Facility: HOSPITAL | Age: 64
End: 2024-11-14
Payer: MEDICARE

## 2024-11-14 DIAGNOSIS — I50.42 CHRONIC COMBINED SYSTOLIC (CONGESTIVE) AND DIASTOLIC (CONGESTIVE) HEART FAILURE (H): ICD-10-CM

## 2024-11-14 LAB
ANION GAP SERPL CALCULATED.3IONS-SCNC: 13 MMOL/L (ref 7–15)
BUN SERPL-MCNC: 40.3 MG/DL (ref 8–23)
CALCIUM SERPL-MCNC: 9.6 MG/DL (ref 8.8–10.4)
CHLORIDE SERPL-SCNC: 92 MMOL/L (ref 98–107)
CREAT SERPL-MCNC: 1.51 MG/DL (ref 0.67–1.17)
EGFRCR SERPLBLD CKD-EPI 2021: 51 ML/MIN/1.73M2
GLUCOSE SERPL-MCNC: 184 MG/DL (ref 70–99)
HCO3 SERPL-SCNC: 30 MMOL/L (ref 22–29)
POTASSIUM SERPL-SCNC: 4 MMOL/L (ref 3.4–5.3)
SODIUM SERPL-SCNC: 135 MMOL/L (ref 135–145)

## 2024-11-14 PROCEDURE — 80048 BASIC METABOLIC PNL TOTAL CA: CPT | Mod: ORL | Performed by: INTERNAL MEDICINE

## 2024-11-18 ENCOUNTER — HOSPITAL ENCOUNTER (OUTPATIENT)
Dept: NUCLEAR MEDICINE | Facility: HOSPITAL | Age: 64
Discharge: HOME | End: 2024-11-18
Attending: INTERNAL MEDICINE
Payer: MEDICARE

## 2024-11-18 ENCOUNTER — HOSPITAL ENCOUNTER (OUTPATIENT)
Dept: CARDIOLOGY | Facility: HOSPITAL | Age: 64
Discharge: HOME | End: 2024-11-18
Attending: INTERNAL MEDICINE
Payer: MEDICARE

## 2024-11-18 ENCOUNTER — HOSPITAL ENCOUNTER (INPATIENT)
Facility: HOSPITAL | Age: 64
DRG: 603 | End: 2024-11-18
Attending: EMERGENCY MEDICINE | Admitting: HOSPITALIST
Payer: MEDICARE

## 2024-11-18 DIAGNOSIS — L03.90 CELLULITIS: Primary | ICD-10-CM

## 2024-11-18 DIAGNOSIS — J44.9 COPD, MODERATE (H): ICD-10-CM

## 2024-11-18 DIAGNOSIS — E66.813 CLASS 3 DRUG-INDUCED OBESITY WITH SERIOUS COMORBIDITY AND BODY MASS INDEX (BMI) OF 45.0 TO 49.9 IN ADULT (H): ICD-10-CM

## 2024-11-18 DIAGNOSIS — N18.31 STAGE 3A CHRONIC KIDNEY DISEASE (H): ICD-10-CM

## 2024-11-18 DIAGNOSIS — E66.1 CLASS 3 DRUG-INDUCED OBESITY WITH SERIOUS COMORBIDITY AND BODY MASS INDEX (BMI) OF 45.0 TO 49.9 IN ADULT (H): ICD-10-CM

## 2024-11-18 DIAGNOSIS — I50.32 CHRONIC DIASTOLIC CONGESTIVE HEART FAILURE (H): ICD-10-CM

## 2024-11-18 DIAGNOSIS — E11.22 TYPE 2 DIABETES MELLITUS WITH STAGE 3A CHRONIC KIDNEY DISEASE, WITHOUT LONG-TERM CURRENT USE OF INSULIN (H): ICD-10-CM

## 2024-11-18 DIAGNOSIS — L03.116 LEFT LEG CELLULITIS: ICD-10-CM

## 2024-11-18 DIAGNOSIS — I48.11 LONGSTANDING PERSISTENT ATRIAL FIBRILLATION (H): ICD-10-CM

## 2024-11-18 DIAGNOSIS — N18.31 TYPE 2 DIABETES MELLITUS WITH STAGE 3A CHRONIC KIDNEY DISEASE, WITHOUT LONG-TERM CURRENT USE OF INSULIN (H): ICD-10-CM

## 2024-11-18 DIAGNOSIS — L03.119 CELLULITIS OF ANTERIOR LOWER LEG: ICD-10-CM

## 2024-11-18 DIAGNOSIS — K59.03 DRUG-INDUCED CONSTIPATION: ICD-10-CM

## 2024-11-18 DIAGNOSIS — S81.802A OPEN WOUND OF LEFT LOWER EXTREMITY, INITIAL ENCOUNTER: ICD-10-CM

## 2024-11-18 DIAGNOSIS — I25.118 CORONARY ARTERY DISEASE INVOLVING NATIVE CORONARY ARTERY OF NATIVE HEART WITH OTHER FORM OF ANGINA PECTORIS (H): ICD-10-CM

## 2024-11-18 DIAGNOSIS — M25.552 HIP PAIN, LEFT: ICD-10-CM

## 2024-11-18 LAB
ANION GAP SERPL CALCULATED.3IONS-SCNC: 13 MMOL/L (ref 7–15)
BUN SERPL-MCNC: 32.7 MG/DL (ref 8–23)
CALCIUM SERPL-MCNC: 9.4 MG/DL (ref 8.8–10.4)
CHLORIDE SERPL-SCNC: 97 MMOL/L (ref 98–107)
CREAT SERPL-MCNC: 1.13 MG/DL (ref 0.67–1.17)
CRP SERPL-MCNC: 8.1 MG/L
CV STRESS CURRENT BP HE: NORMAL
CV STRESS CURRENT HR HE: 79
CV STRESS CURRENT HR HE: 80
CV STRESS CURRENT HR HE: 81
CV STRESS CURRENT HR HE: 82
CV STRESS CURRENT HR HE: 83
CV STRESS CURRENT HR HE: 84
CV STRESS DEVIATION TIME HE: NORMAL
CV STRESS ECHO PERCENT HR HE: NORMAL
CV STRESS EXERCISE STAGE HE: NORMAL
CV STRESS FINAL RESTING BP HE: NORMAL
CV STRESS FINAL RESTING HR HE: 82
CV STRESS MAX HR HE: 84
CV STRESS MAX TREADMILL GRADE HE: 0
CV STRESS MAX TREADMILL SPEED HE: 0
CV STRESS PEAK DIA BP HE: NORMAL
CV STRESS PEAK SYS BP HE: NORMAL
CV STRESS PHASE HE: NORMAL
CV STRESS PROTOCOL HE: NORMAL
CV STRESS RESTING PT POSITION HE: NORMAL
CV STRESS ST DEVIATION AMOUNT HE: NORMAL
CV STRESS ST DEVIATION ELEVATION HE: NORMAL
CV STRESS ST EVELATION AMOUNT HE: NORMAL
CV STRESS TEST TYPE HE: NORMAL
CV STRESS TOTAL STAGE TIME MIN 1 HE: NORMAL
EGFRCR SERPLBLD CKD-EPI 2021: 72 ML/MIN/1.73M2
ERYTHROCYTE [DISTWIDTH] IN BLOOD BY AUTOMATED COUNT: 18 % (ref 10–15)
GLUCOSE BLDC GLUCOMTR-MCNC: 197 MG/DL (ref 70–99)
GLUCOSE SERPL-MCNC: 285 MG/DL (ref 70–99)
HCO3 SERPL-SCNC: 27 MMOL/L (ref 22–29)
HCT VFR BLD AUTO: 34.5 % (ref 40–53)
HGB BLD-MCNC: 10.4 G/DL (ref 13.3–17.7)
MCH RBC QN AUTO: 27.1 PG (ref 26.5–33)
MCHC RBC AUTO-ENTMCNC: 30.1 G/DL (ref 31.5–36.5)
MCV RBC AUTO: 90 FL (ref 78–100)
NUC STRESS EJECTION FRACTION: 69 %
PLATELET # BLD AUTO: 199 10E3/UL (ref 150–450)
POTASSIUM SERPL-SCNC: 4.2 MMOL/L (ref 3.4–5.3)
RATE PRESSURE PRODUCT: 9744
RBC # BLD AUTO: 3.84 10E6/UL (ref 4.4–5.9)
SODIUM SERPL-SCNC: 137 MMOL/L (ref 135–145)
STRESS ECHO BASELINE DIASTOLIC HE: 63
STRESS ECHO BASELINE HR: 82
STRESS ECHO BASELINE SYSTOLIC BP: 143
STRESS ECHO CALCULATED PERCENT HR: 55 %
STRESS ECHO LAST STRESS DIASTOLIC BP: 57
STRESS ECHO LAST STRESS HR: 83
STRESS ECHO LAST STRESS SYSTOLIC BP: 116
STRESS ECHO TARGET HR: 154
WBC # BLD AUTO: 9.9 10E3/UL (ref 4–11)

## 2024-11-18 PROCEDURE — 78452 HT MUSCLE IMAGE SPECT MULT: CPT | Mod: ME

## 2024-11-18 PROCEDURE — 250N000009 HC RX 250: Performed by: HOSPITALIST

## 2024-11-18 PROCEDURE — 87641 MR-STAPH DNA AMP PROBE: CPT | Performed by: HOSPITALIST

## 2024-11-18 PROCEDURE — 250N000013 HC RX MED GY IP 250 OP 250 PS 637: Performed by: HOSPITALIST

## 2024-11-18 PROCEDURE — 258N000003 HC RX IP 258 OP 636: Performed by: EMERGENCY MEDICINE

## 2024-11-18 PROCEDURE — G1010 CDSM STANSON: HCPCS

## 2024-11-18 PROCEDURE — 87640 STAPH A DNA AMP PROBE: CPT | Performed by: HOSPITALIST

## 2024-11-18 PROCEDURE — 87186 SC STD MICRODIL/AGAR DIL: CPT | Performed by: HOSPITALIST

## 2024-11-18 PROCEDURE — 93017 CV STRESS TEST TRACING ONLY: CPT

## 2024-11-18 PROCEDURE — 250N000011 HC RX IP 250 OP 636: Performed by: INTERNAL MEDICINE

## 2024-11-18 PROCEDURE — 86140 C-REACTIVE PROTEIN: CPT | Performed by: HOSPITALIST

## 2024-11-18 PROCEDURE — A9500 TC99M SESTAMIBI: HCPCS | Performed by: INTERNAL MEDICINE

## 2024-11-18 PROCEDURE — 78452 HT MUSCLE IMAGE SPECT MULT: CPT | Mod: 26 | Performed by: INTERNAL MEDICINE

## 2024-11-18 PROCEDURE — 82374 ASSAY BLOOD CARBON DIOXIDE: CPT | Performed by: EMERGENCY MEDICINE

## 2024-11-18 PROCEDURE — G0378 HOSPITAL OBSERVATION PER HR: HCPCS

## 2024-11-18 PROCEDURE — 96374 THER/PROPH/DIAG INJ IV PUSH: CPT

## 2024-11-18 PROCEDURE — 82962 GLUCOSE BLOOD TEST: CPT

## 2024-11-18 PROCEDURE — 94640 AIRWAY INHALATION TREATMENT: CPT

## 2024-11-18 PROCEDURE — G1010 CDSM STANSON: HCPCS | Performed by: INTERNAL MEDICINE

## 2024-11-18 PROCEDURE — 99285 EMERGENCY DEPT VISIT HI MDM: CPT | Mod: 25

## 2024-11-18 PROCEDURE — 36415 COLL VENOUS BLD VENIPUNCTURE: CPT | Performed by: EMERGENCY MEDICINE

## 2024-11-18 PROCEDURE — 343N000001 HC RX 343 MED OP 636: Performed by: INTERNAL MEDICINE

## 2024-11-18 PROCEDURE — 99223 1ST HOSP IP/OBS HIGH 75: CPT | Mod: AI | Performed by: HOSPITALIST

## 2024-11-18 PROCEDURE — 80048 BASIC METABOLIC PNL TOTAL CA: CPT | Performed by: EMERGENCY MEDICINE

## 2024-11-18 PROCEDURE — 85027 COMPLETE CBC AUTOMATED: CPT | Performed by: EMERGENCY MEDICINE

## 2024-11-18 PROCEDURE — 999N000157 HC STATISTIC RCP TIME EA 10 MIN

## 2024-11-18 PROCEDURE — 93016 CV STRESS TEST SUPVJ ONLY: CPT | Performed by: INTERNAL MEDICINE

## 2024-11-18 PROCEDURE — 93018 CV STRESS TEST I&R ONLY: CPT | Performed by: INTERNAL MEDICINE

## 2024-11-18 PROCEDURE — 250N000011 HC RX IP 250 OP 636: Performed by: EMERGENCY MEDICINE

## 2024-11-18 PROCEDURE — 87077 CULTURE AEROBIC IDENTIFY: CPT | Performed by: HOSPITALIST

## 2024-11-18 RX ORDER — CARVEDILOL 3.12 MG/1
3.12 TABLET ORAL 2 TIMES DAILY WITH MEALS
Status: DISCONTINUED | OUTPATIENT
Start: 2024-11-19 | End: 2024-11-22 | Stop reason: HOSPADM

## 2024-11-18 RX ORDER — AMIODARONE HYDROCHLORIDE 200 MG/1
200 TABLET ORAL EVERY MORNING
COMMUNITY

## 2024-11-18 RX ORDER — TORSEMIDE 100 MG/1
100 TABLET ORAL 2 TIMES DAILY
Status: DISCONTINUED | OUTPATIENT
Start: 2024-11-19 | End: 2024-11-22 | Stop reason: HOSPADM

## 2024-11-18 RX ORDER — NICOTINE POLACRILEX 4 MG
15-30 LOZENGE BUCCAL
Status: DISCONTINUED | OUTPATIENT
Start: 2024-11-18 | End: 2024-11-22 | Stop reason: HOSPADM

## 2024-11-18 RX ORDER — ACETAMINOPHEN 650 MG/1
650 SUPPOSITORY RECTAL EVERY 4 HOURS PRN
Status: DISCONTINUED | OUTPATIENT
Start: 2024-11-18 | End: 2024-11-22 | Stop reason: HOSPADM

## 2024-11-18 RX ORDER — BISACODYL 10 MG
10 SUPPOSITORY, RECTAL RECTAL DAILY PRN
Status: DISCONTINUED | OUTPATIENT
Start: 2024-11-18 | End: 2024-11-22 | Stop reason: HOSPADM

## 2024-11-18 RX ORDER — HYDRALAZINE HYDROCHLORIDE 10 MG/1
10 TABLET, FILM COATED ORAL 3 TIMES DAILY
Status: DISCONTINUED | OUTPATIENT
Start: 2024-11-19 | End: 2024-11-18

## 2024-11-18 RX ORDER — NITROGLYCERIN 0.4 MG/1
0.4 TABLET SUBLINGUAL EVERY 5 MIN PRN
Status: DISCONTINUED | OUTPATIENT
Start: 2024-11-18 | End: 2024-11-22 | Stop reason: HOSPADM

## 2024-11-18 RX ORDER — CLOPIDOGREL BISULFATE 75 MG/1
75 TABLET ORAL EVERY MORNING
COMMUNITY

## 2024-11-18 RX ORDER — IPRATROPIUM BROMIDE AND ALBUTEROL SULFATE 2.5; .5 MG/3ML; MG/3ML
1 SOLUTION RESPIRATORY (INHALATION) EVERY 6 HOURS PRN
Status: DISCONTINUED | OUTPATIENT
Start: 2024-11-18 | End: 2024-11-20

## 2024-11-18 RX ORDER — POTASSIUM CHLORIDE 1500 MG/1
40 TABLET, EXTENDED RELEASE ORAL EVERY MORNING
COMMUNITY

## 2024-11-18 RX ORDER — ATORVASTATIN CALCIUM 40 MG/1
40 TABLET, FILM COATED ORAL EVERY MORNING
Status: DISCONTINUED | OUTPATIENT
Start: 2024-11-19 | End: 2024-11-22 | Stop reason: HOSPADM

## 2024-11-18 RX ORDER — HYDROMORPHONE HYDROCHLORIDE 2 MG/1
2 TABLET ORAL EVERY 4 HOURS PRN
Status: DISCONTINUED | OUTPATIENT
Start: 2024-11-18 | End: 2024-11-19

## 2024-11-18 RX ORDER — DAPAGLIFLOZIN 5 MG/1
5 TABLET, FILM COATED ORAL EVERY MORNING
COMMUNITY

## 2024-11-18 RX ORDER — ACETAMINOPHEN 325 MG/1
650 TABLET ORAL EVERY 4 HOURS PRN
Status: DISCONTINUED | OUTPATIENT
Start: 2024-11-18 | End: 2024-11-22 | Stop reason: HOSPADM

## 2024-11-18 RX ORDER — CLOPIDOGREL BISULFATE 75 MG/1
75 TABLET ORAL EVERY MORNING
Status: DISCONTINUED | OUTPATIENT
Start: 2024-11-19 | End: 2024-11-22 | Stop reason: HOSPADM

## 2024-11-18 RX ORDER — AMOXICILLIN 250 MG
1 CAPSULE ORAL 2 TIMES DAILY PRN
Status: DISCONTINUED | OUTPATIENT
Start: 2024-11-18 | End: 2024-11-19

## 2024-11-18 RX ORDER — AMIODARONE HYDROCHLORIDE 200 MG/1
200 TABLET ORAL EVERY MORNING
Status: DISCONTINUED | OUTPATIENT
Start: 2024-11-19 | End: 2024-11-22 | Stop reason: HOSPADM

## 2024-11-18 RX ORDER — HYDRALAZINE HYDROCHLORIDE 20 MG/ML
10 INJECTION INTRAMUSCULAR; INTRAVENOUS EVERY 4 HOURS PRN
Status: DISCONTINUED | OUTPATIENT
Start: 2024-11-18 | End: 2024-11-19

## 2024-11-18 RX ORDER — POTASSIUM CHLORIDE 1500 MG/1
40 TABLET, EXTENDED RELEASE ORAL EVERY MORNING
Status: DISCONTINUED | OUTPATIENT
Start: 2024-11-19 | End: 2024-11-22 | Stop reason: HOSPADM

## 2024-11-18 RX ORDER — REGADENOSON 0.08 MG/ML
0.4 INJECTION, SOLUTION INTRAVENOUS ONCE
Status: COMPLETED | OUTPATIENT
Start: 2024-11-18 | End: 2024-11-18

## 2024-11-18 RX ORDER — ROPINIROLE 1 MG/1
5 TABLET, FILM COATED ORAL 3 TIMES DAILY
Status: DISCONTINUED | OUTPATIENT
Start: 2024-11-19 | End: 2024-11-22 | Stop reason: HOSPADM

## 2024-11-18 RX ORDER — DULOXETIN HYDROCHLORIDE 30 MG/1
30 CAPSULE, DELAYED RELEASE ORAL EVERY MORNING
Status: DISCONTINUED | OUTPATIENT
Start: 2024-11-19 | End: 2024-11-22 | Stop reason: HOSPADM

## 2024-11-18 RX ORDER — AMOXICILLIN 250 MG
2 CAPSULE ORAL 2 TIMES DAILY PRN
Status: DISCONTINUED | OUTPATIENT
Start: 2024-11-18 | End: 2024-11-19

## 2024-11-18 RX ORDER — ROPINIROLE 1 MG/1
5 TABLET, FILM COATED ORAL 3 TIMES DAILY
Status: DISCONTINUED | OUTPATIENT
Start: 2024-11-19 | End: 2024-11-18

## 2024-11-18 RX ORDER — NALOXONE HYDROCHLORIDE 0.4 MG/ML
0.4 INJECTION, SOLUTION INTRAMUSCULAR; INTRAVENOUS; SUBCUTANEOUS
Status: DISCONTINUED | OUTPATIENT
Start: 2024-11-18 | End: 2024-11-22 | Stop reason: HOSPADM

## 2024-11-18 RX ORDER — SPIRONOLACTONE 25 MG/1
25 TABLET ORAL
Status: DISCONTINUED | OUTPATIENT
Start: 2024-11-19 | End: 2024-11-22 | Stop reason: HOSPADM

## 2024-11-18 RX ORDER — NALOXONE HYDROCHLORIDE 0.4 MG/ML
0.2 INJECTION, SOLUTION INTRAMUSCULAR; INTRAVENOUS; SUBCUTANEOUS
Status: DISCONTINUED | OUTPATIENT
Start: 2024-11-18 | End: 2024-11-22 | Stop reason: HOSPADM

## 2024-11-18 RX ORDER — METOLAZONE 2.5 MG/1
2.5 TABLET ORAL WEEKLY
Status: DISCONTINUED | OUTPATIENT
Start: 2024-11-20 | End: 2024-11-22 | Stop reason: HOSPADM

## 2024-11-18 RX ORDER — AMINOPHYLLINE 25 MG/ML
50-100 INJECTION, SOLUTION INTRAVENOUS
Status: COMPLETED | OUTPATIENT
Start: 2024-11-18 | End: 2024-11-18

## 2024-11-18 RX ORDER — HYDRALAZINE HYDROCHLORIDE 10 MG/1
10 TABLET, FILM COATED ORAL EVERY 4 HOURS PRN
Status: DISCONTINUED | OUTPATIENT
Start: 2024-11-18 | End: 2024-11-19

## 2024-11-18 RX ORDER — PROCHLORPERAZINE MALEATE 5 MG/1
5 TABLET ORAL EVERY 6 HOURS PRN
Status: DISCONTINUED | OUTPATIENT
Start: 2024-11-18 | End: 2024-11-22 | Stop reason: HOSPADM

## 2024-11-18 RX ORDER — SPIRONOLACTONE 25 MG/1
50 TABLET ORAL WEEKLY
Status: DISCONTINUED | OUTPATIENT
Start: 2024-11-20 | End: 2024-11-22 | Stop reason: HOSPADM

## 2024-11-18 RX ORDER — ISOSORBIDE MONONITRATE 60 MG/1
180 TABLET, EXTENDED RELEASE ORAL EVERY MORNING
Status: DISCONTINUED | OUTPATIENT
Start: 2024-11-19 | End: 2024-11-19

## 2024-11-18 RX ORDER — DAPAGLIFLOZIN 5 MG/1
5 TABLET, FILM COATED ORAL EVERY MORNING
Status: DISCONTINUED | OUTPATIENT
Start: 2024-11-19 | End: 2024-11-22 | Stop reason: HOSPADM

## 2024-11-18 RX ORDER — ALBUTEROL SULFATE 90 UG/1
2 INHALANT RESPIRATORY (INHALATION) EVERY 6 HOURS PRN
Status: DISCONTINUED | OUTPATIENT
Start: 2024-11-18 | End: 2024-11-22 | Stop reason: HOSPADM

## 2024-11-18 RX ORDER — DEXTROSE MONOHYDRATE 25 G/50ML
25-50 INJECTION, SOLUTION INTRAVENOUS
Status: DISCONTINUED | OUTPATIENT
Start: 2024-11-18 | End: 2024-11-22 | Stop reason: HOSPADM

## 2024-11-18 RX ORDER — POLYETHYLENE GLYCOL 3350 17 G/17G
17 POWDER, FOR SOLUTION ORAL 2 TIMES DAILY PRN
Status: DISCONTINUED | OUTPATIENT
Start: 2024-11-18 | End: 2024-11-19

## 2024-11-18 RX ORDER — POLYETHYLENE GLYCOL 3350 17 G/17G
17 POWDER, FOR SOLUTION ORAL 2 TIMES DAILY PRN
Status: DISCONTINUED | OUTPATIENT
Start: 2024-11-18 | End: 2024-11-18

## 2024-11-18 RX ORDER — NICOTINE 21 MG/24HR
1 PATCH, TRANSDERMAL 24 HOURS TRANSDERMAL DAILY
Status: DISCONTINUED | OUTPATIENT
Start: 2024-11-19 | End: 2024-11-22 | Stop reason: HOSPADM

## 2024-11-18 RX ORDER — HYDRALAZINE HYDROCHLORIDE 10 MG/1
10 TABLET, FILM COATED ORAL 3 TIMES DAILY
Status: DISCONTINUED | OUTPATIENT
Start: 2024-11-19 | End: 2024-11-22 | Stop reason: HOSPADM

## 2024-11-18 RX ADMIN — SODIUM CHLORIDE 2500 MG: 9 INJECTION, SOLUTION INTRAVENOUS at 21:38

## 2024-11-18 RX ADMIN — HYDROMORPHONE HYDROCHLORIDE 1 MG: 2 TABLET ORAL at 23:35

## 2024-11-18 RX ADMIN — Medication 8 MILLICURIE: at 11:55

## 2024-11-18 RX ADMIN — IPRATROPIUM BROMIDE AND ALBUTEROL SULFATE 3 ML: .5; 3 SOLUTION RESPIRATORY (INHALATION) at 23:52

## 2024-11-18 RX ADMIN — Medication 42.7 MILLICURIE: at 13:00

## 2024-11-18 RX ADMIN — ACETAMINOPHEN 650 MG: 325 TABLET ORAL at 23:35

## 2024-11-18 RX ADMIN — REGADENOSON 0.4 MG: 0.08 INJECTION, SOLUTION INTRAVENOUS at 13:05

## 2024-11-18 ASSESSMENT — COLUMBIA-SUICIDE SEVERITY RATING SCALE - C-SSRS
1. IN THE PAST MONTH, HAVE YOU WISHED YOU WERE DEAD OR WISHED YOU COULD GO TO SLEEP AND NOT WAKE UP?: NO
6. HAVE YOU EVER DONE ANYTHING, STARTED TO DO ANYTHING, OR PREPARED TO DO ANYTHING TO END YOUR LIFE?: NO
2. HAVE YOU ACTUALLY HAD ANY THOUGHTS OF KILLING YOURSELF IN THE PAST MONTH?: NO

## 2024-11-18 ASSESSMENT — ACTIVITIES OF DAILY LIVING (ADL)
ADLS_ACUITY_SCORE: 0

## 2024-11-18 NOTE — PROGRESS NOTES
Pt has hx of CHF and CAD having several episodes of  nonexertional chest pain relieved x1 with nitroglycerin.  Denies increase shortness of breath.  Pre-op evaluation.  Pt very somnolent.    Maricruz Arauz RN

## 2024-11-19 ENCOUNTER — APPOINTMENT (OUTPATIENT)
Dept: PHYSICAL THERAPY | Facility: HOSPITAL | Age: 64
DRG: 603 | End: 2024-11-19
Payer: MEDICARE

## 2024-11-19 ENCOUNTER — APPOINTMENT (OUTPATIENT)
Dept: ULTRASOUND IMAGING | Facility: HOSPITAL | Age: 64
DRG: 603 | End: 2024-11-19
Attending: HOSPITALIST
Payer: MEDICARE

## 2024-11-19 LAB
ANION GAP SERPL CALCULATED.3IONS-SCNC: 15 MMOL/L (ref 7–15)
BUN SERPL-MCNC: 29.6 MG/DL (ref 8–23)
CALCIUM SERPL-MCNC: 9 MG/DL (ref 8.8–10.4)
CHLORIDE SERPL-SCNC: 96 MMOL/L (ref 98–107)
CREAT SERPL-MCNC: 1.18 MG/DL (ref 0.67–1.17)
EGFRCR SERPLBLD CKD-EPI 2021: 68 ML/MIN/1.73M2
GLUCOSE BLDC GLUCOMTR-MCNC: 166 MG/DL (ref 70–99)
GLUCOSE BLDC GLUCOMTR-MCNC: 180 MG/DL (ref 70–99)
GLUCOSE BLDC GLUCOMTR-MCNC: 203 MG/DL (ref 70–99)
GLUCOSE BLDC GLUCOMTR-MCNC: 207 MG/DL (ref 70–99)
GLUCOSE BLDC GLUCOMTR-MCNC: 242 MG/DL (ref 70–99)
GLUCOSE SERPL-MCNC: 188 MG/DL (ref 70–99)
HCO3 SERPL-SCNC: 28 MMOL/L (ref 22–29)
HOLD SPECIMEN: NORMAL
MRSA DNA SPEC QL NAA+PROBE: POSITIVE
POTASSIUM SERPL-SCNC: 3.6 MMOL/L (ref 3.4–5.3)
SA TARGET DNA: POSITIVE
SODIUM SERPL-SCNC: 139 MMOL/L (ref 135–145)

## 2024-11-19 PROCEDURE — 97162 PT EVAL MOD COMPLEX 30 MIN: CPT | Mod: GP

## 2024-11-19 PROCEDURE — 250N000011 HC RX IP 250 OP 636: Performed by: HOSPITALIST

## 2024-11-19 PROCEDURE — G0378 HOSPITAL OBSERVATION PER HR: HCPCS

## 2024-11-19 PROCEDURE — 99207 PR APP CREDIT; MD BILLING SHARED VISIT: CPT | Mod: FS

## 2024-11-19 PROCEDURE — 99233 SBSQ HOSP IP/OBS HIGH 50: CPT | Mod: FS | Performed by: HOSPITALIST

## 2024-11-19 PROCEDURE — G0463 HOSPITAL OUTPT CLINIC VISIT: HCPCS

## 2024-11-19 PROCEDURE — 250N000012 HC RX MED GY IP 250 OP 636 PS 637: Performed by: HOSPITALIST

## 2024-11-19 PROCEDURE — 82962 GLUCOSE BLOOD TEST: CPT

## 2024-11-19 PROCEDURE — 250N000013 HC RX MED GY IP 250 OP 250 PS 637: Performed by: HOSPITALIST

## 2024-11-19 PROCEDURE — 120N000001 HC R&B MED SURG/OB

## 2024-11-19 PROCEDURE — 93925 LOWER EXTREMITY STUDY: CPT

## 2024-11-19 PROCEDURE — 250N000011 HC RX IP 250 OP 636

## 2024-11-19 PROCEDURE — 97530 THERAPEUTIC ACTIVITIES: CPT | Mod: GP

## 2024-11-19 PROCEDURE — 250N000013 HC RX MED GY IP 250 OP 250 PS 637

## 2024-11-19 PROCEDURE — 258N000003 HC RX IP 258 OP 636: Performed by: HOSPITALIST

## 2024-11-19 PROCEDURE — 36415 COLL VENOUS BLD VENIPUNCTURE: CPT | Performed by: HOSPITALIST

## 2024-11-19 PROCEDURE — 80048 BASIC METABOLIC PNL TOTAL CA: CPT | Performed by: HOSPITALIST

## 2024-11-19 PROCEDURE — 93922 UPR/L XTREMITY ART 2 LEVELS: CPT

## 2024-11-19 RX ORDER — ISOSORBIDE MONONITRATE 30 MG/1
30 TABLET, EXTENDED RELEASE ORAL EVERY MORNING
Status: DISCONTINUED | OUTPATIENT
Start: 2024-11-19 | End: 2024-11-21

## 2024-11-19 RX ORDER — AMOXICILLIN 250 MG
1 CAPSULE ORAL 2 TIMES DAILY
Status: DISCONTINUED | OUTPATIENT
Start: 2024-11-19 | End: 2024-11-20

## 2024-11-19 RX ORDER — LIDOCAINE 4 G/G
1 PATCH TOPICAL DAILY PRN
Status: DISCONTINUED | OUTPATIENT
Start: 2024-11-19 | End: 2024-11-22 | Stop reason: HOSPADM

## 2024-11-19 RX ORDER — HYDROMORPHONE HCL IN WATER/PF 6 MG/30 ML
0.4 PATIENT CONTROLLED ANALGESIA SYRINGE INTRAVENOUS
Status: DISCONTINUED | OUTPATIENT
Start: 2024-11-19 | End: 2024-11-22 | Stop reason: HOSPADM

## 2024-11-19 RX ORDER — POLYETHYLENE GLYCOL 3350 17 G/17G
17 POWDER, FOR SOLUTION ORAL DAILY
Status: DISCONTINUED | OUTPATIENT
Start: 2024-11-19 | End: 2024-11-19

## 2024-11-19 RX ORDER — PREGABALIN 25 MG/1
25 CAPSULE ORAL
Status: DISCONTINUED | OUTPATIENT
Start: 2024-11-19 | End: 2024-11-22 | Stop reason: HOSPADM

## 2024-11-19 RX ORDER — POLYETHYLENE GLYCOL 3350 17 G/17G
17 POWDER, FOR SOLUTION ORAL DAILY
Status: DISCONTINUED | OUTPATIENT
Start: 2024-11-19 | End: 2024-11-22 | Stop reason: HOSPADM

## 2024-11-19 RX ORDER — AMOXICILLIN 250 MG
2 CAPSULE ORAL 2 TIMES DAILY
Status: DISCONTINUED | OUTPATIENT
Start: 2024-11-19 | End: 2024-11-20

## 2024-11-19 RX ORDER — METHOCARBAMOL 750 MG/1
750 TABLET, FILM COATED ORAL
Status: DISCONTINUED | OUTPATIENT
Start: 2024-11-19 | End: 2024-11-22 | Stop reason: HOSPADM

## 2024-11-19 RX ORDER — OXYCODONE HYDROCHLORIDE 5 MG/1
5 TABLET ORAL EVERY 4 HOURS PRN
Status: DISCONTINUED | OUTPATIENT
Start: 2024-11-19 | End: 2024-11-22 | Stop reason: HOSPADM

## 2024-11-19 RX ADMIN — CARVEDILOL 3.12 MG: 3.12 TABLET, FILM COATED ORAL at 08:40

## 2024-11-19 RX ADMIN — POTASSIUM CHLORIDE 40 MEQ: 1500 TABLET, EXTENDED RELEASE ORAL at 08:41

## 2024-11-19 RX ADMIN — HYDROMORPHONE HYDROCHLORIDE 2 MG: 2 TABLET ORAL at 05:04

## 2024-11-19 RX ADMIN — ROPINIROLE HYDROCHLORIDE 5 MG: 1 TABLET, FILM COATED ORAL at 08:42

## 2024-11-19 RX ADMIN — ROPINIROLE HYDROCHLORIDE 5 MG: 1 TABLET, FILM COATED ORAL at 14:23

## 2024-11-19 RX ADMIN — Medication 1 PATCH: at 08:41

## 2024-11-19 RX ADMIN — INSULIN ASPART 1 UNITS: 100 INJECTION, SOLUTION INTRAVENOUS; SUBCUTANEOUS at 17:40

## 2024-11-19 RX ADMIN — METHOCARBAMOL 750 MG: 750 TABLET ORAL at 12:10

## 2024-11-19 RX ADMIN — DULOXETINE HYDROCHLORIDE 30 MG: 30 CAPSULE, DELAYED RELEASE ORAL at 08:40

## 2024-11-19 RX ADMIN — DAPAGLIFLOZIN 5 MG: 5 TABLET, FILM COATED ORAL at 08:42

## 2024-11-19 RX ADMIN — PREGABALIN 25 MG: 25 CAPSULE ORAL at 17:39

## 2024-11-19 RX ADMIN — TORSEMIDE 100 MG: 100 TABLET ORAL at 08:40

## 2024-11-19 RX ADMIN — METHOCARBAMOL 750 MG: 750 TABLET ORAL at 17:39

## 2024-11-19 RX ADMIN — OXYCODONE 5 MG: 5 TABLET ORAL at 17:44

## 2024-11-19 RX ADMIN — HYDROMORPHONE HYDROCHLORIDE 0.4 MG: 0.2 INJECTION, SOLUTION INTRAMUSCULAR; INTRAVENOUS; SUBCUTANEOUS at 14:54

## 2024-11-19 RX ADMIN — CLOPIDOGREL BISULFATE 75 MG: 75 TABLET ORAL at 08:41

## 2024-11-19 RX ADMIN — TORSEMIDE 100 MG: 100 TABLET ORAL at 19:43

## 2024-11-19 RX ADMIN — VANCOMYCIN HYDROCHLORIDE 1750 MG: 1 INJECTION, POWDER, LYOPHILIZED, FOR SOLUTION INTRAVENOUS at 19:35

## 2024-11-19 RX ADMIN — INSULIN ASPART 3 UNITS: 100 INJECTION, SOLUTION INTRAVENOUS; SUBCUTANEOUS at 12:07

## 2024-11-19 RX ADMIN — SENNOSIDES AND DOCUSATE SODIUM 1 TABLET: 8.6; 5 TABLET ORAL at 12:08

## 2024-11-19 RX ADMIN — APIXABAN 5 MG: 5 TABLET, FILM COATED ORAL at 08:41

## 2024-11-19 RX ADMIN — INSULIN ASPART 1 UNITS: 100 INJECTION, SOLUTION INTRAVENOUS; SUBCUTANEOUS at 08:37

## 2024-11-19 RX ADMIN — LIDOCAINE 1 PATCH: 4 PATCH TOPICAL at 13:08

## 2024-11-19 RX ADMIN — ROPINIROLE HYDROCHLORIDE 5 MG: 1 TABLET, FILM COATED ORAL at 00:20

## 2024-11-19 RX ADMIN — UMECLIDINIUM BROMIDE AND VILANTEROL TRIFENATATE 1 PUFF: 62.5; 25 POWDER RESPIRATORY (INHALATION) at 08:43

## 2024-11-19 RX ADMIN — ROPINIROLE HYDROCHLORIDE 5 MG: 1 TABLET, FILM COATED ORAL at 19:43

## 2024-11-19 RX ADMIN — PREGABALIN 25 MG: 25 CAPSULE ORAL at 12:10

## 2024-11-19 RX ADMIN — OXYCODONE 5 MG: 5 TABLET ORAL at 13:07

## 2024-11-19 RX ADMIN — APIXABAN 5 MG: 5 TABLET, FILM COATED ORAL at 19:43

## 2024-11-19 RX ADMIN — AMIODARONE HYDROCHLORIDE 200 MG: 200 TABLET ORAL at 09:16

## 2024-11-19 RX ADMIN — ATORVASTATIN CALCIUM 40 MG: 40 TABLET, FILM COATED ORAL at 08:41

## 2024-11-19 RX ADMIN — APIXABAN 5 MG: 5 TABLET, FILM COATED ORAL at 00:20

## 2024-11-19 RX ADMIN — SENNOSIDES AND DOCUSATE SODIUM 1 TABLET: 8.6; 5 TABLET ORAL at 19:43

## 2024-11-19 RX ADMIN — CARVEDILOL 3.12 MG: 3.12 TABLET, FILM COATED ORAL at 17:40

## 2024-11-19 RX ADMIN — HYDROMORPHONE HYDROCHLORIDE 2 MG: 2 TABLET ORAL at 09:16

## 2024-11-19 RX ADMIN — ISOSORBIDE MONONITRATE 30 MG: 30 TABLET, EXTENDED RELEASE ORAL at 08:40

## 2024-11-19 RX ADMIN — HYDRALAZINE HYDROCHLORIDE 10 MG: 10 TABLET ORAL at 00:20

## 2024-11-19 RX ADMIN — POLYETHYLENE GLYCOL 3350 17 G: 17 POWDER, FOR SOLUTION ORAL at 12:07

## 2024-11-19 RX ADMIN — SPIRONOLACTONE 25 MG: 25 TABLET, FILM COATED ORAL at 08:41

## 2024-11-19 ASSESSMENT — ACTIVITIES OF DAILY LIVING (ADL)
ADLS_ACUITY_SCORE: 0
DEPENDENT_IADLS:: CLEANING;COOKING;TRANSPORTATION
ADLS_ACUITY_SCORE: 0

## 2024-11-19 NOTE — PROVIDER NOTIFICATION
+[facility]- Nares. Contact Precautions placed.     Lesley text paged at [phone] via [facility]- Primary Nurse also notified.

## 2024-11-19 NOTE — PLAN OF CARE
"PRIMARY DIAGNOSIS: \"GENERIC\" NURSING  OUTPATIENT/OBSERVATION GOALS TO BE MET BEFORE DISCHARGE:  ADLs back to baseline: No    Activity and level of assistance: Up with standby assistance.    Pain status: No improvement noted. Consider adjustment in pain regimen.    Return to near baseline physical activity: No     Discharge Planner Nurse   Safe discharge environment identified: Yes  Barriers to discharge: Yes       Entered by: Glo Ruiz RN 11/19/2024 11:20 AM   Pain is increased when moving. NP changed pain medications. He will try at next dose. Up with 1 assist walker and gait belt. Refusing chair alarm.  Please review provider order for any additional goals.   Nurse to notify provider when observation goals have been met and patient is ready for discharge.                        "

## 2024-11-19 NOTE — ED PROVIDER NOTES
EMERGENCY DEPARTMENT ENCOUnter      NAME: Zackary Hutchison  AGE: 64 year old male  YOB: 1960  MRN: [id number]  EVALUATION DATE & TIME: 2024  6:49 PM    PCP: Reid Escobar    ED PROVIDER: Shaw Serrano DO      Chief Complaint   Patient presents with    Leg Pain         FINAL IMPRESSION:  1. Left leg cellulitis          ED COURSE & MEDICAL DECISION MAKIN:05 PM Introduced myself to the patient, obtained history of present illness, and performed initial physical exam at this time.   9:04 PM Talked with pharmacy.    The patient presented to the emergency department today complaining of redness and skin sloughing on the left shin.  He is diabetic.  He states that this is developed over the past few days.  On exam he has a large area of erythema over the left lower leg with a moderate-sized patch of superficial skin loss.  Laboratory testing has returned normal.  Given the extent of his skin findings and his history of diabetes, I feel would be best to start IV antibiotics and admit him to the hospital.  He is comfortable with this plan.           Medical Decision Making  Obtained supplemental history:Supplemental history obtained?: No  Reviewed external records: External records reviewed?: Documented in chart and Outpatient Record: [facility] ED 24  Care impacted by chronic illness:Chronic Kidney Disease, Chronic Lung Disease, Diabetes, Heart Disease, Hyperlipidemia, Hypertension, Smoking / Nicotine Use, and Other: cervical stenosis, chronic respiratory failure, chronic ischemic heart disease, COPD, RLS  Did you consider but not order tests?: Work up considered but not performed and documented in chart, if applicable  Did you interpret images independently?: Independent interpretation of ECG and images noted in documentation, when applicable.  Consultation discussion with other provider:Did you involve another provider (consultant, MH, pharmacy, etc.)?: I discussed the care with another  health care provider, see documentation for details.  Admit.    MIPS: Not Applicable      At the conclusion of the encounter I discussed the results of all of the tests and the disposition. The questions were answered. The patient or family acknowledged understanding and was agreeable with the care plan.     =================================================================    HPI        Zackary Hutchison is a 64 year old male with a pertinent history of cervical stenosis, chronic respiratory failure, CKD, CHF, chronic ischemic heart disease, COPD, HLD, DMT2, HTN, RLS, tobacco use, who presents to this ED for evaluation of right intermittent, shooting, leg pain. Patient had a protective boot put on 3-4 weeks ago that has been rubbing on his lower leg. The wounds have been getting worse. He endorses redness, weeping. The right leg is also a darker red than baseline. He has a neuro appointment tomorrow and had a stress test today.          PAST MEDICAL HISTORY:  Past Medical History:   Diagnosis Date    Atrial fibrillation (H)     Chronic kidney disease     Chronic kidney disease, stage 3b (H) 09/28/2023    Class 3 severe obesity due to excess calories with body mass index (BMI) of 40.0 to 44.9 in adult (H) 09/28/2023    Congestive heart failure (H)     COPD (chronic obstructive pulmonary disease) (H)     Coronary artery disease involving coronary bypass graft of native heart without angina pectoris 09/28/2023    Heart failure with reduced ejection fraction (H) 09/11/2023    HFrEF (heart failure with reduced ejection fraction) (H) 09/11/2023    Hyperlipidemia     Hypertension     Ischemic cardiomyopathy 09/28/2023    Obese     Paroxysmal atrial fibrillation (H) 09/28/2023    Tobacco abuse 09/28/2023       PAST SURGICAL HISTORY:  Past Surgical History:   Procedure Laterality Date    CORONARY ANGIOGRAPHY ADULT ORDER      CORONARY ARTERY BYPASS Left 2014    2 vessels    CV CARDIOMEMS WITH RIGHT HEART CATH N/A 2/15/2024     Procedure: Pulmonary Arterial Pressure Sensor Placement;  Surgeon: Jacey Bhandari MD;  Location: [facility] CATH LAB CV    CV CORONARY ANGIOGRAM N/A 09/11/2023    Procedure: Coronary Angiogram;  Surgeon: Santy Esposito MD;  Location: [facility] CATH LAB CV    CV CORONARY ANGIOGRAM N/A 5/2/2024    Procedure: CV CORONARY ANGIOGRAM;  Surgeon: Santy Esposito MD;  Location: [facility] CATH LAB CV    CV LEFT HEART CATH N/A 09/11/2023    Procedure: Left Heart Catheterization;  Surgeon: Santy Esposito MD;  Location: [facility] CATH LAB CV    CV LEFT HEART CATH N/A 5/2/2024    Procedure: Left Heart Catheterization;  Surgeon: Santy Esposito MD;  Location: [facility] CATH LAB CV    CV PCI ANGIOPLASTY N/A 5/2/2024    Procedure: Percutaneous Transluminal Angioplasty;  Surgeon: Santy Esposito MD;  Location: [facility] CATH LAB CV    CV RIGHT HEART CATH MEASUREMENTS RECORDED N/A 09/11/2023    Procedure: Right Heart Catheterization;  Surgeon: Santy Esposito MD;  Location: [facility] CATH LAB CV           CURRENT MEDICATIONS:    acetaminophen (TYLENOL) 500 MG tablet  albuterol (PROAIR HFA/PROVENTIL HFA/VENTOLIN HFA) 108 (90 Base) MCG/ACT inhaler  amiodarone (PACERONE) 200 MG tablet  apixaban ANTICOAGULANT (ELIQUIS) 5 MG tablet  atorvastatin (LIPITOR) 40 MG tablet  carvedilol (COREG) 3.125 MG tablet  clopidogrel (PLAVIX) 75 MG tablet  dapagliflozin (FARXIGA) 5 MG TABS tablet  diclofenac (VOLTAREN) 1 % topical gel  DULoxetine (CYMBALTA) 30 MG capsule  hydrALAZINE (APRESOLINE) 10 MG tablet  ipratropium - albuterol 0.5 mg/2.5 mg/3 mL (DUONEB) 0.5-2.5 (3) MG/3ML neb solution  isosorbide mononitrate (IMDUR) 60 MG 24 hr tablet  metolazone (ZAROXOLYN) 2.5 MG tablet  nitroGLYcerin (NITROSTAT) 0.4 MG sublingual tablet  polyethylene glycol (MIRALAX) 17 GM/Dose powder  potassium chloride barbara ER (KLOR-CON M20) 20 MEQ CR tablet  rOPINIRole (REQUIP) 5 MG tablet  spironolactone (ALDACTONE) 25 MG tablet  spironolactone (ALDACTONE) 25 MG  tablet  torsemide (DEMADEX) 100 MG tablet  umeclidinium-vilanterol (ANORO ELLIPTA) 62.5-25 MCG/ACT oral inhaler  vitamin B-12 (CYANOCOBALAMIN) 1000 MCG tablet        ALLERGIES:  Allergies   Allergen Reactions    Bumex [Bumetanide] Muscle Pain (Myalgia)       FAMILY HISTORY:  Family History   Problem Relation Age of Onset    Cerebrovascular Disease Mother     Myocardial Infarction Father        SOCIAL HISTORY:   Social History     Socioeconomic History    Marital status: Single   Occupational History    Occupation: Retired restaurant owner     Comment: [employer]   Tobacco Use    Smoking status: Every Day     Current packs/day: 0.50     Types: Cigarettes    Smokeless tobacco: Never    Tobacco comments:     Seen IP by [facility] on 9/11/23 and declined cessation services and materials   Substance and Sexual Activity    Alcohol use: Not Currently   Social History Narrative    Currently lives in an assisted living facility. (Updated: 01/08/2024)     Social Drivers of Health     Financial Resource Strain: Low Risk  (9/18/2024)    Financial Resource Strain     Within the past 12 months, have you or your family members you live with been unable to get utilities (heat, electricity) when it was really needed?: No   Food Insecurity: Low Risk  (9/18/2024)    Food Insecurity     Within the past 12 months, did you worry that your food would run out before you got money to buy more?: No     Within the past 12 months, did the food you bought just not last and you didn t have money to get more?: No   Transportation Needs: Low Risk  (9/18/2024)    Transportation Needs     Within the past 12 months, has lack of transportation kept you from medical appointments, getting your medicines, non-medical meetings or appointments, work, or from getting things that you need?: No   Interpersonal Safety: High Risk (9/17/2024)    Interpersonal Safety     Do you feel physically and emotionally safe where you currently live?: No     Within the past 12  "months, have you been hit, slapped, kicked or otherwise physically hurt by someone?: No     Within the past 12 months, have you been humiliated or emotionally abused in other ways by your partner or ex-partner?: No   Housing Stability: Low Risk  (9/18/2024)    Housing Stability     Do you have housing? : Yes     Are you worried about losing your housing?: No       VITALS:  Patient Vitals for the past 24 hrs:   BP Temp Temp src Pulse Resp SpO2 Height Weight   11/18/24 2020 (!) 146/70 -- -- 87 -- 95 % -- --   11/18/24 2000 (!) 140/70 -- -- 87 -- 94 % -- --   11/18/24 1950 (!) 141/70 -- -- 88 -- 94 % -- --   11/18/24 1930 (!) 143/70 -- -- 86 -- 96 % -- --   11/18/24 1906 137/65 -- -- 95 -- 95 % -- --   11/18/24 1905 (!) 167/55 -- -- 93 -- 95 % -- --   11/18/24 1848 (!) 225/95 98.3  F (36.8  C) Oral 95 24 97 % 1.676 m (5' 6\") 117.9 kg (260 lb)       PHYSICAL EXAM    Constitutional:  Well developed, Well nourished,  HENT:  Normocephalic, Atraumatic, Oropharynx moist, Nose normal.   Eyes:  EOMI, Conjunctiva normal, No discharge.   Respiratory:  Normal breath sounds, No respiratory distress, No wheezing, No chest tenderness.   Cardiovascular:  Normal heart rate, Normal rhythm, No murmurs  GI:  Soft, No tenderness, No guarding, No CVA tenderness.   Musculoskeletal: Large area of erythema over the left lower leg with a moderate-sized patch of superficial skin loss.  Extremities: No lower extremity edema.  Neurologic:  Alert & oriented x 3, No focal deficits noted.   Psychiatric:  Affect normal, Judgment normal, Mood normal.        LAB:  All pertinent labs reviewed and interpreted.  Results for orders placed or performed during the hospital encounter of 11/18/24   CBC with platelets   Result Value Ref Range    WBC Count 9.9 4.0 - 11.0 10e3/uL    RBC Count 3.84 (L) 4.40 - 5.90 10e6/uL    Hemoglobin 10.4 (L) 13.3 - 17.7 g/dL    Hematocrit 34.5 (L) 40.0 - 53.0 %    MCV 90 78 - 100 fL    MCH 27.1 26.5 - 33.0 pg    MCHC 30.1 (L) " 31.5 - 36.5 g/dL    RDW 18.0 (H) 10.0 - 15.0 %    Platelet Count 199 150 - 450 10e3/uL   Basic metabolic panel   Result Value Ref Range    Sodium 137 135 - 145 mmol/L    Potassium 4.2 3.4 - 5.3 mmol/L    Chloride 97 (L) 98 - 107 mmol/L    Carbon Dioxide (CO2) 27 22 - 29 mmol/L    Anion Gap 13 7 - 15 mmol/L    Urea Nitrogen 32.7 (H) 8.0 - 23.0 mg/dL    Creatinine 1.13 0.67 - 1.17 mg/dL    GFR Estimate 72 >60 mL/min/1.73m2    Calcium 9.4 8.8 - 10.4 mg/dL    Glucose 285 (H) 70 - 99 mg/dL         I, Walt Humphrey, am serving as a scribe to document services personally performed by Dr. Serrano based on my observation and the provider's statements to me. I, Shaw Serrano, DO attest that Walt Humphrey is acting in a scribe capacity, has observed my performance of the services and has documented them in accordance with my direction.    Shaw Serrano DO  Emergency Medicine  [facility] EMERGENCY DEPARTMENT  [address] [place] [id number] [phone]  Dept: [phone]     Shaw Serrano DO  11/18/24 2225

## 2024-11-19 NOTE — PHARMACY-VANCOMYCIN DOSING SERVICE
Pharmacy Vancomycin Initial Note  Date of Service 2024  Patient's  1960  64 year old, male    Indication: Skin and Soft Tissue Infection    Current estimated CrCl = Estimated Creatinine Clearance: 77.7 mL/min (based on SCr of 1.13 mg/dL).    Creatinine for last 3 days  2024:  7:40 PM Creatinine 1.13 mg/dL    Recent Vancomycin Level(s) for last 3 days  No results found for requested labs within last 3 days.      Vancomycin IV Administrations (past 72 hours)        No vancomycin orders with administrations in past 72 hours.                    Nephrotoxins and other renal medications (From now, onward)      Start     Dose/Rate Route Frequency Ordered Stop    24  vancomycin (VANCOCIN) 2,500 mg in 0.9% NaCl 525 mL intermittent infusion         2,500 mg  over 120 Minutes Intravenous ONCE 24              Contrast Orders - past 72 hours (72h ago, onward)      None            Plan:  Start vancomycin  2500 mg IV as a one time ~ 20 mg/kg loading dose.  Please reconsult pharmacy if vancomycin therapy is desired beyond this one time loading dose. Thank you.     Altaf Thomason, [facility]

## 2024-11-19 NOTE — CONSULTS
Care Management Initial Consult    General Information  Assessment completed with: Patient,    Type of CM/SW Visit: Initial Assessment    Primary Care Provider verified and updated as needed: Yes   Readmission within the last 30 days: no previous admission in last 30 days      Reason for Consult: discharge planning  Advance Care Planning: Advance Care Planning Reviewed: no concerns identified          Communication Assessment  Patient's communication style: spoken language (English or Bilingual)    Hearing Difficulty or Deaf: no        Cognitive  Cognitive/Neuro/Behavioral: WDL                      Living Environment:   People in home: alone     Current living Arrangements: independent living facility      Able to return to prior arrangements: yes       Family/Social Support:  Care provided by: self  Provides care for: no one  Marital Status: Single  Support system:            Description of Support System: Supportive    Support Assessment: Adequate family and caregiver support    Current Resources:   Patient receiving home care services: Yes  Skilled Home Care Services: Skilled Nursing     Community Resources: [facility], [facility] Worker  Equipment currently used at home: walker, rolling  Supplies currently used at home: Wound Care Supplies    Employment/Financial:  Employment Status: retired        Financial Concerns: none   Referral to Financial Worker: No       Does the patient's insurance plan have a 3 day qualifying hospital stay waiver?  No    Lifestyle & Psychosocial Needs:  Social Drivers of Health     Food Insecurity: Low Risk  (11/19/2024)    Food Insecurity     Within the past 12 months, did you worry that your food would run out before you got money to buy more?: No     Within the past 12 months, did the food you bought just not last and you didn t have money to get more?: No   Depression: Not at risk (9/24/2024)    PHQ-2     PHQ-2 Score: 0   Housing Stability: Low Risk  (11/19/2024)    Housing  Stability     Do you have housing? : Yes     Are you worried about losing your housing?: No   Tobacco Use: High Risk (11/6/2024)    Patient History     Smoking Tobacco Use: Every Day     Smokeless Tobacco Use: Never     Passive Exposure: Not on file   Financial Resource Strain: Low Risk  (11/19/2024)    Financial Resource Strain     Within the past 12 months, have you or your family members you live with been unable to get utilities (heat, electricity) when it was really needed?: No   Alcohol Use: Alcohol Misuse (11/2/2020)    [facility]    AUDIT-C     Frequency of Alcohol Consumption: 2-3 times a week     Average Number of Drinks: 3 or 4     Frequency of Binge Drinking: Less than monthly   Transportation Needs: Low Risk  (11/19/2024)    Transportation Needs     Within the past 12 months, has lack of transportation kept you from medical appointments, getting your medicines, non-medical meetings or appointments, work, or from getting things that you need?: No   Physical Activity: Not on file   Interpersonal Safety: High Risk (9/17/2024)    Interpersonal Safety     Do you feel physically and emotionally safe where you currently live?: No     Within the past 12 months, have you been hit, slapped, kicked or otherwise physically hurt by someone?: No     Within the past 12 months, have you been humiliated or emotionally abused in other ways by your partner or ex-partner?: No   Stress: Not on file   Social Connections: Not on file   Health Literacy: Not on file       Functional Status:  Prior to admission patient needed assistance:   Dependent ADLs:: Ambulation-walker, Wheelchair-independent, Independent  Dependent IADLs:: Cleaning, Cooking, Transportation  Assesssment of Functional Status: At functional baseline    Mental Health Status:      N/a    Chemical Dependency Status:      N/a          Values/Beliefs:  Spiritual, Cultural Beliefs, Shinto Practices, Values that affect care: no        Cultural/Alevism Practices Patient Routinely Participates In: ceremony       Discussed  Partnership in Safe Discharge Planning  document with patient/family: Yes: pt    Additional Information:  SW met with pt to introduce role of CM, complete  initial assessment, and to discuss needs at time of d/c. Pt comes from home; lives [facility] and and noted to be independent at baseline with home care services (open to [facility] care RN PT). Pt feels that there will be no needs from CM at discharge, and will follow with PCP if needs arise post discharge.    CM to continue to follow through hospitalization and for recommendations.  9:52 AM    Brenna Kjellberg, BSW

## 2024-11-19 NOTE — PLAN OF CARE
Patient admitted to room SS8 via cart from emergency room.  Patient was accompanied by Self.    Patient ambulated/transferred by air karissa.  Patient is alert and orientated x 3.  Outpatient Observation education provided to: (patient, family, friend)  MDRO Education done if applicable (MRSA, VRE, etc)  Safety risks were identified during admission:  fall and skin.   Yellow risk/fall band applied:  Yes  Home meds sent home: N/a  Home meds sent to pharmacy:No IF YES add 1/2 sheet laminated page reminder to chart/clipboard   Detailed Belongings:  clothing, green bag that has lap top and chargers in it, has cell phone and wallet.

## 2024-11-19 NOTE — PLAN OF CARE
Problem: Adult Inpatient Plan of Care  Goal: Plan of Care Review  Description: The Plan of Care Review/Shift note should be completed every shift.  The Outcome Evaluation is a brief statement about your assessment that the patient is improving, declining, or no change.  This information will be displayed automatically on your shift  note.  Outcome: Progressing  Flowsheets (Taken 11/19/2024 1005)  Outcome Evaluation: home with resumption of home RN PT ([facility])  Plan of Care Reviewed With: patient  Overall Patient Progress: improving

## 2024-11-19 NOTE — MEDICATION SCRIBE - ADMISSION MEDICATION HISTORY
Medication Scribe Admission Medication History    Admission medication history is complete. The information provided in this note is only as accurate as the sources available at the time of the update.    Information Source(s): Patient and CareEverywhere/SureScripts via in-person    Pertinent Information: Patient manages his own medications at home. Patient reports taking his QAM medications in the PM around 1730 tonight due to missing them because of a stress test scheduled this afternoon. Reports normally not missing doses or taking them late like he did tonight.  Eliquis 5 mg BID  -pt reports taking only the evening dose tonight 11/18/24. Missed morning dose.  Hydralazine 10 mg TID  -reports 2 doses today, last dose aroun 1730.  Metolazole 2.5mg weekly  -takes 2.5mg weekly on Wednesday(s), reports being unsure if he took his dose this past Wednesday. Reports that he did not lose as much weight this week as he normally has been.  Ropinirole 5 mg  -pt reports having refills for both 2 mg and 5 mg due to auto refill. Currently on 5 mg TID. But has both prescriptions at home. Only using 5 mg tabs, but was told by PCP that he should hang on to them in case they increase his next dose would be 7 mg. Currently taking 5 mg TID, per patient.  -mentions only taking 1 dose of this today, and that he could use another.  Spironolactone  -pt reports 25 mg daily on all days of week except Wednesday where he takes 50mg.  -50mg Wed, 25mg all other days.      Changes made to PTA medication list:  Added: None  Deleted: None  Changed: None    Allergies reviewed with patient and updates made in EHR: yes    Medication History Completed By: Alok Agiular 11/18/2024 10:09 PM    PTA Med List   Medication Sig Last Dose/Taking    acetaminophen (TYLENOL) 500 MG tablet Take 1,000 mg by mouth 2 times daily as needed for mild pain 11/18/2024 at  4:00 PM    albuterol (PROAIR HFA/PROVENTIL HFA/VENTOLIN HFA) 108 (90 Base) MCG/ACT inhaler Inhale 2  puffs into the lungs every 6 hours as needed for shortness of breath, wheezing or cough Taking As Needed    amiodarone (PACERONE) 200 MG tablet Take 200 mg by mouth every morning. 11/18/2024 at  5:30 PM    apixaban ANTICOAGULANT (ELIQUIS) 5 MG tablet Take 1 tablet (5 mg) by mouth 2 times daily 11/18/2024 at  5:30 PM    atorvastatin (LIPITOR) 40 MG tablet Take 40 mg by mouth every morning 11/18/2024 at  5:30 PM    carvedilol (COREG) 3.125 MG tablet Take 1 tablet (3.125 mg) by mouth 2 times daily (with meals). 11/18/2024 at  5:30 PM    clopidogrel (PLAVIX) 75 MG tablet Take 75 mg by mouth every morning. 11/18/2024 Evening    dapagliflozin (FARXIGA) 5 MG TABS tablet Take 5 mg by mouth every morning. 11/18/2024 at  5:30 PM    diclofenac (VOLTAREN) 1 % topical gel Apply 2 g topically every 6 hours as needed for moderate pain 11/17/2024 Evening    DULoxetine (CYMBALTA) 30 MG capsule Take 30 mg by mouth every morning. 11/18/2024 at  5:30 PM    hydrALAZINE (APRESOLINE) 10 MG tablet Take 1 tablet (10 mg) by mouth 3 times daily 11/18/2024 Evening    ipratropium - albuterol 0.5 mg/2.5 mg/3 mL (DUONEB) 0.5-2.5 (3) MG/3ML neb solution Take 1 vial (3 mLs) by nebulization every 6 hours as needed for wheezing. Past Week Evening    isosorbide mononitrate (IMDUR) 60 MG 24 hr tablet Take 3 tablets (180 mg) by mouth every morning 11/18/2024 at  5:30 PM    metolazone (ZAROXOLYN) 2.5 MG tablet Take 1 tablet (2.5 mg) by mouth once a week. on Wednesday, 30 mintues before your torsemide. Have labs drawn weekly on Thursday. Past Month    nitroGLYcerin (NITROSTAT) 0.4 MG sublingual tablet Place 1 tablet (0.4 mg) under the tongue every 5 minutes as needed If no relief after 3 tabs call 911;continue 1 tab every 5 min max 3 tab Indications: chest pain Past Month    polyethylene glycol (MIRALAX) 17 GM/Dose powder Take 17 g by mouth 2 times daily as needed 11/17/2024 Morning    potassium chloride barbara ER (KLOR-CON M20) 20 MEQ CR tablet Take 40 mEq  by mouth every morning. 11/18/2024 at  5:30 PM    rOPINIRole (REQUIP) 5 MG tablet Take 5 mg by mouth 3 times daily. 11/18/2024 Noon    spironolactone (ALDACTONE) 25 MG tablet Take 25 mg by mouth See Admin Instructions. Take 25 mg daily in the morning on Sun, Mon, Tue, Thur, Friday, and Sat. Takes 50 mg weekly on Wednesday(s) with Metolazone dose. 11/18/2024 at  5:30 PM    spironolactone (ALDACTONE) 25 MG tablet Take 50 mg by mouth once a week. Take 50 mg by mouth once a week on Wednesday along with Metolazone 11/13/2024 Morning    torsemide (DEMADEX) 100 MG tablet Take 1 tablet (100 mg) by mouth 2 times daily 11/18/2024 at  5:30 PM    umeclidinium-vilanterol (ANORO ELLIPTA) 62.5-25 MCG/ACT oral inhaler Inhale 1 puff into the lungs daily. 11/18/2024 Morning    vitamin B-12 (CYANOCOBALAMIN) 1000 MCG tablet Take 1,000 mcg by mouth every morning. 11/18/2024 at  5:30 PM

## 2024-11-19 NOTE — DISCHARGE INSTRUCTIONS
BLE - Every other day  Cleanse with Vashe (moisten gauze and place around skin for 5 - 10 minutes or longer); gently dry.  Apply Critic Aid Clear to intact and dry skin.  Cover open areas on LLE with Aquacel Ag and cover with ABD gauze, secure with roll gauze.

## 2024-11-19 NOTE — PROGRESS NOTES
[facility]    Medicine Progress Note - Hospitalist Service    Date of Admission:  11/18/2024    Assessment & Plan   Zackary Hutchison is a 64 year old male admitted on 11/18/2024. He was brought to the ED by ambulance from assisted living facility for evaluation of left leg pain.  Patient reports chronic wound to left leg times several months, has home care RN who comes to evaluate twice weekly.  RN encouraged patient to come in to the ER for evaluation of worsening pain and redness.  MRSA nares positive, continuing IV vancomycin for now    Left Leg Cellulitis   Peripheral Arterial Disease   Evaluated in the ED on 10/31/2024 and 11/2/2024 secondary to left foot burst varicose veins treated with Surgicel and placed on a walking boot to protect the ankle  -- Nontoxic, afebrile and normal WBC  -- CRP just slightly up at 8.1 ; trend CBC   -- MRSA nares +   -- Continue IV vancomycin  -- US negative for DVT   -- ABIs diminished bilaterally, R > L   -- WOC consult  -- Pain control with Tylenol, PRN oxycodone/hydralazine   -- PT consult     Gait Instability  Frequent Falls  Last fall 2 weeks ago, recently moved from independent living to assisted living facility  -- Fall precautions  -- PT/OT/CM    Type 2 diabetes mellitus without long-term current use of insulin  Diagnosed during hospitalization in 9/2024 and prescribed dapagliflozin given heart failure history  -- A1C 8.4% (9/2024)   -- Medium insulin resistance scale  -- Hypoglycemia monitoring and treatment as needed  Needs better control and management outside of dapagliflozin alone - PCP follow-up     Cervical Spine Stenosis  Acute on Chronic Back Pain   -- Trial of methocarbamol and Lyrica   -- PRN oxycodone and Dilaudid for cellulitis as above   -- Is following with NSGY outpatient for consideration of cervical spine decompression    Coronary artery disease  Recent chest pain that improved with nitroglycerin evaluated outpatient by cardiology and  "had a preop nuclear stress test on 11/18/2024 which was negative for ischemia   -- History of CABG, last coronary angiogram on 5/2/2024 treated with stents into the circumflex OM branch and ramus branch  -- Continue PTA atorvastatin, carvedilol, clopidogrel, isosorbide mononitrate, as needed nitroglycerin    Ischemic cardiomyopathy  Chronic HFrEF  No signs or symptoms of acute CHF  -- Monitor daily weights  -- Continue PTA carvedilol, dapagliflozin, spironolactone, torsemide, metolazone     Paroxysmal atrial fibrillation  Currently rate controlled   -- Continue PTA amiodarone, carvedilol  -- Continue PTA Eliquis     Essential Hypertension  Hypertensive Urgency  SBPs 225 in the ER; but patient notes he did not take his meds per normal given stress testing done on 11/18/24  -- Will decrease PTA Imdur to 30 mg daily for now and HOLDing PTA hydralazine   -- Continue PTA Coreg     Chronic kidney disease stage II/IIIa  -- Stable renal function  -- Avoid nephrotoxins    COPD  -- Tobacco cessation encouraged  -- Occasional wheezing but no stridors or shortness of breath to suggest acute exacerbation  -- DuoNebs as needed  -- Continue PTA albuterol HFA, Anoro Ellipta    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   Patient describes falls as above because he frequently falls asleep during the day, and will \"tip forward\" out of his chair   -- Encouraged sleep study as previously recommend by PCP     Drug-induced coagulation defect  Drug-induced platelet defect  -- On PTA clopidogrel and apixaban  -- Monitor for bleeding    Anemia of chronic disease - Stable hemoglobin    Hereditary idiopathic peripheral neuropathy - Continue PTA duloxetine    Restless leg syndrome - Continue PTA ropinirole     Tobacco abuse  -- Currently smoking 0.5 packs/day  -- Nicotine patch       Observation Goals: -diagnostic tests and consults completed and resulted, -vital signs normal or at patient baseline, Nurse to notify provider when observation goals have been met and patient " "is ready for discharge.  Diet: Combination Diet Moderate Consistent Carb (60 g CHO per Meal) Diet; Low Saturated Fat Na <2400mg Diet    DVT Prophylaxis: Eliquis   Ruvalcaba Catheter: Not present  Lines: None     Cardiac Monitoring: None  Code Status: Full Code      Clinically Significant Risk Factors Present on Admission          # Hypochloremia: Lowest Cl = 96 mmol/L in last 2 days, will monitor as appropriate       # Drug Induced Coagulation Defect: home medication list includes an anticoagulant medication  # Drug Induced Platelet Defect: home medication list includes an antiplatelet medication   # Hypertension: Noted on problem list  # Chronic heart failure with reduced ejection fraction: last echo with EF <40%     # Anemia: based on hgb <11      # DMII: A1C = 8.4 % (Ref range: <5.7 %) within past 6 months    # Severe Obesity: Estimated body mass index is 41.85 kg/m  as calculated from the following:    Height as of this encounter: 1.676 m (5' 6\").    Weight as of this encounter: 117.6 kg (259 lb 4.8 oz).       # Financial/Environmental Concerns:           Social Drivers of Health    Tobacco Use: High Risk (11/6/2024)    Patient History     Smoking Tobacco Use: Every Day     Smokeless Tobacco Use: Never   Alcohol Use: Alcohol Misuse (11/2/2020)    Received from [email or web address]    AUDIT-C     Frequency of Alcohol Consumption: 2-3 times a week     Average Number of Drinks: 3 or 4     Frequency of Binge Drinking: Less than monthly   Interpersonal Safety: High Risk (9/17/2024)    Interpersonal Safety     Do you feel physically and emotionally safe where you currently live?: No     Within the past 12 months, have you been hit, slapped, kicked or otherwise physically hurt by someone?: No     Within the past 12 months, have you been humiliated or emotionally abused in other ways by your partner or ex-partner?: No          Disposition Plan     Medically Ready for Discharge: Anticipated in 2-4 Days        The " "patient's care was discussed with the Attending Physician, Dr. Ryan Buckley who independently met with and assessed the patient and is in agreement with the assessment and plan     Slime Perkins PA-C  Hospitalist Service  [facility]  Securely message with [employer] (more info)  Text page via [facility] Paging/Directory   ______________________________________________________________________    Interval History   Patient is new to me today; reports a wound to his left leg x several months that has been treated \"many times.\" Has home RN who told him to come to the ER. Denies any F/C/S. No known inciting injury. Is continuing with pain to left lower extremity.     Lives in senior living facility - does own meds. Recently changed from independent to assisted living. Reports history of frequent falls. He describes this as him falling asleep in his chair, dinner table or walker and he \"falls forward.\"  Does not get much sleep nightly per patient report.     Discussed plan for continued IV abx administration and WOC consult.    Physical Exam   Vital Signs: Temp: 98  F (36.7  C) Temp src: Oral BP: 122/59 Pulse: 68   Resp: 20 SpO2: 97 % O2 Device: None (Room air)    Weight: 259 lbs 4.8 oz    Constitutional: awake, appears uncomfortable, sleepy   Respiratory: No increased work of breathing, no accessory muscle use. Wheezing bilaterally   Cardiovascular: Regular rate and rhythm, normal S1 and S2, no S3 or S4, and no murmur noted  Skin: There is a large weeping ulcer to mid tibia with surrounding erythema, tender to palpation.   Musculoskeletal: Bilateral lower extremity edema  Neuropsychiatric: Appropriate mood, affect and eye contact. Cooperative.    Medical Decision Making             Data     I have personally reviewed the following data over the past 24 hrs:    9.9  \   10.4 (L)   / 199     139 96 (L) 29.6 (H) /  180 (H)   3.6 28 1.18 (H) \     Procal: N/A CRP: 8.10 (H) Lactic Acid: N/A         Imaging results " reviewed over the past 24 hrs:   Recent Results (from the past 24 hours)   NM Lexiscan stress test   Result Value    Pharmacologic Protocol Lexiscan    Test Type Pharmacological    Baseline HR 82    Baseline Systolic     Baseline Diastolic BP 63    Last Stress HR 83    Last Stress Systolic     Last Stress Diastolic BP 57    Target     PERCENT HR 85%    ST Deviation Elevation V1 0.1mm    Deviation Time II -0.5mm    ST Elevation Amount aVR 0.6mm    ST Deviation Amount he II -1.1mm    Final Resting /57    Final Resting HR 82    Max Treadmill Speed 0.0    Max Treadmill Grade 0.0    Peak Systolic /57    Peak Diastolic /59    Max HR  84    Stress Phase Resting    Stress Resting Pt Position SUPINE    Current HR 81    Current /63    Stress Phase Stress    Stage Minute EXE 00:00    Exercise Stage STAGE 2    Current HR 79    Current /63    Stress Phase Stress    Stage Minute EXE 01:00    Exercise Stage STAGE 3    Current HR 80    Current /63    Stress Phase Stress    Stage Minute EXE 02:00    Exercise Stage STAGE 4    Current HR 82    Current /63    Stress Phase Stress    Stage Minute EXE 02:19    Exercise Stage STAGE 4    Current HR 83    Current /59    Stress Phase Stress    Stage Minute EXE 03:00    Exercise Stage STAGE 5    Current HR 84    Current /59    Stress Phase Stress    Stage Minute EXE 03:20    Exercise Stage STAGE 5    Current HR 83    Current /57    Stress Phase Stress    Stage Minute EXE 04:00    Exercise Stage STAGE 6    Current HR 83    Current /57    Stress Phase Stress    Stage Minute EXE 04:00    Exercise Stage STAGE 6    Current HR 83    Current /57    Stress Phase Recovery    Stage Minute REC 00:47    Exercise Stage Recovery    Current HR 83    Current /57    Stress Phase Recovery    Stage Minute REC 00:59    Exercise Stage Recovery    Current HR 84    Current /57    Stress Phase Recovery    Stage  Minute REC 01:59    Exercise Stage Recovery    Current HR 84    Current /57    Stress Phase Recovery    Stage Minute REC 02:19    Exercise Stage Recovery    Current HR 83    Current /57    Stress Phase Recovery    Stage Minute REC 02:59    Exercise Stage Recovery    Current HR 84    Current /57    Stress Phase Recovery    Stage Minute REC 03:59    Exercise Stage Recovery    Current HR 82    Current /57    Stress Phase Recovery    Stage Minute REC 04:01    Exercise Stage Recovery    Current HR 82    Current /57    Max Predicted HR  55    Rate Pressure Product 9,744.0    Left Ventricular EF 69    Narrative      The nuclear stress test is probably negative for inducible myocardial   ischemia or infarction. Diaphragm attenuation artifact is noted.    The left ventricular ejection fraction at stress is 69%.    The patient is at a low risk of future cardiac ischemic events.    There is no prior study for comparison.      There is no prior study for comparison.   US LESLIE Doppler No Exercise 1-2 Levels Bilateral    Narrative    EXAM: RESTING ANKLE-BRACHIAL INDICES (ABIs)  LOCATION: [facility]  DATE: 11/19/2024    INDICATION: Neuropathy, diabetes, recurrent cellulitis  COMPARISON: None.    LESLIE FINDINGS:  RIGHT  Brachial: 123  Ankle (PT): 45 Index: 0.37  Ankle (DP): 0 Index: 0  Digit: 38 Index: 0.31    LEFT  Brachial: 123  Ankle (PT): 88 Index: 0.72  Ankle (DP): 74 Index: 0.60  Digit: 70 Index: 0.57    The right LESLIE at rest is 0.37. The left LESLIE at rest is 0.72.      WAVEFORMS: The dorsalis pedis and posterior tibial arteries are mostly monophasic.      Impression    IMPRESSION:  1.  RIGHT LOWER EXTREMITY: LESLIE is significantly diminished at 0.37. Flow not detected in the dorsalis pedis artery.  2.  LEFT LOWER EXTREMITY: Diminished LESLIE measuring 0.72.           US Lower Extremity Arterial Duplex Bilateral    Narrative    EXAM: US LOWER EXTREMITY ARTERIAL DUPLEX  BILATERAL  LOCATION: [facility]  DATE: 11/19/2024    INDICATION: Diabetes, neuropathy, recurrent cellulitis  COMPARISON: None.  TECHNIQUE: Duplex utilizing 2D gray-scale imaging, Doppler interrogation with color-flow and spectral waveform analysis.    FINDINGS:     RIGHT LOWER EXTREMITY ARTERIAL ASSESSMENT:  External iliac artery 216/30 cm/s  Common femoral artery: 145/20 cm/s  Profunda femoris artery: 95/8 cm/s  SFA (proximal): 158/33 cm/s  SFA (mid): 147/30 cm/s  SFA (distal): 39/5 cm/s  Popliteal artery prox: 63/18 cm/s  Popliteal artery dist: 95/30 cm/s  Posterior tibial artery: 21/4 cm/s  Anterior tibial artery: 0/0 cm/s  Dorsalis pedis artery: 0/0 cm/s    LEFT LOWER EXTREMITY ARTERIAL ASSESSMENT:  External iliac artery 87/26 cm/s  Common femoral artery: 136/39 cm/s  Profunda femoris artery: 77/0 cm/s  SFA (proximal): 240/67 cm/s  SFA (mid): 238/70 cm/s  SFA (distal): 158/38 cm/s  Popliteal artery prox: 89/28 cm/s  Popliteal artery dist: 110/43 cm/s  Posterior tibial artery: 36/1 cm/s  Anterior tibial artery: 21/0 cm/s  Dorsalis pedis artery: 20/0 cm/s      Impression    IMPRESSION:  Unable to obtain arterial waveforms in the right anterior tibial and dorsalis pedis arteries. There is monophasic arterial flow seen in the right posterior tibial artery.    Arterial flow is seen throughout the lower left lower extremity

## 2024-11-19 NOTE — PLAN OF CARE
PRIMARY DIAGNOSIS: SOFT TISSUE INFECTIONS BLE  OUTPATIENT/OBSERVATION GOALS TO BE MET BEFORE DISCHARGE:  Vitals sign stable or return to baseline: Yes    Tolerating oral antibiotics or has home infusion set up if applicable:  pt remains on IV vanco    Pain status: Improved-controlled with oral pain medications. Encouraged pt to elevate legs as able.     Return to near baseline physical activity: No    Discharge Planner Nurse   Safe discharge environment identified: Yes  Barriers to discharge: Yes       Entered by: Glo Dudley RN 11/19/2024 5:36 AM   VSS, pt is on RA, dyspneic at rest, which is normal for him, wheezy LS, neb given. BLE pain, Po dilaudid given x2, rates pain 8/10. Able to move x1 assist, slow. LLE w/ sloughing skin, peeling, flaky. RLE w/ no open areas, but noted small amounts of weeping. C/s WOC, CM. Pt is from [facility] & has RN wound care services.    Please review provider order for any additional goals.     Nurse to notify provider when observation goals have been met and patient is ready for discharge.

## 2024-11-19 NOTE — CONSULTS
[facility]  WO Nurse Inpatient Assessment     Consulted for: BLE    Patient History (according to provider note(s):    Assessment & Plan  Zackary Hutchison is a 64 year old male admitted on 11/18/2024. He was brought to the ED by ambulance from assisted living facility for evaluation of left leg pain     #Left leg cellulitis, probable  #Venous stasis dermatitis  -Evaluated in the ED on 10/31/2024 and 11/2/2024 secondary to left foot burst varicose veins treated with Surgicel and placed on a walking boot to protect the ankle  -Nontoxic, afebrile and normal WBC  -Check CRP inflammation  -Continue IV vancomycin, switch to oral antibiotic and discharge if doing well  -Check MRSA PCR  -Check ABIs  -WOC consult    Assessment:    Wound location: BLE        LLE    RLE  Last photo: All above taken 11/19/24  Wound due to: Venous Ulcer  Wound history/plan of care: Patient states wounds have been present for ' a couple months' and he has been following with Dr. Bhandari; informed patient about wound care clinic and asked if he would like to be seen by a provider there as these are non-healing  Wound base: LLE with dried skin and scattered denudements with sloughin epithelial tissue and areas of pink viable tissue 20 cm x circumferentially and RLE with dry pink skin and scattered areas of dried drainage       Palpation of the wound bed: normal      Drainage: scant     Description of drainage: serous     Measurements (length x width x depth, in cm): See above     Tunneling: N/A     Undermining: N/A  Periwound skin: Dry/scaly and Edematous      Color: pink and red      Temperature: normal   Odor: none  Pain: severe and tension to hands, feet and body but patient states his back hurts worse, sharp  Pain interventions prior to dressing change: patient tolerated well and slow and gentle cares   Treatment goal: Heal , Infection control/prevention, Maintain (prevention of deterioration), and Protection  STATUS:  initial assessment  Supplies ordered: ordered Vashe, Critic Aid Clear, Aquacel Ag    Treatment Plan:   BLE - Every other day  Cleanse with Vashe (moisten gauze and place around skin for 5 - 10 minutes or longer); gently dry.  Apply Critic Aid Clear to intact and dry skin.  Cover open areas on LLE with Aquacel Ag and cover with ABD gauze, secure with roll gauze.    Orders: Written    RECOMMEND PRIMARY TEAM ORDER: Lymphedema consult  Education provided: plan of care  Discussed plan of care with: Patient and Nurse  [facility] nurse follow-up plan: weekly  Notify WO if wound(s) deteriorate.  Nursing to notify the Provider(s) and re-consult the [facility] Nurse if new skin concern.    DATA:     Current support surface: Standard  Standard gel mattress (Isoflex)  Containment of urine/stool: Continent of bladder and Continent of bowel  BMI: Body mass index is 41.85 kg/m .   Active diet order: Orders Placed This Encounter      Combination Diet Moderate Consistent Carb (60 g CHO per Meal) Diet; Low Saturated Fat Na <2400mg Diet     Output: I/O last 3 completed shifts:  In: 240 [P.O.:240]  Out: 500 [Urine:500]     Labs:   Recent Labs   Lab 11/18/24  1940   HGB 10.4*   WBC 9.9     Pressure injury risk assessment:   Sensory Perception: 3-->slightly limited  Moisture: 3-->occasionally moist  Activity: 3-->walks occasionally  Mobility: 3-->slightly limited  Nutrition: 3-->adequate  Friction and Shear: 3-->no apparent problem  Simeon Score: 18    Naheed Domingo, MSN RN CWOCN  Pager no longer is use, please contact through [email or web address] group: [facility] [employer]

## 2024-11-19 NOTE — H&P
[facility]    History and Physical - Hospitalist Service       Date of Admission:  11/18/2024    Assessment & Plan      Zackary Hutchison is a 64 year old male admitted on 11/18/2024. He was brought to the ED by ambulance from assisted living facility for evaluation of left leg pain    #Left leg cellulitis, probable  #Venous stasis dermatitis  -Evaluated in the ED on 10/31/2024 and 11/2/2024 secondary to left foot burst varicose veins treated with Surgicel and placed on a walking boot to protect the ankle  -Nontoxic, afebrile and normal WBC  -Check CRP inflammation  -Continue IV vancomycin, switch to oral antibiotic and discharge if doing well  -Check MRSA PCR  -Check ABIs  -WOC consult    #Gait instability  #Cervical spine stenosis  #Frequent falls  -Last fall 2 weeks ago, moved from independent living to assisted living facility  -Fall precautions    #Coronary artery disease  -Recent chest pain that improved with nitroglycerin evaluated outpatient by cardiology and had a preop nuclear stress test on 11/18/2024  -Currently denies angina symptoms  -History of CABG, last coronary angiogram on 5/2/2024 treated with stents into the circumflex OM branch and ramus branch  -Continue PTA atorvastatin, carvedilol, clopidogrel, isosorbide mononitrate, as needed nitroglycerin    #Ischemic cardiomyopathy  #Chronic HFrEF  -No signs or symptoms of acute CHF  -Monitor daily weights  -Continue PTA carvedilol, dapagliflozin, spironolactone, torsemide    #Essential hypertension  -Elevated blood pressure likely related to pain  -Hydralazine as needed  -Reconcile and continue PTA carvedilol, hydralazine, isosorbide mononitrate    #Type 2 diabetes mellitus without long-term current use of insulin  -Diagnosed during hospitalization in 9/2024 and prescribed dapagliflozin given heart failure history  -Medium insulin resistance scale  -Hypoglycemia monitoring and treatment as needed    #COPD  -Tobacco cessation  encouraged  -Occasional wheezing but no stridors or shortness of breath to suggest acute exacerbation  -DuoNebs as needed  -Continue PTA albuterol HFA, Anoro Ellipta    #Tobacco abuse  -Currently smoking 0.5 packs/day  -Nicotine patch    #Paroxysmal atrial fibrillation  -Continue PTA amiodarone, carvedilol    #Drug-induced coagulation defect  #Drug-induced platelet defect  -On PTA clopidogrel and apixaban  -Monitor for bleeding    #Anemia of chronic disease  -Stable hemoglobin    #Chronic kidney disease stage II/IIIa  -Stable renal function  -Avoid nephrotoxins    #Hereditary idiopathic peripheral neuropathy  -Continue PTA duloxetine    #Restless leg syndrome  -Continue PTA ropinirole    #Severe obesity  -BMI 41.97     Observation Goals: -diagnostic tests and consults completed and resulted, -vital signs normal or at patient baseline, Nurse to notify provider when observation goals have been met and patient is ready for discharge.  Diet: Combination Diet Moderate Consistent Carb (60 g CHO per Meal) Diet; Low Saturated Fat Na <2400mg Diet    DVT Prophylaxis: DOAC  Ruvalcaba Catheter: Not present  Lines: None     Cardiac Monitoring: None  Code Status: Full Code          Disposition Plan     Medically Ready for Discharge: Anticipated Tomorrow           Carlos Alberto Sutton MD  Hospitalist Service  [facility]  Securely message with [employer] (more info)  Text page via [employer] Paging/Directory     ______________________________________________________________________    Chief Complaint   Left leg pain    History is obtained from the patient, electronic health record, and emergency department physician    History of Present Illness   Zackary Hutchison is a 64 year old male who was brought to the ED by ambulance from assisted living facility for evaluation of left leg pain.  Past medical history of CAD, CABG, stents, ischemic cardiomyopathy, chronic HFrEF, hypertension, paroxysmal atrial fibrillation, CKD, type 2  diabetes, hyperlipidemia, obesity, peripheral neuropathy, venous stasis, tobacco abuse, COPD.  Patient was admitted here on 9/17/2024 secondary to a fall.  He falls frequently with the last fall about 2 weeks ago.  He moved from independent senior living into an assisted living facility.  During that hospitalization he was diagnosed with cervical spine stenosis and diabetes.  He was evaluated by neurosurgery team and recommended surgery.  He was seen in the ED on 10/31/2024 and again on 11/4/2024 secondary to bleeding varicose veins on the left foot.  The bleeding was contained, Surgicel was applied and discharged on a walking boot to protect the ankle.  However, patient reported removing the boot because it was denting into his left shin.  He was seen at the cardiology clinic where he reported chest pain relieved by nitroglycerin.  Over the last couple weeks he has had increased left leg pain but denied increased edema or erythema, fevers or chills.  Patient had a preop nuclear stress test on 11/18/2024.  He went home and was advised to return to the ED by the home health aide concerning for infection.  Patient smokes half a pack of cigarette daily, denies alcohol or drugs.      Past Medical History    Past Medical History:   Diagnosis Date    Atrial fibrillation (H)     Chronic kidney disease     Chronic kidney disease, stage 3b (H) 09/28/2023    Class 3 severe obesity due to excess calories with body mass index (BMI) of 40.0 to 44.9 in adult (H) 09/28/2023    Congestive heart failure (H)     COPD (chronic obstructive pulmonary disease) (H)     Coronary artery disease involving coronary bypass graft of native heart without angina pectoris 09/28/2023    Heart failure with reduced ejection fraction (H) 09/11/2023    HFrEF (heart failure with reduced ejection fraction) (H) 09/11/2023    Hyperlipidemia     Hypertension     Ischemic cardiomyopathy 09/28/2023    Obese     Paroxysmal atrial fibrillation (H) 09/28/2023     Tobacco abuse 09/28/2023       Past Surgical History   Past Surgical History:   Procedure Laterality Date    CORONARY ANGIOGRAPHY ADULT ORDER      CORONARY ARTERY BYPASS Left 2014    2 vessels    CV CARDIOMEMS WITH RIGHT HEART CATH N/A 2/15/2024    Procedure: Pulmonary Arterial Pressure Sensor Placement;  Surgeon: Jacey Bhandari MD;  Location: [facility]    CV CORONARY ANGIOGRAM N/A 09/11/2023    Procedure: Coronary Angiogram;  Surgeon: Santy Esposito MD;  Location: [facility]    CV CORONARY ANGIOGRAM N/A 5/2/2024    Procedure: CV CORONARY ANGIOGRAM;  Surgeon: Santy Esposito MD;  Location: [facility]    CV LEFT HEART CATH N/A 09/11/2023    Procedure: Left Heart Catheterization;  Surgeon: Santy Esposito MD;  Location: [facility]    CV LEFT HEART CATH N/A 5/2/2024    Procedure: Left Heart Catheterization;  Surgeon: Santy Esposito MD;  Location: [facility]    CV PCI ANGIOPLASTY N/A 5/2/2024    Procedure: Percutaneous Transluminal Angioplasty;  Surgeon: Santy Esposito MD;  Location: [facility] CATH LAB CV    CV RIGHT HEART CATH MEASUREMENTS RECORDED N/A 09/11/2023    Procedure: Right Heart Catheterization;  Surgeon: Santy Esposito MD;  Location: [facility] CATH LAB CV       Prior to Admission Medications   Prior to Admission Medications   Prescriptions Last Dose Informant Patient Reported? Taking?   DULoxetine (CYMBALTA) 30 MG capsule   Yes No   Sig: Take 30 mg by mouth daily.   acetaminophen (TYLENOL) 500 MG tablet 11/18/2024 at  4:00 PM Self Yes Yes   Sig: Take 1,000 mg by mouth 2 times daily as needed for mild pain   albuterol (PROAIR HFA/PROVENTIL HFA/VENTOLIN HFA) 108 (90 Base) MCG/ACT inhaler  Self Yes Yes   Sig: Inhale 2 puffs into the lungs every 6 hours as needed for shortness of breath, wheezing or cough   amiodarone (PACERONE) 200 MG tablet 11/18/2024 at  5:30 PM  Yes Yes   Sig: Take 200 mg by mouth every morning.   apixaban ANTICOAGULANT  (ELIQUIS) 5 MG tablet 11/18/2024 at  5:30 PM Self No Yes   Sig: Take 1 tablet (5 mg) by mouth 2 times daily   atorvastatin (LIPITOR) 40 MG tablet 11/18/2024 at  5:30 PM Self Yes Yes   Sig: Take 40 mg by mouth every morning   carvedilol (COREG) 3.125 MG tablet 11/18/2024 at  5:30 PM  No Yes   Sig: Take 1 tablet (3.125 mg) by mouth 2 times daily (with meals).   clopidogrel (PLAVIX) 75 MG tablet 11/18/2024 Evening  Yes Yes   Sig: Take 75 mg by mouth every morning.   diclofenac (VOLTAREN) 1 % topical gel  Self Yes No   Sig: Apply 2 g topically every 6 hours as needed for moderate pain   hydrALAZINE (APRESOLINE) 10 MG tablet  Self No No   Sig: Take 1 tablet (10 mg) by mouth 3 times daily   ipratropium - albuterol 0.5 mg/2.5 mg/3 mL (DUONEB) 0.5-2.5 (3) MG/3ML neb solution   No No   Sig: Take 1 vial (3 mLs) by nebulization every 6 hours as needed for wheezing.   isosorbide mononitrate (IMDUR) 60 MG 24 hr tablet  Self No No   Sig: Take 3 tablets (180 mg) by mouth every morning   metolazone (ZAROXOLYN) 2.5 MG tablet   No No   Sig: Take 1 tablet (2.5 mg) by mouth once a week. on Wednesday, 30 mintues before your torsemide. Have labs drawn weekly on Thursday.   nitroGLYcerin (NITROSTAT) 0.4 MG sublingual tablet  Self No No   Sig: Place 1 tablet (0.4 mg) under the tongue every 5 minutes as needed If no relief after 3 tabs call 911;continue 1 tab every 5 min max 3 tab Indications: chest pain   polyethylene glycol (MIRALAX) 17 GM/Dose powder  Self No No   Sig: Take 17 g by mouth 2 times daily as needed   potassium chloride barbara ER (KLOR-CON M20) 20 MEQ CR tablet  Self No No   Sig: Take 2 tablets (40 mEq) by mouth daily   rOPINIRole (REQUIP) 5 MG tablet   Yes No   Sig: Take 5 mg by mouth 3 times daily.   spironolactone (ALDACTONE) 25 MG tablet  Self Yes No   Sig: Take 25 mg by mouth daily.   spironolactone (ALDACTONE) 25 MG tablet  Self Yes No   Sig: Take 50 mg by mouth every 7 days. Take 2 tablet once a week on Wednesday along  with Metolazone   torsemide (DEMADEX) 100 MG tablet  Self No No   Sig: Take 1 tablet (100 mg) by mouth 2 times daily   umeclidinium-vilanterol (ANORO ELLIPTA) 62.5-25 MCG/ACT oral inhaler   No No   Sig: Inhale 1 puff into the lungs daily.   vitamin B-12 (CYANOCOBALAMIN) 1000 MCG tablet 11/18/2024 at  5:30 PM Self Yes Yes   Sig: Take 1,000 mcg by mouth every morning.      Facility-Administered Medications: None           Physical Exam   Vital Signs: Temp: 98.3  F (36.8  C) Temp src: Oral BP: (!) 146/70 Pulse: 87   Resp: 24 SpO2: 95 % O2 Device: None (Room air)    Weight: 260 lbs 0 oz    Constitutional: no apparent distress  Respiratory: no increased work of breathing and occasional wheezing, no stridors  Cardiovascular: regular rate and rhythm  GI: normal bowel sounds  Skin: Left shin denuded, warm to the touch, scaly desquamation and a right medial shin scab  Musculoskeletal: Chronic bilateral lower extremity edema, stasis dermatitis  Neurologic: Mental Status Exam:  Level of Alertness:   awake  Orientation:   person, place, time    Medical Decision Making       MANAGEMENT DISCUSSED with the following over the past 24 hours: ED provider and patient       Data     I have personally reviewed the following data over the past 24 hrs:    9.9  \   10.4 (L)   / 199     137 97 (L) 32.7 (H) /  285 (H)   4.2 27 1.13 \       Imaging results reviewed over the past 24 hrs:   Recent Results (from the past 24 hours)   NM Lexiscan stress test   Result Value    Pharmacologic Protocol Lexiscan    Test Type Pharmacological    Baseline HR 82    Baseline Systolic     Baseline Diastolic BP 63    Last Stress HR 83    Last Stress Systolic     Last Stress Diastolic BP 57    Target     PERCENT HR 85%    ST Deviation Elevation V1 0.1mm    Deviation Time II -0.5mm    ST Elevation Amount aVR 0.6mm    ST Deviation Amount he II -1.1mm    Final Resting /57    Final Resting HR 82    Max Treadmill Speed 0.0    Max Treadmill  Grade 0.0    Peak Systolic /57    Peak Diastolic /59    Max HR  84    Stress Phase Resting    Stress Resting Pt Position SUPINE    Current HR 81    Current /63    Stress Phase Stress    Stage Minute EXE 00:00    Exercise Stage STAGE 2    Current HR 79    Current /63    Stress Phase Stress    Stage Minute EXE 01:00    Exercise Stage STAGE 3    Current HR 80    Current /63    Stress Phase Stress    Stage Minute EXE 02:00    Exercise Stage STAGE 4    Current HR 82    Current /63    Stress Phase Stress    Stage Minute EXE 02:19    Exercise Stage STAGE 4    Current HR 83    Current /59    Stress Phase Stress    Stage Minute EXE 03:00    Exercise Stage STAGE 5    Current HR 84    Current /59    Stress Phase Stress    Stage Minute EXE 03:20    Exercise Stage STAGE 5    Current HR 83    Current /57    Stress Phase Stress    Stage Minute EXE 04:00    Exercise Stage STAGE 6    Current HR 83    Current /57    Stress Phase Stress    Stage Minute EXE 04:00    Exercise Stage STAGE 6    Current HR 83    Current /57    Stress Phase Recovery    Stage Minute REC 00:47    Exercise Stage Recovery    Current HR 83    Current /57    Stress Phase Recovery    Stage Minute REC 00:59    Exercise Stage Recovery    Current HR 84    Current /57    Stress Phase Recovery    Stage Minute REC 01:59    Exercise Stage Recovery    Current HR 84    Current /57    Stress Phase Recovery    Stage Minute REC 02:19    Exercise Stage Recovery    Current HR 83    Current /57    Stress Phase Recovery    Stage Minute REC 02:59    Exercise Stage Recovery    Current HR 84    Current /57    Stress Phase Recovery    Stage Minute REC 03:59    Exercise Stage Recovery    Current HR 82    Current /57    Stress Phase Recovery    Stage Minute REC 04:01    Exercise Stage Recovery    Current HR 82    Current /57    Max Predicted HR  55    Rate Pressure Product  9744.0    Left Ventricular EF 69    Narrative      The nuclear stress test is probably negative for inducible myocardial   ischemia or infarction. Diaphragm attenuation artifact is noted.    The left ventricular ejection fraction at stress is 69%.    The patient is at a low risk of future cardiac ischemic events.    There is no prior study for comparison.      There is no prior study for comparison.

## 2024-11-19 NOTE — PHARMACY-VANCOMYCIN DOSING SERVICE
Pharmacy Vancomycin Initial Note  Date of Service 2024  Patient's  1960  64 year old, male    Indication: Skin and Soft Tissue Infection    Current estimated CrCl = Estimated Creatinine Clearance: 77.7 mL/min (based on SCr of 1.13 mg/dL).    Creatinine for last 3 days  2024:  7:40 PM Creatinine 1.13 mg/dL    Recent Vancomycin Level(s) for last 3 days  No results found for requested labs within last 3 days.      Vancomycin IV Administrations (past 72 hours)                     vancomycin (VANCOCIN) 2,500 mg in 0.9% NaCl 525 mL intermittent infusion (mg) 2,500 mg New Bag 24                    Nephrotoxins and other renal medications (From now, onward)      Start     Dose/Rate Route Frequency Ordered Stop    24 1900  vancomycin (VANCOCIN) 1,750 mg in 0.9% NaCl 517.5 mL intermittent infusion         1,750 mg  over 2 Hours Intravenous EVERY 24 HOURS 24  vancomycin (VANCOCIN) 2,500 mg in 0.9% NaCl 525 mL intermittent infusion         2,500 mg  over 120 Minutes Intravenous ONCE 24              Contrast Orders - past 72 hours (72h ago, onward)      None            [employer] Prediction of Planned Initial Vancomycin Regimen  Regimen: 1750 mg IV every 24 hours.  Start time: 21:38 on 2024  Exposure target: AUC24 (range)400-600 mg/L.hr   AUC24,ss: 589 mg/L.hr  Probability of AUC24 > 400: 78 %  Ctrough,ss: 14.1 mg/L  Probability of Ctrough,ss > 20: 35 %  Probability of nephrotoxicity (Lodise SALINA ): 9 %          Plan:  Start vancomycin  1750 mg IV q24h.   Vancomycin monitoring method: AUC  Vancomycin therapeutic monitoring goal: 400-600 mg*h/L  Pharmacy will check vancomycin levels as appropriate in 1-3 Days.    Serum creatinine levels will be ordered daily for the first week of therapy and at least twice weekly for subsequent weeks.      Gabriel Holloway [facility]

## 2024-11-19 NOTE — ED TRIAGE NOTES
Patient lives in senior living--Has been having left leg pain/redness worsening--home health aide came advised him to come in for evaluation--hx of cellulitis on the right shin.

## 2024-11-19 NOTE — ED NOTES
"[facility] ED Handoff Report    ED Chief Complaint: left leg pain     ED Diagnosis:  (L03.116) Left leg cellulitis  Comment:   Plan: IV abx, received vanco in ED        PMH:    Past Medical History:   Diagnosis Date    Atrial fibrillation (H)     Chronic kidney disease     Chronic kidney disease, stage 3b (H) 09/28/2023    Class 3 severe obesity due to excess calories with body mass index (BMI) of 40.0 to 44.9 in adult (H) 09/28/2023    Congestive heart failure (H)     COPD (chronic obstructive pulmonary disease) (H)     Coronary artery disease involving coronary bypass graft of native heart without angina pectoris 09/28/2023    Heart failure with reduced ejection fraction (H) 09/11/2023    HFrEF (heart failure with reduced ejection fraction) (H) 09/11/2023    Hyperlipidemia     Hypertension     Ischemic cardiomyopathy 09/28/2023    Obese     Paroxysmal atrial fibrillation (H) 09/28/2023    Tobacco abuse 09/28/2023        Code Status:  Full Code     Falls Risk: Yes Band: Applied    Current Living Situation/Residence: lives in an assisted living facility     Elimination Status: Continent: Yes, using bedside urinal (frequently)     Activity Level: SBA w/ walker    Patients Preferred Language:  English     Needed: No    Vital Signs:  BP (!) 146/70   Pulse 87   Temp 98.3  F (36.8  C) (Oral)   Resp 24   Ht 1.676 m (5' 6\")   Wt 117.9 kg (260 lb)   SpO2 95%   BMI 41.97 kg/m       Cardiac Rhythm: NA    Pain Score: 2/10    Is the Patient Confused:  No    Last Food or Drink: 11/18/24 at PTA     Focused Assessment:  left leg swelling and pain with some weeping. Pt was treated prior for cellulitis in the right leg     Tests Performed: Done: Labs    Treatments Provided:  IV abx     Family Dynamics/Concerns: No    Family Updated On Visitor Policy: Yes    Plan of Care Communicated to Family: No    Who Was Updated about Plan of Care: Pt updating family independently     Belongings Checklist Done and " Signed by Patient: No    Belongings Sent with Patient: clothing, green bag that has lap top and chargers in it, has cell phone and wallet.     Medications sent with patient: insulin pen     Covid: symptomatic, NA    Additional Information:     RN: Lisa Arredondo RN   11/18/2024 10:27 PM

## 2024-11-19 NOTE — ED NOTES
[facility]  Expected date:   Expected time:   Means of arrival:   Comments:  MPWD- 65yo M left leg pain, cellulitis

## 2024-11-20 ENCOUNTER — APPOINTMENT (OUTPATIENT)
Dept: OCCUPATIONAL THERAPY | Facility: HOSPITAL | Age: 64
DRG: 603 | End: 2024-11-20
Attending: HOSPITALIST
Payer: MEDICARE

## 2024-11-20 ENCOUNTER — APPOINTMENT (OUTPATIENT)
Dept: PHYSICAL THERAPY | Facility: HOSPITAL | Age: 64
DRG: 603 | End: 2024-11-20
Payer: MEDICARE

## 2024-11-20 LAB
ANION GAP SERPL CALCULATED.3IONS-SCNC: 12 MMOL/L (ref 7–15)
BUN SERPL-MCNC: 27.7 MG/DL (ref 8–23)
CALCIUM SERPL-MCNC: 8.7 MG/DL (ref 8.8–10.4)
CHLORIDE SERPL-SCNC: 95 MMOL/L (ref 98–107)
CREAT SERPL-MCNC: 1.23 MG/DL (ref 0.67–1.17)
CRP SERPL-MCNC: 32.4 MG/L
EGFRCR SERPLBLD CKD-EPI 2021: 65 ML/MIN/1.73M2
ERYTHROCYTE [DISTWIDTH] IN BLOOD BY AUTOMATED COUNT: 17.8 % (ref 10–15)
GLUCOSE BLDC GLUCOMTR-MCNC: 159 MG/DL (ref 70–99)
GLUCOSE BLDC GLUCOMTR-MCNC: 175 MG/DL (ref 70–99)
GLUCOSE BLDC GLUCOMTR-MCNC: 198 MG/DL (ref 70–99)
GLUCOSE BLDC GLUCOMTR-MCNC: 212 MG/DL (ref 70–99)
GLUCOSE BLDC GLUCOMTR-MCNC: 222 MG/DL (ref 70–99)
GLUCOSE SERPL-MCNC: 191 MG/DL (ref 70–99)
HCO3 SERPL-SCNC: 27 MMOL/L (ref 22–29)
HCT VFR BLD AUTO: 36 % (ref 40–53)
HGB BLD-MCNC: 10.6 G/DL (ref 13.3–17.7)
MCH RBC QN AUTO: 26.7 PG (ref 26.5–33)
MCHC RBC AUTO-ENTMCNC: 29.4 G/DL (ref 31.5–36.5)
MCV RBC AUTO: 91 FL (ref 78–100)
PLATELET # BLD AUTO: 185 10E3/UL (ref 150–450)
POTASSIUM SERPL-SCNC: 3.8 MMOL/L (ref 3.4–5.3)
RBC # BLD AUTO: 3.97 10E6/UL (ref 4.4–5.9)
SODIUM SERPL-SCNC: 134 MMOL/L (ref 135–145)
VANCOMYCIN SERPL-MCNC: 17.1 UG/ML
WBC # BLD AUTO: 8.8 10E3/UL (ref 4–11)

## 2024-11-20 PROCEDURE — 250N000013 HC RX MED GY IP 250 OP 250 PS 637

## 2024-11-20 PROCEDURE — 85049 AUTOMATED PLATELET COUNT: CPT

## 2024-11-20 PROCEDURE — 86140 C-REACTIVE PROTEIN: CPT

## 2024-11-20 PROCEDURE — 97116 GAIT TRAINING THERAPY: CPT | Mod: GP

## 2024-11-20 PROCEDURE — 999N000156 HC STATISTIC RCP CONSULT EA 30 MIN

## 2024-11-20 PROCEDURE — 250N000013 HC RX MED GY IP 250 OP 250 PS 637: Performed by: HOSPITALIST

## 2024-11-20 PROCEDURE — 99233 SBSQ HOSP IP/OBS HIGH 50: CPT | Performed by: HOSPITALIST

## 2024-11-20 PROCEDURE — 36415 COLL VENOUS BLD VENIPUNCTURE: CPT

## 2024-11-20 PROCEDURE — 999N000157 HC STATISTIC RCP TIME EA 10 MIN

## 2024-11-20 PROCEDURE — 82374 ASSAY BLOOD CARBON DIOXIDE: CPT

## 2024-11-20 PROCEDURE — 97535 SELF CARE MNGMENT TRAINING: CPT | Mod: GO

## 2024-11-20 PROCEDURE — 250N000011 HC RX IP 250 OP 636: Performed by: HOSPITALIST

## 2024-11-20 PROCEDURE — 85018 HEMOGLOBIN: CPT

## 2024-11-20 PROCEDURE — 36415 COLL VENOUS BLD VENIPUNCTURE: CPT | Performed by: HOSPITALIST

## 2024-11-20 PROCEDURE — 80048 BASIC METABOLIC PNL TOTAL CA: CPT

## 2024-11-20 PROCEDURE — 250N000009 HC RX 250: Performed by: HOSPITALIST

## 2024-11-20 PROCEDURE — 97110 THERAPEUTIC EXERCISES: CPT | Mod: GP

## 2024-11-20 PROCEDURE — 80202 ASSAY OF VANCOMYCIN: CPT | Performed by: HOSPITALIST

## 2024-11-20 PROCEDURE — 120N000001 HC R&B MED SURG/OB

## 2024-11-20 PROCEDURE — 94640 AIRWAY INHALATION TREATMENT: CPT

## 2024-11-20 PROCEDURE — 258N000003 HC RX IP 258 OP 636: Performed by: HOSPITALIST

## 2024-11-20 PROCEDURE — 94640 AIRWAY INHALATION TREATMENT: CPT | Mod: 76

## 2024-11-20 PROCEDURE — 97165 OT EVAL LOW COMPLEX 30 MIN: CPT | Mod: GO

## 2024-11-20 RX ORDER — AMOXICILLIN 250 MG
2 CAPSULE ORAL 2 TIMES DAILY
Status: DISCONTINUED | OUTPATIENT
Start: 2024-11-20 | End: 2024-11-20

## 2024-11-20 RX ORDER — AMOXICILLIN 250 MG
2 CAPSULE ORAL 2 TIMES DAILY
Status: DISCONTINUED | OUTPATIENT
Start: 2024-11-20 | End: 2024-11-22 | Stop reason: HOSPADM

## 2024-11-20 RX ORDER — IPRATROPIUM BROMIDE AND ALBUTEROL SULFATE 2.5; .5 MG/3ML; MG/3ML
1 SOLUTION RESPIRATORY (INHALATION)
Status: DISCONTINUED | OUTPATIENT
Start: 2024-11-20 | End: 2024-11-22 | Stop reason: HOSPADM

## 2024-11-20 RX ADMIN — METHOCARBAMOL 750 MG: 750 TABLET ORAL at 13:11

## 2024-11-20 RX ADMIN — INSULIN ASPART 1 UNITS: 100 INJECTION, SOLUTION INTRAVENOUS; SUBCUTANEOUS at 08:13

## 2024-11-20 RX ADMIN — POTASSIUM CHLORIDE 40 MEQ: 1500 TABLET, EXTENDED RELEASE ORAL at 08:08

## 2024-11-20 RX ADMIN — ROPINIROLE HYDROCHLORIDE 5 MG: 1 TABLET, FILM COATED ORAL at 08:08

## 2024-11-20 RX ADMIN — OXYCODONE 5 MG: 5 TABLET ORAL at 20:41

## 2024-11-20 RX ADMIN — UMECLIDINIUM BROMIDE AND VILANTEROL TRIFENATATE 1 PUFF: 62.5; 25 POWDER RESPIRATORY (INHALATION) at 08:13

## 2024-11-20 RX ADMIN — SENNOSIDES AND DOCUSATE SODIUM 1 TABLET: 8.6; 5 TABLET ORAL at 08:07

## 2024-11-20 RX ADMIN — APIXABAN 5 MG: 5 TABLET, FILM COATED ORAL at 08:09

## 2024-11-20 RX ADMIN — OXYCODONE 5 MG: 5 TABLET ORAL at 05:05

## 2024-11-20 RX ADMIN — CLOPIDOGREL BISULFATE 75 MG: 75 TABLET ORAL at 08:09

## 2024-11-20 RX ADMIN — IPRATROPIUM BROMIDE AND ALBUTEROL SULFATE 3 ML: .5; 3 SOLUTION RESPIRATORY (INHALATION) at 14:46

## 2024-11-20 RX ADMIN — INSULIN ASPART 2 UNITS: 100 INJECTION, SOLUTION INTRAVENOUS; SUBCUTANEOUS at 16:46

## 2024-11-20 RX ADMIN — CARVEDILOL 3.12 MG: 3.12 TABLET, FILM COATED ORAL at 18:09

## 2024-11-20 RX ADMIN — ACETAMINOPHEN 650 MG: 325 TABLET ORAL at 20:41

## 2024-11-20 RX ADMIN — ROPINIROLE HYDROCHLORIDE 5 MG: 1 TABLET, FILM COATED ORAL at 20:45

## 2024-11-20 RX ADMIN — ROPINIROLE HYDROCHLORIDE 5 MG: 1 TABLET, FILM COATED ORAL at 13:10

## 2024-11-20 RX ADMIN — ATORVASTATIN CALCIUM 40 MG: 40 TABLET, FILM COATED ORAL at 08:08

## 2024-11-20 RX ADMIN — IPRATROPIUM BROMIDE AND ALBUTEROL SULFATE 3 ML: .5; 3 SOLUTION RESPIRATORY (INHALATION) at 19:31

## 2024-11-20 RX ADMIN — Medication 1 PATCH: at 08:06

## 2024-11-20 RX ADMIN — ISOSORBIDE MONONITRATE 30 MG: 30 TABLET, EXTENDED RELEASE ORAL at 08:08

## 2024-11-20 RX ADMIN — ACETAMINOPHEN 650 MG: 325 TABLET ORAL at 10:37

## 2024-11-20 RX ADMIN — DAPAGLIFLOZIN 5 MG: 5 TABLET, FILM COATED ORAL at 08:09

## 2024-11-20 RX ADMIN — TORSEMIDE 100 MG: 100 TABLET ORAL at 08:08

## 2024-11-20 RX ADMIN — METOLAZONE 2.5 MG: 2.5 TABLET ORAL at 08:11

## 2024-11-20 RX ADMIN — DULOXETINE HYDROCHLORIDE 30 MG: 30 CAPSULE, DELAYED RELEASE ORAL at 08:07

## 2024-11-20 RX ADMIN — OXYCODONE 5 MG: 5 TABLET ORAL at 00:05

## 2024-11-20 RX ADMIN — AMIODARONE HYDROCHLORIDE 200 MG: 200 TABLET ORAL at 08:11

## 2024-11-20 RX ADMIN — VANCOMYCIN HYDROCHLORIDE 1750 MG: 1 INJECTION, POWDER, LYOPHILIZED, FOR SOLUTION INTRAVENOUS at 18:09

## 2024-11-20 RX ADMIN — INSULIN ASPART 2 UNITS: 100 INJECTION, SOLUTION INTRAVENOUS; SUBCUTANEOUS at 13:10

## 2024-11-20 RX ADMIN — SENNOSIDES AND DOCUSATE SODIUM 2 TABLET: 8.6; 5 TABLET ORAL at 20:42

## 2024-11-20 RX ADMIN — PREGABALIN 25 MG: 25 CAPSULE ORAL at 18:09

## 2024-11-20 RX ADMIN — METHOCARBAMOL 750 MG: 750 TABLET ORAL at 18:09

## 2024-11-20 RX ADMIN — PREGABALIN 25 MG: 25 CAPSULE ORAL at 13:11

## 2024-11-20 RX ADMIN — METHOCARBAMOL 750 MG: 750 TABLET ORAL at 08:08

## 2024-11-20 RX ADMIN — CARVEDILOL 3.12 MG: 3.12 TABLET, FILM COATED ORAL at 08:09

## 2024-11-20 RX ADMIN — OXYCODONE 5 MG: 5 TABLET ORAL at 10:37

## 2024-11-20 RX ADMIN — PREGABALIN 25 MG: 25 CAPSULE ORAL at 08:09

## 2024-11-20 RX ADMIN — SPIRONOLACTONE 50 MG: 25 TABLET, FILM COATED ORAL at 09:05

## 2024-11-20 RX ADMIN — TORSEMIDE 100 MG: 100 TABLET ORAL at 19:03

## 2024-11-20 RX ADMIN — APIXABAN 5 MG: 5 TABLET, FILM COATED ORAL at 20:42

## 2024-11-20 RX ADMIN — POLYETHYLENE GLYCOL 3350 17 G: 17 POWDER, FOR SOLUTION ORAL at 08:08

## 2024-11-20 ASSESSMENT — ACTIVITIES OF DAILY LIVING (ADL)
WEAR_GLASSES_OR_BLIND: YES
ADLS_ACUITY_SCORE: 0
CONCENTRATING,_REMEMBERING_OR_MAKING_DECISIONS_DIFFICULTY: NO
ADLS_ACUITY_SCORE: 0
WALKING_OR_CLIMBING_STAIRS_DIFFICULTY: OTHER (SEE COMMENTS)
ADLS_ACUITY_SCORE: 0
ADLS_ACUITY_SCORE: 0
DIFFICULTY_EATING/SWALLOWING: NO
DOING_ERRANDS_INDEPENDENTLY_DIFFICULTY: NO
CHANGE_IN_FUNCTIONAL_STATUS_SINCE_ONSET_OF_CURRENT_ILLNESS/INJURY: YES
ADLS_ACUITY_SCORE: 0
TOILETING_ISSUES: NO
ADLS_ACUITY_SCORE: 0
HEARING_DIFFICULTY_OR_DEAF: NO
VISION_MANAGEMENT: GLASSES
ADLS_ACUITY_SCORE: 0
DRESSING/BATHING_DIFFICULTY: NO
ADLS_ACUITY_SCORE: 0
DIFFICULTY_COMMUNICATING: NO
ADLS_ACUITY_SCORE: 0

## 2024-11-20 NOTE — PLAN OF CARE
"PRIMARY DIAGNOSIS: \"GENERIC\" NURSING  OUTPATIENT/OBSERVATION GOALS TO BE MET BEFORE DISCHARGE:  ADLs back to baseline: No    Activity and level of assistance: Up with standby assistance.    Pain status: Improved-controlled with oral pain medications.    Return to near baseline physical activity: Yes     Discharge Planner Nurse   Safe discharge environment identified: Yes  Barriers to discharge: Yes       Entered by: Apolonia Naik RN 11/20/2024 3:23 PM     Please review provider order for any additional goals.   Nurse to notify provider when observation goals have been met and patient is ready for discharge.Goal Outcome Evaluation:      "

## 2024-11-20 NOTE — PROGRESS NOTES
Care Management Follow Up    Length of Stay (days): 1    Expected Discharge Date: 11/21/2024    Anticipated Discharge Plan:  Home Care vs TCU    Transportation: TBD    PT Recommendations: Transitional Care Facility  OT Recommendations:  other (see comments) (Home lymph care continue)     Barriers to Discharge: medical stability, placement, IV abx, diagnostic workup, woundcare, pain management    Prior Living Situation: independent living facility with alone    Discussed  Partnership in Safe Discharge Planning  document with patient/family: No     Handoff Completed: no    Patient/Spokesperson Updated: Yes. Who? pt    Additional Information:  Pt will decide he wants to go to TCU, list provided and CM to check in with him tomorrow.    Next Steps: IV antibiotics, pain mgmt and woundvac    Latoya Cuellar RN

## 2024-11-20 NOTE — PROGRESS NOTES
11/19/24 1015   Appointment Info   Signing Clinician's Name / Credentials (PT) Melissa Dunham, NITIN   Quick Adds   Quick Adds Certification   Living Environment   People in Home alone   Current Living Arrangements assisted living  (3rd floor)   Home Accessibility no concerns   Living Environment Comments Walk in shower.  Pt said he now lives in [facility] due to he thinks he will need more assist and he has a hx of tallls. Pt does walk to meals 3/day.  Pt does have a WC.   Self-Care   Current Activity Tolerance moderate   Equipment Currently Used at Home walker, rolling;wheelchair, manual  (4WW)   Fall history within last six months yes   Number of times patient has fallen within last six months 6   Activity/Exercise/Self-Care Comment Pt is indep with bathing, dressing, bed mobility, transfers and gait.  He does use a 4WW for mobility.  Assisted with driving.   General Information   Onset of Illness/Injury or Date of Surgery 11/18/24   Referring Physician Slime Perkins PA-C   Patient/Family Therapy Goals Statement (PT) none stated   Pertinent History of Current Problem (include personal factors and/or comorbidities that impact the POC) Per the chart, Zackary Hutchison is a 64 year old male admitted on 11/18/2024. He was brought to the ED by ambulance from assisted living facility for evaluation of left leg pain.  Patient reports chronic wound to left leg times several months, has home care RN who comes to evaluate twice weekly.  RN encouraged patient to come in to the ER for evaluation of worsening pain and redness.  MRSA nares positive, continuing IV vancomycin for now     Left Leg Cellulitis   Peripheral Arterial Disease   Evaluated in the ED on 10/31/2024 and 11/2/2024 secondary to left foot burst varicose veins treated with Surgicel and placed on a walking boot to protect the ankle   Existing Precautions/Restrictions fall  (Call light left by the pt and the pt did refuse the bed alarm on.  nursing is aware of  that.)    Weight-Bearing Status - LLE weight-bearing as tolerated   Weight-Bearing Status - RLE weight-bearing as tolerated   Cognition   Affect/Mental Status (Cognition) WNL   Orientation Status (Cognition) oriented x 4   Pain Assessment   Patient Currently in Pain   (Pt did c/o left LE pain and pain all over .)   Integumentary/Edema   Integumentary/Edema Comments Pt does have a wound on the distal L LE and increased edema.   Range of Motion (ROM)   ROM Comment R LE WFL.  and L hip and knee WFL and foot not tested.   Strength (Manual Muscle Testing)   Strength Comments Pt was able to WB bilat LEs   Bed Mobility   Comment, (Bed Mobility) Sit>supine with SBA   Transfers   Comment, (Transfers) Sit>stand with min A x 1 with FWW   Balance   Balance Comments min A x  1 with FWW.   Sensory Examination   Sensory Perception Comments dec light touch left foot.   Clinical Impression   Criteria for Skilled Therapeutic Intervention Yes, treatment indicated   PT Diagnosis (PT) Impaired functional mobility.   Influenced by the following impairments weakness, dec bal. dec endurance.  Pt is not at his PLOF.   Functional limitations due to impairments bed mobility, transfers, gait   Clinical Presentation (PT Evaluation Complexity) stable   Clinical Presentation Rationale Pt presents medically diagnosed.   Clinical Decision Making (Complexity) moderate complexity   Planned Therapy Interventions (PT) balance training;bed mobility training;gait training;home exercise program;strengthening;transfer training   Risk & Benefits of therapy have been explained evaluation/treatment results reviewed;care plan/treatment goals reviewed;risks/benefits reviewed;patient;participants voiced agreement with care plan   PT Total Evaluation Time   PT Eval, Moderate Complexity Minutes (74279) 15   Therapy Certification   Start of care date 11/19/24   Certification date from 11/19/24   Certification date to 11/26/24   Medical Diagnosis Left leg cellulitis    Physical Therapy Goals   PT Frequency Daily   PT Predicted Duration/Target Date for Goal Attainment 11/26/24   PT Goals Bed Mobility;Transfers;Gait   PT: Bed Mobility Supervision/stand-by assist;Supine to/from sit;Rolling   PT: Transfers Supervision/stand-by assist;Sit to/from stand;Bed to/from chair;Assistive device   PT: Gait Minimal assist;Rolling walker;150 feet   Interventions   Interventions Quick Adds Therapeutic Activity;Gait Training   Therapeutic Activity   Therapeutic Activities: dynamic activities to improve functional performance Minutes (93192) 15   Symptoms Noted During/After Treatment Fatigue;Increased pain   Treatment Detail/Skilled Intervention Supine >sit with min A x 1 with the pt using the rail. He moves slowly due to increased pain. Sit<>stand with the FWW with min A x 1 with cues for hand placement.  Pt walked slowly and use the FWW with min A x 1. Stand to sit with min A x 1 with cues for hand placement.   Gait Training   Symptoms Noted During/After Treatment (Gait Training) increased pain;fatigue   Treatment Detail/Skilled Intervention Pt did have increased L LE pain with WB and he did take smaller steps.   Distance in Feet 30'   [place] Level (Gait Training) minimum assist (75% patient effort)   Physical Assistance Level (Gait Training) verbal cues;1 person assist   Weight Bearing (Gait Training) weight-bearing as tolerated   Assistive Device (Gait Training) rolling walker  (FWW)   Pattern Analysis (Gait Training) swing-to gait   Gait Analysis Deviations decreased robert;decreased step length  (antalgic gait)   Impairments (Gait Analysis/Training) balance impaired;pain;ROM decreased;strength decreased   PT Discharge Planning   PT Plan gait with 4WW, LE ex,  transfers, gait w 4WW   PT Discharge Recommendation (DC Rec) Transitional Care Facility   PT Rationale for DC Rec The pt does need assist with mobility and does have increased L LE pain with mobility. TCU is recommneded. Pt does  live alone.   PT Brief overview of current status PT eval, bed mobility and transfers and gait with FWW with min A x 1.   PT Equipment Needed at Discharge walker, rolling  (Pt has a 4WW.)   Physical Therapy Time and Intention   Timed Code Treatment Minutes 15   Total Session Time (sum of timed and untimed services) 30   [facility]  OUTPATIENT PHYSICAL THERAPY EVALUATION  PLAN OF TREATMENT FOR OUTPATIENT REHABILITATION  (COMPLETE FOR INITIAL CLAIMS ONLY)  Patient's Last Name, First Name, M.I.  YOB: 1960  Zackary Hutchison                           Provider's Name  [facility] Medical Record No.  [id number]                             Onset Date:  11/18/24   Start of Care Date:  11/19/24   Type:     _X_PT   ___OT   ___SLP Medical Diagnosis:  Left leg cellulitis              PT Diagnosis:  Impaired functional mobility. Visits from SOC:  1     See note for plan of treatment, functional goals and certification details    I CERTIFY THE NEED FOR THESE SERVICES FURNISHED UNDER        THIS PLAN OF TREATMENT AND WHILE UNDER MY CARE     (Physician co-signature of this document indicates review and certification of the therapy plan).

## 2024-11-20 NOTE — TREATMENT PLAN
RCAT Treatment Plan    Patient Score: 7  Patient Acuity: 4    Clinical Indication for Therapy: bronchospasm, history of bronchospasm, and home regimen    Therapy Ordered: duoneb Q6 while awake, albuterol inhaler Q6 PRN, anoro ellipta daily    Assessment Summary: Patient admitted to the hospital from an assisted living facility for left leg pain. Patient is a current smoker (<1 PPD) and has a pulmonary history of COPD using home nebs and inhalers. He reports using his home nebs 3 to 4 times a day. No respiratory distress while at rest. Crackles with expiratory wheezes pre treatment; increased aeration post treatment. Patient is not requiring supplemental oxygen support at this time. No recent chest xray/chest CT. Patient is lethargic; concern for undiagnosed GRACE. There is a BiPAP order for tonight as a trial. Will keep neb orders as scheduled due to wheezing. RT to re-evaluate within 72 hours.     Lola Bianchi, RT  11/20/2024

## 2024-11-20 NOTE — PLAN OF CARE
Problem: Adult Inpatient Plan of Care  Goal: Optimal Comfort and Wellbeing  Outcome: Progressing  Intervention: Monitor Pain and Promote Comfort  Recent Flowsheet Documentation  Taken 11/20/2024 0807 by Santy Solo RN  Pain Management Interventions: medication (see MAR)     Problem: Adult Inpatient Plan of Care  Goal: Optimal Comfort and Wellbeing  Intervention: Monitor Pain and Promote Comfort  Recent Flowsheet Documentation  Taken 11/20/2024 0807 by Santy Solo RN  Pain Management Interventions: medication (see MAR)     Problem: Adult Inpatient Plan of Care  Goal: Readiness for Transition of Care  Outcome: Progressing     Problem: Pain Acute  Goal: Optimal Pain Control and Function  Outcome: Progressing  Intervention: Develop Pain Management Plan  Recent Flowsheet Documentation  Taken 11/20/2024 0807 by Santy Solo RN  Pain Management Interventions: medication (see MAR)   Goal Outcome Evaluation:      Plan of Care Reviewed With: patient    Overall Patient Progress: no changeOverall Patient Progress: no change

## 2024-11-20 NOTE — PROGRESS NOTES
[facility]    Medicine Progress Note - Hospitalist Service    Date of Admission:  11/18/2024    Assessment & Plan   Zackary Hutchison is a 64 year old male admitted on 11/18/2024. He was brought to the ED by ambulance from assisted living facility for evaluation of left leg pain.  Patient reports chronic wound to left leg times several months, has home care RN who comes to evaluate twice weekly.  RN encouraged patient to come in to the ER for evaluation of worsening pain and redness.  MRSA nares positive, continuing IV vancomycin for now     Left Leg Cellulitis   Peripheral vascular disease  Left leg venous ulcerations  Evaluated in the ED on 10/31/2024 and 11/2/2024 secondary to left foot burst varicose veins treated with Surgicel and placed on a walking boot to protect the ankle  -- Nontoxic, afebrile and normal WBC  -- CRP just slightly up at 8.1 ; trend CBC   -- MRSA nares +   -- Continue IV vancomycin  -- US negative for DVT   -- ABIs diminished bilaterally, R > L   -- WOC consult  -- Pain control with Tylenol, PRN oxycodone, IV Dilaudid  -- PT consult, lymphedema consult     Gait Instability  Frequent Falls  Last fall 2 weeks ago, recently moved from independent living to assisted living facility  -- Fall precautions  -- PT/OT/CM, anticipate TCU upon discharge     Type 2 diabetes mellitus without long-term current use of insulin  Diagnosed during hospitalization in 9/2024 and prescribed dapagliflozin given heart failure history  -- A1C 8.4% (9/2024)   -- Medium insulin resistance scale  -- Hypoglycemia monitoring and treatment as needed  Needs better control and management outside of dapagliflozin alone - PCP follow-up      Cervical Spine Stenosis  Acute on Chronic Back Pain   -- Trial of methocarbamol and Lyrica   -- PRN oxycodone and Dilaudid for cellulitis as above   -- Is following with NSGY outpatient for consideration of cervical spine decompression     Coronary artery disease  Recent  "chest pain that improved with nitroglycerin evaluated outpatient by cardiology and had a preop nuclear stress test on 11/18/2024 which was negative for ischemia   -- History of CABG, last coronary angiogram on 5/2/2024 treated with stents into the circumflex OM branch and ramus branch  -- Continue PTA atorvastatin, carvedilol, clopidogrel, lowered dose isosorbide mononitrate, as needed nitroglycerin     Ischemic cardiomyopathy  Chronic HFrEF  No signs or symptoms of acute CHF  -- Monitor daily weights  -- Continue PTA carvedilol, dapagliflozin, spironolactone, torsemide, metolazone      Paroxysmal atrial fibrillation  Currently rate controlled   -- Continue PTA amiodarone, carvedilol  -- Continue PTA Eliquis      Essential Hypertension  Hypertensive Urgency  SBPs 225 in the ER; but patient notes he did not take his meds per normal given stress testing done on 11/18/24  -- Will decrease PTA Imdur to 30 mg daily for now and HOLDing PTA hydralazine   -- Continue PTA Coreg      Chronic kidney disease stage II/IIIa  -- Stable renal function  -- Avoid added/extra nephrotoxins     COPD  -- Tobacco cessation encouraged  -- Occasional wheezing but no stridors or shortness of breath to suggest acute exacerbation  -- DuoNebs scheduled 11/20 given wheezes noted on exam  -- Continue PTA albuterol HFA, Anoro Ellipta     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   Patient describes falls as above because he frequently falls asleep during the day, and will \"tip forward\" out of his chair   -- Encouraged sleep study as previously recommend by PCP   -- Discussed with patient trial of BiPAP 11/20 as a trial, RT consult placed     Drug-induced coagulation defect  Drug-induced platelet defect  -- On PTA clopidogrel and apixaban  -- Monitor for bleeding     Anemia of chronic disease - Stable hemoglobin     Hereditary idiopathic peripheral neuropathy - Continue PTA duloxetine     Restless leg syndrome - Continue PTA ropinirole      Tobacco abuse  -- Currently smoking " "0.5 packs/day  -- Nicotine patch          Diet: Combination Diet Moderate Consistent Carb (60 g CHO per Meal) Diet; Low Saturated Fat Na <2400mg Diet    DVT Prophylaxis: DOAC  Ruvalcaba Catheter: Not present  Lines: None     Cardiac Monitoring: None  Code Status: Full Code      Clinically Significant Risk Factors         # Hyponatremia: Lowest Na = 134 mmol/L in last 2 days, will monitor as appropriate  # Hypochloremia: Lowest Cl = 95 mmol/L in last 2 days, will monitor as appropriate          # Hypertension: Noted on problem list  # Chronic heart failure with reduced ejection fraction: last echo with EF <40%          # DMII: A1C = 8.4 % (Ref range: <5.7 %) within past 6 months, PRESENT ON ADMISSION  # Severe Obesity: Estimated body mass index is 41.85 kg/m  as calculated from the following:    Height as of this encounter: 1.676 m (5' 6\").    Weight as of this encounter: 117.6 kg (259 lb 4.8 oz)., PRESENT ON ADMISSION     # Financial/Environmental Concerns: none         Social Drivers of Health    Tobacco Use: High Risk (11/6/2024)    Patient History     Smoking Tobacco Use: Every Day     Smokeless Tobacco Use: Never   Alcohol Use: Alcohol Misuse (11/2/2020)    [facility]    AUDIT-C     Frequency of Alcohol Consumption: 2-3 times a week     Average Number of Drinks: 3 or 4     Frequency of Binge Drinking: Less than monthly   Interpersonal Safety: Low Risk  (11/20/2024)    Interpersonal Safety     Do you feel physically and emotionally safe where you currently live?: Yes     Within the past 12 months, have you been hit, slapped, kicked or otherwise physically hurt by someone?: No     Within the past 12 months, have you been humiliated or emotionally abused in other ways by your partner or ex-partner?: No   Recent Concern: Interpersonal Safety - High Risk (9/17/2024)    Interpersonal Safety     Do you feel physically and emotionally safe where you currently live?: No     Within the past " 12 months, have you been hit, slapped, kicked or otherwise physically hurt by someone?: No     Within the past 12 months, have you been humiliated or emotionally abused in other ways by your partner or ex-partner?: No          Disposition Plan     Medically Ready for Discharge: Anticipated in 2-4 Days             Thony Buckley MD  Hospitalist Service  [facility]  Securely message with [employer] (more info)  Text page via [employer] Paging/Directory   ______________________________________________________________________    Interval History   No overnight events, pain overall better but moderate to severe at times, feels tired, no BM for several days    Physical Exam   Vital Signs: Temp: 97.8  F (36.6  C) Temp src: Oral BP: 137/59 Pulse: 84   Resp: 18 SpO2: 100 % O2 Device: None (Room air)    Weight: 259 lbs 4.8 oz    Obese, tired, no acute distress, scattered right-sided wheezes, breathing nonlabored, heart rate regular, abdomen protuberant, nontender, bilateral legs compressed with lymphedema treatments, no cranial erythema    Medical Decision Making       52 MINUTES SPENT BY ME on the date of service doing chart review, history, exam, documentation & further activities per the note.  NOTE(S)/MEDICAL RECORDS REVIEWED over the past 24 hours: Vitals, labs, new imaging, documentation and medication administrations       Data     I have personally reviewed the following data over the past 24 hrs:    8.8  \   10.6 (L)   / 185     134 (L) 95 (L) 27.7 (H) /  198 (H)   3.8 27 1.23 (H) \     Procal: N/A CRP: 32.40 (H) Lactic Acid: N/A         Imaging results reviewed over the past 24 hrs:   No results found for this or any previous visit (from the past 24 hours).

## 2024-11-20 NOTE — PLAN OF CARE
Problem: Adult Inpatient Plan of Care  Goal: Optimal Comfort and Wellbeing  Outcome: Progressing  Intervention: Monitor Pain and Promote Comfort  Recent Flowsheet Documentation  Taken 11/20/2024 0005 by Kendra Alcantara RN  Pain Management Interventions:   medication (see MAR)   rest     Problem: Wound  Goal: Optimal Pain Control and Function  Outcome: Progressing  Intervention: Prevent or Manage Pain  Recent Flowsheet Documentation  Taken 11/20/2024 0005 by Kendra Alcantara RN  Pain Management Interventions:   medication (see MAR)   rest  Goal: Optimal Wound Healing  Outcome: Progressing     Problem: Comorbidity Management  Goal: Blood Glucose Levels Within Targeted Range  Outcome: Progressing     Goal Outcome Evaluation:         Pt is A&Ox4. Changed LLE dressing per order due to moderate drainage. Reported pain with dressing change. Also reports back back. PRN Oxycodone given with help.     Using urinal at the bedside. Ax1 with walker. .

## 2024-11-20 NOTE — PROGRESS NOTES
SPIRITUAL HEALTH SERVICES Brief Progress Note  [facility]/    Summary of Interaction: Offered a visit due to due to admission screening response but Zackary declines at this time. He is aware that a hospital  will be available tomorrow.     Plan: No further plans for follow-up at this time, but will remain available for further support as patient/family needs/desires.    Zulma Marcus M.Div.      Office: [phone] (for non-urgent requests)  Please Vocera or page through [facility] for time-sensitive requests

## 2024-11-20 NOTE — PROGRESS NOTES
11/20/24 0735   Appointment Info   Signing Clinician's Name / Credentials (OT) Elinakavin Serna OTD OTR/L   Rehab Comments (OT) (S)  Lymphedema eval   Quick Adds   Quick Adds Edema   Living Environment   People in Home alone   Current Living Arrangements assisted living   General Information   Onset of Illness/Injury or Date of Surgery 11/18/24   Patient/Family Therapy Goal Statement (OT) To heal lower extremities   Edema General Information   Onset of Edema 11/18/24   Affected Body Part(s) Left LE;Right LE   Edema Etiology Infection;Insidious   Edema Precautions Cardiac Edema/CHF   General Comments/Previous Edema Treatment/Edema Equipment Pt has has home care lymphedema care recently   Edema Examination/Assessment   Skin Condition Dryness;Hemosiderin deposits   Ulcerations Yes  (LLE)   Skin Integrity Weeping LLE   Fibrosis Assessment Fibrotic   Clinical Impression   Criteria for Skilled Therapeutic Interventions Met (OT) Yes, treatment indicated   Edema: Patient Presentation Stage 1 Lymphedema;Wounds/Ulcers   Edema: Planned Interventions Manual lymph drainage;Gradient compression bandaging;Edema exercises;Education;Precautions to prevent infection/exacerbation   Clinical Decision Making Complexity (OT) problem focused assessment/low complexity   Risk & Benefits of therapy have been explained evaluation/treatment results reviewed;care plan/treatment goals reviewed;risks/benefits reviewed;current/potential barriers reviewed;patient   OT Total Evaluation Time   OT Eval, Low Complexity Minutes (91965) 5   OT Goals   Therapy Frequency (OT) Daily   OT Predicted Duration/Target Date for Goal Attainment 11/27/24   OT Goals Edema   OT: Edema education to increase ability to manage edema after discharge from the hospital Patient;Caregiver;signs/symptoms of intolerance;garrnet/bandage care   OT: Management of edema bandages Patient;Verbalize;Supervision/Stand-by assist;quick wrap   OT: Functional edema exercise program to reduce  limb volume, increase activity tolerance and improve independence with ADL Patient;Supervision/Stand-by assist;HEP   Self-Care/Home Management   Self-Care/Home Mgmt/ADL, Compensatory, Meal Prep Minutes (26635) 23   Symptoms Noted During/After Treatment (Meal Preparation/Planning Training) none   Treatment Detail/Skilled Intervention Lymph: Eval completed, treatment initiated. RN completed dressing change d/t weeping on LLE prior to session (kerlix). Washed/lotioned RLE and L foot, TG soft base layer applied followed by [facility] herringbone tech, med compression, 75% overlap. Pt reporting comfort, educated on s/s for removal   OT Discharge Planning   OT Plan Lymph: wound care EOD (thursday next), reassess/rewrap   OT Discharge Recommendation (DC Rec) other (see comments)  (Home lymph care continue)   Total Session Time   Timed Code Treatment Minutes 23   Total Session Time (sum of timed and untimed services) 28

## 2024-11-20 NOTE — PLAN OF CARE
Patient had increasing pain after PT. Oxycodone did not relieve pain. Given IV Hydromorphone with good relief. Dressing change done per order. OT will do lymph wraps tomorrow.Up with 1 assist and walker to chair.   Problem: Adult Inpatient Plan of Care  Goal: Optimal Comfort and Wellbeing  Intervention: Monitor Pain and Promote Comfort  Recent Flowsheet Documentation  Taken 11/19/2024 1744 by Glo Ruiz, RN  Pain Management Interventions: medication (see MAR)  Taken 11/19/2024 0916 by Glo Ruiz, RN  Pain Management Interventions: medication (see MAR)  Taken 11/19/2024 0840 by Glo Ruiz, RN  Pain Management Interventions: medication (see MAR)

## 2024-11-21 ENCOUNTER — APPOINTMENT (OUTPATIENT)
Dept: OCCUPATIONAL THERAPY | Facility: HOSPITAL | Age: 64
DRG: 603 | End: 2024-11-21
Payer: MEDICARE

## 2024-11-21 ENCOUNTER — APPOINTMENT (OUTPATIENT)
Dept: PHYSICAL THERAPY | Facility: HOSPITAL | Age: 64
DRG: 603 | End: 2024-11-21
Payer: MEDICARE

## 2024-11-21 VITALS
OXYGEN SATURATION: 94 % | TEMPERATURE: 98.1 F | HEIGHT: 66 IN | HEART RATE: 78 BPM | DIASTOLIC BLOOD PRESSURE: 72 MMHG | SYSTOLIC BLOOD PRESSURE: 153 MMHG | BODY MASS INDEX: 40.95 KG/M2 | WEIGHT: 254.8 LBS | RESPIRATION RATE: 19 BRPM

## 2024-11-21 LAB
ALBUMIN SERPL BCG-MCNC: 4 G/DL (ref 3.5–5.2)
ANION GAP SERPL CALCULATED.3IONS-SCNC: 14 MMOL/L (ref 7–15)
BACTERIA SPEC CULT: ABNORMAL
BASOPHILS # BLD AUTO: 0.1 10E3/UL (ref 0–0.2)
BASOPHILS NFR BLD AUTO: 1 %
BUN SERPL-MCNC: 33.1 MG/DL (ref 8–23)
CALCIUM SERPL-MCNC: 9.4 MG/DL (ref 8.8–10.4)
CHLORIDE SERPL-SCNC: 89 MMOL/L (ref 98–107)
CREAT SERPL-MCNC: 1.32 MG/DL (ref 0.67–1.17)
EGFRCR SERPLBLD CKD-EPI 2021: 59 ML/MIN/1.73M2
EOSINOPHIL # BLD AUTO: 0.2 10E3/UL (ref 0–0.7)
EOSINOPHIL NFR BLD AUTO: 2 %
ERYTHROCYTE [DISTWIDTH] IN BLOOD BY AUTOMATED COUNT: 18.1 % (ref 10–15)
GLUCOSE BLDC GLUCOMTR-MCNC: 151 MG/DL (ref 70–99)
GLUCOSE BLDC GLUCOMTR-MCNC: 185 MG/DL (ref 70–99)
GLUCOSE BLDC GLUCOMTR-MCNC: 188 MG/DL (ref 70–99)
GLUCOSE BLDC GLUCOMTR-MCNC: 205 MG/DL (ref 70–99)
GLUCOSE BLDC GLUCOMTR-MCNC: 290 MG/DL (ref 70–99)
GLUCOSE SERPL-MCNC: 218 MG/DL (ref 70–99)
HCO3 SERPL-SCNC: 32 MMOL/L (ref 22–29)
HCT VFR BLD AUTO: 37.9 % (ref 40–53)
HGB BLD-MCNC: 11 G/DL (ref 13.3–17.7)
IMM GRANULOCYTES # BLD: 0.1 10E3/UL
IMM GRANULOCYTES NFR BLD: 1 %
LYMPHOCYTES # BLD AUTO: 1.1 10E3/UL (ref 0.8–5.3)
LYMPHOCYTES NFR BLD AUTO: 15 %
MCH RBC QN AUTO: 25.8 PG (ref 26.5–33)
MCHC RBC AUTO-ENTMCNC: 29 G/DL (ref 31.5–36.5)
MCV RBC AUTO: 89 FL (ref 78–100)
MONOCYTES # BLD AUTO: 0.6 10E3/UL (ref 0–1.3)
MONOCYTES NFR BLD AUTO: 8 %
NEUTROPHILS # BLD AUTO: 5.6 10E3/UL (ref 1.6–8.3)
NEUTROPHILS NFR BLD AUTO: 73 %
NRBC # BLD AUTO: 0 10E3/UL
NRBC BLD AUTO-RTO: 0 /100
PHOSPHATE SERPL-MCNC: 4.4 MG/DL (ref 2.5–4.5)
PLATELET # BLD AUTO: 197 10E3/UL (ref 150–450)
POTASSIUM SERPL-SCNC: 3.5 MMOL/L (ref 3.4–5.3)
RBC # BLD AUTO: 4.26 10E6/UL (ref 4.4–5.9)
SODIUM SERPL-SCNC: 135 MMOL/L (ref 135–145)
WBC # BLD AUTO: 7.7 10E3/UL (ref 4–11)

## 2024-11-21 PROCEDURE — 258N000003 HC RX IP 258 OP 636: Performed by: HOSPITALIST

## 2024-11-21 PROCEDURE — 97535 SELF CARE MNGMENT TRAINING: CPT | Mod: GO

## 2024-11-21 PROCEDURE — 999N000157 HC STATISTIC RCP TIME EA 10 MIN

## 2024-11-21 PROCEDURE — 94660 CPAP INITIATION&MGMT: CPT

## 2024-11-21 PROCEDURE — 97110 THERAPEUTIC EXERCISES: CPT | Mod: GP

## 2024-11-21 PROCEDURE — 250N000011 HC RX IP 250 OP 636

## 2024-11-21 PROCEDURE — 85041 AUTOMATED RBC COUNT: CPT | Performed by: HOSPITALIST

## 2024-11-21 PROCEDURE — P9047 ALBUMIN (HUMAN), 25%, 50ML: HCPCS | Mod: JZ | Performed by: HOSPITALIST

## 2024-11-21 PROCEDURE — 82310 ASSAY OF CALCIUM: CPT | Performed by: HOSPITALIST

## 2024-11-21 PROCEDURE — 99233 SBSQ HOSP IP/OBS HIGH 50: CPT | Performed by: HOSPITALIST

## 2024-11-21 PROCEDURE — 250N000013 HC RX MED GY IP 250 OP 250 PS 637: Performed by: HOSPITALIST

## 2024-11-21 PROCEDURE — 250N000011 HC RX IP 250 OP 636: Mod: JZ | Performed by: HOSPITALIST

## 2024-11-21 PROCEDURE — 82374 ASSAY BLOOD CARBON DIOXIDE: CPT | Performed by: HOSPITALIST

## 2024-11-21 PROCEDURE — 97116 GAIT TRAINING THERAPY: CPT | Mod: GP

## 2024-11-21 PROCEDURE — 94640 AIRWAY INHALATION TREATMENT: CPT

## 2024-11-21 PROCEDURE — 250N000009 HC RX 250: Performed by: HOSPITALIST

## 2024-11-21 PROCEDURE — 120N000001 HC R&B MED SURG/OB

## 2024-11-21 PROCEDURE — 94640 AIRWAY INHALATION TREATMENT: CPT | Mod: 76

## 2024-11-21 PROCEDURE — 250N000013 HC RX MED GY IP 250 OP 250 PS 637

## 2024-11-21 PROCEDURE — 85004 AUTOMATED DIFF WBC COUNT: CPT | Performed by: HOSPITALIST

## 2024-11-21 PROCEDURE — 82947 ASSAY GLUCOSE BLOOD QUANT: CPT | Performed by: HOSPITALIST

## 2024-11-21 PROCEDURE — 36415 COLL VENOUS BLD VENIPUNCTURE: CPT | Performed by: HOSPITALIST

## 2024-11-21 RX ORDER — ISOSORBIDE MONONITRATE 30 MG/1
60 TABLET, EXTENDED RELEASE ORAL EVERY MORNING
Status: DISCONTINUED | OUTPATIENT
Start: 2024-11-22 | End: 2024-11-22 | Stop reason: HOSPADM

## 2024-11-21 RX ORDER — ALBUMIN (HUMAN) 12.5 G/50ML
25 SOLUTION INTRAVENOUS ONCE
Status: COMPLETED | OUTPATIENT
Start: 2024-11-21 | End: 2024-11-21

## 2024-11-21 RX ORDER — ISOSORBIDE MONONITRATE 30 MG/1
30 TABLET, EXTENDED RELEASE ORAL ONCE
Status: COMPLETED | OUTPATIENT
Start: 2024-11-21 | End: 2024-11-21

## 2024-11-21 RX ADMIN — SODIUM CHLORIDE 1500 MG: 9 INJECTION, SOLUTION INTRAVENOUS at 18:43

## 2024-11-21 RX ADMIN — ROPINIROLE HYDROCHLORIDE 5 MG: 1 TABLET, FILM COATED ORAL at 19:52

## 2024-11-21 RX ADMIN — POTASSIUM CHLORIDE 40 MEQ: 1500 TABLET, EXTENDED RELEASE ORAL at 08:28

## 2024-11-21 RX ADMIN — CLOPIDOGREL BISULFATE 75 MG: 75 TABLET ORAL at 08:25

## 2024-11-21 RX ADMIN — APIXABAN 5 MG: 5 TABLET, FILM COATED ORAL at 19:53

## 2024-11-21 RX ADMIN — POLYETHYLENE GLYCOL 3350 17 G: 17 POWDER, FOR SOLUTION ORAL at 08:40

## 2024-11-21 RX ADMIN — OXYCODONE 5 MG: 5 TABLET ORAL at 01:13

## 2024-11-21 RX ADMIN — ISOSORBIDE MONONITRATE 30 MG: 30 TABLET, EXTENDED RELEASE ORAL at 08:31

## 2024-11-21 RX ADMIN — ALBUMIN HUMAN 25 G: 0.25 SOLUTION INTRAVENOUS at 08:46

## 2024-11-21 RX ADMIN — METHOCARBAMOL 750 MG: 750 TABLET ORAL at 12:13

## 2024-11-21 RX ADMIN — INSULIN ASPART 4 UNITS: 100 INJECTION, SOLUTION INTRAVENOUS; SUBCUTANEOUS at 12:13

## 2024-11-21 RX ADMIN — CARVEDILOL 3.12 MG: 3.12 TABLET, FILM COATED ORAL at 17:49

## 2024-11-21 RX ADMIN — IPRATROPIUM BROMIDE AND ALBUTEROL SULFATE 3 ML: .5; 3 SOLUTION RESPIRATORY (INHALATION) at 20:09

## 2024-11-21 RX ADMIN — DAPAGLIFLOZIN 5 MG: 5 TABLET, FILM COATED ORAL at 08:33

## 2024-11-21 RX ADMIN — OXYCODONE 5 MG: 5 TABLET ORAL at 13:24

## 2024-11-21 RX ADMIN — INSULIN ASPART 1 UNITS: 100 INJECTION, SOLUTION INTRAVENOUS; SUBCUTANEOUS at 17:47

## 2024-11-21 RX ADMIN — IPRATROPIUM BROMIDE AND ALBUTEROL SULFATE 3 ML: .5; 3 SOLUTION RESPIRATORY (INHALATION) at 13:47

## 2024-11-21 RX ADMIN — ROPINIROLE HYDROCHLORIDE 5 MG: 1 TABLET, FILM COATED ORAL at 08:26

## 2024-11-21 RX ADMIN — PREGABALIN 25 MG: 25 CAPSULE ORAL at 12:12

## 2024-11-21 RX ADMIN — ACETAMINOPHEN 650 MG: 325 TABLET ORAL at 01:13

## 2024-11-21 RX ADMIN — Medication 1 PATCH: at 08:37

## 2024-11-21 RX ADMIN — SENNOSIDES AND DOCUSATE SODIUM 2 TABLET: 8.6; 5 TABLET ORAL at 19:52

## 2024-11-21 RX ADMIN — APIXABAN 5 MG: 5 TABLET, FILM COATED ORAL at 08:32

## 2024-11-21 RX ADMIN — PREGABALIN 25 MG: 25 CAPSULE ORAL at 08:32

## 2024-11-21 RX ADMIN — ROPINIROLE HYDROCHLORIDE 5 MG: 1 TABLET, FILM COATED ORAL at 13:24

## 2024-11-21 RX ADMIN — ISOSORBIDE MONONITRATE 30 MG: 30 TABLET, EXTENDED RELEASE ORAL at 12:12

## 2024-11-21 RX ADMIN — AMIODARONE HYDROCHLORIDE 200 MG: 200 TABLET ORAL at 08:33

## 2024-11-21 RX ADMIN — HYDROMORPHONE HYDROCHLORIDE 0.4 MG: 0.2 INJECTION, SOLUTION INTRAMUSCULAR; INTRAVENOUS; SUBCUTANEOUS at 14:29

## 2024-11-21 RX ADMIN — HYDRALAZINE HYDROCHLORIDE 10 MG: 10 TABLET ORAL at 19:56

## 2024-11-21 RX ADMIN — METHOCARBAMOL 750 MG: 750 TABLET ORAL at 17:50

## 2024-11-21 RX ADMIN — UMECLIDINIUM BROMIDE AND VILANTEROL TRIFENATATE 1 PUFF: 62.5; 25 POWDER RESPIRATORY (INHALATION) at 08:39

## 2024-11-21 RX ADMIN — IPRATROPIUM BROMIDE AND ALBUTEROL SULFATE 3 ML: .5; 3 SOLUTION RESPIRATORY (INHALATION) at 08:00

## 2024-11-21 RX ADMIN — HYDRALAZINE HYDROCHLORIDE 10 MG: 10 TABLET ORAL at 13:24

## 2024-11-21 RX ADMIN — ATORVASTATIN CALCIUM 40 MG: 40 TABLET, FILM COATED ORAL at 08:24

## 2024-11-21 RX ADMIN — DULOXETINE HYDROCHLORIDE 30 MG: 30 CAPSULE, DELAYED RELEASE ORAL at 08:27

## 2024-11-21 RX ADMIN — OXYCODONE 5 MG: 5 TABLET ORAL at 05:24

## 2024-11-21 RX ADMIN — METHOCARBAMOL 750 MG: 750 TABLET ORAL at 08:31

## 2024-11-21 RX ADMIN — PREGABALIN 25 MG: 25 CAPSULE ORAL at 17:49

## 2024-11-21 RX ADMIN — INSULIN ASPART 2 UNITS: 100 INJECTION, SOLUTION INTRAVENOUS; SUBCUTANEOUS at 08:35

## 2024-11-21 RX ADMIN — SENNOSIDES AND DOCUSATE SODIUM 2 TABLET: 8.6; 5 TABLET ORAL at 08:32

## 2024-11-21 NOTE — PROGRESS NOTES
"Care Management Follow Up    Length of Stay (days): 2    Expected Discharge Date: 11/21/2024    Anticipated Discharge Plan:  Transitional care (TCU) is recommended for continued therapy and skilled nursing.    Transportation: TBD    PT Recommendations: Transitional Care Facility  OT Recommendations:  Transitional Care Facility     Barriers to Discharge: medical stability, diagnostic workup    Prior Living Situation: independent living facility with alone    Discussed  Partnership in Safe Discharge Planning  document with patient/family: No     Handoff Completed: No, handoff not indicated or clinically appropriate    Patient/Spokesperson Updated: Yes. Who? Zackary    Additional Information:  Patient likes alone in [facility] apartment. Transitional care (TCU) is recommended for continued therapy and skilled nursing. Met with patient who agrees with the recommendation. He states that he has the list of local skilled nursing facilities (which includes the medicare.gov website) but \"they are just names on a piece of paper\" and he does not have any preferences. He also does not care where as he \"is not tied to any specific place in town\". His main concern is that he is a smoker. Writer educated patient that most transitional are units do not allow smoking on campus. Although he would prefer a facility that allows smoking, he understands that this might not be possible. He has used Nicotine patches in the past but cannot use the gum. He agreed with a referral to multiple local transitional care units at this time.     Next Steps: Send referrals for TCU.     Idania Davila RN    "

## 2024-11-21 NOTE — PROGRESS NOTES
Lab called to inform RN that morning lab results for sodium and potassium were inaccurate and giving updated info.     Sodium was actually 135, not the reported 130.  Chloride was actually 89, not the reported 85.    Provider updated.

## 2024-11-21 NOTE — PHARMACY-VANCOMYCIN DOSING SERVICE
Pharmacy Vancomycin Note  Date of Service 2024  Patient's  1960   64 year old, male    Indication: Skin and Soft Tissue Infection  Day of Therapy: 3  Current vancomycin regimen:  1750 mg IV q24h  Current vancomycin monitoring method: AUC  Current vancomycin therapeutic monitoring goal: 400-600 mg*h/L    Insight[employer] Prediction of Current Vancomycin Regimen  Regimen: 1750 mg IV every 24 hours.  Start time: 18:09 on 2024  Exposure target: AUC24 (range)400-600 mg/L.hr   AUC24,ss: 602 mg/L.hr  Probability of AUC24 > 400: 100 %  Ctrough,ss: 14.3 mg/L  Probability of Ctrough,ss > 20: 8 %  Probability of nephrotoxicity (Lodise SALINA ): 9 %    Current estimated CrCl = Estimated Creatinine Clearance: 71.3 mL/min (A) (based on SCr of 1.23 mg/dL (H)).    Creatinine for last 3 days  2024:  7:40 PM Creatinine 1.13 mg/dL  2024:  5:17 AM Creatinine 1.18 mg/dL  2024:  7:53 AM Creatinine 1.23 mg/dL    Recent Vancomycin Levels (past 3 days)  2024:  3:40 PM Vancomycin 17.1 ug/mL    Vancomycin IV Administrations (past 72 hours)                     vancomycin (VANCOCIN) 1,750 mg in 0.9% NaCl 517.5 mL intermittent infusion (mg) 1,750 mg New Bag 24 1809     1,750 mg New Bag 24 1935    vancomycin (VANCOCIN) 2,500 mg in 0.9% NaCl 525 mL intermittent infusion (mg) 2,500 mg New Bag 24 213                    Nephrotoxins and other renal medications (From now, onward)      Start     Dose/Rate Route Frequency Ordered Stop    24 1900  vancomycin (VANCOCIN) 1,500 mg in 0.9% NaCl 265 mL intermittent infusion         1,500 mg  over 90 Minutes Intravenous EVERY 24 HOURS 24 0800  dapagliflozin (FARXIGA) tablet 5 mg        Note to Pharmacy: PTA Sig:Take 5 mg by mouth every morning.      5 mg Oral EVERY MORNING 24 0800  torsemide (DEMADEX) tablet 100 mg        Note to Pharmacy: PTA Sig:Take 1 tablet (100 mg) by mouth 2 times daily       100 mg Oral 2 TIMES DAILY 11/18/24 2241                 Contrast Orders - past 72 hours (72h ago, onward)      None            Interpretation of levels and current regimen:  Vancomycin level is reflective of AUC greater than 600    Has serum creatinine changed greater than 50% in last 72 hours: No    Urine output:  good urine output    Renal Function: Stable    Insight[employer] Prediction of Planned New Vancomycin Regimen  Regimen: 1500 mg IV every 24 hours.  Start time: 18:09 on 11/21/2024  Exposure target: AUC24 (range)400-600 mg/L.hr   AUC24,ss: 517 mg/L.hr  Probability of AUC24 > 400: 98 %  Ctrough,ss: 12.1 mg/L  Probability of Ctrough,ss > 20: 2 %  Probability of nephrotoxicity (Lodise SALINA 2009): 7 %    Plan:  Decrease Dose to Vancomycin 1500 mg IV q24h  Vancomycin monitoring method: AUC  Vancomycin therapeutic monitoring goal: 400-600 mg*h/L  Pharmacy will check vancomycin levels as appropriate in 1-3 Days.  Serum creatinine levels will be ordered daily for the first week of therapy and at least twice weekly for subsequent weeks.    Harshal Boyd RP

## 2024-11-21 NOTE — PLAN OF CARE
"End of Shift Summary.  For vital signs and complete assessments, please see documentation flowsheets.      Pertinent assessments: A&Ox4, has been up & down from chair-bed tonight, restless. Oxycodone doses x3 tonight for leg/hip/back pain. Pt tried to use the bipap tonight per orders, unable to tolerate after 1 hr removed device. BLE legs wrapped, no drainage outside the wraps, wound care due today, 11/21, per orders.   Major Shift Events: none.   Plan (Upcoming Events): daily weights, tosesemide, wound care. PT/OT, PT recs TCU at discharge.   Discharge/Transfer Needs:will need WOC at TCU, CSW to assist w/ dispo.       Problem: Adult Inpatient Plan of Care  Goal: Plan of Care Review  Description: The Plan of Care Review/Shift note should be completed every shift.  The Outcome Evaluation is a brief statement about your assessment that the patient is improving, declining, or no change.  This information will be displayed automatically on your shift  note.  Outcome: Not Progressing  Goal: Patient-Specific Goal (Individualized)  Description: You can add care plan individualizations to a care plan. Examples of Individualization might be:  \"Parent requests to be called daily at 9am for status\", \"I have a hard time hearing out of my right ear\", or \"Do not touch me to wake me up as it startles  me\".  Outcome: Not Progressing  Goal: Absence of Hospital-Acquired Illness or Injury  Outcome: Not Progressing  Intervention: Identify and Manage Fall Risk  Recent Flowsheet Documentation  Taken 11/20/2024 2040 by Glo Dudley, RN  Safety Promotion/Fall Prevention:   activity supervised   clutter free environment maintained   increase visualization of patient   lighting adjusted   nonskid shoes/slippers when out of bed   safety round/check completed  Intervention: Prevent Skin Injury  Recent Flowsheet Documentation  Taken 11/20/2024 2031 by Glo Dudley, RN  Body Position: position changed independently  Goal: Optimal Comfort " and Wellbeing  Outcome: Not Progressing  Intervention: Monitor Pain and Promote Comfort  Recent Flowsheet Documentation  Taken 11/20/2024 2031 by Glo Dudley RN  Pain Management Interventions: medication (see MAR)  Goal: Readiness for Transition of Care  Outcome: Not Progressing     Problem: Pain Acute  Goal: Optimal Pain Control and Function  Outcome: Not Progressing  Intervention: Develop Pain Management Plan  Recent Flowsheet Documentation  Taken 11/20/2024 2031 by Glo Dudley RN  Pain Management Interventions: medication (see MAR)  Intervention: Prevent or Manage Pain  Recent Flowsheet Documentation  Taken 11/20/2024 2040 by Glo Dudley RN  Medication Review/Management: medications reviewed     Problem: Wound  Goal: Absence of Infection Signs and Symptoms  Outcome: Not Progressing  Intervention: Prevent or Manage Infection  Recent Flowsheet Documentation  Taken 11/20/2024 2040 by Glo Dudley RN  Isolation Precautions: contact precautions maintained  Goal: Optimal Pain Control and Function  Outcome: Not Progressing  Intervention: Prevent or Manage Pain  Recent Flowsheet Documentation  Taken 11/20/2024 2031 by Glo Dudley RN  Pain Management Interventions: medication (see MAR)  Goal: Optimal Wound Healing  Outcome: Not Progressing     Problem: Comorbidity Management  Goal: Blood Glucose Levels Within Targeted Range  Outcome: Not Progressing  Intervention: Monitor and Manage Glycemia  Recent Flowsheet Documentation  Taken 11/20/2024 2040 by Glo Dudley RN  Medication Review/Management: medications reviewed

## 2024-11-21 NOTE — PLAN OF CARE
Goal Outcome Evaluation:                        Problem: Adult Inpatient Plan of Care  Goal: Optimal Comfort and Wellbeing  Outcome: Progressing  Intervention: Monitor Pain and Promote Comfort  Recent Flowsheet Documentation  Taken 11/21/2024 0917 by Suyapa Pennington RN  Pain Management Interventions: declines  Taken 11/21/2024 0827 by Suyapa Pennington RN  Pain Management Interventions: medication (see MAR)     Problem: Pain Acute  Goal: Optimal Pain Control and Function  Intervention: Develop Pain Management Plan  Recent Flowsheet Documentation  Taken 11/21/2024 0917 by Suyapa Pennington RN  Pain Management Interventions: declines  Taken 11/21/2024 0827 by Suyapa Pennington RN  Pain Management Interventions: medication (see MAR)     Problem: Wound  Goal: Absence of Infection Signs and Symptoms  Intervention: Prevent or Manage Infection  Recent Flowsheet Documentation  Taken 11/21/2024 0827 by Suyapa Pennington RN  Isolation Precautions: contact precautions maintained   Pt AxO4, VSS, RA. Pt rating pain 6-8/10, given 5mg oxycodone and scheduled robaxin, pregabalin, and requip. Wound care performed per orders and OT rewrapped legs with lymphedema wraps. Albumin infused.  and 290, insulin coverage given.

## 2024-11-21 NOTE — PLAN OF CARE
"Goal Outcome Evaluation:    PRIMARY DIAGNOSIS: \"GENERIC\" NURSING  OUTPATIENT/OBSERVATION GOALS TO BE MET BEFORE DISCHARGE:  ADLs back to baseline: No    Activity and level of assistance: Up with standby assistance.    Pain status: Improved-controlled with oral pain medications.    Return to near baseline physical activity: Yes     Discharge Planner Nurse   Safe discharge environment identified: Yes  Barriers to discharge: Yes       Entered by: Apolonia Naik RN 11/20/2024 6:50 PM     Please review provider order for any additional goals.   Nurse to notify provider when observation goals have been met and patient is ready for discharge    Pt AO x4. Denies pain. Up with SBA. Vanco infusing L PIV. Pt able to make needs known. .  "

## 2024-11-21 NOTE — PROGRESS NOTES
Patient was put on bipap for bedtime. Current settings are 10/5 10 25%. Tolerating well. RT will continue to monitor.

## 2024-11-21 NOTE — PROGRESS NOTES
[facility]    Medicine Progress Note - Hospitalist Service    Date of Admission:  11/18/2024    Assessment & Plan   Zackary Hutchison is a 64 year old male admitted on 11/18/2024. He was brought to the ED by ambulance from assisted living facility for evaluation of left leg pain.  Patient reports chronic wound to left leg times several months, has home care RN who comes to evaluate twice weekly.  RN encouraged patient to come in to the ER for evaluation of worsening pain and redness.  MRSA nares positive, continuing IV vancomycin for now.     Left Leg Cellulitis   Peripheral vascular disease  Left leg venous ulcerations  Evaluated in the ED on 10/31/2024 and 11/2/2024 secondary to left foot burst varicose veins treated with Surgicel and placed on a walking boot to protect the ankle  -- Nontoxic, afebrile and normal WBC  -- CRP just slightly up at 8.1 ; trend CBC   -- MRSA nares +   -- Continue IV vancomycin  -- US negative for DVT   -- ABIs diminished bilaterally, R > L, no arterial ulcers on exam  -- WOC consult  -- Pain control with Tylenol, PRN oxycodone, IV Dilaudid  -- PT consult, lymphedema consult     Gait Instability  Frequent Falls  Last fall 2 weeks ago, recently moved from independent living to assisted living facility  -- Fall precautions  -- PT/OT/CM, anticipate TCU upon discharge, patient preferring home health at living facility if able to take care of his wound care effectively 3 times weekly     Type 2 diabetes mellitus without long-term current use of insulin  Diagnosed during hospitalization in 9/2024 and prescribed dapagliflozin given heart failure history  -- A1C 8.4% (9/2024)   -- Medium insulin resistance scale  -- Hypoglycemia monitoring and treatment as needed  Needs better control and management outside of dapagliflozin alone - PCP follow-up      Cervical Spine Stenosis  Acute on Chronic Back Pain   -- Trial of methocarbamol and Lyrica   -- PRN oxycodone and Dilaudid for  "cellulitis as above   -- Is following with NSGY outpatient for consideration of cervical spine decompression  --PT and OT recommending TCU, patient preferring home health at living facility     Coronary artery disease  Recent chest pain that improved with nitroglycerin evaluated outpatient by cardiology and had a preop nuclear stress test on 11/18/2024 which was negative for ischemia   -- History of CABG, last coronary angiogram on 5/2/2024 treated with stents into the circumflex OM branch and ramus branch  -- Continue PTA atorvastatin, carvedilol, clopidogrel, lowered dose isosorbide mononitrate, as needed nitroglycerin     Ischemic cardiomyopathy  Chronic HFrEF  No signs or symptoms of acute CHF  -- Monitor daily weights  -- Continue PTA carvedilol, dapagliflozin, spironolactone, metolazone   --Holding torsemide 11/21 for bump in creatinine, resume if creatinine improves     Paroxysmal atrial fibrillation  Currently rate controlled   -- Continue PTA amiodarone, carvedilol  -- Continue PTA Eliquis      Essential Hypertension  Hypertensive Urgency  SBPs 225 in the ER; but patient notes he did not take his meds per normal given stress testing done on 11/18/24  -- Initially due to low normal blood pressure decreased PTA Imdur to 30 mg daily, increase to 60 mg for evolving hypertension 11/21, hydralazine also resumed 11/21  -- Continue PTA Coreg, hold parameters for bradycardia     Chronic kidney disease stage II/IIIa  -- Stable renal function  -- Avoid added/extra nephrotoxins, as above holding torsemide 11/21 for small bump in creatinine     COPD  -- Tobacco cessation encouraged  -- Occasional wheezing but no stridors or shortness of breath to suggest acute exacerbation  -- Adarsh scheduled 11/20 given wheezes noted on exam  -- Continue PTA albuterol HFA, Anoro Ellipta     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   Patient describes falls as above because he frequently falls asleep during the day, and will \"tip forward\" out of his chair   -- " "Encouraged sleep study as previously recommend by PCP   -- Discussed with patient trial of BiPAP 11/20 as a trial, RT consult placed, patient did not tolerate so we will discontinue     Drug-induced coagulation defect  Drug-induced platelet defect  -- On PTA clopidogrel and apixaban  -- Monitor for bleeding     Anemia of chronic disease - Stable hemoglobin     Hereditary idiopathic peripheral neuropathy - Continue PTA duloxetine     Restless leg syndrome - Continue PTA ropinirole      Tobacco abuse  -- Currently smoking 0.5 packs/day  -- Nicotine patch          Diet: Combination Diet Moderate Consistent Carb (60 g CHO per Meal) Diet; Low Saturated Fat Na <2400mg Diet    DVT Prophylaxis: DOAC  Ruvalcaba Catheter: Not present  Lines: None     Cardiac Monitoring: None  Code Status: Full Code      Clinically Significant Risk Factors         # Hyponatremia: Lowest Na = 134 mmol/L in last 2 days, will monitor as appropriate  # Hypochloremia: Lowest Cl = 89 mmol/L in last 2 days, will monitor as appropriate          # Hypertension: Noted on problem list  # Chronic heart failure with reduced ejection fraction: last echo with EF <40%          # DMII: A1C = 8.4 % (Ref range: <5.7 %) within past 6 months, PRESENT ON ADMISSION  # Severe Obesity: Estimated body mass index is 41.13 kg/m  as calculated from the following:    Height as of this encounter: 1.676 m (5' 6\").    Weight as of this encounter: 115.6 kg (254 lb 12.8 oz)., PRESENT ON ADMISSION     # Financial/Environmental Concerns: none         Social Drivers of Health    Tobacco Use: High Risk (11/6/2024)    Patient History     Smoking Tobacco Use: Every Day     Smokeless Tobacco Use: Never   Alcohol Use: Alcohol Misuse (11/2/2020)    [facility]     Frequency of Alcohol Consumption: 2-3 times a week     Average Number of Drinks: 3 or 4     Frequency of Binge Drinking: Less than monthly   Interpersonal Safety: Low Risk  (11/20/2024) "    Interpersonal Safety     Do you feel physically and emotionally safe where you currently live?: Yes     Within the past 12 months, have you been hit, slapped, kicked or otherwise physically hurt by someone?: No     Within the past 12 months, have you been humiliated or emotionally abused in other ways by your partner or ex-partner?: No   Recent Concern: Interpersonal Safety - High Risk (9/17/2024)    Interpersonal Safety     Do you feel physically and emotionally safe where you currently live?: No     Within the past 12 months, have you been hit, slapped, kicked or otherwise physically hurt by someone?: No     Within the past 12 months, have you been humiliated or emotionally abused in other ways by your partner or ex-partner?: No          Disposition Plan     Medically Ready for Discharge: Anticipated Tomorrow             Thony Buckley MD  Hospitalist Service  [facility]  Securely message with [employer] (more info)  Text page via [employer] Paging/Directory   ______________________________________________________________________    Interval History   No overnight events, patient denies any uncontrolled pain, no BM, did not tolerate BiPAP overnight, felt there was too drying of his mucous membranes.    Physical Exam   Vital Signs: Temp: 97.5  F (36.4  C) Temp src: Oral BP: 131/58 Pulse: 88   Resp: 20 SpO2: 99 % O2 Device: None (Room air)    Weight: 254 lbs 12.8 oz    Obese, no acute distress, heart and lungs normal, no wheezes today, abdomen protuberant, hypoactive bowel sounds, legs compressed with lymphedema wraps    Medical Decision Making       52 MINUTES SPENT BY ME on the date of service doing chart review, history, exam, documentation & further activities per the note.  NOTE(S)/MEDICAL RECORDS REVIEWED over the past 24 hours: Vitals, labs, new imaging, documentation and medication administrations       Data     I have personally reviewed the following data over the past 24 hrs:    7.7   \   11.0 (L)   / 197     135 89 (L) 33.1 (H) /  290 (H)   3.5 32 (H) 1.32 (H) \     ALT: N/A AST: N/A AP: N/A TBILI: N/A   ALB: 4.0 TOT PROTEIN: N/A LIPASE: N/A       Imaging results reviewed over the past 24 hrs:   No results found for this or any previous visit (from the past 24 hours).

## 2024-11-22 ENCOUNTER — APPOINTMENT (OUTPATIENT)
Dept: OCCUPATIONAL THERAPY | Facility: HOSPITAL | Age: 64
DRG: 603 | End: 2024-11-22
Payer: MEDICARE

## 2024-11-22 ENCOUNTER — APPOINTMENT (OUTPATIENT)
Dept: PHYSICAL THERAPY | Facility: HOSPITAL | Age: 64
DRG: 603 | End: 2024-11-22
Payer: MEDICARE

## 2024-11-22 VITALS
BODY MASS INDEX: 40.95 KG/M2 | SYSTOLIC BLOOD PRESSURE: 130 MMHG | RESPIRATION RATE: 16 BRPM | HEIGHT: 66 IN | TEMPERATURE: 98.1 F | HEART RATE: 91 BPM | DIASTOLIC BLOOD PRESSURE: 63 MMHG | WEIGHT: 254.8 LBS | OXYGEN SATURATION: 99 %

## 2024-11-22 LAB
ALBUMIN SERPL BCG-MCNC: 4.2 G/DL (ref 3.5–5.2)
ANION GAP SERPL CALCULATED.3IONS-SCNC: 15 MMOL/L (ref 7–15)
BACTERIA SPEC CULT: ABNORMAL
BASOPHILS # BLD AUTO: 0.1 10E3/UL (ref 0–0.2)
BASOPHILS NFR BLD AUTO: 1 %
BUN SERPL-MCNC: 28.3 MG/DL (ref 8–23)
CALCIUM SERPL-MCNC: 9.6 MG/DL (ref 8.8–10.4)
CHLORIDE SERPL-SCNC: 90 MMOL/L (ref 98–107)
CREAT SERPL-MCNC: 1.12 MG/DL (ref 0.67–1.17)
EGFRCR SERPLBLD CKD-EPI 2021: 72 ML/MIN/1.73M2
EOSINOPHIL # BLD AUTO: 0.1 10E3/UL (ref 0–0.7)
EOSINOPHIL NFR BLD AUTO: 2 %
ERYTHROCYTE [DISTWIDTH] IN BLOOD BY AUTOMATED COUNT: 17.7 % (ref 10–15)
GLUCOSE BLDC GLUCOMTR-MCNC: 150 MG/DL (ref 70–99)
GLUCOSE BLDC GLUCOMTR-MCNC: 201 MG/DL (ref 70–99)
GLUCOSE BLDC GLUCOMTR-MCNC: 237 MG/DL (ref 70–99)
GLUCOSE SERPL-MCNC: 161 MG/DL (ref 70–99)
HCO3 SERPL-SCNC: 28 MMOL/L (ref 22–29)
HCT VFR BLD AUTO: 37.2 % (ref 40–53)
HGB BLD-MCNC: 11.1 G/DL (ref 13.3–17.7)
IMM GRANULOCYTES # BLD: 0.1 10E3/UL
IMM GRANULOCYTES NFR BLD: 1 %
LYMPHOCYTES # BLD AUTO: 1 10E3/UL (ref 0.8–5.3)
LYMPHOCYTES NFR BLD AUTO: 14 %
MCH RBC QN AUTO: 26.6 PG (ref 26.5–33)
MCHC RBC AUTO-ENTMCNC: 29.8 G/DL (ref 31.5–36.5)
MCV RBC AUTO: 89 FL (ref 78–100)
MONOCYTES # BLD AUTO: 0.6 10E3/UL (ref 0–1.3)
MONOCYTES NFR BLD AUTO: 8 %
NEUTROPHILS # BLD AUTO: 5.4 10E3/UL (ref 1.6–8.3)
NEUTROPHILS NFR BLD AUTO: 74 %
NRBC # BLD AUTO: 0 10E3/UL
NRBC BLD AUTO-RTO: 0 /100
PHOSPHATE SERPL-MCNC: 3.4 MG/DL (ref 2.5–4.5)
PLATELET # BLD AUTO: 195 10E3/UL (ref 150–450)
POTASSIUM SERPL-SCNC: 3.6 MMOL/L (ref 3.4–5.3)
RBC # BLD AUTO: 4.17 10E6/UL (ref 4.4–5.9)
SODIUM SERPL-SCNC: 133 MMOL/L (ref 135–145)
WBC # BLD AUTO: 7.3 10E3/UL (ref 4–11)

## 2024-11-22 PROCEDURE — 82040 ASSAY OF SERUM ALBUMIN: CPT | Performed by: HOSPITALIST

## 2024-11-22 PROCEDURE — 94799 UNLISTED PULMONARY SVC/PX: CPT

## 2024-11-22 PROCEDURE — 999N000157 HC STATISTIC RCP TIME EA 10 MIN

## 2024-11-22 PROCEDURE — 94640 AIRWAY INHALATION TREATMENT: CPT | Mod: 76

## 2024-11-22 PROCEDURE — 97535 SELF CARE MNGMENT TRAINING: CPT | Mod: GO

## 2024-11-22 PROCEDURE — 250N000013 HC RX MED GY IP 250 OP 250 PS 637: Performed by: HOSPITALIST

## 2024-11-22 PROCEDURE — 94640 AIRWAY INHALATION TREATMENT: CPT

## 2024-11-22 PROCEDURE — 99207 PR APP CREDIT; MD BILLING SHARED VISIT: CPT | Mod: FS | Performed by: HOSPITALIST

## 2024-11-22 PROCEDURE — 250N000011 HC RX IP 250 OP 636

## 2024-11-22 PROCEDURE — 85004 AUTOMATED DIFF WBC COUNT: CPT | Performed by: HOSPITALIST

## 2024-11-22 PROCEDURE — 272N000202 HC AEROBIKA WITH MANOMETER

## 2024-11-22 PROCEDURE — 250N000013 HC RX MED GY IP 250 OP 250 PS 637

## 2024-11-22 PROCEDURE — 99239 HOSP IP/OBS DSCHRG MGMT >30: CPT | Mod: FS

## 2024-11-22 PROCEDURE — 97530 THERAPEUTIC ACTIVITIES: CPT | Mod: GP

## 2024-11-22 PROCEDURE — 250N000009 HC RX 250: Performed by: HOSPITALIST

## 2024-11-22 PROCEDURE — 36415 COLL VENOUS BLD VENIPUNCTURE: CPT | Performed by: HOSPITALIST

## 2024-11-22 RX ORDER — AMOXICILLIN 250 MG
2 CAPSULE ORAL 2 TIMES DAILY
Qty: 14 TABLET | Refills: 0 | Status: SHIPPED | OUTPATIENT
Start: 2024-11-22

## 2024-11-22 RX ORDER — PREGABALIN 25 MG/1
25 CAPSULE ORAL
Qty: 21 CAPSULE | Refills: 0 | Status: SHIPPED | OUTPATIENT
Start: 2024-11-22 | End: 2024-11-22

## 2024-11-22 RX ORDER — OXYCODONE HYDROCHLORIDE 5 MG/1
5 TABLET ORAL EVERY 4 HOURS PRN
Qty: 28 TABLET | Refills: 0 | Status: SHIPPED | OUTPATIENT
Start: 2024-11-22 | End: 2024-11-22

## 2024-11-22 RX ORDER — DOXYCYCLINE 100 MG/1
100 CAPSULE ORAL 2 TIMES DAILY
Qty: 14 CAPSULE | Refills: 0 | Status: SHIPPED | OUTPATIENT
Start: 2024-11-22

## 2024-11-22 RX ORDER — OXYCODONE HYDROCHLORIDE 5 MG/1
5 TABLET ORAL EVERY 4 HOURS PRN
Qty: 28 TABLET | Refills: 0 | Status: SHIPPED | OUTPATIENT
Start: 2024-11-22

## 2024-11-22 RX ORDER — PREGABALIN 25 MG/1
25 CAPSULE ORAL
Qty: 21 CAPSULE | Refills: 0 | Status: SHIPPED | OUTPATIENT
Start: 2024-11-22

## 2024-11-22 RX ORDER — METHOCARBAMOL 750 MG/1
750 TABLET, FILM COATED ORAL
Qty: 21 TABLET | Refills: 0 | Status: SHIPPED | OUTPATIENT
Start: 2024-11-22

## 2024-11-22 RX ADMIN — DAPAGLIFLOZIN 5 MG: 5 TABLET, FILM COATED ORAL at 08:46

## 2024-11-22 RX ADMIN — OXYCODONE 5 MG: 5 TABLET ORAL at 10:58

## 2024-11-22 RX ADMIN — HYDRALAZINE HYDROCHLORIDE 10 MG: 10 TABLET ORAL at 09:11

## 2024-11-22 RX ADMIN — ROPINIROLE HYDROCHLORIDE 5 MG: 1 TABLET, FILM COATED ORAL at 08:47

## 2024-11-22 RX ADMIN — HYDROMORPHONE HYDROCHLORIDE 0.4 MG: 0.2 INJECTION, SOLUTION INTRAMUSCULAR; INTRAVENOUS; SUBCUTANEOUS at 14:03

## 2024-11-22 RX ADMIN — OXYCODONE 5 MG: 5 TABLET ORAL at 07:09

## 2024-11-22 RX ADMIN — PREGABALIN 25 MG: 25 CAPSULE ORAL at 11:00

## 2024-11-22 RX ADMIN — IPRATROPIUM BROMIDE AND ALBUTEROL SULFATE 3 ML: .5; 3 SOLUTION RESPIRATORY (INHALATION) at 07:15

## 2024-11-22 RX ADMIN — IPRATROPIUM BROMIDE AND ALBUTEROL SULFATE 3 ML: .5; 3 SOLUTION RESPIRATORY (INHALATION) at 14:07

## 2024-11-22 RX ADMIN — ISOSORBIDE MONONITRATE 60 MG: 30 TABLET, EXTENDED RELEASE ORAL at 08:54

## 2024-11-22 RX ADMIN — APIXABAN 5 MG: 5 TABLET, FILM COATED ORAL at 08:44

## 2024-11-22 RX ADMIN — SENNOSIDES AND DOCUSATE SODIUM 2 TABLET: 8.6; 5 TABLET ORAL at 08:42

## 2024-11-22 RX ADMIN — HYDROMORPHONE HYDROCHLORIDE 0.4 MG: 0.2 INJECTION, SOLUTION INTRAMUSCULAR; INTRAVENOUS; SUBCUTANEOUS at 01:31

## 2024-11-22 RX ADMIN — POTASSIUM CHLORIDE 40 MEQ: 1500 TABLET, EXTENDED RELEASE ORAL at 08:41

## 2024-11-22 RX ADMIN — HYDRALAZINE HYDROCHLORIDE 10 MG: 10 TABLET ORAL at 13:54

## 2024-11-22 RX ADMIN — METHOCARBAMOL 750 MG: 750 TABLET ORAL at 11:00

## 2024-11-22 RX ADMIN — Medication 1 PATCH: at 08:52

## 2024-11-22 RX ADMIN — METHOCARBAMOL 750 MG: 750 TABLET ORAL at 08:43

## 2024-11-22 RX ADMIN — UMECLIDINIUM BROMIDE AND VILANTEROL TRIFENATATE 1 PUFF: 62.5; 25 POWDER RESPIRATORY (INHALATION) at 08:48

## 2024-11-22 RX ADMIN — AMIODARONE HYDROCHLORIDE 200 MG: 200 TABLET ORAL at 08:41

## 2024-11-22 RX ADMIN — CARVEDILOL 3.12 MG: 3.12 TABLET, FILM COATED ORAL at 08:45

## 2024-11-22 RX ADMIN — CLOPIDOGREL BISULFATE 75 MG: 75 TABLET ORAL at 08:44

## 2024-11-22 RX ADMIN — INSULIN ASPART 2 UNITS: 100 INJECTION, SOLUTION INTRAVENOUS; SUBCUTANEOUS at 12:18

## 2024-11-22 RX ADMIN — PREGABALIN 25 MG: 25 CAPSULE ORAL at 09:11

## 2024-11-22 RX ADMIN — ACETAMINOPHEN 650 MG: 325 TABLET ORAL at 14:02

## 2024-11-22 RX ADMIN — INSULIN ASPART 2 UNITS: 100 INJECTION, SOLUTION INTRAVENOUS; SUBCUTANEOUS at 08:49

## 2024-11-22 RX ADMIN — ROPINIROLE HYDROCHLORIDE 5 MG: 1 TABLET, FILM COATED ORAL at 13:54

## 2024-11-22 RX ADMIN — POLYETHYLENE GLYCOL 3350 17 G: 17 POWDER, FOR SOLUTION ORAL at 08:43

## 2024-11-22 RX ADMIN — DULOXETINE HYDROCHLORIDE 30 MG: 30 CAPSULE, DELAYED RELEASE ORAL at 08:45

## 2024-11-22 RX ADMIN — ATORVASTATIN CALCIUM 40 MG: 40 TABLET, FILM COATED ORAL at 08:45

## 2024-11-22 ASSESSMENT — ACTIVITIES OF DAILY LIVING (ADL)
ADLS_ACUITY_SCORE: 0

## 2024-11-22 NOTE — PROGRESS NOTES
Care Management Discharge Note    Discharge Date: 11/22/2024       Discharge Disposition: Transitional Care, [facility]    Discharge Services: therapy    Discharge DME: None    Discharge Transportation: [facility]    Private pay costs discussed: transportation costs    Does the patient's insurance plan have a 3 day qualifying hospital stay waiver?  No    PAS Confirmation Code: [id number]  Patient/family educated on Medicare website which has current facility and service quality ratings: yes    Education Provided on the Discharge Plan: Yes  Persons Notified of Discharge Plans: pt  Patient/Family in Agreement with the Plan: yes    Handoff Referral Completed: Yes, [facility] PCP: Internal handoff referral completed    Additional Information:  Pt discharge today to [facility], he opt for his facility as it allows smoking.  transport set up for 3pm.    Latoya Cuellar RN

## 2024-11-22 NOTE — PLAN OF CARE
Problem: Adult Inpatient Plan of Care  Goal: Optimal Comfort and Wellbeing  Outcome: Progressing  Intervention: Monitor Pain and Promote Comfort  Recent Flowsheet Documentation  Taken 11/22/2024 0143 by Esther Beal RN  Pain Management Interventions: rest  Taken 11/22/2024 0131 by Esther Beal RN  Pain Management Interventions: medication (see MAR)  Taken 11/22/2024 0121 by Esther Beal RN  Pain Management Interventions: medication (see MAR)     Problem: Pain Acute  Goal: Optimal Pain Control and Function  Outcome: Progressing  Intervention: Develop Pain Management Plan  Recent Flowsheet Documentation  Taken 11/22/2024 0143 by Esther Beal RN  Pain Management Interventions: rest  Taken 11/22/2024 0131 by Esther Beal RN  Pain Management Interventions: medication (see MAR)  Taken 11/22/2024 0121 by Esther Beal RN  Pain Management Interventions: medication (see MAR)  Intervention: Prevent or Manage Pain  Recent Flowsheet Documentation  Taken 11/22/2024 0121 by Esther Beal RN  Medication Review/Management: medications reviewed     Problem: Wound  Goal: Absence of Infection Signs and Symptoms  Outcome: Progressing  Intervention: Prevent or Manage Infection  Recent Flowsheet Documentation  Taken 11/22/2024 0121 by Esther Beal RN  Isolation Precautions: contact precautions maintained     Problem: Comorbidity Management  Goal: Blood Glucose Levels Within Targeted Range  Outcome: Progressing  Intervention: Monitor and Manage Glycemia  Recent Flowsheet Documentation  Taken 11/22/2024 0121 by Esther Beal RN  Medication Review/Management: medications reviewed     Problem: Adult Inpatient Plan of Care  Goal: Absence of Hospital-Acquired Illness or Injury  Intervention: Prevent Skin Injury  Recent Flowsheet Documentation  Taken 11/22/2024 0121 by Esther Beal RN  Body Position: position changed independently     Problem: Wound  Goal: Optimal Pain Control and  Function  Intervention: Prevent or Manage Pain  Recent Flowsheet Documentation  Taken 11/22/2024 0143 by Esther Beal, RN  Pain Management Interventions: rest  Taken 11/22/2024 0131 by Esther Beal, RN  Pain Management Interventions: medication (see MAR)  Taken 11/22/2024 0121 by Esther Beal, RN  Pain Management Interventions: medication (see MAR)   Goal Outcome Evaluation:       Patient admitted for left leg cellulitis. Unable to visualize the wound. Wound to be changed every other day. Ace wrap in place BLE. IV dilaudid 0.4 mg administered for pain around 0130. Contact precautions for MRSA maintained. RA. AOX4. VSS except BP 150s. BS at 0200 was 150. PT/OT and lymphedema consults. Left PIV saline locked. Low fat sat diet; Na < 2400 mg. Slept in the chair, patient did not want to sleep in the bed.

## 2024-11-22 NOTE — PLAN OF CARE
Occupational Therapy Discharge Summary    Reason for therapy discharge:    Discharged to transitional care facility.    Progress towards therapy goal(s). See goals on Care Plan in [facility] electronic health record for goal details.  Goals partially met.  Barriers to achieving goals:   discharge from facility.    Therapy recommendation(s):    Continued therapy is recommended.  Rationale/Recommendations:  to maximize ADL independence.

## 2024-11-22 NOTE — DISCHARGE SUMMARY
"[facility]  Hospitalist Discharge Summary      Date of Admission:  11/18/2024  Date of Discharge:  11/22/2024  Discharging Provider: Toshia Camarillo NP  Discharge Service: Hospitalist Service    Discharge Diagnoses   Probable left leg cellulitis  MRSA-nares  Frequent falls    Clinically Significant Risk Factors     # DMII: A1C = 8.4 % (Ref range: <5.7 %) within past 6 months  # Severe Obesity: Estimated body mass index is 41.13 kg/m  as calculated from the following:    Height as of this encounter: 1.676 m (5' 6\").    Weight as of this encounter: 115.6 kg (254 lb 12.8 oz).       Follow-ups Needed After Discharge   Follow-up Appointments       Follow Up and recommended labs and tests      Follow up with shelter physician.  The following labs/tests are recommended: BMP. Kidney function specifically..                Unresulted Labs Ordered in the Past 30 Days of this Admission       No orders found from 10/19/2024 to 11/19/2024.            Discharge Disposition   Discharged to rehabilitation facility  Condition at discharge: Good    Hospital Course     Zackary Hutchison is a 64 year old male admitted on 11/18/2024. He was brought to the ED by ambulance from assisted living facility for evaluation of left leg pain.  Patient reports chronic wound to left leg times several months, has home care RN who comes to evaluate twice weekly.  RN encouraged patient to come in to the ER for evaluation of worsening pain and redness.  MRSA nares positive, continuing IV vancomycin for now.     Left Leg Cellulitis   Peripheral vascular disease  Left leg venous ulcerations  Evaluated in the ED on 10/31/2024 and 11/2/2024 secondary to left foot burst varicose veins treated with Surgicel and placed on a walking boot to protect the ankle  -Nontoxic, afebrile and normal WBC  -CRP just slightly up at 8.1 ; trend CBC   -MRSA nares +   -Continue IV vancomycin while inpatient.  Doxycycline prescribed at discharge  -US " negative for DVT   -ABIs diminished bilaterally, R > L, no arterial ulcers on exam  -WOC consult-dressing change orders placed  -Pain control with Tylenol, PRN oxycodone, IV Dilaudid  -PT consult, lymphedema consult-legs currently wrapped.     Gait Instability  Frequent Falls  Last fall 2 weeks ago, recently moved from independent living to assisted living facility  --Fall precautions  -- PT/OT/CM-TCU upon discharge recommended.  Patient in agreement as long as there is a facility where he is able to smoke.  Accepted to [facility] who has smoking privileges     Type 2 diabetes mellitus without long-term current use of insulin  Diagnosed during hospitalization in 9/2024 and prescribed dapagliflozin given heart failure history  -A1C 8.4% (9/2024)   -Medium insulin resistance scale while inpatient  -Hypoglycemia monitoring and treatment as needed  Needs better control and management outside of dapagliflozin alone - PCP follow-up      Cervical Spine Stenosis  Acute on Chronic Back Pain   -Trial of methocarbamol and Lyrica-prescribed at discharge  -PRN oxycodone and Dilaudid for cellulitis as above   -Is following with NSGY outpatient for consideration of cervical spine decompression     Coronary artery disease  Recent chest pain that improved with nitroglycerin evaluated outpatient by cardiology and had a preop nuclear stress test on 11/18/2024 which was negative for ischemia   -History of CABG, last coronary angiogram on 5/2/2024 treated with stents into the circumflex OM branch and ramus branch  -Continue PTA atorvastatin, carvedilol, clopidogrel, isosorbide mononitrate     Ischemic cardiomyopathy  Chronic HFrEF  No signs or symptoms of acute CHF  -Monitor daily weights  -Continue PTA carvedilol, dapagliflozin, spironolactone, metolazone   -torsemide, metolazone, and spironolactone held 11/21 for bump in creatinine.  Creatinine-1.32.  Improved today 11/22/2024-1.12.  Resume medications at discharge    "  Paroxysmal atrial fibrillation  -Currently rate controlled   -Continue PTA amiodarone, carvedilol  -Continue PTA Eliquis      Essential Hypertension  Hypertensive Urgency  SBPs 225 in the ER; but patient notes he did not take his meds per normal given stress testing done on 11/18/24  -Continue PTA Coreg, hold parameters for bradycardia     Chronic kidney disease stage II/IIIa  -Stable renal function  -Avoid added/extra nephrotoxins-as above, medications held but have now been resumed due to creatinine level stabilizing.     COPD  Tobacco abuse  -Currently smoking 0.5 packs/day  -Nicotine patch  -Tobacco cessation encouraged  -Occasional wheezing but no stridors or shortness of breath to suggest acute exacerbation  -DuoNebs scheduled 11/20 given wheezes noted on exam  -Continue PTA albuterol HFA, Anoro Ellipta     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   Patient describes falls as above because he frequently falls asleep during the day, and will \"tip forward\" out of his chair   -Encouraged sleep study as previously recommend by PCP   -Discussed with patient trial of BiPAP 11/20 as a trial, RT consult placed, patient did not tolerate so was discontinued     Drug-induced coagulation defect  Drug-induced platelet defect  -On PTA clopidogrel and apixaban  -Monitor for bleeding     Anemia of chronic disease  -Stable hemoglobin     Hereditary idiopathic peripheral neuropathy  -Continue PTA duloxetine     Restless leg syndrome - Continue PTA ropinirole        Consultations This Hospital Stay   PHARMACY TO DOSE VANCO  CARE MANAGEMENT / SOCIAL WORK IP CONSULT  WOUND OSTOMY CONTINENCE NURSE  IP CONSULT  PHARMACY TO DOSE VANCO  PHYSICAL THERAPY ADULT IP CONSULT  LYMPHEDEMA THERAPY IP CONSULT  OCCUPATIONAL THERAPY ADULT IP CONSULT  PHYSICAL THERAPY ADULT IP CONSULT  OCCUPATIONAL THERAPY ADULT IP CONSULT    Code Status   Full Code    Time Spent on this Encounter   Toshia WALTERS NP, personally saw the patient today and spent greater than 30 " minutes discharging this patient.       Toshia Camarillo NP  [facility] EXTENDED RECOVERY AND SHORT STAY  [address] [place] [id number]  Phone: [phone]  Fax: [phone]  ______________________________________________________________________    Physical Exam   Vital Signs: Temp: 98.1  F (36.7  C) Temp src: Oral BP: 130/63 Pulse: 92   Resp: 18 SpO2: 91 % O2 Device: None (Room air)    Weight: 254 lbs 12.8 oz  Constitutional: awake, alert, cooperative, no apparent distress, and appears stated age  Respiratory: No increased work of breathing, good air exchange, clear to auscultation bilaterally, no crackles or wheezing  Cardiovascular: Normal apical impulse, regular rate and rhythm, normal S1 and S2, no S3 or S4, and no murmur noted  GI: No scars, normal bowel sounds, soft, non-distended, non-tender, no masses palpated, no hepatosplenomegally       Primary Care Physician   Reid Escobar    Discharge Orders      General info for SNF    Length of Stay Estimate: Short Term Care: Estimated # of Days <30  Condition at Discharge: Improving  Level of care:skilled   Rehabilitation Potential: Good  Admission H&P remains valid and up-to-date: Yes  Recent Chemotherapy: N/A  Use Nursing Home Standing Orders: Yes     Mantoux instructions    Give two-step Mantoux (PPD) Per Facility Policy Yes     Follow Up and recommended labs and tests    Follow up with [facility] physician.  The following labs/tests are recommended: BMP. Kidney function specifically..     Reason for your hospital stay    Probable left leg cellulitis  MRSA positive in nares  Frequent falls     Activity - Up with assistive device     Full Code     Physical Therapy Adult Consult    Evaluate and treat as clinically indicated.    Reason:  Frequent falls     Occupational Therapy Adult Consult    Evaluate and treat as clinically indicated.    Reason:  Frequent falls     Contact Isolation    MRSA nares     Fall precautions      Diet    Follow this diet upon discharge: Current Diet:Orders Placed This Encounter      Combination Diet Moderate Consistent Carb (60 g CHO per Meal) Diet; Low Saturated Fat Na <2400mg Diet       Significant Results and Procedures   Results for orders placed or performed during the hospital encounter of 11/18/24   US LESLIE Doppler No Exercise 1-2 Levels Bilateral    Narrative    EXAM: RESTING ANKLE-BRACHIAL INDICES (ABIs)  LOCATION: [facility]  DATE: 11/19/2024    INDICATION: Neuropathy, diabetes, recurrent cellulitis  COMPARISON: None.    LESLIE FINDINGS:  RIGHT  Brachial: 123  Ankle (PT): 45 Index: 0.37  Ankle (DP): 0 Index: 0  Digit: 38 Index: 0.31    LEFT  Brachial: 123  Ankle (PT): 88 Index: 0.72  Ankle (DP): 74 Index: 0.60  Digit: 70 Index: 0.57    The right LESLIE at rest is 0.37. The left LESLIE at rest is 0.72.      WAVEFORMS: The dorsalis pedis and posterior tibial arteries are mostly monophasic.      Impression    IMPRESSION:  1.  RIGHT LOWER EXTREMITY: LESLIE is significantly diminished at 0.37. Flow not detected in the dorsalis pedis artery.  2.  LEFT LOWER EXTREMITY: Diminished LESLIE measuring 0.72.           US Lower Extremity Arterial Duplex Bilateral    Narrative    EXAM: US LOWER EXTREMITY ARTERIAL DUPLEX BILATERAL  LOCATION: [facility]  DATE: 11/19/2024    INDICATION: Diabetes, neuropathy, recurrent cellulitis  COMPARISON: None.  TECHNIQUE: Duplex utilizing 2D gray-scale imaging, Doppler interrogation with color-flow and spectral waveform analysis.    FINDINGS:     RIGHT LOWER EXTREMITY ARTERIAL ASSESSMENT:  External iliac artery 216/30 cm/s  Common femoral artery: 145/20 cm/s  Profunda femoris artery: 95/8 cm/s  SFA (proximal): 158/33 cm/s  SFA (mid): 147/30 cm/s  SFA (distal): 39/5 cm/s  Popliteal artery prox: 63/18 cm/s  Popliteal artery dist: 95/30 cm/s  Posterior tibial artery: 21/4 cm/s  Anterior tibial artery: 0/0 cm/s  Dorsalis pedis artery: 0/0  cm/s    LEFT LOWER EXTREMITY ARTERIAL ASSESSMENT:  External iliac artery 87/26 cm/s  Common femoral artery: 136/39 cm/s  Profunda femoris artery: 77/0 cm/s  SFA (proximal): 240/67 cm/s  SFA (mid): 238/70 cm/s  SFA (distal): 158/38 cm/s  Popliteal artery prox: 89/28 cm/s  Popliteal artery dist: 110/43 cm/s  Posterior tibial artery: 36/1 cm/s  Anterior tibial artery: 21/0 cm/s  Dorsalis pedis artery: 20/0 cm/s      Impression    IMPRESSION:  Unable to obtain arterial waveforms in the right anterior tibial and dorsalis pedis arteries. There is monophasic arterial flow seen in the right posterior tibial artery.    Arterial flow is seen throughout the lower left lower extremity            Discharge Medications   Current Discharge Medication List        START taking these medications    Details   doxycycline hyclate (VIBRAMYCIN) 100 MG capsule Take 1 capsule (100 mg) by mouth 2 times daily.  Qty: 14 capsule, Refills: 0    Associated Diagnoses: Cellulitis of anterior lower leg      methocarbamol (ROBAXIN) 750 MG tablet Take 1 tablet (750 mg) by mouth 3 times daily (with meals).  Qty: 21 tablet, Refills: 0    Associated Diagnoses: Hip pain, left      oxyCODONE (ROXICODONE) 5 MG tablet Take 1 tablet (5 mg) by mouth every 4 hours as needed for severe pain.  Qty: 28 tablet, Refills: 0    Associated Diagnoses: Hip pain, left      pregabalin (LYRICA) 25 MG capsule Take 1 capsule (25 mg) by mouth 3 times daily (with meals).  Qty: 21 capsule, Refills: 0    Associated Diagnoses: Hip pain, left      senna-docusate (SENOKOT-S/PERICOLACE) 8.6-50 MG tablet Take 2 tablets by mouth 2 times daily.  Qty: 14 tablet, Refills: 0    Associated Diagnoses: Drug-induced constipation           CONTINUE these medications which have NOT CHANGED    Details   acetaminophen (TYLENOL) 500 MG tablet Take 1,000 mg by mouth 2 times daily as needed for mild pain      albuterol (PROAIR HFA/PROVENTIL HFA/VENTOLIN HFA) 108 (90 Base) MCG/ACT inhaler Inhale 2  puffs into the lungs every 6 hours as needed for shortness of breath, wheezing or cough      amiodarone (PACERONE) 200 MG tablet Take 200 mg by mouth every morning.      apixaban ANTICOAGULANT (ELIQUIS) 5 MG tablet Take 1 tablet (5 mg) by mouth 2 times daily  Qty: 180 tablet, Refills: 3    Associated Diagnoses: Paroxysmal atrial fibrillation (H)      atorvastatin (LIPITOR) 40 MG tablet Take 40 mg by mouth every morning      carvedilol (COREG) 3.125 MG tablet Take 1 tablet (3.125 mg) by mouth 2 times daily (with meals).  Qty: 180 tablet, Refills: 3    Associated Diagnoses: Heart failure with reduced ejection fraction (H)      clopidogrel (PLAVIX) 75 MG tablet Take 75 mg by mouth every morning.      dapagliflozin (FARXIGA) 5 MG TABS tablet Take 5 mg by mouth every morning.      diclofenac (VOLTAREN) 1 % topical gel Apply 2 g topically every 6 hours as needed for moderate pain      DULoxetine (CYMBALTA) 30 MG capsule Take 30 mg by mouth every morning.      hydrALAZINE (APRESOLINE) 10 MG tablet Take 1 tablet (10 mg) by mouth 3 times daily  Qty: 270 tablet, Refills: 3    Associated Diagnoses: Acute on chronic systolic congestive heart failure (H)      ipratropium - albuterol 0.5 mg/2.5 mg/3 mL (DUONEB) 0.5-2.5 (3) MG/3ML neb solution Take 1 vial (3 mLs) by nebulization every 6 hours as needed for wheezing.  Qty: 180 mL, Refills: 5    Associated Diagnoses: COPD, moderate (H)      isosorbide mononitrate (IMDUR) 60 MG 24 hr tablet Take 3 tablets (180 mg) by mouth every morning  Qty: 270 tablet, Refills: 1    Associated Diagnoses: Coronary artery disease involving native heart without angina pectoris, unspecified vessel or lesion type      metolazone (ZAROXOLYN) 2.5 MG tablet Take 1 tablet (2.5 mg) by mouth once a week. on Wednesday, 30 mintues before your torsemide. Have labs drawn weekly on Thursday.  Qty: 12 tablet, Refills: 1    Associated Diagnoses: Acute on chronic diastolic congestive heart failure (H)       nitroGLYcerin (NITROSTAT) 0.4 MG sublingual tablet Place 1 tablet (0.4 mg) under the tongue every 5 minutes as needed If no relief after 3 tabs call 911;continue 1 tab every 5 min max 3 tab Indications: chest pain  Qty: 100 tablet, Refills: 1    Associated Diagnoses: Ischemic cardiomyopathy      polyethylene glycol (MIRALAX) 17 GM/Dose powder Take 17 g by mouth 2 times daily as needed  Qty: 510 g, Refills: 0    Associated Diagnoses: Constipation, unspecified constipation type      potassium chloride barbara ER (KLOR-CON M20) 20 MEQ CR tablet Take 40 mEq by mouth every morning.      rOPINIRole (REQUIP) 5 MG tablet Take 5 mg by mouth 3 times daily.      !! spironolactone (ALDACTONE) 25 MG tablet Take 25 mg by mouth See Admin Instructions. Take 25 mg daily in the morning on Sun, Mon, Tue, Thur, Friday, and Sat. Takes 50 mg weekly on Wednesday(s) with Metolazone dose.      !! spironolactone (ALDACTONE) 25 MG tablet Take 50 mg by mouth once a week. Take 50 mg by mouth once a week on Wednesday along with Metolazone      torsemide (DEMADEX) 100 MG tablet Take 1 tablet (100 mg) by mouth 2 times daily  Qty: 180 tablet, Refills: 3    Associated Diagnoses: Coronary artery disease involving native coronary artery of native heart without angina pectoris      umeclidinium-vilanterol (ANORO ELLIPTA) 62.5-25 MCG/ACT oral inhaler Inhale 1 puff into the lungs daily.  Qty: 60 each, Refills: 11    Associated Diagnoses: COPD, moderate (H)      vitamin B-12 (CYANOCOBALAMIN) 1000 MCG tablet Take 1,000 mcg by mouth every morning.       !! - Potential duplicate medications found. Please discuss with provider.        Allergies   Allergies   Allergen Reactions    Bumex [Bumetanide] Muscle Pain (Myalgia)

## 2024-11-22 NOTE — PLAN OF CARE
Problem: Adult Inpatient Plan of Care  Goal: Optimal Comfort and Wellbeing  Outcome: Progressing  Intervention: Monitor Pain and Promote Comfort  Recent Flowsheet Documentation  Taken 11/21/2024 1650 by Jesus Sheth RN  Pain Management Interventions: medication (see MAR)   Goal Outcome Evaluation:       Pt afebrile. On iv vancomycin. Using urinal. Able to make needs known to staff. Updated on plan of care. Call light and items placed within reach.

## 2024-11-22 NOTE — PLAN OF CARE
Problem: Adult Inpatient Plan of Care  Goal: Plan of Care Review  Description: The Plan of Care Review/Shift note should be completed every shift.  The Outcome Evaluation is a brief statement about your assessment that the patient is improving, declining, or no change.  This information will be displayed automatically on your shift  note.  Outcome: Adequate for Care Transition  Flowsheets (Taken 11/22/2024 1303)  Plan of Care Reviewed With: patient   Goal Outcome Evaluation:     Pt AxO4, VSS, RA. Pt rating pain 6-8/10, given scheduled meds and PRN oxycodone 5mg, tylenol and IV dilaudid.  and 201. Pt seen by PT. Pt going to [facility], ride  at [phone].

## 2024-11-23 ENCOUNTER — PATIENT OUTREACH (OUTPATIENT)
Dept: CARE COORDINATION | Facility: CLINIC | Age: 64
End: 2024-11-23
Payer: MEDICARE

## 2024-11-23 NOTE — PLAN OF CARE
Physical Therapy Discharge Summary    Reason for therapy discharge:    Discharged to transitional care facility.    Progress towards therapy goal(s). See goals on Care Plan in [facility] electronic health record for goal details.  Goals not met.  Barriers to achieving goals:   due to pain and weakness. Pt was working on the goals.   .    Therapy recommendation(s):    Continued therapy is recommended.  Rationale/Recommendations:  To improve mobility and strength. .

## 2024-11-23 NOTE — PROGRESS NOTES
Connected Care Resource [place]    Background: Transitional Care Management program identified per system criteria and reviewed by [facility] team for possible outreach.    Assessment: Upon chart review, [facility] Team member will not proceed with patient outreach related to this episode of Transitional Care Management program due to reason below:    Patient has discharged to a Memory Care, Long-term Care, Assisted Living or Group Home where patient is receiving on-site support with their daily cares, including support with hospital follow up plan.    Plan: Transitional Care Management episode addressed appropriately per reason noted above.      JOY Garvey  Connected Care Resource [facility]    *Connected Care Resource Team does NOT follow patient ongoing. Referrals are identified based on internal discharge reports and the outreach is to ensure patient has an understanding of their discharge instructions.

## 2024-11-25 ENCOUNTER — OFFICE VISIT (OUTPATIENT)
Dept: CARDIOLOGY | Facility: CLINIC | Age: 64
End: 2024-11-25
Payer: MEDICARE

## 2024-11-25 ENCOUNTER — LAB REQUISITION (OUTPATIENT)
Dept: LAB | Facility: CLINIC | Age: 64
End: 2024-11-25
Payer: MEDICARE

## 2024-11-25 DIAGNOSIS — M25.552 PAIN IN LEFT HIP: ICD-10-CM

## 2024-11-25 DIAGNOSIS — I50.32 CHRONIC DIASTOLIC CONGESTIVE HEART FAILURE (H): Primary | ICD-10-CM

## 2024-11-25 PROCEDURE — 99207 PR NO CHARGE NURSE ONLY: CPT

## 2024-11-29 LAB
ANION GAP SERPL CALCULATED.3IONS-SCNC: 10 MMOL/L (ref 7–15)
BUN SERPL-MCNC: 36.8 MG/DL (ref 8–23)
CALCIUM SERPL-MCNC: 9.4 MG/DL (ref 8.8–10.4)
CHLORIDE SERPL-SCNC: 96 MMOL/L (ref 98–107)
CREAT SERPL-MCNC: 1.21 MG/DL (ref 0.67–1.17)
CRP SERPL-MCNC: 4.9 MG/L
EGFRCR SERPLBLD CKD-EPI 2021: 66 ML/MIN/1.73M2
ERYTHROCYTE [DISTWIDTH] IN BLOOD BY AUTOMATED COUNT: 18.1 % (ref 10–15)
GLUCOSE SERPL-MCNC: 153 MG/DL (ref 70–99)
HCO3 SERPL-SCNC: 31 MMOL/L (ref 22–29)
HCT VFR BLD AUTO: 36.7 % (ref 40–53)
HGB BLD-MCNC: 10.5 G/DL (ref 13.3–17.7)
MCH RBC QN AUTO: 25.9 PG (ref 26.5–33)
MCHC RBC AUTO-ENTMCNC: 28.6 G/DL (ref 31.5–36.5)
MCV RBC AUTO: 90 FL (ref 78–100)
PLATELET # BLD AUTO: 221 10E3/UL (ref 150–450)
POTASSIUM SERPL-SCNC: 4.1 MMOL/L (ref 3.4–5.3)
RBC # BLD AUTO: 4.06 10E6/UL (ref 4.4–5.9)
SODIUM SERPL-SCNC: 137 MMOL/L (ref 135–145)
WBC # BLD AUTO: 9.3 10E3/UL (ref 4–11)

## 2024-11-29 PROCEDURE — 85027 COMPLETE CBC AUTOMATED: CPT | Mod: ORL | Performed by: INTERNAL MEDICINE

## 2024-11-29 PROCEDURE — 80048 BASIC METABOLIC PNL TOTAL CA: CPT | Mod: ORL | Performed by: INTERNAL MEDICINE

## 2024-11-29 PROCEDURE — 80048 BASIC METABOLIC PNL TOTAL CA: CPT | Performed by: INTERNAL MEDICINE

## 2024-11-29 PROCEDURE — 86140 C-REACTIVE PROTEIN: CPT | Mod: ORL | Performed by: INTERNAL MEDICINE

## 2024-11-29 PROCEDURE — 36415 COLL VENOUS BLD VENIPUNCTURE: CPT | Performed by: INTERNAL MEDICINE

## 2024-11-29 PROCEDURE — P9604 ONE-WAY ALLOW PRORATED TRIP: HCPCS | Mod: ORL | Performed by: INTERNAL MEDICINE

## 2024-12-04 ENCOUNTER — TELEPHONE (OUTPATIENT)
Dept: CARDIOLOGY | Facility: CLINIC | Age: 64
End: 2024-12-04
Payer: MEDICARE

## 2024-12-04 NOTE — TELEPHONE ENCOUNTER
Patient is currently at [facility]. He reports that he has a care conference later today to review his discharge options. Otherwise was asked to have someone bring him the [employer] pillow unit to perform transmissions. Emphasized that since he is making progress in the areas of wound management, that we would best to keep the fluid off to promote further healing. Heather HUERTA RN BSN, CHFN, PCCN-K

## 2024-12-09 DIAGNOSIS — I50.20 HEART FAILURE WITH REDUCED EJECTION FRACTION (H): Primary | ICD-10-CM

## 2024-12-09 RX ORDER — POTASSIUM CHLORIDE 1500 MG/1
40 TABLET, EXTENDED RELEASE ORAL EVERY MORNING
Qty: 240 TABLET | Refills: 2 | Status: SHIPPED | OUTPATIENT
Start: 2024-12-09

## 2024-12-16 ENCOUNTER — TELEPHONE (OUTPATIENT)
Dept: CARDIOLOGY | Facility: CLINIC | Age: 64
End: 2024-12-16
Payer: MEDICARE

## 2024-12-16 NOTE — TELEPHONE ENCOUNTER
Zackary is LM asking him to return my call to discuss his current HF status and if he has been discharged from the TCU or back to his apartment yet.  Needing [employer] transmissions from him to assist in management of his fluid status.   Heather HUERTA RN BSN, CHFN, PCCN-K

## 2024-12-26 ENCOUNTER — OFFICE VISIT (OUTPATIENT)
Dept: CARDIOLOGY | Facility: CLINIC | Age: 64
End: 2024-12-26
Payer: MEDICARE

## 2024-12-26 DIAGNOSIS — I50.20 HEART FAILURE WITH REDUCED EJECTION FRACTION (H): Primary | ICD-10-CM

## 2024-12-26 PROCEDURE — 99207 PR NO CHARGE NURSE ONLY: CPT

## 2025-01-02 ENCOUNTER — TELEPHONE (OUTPATIENT)
Dept: CARDIOLOGY | Facility: CLINIC | Age: 65
End: 2025-01-02
Payer: MEDICARE

## 2025-01-02 NOTE — TELEPHONE ENCOUNTER
[facility] Call Center    Phone Message    May a detailed message be left on voicemail: yes     Reason for Call: Other: Patient would like call back from nurse Hensley. Patient did not let me know the question. Please reach out to patient on number [phone]. Thank you       Action Taken: Other: cardiology     Travel Screening: Not Applicable     Date of Service:

## 2025-01-02 NOTE — TELEPHONE ENCOUNTER
Attempted to reach out to Zackary, no answer, no ability to leave a message. Heather HUERTA RN BSN, CHFN, PCCN-K

## 2025-01-09 ENCOUNTER — TELEPHONE (OUTPATIENT)
Dept: CARDIOLOGY | Facility: CLINIC | Age: 65
End: 2025-01-09
Payer: MEDICARE

## 2025-01-09 DIAGNOSIS — I48.0 PAROXYSMAL ATRIAL FIBRILLATION (H): ICD-10-CM

## 2025-01-09 DIAGNOSIS — I25.10 CORONARY ARTERY DISEASE INVOLVING NATIVE HEART WITHOUT ANGINA PECTORIS, UNSPECIFIED VESSEL OR LESION TYPE: ICD-10-CM

## 2025-01-09 RX ORDER — ISOSORBIDE MONONITRATE 60 MG/1
180 TABLET, EXTENDED RELEASE ORAL EVERY MORNING
Qty: 270 TABLET | Refills: 1 | Status: SHIPPED | OUTPATIENT
Start: 2025-01-09

## 2025-01-09 NOTE — TELEPHONE ENCOUNTER
Zackary is calling back to update the [facility] regarding his HF status. He recently moved back to his apartment at the assisted living facility. He is still using Metolazone 2.5mg weekly on Wed. He manages his own medications. He is asking his primary care office ([facility]) for home care to accommodate lab draws at his facility as he has some challenges with transportation to the lab. He also was recently started on antibiotics for some redness swelling in his LE (so far no weeping of the wound) Is anticipating the need for cervical surgery for stenosis of his spine. ( Not currently scheduled) he has a preop clearance visit with Dr. Loera in our Cardiology clinic on 1/22/25.  He is advised to ask for HC monitoring of his wound to satisfy the nursing need for HC services.   He is also asked to resume use of his [employer] device transmissions so that we are able to ensure he is a euvolemic state prior to a surgical procedure.   He will work on this.    Heather HUERTA RN BSN, CHFN, PCCN-K

## 2025-01-18 ENCOUNTER — HEALTH MAINTENANCE LETTER (OUTPATIENT)
Age: 65
End: 2025-01-18

## 2025-01-22 ENCOUNTER — OFFICE VISIT (OUTPATIENT)
Dept: CARDIOLOGY | Facility: CLINIC | Age: 65
End: 2025-01-22
Payer: MEDICARE

## 2025-01-22 VITALS
WEIGHT: 257 LBS | BODY MASS INDEX: 41.48 KG/M2 | RESPIRATION RATE: 14 BRPM | SYSTOLIC BLOOD PRESSURE: 136 MMHG | DIASTOLIC BLOOD PRESSURE: 68 MMHG | HEART RATE: 84 BPM

## 2025-01-22 DIAGNOSIS — I25.83 CORONARY ARTERY DISEASE DUE TO LIPID RICH PLAQUE: Primary | ICD-10-CM

## 2025-01-22 DIAGNOSIS — I50.20 HEART FAILURE WITH REDUCED EJECTION FRACTION (H): ICD-10-CM

## 2025-01-22 DIAGNOSIS — N18.32 CHRONIC KIDNEY DISEASE, STAGE 3B (H): ICD-10-CM

## 2025-01-22 DIAGNOSIS — E11.9 TYPE 2 DIABETES MELLITUS TREATED WITHOUT INSULIN (H): ICD-10-CM

## 2025-01-22 DIAGNOSIS — R26.81 GAIT INSTABILITY: ICD-10-CM

## 2025-01-22 DIAGNOSIS — E78.00 PURE HYPERCHOLESTEROLEMIA: Chronic | ICD-10-CM

## 2025-01-22 DIAGNOSIS — Z95.1 S/P CABG (CORONARY ARTERY BYPASS GRAFT): Chronic | ICD-10-CM

## 2025-01-22 DIAGNOSIS — I25.5 ISCHEMIC CARDIOMYOPATHY: ICD-10-CM

## 2025-01-22 DIAGNOSIS — I10 PRIMARY HYPERTENSION: ICD-10-CM

## 2025-01-22 DIAGNOSIS — E66.01 MORBID OBESITY WITH BODY MASS INDEX (BMI) OF 40.0 OR HIGHER (H): ICD-10-CM

## 2025-01-22 DIAGNOSIS — I25.10 CORONARY ARTERY DISEASE DUE TO LIPID RICH PLAQUE: Primary | ICD-10-CM

## 2025-01-22 DIAGNOSIS — I48.19 PERSISTENT ATRIAL FIBRILLATION (H): ICD-10-CM
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
## 2025-01-22 PROBLEM — T14.8XXA OPEN WOUND: Status: RESOLVED | Noted: 2024-04-29 | Resolved: 2025-01-22

## 2025-01-22 PROBLEM — E66.813 CLASS 3 SEVERE OBESITY DUE TO EXCESS CALORIES WITH BODY MASS INDEX (BMI) OF 40.0 TO 44.9 IN ADULT (H): Status: RESOLVED | Noted: 2023-09-28 | Resolved: 2025-01-22

## 2025-01-22 PROBLEM — R94.31 ABNORMAL ELECTROCARDIOGRAM: Status: RESOLVED | Noted: 2023-09-08 | Resolved: 2025-01-22

## 2025-01-22 PROBLEM — I48.91 RAPID ATRIAL FIBRILLATION (H): Status: RESOLVED | Noted: 2024-06-03 | Resolved: 2025-01-22

## 2025-01-22 LAB
ANION GAP SERPL CALCULATED.3IONS-SCNC: 14 MMOL/L (ref 7–15)
BUN SERPL-MCNC: 31.3 MG/DL (ref 8–23)
CALCIUM SERPL-MCNC: 9.4 MG/DL (ref 8.8–10.4)
CHLORIDE SERPL-SCNC: 94 MMOL/L (ref 98–107)
CREAT SERPL-MCNC: 1.48 MG/DL (ref 0.67–1.17)
EGFRCR SERPLBLD CKD-EPI 2021: 52 ML/MIN/1.73M2
GLUCOSE SERPL-MCNC: 460 MG/DL (ref 70–99)
HCO3 SERPL-SCNC: 28 MMOL/L (ref 22–29)
NT-PROBNP SERPL-MCNC: 143 PG/ML (ref 0–900)
POTASSIUM SERPL-SCNC: 4.7 MMOL/L (ref 3.4–5.3)
SODIUM SERPL-SCNC: 136 MMOL/L (ref 135–145)

## 2025-01-22 PROCEDURE — G2211 COMPLEX E/M VISIT ADD ON: HCPCS | Performed by: INTERNAL MEDICINE

## 2025-01-22 PROCEDURE — 99214 OFFICE O/P EST MOD 30 MIN: CPT | Performed by: INTERNAL MEDICINE

## 2025-01-22 PROCEDURE — 80048 BASIC METABOLIC PNL TOTAL CA: CPT | Performed by: INTERNAL MEDICINE

## 2025-01-22 PROCEDURE — 36415 COLL VENOUS BLD VENIPUNCTURE: CPT | Performed by: INTERNAL MEDICINE

## 2025-01-22 PROCEDURE — 83880 ASSAY OF NATRIURETIC PEPTIDE: CPT | Performed by: INTERNAL MEDICINE

## 2025-01-22 RX ORDER — DAPAGLIFLOZIN 5 MG/1
5 TABLET, FILM COATED ORAL EVERY MORNING
Qty: 90 TABLET | Refills: 4 | Status: SHIPPED | OUTPATIENT
Start: 2025-01-22

## 2025-01-22 RX ORDER — METFORMIN HYDROCHLORIDE 500 MG/1
1000 TABLET, EXTENDED RELEASE ORAL
COMMUNITY
Start: 2025-01-07 | End: 2026-01-07

## 2025-01-22 NOTE — LETTER
1/22/2025    Reid Escobar MD  [address] [place] [address]    RE: Zackary Hutchison       Dear Colleague,     I had the pleasure of seeing Zackary Matiaslinh in the [facility].      [facility]  Heart Care Clinic Follow-up Note    Assessment & Plan        (I25.10,  I25.83) Coronary artery disease due to lipid rich plaque  (primary encounter diagnosis)  Comment: Angiography May 2024 showed normal left main, ostial total occlusion LAD, circumflex with mid to distal 80% lesion in first obtuse marginal artery 90% lesion, ramus proximal 95% lesion, and right coronary artery with a proximal total occlusion.  At that time he had a PCI to the ramus branch with a 2.5 x 24 mm Synergy drug-coated stent as well as a PCI to the circumflex branch with a 2.3 x 24 mm Synergy drug-coated stent.  Stress test was performed in November 2024 showing no significant ischemia or scar with preserved ejection fraction.     (Z95.1) S/P CABG (coronary artery bypass graft)  Comment: He has a LIMA to the distal LAD as well as a vein graft to distal right coronary artery.    (I48.19) Persistent atrial fibrillation (H)  Comment: On amiodarone as well as Eliquis, in sinus rhythm, amiodarone blood work from September 2024 looks good.    (I25.5) Ischemic cardiomyopathy  Comment: Resolved with most recent ejection fraction 69%.    (I50.20) Heart failure with reduced ejection fraction (H)  Comment: Suspect actually acute on chronic the setting of midrange to preserved ejection fraction.  Given volume overload today with weeping wound as well as shortness breath will check BNP as well as renal profile, will call him results of most likely increase diuretic, will asked that he get seen by the heart failure clinic and possibly needs CardioMEMS restarted.    (I10) Primary hypertension  Comment: Under good control currently on Coreg, hydralazine, Imdur.    (E78.00) Pure hypercholesterolemia  Comment: Excellent cholesterol  121 with an LDL of 50 on statin.    (E11.9) Type 2 diabetes mellitus treated without insulin (H)  Comment: So noted with hemoglobin A1c of 8.4.  Given significant obesity would strongly recommend GLP-1 type antagonist.    (N18.32) Chronic kidney disease, stage 3b (H)  Comment: Creatinine 1.21 with GFR preserved at 66, will recheck today with heart failure.    (R26.81) Gait instability  Comment: Looking at having cervical spine surgery, given this he had stress test showed no significant ischemia, his risk is increased but not prohibitive.  He does have coronary disease but it is stable, has decreased functional capacity with an unremarkable stress test, has mild chronic kidney disease, diabetes and most likely sleep apnea as well as heart failure.  If surgery needs to be done could pursue it but most likely have a very vin postoperative course.    (E66.01) Morbid obesity with body mass index (BMI) of 40.0 or higher (H)  Comment: Needs to work on weight loss, as above GLP-1 type antagonist, would also recommend sleep apnea evaluation.    Plan  1.  Weight loss.  2.  Check renal profile BNP today and address with increased dose of diuretic.  3.  Reengage with the heart failure clinic in the cardio mems follow-up.  4.  Strongly recommend sleep study.  5.  Could proceed with neck surgery at increased but not certainly prohibitive risk.  6.  Follow-up with Dr. Anthony Husain.  7.  Will have him follow-up with heart failure nurse practitioners in 2 to 4 weeks.    The longitudinal plan of care for the diagnosis(es)/condition(s) as documented were addressed during this visit. Due to the added complexity in care, I will continue to support Zackary in the subsequent management and with ongoing continuity of care.     Subjective  CC: 64-year-old white gentleman being seen in the rapid access clinic.  He is here for preoperative visit for his neck surgery which has not been scheduled.  He was seen by Dr. Anthony Husain for  this in November 2024 and stress test was performed which was unremarkable.  Patient tells me he is now in assisted living due to falling, he slides out of chairs and most likely due to cervical spine issues.  He tells me he has increased shortness of breath and activity but no PND and orthopnea.  He has increased bipedal edema with weeping of the left lower extremity.  There is no chest pains, palpitations, syncope, dizziness.  He is not checking his weights daily, not watching his diet, not exercising, and is not sending in CardioMEMS readings.    Medications  Current Outpatient Medications   Medication Sig Dispense Refill     acetaminophen (TYLENOL) 500 MG tablet Take 1,000 mg by mouth 2 times daily as needed for mild pain       albuterol (PROAIR HFA/PROVENTIL HFA/VENTOLIN HFA) 108 (90 Base) MCG/ACT inhaler Inhale 2 puffs into the lungs every 6 hours as needed for shortness of breath, wheezing or cough       amiodarone (PACERONE) 200 MG tablet Take 200 mg by mouth every morning.       apixaban ANTICOAGULANT (ELIQUIS) 5 MG tablet Take 1 tablet (5 mg) by mouth 2 times daily. 180 tablet 3     atorvastatin (LIPITOR) 40 MG tablet Take 40 mg by mouth every morning       carvedilol (COREG) 3.125 MG tablet Take 1 tablet (3.125 mg) by mouth 2 times daily (with meals). 180 tablet 3     clopidogrel (PLAVIX) 75 MG tablet Take 75 mg by mouth every morning.       dapagliflozin (FARXIGA) 5 MG TABS tablet Take 1 tablet (5 mg) by mouth every morning. 90 tablet 4     diclofenac (VOLTAREN) 1 % topical gel Apply 2 g topically every 6 hours as needed for moderate pain       doxycycline hyclate (VIBRAMYCIN) 100 MG capsule Take 1 capsule (100 mg) by mouth 2 times daily. (Patient taking differently: Take 100 mg by mouth daily.) 14 capsule 0     DULoxetine (CYMBALTA) 30 MG capsule Take 30 mg by mouth every morning.       hydrALAZINE (APRESOLINE) 10 MG tablet Take 1 tablet (10 mg) by mouth 3 times daily 270 tablet 3     ipratropium -  albuterol 0.5 mg/2.5 mg/3 mL (DUONEB) 0.5-2.5 (3) MG/3ML neb solution Take 1 vial (3 mLs) by nebulization every 6 hours as needed for wheezing. 180 mL 5     isosorbide mononitrate (IMDUR) 60 MG 24 hr tablet Take 3 tablets (180 mg) by mouth every morning. 270 tablet 1     metFORMIN (GLUCOPHAGE XR) 500 MG 24 hr tablet Take 1,000 mg by mouth daily (with dinner).       metolazone (ZAROXOLYN) 2.5 MG tablet Take 1 tablet (2.5 mg) by mouth once a week. on Wednesday, 30 mintues before your torsemide. Have labs drawn weekly on Thursday. 12 tablet 1     nitroGLYcerin (NITROSTAT) 0.4 MG sublingual tablet Place 1 tablet (0.4 mg) under the tongue every 5 minutes as needed If no relief after 3 tabs call 911;continue 1 tab every 5 min max 3 tab Indications: chest pain 100 tablet 1     polyethylene glycol (MIRALAX) 17 GM/Dose powder Take 17 g by mouth 2 times daily as needed 510 g 0     potassium chloride barbara ER (KLOR-CON M20) 20 MEQ CR tablet Take 2 tablets (40 mEq) by mouth every morning. 240 tablet 2     rOPINIRole (REQUIP) 5 MG tablet Take 5 mg by mouth 3 times daily.       spironolactone (ALDACTONE) 25 MG tablet Take 25 mg by mouth See Admin Instructions. Take 25 mg daily in the morning on Sun, Mon, Tue, Thur, Friday, and Sat. Takes 50 mg weekly on Wednesday(s) with Metolazone dose.       spironolactone (ALDACTONE) 25 MG tablet Take 50 mg by mouth once a week. Take 50 mg by mouth once a week on Wednesday along with Metolazone       torsemide (DEMADEX) 100 MG tablet Take 1 tablet (100 mg) by mouth 2 times daily 180 tablet 3     umeclidinium-vilanterol (ANORO ELLIPTA) 62.5-25 MCG/ACT oral inhaler Inhale 1 puff into the lungs daily. 60 each 11     vitamin B-12 (CYANOCOBALAMIN) 1000 MCG tablet Take 1 tablet (1,000 mcg) by mouth every morning. 90 tablet 4       Objective  /68 (BP Location: Left arm, Patient Position: Sitting, Cuff Size: Adult Large)   Pulse 84   Resp 14   Wt 116.6 kg (257 lb)   BMI 41.48 kg/m   "    General Appearance:    Alert, cooperative, no distress, appears stated age   Head:    Normocephalic, without obvious abnormality, atraumatic   Throat:   Lips, mucosa, and tongue normal; teeth and gums normal   Neck:   Supple, symmetrical, trachea midline, no adenopathy;        thyroid:  No enlargement/tenderness/nodules; no carotid    bruit or JVD   Back:     Symmetric, no curvature, ROM normal, no CVA tenderness   Lungs:     Expiratory wheezes bilaterally, respirations unlabored   Chest wall:    No tenderness, midline sternotomy scar   Heart:    Regular rate and rhythm, S1 and S2 normal, no murmur, rub   or gallop   Abdomen:     Soft, non-tender, bowel sounds active all four quadrants,     no masses, no organomegaly   Extremities:   Normal, atraumatic, no cyanosis, 1/4 weeping bipedal edema   Pulses:   2+ and symmetric all extremities   Skin:   Skin color, texture, turgor normal, no rashes or lesions     Results    Lab Results personally reviewed   Lab Results   Component Value Date    CHOL 121 05/02/2024     Lab Results   Component Value Date    HDL 27 (L) 05/02/2024     No components found for: \"LDLCALC\"  Lab Results   Component Value Date    TRIG 219 (H) 05/02/2024     Lab Results   Component Value Date    WBC 9.3 11/29/2024    HGB 10.5 (L) 11/29/2024    HCT 36.7 (L) 11/29/2024     11/29/2024     Lab Results   Component Value Date    BUN 36.8 (H) 11/29/2024     11/29/2024    CO2 31 (H) 11/29/2024               Thank you for allowing me to participate in the care of your patient.      Sincerely,     JESSICA ESCALANTE MD     [facility]  cc:   Referred Self, MD  No address on file      "

## 2025-01-22 NOTE — PROGRESS NOTES
[facility]  Heart Care Clinic Follow-up Note    Assessment & Plan        (I25.10,  I25.83) Coronary artery disease due to lipid rich plaque  (primary encounter diagnosis)  Comment: Angiography May 2024 showed normal left main, ostial total occlusion LAD, circumflex with mid to distal 80% lesion in first obtuse marginal artery 90% lesion, ramus proximal 95% lesion, and right coronary artery with a proximal total occlusion.  At that time he had a PCI to the ramus branch with a 2.5 x 24 mm Synergy drug-coated stent as well as a PCI to the circumflex branch with a 2.3 x 24 mm Synergy drug-coated stent.  Stress test was performed in November 2024 showing no significant ischemia or scar with preserved ejection fraction.     (Z95.1) S/P CABG (coronary artery bypass graft)  Comment: He has a LIMA to the distal LAD as well as a vein graft to distal right coronary artery.    (I48.19) Persistent atrial fibrillation (H)  Comment: On amiodarone as well as Eliquis, in sinus rhythm, amiodarone blood work from September 2024 looks good.    (I25.5) Ischemic cardiomyopathy  Comment: Resolved with most recent ejection fraction 69%.    (I50.20) Heart failure with reduced ejection fraction (H)  Comment: Suspect actually acute on chronic the setting of midrange to preserved ejection fraction.  Given volume overload today with weeping wound as well as shortness breath will check BNP as well as renal profile, will call him results of most likely increase diuretic, will asked that he get seen by the heart failure clinic and possibly needs CardioMEMS restarted.    (I10) Primary hypertension  Comment: Under good control currently on Coreg, hydralazine, Imdur.    (E78.00) Pure hypercholesterolemia  Comment: Excellent cholesterol 121 with an LDL of 50 on statin.    (E11.9) Type 2 diabetes mellitus treated without insulin (H)  Comment: So noted with hemoglobin A1c of 8.4.  Given significant obesity would strongly recommend GLP-1 type  antagonist.    (N18.32) Chronic kidney disease, stage 3b (H)  Comment: Creatinine 1.21 with GFR preserved at 66, will recheck today with heart failure.    (R26.81) Gait instability  Comment: Looking at having cervical spine surgery, given this he had stress test showed no significant ischemia, his risk is increased but not prohibitive.  He does have coronary disease but it is stable, has decreased functional capacity with an unremarkable stress test, has mild chronic kidney disease, diabetes and most likely sleep apnea as well as heart failure.  If surgery needs to be done could pursue it but most likely have a very vin postoperative course.    (E66.01) Morbid obesity with body mass index (BMI) of 40.0 or higher (H)  Comment: Needs to work on weight loss, as above GLP-1 type antagonist, would also recommend sleep apnea evaluation.    Plan  1.  Weight loss.  2.  Check renal profile BNP today and address with increased dose of diuretic.  3.  Reengage with the heart failure clinic in the cardio mems follow-up.  4.  Strongly recommend sleep study.  5.  Could proceed with neck surgery at increased but not certainly prohibitive risk.  6.  Follow-up with Dr. Anthony Husain.  7.  Will have him follow-up with heart failure nurse practitioners in 2 to 4 weeks.    The longitudinal plan of care for the diagnosis(es)/condition(s) as documented were addressed during this visit. Due to the added complexity in care, I will continue to support Zackary in the subsequent management and with ongoing continuity of care.     Subjective  CC: 64-year-old white gentleman being seen in the rapid access clinic.  He is here for preoperative visit for his neck surgery which has not been scheduled.  He was seen by Dr. Anthony Husain for this in November 2024 and stress test was performed which was unremarkable.  Patient tells me he is now in assisted living due to falling, he slides out of chairs and most likely due to cervical spine issues.   He tells me he has increased shortness of breath and activity but no PND and orthopnea.  He has increased bipedal edema with weeping of the left lower extremity.  There is no chest pains, palpitations, syncope, dizziness.  He is not checking his weights daily, not watching his diet, not exercising, and is not sending in CardioMEMS readings.    Medications  Current Outpatient Medications   Medication Sig Dispense Refill    acetaminophen (TYLENOL) 500 MG tablet Take 1,000 mg by mouth 2 times daily as needed for mild pain      albuterol (PROAIR HFA/PROVENTIL HFA/VENTOLIN HFA) 108 (90 Base) MCG/ACT inhaler Inhale 2 puffs into the lungs every 6 hours as needed for shortness of breath, wheezing or cough      amiodarone (PACERONE) 200 MG tablet Take 200 mg by mouth every morning.      apixaban ANTICOAGULANT (ELIQUIS) 5 MG tablet Take 1 tablet (5 mg) by mouth 2 times daily. 180 tablet 3    atorvastatin (LIPITOR) 40 MG tablet Take 40 mg by mouth every morning      carvedilol (COREG) 3.125 MG tablet Take 1 tablet (3.125 mg) by mouth 2 times daily (with meals). 180 tablet 3    clopidogrel (PLAVIX) 75 MG tablet Take 75 mg by mouth every morning.      dapagliflozin (FARXIGA) 5 MG TABS tablet Take 1 tablet (5 mg) by mouth every morning. 90 tablet 4    diclofenac (VOLTAREN) 1 % topical gel Apply 2 g topically every 6 hours as needed for moderate pain      doxycycline hyclate (VIBRAMYCIN) 100 MG capsule Take 1 capsule (100 mg) by mouth 2 times daily. (Patient taking differently: Take 100 mg by mouth daily.) 14 capsule 0    DULoxetine (CYMBALTA) 30 MG capsule Take 30 mg by mouth every morning.      hydrALAZINE (APRESOLINE) 10 MG tablet Take 1 tablet (10 mg) by mouth 3 times daily 270 tablet 3    ipratropium - albuterol 0.5 mg/2.5 mg/3 mL (DUONEB) 0.5-2.5 (3) MG/3ML neb solution Take 1 vial (3 mLs) by nebulization every 6 hours as needed for wheezing. 180 mL 5    isosorbide mononitrate (IMDUR) 60 MG 24 hr tablet Take 3 tablets (180  mg) by mouth every morning. 270 tablet 1    metFORMIN (GLUCOPHAGE XR) 500 MG 24 hr tablet Take 1,000 mg by mouth daily (with dinner).      metolazone (ZAROXOLYN) 2.5 MG tablet Take 1 tablet (2.5 mg) by mouth once a week. on Wednesday, 30 mintues before your torsemide. Have labs drawn weekly on Thursday. 12 tablet 1    nitroGLYcerin (NITROSTAT) 0.4 MG sublingual tablet Place 1 tablet (0.4 mg) under the tongue every 5 minutes as needed If no relief after 3 tabs call 911;continue 1 tab every 5 min max 3 tab Indications: chest pain 100 tablet 1    polyethylene glycol (MIRALAX) 17 GM/Dose powder Take 17 g by mouth 2 times daily as needed 510 g 0    potassium chloride barbara ER (KLOR-CON M20) 20 MEQ CR tablet Take 2 tablets (40 mEq) by mouth every morning. 240 tablet 2    rOPINIRole (REQUIP) 5 MG tablet Take 5 mg by mouth 3 times daily.      spironolactone (ALDACTONE) 25 MG tablet Take 25 mg by mouth See Admin Instructions. Take 25 mg daily in the morning on Sun, Mon, Tue, Thur, Friday, and Sat. Takes 50 mg weekly on Wednesday(s) with Metolazone dose.      spironolactone (ALDACTONE) 25 MG tablet Take 50 mg by mouth once a week. Take 50 mg by mouth once a week on Wednesday along with Metolazone      torsemide (DEMADEX) 100 MG tablet Take 1 tablet (100 mg) by mouth 2 times daily 180 tablet 3    umeclidinium-vilanterol (ANORO ELLIPTA) 62.5-25 MCG/ACT oral inhaler Inhale 1 puff into the lungs daily. 60 each 11    vitamin B-12 (CYANOCOBALAMIN) 1000 MCG tablet Take 1 tablet (1,000 mcg) by mouth every morning. 90 tablet 4       Objective  /68 (BP Location: Left arm, Patient Position: Sitting, Cuff Size: Adult Large)   Pulse 84   Resp 14   Wt 116.6 kg (257 lb)   BMI 41.48 kg/m      General Appearance:    Alert, cooperative, no distress, appears stated age   Head:    Normocephalic, without obvious abnormality, atraumatic   Throat:   Lips, mucosa, and tongue normal; teeth and gums normal   Neck:   Supple, symmetrical,  "trachea midline, no adenopathy;        thyroid:  No enlargement/tenderness/nodules; no carotid    bruit or JVD   Back:     Symmetric, no curvature, ROM normal, no CVA tenderness   Lungs:     Expiratory wheezes bilaterally, respirations unlabored   Chest wall:    No tenderness, midline sternotomy scar   Heart:    Regular rate and rhythm, S1 and S2 normal, no murmur, rub   or gallop   Abdomen:     Soft, non-tender, bowel sounds active all four quadrants,     no masses, no organomegaly   Extremities:   Normal, atraumatic, no cyanosis, 1/4 weeping bipedal edema   Pulses:   2+ and symmetric all extremities   Skin:   Skin color, texture, turgor normal, no rashes or lesions     Results    Lab Results personally reviewed   Lab Results   Component Value Date    CHOL 121 05/02/2024     Lab Results   Component Value Date    HDL 27 (L) 05/02/2024     No components found for: \"LDLCALC\"  Lab Results   Component Value Date    TRIG 219 (H) 05/02/2024     Lab Results   Component Value Date    WBC 9.3 11/29/2024    HGB 10.5 (L) 11/29/2024    HCT 36.7 (L) 11/29/2024     11/29/2024     Lab Results   Component Value Date    BUN 36.8 (H) 11/29/2024     11/29/2024    CO2 31 (H) 11/29/2024           "

## 2025-01-22 NOTE — PATIENT INSTRUCTIONS
Mr Zackary Hutchison,  I enjoyed visiting with you again today.  I am sorry to see of the increased fluid retention and the need for the spine neck surgery.    Per our conversation I will check blood work today dependent at most likely change your diuretics.   I will plan on you seeing the heart failure clinic in 2 weeks or so.  Shimon Loera

## 2025-01-23 ENCOUNTER — TELEPHONE (OUTPATIENT)
Dept: VASCULAR SURGERY | Facility: CLINIC | Age: 65
End: 2025-01-23
Payer: MEDICARE

## 2025-01-23 DIAGNOSIS — I50.20 HEART FAILURE WITH REDUCED EJECTION FRACTION (H): ICD-10-CM

## 2025-01-23 DIAGNOSIS — I25.5 ISCHEMIC CARDIOMYOPATHY: Primary | ICD-10-CM

## 2025-01-23 NOTE — TELEPHONE ENCOUNTER
Vascular Referral Intake    Appointment note (to be pasted into appt note. Also add where additional info is located ie: outside images pushed to PACS, in Epic, sent to HIM, etc):   Referred by Dr. Loera for LLE wounds and weeping with swelling    Specialty: Wound Clinic    Specific Provider if Necessary:  SHAKILA Velazquez, MD Harshal Higginbotham, or SHAKILA Sears    Visit Type: New    Time Frame: Next Available    Testing/Imaging Needed Before Consult: NA    *Schedulers: Please send welcome letter to patient after appointment(s) have been scheduled*

## 2025-01-27 ENCOUNTER — OFFICE VISIT (OUTPATIENT)
Dept: CARDIOLOGY | Facility: CLINIC | Age: 65
End: 2025-01-27
Payer: MEDICARE

## 2025-01-27 DIAGNOSIS — I50.20 HEART FAILURE WITH REDUCED EJECTION FRACTION (H): Primary | ICD-10-CM

## 2025-01-27 PROCEDURE — 99207 PR NO CHARGE LOS: CPT | Performed by: INTERNAL MEDICINE

## 2025-01-28 NOTE — PROGRESS NOTES
[facility]:  CardioMems Routine Remote Evaluation     Dates: 9/23/24 through 10/24/24    Adjustments made in the last month: currently using Torsemide 100mg bid, Metolazone 2.5mg weekly on Wednesday    No adjustments made this month.  Discussed with patient/caregiver and they will continue current plan of care.     Threshold Alert Settings:12  Heather HUERTA RN BSN, CHFN, PCCN-K      Readings:       Future Appointments   Date Time Provider Department Center   2/7/2025  2:10 PM [facility]   2/27/2025  4:00 AM [facility]   3/31/2025  4:00 AM [facility]   5/1/2025  4:00 AM [facility]      I have reviewed Heather Armenta RN BSN, CHFN's note and agree.    Jacey Bhandari MD., MHS

## 2025-01-28 NOTE — PROGRESS NOTES
Inadequate transmissions for this billing period, unable to charge for this term.  Heather HUERTA RN BSN, CHFN, PCCN-K

## 2025-02-04 ENCOUNTER — TELEPHONE (OUTPATIENT)
Dept: CARDIOLOGY | Facility: CLINIC | Age: 65
End: 2025-02-04
Payer: MEDICARE

## 2025-02-04 NOTE — TELEPHONE ENCOUNTER
LM for patient today to discuss current HF status and how things are going since his recent procedure. Heather HUERTA RN BSN, CHFN, PCCN-K

## 2025-02-06 ENCOUNTER — TELEPHONE (OUTPATIENT)
Dept: CARDIOLOGY | Facility: CLINIC | Age: 65
End: 2025-02-06
Payer: MEDICARE

## 2025-02-06 NOTE — TELEPHONE ENCOUNTER
Zackary is now back at his Assisted Living but has not been transmitting his CardioMEMS readings for quite a long stretch. He was to have a neuro-surgery coming up but this was delayed due to open decubitus ulcers on his L leg. He was encouraged to do a transmission today or tomorrow to give us some guidance on his diuretic dosing. Let him know that the excess fluid that he has on is likely preventing the good blood flow to help heal his wound.  He assured me that he would try to send a transmission in the next day or so.     He reports that he continues to use Metolazone 2.5mg weekly. (Currently on Wed) he does not have consistent transportation for lab services that we recommend weekly on the day following the Metolazone use.     FYI to Dr. Thony HUERTA RN BSN, CHFN, PCCN-K

## 2025-02-07 ENCOUNTER — OFFICE VISIT (OUTPATIENT)
Dept: CARDIOLOGY | Facility: CLINIC | Age: 65
End: 2025-02-07
Attending: INTERNAL MEDICINE
Payer: MEDICARE

## 2025-02-07 VITALS
SYSTOLIC BLOOD PRESSURE: 146 MMHG | WEIGHT: 262 LBS | RESPIRATION RATE: 14 BRPM | BODY MASS INDEX: 42.29 KG/M2 | DIASTOLIC BLOOD PRESSURE: 76 MMHG | HEART RATE: 92 BPM

## 2025-02-07 DIAGNOSIS — I10 PRIMARY HYPERTENSION: ICD-10-CM

## 2025-02-07 DIAGNOSIS — I48.19 PERSISTENT ATRIAL FIBRILLATION (H): ICD-10-CM

## 2025-02-07 DIAGNOSIS — E78.5 DYSLIPIDEMIA: ICD-10-CM

## 2025-02-07 DIAGNOSIS — N18.31 STAGE 3A CHRONIC KIDNEY DISEASE (H): ICD-10-CM

## 2025-02-07 DIAGNOSIS — I25.83 CORONARY ARTERY DISEASE DUE TO LIPID RICH PLAQUE: ICD-10-CM

## 2025-02-07 DIAGNOSIS — I25.10 CORONARY ARTERY DISEASE DUE TO LIPID RICH PLAQUE: ICD-10-CM

## 2025-02-07 DIAGNOSIS — I25.5 ISCHEMIC CARDIOMYOPATHY: ICD-10-CM

## 2025-02-07 DIAGNOSIS — I50.32 HEART FAILURE WITH IMPROVED EJECTION FRACTION (HFIMPEF) (H): Primary | ICD-10-CM

## 2025-02-07 DIAGNOSIS — E66.01 MORBID OBESITY WITH BODY MASS INDEX (BMI) OF 40.0 OR HIGHER (H): ICD-10-CM

## 2025-02-07 DIAGNOSIS — I50.20 HEART FAILURE WITH REDUCED EJECTION FRACTION (H): ICD-10-CM

## 2025-02-07 DIAGNOSIS — Z95.1 S/P CABG (CORONARY ARTERY BYPASS GRAFT): Chronic | ICD-10-CM

## 2025-02-07 DIAGNOSIS — E11.9 TYPE 2 DIABETES MELLITUS TREATED WITHOUT INSULIN (H): ICD-10-CM

## 2025-02-07 LAB
ANION GAP SERPL CALCULATED.3IONS-SCNC: 14 MMOL/L (ref 7–15)
BUN SERPL-MCNC: 41.8 MG/DL (ref 8–23)
CALCIUM SERPL-MCNC: 10.1 MG/DL (ref 8.8–10.4)
CHLORIDE SERPL-SCNC: 91 MMOL/L (ref 98–107)
CREAT SERPL-MCNC: 1.45 MG/DL (ref 0.67–1.17)
EGFRCR SERPLBLD CKD-EPI 2021: 53 ML/MIN/1.73M2
GLUCOSE SERPL-MCNC: 180 MG/DL (ref 70–99)
HCO3 SERPL-SCNC: 30 MMOL/L (ref 22–29)
NT-PROBNP SERPL-MCNC: 148 PG/ML (ref 0–900)
POTASSIUM SERPL-SCNC: 4.1 MMOL/L (ref 3.4–5.3)
SODIUM SERPL-SCNC: 135 MMOL/L (ref 135–145)

## 2025-02-07 PROCEDURE — 80048 BASIC METABOLIC PNL TOTAL CA: CPT | Performed by: NURSE PRACTITIONER

## 2025-02-07 PROCEDURE — 99214 OFFICE O/P EST MOD 30 MIN: CPT | Performed by: NURSE PRACTITIONER

## 2025-02-07 PROCEDURE — 83880 ASSAY OF NATRIURETIC PEPTIDE: CPT | Performed by: NURSE PRACTITIONER

## 2025-02-07 PROCEDURE — G2211 COMPLEX E/M VISIT ADD ON: HCPCS | Performed by: NURSE PRACTITIONER

## 2025-02-07 PROCEDURE — 36415 COLL VENOUS BLD VENIPUNCTURE: CPT | Performed by: NURSE PRACTITIONER

## 2025-02-07 RX ORDER — DAPAGLIFLOZIN 5 MG/1
5 TABLET, FILM COATED ORAL EVERY MORNING
Qty: 90 TABLET | Refills: 3 | Status: SHIPPED | OUTPATIENT
Start: 2025-02-07

## 2025-02-07 NOTE — LETTER
2/7/2025    Reid Escobar MD  [address] [place] [address]    RE: Zcakary Hutchison       Dear Colleague,     I had the pleasure of seeing Zackary Hutchison in the [facility].      [facility]  [address], [place] [address]  Office: [phone]     Assessment/Recommendations   Assessment: Mr. Hutchison presents to [facility] today for heart failure focused follow-up visit.    # Acute on chronic HFimpEF (EF 55% per echo 5/1/2024)  # ICM  Stage C. NYHA Class III.  His weight on the clinic scale today is 262 lbs. His weight on home scale this AM was 255 lbs. No recent PAD readings from CardioMems to review. Upon exam, patient is decompensated. Patient is well perfused. It was recommended that patient present to the emergency room for IV diuresis.     -Fluid status: hypervolemic; Diuretic: Torsemide 100 mg bid w/KCL 40 mEq; metolazone 2.5 mg weekly on Wed, weekly labs on Th  -ACEi/ARB/ARNi/afterload reduction: Lisinopril held 1/2024 for possible PRINCESS  -BB: Carvedilol 3.125 mg bid  -Aldosterone antagonist: Spironolactone   -SGLT2i: Farxiga 5 mg   -Adjunct: Hydralazine 10 mg tid  -Ischemia evaluation: S/p PCI 5/2024, NM stress negative for inducible ischemia 11/2024  -SCD prophylaxis: does not meet criteria for implant  -NSAID use: contraindicated  -Sleep apnea evaluation: not discussed today  Remote PA Pressure Monitoring (CardioMems) Implanted (Date 2/15/2024); Target PAD range ~12; Most recent PAD measures (x3) Not completed by patient.     Heart failure education including medication compliance and lifestyle management reviewed today: low sodium diet <2g Na/day, fluid intake <2L/day, daily weight monitoring, and physical activity as tolerated.    # Hypertension  -BP today 146/76, HR 92  -GDMT as outlined above, uptitrate as tolerated    # Coronary artery disease  # Dyslipidemia  -S/p CABG (2015) and PCI 5/2024  -Denies  chest pain and anginal equivalents  -Antiplatelet therapy with plavix 75 mg for secondary ASCVD prevention  -LDL goal <70; high intensity statin therapy with atorvastatin 40 mg for secondary ASCVD prevention  -Imdur 180 mg    # Persistent atrial fib  -Rate/rhythm control: Carvedilol 3.125 mg bid, amiodarone 200 mg  -[id number] score of 5 for age 65-74 and h/o CHF, HTN, DM, vascular disease  -Continue eliquis 5 mg bid for stroke prophylaxis    # CKD IIIa (eGFR 55 1/7/2024)  -Baseline Cr ~1-1.2. Most recently Cr 1.4 (1/7)    # Obesity  -Body mass index is 42.29 kg/m .    # Diabetes Mellitus 2  -Most recent A1C 9.3 (1/7/2024)  -Managed by PCP    # COPD  -Managed by PCP      Plan:  -Recommended that patient present to the emergency room for IV diuretics, Pt deferred  -BMP and NtproBNP today  -Send CardioMems reading   -HF nurse to follow-up with patient on Monday    Follow up with General Cardiology, Dr. Husain or PATRICIA in ~2-3 months.  Follow up in the Heart Failure Clinic with Dr. Bhandari ~2-4 weeks. PATRICIA if not available.     The longitudinal plan of care for the condition(s) below were addressed during this visit. Due to the added complexity in care, I will continue to support Mr. Hutchison in the subsequent management of this condition(s) and with the ongoing continuity of care of this condition(s): HFimpEF.      History of Present Illness/Subjective    Mr. Hutchison is a 64 year old male with a past medical history significant for ICM, CAD, HFimpEF, HTN, CKD, DM, obesity, persistent Afib, and cervical spinal stenosis, and COPD. Today patient presents to [facility] for heart failure focused follow-up visit.    Primary Cardiologist: Rodrigue; Last office visit 11/6/2024    Office visit in [facility] with Dr. Loera 1/22/2025. At that time, patient had increased shortness of breath with activity and lower extremity edema. Recommendations included weight loss, labs with plan to increased diuretic dose,  reengage with heart failure clinic with routine CardioMems, sleep study, and follow-up with Dr. Husain. Weight in clinic that day was 257 lbs. After labs resulted, patient was instructed to take metolazone x3 days.    Today, patient endorses shortness of breath at rest and increased shortness of breath with activity. He recently had a cold and continues to feel chest congestion and wheezing. He uses inhalers and nebulizer which seem to help. He is living at assisted living facility and meals are prepared for him. He does not track sodium in diet. He does not track his fluid intake and is most likely drinking excess fluids. His weight on home scale this AM was 255 lbs. Patient reports that he has not recently attempted to send CardioMems readings for the past ~3-months.     He denies chest pain, palpitations, lightheadedness/dizziness, orthopnea, PND, fatigue/activity intolerance, abdominal fullness/bloating, and bleeding.      Recent test results & labs below (personally reviewed):    Echocardiogram 5/1/2024  Interpretation Summary  1. Technically difficult study.  2. The left ventricle is normal in size. Image quality does not provide for  detailed assessment of LV systolic function, but is felt to be normal with a  visually estimated ejection fraction of roughly 55%.  3. No significant valvular heart disease is identified on this study though  the sensitivity, particularly of regurgitant lesions, is reduced due to poor  Doppler acoustics.  4. Right ventricular size and systolic performance could not be accurately  assessed due to inadequate visualization.  The acoustic quality of this study is fairly poor. If a more accurate  assessment of left ventricular systolicperformance, regional wall motion, and  other morphology/function is required; would recommend cardiac MRI or other  alternative imaging modality for further evaluation.  When compared to the prior real-time echocardiogram dated 25 December 2023,  the  ejection fraction appears somewhat higher on the current study though  accurate comparison is somewhat limited due to suboptimal acoustic imaging not  only on the current examination, but also hold the prior study as well.    NM lexiscan stress test 11/18/2024     The nuclear stress test is probably negative for inducible myocardial ischemia or infarction. Diaphragm attenuation artifact is noted.     The left ventricular ejection fraction at stress is 69%.     The patient is at a low risk of future cardiac ischemic events.     There is no prior study for comparison.    Coronary Angiogram 5/2/2024  Conclusion     Ost LAD to Prox LAD lesion is 100% stenosed.     Ramus lesion is 95% stenosed.     Mid Cx to Dist Cx lesion is 80% stenosed.     1st Mrg lesion is 90% stenosed.     Prox RCA to Dist RCA lesion is 100% stenosed.     Mid LAD lesion is 70% stenosed.  1.  LAD is occluded proximally.  Mid to distal LAD fills well from large, widely patent LIMA graft.  LAD proximal to graft insertion site has calcified stenosis compromising flow to large proximal diagonal  2.  Calcified 90% proximal stenosis in medium size ramus branch.  Ostium ?covered by occluded LAD stent.  3.  Severely calcified stenosis proximal segment of large first obtuse marginal branch of left circumflex.  4.  Chronic total mid RCA occlusion.  PDA fills from patent free LAURI graft.  Posterolateral branches fill from collaterals from left circumflex.  5.  LVEDP = 30 mmHg  6.  Successful PCI ramus branch with 2.5 x 24 mm Synergy DARRELL implant x 1.  30% eccentric residual with BROOKLYNN-3 flow.  7.  Successful PCI LCx OM branch with DARRELL implant x 2: 3.0 x 24 Synergy DARRELL x 1, 2.5 x 16 Synergy DARRELL x 1.  0% residual, BROOKLYNN-3 flow.  Recommendations  Medications:  - Resume antithrombin therapy with Warfarin or NOAC immediately.         Physical Examination Review of Systems   BP (!) 146/76 (BP Location: Right arm, Patient Position: Sitting, Cuff Size: Adult Large)    Pulse 92   Resp 14   Wt 118.8 kg (262 lb)   BMI 42.29 kg/m    Body mass index is 42.29 kg/m .  Wt Readings from Last 3 Encounters:   02/07/25 118.8 kg (262 lb)   01/22/25 116.6 kg (257 lb)   11/21/24 115.6 kg (254 lb 12.8 oz)     General Appearance:   Appears comfortable, in no acute distress   HEENT: Eyes symmetrical, no discharge or icterus bilaterally. Mucous membranes moist and without lesions   Cardiovascular: RRR, +S1S2, no murmur, rub, or gallop. JVP not visible at 90 degrees      Respiratory:   Respirations regular, even, and unlabored. +Rhonchi, +bibasilar crackles.   GI:  Soft and rounded. Obese   Musculoskeletal: No joint swelling or tenderness   Extremities   No cyanosis. +3 pitting lower extremity peripheral edema.   Skin: Warm, no xanthelasma, no jaundice, no rashes. BLE wounds covered by bandage.   Neurologic: Alert and oriented X3, no focal deficits   Psychiatric: calm and cooperative                                             Negative unless noted in HPI     Medical History  Surgical History Family History Social History   Past Medical History:   Diagnosis Date     Atrial fibrillation (H)      Chronic kidney disease      Chronic kidney disease, stage 3b (H) 09/28/2023     Class 3 severe obesity due to excess calories with body mass index (BMI) of 40.0 to 44.9 in adult (H) 09/28/2023     Congestive heart failure (H)      COPD (chronic obstructive pulmonary disease) (H)      Coronary artery disease involving coronary bypass graft of native heart without angina pectoris 09/28/2023     Heart failure with reduced ejection fraction (H) 09/11/2023     HFrEF (heart failure with reduced ejection fraction) (H) 09/11/2023     Hyperlipidemia      Hypertension      Ischemic cardiomyopathy 09/28/2023     Obese      Paroxysmal atrial fibrillation (H) 09/28/2023     Tobacco abuse 09/28/2023    Past Surgical History:   Procedure Laterality Date     CORONARY ANGIOGRAPHY ADULT ORDER       CORONARY ARTERY BYPASS  Left 2014    2 vessels     CV CARDIOMEMS WITH RIGHT HEART CATH N/A 2/15/2024    Procedure: Pulmonary Arterial Pressure Sensor Placement;  Surgeon: Jacey Bhandari MD;  Location: [facility]     CV CORONARY ANGIOGRAM N/A 09/11/2023    Procedure: Coronary Angiogram;  Surgeon: Santy Esposito MD;  Location: [facility]     CV CORONARY ANGIOGRAM N/A 5/2/2024    Procedure: CV CORONARY ANGIOGRAM;  Surgeon: Santy Esposito MD;  Location: [facility]     CV LEFT HEART CATH N/A 09/11/2023    Procedure: Left Heart Catheterization;  Surgeon: Santy Esposito MD;  Location: [facility]     CV LEFT HEART CATH N/A 5/2/2024    Procedure: Left Heart Catheterization;  Surgeon: Santy Esposito MD;  Location: [facility]     CV PCI ANGIOPLASTY N/A 5/2/2024    Procedure: Percutaneous Transluminal Angioplasty;  Surgeon: Santy Esposito MD;  Location: [facility]     CV RIGHT HEART CATH MEASUREMENTS RECORDED N/A 09/11/2023    Procedure: Right Heart Catheterization;  Surgeon: Santy Esposito MD;  Location: [facility]    Family History   Problem Relation Age of Onset     Cerebrovascular Disease Mother      Myocardial Infarction Father     Social History     Socioeconomic History     Marital status: Single     Spouse name: Not on file     Number of children: Not on file     Years of education: Not on file     Highest education level: Not on file   Occupational History     Occupation: Retired restaurant owner     Comment: [employer]   Tobacco Use     Smoking status: Every Day     Current packs/day: 0.50     Types: Cigarettes     Smokeless tobacco: Never     Tobacco comments:     Seen IP by [facility] on 9/11/23 and declined cessation services and materials   Substance and Sexual Activity     Alcohol use: Not Currently     Drug use: Not on file     Sexual activity: Not on file   Other Topics Concern     Not on file   Social History Narrative    Currently lives in an  assisted living facility. (Updated: 01/08/2024)     Social Drivers of Health     Financial Resource Strain: Low Risk  (11/19/2024)    Financial Resource Strain      Within the past 12 months, have you or your family members you live with been unable to get utilities (heat, electricity) when it was really needed?: No   Food Insecurity: Low Risk  (11/19/2024)    Food Insecurity      Within the past 12 months, did you worry that your food would run out before you got money to buy more?: No      Within the past 12 months, did the food you bought just not last and you didn t have money to get more?: No   Transportation Needs: Low Risk  (11/19/2024)    Transportation Needs      Within the past 12 months, has lack of transportation kept you from medical appointments, getting your medicines, non-medical meetings or appointments, work, or from getting things that you need?: No   Physical Activity: Not on file   Stress: Not on file   Social Connections: Not on file   Interpersonal Safety: Low Risk  (11/20/2024)    Interpersonal Safety      Do you feel physically and emotionally safe where you currently live?: Yes      Within the past 12 months, have you been hit, slapped, kicked or otherwise physically hurt by someone?: No      Within the past 12 months, have you been humiliated or emotionally abused in other ways by your partner or ex-partner?: No   Recent Concern: Interpersonal Safety - High Risk (9/17/2024)    Interpersonal Safety      Do you feel physically and emotionally safe where you currently live?: No      Within the past 12 months, have you been hit, slapped, kicked or otherwise physically hurt by someone?: No      Within the past 12 months, have you been humiliated or emotionally abused in other ways by your partner or ex-partner?: No   Housing Stability: Low Risk  (11/19/2024)    Housing Stability      Do you have housing? : Yes      Are you worried about losing your housing?: No        Medications  Allergies    Current Outpatient Medications   Medication Sig Dispense Refill     acetaminophen (TYLENOL) 500 MG tablet Take 1,000 mg by mouth 2 times daily as needed for mild pain       albuterol (PROAIR HFA/PROVENTIL HFA/VENTOLIN HFA) 108 (90 Base) MCG/ACT inhaler Inhale 2 puffs into the lungs every 6 hours as needed for shortness of breath, wheezing or cough       amiodarone (PACERONE) 200 MG tablet Take 200 mg by mouth every morning.       apixaban ANTICOAGULANT (ELIQUIS) 5 MG tablet Take 1 tablet (5 mg) by mouth 2 times daily. 180 tablet 3     atorvastatin (LIPITOR) 40 MG tablet Take 40 mg by mouth every morning       carvedilol (COREG) 3.125 MG tablet Take 1 tablet (3.125 mg) by mouth 2 times daily (with meals). 180 tablet 3     clopidogrel (PLAVIX) 75 MG tablet Take 75 mg by mouth every morning.       dapagliflozin (FARXIGA) 5 MG TABS tablet Take 1 tablet (5 mg) by mouth every morning. 90 tablet 3     diclofenac (VOLTAREN) 1 % topical gel Apply 2 g topically every 6 hours as needed for moderate pain       doxycycline hyclate (VIBRAMYCIN) 100 MG capsule Take 1 capsule (100 mg) by mouth 2 times daily. (Patient taking differently: Take 100 mg by mouth daily.) 14 capsule 0     DULoxetine (CYMBALTA) 30 MG capsule Take 30 mg by mouth every morning.       hydrALAZINE (APRESOLINE) 10 MG tablet Take 1 tablet (10 mg) by mouth 3 times daily 270 tablet 3     ipratropium - albuterol 0.5 mg/2.5 mg/3 mL (DUONEB) 0.5-2.5 (3) MG/3ML neb solution Take 1 vial (3 mLs) by nebulization every 6 hours as needed for wheezing. 180 mL 5     isosorbide mononitrate (IMDUR) 60 MG 24 hr tablet Take 3 tablets (180 mg) by mouth every morning. 270 tablet 1     metFORMIN (GLUCOPHAGE XR) 500 MG 24 hr tablet Take 1,000 mg by mouth daily (with dinner).       metolazone (ZAROXOLYN) 2.5 MG tablet Take 1 tablet (2.5 mg) by mouth once a week. on Wednesday, 30 mintues before your torsemide. Have labs drawn weekly on Thursday. 12 tablet 1     nitroGLYcerin  (NITROSTAT) 0.4 MG sublingual tablet Place 1 tablet (0.4 mg) under the tongue every 5 minutes as needed If no relief after 3 tabs call 911;continue 1 tab every 5 min max 3 tab Indications: chest pain 100 tablet 1     polyethylene glycol (MIRALAX) 17 GM/Dose powder Take 17 g by mouth 2 times daily as needed 510 g 0     potassium chloride barbara ER (KLOR-CON M20) 20 MEQ CR tablet Take 2 tablets (40 mEq) by mouth every morning. 240 tablet 2     rOPINIRole (REQUIP) 5 MG tablet Take 5 mg by mouth 3 times daily.       spironolactone (ALDACTONE) 25 MG tablet Take 25 mg by mouth See Admin Instructions. Take 25 mg daily in the morning on Sun, Mon, Tue, Thur, Friday, and Sat. Takes 50 mg weekly on Wednesday(s) with Metolazone dose.       spironolactone (ALDACTONE) 25 MG tablet Take 50 mg by mouth once a week. Take 50 mg by mouth once a week on Wednesday along with Metolazone       torsemide (DEMADEX) 100 MG tablet Take 1 tablet (100 mg) by mouth 2 times daily 180 tablet 3     umeclidinium-vilanterol (ANORO ELLIPTA) 62.5-25 MCG/ACT oral inhaler Inhale 1 puff into the lungs daily. 60 each 11     vitamin B-12 (CYANOCOBALAMIN) 1000 MCG tablet Take 1 tablet (1,000 mcg) by mouth every morning. 90 tablet 4    Allergies   Allergen Reactions     Bumex [Bumetanide] Muscle Pain (Myalgia)         Lab Results    Chemistry/lipid CBC Cardiac Enzymes/BNP/TSH/INR   Lab Results   Component Value Date    CHOL 121 2024    HDL 27 (L) 2024    TRIG 219 (H) 2024    BUN 41.8 (H) 2025     2025    CO2 30 (H) 2025    Lab Results   Component Value Date    WBC 9.3 2024    HGB 10.5 (L) 2024    HCT 36.7 (L) 2024    MCV 90 2024     2024    Lab Results   Component Value Date    TSH 1.27 2024    INR 0.97 2024            Lotus Huff, MARY, APRN, CNP  [facility] and [phone]  Fax:  [phone]  Heart Failure Nurses: [phone]      Thank you for allowing me to participate in the care of your patient.      Sincerely,     Lotus Huff, [facility]  cc:   Gabriella Loera MD  [address], [address]  [place] [address]

## 2025-02-07 NOTE — PROGRESS NOTES
[facility]  [address], [place] [address]  Office: [phone]     Assessment/Recommendations   Assessment: Mr. Hutchison presents to [facility] today for heart failure focused follow-up visit.    # Acute on chronic HFimpEF (EF 55% per echo 5/1/2024)  # ICM  Stage C. NYHA Class III.  His weight on the clinic scale today is 262 lbs. His weight on home scale this AM was 255 lbs. No recent PAD readings from CardioMems to review. Upon exam, patient is decompensated. Patient is well perfused. It was recommended that patient present to the emergency room for IV diuresis.     -Fluid status: hypervolemic; Diuretic: Torsemide 100 mg bid w/KCL 40 mEq; metolazone 2.5 mg weekly on Wed, weekly labs on Th  -ACEi/ARB/ARNi/afterload reduction: Lisinopril held 1/2024 for possible PRINCESS  -BB: Carvedilol 3.125 mg bid  -Aldosterone antagonist: Spironolactone   -SGLT2i: Farxiga 5 mg   -Adjunct: Hydralazine 10 mg tid  -Ischemia evaluation: S/p PCI 5/2024, NM stress negative for inducible ischemia 11/2024  -SCD prophylaxis: does not meet criteria for implant  -NSAID use: contraindicated  -Sleep apnea evaluation: not discussed today  Remote PA Pressure Monitoring (CardioMems) Implanted (Date 2/15/2024); Target PAD range ~12; Most recent PAD measures (x3) Not completed by patient.     Heart failure education including medication compliance and lifestyle management reviewed today: low sodium diet <2g Na/day, fluid intake <2L/day, daily weight monitoring, and physical activity as tolerated.    # Hypertension  -BP today 146/76, HR 92  -GDMT as outlined above, uptitrate as tolerated    # Coronary artery disease  # Dyslipidemia  -S/p CABG (2015) and PCI 5/2024  -Denies chest pain and anginal equivalents  -Antiplatelet therapy with plavix 75 mg for secondary ASCVD prevention  -LDL goal <70; high intensity statin therapy with atorvastatin 40 mg for secondary ASCVD  prevention  -Imdur 180 mg    # Persistent atrial fib  -Rate/rhythm control: Carvedilol 3.125 mg bid, amiodarone 200 mg  -[id number] score of 5 for age 65-74 and h/o CHF, HTN, DM, vascular disease  -Continue eliquis 5 mg bid for stroke prophylaxis    # CKD IIIa (eGFR 55 1/7/2024)  -Baseline Cr ~1-1.2. Most recently Cr 1.4 (1/7)    # Obesity  -Body mass index is 42.29 kg/m .    # Diabetes Mellitus 2  -Most recent A1C 9.3 (1/7/2024)  -Managed by PCP    # COPD  -Managed by PCP      Plan:  -Recommended that patient present to the emergency room for IV diuretics, Pt deferred  -BMP and NtproBNP today  -Send CardioMems reading   -HF nurse to follow-up with patient on Monday    Follow up with General Cardiology, Dr. Husain or PATRICIA in ~2-3 months.  Follow up in the Heart Failure Clinic with Dr. Bhandari ~2-4 weeks. PATRICIA if not available.     The longitudinal plan of care for the condition(s) below were addressed during this visit. Due to the added complexity in care, I will continue to support Mr. Hutchison in the subsequent management of this condition(s) and with the ongoing continuity of care of this condition(s): HFimpEF.      History of Present Illness/Subjective    Mr. Hutchison is a 64 year old male with a past medical history significant for ICM, CAD, HFimpEF, HTN, CKD, DM, obesity, persistent Afib, and cervical spinal stenosis, and COPD. Today patient presents to [facility] for heart failure focused follow-up visit.    Primary Cardiologist: Rodrigue; Last office visit 11/6/2024    Office visit in [facility] with Dr. Loera 1/22/2025. At that time, patient had increased shortness of breath with activity and lower extremity edema. Recommendations included weight loss, labs with plan to increased diuretic dose, reengage with heart failure clinic with routine CardioMems, sleep study, and follow-up with Dr. Husain. Weight in clinic that day was 257 lbs. After labs resulted, patient was instructed to take  metolazone x3 days.    Today, patient endorses shortness of breath at rest and increased shortness of breath with activity. He recently had a cold and continues to feel chest congestion and wheezing. He uses inhalers and nebulizer which seem to help. He is living at assisted living facility and meals are prepared for him. He does not track sodium in diet. He does not track his fluid intake and is most likely drinking excess fluids. His weight on home scale this AM was 255 lbs. Patient reports that he has not recently attempted to send CardioMems readings for the past ~3-months.     He denies chest pain, palpitations, lightheadedness/dizziness, orthopnea, PND, fatigue/activity intolerance, abdominal fullness/bloating, and bleeding.      Recent test results & labs below (personally reviewed):    Echocardiogram 5/1/2024  Interpretation Summary  1. Technically difficult study.  2. The left ventricle is normal in size. Image quality does not provide for  detailed assessment of LV systolic function, but is felt to be normal with a  visually estimated ejection fraction of roughly 55%.  3. No significant valvular heart disease is identified on this study though  the sensitivity, particularly of regurgitant lesions, is reduced due to poor  Doppler acoustics.  4. Right ventricular size and systolic performance could not be accurately  assessed due to inadequate visualization.  The acoustic quality of this study is fairly poor. If a more accurate  assessment of left ventricular systolicperformance, regional wall motion, and  other morphology/function is required; would recommend cardiac MRI or other  alternative imaging modality for further evaluation.  When compared to the prior real-time echocardiogram dated 25 December 2023,  the ejection fraction appears somewhat higher on the current study though  accurate comparison is somewhat limited due to suboptimal acoustic imaging not  only on the current examination, but also hold  the prior study as well.    NM lexiscan stress test 11/18/2024    The nuclear stress test is probably negative for inducible myocardial ischemia or infarction. Diaphragm attenuation artifact is noted.    The left ventricular ejection fraction at stress is 69%.    The patient is at a low risk of future cardiac ischemic events.    There is no prior study for comparison.    Coronary Angiogram 5/2/2024  Conclusion    Ost LAD to Prox LAD lesion is 100% stenosed.    Ramus lesion is 95% stenosed.    Mid Cx to Dist Cx lesion is 80% stenosed.    1st Mrg lesion is 90% stenosed.    Prox RCA to Dist RCA lesion is 100% stenosed.    Mid LAD lesion is 70% stenosed.  1.  LAD is occluded proximally.  Mid to distal LAD fills well from large, widely patent LIMA graft.  LAD proximal to graft insertion site has calcified stenosis compromising flow to large proximal diagonal  2.  Calcified 90% proximal stenosis in medium size ramus branch.  Ostium ?covered by occluded LAD stent.  3.  Severely calcified stenosis proximal segment of large first obtuse marginal branch of left circumflex.  4.  Chronic total mid RCA occlusion.  PDA fills from patent free LAURI graft.  Posterolateral branches fill from collaterals from left circumflex.  5.  LVEDP = 30 mmHg  6.  Successful PCI ramus branch with 2.5 x 24 mm Synergy DARRELL implant x 1.  30% eccentric residual with BROOKLYNN-3 flow.  7.  Successful PCI LCx OM branch with DARRELL implant x 2: 3.0 x 24 Synergy DARRELL x 1, 2.5 x 16 Synergy DARRELL x 1.  0% residual, BROOKLYNN-3 flow.  Recommendations  Medications:  - Resume antithrombin therapy with Warfarin or NOAC immediately.         Physical Examination Review of Systems   BP (!) 146/76 (BP Location: Right arm, Patient Position: Sitting, Cuff Size: Adult Large)   Pulse 92   Resp 14   Wt 118.8 kg (262 lb)   BMI 42.29 kg/m    Body mass index is 42.29 kg/m .  Wt Readings from Last 3 Encounters:   02/07/25 118.8 kg (262 lb)   01/22/25 116.6 kg (257 lb)   11/21/24 115.6  kg (254 lb 12.8 oz)     General Appearance:   Appears comfortable, in no acute distress   HEENT: Eyes symmetrical, no discharge or icterus bilaterally. Mucous membranes moist and without lesions   Cardiovascular: RRR, +S1S2, no murmur, rub, or gallop. JVP not visible at 90 degrees      Respiratory:   Respirations regular, even, and unlabored. +Rhonchi, +bibasilar crackles.   GI:  Soft and rounded. Obese   Musculoskeletal: No joint swelling or tenderness   Extremities   No cyanosis. +3 pitting lower extremity peripheral edema.   Skin: Warm, no xanthelasma, no jaundice, no rashes. BLE wounds covered by bandage.   Neurologic: Alert and oriented X3, no focal deficits   Psychiatric: calm and cooperative                                             Negative unless noted in HPI     Medical History  Surgical History Family History Social History   Past Medical History:   Diagnosis Date    Atrial fibrillation (H)     Chronic kidney disease     Chronic kidney disease, stage 3b (H) 09/28/2023    Class 3 severe obesity due to excess calories with body mass index (BMI) of 40.0 to 44.9 in adult (H) 09/28/2023    Congestive heart failure (H)     COPD (chronic obstructive pulmonary disease) (H)     Coronary artery disease involving coronary bypass graft of native heart without angina pectoris 09/28/2023    Heart failure with reduced ejection fraction (H) 09/11/2023    HFrEF (heart failure with reduced ejection fraction) (H) 09/11/2023    Hyperlipidemia     Hypertension     Ischemic cardiomyopathy 09/28/2023    Obese     Paroxysmal atrial fibrillation (H) 09/28/2023    Tobacco abuse 09/28/2023    Past Surgical History:   Procedure Laterality Date    CORONARY ANGIOGRAPHY ADULT ORDER      CORONARY ARTERY BYPASS Left 2014    2 vessels    CV CARDIOMEMS WITH RIGHT HEART CATH N/A 2/15/2024    Procedure: Pulmonary Arterial Pressure Sensor Placement;  Surgeon: Jacey Bhandari MD;  Location: [facility] CATH LAB CV    CV CORONARY ANGIOGRAM  N/A 09/11/2023    Procedure: Coronary Angiogram;  Surgeon: Snaty Esposito MD;  Location: [facility] CATH LAB CV    CV CORONARY ANGIOGRAM N/A 5/2/2024    Procedure: CV CORONARY ANGIOGRAM;  Surgeon: Santy Esposito MD;  Location: [facility] CATH LAB CV    CV LEFT HEART CATH N/A 09/11/2023    Procedure: Left Heart Catheterization;  Surgeon: Santy Esposito MD;  Location: [facility] CATH LAB CV    CV LEFT HEART CATH N/A 5/2/2024    Procedure: Left Heart Catheterization;  Surgeon: Santy Esposito MD;  Location: [facility] CATH LAB CV    CV PCI ANGIOPLASTY N/A 5/2/2024    Procedure: Percutaneous Transluminal Angioplasty;  Surgeon: Santy Esposito MD;  Location: [facility] CATH LAB CV    CV RIGHT HEART CATH MEASUREMENTS RECORDED N/A 09/11/2023    Procedure: Right Heart Catheterization;  Surgeon: Santy Esposito MD;  Location: [facility] CATH LAB CV    Family History   Problem Relation Age of Onset    Cerebrovascular Disease Mother     Myocardial Infarction Father     Social History     Socioeconomic History    Marital status: Single     Spouse name: Not on file    Number of children: Not on file    Years of education: Not on file    Highest education level: Not on file   Occupational History    Occupation: Retired restaurant owner     Comment: [employer]   Tobacco Use    Smoking status: Every Day     Current packs/day: 0.50     Types: Cigarettes    Smokeless tobacco: Never    Tobacco comments:     Seen IP by [facility] on 9/11/23 and declined cessation services and materials   Substance and Sexual Activity    Alcohol use: Not Currently    Drug use: Not on file    Sexual activity: Not on file   Other Topics Concern    Not on file   Social History Narrative    Currently lives in an assisted living facility. (Updated: 01/08/2024)     Social Drivers of Health     Financial Resource Strain: Low Risk  (11/19/2024)    Financial Resource Strain     Within the past 12 months, have you or your family members you live with been unable to get  utilities (heat, electricity) when it was really needed?: No   Food Insecurity: Low Risk  (11/19/2024)    Food Insecurity     Within the past 12 months, did you worry that your food would run out before you got money to buy more?: No     Within the past 12 months, did the food you bought just not last and you didn t have money to get more?: No   Transportation Needs: Low Risk  (11/19/2024)    Transportation Needs     Within the past 12 months, has lack of transportation kept you from medical appointments, getting your medicines, non-medical meetings or appointments, work, or from getting things that you need?: No   Physical Activity: Not on file   Stress: Not on file   Social Connections: Not on file   Interpersonal Safety: Low Risk  (11/20/2024)    Interpersonal Safety     Do you feel physically and emotionally safe where you currently live?: Yes     Within the past 12 months, have you been hit, slapped, kicked or otherwise physically hurt by someone?: No     Within the past 12 months, have you been humiliated or emotionally abused in other ways by your partner or ex-partner?: No   Recent Concern: Interpersonal Safety - High Risk (9/17/2024)    Interpersonal Safety     Do you feel physically and emotionally safe where you currently live?: No     Within the past 12 months, have you been hit, slapped, kicked or otherwise physically hurt by someone?: No     Within the past 12 months, have you been humiliated or emotionally abused in other ways by your partner or ex-partner?: No   Housing Stability: Low Risk  (11/19/2024)    Housing Stability     Do you have housing? : Yes     Are you worried about losing your housing?: No        Medications  Allergies   Current Outpatient Medications   Medication Sig Dispense Refill    acetaminophen (TYLENOL) 500 MG tablet Take 1,000 mg by mouth 2 times daily as needed for mild pain      albuterol (PROAIR HFA/PROVENTIL HFA/VENTOLIN HFA) 108 (90 Base) MCG/ACT inhaler Inhale 2 puffs  into the lungs every 6 hours as needed for shortness of breath, wheezing or cough      amiodarone (PACERONE) 200 MG tablet Take 200 mg by mouth every morning.      apixaban ANTICOAGULANT (ELIQUIS) 5 MG tablet Take 1 tablet (5 mg) by mouth 2 times daily. 180 tablet 3    atorvastatin (LIPITOR) 40 MG tablet Take 40 mg by mouth every morning      carvedilol (COREG) 3.125 MG tablet Take 1 tablet (3.125 mg) by mouth 2 times daily (with meals). 180 tablet 3    clopidogrel (PLAVIX) 75 MG tablet Take 75 mg by mouth every morning.      dapagliflozin (FARXIGA) 5 MG TABS tablet Take 1 tablet (5 mg) by mouth every morning. 90 tablet 3    diclofenac (VOLTAREN) 1 % topical gel Apply 2 g topically every 6 hours as needed for moderate pain      doxycycline hyclate (VIBRAMYCIN) 100 MG capsule Take 1 capsule (100 mg) by mouth 2 times daily. (Patient taking differently: Take 100 mg by mouth daily.) 14 capsule 0    DULoxetine (CYMBALTA) 30 MG capsule Take 30 mg by mouth every morning.      hydrALAZINE (APRESOLINE) 10 MG tablet Take 1 tablet (10 mg) by mouth 3 times daily 270 tablet 3    ipratropium - albuterol 0.5 mg/2.5 mg/3 mL (DUONEB) 0.5-2.5 (3) MG/3ML neb solution Take 1 vial (3 mLs) by nebulization every 6 hours as needed for wheezing. 180 mL 5    isosorbide mononitrate (IMDUR) 60 MG 24 hr tablet Take 3 tablets (180 mg) by mouth every morning. 270 tablet 1    metFORMIN (GLUCOPHAGE XR) 500 MG 24 hr tablet Take 1,000 mg by mouth daily (with dinner).      metolazone (ZAROXOLYN) 2.5 MG tablet Take 1 tablet (2.5 mg) by mouth once a week. on Wednesday, 30 mintues before your torsemide. Have labs drawn weekly on Thursday. 12 tablet 1    nitroGLYcerin (NITROSTAT) 0.4 MG sublingual tablet Place 1 tablet (0.4 mg) under the tongue every 5 minutes as needed If no relief after 3 tabs call 911;continue 1 tab every 5 min max 3 tab Indications: chest pain 100 tablet 1    polyethylene glycol (MIRALAX) 17 GM/Dose powder Take 17 g by mouth 2 times  daily as needed 510 g 0    potassium chloride barbara ER (KLOR-CON M20) 20 MEQ CR tablet Take 2 tablets (40 mEq) by mouth every morning. 240 tablet 2    rOPINIRole (REQUIP) 5 MG tablet Take 5 mg by mouth 3 times daily.      spironolactone (ALDACTONE) 25 MG tablet Take 25 mg by mouth See Admin Instructions. Take 25 mg daily in the morning on Sun, Mon, Tue, Thur, Friday, and Sat. Takes 50 mg weekly on Wednesday(s) with Metolazone dose.      spironolactone (ALDACTONE) 25 MG tablet Take 50 mg by mouth once a week. Take 50 mg by mouth once a week on Wednesday along with Metolazone      torsemide (DEMADEX) 100 MG tablet Take 1 tablet (100 mg) by mouth 2 times daily 180 tablet 3    umeclidinium-vilanterol (ANORO ELLIPTA) 62.5-25 MCG/ACT oral inhaler Inhale 1 puff into the lungs daily. 60 each 11    vitamin B-12 (CYANOCOBALAMIN) 1000 MCG tablet Take 1 tablet (1,000 mcg) by mouth every morning. 90 tablet 4    Allergies   Allergen Reactions    Bumex [Bumetanide] Muscle Pain (Myalgia)         Lab Results    Chemistry/lipid CBC Cardiac Enzymes/BNP/TSH/INR   Lab Results   Component Value Date    CHOL 121 2024    HDL 27 (L) 2024    TRIG 219 (H) 2024    BUN 41.8 (H) 2025     2025    CO2 30 (H) 2025    Lab Results   Component Value Date    WBC 9.3 2024    HGB 10.5 (L) 2024    HCT 36.7 (L) 2024    MCV 90 2024     2024    Lab Results   Component Value Date    TSH 1.27 2024    INR 0.97 2024            Lotus Huff, DNP, APRN, CNP  [facility] [place]   Clinic and [phone]  Fax: [phone]  Heart Failure Nurses: [phone]

## 2025-02-07 NOTE — PATIENT INSTRUCTIONS
It was a pleasure to see you today at the [facility].     Recommendations:  1. Please re-consider going the emergency department for IV diuretics.  2. The nurse will call you to review your lab results.   3. Resume daily CardioMems readings, check in with heart failure nurse on Monday morning  4. Continue to follow a low sodium diet (<2g/day) and maintain adequate fluid intake (~50-60 oz/day).   5. Continue to monitor your weight daily and please call if you gain 2 lbs or more in 1 day OR 5 lbs or more in 1-week.    Follow up with Dr. Bhandari in the heart failure clinic in the next 2-4 weeks. PATRICIA if not available.     Please call with questions/concerns and/or if you experience new or worsening heart failure symptoms (shortness of breath, weight gain, fluid retention/lower extremity swelling, and/or reduced activity tolerance).    Heart Failure Nurse Line: [phone] (M-F 8a-430p)  24-hour HF Nurse Line: [phone] (weekends, evenings, holidays)      Lotus Huff CNP  [facility] [place]   Clinic and [phone]  Fax: [phone]  Heart Failure Nurses: [phone]

## 2025-02-10 NOTE — PROGRESS NOTES
Patient has insufficient transmissions this billing period to produce billing. Heather HUERTA RN BSN, CHFN, PCCN-K

## 2025-02-12 ENCOUNTER — HOSPITAL ENCOUNTER (EMERGENCY)
Facility: HOSPITAL | Age: 65
Discharge: HOME | End: 2025-02-12
Attending: EMERGENCY MEDICINE | Admitting: EMERGENCY MEDICINE
Payer: MEDICARE

## 2025-02-12 ENCOUNTER — TELEPHONE (OUTPATIENT)
Dept: CARDIOLOGY | Facility: CLINIC | Age: 65
End: 2025-02-12
Payer: MEDICARE

## 2025-02-12 VITALS
HEART RATE: 93 BPM | WEIGHT: 276 LBS | BODY MASS INDEX: 44.55 KG/M2 | SYSTOLIC BLOOD PRESSURE: 106 MMHG | TEMPERATURE: 98.1 F | RESPIRATION RATE: 22 BRPM | OXYGEN SATURATION: 94 % | DIASTOLIC BLOOD PRESSURE: 55 MMHG

## 2025-02-12 DIAGNOSIS — J44.1 COPD EXACERBATION (H): ICD-10-CM

## 2025-02-12 LAB
ANION GAP SERPL CALCULATED.3IONS-SCNC: 12 MMOL/L (ref 7–15)
BASOPHILS # BLD AUTO: 0.1 10E3/UL (ref 0–0.2)
BASOPHILS NFR BLD AUTO: 1 %
BUN SERPL-MCNC: 27.1 MG/DL (ref 8–23)
CALCIUM SERPL-MCNC: 9.9 MG/DL (ref 8.8–10.4)
CHLORIDE SERPL-SCNC: 99 MMOL/L (ref 98–107)
CREAT SERPL-MCNC: 1.14 MG/DL (ref 0.67–1.17)
EGFRCR SERPLBLD CKD-EPI 2021: 71 ML/MIN/1.73M2
ELLIPTOCYTES BLD QL SMEAR: SLIGHT
EOSINOPHIL # BLD AUTO: 0.2 10E3/UL (ref 0–0.7)
EOSINOPHIL NFR BLD AUTO: 2 %
ERYTHROCYTE [DISTWIDTH] IN BLOOD BY AUTOMATED COUNT: 21.9 % (ref 10–15)
FLUAV RNA SPEC QL NAA+PROBE: NEGATIVE
FLUBV RNA RESP QL NAA+PROBE: NEGATIVE
GLUCOSE SERPL-MCNC: 139 MG/DL (ref 70–99)
HCO3 SERPL-SCNC: 27 MMOL/L (ref 22–29)
HCT VFR BLD AUTO: 41.1 % (ref 40–53)
HGB BLD-MCNC: 11.5 G/DL (ref 13.3–17.7)
IMM GRANULOCYTES # BLD: 0.2 10E3/UL
IMM GRANULOCYTES NFR BLD: 2 %
LYMPHOCYTES # BLD AUTO: 1.2 10E3/UL (ref 0.8–5.3)
LYMPHOCYTES NFR BLD AUTO: 16 %
MCH RBC QN AUTO: 24.9 PG (ref 26.5–33)
MCHC RBC AUTO-ENTMCNC: 28 G/DL (ref 31.5–36.5)
MCV RBC AUTO: 89 FL (ref 78–100)
MONOCYTES # BLD AUTO: 0.7 10E3/UL (ref 0–1.3)
MONOCYTES NFR BLD AUTO: 9 %
NEUTROPHILS # BLD AUTO: 5.2 10E3/UL (ref 1.6–8.3)
NEUTROPHILS NFR BLD AUTO: 70 %
NRBC # BLD AUTO: 0 10E3/UL
NRBC BLD AUTO-RTO: 0 /100
NT-PROBNP SERPL-MCNC: 253 PG/ML (ref 0–900)
PLAT MORPH BLD: ABNORMAL
PLATELET # BLD AUTO: 197 10E3/UL (ref 150–450)
POLYCHROMASIA BLD QL SMEAR: SLIGHT
POTASSIUM SERPL-SCNC: 5.1 MMOL/L (ref 3.4–5.3)
PROCALCITONIN SERPL IA-MCNC: 0.18 NG/ML
RBC # BLD AUTO: 4.61 10E6/UL (ref 4.4–5.9)
RBC MORPH BLD: ABNORMAL
RSV RNA SPEC NAA+PROBE: NEGATIVE
SARS-COV-2 RNA RESP QL NAA+PROBE: NEGATIVE
SODIUM SERPL-SCNC: 138 MMOL/L (ref 135–145)
WBC # BLD AUTO: 7.4 10E3/UL (ref 4–11)

## 2025-02-12 PROCEDURE — 99285 EMERGENCY DEPT VISIT HI MDM: CPT | Mod: 25

## 2025-02-12 PROCEDURE — 94640 AIRWAY INHALATION TREATMENT: CPT

## 2025-02-12 PROCEDURE — 93005 ELECTROCARDIOGRAM TRACING: CPT | Performed by: EMERGENCY MEDICINE

## 2025-02-12 PROCEDURE — 84145 PROCALCITONIN (PCT): CPT | Performed by: EMERGENCY MEDICINE

## 2025-02-12 PROCEDURE — 250N000009 HC RX 250: Performed by: EMERGENCY MEDICINE

## 2025-02-12 PROCEDURE — 85025 COMPLETE CBC W/AUTO DIFF WBC: CPT | Performed by: EMERGENCY MEDICINE

## 2025-02-12 PROCEDURE — 83880 ASSAY OF NATRIURETIC PEPTIDE: CPT | Performed by: EMERGENCY MEDICINE

## 2025-02-12 PROCEDURE — 250N000012 HC RX MED GY IP 250 OP 636 PS 637: Performed by: EMERGENCY MEDICINE

## 2025-02-12 PROCEDURE — 80048 BASIC METABOLIC PNL TOTAL CA: CPT | Performed by: EMERGENCY MEDICINE

## 2025-02-12 PROCEDURE — 250N000013 HC RX MED GY IP 250 OP 250 PS 637: Performed by: EMERGENCY MEDICINE

## 2025-02-12 PROCEDURE — 36415 COLL VENOUS BLD VENIPUNCTURE: CPT | Performed by: EMERGENCY MEDICINE

## 2025-02-12 PROCEDURE — 87637 SARSCOV2&INF A&B&RSV AMP PRB: CPT | Performed by: EMERGENCY MEDICINE

## 2025-02-12 RX ORDER — IPRATROPIUM BROMIDE AND ALBUTEROL SULFATE 2.5; .5 MG/3ML; MG/3ML
3 SOLUTION RESPIRATORY (INHALATION) ONCE
Status: COMPLETED | OUTPATIENT
Start: 2025-02-12 | End: 2025-02-12

## 2025-02-12 RX ORDER — ROPINIROLE 0.25 MG/1
0.5 TABLET, FILM COATED ORAL ONCE
Status: DISCONTINUED | OUTPATIENT
Start: 2025-02-12 | End: 2025-02-12 | Stop reason: DRUGHIGH

## 2025-02-12 RX ORDER — ALBUTEROL SULFATE 0.83 MG/ML
2.5 SOLUTION RESPIRATORY (INHALATION)
Status: DISCONTINUED | OUTPATIENT
Start: 2025-02-12 | End: 2025-02-12 | Stop reason: HOSPADM

## 2025-02-12 RX ORDER — PREDNISONE 20 MG/1
40 TABLET ORAL ONCE
Status: DISCONTINUED | OUTPATIENT
Start: 2025-02-12 | End: 2025-02-12

## 2025-02-12 RX ORDER — PREDNISONE 20 MG/1
TABLET ORAL
Qty: 10 TABLET | Refills: 0 | Status: SHIPPED | OUTPATIENT
Start: 2025-02-12

## 2025-02-12 RX ORDER — METHYLPREDNISOLONE SODIUM SUCCINATE 125 MG/2ML
125 INJECTION INTRAMUSCULAR; INTRAVENOUS ONCE
Status: DISCONTINUED | OUTPATIENT
Start: 2025-02-12 | End: 2025-02-12

## 2025-02-12 RX ORDER — ROPINIROLE 1 MG/1
5 TABLET, FILM COATED ORAL ONCE
Status: COMPLETED | OUTPATIENT
Start: 2025-02-12 | End: 2025-02-12

## 2025-02-12 RX ORDER — PREDNISONE 20 MG/1
40 TABLET ORAL ONCE
Status: COMPLETED | OUTPATIENT
Start: 2025-02-12 | End: 2025-02-12

## 2025-02-12 RX ADMIN — IPRATROPIUM BROMIDE AND ALBUTEROL SULFATE 3 ML: .5; 3 SOLUTION RESPIRATORY (INHALATION) at 18:58

## 2025-02-12 RX ADMIN — IPRATROPIUM BROMIDE AND ALBUTEROL SULFATE 3 ML: .5; 3 SOLUTION RESPIRATORY (INHALATION) at 17:29

## 2025-02-12 RX ADMIN — PREDNISONE 40 MG: 20 TABLET ORAL at 20:38

## 2025-02-12 RX ADMIN — ROPINIROLE HYDROCHLORIDE 5 MG: 1 TABLET, FILM COATED ORAL at 19:47

## 2025-02-12 ASSESSMENT — ACTIVITIES OF DAILY LIVING (ADL)
ADLS_ACUITY_SCORE: 58

## 2025-02-12 NOTE — LETTER
February 12, 2025      Zackary Hutchison  [address]  [facility] [address]            Dear Zackary,     We have made a number of attempts to reach out to you in follow up to your recent clinic appointment. As Lotus Huff CNP had recommended, we are urging you to consider  Admission to the hospital for aggressive IV diuresis, due to your fluid overload.    Especially since you are trying to heal your wound, it will be very important to keep the fluid levels at a minimum level, to promote blood flow to the open areas.    Please call us with any updates, concerns. Our direct line is [phone].          Sincerely,          Heather Armenta, RN, BSN, CHFN, PCCN-K

## 2025-02-12 NOTE — TELEPHONE ENCOUNTER
----- Message from Lotus Huff sent at 2/10/2025  9:54 AM CST -----  Stable labs. Cr remains elevated above baseline. BNP is wnl although suspect falsely low reading given obesity. Please call patient to review results and to check in. I recommend that he be seen in the ED in clinic last Friday for IV diuresis as he is fluid overloaded and we do not have a lot of room to move on his diuretics, please reiterate if status remains unchanged. Please ask him again to please send in CardioMems reading.

## 2025-02-12 NOTE — DISCHARGE INSTRUCTIONS
Emergency Department if you are having worsening symptoms, spike fevers, or have any other concern.

## 2025-02-12 NOTE — TELEPHONE ENCOUNTER
No answer with multiple attempts to reach patient. Letter sent.  Heather HUERTA RN BSN, CHFN, PCCN-K

## 2025-02-12 NOTE — ED TRIAGE NOTES
The patient reports sob with wheezing for the past week. Hx of COPD and CHF. He is on diuretics. He has audible wheezing. He is able to speak in complete sentences. Spo2 was 96% on room air.

## 2025-02-12 NOTE — ED PROVIDER NOTES
eMERGENCY dEPARTMENT PROGRESS NOTE         ED COURSE AND MEDICAL DECISION MAKING  5:56 PM  Patient was signed out to me by Dr. Nixon.  7:00 PM  Rechecked on the patient.   8:14 PM  Patient is comfortable with discharge and being prescribed steroids. We did an ambulation trial and the patient was able to ambulate without any difficulties. Oxygen saturation stayed at 94%.    Zackary Hutchison is a 64 year old male who presented to the ED for evaluation of shortness of breath.  Patient does have known history of COPD, and was quite wheezy on my exam.  BNP was not elevated and chest x-ray did not show any evidence of pneumonia, pneumothorax, or pulmonary edema.  At this time, does appear to be an exacerbation of his known underlying COPD, as originally suspected by the previous provider.  We did give an additional neb in the emergency department.  With this patient did have significant improvement, and appeared to be breathing much easier.  He was given prednisone in the emergency department as he did not wish to have an IV.  He was able to ambulate without significant desaturation or increased work of breathing.  Therefore at this time, believe the patient is appropriate for discharge home with outpatient follow-up with primary care provider.  Return precautions were discussed.    At the conclusion of the encounter I discussed the results of all of the tests and the disposition. The questions were answered. The patient or family acknowledged understanding and was agreeable with the care plan.     LAB  Pertinent labs results reviewed   Results for orders placed or performed during the hospital encounter of 02/12/25   XR Chest 2 Views    Impression    IMPRESSION: Status post median sternotomy with stable heart size. No overt failure, lobar consolidation or large effusions. Pulmonary arterial monitoring device. Multilevel spondylosis.   Basic metabolic panel   Result Value Ref Range    Sodium 138 135 - 145 mmol/L    Potassium  5.1 3.4 - 5.3 mmol/L    Chloride 99 98 - 107 mmol/L    Carbon Dioxide (CO2) 27 22 - 29 mmol/L    Anion Gap 12 7 - 15 mmol/L    Urea Nitrogen 27.1 (H) 8.0 - 23.0 mg/dL    Creatinine 1.14 0.67 - 1.17 mg/dL    GFR Estimate 71 >60 mL/min/1.73m2    Calcium 9.9 8.8 - 10.4 mg/dL    Glucose 139 (H) 70 - 99 mg/dL   Influenza A/B, RSV and SARS-CoV2 PCR (COVID-19) Nasopharyngeal    Specimen: Nasopharyngeal; Swab   Result Value Ref Range    Influenza A PCR Negative Negative    Influenza B PCR Negative Negative    RSV PCR Negative Negative    SARS CoV2 PCR Negative Negative   Result Value Ref Range    Procalcitonin 0.18 <0.50 ng/mL   CBC with platelets and differential   Result Value Ref Range    WBC Count 7.4 4.0 - 11.0 10e3/uL    RBC Count 4.61 4.40 - 5.90 10e6/uL    Hemoglobin 11.5 (L) 13.3 - 17.7 g/dL    Hematocrit 41.1 40.0 - 53.0 %    MCV 89 78 - 100 fL    MCH 24.9 (L) 26.5 - 33.0 pg    MCHC 28.0 (L) 31.5 - 36.5 g/dL    RDW 21.9 (H) 10.0 - 15.0 %    Platelet Count 197 150 - 450 10e3/uL    % Neutrophils 70 %    % Lymphocytes 16 %    % Monocytes 9 %    % Eosinophils 2 %    % Basophils 1 %    % Immature Granulocytes 2 %    NRBCs per 100 WBC 0 <1 /100    Absolute Neutrophils 5.2 1.6 - 8.3 10e3/uL    Absolute Lymphocytes 1.2 0.8 - 5.3 10e3/uL    Absolute Monocytes 0.7 0.0 - 1.3 10e3/uL    Absolute Eosinophils 0.2 0.0 - 0.7 10e3/uL    Absolute Basophils 0.1 0.0 - 0.2 10e3/uL    Absolute Immature Granulocytes 0.2 <=0.4 10e3/uL    Absolute NRBCs 0.0 10e3/uL   RBC and Platelet Morphology   Result Value Ref Range    RBC Morphology Confirmed RBC Indices     Platelet Assessment  Automated Count Confirmed. Platelet morphology is normal.     Automated Count Confirmed. Platelet morphology is normal.    Elliptocytes Slight (A) None Seen    Polychromasia Slight (A) None Seen   N terminal pro BNP outpatient   Result Value Ref Range    N Terminal Pro BNP Outpatient 253 0 - 900 pg/mL         RADIOLOGY    Pertinent imaging reviewed   Please  see official radiology report.  XR Chest 2 Views   Final Result   IMPRESSION: Status post median sternotomy with stable heart size. No overt failure, lobar consolidation or large effusions. Pulmonary arterial monitoring device. Multilevel spondylosis.          FINAL IMPRESSION    1. COPD exacerbation (H)         DISCHARGE PRESCRIPTIONS  New Prescriptions    PREDNISONE (DELTASONE) 20 MG TABLET    Take two tablets (= 40mg) each day for 5 (five) days       DO Mami Roman, Ron Bee DO  02/12/25 2743

## 2025-02-12 NOTE — ED PROVIDER NOTES
EMERGENCY DEPARTMENT ENCOUNTER      NAME: Zackary Hutchison  AGE: 64 year old male  YOB: 1960  MRN: [id number]  EVALUATION DATE & TIME: No admission date for patient encounter.    PCP: Reid Escobar    ED PROVIDER: Rachel Nixon M.D.      Chief Complaint   Patient presents with    Shortness of Breath         FINAL IMPRESSION:  1. COPD exacerbation (H)          ED COURSE & MEDICAL DECISION MAKING:    ED Course as of 02/12/25 1751   Wed Feb 12, 2025   1546 Pt with coughing and wheezing, known COPD, not smoking, used his nebulizer once at 7am today with partial improvement and now here 9 hours later with continued wheezing, has albuterol MDI but hasn't used it, not on antibiotic therapy and no recent steroid use, and with wheezing without retractions or florid hypoxia, pending XR and viral PCR with screening CBC and BMP and given a dose of steroid therapy with duonebs and will reassess, patient ok with plan   1716 CBC and chemistry WNL and procalctionin 0.18 which argues strongly against acute bacterial infection with normal VS other than elevated BP with known HTN. Viral PCR underway and CXR pending   1740 Viral PCR reassuringly negative for influenza, RSV and COVID19   1749 Pt signed out to PM ED MD Dr Hernández pending completion of nebs, CXR and reassessment       Pertinent Labs & Imaging studies reviewed. (See chart for details)    Medical Decision Making  Obtained supplemental history:Supplemental history obtained?: No  Reviewed external records: External records reviewed?: No  Care impacted by chronic illness:Chronic Lung Disease, Diabetes, Heart Disease, Hyperlipidemia, and Hypertension  Did you consider but not order tests?: Work up considered but not performed and documented in chart, if applicable  Did you interpret images independently?: Independent interpretation of ECG and images noted in documentation, when applicable.  Consultation discussion with other provider:Did you involve another provider  (consultant, , pharmacy, etc.)?: I discussed the care with another health care provider, see documentation for details.  Admission considered. Patient was signed out to the oncoming physician, disposition pending.    MIPS (CTPE, Dental pain, [place], Sinusitis, Asthma/COPD, Head Trauma): Asthma or COPD Exacerbation:Smoking Cessation Counseling: Patient is NOT a current smoker.      At the conclusion of the encounter I discussed the results of all of the tests and the disposition. The questions were answered. The patient or family acknowledged understanding and was agreeable with the care plan.     MEDICATIONS GIVEN IN THE EMERGENCY:  Medications   methylPREDNISolone Na Suc (solu-MEDROL) injection 125 mg (has no administration in time range)   albuterol (PROVENTIL) neb solution 2.5 mg (has no administration in time range)   ipratropium - albuterol 0.5 mg/2.5 mg/3 mL (DUONEB) neb solution 3 mL (3 mLs Nebulization $Given 2/12/25 0843)       NEW PRESCRIPTIONS STARTED AT TODAY'S ER VISIT  New Prescriptions    PREDNISONE (DELTASONE) 20 MG TABLET    Take two tablets (= 40mg) each day for 5 (five) days          =================================================================    HPI      Zackary Hutchison is a 64 year old male with PMHx of morbid obesity, chronic afib, COPD, coronary artery disease, CHF, DM who presents to the ED today via private vehicle with shortness of breath.    He reports one week of coughing and wheezing like prior COPD exacerbations. No fever, no recent steroids. He took antibiotics last month for cellulitis but not for pulmonary issues. He has an albuterol nebulizer that he used at 7am today with partial relief but did not use again and has an albuterol metered dose inhaler that he is supposed to use if wheezing but has not used. No sick contacts, no antipyretic therapy taken. No vomiting or abdominal pain or chest pain or new leg swelling. He is not currently a smoker.       REVIEW OF SYSTEMS   All  other systems reviewed and are negative except as noted above in HPI.    PAST MEDICAL HISTORY:  Past Medical History:   Diagnosis Date    Atrial fibrillation (H)     Chronic kidney disease     Chronic kidney disease, stage 3b (H) 09/28/2023    Class 3 severe obesity due to excess calories with body mass index (BMI) of 40.0 to 44.9 in adult (H) 09/28/2023    Congestive heart failure (H)     COPD (chronic obstructive pulmonary disease) (H)     Coronary artery disease involving coronary bypass graft of native heart without angina pectoris 09/28/2023    Heart failure with reduced ejection fraction (H) 09/11/2023    HFrEF (heart failure with reduced ejection fraction) (H) 09/11/2023    Hyperlipidemia     Hypertension     Ischemic cardiomyopathy 09/28/2023    Obese     Paroxysmal atrial fibrillation (H) 09/28/2023    Tobacco abuse 09/28/2023       PAST SURGICAL HISTORY:  Past Surgical History:   Procedure Laterality Date    CORONARY ANGIOGRAPHY ADULT ORDER      CORONARY ARTERY BYPASS Left 2014    2 vessels    CV CARDIOMEMS WITH RIGHT HEART CATH N/A 2/15/2024    Procedure: Pulmonary Arterial Pressure Sensor Placement;  Surgeon: Jacey Bhandari MD;  Location: [facility] CATH LAB CV    CV CORONARY ANGIOGRAM N/A 09/11/2023    Procedure: Coronary Angiogram;  Surgeon: Santy Esposito MD;  Location: [facility] CATH LAB CV    CV CORONARY ANGIOGRAM N/A 5/2/2024    Procedure: CV CORONARY ANGIOGRAM;  Surgeon: Santy Esposito MD;  Location: [facility] CATH LAB CV    CV LEFT HEART CATH N/A 09/11/2023    Procedure: Left Heart Catheterization;  Surgeon: Santy Esposito MD;  Location: [facility] CATH LAB CV    CV LEFT HEART CATH N/A 5/2/2024    Procedure: Left Heart Catheterization;  Surgeon: Santy Esposito MD;  Location: [facility] CATH LAB CV    CV PCI ANGIOPLASTY N/A 5/2/2024    Procedure: Percutaneous Transluminal Angioplasty;  Surgeon: Santy Esposito MD;  Location: [facility] CATH LAB CV    CV RIGHT HEART CATH MEASUREMENTS  RECORDED N/A 09/11/2023    Procedure: Right Heart Catheterization;  Surgeon: Santy Esposito MD;  Location: [facility] CATH LAB CV       CURRENT MEDICATIONS:    acetaminophen (TYLENOL) 500 MG tablet  albuterol (PROAIR HFA/PROVENTIL HFA/VENTOLIN HFA) 108 (90 Base) MCG/ACT inhaler  amiodarone (PACERONE) 200 MG tablet  apixaban ANTICOAGULANT (ELIQUIS) 5 MG tablet  atorvastatin (LIPITOR) 40 MG tablet  carvedilol (COREG) 3.125 MG tablet  clopidogrel (PLAVIX) 75 MG tablet  dapagliflozin (FARXIGA) 5 MG TABS tablet  diclofenac (VOLTAREN) 1 % topical gel  doxycycline hyclate (VIBRAMYCIN) 100 MG capsule  DULoxetine (CYMBALTA) 30 MG capsule  hydrALAZINE (APRESOLINE) 10 MG tablet  ipratropium - albuterol 0.5 mg/2.5 mg/3 mL (DUONEB) 0.5-2.5 (3) MG/3ML neb solution  isosorbide mononitrate (IMDUR) 60 MG 24 hr tablet  metFORMIN (GLUCOPHAGE XR) 500 MG 24 hr tablet  metolazone (ZAROXOLYN) 2.5 MG tablet  nitroGLYcerin (NITROSTAT) 0.4 MG sublingual tablet  polyethylene glycol (MIRALAX) 17 GM/Dose powder  potassium chloride barbara ER (KLOR-CON M20) 20 MEQ CR tablet  predniSONE (DELTASONE) 20 MG tablet  rOPINIRole (REQUIP) 5 MG tablet  spironolactone (ALDACTONE) 25 MG tablet  spironolactone (ALDACTONE) 25 MG tablet  torsemide (DEMADEX) 100 MG tablet  umeclidinium-vilanterol (ANORO ELLIPTA) 62.5-25 MCG/ACT oral inhaler  vitamin B-12 (CYANOCOBALAMIN) 1000 MCG tablet        ALLERGIES:  Allergies   Allergen Reactions    Bumex [Bumetanide] Muscle Pain (Myalgia)       FAMILY HISTORY:  Family History   Problem Relation Age of Onset    Cerebrovascular Disease Mother     Myocardial Infarction Father        SOCIAL HISTORY:   Social History     Socioeconomic History    Marital status: Single   Occupational History    Occupation: Retired restaurant owner     Comment: [employer]   Tobacco Use    Smoking status: Every Day     Current packs/day: 0.50     Types: Cigarettes    Smokeless tobacco: Never    Tobacco comments:     Seen IP by [facility] on 9/11/23 and  declined cessation services and materials   Substance and Sexual Activity    Alcohol use: Not Currently   Social History Narrative    Currently lives in an assisted living facility. (Updated: 01/08/2024)     Social Drivers of Health     Financial Resource Strain: Low Risk  (11/19/2024)    Financial Resource Strain     Within the past 12 months, have you or your family members you live with been unable to get utilities (heat, electricity) when it was really needed?: No   Food Insecurity: Low Risk  (11/19/2024)    Food Insecurity     Within the past 12 months, did you worry that your food would run out before you got money to buy more?: No     Within the past 12 months, did the food you bought just not last and you didn t have money to get more?: No   Transportation Needs: Low Risk  (11/19/2024)    Transportation Needs     Within the past 12 months, has lack of transportation kept you from medical appointments, getting your medicines, non-medical meetings or appointments, work, or from getting things that you need?: No   Interpersonal Safety: Low Risk  (11/20/2024)    Interpersonal Safety     Do you feel physically and emotionally safe where you currently live?: Yes     Within the past 12 months, have you been hit, slapped, kicked or otherwise physically hurt by someone?: No     Within the past 12 months, have you been humiliated or emotionally abused in other ways by your partner or ex-partner?: No   Recent Concern: Interpersonal Safety - High Risk (9/17/2024)    Interpersonal Safety     Do you feel physically and emotionally safe where you currently live?: No     Within the past 12 months, have you been hit, slapped, kicked or otherwise physically hurt by someone?: No     Within the past 12 months, have you been humiliated or emotionally abused in other ways by your partner or ex-partner?: No   Housing Stability: Low Risk  (11/19/2024)    Housing Stability     Do you have housing? : Yes     Are you worried about  losing your housing?: No       VITALS:  Patient Vitals for the past 24 hrs:   BP Temp Temp src Pulse Resp SpO2 Weight   02/12/25 1747 -- -- -- 92 -- 95 % --   02/12/25 1732 -- -- -- 89 -- 98 % --   02/12/25 1725 131/87 -- -- 89 -- 93 % --   02/12/25 1536 (!) 181/80 98.6  F (37  C) Oral 91 30 96 % 125.2 kg (276 lb)       PHYSICAL EXAM    GENERAL: Awake, alert.  In no acute distress.   HEENT: Normocephalic, atraumatic.  Pupils equal, round and reactive.  Conjunctiva normal.  EOMI.  NECK: No stridor or apparent deformity.  PULMONARY: Symmetrical breath sounds without distress. Diffuse prolonged expiratory phase with wheezing but good air entry  CARDIO: Regular rate and rhythm.  No significant murmur, rub or gallop.  Radial pulses strong and symmetrical.  ABDOMINAL: Abdomen soft, non-distended and non-tender to palpation.  No CVAT, no palpable hepatosplenomegaly.  EXTREMITIES: No lower extremity swelling or edema.    NEURO: Alert and oriented to person, place and time.  Cranial nerves grossly intact.  No focal motor deficit.  PSYCH: Normal mood and affect  SKIN: No rashes      LAB:  All pertinent labs reviewed and interpreted.  Results for orders placed or performed during the hospital encounter of 02/12/25   Basic metabolic panel   Result Value Ref Range    Sodium 138 135 - 145 mmol/L    Potassium 5.1 3.4 - 5.3 mmol/L    Chloride 99 98 - 107 mmol/L    Carbon Dioxide (CO2) 27 22 - 29 mmol/L    Anion Gap 12 7 - 15 mmol/L    Urea Nitrogen 27.1 (H) 8.0 - 23.0 mg/dL    Creatinine 1.14 0.67 - 1.17 mg/dL    GFR Estimate 71 >60 mL/min/1.73m2    Calcium 9.9 8.8 - 10.4 mg/dL    Glucose 139 (H) 70 - 99 mg/dL   Influenza A/B, RSV and SARS-CoV2 PCR (COVID-19) Nasopharyngeal    Specimen: Nasopharyngeal; Swab   Result Value Ref Range    Influenza A PCR Negative Negative    Influenza B PCR Negative Negative    RSV PCR Negative Negative    SARS CoV2 PCR Negative Negative   Result Value Ref Range    Procalcitonin 0.18 <0.50 ng/mL   CBC  with platelets and differential   Result Value Ref Range    WBC Count 7.4 4.0 - 11.0 10e3/uL    RBC Count 4.61 4.40 - 5.90 10e6/uL    Hemoglobin 11.5 (L) 13.3 - 17.7 g/dL    Hematocrit 41.1 40.0 - 53.0 %    MCV 89 78 - 100 fL    MCH 24.9 (L) 26.5 - 33.0 pg    MCHC 28.0 (L) 31.5 - 36.5 g/dL    RDW 21.9 (H) 10.0 - 15.0 %    Platelet Count 197 150 - 450 10e3/uL       RADIOLOGY:  Reviewed all pertinent imaging. Please see official radiology report.  XR Chest 2 Views    (Results Pending)        Rachel Nixon MD  02/12/25 7862

## 2025-02-13 NOTE — ED NOTES
Pt reports a staff member from his apartment unit will bring a walker to the taxi upon his arrival. Taxi scheduled, ETA 2115. Discharge teaching provided. He denies questions regarding teaching.

## 2025-02-13 NOTE — ED NOTES
Pt ambulates to the hallway with EDT SBA. He is able to ambulate without difficulty, oxygen remaining at 94% while ambulating.

## 2025-02-13 NOTE — ED NOTES
[place] provided to the patient per pt request. The pt reports he needs a walker to ambulate from a taxi to the front door. He is calling the staff at his building to request a walker.

## 2025-02-17 ENCOUNTER — TELEPHONE (OUTPATIENT)
Dept: CARDIOLOGY | Facility: CLINIC | Age: 65
End: 2025-02-17
Payer: MEDICARE

## 2025-02-17 DIAGNOSIS — I50.33 ACUTE ON CHRONIC DIASTOLIC CONGESTIVE HEART FAILURE (H): Primary | ICD-10-CM

## 2025-02-17 NOTE — TELEPHONE ENCOUNTER
Called patient to check on his status.  Looking for his wt, and HF s/s.  In reviewing [facility] patient has not had a reading since 11/16/24.  Patient recently admitted to [facility] ED and was discharged.    Thank you    Otto Rodríguez RN

## 2025-02-17 NOTE — TELEPHONE ENCOUNTER
Wt: 256 lbs (yesterday)    HF S/S: patient is reporting swelling in his right legs, and has some wheezing in his breathing.  Patent reports his swelling has improved since last week.     Patient reports he would like to have his Metolazone day on Sundays again so he can have a lab draw done on Mondays.     Dr Bhandari is it okay if we update the sig on Metolazone for it to be given on Sundays with BMP labs to follow on Mondays?    We will fax any updated orders to the site    FYI Dr Bahndari    Thank you    Otto Rodríguez RN

## 2025-02-18 RX ORDER — METOLAZONE 2.5 MG/1
2.5 TABLET ORAL WEEKLY
Qty: 12 TABLET | Refills: 1 | Status: SHIPPED | OUTPATIENT
Start: 2025-02-18

## 2025-02-18 NOTE — TELEPHONE ENCOUNTER
Jacey Bhandari MD  P [facility]  Caller: Unspecified (Yesterday,  2:47 PM)  That's fine            Noted, EPIC updated. Orders faxed to Shreya HUERTA RN BSN, CHFN, PCCN-K

## 2025-02-19 ENCOUNTER — LAB REQUISITION (OUTPATIENT)
Dept: LAB | Facility: CLINIC | Age: 65
End: 2025-02-19
Payer: MEDICARE

## 2025-02-19 DIAGNOSIS — I50.33 ACUTE ON CHRONIC DIASTOLIC (CONGESTIVE) HEART FAILURE (H): ICD-10-CM

## 2025-02-20 LAB
ATRIAL RATE - MUSE: 90 BPM
DIASTOLIC BLOOD PRESSURE - MUSE: NORMAL MMHG
INTERPRETATION ECG - MUSE: NORMAL
P AXIS - MUSE: 64 DEGREES
PR INTERVAL - MUSE: 176 MS
QRS DURATION - MUSE: 132 MS
QT - MUSE: 398 MS
QTC - MUSE: 486 MS
R AXIS - MUSE: 69 DEGREES
SYSTOLIC BLOOD PRESSURE - MUSE: NORMAL MMHG
T AXIS - MUSE: 48 DEGREES
VENTRICULAR RATE- MUSE: 90 BPM

## 2025-02-22 DIAGNOSIS — I25.10 ATHEROSCLEROTIC HEART DISEASE OF NATIVE CORONARY ARTERY WITHOUT ANGINA PECTORIS: ICD-10-CM

## 2025-02-24 ENCOUNTER — OFFICE VISIT (OUTPATIENT)
Dept: CARDIOLOGY | Facility: CLINIC | Age: 65
End: 2025-02-24
Payer: MEDICARE

## 2025-02-24 VITALS
HEART RATE: 88 BPM | SYSTOLIC BLOOD PRESSURE: 120 MMHG | BODY MASS INDEX: 41.48 KG/M2 | WEIGHT: 257 LBS | RESPIRATION RATE: 16 BRPM | DIASTOLIC BLOOD PRESSURE: 72 MMHG

## 2025-02-24 DIAGNOSIS — I25.5 ISCHEMIC CARDIOMYOPATHY: ICD-10-CM

## 2025-02-24 DIAGNOSIS — I50.32 HEART FAILURE WITH IMPROVED EJECTION FRACTION (HFIMPEF) (H): Primary | ICD-10-CM

## 2025-02-24 LAB — GLUCOSE SERPL-MCNC: 196 MG/DL (ref 70–99)

## 2025-02-24 PROCEDURE — G2211 COMPLEX E/M VISIT ADD ON: HCPCS | Performed by: INTERNAL MEDICINE

## 2025-02-24 PROCEDURE — 99214 OFFICE O/P EST MOD 30 MIN: CPT | Performed by: INTERNAL MEDICINE

## 2025-02-24 PROCEDURE — P9604 ONE-WAY ALLOW PRORATED TRIP: HCPCS | Mod: ORL | Performed by: INTERNAL MEDICINE

## 2025-02-24 PROCEDURE — 80048 BASIC METABOLIC PNL TOTAL CA: CPT | Mod: ORL | Performed by: INTERNAL MEDICINE

## 2025-02-24 PROCEDURE — 36415 COLL VENOUS BLD VENIPUNCTURE: CPT | Mod: ORL | Performed by: INTERNAL MEDICINE

## 2025-02-24 RX ORDER — NITROGLYCERIN 0.4 MG/1
0.4 TABLET SUBLINGUAL EVERY 5 MIN PRN
Qty: 100 TABLET | Refills: 1 | Status: SHIPPED | OUTPATIENT
Start: 2025-02-24

## 2025-02-24 RX ORDER — CLOPIDOGREL BISULFATE 75 MG/1
75 TABLET ORAL DAILY
Qty: 90 TABLET | Refills: 1 | Status: SHIPPED | OUTPATIENT
Start: 2025-02-24

## 2025-02-24 RX ORDER — DOXYCYCLINE 100 MG/1
100 CAPSULE ORAL DAILY
COMMUNITY
Start: 2025-02-04

## 2025-02-24 NOTE — PROGRESS NOTES
HEART CARE NOTE          Assessment/Recommendations   1. HFimpEF   Assessment / Plan  PAD 8 (threshold 12); Near euvolemia on physical exam; denies HF symptoms - no changes to regimen at this time  Patient is high risk for adverse cardiac events 2/2 severe biventricular dysfunction, multiple HFHs, non-compliance with dietary restrictions, renal dysfunction, CAD  GDMT as detailed below     Current Pharmacotherapy AHA Guideline-Directed Medical Therapy   Lisinopril 5 mg daily  - held Lisinopril 20 mg twice daily   Carvedilol 3.125 mg BID  Carvedilol 25 mg twice daily   Spironolactone 25 mg daily and 50 mg on metolazone days Spironolactone 25 mg once daily   Hydralazine 10 mg TID Hydralazine 100 mg three times daily   Isosorbide mononitrate 180 mg daily Isosorbide dinitrate 40 mg three times daily   SGLT2 inhibitor: Dapagliflozin 5 mg daily Dapagliflozin or Empagliflozin 10 mg daily      2. Atrial fibrillation  Assessment / Plan  Currently on apixaban and amiodarone     3. CAD c/b ICM  Assessment / Plan  S/p CABG 2015 with recent PCI to Ramus and OM with D[facility]RELL  Denies chest pain or anginal equivalent  Continue atorvastatin, carvedilol, clopidogrel     4.CKD  Assessment / Plan  Renal function within acceptable range     5.DM2  Assessment / Plan  NIDDM; management per PMD    35 minutes spent reviewing prior records (including documentation, laboratory studies, cardiac testing/imaging), history and physical exam, planning, and subsequent documentation.      The longitudinal plan of care for HFpEF was addressed during this visit. Due to the added complexity in care, I will continue to support Mr. Zackary Hutchison  in the subsequent management of this condition(s) and with the ongoing continuity of care of this condition(s).      History of Present Illness/Subjective    Mr. Zackary Hutchison is a 63 year old male with a PMHx significant for (per Epic notation) atrial fibrillation, stage III CKD, ischemic cardiomyopathy, COPD, HFrEF,  hyperlipidemia, CAD s/p CABG, restless leg syndrome, hypertension, obesity, newly diagnosed type 2 diabetes, and tobacco use disorder admitted on 5/13/2024 with the ER with left hip pain, suspected exacerbation of HFpEF, and possible COPD exacerbation      Today, Mr. Hutchison  denies acute cardiac events or complaints; Cardiac management as detailed above     ECG: Personally reviewed 6/3/24; Afib with RVR     ECHO (personnaly Reviewed on 5/13/24):   1. Technically difficult study.  2. The left ventricle is normal in size. Image quality does not provide for  detailed assessment of LV systolic function, but is felt to be normal with a  visually estimated ejection fraction of roughly 55%.  3. No significant valvular heart disease is identified on this study though  the sensitivity, particularly of regurgitant lesions, is reduced due to poor  Doppler acoustics.  4. Right ventricular size and systolic performance could not be accurately  assessed due to inadequate visualization.     The acoustic quality of this study is fairly poor. If a more accurate  assessment of left ventricular systolicperformance, regional wall motion, and  other morphology/function is required; would recommend cardiac MRI or other  alternative imaging modality for further evaluation.     When compared to the prior real-time echocardiogram dated 25 December 2023,  the ejection fraction appears somewhat higher on the current study though  accurate comparison is somewhat limited due to suboptimal acoustic imaging not  only on the current examination, but also hold the prior study as well.    Lab results: personally reviewed February 24, 2025; notable for CKD    Medical history and pertinent documents reviewed in Care Everywhere please where applicable see details above        Physical Examination Review of Systems   /72 (BP Location: Right arm, Patient Position: Sitting, Cuff Size: Adult Regular)   Pulse 88   Resp 16   Wt 116.6 kg (257 lb)    BMI 41.48 kg/m    Body mass index is 41.48 kg/m .  Wt Readings from Last 3 Encounters:   02/24/25 116.6 kg (257 lb)   02/12/25 125.2 kg (276 lb)   02/07/25 118.8 kg (262 lb)     General Appearance:   no distress, normal body habitus   ENT/Mouth: membranes moist, no oral lesions or bleeding gums.      EYES:  no scleral icterus, normal conjunctivae   Neck: no carotid bruits or thyromegaly   Chest/Lungs:   lungs are clear to auscultation, no rales or wheezing, equal chest wall expansion    Cardiovascular:   Regular. Normal first and second heart sounds with no murmurs, rubs, or gallops; the carotid, radial and posterior tibial pulses are intact, no JVD and trace LE edema bilaterally    Abdomen:  no organomegaly, masses, bruits, or tenderness; bowel sounds are present   Extremities: no cyanosis or clubbing   Skin: no xanthelasma, warm.    Neurologic: NAD     Psychiatric: alert and oriented x3, calm     A complete 10 systems ROS was reviewed  And is negative except what is listed in the HPI.          Medical History  Surgical History Family History Social History   Past Medical History:   Diagnosis Date    Atrial fibrillation (H)     Chronic kidney disease     Chronic kidney disease, stage 3b (H) 09/28/2023    Class 3 severe obesity due to excess calories with body mass index (BMI) of 40.0 to 44.9 in adult (H) 09/28/2023    Congestive heart failure (H)     COPD (chronic obstructive pulmonary disease) (H)     Coronary artery disease involving coronary bypass graft of native heart without angina pectoris 09/28/2023    Heart failure with reduced ejection fraction (H) 09/11/2023    HFrEF (heart failure with reduced ejection fraction) (H) 09/11/2023    Hyperlipidemia     Hypertension     Ischemic cardiomyopathy 09/28/2023    Obese     Paroxysmal atrial fibrillation (H) 09/28/2023    Tobacco abuse 09/28/2023    Past Surgical History:   Procedure Laterality Date    CORONARY ANGIOGRAPHY ADULT ORDER      CORONARY ARTERY BYPASS  Left 2014    2 vessels    CV CARDIOMEMS WITH RIGHT HEART CATH N/A 2/15/2024    Procedure: Pulmonary Arterial Pressure Sensor Placement;  Surgeon: Jacey Bhandari MD;  Location: [facility]    CV CORONARY ANGIOGRAM N/A 09/11/2023    Procedure: Coronary Angiogram;  Surgeon: Snaty Esposito MD;  Location: [facility]    CV CORONARY ANGIOGRAM N/A 5/2/2024    Procedure: CV CORONARY ANGIOGRAM;  Surgeon: Santy Esposito MD;  Location: [facility]    CV LEFT HEART CATH N/A 09/11/2023    Procedure: Left Heart Catheterization;  Surgeon: Santy Esposito MD;  Location: [facility]    CV LEFT HEART CATH N/A 5/2/2024    Procedure: Left Heart Catheterization;  Surgeon: Santy Esposito MD;  Location: [facility]    CV PCI ANGIOPLASTY N/A 5/2/2024    Procedure: Percutaneous Transluminal Angioplasty;  Surgeon: Santy Esposito MD;  Location: [facility]    CV RIGHT HEART CATH MEASUREMENTS RECORDED N/A 09/11/2023    Procedure: Right Heart Catheterization;  Surgeon: Santy Esposito MD;  Location: [facility]    no family history of premature coronary artery disease Social History     Socioeconomic History    Marital status: Single     Spouse name: Not on file    Number of children: Not on file    Years of education: Not on file    Highest education level: Not on file   Occupational History    Occupation: Retired restaurant owner     Comment: [employer]   Tobacco Use    Smoking status: Every Day     Current packs/day: 0.50     Types: Cigarettes    Smokeless tobacco: Never    Tobacco comments:     Seen IP by [facility] on 9/11/23 and declined cessation services and materials   Substance and Sexual Activity    Alcohol use: Not Currently    Drug use: Not on file    Sexual activity: Not on file   Other Topics Concern    Not on file   Social History Narrative    Currently lives in an assisted living facility. (Updated: 01/08/2024)     Social Drivers of Health      Financial Resource Strain: Low Risk  (11/19/2024)    Financial Resource Strain     Within the past 12 months, have you or your family members you live with been unable to get utilities (heat, electricity) when it was really needed?: No   Food Insecurity: Low Risk  (11/19/2024)    Food Insecurity     Within the past 12 months, did you worry that your food would run out before you got money to buy more?: No     Within the past 12 months, did the food you bought just not last and you didn t have money to get more?: No   Transportation Needs: Low Risk  (11/19/2024)    Transportation Needs     Within the past 12 months, has lack of transportation kept you from medical appointments, getting your medicines, non-medical meetings or appointments, work, or from getting things that you need?: No   Physical Activity: Not on file   Stress: Not on file   Social Connections: Not on file   Interpersonal Safety: Low Risk  (11/20/2024)    Interpersonal Safety     Do you feel physically and emotionally safe where you currently live?: Yes     Within the past 12 months, have you been hit, slapped, kicked or otherwise physically hurt by someone?: No     Within the past 12 months, have you been humiliated or emotionally abused in other ways by your partner or ex-partner?: No   Recent Concern: Interpersonal Safety - High Risk (9/17/2024)    Interpersonal Safety     Do you feel physically and emotionally safe where you currently live?: No     Within the past 12 months, have you been hit, slapped, kicked or otherwise physically hurt by someone?: No     Within the past 12 months, have you been humiliated or emotionally abused in other ways by your partner or ex-partner?: No   Housing Stability: Low Risk  (11/19/2024)    Housing Stability     Do you have housing? : Yes     Are you worried about losing your housing?: No           Lab Results    Chemistry/lipid CBC Cardiac Enzymes/BNP/TSH/INR   Lab Results   Component Value Date    CHOL 121  "05/02/2024    HDL 27 (L) 05/02/2024    TRIG 219 (H) 05/02/2024    BUN 27.1 (H) 02/12/2025     02/12/2025    CO2 27 02/12/2025    Lab Results   Component Value Date    WBC 7.4 02/12/2025    HGB 11.5 (L) 02/12/2025    HCT 41.1 02/12/2025    MCV 89 02/12/2025     02/12/2025    Lab Results   Component Value Date    TSH 1.27 08/13/2024    INR 0.97 06/03/2024     No results found for: \"CKTOTAL\", \"CKMB\", \"TROPONINI\"       Weight:    Wt Readings from Last 3 Encounters:   02/24/25 116.6 kg (257 lb)   02/12/25 125.2 kg (276 lb)   02/07/25 118.8 kg (262 lb)       Allergies  Allergies   Allergen Reactions    Bumex [Bumetanide] Muscle Pain (Myalgia)         Surgical History  Past Surgical History:   Procedure Laterality Date    CORONARY ANGIOGRAPHY ADULT ORDER      CORONARY ARTERY BYPASS Left 2014    2 vessels    CV CARDIOMEMS WITH RIGHT HEART CATH N/A 2/15/2024    Procedure: Pulmonary Arterial Pressure Sensor Placement;  Surgeon: Jacey Bhandari MD;  Location: [facility]    CV CORONARY ANGIOGRAM N/A 09/11/2023    Procedure: Coronary Angiogram;  Surgeon: Santy Esposito MD;  Location: [facility]    CV CORONARY ANGIOGRAM N/A 5/2/2024    Procedure: CV CORONARY ANGIOGRAM;  Surgeon: Santy Esposito MD;  Location: [facility]    CV LEFT HEART CATH N/A 09/11/2023    Procedure: Left Heart Catheterization;  Surgeon: Santy Esposito MD;  Location: [facility]    CV LEFT HEART CATH N/A 5/2/2024    Procedure: Left Heart Catheterization;  Surgeon: Santy Esposito MD;  Location: [facility]    CV PCI ANGIOPLASTY N/A 5/2/2024    Procedure: Percutaneous Transluminal Angioplasty;  Surgeon: Santy Esposito MD;  Location: [facility]    CV RIGHT HEART CATH MEASUREMENTS RECORDED N/A 09/11/2023    Procedure: Right Heart Catheterization;  Surgeon: Santy Esposito MD;  Location: [facility]       Social History  Tobacco:   History   Smoking Status    Every " Day    Types: Cigarettes   Smokeless Tobacco    Never    Alcohol:   Social History    Substance and Sexual Activity      Alcohol use: Not Currently   Illicit Drugs:   History   Drug Use Not on file       Family History  Family History   Problem Relation Age of Onset    Cerebrovascular Disease Mother     Myocardial Infarction Father           Jacey Bhandari MD on 2/24/2025      cc: Reid Escobar

## 2025-02-24 NOTE — LETTER
2/24/2025    Reid Escobar MD  [address] [place] [address]    RE: Zackary Hutchison       Dear Colleague,     I had the pleasure of seeing Zackary Kianna in the [facility].    HEART CARE NOTE          Assessment/Recommendations   1. HFimpEF   Assessment / Plan  PAD 8 (threshold 12); Near euvolemia on physical exam; denies HF symptoms - no changes to regimen at this time  Patient is high risk for adverse cardiac events 2/2 severe biventricular dysfunction, multiple HFHs, non-compliance with dietary restrictions, renal dysfunction, CAD  GDMT as detailed below     Current Pharmacotherapy AHA Guideline-Directed Medical Therapy   Lisinopril 5 mg daily  - held Lisinopril 20 mg twice daily   Carvedilol 3.125 mg BID  Carvedilol 25 mg twice daily   Spironolactone 25 mg daily and 50 mg on metolazone days Spironolactone 25 mg once daily   Hydralazine 10 mg TID Hydralazine 100 mg three times daily   Isosorbide mononitrate 180 mg daily Isosorbide dinitrate 40 mg three times daily   SGLT2 inhibitor: Dapagliflozin 5 mg daily Dapagliflozin or Empagliflozin 10 mg daily      2. Atrial fibrillation  Assessment / Plan  Currently on apixaban and amiodarone     3. CAD c/b ICM  Assessment / Plan  S/p CABG 2015 with recent PCI to Ramus and OM with DARRELL  Denies chest pain or anginal equivalent  Continue atorvastatin, carvedilol, clopidogrel     4.CKD  Assessment / Plan  Renal function within acceptable range     5.DM2  Assessment / Plan  NIDDM; management per PMD    35 minutes spent reviewing prior records (including documentation, laboratory studies, cardiac testing/imaging), history and physical exam, planning, and subsequent documentation.      The longitudinal plan of care for HFpEF was addressed during this visit. Due to the added complexity in care, I will continue to support Hemalatha Zackary Hutchison  in the subsequent management of this condition(s) and with the ongoing continuity of care of this  condition(s).      History of Present Illness/Subjective    Mr. Zackary Hutchison is a 63 year old male with a PMHx significant for (per Epic notation) atrial fibrillation, stage III CKD, ischemic cardiomyopathy, COPD, HFrEF, hyperlipidemia, CAD s/p CABG, restless leg syndrome, hypertension, obesity, newly diagnosed type 2 diabetes, and tobacco use disorder admitted on 5/13/2024 with the ER with left hip pain, suspected exacerbation of HFpEF, and possible COPD exacerbation      Today, Mr. Hutchison  denies acute cardiac events or complaints; Cardiac management as detailed above     ECG: Personally reviewed 6/3/24; Afib with RVR     ECHO (personnaly Reviewed on 5/13/24):   1. Technically difficult study.  2. The left ventricle is normal in size. Image quality does not provide for  detailed assessment of LV systolic function, but is felt to be normal with a  visually estimated ejection fraction of roughly 55%.  3. No significant valvular heart disease is identified on this study though  the sensitivity, particularly of regurgitant lesions, is reduced due to poor  Doppler acoustics.  4. Right ventricular size and systolic performance could not be accurately  assessed due to inadequate visualization.     The acoustic quality of this study is fairly poor. If a more accurate  assessment of left ventricular systolicperformance, regional wall motion, and  other morphology/function is required; would recommend cardiac MRI or other  alternative imaging modality for further evaluation.     When compared to the prior real-time echocardiogram dated 25 December 2023,  the ejection fraction appears somewhat higher on the current study though  accurate comparison is somewhat limited due to suboptimal acoustic imaging not  only on the current examination, but also hold the prior study as well.    Lab results: personally reviewed February 24, 2025; notable for CKD    Medical history and pertinent documents reviewed in Care Everywhere please  where applicable see details above        Physical Examination Review of Systems   /72 (BP Location: Right arm, Patient Position: Sitting, Cuff Size: Adult Regular)   Pulse 88   Resp 16   Wt 116.6 kg (257 lb)   BMI 41.48 kg/m    Body mass index is 41.48 kg/m .  Wt Readings from Last 3 Encounters:   02/24/25 116.6 kg (257 lb)   02/12/25 125.2 kg (276 lb)   02/07/25 118.8 kg (262 lb)     General Appearance:   no distress, normal body habitus   ENT/Mouth: membranes moist, no oral lesions or bleeding gums.      EYES:  no scleral icterus, normal conjunctivae   Neck: no carotid bruits or thyromegaly   Chest/Lungs:   lungs are clear to auscultation, no rales or wheezing, equal chest wall expansion    Cardiovascular:   Regular. Normal first and second heart sounds with no murmurs, rubs, or gallops; the carotid, radial and posterior tibial pulses are intact, no JVD and trace LE edema bilaterally    Abdomen:  no organomegaly, masses, bruits, or tenderness; bowel sounds are present   Extremities: no cyanosis or clubbing   Skin: no xanthelasma, warm.    Neurologic: NAD     Psychiatric: alert and oriented x3, calm     A complete 10 systems ROS was reviewed  And is negative except what is listed in the HPI.          Medical History  Surgical History Family History Social History   Past Medical History:   Diagnosis Date     Atrial fibrillation (H)      Chronic kidney disease      Chronic kidney disease, stage 3b (H) 09/28/2023     Class 3 severe obesity due to excess calories with body mass index (BMI) of 40.0 to 44.9 in adult (H) 09/28/2023     Congestive heart failure (H)      COPD (chronic obstructive pulmonary disease) (H)      Coronary artery disease involving coronary bypass graft of native heart without angina pectoris 09/28/2023     Heart failure with reduced ejection fraction (H) 09/11/2023     HFrEF (heart failure with reduced ejection fraction) (H) 09/11/2023     Hyperlipidemia      Hypertension      Ischemic  cardiomyopathy 09/28/2023     Obese      Paroxysmal atrial fibrillation (H) 09/28/2023     Tobacco abuse 09/28/2023    Past Surgical History:   Procedure Laterality Date     CORONARY ANGIOGRAPHY ADULT ORDER       CORONARY ARTERY BYPASS Left 2014    2 vessels     CV CARDIOMEMS WITH RIGHT HEART CATH N/A 2/15/2024    Procedure: Pulmonary Arterial Pressure Sensor Placement;  Surgeon: Jacey Bhandari MD;  Location: [facility]     CV CORONARY ANGIOGRAM N/A 09/11/2023    Procedure: Coronary Angiogram;  Surgeon: Santy Esposito MD;  Location: [facility]     CV CORONARY ANGIOGRAM N/A 5/2/2024    Procedure: CV CORONARY ANGIOGRAM;  Surgeon: Santy Esposito MD;  Location: [facility]     CV LEFT HEART CATH N/A 09/11/2023    Procedure: Left Heart Catheterization;  Surgeon: Santy Esposito MD;  Location: [facility]     CV LEFT HEART CATH N/A 5/2/2024    Procedure: Left Heart Catheterization;  Surgeon: Santy Esposito MD;  Location: [facility]     CV PCI ANGIOPLASTY N/A 5/2/2024    Procedure: Percutaneous Transluminal Angioplasty;  Surgeon: Santy Esposito MD;  Location: [facility]     CV RIGHT HEART CATH MEASUREMENTS RECORDED N/A 09/11/2023    Procedure: Right Heart Catheterization;  Surgeon: Santy Esposito MD;  Location: [facility]    no family history of premature coronary artery disease Social History     Socioeconomic History     Marital status: Single     Spouse name: Not on file     Number of children: Not on file     Years of education: Not on file     Highest education level: Not on file   Occupational History     Occupation: Retired restaurant owner     Comment: [employer]   Tobacco Use     Smoking status: Every Day     Current packs/day: 0.50     Types: Cigarettes     Smokeless tobacco: Never     Tobacco comments:     Seen IP by [facility] on 9/11/23 and declined cessation services and materials   Substance and Sexual Activity     Alcohol  use: Not Currently     Drug use: Not on file     Sexual activity: Not on file   Other Topics Concern     Not on file   Social History Narrative    Currently lives in an assisted living facility. (Updated: 01/08/2024)     Social Drivers of Health     Financial Resource Strain: Low Risk  (11/19/2024)    Financial Resource Strain      Within the past 12 months, have you or your family members you live with been unable to get utilities (heat, electricity) when it was really needed?: No   Food Insecurity: Low Risk  (11/19/2024)    Food Insecurity      Within the past 12 months, did you worry that your food would run out before you got money to buy more?: No      Within the past 12 months, did the food you bought just not last and you didn t have money to get more?: No   Transportation Needs: Low Risk  (11/19/2024)    Transportation Needs      Within the past 12 months, has lack of transportation kept you from medical appointments, getting your medicines, non-medical meetings or appointments, work, or from getting things that you need?: No   Physical Activity: Not on file   Stress: Not on file   Social Connections: Not on file   Interpersonal Safety: Low Risk  (11/20/2024)    Interpersonal Safety      Do you feel physically and emotionally safe where you currently live?: Yes      Within the past 12 months, have you been hit, slapped, kicked or otherwise physically hurt by someone?: No      Within the past 12 months, have you been humiliated or emotionally abused in other ways by your partner or ex-partner?: No   Recent Concern: Interpersonal Safety - High Risk (9/17/2024)    Interpersonal Safety      Do you feel physically and emotionally safe where you currently live?: No      Within the past 12 months, have you been hit, slapped, kicked or otherwise physically hurt by someone?: No      Within the past 12 months, have you been humiliated or emotionally abused in other ways by your partner or ex-partner?: No   Housing  "Stability: Low Risk  (11/19/2024)    Housing Stability      Do you have housing? : Yes      Are you worried about losing your housing?: No           Lab Results    Chemistry/lipid CBC Cardiac Enzymes/BNP/TSH/INR   Lab Results   Component Value Date    CHOL 121 05/02/2024    HDL 27 (L) 05/02/2024    TRIG 219 (H) 05/02/2024    BUN 27.1 (H) 02/12/2025     02/12/2025    CO2 27 02/12/2025    Lab Results   Component Value Date    WBC 7.4 02/12/2025    HGB 11.5 (L) 02/12/2025    HCT 41.1 02/12/2025    MCV 89 02/12/2025     02/12/2025    Lab Results   Component Value Date    TSH 1.27 08/13/2024    INR 0.97 06/03/2024     No results found for: \"CKTOTAL\", \"CKMB\", \"TROPONINI\"       Weight:    Wt Readings from Last 3 Encounters:   02/24/25 116.6 kg (257 lb)   02/12/25 125.2 kg (276 lb)   02/07/25 118.8 kg (262 lb)       Allergies  Allergies   Allergen Reactions     Bumex [Bumetanide] Muscle Pain (Myalgia)         Surgical History  Past Surgical History:   Procedure Laterality Date     CORONARY ANGIOGRAPHY ADULT ORDER       CORONARY ARTERY BYPASS Left 2014    2 vessels     CV CARDIOMEMS WITH RIGHT HEART CATH N/A 2/15/2024    Procedure: Pulmonary Arterial Pressure Sensor Placement;  Surgeon: Jacey Bhandari MD;  Location: [facility]     CV CORONARY ANGIOGRAM N/A 09/11/2023    Procedure: Coronary Angiogram;  Surgeon: Santy Esposito MD;  Location: [facility]ATH [facility]     CV CORONARY ANGIOGRAM N/A 5/2/2024    Procedure: CV CORONARY ANGIOGRAM;  Surgeon: Santy Esposito MD;  Location: [facility]     CV LEFT HEART CATH N/A 09/11/2023    Procedure: Left Heart Catheterization;  Surgeon: Santy Esposito MD;  Location: [facility]     CV LEFT HEART CATH N/A 5/2/2024    Procedure: Left Heart Catheterization;  Surgeon: Santy Esposito MD;  Location: [facility]     CV PCI ANGIOPLASTY N/A 5/2/2024    Procedure: Percutaneous Transluminal Angioplasty;  Surgeon: Santy Esposito, " MD;  Location: [facility] CATH [facility]     CV RIGHT HEART CATH MEASUREMENTS RECORDED N/A 09/11/2023    Procedure: Right Heart Catheterization;  Surgeon: Santy Esposito MD;  Location: [facility]ATH [facility]       Social History  Tobacco:   History   Smoking Status     Every Day     Types: Cigarettes   Smokeless Tobacco     Never    Alcohol:   Social History    Substance and Sexual Activity      Alcohol use: Not Currently   Illicit Drugs:   History   Drug Use Not on file       Family History  Family History   Problem Relation Age of Onset     Cerebrovascular Disease Mother      Myocardial Infarction Father           Jacey Bhandari MD on 2/24/2025      cc: Reid Escobar      Thank you for allowing me to participate in the care of your patient.      Sincerely,     Jacey Bhandari MD     [facility]  cc:   Lotus Huff, CNP  [address] [place] [address]       Statement Selected

## 2025-02-25 LAB
ANION GAP SERPL CALCULATED.3IONS-SCNC: 20 MMOL/L (ref 7–15)
BUN SERPL-MCNC: 37.6 MG/DL (ref 8–23)
CALCIUM SERPL-MCNC: 9.8 MG/DL (ref 8.8–10.4)
CHLORIDE SERPL-SCNC: 87 MMOL/L (ref 98–107)
CREAT SERPL-MCNC: 1.47 MG/DL (ref 0.67–1.17)
EGFRCR SERPLBLD CKD-EPI 2021: 52 ML/MIN/1.73M2
HCO3 SERPL-SCNC: 27 MMOL/L (ref 22–29)
POTASSIUM SERPL-SCNC: 3.8 MMOL/L (ref 3.4–5.3)
SODIUM SERPL-SCNC: 134 MMOL/L (ref 135–145)

## 2025-02-27 ENCOUNTER — LAB REQUISITION (OUTPATIENT)
Dept: LAB | Facility: CLINIC | Age: 65
End: 2025-02-27
Payer: MEDICARE

## 2025-02-27 ENCOUNTER — OFFICE VISIT (OUTPATIENT)
Dept: CARDIOLOGY | Facility: CLINIC | Age: 65
End: 2025-02-27
Payer: MEDICARE

## 2025-02-27 DIAGNOSIS — I50.32 HEART FAILURE WITH IMPROVED EJECTION FRACTION (HFIMPEF) (H): Primary | ICD-10-CM

## 2025-02-27 DIAGNOSIS — I50.33 ACUTE ON CHRONIC DIASTOLIC (CONGESTIVE) HEART FAILURE (H): ICD-10-CM

## 2025-03-01 ENCOUNTER — APPOINTMENT (OUTPATIENT)
Dept: RADIOLOGY | Facility: HOSPITAL | Age: 65
End: 2025-03-01
Attending: EMERGENCY MEDICINE
Payer: MEDICARE

## 2025-03-01 ENCOUNTER — HOSPITAL ENCOUNTER (EMERGENCY)
Facility: HOSPITAL | Age: 65
Discharge: HOME | End: 2025-03-01
Attending: EMERGENCY MEDICINE | Admitting: EMERGENCY MEDICINE
Payer: MEDICARE

## 2025-03-01 ENCOUNTER — APPOINTMENT (OUTPATIENT)
Dept: CT IMAGING | Facility: HOSPITAL | Age: 65
End: 2025-03-01
Attending: EMERGENCY MEDICINE
Payer: MEDICARE

## 2025-03-01 VITALS
SYSTOLIC BLOOD PRESSURE: 134 MMHG | RESPIRATION RATE: 18 BRPM | BODY MASS INDEX: 41.14 KG/M2 | HEART RATE: 75 BPM | HEIGHT: 66 IN | TEMPERATURE: 97.4 F | OXYGEN SATURATION: 94 % | DIASTOLIC BLOOD PRESSURE: 70 MMHG | WEIGHT: 256 LBS

## 2025-03-01 DIAGNOSIS — T14.8XXA HEMATOMA OF SKIN: ICD-10-CM
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
## 2025-03-01 DIAGNOSIS — J44.1 COPD EXACERBATION (H): ICD-10-CM
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
## 2025-03-01 DIAGNOSIS — S40.011A CONTUSION OF RIGHT SHOULDER, INITIAL ENCOUNTER: ICD-10-CM

## 2025-03-01 DIAGNOSIS — Z79.01 ANTICOAGULATED BY ANTICOAGULATION TREATMENT: ICD-10-CM

## 2025-03-01 LAB
ALBUMIN UR-MCNC: NEGATIVE MG/DL
ANION GAP SERPL CALCULATED.3IONS-SCNC: 9 MMOL/L (ref 7–15)
APPEARANCE UR: CLEAR
APTT PPP: 29 SECONDS (ref 22–38)
BASOPHILS # BLD AUTO: 0.1 10E3/UL (ref 0–0.2)
BASOPHILS NFR BLD AUTO: 1 %
BILIRUB UR QL STRIP: NEGATIVE
BUN SERPL-MCNC: 30.6 MG/DL (ref 8–23)
CALCIUM SERPL-MCNC: 9.9 MG/DL (ref 8.8–10.4)
CHLORIDE SERPL-SCNC: 94 MMOL/L (ref 98–107)
COLOR UR AUTO: COLORLESS
CREAT SERPL-MCNC: 1.41 MG/DL (ref 0.67–1.17)
EGFRCR SERPLBLD CKD-EPI 2021: 55 ML/MIN/1.73M2
EOSINOPHIL # BLD AUTO: 0.1 10E3/UL (ref 0–0.7)
EOSINOPHIL NFR BLD AUTO: 1 %
ERYTHROCYTE [DISTWIDTH] IN BLOOD BY AUTOMATED COUNT: 21.7 % (ref 10–15)
FLUAV RNA SPEC QL NAA+PROBE: NEGATIVE
FLUBV RNA RESP QL NAA+PROBE: NEGATIVE
GLUCOSE SERPL-MCNC: 293 MG/DL (ref 70–99)
GLUCOSE UR STRIP-MCNC: >1000 MG/DL
HCO3 SERPL-SCNC: 34 MMOL/L (ref 22–29)
HCT VFR BLD AUTO: 38.7 % (ref 40–53)
HGB BLD-MCNC: 11.1 G/DL (ref 13.3–17.7)
HGB UR QL STRIP: NEGATIVE
IMM GRANULOCYTES # BLD: 0.2 10E3/UL
IMM GRANULOCYTES NFR BLD: 2 %
INR PPP: 1.06 (ref 0.85–1.15)
KETONES UR STRIP-MCNC: NEGATIVE MG/DL
LEUKOCYTE ESTERASE UR QL STRIP: NEGATIVE
LYMPHOCYTES # BLD AUTO: 0.9 10E3/UL (ref 0.8–5.3)
LYMPHOCYTES NFR BLD AUTO: 11 %
MCH RBC QN AUTO: 25.2 PG (ref 26.5–33)
MCHC RBC AUTO-ENTMCNC: 28.7 G/DL (ref 31.5–36.5)
MCV RBC AUTO: 88 FL (ref 78–100)
MONOCYTES # BLD AUTO: 0.7 10E3/UL (ref 0–1.3)
MONOCYTES NFR BLD AUTO: 8 %
NEUTROPHILS # BLD AUTO: 6.6 10E3/UL (ref 1.6–8.3)
NEUTROPHILS NFR BLD AUTO: 78 %
NITRATE UR QL: NEGATIVE
NRBC # BLD AUTO: 0 10E3/UL
NRBC BLD AUTO-RTO: 0 /100
NT-PROBNP SERPL-MCNC: 248 PG/ML (ref 0–900)
PH UR STRIP: 6.5 [PH] (ref 5–7)
PLATELET # BLD AUTO: 156 10E3/UL (ref 150–450)
POTASSIUM SERPL-SCNC: 4.6 MMOL/L (ref 3.4–5.3)
RBC # BLD AUTO: 4.4 10E6/UL (ref 4.4–5.9)
RBC URINE: <1 /HPF
RSV RNA SPEC NAA+PROBE: NEGATIVE
SARS-COV-2 RNA RESP QL NAA+PROBE: NEGATIVE
SODIUM SERPL-SCNC: 137 MMOL/L (ref 135–145)
SP GR UR STRIP: 1.02 (ref 1–1.03)
TROPONIN T SERPL HS-MCNC: 37 NG/L
TROPONIN T SERPL HS-MCNC: 40 NG/L
UROBILINOGEN UR STRIP-MCNC: <2 MG/DL
WBC # BLD AUTO: 8.5 10E3/UL (ref 4–11)
WBC URINE: <1 /HPF

## 2025-03-01 PROCEDURE — 83880 ASSAY OF NATRIURETIC PEPTIDE: CPT | Performed by: EMERGENCY MEDICINE

## 2025-03-01 PROCEDURE — 81003 URINALYSIS AUTO W/O SCOPE: CPT | Performed by: EMERGENCY MEDICINE

## 2025-03-01 PROCEDURE — 73030 X-RAY EXAM OF SHOULDER: CPT | Mod: RT

## 2025-03-01 PROCEDURE — 85004 AUTOMATED DIFF WBC COUNT: CPT | Performed by: EMERGENCY MEDICINE

## 2025-03-01 PROCEDURE — 99285 EMERGENCY DEPT VISIT HI MDM: CPT | Mod: 25

## 2025-03-01 PROCEDURE — 81001 URINALYSIS AUTO W/SCOPE: CPT | Performed by: EMERGENCY MEDICINE

## 2025-03-01 PROCEDURE — 87637 SARSCOV2&INF A&B&RSV AMP PRB: CPT | Performed by: EMERGENCY MEDICINE

## 2025-03-01 PROCEDURE — 85048 AUTOMATED LEUKOCYTE COUNT: CPT | Performed by: EMERGENCY MEDICINE

## 2025-03-01 PROCEDURE — 80048 BASIC METABOLIC PNL TOTAL CA: CPT | Performed by: EMERGENCY MEDICINE

## 2025-03-01 PROCEDURE — 70450 CT HEAD/BRAIN W/O DYE: CPT

## 2025-03-01 PROCEDURE — 999N000157 HC STATISTIC RCP TIME EA 10 MIN

## 2025-03-01 PROCEDURE — 71046 X-RAY EXAM CHEST 2 VIEWS: CPT

## 2025-03-01 PROCEDURE — 250N000013 HC RX MED GY IP 250 OP 250 PS 637: Performed by: EMERGENCY MEDICINE

## 2025-03-01 PROCEDURE — 93005 ELECTROCARDIOGRAM TRACING: CPT | Performed by: EMERGENCY MEDICINE

## 2025-03-01 PROCEDURE — 82565 ASSAY OF CREATININE: CPT | Performed by: EMERGENCY MEDICINE

## 2025-03-01 PROCEDURE — 250N000009 HC RX 250: Performed by: EMERGENCY MEDICINE

## 2025-03-01 PROCEDURE — 85610 PROTHROMBIN TIME: CPT | Performed by: EMERGENCY MEDICINE

## 2025-03-01 PROCEDURE — 94640 AIRWAY INHALATION TREATMENT: CPT

## 2025-03-01 PROCEDURE — 36415 COLL VENOUS BLD VENIPUNCTURE: CPT | Performed by: EMERGENCY MEDICINE

## 2025-03-01 PROCEDURE — 72125 CT NECK SPINE W/O DYE: CPT

## 2025-03-01 PROCEDURE — 85730 THROMBOPLASTIN TIME PARTIAL: CPT | Performed by: EMERGENCY MEDICINE

## 2025-03-01 PROCEDURE — 84484 ASSAY OF TROPONIN QUANT: CPT | Performed by: EMERGENCY MEDICINE

## 2025-03-01 PROCEDURE — 250N000012 HC RX MED GY IP 250 OP 636 PS 637: Performed by: EMERGENCY MEDICINE

## 2025-03-01 RX ORDER — ROPINIROLE 0.25 MG/1
0.25 TABLET, FILM COATED ORAL ONCE
Status: COMPLETED | OUTPATIENT
Start: 2025-03-01 | End: 2025-03-01

## 2025-03-01 RX ORDER — PREDNISONE 20 MG/1
40 TABLET ORAL DAILY
Qty: 10 TABLET | Refills: 0 | Status: SHIPPED | OUTPATIENT
Start: 2025-03-01 | End: 2025-03-06

## 2025-03-01 RX ORDER — PREDNISONE 20 MG/1
40 TABLET ORAL ONCE
Status: COMPLETED | OUTPATIENT
Start: 2025-03-01 | End: 2025-03-01

## 2025-03-01 RX ORDER — IPRATROPIUM BROMIDE AND ALBUTEROL SULFATE 2.5; .5 MG/3ML; MG/3ML
3 SOLUTION RESPIRATORY (INHALATION)
Status: DISCONTINUED | OUTPATIENT
Start: 2025-03-01 | End: 2025-03-01 | Stop reason: HOSPADM

## 2025-03-01 RX ORDER — ALBUTEROL SULFATE 0.83 MG/ML
2.5 SOLUTION RESPIRATORY (INHALATION) EVERY 6 HOURS PRN
Qty: 90 ML | Refills: 0 | Status: SHIPPED | OUTPATIENT
Start: 2025-03-01 | End: 2025-03-31

## 2025-03-01 RX ORDER — ALBUTEROL SULFATE 5 MG/ML
2.5 SOLUTION RESPIRATORY (INHALATION) EVERY 6 HOURS PRN
Status: DISCONTINUED | OUTPATIENT
Start: 2025-03-01 | End: 2025-03-01 | Stop reason: HOSPADM

## 2025-03-01 RX ADMIN — PREDNISONE 40 MG: 20 TABLET ORAL at 14:23

## 2025-03-01 RX ADMIN — ALBUTEROL SULFATE 2.5 MG: 2.5 SOLUTION RESPIRATORY (INHALATION) at 12:21

## 2025-03-01 RX ADMIN — ROPINIROLE HYDROCHLORIDE 0.25 MG: 0.25 TABLET, FILM COATED ORAL at 12:05

## 2025-03-01 ASSESSMENT — ACTIVITIES OF DAILY LIVING (ADL)
ADLS_ACUITY_SCORE: 58

## 2025-03-01 NOTE — ED TRIAGE NOTES
Patient is from assisted living--was getting off the toilet today when he lost his footing and fell to the right--hitting the right side of his head and his right shoulder.  Is on elequist.  No LOC--arrives alert and oriented     Triage Assessment (Adult)       Row Name 03/01/25 0940          Triage Assessment    Airway WDL WDL        Respiratory WDL    Respiratory WDL WDL        Skin Circulation/Temperature WDL    Skin Circulation/Temperature WDL WDL        Cardiac WDL    Cardiac WDL WDL        Peripheral/Neurovascular WDL    Peripheral Neurovascular WDL WDL        Cognitive/Neuro/Behavioral WDL    Cognitive/Neuro/Behavioral WDL WDL

## 2025-03-01 NOTE — ED NOTES
Bed: [facility]-G  Expected date: 3/1/25  Expected time: 9:30 AM  Means of arrival:   Comments:  Fall/hit head / on thinners/[place]

## 2025-03-01 NOTE — DISCHARGE INSTRUCTIONS
Read and follow the discharge instructions.    Take your regular medications as instructed.    Be careful when ambulating we want to make sure you do not fall    Call your primary care doctor on Monday to follow-up.    In the meanwhile use your albuterol MDI.    Start the prednisone when you are able to fill the prescription.    Return or call 911 for any concerns.

## 2025-03-01 NOTE — ED TRIAGE NOTES
Patient comes from assisted living--he was getting off the toilet today when he had a fall--hitting his right side of his head--is on elequist--arrives alert and oriented.      Triage Assessment (Adult)       Row Name 03/01/25 0940          Triage Assessment    Airway WDL WDL        Respiratory WDL    Respiratory WDL WDL        Skin Circulation/Temperature WDL    Skin Circulation/Temperature WDL WDL        Cardiac WDL    Cardiac WDL WDL        Peripheral/Neurovascular WDL    Peripheral Neurovascular WDL WDL        Cognitive/Neuro/Behavioral WDL    Cognitive/Neuro/Behavioral WDL WDL                        Triage Assessment (Adult)       Row Name 03/01/25 0940          Triage Assessment    Airway WDL WDL        Respiratory WDL    Respiratory WDL WDL        Skin Circulation/Temperature WDL    Skin Circulation/Temperature WDL WDL        Cardiac WDL    Cardiac WDL WDL        Peripheral/Neurovascular WDL    Peripheral Neurovascular WDL WDL        Cognitive/Neuro/Behavioral WDL    Cognitive/Neuro/Behavioral WDL WDL

## 2025-03-01 NOTE — ED PROVIDER NOTES
EMERGENCY DEPARTMENT ENCOUNTER      NAME: Zackary Hutchison  AGE: 64 year old male  YOB: 1960  MRN: [id number]  EVALUATION DATE & TIME: 3/1/2025  9:36 AM    PCP: Reid Escobar    ED PROVIDER: Savanna Booker M.D.      CHIEF COMPLAINT     Chief Complaint   Patient presents with    Fall         FINAL IMPRESSION:     1. Fall at home, initial encounter    2. Contusion of right shoulder, initial encounter    3. Hematoma of skin    4. COPD exacerbation (H)    5. Anticoagulated by anticoagulation treatment          MEDICAL DECISION MAKING:     ED Course as of 03/01/25 1653   Sat Mar 01, 2025   0959 Mr. Lord is a 65 yo with pmh significant for hear failure, a.fib obesity copd CKD  Who presents via EMS after fall.  Patient was getting out of the toilet.  He felt like his extremities were too tightly wrapped by his wound care nurse yesterday and he fell landing on the right side.  No loss of consciousness.  Always has shortness of breath because of COPD.  Complaining of right shoulder trapezius pain.   0959 On Examination well-appearing elevated BMI minimal abrasion to the right forehead with no hematoma he does have a small hematoma located on the upper thoracic on the right.  And shoulder tenderness palpation.  There is no other ecchymosis.  GCS of 15.   1134 Trauma alert called   1135 Trauma evaluation included a CT head and CT C-spine shoulder x-ray and chest x-ray.   1135 CT head independently interpreted by me reveals no intracranial hemorrhage formal read by radiologist.   1243 Trauma Evaluation by radiologist reveals no intracranial hemorrhage no fracture.   1253 Patient quite wheezy states he is in COPD.  Given an albuterol Atrovent nebulizer see.  Reports this is appropriate for restless leg syndrome.   1300 Other Etiologies for wheezing were considered such as congestive heart failure BNP within normal limits.   1426 EK compared to EKG on February 12, 2025 inverted T wave in V2 is new.  G sinus rhythm.   Right bundle branch block.  Inverted T wave in V2.   1643 Patient denies chest pain. But given comorbidies troponin ordered. Initial 37 delta 40.     1644 Over the last 6 months troponin chronically elevated range of 26 to 40.  Patient observed in the ED  Was given something to eat  Was able to ambulate multiple times with a walker to the restroom on his own.  UA with no evidence of infection  We discussed labs including troponin. Patient denies cp near syncope diaphoresis. States feels comfortable going back to his assisted living.     1646 On revaluation no wheezes only ronchi.  Patient states prednisone helped last time.  He was given 40 mg PO at the ED and prescription for 40 mg po every day for 4 days sent to his pharmacy.  He also requested refill of his albuterol nebs.  He voiced concern about being able to  prescriptions.  [facility] pharmacy has a delivery option but he can't afford it.  I spoke with Care Manager and given that he is in assisted living recommended he talks with the inpatient SW to have his medications.  He currently has MDI at home  Patient discharged in stable condition. Recommended prompt follow up and strict return precautions given.   1649 Clinical impression and decision making  63 yo male presents after fall  History of fall  Anticoagualted  Trauma alert  Trauma evaluation negative  Wheezing in ED hisotry of COPD  No chest pain  EKG ivnerted t in v2   Troponin chronically elevated  No evidence of infection  Able to ambulate with walker. No cp no dizziness no near syncope  Discharged after SDM  Prednisone for codp  Discussed safety with ambulation and SW discussion at assisted living for medications delivery       Medical Decision Making    History:  Supplemental history from: EMS  External Record(s) reviewed: cardiology 2/24/2025 HF with improved ef, non compliance with dietary restrictions, renal dysfunction, CAD     Work Up:  Chart documentation includes differential considered  and any EKGs or imaging independently interpreted by provider, where specified.  In additional to work up documented, I considered the following work up: antibiotics no consolidation of clinical sx of pneumonia    External consultation:  Discussion of management with another provider:     Complicating factors:  Care impacted by chronic illness:     Disposition considerations: Discharge. I prescribed additional prescription strength medication(s) as charted. I considered admission, but discharged the patient after share decision making conversation.    MIPS Adult Minor Head Trauma:Age 65 years or older on anticoagulant medication         ED COURSE         At the conclusion of the encounter I discussed the results of all of the tests and the disposition. The questions were answered. The patient acknowledged understanding and was agreeable with the care plan.     9:48 AM I met with the patient, obtained history, performed an initial exam, and discussed options and plan for diagnostics and treatment here in the ED.  11:26 AM I rechecked the patient.     MEDICATIONS GIVEN IN THE EMERGENCY:     Medications   albuterol (PROVENTIL) neb solution 2.5 mg (2.5 mg Nebulization $Given 3/1/25 1221)   ipratropium - albuterol 0.5 mg/2.5 mg/3 mL (DUONEB) neb solution 3 mL (has no administration in time range)   rOPINIRole (REQUIP) tablet 0.25 mg (0.25 mg Oral $Given 3/1/25 1205)   predniSONE (DELTASONE) tablet 40 mg (40 mg Oral $Given 3/1/25 1423)       NEW PRESCRIPTIONS STARTED AT TODAY'S ER VISIT     Discharge Medication List as of 3/1/2025  3:41 PM        START taking these medications    Details   albuterol (PROVENTIL) (2.5 MG/3ML) 0.083% neb solution Take 1 vial (2.5 mg) by nebulization every 6 hours as needed for shortness of breath., Disp-90 mL, R-0, E-Prescribe                =================================================================    HPI     Patient information was obtained from: patient     Use of : N/A        Zackary Hutchison is a 64 year old male who presents by EMS.     Patient stated that he had a fall this morning while getting off of the toilet. This was caused by weakness in his legs due to his leg wrap being to tight. He described the fall as getting off of the toilet reaching for his walker, falling, and hitting the right side of his head and his right shoulder on the ground. He is on a blood thinner and takes Eliquis. He lives at an assisted living facility and pressed a pendent on his keys after the fall so staff was able to assist him. Now, he endorses pain to his right shoulder and right side of his head. He also reports that his leg wrapping is to tight.     He mentioned an extensive history of falls. He noted that he was seen here in November for a fall.     He reports he has shortness of breath at baseline due to his COPD, but said that this is unchanged.     Patient denied any fever, chills, vomiting, diarrhea, hematochezia, hematuria, and did not mention any other symptoms/ concerns.       REVIEW OF SYSTEMS   Review of Systems   SEE HPI    PAST MEDICAL HISTORY:     Past Medical History:   Diagnosis Date    Atrial fibrillation (H)     Chronic kidney disease     Chronic kidney disease, stage 3b (H) 09/28/2023    Class 3 severe obesity due to excess calories with body mass index (BMI) of 40.0 to 44.9 in adult (H) 09/28/2023    Congestive heart failure (H)     COPD (chronic obstructive pulmonary disease) (H)     Coronary artery disease involving coronary bypass graft of native heart without angina pectoris 09/28/2023    Heart failure with reduced ejection fraction (H) 09/11/2023    HFrEF (heart failure with reduced ejection fraction) (H) 09/11/2023    Hyperlipidemia     Hypertension     Ischemic cardiomyopathy 09/28/2023    Obese     Paroxysmal atrial fibrillation (H) 09/28/2023    Tobacco abuse 09/28/2023       PAST SURGICAL HISTORY:     Past Surgical History:   Procedure Laterality Date    CORONARY  ANGIOGRAPHY ADULT ORDER      CORONARY ARTERY BYPASS Left 2014    2 vessels    CV CARDIOMEMS WITH RIGHT HEART CATH N/A 2/15/2024    Procedure: Pulmonary Arterial Pressure Sensor Placement;  Surgeon: Jacey Bhandari MD;  Location: [facility]    CV CORONARY ANGIOGRAM N/A 09/11/2023    Procedure: Coronary Angiogram;  Surgeon: Santy Esposito MD;  Location: [facility]    CV CORONARY ANGIOGRAM N/A 5/2/2024    Procedure: CV CORONARY ANGIOGRAM;  Surgeon: Santy Esposito MD;  Location: [facility]    CV LEFT HEART CATH N/A 09/11/2023    Procedure: Left Heart Catheterization;  Surgeon: Santy Esposito MD;  Location: [facility]    CV LEFT HEART CATH N/A 5/2/2024    Procedure: Left Heart Catheterization;  Surgeon: Santy Esposito MD;  Location: [facility]    CV PCI ANGIOPLASTY N/A 5/2/2024    Procedure: Percutaneous Transluminal Angioplasty;  Surgeon: Santy Esposito MD;  Location: [facility]    CV RIGHT HEART CATH MEASUREMENTS RECORDED N/A 09/11/2023    Procedure: Right Heart Catheterization;  Surgeon: Santy Esposito MD;  Location: [facility]         CURRENT MEDICATIONS:   albuterol (PROVENTIL) (2.5 MG/3ML) 0.083% neb solution  predniSONE (DELTASONE) 20 MG tablet  acetaminophen (TYLENOL) 500 MG tablet  albuterol (PROAIR HFA/PROVENTIL HFA/VENTOLIN HFA) 108 (90 Base) MCG/ACT inhaler  amiodarone (PACERONE) 200 MG tablet  apixaban ANTICOAGULANT (ELIQUIS) 5 MG tablet  atorvastatin (LIPITOR) 40 MG tablet  carvedilol (COREG) 3.125 MG tablet  clopidogrel (PLAVIX) 75 MG tablet  dapagliflozin (FARXIGA) 5 MG TABS tablet  diclofenac (VOLTAREN) 1 % topical gel  doxycycline hyclate (VIBRAMYCIN) 100 MG capsule  doxycycline monohydrate (MONODOX) 100 MG capsule  DULoxetine (CYMBALTA) 30 MG capsule  hydrALAZINE (APRESOLINE) 10 MG tablet  ipratropium - albuterol 0.5 mg/2.5 mg/3 mL (DUONEB) 0.5-2.5 (3) MG/3ML neb solution  isosorbide mononitrate (IMDUR) 60 MG  24 hr tablet  metFORMIN (GLUCOPHAGE XR) 500 MG 24 hr tablet  metolazone (ZAROXOLYN) 2.5 MG tablet  nitroGLYcerin (NITROSTAT) 0.4 MG sublingual tablet  polyethylene glycol (MIRALAX) 17 GM/Dose powder  potassium chloride barbara ER (KLOR-CON M20) 20 MEQ CR tablet  rOPINIRole (REQUIP) 5 MG tablet  spironolactone (ALDACTONE) 25 MG tablet  spironolactone (ALDACTONE) 25 MG tablet  torsemide (DEMADEX) 100 MG tablet  umeclidinium-vilanterol (ANORO ELLIPTA) 62.5-25 MCG/ACT oral inhaler  vitamin B-12 (CYANOCOBALAMIN) 1000 MCG tablet         ALLERGIES:     Allergies   Allergen Reactions    Bumex [Bumetanide] Muscle Pain (Myalgia)       FAMILY HISTORY:     Family History   Problem Relation Age of Onset    Cerebrovascular Disease Mother     Myocardial Infarction Father        SOCIAL HISTORY:     Social History     Socioeconomic History    Marital status: Single   Occupational History    Occupation: Retired restaurant owner     Comment: [employer]   Tobacco Use    Smoking status: Every Day     Current packs/day: 0.50     Types: Cigarettes    Smokeless tobacco: Never    Tobacco comments:     Seen IP by [facility] on 9/11/23 and declined cessation services and materials   Substance and Sexual Activity    Alcohol use: Not Currently   Social History Narrative    Currently lives in an assisted living facility. (Updated: 01/08/2024)     Social Drivers of Health     Financial Resource Strain: Low Risk  (11/19/2024)    Financial Resource Strain     Within the past 12 months, have you or your family members you live with been unable to get utilities (heat, electricity) when it was really needed?: No   Food Insecurity: Low Risk  (11/19/2024)    Food Insecurity     Within the past 12 months, did you worry that your food would run out before you got money to buy more?: No     Within the past 12 months, did the food you bought just not last and you didn t have money to get more?: No   Transportation Needs: Low Risk  (11/19/2024)    Transportation Needs  "    Within the past 12 months, has lack of transportation kept you from medical appointments, getting your medicines, non-medical meetings or appointments, work, or from getting things that you need?: No   Interpersonal Safety: Low Risk  (11/20/2024)    Interpersonal Safety     Do you feel physically and emotionally safe where you currently live?: Yes     Within the past 12 months, have you been hit, slapped, kicked or otherwise physically hurt by someone?: No     Within the past 12 months, have you been humiliated or emotionally abused in other ways by your partner or ex-partner?: No   Recent Concern: Interpersonal Safety - High Risk (9/17/2024)    Interpersonal Safety     Do you feel physically and emotionally safe where you currently live?: No     Within the past 12 months, have you been hit, slapped, kicked or otherwise physically hurt by someone?: No     Within the past 12 months, have you been humiliated or emotionally abused in other ways by your partner or ex-partner?: No   Housing Stability: Low Risk  (11/19/2024)    Housing Stability     Do you have housing? : Yes     Are you worried about losing your housing?: No       VITALS:   /70   Pulse 75   Temp 97.4  F (36.3  C) (Oral)   Resp 18   Ht 1.676 m (5' 6\")   Wt 116.1 kg (256 lb)   SpO2 94%   BMI 41.32 kg/m      PHYSICAL EXAM     Physical Exam  Vitals and nursing note reviewed. Exam conducted with a chaperone present.   Constitutional:       General: He is not in acute distress.     Appearance: Normal appearance. He is obese. He is not ill-appearing or diaphoretic.   Pulmonary:      Effort: No respiratory distress.      Breath sounds: No stridor. Rhonchi present. No wheezing or rales.   Chest:      Chest wall: No tenderness.   Neurological:      Mental Status: He is alert.         Physical Exam   Constitutional: non toxic cooperative elevated BMI    Head: Atraumatic.   Abrasion to right side of head.     Nose: Nose normal.     Mouth/Throat: " Oropharynx is clear and moist.     Eyes: EOM are normal. Pupils are equal, round, and reactive to light. No hyphema     Ears: Bilateral pearly white tympanic membranes. No hemotympanum.     Neck: Normal range of motion. Neck supple.     Cardiovascular: Normal rate, regular rhythm and normal heart sounds.  2+ femoral pulses/radial/DP pulses B    Pulmonary/Chest: b expiratory wheezes    Abdominal: soft nontender. Protuberant no signs of trauma    Musculoskeletal: Normal range of motion. No midline cervical, thoracic, or lumbar midline tenderness to palpation.     Neurological: Moves upper and lower extremities equally. GCS 15.     Lymphatics: no edema, no calves pain, no palpable cords.  Bilateral lower extremities tightly wrapped with dressing. No cyanosis   Dressing removed per his request    : NA    Skin: Skin is warm and dry. Abrasion right temporal area. Contusion right upper back    Psychiatric: Normal mood and affect. Behavior is normal.       LAB:     All pertinent labs reviewed and interpreted.  Labs Ordered and Resulted from Time of ED Arrival to Time of ED Departure   BASIC METABOLIC PANEL - Abnormal       Result Value    Sodium 137      Potassium 4.6      Chloride 94 (*)     Carbon Dioxide (CO2) 34 (*)     Anion Gap 9      Urea Nitrogen 30.6 (*)     Creatinine 1.41 (*)     GFR Estimate 55 (*)     Calcium 9.9      Glucose 293 (*)    CBC WITH PLATELETS AND DIFFERENTIAL - Abnormal    WBC Count 8.5      RBC Count 4.40      Hemoglobin 11.1 (*)     Hematocrit 38.7 (*)     MCV 88      MCH 25.2 (*)     MCHC 28.7 (*)     RDW 21.7 (*)     Platelet Count 156      % Neutrophils 78      % Lymphocytes 11      % Monocytes 8      % Eosinophils 1      % Basophils 1      % Immature Granulocytes 2      NRBCs per 100 WBC 0      Absolute Neutrophils 6.6      Absolute Lymphocytes 0.9      Absolute Monocytes 0.7      Absolute Eosinophils 0.1      Absolute Basophils 0.1      Absolute Immature Granulocytes 0.2      Absolute  NRBCs 0.0     ROUTINE UA WITH MICROSCOPIC REFLEX TO CULTURE - Abnormal    Color Urine Colorless      Appearance Urine Clear      Glucose Urine >1000 (*)     Bilirubin Urine Negative      Ketones Urine Negative      Specific Gravity Urine 1.018      Blood Urine Negative      pH Urine 6.5      Protein Albumin Urine Negative      Urobilinogen Urine <2.0      Nitrite Urine Negative      Leukocyte Esterase Urine Negative      RBC Urine <1      WBC Urine <1     TROPONIN T, HIGH SENSITIVITY - Abnormal    Troponin T, High Sensitivity 37 (*)    TROPONIN T, HIGH SENSITIVITY - Abnormal    Troponin T, High Sensitivity 40 (*)    INR - Normal    INR 1.06     PARTIAL THROMBOPLASTIN TIME - Normal    aPTT 29     NT PROBNP INPATIENT - Normal    N terminal Pro BNP Inpatient 248     INFLUENZA A/B, RSV AND SARS-COV2 PCR - Normal    Influenza A PCR Negative      Influenza B PCR Negative      RSV PCR Negative      SARS CoV2 PCR Negative          RADIOLOGY:     Reviewed all pertinent imaging. Please see official radiology report.  CT Cervical Spine w/o Contrast   Final Result   IMPRESSION:    1.  No acute cervical spine fracture.   2.  Degenerative changes with moderate to severe spinal canal stenosis most pronounced at C4-C5 and C5-C6, not substantially changed versus prior.         Head CT w/o contrast   Final Result   IMPRESSION:   1.  No acute intracranial injury, hemorrhage, mass, or CT evidence of recent ischemia.   2.  Dependent fluid and secretions within the maxillary sinuses. Correlate for any symptoms of sinusitis.         Chest XR,  PA & LAT   Final Result   IMPRESSION: No acute cardiopulmonary abnormality. Stable cardiomediastinal silhouette.      XR Shoulder Right 2 Views   Final Result   IMPRESSION: Mild osteoarthrosis of the AC joint. Otherwise, negative. There is no evidence of fracture. No subluxation or dislocation.              EKG:     EKG #1  Sinus rhythm right bundle branch block inverted T wave V2 compared to EKG in  February 2025 bradycardia T wave in V2 is new.    Time:[id number]    Ventricular rate 82 bmp  Axis normal  AZ interval ms  QRS duration 126 ms  QT//497 ms    Compared to previous EKG on also February 2025 inverted T wave V2 is new    I have independently reviewed and interpreted the EKG(s) documented above.      PROCEDURES:     Procedures      I, Kenney Gomez, am serving as a scribe to document services personally performed by Dr. Booker based on my observation and the provider's statements to me. I, Savanna Booker MD attest that Kenney Gomez is acting in a scribe capacity, has observed my performance of the services and has documented them in accordance with my direction.    Savanna Booker M.D.  Emergency Medicine  [facility] EMERGENCY DEPARTMENT  [address] [place] [id number]  [phone]  Dept: [phone]       Savanna Booker MD  03/01/25 3967

## 2025-03-02 LAB
ATRIAL RATE - MUSE: 82 BPM
DIASTOLIC BLOOD PRESSURE - MUSE: 66 MMHG
INTERPRETATION ECG - MUSE: NORMAL
P AXIS - MUSE: NORMAL DEGREES
PR INTERVAL - MUSE: NORMAL MS
QRS DURATION - MUSE: 126 MS
QT - MUSE: 426 MS
QTC - MUSE: 497 MS
R AXIS - MUSE: 83 DEGREES
SYSTOLIC BLOOD PRESSURE - MUSE: 108 MMHG
T AXIS - MUSE: 82 DEGREES
VENTRICULAR RATE- MUSE: 82 BPM

## 2025-03-03 LAB
ANION GAP SERPL CALCULATED.3IONS-SCNC: 18 MMOL/L (ref 7–15)
BUN SERPL-MCNC: 34.5 MG/DL (ref 8–23)
CALCIUM SERPL-MCNC: 9.7 MG/DL (ref 8.8–10.4)
CHLORIDE SERPL-SCNC: 89 MMOL/L (ref 98–107)
CREAT SERPL-MCNC: 1.33 MG/DL (ref 0.67–1.17)
EGFRCR SERPLBLD CKD-EPI 2021: 59 ML/MIN/1.73M2
GLUCOSE SERPL-MCNC: 325 MG/DL (ref 70–99)
HCO3 SERPL-SCNC: 26 MMOL/L (ref 22–29)
POTASSIUM SERPL-SCNC: 4.3 MMOL/L (ref 3.4–5.3)
SODIUM SERPL-SCNC: 133 MMOL/L (ref 135–145)

## 2025-03-03 PROCEDURE — P9604 ONE-WAY ALLOW PRORATED TRIP: HCPCS | Mod: ORL | Performed by: INTERNAL MEDICINE

## 2025-03-03 PROCEDURE — 80048 BASIC METABOLIC PNL TOTAL CA: CPT | Mod: ORL | Performed by: INTERNAL MEDICINE

## 2025-03-03 PROCEDURE — 36415 COLL VENOUS BLD VENIPUNCTURE: CPT | Mod: ORL | Performed by: INTERNAL MEDICINE

## 2025-03-05 ENCOUNTER — TELEPHONE (OUTPATIENT)
Dept: CARDIOLOGY | Facility: CLINIC | Age: 65
End: 2025-03-05
Payer: MEDICARE

## 2025-03-05 ENCOUNTER — TELEPHONE (OUTPATIENT)
Dept: NEUROSURGERY | Facility: CLINIC | Age: 65
End: 2025-03-05
Payer: MEDICARE

## 2025-03-05 ENCOUNTER — PREP FOR PROCEDURE (OUTPATIENT)
Dept: NEUROLOGY | Facility: CLINIC | Age: 65
End: 2025-03-05
Payer: MEDICARE

## 2025-03-05 DIAGNOSIS — I50.23 ACUTE ON CHRONIC SYSTOLIC CONGESTIVE HEART FAILURE (H): ICD-10-CM

## 2025-03-05 DIAGNOSIS — M47.12 CERVICAL SPONDYLOSIS WITH MYELOPATHY: Primary | ICD-10-CM

## 2025-03-05 RX ORDER — HYDRALAZINE HYDROCHLORIDE 10 MG/1
10 TABLET, FILM COATED ORAL 3 TIMES DAILY
Qty: 270 TABLET | Refills: 3 | Status: SHIPPED | OUTPATIENT
Start: 2025-03-05

## 2025-03-05 NOTE — TELEPHONE ENCOUNTER
Patient called reporting needing a refill on Hydralazine 10 mg, take 1 tablet 3 times daily.  Patient has recently seen Dr Husain.  Routing to [employer] for assistance.     Patient is asking for a call back when filled.    Thank you    Otto Rodríguez RN

## 2025-03-05 NOTE — TELEPHONE ENCOUNTER
Date: March 5, 2025    Provider: Dr. Antolin Pastor MD     Provider/Other: N/A    Reason for out-going call: SURGERY SCHEDULED/ NEEDS TO SCHEDULE       Detailed message: Patient called stating he is ready for surgery, writer told patient that writer will send message to Dr. Dangelo to put in the case request and once that is in. Writer will call  back patient to schedule surgery.           Number provider for patient: LORRAINE JIMENEZ : [phone]

## 2025-03-06 ENCOUNTER — LAB REQUISITION (OUTPATIENT)
Dept: LAB | Facility: CLINIC | Age: 65
End: 2025-03-06
Payer: MEDICARE

## 2025-03-06 ENCOUNTER — TELEPHONE (OUTPATIENT)
Dept: NEUROSURGERY | Facility: CLINIC | Age: 65
End: 2025-03-06
Payer: MEDICARE

## 2025-03-06 DIAGNOSIS — I50.33 ACUTE ON CHRONIC DIASTOLIC (CONGESTIVE) HEART FAILURE (H): ICD-10-CM

## 2025-03-06 NOTE — TELEPHONE ENCOUNTER
Date: March 6, 2025    Provider: Dr. Antolin Pastor MD     Provider/Other: n/a    Reason for out-going call: SURGERY SCHEDULED/ NEEDS TO SCHEDULE       Detailed message: ldvm for patient to call back and schedule surgery with Dr. Dangelo.           Number provider for patient: LORRAINE JIMENEZ : [phone]

## 2025-03-10 LAB
ANION GAP SERPL CALCULATED.3IONS-SCNC: 15 MMOL/L (ref 7–15)
BUN SERPL-MCNC: 27.1 MG/DL (ref 8–23)
CALCIUM SERPL-MCNC: 10.1 MG/DL (ref 8.8–10.4)
CHLORIDE SERPL-SCNC: 91 MMOL/L (ref 98–107)
CREAT SERPL-MCNC: 1.4 MG/DL (ref 0.67–1.17)
EGFRCR SERPLBLD CKD-EPI 2021: 55 ML/MIN/1.73M2
GLUCOSE SERPL-MCNC: 176 MG/DL (ref 70–99)
HCO3 SERPL-SCNC: 31 MMOL/L (ref 22–29)
POTASSIUM SERPL-SCNC: 3.9 MMOL/L (ref 3.4–5.3)
SODIUM SERPL-SCNC: 137 MMOL/L (ref 135–145)

## 2025-03-10 PROCEDURE — 36415 COLL VENOUS BLD VENIPUNCTURE: CPT | Mod: ORL | Performed by: INTERNAL MEDICINE

## 2025-03-10 PROCEDURE — P9604 ONE-WAY ALLOW PRORATED TRIP: HCPCS | Mod: ORL | Performed by: INTERNAL MEDICINE

## 2025-03-10 PROCEDURE — 80048 BASIC METABOLIC PNL TOTAL CA: CPT | Mod: ORL | Performed by: INTERNAL MEDICINE

## 2025-03-11 NOTE — PROGRESS NOTES
[facility]:  CardioMems Routine Remote Evaluation     Dates: 12/27/24 through 1/27/25    Adjustments made in the last month: None    No adjustments made this month.  Discussed with patient/caregiver and they will continue current plan of care.     Threshold Alert Settings: 12    Anh Velasquez CMA    Readings:    Inadequate transmissions during this billing period to submit billing.     Future Appointments   Date Time Provider Department Center   3[facility]27[facility]2025 10:30 [facility]   3/31/2025  4:00 AM [facility]   4/17/2025 11:00 AM [facility]   5/1/2025  4:00 AM [facility]   5/20/2025 10:00 AM [facility]ifford Keys [facility]   5/22/2025 12:50 PM [facility] Huff[facility]   7/3/2025 10:40 AM Sarah Dangelo MD [facility]     I have reviewed Anh Velasquez CMA 's note and agree.    Jacey Bhandari MD., S

## 2025-03-13 ENCOUNTER — LAB REQUISITION (OUTPATIENT)
Dept: LAB | Facility: CLINIC | Age: 65
End: 2025-03-13
Payer: MEDICARE

## 2025-03-13 DIAGNOSIS — I50.33 ACUTE ON CHRONIC DIASTOLIC (CONGESTIVE) HEART FAILURE (H): ICD-10-CM

## 2025-03-16 ENCOUNTER — HEALTH MAINTENANCE LETTER (OUTPATIENT)
Age: 65
End: 2025-03-16

## 2025-03-17 ENCOUNTER — TRANSFERRED RECORDS (OUTPATIENT)
Dept: MULTI SPECIALTY CLINIC | Facility: CLINIC | Age: 65
End: 2025-03-17

## 2025-03-17 LAB
ANION GAP SERPL CALCULATED.3IONS-SCNC: 17 MMOL/L (ref 7–15)
BUN SERPL-MCNC: 37.6 MG/DL (ref 8–23)
CALCIUM SERPL-MCNC: 10.3 MG/DL (ref 8.8–10.4)
CHLORIDE SERPL-SCNC: 91 MMOL/L (ref 98–107)
CREAT SERPL-MCNC: 1.35 MG/DL (ref 0.67–1.17)
EGFRCR SERPLBLD CKD-EPI 2021: 58 ML/MIN/1.73M2
GLUCOSE SERPL-MCNC: 292 MG/DL (ref 70–99)
HBA1C MFR BLD: 8.8 %
HCO3 SERPL-SCNC: 27 MMOL/L (ref 22–29)
POTASSIUM SERPL-SCNC: 3.9 MMOL/L (ref 3.4–5.3)
SODIUM SERPL-SCNC: 135 MMOL/L (ref 135–145)

## 2025-03-17 PROCEDURE — 80048 BASIC METABOLIC PNL TOTAL CA: CPT | Mod: ORL | Performed by: INTERNAL MEDICINE

## 2025-03-17 PROCEDURE — 36415 COLL VENOUS BLD VENIPUNCTURE: CPT | Mod: ORL | Performed by: INTERNAL MEDICINE

## 2025-03-17 PROCEDURE — P9604 ONE-WAY ALLOW PRORATED TRIP: HCPCS | Mod: ORL | Performed by: INTERNAL MEDICINE

## 2025-03-20 ENCOUNTER — LAB REQUISITION (OUTPATIENT)
Dept: LAB | Facility: CLINIC | Age: 65
End: 2025-03-20
Payer: MEDICARE

## 2025-03-20 DIAGNOSIS — I50.33 ACUTE ON CHRONIC DIASTOLIC (CONGESTIVE) HEART FAILURE (H): ICD-10-CM

## 2025-03-21 NOTE — PROGRESS NOTES
[facility]:  CardioMems Routine Remote Evaluation     Dates: 1/28/25 through 2/27/25    Adjustments made in the last month: None    No adjustments made this month.  Discussed with patient/caregiver and they will continue current plan of care.     Threshold Alert Settings: 12    Anh Velasquez CMA    Readings:     Inadequate transmissions during this billing period to submit billing.        Future Appointments   Date Time Provider Department Center   3/27/2025 10:30 AM [facility]   3/31/2025  4:00 AM [facility] CARDIOMEMS [facility]   4/17/2025 11:00 AM [facility]   5/1/2025  4:00 AM [facility] CARDIOMEMS [facility] N   5/20/2025 10:00 AM Clifford Keys PA-C [facility]   5/22/2025 12:50 PM Lotus Huff, CNP [facility] N   6/2/2025  4:00 AM [facility] CARDIOMEMS [facility]   7/3/2025  4:00 AM [facility] CARDIOMEMS [facility]   7/3/2025 10:40 AM [facility]   8/4/2025  4:00 AM [facility] CARDIOMEMS [facility]   9/4/2025  4:00 AM [facility] CARDIOMEMS [facility]   10/6/2025  4:00 AM [facility] CARDIOMEMS [facility]   11/6/2025  4:00 AM [facility] CARDIOMEMS [facility]   12/8/2025  4:00 AM [facility] CARDIOMEMS [facility]       I have reviewed Anh Velasquez CMA 's note and agree.    Jacey Bhandari MD., S

## 2025-03-24 LAB
ANION GAP SERPL CALCULATED.3IONS-SCNC: 22 MMOL/L (ref 7–15)
BUN SERPL-MCNC: 39.8 MG/DL (ref 8–23)
CALCIUM SERPL-MCNC: 9.9 MG/DL (ref 8.8–10.4)
CHLORIDE SERPL-SCNC: 87 MMOL/L (ref 98–107)
CREAT SERPL-MCNC: 1.43 MG/DL (ref 0.67–1.17)
EGFRCR SERPLBLD CKD-EPI 2021: 54 ML/MIN/1.73M2
GLUCOSE SERPL-MCNC: 257 MG/DL (ref 70–99)
HCO3 SERPL-SCNC: 25 MMOL/L (ref 22–29)
POTASSIUM SERPL-SCNC: 3.9 MMOL/L (ref 3.4–5.3)
SODIUM SERPL-SCNC: 134 MMOL/L (ref 135–145)

## 2025-03-24 PROCEDURE — 36415 COLL VENOUS BLD VENIPUNCTURE: CPT | Mod: ORL | Performed by: INTERNAL MEDICINE

## 2025-03-24 PROCEDURE — P9604 ONE-WAY ALLOW PRORATED TRIP: HCPCS | Mod: ORL | Performed by: INTERNAL MEDICINE

## 2025-03-24 PROCEDURE — 80048 BASIC METABOLIC PNL TOTAL CA: CPT | Mod: ORL | Performed by: INTERNAL MEDICINE

## 2025-03-26 ENCOUNTER — LAB REQUISITION (OUTPATIENT)
Dept: LAB | Facility: CLINIC | Age: 65
End: 2025-03-26
Payer: MEDICARE

## 2025-03-26 DIAGNOSIS — I50.33 ACUTE ON CHRONIC DIASTOLIC (CONGESTIVE) HEART FAILURE (H): ICD-10-CM

## 2025-03-27 ENCOUNTER — VIRTUAL VISIT (OUTPATIENT)
Dept: NEUROSURGERY | Facility: CLINIC | Age: 65
End: 2025-03-27
Payer: MEDICARE

## 2025-03-27 DIAGNOSIS — G99.2 STENOSIS OF CERVICAL SPINE WITH MYELOPATHY (H): Primary | ICD-10-CM

## 2025-03-27 DIAGNOSIS — M48.02 STENOSIS OF CERVICAL SPINE WITH MYELOPATHY (H): Primary | ICD-10-CM

## 2025-03-27 NOTE — PROGRESS NOTES
Called patient, discussed surgery, post-op course, expectations, follow up plan.    Reviewed H&P from 3/17/25 - cleared  Labs - 3/17/25 within acceptable limits  EKG - 3/17/25 sinus rhythm with right BBB    Triage note from 3/20/25 patient reports hallucinations, instructed to go to ED and declined. Discussed with Dr. Dangelo and instructed patient to follow-up with primary care to determine if safe to proceed with surgery. Patient verbalized understanding.    MRI done on 9/17/24 - in [place]    To OR as planned.     Procedure - Left cervical 3-cervical 6 open door laminoplasty     Hospital - [facility]    Surgeon - Dr. Dangelo    Date of surgery - 4/2/25    Start time - 7:30 AM    Check in time - 5:30 AM    Reviewed with patient: Arrive 2 hours prior to scheduled surgery.    Reviewed NPO instructions:  Do not eat after midnight the night before surgery.  You can drink clear liquids up until 2 hours before surgery.     Patient confirmed they have help/assistance in place at home upon discharge    Instructions: using a washcloth and a bottle of provided Hibiclens, wash your body, avoiding your face and genitals. Preferably, shower the night before surgery and the morning of surgery using a half a bottle each time for your whole body shower.    NSAID: none  Anticoagulants, blood thinners:   Plavix - hold 5 days  Eliquis - hold 5 days  GLP-1 agonists: none  DMARD: none  Supplements, herbal remedies, vitamins: holding  Medications from pain clinic: none  Dapagliflozin - hold 3 days  Metformin - hold day of    Patient reminded that Dr. Dangelo will refill pain medications for up to 6 weeks after the date of surgery, and if pain medication is still needed at that point they will have to go through primary care.    Instructed patient about the following: After your surgery, if you will be staying in-patient, a nursing provider team will be monitoring you closely throughout your stay and communicate your health status to your  surgeon and other providers.  You will be seen by Advanced Practice Providers (e.g., nurse practitioners, clinic nurse specialist, and physician assistants) who will check on you regularly to assess the status of your surgery.

## 2025-03-31 ENCOUNTER — OFFICE VISIT (OUTPATIENT)
Dept: CARDIOLOGY | Facility: CLINIC | Age: 65
End: 2025-03-31
Payer: MEDICARE

## 2025-03-31 DIAGNOSIS — I50.32 HEART FAILURE WITH IMPROVED EJECTION FRACTION (HFIMPEF) (H): Primary | ICD-10-CM

## 2025-03-31 LAB
ANION GAP SERPL CALCULATED.3IONS-SCNC: 15 MMOL/L (ref 7–15)
BUN SERPL-MCNC: 36.2 MG/DL (ref 8–23)
CALCIUM SERPL-MCNC: 9.9 MG/DL (ref 8.8–10.4)
CHLORIDE SERPL-SCNC: 89 MMOL/L (ref 98–107)
CREAT SERPL-MCNC: 1.2 MG/DL (ref 0.67–1.17)
EGFRCR SERPLBLD CKD-EPI 2021: 67 ML/MIN/1.73M2
GLUCOSE SERPL-MCNC: 227 MG/DL (ref 70–99)
HCO3 SERPL-SCNC: 31 MMOL/L (ref 22–29)
POTASSIUM SERPL-SCNC: 4.3 MMOL/L (ref 3.4–5.3)
SODIUM SERPL-SCNC: 135 MMOL/L (ref 135–145)

## 2025-03-31 PROCEDURE — P9603 ONE-WAY ALLOW PRORATED MILES: HCPCS | Mod: ORL | Performed by: INTERNAL MEDICINE

## 2025-03-31 PROCEDURE — 36415 COLL VENOUS BLD VENIPUNCTURE: CPT | Mod: ORL | Performed by: INTERNAL MEDICINE

## 2025-03-31 PROCEDURE — 80048 BASIC METABOLIC PNL TOTAL CA: CPT | Mod: ORL | Performed by: INTERNAL MEDICINE

## 2025-03-31 PROCEDURE — 93264 REM MNTR WRLS P-ART PRS SNR: CPT | Performed by: INTERNAL MEDICINE

## 2025-04-01 ENCOUNTER — TELEPHONE (OUTPATIENT)
Dept: CARDIOLOGY | Facility: CLINIC | Age: 65
End: 2025-04-01
Payer: MEDICARE

## 2025-04-01 ENCOUNTER — TELEPHONE (OUTPATIENT)
Dept: NEUROSURGERY | Facility: CLINIC | Age: 65
End: 2025-04-01
Payer: MEDICARE

## 2025-04-01 NOTE — TELEPHONE ENCOUNTER
Received call from Es DOLL from [facility] to discuss hallucination concern. They are attempting to place patient with a provider today for evaluation. Provided neurosurgery fax number.    Addendum  Received call from provider at [facility] stating he did not clear [place] for surgery.

## 2025-04-01 NOTE — TELEPHONE ENCOUNTER
Patient is reached and has been awaiting approval to move forward with Neurosurgery. He has significant pain and is eager to secure this procedure. He has not been able to get his CardioMEMS pillow unit to work and he has not called technical support. He is asked to do this today to have a change out of the unit. He agrees to do this, the number is provided to him. Heather HUERTA RN BSN, CHFN, PCCN-K

## 2025-04-02 ENCOUNTER — LAB REQUISITION (OUTPATIENT)
Dept: LAB | Facility: CLINIC | Age: 65
End: 2025-04-02
Payer: MEDICARE

## 2025-04-02 DIAGNOSIS — I50.33 ACUTE ON CHRONIC DIASTOLIC (CONGESTIVE) HEART FAILURE (H): ICD-10-CM

## 2025-04-03 NOTE — PROGRESS NOTES
[facility]:  CardioMems Routine Remote Evaluation     Dates: 2/28/25 through 3/31/25    Adjustments made in the last month: None    No adjustments made this month.  Discussed with patient/caregiver and they will continue current plan of care.     Threshold Alert Settings: 12    Anh Velasquez CMA    Readings:    Inadequate transmissions during this billing period to submit billing.      Future Appointments   Date Time Provider Department Center   5/1/2025  4:00 AM [facility]   5/22/2025 12:50 PM Lotus Huff, CNP [facility]   6/2/2025  4:00 AM [facility]   7/3/2025  4:00 AM [facility]   8/4/2025  4:00 AM [facility]   9/4/2025  4:00 AM [facility]   10/6/2025  4:00 AM [facility]   11/6/2025  4:00 AM [facility]   12/8/2025  4:00 AM [facility]       I have reviewed Anh Velasquez CMA 's note and agree.    Jacey Bhandari MD., MHS

## 2025-04-07 LAB
ALBUMIN SERPL BCG-MCNC: 4.3 G/DL (ref 3.5–5.2)
ALP SERPL-CCNC: 83 U/L (ref 40–150)
ALT SERPL W P-5'-P-CCNC: 25 U/L (ref 0–70)
ANION GAP SERPL CALCULATED.3IONS-SCNC: 17 MMOL/L (ref 7–15)
AST SERPL W P-5'-P-CCNC: 28 U/L (ref 0–45)
BILIRUB SERPL-MCNC: 0.6 MG/DL
BUN SERPL-MCNC: 40.8 MG/DL (ref 8–23)
CALCIUM SERPL-MCNC: 10.2 MG/DL (ref 8.8–10.4)
CHLORIDE SERPL-SCNC: 88 MMOL/L (ref 98–107)
CREAT SERPL-MCNC: 1.52 MG/DL (ref 0.67–1.17)
EGFRCR SERPLBLD CKD-EPI 2021: 50 ML/MIN/1.73M2
GLUCOSE SERPL-MCNC: 272 MG/DL (ref 70–99)
HCO3 SERPL-SCNC: 33 MMOL/L (ref 22–29)
POTASSIUM SERPL-SCNC: 4.4 MMOL/L (ref 3.4–5.3)
PROT SERPL-MCNC: 7.9 G/DL (ref 6.4–8.3)
SODIUM SERPL-SCNC: 138 MMOL/L (ref 135–145)

## 2025-04-07 PROCEDURE — 80053 COMPREHEN METABOLIC PANEL: CPT | Mod: ORL | Performed by: INTERNAL MEDICINE

## 2025-04-07 PROCEDURE — 36415 COLL VENOUS BLD VENIPUNCTURE: CPT | Mod: ORL | Performed by: INTERNAL MEDICINE

## 2025-04-07 PROCEDURE — P9604 ONE-WAY ALLOW PRORATED TRIP: HCPCS | Mod: ORL | Performed by: INTERNAL MEDICINE

## 2025-04-11 ENCOUNTER — LAB REQUISITION (OUTPATIENT)
Dept: LAB | Facility: CLINIC | Age: 65
End: 2025-04-11
Payer: MEDICARE

## 2025-04-11 DIAGNOSIS — I50.33 ACUTE ON CHRONIC DIASTOLIC (CONGESTIVE) HEART FAILURE (H): ICD-10-CM

## 2025-04-14 LAB
ANION GAP SERPL CALCULATED.3IONS-SCNC: 15 MMOL/L (ref 7–15)
BUN SERPL-MCNC: 26.1 MG/DL (ref 8–23)
CALCIUM SERPL-MCNC: 9.6 MG/DL (ref 8.8–10.4)
CHLORIDE SERPL-SCNC: 94 MMOL/L (ref 98–107)
CREAT SERPL-MCNC: 1.25 MG/DL (ref 0.67–1.17)
EGFRCR SERPLBLD CKD-EPI 2021: 64 ML/MIN/1.73M2
GLUCOSE SERPL-MCNC: 341 MG/DL (ref 70–99)
HCO3 SERPL-SCNC: 26 MMOL/L (ref 22–29)
POTASSIUM SERPL-SCNC: 4.9 MMOL/L (ref 3.4–5.3)
SODIUM SERPL-SCNC: 135 MMOL/L (ref 135–145)

## 2025-04-14 PROCEDURE — 36415 COLL VENOUS BLD VENIPUNCTURE: CPT | Mod: ORL | Performed by: INTERNAL MEDICINE

## 2025-04-14 PROCEDURE — P9604 ONE-WAY ALLOW PRORATED TRIP: HCPCS | Mod: ORL | Performed by: INTERNAL MEDICINE

## 2025-04-14 PROCEDURE — 80048 BASIC METABOLIC PNL TOTAL CA: CPT | Mod: ORL | Performed by: INTERNAL MEDICINE

## 2025-04-16 ENCOUNTER — LAB REQUISITION (OUTPATIENT)
Dept: LAB | Facility: CLINIC | Age: 65
End: 2025-04-16
Payer: MEDICARE

## 2025-04-16 DIAGNOSIS — I50.33 ACUTE ON CHRONIC DIASTOLIC (CONGESTIVE) HEART FAILURE (H): ICD-10-CM

## 2025-04-25 ENCOUNTER — TELEPHONE (OUTPATIENT)
Dept: CARDIOLOGY | Facility: CLINIC | Age: 65
End: 2025-04-25
Payer: MEDICARE

## 2025-04-25 NOTE — TELEPHONE ENCOUNTER
The patient's sister in law called the clinic on 25 to notify us on the patient passing away on 25.  Notification of a  patient form was filled out and sent to the [facility]-.    Thank you    Otto Rodríguez RN

## (undated) DEVICE — GUIDEWIRE FORTE FLOPPY J TOP 34949-05J

## (undated) DEVICE — CATH DIAG 4FR JL 6.0  538424

## (undated) DEVICE — INTRO MICRO MINI STICK 4FR STIFF NITINOL 45-753

## (undated) DEVICE — CATH DIAG 4FR RCB 538470

## (undated) DEVICE — ELECTRODE DEFIB CADENCE 22550R

## (undated) DEVICE — CATH DIAG 4FR ANG PIG 538453S

## (undated) DEVICE — CUSTOM PACK CORONARY SAN5BCRHEA

## (undated) DEVICE — GUIDEWIRE JTIP 3MM .035 180CM IQ35F180J3

## (undated) DEVICE — SYR ANGIOGRAPHY MULTIUSE KIT ACIST 014612

## (undated) DEVICE — VALVE HEMOSTASIS .096" COPILOT MECH 1003331

## (undated) DEVICE — INTRO SHEATH 4FRX10CM PINNACLE RSS402

## (undated) DEVICE — INTRO SHEATH 6FRX10CM PINNACLE RSS602

## (undated) DEVICE — INTRODUCER SHEATH 12FRX12CM FAST-CATH 406128

## (undated) DEVICE — CATH ANGIO INFINITI 3DRC 4FRX100CM 538476

## (undated) DEVICE — CATH BALLOON NC EMERGE 2.00X8MM H7493926708200

## (undated) DEVICE — CATH BALLOON EMERGE 2.0X15MM H7493918915200

## (undated) DEVICE — RAD CLOSURE ANGIOSEAL 8FR  610131

## (undated) DEVICE — CATH GUIDELINER 6FR 5571

## (undated) DEVICE — SYSTEM PANNUS RETENTION 4 PAD 2 STRAP CZ-PRS-04

## (undated) DEVICE — INTRO SHEATH 7FRX10CM PINNACLE RSS702

## (undated) DEVICE — CATH BALLOON NC EMERGE 2.50X12MM H7493926712250

## (undated) DEVICE — INTRO MICRO MINI STICK 4FR STD NITINOL

## (undated) DEVICE — CATH LAUNCHER 6FR EBU 4.0 LA6EBU40

## (undated) DEVICE — CATH PULM ART 7FR X 110CM, SWA

## (undated) DEVICE — GUIDEWIRE NITINOL .018/175CM N181805

## (undated) DEVICE — CATH BALLOON NC EMERGE 2.75X12MM H7493926712270

## (undated) DEVICE — CATH SPRINTER 1.5 X 12MM RX SPL15012X

## (undated) DEVICE — MANIFOLD KIT ANGIO AUTOMATED 014613

## (undated) DEVICE — CATH BALLOON NC EMERGE 3.00X12MM H7493926712300

## (undated) DEVICE — CATH ANGIO INFINITI JL4 4FRX100CM 538420

## (undated) DEVICE — WIRE GUIDE HI-TRQ  WHISPER MS JTIP 0.014"X190CM 1005357HJ

## (undated) DEVICE — GW .018IN X 300CM 5CM RADIOPAQ

## (undated) DEVICE — KIT HAND CONTROL ACIST 014644 AR-P54

## (undated) DEVICE — CATH ANGIO INFINITI JL5 4FRX100CM 538422

## (undated) DEVICE — CATH RX TAKERU PTCA BALLOON 1.5X12MM DC-RY1512UA1

## (undated) DEVICE — EXCHANGE WIRE .035 260 STAR/JFC/035/260/ M001491681

## (undated) DEVICE — DEVICE INFLATION SYR W/ HEMOSTASIS VALVE 12IN EXT IN4904

## (undated) DEVICE — DILATOR VASCULAR 8FRX20CM G00980

## (undated) DEVICE — CATH DIAG 4FR JL 4.5 538417

## (undated) DEVICE — CATH BALLOON NC EMERGE 2.25X12MM H7493926712220

## (undated) DEVICE — CATH ANGIO INFINITI IM 4FRX100CM 538460

## (undated) RX ORDER — ACETAMINOPHEN 325 MG/1
TABLET ORAL
Status: DISPENSED
Start: 2024-02-15

## (undated) RX ORDER — LIDOCAINE HYDROCHLORIDE 10 MG/ML
INJECTION, SOLUTION EPIDURAL; INFILTRATION; INTRACAUDAL; PERINEURAL
Status: DISPENSED
Start: 2024-05-02

## (undated) RX ORDER — FENTANYL CITRATE 50 UG/ML
INJECTION, SOLUTION INTRAMUSCULAR; INTRAVENOUS
Status: DISPENSED
Start: 2024-05-02

## (undated) RX ORDER — FUROSEMIDE 10 MG/ML
INJECTION INTRAMUSCULAR; INTRAVENOUS
Status: DISPENSED
Start: 2024-02-15

## (undated) RX ORDER — FENTANYL CITRATE 50 UG/ML
INJECTION, SOLUTION INTRAMUSCULAR; INTRAVENOUS
Status: DISPENSED
Start: 2023-09-11

## (undated) RX ORDER — ALBUMIN (HUMAN) 12.5 G/50ML
SOLUTION INTRAVENOUS
Status: DISPENSED
Start: 2024-01-10

## (undated) RX ORDER — FENTANYL CITRATE 50 UG/ML
INJECTION, SOLUTION INTRAMUSCULAR; INTRAVENOUS
Status: DISPENSED
Start: 2024-02-15

## (undated) RX ORDER — CLOPIDOGREL 300 MG/1
TABLET, FILM COATED ORAL
Status: DISPENSED
Start: 2024-05-02

## (undated) RX ORDER — DIAZEPAM 10 MG
TABLET ORAL
Status: DISPENSED
Start: 2023-09-11

## (undated) RX ORDER — HEPARIN SODIUM 1000 [USP'U]/ML
INJECTION, SOLUTION INTRAVENOUS; SUBCUTANEOUS
Status: DISPENSED
Start: 2024-05-02